# Patient Record
Sex: FEMALE | Race: WHITE | NOT HISPANIC OR LATINO | Employment: UNEMPLOYED | URBAN - NONMETROPOLITAN AREA
[De-identification: names, ages, dates, MRNs, and addresses within clinical notes are randomized per-mention and may not be internally consistent; named-entity substitution may affect disease eponyms.]

---

## 2024-06-27 ENCOUNTER — HOSPITAL ENCOUNTER (EMERGENCY)
Facility: HOSPITAL | Age: 44
Discharge: HOME | End: 2024-06-27
Attending: EMERGENCY MEDICINE
Payer: MEDICAID

## 2024-06-27 VITALS
OXYGEN SATURATION: 98 % | RESPIRATION RATE: 16 BRPM | HEART RATE: 111 BPM | WEIGHT: 264 LBS | HEIGHT: 66 IN | TEMPERATURE: 97.3 F | BODY MASS INDEX: 42.43 KG/M2 | SYSTOLIC BLOOD PRESSURE: 134 MMHG | DIASTOLIC BLOOD PRESSURE: 78 MMHG

## 2024-06-27 DIAGNOSIS — A60.04 HERPES SIMPLEX VULVOVAGINITIS: Primary | ICD-10-CM

## 2024-06-27 LAB
QT INTERVAL: 356 MS
QTC INTERVAL: 545 MS

## 2024-06-27 PROCEDURE — 93010 ELECTROCARDIOGRAM REPORT: CPT | Performed by: INTERNAL MEDICINE

## 2024-06-27 PROCEDURE — 99283 EMERGENCY DEPT VISIT LOW MDM: CPT

## 2024-06-27 PROCEDURE — 25010000002 KETOROLAC TROMETHAMINE PER 15 MG: Performed by: EMERGENCY MEDICINE

## 2024-06-27 PROCEDURE — 96374 THER/PROPH/DIAG INJ IV PUSH: CPT

## 2024-06-27 PROCEDURE — 25810000003 SODIUM CHLORIDE 0.9 % SOLUTION: Performed by: EMERGENCY MEDICINE

## 2024-06-27 PROCEDURE — 93005 ELECTROCARDIOGRAM TRACING: CPT | Performed by: EMERGENCY MEDICINE

## 2024-06-27 RX ORDER — ACYCLOVIR 200 MG/1
400 CAPSULE ORAL ONCE
Status: COMPLETED | OUTPATIENT
Start: 2024-06-27 | End: 2024-06-27

## 2024-06-27 RX ORDER — HYDROCODONE BITARTRATE AND ACETAMINOPHEN 5; 325 MG/1; MG/1
1 TABLET ORAL ONCE
Status: COMPLETED | OUTPATIENT
Start: 2024-06-27 | End: 2024-06-27

## 2024-06-27 RX ORDER — KETOROLAC TROMETHAMINE 30 MG/ML
30 INJECTION, SOLUTION INTRAMUSCULAR; INTRAVENOUS ONCE
Status: COMPLETED | OUTPATIENT
Start: 2024-06-27 | End: 2024-06-27

## 2024-06-27 RX ADMIN — ACYCLOVIR 400 MG: 200 CAPSULE ORAL at 17:21

## 2024-06-27 RX ADMIN — KETOROLAC TROMETHAMINE 30 MG: 30 INJECTION, SOLUTION INTRAMUSCULAR; INTRAVENOUS at 16:25

## 2024-06-27 RX ADMIN — SODIUM CHLORIDE 1000 ML: 9 INJECTION, SOLUTION INTRAVENOUS at 16:25

## 2024-06-27 RX ADMIN — HYDROCODONE BITARTRATE AND ACETAMINOPHEN 1 TABLET: 5; 325 TABLET ORAL at 16:25

## 2024-06-27 NOTE — ED NOTES
Moises: [phone]    Spoke with patient's sister Moises who states the patient lied to get discharged home after her stroke saying there is someone in the home to help her, but apparently there is no one.

## 2024-06-27 NOTE — ED PROVIDER NOTES
Subjective   History of Present Illness  Lety is a 43-year-old female who presents to the ED for vaginal pruritus and pain.  She states she has genital herpes and has had an outbreak for the past 2 days, she states that she also recently had a stroke.  She complains of discomfort and states has been laying there with a fan on she has not taken any Aciclovir.  But states she does have prescriptions for acyclovir.        Review of Systems   All other systems reviewed and are negative.      No past medical history on file.    No Known Allergies    No past surgical history on file.    No family history on file.    Social History     Socioeconomic History    Marital status: Single           Objective   Physical Exam  Vitals reviewed.   Constitutional:       Appearance: Normal appearance. She is normal weight.   HENT:      Head: Normocephalic and atraumatic.      Right Ear: External ear normal.      Left Ear: External ear normal.      Nose: Nose normal.      Mouth/Throat:      Mouth: Mucous membranes are moist. Mucous membranes are dry.      Pharynx: Oropharynx is clear.   Eyes:      Extraocular Movements: Extraocular movements intact.      Conjunctiva/sclera: Conjunctivae normal.      Pupils: Pupils are equal, round, and reactive to light.   Cardiovascular:      Rate and Rhythm: Normal rate and regular rhythm.      Pulses: Normal pulses.      Heart sounds: Normal heart sounds.   Pulmonary:      Effort: Pulmonary effort is normal.      Breath sounds: Normal breath sounds.   Abdominal:      General: Bowel sounds are normal.      Palpations: Abdomen is soft.   Genitourinary:     Comments: Lesions consistent with genital herpes on the vagina perineum  Musculoskeletal:         General: Normal range of motion.      Cervical back: Normal range of motion and neck supple. No rigidity.   Skin:     General: Skin is warm and dry.      Capillary Refill: Capillary refill takes less than 2 seconds.   Neurological:      General: No  focal deficit present.      Mental Status: She is alert and oriented to person, place, and time.   Psychiatric:         Mood and Affect: Mood normal.         Procedures           ED Course                                             Medical Decision Making  Lety is a 43-year-old female who presents to the ED for chief complaint of vaginal itchiness.  She states she has a prescription of acyclovir but has not taken it, I will give her the first dose of acyclovir I will give her a liter of fluid due to some tachycardia which is likely stress-induced and I will give her Toradol for pain.    Answered all questions provided reassurance  Patient was discharged she was offered prescription IV acyclovir she said no as she knows what her prescription at home is.  I encouraged her to fill it out.    Problems Addressed:  Herpes simplex vulvovaginitis: complicated acute illness or injury    Amount and/or Complexity of Data Reviewed  ECG/medicine tests: ordered.    Risk  Prescription drug management.        Final diagnoses:   Herpes simplex vulvovaginitis       ED Disposition  ED Disposition       ED Disposition   Discharge    Condition   Stable    Comment   --               Provider, No Known  [facility]  [facility] [id number] [phone]               Medication List      No changes were made to your prescriptions during this visit.            Ramon Cruz MD  06/27/24 0105

## 2024-06-27 NOTE — CASE MANAGEMENT/SOCIAL WORK
Case Management/Social Work    Patient Name:  Lety Johnson  YOB: 1980  MRN: [id number]  Admit Date:  6/27/2024      Spoke with patient at bedside. Patient is A&Ox 4. She stated she lives at home with her boyfriend  Octavio Mccauley [phone] or [phone] and son Arnoldo Dior [phone]. Patient stated she does not want to stay here she wants to go back home. Patient gave me her Aunt Carol phone number and she confirmed that patients adult son lives at home with her also but is OOT with her this weekend. Her Aunt gave me patients mother Trev Johnson [phone] phone number. The mother refuses to call APS out of fear of her grandson. Explained to patients mother that we are unable to hold patient against her will if she wants to go home. Mother verbalized understanding and requested number to APS that she would make call to them tomorrow and have them check on patient. Patient stated she was going home we couldn't make her stay that she could get in the house and she wanted to go home. [facility] has accepted patient.       Electronically signed by:  Fidelia Magdaleno  06/27/24 19:35 EDT

## 2024-09-26 ENCOUNTER — APPOINTMENT (OUTPATIENT)
Dept: CT IMAGING | Facility: HOSPITAL | Age: 44
End: 2024-09-26
Payer: MEDICAID

## 2024-09-26 ENCOUNTER — APPOINTMENT (OUTPATIENT)
Dept: GENERAL RADIOLOGY | Facility: HOSPITAL | Age: 44
End: 2024-09-26
Payer: MEDICAID

## 2024-09-26 ENCOUNTER — HOSPITAL ENCOUNTER (INPATIENT)
Facility: HOSPITAL | Age: 44
Discharge: HOME | End: 2024-09-26
Attending: STUDENT IN AN ORGANIZED HEALTH CARE EDUCATION/TRAINING PROGRAM | Admitting: INTERNAL MEDICINE
Payer: MEDICAID

## 2024-09-26 DIAGNOSIS — G62.9 PERIPHERAL POLYNEUROPATHY: ICD-10-CM

## 2024-09-26 DIAGNOSIS — L89.629 PRESSURE INJURY OF SKIN OF LEFT HEEL, UNSPECIFIED INJURY STAGE: ICD-10-CM

## 2024-09-26 DIAGNOSIS — E87.6 HYPOKALEMIA: Primary | ICD-10-CM

## 2024-09-26 DIAGNOSIS — L89.159 PRESSURE INJURY OF SKIN OF SACRAL REGION, UNSPECIFIED INJURY STAGE: ICD-10-CM

## 2024-09-26 DIAGNOSIS — N30.00 ACUTE CYSTITIS WITHOUT HEMATURIA: ICD-10-CM

## 2024-09-26 PROBLEM — N39.0 UTI (URINARY TRACT INFECTION): Status: ACTIVE | Noted: 2024-09-26

## 2024-09-26 LAB
ALBUMIN SERPL-MCNC: 3.5 G/DL (ref 3.5–5.2)
ALBUMIN/GLOB SERPL: 1 G/DL
ALP SERPL-CCNC: 119 U/L (ref 39–117)
ALT SERPL W P-5'-P-CCNC: 8 U/L (ref 1–33)
ANION GAP SERPL CALCULATED.3IONS-SCNC: 12.1 MMOL/L (ref 5–15)
AST SERPL-CCNC: 21 U/L (ref 1–32)
BACTERIA UR QL AUTO: ABNORMAL /HPF
BASOPHILS # BLD AUTO: 0.02 10*3/MM3 (ref 0–0.2)
BASOPHILS NFR BLD AUTO: 0.2 % (ref 0–1.5)
BILIRUB SERPL-MCNC: 0.6 MG/DL (ref 0–1.2)
BILIRUB UR QL STRIP: ABNORMAL
BUN SERPL-MCNC: 10 MG/DL (ref 6–20)
BUN/CREAT SERPL: 19.6 (ref 7–25)
C TRACH RRNA CVX QL NAA+PROBE: NOT DETECTED
CALCIUM SPEC-SCNC: 9.4 MG/DL (ref 8.6–10.5)
CHLORIDE SERPL-SCNC: 99 MMOL/L (ref 98–107)
CLARITY UR: ABNORMAL
CO2 SERPL-SCNC: 28.9 MMOL/L (ref 22–29)
COLOR UR: ABNORMAL
CREAT SERPL-MCNC: 0.51 MG/DL (ref 0.57–1)
CRP SERPL-MCNC: 3.8 MG/DL (ref 0–0.5)
D-LACTATE SERPL-SCNC: 1.1 MMOL/L (ref 0.5–2)
DEPRECATED RDW RBC AUTO: 46.9 FL (ref 37–54)
EGFRCR SERPLBLD CKD-EPI 2021: 119.7 ML/MIN/1.73
EOSINOPHIL # BLD AUTO: 0.05 10*3/MM3 (ref 0–0.4)
EOSINOPHIL NFR BLD AUTO: 0.5 % (ref 0.3–6.2)
ERYTHROCYTE [DISTWIDTH] IN BLOOD BY AUTOMATED COUNT: 13.6 % (ref 12.3–15.4)
FLUAV RNA RESP QL NAA+PROBE: NOT DETECTED
FLUBV RNA ISLT QL NAA+PROBE: NOT DETECTED
GLOBULIN UR ELPH-MCNC: 3.6 GM/DL
GLUCOSE SERPL-MCNC: 220 MG/DL (ref 65–99)
GLUCOSE UR STRIP-MCNC: NEGATIVE MG/DL
HAV IGM SERPL QL IA: NORMAL
HBV CORE IGM SERPL QL IA: NORMAL
HBV SURFACE AG SERPL QL IA: NORMAL
HCG SERPL QL: NEGATIVE
HCT VFR BLD AUTO: 37.4 % (ref 34–46.6)
HCV AB SER QL: NORMAL
HGB BLD-MCNC: 12.5 G/DL (ref 12–15.9)
HGB UR QL STRIP.AUTO: ABNORMAL
HIV 1+2 AB+HIV1 P24 AG SERPL QL IA: NORMAL
HYALINE CASTS UR QL AUTO: ABNORMAL /LPF
IMM GRANULOCYTES # BLD AUTO: 0.07 10*3/MM3 (ref 0–0.05)
IMM GRANULOCYTES NFR BLD AUTO: 0.7 % (ref 0–0.5)
KETONES UR QL STRIP: ABNORMAL
LEUKOCYTE ESTERASE UR QL STRIP.AUTO: ABNORMAL
LYMPHOCYTES # BLD AUTO: 3 10*3/MM3 (ref 0.7–3.1)
LYMPHOCYTES NFR BLD AUTO: 28.8 % (ref 19.6–45.3)
MAGNESIUM SERPL-MCNC: 1.5 MG/DL (ref 1.6–2.6)
MCH RBC QN AUTO: 31.4 PG (ref 26.6–33)
MCHC RBC AUTO-ENTMCNC: 33.4 G/DL (ref 31.5–35.7)
MCV RBC AUTO: 94 FL (ref 79–97)
MONOCYTES # BLD AUTO: 0.39 10*3/MM3 (ref 0.1–0.9)
MONOCYTES NFR BLD AUTO: 3.7 % (ref 5–12)
N GONORRHOEA RRNA SPEC QL NAA+PROBE: NOT DETECTED
NEUTROPHILS NFR BLD AUTO: 6.89 10*3/MM3 (ref 1.7–7)
NEUTROPHILS NFR BLD AUTO: 66.1 % (ref 42.7–76)
NITRITE UR QL STRIP: POSITIVE
NRBC BLD AUTO-RTO: 0 /100 WBC (ref 0–0.2)
PH UR STRIP.AUTO: 6 [PH] (ref 5–8)
PLATELET # BLD AUTO: 402 10*3/MM3 (ref 140–450)
PMV BLD AUTO: 9.2 FL (ref 6–12)
POTASSIUM SERPL-SCNC: 2.7 MMOL/L (ref 3.5–5.2)
PROT SERPL-MCNC: 7.1 G/DL (ref 6–8.5)
PROT UR QL STRIP: ABNORMAL
RBC # BLD AUTO: 3.98 10*6/MM3 (ref 3.77–5.28)
RBC # UR STRIP: ABNORMAL /HPF
REF LAB TEST METHOD: ABNORMAL
SARS-COV-2 RNA RESP QL NAA+PROBE: NOT DETECTED
SODIUM SERPL-SCNC: 140 MMOL/L (ref 136–145)
SP GR UR STRIP: >=1.03 (ref 1–1.03)
SQUAMOUS #/AREA URNS HPF: ABNORMAL /HPF
TRICHOMONAS VAGINALIS PCR: NOT DETECTED
UROBILINOGEN UR QL STRIP: ABNORMAL
WBC # UR STRIP: ABNORMAL /HPF
WBC NRBC COR # BLD AUTO: 10.42 10*3/MM3 (ref 3.4–10.8)

## 2024-09-26 PROCEDURE — 71045 X-RAY EXAM CHEST 1 VIEW: CPT

## 2024-09-26 PROCEDURE — G0432 EIA HIV-1/HIV-2 SCREEN: HCPCS | Performed by: INTERNAL MEDICINE

## 2024-09-26 PROCEDURE — 87661 TRICHOMONAS VAGINALIS AMPLIF: CPT | Performed by: INTERNAL MEDICINE

## 2024-09-26 PROCEDURE — 72131 CT LUMBAR SPINE W/O DYE: CPT

## 2024-09-26 PROCEDURE — 71045 X-RAY EXAM CHEST 1 VIEW: CPT | Performed by: RADIOLOGY

## 2024-09-26 PROCEDURE — 25010000002 POTASSIUM CHLORIDE 10 MEQ/100ML SOLUTION: Performed by: PHYSICIAN ASSISTANT

## 2024-09-26 PROCEDURE — G0378 HOSPITAL OBSERVATION PER HR: HCPCS

## 2024-09-26 PROCEDURE — 73610 X-RAY EXAM OF ANKLE: CPT | Performed by: RADIOLOGY

## 2024-09-26 PROCEDURE — 87491 CHLMYD TRACH DNA AMP PROBE: CPT | Performed by: INTERNAL MEDICINE

## 2024-09-26 PROCEDURE — 87040 BLOOD CULTURE FOR BACTERIA: CPT | Performed by: PHYSICIAN ASSISTANT

## 2024-09-26 PROCEDURE — 83735 ASSAY OF MAGNESIUM: CPT | Performed by: PHYSICIAN ASSISTANT

## 2024-09-26 PROCEDURE — 99223 1ST HOSP IP/OBS HIGH 75: CPT | Performed by: INTERNAL MEDICINE

## 2024-09-26 PROCEDURE — 25810000003 SODIUM CHLORIDE 0.9 % SOLUTION: Performed by: INTERNAL MEDICINE

## 2024-09-26 PROCEDURE — 72131 CT LUMBAR SPINE W/O DYE: CPT | Performed by: RADIOLOGY

## 2024-09-26 PROCEDURE — 72128 CT CHEST SPINE W/O DYE: CPT | Performed by: RADIOLOGY

## 2024-09-26 PROCEDURE — 87086 URINE CULTURE/COLONY COUNT: CPT | Performed by: PHYSICIAN ASSISTANT

## 2024-09-26 PROCEDURE — 99285 EMERGENCY DEPT VISIT HI MDM: CPT

## 2024-09-26 PROCEDURE — 83605 ASSAY OF LACTIC ACID: CPT | Performed by: PHYSICIAN ASSISTANT

## 2024-09-26 PROCEDURE — P9612 CATHETERIZE FOR URINE SPEC: HCPCS

## 2024-09-26 PROCEDURE — 72125 CT NECK SPINE W/O DYE: CPT

## 2024-09-26 PROCEDURE — 73630 X-RAY EXAM OF FOOT: CPT | Performed by: RADIOLOGY

## 2024-09-26 PROCEDURE — 73630 X-RAY EXAM OF FOOT: CPT

## 2024-09-26 PROCEDURE — 25010000002 MORPHINE PER 10 MG: Performed by: HOSPITALIST

## 2024-09-26 PROCEDURE — 70450 CT HEAD/BRAIN W/O DYE: CPT | Performed by: RADIOLOGY

## 2024-09-26 PROCEDURE — 72128 CT CHEST SPINE W/O DYE: CPT

## 2024-09-26 PROCEDURE — 25010000002 ONDANSETRON PER 1 MG: Performed by: PHYSICIAN ASSISTANT

## 2024-09-26 PROCEDURE — 81001 URINALYSIS AUTO W/SCOPE: CPT | Performed by: PHYSICIAN ASSISTANT

## 2024-09-26 PROCEDURE — 93005 ELECTROCARDIOGRAM TRACING: CPT

## 2024-09-26 PROCEDURE — 72125 CT NECK SPINE W/O DYE: CPT | Performed by: RADIOLOGY

## 2024-09-26 PROCEDURE — 85025 COMPLETE CBC W/AUTO DIFF WBC: CPT | Performed by: PHYSICIAN ASSISTANT

## 2024-09-26 PROCEDURE — 80053 COMPREHEN METABOLIC PANEL: CPT | Performed by: PHYSICIAN ASSISTANT

## 2024-09-26 PROCEDURE — 70450 CT HEAD/BRAIN W/O DYE: CPT

## 2024-09-26 PROCEDURE — 73610 X-RAY EXAM OF ANKLE: CPT

## 2024-09-26 PROCEDURE — 25010000002 PROCHLORPERAZINE 10 MG/2ML SOLUTION

## 2024-09-26 PROCEDURE — 25010000002 CEFTRIAXONE PER 250 MG: Performed by: PHYSICIAN ASSISTANT

## 2024-09-26 PROCEDURE — 93010 ELECTROCARDIOGRAM REPORT: CPT | Performed by: INTERNAL MEDICINE

## 2024-09-26 PROCEDURE — 84703 CHORIONIC GONADOTROPIN ASSAY: CPT | Performed by: PHYSICIAN ASSISTANT

## 2024-09-26 PROCEDURE — 87591 N.GONORRHOEAE DNA AMP PROB: CPT | Performed by: INTERNAL MEDICINE

## 2024-09-26 PROCEDURE — 87186 SC STD MICRODIL/AGAR DIL: CPT | Performed by: PHYSICIAN ASSISTANT

## 2024-09-26 PROCEDURE — 25010000002 HEPARIN (PORCINE) PER 1000 UNITS: Performed by: INTERNAL MEDICINE

## 2024-09-26 PROCEDURE — 87636 SARSCOV2 & INF A&B AMP PRB: CPT | Performed by: PHYSICIAN ASSISTANT

## 2024-09-26 PROCEDURE — 25010000002 MORPHINE PER 10 MG: Performed by: STUDENT IN AN ORGANIZED HEALTH CARE EDUCATION/TRAINING PROGRAM

## 2024-09-26 PROCEDURE — 25810000003 SODIUM CHLORIDE 0.9 % SOLUTION: Performed by: PHYSICIAN ASSISTANT

## 2024-09-26 PROCEDURE — 87077 CULTURE AEROBIC IDENTIFY: CPT | Performed by: PHYSICIAN ASSISTANT

## 2024-09-26 PROCEDURE — 80074 ACUTE HEPATITIS PANEL: CPT | Performed by: INTERNAL MEDICINE

## 2024-09-26 PROCEDURE — 36415 COLL VENOUS BLD VENIPUNCTURE: CPT

## 2024-09-26 PROCEDURE — 86140 C-REACTIVE PROTEIN: CPT | Performed by: PHYSICIAN ASSISTANT

## 2024-09-26 RX ORDER — BISACODYL 5 MG/1
5 TABLET, DELAYED RELEASE ORAL DAILY PRN
Status: DISPENSED | OUTPATIENT
Start: 2024-09-26

## 2024-09-26 RX ORDER — ATORVASTATIN CALCIUM 40 MG/1
80 TABLET, FILM COATED ORAL DAILY
Status: DISPENSED | OUTPATIENT
Start: 2024-09-26

## 2024-09-26 RX ORDER — CYCLOBENZAPRINE HCL 10 MG
10 TABLET ORAL 3 TIMES DAILY PRN
Status: DISPENSED | OUTPATIENT
Start: 2024-09-26

## 2024-09-26 RX ORDER — POTASSIUM CHLORIDE 7.45 MG/ML
10 INJECTION INTRAVENOUS ONCE
Status: COMPLETED | OUTPATIENT
Start: 2024-09-26 | End: 2024-09-26

## 2024-09-26 RX ORDER — ASPIRIN 81 MG/1
81 TABLET, CHEWABLE ORAL DAILY
Status: DISPENSED | OUTPATIENT
Start: 2024-09-26

## 2024-09-26 RX ORDER — POTASSIUM CHLORIDE 7.45 MG/ML
10 INJECTION INTRAVENOUS ONCE
Status: COMPLETED | OUTPATIENT
Start: 2024-09-26 | End: 2024-09-27

## 2024-09-26 RX ORDER — PANTOPRAZOLE SODIUM 40 MG/1
40 TABLET, DELAYED RELEASE ORAL DAILY
Status: ON HOLD | COMMUNITY

## 2024-09-26 RX ORDER — SODIUM CHLORIDE 9 MG/ML
75 INJECTION, SOLUTION INTRAVENOUS CONTINUOUS
Status: DISCONTINUED | OUTPATIENT
Start: 2024-09-26 | End: 2024-10-05

## 2024-09-26 RX ORDER — PANTOPRAZOLE SODIUM 40 MG/1
40 TABLET, DELAYED RELEASE ORAL DAILY
Status: DISPENSED | OUTPATIENT
Start: 2024-09-26

## 2024-09-26 RX ORDER — MORPHINE SULFATE 2 MG/ML
2 INJECTION, SOLUTION INTRAMUSCULAR; INTRAVENOUS ONCE
Status: COMPLETED | OUTPATIENT
Start: 2024-09-26 | End: 2024-09-26

## 2024-09-26 RX ORDER — SODIUM CHLORIDE 0.9 % (FLUSH) 0.9 %
10 SYRINGE (ML) INJECTION AS NEEDED
Status: ACTIVE | OUTPATIENT
Start: 2024-09-26

## 2024-09-26 RX ORDER — CYCLOBENZAPRINE HCL 10 MG
10 TABLET ORAL 3 TIMES DAILY PRN
Status: ON HOLD | COMMUNITY

## 2024-09-26 RX ORDER — LISINOPRIL 10 MG/1
20 TABLET ORAL DAILY
Status: DISPENSED | OUTPATIENT
Start: 2024-09-26

## 2024-09-26 RX ORDER — DULOXETIN HYDROCHLORIDE 60 MG/1
60 CAPSULE, DELAYED RELEASE ORAL DAILY
Status: ON HOLD | COMMUNITY

## 2024-09-26 RX ORDER — DULOXETIN HYDROCHLORIDE 60 MG/1
60 CAPSULE, DELAYED RELEASE ORAL DAILY
Status: DISPENSED | OUTPATIENT
Start: 2024-09-26

## 2024-09-26 RX ORDER — POTASSIUM CHLORIDE 1500 MG/1
40 TABLET, EXTENDED RELEASE ORAL ONCE
Status: COMPLETED | OUTPATIENT
Start: 2024-09-26 | End: 2024-09-26

## 2024-09-26 RX ORDER — LISINOPRIL 20 MG/1
20 TABLET ORAL DAILY
Status: ON HOLD | COMMUNITY

## 2024-09-26 RX ORDER — PROCHLORPERAZINE EDISYLATE 5 MG/ML
2.5 INJECTION INTRAMUSCULAR; INTRAVENOUS EVERY 6 HOURS PRN
Status: DISPENSED | OUTPATIENT
Start: 2024-09-26

## 2024-09-26 RX ORDER — SODIUM CHLORIDE 9 MG/ML
40 INJECTION, SOLUTION INTRAVENOUS AS NEEDED
Status: ACTIVE | OUTPATIENT
Start: 2024-09-26

## 2024-09-26 RX ORDER — SODIUM CHLORIDE 0.9 % (FLUSH) 0.9 %
10 SYRINGE (ML) INJECTION EVERY 12 HOURS SCHEDULED
Status: ACTIVE | OUTPATIENT
Start: 2024-09-26

## 2024-09-26 RX ORDER — POLYETHYLENE GLYCOL 3350 17 G/17G
17 POWDER, FOR SOLUTION ORAL DAILY PRN
Status: DISPENSED | OUTPATIENT
Start: 2024-09-26

## 2024-09-26 RX ORDER — ONDANSETRON 2 MG/ML
4 INJECTION INTRAMUSCULAR; INTRAVENOUS ONCE
Status: COMPLETED | OUTPATIENT
Start: 2024-09-26 | End: 2024-09-26

## 2024-09-26 RX ORDER — BISACODYL 10 MG
10 SUPPOSITORY, RECTAL RECTAL DAILY PRN
Status: ACTIVE | OUTPATIENT
Start: 2024-09-26

## 2024-09-26 RX ORDER — HEPARIN SODIUM 5000 [USP'U]/ML
5000 INJECTION, SOLUTION INTRAVENOUS; SUBCUTANEOUS EVERY 8 HOURS SCHEDULED
Status: DISPENSED | OUTPATIENT
Start: 2024-09-26

## 2024-09-26 RX ORDER — ASPIRIN 81 MG/1
81 TABLET, CHEWABLE ORAL DAILY
Status: ON HOLD | COMMUNITY

## 2024-09-26 RX ORDER — ATORVASTATIN CALCIUM 80 MG/1
80 TABLET, FILM COATED ORAL DAILY
Status: ON HOLD | COMMUNITY

## 2024-09-26 RX ORDER — AMOXICILLIN 250 MG
2 CAPSULE ORAL 2 TIMES DAILY PRN
Status: DISPENSED | OUTPATIENT
Start: 2024-09-26

## 2024-09-26 RX ADMIN — ASPIRIN 81 MG: 81 TABLET, CHEWABLE ORAL at 18:35

## 2024-09-26 RX ADMIN — ONDANSETRON 4 MG: 2 INJECTION INTRAMUSCULAR; INTRAVENOUS at 14:10

## 2024-09-26 RX ADMIN — POTASSIUM CHLORIDE 10 MEQ: 7.46 INJECTION, SOLUTION INTRAVENOUS at 19:50

## 2024-09-26 RX ADMIN — MORPHINE SULFATE 2 MG: 2 INJECTION, SOLUTION INTRAMUSCULAR; INTRAVENOUS at 20:24

## 2024-09-26 RX ADMIN — PANTOPRAZOLE SODIUM 40 MG: 40 TABLET, DELAYED RELEASE ORAL at 18:35

## 2024-09-26 RX ADMIN — MORPHINE SULFATE 4 MG: 4 INJECTION, SOLUTION INTRAMUSCULAR; INTRAVENOUS at 14:10

## 2024-09-26 RX ADMIN — POTASSIUM CHLORIDE 10 MEQ: 7.46 INJECTION, SOLUTION INTRAVENOUS at 16:18

## 2024-09-26 RX ADMIN — POTASSIUM CHLORIDE 10 MEQ: 7.46 INJECTION, SOLUTION INTRAVENOUS at 20:25

## 2024-09-26 RX ADMIN — LISINOPRIL 20 MG: 10 TABLET ORAL at 18:35

## 2024-09-26 RX ADMIN — NICOTINE 1 PATCH: 7 PATCH, EXTENDED RELEASE TRANSDERMAL at 18:34

## 2024-09-26 RX ADMIN — POTASSIUM CHLORIDE 40 MEQ: 1500 TABLET, EXTENDED RELEASE ORAL at 16:18

## 2024-09-26 RX ADMIN — DULOXETINE HYDROCHLORIDE 60 MG: 60 CAPSULE, DELAYED RELEASE ORAL at 18:35

## 2024-09-26 RX ADMIN — ATORVASTATIN CALCIUM 80 MG: 40 TABLET, FILM COATED ORAL at 18:34

## 2024-09-26 RX ADMIN — CYCLOBENZAPRINE 10 MG: 10 TABLET, FILM COATED ORAL at 19:50

## 2024-09-26 RX ADMIN — SODIUM CHLORIDE 75 ML/HR: 9 INJECTION, SOLUTION INTRAVENOUS at 18:35

## 2024-09-26 RX ADMIN — POTASSIUM CHLORIDE 10 MEQ: 7.46 INJECTION, SOLUTION INTRAVENOUS at 18:35

## 2024-09-26 RX ADMIN — SODIUM CHLORIDE 1000 ML: 9 INJECTION, SOLUTION INTRAVENOUS at 15:27

## 2024-09-26 RX ADMIN — POTASSIUM CHLORIDE 10 MEQ: 7.46 INJECTION, SOLUTION INTRAVENOUS at 21:59

## 2024-09-26 RX ADMIN — CEFTRIAXONE 1000 MG: 1 INJECTION, POWDER, FOR SOLUTION INTRAMUSCULAR; INTRAVENOUS at 15:28

## 2024-09-26 RX ADMIN — HEPARIN SODIUM 5000 UNITS: 5000 INJECTION INTRAVENOUS; SUBCUTANEOUS at 21:13

## 2024-09-26 RX ADMIN — POTASSIUM CHLORIDE 10 MEQ: 7.46 INJECTION, SOLUTION INTRAVENOUS at 23:31

## 2024-09-26 RX ADMIN — PROCHLORPERAZINE EDISYLATE 2.5 MG: 5 INJECTION INTRAMUSCULAR; INTRAVENOUS at 23:32

## 2024-09-26 NOTE — H&P
[facility] HOSPITALIST HISTORY AND PHYSICAL    Patient Identification:  Name:  Lety Johnson  Age:  43 y.o.  Sex:  female  :  1980  MRN:  [id number]   Admit Date: 2024   Visit Number:  [id number]  Room number:  404/04  Primary Care Physician:  Provider, No Known     Subjective     Chief complaint:    Chief Complaint   Patient presents with    Fall     History of presenting illness:   43F Morbid Obesity by BMI PMH History Genital Herpes, Cerebrovascular Accident 2024 complicated by L sided paralysis, presented to [facility] emergency room  after sliding into the floor off her chair due to weakness.  Upon arrival patient afebrile, heart rate 109, respiratory rate 18, blood pressure 146/101, satting 98% on room air. Labs showed WBC count 10K, lactate 1.1, CRP 3.8, potassium 2.7, magnesium 1.5, HCG negative, blood cultures pending. CXR no acute cardiopulmonary processes.  Xray ankle/foot without fracture or dislocation.  Emergency room provider gave antibiotics, pain medications, zofran, potassium replacement.  Hospital Medicine consulted for admission. Patient seen and examined in the emergency room, notes fevers chills at home a few days ago, recently has been on antibiotics for lower extremity cellulitis, has had some dysuria and suprapubic pain, denies current sexual activity, reports her fiance was taken to intermediate about a week ago and has been her primary caretaker, has had difficulty at home since prior stroke and caretakers not consistent, last 24hrs didn't have anyone to help her, sister endorses recent confusion/hallucinations beyond baseline, seeing dead family members, coinciding with onset of dysuria.  ---------------------------------------------------------------------------------------------------------------------   Review of Systems   Constitutional:  Positive for chills and fever.   HENT: Negative.     Eyes: Negative.    Respiratory:  Positive for shortness  of breath.    Cardiovascular:  Positive for leg swelling. Negative for chest pain.   Gastrointestinal: Negative.    Endocrine: Negative.    Genitourinary:  Positive for dysuria.   Musculoskeletal: Negative.    Skin:  Positive for rash.   Allergic/Immunologic: Negative.    Neurological:  Positive for weakness.   Hematological: Negative.    Psychiatric/Behavioral:  Positive for hallucinations.      ---------------------------------------------------------------------------------------------------------------------   Past Medical History:   Diagnosis Date    Stroke      No past surgical history on file.  No family history on file.  Social History     Socioeconomic History    Marital status: Single     ---------------------------------------------------------------------------------------------------------------------   Allergies:  Patient has no known allergies.  ---------------------------------------------------------------------------------------------------------------------   Medications below are reported home medications pulling from within the system; at this time, these medications have not been reconciled unless otherwise specified and are in the verification process for further verifcation as current home medications.    Prior to Admission Medications       Prescriptions Last Dose Informant Patient Reported? Taking?    aspirin 81 MG chewable tablet Unknown  Yes No    Chew 1 tablet Daily.    atorvastatin (LIPITOR) 80 MG tablet Unknown  Yes No    Take 1 tablet by mouth Daily.    cyclobenzaprine (FLEXERIL) 10 MG tablet Unknown  Yes No    Take 1 tablet by mouth 3 (Three) Times a Day As Needed for Muscle Spasms.    DULoxetine (CYMBALTA) 60 MG capsule Unknown  Yes No    Take 1 capsule by mouth Daily.    lisinopril (PRINIVIL,ZESTRIL) 20 MG tablet Unknown  Yes No    Take 1 tablet by mouth Daily.    metFORMIN (GLUCOPHAGE) 1000 MG tablet Unknown  Yes No    Take 1 tablet by mouth 2 (Two) Times a Day With Meals.     pantoprazole (PROTONIX) 40 MG EC tablet Unknown  Yes No    Take 1 tablet by mouth Daily.          Objective     Vital Signs:  Temp:  [98.2 °F (36.8 °C)] 98.2 °F (36.8 °C)  Heart Rate:  [] 118  Resp:  [18] 18  BP: (135-157)/() 140/79    Mean Arterial Pressure (Non-Invasive) for the past 24 hrs (Last 3 readings):   Noninvasive MAP (mmHg)   09/26/24 1645 90   09/26/24 1630 100   09/26/24 1615 103     SpO2:  [91 %-100 %] 94 %  on   ;   Device (Oxygen Therapy): room air  Body mass index is 43.26 kg/m².    Wt Readings from Last 3 Encounters:   09/26/24 120 kg (264 lb)   06/27/24 120 kg (264 lb)      ---------------------------------------------------------------------------------------------------------------------   Physical Exam:  Constitutional:  Well-developed and well-nourished. Older than stated age. No acute distress.      HENT:  Head:  Normocephalic and atraumatic.  Mouth:  Moist mucous membranes.    Eyes:  Conjunctivae and EOM are normal. No scleral icterus.    Neck:  Neck supple.  No JVD present.    Cardiovascular:  Normal rate, regular rhythm and normal heart sounds with no murmur.  Pulmonary/Chest:  No respiratory distress, no wheezes, no crackles, with normal breath sounds and good air movement.  Abdominal:  Soft. No distension and no tenderness.   Musculoskeletal:  No tenderness, and no deformity.  No red or swollen joints anywhere.    Neurological:  Alert and oriented to person, place, and time.  No cranial nerve deficit. Chronic L sided paralysis.    Skin:  Skin is warm and dry. No pallor. Significant intertriginous erythema under panus, consistent with yeast infection.   Peripheral vascular:  No clubbing, no cyanosis, trace edema.  Psychiatric: Appropriate mood and affect  Edited by: Ramon Marc MD at 9/26/2024 1701  ---------------------------------------------------------------------------------------------------------------------  EKG:  N/A    Telemetry:  normal sinus rhythm, no  "significant ST changes    I have personally looked at telemetry.    Last echocardiogram:  Ordered and pending   --------------------------------------------------------------------------------------------------------------------  Labs:  Results from last 7 days   Lab Units 09/26/24  1504 09/26/24  1350   LACTATE mmol/L  --  1.1   CRP mg/dL 3.80*  --    WBC 10*3/mm3  --  10.42   HEMOGLOBIN g/dL  --  12.5   HEMATOCRIT %  --  37.4   MCV fL  --  94.0   MCHC g/dL  --  33.4   PLATELETS 10*3/mm3  --  402         Results from last 7 days   Lab Units 09/26/24  1504   SODIUM mmol/L 140   POTASSIUM mmol/L 2.7*   MAGNESIUM mg/dL 1.5*   CHLORIDE mmol/L 99   CO2 mmol/L 28.9   BUN mg/dL 10   CREATININE mg/dL 0.51*   CALCIUM mg/dL 9.4   GLUCOSE mg/dL 220*   ALBUMIN g/dL 3.5   BILIRUBIN mg/dL 0.6   ALK PHOS U/L 119*   AST (SGOT) U/L 21   ALT (SGPT) U/L 8   Estimated Creatinine Clearance: 188.1 mL/min (A) (by C-G formula based on SCr of 0.51 mg/dL (L)).  No results found for: \"AMMONIA\"          No results found for: \"HGBA1C\", \"POCGLU\"  No results found for: \"TSH\", \"FREET4\"  No results found for: \"PREGTESTUR\", \"PREGSERUM\", \"HCG\", \"HCGQUANT\"  Pain Management Panel           No data to display              Brief Urine Lab Results  (Last result in the past 365 days)        Color   Clarity   Blood   Leuk Est   Nitrite   Protein   CREAT   Urine HCG        09/26/24 1419 Dark Yellow   Turbid   Trace   Moderate (2+)   Positive   30 mg/dL (1+)                 No results found for: \"BLOODCX\"  No results found for: \"URINECX\"  No results found for: \"WOUNDCX\"  No results found for: \"STOOLCX\"    I have personally looked at the labs and they are summarized above.  ----------------------------------------------------------------------------------------------------------------------  Detailed radiology reports for the last 24 hours:    Imaging Results (Last 24 Hours)       Procedure Component Value Units Date/Time    XR Ankle 3+ View Left [[id number]] " Collected: 09/26/24 1537     Updated: 09/26/24 1540    Narrative:      EXAM:    XR Left Ankle Complete, 3 or More Views     EXAM DATE:    9/26/2024 3:17 PM     CLINICAL HISTORY:    left ankle injury     TECHNIQUE:    Frontal, lateral and oblique views of the left ankle.     COMPARISON:    No relevant prior studies available.     FINDINGS:    Bones/joints:  See below.      Soft tissues:  Soft tissue swelling, but no acute fracture or  dislocation.       Impression:        Soft tissue swelling, but no acute fracture or dislocation.        This report was finalized on 9/26/2024 3:38 PM by Dr. Moses Otto MD.       XR Foot 3+ View Left [[id number]] Collected: 09/26/24 1536     Updated: 09/26/24 1539    Narrative:      EXAM:    XR Left Foot Complete, 3 or More Views     EXAM DATE:    9/26/2024 3:16 PM     CLINICAL HISTORY:    left foot wound     TECHNIQUE:    Frontal, lateral and oblique views of the left foot.     COMPARISON:    No relevant prior studies available.     FINDINGS:    Bones/joints:  Unremarkable.  No acute fracture.  No dislocation.    Soft tissues:  Unremarkable.  No radiopaque foreign body.       Impression:        No acute findings in the left foot.        This report was finalized on 9/26/2024 3:37 PM by Dr. Moses Otto MD.       XR Chest 1 View [[id number]] Collected: 09/26/24 1406     Updated: 09/26/24 1408    Narrative:      XR CHEST 1 VW-     CLINICAL INDICATION: weakness        COMPARISON: None immediately available      TECHNIQUE: Single frontal view of the chest.     FINDINGS:     LUNGS: Lungs are adequately aerated.      HEART AND MEDIASTINUM: Heart and mediastinal contours are unremarkable        SKELETON: Bony and soft tissue structures are unremarkable.             Impression:      No radiographic evidence of acute cardiac or pulmonary disease.           This report was finalized on 9/26/2024 2:06 PM by Dr. Moses Otto MD.       CT Cervical Spine Without Contrast [[id number]] Collected:  09/26/24 1317     Updated: 09/26/24 1319    Narrative:      CT CERVICAL SPINE WO CONTRAST-     CLINICAL INDICATION: neck pain        COMPARISON: None available     TECHNIQUE: Axial images of the cervical spine were acquired with out any  intravenous contrast. Reformatted images were then created in the  sagittal and coronal planes.     DOSE:      Radiation dose reduction techniques were utilized per ALARA protocol.  Automated exposure control was initiated through either or [employer] or  [employer] software packages by  protocol.           FINDINGS:   The provided study demonstrates preservation of the vertebral body  heights in the sagittally reconstructed images.     There is no prevertebral soft tissue swelling.     Alignment is near anatomic.     The disc space heights are uniform.     I see no acute cervical spine fracture.       Impression:         1. No acute bony abnormality.     2. Other incidental findings as above     This report was finalized on 9/26/2024 1:17 PM by Dr. Moses Otto MD.       CT Lumbar Spine Without Contrast [[id number]] Collected: 09/26/24 1317     Updated: 09/26/24 1319    Narrative:      EXAM:    CT Lumbar Spine Without Intravenous Contrast     EXAM DATE:    9/26/2024 12:59 PM     CLINICAL HISTORY:    back pain     TECHNIQUE:    Axial computed tomography images of the lumbar spine without  intravenous contrast.  Sagittal and coronal reformatted images were  created and reviewed.  This CT exam was performed using one or more of  the following dose reduction techniques:  automated exposure control,  adjustment of the mA and/or kV according to patient size, and/or use of  iterative reconstruction technique.     COMPARISON:    No relevant prior studies available.     FINDINGS:    VERTEBRAE:  Unremarkable.  No acute fracture.    DISCS/SPINAL CANAL/NEURAL FORAMINA:  No acute findings.  No spinal  canal stenosis.    SOFT TISSUES:  Unremarkable.       Impression:        No acute  findings in the lumbar spine.        This report was finalized on 9/26/2024 1:17 PM by Dr. Moses Otto MD.       CT Thoracic Spine Without Contrast [[id number]] Collected: 09/26/24 1316     Updated: 09/26/24 1319    Narrative:      EXAM:    CT Thoracic Spine Without Intravenous Contrast     EXAM DATE:    9/26/2024 12:58 PM     CLINICAL HISTORY:    back pain     TECHNIQUE:    Axial computed tomography images of the thoracic spine without  intravenous contrast.  Sagittal and coronal reformatted images were  created and reviewed.  This CT exam was performed using one or more of  the following dose reduction techniques:  automated exposure control,  adjustment of the mA and/or kV according to patient size, and/or use of  iterative reconstruction technique.     COMPARISON:    No relevant prior studies available.     FINDINGS:    VERTEBRAE:  Unremarkable.  No acute fracture.    DISCS/SPINAL CANAL/NEURAL FORAMINA:  No acute findings.  No spinal  canal stenosis.    SOFT TISSUES:  Unremarkable.       Impression:        No acute findings in the thoracic spine.        This report was finalized on 9/26/2024 1:17 PM by Dr. Moses Otto MD.       CT Head Without Contrast [[id number]] Collected: 09/26/24 1259     Updated: 09/26/24 1302    Narrative:      CT HEAD WO CONTRAST-     CLINICAL INDICATION: weakness        COMPARISON: None available     TECHNIQUE: Axial images of the brain were obtained with out intravenous  contrast.  Reformatted images were created in the sagittal and coronal  planes.     DOSE:     Radiation dose reduction techniques were utilized per ALARA protocol.  Automated exposure control was initiated through either or [employer] or  [employer] software packages by  protocol.        FINDINGS:    BRAIN: Probable subacute ischemia right middle cerebral artery  territory    VENTRICLES:  Unremarkable.  No ventriculomegaly.       BONES/JOINTS:  Unremarkable.  No acute fracture.       SOFT TISSUES:   Unremarkable.       SINUSES:  no air fluid levels       MASTOID AIR CELLS:  Unremarkable as visualized.  No mastoid effusion.          Impression:         1. Probable remote right middle cerebral artery infarction  2. No acute parenchymal mass, hemorrhage, or midline shift     This report was finalized on 9/26/2024 1:00 PM by Dr. Moses Otto MD.             I have personally looked at the radiology images and read the final radiology report.    Assessment & Plan    43F Morbid Obesity by BMI PMH History Genital Herpes, Cerebrovascular Accident 5/2024 complicated by L sided paralysis, presented to [facility] emergency room 9/26 after sliding into the floor off her chair due to weakness.     #Acute Metabolic Encephalopathy due to Acute Urinary Tract Infection in setting of probable neurogenic bladder  - Continue Ceftriaxone, follow up cultures, rule out STI    #Electrolyte Abnormalities  - Acute Mild Hypokalemia - Replacing, on protocol  - Acute Mild Hypomagnesemia - Replacing, on protocol    #History Cerebrovascular Accident complicated by L sided paralysis, unclear etiology  - Review home medications and resume as indicated, Supportive Care     #Debility  - Consult PT/OT, Social Work if placement needed     #History Genital Herpes  - Supportive Care, follow up medication reconciliation for any suppressive medications, check HIV & acute hepatitis panel     #Morbid Obesity by BMI  - Body mass index is 43.26 kg/m².; complicates all aspects of care    F: Oral  E: Monitor & Replace as needed   N: Regular Diet  PPx: SQH  Code Status (Patient has no pulse and is not breathing): CPR  Medical Interventions (Patient has pulse or is breathing): Full Support     Dispo: Pending workup and clinical improvement    *This patient is considered high risk secondary to Urinary Tract Infection, electrolyte abnormalities, chronic L sided paralysis from prior Cerebrovascular Accident.      Edited by: Ramon Marc MD at  9/26/2024 1701    Ramon Marc MD  [facility]  09/26/24  17:01 EDT

## 2024-09-26 NOTE — LETTER
[facility] [employer]  [address] [place] [address]  [phone]  [phone]        October 8, 2024      Patient: Lety Johnson  YOB: 1980  Date of Visit: 9/26/2024      Return call to Monica at [phone]        KAZ Lacy

## 2024-09-26 NOTE — ED PROVIDER NOTES
Subjective   History of Present Illness  This is a 43 year old female patient who presents to the ER with chief complaint of back pain. 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 The patient had been living with her fiance who is in [facility] now. Her sister has been trying to help care for her. Today, she was in the floor and unable to get up so they called EMS for lift assist. Patient's sister and patient want the patient to go into a rehab facility for strengthening. She also complains of low back pain.       Review of Systems   Constitutional: Negative.  Negative for fever.   HENT: Negative.     Respiratory: Negative.     Cardiovascular: Negative.  Negative for chest pain.   Gastrointestinal:  Negative for abdominal distention, abdominal pain, anal bleeding, blood in stool, constipation, diarrhea, nausea, rectal pain and vomiting.   Endocrine: Negative.    Genitourinary: Negative.  Negative for dysuria.   Musculoskeletal:  Positive for back pain. Negative for arthralgias, gait problem, joint swelling, myalgias, neck pain and neck stiffness.   Skin: Negative.    Neurological: Negative.    Psychiatric/Behavioral: Negative.     All other systems reviewed and are negative.      Past Medical History:   Diagnosis Date    Stroke        No Known Allergies    No past surgical history on file.    No family history on file.    Social History     Socioeconomic History    Marital status: Single           Objective   Physical Exam  Vitals and nursing note reviewed.   Constitutional:       General: She is not in acute distress.     Appearance: She is well-developed. She is not diaphoretic.   HENT:      Head: Normocephalic and atraumatic.      Right Ear: External ear normal.      Left Ear: External ear normal.      Nose: Nose normal.   Eyes:      Conjunctiva/sclera: Conjunctivae normal.      Pupils: Pupils are equal, round, and reactive to light.   Neck:      Vascular: No JVD.      Trachea:  No tracheal deviation.   Cardiovascular:      Rate and Rhythm: Normal rate and regular rhythm.      Heart sounds: Normal heart sounds. No murmur heard.  Pulmonary:      Effort: Pulmonary effort is normal. No respiratory distress.      Breath sounds: Normal breath sounds. No wheezing.   Abdominal:      General: Bowel sounds are normal.      Palpations: Abdomen is soft.      Tenderness: There is no abdominal tenderness.   Musculoskeletal:         General: No deformity. Normal range of motion.      Cervical back: Normal range of motion and neck supple.   Skin:     General: Skin is warm and dry.      Coloration: Skin is not pale.      Findings: No erythema or rash.      Comments: Multiple sacral decubitus ulcers   Neurological:      Mental Status: She is alert and oriented to person, place, and time.      Cranial Nerves: No cranial nerve deficit.   Psychiatric:         Behavior: Behavior normal.         Thought Content: Thought content normal.         Procedures       Results for orders placed or performed during the hospital encounter of 09/26/24   COVID-19 and FLU A/B PCR, 1 HR TAT - Swab, Nasopharynx    Specimen: Nasopharynx; Swab   Result Value Ref Range    COVID19 Not Detected Not Detected - Ref. Range    Influenza A PCR Not Detected Not Detected    Influenza B PCR Not Detected Not Detected   Comprehensive Metabolic Panel    Specimen: Arm, Right; Blood   Result Value Ref Range    Glucose 220 (H) 65 - 99 mg/dL    BUN 10 6 - 20 mg/dL    Creatinine 0.51 (L) 0.57 - 1.00 mg/dL    Sodium 140 136 - 145 mmol/L    Potassium 2.7 (L) 3.5 - 5.2 mmol/L    Chloride 99 98 - 107 mmol/L    CO2 28.9 22.0 - 29.0 mmol/L    Calcium 9.4 8.6 - 10.5 mg/dL    Total Protein 7.1 6.0 - 8.5 g/dL    Albumin 3.5 3.5 - 5.2 g/dL    ALT (SGPT) 8 1 - 33 U/L    AST (SGOT) 21 1 - 32 U/L    Alkaline Phosphatase 119 (H) 39 - 117 U/L    Total Bilirubin 0.6 0.0 - 1.2 mg/dL    Globulin 3.6 gm/dL    A/G Ratio 1.0 g/dL    BUN/Creatinine Ratio 19.6 7.0 - 25.0     Anion Gap 12.1 5.0 - 15.0 mmol/L    eGFR 119.7 >60.0 mL/min/1.73   Urinalysis With Microscopic If Indicated (No Culture) - Urine, Clean Catch    Specimen: Urine, Clean Catch   Result Value Ref Range    Color, UA Dark Yellow (A) Yellow, Straw    Appearance, UA Turbid (A) Clear    pH, UA 6.0 5.0 - 8.0    Specific Gravity, UA >=1.030 1.005 - 1.030    Glucose, UA Negative Negative    Ketones, UA 15 mg/dL (1+) (A) Negative    Bilirubin, UA Small (1+) (A) Negative    Blood, UA Trace (A) Negative    Protein, UA 30 mg/dL (1+) (A) Negative    Leuk Esterase, UA Moderate (2+) (A) Negative    Nitrite, UA Positive (A) Negative    Urobilinogen, UA 2.0 E.U./dL (A) 0.2 - 1.0 E.U./dL   hCG, Serum, Qualitative    Specimen: Arm, Right; Blood   Result Value Ref Range    HCG Qualitative Negative Negative   C-reactive Protein    Specimen: Arm, Right; Blood   Result Value Ref Range    C-Reactive Protein 3.80 (H) 0.00 - 0.50 mg/dL   CBC Auto Differential    Specimen: Arm, Right; Blood   Result Value Ref Range    WBC 10.42 3.40 - 10.80 10*3/mm3    RBC 3.98 3.77 - 5.28 10*6/mm3    Hemoglobin 12.5 12.0 - 15.9 g/dL    Hematocrit 37.4 34.0 - 46.6 %    MCV 94.0 79.0 - 97.0 fL    MCH 31.4 26.6 - 33.0 pg    MCHC 33.4 31.5 - 35.7 g/dL    RDW 13.6 12.3 - 15.4 %    RDW-SD 46.9 37.0 - 54.0 fl    MPV 9.2 6.0 - 12.0 fL    Platelets 402 140 - 450 10*3/mm3    Neutrophil % 66.1 42.7 - 76.0 %    Lymphocyte % 28.8 19.6 - 45.3 %    Monocyte % 3.7 (L) 5.0 - 12.0 %    Eosinophil % 0.5 0.3 - 6.2 %    Basophil % 0.2 0.0 - 1.5 %    Immature Grans % 0.7 (H) 0.0 - 0.5 %    Neutrophils, Absolute 6.89 1.70 - 7.00 10*3/mm3    Lymphocytes, Absolute 3.00 0.70 - 3.10 10*3/mm3    Monocytes, Absolute 0.39 0.10 - 0.90 10*3/mm3    Eosinophils, Absolute 0.05 0.00 - 0.40 10*3/mm3    Basophils, Absolute 0.02 0.00 - 0.20 10*3/mm3    Immature Grans, Absolute 0.07 (H) 0.00 - 0.05 10*3/mm3    nRBC 0.0 0.0 - 0.2 /100 WBC   Lactic Acid, Plasma    Specimen: Arm, Right; Blood    Result Value Ref Range    Lactate 1.1 0.5 - 2.0 mmol/L   Urinalysis, Microscopic Only - Urine, Clean Catch    Specimen: Urine, Clean Catch   Result Value Ref Range    RBC, UA 0-2 None Seen, 0-2 /HPF    WBC, UA 3-5 (A) None Seen, 0-2 /HPF    Bacteria, UA 4+ (A) None Seen /HPF    Squamous Epithelial Cells, UA None Seen None Seen, 0-2 /HPF    Hyaline Casts, UA None Seen None Seen /LPF    Methodology Manual Light Microscopy    Magnesium    Specimen: Arm, Right; Blood   Result Value Ref Range    Magnesium 1.5 (L) 1.6 - 2.6 mg/dL          ED Course  ED Course as of 09/26/24 1624   Thu Sep 26, 2024   1311 CT Head Without Contrast  IMPRESSION:     1. Probable remote right middle cerebral artery infarction  2. No acute parenchymal mass, hemorrhage, or midline shift     This report was finalized on 9/26/2024 1:00 PM by Dr. Moses Otto MD   [MM]   1329 CT Cervical Spine Without Contrast  IMPRESSION:     1. No acute bony abnormality.     2. Other incidental findings as above     This report was finalized on 9/26/2024 1:17 PM by Dr. Moses Otto MD   [MM]   1329 CT Thoracic Spine Without Contrast  IMPRESSION:    No acute findings in the thoracic spine.        This report was finalized on 9/26/2024 1:17 PM by Dr. Moses Otto MD   [MM]   1330 CT Lumbar Spine Without Contrast  IMPRESSION:    No acute findings in the lumbar spine.        This report was finalized on 9/26/2024 1:17 PM by Dr. Moses Otto MD   [MM]   1409 XR Chest 1 View  IMPRESSION:  No radiographic evidence of acute cardiac or pulmonary disease.           This report was finalized on 9/26/2024 2:06 PM by Dr. Moses Otto MD   [MM]   1544 XR Foot 3+ View Left  IMPRESSION:    No acute findings in the left foot.        This report was finalized on 9/26/2024 3:37 PM by Dr. Moses Otto MD   [MM]   1544 XR Ankle 3+ View Left  IMPRESSION:    Soft tissue swelling, but no acute fracture or dislocation.        This report was finalized on 9/26/2024 3:38 PM by   Moses Otto MD   [MM]   [id number] Spoke with Dr. Marc who has accepted her in admission.  [MM]      ED Course User Index  [MM] Alayna Enrique PA                                             Medical Decision Making    This is a 43 year old female patient who presents to the ER with chief complaint of back pain. 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 The patient had been living with her fiance who is in long-term now. Her sister has been trying to help care for her. Today, she was in the floor and unable to get up so they called EMS for lift assist. Patient's sister and patient want the patient to go into a rehab facility for strengthening. She also complains of low back pain.       Problems Addressed:  Acute cystitis without hematuria: complicated acute illness or injury  Hypokalemia: complicated acute illness or injury  Pressure injury of skin of left heel, unspecified injury stage: complicated acute illness or injury  Pressure injury of skin of sacral region, unspecified injury stage: complicated acute illness or injury    Amount and/or Complexity of Data Reviewed  Labs: ordered. Decision-making details documented in ED Course.  Radiology: ordered. Decision-making details documented in ED Course.    Risk  Prescription drug management.        Final diagnoses:   Hypokalemia   Pressure injury of skin of sacral region, unspecified injury stage   Pressure injury of skin of left heel, unspecified injury stage   Acute cystitis without hematuria       ED Disposition  ED Disposition       ED Disposition   Decision to Admit    Condition   --    Comment   --               No follow-up provider specified.       Medication List      No changes were made to your prescriptions during this visit.            Alayna Enrique PA  09/26/24 3724

## 2024-09-26 NOTE — LETTER
[facility] [employer] [facility]  [address] [place] [address]  [phone]  [phone]        September 30, 2024      Patient: Lety Johnson  YOB: 1980  Date of Visit: 9/26/2024      Return call to Monica at [phone].          KAZ Lacy

## 2024-09-26 NOTE — LETTER
[facility]  [address] [place] [address]  [phone]  [phone]        October 1, 2024      Patient: Lety Johnson  YOB: 1980  Date of Visit: 9/26/2024      Return call to Monica at [phone]        KAZ Lacy

## 2024-09-26 NOTE — ED NOTES
Patient assisted with pilliow positioning for comfort, pillow placed under R hip and R knee at this time, per patient request.

## 2024-09-26 NOTE — LETTER
[facility]  [address] [place] [address]  [phone]  [phone]        October 23, 2024      Patient: Lety Johnson  YOB: 1980  Date of Visit: 9/26/2024    To whom it may concern:     The above patient has been hospitalized at the above facility since 9/26/2024. She has a history of CVA

## 2024-09-26 NOTE — ED NOTES
PT has wound on the back of the L ankle, Cleaned with surgical scrub, applied non-stick dressing and gauze roll secured with tape. Pt tolerated well.

## 2024-09-26 NOTE — ED NOTES
Patient arrived to ED via ems. Pt reports that she can not move her L side due to Previous stroke, upon assessment patient was covered with dirt and debris. Pt had feces on her buttocks and down her legs. Pt was cleaned and assisted with rolling over to examine her skin on her backside, Patient has several wounds on her backside. Pt says that she has been living alone every since her fiance went to long-term. Pt states that her sister stayed with her last night and her family was unaware that she was living by herself until last night.

## 2024-09-27 ENCOUNTER — APPOINTMENT (OUTPATIENT)
Dept: CARDIOLOGY | Facility: HOSPITAL | Age: 44
End: 2024-09-27
Payer: MEDICAID

## 2024-09-27 LAB
ALBUMIN SERPL-MCNC: 2.5 G/DL (ref 3.5–5.2)
ALBUMIN/GLOB SERPL: 0.9 G/DL
ALP SERPL-CCNC: 96 U/L (ref 39–117)
ALT SERPL W P-5'-P-CCNC: 7 U/L (ref 1–33)
ANION GAP SERPL CALCULATED.3IONS-SCNC: 10.5 MMOL/L (ref 5–15)
AST SERPL-CCNC: 17 U/L (ref 1–32)
BH CV ECHO MEAS - ACS: 2.2 CM
BH CV ECHO MEAS - AO MAX PG: 10.9 MMHG
BH CV ECHO MEAS - AO MEAN PG: 5 MMHG
BH CV ECHO MEAS - AO V2 MAX: 165 CM/SEC
BH CV ECHO MEAS - AO V2 VTI: 29.6 CM
BH CV ECHO MEAS - AVA(I,D): 1.95 CM2
BH CV ECHO MEAS - EDV(CUBED): 64 ML
BH CV ECHO MEAS - EDV(MOD-SP2): 78.1 ML
BH CV ECHO MEAS - EDV(MOD-SP4): 83.2 ML
BH CV ECHO MEAS - EF(MOD-BP): 62.5 %
BH CV ECHO MEAS - EF(MOD-SP2): 61.3 %
BH CV ECHO MEAS - EF(MOD-SP4): 62.1 %
BH CV ECHO MEAS - ESV(CUBED): 22 ML
BH CV ECHO MEAS - ESV(MOD-SP2): 30.2 ML
BH CV ECHO MEAS - ESV(MOD-SP4): 31.5 ML
BH CV ECHO MEAS - FS: 30 %
BH CV ECHO MEAS - IVS/LVPW: 1 CM
BH CV ECHO MEAS - IVSD: 1 CM
BH CV ECHO MEAS - LA DIMENSION: 2.9 CM
BH CV ECHO MEAS - LAT PEAK E' VEL: 18.8 CM/SEC
BH CV ECHO MEAS - LV DIASTOLIC VOL/BSA (35-75): 38.3 CM2
BH CV ECHO MEAS - LV MASS(C)D: 127.1 GRAMS
BH CV ECHO MEAS - LV MAX PG: 7.8 MMHG
BH CV ECHO MEAS - LV MEAN PG: 3 MMHG
BH CV ECHO MEAS - LV SYSTOLIC VOL/BSA (12-30): 14.5 CM2
BH CV ECHO MEAS - LV V1 MAX: 140 CM/SEC
BH CV ECHO MEAS - LV V1 VTI: 22.7 CM
BH CV ECHO MEAS - LVIDD: 4 CM
BH CV ECHO MEAS - LVIDS: 2.8 CM
BH CV ECHO MEAS - LVOT AREA: 2.5 CM2
BH CV ECHO MEAS - LVOT DIAM: 1.8 CM
BH CV ECHO MEAS - LVPWD: 1 CM
BH CV ECHO MEAS - MED PEAK E' VEL: 16.3 CM/SEC
BH CV ECHO MEAS - MV A MAX VEL: 114 CM/SEC
BH CV ECHO MEAS - MV DEC SLOPE: 544 CM/SEC2
BH CV ECHO MEAS - MV DEC TIME: 0.21 SEC
BH CV ECHO MEAS - MV E MAX VEL: 112 CM/SEC
BH CV ECHO MEAS - MV E/A: 0.98
BH CV ECHO MEAS - SV(LVOT): 57.8 ML
BH CV ECHO MEAS - SV(MOD-SP2): 47.9 ML
BH CV ECHO MEAS - SV(MOD-SP4): 51.7 ML
BH CV ECHO MEAS - SVI(LVOT): 26.6 ML/M2
BH CV ECHO MEAS - SVI(MOD-SP2): 22.1 ML/M2
BH CV ECHO MEAS - SVI(MOD-SP4): 23.8 ML/M2
BH CV ECHO MEAS - TAPSE (>1.6): 3.5 CM
BH CV ECHO MEASUREMENTS AVERAGE E/E' RATIO: 6.38
BILIRUB SERPL-MCNC: 0.3 MG/DL (ref 0–1.2)
BUN SERPL-MCNC: 7 MG/DL (ref 6–20)
BUN/CREAT SERPL: 19.4 (ref 7–25)
CALCIUM SPEC-SCNC: 7.9 MG/DL (ref 8.6–10.5)
CHLORIDE SERPL-SCNC: 105 MMOL/L (ref 98–107)
CO2 SERPL-SCNC: 21.5 MMOL/L (ref 22–29)
CREAT SERPL-MCNC: 0.36 MG/DL (ref 0.57–1)
DEPRECATED RDW RBC AUTO: 52.1 FL (ref 37–54)
EGFRCR SERPLBLD CKD-EPI 2021: 130.2 ML/MIN/1.73
ERYTHROCYTE [DISTWIDTH] IN BLOOD BY AUTOMATED COUNT: 14 % (ref 12.3–15.4)
GLOBULIN UR ELPH-MCNC: 2.8 GM/DL
GLUCOSE SERPL-MCNC: 234 MG/DL (ref 65–99)
HCT VFR BLD AUTO: 34.8 % (ref 34–46.6)
HGB BLD-MCNC: 10.9 G/DL (ref 12–15.9)
MCH RBC QN AUTO: 32.1 PG (ref 26.6–33)
MCHC RBC AUTO-ENTMCNC: 31.3 G/DL (ref 31.5–35.7)
MCV RBC AUTO: 102.4 FL (ref 79–97)
PLATELET # BLD AUTO: 292 10*3/MM3 (ref 140–450)
PMV BLD AUTO: 9.8 FL (ref 6–12)
POTASSIUM SERPL-SCNC: 3.5 MMOL/L (ref 3.5–5.2)
POTASSIUM SERPL-SCNC: 4 MMOL/L (ref 3.5–5.2)
PROT SERPL-MCNC: 5.3 G/DL (ref 6–8.5)
QT INTERVAL: 326 MS
QTC INTERVAL: 458 MS
RBC # BLD AUTO: 3.4 10*6/MM3 (ref 3.77–5.28)
SODIUM SERPL-SCNC: 137 MMOL/L (ref 136–145)
WBC NRBC COR # BLD AUTO: 9.67 10*3/MM3 (ref 3.4–10.8)

## 2024-09-27 PROCEDURE — 84132 ASSAY OF SERUM POTASSIUM: CPT | Performed by: INTERNAL MEDICINE

## 2024-09-27 PROCEDURE — 80053 COMPREHEN METABOLIC PANEL: CPT | Performed by: INTERNAL MEDICINE

## 2024-09-27 PROCEDURE — G0378 HOSPITAL OBSERVATION PER HR: HCPCS

## 2024-09-27 PROCEDURE — 25010000002 HEPARIN (PORCINE) PER 1000 UNITS: Performed by: INTERNAL MEDICINE

## 2024-09-27 PROCEDURE — 25810000003 SODIUM CHLORIDE 0.9 % SOLUTION: Performed by: INTERNAL MEDICINE

## 2024-09-27 PROCEDURE — 99232 SBSQ HOSP IP/OBS MODERATE 35: CPT | Performed by: INTERNAL MEDICINE

## 2024-09-27 PROCEDURE — 93306 TTE W/DOPPLER COMPLETE: CPT | Performed by: SPECIALIST

## 2024-09-27 PROCEDURE — 93306 TTE W/DOPPLER COMPLETE: CPT

## 2024-09-27 PROCEDURE — 25010000002 KETOROLAC TROMETHAMINE PER 15 MG

## 2024-09-27 PROCEDURE — 85027 COMPLETE CBC AUTOMATED: CPT | Performed by: INTERNAL MEDICINE

## 2024-09-27 PROCEDURE — 25010000002 SULFUR HEXAFLUORIDE MICROSPH 60.7-25 MG RECONSTITUTED SUSPENSION: Performed by: INTERNAL MEDICINE

## 2024-09-27 PROCEDURE — 25010000002 CEFTRIAXONE PER 250 MG: Performed by: INTERNAL MEDICINE

## 2024-09-27 RX ORDER — HYDROXYZINE HYDROCHLORIDE 25 MG/1
25 TABLET, FILM COATED ORAL ONCE
Status: COMPLETED | OUTPATIENT
Start: 2024-09-27 | End: 2024-09-27

## 2024-09-27 RX ORDER — ACETAMINOPHEN 325 MG/1
650 TABLET ORAL EVERY 6 HOURS PRN
Status: DISPENSED | OUTPATIENT
Start: 2024-09-27

## 2024-09-27 RX ORDER — ACYCLOVIR 400 MG/1
400 TABLET ORAL NIGHTLY
Status: ON HOLD | COMMUNITY
End: 2024-10-03

## 2024-09-27 RX ORDER — KETOROLAC TROMETHAMINE 30 MG/ML
15 INJECTION, SOLUTION INTRAMUSCULAR; INTRAVENOUS ONCE AS NEEDED
Status: COMPLETED | OUTPATIENT
Start: 2024-09-27 | End: 2024-09-27

## 2024-09-27 RX ORDER — ACYCLOVIR 200 MG/1
400 CAPSULE ORAL NIGHTLY
Status: DISPENSED | OUTPATIENT
Start: 2024-09-27 | End: 2024-11-25

## 2024-09-27 RX ORDER — POTASSIUM CHLORIDE 1500 MG/1
40 TABLET, EXTENDED RELEASE ORAL EVERY 4 HOURS
Status: COMPLETED | OUTPATIENT
Start: 2024-09-27 | End: 2024-09-27

## 2024-09-27 RX ADMIN — HEPARIN SODIUM 5000 UNITS: 5000 INJECTION INTRAVENOUS; SUBCUTANEOUS at 06:26

## 2024-09-27 RX ADMIN — POTASSIUM CHLORIDE 40 MEQ: 1500 TABLET, EXTENDED RELEASE ORAL at 13:33

## 2024-09-27 RX ADMIN — KETOROLAC TROMETHAMINE 15 MG: 30 INJECTION, SOLUTION INTRAMUSCULAR; INTRAVENOUS at 21:34

## 2024-09-27 RX ADMIN — Medication 10 ML: at 21:33

## 2024-09-27 RX ADMIN — ATORVASTATIN CALCIUM 80 MG: 40 TABLET, FILM COATED ORAL at 08:53

## 2024-09-27 RX ADMIN — SULFUR HEXAFLUORIDE 3 ML: KIT at 15:26

## 2024-09-27 RX ADMIN — Medication 10 ML: at 08:54

## 2024-09-27 RX ADMIN — CYCLOBENZAPRINE 10 MG: 10 TABLET, FILM COATED ORAL at 04:15

## 2024-09-27 RX ADMIN — NICOTINE 1 PATCH: 7 PATCH, EXTENDED RELEASE TRANSDERMAL at 08:54

## 2024-09-27 RX ADMIN — POTASSIUM CHLORIDE 40 MEQ: 1500 TABLET, EXTENDED RELEASE ORAL at 17:23

## 2024-09-27 RX ADMIN — HEPARIN SODIUM 5000 UNITS: 5000 INJECTION INTRAVENOUS; SUBCUTANEOUS at 21:34

## 2024-09-27 RX ADMIN — SODIUM CHLORIDE 75 ML/HR: 9 INJECTION, SOLUTION INTRAVENOUS at 08:52

## 2024-09-27 RX ADMIN — SODIUM CHLORIDE 2000 MG: 9 INJECTION, SOLUTION INTRAVENOUS at 02:54

## 2024-09-27 RX ADMIN — ASPIRIN 81 MG: 81 TABLET, CHEWABLE ORAL at 08:54

## 2024-09-27 RX ADMIN — HYDROXYZINE HYDROCHLORIDE 25 MG: 25 TABLET ORAL at 21:34

## 2024-09-27 RX ADMIN — DICLOFENAC SODIUM 4 G: 10 GEL TOPICAL at 03:13

## 2024-09-27 RX ADMIN — ACETAMINOPHEN 650 MG: 325 TABLET ORAL at 02:55

## 2024-09-27 RX ADMIN — DULOXETINE HYDROCHLORIDE 60 MG: 60 CAPSULE, DELAYED RELEASE ORAL at 08:54

## 2024-09-27 RX ADMIN — HEPARIN SODIUM 5000 UNITS: 5000 INJECTION INTRAVENOUS; SUBCUTANEOUS at 13:32

## 2024-09-27 RX ADMIN — CYCLOBENZAPRINE 10 MG: 10 TABLET, FILM COATED ORAL at 21:34

## 2024-09-27 RX ADMIN — ACETAMINOPHEN 650 MG: 325 TABLET ORAL at 13:33

## 2024-09-27 RX ADMIN — LISINOPRIL 20 MG: 10 TABLET ORAL at 08:53

## 2024-09-27 RX ADMIN — PANTOPRAZOLE SODIUM 40 MG: 40 TABLET, DELAYED RELEASE ORAL at 08:53

## 2024-09-27 RX ADMIN — ACYCLOVIR 400 MG: 200 CAPSULE ORAL at 21:34

## 2024-09-27 RX ADMIN — SODIUM CHLORIDE 75 ML/HR: 9 INJECTION, SOLUTION INTRAVENOUS at 21:33

## 2024-09-27 NOTE — SIGNIFICANT NOTE
09/27/24 1456   OTHER   Discipline physical therapist   Rehab Time/Intention   Session Not Performed patient unavailable for evaluation  (Pt. unavailable for evaluation x2 attempts.)

## 2024-09-27 NOTE — CASE MANAGEMENT/SOCIAL WORK
Discharge Planning Assessment  [facility]     Patient Name: Lety Johnson  MRN: [id number]  Today's Date: 9/27/2024    Admit Date: 9/26/2024    Plan: SS received consult per Physician for discharge planning.  SS spoke with pt at bedside.  Pt resides at home alone at [address].  Pt had CVA May of 2024 and has been unable to ambulate since.  Pt was unable to receive rehab after CVA due to lack of insurance.  Pt was residing with sigificant other Octavio( who was pt's caregiver) until approx two weeks ago when significant other was incarcerated.  Pt does not have monthly income.  Pt has filed for Disability and has a hearing scheduled on Oct 31st.  Pt will be unable to be placed in a nursing home for rehab due to lack of monthly income.  Pt's PCP is Dr. Kern at [facility] in [place].  Pt utilizes [facility] in [place].  Pt's sister Moises Narvaez is POA and paperwork has been provided to [facility].  [facility] and family and pt is unable to notify SS of DME provider.  APS  Kinsey Dial at [facility] on this date. APS has accepted an outside report due to pt's self neglect.  Pt is agreeable for rehab if possible.  Pt agreeable for SS to discuss with sister/POA Moises Narvaez at [phone].  SS spoke with pt's sister/POA who stated there is no family who can care for pt at home at this time.  Pt and family are agreeable to rehab.  SS discussed rehab options with POA and discussed that nursing home placement is not an option without a monthly income. Pt's sister/POA stated understanding.  SS will follow up am Monday.   Discharge Needs Assessment       Row Name 09/27/24 1746       Living Environment    People in Home alone    Current Living Arrangements home    Primary Care Provided by self    Provides Primary Care For no one, unable/limited ability to care for self       Transition Planning    Patient/Family Anticipates Transition to inpatient  rehabilitation facility    Patient/Family Anticipated Services at Transition        Discharge Needs Assessment    Equipment Currently Used at Home hospital bed;wheelchair;lift device    Concerns to be Addressed discharge planning                   Discharge Plan       Row Name 09/27/24 2242       Plan    Plan SS received consult per Physician for discharge planning.  SS spoke with pt at bedside.  Pt resides at home alone at [address].  Pt had CVA May of 2024 and has been unable to ambulate since.  Pt was unable to receive rehab after CVA due to lack of insurance.  Pt was residing with sigificant other Octavio( who was pt's caregiver) until approx two weeks ago when significant other was incarcerated.  Pt does not have monthly income.  Pt has filed for Disability and has a hearing scheduled on Oct 31st.  Pt will be unable to be placed in a nursing home for rehab due to lack of monthly income.  Pt's PCP is Dr. Kern at [facility] in [place].  Pt utilizes [facility] in [place].  Pt's sister Moises Narvaez is POA and paperwork has been provided to [facility].  Pt has andreia lift and hospital bed and family and pt is unable to notify SS of DME provider.  APS  [facility]sey Dial at [facility] on this date. APS has accepted an outside report due to pt's self neglect.  Pt is agreeable for rehab if possible.  Pt agreeable for SS to discuss with sister/POA Moises Nravaez at [phone].  SS spoke with pt's sister/POA who stated there is no family who can care for pt at home at this time.  Pt and family are agreeable to rehab.  SS discussed rehab options with POA and discussed that nursing home placement is not an option without a monthly income. Pt's sister/POA stated understanding.  SS will follow up am Monday.                  Continued Care and Services - Admitted Since 9/26/2024    No active coordination exists for this encounter.       Expected Discharge Date and  Time       Expected Discharge Date Expected Discharge Time    Sep 27, 2024            Demographic Summary       Row Name 09/27/24 1887       General Information    Admission Type observation    Arrived From home    Referral Source emergency department    Reason for Consult discharge planning    Preferred Language English                 Monica Ayala, SHELBYW     99

## 2024-09-27 NOTE — DISCHARGE PLACEMENT REQUEST
"Lety Johnson (43 y.o. Female)       Date of Birth   1980    Social Security Number       Address   [address] [id number]    Home Phone   [phone]    MRN   [id number]       Taoism   Episcopalian    Marital Status   Single                            Admission Date   9/26/24    Admission Type   Emergency    Admitting Provider   Ramon Marc MD    Attending Provider   Ramon Marc MD    Department, Room/Bed   [address], 3315/1S       Discharge Date       Discharge Disposition       Discharge Destination                                 Attending Provider: Ramon Marc MD    Allergies: No Known Allergies    Isolation: None   Infection: None   Code Status: CPR    Ht: 166.4 cm (65.5\")   Wt: 113 kg (249 lb 9 oz)    Admission Cmt: None   Principal Problem: UTI (urinary tract infection) [N39.0]                   Active Insurance as of 9/26/2024       Primary Coverage       Payor Plan Insurance Group Employer/Plan Group    [place] [id number]       Payor Plan Address Payor Plan Phone Number Payor Plan Fax Number Effective Dates    [employer] [address] [address] [phone]  5/1/2024 - None Entered    [facility] [address]         Subscriber Name Subscriber Birth Date Member ID       LETY JOHNSON 1980 [id number]                     Emergency Contacts        (Rel.) Home Phone Work Phone Mobile Phone    FLAVIA SHEPARD (Significant Other) [phone] -- --              Emergency Contact Information       Name Relation Home Work FLAVIA Jarrell Significant Other [phone]            Insurance Information                  [place] Phone: [phone]    Subscriber: Leyt Johnson Subscriber#: [id number]    Group#: [id number] Precert#: --          Treatment Team         Provider   Role Specialty Contact     Ramon Marc MD      Attending, Consulting Physician Internal Medicine, Internal " Medicine  [phone]       Kiya Medeiros, PCT      Patient Care Technician -- --     Josue Rg, PCT      Patient Care Technician -- --     Robyn Pickett, PCT      Patient Care Technician -- --     Monica Ayala, BSW         [phone]       Samantha Ragland, OT      Occupational Therapist Occupational Therapy --     Paulette Odell RN      Registered Nurse -- --          Problem List             Codes Noted - Resolved       Hospital    * (Principal) UTI (urinary tract infection) ICD-10-CM: N39.0  ICD-9-CM: 599.0 2024 - Present        History & Physical        Ramon Marc MD at 24 1701              [facility] HOSPITALIST HISTORY AND PHYSICAL    Patient Identification:  Name:  Lety Johnson  Age:  43 y.o.  Sex:  female  :  1980  MRN:  [id number]   Admit Date: 2024   Visit Number:  [id number]  Room number:  404/04  Primary Care Physician:  Provider, No Known     Subjective     Chief complaint:    Chief Complaint   Patient presents with    Fall     History of presenting illness:   43F Morbid Obesity by BMI PMH History Genital Herpes, Cerebrovascular Accident 2024 complicated by L sided paralysis, presented to [facility] emergency room  after sliding into the floor off her chair due to weakness.  Upon arrival patient afebrile, heart rate 109, respiratory rate 18, blood pressure 146/101, satting 98% on room air. Labs showed WBC count 10K, lactate 1.1, CRP 3.8, potassium 2.7, magnesium 1.5, HCG negative, blood cultures pending. CXR no acute cardiopulmonary processes.  Xray ankle/foot without fracture or dislocation.  Emergency room provider gave antibiotics, pain medications, zofran, potassium replacement.  Hospital Medicine consulted for admission. Patient seen and examined in the emergency room, notes fevers chills at home a few days ago, recently has been on antibiotics for lower extremity cellulitis, has had some dysuria and  suprapubic pain, denies current sexual activity, reports her fiance was taken to nursing home about a week ago and has been her primary caretaker, has had difficulty at home since prior stroke and caretakers not consistent, last 24hrs didn't have anyone to help her, sister endorses recent confusion/hallucinations beyond baseline, seeing dead family members, coinciding with onset of dysuria.  ---------------------------------------------------------------------------------------------------------------------   Review of Systems   Constitutional:  Positive for chills and fever.   HENT: Negative.     Eyes: Negative.    Respiratory:  Positive for shortness of breath.    Cardiovascular:  Positive for leg swelling. Negative for chest pain.   Gastrointestinal: Negative.    Endocrine: Negative.    Genitourinary:  Positive for dysuria.   Musculoskeletal: Negative.    Skin:  Positive for rash.   Allergic/Immunologic: Negative.    Neurological:  Positive for weakness.   Hematological: Negative.    Psychiatric/Behavioral:  Positive for hallucinations.      ---------------------------------------------------------------------------------------------------------------------   Past Medical History:   Diagnosis Date    Stroke      No past surgical history on file.  No family history on file.  Social History     Socioeconomic History    Marital status: Single     ---------------------------------------------------------------------------------------------------------------------   Allergies:  Patient has no known allergies.  ---------------------------------------------------------------------------------------------------------------------   Medications below are reported home medications pulling from within the system; at this time, these medications have not been reconciled unless otherwise specified and are in the verification process for further verifcation as current home medications.    Prior to Admission Medications        Prescriptions Last Dose Informant Patient Reported? Taking?    aspirin 81 MG chewable tablet Unknown  Yes No    Chew 1 tablet Daily.    atorvastatin (LIPITOR) 80 MG tablet Unknown  Yes No    Take 1 tablet by mouth Daily.    cyclobenzaprine (FLEXERIL) 10 MG tablet Unknown  Yes No    Take 1 tablet by mouth 3 (Three) Times a Day As Needed for Muscle Spasms.    DULoxetine (CYMBALTA) 60 MG capsule Unknown  Yes No    Take 1 capsule by mouth Daily.    lisinopril (PRINIVIL,ZESTRIL) 20 MG tablet Unknown  Yes No    Take 1 tablet by mouth Daily.    metFORMIN (GLUCOPHAGE) 1000 MG tablet Unknown  Yes No    Take 1 tablet by mouth 2 (Two) Times a Day With Meals.    pantoprazole (PROTONIX) 40 MG EC tablet Unknown  Yes No    Take 1 tablet by mouth Daily.          Objective     Vital Signs:  Temp:  [98.2 °F (36.8 °C)] 98.2 °F (36.8 °C)  Heart Rate:  [] 118  Resp:  [18] 18  BP: (135-157)/() 140/79    Mean Arterial Pressure (Non-Invasive) for the past 24 hrs (Last 3 readings):   Noninvasive MAP (mmHg)   09/26/24 1645 90   09/26/24 1630 100   09/26/24 1615 103     SpO2:  [91 %-100 %] 94 %  on   ;   Device (Oxygen Therapy): room air  Body mass index is 43.26 kg/m².    Wt Readings from Last 3 Encounters:   09/26/24 120 kg (264 lb)   06/27/24 120 kg (264 lb)      ---------------------------------------------------------------------------------------------------------------------   Physical Exam:  Constitutional:  Well-developed and well-nourished. Older than stated age. No acute distress.      HENT:  Head:  Normocephalic and atraumatic.  Mouth:  Moist mucous membranes.    Eyes:  Conjunctivae and EOM are normal. No scleral icterus.    Neck:  Neck supple.  No JVD present.    Cardiovascular:  Normal rate, regular rhythm and normal heart sounds with no murmur.  Pulmonary/Chest:  No respiratory distress, no wheezes, no crackles, with normal breath sounds and good air movement.  Abdominal:  Soft. No distension and no tenderness.  "  Musculoskeletal:  No tenderness, and no deformity.  No red or swollen joints anywhere.    Neurological:  Alert and oriented to person, place, and time.  No cranial nerve deficit. Chronic L sided paralysis.    Skin:  Skin is warm and dry. No pallor. Significant intertriginous erythema under panus, consistent with yeast infection.   Peripheral vascular:  No clubbing, no cyanosis, trace edema.  Psychiatric: Appropriate mood and affect  Edited by: Ramon Marc MD at 9/26/2024 1701  ---------------------------------------------------------------------------------------------------------------------  EKG:  N/A    Telemetry:  normal sinus rhythm, no significant ST changes    I have personally looked at telemetry.    Last echocardiogram:  Ordered and pending   --------------------------------------------------------------------------------------------------------------------  Labs:  Results from last 7 days   Lab Units 09/26/24  1504 09/26/24  1350   LACTATE mmol/L  --  1.1   CRP mg/dL 3.80*  --    WBC 10*3/mm3  --  10.42   HEMOGLOBIN g/dL  --  12.5   HEMATOCRIT %  --  37.4   MCV fL  --  94.0   MCHC g/dL  --  33.4   PLATELETS 10*3/mm3  --  402         Results from last 7 days   Lab Units 09/26/24  1504   SODIUM mmol/L 140   POTASSIUM mmol/L 2.7*   MAGNESIUM mg/dL 1.5*   CHLORIDE mmol/L 99   CO2 mmol/L 28.9   BUN mg/dL 10   CREATININE mg/dL 0.51*   CALCIUM mg/dL 9.4   GLUCOSE mg/dL 220*   ALBUMIN g/dL 3.5   BILIRUBIN mg/dL 0.6   ALK PHOS U/L 119*   AST (SGOT) U/L 21   ALT (SGPT) U/L 8   Estimated Creatinine Clearance: 188.1 mL/min (A) (by C-G formula based on SCr of 0.51 mg/dL (L)).  No results found for: \"AMMONIA\"          No results found for: \"HGBA1C\", \"POCGLU\"  No results found for: \"TSH\", \"FREET4\"  No results found for: \"PREGTESTUR\", \"PREGSERUM\", \"HCG\", \"HCGQUANT\"  Pain Management Panel           No data to display              Brief Urine Lab Results  (Last result in the past 365 days)        Color   Clarity   " "Blood   Leuk Est   Nitrite   Protein   CREAT   Urine HCG        09/26/24 1419 Dark Yellow   Turbid   Trace   Moderate (2+)   Positive   30 mg/dL (1+)                 No results found for: \"BLOODCX\"  No results found for: \"URINECX\"  No results found for: \"WOUNDCX\"  No results found for: \"STOOLCX\"    I have personally looked at the labs and they are summarized above.  ----------------------------------------------------------------------------------------------------------------------  Detailed radiology reports for the last 24 hours:    Imaging Results (Last 24 Hours)       Procedure Component Value Units Date/Time    XR Ankle 3+ View Left [[id number]] Collected: 09/26/24 1537     Updated: 09/26/24 1540    Narrative:      EXAM:    XR Left Ankle Complete, 3 or More Views     EXAM DATE:    9/26/2024 3:17 PM     CLINICAL HISTORY:    left ankle injury     TECHNIQUE:    Frontal, lateral and oblique views of the left ankle.     COMPARISON:    No relevant prior studies available.     FINDINGS:    Bones/joints:  See below.      Soft tissues:  Soft tissue swelling, but no acute fracture or  dislocation.       Impression:        Soft tissue swelling, but no acute fracture or dislocation.        This report was finalized on 9/26/2024 3:38 PM by Dr. Moses Otto MD.       XR Foot 3+ View Left [[id number]] Collected: 09/26/24 1536     Updated: 09/26/24 1539    Narrative:      EXAM:    XR Left Foot Complete, 3 or More Views     EXAM DATE:    9/26/2024 3:16 PM     CLINICAL HISTORY:    left foot wound     TECHNIQUE:    Frontal, lateral and oblique views of the left foot.     COMPARISON:    No relevant prior studies available.     FINDINGS:    Bones/joints:  Unremarkable.  No acute fracture.  No dislocation.    Soft tissues:  Unremarkable.  No radiopaque foreign body.       Impression:        No acute findings in the left foot.        This report was finalized on 9/26/2024 3:37 PM by Dr. Moses Otto MD.       XR Chest 1 View " [[id number]] Collected: 09/26/24 1406     Updated: 09/26/24 1408    Narrative:      XR CHEST 1 VW-     CLINICAL INDICATION: weakness        COMPARISON: None immediately available      TECHNIQUE: Single frontal view of the chest.     FINDINGS:     LUNGS: Lungs are adequately aerated.      HEART AND MEDIASTINUM: Heart and mediastinal contours are unremarkable        SKELETON: Bony and soft tissue structures are unremarkable.             Impression:      No radiographic evidence of acute cardiac or pulmonary disease.           This report was finalized on 9/26/2024 2:06 PM by Dr. Moses Otto MD.       CT Cervical Spine Without Contrast [[id number]] Collected: 09/26/24 1317     Updated: 09/26/24 1319    Narrative:      CT CERVICAL SPINE WO CONTRAST-     CLINICAL INDICATION: neck pain        COMPARISON: None available     TECHNIQUE: Axial images of the cervical spine were acquired with out any  intravenous contrast. Reformatted images were then created in the  sagittal and coronal planes.     DOSE:      Radiation dose reduction techniques were utilized per ALARA protocol.  Automated exposure control was initiated through either or [employer] or  DoseRight software packages by  protocol.           FINDINGS:   The provided study demonstrates preservation of the vertebral body  heights in the sagittally reconstructed images.     There is no prevertebral soft tissue swelling.     Alignment is near anatomic.     The disc space heights are uniform.     I see no acute cervical spine fracture.       Impression:         1. No acute bony abnormality.     2. Other incidental findings as above     This report was finalized on 9/26/2024 1:17 PM by Dr. Moses Otto MD.       CT Lumbar Spine Without Contrast [[id number]] Collected: 09/26/24 1317     Updated: 09/26/24 1319    Narrative:      EXAM:    CT Lumbar Spine Without Intravenous Contrast     EXAM DATE:    9/26/2024 12:59 PM     CLINICAL HISTORY:    back pain      TECHNIQUE:    Axial computed tomography images of the lumbar spine without  intravenous contrast.  Sagittal and coronal reformatted images were  created and reviewed.  This CT exam was performed using one or more of  the following dose reduction techniques:  automated exposure control,  adjustment of the mA and/or kV according to patient size, and/or use of  iterative reconstruction technique.     COMPARISON:    No relevant prior studies available.     FINDINGS:    VERTEBRAE:  Unremarkable.  No acute fracture.    DISCS/SPINAL CANAL/NEURAL FORAMINA:  No acute findings.  No spinal  canal stenosis.    SOFT TISSUES:  Unremarkable.       Impression:        No acute findings in the lumbar spine.        This report was finalized on 9/26/2024 1:17 PM by Dr. Moses Otto MD.       CT Thoracic Spine Without Contrast [[id number]] Collected: 09/26/24 1316     Updated: 09/26/24 1319    Narrative:      EXAM:    CT Thoracic Spine Without Intravenous Contrast     EXAM DATE:    9/26/2024 12:58 PM     CLINICAL HISTORY:    back pain     TECHNIQUE:    Axial computed tomography images of the thoracic spine without  intravenous contrast.  Sagittal and coronal reformatted images were  created and reviewed.  This CT exam was performed using one or more of  the following dose reduction techniques:  automated exposure control,  adjustment of the mA and/or kV according to patient size, and/or use of  iterative reconstruction technique.     COMPARISON:    No relevant prior studies available.     FINDINGS:    VERTEBRAE:  Unremarkable.  No acute fracture.    DISCS/SPINAL CANAL/NEURAL FORAMINA:  No acute findings.  No spinal  canal stenosis.    SOFT TISSUES:  Unremarkable.       Impression:        No acute findings in the thoracic spine.        This report was finalized on 9/26/2024 1:17 PM by Dr. Moses Otto MD.       CT Head Without Contrast [[id number]] Collected: 09/26/24 1259     Updated: 09/26/24 1302    Narrative:      CT HEAD WO  CONTRAST-     CLINICAL INDICATION: weakness        COMPARISON: None available     TECHNIQUE: Axial images of the brain were obtained with out intravenous  contrast.  Reformatted images were created in the sagittal and coronal  planes.     DOSE:     Radiation dose reduction techniques were utilized per ALARA protocol.  Automated exposure control was initiated through either or [employer] or  DoseRight software packages by  protocol.        FINDINGS:    BRAIN: Probable subacute ischemia right middle cerebral artery  territory    VENTRICLES:  Unremarkable.  No ventriculomegaly.       BONES/JOINTS:  Unremarkable.  No acute fracture.       SOFT TISSUES:  Unremarkable.       SINUSES:  no air fluid levels       MASTOID AIR CELLS:  Unremarkable as visualized.  No mastoid effusion.          Impression:         1. Probable remote right middle cerebral artery infarction  2. No acute parenchymal mass, hemorrhage, or midline shift     This report was finalized on 9/26/2024 1:00 PM by Dr. Moses Otto MD.             I have personally looked at the radiology images and read the final radiology report.    Assessment & Plan    43F Morbid Obesity by BMI PMH History Genital Herpes, Cerebrovascular Accident 5/2024 complicated by L sided paralysis, presented to [facility] emergency room 9/26 after sliding into the floor off her chair due to weakness.     #Acute Metabolic Encephalopathy due to Acute Urinary Tract Infection in setting of probable neurogenic bladder  - Continue Ceftriaxone, follow up cultures, rule out STI    #Electrolyte Abnormalities  - Acute Mild Hypokalemia - Replacing, on protocol  - Acute Mild Hypomagnesemia - Replacing, on protocol    #History Cerebrovascular Accident complicated by L sided paralysis, unclear etiology  - Review home medications and resume as indicated, Supportive Care     #Debility  - Consult PT/OT, Social Work if placement needed     #History Genital Herpes  - Supportive  Care, follow up medication reconciliation for any suppressive medications, check HIV & acute hepatitis panel     #Morbid Obesity by BMI  - Body mass index is 43.26 kg/m².; complicates all aspects of care    F: Oral  E: Monitor & Replace as needed   N: Regular Diet  PPx: SQH  Code Status (Patient has no pulse and is not breathing): CPR  Medical Interventions (Patient has pulse or is breathing): Full Support     Dispo: Pending workup and clinical improvement    *This patient is considered high risk secondary to Urinary Tract Infection, electrolyte abnormalities, chronic L sided paralysis from prior Cerebrovascular Accident.      Edited by: Ramon Marc MD at 9/26/2024 1701    Ramon Marc MD  [facility]  09/26/24  17:01 EDT        Electronically signed by Ramon Marc MD at 09/26/24 1703       Lines, Drains & Airways       Active LDAs       Name Placement date Placement time Site Days    Peripheral IV 09/26/24 1355 Anterior;Right;Upper Arm 09/26/24  1355  Arm  1    Peripheral IV 09/26/24 1355 Anterior;Right Forearm 09/26/24  1355  Forearm  1    External Urinary Catheter 09/26/24  1830  --  less than 1                  Current Facility-Administered Medications   Medication Dose Route Frequency Provider Last Rate Last Admin    acetaminophen (TYLENOL) tablet 650 mg  650 mg Oral Q6H PRN Ashleigh Nicholas PA-C   650 mg at 09/27/24 1333    acyclovir (ZOVIRAX) capsule 400 mg  400 mg Oral Nightly Ramon Marc MD        aspirin chewable tablet 81 mg  81 mg Oral Daily Ramon Marc MD   81 mg at 09/27/24 0854    atorvastatin (LIPITOR) tablet 80 mg  80 mg Oral Daily Ramon Marc MD   80 mg at 09/27/24 0853    sennosides-docusate (PERICOLACE) 8.6-50 MG per tablet 2 tablet  2 tablet Oral BID PRN Ramon Marc MD        And    polyethylene glycol (MIRALAX) packet 17 g  17 g Oral Daily PRN Ramon Marc MD        And    bisacodyl (DULCOLAX) EC tablet 5 mg  5 mg Oral Daily PRN Ramon Marc MD         And    bisacodyl (DULCOLAX) suppository 10 mg  10 mg Rectal Daily PRN Ramon Marc MD        Calcium Replacement - Follow Nurse / BPA Driven Protocol   Does not apply PRN Ramon Marc MD        cefTRIAXone (ROCEPHIN) 2,000 mg in sodium chloride 0.9 % 100 mL IVPB-VTB  2,000 mg Intravenous Q24H Ramon Marc  mL/hr at 09/27/24 0254 2,000 mg at 09/27/24 0254    cyclobenzaprine (FLEXERIL) tablet 10 mg  10 mg Oral TID PRN Ramon Marc MD   10 mg at 09/27/24 0415    Diclofenac Sodium (VOLTAREN) 1 % gel 4 g  4 g Topical 4x Daily PRN Ashleigh Nicholas PA-C   4 g at 09/27/24 0313    DULoxetine (CYMBALTA) DR capsule 60 mg  60 mg Oral Daily Ramon Marc MD   60 mg at 09/27/24 0854    heparin (porcine) 5000 UNIT/ML injection 5,000 Units  5,000 Units Subcutaneous Q8H Ramon Marc MD   5,000 Units at 09/27/24 1332    lisinopril (PRINIVIL,ZESTRIL) tablet 20 mg  20 mg Oral Daily Ramon Marc MD   20 mg at 09/27/24 0853    Magnesium Standard Dose Replacement - Follow Nurse / BPA Driven Protocol   Does not apply PRN Ramon Marc MD        [Held by provider] metFORMIN (GLUCOPHAGE) tablet 1,000 mg  1,000 mg Oral BID With Meals Ramon Marc MD        nicotine (NICODERM CQ) 7 MG/24HR patch 1 patch  1 patch Transdermal Q24H Ramon Marc MD   1 patch at 09/27/24 0854    pantoprazole (PROTONIX) EC tablet 40 mg  40 mg Oral Daily Ramon Marc MD   40 mg at 09/27/24 0853    Pharmacy Consult   Does not apply Continuous PRN Ramon Marc MD        Phosphorus Replacement - Follow Nurse / BPA Driven Protocol   Does not apply PRN Ramon Marc MD        Potassium Replacement - Follow Nurse / BPA Driven Protocol   Does not apply PRN Ramon Marc MD        prochlorperazine (COMPAZINE) injection 2.5 mg  2.5 mg Intravenous Q6H PRN Ashleigh Nicholas PA-C   2.5 mg at 09/26/24 2332    sodium chloride 0.9 % flush 10 mL  10 mL Intravenous Q12H Ramon Marc MD   10 mL at 09/27/24 0854    sodium chloride 0.9 % flush 10 mL  10  mL Intravenous PRN Ramon Marc MD        sodium chloride 0.9 % infusion 40 mL  40 mL Intravenous PRN Ramon Marc MD        sodium chloride 0.9 % infusion  75 mL/hr Intravenous Continuous Ramon Marc MD 75 mL/hr at 09/27/24 0852 75 mL/hr at 09/27/24 0852     Lab Results (most recent)       Procedure Component Value Units Date/Time    Blood Culture - Blood, Arm, Left [[id number]]  (Normal) Collected: 09/26/24 1350    Specimen: Blood from Arm, Left Updated: 09/27/24 1400     Blood Culture No growth at 24 hours    Blood Culture - Blood, Arm, Right [[id number]]  (Normal) Collected: 09/26/24 1350    Specimen: Blood from Arm, Right Updated: 09/27/24 1400     Blood Culture No growth at 24 hours    Urine Culture - Urine, Straight Cath [[id number]]  (Abnormal) Collected: 09/26/24 1451    Specimen: Urine from Straight Cath Updated: 09/27/24 1151     Urine Culture >100,000 CFU/mL Gram Negative Bacilli    Narrative:      Colonization of the urinary tract without infection is common. Treatment is discouraged unless the patient is symptomatic, pregnant, or undergoing an invasive urologic procedure.    CBC (No Diff) [[id number]]  (Abnormal) Collected: 09/27/24 0844    Specimen: Blood Updated: 09/27/24 1038     WBC 9.67 10*3/mm3      RBC 3.40 10*6/mm3      Hemoglobin 10.9 g/dL      Hematocrit 34.8 %      .4 fL      MCH 32.1 pg      MCHC 31.3 g/dL      RDW 14.0 %      RDW-SD 52.1 fl      MPV 9.8 fL      Platelets 292 10*3/mm3     Comprehensive Metabolic Panel [[id number]]  (Abnormal) Collected: 09/27/24 0844    Specimen: Blood Updated: 09/27/24 1003     Glucose 234 mg/dL      BUN 7 mg/dL      Creatinine 0.36 mg/dL      Sodium 137 mmol/L      Potassium 3.5 mmol/L      Comment: Slight hemolysis detected by analyzer. Result may be falsely elevated.        Chloride 105 mmol/L      CO2 21.5 mmol/L      Calcium 7.9 mg/dL      Total Protein 5.3 g/dL      Albumin 2.5 g/dL      ALT (SGPT) 7 U/L      AST (SGOT) 17 U/L      Alkaline  Phosphatase 96 U/L      Total Bilirubin 0.3 mg/dL      Globulin 2.8 gm/dL      A/G Ratio 0.9 g/dL      BUN/Creatinine Ratio 19.4     Anion Gap 10.5 mmol/L      eGFR 130.2 mL/min/1.73     Narrative:      GFR Normal >60  Chronic Kidney Disease <60  Kidney Failure <15      Chlamydia trachomatis, Neisseria gonorrhoeae, Trichomonas vaginalis, PCR - Swab, Vagina [[id number]]  (Normal) Collected: 09/26/24 1837    Specimen: Swab from Vagina Updated: 09/26/24 2018     Chlamydia DNA by PCR Not Detected     Neisseria gonorrhoeae by PCR Not Detected     Trichomonas vaginalis PCR Not Detected    HIV-1 & HIV-2 Antibodies [[id number]]  (Normal) Collected: 09/26/24 1350    Specimen: Blood from Arm, Right Updated: 09/26/24 1748    Narrative:      The following orders were created for panel order HIV-1 & HIV-2 Antibodies.  Procedure                               Abnormality         Status                     ---------                               -----------         ------                     HIV-1 / O / 2 Ag / Antibody[[id number]]  Normal              Final result                 Please view results for these tests on the individual orders.    Hepatitis Panel, Acute [[id number]]  (Normal) Collected: 09/26/24 1350    Specimen: Blood from Arm, Right Updated: 09/26/24 1748     Hepatitis B Surface Ag Non-Reactive     Hep A IgM Non-Reactive     Hep B C IgM Non-Reactive     Hepatitis C Ab Non-Reactive    Narrative:      Results may be falsely decreased if patient taking Biotin.     HIV-1 / O / 2 Ag / Antibody [[id number]]  (Normal) Collected: 09/26/24 1350    Specimen: Blood from Arm, Right Updated: 09/26/24 1748     HIV-1/ HIV-2 Non-Reactive     Comment: A non-reactive test result does not preclude the possibility of exposure to HIV or infection with HIV. An antibody response to recent exposure may take several months to reach detectable levels.       Narrative:      The HIV antibody/antigen combo assay is a qualitative assay for HIV that  includes the p24 antigen as well as antibodies to HIV types 1 and 2. This test is intended to be used as a screening assay in the diagnosis of HIV infection in patients over the age of 2.    Magnesium [[id number]]  (Abnormal) Collected: 09/26/24 1504    Specimen: Blood from Arm, Right Updated: 09/26/24 1553     Magnesium 1.5 mg/dL     Comprehensive Metabolic Panel [[id number]]  (Abnormal) Collected: 09/26/24 1504    Specimen: Blood from Arm, Right Updated: 09/26/24 1536     Glucose 220 mg/dL      BUN 10 mg/dL      Creatinine 0.51 mg/dL      Sodium 140 mmol/L      Potassium 2.7 mmol/L      Comment: Slight hemolysis detected by analyzer. Result may be falsely elevated.        Chloride 99 mmol/L      CO2 28.9 mmol/L      Calcium 9.4 mg/dL      Total Protein 7.1 g/dL      Albumin 3.5 g/dL      ALT (SGPT) 8 U/L      AST (SGOT) 21 U/L      Alkaline Phosphatase 119 U/L      Total Bilirubin 0.6 mg/dL      Globulin 3.6 gm/dL      A/G Ratio 1.0 g/dL      BUN/Creatinine Ratio 19.6     Anion Gap 12.1 mmol/L      eGFR 119.7 mL/min/1.73     Narrative:      GFR Normal >60  Chronic Kidney Disease <60  Kidney Failure <15      C-reactive Protein [[id number]]  (Abnormal) Collected: 09/26/24 1504    Specimen: Blood from Arm, Right Updated: 09/26/24 1530     C-Reactive Protein 3.80 mg/dL     Urinalysis, Microscopic Only - Urine, Clean Catch [[id number]]  (Abnormal) Collected: 09/26/24 1419    Specimen: Urine, Clean Catch Updated: 09/26/24 1459     RBC, UA 0-2 /HPF      WBC, UA 3-5 /HPF      Bacteria, UA 4+ /HPF      Squamous Epithelial Cells, UA None Seen /HPF      Hyaline Casts, UA None Seen /LPF      Methodology Manual Light Microscopy    Urinalysis With Microscopic If Indicated (No Culture) - Urine, Clean Catch [[id number]]  (Abnormal) Collected: 09/26/24 1419    Specimen: Urine, Clean Catch Updated: 09/26/24 1440     Color, UA Dark Yellow     Appearance, UA Turbid     pH, UA 6.0     Specific Gravity, UA >=1.030     Glucose, UA Negative      Ketones, UA 15 mg/dL (1+)     Bilirubin, UA Small (1+)     Blood, UA Trace     Protein, UA 30 mg/dL (1+)     Leuk Esterase, UA Moderate (2+)     Nitrite, UA Positive     Urobilinogen, UA 2.0 E.U./dL    hCG, Serum, Qualitative [[id number]]  (Normal) Collected: 09/26/24 1350    Specimen: Blood from Arm, Right Updated: 09/26/24 1425     HCG Qualitative Negative    Lactic Acid, Plasma [[id number]]  (Normal) Collected: 09/26/24 1350    Specimen: Blood from Arm, Right Updated: 09/26/24 1424     Lactate 1.1 mmol/L     CBC & Differential [[id number]]  (Abnormal) Collected: 09/26/24 1350    Specimen: Blood from Arm, Right Updated: 09/26/24 1403    Narrative:      The following orders were created for panel order CBC & Differential.  Procedure                               Abnormality         Status                     ---------                               -----------         ------                     CBC Auto Differential[[id number]]        Abnormal            Final result                 Please view results for these tests on the individual orders.    CBC Auto Differential [[id number]]  (Abnormal) Collected: 09/26/24 1350    Specimen: Blood from Arm, Right Updated: 09/26/24 1403     WBC 10.42 10*3/mm3      RBC 3.98 10*6/mm3      Hemoglobin 12.5 g/dL      Hematocrit 37.4 %      MCV 94.0 fL      MCH 31.4 pg      MCHC 33.4 g/dL      RDW 13.6 %      RDW-SD 46.9 fl      MPV 9.2 fL      Platelets 402 10*3/mm3      Neutrophil % 66.1 %      Lymphocyte % 28.8 %      Monocyte % 3.7 %      Eosinophil % 0.5 %      Basophil % 0.2 %      Immature Grans % 0.7 %      Neutrophils, Absolute 6.89 10*3/mm3      Lymphocytes, Absolute 3.00 10*3/mm3      Monocytes, Absolute 0.39 10*3/mm3      Eosinophils, Absolute 0.05 10*3/mm3      Basophils, Absolute 0.02 10*3/mm3      Immature Grans, Absolute 0.07 10*3/mm3      nRBC 0.0 /100 WBC     COVID-19 and FLU A/B PCR, 1 HR TAT - Swab, Nasopharynx [[id number]]  (Normal) Collected: 09/26/24 1324     Specimen: Swab from Nasopharynx Updated: 09/26/24 1353     COVID19 Not Detected     Influenza A PCR Not Detected     Influenza B PCR Not Detected    Narrative:      Fact sheet for providers: https://www.fda.gov/media/655307/download    Fact sheet for patients: https://www.fda.gov/media/319670/download    Test performed by PCR.          Orders (last 24 hrs)        Start     Ordered    09/28/24 0600  Comprehensive Metabolic Panel  Daily       09/27/24 0738    09/28/24 0600  CBC (No Diff)  Daily       09/27/24 0738    09/27/24 2100  acyclovir (ZOVIRAX) capsule 400 mg  Nightly         09/27/24 0921    09/27/24 1930  Potassium  Timed         09/27/24 1030    09/27/24 1615  Sulfur Hexafluoride Microsph (LUMASON) 60.7-25 MG IV reconstituted suspension reconstituted suspension 3 mL  Once in Imaging         09/27/24 1526    09/27/24 1130  potassium chloride (KLOR-CON M20) CR tablet 40 mEq  Every 4 Hours         09/27/24 1030    09/27/24 0738  CBC (No Diff)  STAT         09/27/24 0738    09/27/24 0738  Comprehensive Metabolic Panel  STAT         09/27/24 0738    09/27/24 0737  Pharmacy Consult  Continuous PRN         09/27/24 0738    09/27/24 0300  cefTRIAXone (ROCEPHIN) 2,000 mg in sodium chloride 0.9 % 100 mL IVPB-VTB  Every 24 Hours         09/26/24 1747    09/27/24 0103  acetaminophen (TYLENOL) tablet 650 mg  Every 6 Hours PRN         09/27/24 0105    09/27/24 0013  Diclofenac Sodium (VOLTAREN) 1 % gel 4 g  4 Times Daily PRN         09/27/24 0013    09/26/24 2200  heparin (porcine) 5000 UNIT/ML injection 5,000 Units  Every 8 Hours Scheduled         09/26/24 1747 09/26/24 2150  ECG 12 Lead QT Measurement  STAT         09/26/24 2149 09/26/24 2149  prochlorperazine (COMPAZINE) injection 2.5 mg  Every 6 Hours PRN         09/26/24 2149 09/26/24 2115  morphine injection 2 mg  Once         09/26/24 2016 09/26/24 2100  sodium chloride 0.9 % flush 10 mL  Every 12 Hours Scheduled         09/26/24 1747    09/26/24 0054   "potassium chloride 10 mEq in 100 mL IVPB  Once        Placed in \"And\" Linked Group    09/26/24 1545    09/26/24 2000  Vital Signs  Every 4 Hours       09/26/24 1747 09/26/24 1946  potassium chloride 10 mEq in 100 mL IVPB  Once        Placed in \"And\" Linked Group    09/26/24 1545    09/26/24 1846  potassium chloride 10 mEq in 100 mL IVPB  Once        Placed in \"And\" Linked Group    09/26/24 1545    09/26/24 1845  sodium chloride 0.9 % infusion  Continuous         09/26/24 1747    09/26/24 1845  aspirin chewable tablet 81 mg  Daily         09/26/24 1747    09/26/24 1845  atorvastatin (LIPITOR) tablet 80 mg  Daily         09/26/24 1747 09/26/24 1845  DULoxetine (CYMBALTA) DR capsule 60 mg  Daily         09/26/24 1747 09/26/24 1845  lisinopril (PRINIVIL,ZESTRIL) tablet 20 mg  Daily         09/26/24 1747 09/26/24 1845  pantoprazole (PROTONIX) EC tablet 40 mg  Daily         09/26/24 1747 09/26/24 1845  [Held by provider]  metFORMIN (GLUCOPHAGE) tablet 1,000 mg  2 Times Daily With Meals        (On hold since yesterday at 1747 until manually unheld; held by Ramon Marc MDHold Reason: Other (Comment Required))    09/26/24 1747 09/26/24 1845  nicotine (NICODERM CQ) 7 MG/24HR patch 1 patch  Every 24 Hours Scheduled         09/26/24 1747 09/26/24 1800  Oral Care  2 Times Daily       09/26/24 1747 09/26/24 1748  Intake & Output  Every Shift       09/26/24 1747 09/26/24 1747  sodium chloride 0.9 % flush 10 mL  As Needed         09/26/24 1747 09/26/24 1747  sodium chloride 0.9 % infusion 40 mL  As Needed         09/26/24 1747 09/26/24 1747  Potassium Replacement - Follow Nurse / BPA Driven Protocol  As Needed         09/26/24 1747    09/26/24 1747  Magnesium Standard Dose Replacement - Follow Nurse / BPA Driven Protocol  As Needed         09/26/24 1747 09/26/24 1747  Phosphorus Replacement - Follow Nurse / BPA Driven Protocol  As Needed         09/26/24 1747    09/26/24 1747  Calcium " "Replacement - Follow Nurse / BPA Driven Protocol  As Needed         09/26/24 1747 09/26/24 1747  sennosides-docusate (PERICOLACE) 8.6-50 MG per tablet 2 tablet  2 Times Daily PRN        Placed in \"And\" Linked Group    09/26/24 1747    09/26/24 1747  polyethylene glycol (MIRALAX) packet 17 g  Daily PRN        Placed in \"And\" Linked Group    09/26/24 1747    09/26/24 1747  bisacodyl (DULCOLAX) EC tablet 5 mg  Daily PRN        Placed in \"And\" Linked Group    09/26/24 1747    09/26/24 1747  bisacodyl (DULCOLAX) suppository 10 mg  Daily PRN        Placed in \"And\" Linked Group    09/26/24 1747    09/26/24 1747  cyclobenzaprine (FLEXERIL) tablet 10 mg  3 Times Daily PRN         09/26/24 1747    09/26/24 1746  potassium chloride 10 mEq in 100 mL IVPB  Once        Placed in \"And\" Linked Group    09/26/24 1545    09/26/24 1646  potassium chloride 10 mEq in 100 mL IVPB  Once        Placed in \"And\" Linked Group    09/26/24 1545    Unscheduled  Straight cath  As Needed       09/26/24 1359    Unscheduled  Potassium  As Needed        Comments: Release/collect/run 4 hours after completion of last potassium dose.     Placed in \"And\" Linked Group    09/26/24 1545    --  aspirin 81 MG chewable tablet  Daily         09/26/24 1641    --  atorvastatin (LIPITOR) 80 MG tablet  Daily         09/26/24 1641    --  cyclobenzaprine (FLEXERIL) 10 MG tablet  3 Times Daily PRN         09/26/24 1641    --  DULoxetine (CYMBALTA) 60 MG capsule  Daily         09/26/24 1641    --  lisinopril (PRINIVIL,ZESTRIL) 20 MG tablet  Daily         09/26/24 1641    --  metFORMIN (GLUCOPHAGE) 1000 MG tablet  2 Times Daily With Meals         09/26/24 1641    --  pantoprazole (PROTONIX) 40 MG EC tablet  Daily         09/26/24 1641    --  acyclovir (ZOVIRAX) 400 MG tablet  Nightly         09/27/24 0920                     Physician Progress Notes (most recent note)        Ramon Marc MD at 09/27/24 1029              [facility] [facility]     Patient Identification:  Name:  Lety Johnson  Age:  43 y.o.  Sex:  female  :  1980  MRN:  [id number]  Visit Number:  [id number]  ROOM: [address] [place] [address]     Primary Care Provider:  Provider, No Known    Length of stay in inpatient status:  0    Subjective     Chief Compliant:    Chief Complaint   Patient presents with    Fall     History of Presenting Illness:    Patient remains ill but stable today, no acute events overnight, no new complaints, denies any fevers or chills, having some chronic L sided spasms, has flexeril as needed ordered, cultures pending, on antibiotics, labs and vitals stable, resumed home Acyclovir, therapy evaluations pending.   Objective     Current Hospital Meds:  acyclovir, 400 mg, Oral, Nightly  aspirin, 81 mg, Oral, Daily  atorvastatin, 80 mg, Oral, Daily  cefTRIAXone, 2,000 mg, Intravenous, Q24H  DULoxetine, 60 mg, Oral, Daily  heparin (porcine), 5,000 Units, Subcutaneous, Q8H  lisinopril, 20 mg, Oral, Daily  [Held by provider] metFORMIN, 1,000 mg, Oral, BID With Meals  nicotine, 1 patch, Transdermal, Q24H  pantoprazole, 40 mg, Oral, Daily  sodium chloride, 10 mL, Intravenous, Q12H      Pharmacy Consult,   sodium chloride, 75 mL/hr, Last Rate: 75 mL/hr (24 0852)      ----------------------------------------------------------------------------------------------------------------------  Vital Signs:  Temp:  [97.3 °F (36.3 °C)-98.4 °F (36.9 °C)] 98.2 °F (36.8 °C)  Heart Rate:  [] 110  Resp:  [18-20] 18  BP: (112-157)/() 112/66  SpO2:  [91 %-100 %] 97 %  on   ;   Device (Oxygen Therapy): room air  Body mass index is 40.9 kg/m².      Intake/Output Summary (Last 24 hours) at 2024 1029  Last data filed at 2024 0874  Gross per 24 hour   Intake 1340 ml   Output 800 ml   Net 540 ml      ----------------------------------------------------------------------------------------------------------------------  Physical exam:  Constitutional:  Well-developed and  "well-nourished. Older than stated age. No acute distress.      HENT:  Head:  Normocephalic and atraumatic.  Mouth:  Moist mucous membranes.    Eyes:  Conjunctivae and EOM are normal. No scleral icterus.    Neck:  Neck supple.  No JVD present.    Cardiovascular:  Normal rate, regular rhythm and normal heart sounds with no murmur.  Pulmonary/Chest:  No respiratory distress, no wheezes, on room air  Abdominal:  Soft. No distension and no tenderness.   Musculoskeletal:  No tenderness, and no deformity.  No red or swollen joints anywhere.    Neurological:  Alert and oriented to person, place, and time.  No cranial nerve deficit. Chronic L sided paralysis.    Skin:  Skin is warm and dry. No pallor. Significant intertriginous erythema under panus, consistent with yeast infection.   Peripheral vascular:  No clubbing, no cyanosis, trace edema.  Psychiatric: Appropriate mood and affect  Edited by: Ramon Marc MD at 9/27/2024 1028  ----------------------------------------------------------------------------------------------------------------------  WBC/HGB/HCT/PLT   10.42/12.5/37.4/402 (09/26 1350)  BUN/CREAT/GLUC/ALT/AST/NORMA/LIP    7/0.36/234/7/17/--/-- (09/27 0844)  LYTES - Na/K/Cl/CO2: 137/3.5/105/21.5* (09/27 0844)        No results found for: \"URINECX\"  No results found for: \"BLOODCX\"    I have personally looked at the labs and they are summarized above.  ----------------------------------------------------------------------------------------------------------------------  Detailed radiology reports for the last 24 hours:  XR Ankle 3+ View Left    Result Date: 9/26/2024    Soft tissue swelling, but no acute fracture or dislocation.   This report was finalized on 9/26/2024 3:38 PM by Dr. Moses Otto MD.      XR Foot 3+ View Left    Result Date: 9/26/2024    No acute findings in the left foot.   This report was finalized on 9/26/2024 3:37 PM by Dr. Moses Otto MD.      XR Chest 1 View    Result Date: 9/26/2024  No " radiographic evidence of acute cardiac or pulmonary disease.    This report was finalized on 9/26/2024 2:06 PM by Dr. Moses Otto MD.      CT Cervical Spine Without Contrast    Result Date: 9/26/2024   1. No acute bony abnormality.  2. Other incidental findings as above  This report was finalized on 9/26/2024 1:17 PM by Dr. Moses Otto MD.      CT Lumbar Spine Without Contrast    Result Date: 9/26/2024    No acute findings in the lumbar spine.   This report was finalized on 9/26/2024 1:17 PM by Dr. Moses Otto MD.      CT Thoracic Spine Without Contrast    Result Date: 9/26/2024    No acute findings in the thoracic spine.   This report was finalized on 9/26/2024 1:17 PM by Dr. Moses Otto MD.      CT Head Without Contrast    Result Date: 9/26/2024   1. Probable remote right middle cerebral artery infarction 2. No acute parenchymal mass, hemorrhage, or midline shift  This report was finalized on 9/26/2024 1:00 PM by Dr. Moses Otto MD.     Assessment & Plan    43F Morbid Obesity by BMI PMH History Genital Herpes, Cerebrovascular Accident 5/2024 complicated by L sided paralysis, presented to [facility] emergency room 9/26 after sliding into the floor off her chair due to weakness.     #Acute Metabolic Encephalopathy due to Acute Urinary Tract Infection in setting of probable neurogenic bladder  - Continue Ceftriaxone, follow up cultures, ruled out STI    #Electrolyte Abnormalities  - Acute Mild Hypokalemia - Replacing, on protocol  - Acute Mild Hypomagnesemia - Replacing, on protocol    #History Cerebrovascular Accident complicated by L sided paralysis, unclear etiology  - Continue home regimen, Supportive Care     #Debility  - Consult PT/OT, Social Work if placement needed     #History Genital Herpes  - Supportive Care, continue home Acyclovir     #Morbid Obesity by BMI  - Body mass index is 43.26 kg/m².; complicates all aspects of care    F: Oral  E: Monitor & Replace as needed   N: Diet:  Regular/House; Fluid Consistency: Thin (IDDSI 0)   PPx: SQH  Code Status (Patient has no pulse and is not breathing): CPR  Medical Interventions (Patient has pulse or is breathing): Full Support     Dispo: Pending workup and clinical improvement    *This patient is considered high risk secondary to Urinary Tract Infection, electrolyte abnormalities, chronic L sided paralysis from prior Cerebrovascular Accident.    Edited by: Ramon Marc MD at 9/27/2024 1029  [facility] Hospitalist        Electronically signed by Ramon Marc MD at 09/27/24 1029       Consult Notes (most recent note)    No notes of this type exist for this encounter.          Physical Therapy Notes (most recent note)        Thierry Saldivar, PT at 09/27/24 1456  Version 1 of 1            09/27/24 1456   OTHER   Discipline physical therapist   Rehab Time/Intention   Session Not Performed patient unavailable for evaluation  (Pt. unavailable for evaluation x2 attempts.)         Electronically signed by Thierry Saldivar PT at 09/27/24 1456          Occupational Therapy Notes (most recent note)        Samantha Ragland OT at 09/27/24 1455             09/27/24 1455   OTHER   Discipline occupational therapist   Rehab Time/Intention   Session Not Performed patient unavailable for evaluation  (attempted multiple times, patient off floor for testing in pm)         Electronically signed by Samantha Ragland OT at 09/27/24 1455       Speech Language Pathology Notes (most recent note)    No notes exist for this encounter.       ADL Documentation (most recent)      Flowsheet Row Most Recent Value   Transferring 4 - completely dependent   Toileting 4 - completely dependent   Bathing 4 - completely dependent   Dressing 4 - completely dependent   Eating 2 - assistive person   Communication 0 - understands/communicates without difficulty   Swallowing 0 - swallows foods/liquids without difficulty   Equipment Currently Used at Home hospital bed, wheelchair, lift  device

## 2024-09-27 NOTE — PROGRESS NOTES
[facility] HOSPITALIST PROGRESS NOTE     Patient Identification:  Name:  Lety Johnson  Age:  43 y.o.  Sex:  female  :  1980  MRN:  [id number]  Visit Number:  [id number]  ROOM: [address] [place] [address]     Primary Care Provider:  Provider, No Known    Length of stay in inpatient status:  0    Subjective     Chief Compliant:    Chief Complaint   Patient presents with    Fall     History of Presenting Illness:    Patient remains ill but stable today, no acute events overnight, no new complaints, denies any fevers or chills, having some chronic L sided spasms, has flexeril as needed ordered, cultures pending, on antibiotics, labs and vitals stable, resumed home Acyclovir, therapy evaluations pending.   Objective     Current Hospital Meds:  acyclovir, 400 mg, Oral, Nightly  aspirin, 81 mg, Oral, Daily  atorvastatin, 80 mg, Oral, Daily  cefTRIAXone, 2,000 mg, Intravenous, Q24H  DULoxetine, 60 mg, Oral, Daily  heparin (porcine), 5,000 Units, Subcutaneous, Q8H  lisinopril, 20 mg, Oral, Daily  [Held by provider] metFORMIN, 1,000 mg, Oral, BID With Meals  nicotine, 1 patch, Transdermal, Q24H  pantoprazole, 40 mg, Oral, Daily  sodium chloride, 10 mL, Intravenous, Q12H      Pharmacy Consult,   sodium chloride, 75 mL/hr, Last Rate: 75 mL/hr (24 0852)      ----------------------------------------------------------------------------------------------------------------------  Vital Signs:  Temp:  [97.3 °F (36.3 °C)-98.4 °F (36.9 °C)] 98.2 °F (36.8 °C)  Heart Rate:  [] 110  Resp:  [18-20] 18  BP: (112-157)/() 112/66  SpO2:  [91 %-100 %] 97 %  on   ;   Device (Oxygen Therapy): room air  Body mass index is 40.9 kg/m².      Intake/Output Summary (Last 24 hours) at 2024 1029  Last data filed at 2024 0837  Gross per 24 hour   Intake 1340 ml   Output 800 ml   Net 540 ml      ----------------------------------------------------------------------------------------------------------------------  Physical  "exam:  Constitutional:  Well-developed and well-nourished. Older than stated age. No acute distress.      HENT:  Head:  Normocephalic and atraumatic.  Mouth:  Moist mucous membranes.    Eyes:  Conjunctivae and EOM are normal. No scleral icterus.    Neck:  Neck supple.  No JVD present.    Cardiovascular:  Normal rate, regular rhythm and normal heart sounds with no murmur.  Pulmonary/Chest:  No respiratory distress, no wheezes, on room air  Abdominal:  Soft. No distension and no tenderness.   Musculoskeletal:  No tenderness, and no deformity.  No red or swollen joints anywhere.    Neurological:  Alert and oriented to person, place, and time.  No cranial nerve deficit. Chronic L sided paralysis.    Skin:  Skin is warm and dry. No pallor. Significant intertriginous erythema under panus, consistent with yeast infection.   Peripheral vascular:  No clubbing, no cyanosis, trace edema.  Psychiatric: Appropriate mood and affect  Edited by: Ramon Marc MD at 9/27/2024 1028  ----------------------------------------------------------------------------------------------------------------------  WBC/HGB/HCT/PLT   10.42/12.5/37.4/402 (09/26 1350)  BUN/CREAT/GLUC/ALT/AST/NORMA/LIP    7/0.36/234/7/17/--/-- (09/27 0844)  LYTES - Na/K/Cl/CO2: 137/3.5/105/21.5* (09/27 0844)        No results found for: \"URINECX\"  No results found for: \"BLOODCX\"    I have personally looked at the labs and they are summarized above.  ----------------------------------------------------------------------------------------------------------------------  Detailed radiology reports for the last 24 hours:  XR Ankle 3+ View Left    Result Date: 9/26/2024    Soft tissue swelling, but no acute fracture or dislocation.   This report was finalized on 9/26/2024 3:38 PM by Dr. Moses Otto MD.      XR Foot 3+ View Left    Result Date: 9/26/2024    No acute findings in the left foot.   This report was finalized on 9/26/2024 3:37 PM by Dr. Moses Otto MD.      XR " Chest 1 View    Result Date: 9/26/2024  No radiographic evidence of acute cardiac or pulmonary disease.    This report was finalized on 9/26/2024 2:06 PM by Dr. Moses Otto MD.      CT Cervical Spine Without Contrast    Result Date: 9/26/2024   1. No acute bony abnormality.  2. Other incidental findings as above  This report was finalized on 9/26/2024 1:17 PM by Dr. Moses Otto MD.      CT Lumbar Spine Without Contrast    Result Date: 9/26/2024    No acute findings in the lumbar spine.   This report was finalized on 9/26/2024 1:17 PM by Dr. Moses Otto MD.      CT Thoracic Spine Without Contrast    Result Date: 9/26/2024    No acute findings in the thoracic spine.   This report was finalized on 9/26/2024 1:17 PM by Dr. Moses Otto MD.      CT Head Without Contrast    Result Date: 9/26/2024   1. Probable remote right middle cerebral artery infarction 2. No acute parenchymal mass, hemorrhage, or midline shift  This report was finalized on 9/26/2024 1:00 PM by Dr. Moses Otto MD.     Assessment & Plan    43F Morbid Obesity by BMI PMH History Genital Herpes, Cerebrovascular Accident 5/2024 complicated by L sided paralysis, presented to [facility] emergency room 9/26 after sliding into the floor off her chair due to weakness.     #Acute Metabolic Encephalopathy due to Acute Urinary Tract Infection in setting of probable neurogenic bladder  - Continue Ceftriaxone, follow up cultures, ruled out STI    #Electrolyte Abnormalities  - Acute Mild Hypokalemia - Replacing, on protocol  - Acute Mild Hypomagnesemia - Replacing, on protocol    #History Cerebrovascular Accident complicated by L sided paralysis, unclear etiology  - Continue home regimen, Supportive Care     #Debility  - Consult PT/OT, Social Work if placement needed     #History Genital Herpes  - Supportive Care, continue home Acyclovir     #Morbid Obesity by BMI  - Body mass index is 43.26 kg/m².; complicates all aspects of care    F: Oral  E:  Monitor & Replace as needed   N: Diet: Regular/House; Fluid Consistency: Thin (IDDSI 0)   PPx: SQH  Code Status (Patient has no pulse and is not breathing): CPR  Medical Interventions (Patient has pulse or is breathing): Full Support     Dispo: Pending workup and clinical improvement    *This patient is considered high risk secondary to Urinary Tract Infection, electrolyte abnormalities, chronic L sided paralysis from prior Cerebrovascular Accident.    Edited by: Ramon Marc MD at 9/27/2024 [address] [place] [address]

## 2024-09-27 NOTE — PLAN OF CARE
Patient resting in the bed throughout the night, many complaints of pain see JOE COATES aware. Will continue to follow plan of care.

## 2024-09-27 NOTE — PLAN OF CARE
Goal Outcome Evaluation:   Pt has been pleasant this shift. Pt on RA with sats maintaining above 90%. No s/s of acute distress noted at this time. Plan of care ongoing.

## 2024-09-27 NOTE — SIGNIFICANT NOTE
09/27/24 1459   OTHER   Discipline occupational therapist   Rehab Time/Intention   Session Not Performed patient unavailable for evaluation  (attempted multiple times, patient off floor for testing in pm)

## 2024-09-28 LAB
ALBUMIN SERPL-MCNC: 2.7 G/DL (ref 3.5–5.2)
ALBUMIN/GLOB SERPL: 0.9 G/DL
ALP SERPL-CCNC: 100 U/L (ref 39–117)
ALT SERPL W P-5'-P-CCNC: 9 U/L (ref 1–33)
ANION GAP SERPL CALCULATED.3IONS-SCNC: 9.7 MMOL/L (ref 5–15)
AST SERPL-CCNC: 15 U/L (ref 1–32)
BACTERIA SPEC AEROBE CULT: ABNORMAL
BILIRUB SERPL-MCNC: 0.3 MG/DL (ref 0–1.2)
BUN SERPL-MCNC: 5 MG/DL (ref 6–20)
BUN/CREAT SERPL: 11.6 (ref 7–25)
CALCIUM SPEC-SCNC: 8.5 MG/DL (ref 8.6–10.5)
CHLORIDE SERPL-SCNC: 110 MMOL/L (ref 98–107)
CO2 SERPL-SCNC: 22.3 MMOL/L (ref 22–29)
CREAT SERPL-MCNC: 0.43 MG/DL (ref 0.57–1)
DEPRECATED RDW RBC AUTO: 51.4 FL (ref 37–54)
EGFRCR SERPLBLD CKD-EPI 2021: 124.7 ML/MIN/1.73
ERYTHROCYTE [DISTWIDTH] IN BLOOD BY AUTOMATED COUNT: 14.2 % (ref 12.3–15.4)
GLOBULIN UR ELPH-MCNC: 3.1 GM/DL
GLUCOSE SERPL-MCNC: 198 MG/DL (ref 65–99)
HCT VFR BLD AUTO: 36.9 % (ref 34–46.6)
HGB BLD-MCNC: 11.8 G/DL (ref 12–15.9)
MCH RBC QN AUTO: 31.6 PG (ref 26.6–33)
MCHC RBC AUTO-ENTMCNC: 32 G/DL (ref 31.5–35.7)
MCV RBC AUTO: 98.9 FL (ref 79–97)
PLATELET # BLD AUTO: 332 10*3/MM3 (ref 140–450)
PMV BLD AUTO: 9.1 FL (ref 6–12)
POTASSIUM SERPL-SCNC: 4.1 MMOL/L (ref 3.5–5.2)
PROT SERPL-MCNC: 5.8 G/DL (ref 6–8.5)
RBC # BLD AUTO: 3.73 10*6/MM3 (ref 3.77–5.28)
SODIUM SERPL-SCNC: 142 MMOL/L (ref 136–145)
WBC NRBC COR # BLD AUTO: 7.38 10*3/MM3 (ref 3.4–10.8)

## 2024-09-28 PROCEDURE — 25810000003 SODIUM CHLORIDE 0.9 % SOLUTION: Performed by: INTERNAL MEDICINE

## 2024-09-28 PROCEDURE — 85027 COMPLETE CBC AUTOMATED: CPT | Performed by: INTERNAL MEDICINE

## 2024-09-28 PROCEDURE — 99254 IP/OBS CNSLTJ NEW/EST MOD 60: CPT | Performed by: NURSE PRACTITIONER

## 2024-09-28 PROCEDURE — 99232 SBSQ HOSP IP/OBS MODERATE 35: CPT | Performed by: INTERNAL MEDICINE

## 2024-09-28 PROCEDURE — 25010000002 CEFTRIAXONE PER 250 MG: Performed by: INTERNAL MEDICINE

## 2024-09-28 PROCEDURE — 25010000002 PROCHLORPERAZINE 10 MG/2ML SOLUTION

## 2024-09-28 PROCEDURE — 25010000002 HEPARIN (PORCINE) PER 1000 UNITS: Performed by: INTERNAL MEDICINE

## 2024-09-28 PROCEDURE — 25010000002 ERTAPENEM PER 500 MG: Performed by: INTERNAL MEDICINE

## 2024-09-28 PROCEDURE — 80053 COMPREHEN METABOLIC PANEL: CPT | Performed by: INTERNAL MEDICINE

## 2024-09-28 RX ADMIN — ACETAMINOPHEN 650 MG: 325 TABLET ORAL at 17:39

## 2024-09-28 RX ADMIN — SODIUM CHLORIDE 2000 MG: 9 INJECTION, SOLUTION INTRAVENOUS at 03:46

## 2024-09-28 RX ADMIN — CYCLOBENZAPRINE 10 MG: 10 TABLET, FILM COATED ORAL at 08:46

## 2024-09-28 RX ADMIN — ACYCLOVIR 400 MG: 200 CAPSULE ORAL at 22:54

## 2024-09-28 RX ADMIN — PROCHLORPERAZINE EDISYLATE 2.5 MG: 5 INJECTION INTRAMUSCULAR; INTRAVENOUS at 17:39

## 2024-09-28 RX ADMIN — LISINOPRIL 20 MG: 10 TABLET ORAL at 08:46

## 2024-09-28 RX ADMIN — CYCLOBENZAPRINE 10 MG: 10 TABLET, FILM COATED ORAL at 17:39

## 2024-09-28 RX ADMIN — ERTAPENEM 1000 MG: 1 INJECTION, POWDER, LYOPHILIZED, FOR SOLUTION INTRAMUSCULAR; INTRAVENOUS at 15:57

## 2024-09-28 RX ADMIN — ASPIRIN 81 MG: 81 TABLET, CHEWABLE ORAL at 08:45

## 2024-09-28 RX ADMIN — Medication 10 ML: at 08:46

## 2024-09-28 RX ADMIN — DULOXETINE HYDROCHLORIDE 60 MG: 60 CAPSULE, DELAYED RELEASE ORAL at 08:45

## 2024-09-28 RX ADMIN — SODIUM CHLORIDE 75 ML/HR: 9 INJECTION, SOLUTION INTRAVENOUS at 11:42

## 2024-09-28 RX ADMIN — ATORVASTATIN CALCIUM 80 MG: 40 TABLET, FILM COATED ORAL at 08:46

## 2024-09-28 RX ADMIN — ACETAMINOPHEN 650 MG: 325 TABLET ORAL at 08:46

## 2024-09-28 RX ADMIN — Medication 10 ML: at 22:39

## 2024-09-28 RX ADMIN — NICOTINE 1 PATCH: 7 PATCH, EXTENDED RELEASE TRANSDERMAL at 08:49

## 2024-09-28 RX ADMIN — PANTOPRAZOLE SODIUM 40 MG: 40 TABLET, DELAYED RELEASE ORAL at 08:46

## 2024-09-28 RX ADMIN — HEPARIN SODIUM 5000 UNITS: 5000 INJECTION INTRAVENOUS; SUBCUTANEOUS at 22:54

## 2024-09-28 NOTE — PLAN OF CARE
Problem: Adult Inpatient Plan of Care  Goal: Plan of Care Review  Outcome: Progressing   Goal Outcome Evaluation:  Plan of Care Reviewed With: patient, durable power of , mother      Patient lying in bed. Family at bedside. IV fluids infusing per order. IV ABX continued. IV patent. Patient resulted positive cultures for ESBL E.Coli this shift. Isolation precautions initiated. IV ABX adjusted this shift. Wound care completed. Plan of care ongoing.

## 2024-09-28 NOTE — PLAN OF CARE
Problem: Adult Inpatient Plan of Care  Goal: Absence of Hospital-Acquired Illness or Injury  Intervention: Prevent Skin Injury  Recent Flowsheet Documentation  Taken 9/27/2024 1957 by Dyan Natarajan, RN  Body Position: supine, legs elevated  Skin Protection:   adhesive use limited   incontinence pads utilized     Problem: Adult Inpatient Plan of Care  Goal: Absence of Hospital-Acquired Illness or Injury  Intervention: Prevent and Manage VTE (Venous Thromboembolism) Risk  Recent Flowsheet Documentation  Taken 9/27/2024 1957 by Dyan Natarajan, RN  Activity Management: activity encouraged  VTE Prevention/Management: (see MAR) other (see comments)   Goal Outcome Evaluation:

## 2024-09-28 NOTE — PROGRESS NOTES
[facility] HOSPITALIST PROGRESS NOTE     Patient Identification:  Name:  Lety Johnson  Age:  43 y.o.  Sex:  female  :  1980  MRN:  [id number]  Visit Number:  [id number]  ROOM: [address] [place] [address]     Primary Care Provider:  Provider, No Known    Length of stay in inpatient status:  0    Subjective     Chief Compliant:    Chief Complaint   Patient presents with    Fall     History of Presenting Illness:    Patient remains ill but stable today, no acute events overnight, denies any fevers or chills, does have new headache today, no neck stiffness or fevers, urine culture with growth and speciation pending, continue Ceftriaxone, electrolytes improved, PT/OT consulted and following.   Objective     Current Hospital Meds:  acyclovir, 400 mg, Oral, Nightly  aspirin, 81 mg, Oral, Daily  atorvastatin, 80 mg, Oral, Daily  cefTRIAXone, 2,000 mg, Intravenous, Q24H  DULoxetine, 60 mg, Oral, Daily  heparin (porcine), 5,000 Units, Subcutaneous, Q8H  lisinopril, 20 mg, Oral, Daily  [Held by provider] metFORMIN, 1,000 mg, Oral, BID With Meals  nicotine, 1 patch, Transdermal, Q24H  pantoprazole, 40 mg, Oral, Daily  sodium chloride, 10 mL, Intravenous, Q12H      Pharmacy Consult,   sodium chloride, 75 mL/hr, Last Rate: 75 mL/hr (24 0846)      ----------------------------------------------------------------------------------------------------------------------  Vital Signs:  Temp:  [97.5 °F (36.4 °C)-98.2 °F (36.8 °C)] 98.2 °F (36.8 °C)  Heart Rate:  [102-112] 102  Resp:  [18-20] 20  BP: (118-150)/(66-86) 141/71  SpO2:  [95 %-98 %] 96 %  on   ;   Device (Oxygen Therapy): room air  Body mass index is 40.79 kg/m².      Intake/Output Summary (Last 24 hours) at 202407  Last data filed at 2024 0309  Gross per 24 hour   Intake 840 ml   Output 1350 ml   Net -510 ml      ----------------------------------------------------------------------------------------------------------------------  Physical  exam:  Constitutional:  Older than stated age. No acute distress. Mild discomfort   HENT:  Head:  Normocephalic and atraumatic.  Mouth:  Moist mucous membranes.    Eyes:  Conjunctivae and EOM are normal. No scleral icterus.    Neck:  Neck supple.  No JVD present.    Cardiovascular:  Normal rate, regular rhythm and normal heart sounds with no murmur.  Pulmonary/Chest:  No respiratory distress, no wheezes, on room air  Abdominal:  Soft. No distension and no tenderness.   Musculoskeletal:  No tenderness, and no deformity.  No red or swollen joints anywhere.    Neurological:  Alert and oriented to person, place, and time.  No cranial nerve deficit. Chronic L sided paralysis.    Skin:  Skin is warm and dry. No pallor. Significant intertriginous erythema under panus, consistent with yeast infection.   Peripheral vascular:  No clubbing, no cyanosis, trace edema.  Psychiatric: Appropriate mood and affect  Edited by: Ramon Marc MD at 9/28/2024 0925  ----------------------------------------------------------------------------------------------------------------------  WBC/HGB/HCT/PLT   7.38/11.8/36.9/332 (09/28 0318)  BUN/CREAT/GLUC/ALT/AST/NORMA/LIP    5/0.43/198/9/15/--/-- (09/28 0318)  LYTES - Na/K/Cl/CO2: 142/4.1/110*/22.3 (09/28 0318)        Urine Culture   Date Value Ref Range Status   09/26/2024 >100,000 CFU/mL Gram Negative Bacilli (A)  Preliminary     Blood Culture   Date Value Ref Range Status   09/26/2024 No growth at 24 hours  Preliminary   09/26/2024 No growth at 24 hours  Preliminary       I have personally looked at the labs and they are summarized above.  ----------------------------------------------------------------------------------------------------------------------  Detailed radiology reports for the last 24 hours:  XR Ankle 3+ View Left    Result Date: 9/26/2024    Soft tissue swelling, but no acute fracture or dislocation.   This report was finalized on 9/26/2024 3:38 PM by Dr. Moses Otto MD.       XR Foot 3+ View Left    Result Date: 9/26/2024    No acute findings in the left foot.   This report was finalized on 9/26/2024 3:37 PM by Dr. Moses Otto MD.      XR Chest 1 View    Result Date: 9/26/2024  No radiographic evidence of acute cardiac or pulmonary disease.    This report was finalized on 9/26/2024 2:06 PM by Dr. Moses Otto MD.      CT Cervical Spine Without Contrast    Result Date: 9/26/2024   1. No acute bony abnormality.  2. Other incidental findings as above  This report was finalized on 9/26/2024 1:17 PM by Dr. Moses Otto MD.      CT Lumbar Spine Without Contrast    Result Date: 9/26/2024    No acute findings in the lumbar spine.   This report was finalized on 9/26/2024 1:17 PM by Dr. Moses Otto MD.      CT Thoracic Spine Without Contrast    Result Date: 9/26/2024    No acute findings in the thoracic spine.   This report was finalized on 9/26/2024 1:17 PM by Dr. Moses Otto MD.      CT Head Without Contrast    Result Date: 9/26/2024   1. Probable remote right middle cerebral artery infarction 2. No acute parenchymal mass, hemorrhage, or midline shift  This report was finalized on 9/26/2024 1:00 PM by Dr. Moses Otto MD.     Assessment & Plan    43F Morbid Obesity by BMI PMH History Genital Herpes, Cerebrovascular Accident 5/2024 complicated by L sided paralysis, presented to [facility] emergency room 9/26 after sliding into the floor off her chair due to weakness.     #Acute Metabolic Encephalopathy due to Acute Urinary Tract Infection in setting of probable neurogenic bladder, ruled out STI  - Continue Ceftriaxone, follow up cultures, so far growing > 100K CFUs GN Bacilli    #Electrolyte Abnormalities  - Acute Mild Hypokalemia - Replaced per protocol - Resolved   - Acute Mild Hypomagnesemia - Replaced per protocol - Resolved     #History Cerebrovascular Accident complicated by L sided paralysis, unclear etiology  - Continue home regimen, Supportive Care      #Debility  - Consult PT/OT, Social Work if placement needed     #History Genital Herpes  - Supportive Care, continue home Acyclovir     #Morbid Obesity by BMI  - Body mass index is 43.26 kg/m².; complicates all aspects of care    F: Oral  E: Monitor & Replace as needed   N: Diet: Regular/House; Fluid Consistency: Thin (IDDSI 0)   PPx: SQH  Code Status (Patient has no pulse and is not breathing): CPR  Medical Interventions (Patient has pulse or is breathing): Full Support     Dispo: Pending workup and clinical improvement, PT/OT consulted and following, likely to need placement, follow up Social Work on Monday     *This patient is considered high risk secondary to Urinary Tract Infection, electrolyte abnormalities, chronic L sided paralysis from prior Cerebrovascular Accident.    Edited by: Ramon Marc MD at 9/28/2024 6329  [facility] Hospitalist

## 2024-09-28 NOTE — CONSULTS
INFECTIOUS DISEASE CONSULTATION REPORT        Patient Identification:  Name:  Lety Johnson  Age:  43 y.o.  Sex:  female  :  1980  MRN:  [id number]   Visit Number:  [id number]  Primary Care Physician:  Provider, No Known        Referring Provider: Dr. Marc    Reason for consult: ESBL UTI       LOS: 0 days        Subjective       Subjective     History of present illness:      Thank you Dr. Marc for allowing us to participate in the care of your patient.  As you well know, Ms. Lety Johnson is a 43 y.o. 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  WBC 7.38.  Chlamydia gonorrhea and trichomonas negative.  CRP 3.80.  Urinalysis positive with culture finalizing is greater than 100,000 colonies of E. coli ESBL.  Blood cultures from 2024 show no growth thus far.  HIV 1 and 2 nonreactive.  Hepatitis panel nonreactive.  COVID-19 and influenza PCR negative.  Lactic acid normal on admission.    Patient resting in bed.  Denies dysuria, urgency, frequency.  Patient reports recurrent yeast infections.  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  Lungs clear to auscultation bilaterally.      Infectious Disease consultation was requested for antimicrobial management.      ---------------------------------------------------------------------------------------------------------------------     Review Of Systems:    Constitutional: no fever, chills and night sweats. No appetite change or unexpected weight change. No fatigue.  Eyes: no eye drainage, itching or redness.  HEENT: no mouth sores, dysphagia or nose bleed.  Respiratory: no for shortness of breath, cough or production of sputum.  Cardiovascular: no chest pain, no palpitations, no orthopnea.  Gastrointestinal: no nausea, vomiting or diarrhea. No  abdominal pain, hematemesis or rectal bleeding.  Genitourinary: no dysuria or polyuria.  Hematologic/lymphatic: no lymph node abnormalities, no easy bruising or easy bleeding.  Musculoskeletal: no muscle or joint pain.  Skin: No rash and no itching.  Neurological: no loss of consciousness, no seizure, no headache.  Behavioral/Psych: no depression or suicidal ideation.  Endocrine: no hot flashes.  Immunologic: negative.    ---------------------------------------------------------------------------------------------------------------------     Past Medical History    Past Medical History:   Diagnosis Date    Stroke        Past Surgical History    History reviewed. No pertinent surgical history.    Family History    History reviewed. No pertinent family history.    Social History    Social History     Tobacco Use    Smoking status: Every Day     Average packs/day: 2.0 packs/day for 26.0 years (52.0 ttl pk-yrs)     Types: Cigarettes     Start date: 1998    Smokeless tobacco: Never   Vaping Use    Vaping status: Never Used   Substance Use Topics    Alcohol use: Not Currently    Drug use: Never       Allergies    Patient has no known allergies.  ---------------------------------------------------------------------------------------------------------------------     Home Medications:    Prior to Admission Medications       Prescriptions Last Dose Informant Patient Reported? Taking?    acyclovir (ZOVIRAX) 400 MG tablet  Pharmacy Yes No    Take 1 tablet by mouth Every Night.    aspirin 81 MG chewable tablet Unknown  Yes No    Chew 1 tablet Daily.    atorvastatin (LIPITOR) 80 MG tablet Unknown  Yes No    Take 1 tablet by mouth Daily.    cyclobenzaprine (FLEXERIL) 10 MG tablet Unknown  Yes No    Take 1 tablet by mouth 3 (Three) Times a Day As Needed for Muscle Spasms.    DULoxetine (CYMBALTA) 60 MG capsule Unknown  Yes No    Take 1 capsule by mouth Daily.    lisinopril (PRINIVIL,ZESTRIL) 20 MG tablet Unknown  Yes No    Take 1  tablet by mouth Daily.    metFORMIN (GLUCOPHAGE) 1000 MG tablet Unknown  Yes No    Take 1 tablet by mouth 2 (Two) Times a Day With Meals.    pantoprazole (PROTONIX) 40 MG EC tablet Unknown  Yes No    Take 1 tablet by mouth Daily.          ---------------------------------------------------------------------------------------------------------------------    Objective       Objective     Hospital Scheduled Meds:  acyclovir, 400 mg, Oral, Nightly  aspirin, 81 mg, Oral, Daily  atorvastatin, 80 mg, Oral, Daily  DULoxetine, 60 mg, Oral, Daily  ertapenem, 1,000 mg, Intravenous, Q24H  heparin (porcine), 5,000 Units, Subcutaneous, Q8H  lisinopril, 20 mg, Oral, Daily  [Held by provider] metFORMIN, 1,000 mg, Oral, BID With Meals  nicotine, 1 patch, Transdermal, Q24H  pantoprazole, 40 mg, Oral, Daily  sodium chloride, 10 mL, Intravenous, Q12H      Pharmacy Consult,   sodium chloride, 75 mL/hr, Last Rate: 75 mL/hr (09/28/24 1315)      ---------------------------------------------------------------------------------------------------------------------   Vital Signs:  Temp:  [97.5 °F (36.4 °C)-98.4 °F (36.9 °C)] 98.4 °F (36.9 °C)  Heart Rate:  [] 58  Resp:  [18-20] 18  BP: (133-172)/(71-86) 172/83  Mean Arterial Pressure (Non-Invasive) for the past 24 hrs (Last 3 readings):   Noninvasive MAP (mmHg)   09/28/24 1100 107   09/28/24 0650 92   09/28/24 0307 103     SpO2 Percentage    09/28/24 0307 09/28/24 0650 09/28/24 1100   SpO2: 98% 96% 97%     SpO2:  [96 %-98 %] 97 %  on   ;   Device (Oxygen Therapy): room air    Body mass index is 40.79 kg/m².  Wt Readings from Last 3 Encounters:   09/28/24 113 kg (248 lb 14.4 oz)   06/27/24 120 kg (264 lb)     ---------------------------------------------------------------------------------------------------------------------     Physical Exam:    Constitutional:  Well-developed and well-nourished.  No respiratory distress.      HENT:  Head: Normocephalic and atraumatic.  Mouth:  Moist  "mucous membranes.    Eyes:  Conjunctivae and EOM are normal.  No scleral icterus.  Neck:  Neck supple.  No JVD present.    Cardiovascular:  Normal rate, regular rhythm and normal heart sounds with no murmur. No edema.  Pulmonary/Chest:  No respiratory distress, no wheezes, no crackles, with normal breath sounds and good air movement.  Abdominal:  Soft.  Bowel sounds are normal.  No distension and no tenderness.   Musculoskeletal: Left sided paralysis secondary to CVA.  Neurological:  Alert and oriented to person, place, and time.  No facial droop.  No slurred speech.   Skin:  Skin is warm and dry.  No rash noted.  No pallor.   Psychiatric:  Normal mood and affect.  Behavior is normal.    ---------------------------------------------------------------------------------------------------------------------              Results from last 7 days   Lab Units 09/28/24 0318 09/27/24  0844 09/26/24  1504 09/26/24  1350   CRP mg/dL  --   --  3.80*  --    LACTATE mmol/L  --   --   --  1.1   WBC 10*3/mm3 7.38 9.67  --  10.42   HEMOGLOBIN g/dL 11.8* 10.9*  --  12.5   HEMATOCRIT % 36.9 34.8  --  37.4   MCV fL 98.9* 102.4*  --  94.0   MCHC g/dL 32.0 31.3*  --  33.4   PLATELETS 10*3/mm3 332 292  --  402     Results from last 7 days   Lab Units 09/28/24 0318 09/27/24  1904 09/27/24  0844 09/26/24  1504   SODIUM mmol/L 142  --  137 140   POTASSIUM mmol/L 4.1 4.0 3.5 2.7*   MAGNESIUM mg/dL  --   --   --  1.5*   CHLORIDE mmol/L 110*  --  105 99   CO2 mmol/L 22.3  --  21.5* 28.9   BUN mg/dL 5*  --  7 10   CREATININE mg/dL 0.43*  --  0.36* 0.51*   CALCIUM mg/dL 8.5*  --  7.9* 9.4   GLUCOSE mg/dL 198*  --  234* 220*   ALBUMIN g/dL 2.7*  --  2.5* 3.5   BILIRUBIN mg/dL 0.3  --  0.3 0.6   ALK PHOS U/L 100  --  96 119*   AST (SGOT) U/L 15  --  17 21   ALT (SGPT) U/L 9  --  7 8   Estimated Creatinine Clearance: 215.5 mL/min (A) (by C-G formula based on SCr of 0.43 mg/dL (L)).  No results found for: \"AMMONIA\"    No results found for: " "\"HGBA1C\", \"POCGLU\"  No results found for: \"HGBA1C\"  No results found for: \"TSH\", \"FREET4\"    Blood Culture   Date Value Ref Range Status   09/26/2024 No growth at 2 days  Preliminary   09/26/2024 No growth at 2 days  Preliminary     Urine Culture   Date Value Ref Range Status   09/26/2024 >100,000 CFU/mL Escherichia coli ESBL (A)  Final     No results found for: \"WOUNDCX\"  No results found for: \"STOOLCX\"  No results found for: \"RESPCX\"  Pain Management Panel           No data to display              I have personally reviewed the above laboratory results.   ---------------------------------------------------------------------------------------------------------------------  Imaging Results (Last 7 Days)       Procedure Component Value Units Date/Time    XR Ankle 3+ View Left [[id number]] Collected: 09/26/24 1537     Updated: 09/26/24 1540    Narrative:      EXAM:    XR Left Ankle Complete, 3 or More Views     EXAM DATE:    9/26/2024 3:17 PM     CLINICAL HISTORY:    left ankle injury     TECHNIQUE:    Frontal, lateral and oblique views of the left ankle.     COMPARISON:    No relevant prior studies available.     FINDINGS:    Bones/joints:  See below.      Soft tissues:  Soft tissue swelling, but no acute fracture or  dislocation.       Impression:        Soft tissue swelling, but no acute fracture or dislocation.        This report was finalized on 9/26/2024 3:38 PM by Dr. Moses Otto MD.       XR Foot 3+ View Left [[id number]] Collected: 09/26/24 1536     Updated: 09/26/24 1539    Narrative:      EXAM:    XR Left Foot Complete, 3 or More Views     EXAM DATE:    9/26/2024 3:16 PM     CLINICAL HISTORY:    left foot wound     TECHNIQUE:    Frontal, lateral and oblique views of the left foot.     COMPARISON:    No relevant prior studies available.     FINDINGS:    Bones/joints:  Unremarkable.  No acute fracture.  No dislocation.    Soft tissues:  Unremarkable.  No radiopaque foreign body.       Impression:        No " acute findings in the left foot.        This report was finalized on 9/26/2024 3:37 PM by Dr. Moses Otto MD.       XR Chest 1 View [[id number]] Collected: 09/26/24 1406     Updated: 09/26/24 1408    Narrative:      XR CHEST 1 VW-     CLINICAL INDICATION: weakness        COMPARISON: None immediately available      TECHNIQUE: Single frontal view of the chest.     FINDINGS:     LUNGS: Lungs are adequately aerated.      HEART AND MEDIASTINUM: Heart and mediastinal contours are unremarkable        SKELETON: Bony and soft tissue structures are unremarkable.             Impression:      No radiographic evidence of acute cardiac or pulmonary disease.           This report was finalized on 9/26/2024 2:06 PM by Dr. Moses Otto MD.       CT Cervical Spine Without Contrast [[id number]] Collected: 09/26/24 1317     Updated: 09/26/24 1319    Narrative:      CT CERVICAL SPINE WO CONTRAST-     CLINICAL INDICATION: neck pain        COMPARISON: None available     TECHNIQUE: Axial images of the cervical spine were acquired with out any  intravenous contrast. Reformatted images were then created in the  sagittal and coronal planes.     DOSE:      Radiation dose reduction techniques were utilized per ALARA protocol.  Automated exposure control was initiated through either or [employer] or  [employer] software packages by  protocol.           FINDINGS:   The provided study demonstrates preservation of the vertebral body  heights in the sagittally reconstructed images.     There is no prevertebral soft tissue swelling.     Alignment is near anatomic.     The disc space heights are uniform.     I see no acute cervical spine fracture.       Impression:         1. No acute bony abnormality.     2. Other incidental findings as above     This report was finalized on 9/26/2024 1:17 PM by Dr. Moses Otto MD.       CT Lumbar Spine Without Contrast [[id number]] Collected: 09/26/24 1317     Updated: 09/26/24 1319    Narrative:       EXAM:    CT Lumbar Spine Without Intravenous Contrast     EXAM DATE:    9/26/2024 12:59 PM     CLINICAL HISTORY:    back pain     TECHNIQUE:    Axial computed tomography images of the lumbar spine without  intravenous contrast.  Sagittal and coronal reformatted images were  created and reviewed.  This CT exam was performed using one or more of  the following dose reduction techniques:  automated exposure control,  adjustment of the mA and/or kV according to patient size, and/or use of  iterative reconstruction technique.     COMPARISON:    No relevant prior studies available.     FINDINGS:    VERTEBRAE:  Unremarkable.  No acute fracture.    DISCS/SPINAL CANAL/NEURAL FORAMINA:  No acute findings.  No spinal  canal stenosis.    SOFT TISSUES:  Unremarkable.       Impression:        No acute findings in the lumbar spine.        This report was finalized on 9/26/2024 1:17 PM by Dr. Moses Otto MD.       CT Thoracic Spine Without Contrast [[id number]] Collected: 09/26/24 1316     Updated: 09/26/24 1319    Narrative:      EXAM:    CT Thoracic Spine Without Intravenous Contrast     EXAM DATE:    9/26/2024 12:58 PM     CLINICAL HISTORY:    back pain     TECHNIQUE:    Axial computed tomography images of the thoracic spine without  intravenous contrast.  Sagittal and coronal reformatted images were  created and reviewed.  This CT exam was performed using one or more of  the following dose reduction techniques:  automated exposure control,  adjustment of the mA and/or kV according to patient size, and/or use of  iterative reconstruction technique.     COMPARISON:    No relevant prior studies available.     FINDINGS:    VERTEBRAE:  Unremarkable.  No acute fracture.    DISCS/SPINAL CANAL/NEURAL FORAMINA:  No acute findings.  No spinal  canal stenosis.    SOFT TISSUES:  Unremarkable.       Impression:        No acute findings in the thoracic spine.        This report was finalized on 9/26/2024 1:17 PM by Dr. Moses Otto MD.        CT Head Without Contrast [[id number]] Collected: 09/26/24 1259     Updated: 09/26/24 1302    Narrative:      CT HEAD WO CONTRAST-     CLINICAL INDICATION: weakness        COMPARISON: None available     TECHNIQUE: Axial images of the brain were obtained with out intravenous  contrast.  Reformatted images were created in the sagittal and coronal  planes.     DOSE:     Radiation dose reduction techniques were utilized per ALARA protocol.  Automated exposure control was initiated through either or [employer] or  [employer] software packages by  protocol.        FINDINGS:    BRAIN: Probable subacute ischemia right middle cerebral artery  territory    VENTRICLES:  Unremarkable.  No ventriculomegaly.       BONES/JOINTS:  Unremarkable.  No acute fracture.       SOFT TISSUES:  Unremarkable.       SINUSES:  no air fluid levels       MASTOID AIR CELLS:  Unremarkable as visualized.  No mastoid effusion.          Impression:         1. Probable remote right middle cerebral artery infarction  2. No acute parenchymal mass, hemorrhage, or midline shift     This report was finalized on 9/26/2024 1:00 PM by Dr. Moses Otto MD.             I have personally reviewed the above radiology results.   ---------------------------------------------------------------------------------------------------------------------      Pertinent Infectious Disease Results          Assessment & Plan      Assessment        ESBL UTI      Plan      Patient presented to [facility] Emergency Department on 9/26/2024 for evaluation after sliding into the floor due to weakness.  WBC 7.38.  Chlamydia gonorrhea and trichomonas negative.  CRP 3.80.  Urinalysis positive with culture finalizing is greater than 100,000 colonies of E. coli ESBL.  Blood cultures from 9/26/2024 show no growth thus far.  HIV 1 and 2 nonreactive.  Hepatitis panel nonreactive.  COVID-19 and influenza PCR negative.  Lactic acid normal on admission.    Patient resting  in bed.  Denies dysuria, urgency, frequency.  Patient reports recurrent yeast infections.  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  Lungs clear to auscultation bilaterally.    Recommend to continue current antibiotic regimen of ertapenem 1 g IV every 24 hours x 7 days for treatment of ESBL UTI.  Okay to continue home suppressive acyclovir for history of genital herpes.  We will continue to follow closely and adjust antibiotic therapy as needed.        ANTIMICROBIAL THERAPY    acyclovir - 200 MG, 400 MG  ertapenem (INVanz) 1000 mg in 100 mL NS (VTB)       Again, thank you Dr. Marc for allowing us to participate in the care of your patient and please feel free to call for any questions you may have.        Code Status:     Code Status and Medical Interventions: CPR (Attempt to Resuscitate); Full Support   Ordered at: 09/26/24 1626     Code Status (Patient has no pulse and is not breathing):    CPR (Attempt to Resuscitate)     Medical Interventions (Patient has pulse or is breathing):    Full Support         YUE Rivera  09/28/24  14:36 EDT

## 2024-09-29 LAB
ALBUMIN SERPL-MCNC: 2.5 G/DL (ref 3.5–5.2)
ALBUMIN/GLOB SERPL: 0.9 G/DL
ALP SERPL-CCNC: 88 U/L (ref 39–117)
ALT SERPL W P-5'-P-CCNC: 8 U/L (ref 1–33)
ANION GAP SERPL CALCULATED.3IONS-SCNC: 10 MMOL/L (ref 5–15)
AST SERPL-CCNC: 15 U/L (ref 1–32)
BILIRUB SERPL-MCNC: 0.2 MG/DL (ref 0–1.2)
BUN SERPL-MCNC: 4 MG/DL (ref 6–20)
BUN/CREAT SERPL: 10 (ref 7–25)
CALCIUM SPEC-SCNC: 8.2 MG/DL (ref 8.6–10.5)
CHLORIDE SERPL-SCNC: 109 MMOL/L (ref 98–107)
CO2 SERPL-SCNC: 21 MMOL/L (ref 22–29)
CREAT SERPL-MCNC: 0.4 MG/DL (ref 0.57–1)
DEPRECATED RDW RBC AUTO: 51.7 FL (ref 37–54)
EGFRCR SERPLBLD CKD-EPI 2021: 126.9 ML/MIN/1.73
ERYTHROCYTE [DISTWIDTH] IN BLOOD BY AUTOMATED COUNT: 14 % (ref 12.3–15.4)
GLOBULIN UR ELPH-MCNC: 2.8 GM/DL
GLUCOSE SERPL-MCNC: 192 MG/DL (ref 65–99)
HCT VFR BLD AUTO: 33.6 % (ref 34–46.6)
HGB BLD-MCNC: 10.7 G/DL (ref 12–15.9)
MCH RBC QN AUTO: 32 PG (ref 26.6–33)
MCHC RBC AUTO-ENTMCNC: 31.8 G/DL (ref 31.5–35.7)
MCV RBC AUTO: 100.6 FL (ref 79–97)
PLATELET # BLD AUTO: 318 10*3/MM3 (ref 140–450)
PMV BLD AUTO: 9.7 FL (ref 6–12)
POTASSIUM SERPL-SCNC: 3.9 MMOL/L (ref 3.5–5.2)
PROT SERPL-MCNC: 5.3 G/DL (ref 6–8.5)
RBC # BLD AUTO: 3.34 10*6/MM3 (ref 3.77–5.28)
SODIUM SERPL-SCNC: 140 MMOL/L (ref 136–145)
WBC NRBC COR # BLD AUTO: 7.24 10*3/MM3 (ref 3.4–10.8)

## 2024-09-29 PROCEDURE — 25810000003 SODIUM CHLORIDE 0.9 % SOLUTION: Performed by: INTERNAL MEDICINE

## 2024-09-29 PROCEDURE — 85027 COMPLETE CBC AUTOMATED: CPT | Performed by: INTERNAL MEDICINE

## 2024-09-29 PROCEDURE — 80053 COMPREHEN METABOLIC PANEL: CPT | Performed by: INTERNAL MEDICINE

## 2024-09-29 PROCEDURE — 25010000002 ERTAPENEM PER 500 MG: Performed by: INTERNAL MEDICINE

## 2024-09-29 PROCEDURE — 99232 SBSQ HOSP IP/OBS MODERATE 35: CPT | Performed by: NURSE PRACTITIONER

## 2024-09-29 PROCEDURE — 99233 SBSQ HOSP IP/OBS HIGH 50: CPT | Performed by: INTERNAL MEDICINE

## 2024-09-29 PROCEDURE — 25010000002 HEPARIN (PORCINE) PER 1000 UNITS: Performed by: INTERNAL MEDICINE

## 2024-09-29 RX ORDER — NYSTATIN 100000 [USP'U]/G
POWDER TOPICAL EVERY 12 HOURS SCHEDULED
Status: DISPENSED | OUTPATIENT
Start: 2024-09-29

## 2024-09-29 RX ORDER — TRAMADOL HYDROCHLORIDE 50 MG/1
50 TABLET ORAL EVERY 8 HOURS PRN
Status: DISPENSED | OUTPATIENT
Start: 2024-09-29 | End: 2024-10-04

## 2024-09-29 RX ADMIN — ATORVASTATIN CALCIUM 80 MG: 40 TABLET, FILM COATED ORAL at 08:06

## 2024-09-29 RX ADMIN — CYCLOBENZAPRINE 10 MG: 10 TABLET, FILM COATED ORAL at 14:21

## 2024-09-29 RX ADMIN — SODIUM CHLORIDE 75 ML/HR: 9 INJECTION, SOLUTION INTRAVENOUS at 20:48

## 2024-09-29 RX ADMIN — PANTOPRAZOLE SODIUM 40 MG: 40 TABLET, DELAYED RELEASE ORAL at 08:06

## 2024-09-29 RX ADMIN — Medication 10 ML: at 22:12

## 2024-09-29 RX ADMIN — LISINOPRIL 20 MG: 10 TABLET ORAL at 08:06

## 2024-09-29 RX ADMIN — Medication 10 ML: at 08:07

## 2024-09-29 RX ADMIN — TRAMADOL HYDROCHLORIDE 50 MG: 50 TABLET ORAL at 11:09

## 2024-09-29 RX ADMIN — NYSTATIN: 100000 POWDER TOPICAL at 20:48

## 2024-09-29 RX ADMIN — NICOTINE 1 PATCH: 7 PATCH, EXTENDED RELEASE TRANSDERMAL at 08:08

## 2024-09-29 RX ADMIN — ACYCLOVIR 400 MG: 200 CAPSULE ORAL at 20:48

## 2024-09-29 RX ADMIN — CYCLOBENZAPRINE 10 MG: 10 TABLET, FILM COATED ORAL at 04:59

## 2024-09-29 RX ADMIN — ERTAPENEM 1000 MG: 1 INJECTION, POWDER, LYOPHILIZED, FOR SOLUTION INTRAMUSCULAR; INTRAVENOUS at 14:25

## 2024-09-29 RX ADMIN — ASPIRIN 81 MG: 81 TABLET, CHEWABLE ORAL at 08:07

## 2024-09-29 RX ADMIN — HEPARIN SODIUM 5000 UNITS: 5000 INJECTION INTRAVENOUS; SUBCUTANEOUS at 05:00

## 2024-09-29 RX ADMIN — TRAMADOL HYDROCHLORIDE 50 MG: 50 TABLET ORAL at 20:48

## 2024-09-29 RX ADMIN — HEPARIN SODIUM 5000 UNITS: 5000 INJECTION INTRAVENOUS; SUBCUTANEOUS at 22:11

## 2024-09-29 RX ADMIN — SODIUM CHLORIDE 75 ML/HR: 9 INJECTION, SOLUTION INTRAVENOUS at 04:53

## 2024-09-29 RX ADMIN — ACETAMINOPHEN 650 MG: 325 TABLET ORAL at 04:59

## 2024-09-29 RX ADMIN — DICLOFENAC SODIUM 4 G: 10 GEL TOPICAL at 11:09

## 2024-09-29 RX ADMIN — NYSTATIN: 100000 POWDER TOPICAL at 11:09

## 2024-09-29 RX ADMIN — DICLOFENAC SODIUM 4 G: 10 GEL TOPICAL at 20:48

## 2024-09-29 RX ADMIN — HEPARIN SODIUM 5000 UNITS: 5000 INJECTION INTRAVENOUS; SUBCUTANEOUS at 14:21

## 2024-09-29 RX ADMIN — DULOXETINE HYDROCHLORIDE 60 MG: 60 CAPSULE, DELAYED RELEASE ORAL at 08:06

## 2024-09-29 NOTE — PLAN OF CARE
Goal Outcome Evaluation:      Patient resting in bed at this time voicing no complaints or concerns. Pt cooperative with care, IV access maintained, IV fluids infusing per MAR, no s/s of acute distress noted at this time. Plan of care ongoing.

## 2024-09-29 NOTE — PLAN OF CARE
Goal Outcome Evaluation:     Patient resting in bed. Wound care performed. Pt complained of pain, PRN medication provided, see MAR. No visible indicators of acute distress noted. Plan of care ongoing.

## 2024-09-29 NOTE — PROGRESS NOTES
[facility] HOSPITALIST PROGRESS NOTE     Patient Identification:  Name:  Lety Johnson  Age:  43 y.o.  Sex:  female  :  1980  MRN:  [id number]  Visit Number:  [id number]  ROOM: [address] [place] [address]     Primary Care Provider:  Provider, No Known    Length of stay in inpatient status:  1    Subjective     Chief Compliant:    Chief Complaint   Patient presents with    Fall     History of Presenting Illness:    Patient remains ill but stable today, no acute events overnight, no new complaints, denies any fevers or chills, culture grew ESBL organism, still having some dysuria, antibiotics changed to Invanz, Infectious Disease consulted and recommendations pending, having trouble keeping IVs in, will order midline. Social Work to follow up on Monday regarding Short Term Rehab. PT/OT consulted and following.   Objective     Current Hospital Meds:  acyclovir, 400 mg, Oral, Nightly  aspirin, 81 mg, Oral, Daily  atorvastatin, 80 mg, Oral, Daily  DULoxetine, 60 mg, Oral, Daily  ertapenem, 1,000 mg, Intravenous, Q24H  heparin (porcine), 5,000 Units, Subcutaneous, Q8H  lisinopril, 20 mg, Oral, Daily  [Held by provider] metFORMIN, 1,000 mg, Oral, BID With Meals  nicotine, 1 patch, Transdermal, Q24H  pantoprazole, 40 mg, Oral, Daily  sodium chloride, 10 mL, Intravenous, Q12H      Pharmacy Consult,   sodium chloride, 75 mL/hr, Last Rate: 75 mL/hr (24 0453)      ----------------------------------------------------------------------------------------------------------------------  Vital Signs:  Temp:  [98 °F (36.7 °C)-98.8 °F (37.1 °C)] 98.4 °F (36.9 °C)  Heart Rate:  [] 109  Resp:  [18-20] 20  BP: (138-172)/(75-97) 157/85  SpO2:  [90 %-98 %] 90 %  on   ;   Device (Oxygen Therapy): room air  Body mass index is 40.12 kg/m².      Intake/Output Summary (Last 24 hours) at 2024 0921  Last data filed at 2024 2200  Gross per 24 hour   Intake 420 ml   Output --   Net 420 ml       ----------------------------------------------------------------------------------------------------------------------  Physical exam:  Constitutional:  Older than stated age. No acute distress. Mild discomfort   HENT:  Head:  Normocephalic and atraumatic.  Mouth:  Moist mucous membranes.    Eyes:  Conjunctivae and EOM are normal. No scleral icterus.    Neck:  Neck supple.  No JVD present.    Cardiovascular:  Normal rate, regular rhythm and normal heart sounds with no murmur.  Pulmonary/Chest:  No respiratory distress, no wheezes, on room air  Abdominal:  Soft. No distension and no tenderness.   Musculoskeletal:  No tenderness, and no deformity.  No red or swollen joints anywhere.    Neurological:  Alert and oriented to person, place, and time.  No cranial nerve deficit. Chronic L sided paralysis.    Skin:  Skin is warm and dry. No pallor. Significant intertriginous erythema under panus, consistent with yeast infection.   Peripheral vascular:  No clubbing, no cyanosis, trace edema.  Psychiatric: Appropriate mood and affect    *Examination stable today 9/29  Edited by: Ramon Marc MD at 9/29/2024 0921  ----------------------------------------------------------------------------------------------------------------------  WBC/HGB/HCT/PLT   7.24/10.7/33.6/318 (09/29 0036)  BUN/CREAT/GLUC/ALT/AST/NORMA/LIP    4/0.40/192/8/15/--/-- (09/29 0036)  LYTES - Na/K/Cl/CO2: 140/3.9/109*/21.0* (09/29 0036)        Urine Culture   Date Value Ref Range Status   09/26/2024 >100,000 CFU/mL Escherichia coli ESBL (A)  Final     Blood Culture   Date Value Ref Range Status   09/26/2024 No growth at 2 days  Preliminary   09/26/2024 No growth at 2 days  Preliminary       I have personally looked at the labs and they are summarized above.  ----------------------------------------------------------------------------------------------------------------------  Detailed radiology reports for the last 24 hours:  No radiology results for the  last day  Assessment & Plan    43F Morbid Obesity by BMI PMH History Genital Herpes, Cerebrovascular Accident 5/2024 complicated by L sided paralysis, presented to [facility] emergency room 9/26 after sliding into the floor off her chair due to weakness.     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   [facility] consulted and recommendations pending, will order midline    #Electrolyte Abnormalities  - Acute Mild Hypokalemia - Replaced per protocol - Resolved   - Acute Mild Hypomagnesemia - Replaced per protocol - Resolved     #History Cerebrovascular Accident complicated by L sided paralysis, unclear etiology  - Continue home regimen, Supportive Care     #Debility  - Consult PT/OT, Social Work if placement needed     #History Genital Herpes  - Supportive Care, continue home Acyclovir     #Morbid Obesity by BMI  - Body mass index is 43.26 kg/m².; complicates all aspects of care    F: Oral  E: Monitor & Replace as needed   N: Diet: Regular/House; Fluid Consistency: Thin (IDDSI 0)   PPx: SQH  Code Status (Patient has no pulse and is not breathing): CPR  Medical Interventions (Patient has pulse or is breathing): Full Support     Dispo: Pending clinical improvement, PT/OT consulted and following, likely to need placement, follow up Social Work on Monday     *This patient is considered high risk secondary to Urinary Tract Infection, electrolyte abnormalities, chronic L sided paralysis from prior Cerebrovascular Accident.    Edited by: Ramon Marc MD at 9/29/2024 0921  [facility] Hospitalist

## 2024-09-29 NOTE — PLAN OF CARE
Goal Outcome Evaluation:    Pt is resting in bed with no s/s of distress noted. VSS. IV access maintained. Wound care completed per standing orders. Will continue with plan of care.

## 2024-09-29 NOTE — NURSING NOTE
Taking over care from FIFI Shahid. Agree with assessment charted. No s/s of acute distress noted at this time.

## 2024-09-29 NOTE — PROGRESS NOTES
PROGRESS NOTE         Patient Identification:  Name:  Lety Johnson  Age:  43 y.o.  Sex:  female  :  1980  MRN:  [id number]  Visit Number:  [id number]  Primary Care Provider:  Provider, No Known         LOS: 1 day       ----------------------------------------------------------------------------------------------------------------------  Subjective       Chief Complaints:    Fall        Interval History:      Patient resting in bed this morning.  No issues or complaints.  Afebrile, denies diarrhea.  Currently on room air with no apparent distress.  WBC normal at 7.24.    Review of Systems:    Constitutional: no fever, chills and night sweats.  Generalized fatigue.  Eyes: no eye drainage, itching or redness.  HEENT: no mouth sores, dysphagia or nose bleed.  Respiratory: no for shortness of breath, cough or production of sputum.  Cardiovascular: no chest pain, no palpitations, no orthopnea.  Gastrointestinal: no nausea, vomiting or diarrhea. No abdominal pain, hematemesis or rectal bleeding.  Genitourinary: no dysuria or polyuria.  Hematologic/lymphatic: no lymph node abnormalities, no easy bruising or easy bleeding.  Musculoskeletal: no muscle or joint pain.  Skin: No rash and no itching.  Neurological: no loss of consciousness, no seizure, no headache.  Behavioral/Psych: no depression or suicidal ideation.  Endocrine: no hot flashes.  Immunologic: negative.    ----------------------------------------------------------------------------------------------------------------------      Objective       Current Hospital Meds:  acyclovir, 400 mg, Oral, Nightly  aspirin, 81 mg, Oral, Daily  atorvastatin, 80 mg, Oral, Daily  DULoxetine, 60 mg, Oral, Daily  ertapenem, 1,000 mg, Intravenous, Q24H  heparin (porcine), 5,000 Units, Subcutaneous, Q8H  lisinopril, 20 mg, Oral, Daily  [Held by provider] metFORMIN, 1,000 mg, Oral, BID With Meals  nicotine, 1 patch, Transdermal, Q24H  nystatin, , Topical,  Q12H  pantoprazole, 40 mg, Oral, Daily  sodium chloride, 10 mL, Intravenous, Q12H      Pharmacy Consult,   sodium chloride, 75 mL/hr, Last Rate: 75 mL/hr (09/29/24 0453)      ----------------------------------------------------------------------------------------------------------------------    Vital Signs:  Temp:  [98 °F (36.7 °C)-98.8 °F (37.1 °C)] 98.3 °F (36.8 °C)  Heart Rate:  [] 102  Resp:  [20] 20  BP: (136-157)/(75-97) 136/77  Mean Arterial Pressure (Non-Invasive) for the past 24 hrs (Last 3 readings):   Noninvasive MAP (mmHg)   09/29/24 1123 108   09/29/24 0700 119   09/29/24 0438 95     SpO2 Percentage    09/29/24 0438 09/29/24 0700 09/29/24 1123   SpO2: 94% 90% 98%     SpO2:  [90 %-98 %] 98 %  on   ;   Device (Oxygen Therapy): room air    Body mass index is 40.12 kg/m².  Wt Readings from Last 3 Encounters:   09/29/24 111 kg (244 lb 12.8 oz)   06/27/24 120 kg (264 lb)        Intake/Output Summary (Last 24 hours) at 9/29/2024 1230  Last data filed at 9/29/2024 0735  Gross per 24 hour   Intake 660 ml   Output --   Net 660 ml     Diet: Regular/House; Fluid Consistency: Thin (IDDSI 0)  ----------------------------------------------------------------------------------------------------------------------      Physical Exam:    Constitutional:  Well-developed and well-nourished.  No respiratory distress.  On room air.     HENT:  Head: Normocephalic and atraumatic.  Mouth:  Moist mucous membranes.    Eyes:  Conjunctivae and EOM are normal.  No scleral icterus.  Neck:  Neck supple.  No JVD present.    Cardiovascular:  Normal rate, regular rhythm and normal heart sounds with no murmur. No edema.  Pulmonary/Chest:  No respiratory distress, no wheezes, no crackles, with normal breath sounds and good air movement.  Abdominal:  Soft.  Bowel sounds are normal.  No distension and no tenderness.   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  Neurological:  Alert and oriented to person, place, and time.   "No facial droop.  No slurred speech.   Skin:  Skin is warm and dry.  No rash noted.  No pallor.   Psychiatric:  Normal mood and affect.  Behavior is normal.        ----------------------------------------------------------------------------------------------------------------------            Results from last 7 days   Lab Units 09/29/24 0036 09/28/24 0318 09/27/24  0844 09/26/24  1504 09/26/24  1350   CRP mg/dL  --   --   --  3.80*  --    LACTATE mmol/L  --   --   --   --  1.1   WBC 10*3/mm3 7.24 7.38 9.67  --  10.42   HEMOGLOBIN g/dL 10.7* 11.8* 10.9*  --  12.5   HEMATOCRIT % 33.6* 36.9 34.8  --  37.4   MCV fL 100.6* 98.9* 102.4*  --  94.0   MCHC g/dL 31.8 32.0 31.3*  --  33.4   PLATELETS 10*3/mm3 318 332 292  --  402     Results from last 7 days   Lab Units 09/29/24 0036 09/28/24 0318 09/27/24  1904 09/27/24  0844 09/26/24  1504   SODIUM mmol/L 140 142  --  137 140   POTASSIUM mmol/L 3.9 4.1 4.0 3.5 2.7*   MAGNESIUM mg/dL  --   --   --   --  1.5*   CHLORIDE mmol/L 109* 110*  --  105 99   CO2 mmol/L 21.0* 22.3  --  21.5* 28.9   BUN mg/dL 4* 5*  --  7 10   CREATININE mg/dL 0.40* 0.43*  --  0.36* 0.51*   CALCIUM mg/dL 8.2* 8.5*  --  7.9* 9.4   GLUCOSE mg/dL 192* 198*  --  234* 220*   ALBUMIN g/dL 2.5* 2.7*  --  2.5* 3.5   BILIRUBIN mg/dL 0.2 0.3  --  0.3 0.6   ALK PHOS U/L 88 100  --  96 119*   AST (SGOT) U/L 15 15  --  17 21   ALT (SGPT) U/L 8 9  --  7 8   Estimated Creatinine Clearance: 229.4 mL/min (A) (by C-G formula based on SCr of 0.4 mg/dL (L)).  No results found for: \"AMMONIA\"    No results found for: \"HGBA1C\", \"POCGLU\"  No results found for: \"HGBA1C\"  No results found for: \"TSH\", \"FREET4\"    Blood Culture   Date Value Ref Range Status   09/26/2024 No growth at 2 days  Preliminary   09/26/2024 No growth at 2 days  Preliminary     Urine Culture   Date Value Ref Range Status   09/26/2024 >100,000 CFU/mL Escherichia coli ESBL (A)  Final     No results found for: \"WOUNDCX\"  No results found for: " "\"STOOLCX\"  No results found for: \"RESPCX\"  Pain Management Panel           No data to display                  ----------------------------------------------------------------------------------------------------------------------  Imaging Results (Last 24 Hours)       ** No results found for the last 24 hours. **            ----------------------------------------------------------------------------------------------------------------------    Pertinent Infectious Disease Results                Assessment/Plan       Assessment       ESBL UTI       Plan      Patient resting in bed this morning.  No issues or complaints.  Afebrile, denies diarrhea.  Currently on room air with no apparent distress.  WBC normal at 7.24.      Recommend to continue current antibiotic regimen of ertapenem 1 g IV every 24 hours x 7 days for treatment of ESBL UTI.  Upon discharge patient can be further de-escalated to Macrobid 100 mg p.o. twice daily to complete antibiotic course. Okay to continue home suppressive acyclovir for history of genital herpes. We will continue to follow closely and adjust antibiotic therapy as needed.     ANTIMICROBIAL THERAPY    acyclovir - 200 MG, 400 MG  ertapenem (INVanz) 1000 mg in 100 mL NS (VTB)     Code Status:   Code Status and Medical Interventions: CPR (Attempt to Resuscitate); Full Support   Ordered at: 09/26/24 2877     Code Status (Patient has no pulse and is not breathing):    CPR (Attempt to Resuscitate)     Medical Interventions (Patient has pulse or is breathing):    Full Support       Cuca Yuen, APRN  09/29/24  12:30 EDT  "

## 2024-09-30 LAB
ALBUMIN SERPL-MCNC: 2.5 G/DL (ref 3.5–5.2)
ALBUMIN/GLOB SERPL: 1 G/DL
ALP SERPL-CCNC: 79 U/L (ref 39–117)
ALT SERPL W P-5'-P-CCNC: 7 U/L (ref 1–33)
ANION GAP SERPL CALCULATED.3IONS-SCNC: 9.2 MMOL/L (ref 5–15)
AST SERPL-CCNC: 11 U/L (ref 1–32)
BILIRUB SERPL-MCNC: 0.2 MG/DL (ref 0–1.2)
BUN SERPL-MCNC: 5 MG/DL (ref 6–20)
BUN/CREAT SERPL: 10.9 (ref 7–25)
CALCIUM SPEC-SCNC: 8 MG/DL (ref 8.6–10.5)
CHLORIDE SERPL-SCNC: 110 MMOL/L (ref 98–107)
CO2 SERPL-SCNC: 22.8 MMOL/L (ref 22–29)
CREAT SERPL-MCNC: 0.46 MG/DL (ref 0.57–1)
DEPRECATED RDW RBC AUTO: 50.3 FL (ref 37–54)
EGFRCR SERPLBLD CKD-EPI 2021: 122.7 ML/MIN/1.73
ERYTHROCYTE [DISTWIDTH] IN BLOOD BY AUTOMATED COUNT: 13.9 % (ref 12.3–15.4)
GLOBULIN UR ELPH-MCNC: 2.6 GM/DL
GLUCOSE SERPL-MCNC: 196 MG/DL (ref 65–99)
HCT VFR BLD AUTO: 32.7 % (ref 34–46.6)
HGB BLD-MCNC: 10.5 G/DL (ref 12–15.9)
MCH RBC QN AUTO: 31.8 PG (ref 26.6–33)
MCHC RBC AUTO-ENTMCNC: 32.1 G/DL (ref 31.5–35.7)
MCV RBC AUTO: 99.1 FL (ref 79–97)
PLATELET # BLD AUTO: 285 10*3/MM3 (ref 140–450)
PMV BLD AUTO: 8.8 FL (ref 6–12)
POTASSIUM SERPL-SCNC: 3.5 MMOL/L (ref 3.5–5.2)
PROT SERPL-MCNC: 5.1 G/DL (ref 6–8.5)
RBC # BLD AUTO: 3.3 10*6/MM3 (ref 3.77–5.28)
SODIUM SERPL-SCNC: 142 MMOL/L (ref 136–145)
WBC NRBC COR # BLD AUTO: 8.49 10*3/MM3 (ref 3.4–10.8)

## 2024-09-30 PROCEDURE — 05HB33Z INSERTION OF INFUSION DEVICE INTO RIGHT BASILIC VEIN, PERCUTANEOUS APPROACH: ICD-10-PCS | Performed by: INTERNAL MEDICINE

## 2024-09-30 PROCEDURE — 25010000002 PROCHLORPERAZINE 10 MG/2ML SOLUTION

## 2024-09-30 PROCEDURE — 25010000002 ERTAPENEM PER 500 MG: Performed by: INTERNAL MEDICINE

## 2024-09-30 PROCEDURE — 97162 PT EVAL MOD COMPLEX 30 MIN: CPT

## 2024-09-30 PROCEDURE — 80053 COMPREHEN METABOLIC PANEL: CPT | Performed by: INTERNAL MEDICINE

## 2024-09-30 PROCEDURE — 25810000003 SODIUM CHLORIDE 0.9 % SOLUTION: Performed by: INTERNAL MEDICINE

## 2024-09-30 PROCEDURE — 99232 SBSQ HOSP IP/OBS MODERATE 35: CPT | Performed by: NURSE PRACTITIONER

## 2024-09-30 PROCEDURE — 99232 SBSQ HOSP IP/OBS MODERATE 35: CPT | Performed by: INTERNAL MEDICINE

## 2024-09-30 PROCEDURE — 92610 EVALUATE SWALLOWING FUNCTION: CPT | Performed by: SPEECH-LANGUAGE PATHOLOGIST

## 2024-09-30 PROCEDURE — C1751 CATH, INF, PER/CENT/MIDLINE: HCPCS

## 2024-09-30 PROCEDURE — 85027 COMPLETE CBC AUTOMATED: CPT | Performed by: INTERNAL MEDICINE

## 2024-09-30 PROCEDURE — 36410 VNPNXR 3YR/> PHY/QHP DX/THER: CPT

## 2024-09-30 PROCEDURE — 25010000002 HEPARIN (PORCINE) PER 1000 UNITS: Performed by: INTERNAL MEDICINE

## 2024-09-30 RX ORDER — SODIUM CHLORIDE 0.9 % (FLUSH) 0.9 %
10 SYRINGE (ML) INJECTION AS NEEDED
Status: ACTIVE | OUTPATIENT
Start: 2024-09-30

## 2024-09-30 RX ORDER — SODIUM CHLORIDE 0.9 % (FLUSH) 0.9 %
10 SYRINGE (ML) INJECTION EVERY 12 HOURS SCHEDULED
Status: ACTIVE | OUTPATIENT
Start: 2024-09-30

## 2024-09-30 RX ORDER — POTASSIUM CHLORIDE 1500 MG/1
40 TABLET, EXTENDED RELEASE ORAL EVERY 4 HOURS
Status: COMPLETED | OUTPATIENT
Start: 2024-09-30 | End: 2024-09-30

## 2024-09-30 RX ORDER — SODIUM CHLORIDE 9 MG/ML
40 INJECTION, SOLUTION INTRAVENOUS AS NEEDED
Status: ACTIVE | OUTPATIENT
Start: 2024-09-30

## 2024-09-30 RX ADMIN — POTASSIUM CHLORIDE 40 MEQ: 1500 TABLET, EXTENDED RELEASE ORAL at 02:43

## 2024-09-30 RX ADMIN — TRAMADOL HYDROCHLORIDE 50 MG: 50 TABLET ORAL at 21:24

## 2024-09-30 RX ADMIN — ACETAMINOPHEN 650 MG: 325 TABLET ORAL at 00:25

## 2024-09-30 RX ADMIN — CYCLOBENZAPRINE 10 MG: 10 TABLET, FILM COATED ORAL at 00:25

## 2024-09-30 RX ADMIN — DULOXETINE HYDROCHLORIDE 60 MG: 60 CAPSULE, DELAYED RELEASE ORAL at 10:00

## 2024-09-30 RX ADMIN — ATORVASTATIN CALCIUM 80 MG: 40 TABLET, FILM COATED ORAL at 10:00

## 2024-09-30 RX ADMIN — ERTAPENEM 1000 MG: 1 INJECTION, POWDER, LYOPHILIZED, FOR SOLUTION INTRAMUSCULAR; INTRAVENOUS at 14:26

## 2024-09-30 RX ADMIN — SODIUM CHLORIDE 75 ML/HR: 9 INJECTION, SOLUTION INTRAVENOUS at 10:22

## 2024-09-30 RX ADMIN — LISINOPRIL 20 MG: 10 TABLET ORAL at 10:00

## 2024-09-30 RX ADMIN — DICLOFENAC SODIUM 4 G: 10 GEL TOPICAL at 10:01

## 2024-09-30 RX ADMIN — ACETAMINOPHEN 650 MG: 325 TABLET ORAL at 10:08

## 2024-09-30 RX ADMIN — Medication 10 ML: at 21:25

## 2024-09-30 RX ADMIN — NYSTATIN: 100000 POWDER TOPICAL at 21:25

## 2024-09-30 RX ADMIN — DICLOFENAC SODIUM 4 G: 10 GEL TOPICAL at 02:44

## 2024-09-30 RX ADMIN — PROCHLORPERAZINE EDISYLATE 2.5 MG: 5 INJECTION INTRAMUSCULAR; INTRAVENOUS at 21:24

## 2024-09-30 RX ADMIN — Medication 10 ML: at 10:01

## 2024-09-30 RX ADMIN — SODIUM CHLORIDE 75 ML/HR: 9 INJECTION, SOLUTION INTRAVENOUS at 23:45

## 2024-09-30 RX ADMIN — Medication 10 ML: at 10:09

## 2024-09-30 RX ADMIN — NYSTATIN: 100000 POWDER TOPICAL at 10:02

## 2024-09-30 RX ADMIN — ACYCLOVIR 400 MG: 200 CAPSULE ORAL at 21:24

## 2024-09-30 RX ADMIN — HEPARIN SODIUM 5000 UNITS: 5000 INJECTION INTRAVENOUS; SUBCUTANEOUS at 21:24

## 2024-09-30 RX ADMIN — PANTOPRAZOLE SODIUM 40 MG: 40 TABLET, DELAYED RELEASE ORAL at 10:00

## 2024-09-30 RX ADMIN — ASPIRIN 81 MG: 81 TABLET, CHEWABLE ORAL at 10:01

## 2024-09-30 RX ADMIN — HEPARIN SODIUM 5000 UNITS: 5000 INJECTION INTRAVENOUS; SUBCUTANEOUS at 05:22

## 2024-09-30 RX ADMIN — MUPIROCIN 1 APPLICATION: 20 OINTMENT TOPICAL at 21:24

## 2024-09-30 RX ADMIN — NICOTINE 1 PATCH: 7 PATCH, EXTENDED RELEASE TRANSDERMAL at 10:02

## 2024-09-30 RX ADMIN — POTASSIUM CHLORIDE 40 MEQ: 1500 TABLET, EXTENDED RELEASE ORAL at 10:00

## 2024-09-30 RX ADMIN — TRAMADOL HYDROCHLORIDE 50 MG: 50 TABLET ORAL at 04:39

## 2024-09-30 RX ADMIN — HEPARIN SODIUM 5000 UNITS: 5000 INJECTION INTRAVENOUS; SUBCUTANEOUS at 13:30

## 2024-09-30 RX ADMIN — TRAMADOL HYDROCHLORIDE 50 MG: 50 TABLET ORAL at 13:30

## 2024-09-30 NOTE — CASE MANAGEMENT/SOCIAL WORK
Discharge Planning Assessment  [facility]     Patient Name: Lety Johnson  MRN: [id number]  Today's Date: 9/30/2024    Admit Date: 9/26/2024    Plan: SS spoke with pt at bedside on this date.  Pt stated that she has participated with therapy on this date and was able to sit on side of the bed.  Pt continues to be agreeable to rehab if possible and requests SS discuss discharge options with POA/Sister Moises.  SS spoke with Moises via telephone.  Moises resides in [place] and requests possible [facility] as nursing home placement will not be an option with no monthly income.  Per Moises if pt can become strong enough to transfer self after rehab and can not be placed in nursing home then pt would return home with sister Moises to her home in [place].  Pt's sister would be agreeable to swing bed referrals if [facility] is not an option for rehab.  SS left message for [facility] to return call to [facility] SS and sent referral at [phone] for review.  SS will follow up in am.       Discharge Plan       Row Name 09/30/24 1653       Plan    Plan SS spoke with pt at bedside on this date.  Pt stated that she has participated with therapy on this date and was able to sit on side of the bed.  Pt continues to be agreeable to rehab if possible and requests SS discuss discharge options with POA/Sister Moises.  SS spoke with Moises via telephone.  Moises resides in [place] and requests possible [facility] as nursing home placement will not be an option with no monthly income.  Per Moises if pt can become strong enough to transfer self after rehab and can not be placed in nursing home then pt would return home with sister Moises to her home in [place].  Pt's sister would be agreeable to swing bed referrals if [facility] is not an option for rehab.  SS left message for [facility] to return call to [facility] SS and sent referral at [phone] for review.  SS will follow up in am.                  Continued Care and Services -  Admitted Since 9/26/2024    No active coordination exists for this encounter.       Expected Discharge Date and Time       Expected Discharge Date Expected Discharge Time    Oct 2, 2024         SHELBY LacyW

## 2024-09-30 NOTE — DISCHARGE PLACEMENT REQUEST
"Lety Johnson (43 y.o. Female)       Date of Birth   1980    Social Security Number       Address   [address] [id number]    Home Phone   [phone]    MRN   [id number]       [place]astaf[place]   [facility], [place]    Marital Status   Single                            Admission Date   9/26/24    Admission Type   Emergency    Admitting Provider   Ramon Marc MD    Attending Provider   Marv Navas DO    Department, Room/Bed   [address], 3315/1S       Discharge Date       Discharge Disposition       Discharge Destination                                 Attending Provider: Marv Navas DO    Allergies: No Known Allergies    Isolation: Contact   Infection: ESBL E coli (09/28/24)   Code Status: CPR    Ht: 166.4 cm (65.5\")   Wt: 110 kg (243 lb 9.6 oz)    Admission Cmt: None   Principal Problem: UTI (urinary tract infection) [N39.0]                   Active Insurance as of 9/26/2024       Primary Coverage       Payor Plan Insurance Group Employer/Plan Group    [place] [id number]       Payor Plan Address Payor Plan Phone Number Payor Plan Fax Number Effective Dates    [employer] [address] [address] [phone]  5/1/2024 - None Entered    [facility] [id number]         Subscriber Name Subscriber Birth Date Member ID       LETY JOHNSON 1980 [id number]                     Emergency Contacts        (Rel.) Home Phone Work Phone Mobile Phone    Moises Narvaez (Legal Guardian) -- -- [phone]              Emergency Contact Information       Name Relation Home Work Mobile    Moises Narvaez Legal Guardian   [phone]          Insurance Information                  [place] Phone: [phone]    Subscriber: Lety Johnson Subscriber#: [id number]    Group#: [id number] Precert#: --          Problem List             Codes Noted - Resolved       Hospital    * (Principal) UTI (urinary tract " infection) ICD-10-CM: N39.0  ICD-9-CM: 599.0 2024 - Present        History & Physical        Ramon Marc MD at 24 1701              [facility]    Patient Identification:  Name:  Lety Johnson  Age:  43 y.o.  Sex:  female  :  1980  MRN:  [id number]   Admit Date: 2024   Visit Number:  [id number]  Room number:  404/04  Primary Care Physician:  Provider, No Known     Subjective     Chief complaint:    Chief Complaint   Patient presents with    Fall     History of presenting illness:   43F Morbid Obesity by BMI PMH History Genital Herpes, Cerebrovascular Accident 2024 complicated by L sided paralysis, presented to [facility] emergency room  after sliding into the floor off her chair due to weakness.  Upon arrival patient afebrile, heart rate 109, respiratory rate 18, blood pressure 146/101, satting 98% on room air. Labs showed WBC count 10K, lactate 1.1, CRP 3.8, potassium 2.7, magnesium 1.5, HCG negative, blood cultures pending. CXR no acute cardiopulmonary processes.  Xray ankle/foot without fracture or dislocation.  Emergency room provider gave antibiotics, pain medications, zofran, potassium replacement.  Hospital Medicine consulted for admission. Patient seen and examined in the emergency room, notes fevers chills at home a few days ago, recently has been on antibiotics for lower extremity cellulitis, has had some dysuria and suprapubic pain, denies current sexual activity, reports her fiance was taken to residential about a week ago and has been her primary caretaker, has had difficulty at home since prior stroke and caretakers not consistent, last 24hrs didn't have anyone to help her, sister endorses recent confusion/hallucinations beyond baseline, seeing dead family members, coinciding with onset of dysuria.  ---------------------------------------------------------------------------------------------------------------------    Review of Systems   Constitutional:  Positive for chills and fever.   HENT: Negative.     Eyes: Negative.    Respiratory:  Positive for shortness of breath.    Cardiovascular:  Positive for leg swelling. Negative for chest pain.   Gastrointestinal: Negative.    Endocrine: Negative.    Genitourinary:  Positive for dysuria.   Musculoskeletal: Negative.    Skin:  Positive for rash.   Allergic/Immunologic: Negative.    Neurological:  Positive for weakness.   Hematological: Negative.    Psychiatric/Behavioral:  Positive for hallucinations.      ---------------------------------------------------------------------------------------------------------------------   Past Medical History:   Diagnosis Date    Stroke      No past surgical history on file.  No family history on file.  Social History     Socioeconomic History    Marital status: Single     ---------------------------------------------------------------------------------------------------------------------   Allergies:  Patient has no known allergies.  ---------------------------------------------------------------------------------------------------------------------   Medications below are reported home medications pulling from within the system; at this time, these medications have not been reconciled unless otherwise specified and are in the verification process for further verifcation as current home medications.    Prior to Admission Medications       Prescriptions Last Dose Informant Patient Reported? Taking?    aspirin 81 MG chewable tablet Unknown  Yes No    Chew 1 tablet Daily.    atorvastatin (LIPITOR) 80 MG tablet Unknown  Yes No    Take 1 tablet by mouth Daily.    cyclobenzaprine (FLEXERIL) 10 MG tablet Unknown  Yes No    Take 1 tablet by mouth 3 (Three) Times a Day As Needed for Muscle Spasms.    DULoxetine (CYMBALTA) 60 MG capsule Unknown  Yes No    Take 1 capsule by mouth Daily.    lisinopril (PRINIVIL,ZESTRIL) 20 MG tablet Unknown  Yes No    Take  1 tablet by mouth Daily.    metFORMIN (GLUCOPHAGE) 1000 MG tablet Unknown  Yes No    Take 1 tablet by mouth 2 (Two) Times a Day With Meals.    pantoprazole (PROTONIX) 40 MG EC tablet Unknown  Yes No    Take 1 tablet by mouth Daily.          Objective     Vital Signs:  Temp:  [98.2 °F (36.8 °C)] 98.2 °F (36.8 °C)  Heart Rate:  [] 118  Resp:  [18] 18  BP: (135-157)/() 140/79    Mean Arterial Pressure (Non-Invasive) for the past 24 hrs (Last 3 readings):   Noninvasive MAP (mmHg)   09/26/24 1645 90   09/26/24 1630 100   09/26/24 1615 103     SpO2:  [91 %-100 %] 94 %  on   ;   Device (Oxygen Therapy): room air  Body mass index is 43.26 kg/m².    Wt Readings from Last 3 Encounters:   09/26/24 120 kg (264 lb)   06/27/24 120 kg (264 lb)      ---------------------------------------------------------------------------------------------------------------------   Physical Exam:  Constitutional:  Well-developed and well-nourished. Older than stated age. No acute distress.      HENT:  Head:  Normocephalic and atraumatic.  Mouth:  Moist mucous membranes.    Eyes:  Conjunctivae and EOM are normal. No scleral icterus.    Neck:  Neck supple.  No JVD present.    Cardiovascular:  Normal rate, regular rhythm and normal heart sounds with no murmur.  Pulmonary/Chest:  No respiratory distress, no wheezes, no crackles, with normal breath sounds and good air movement.  Abdominal:  Soft. No distension and no tenderness.   Musculoskeletal:  No tenderness, and no deformity.  No red or swollen joints anywhere.    Neurological:  Alert and oriented to person, place, and time.  No cranial nerve deficit. Chronic L sided paralysis.    Skin:  Skin is warm and dry. No pallor. Significant intertriginous erythema under panus, consistent with yeast infection.   Peripheral vascular:  No clubbing, no cyanosis, trace edema.  Psychiatric: Appropriate mood and affect  Edited by: Ramon Marc MD at 9/26/2024  "1701  ---------------------------------------------------------------------------------------------------------------------  EKG:  N/A    Telemetry:  normal sinus rhythm, no significant ST changes    I have personally looked at telemetry.    Last echocardiogram:  Ordered and pending   --------------------------------------------------------------------------------------------------------------------  Labs:  Results from last 7 days   Lab Units 09/26/24  1504 09/26/24  1350   LACTATE mmol/L  --  1.1   CRP mg/dL 3.80*  --    WBC 10*3/mm3  --  10.42   HEMOGLOBIN g/dL  --  12.5   HEMATOCRIT %  --  37.4   MCV fL  --  94.0   MCHC g/dL  --  33.4   PLATELETS 10*3/mm3  --  402         Results from last 7 days   Lab Units 09/26/24  1504   SODIUM mmol/L 140   POTASSIUM mmol/L 2.7*   MAGNESIUM mg/dL 1.5*   CHLORIDE mmol/L 99   CO2 mmol/L 28.9   BUN mg/dL 10   CREATININE mg/dL 0.51*   CALCIUM mg/dL 9.4   GLUCOSE mg/dL 220*   ALBUMIN g/dL 3.5   BILIRUBIN mg/dL 0.6   ALK PHOS U/L 119*   AST (SGOT) U/L 21   ALT (SGPT) U/L 8   Estimated Creatinine Clearance: 188.1 mL/min (A) (by C-G formula based on SCr of 0.51 mg/dL (L)).  No results found for: \"AMMONIA\"          No results found for: \"HGBA1C\", \"POCGLU\"  No results found for: \"TSH\", \"FREET4\"  No results found for: \"PREGTESTUR\", \"PREGSERUM\", \"HCG\", \"HCGQUANT\"  Pain Management Panel           No data to display              Brief Urine Lab Results  (Last result in the past 365 days)        Color   Clarity   Blood   Leuk Est   Nitrite   Protein   CREAT   Urine HCG        09/26/24 1419 Dark Yellow   Turbid   Trace   Moderate (2+)   Positive   30 mg/dL (1+)                 No results found for: \"BLOODCX\"  No results found for: \"URINECX\"  No results found for: \"WOUNDCX\"  No results found for: \"STOOLCX\"    I have personally looked at the labs and they are summarized " above.  ----------------------------------------------------------------------------------------------------------------------  Detailed radiology reports for the last 24 hours:    Imaging Results (Last 24 Hours)       Procedure Component Value Units Date/Time    XR Ankle 3+ View Left [[id number]] Collected: 09/26/24 1537     Updated: 09/26/24 1540    Narrative:      EXAM:    XR Left Ankle Complete, 3 or More Views     EXAM DATE:    9/26/2024 3:17 PM     CLINICAL HISTORY:    left ankle injury     TECHNIQUE:    Frontal, lateral and oblique views of the left ankle.     COMPARISON:    No relevant prior studies available.     FINDINGS:    Bones/joints:  See below.      Soft tissues:  Soft tissue swelling, but no acute fracture or  dislocation.       Impression:        Soft tissue swelling, but no acute fracture or dislocation.        This report was finalized on 9/26/2024 3:38 PM by Dr. Moses Otto MD.       XR Foot 3+ View Left [[id number]] Collected: 09/26/24 1536     Updated: 09/26/24 1539    Narrative:      EXAM:    XR Left Foot Complete, 3 or More Views     EXAM DATE:    9/26/2024 3:16 PM     CLINICAL HISTORY:    left foot wound     TECHNIQUE:    Frontal, lateral and oblique views of the left foot.     COMPARISON:    No relevant prior studies available.     FINDINGS:    Bones/joints:  Unremarkable.  No acute fracture.  No dislocation.    Soft tissues:  Unremarkable.  No radiopaque foreign body.       Impression:        No acute findings in the left foot.        This report was finalized on 9/26/2024 3:37 PM by Dr. Moses Otto MD.       XR Chest 1 View [[id number]] Collected: 09/26/24 1406     Updated: 09/26/24 1408    Narrative:      XR CHEST 1 VW-     CLINICAL INDICATION: weakness        COMPARISON: None immediately available      TECHNIQUE: Single frontal view of the chest.     FINDINGS:     LUNGS: Lungs are adequately aerated.      HEART AND MEDIASTINUM: Heart and mediastinal contours are unremarkable         SKELETON: Bony and soft tissue structures are unremarkable.             Impression:      No radiographic evidence of acute cardiac or pulmonary disease.           This report was finalized on 9/26/2024 2:06 PM by Dr. Moses Otto MD.       CT Cervical Spine Without Contrast [[id number]] Collected: 09/26/24 1317     Updated: 09/26/24 1319    Narrative:      CT CERVICAL SPINE WO CONTRAST-     CLINICAL INDICATION: neck pain        COMPARISON: None available     TECHNIQUE: Axial images of the cervical spine were acquired with out any  intravenous contrast. Reformatted images were then created in the  sagittal and coronal planes.     DOSE:      Radiation dose reduction techniques were utilized per ALARA protocol.  Automated exposure control was initiated through either or Agentrun or  Dianxin software packages by  protocol.           FINDINGS:   The provided study demonstrates preservation of the vertebral body  heights in the sagittally reconstructed images.     There is no prevertebral soft tissue swelling.     Alignment is near anatomic.     The disc space heights are uniform.     I see no acute cervical spine fracture.       Impression:         1. No acute bony abnormality.     2. Other incidental findings as above     This report was finalized on 9/26/2024 1:17 PM by Dr. Moses Otto MD.       CT Lumbar Spine Without Contrast [[id number]] Collected: 09/26/24 1317     Updated: 09/26/24 1319    Narrative:      EXAM:    CT Lumbar Spine Without Intravenous Contrast     EXAM DATE:    9/26/2024 12:59 PM     CLINICAL HISTORY:    back pain     TECHNIQUE:    Axial computed tomography images of the lumbar spine without  intravenous contrast.  Sagittal and coronal reformatted images were  created and reviewed.  This CT exam was performed using one or more of  the following dose reduction techniques:  automated exposure control,  adjustment of the mA and/or kV according to patient size, and/or use of  iterative  reconstruction technique.     COMPARISON:    No relevant prior studies available.     FINDINGS:    VERTEBRAE:  Unremarkable.  No acute fracture.    DISCS/SPINAL CANAL/NEURAL FORAMINA:  No acute findings.  No spinal  canal stenosis.    SOFT TISSUES:  Unremarkable.       Impression:        No acute findings in the lumbar spine.        This report was finalized on 9/26/2024 1:17 PM by Dr. Moses Otto MD.       CT Thoracic Spine Without Contrast [[id number]] Collected: 09/26/24 1316     Updated: 09/26/24 1319    Narrative:      EXAM:    CT Thoracic Spine Without Intravenous Contrast     EXAM DATE:    9/26/2024 12:58 PM     CLINICAL HISTORY:    back pain     TECHNIQUE:    Axial computed tomography images of the thoracic spine without  intravenous contrast.  Sagittal and coronal reformatted images were  created and reviewed.  This CT exam was performed using one or more of  the following dose reduction techniques:  automated exposure control,  adjustment of the mA and/or kV according to patient size, and/or use of  iterative reconstruction technique.     COMPARISON:    No relevant prior studies available.     FINDINGS:    VERTEBRAE:  Unremarkable.  No acute fracture.    DISCS/SPINAL CANAL/NEURAL FORAMINA:  No acute findings.  No spinal  canal stenosis.    SOFT TISSUES:  Unremarkable.       Impression:        No acute findings in the thoracic spine.        This report was finalized on 9/26/2024 1:17 PM by Dr. Moses Otto MD.       CT Head Without Contrast [[id number]] Collected: 09/26/24 1259     Updated: 09/26/24 1302    Narrative:      CT HEAD WO CONTRAST-     CLINICAL INDICATION: weakness        COMPARISON: None available     TECHNIQUE: Axial images of the brain were obtained with out intravenous  contrast.  Reformatted images were created in the sagittal and coronal  planes.     DOSE:     Radiation dose reduction techniques were utilized per ALARA protocol.  Automated exposure control was initiated through either or  CareDose or  DoseRight software packages by  protocol.        FINDINGS:    BRAIN: Probable subacute ischemia right middle cerebral artery  territory    VENTRICLES:  Unremarkable.  No ventriculomegaly.       BONES/JOINTS:  Unremarkable.  No acute fracture.       SOFT TISSUES:  Unremarkable.       SINUSES:  no air fluid levels       MASTOID AIR CELLS:  Unremarkable as visualized.  No mastoid effusion.          Impression:         1. Probable remote right middle cerebral artery infarction  2. No acute parenchymal mass, hemorrhage, or midline shift     This report was finalized on 9/26/2024 1:00 PM by Dr. Moses Otto MD.             I have personally looked at the radiology images and read the final radiology report.    Assessment & Plan    43F Morbid Obesity by BMI PMH History Genital Herpes, Cerebrovascular Accident 5/2024 complicated by L sided paralysis, presented to [facility] emergency room 9/26 after sliding into the floor off her chair due to weakness.     #Acute Metabolic Encephalopathy due to Acute Urinary Tract Infection in setting of probable neurogenic bladder  - Continue Ceftriaxone, follow up cultures, rule out STI    #Electrolyte Abnormalities  - Acute Mild Hypokalemia - Replacing, on protocol  - Acute Mild Hypomagnesemia - Replacing, on protocol    #History Cerebrovascular Accident complicated by L sided paralysis, unclear etiology  - Review home medications and resume as indicated, Supportive Care     #Debility  - Consult PT/OT, Social Work if placement needed     #History Genital Herpes  - Supportive Care, follow up medication reconciliation for any suppressive medications, check HIV & acute hepatitis panel     #Morbid Obesity by BMI  - Body mass index is 43.26 kg/m².; complicates all aspects of care    F: Oral  E: Monitor & Replace as needed   N: Regular Diet  PPx: SQH  Code Status (Patient has no pulse and is not breathing): CPR  Medical Interventions (Patient has pulse or  is breathing): Full Support     Dispo: Pending workup and clinical improvement    *This patient is considered high risk secondary to Urinary Tract Infection, electrolyte abnormalities, chronic L sided paralysis from prior Cerebrovascular Accident.      Edited by: Ramon Marc MD at 9/26/2024 1701    Ramon Marc MD  [facility]  09/26/24  17:01 EDT        Electronically signed by Ramon Marc MD at 09/26/24 1703       Vital Signs (last day)       Date/Time Temp Temp src Pulse Resp BP Patient Position SpO2    09/30/24 1500 98.4 (36.9) Oral 101 18 162/92 Lying 97    09/30/24 1100 98.1 (36.7) Oral 102 20 162/89 Lying 97    09/30/24 0700 98.1 (36.7) Oral 105 20 169/87 Lying 95    09/30/24 0452 97.7 (36.5) Oral -- 20 152/87 Lying 92    09/29/24 2024 98 (36.7) Oral 100 20 138/84 Lying 92    09/29/24 1453 98 (36.7) Oral 104 20 140/87 Lying 92    09/29/24 1123 98.3 (36.8) Oral 102 20 136/77 Lying 98    09/29/24 0700 98.4 (36.9) Oral 109 20 157/85 Lying 90    09/29/24 0438 98.2 (36.8) Oral 104 20 138/82 Lying 94    09/29/24 0028 98.8 (37.1) Axillary 78 20 141/75 Lying 96          Lines, Drains & Airways       Active LDAs       Name Placement date Placement time Site Days    Midline Catheter - Single Lumen 09/30/24 Right Basilic 09/30/24  0955  -- less than 1    Peripheral IV 09/29/24 0825 Right Antecubital 09/29/24  0825  Antecubital  1    External Urinary Catheter 09/29/24  1500  --  1                  Current Facility-Administered Medications   Medication Dose Route Frequency Provider Last Rate Last Admin    acetaminophen (TYLENOL) tablet 650 mg  650 mg Oral Q6H PRN Ashleigh Nicholas PA-C   650 mg at 09/30/24 1008    acyclovir (ZOVIRAX) capsule 400 mg  400 mg Oral Nightly Ramon Marc MD   400 mg at 09/29/24 2048    aspirin chewable tablet 81 mg  81 mg Oral Daily Ramon Marc MD   81 mg at 09/30/24 1001    atorvastatin (LIPITOR) tablet 80 mg  80 mg Oral Daily Ramon Marc MD   80 mg at  09/30/24 1000    sennosides-docusate (PERICOLACE) 8.6-50 MG per tablet 2 tablet  2 tablet Oral BID PRN Ramon Marc MD        And    polyethylene glycol (MIRALAX) packet 17 g  17 g Oral Daily PRN Ramon Marc MD        And    bisacodyl (DULCOLAX) EC tablet 5 mg  5 mg Oral Daily PRN Ramon Marc MD        And    bisacodyl (DULCOLAX) suppository 10 mg  10 mg Rectal Daily PRN Ramon Marc MD        Calcium Replacement - Follow Nurse / BPA Driven Protocol   Does not apply PRN Ramon Marc MD        cyclobenzaprine (FLEXERIL) tablet 10 mg  10 mg Oral TID PRN Ramon Marc MD   10 mg at 09/30/24 0025    Diclofenac Sodium (VOLTAREN) 1 % gel 4 g  4 g Topical 4x Daily PRN Ashleigh Nicholas PA-C   4 g at 09/30/24 1001    DULoxetine (CYMBALTA) DR capsule 60 mg  60 mg Oral Daily Ramon Marc MD   60 mg at 09/30/24 1000    ertapenem (INVanz) 1,000 mg in sodium chloride 0.9 % 100 mL IVPB-VTB  1,000 mg Intravenous Q24H Ramon Marc  mL/hr at 09/30/24 1426 1,000 mg at 09/30/24 1426    heparin (porcine) 5000 UNIT/ML injection 5,000 Units  5,000 Units Subcutaneous Q8H Ramon Marc MD   5,000 Units at 09/30/24 1330    lisinopril (PRINIVIL,ZESTRIL) tablet 20 mg  20 mg Oral Daily Ramon Marc MD   20 mg at 09/30/24 1000    Magnesium Standard Dose Replacement - Follow Nurse / BPA Driven Protocol   Does not apply PRN Ramon Marc MD        [Held by provider] metFORMIN (GLUCOPHAGE) tablet 1,000 mg  1,000 mg Oral BID With Meals Ramon Marc MD        mupirocin (BACTROBAN) 2 % nasal ointment 1 Application  1 application  Each Nare BID Marv Navas,         nicotine (NICODERM CQ) 7 MG/24HR patch 1 patch  1 patch Transdermal Q24H Ramon Marc MD   1 patch at 09/30/24 1002    nystatin (MYCOSTATIN) powder   Topical Q12H Ramon Marc MD   Given at 09/30/24 1002    pantoprazole (PROTONIX) EC tablet 40 mg  40 mg Oral Daily Ramon Marc MD   40 mg at 09/30/24 1000    Pharmacy Consult   Does not apply  Continuous PRN Ramon Marc MD        Phosphorus Replacement - Follow Nurse / BPA Driven Protocol   Does not apply PRN Ramon Marc MD        Potassium Replacement - Follow Nurse / BPA Driven Protocol   Does not apply Ramon Benson MD        prochlorperazine (COMPAZINE) injection 2.5 mg  2.5 mg Intravenous Q6H PRN Ashleigh Nicholas PA-C   2.5 mg at 09/28/24 1739    sodium chloride 0.9 % flush 10 mL  10 mL Intravenous Q12H Ramon Marc MD   10 mL at 09/30/24 1001    sodium chloride 0.9 % flush 10 mL  10 mL Intravenous PRN Ramon Marc MD        sodium chloride 0.9 % flush 10 mL  10 mL Intravenous Q12H Marv Navas,    10 mL at 09/30/24 1009    sodium chloride 0.9 % flush 10 mL  10 mL Intravenous PRN Marv Navas,         sodium chloride 0.9 % infusion 40 mL  40 mL Intravenous PRN Ramon Marc MD        sodium chloride 0.9 % infusion 40 mL  40 mL Intravenous PRN Marv Navas,         sodium chloride 0.9 % infusion  75 mL/hr Intravenous Continuous Ramon Marc MD 75 mL/hr at 09/30/24 1022 75 mL/hr at 09/30/24 1022    traMADol (ULTRAM) tablet 50 mg  50 mg Oral Q8H PRN Ramon Marc MD   50 mg at 09/30/24 1330     Lab Results (most recent)       Procedure Component Value Units Date/Time    Blood Culture - Blood, Arm, Right [[id number]]  (Normal) Collected: 09/26/24 1350    Specimen: Blood from Arm, Right Updated: 09/30/24 1400     Blood Culture No growth at 4 days    Blood Culture - Blood, Arm, Left [[id number]]  (Normal) Collected: 09/26/24 1350    Specimen: Blood from Arm, Left Updated: 09/30/24 1400     Blood Culture No growth at 4 days    Comprehensive Metabolic Panel [[id number]]  (Abnormal) Collected: 09/30/24 0117    Specimen: Blood Updated: 09/30/24 0202     Glucose 196 mg/dL      BUN 5 mg/dL      Creatinine 0.46 mg/dL      Sodium 142 mmol/L      Potassium 3.5 mmol/L      Chloride 110 mmol/L      CO2 22.8 mmol/L      Calcium 8.0 mg/dL      Total Protein 5.1  g/dL      Albumin 2.5 g/dL      ALT (SGPT) 7 U/L      AST (SGOT) 11 U/L      Alkaline Phosphatase 79 U/L      Total Bilirubin 0.2 mg/dL      Globulin 2.6 gm/dL      A/G Ratio 1.0 g/dL      BUN/Creatinine Ratio 10.9     Anion Gap 9.2 mmol/L      eGFR 122.7 mL/min/1.73     Narrative:      GFR Normal >60  Chronic Kidney Disease <60  Kidney Failure <15      CBC (No Diff) [[id number]]  (Abnormal) Collected: 09/30/24 0117    Specimen: Blood Updated: 09/30/24 0132     WBC 8.49 10*3/mm3      RBC 3.30 10*6/mm3      Hemoglobin 10.5 g/dL      Hematocrit 32.7 %      MCV 99.1 fL      MCH 31.8 pg      MCHC 32.1 g/dL      RDW 13.9 %      RDW-SD 50.3 fl      MPV 8.8 fL      Platelets 285 10*3/mm3     Comprehensive Metabolic Panel [[id number]]  (Abnormal) Collected: 09/29/24 0036    Specimen: Blood Updated: 09/29/24 0224     Glucose 192 mg/dL      BUN 4 mg/dL      Creatinine 0.40 mg/dL      Sodium 140 mmol/L      Potassium 3.9 mmol/L      Comment: Slight hemolysis detected by analyzer. Result may be falsely elevated.        Chloride 109 mmol/L      CO2 21.0 mmol/L      Calcium 8.2 mg/dL      Total Protein 5.3 g/dL      Albumin 2.5 g/dL      ALT (SGPT) 8 U/L      AST (SGOT) 15 U/L      Alkaline Phosphatase 88 U/L      Total Bilirubin 0.2 mg/dL      Globulin 2.8 gm/dL      A/G Ratio 0.9 g/dL      BUN/Creatinine Ratio 10.0     Anion Gap 10.0 mmol/L      eGFR 126.9 mL/min/1.73     Narrative:      GFR Normal >60  Chronic Kidney Disease <60  Kidney Failure <15      CBC (No Diff) [[id number]]  (Abnormal) Collected: 09/29/24 0036    Specimen: Blood Updated: 09/29/24 0156     WBC 7.24 10*3/mm3      RBC 3.34 10*6/mm3      Hemoglobin 10.7 g/dL      Hematocrit 33.6 %      .6 fL      MCH 32.0 pg      MCHC 31.8 g/dL      RDW 14.0 %      RDW-SD 51.7 fl      MPV 9.7 fL      Platelets 318 10*3/mm3     Urine Culture - Urine, Straight Cath [[id number]]  (Abnormal)  (Susceptibility) Collected: 09/26/24 1451    Specimen: Urine from Straight Cath  Updated: 09/28/24 1408     Urine Culture >100,000 CFU/mL Escherichia coli ESBL    Narrative:      Colonization of the urinary tract without infection is common. Treatment is discouraged unless the patient is symptomatic, pregnant, or undergoing an invasive urologic procedure.  Recent outcomes data supports the use of pip/tazo in the treatment of susceptible ESBL infections for uncomplicated UTI. Consider use of pip/tazo as a carbapenem-sparing regimen in applicable patients.    Susceptibility        Escherichia coli ESBL      LASHAWN      Ertapenem Susceptible      Gentamicin Susceptible      Levofloxacin Intermediate      Meropenem Susceptible      Nitrofurantoin Susceptible      Piperacillin + Tazobactam Susceptible      Trimethoprim + Sulfamethoxazole Resistant                           Potassium [[id number]]  (Normal) Collected: 09/27/24 1904    Specimen: Blood Updated: 09/27/24 1920     Potassium 4.0 mmol/L      Comment: Slight hemolysis detected by analyzer. Result may be falsely elevated.       Chlamydia trachomatis, Neisseria gonorrhoeae, Trichomonas vaginalis, PCR - Swab, Vagina [[id number]]  (Normal) Collected: 09/26/24 1837    Specimen: Swab from Vagina Updated: 09/26/24 2018     Chlamydia DNA by PCR Not Detected     Neisseria gonorrhoeae by PCR Not Detected     Trichomonas vaginalis PCR Not Detected    HIV-1 & HIV-2 Antibodies [[id number]]  (Normal) Collected: 09/26/24 1350    Specimen: Blood from Arm, Right Updated: 09/26/24 1748    Narrative:      The following orders were created for panel order HIV-1 & HIV-2 Antibodies.  Procedure                               Abnormality         Status                     ---------                               -----------         ------                     HIV-1 / O / 2 Ag / Antibody[[id number]]  Normal              Final result                 Please view results for these tests on the individual orders.    Hepatitis Panel, Acute [[id number]]  (Normal) Collected: 09/26/24  1350    Specimen: Blood from Arm, Right Updated: 09/26/24 1748     Hepatitis B Surface Ag Non-Reactive     Hep A IgM Non-Reactive     Hep B C IgM Non-Reactive     Hepatitis C Ab Non-Reactive    Narrative:      Results may be falsely decreased if patient taking Biotin.     HIV-1 / O / 2 Ag / Antibody [[id number]]  (Normal) Collected: 09/26/24 1350    Specimen: Blood from Arm, Right Updated: 09/26/24 1748     HIV-1/ HIV-2 Non-Reactive     Comment: A non-reactive test result does not preclude the possibility of exposure to HIV or infection with HIV. An antibody response to recent exposure may take several months to reach detectable levels.       Narrative:      The HIV antibody/antigen combo assay is a qualitative assay for HIV that includes the p24 antigen as well as antibodies to HIV types 1 and 2. This test is intended to be used as a screening assay in the diagnosis of HIV infection in patients over the age of 2.    Magnesium [[id number]]  (Abnormal) Collected: 09/26/24 1504    Specimen: Blood from Arm, Right Updated: 09/26/24 1553     Magnesium 1.5 mg/dL     C-reactive Protein [[id number]]  (Abnormal) Collected: 09/26/24 1504    Specimen: Blood from Arm, Right Updated: 09/26/24 1530     C-Reactive Protein 3.80 mg/dL     Urinalysis, Microscopic Only - Urine, Clean Catch [[id number]]  (Abnormal) Collected: 09/26/24 1419    Specimen: Urine, Clean Catch Updated: 09/26/24 1459     RBC, UA 0-2 /HPF      WBC, UA 3-5 /HPF      Bacteria, UA 4+ /HPF      Squamous Epithelial Cells, UA None Seen /HPF      Hyaline Casts, UA None Seen /LPF      Methodology Manual Light Microscopy    Urinalysis With Microscopic If Indicated (No Culture) - Urine, Clean Catch [[id number]]  (Abnormal) Collected: 09/26/24 1419    Specimen: Urine, Clean Catch Updated: 09/26/24 1440     Color, UA Dark Yellow     Appearance, UA Turbid     pH, UA 6.0     Specific Gravity, UA >=1.030     Glucose, UA Negative     Ketones, UA 15 mg/dL (1+)     Bilirubin, UA  Small (1+)     Blood, UA Trace     Protein, UA 30 mg/dL (1+)     Leuk Esterase, UA Moderate (2+)     Nitrite, UA Positive     Urobilinogen, UA 2.0 E.U./dL    hCG, Serum, Qualitative [[id number]]  (Normal) Collected: 09/26/24 1350    Specimen: Blood from Arm, Right Updated: 09/26/24 1425     HCG Qualitative Negative    Lactic Acid, Plasma [[id number]]  (Normal) Collected: 09/26/24 1350    Specimen: Blood from Arm, Right Updated: 09/26/24 1424     Lactate 1.1 mmol/L     CBC & Differential [[id number]]  (Abnormal) Collected: 09/26/24 1350    Specimen: Blood from Arm, Right Updated: 09/26/24 1403    Narrative:      The following orders were created for panel order CBC & Differential.  Procedure                               Abnormality         Status                     ---------                               -----------         ------                     CBC Auto Differential[[id number]]        Abnormal            Final result                 Please view results for these tests on the individual orders.    CBC Auto Differential [[id number]]  (Abnormal) Collected: 09/26/24 1350    Specimen: Blood from Arm, Right Updated: 09/26/24 1403     WBC 10.42 10*3/mm3      RBC 3.98 10*6/mm3      Hemoglobin 12.5 g/dL      Hematocrit 37.4 %      MCV 94.0 fL      MCH 31.4 pg      MCHC 33.4 g/dL      RDW 13.6 %      RDW-SD 46.9 fl      MPV 9.2 fL      Platelets 402 10*3/mm3      Neutrophil % 66.1 %      Lymphocyte % 28.8 %      Monocyte % 3.7 %      Eosinophil % 0.5 %      Basophil % 0.2 %      Immature Grans % 0.7 %      Neutrophils, Absolute 6.89 10*3/mm3      Lymphocytes, Absolute 3.00 10*3/mm3      Monocytes, Absolute 0.39 10*3/mm3      Eosinophils, Absolute 0.05 10*3/mm3      Basophils, Absolute 0.02 10*3/mm3      Immature Grans, Absolute 0.07 10*3/mm3      nRBC 0.0 /100 WBC     COVID-19 and FLU A/B PCR, 1 HR TAT - Swab, Nasopharynx [[id number]]  (Normal) Collected: 09/26/24 1326    Specimen: Swab from Nasopharynx Updated: 09/26/24  1353     COVID19 Not Detected     Influenza A PCR Not Detected     Influenza B PCR Not Detected    Narrative:      Fact sheet for providers: https://www.fda.gov/media/872027/download    Fact sheet for patients: https://www.fda.gov/media/528295/download    Test performed by PCR.          Orders (last 24 hrs)        Start     Ordered    09/30/24 1100  sodium chloride 0.9 % flush 10 mL  Every 12 Hours Scheduled         09/30/24 1004    09/30/24 1100  mupirocin (BACTROBAN) 2 % nasal ointment 1 Application  2 Times Daily         09/30/24 1004    09/30/24 1005  Connectors / Hubs Must Be Scrubbed 15 Seconds Using 70% Alcohol Before Access - Allow to Dry Before Accessing Line  Continuous         09/30/24 1004    09/30/24 1005  Change CHG Dressing or Transparent Dressing with CHG Disk, Needleless Connectors and Securement Device Every 7 Days  Weekly        Comments: Per [facility] Policy    09/30/24 1004    09/30/24 1005  Discontinue mupirocin for decolonization after 5 days or sooner if patient no longer has a central venous access device, accessed port, hemodialysis catheter, or midline  Continuous         09/30/24 1004    09/30/24 1004  sodium chloride 0.9 % flush 10 mL  As Needed         09/30/24 1004    09/30/24 1004  sodium chloride 0.9 % infusion 40 mL  As Needed         09/30/24 1004    09/30/24 0300  potassium chloride (KLOR-CON M20) CR tablet 40 mEq  Every 4 Hours         09/30/24 0206    09/30/24 0106  Unstageable Pressure Ulcer (Wet) Care Q12H  Every 12 Hours        Comments: - Gently Cleanse With Normal Saline   - Pack Loosely With Moist to Moist Normal Saline Fluffed Gauze   - Cover With Silicone Border Dressing or Dry Dressing    09/30/24 0105    09/30/24 0105  Follow Pressure Ulcer Prevention Measures Policy  Continuous        Comments: Implement Appropriate Pressure Ulcer Prevention Measures  - Open Order Report to View Full Instructions  Enter Wound LDA & Document Assessment  Add Wound Care Plan  Add Patient  Education Per Policy    09/30/24 0105    09/30/24 0105  Wound Ostomy Eval & Treat  Once         09/30/24 0105    09/30/24 0104  Follow Pressure Ulcer Prevention Measures Policy  Continuous        Comments: Implement Appropriate Pressure Ulcer Prevention Measures  - Open Order Report to View Full Instructions  Enter Wound LDA & Document Assessment  Add Wound Care Plan  Add Patient Education Per Policy    09/30/24 0105    09/30/24 0104  Turn Patient  Now Then Every 2 Hours         09/30/24 0105    09/30/24 0104  Head of Bed 30 Degrees or Less (Unless Contraindicated)  Until Discontinued         09/30/24 0105    09/30/24 0104  Elevate Heels Off of Bed  Until Discontinued         09/30/24 0105    09/30/24 0104  Use Seat Cushion When Up In Chair  Continuous         09/30/24 0105 09/30/24 0104  Silicone Border Dressing to Bony Prominences  Every Shift       09/30/24 0105    09/30/24 0104  Do NOT Rub or Massage Any Bony Prominence  Continuous         09/30/24 0105    09/29/24 1100  nystatin (MYCOSTATIN) powder  Every 12 Hours Scheduled         09/29/24 0948    09/29/24 0930  traMADol (ULTRAM) tablet 50 mg  Every 8 Hours PRN         09/29/24 0930    09/28/24 1800  Dietary Nutrition Supplements Boost Plus (Ensure Enlive, Ensure Plus)  Daily With Breakfast & Dinner       09/28/24 1209    09/28/24 1500  ertapenem (INVanz) 1,000 mg in sodium chloride 0.9 % 100 mL IVPB-VTB  Every 24 Hours         09/28/24 1427    09/28/24 0600  Comprehensive Metabolic Panel  Daily       09/27/24 0738    09/28/24 0600  CBC (No Diff)  Daily       09/27/24 0738    09/27/24 2100  acyclovir (ZOVIRAX) capsule 400 mg  Nightly         09/27/24 0921    09/27/24 0737  Pharmacy Consult  Continuous PRN         09/27/24 0738    09/27/24 0103  acetaminophen (TYLENOL) tablet 650 mg  Every 6 Hours PRN         09/27/24 0105    09/27/24 0013  Diclofenac Sodium (VOLTAREN) 1 % gel 4 g  4 Times Daily PRN         09/27/24 0013    09/26/24 2200  heparin (porcine)  5000 UNIT/ML injection 5,000 Units  Every 8 Hours Scheduled         09/26/24 1747 09/26/24 2149  prochlorperazine (COMPAZINE) injection 2.5 mg  Every 6 Hours PRN         09/26/24 2149 09/26/24 2100  sodium chloride 0.9 % flush 10 mL  Every 12 Hours Scheduled         09/26/24 1747 09/26/24 2000  Vital Signs  Every 4 Hours       09/26/24 1747 09/26/24 1845  sodium chloride 0.9 % infusion  Continuous         09/26/24 1747 09/26/24 1845  aspirin chewable tablet 81 mg  Daily         09/26/24 1747 09/26/24 1845  atorvastatin (LIPITOR) tablet 80 mg  Daily         09/26/24 1747 09/26/24 1845  DULoxetine (CYMBALTA) DR capsule 60 mg  Daily         09/26/24 1747 09/26/24 1845  lisinopril (PRINIVIL,ZESTRIL) tablet 20 mg  Daily         09/26/24 1747 09/26/24 1845  pantoprazole (PROTONIX) EC tablet 40 mg  Daily         09/26/24 1747 09/26/24 1845  [Held by provider]  metFORMIN (GLUCOPHAGE) tablet 1,000 mg  2 Times Daily With Meals        (On hold since Thu 9/26/2024 at 1747 until manually unheld; held by Ramon Marc MDHold Reason: Other (Comment Required))    09/26/24 1747 09/26/24 1845  nicotine (NICODERM CQ) 7 MG/24HR patch 1 patch  Every 24 Hours Scheduled         09/26/24 1747 09/26/24 1800  Oral Care  2 Times Daily       09/26/24 1747 09/26/24 1748  Intake & Output  Every Shift       09/26/24 1747 09/26/24 1747  sodium chloride 0.9 % flush 10 mL  As Needed         09/26/24 1747 09/26/24 1747  sodium chloride 0.9 % infusion 40 mL  As Needed         09/26/24 1747 09/26/24 1747  Potassium Replacement - Follow Nurse / BPA Driven Protocol  As Needed         09/26/24 1747 09/26/24 1747  Magnesium Standard Dose Replacement - Follow Nurse / BPA Driven Protocol  As Needed         09/26/24 1747 09/26/24 1747  Phosphorus Replacement - Follow Nurse / BPA Driven Protocol  As Needed         09/26/24 1747    09/26/24 1747  Calcium Replacement - Follow Nurse / BPA Driven Protocol  As  "Needed         09/26/24 1747    09/26/24 1747  sennosides-docusate (PERICOLACE) 8.6-50 MG per tablet 2 tablet  2 Times Daily PRN        Placed in \"And\" Linked Group    09/26/24 1747    09/26/24 1747  polyethylene glycol (MIRALAX) packet 17 g  Daily PRN        Placed in \"And\" Linked Group    09/26/24 1747    09/26/24 1747  bisacodyl (DULCOLAX) EC tablet 5 mg  Daily PRN        Placed in \"And\" Linked Group    09/26/24 1747    09/26/24 1747  bisacodyl (DULCOLAX) suppository 10 mg  Daily PRN        Placed in \"And\" Linked Group    09/26/24 1747    09/26/24 1747  cyclobenzaprine (FLEXERIL) tablet 10 mg  3 Times Daily PRN         09/26/24 1747    Unscheduled  Straight cath  As Needed       09/26/24 1359    Unscheduled  Potassium  As Needed        Comments: Release/collect/run 4 hours after completion of last potassium dose.     Placed in \"And\" Linked Group    09/26/24 1545    Unscheduled  Stage II Pressure Ulcer Care PRN  As Needed      Comments: - Gently Cleanse With Normal Saline  - Cover With Silicone Border Dressing (If Indicated)  - For Frequent Incontinence - Apply Skin Protective Barrier Cream Rather Than Silicone Border Dressing    09/29/24 1139    Unscheduled  Wound Care  As Needed       09/30/24 0105    Unscheduled  Unstageable Pressure Ulcer (Wet) Care PRN  As Needed      Comments: - Gently Cleanse With Normal Saline   - Pack Loosely With Moist to Moist Normal Saline Fluffed Gauze   - Cover With Silicone Border Dressing or Dry Dressing    09/30/24 0105    Unscheduled  Change Dressing to IV Site As Needed When Damp, Loose or Soiled  As Needed       09/30/24 1004    Unscheduled  Change Needleless Connectors  As Needed      Comments: Change Needleless Connectors When:  - Administration Set Changed  - Dressing Changed  - Removed For Any Reason  - Residual Blood or Debris Within Connector  - Prior to Drawing Blood Cultures  - Contamination of Connector  - After Administration of Blood or Blood Components    09/30/24 " 1004    --  aspirin 81 MG chewable tablet  Daily         24    --  atorvastatin (LIPITOR) 80 MG tablet  Daily         24    --  cyclobenzaprine (FLEXERIL) 10 MG tablet  3 Times Daily PRN         24    --  DULoxetine (CYMBALTA) 60 MG capsule  Daily         24    --  lisinopril (PRINIVIL,ZESTRIL) 20 MG tablet  Daily         24    --  metFORMIN (GLUCOPHAGE) 1000 MG tablet  2 Times Daily With Meals         24    --  pantoprazole (PROTONIX) 40 MG EC tablet  Daily         24    --  acyclovir (ZOVIRAX) 400 MG tablet  Nightly         24                     Physician Progress Notes (most recent note)        Cuca Yuen APRN at 24 1129                     PROGRESS NOTE         Patient Identification:  Name:  Lety Johnson  Age:  43 y.o.  Sex:  female  :  1980  MRN:  [id number]  Visit Number:  [id number]  Primary Care Provider:  Provider, No Known         LOS: 2 days       ----------------------------------------------------------------------------------------------------------------------  Subjective       Chief Complaints:    Fall        Interval History:      Patient resting in bed this morning.  Family at bedside.  Currently on room air with no apparent distress.  No issues or complaints.  Denies dysuria, urgency frequency.  Lungs clear to auscultation bilaterally.  WBC normal at 8.49.    Review of Systems:    Constitutional: no fever, chills and night sweats.  Generalized fatigue.  Eyes: no eye drainage, itching or redness.  HEENT: no mouth sores, dysphagia or nose bleed.  Respiratory: no for shortness of breath, cough or production of sputum.  Cardiovascular: no chest pain, no palpitations, no orthopnea.  Gastrointestinal: no nausea, vomiting or diarrhea. No abdominal pain, hematemesis or rectal bleeding.  Genitourinary: no dysuria or polyuria.  Hematologic/lymphatic: no lymph node abnormalities, no easy bruising  or easy bleeding.  Musculoskeletal: no muscle or joint pain.  Skin: No rash and no itching.  Neurological: no loss of consciousness, no seizure, no headache.  Behavioral/Psych: no depression or suicidal ideation.  Endocrine: no hot flashes.  Immunologic: negative.    ----------------------------------------------------------------------------------------------------------------------      Objective       [facility] Meds:  acyclovir, 400 mg, Oral, Nightly  aspirin, 81 mg, Oral, Daily  atorvastatin, 80 mg, Oral, Daily  DULoxetine, 60 mg, Oral, Daily  ertapenem, 1,000 mg, Intravenous, Q24H  heparin (porcine), 5,000 Units, Subcutaneous, Q8H  lisinopril, 20 mg, Oral, Daily  [Held by provider] metFORMIN, 1,000 mg, Oral, BID With Meals  mupirocin, 1 application , Each Nare, BID  nicotine, 1 patch, Transdermal, Q24H  nystatin, , Topical, Q12H  pantoprazole, 40 mg, Oral, Daily  sodium chloride, 10 mL, Intravenous, Q12H  sodium chloride, 10 mL, Intravenous, Q12H      Pharmacy Consult,   sodium chloride, 75 mL/hr, Last Rate: 75 mL/hr (09/30/24 1022)      ----------------------------------------------------------------------------------------------------------------------    Vital Signs:  Temp:  [97.7 °F (36.5 °C)-98.1 °F (36.7 °C)] 98.1 °F (36.7 °C)  Heart Rate:  [100-105] 102  Resp:  [20] 20  BP: (138-169)/(84-89) 162/89  Mean Arterial Pressure (Non-Invasive) for the past 24 hrs (Last 3 readings):   Noninvasive MAP (mmHg)   09/30/24 0452 105   09/29/24 2024 102   09/29/24 1453 101     SpO2 Percentage    09/30/24 0452 09/30/24 0700 09/30/24 1100   SpO2: 92% 95% 97%     SpO2:  [92 %-97 %] 97 %  on   ;   Device (Oxygen Therapy): room air    Body mass index is 39.92 kg/m².  Wt Readings from Last 3 Encounters:   09/30/24 110 kg (243 lb 9.6 oz)   06/27/24 120 kg (264 lb)        Intake/Output Summary (Last 24 hours) at 9/30/2024 1129  Last data filed at 9/30/2024 0400  Gross per 24 hour   Intake 480 ml   Output 300 ml   Net  180 ml     Diet: Regular/House; Fluid Consistency: Thin (IDDSI 0)  ----------------------------------------------------------------------------------------------------------------------      Physical Exam:    Constitutional:  Well-developed and well-nourished.  No respiratory distress.  On room air.  Family at bedside.  HENT:  Head: Normocephalic and atraumatic.  Mouth:  Moist mucous membranes.    Eyes:  Conjunctivae and EOM are normal.  No scleral icterus.  Neck:  Neck supple.  No JVD present.    Cardiovascular:  Normal rate, regular rhythm and normal heart sounds with no murmur. No edema.  Pulmonary/Chest:  No respiratory distress, no wheezes, no crackles, with normal breath sounds and good air movement.  Abdominal:  Soft.  Bowel sounds are normal.  No distension and no tenderness.   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  Neurological:  Alert and oriented to person, place, and time.  No facial droop.  No slurred speech.   Skin:  Skin is warm and dry.  No rash noted.  No pallor.   Psychiatric:  Normal mood and affect.  Behavior is normal.        ----------------------------------------------------------------------------------------------------------------------            Results from last 7 days   Lab Units 09/30/24  0117 09/29/24  0036 09/28/24  0318 09/27/24  0844 09/26/24  1504 09/26/24  1350   CRP mg/dL  --   --   --   --  3.80*  --    LACTATE mmol/L  --   --   --   --   --  1.1   WBC 10*3/mm3 8.49 7.24 7.38   < >  --  10.42   HEMOGLOBIN g/dL 10.5* 10.7* 11.8*   < >  --  12.5   HEMATOCRIT % 32.7* 33.6* 36.9   < >  --  37.4   MCV fL 99.1* 100.6* 98.9*   < >  --  94.0   MCHC g/dL 32.1 31.8 32.0   < >  --  33.4   PLATELETS 10*3/mm3 285 318 332   < >  --  402    < > = values in this interval not displayed.     Results from last 7 days   Lab Units 09/30/24  0117 09/29/24  0036 09/28/24  0318 09/27/24  0844 09/26/24  1504   SODIUM mmol/L 142 140 142   < > 140   POTASSIUM mmol/L 3.5 3.9 4.1   < >  "2.7*   MAGNESIUM mg/dL  --   --   --   --  1.5*   CHLORIDE mmol/L 110* 109* 110*   < > 99   CO2 mmol/L 22.8 21.0* 22.3   < > 28.9   BUN mg/dL 5* 4* 5*   < > 10   CREATININE mg/dL 0.46* 0.40* 0.43*   < > 0.51*   CALCIUM mg/dL 8.0* 8.2* 8.5*   < > 9.4   GLUCOSE mg/dL 196* 192* 198*   < > 220*   ALBUMIN g/dL 2.5* 2.5* 2.7*   < > 3.5   BILIRUBIN mg/dL 0.2 0.2 0.3   < > 0.6   ALK PHOS U/L 79 88 100   < > 119*   AST (SGOT) U/L 11 15 15   < > 21   ALT (SGPT) U/L 7 8 9   < > 8    < > = values in this interval not displayed.   Estimated Creatinine Clearance: 198.4 mL/min (A) (by C-G formula based on SCr of 0.46 mg/dL (L)).  No results found for: \"AMMONIA\"    No results found for: \"HGBA1C\", \"POCGLU\"  No results found for: \"HGBA1C\"  No results found for: \"TSH\", \"FREET4\"    Blood Culture   Date Value Ref Range Status   09/26/2024 No growth at 2 days  Preliminary   09/26/2024 No growth at 2 days  Preliminary     Urine Culture   Date Value Ref Range Status   09/26/2024 >100,000 CFU/mL Escherichia coli ESBL (A)  Final     No results found for: \"WOUNDCX\"  No results found for: \"STOOLCX\"  No results found for: \"RESPCX\"  Pain Management Panel           No data to display                  ----------------------------------------------------------------------------------------------------------------------  Imaging Results (Last 24 Hours)       ** No results found for the last 24 hours. **            ----------------------------------------------------------------------------------------------------------------------    Pertinent Infectious Disease Results                Assessment/Plan       Assessment       ESBL UTI       Plan        Patient resting in bed this morning.  Family at bedside.  Currently on room air with no apparent distress.  No issues or complaints.  Denies dysuria, urgency frequency.  Lungs clear to auscultation bilaterally.  WBC normal at 8.49.    Recommend to continue current antibiotic regimen of ertapenem 1 g IV " every 24 hours x 7 days for treatment of ESBL UTI.  Upon discharge patient can be further de-escalated to Macrobid 100 mg p.o. twice daily to complete antibiotic course. Okay to continue home suppressive acyclovir for history of genital herpes. We will continue to follow closely and adjust antibiotic therapy as needed.     ANTIMICROBIAL THERAPY    acyclovir - 200 MG, 400 MG  ertapenem (INVanz) 1000 mg in 100 mL NS (VTB)     Code Status:   Code Status and Medical Interventions: CPR (Attempt to Resuscitate); Full Support   Ordered at: 24 1626     Code Status (Patient has no pulse and is not breathing):    CPR (Attempt to Resuscitate)     Medical Interventions (Patient has pulse or is breathing):    Full Support       YUE Rivera  24  11:29 EDT    Electronically signed by Cuca Yuen APRN at 24 1133          Consult Notes (most recent note)        Cuca Yuen APRN at 24 1436        Consult Orders    1. Inpatient Infectious Diseases Consult [[id number]] ordered by Ramon Marc MD at 24 1427                         INFECTIOUS DISEASE CONSULTATION REPORT        Patient Identification:  Name:  Lety Johnson  Age:  43 y.o.  Sex:  female  :  1980  MRN:  [id number]   Visit Number:  [id number]  Primary Care Physician:  Provider, No Known        Referring Provider: Dr. Marc    Reason for consult: ESBL UTI       LOS: 0 days        Subjective       Subjective     History of present illness:      Thank you Dr. Marc for allowing us to participate in the care of your patient.  As you well know, Ms. Lety Johnson is a 43 y.o. 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  WBC 7.38.  Chlamydia gonorrhea and trichomonas negative.  CRP 3.80.  Urinalysis positive with culture  finalizing is greater than 100,000 colonies of E. coli ESBL.  Blood cultures from 9/26/2024 show no growth thus far.  HIV 1 and 2 nonreactive.  Hepatitis panel nonreactive.  COVID-19 and influenza PCR negative.  Lactic acid normal on admission.    Patient resting in bed.  Denies dysuria, urgency, frequency.  Patient reports recurrent yeast infections.  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  Lungs clear to auscultation bilaterally.      Infectious Disease consultation was requested for antimicrobial management.      ---------------------------------------------------------------------------------------------------------------------     Review Of Systems:    Constitutional: no fever, chills and night sweats. No appetite change or unexpected weight change. No fatigue.  Eyes: no eye drainage, itching or redness.  HEENT: no mouth sores, dysphagia or nose bleed.  Respiratory: no for shortness of breath, cough or production of sputum.  Cardiovascular: no chest pain, no palpitations, no orthopnea.  Gastrointestinal: no nausea, vomiting or diarrhea. No abdominal pain, hematemesis or rectal bleeding.  Genitourinary: no dysuria or polyuria.  Hematologic/lymphatic: no lymph node abnormalities, no easy bruising or easy bleeding.  Musculoskeletal: no muscle or joint pain.  Skin: No rash and no itching.  Neurological: no loss of consciousness, no seizure, no headache.  Behavioral/Psych: no depression or suicidal ideation.  Endocrine: no hot flashes.  Immunologic: negative.    ---------------------------------------------------------------------------------------------------------------------     Past Medical History    Past Medical History:   Diagnosis Date    Stroke        Past Surgical History    History reviewed. No pertinent surgical history.    Family History    History reviewed. No pertinent family history.    Social History    Social History     Tobacco Use    Smoking status: Every Day     Average packs/day: 2.0  packs/day for 26.0 years (52.0 ttl pk-yrs)     Types: Cigarettes     Start date: 1998    Smokeless tobacco: Never   Vaping Use    Vaping status: Never Used   Substance Use Topics    Alcohol use: Not Currently    Drug use: Never       Allergies    Patient has no known allergies.  ---------------------------------------------------------------------------------------------------------------------     Home Medications:    Prior to Admission Medications       Prescriptions Last Dose Informant Patient Reported? Taking?    acyclovir (ZOVIRAX) 400 MG tablet  Pharmacy Yes No    Take 1 tablet by mouth Every Night.    aspirin 81 MG chewable tablet Unknown  Yes No    Chew 1 tablet Daily.    atorvastatin (LIPITOR) 80 MG tablet Unknown  Yes No    Take 1 tablet by mouth Daily.    cyclobenzaprine (FLEXERIL) 10 MG tablet Unknown  Yes No    Take 1 tablet by mouth 3 (Three) Times a Day As Needed for Muscle Spasms.    DULoxetine (CYMBALTA) 60 MG capsule Unknown  Yes No    Take 1 capsule by mouth Daily.    lisinopril (PRINIVIL,ZESTRIL) 20 MG tablet Unknown  Yes No    Take 1 tablet by mouth Daily.    metFORMIN (GLUCOPHAGE) 1000 MG tablet Unknown  Yes No    Take 1 tablet by mouth 2 (Two) Times a Day With Meals.    pantoprazole (PROTONIX) 40 MG EC tablet Unknown  Yes No    Take 1 tablet by mouth Daily.          ---------------------------------------------------------------------------------------------------------------------    Objective       Objective     Hospital Scheduled Meds:  acyclovir, 400 mg, Oral, Nightly  aspirin, 81 mg, Oral, Daily  atorvastatin, 80 mg, Oral, Daily  DULoxetine, 60 mg, Oral, Daily  ertapenem, 1,000 mg, Intravenous, Q24H  heparin (porcine), 5,000 Units, Subcutaneous, Q8H  lisinopril, 20 mg, Oral, Daily  [Held by provider] metFORMIN, 1,000 mg, Oral, BID With Meals  nicotine, 1 patch, Transdermal, Q24H  pantoprazole, 40 mg, Oral, Daily  sodium chloride, 10 mL, Intravenous, Q12H      Pharmacy Consult,   sodium  chloride, 75 mL/hr, Last Rate: 75 mL/hr (09/28/24 1315)      ---------------------------------------------------------------------------------------------------------------------   Vital Signs:  Temp:  [97.5 °F (36.4 °C)-98.4 °F (36.9 °C)] 98.4 °F (36.9 °C)  Heart Rate:  [] 58  Resp:  [18-20] 18  BP: (133-172)/(71-86) 172/83  Mean Arterial Pressure (Non-Invasive) for the past 24 hrs (Last 3 readings):   Noninvasive MAP (mmHg)   09/28/24 1100 107   09/28/24 0650 92   09/28/24 0307 103     SpO2 Percentage    09/28/24 0307 09/28/24 0650 09/28/24 1100   SpO2: 98% 96% 97%     SpO2:  [96 %-98 %] 97 %  on   ;   Device (Oxygen Therapy): room air    Body mass index is 40.79 kg/m².  Wt Readings from Last 3 Encounters:   09/28/24 113 kg (248 lb 14.4 oz)   06/27/24 120 kg (264 lb)     ---------------------------------------------------------------------------------------------------------------------     Physical Exam:    Constitutional:  Well-developed and well-nourished.  No respiratory distress.      HENT:  Head: Normocephalic and atraumatic.  Mouth:  Moist mucous membranes.    Eyes:  Conjunctivae and EOM are normal.  No scleral icterus.  Neck:  Neck supple.  No JVD present.    Cardiovascular:  Normal rate, regular rhythm and normal heart sounds with no murmur. No edema.  Pulmonary/Chest:  No respiratory distress, no wheezes, no crackles, with normal breath sounds and good air movement.  Abdominal:  Soft.  Bowel sounds are normal.  No distension and no tenderness.   Musculoskeletal: Left sided paralysis secondary to CVA.  Neurological:  Alert and oriented to person, place, and time.  No facial droop.  No slurred speech.   Skin:  Skin is warm and dry.  No rash noted.  No pallor.   Psychiatric:  Normal mood and affect.  Behavior is normal.    ---------------------------------------------------------------------------------------------------------------------              Results from last 7 days   Lab Units  "09/28/24 0318 09/27/24  0844 09/26/24  1504 09/26/24  1350   CRP mg/dL  --   --  3.80*  --    LACTATE mmol/L  --   --   --  1.1   WBC 10*3/mm3 7.38 9.67  --  10.42   HEMOGLOBIN g/dL 11.8* 10.9*  --  12.5   HEMATOCRIT % 36.9 34.8  --  37.4   MCV fL 98.9* 102.4*  --  94.0   MCHC g/dL 32.0 31.3*  --  33.4   PLATELETS 10*3/mm3 332 292  --  402     Results from last 7 days   Lab Units 09/28/24 0318 09/27/24  1904 09/27/24  0844 09/26/24  1504   SODIUM mmol/L 142  --  137 140   POTASSIUM mmol/L 4.1 4.0 3.5 2.7*   MAGNESIUM mg/dL  --   --   --  1.5*   CHLORIDE mmol/L 110*  --  105 99   CO2 mmol/L 22.3  --  21.5* 28.9   BUN mg/dL 5*  --  7 10   CREATININE mg/dL 0.43*  --  0.36* 0.51*   CALCIUM mg/dL 8.5*  --  7.9* 9.4   GLUCOSE mg/dL 198*  --  234* 220*   ALBUMIN g/dL 2.7*  --  2.5* 3.5   BILIRUBIN mg/dL 0.3  --  0.3 0.6   ALK PHOS U/L 100  --  96 119*   AST (SGOT) U/L 15  --  17 21   ALT (SGPT) U/L 9  --  7 8   Estimated Creatinine Clearance: 215.5 mL/min (A) (by C-G formula based on SCr of 0.43 mg/dL (L)).  No results found for: \"AMMONIA\"    No results found for: \"HGBA1C\", \"POCGLU\"  No results found for: \"HGBA1C\"  No results found for: \"TSH\", \"FREET4\"    Blood Culture   Date Value Ref Range Status   09/26/2024 No growth at 2 days  Preliminary   09/26/2024 No growth at 2 days  Preliminary     Urine Culture   Date Value Ref Range Status   09/26/2024 >100,000 CFU/mL Escherichia coli ESBL (A)  Final     No results found for: \"WOUNDCX\"  No results found for: \"STOOLCX\"  No results found for: \"RESPCX\"  Pain Management Panel           No data to display              I have personally reviewed the above laboratory results.   ---------------------------------------------------------------------------------------------------------------------  Imaging Results (Last 7 Days)       Procedure Component Value Units Date/Time    XR Ankle 3+ View Left [[id number]] Collected: 09/26/24 1537     Updated: 09/26/24 1540    Narrative:      " EXAM:    XR Left Ankle Complete, 3 or More Views     EXAM DATE:    9/26/2024 3:17 PM     CLINICAL HISTORY:    left ankle injury     TECHNIQUE:    Frontal, lateral and oblique views of the left ankle.     COMPARISON:    No relevant prior studies available.     FINDINGS:    Bones/joints:  See below.      Soft tissues:  Soft tissue swelling, but no acute fracture or  dislocation.       Impression:        Soft tissue swelling, but no acute fracture or dislocation.        This report was finalized on 9/26/2024 3:38 PM by Dr. Moses Otto MD.       XR Foot 3+ View Left [[id number]] Collected: 09/26/24 1536     Updated: 09/26/24 1539    Narrative:      EXAM:    XR Left Foot Complete, 3 or More Views     EXAM DATE:    9/26/2024 3:16 PM     CLINICAL HISTORY:    left foot wound     TECHNIQUE:    Frontal, lateral and oblique views of the left foot.     COMPARISON:    No relevant prior studies available.     FINDINGS:    Bones/joints:  Unremarkable.  No acute fracture.  No dislocation.    Soft tissues:  Unremarkable.  No radiopaque foreign body.       Impression:        No acute findings in the left foot.        This report was finalized on 9/26/2024 3:37 PM by Dr. Moses Otto MD.       XR Chest 1 View [[id number]] Collected: 09/26/24 1406     Updated: 09/26/24 1408    Narrative:      XR CHEST 1 VW-     CLINICAL INDICATION: weakness        COMPARISON: None immediately available      TECHNIQUE: Single frontal view of the chest.     FINDINGS:     LUNGS: Lungs are adequately aerated.      HEART AND MEDIASTINUM: Heart and mediastinal contours are unremarkable        SKELETON: Bony and soft tissue structures are unremarkable.             Impression:      No radiographic evidence of acute cardiac or pulmonary disease.           This report was finalized on 9/26/2024 2:06 PM by Dr. Moses Otto MD.       CT Cervical Spine Without Contrast [[id number]] Collected: 09/26/24 1317     Updated: 09/26/24 1319    Narrative:      CT CERVICAL  SPINE WO CONTRAST-     CLINICAL INDICATION: neck pain        COMPARISON: None available     TECHNIQUE: Axial images of the cervical spine were acquired with out any  intravenous contrast. Reformatted images were then created in the  sagittal and coronal planes.     DOSE:      Radiation dose reduction techniques were utilized per ALARA protocol.  Automated exposure control was initiated through either or [employer] or  [employer] software packages by  protocol.           FINDINGS:   The provided study demonstrates preservation of the vertebral body  heights in the sagittally reconstructed images.     There is no prevertebral soft tissue swelling.     Alignment is near anatomic.     The disc space heights are uniform.     I see no acute cervical spine fracture.       Impression:         1. No acute bony abnormality.     2. Other incidental findings as above     This report was finalized on 9/26/2024 1:17 PM by Dr. Moses Otto MD.       CT Lumbar Spine Without Contrast [[id number]] Collected: 09/26/24 1317     Updated: 09/26/24 1319    Narrative:      EXAM:    CT Lumbar Spine Without Intravenous Contrast     EXAM DATE:    9/26/2024 12:59 PM     CLINICAL HISTORY:    back pain     TECHNIQUE:    Axial computed tomography images of the lumbar spine without  intravenous contrast.  Sagittal and coronal reformatted images were  created and reviewed.  This CT exam was performed using one or more of  the following dose reduction techniques:  automated exposure control,  adjustment of the mA and/or kV according to patient size, and/or use of  iterative reconstruction technique.     COMPARISON:    No relevant prior studies available.     FINDINGS:    VERTEBRAE:  Unremarkable.  No acute fracture.    DISCS/SPINAL CANAL/NEURAL FORAMINA:  No acute findings.  No spinal  canal stenosis.    SOFT TISSUES:  Unremarkable.       Impression:        No acute findings in the lumbar spine.        This report was finalized on 9/26/2024  1:17 PM by Dr. Moses Otto MD.       CT Thoracic Spine Without Contrast [[id number]] Collected: 09/26/24 1316     Updated: 09/26/24 1319    Narrative:      EXAM:    CT Thoracic Spine Without Intravenous Contrast     EXAM DATE:    9/26/2024 12:58 PM     CLINICAL HISTORY:    back pain     TECHNIQUE:    Axial computed tomography images of the thoracic spine without  intravenous contrast.  Sagittal and coronal reformatted images were  created and reviewed.  This CT exam was performed using one or more of  the following dose reduction techniques:  automated exposure control,  adjustment of the mA and/or kV according to patient size, and/or use of  iterative reconstruction technique.     COMPARISON:    No relevant prior studies available.     FINDINGS:    VERTEBRAE:  Unremarkable.  No acute fracture.    DISCS/SPINAL CANAL/NEURAL FORAMINA:  No acute findings.  No spinal  canal stenosis.    SOFT TISSUES:  Unremarkable.       Impression:        No acute findings in the thoracic spine.        This report was finalized on 9/26/2024 1:17 PM by Dr. Moses Otto MD.       CT Head Without Contrast [[id number]] Collected: 09/26/24 1259     Updated: 09/26/24 1302    Narrative:      CT HEAD WO CONTRAST-     CLINICAL INDICATION: weakness        COMPARISON: None available     TECHNIQUE: Axial images of the brain were obtained with out intravenous  contrast.  Reformatted images were created in the sagittal and coronal  planes.     DOSE:     Radiation dose reduction techniques were utilized per ALARA protocol.  Automated exposure control was initiated through either or [employer] computer or  [employer] software packages by  protocol.        FINDINGS:    BRAIN: Probable subacute ischemia right middle cerebral artery  territory    VENTRICLES:  Unremarkable.  No ventriculomegaly.       BONES/JOINTS:  Unremarkable.  No acute fracture.       SOFT TISSUES:  Unremarkable.       SINUSES:  no air fluid levels       MASTOID AIR CELLS:   Unremarkable as visualized.  No mastoid effusion.          Impression:         1. Probable remote right middle cerebral artery infarction  2. No acute parenchymal mass, hemorrhage, or midline shift     This report was finalized on 9/26/2024 1:00 PM by Dr. Moses Otto MD.             I have personally reviewed the above radiology results.   ---------------------------------------------------------------------------------------------------------------------      Pertinent Infectious Disease Results          Assessment & Plan      Assessment        ESBL UTI      Plan      Patient presented to [facility] Emergency Department on 9/26/2024 for evaluation after sliding into the floor due to weakness.  WBC 7.38.  Chlamydia gonorrhea and trichomonas negative.  CRP 3.80.  Urinalysis positive with culture finalizing is greater than 100,000 colonies of E. coli ESBL.  Blood cultures from 9/26/2024 show no growth thus far.  HIV 1 and 2 nonreactive.  Hepatitis panel nonreactive.  COVID-19 and influenza PCR negative.  Lactic acid normal on admission.    Patient resting in bed.  Denies dysuria, urgency, frequency.  Patient reports recurrent yeast infections.  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  Lungs clear to auscultation bilaterally.    Recommend to continue current antibiotic regimen of ertapenem 1 g IV every 24 hours x 7 days for treatment of ESBL UTI.  Okay to continue home suppressive acyclovir for history of genital herpes.  We will continue to follow closely and adjust antibiotic therapy as needed.        ANTIMICROBIAL THERAPY    acyclovir - 200 MG, 400 MG  ertapenem (INVanz) 1000 mg in 100 mL NS (VTB)       Again, thank you Dr. Marc for allowing us to participate in the care of your patient and please feel free to call for any questions you may have.        Code Status:     Code Status and Medical Interventions: CPR (Attempt to Resuscitate); Full Support   Ordered at: 09/26/24 3797     Code Status  (Patient has no pulse and is not breathing):    CPR (Attempt to Resuscitate)     Medical Interventions (Patient has pulse or is breathing):    Full Support         YUE Rivera  24  14:36 EDT    Electronically signed by Cuca Yuen APRN at 24 1532          Physical Therapy Notes (most recent note)        Мария Joya, PT at 24 1614  Version 1 of 1         Acute Care - Physical Therapy Initial Evaluation   [facility]aki     Patient Name: Lety Johnson  : 1980  MRN: [id number]  Today's Date: 2024   Onset of Illness/Injury or Date of Surgery: 24  Visit Dx:     ICD-10-CM ICD-9-CM   1. Hypokalemia  E87.6 276.8   2. Pressure injury of skin of sacral region, unspecified injury stage  L89.159 707.03     707.20   3. Pressure injury of skin of left heel, unspecified injury stage  L89.629 707.07     707.20   4. Acute cystitis without hematuria  N30.00 595.0     Patient Active Problem List   Diagnosis    UTI (urinary tract infection)     Past Medical History:   Diagnosis Date    Stroke      History reviewed. No pertinent surgical history.  PT Assessment (Last 12 Hours)       PT Evaluation and Treatment       Row Name 24 3965          Physical Therapy Time and Intention    Subjective Information complains of;weakness  -AG     Document Type evaluation  -AG     Mode of Treatment physical therapy  -AG     Patient Effort good  -AG     Symptoms Noted During/After Treatment none  -AG       Row Name 24 1018          General Information    Patient Profile Reviewed yes  -AG     Onset of Illness/Injury or Date of Surgery 24  -AG     Referring Physician Dr. Marc  -AG     Patient Observations agree to therapy;cooperative;alert  -AG     Patient/Family/Caregiver Comments/Observations pt. supine in bed; L UE hypertonicity noted; L LE is flexed, heel is bandaged; pt. states she has a pressure sore on heel.  -AG     Prior Level of Function --  pt. bedbound at home; boyfriend  provides assistance with bed mobility  -AG     Equipment Currently Used at Home wheelchair;lift device;hospital bed  -AG     Pertinent History of Current Functional Problem pt. admitted with UTI  -AG     Existing Precautions/Restrictions fall  -AG     Risks Reviewed patient:;dizziness;LOB  -AG     Benefits Reviewed patient:;increase knowledge;improve function;increase independence;increase strength  -AG     Barriers to Rehab previous functional deficit  -AG       Row Name 09/30/24 1557          Previous Level of Function/Home Environm    BADLs, Premorbid Functional Level partial assistance  -AG     Bed Mobility, Premorbid Functional Level partial assistance  -AG     Previous Level of Function, Premorbid pt nonambulatory since May (CVA)  -AG       Row Name 09/30/24 1150          Living Environment    Current Living Arrangements home  -AG     People in Home alone  -AG     Primary Care Provided by spouse/significant other  pt. reports her significant other is currently in intermediate and therefore will not be available to assist her at d/c.  -       Row Name 09/30/24 4704          Home Use of Assistive/Adaptive Equipment    Equipment Currently Used at Home hospital bed;lift device;wheelchair  -AG       Row Name 09/30/24 9237          Pain    Additional Documentation Pain Scale: FACES Pre/Post-Treatment (Group)  -       Row Name 09/30/24 5924          Pain Scale: FACES Pre/Post-Treatment    Pain: FACES Scale, Pretreatment 0-->no hurt  -AG     Posttreatment Pain Rating 0-->no hurt  -AG       [facility] Name 09/30/24 6326          Cognition    Affect/Mental Status (Cognition) [facility]     Personal Safety Interventions fall prevention program maintained;gait belt;nonskid shoes/slippers when out of bed;supervised activity  -       Row Name 09/30/24 3051          Range of Motion (ROM)    Range of Motion --  L LE minimal AROM d/t hypertonicity; L UE no functional AROM; minimal L UE PROM d/t tone, pain; R U/LE AROM [facility]  Name 09/30/24 1559          Strength (Manual Muscle Testing)    Strength (Manual Muscle Testing) --  R LE 4-/5  -AG       Row Name 09/30/24 1559          Sensory    Hearing Status L  -AG       Row Name 09/30/24 1559          Vision Assessment/Intervention    Visual Impairment/Limitations L  -AG       Row Name 09/30/24 1559          Bed Mobility    Bed Mobility rolling left;rolling right;scooting/bridging;supine-sit;sit-supine  -AG     Rolling Left Davison (Bed Mobility) verbal cues;nonverbal cues (demo/gesture);maximum assist (25% patient effort)  -AG     Rolling Right Davison (Bed Mobility) verbal cues;nonverbal cues (demo/gesture);maximum assist (25% patient effort)  -AG     Scooting/Bridging Davison (Bed Mobility) verbal cues;nonverbal cues (demo/gesture);maximum assist (25% patient effort)  -AG     Supine-Sit Davison (Bed Mobility) verbal cues;nonverbal cues (demo/gesture);maximum assist (25% patient effort)  -AG     Bed Mobility, Safety Issues decreased use of arms for pushing/pulling;decreased use of legs for bridging/pushing  -AG       Row Name 09/30/24 1556          Transfers    Comment, (Transfers) unable to safetly assess  -AG       Row Name 09/30/24 9761          Gait/Stairs (Locomotion)    Patient was able to Ambulate no, other medical factors prevent ambulation  -     Reason Patient was unable to Ambulate Non-Ambulatory at Baseline  -AG       Row Name 09/30/24 1558          Safety Issues, Functional Mobility    Impairments Affecting Function (Mobility) motor control;pain;muscle tone abnormal;range of motion (ROM);strength  -AG       Row Name 09/30/24 3329          Balance    Balance Assessment sitting static balance;sitting dynamic balance;sit to stand dynamic balance;standing static balance;standing dynamic balance  -     Static Sitting Balance independent  -AG     Position, Sitting Balance unsupported;sitting edge of bed  -[facility] Name             Wound 09/26/24 9857  gluteal    Wound - Properties Group Placement Date: 09/26/24  -KJ Placement Time: 1806  -KJ Location: gluteal  -KJ    Retired Wound - Properties Group Placement Date: 09/26/24  -KJ Placement Time: 1806  -KJ Location: gluteal  -KJ    Retired Wound - Properties Group Date first assessed: 09/26/24  -KJ Time first assessed: 1806  -KJ Location: gluteal  -KJ      Row Name             Wound 09/26/24 1807 Left gluteal    Wound - Properties Group Placement Date: 09/26/24  -KJ Placement Time: 1807  -KJ Side: Left  -KJ Location: gluteal  -KJ    Retired Wound - Properties Group Placement Date: 09/26/24  -KJ Placement Time: 1807  -KJ Side: Left  -KJ Location: gluteal  -KJ    Retired Wound - Properties Group Date first assessed: 09/26/24  -KJ Time first assessed: 1807  -KJ Side: Left  -KJ Location: gluteal  -KJ      Row Name             Wound 09/26/24 1809 gluteal    Wound - Properties Group Placement Date: 09/26/24  -KJ Placement Time: 1809  -KJ Location: gluteal  -KJ    Retired Wound - Properties Group Placement Date: 09/26/24  -KJ Placement Time: 1809  -KJ Location: gluteal  -KJ    Retired Wound - Properties Group Date first assessed: 09/26/24  -KJ Time first assessed: 1809  -KJ Location: gluteal  -KJ      Row Name             Wound 09/26/24 1809 Left posterior ankle    Wound - Properties Group Placement Date: 09/26/24  -KJ Placement Time: 1809  -KJ Side: Left  -KJ Orientation: posterior  -KJ Location: ankle  -KJ    Retired Wound - Properties Group Placement Date: 09/26/24  -KJ Placement Time: 1809  -KJ Side: Left  -KJ Orientation: posterior  -KJ Location: ankle  -KJ    Retired Wound - Properties Group Date first assessed: 09/26/24  -KJ Time first assessed: 1809  -KJ Side: Left  -KJ Location: ankle  -KJ      Row Name             Wound 09/29/24 0952 Bilateral anterior thigh MASD (Moisture associated skin damage)    Wound - Properties Group Placement Date: 09/29/24  -LB Placement Time: 0952  -LB Present on Original Admission: Y   "-LB Side: Bilateral  -LB Orientation: anterior  -LB Location: thigh  -LB Primary Wound Type: MASD  -LB    Retired Wound - Properties Group Placement Date: 09/29/24  -LB Placement Time: 0952 -LB Present on Original Admission: Y  -LB Side: Bilateral  -LB Orientation: anterior  -LB Location: thigh  -LB Primary Wound Type: MASD  -LB    Retired Wound - Properties Group Date first assessed: 09/29/24  -LB Time first assessed: 0952 -LB Present on Original Admission: Y  -LB Side: Bilateral  -LB Location: thigh  -LB Primary Wound Type: MASD  -LB      Row Name 09/30/24 1559          Coping    Observed Emotional State calm;cooperative  -AG     Verbalized Emotional State acceptance  -AG     Trust Relationship/Rapport care explained;choices provided;thoughts/feelings acknowledged  -AG     Family/Support Persons significant other  -AG     Involvement in Care not present at bedside  -AG     Family/Support System Care support provided;self-care encouraged  -AG       Row Name 09/30/24 1553          Plan of Care Review    Plan of Care Reviewed With patient  -AG     Outcome Evaluation PT evaluation complete.  Pt. 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  Pt. requires max A for bed mobility including supine>sit; able to remain sitting unsupported for prolonged time.  PT will continue to follow and focus on improving bed mobility skills; transfer training if applicable.  -AG       Row Name 09/30/24 1559          Positioning and Restraints    Pre-Treatment Position in bed  -AG     Post Treatment Position bed  -AG     In Bed sitting EOB;call light within reach;encouraged to call for assist;notified nsg  -       Row Name 09/30/24 155          Therapy Assessment/Plan (PT)    Patient/Family Therapy Goals Statement (PT) pt. states she plans to d/c to a \"rehab unit\" until she can go home alone or until significan other is released from retirement  -AG     Functional Level at Time of Evaluation (PT) max A  -AG     PT Diagnosis (PT) impaired " functional mobility  -AG     Rehab Potential (PT) fair, will monitor progress closely  -AG     Criteria for Skilled Interventions Met (PT) yes;meets criteria  -AG     Therapy Frequency (PT) 2 times/wk  1-5 times/ week per priority  -AG     Predicted Duration of Therapy Intervention (PT) LOS  -AG     Problem List (PT) problems related to;balance;mobility;hemiparesis/hemiplegia;coordination;strength;range of motion (ROM);muscle tone;motor control  -AG     Activity Limitations Related to Problem List (PT) unable to ambulate safely;unable to transfer safely;BADLs not performed adequately or safely  -AG       Row Name 09/30/24 2166          Therapy Plan Review/Discharge Plan (PT)    Therapy Plan Review (PT) evaluation/treatment results reviewed;care plan/treatment goals reviewed;risks/benefits reviewed;current/potential barriers reviewed;participants voiced agreement with care plan;participants included;patient  -AG       Row Name 09/30/24 8821          Physical Therapy Goals    Bed Mobility Goal Selection (PT) bed mobility, PT goal 1  -AG     Transfer Goal Selection (PT) transfer, PT goal 1  -AG       Row Name 09/30/24 9469          Bed Mobility Goal 1 (PT)    Activity/Assistive Device (Bed Mobility Goal 1, PT) rolling to left;rolling to right;scooting;sit to supine;supine to sit  -AG     Deep Water Level/Cues Needed (Bed Mobility Goal 1, PT) moderate assist (50-74% patient effort)  -AG     Time Frame (Bed Mobility Goal 1, PT) by discharge  -AG       Row Name 09/30/24 3968          Transfer Goal 1 (PT)    Activity/Assistive Device (Transfer Goal 1, PT) sit-to-stand/stand-to-sit;bed-to-chair/chair-to-bed;toilet  -AG     Deep Water Level/Cues Needed (Transfer Goal 1, PT) moderate assist (50-74% patient effort)  -AG     Time Frame (Transfer Goal 1, PT) by discharge  -AG               User Key  (r) = Recorded By, (t) = Taken By, (c) = Cosigned By      Initials Name Provider Type    Мария Russo, PT Physical  Therapist    Nory Kenean, RN Registered Nurse    Zehra Rivera, RN Registered Nurse                    Physical Therapy Education       Title: PT OT SLP Therapies (Done)       Topic: Physical Therapy (Done)       Point: Mobility training (Done)       Learning Progress Summary             Patient Acceptance, E,D, VU,NR by  at 9/30/2024 1559                         Point: Home exercise program (Done)       Learning Progress Summary             Patient Acceptance, E,D, VU,NR by  at 9/30/2024 1559                         Point: Body mechanics (Done)       Learning Progress Summary             Patient Acceptance, E,D, VU,NR by  at 9/30/2024 1559                         Point: Precautions (Done)       Learning Progress Summary             Patient Acceptance, E,D, VU,NR by  at 9/30/2024 1559                                         User Key       Initials Effective Dates Name Provider Type Discipline     06/16/21 -  Мария Joya, PT Physical Therapist PT                  PT Recommendation and Plan  Anticipated Discharge Disposition (PT): skilled nursing facility  Planned Therapy Interventions (PT): balance training, bed mobility training, gait training, home exercise program, transfer training, strengthening, ROM (range of motion), patient/family education, neuromuscular re-education  Therapy Frequency (PT): 2 times/wk (1-5 times/ week per priority)  Plan of Care Reviewed With: patient  Outcome Evaluation: PT evaluation complete.  Pt. 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  Pt. requires max A for bed mobility including supine>sit; able to remain sitting unsupported for prolonged time.  PT will continue to follow and focus on improving bed mobility skills; transfer training if applicable.       Time Calculation:    PT Charges       Row Name 09/30/24 1558             Time Calculation    PT Received On 09/30/24  -      PT Goal Re-Cert Due Date 10/14/24  -                User Key  (r) = Recorded By,  "(t) = Taken By, (c) = Cosigned By      Initials Name Provider Type     Мария Joya, PT Physical Therapist                  Therapy Charges for Today       Code Description Service Date Service Provider Modifiers Qty    66660569052 HC PT EVAL MOD COMPLEXITY 4 2024 Мария Joya, PT GP 1            PT G-Codes  AM-PAC 6 Clicks Score (PT): 6    Мария Joya PT  2024      Electronically signed by Мария Joya, PT at 24 1615          Occupational Therapy Notes (most recent note)        Samantha Ragland OT at 24 1455             24 1455   OTHER   Discipline occupational therapist   Rehab Time/Intention   Session Not Performed patient unavailable for evaluation  (attempted multiple times, patient off floor for testing in pm)         Electronically signed by Samantha Ragland OT at 24 1455          Speech Language Pathology Notes (most recent note)        Hafsa Melendez MA,CCC-SLP at 24 1101          Acute Care - Speech Language Pathology   Swallow Initial Evaluation  [facility]  Clinical Dysphagia Assessment     Patient Name: Lety Johnson  : 1980  MRN: [id number]  Today's Date: 2024             Admit Date: 2024    Visit Dx:     ICD-10-CM ICD-9-CM   1. Hypokalemia  E87.6 276.8   2. Pressure injury of skin of sacral region, unspecified injury stage  L89.159 707.03     707.20   3. Pressure injury of skin of left heel, unspecified injury stage  L89.629 707.07     707.20   4. Acute cystitis without hematuria  N30.00 595.0     Patient Active Problem List   Diagnosis    UTI (urinary tract infection)     Past Medical History:   Diagnosis Date    Stroke      History reviewed. No pertinent surgical history.    Lety Johnson  was seen at bedside this AM on 3S Rm. 3315-1s to assess safety/efficacy of swallowing fnx, determine safest/least restrictive diet tolerance.      Per report: pt \"is a 43 year old female patient who presents to the ER with chief complaint of " "back pain. 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 The patient had been living with her fiance who is in snf now. Her sister has been trying to help care for her. Today, she was in the floor and unable to get up so they called EMS for lift assist. Patient's sister and patient want the patient to go into a rehab facility for strengthening. She also complains of low back pain.\"    Social History     Socioeconomic History    Marital status: Single   Tobacco Use    Smoking status: Every Day     Average packs/day: 2.0 packs/day for 26.0 years (52.0 ttl pk-yrs)     Types: Cigarettes     Start date: 1998    Smokeless tobacco: Never   Vaping Use    Vaping status: Never Used   Substance and Sexual Activity    Alcohol use: Not Currently    Drug use: Never    Sexual activity: Not Currently     Partners: Male     Comment:  in alf        Imaging:  No recent chest imaging    Labs:  Sodium  142  09/30 0117  Potassium  3.5  09/30 0117  Chloride  110  09/30 0117  CO2  22.8  09/30 0117  BUN  5  09/30 0117  Creatinine  0.46  09/30 0117  Glucose  196  09/30 0117  Hemoglobin  10.5  09/30 0117  Hematocrit  32.7  09/30 0117  WBC  8.49  09/30 0117  Platelets  285  09/30 0117  Diet Orders (active) (From admission, onward)       Start     Ordered    09/28/24 1800  Dietary Nutrition Supplements Boost Plus (Ensure Enlive, Ensure Plus)  Daily With Breakfast & Dinner       09/28/24 1209    09/26/24 1748  Diet: Regular/House; Fluid Consistency: Thin (IDDSI 0)  Diet Effective Now         09/26/24 1747                  Pt is observed on RA.     Patient was positioned upright and centered in bed to accept multiple po presentations of ice chips, solid cracker, puree, and thin liquids via spoon, cup, and straw. Patient was able to self provide po trials.     Facial/oral structures were slightly asymmetrical upon observation. Pt with residual L side weakness from previous CVA. Lingual protrusion " revealed no deviation. Oral mucosa were moist, pink, and clean. Secretions were clear, thin, and well controlled. OROM/ENDER was wfl to imitate oral postures. Gag is not assessed. Volitional cough was intact w/ adequate  intensity, clear in quality, non-productive. Voice was adequate in intensity, clear in quality w/ intelligible speech.    Upon po presentations, adequate bolus anticipation and acceptance w/ good labial seal for bolus clearance via spoon bowl, cup rim stability and suction via straw. Bolus formation, manipulation and control were wfl w/ rotary mastication pattern. A-p transit was timely w/o significant oral residue appreciated. No overt s/s aspiration before the swallow.      Pharyngeal swallow was timely w/ adequate hyolaryngeal elevation per palpation. No overt s/s aspiration evidenced across this evaluation. No silent aspiration suspected. Patient denied odynophagia.    Pt reports difficulty swallowing large pills only.     Impression: Patient presented w/ wfl oropharyngeal swallow w/o s/s aspiration. No s/s indicative of silent aspiration. No odynophagia reported.      SLP Recommendation and Plan   1. Regular consistencies, thin liquids.    2. Medications whole in puree/thins. Crush PRN.  3. Upright and centered for all po intake.  4. MO precautions.  5. Oral care protocol.    No further formal SLP f/u warranted/recommended at this time.    D/w patient results and recommendations w/ verbal agreement.    D/w RN results and recommendations w/ verbal agreement.    Thank you for allowing me to participate in the care of your patient-  Hafsa Melendez M.A., CCC-SLP     EDUCATION  The patient has been educated in the following areas:   Oral Care/Hydration.      Time Calculation:     Therapy Charges for Today       Code Description Service Date Service Provider Modifiers Qty    45290200410  ST EVAL ORAL PHARYNG SWALLOW 4 9/30/2024 Hafsa Melendez MA,CCC-SLP GN 1          Hafsa Melendez  MA,CCC-SLP  9/30/2024    Electronically signed by Hafsa Melendez MA,CCC-SLP at 09/30/24 1107       ADL Documentation (most recent)      Flowsheet Row Most Recent Value   Transferring 4 - completely dependent   Toileting 4 - completely dependent   Bathing 4 - completely dependent   Dressing 4 - completely dependent   Eating 2 - assistive person   Communication 0 - understands/communicates without difficulty   Swallowing 0 - swallows foods/liquids without difficulty   Equipment Currently Used at Home hospital bed, lift device, wheelchair

## 2024-09-30 NOTE — PROGRESS NOTES
[facility] HOSPITALIST PROGRESS NOTE     Patient Identification:  Name:  Lety Johnson  Age:  43 y.o.  Sex:  female  :  1980  MRN:  [id number]  Visit Number:  [id number]  Primary Care Provider:  Provider, No Known    Length of stay:  2    Chief complaint: Left lower extremity pain    Subjective:    Patient seen and examined at bedside with no nursing staff present.  Patient states her left lower extremity is painful and has intermittent cramping.  She also reports left-sided shoulder and neck pain.  Patient did appear tearful when describing the symptoms stating that she wished to have stronger pain medication but was specific and saying she did not want to use hydrocodone or oxycodone.  Otherwise, patient denies any other symptoms at this time.  Patient is concerned about returning home as her boyfriend used to take care of her and is now no longer able to as he is incarcerated.  ----------------------------------------------------------------------------------------------------------------------  Current Hospital Meds:  acyclovir, 400 mg, Oral, Nightly  aspirin, 81 mg, Oral, Daily  atorvastatin, 80 mg, Oral, Daily  DULoxetine, 60 mg, Oral, Daily  ertapenem, 1,000 mg, Intravenous, Q24H  heparin (porcine), 5,000 Units, Subcutaneous, Q8H  lisinopril, 20 mg, Oral, Daily  [Held by provider] metFORMIN, 1,000 mg, Oral, BID With Meals  mupirocin, 1 application , Each Nare, BID  nicotine, 1 patch, Transdermal, Q24H  nystatin, , Topical, Q12H  pantoprazole, 40 mg, Oral, Daily  sodium chloride, 10 mL, Intravenous, Q12H  sodium chloride, 10 mL, Intravenous, Q12H      Pharmacy Consult,   sodium chloride, 75 mL/hr, Last Rate: 75 mL/hr (24 1022)      ----------------------------------------------------------------------------------------------------------------------  Vital Signs:  Temp:  [97.7 °F (36.5 °C)-98.4 °F (36.9 °C)] 98.4 °F (36.9 °C)  Heart Rate:  [100-105] 101  Resp:  [18-20] 18  BP:  (138-169)/(84-92) 162/92      09/28/24  0500 09/29/24  0526 09/30/24  0500   Weight: 113 kg (248 lb 14.4 oz) 111 kg (244 lb 12.8 oz) 110 kg (243 lb 9.6 oz)     Body mass index is 39.92 kg/m².    Intake/Output Summary (Last 24 hours) at 9/30/2024 1832  Last data filed at 9/30/2024 1300  Gross per 24 hour   Intake 840 ml   Output 1900 ml   Net -1060 ml     Diet: Regular/House; Fluid Consistency: Thin (IDDSI 0)  ----------------------------------------------------------------------------------------------------------------------  Physical exam:  Constitutional: Chronically ill-appearing  female in mild distress.     HENT:  Head:  Normocephalic and atraumatic.  Mouth:  Moist mucous membranes.    Eyes:  Conjunctivae and EOM are normal.  Pupils are equal, round, and reactive to light.  No scleral icterus.    Neck:  Neck supple. No thyromegaly.  No JVD present.    Cardiovascular:  Regular rate and rhythm with no murmurs, rubs, clicks or gallops appreciated.  Pulmonary/Chest:  Clear to auscultation bilaterally with no crackles, wheezes or rhonchi appreciated.  Abdominal:  Soft. Nontender. Nondistended  Bowel sounds are normal in all four quadrants. No organomegally appreciated.   Musculoskeletal:  No edema, no tenderness, and no deformity.  No red or swollen joints anywhere.    Neurological:  Alert, Cranial nerves II-XII intact with no focal deficits.  No facial droop.  No slurred speech.   Skin:  Warm and dry to palpation with no rashes or lesions appreciated.  Peripheral vascular:  2+ radial and pedal pulses in bilateral upper and lower extremities.  Psychiatric:  Alert and oriented x3, demonstrates appropriate judgment and insight.  -------------------------------------------------------------------------  ----------------------------------------------------------------------------------------------------------------------      Results from last 7 days   Lab Units 09/30/24  0117 09/29/24  0036 09/28/24  0318  "09/27/24  0844 09/26/24  1504 09/26/24  1350   CRP mg/dL  --   --   --   --  3.80*  --    LACTATE mmol/L  --   --   --   --   --  1.1   WBC 10*3/mm3 8.49 7.24 7.38   < >  --  10.42   HEMOGLOBIN g/dL 10.5* 10.7* 11.8*   < >  --  12.5   HEMATOCRIT % 32.7* 33.6* 36.9   < >  --  37.4   MCV fL 99.1* 100.6* 98.9*   < >  --  94.0   MCHC g/dL 32.1 31.8 32.0   < >  --  33.4   PLATELETS 10*3/mm3 285 318 332   < >  --  402    < > = values in this interval not displayed.         Results from last 7 days   Lab Units 09/30/24  0117 09/29/24  0036 09/28/24  0318 09/27/24  0844 09/26/24  1504   SODIUM mmol/L 142 140 142   < > 140   POTASSIUM mmol/L 3.5 3.9 4.1   < > 2.7*   MAGNESIUM mg/dL  --   --   --   --  1.5*   CHLORIDE mmol/L 110* 109* 110*   < > 99   CO2 mmol/L 22.8 21.0* 22.3   < > 28.9   BUN mg/dL 5* 4* 5*   < > 10   CREATININE mg/dL 0.46* 0.40* 0.43*   < > 0.51*   CALCIUM mg/dL 8.0* 8.2* 8.5*   < > 9.4   GLUCOSE mg/dL 196* 192* 198*   < > 220*   ALBUMIN g/dL 2.5* 2.5* 2.7*   < > 3.5   BILIRUBIN mg/dL 0.2 0.2 0.3   < > 0.6   ALK PHOS U/L 79 88 100   < > 119*   AST (SGOT) U/L 11 15 15   < > 21   ALT (SGPT) U/L 7 8 9   < > 8    < > = values in this interval not displayed.   Estimated Creatinine Clearance: 198.4 mL/min (A) (by C-G formula based on SCr of 0.46 mg/dL (L)).    No results found for: \"AMMONIA\"      Blood Culture   Date Value Ref Range Status   09/26/2024 No growth at 4 days  Preliminary   09/26/2024 No growth at 4 days  Preliminary     Urine Culture   Date Value Ref Range Status   09/26/2024 >100,000 CFU/mL Escherichia coli ESBL (A)  Final     No results found for: \"WOUNDCX\"  No results found for: \"STOOLCX\"    I have personally looked at the labs and they are summarized above.  ----------------------------------------------------------------------------------------------------------------------  Imaging Results (Last 24 Hours)       ** No results found for the last 24 hours. **      "     ----------------------------------------------------------------------------------------------------------------------  Assessment and Plan:    ESBL acute cystitis -will continue Invanz 1 g IV every 24 hours while hospitalized and will de-escalate antibiotic therapy to yes Macrobid 100 mg p.o. twice daily to complete a full 7-day course of antibiotic therapy per infectious disease recommendations.    2.  Acute metabolic encephalopathy -resolved, initially secondary to acute UTI    3.  History of CVA -patient with left-sided hemiparesis secondary to history of CVA    4.  Debility -continue PT/OT, unfortunately patient does not have a monthly M, and currently unable to find any rehab facility that will accept the patient.    5.  History of genital herpes -continue acyclovir    6.  Morbid obesity -complicates all aspects of care    Disposition likely discharge in the next 24 to 48 hours    Marv Navas,    09/30/24   18:32 EDT

## 2024-09-30 NOTE — CONSULTS
"Assessment:  Diagnosis: UTI    No Known Allergies    Order Date/Time: 9/29/2024 0951  Indications: difficult access; iv abx  LABS:  No results found for: \"INR\", \"PROTIME\"  No results found for: \"PTT\"  Lab Results   Component Value Date    WBC 8.49 09/30/2024    HGB 10.5 (L) 09/30/2024    HCT 32.7 (L) 09/30/2024    MCV 99.1 (H) 09/30/2024     09/30/2024     Lab Results   Component Value Date    BUN 5 (L) 09/30/2024     Lab Results   Component Value Date    CREATININE 0.46 (L) 09/30/2024     No results found for: \"EGFRIFNONA\", \"EGFRIFAFRI\"  Labs Reviewed: all labs reviewed    Contraindications for PICC/Midline:  No sticks/BPs to Left arm    No other contraindications noted    Recommendations:  PowerGlide Pro Midline Catheter; 18 g; 10 cm  Upper Right Basilic    Procedure Time Out:  Time out Time: 0945  Correct Patient Identity: Yes  Correct Surgical Side and Site Are Marked: Yes  Agreement on Procedure to be done: Yes  Antibiotic Given: N/A  RN: DIGNA Patton RN    Introcan Safety IV Catheter placed with ultrasound guidance and verified by blood return. Minimal blood loss noted. Catheter flushes easily. Blood return noted. Patient nurse, Randi CALIX, made aware that catheter is in upper R arm and ready for use.      Radha Patton RN    "

## 2024-09-30 NOTE — THERAPY EVALUATION
"Acute Care - Speech Language Pathology   Swallow Initial Evaluation [facility]  Clinical Dysphagia Assessment     Patient Name: Lety Johnson  : 1980  MRN: [id number]  Today's Date: 2024             Admit Date: 2024    Visit Dx:     ICD-10-CM ICD-9-CM   1. Hypokalemia  E87.6 276.8   2. Pressure injury of skin of sacral region, unspecified injury stage  L89.159 707.03     707.20   3. Pressure injury of skin of left heel, unspecified injury stage  L89.629 707.07     707.20   4. Acute cystitis without hematuria  N30.00 595.0     Patient Active Problem List   Diagnosis    UTI (urinary tract infection)     Past Medical History:   Diagnosis Date    Stroke      History reviewed. No pertinent surgical history.    Lety Johnson  was seen at bedside this AM on 3S Rm. [id number] to assess safety/efficacy of swallowing fnx, determine safest/least restrictive diet tolerance.      Per report: pt \"is a 43 year old female patient who presents to the ER with chief complaint of back pain. 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 The patient had been living with her fiance who is in detention now. Her sister has been trying to help care for her. Today, she was in the floor and unable to get up so they called EMS for lift assist. Patient's sister and patient want the patient to go into a rehab facility for strengthening. She also complains of low back pain.\"    Social History     Socioeconomic History    Marital status: Single   Tobacco Use    Smoking status: Every Day     Average packs/day: 2.0 packs/day for 26.0 years (52.0 ttl pk-yrs)     Types: Cigarettes     Start date:     Smokeless tobacco: Never   Vaping Use    Vaping status: Never Used   Substance and Sexual Activity    Alcohol use: Not Currently    Drug use: Never    Sexual activity: Not Currently     Partners: Male     Comment:  in custodial        Imaging:  No recent chest imaging    Labs:  Sodium  142   " 0117  Potassium  3.5  09/30 0117  Chloride  110  09/30 0117  CO2  22.8  09/30 0117  BUN  5  09/30 0117  Creatinine  0.46  09/30 0117  Glucose  196  09/30 0117  Hemoglobin  10.5  09/30 0117  Hematocrit  32.7  09/30 0117  WBC  8.49  09/30 0117  Platelets  285  09/30 0117  Diet Orders (active) (From admission, onward)       Start     Ordered    09/28/24 1800  Dietary Nutrition Supplements Boost Plus (Ensure Enlive, Ensure Plus)  Daily With Breakfast & Dinner       09/28/24 1209    09/26/24 1748  Diet: Regular/House; Fluid Consistency: Thin (IDDSI 0)  Diet Effective Now         09/26/24 1747                  Pt is observed on RA.     Patient was positioned upright and centered in bed to accept multiple po presentations of ice chips, solid cracker, puree, and thin liquids via spoon, cup, and straw. Patient was able to self provide po trials.     Facial/oral structures were slightly asymmetrical upon observation. Pt with residual L side weakness from previous CVA. Lingual protrusion revealed no deviation. Oral mucosa were moist, pink, and clean. Secretions were clear, thin, and well controlled. OROM/ENDER was wfl to imitate oral postures. Gag is not assessed. Volitional cough was intact w/ adequate  intensity, clear in quality, non-productive. Voice was adequate in intensity, clear in quality w/ intelligible speech.    Upon po presentations, adequate bolus anticipation and acceptance w/ good labial seal for bolus clearance via spoon bowl, cup rim stability and suction via straw. Bolus formation, manipulation and control were wfl w/ rotary mastication pattern. A-p transit was timely w/o significant oral residue appreciated. No overt s/s aspiration before the swallow.      Pharyngeal swallow was timely w/ adequate hyolaryngeal elevation per palpation. No overt s/s aspiration evidenced across this evaluation. No silent aspiration suspected. Patient denied odynophagia.    Pt reports difficulty swallowing large pills only.      Impression: Patient presented w/ wfl oropharyngeal swallow w/o s/s aspiration. No s/s indicative of silent aspiration. No odynophagia reported.      SLP Recommendation and Plan   1. Regular consistencies, thin liquids.    2. Medications whole in puree/thins. Crush PRN.  3. Upright and centered for all po intake.  4. MO precautions.  5. Oral care protocol.    No further formal SLP f/u warranted/recommended at this time.    D/w patient results and recommendations w/ verbal agreement.    D/w RN results and recommendations w/ verbal agreement.    Thank you for allowing me to participate in the care of your patient-  Hafsa Melendze M.A., CCC-SLP     EDUCATION  The patient has been educated in the following areas:   Oral Care/Hydration.      Time Calculation:     Therapy Charges for Today       Code Description Service Date Service Provider Modifiers Qty    [id number]  ST EVAL ORAL PHARYNG SWALLOW 4 9/30/2024 Hafsa Melendez MA,CCC-SLP GN 1          Hafsa Melendez MA,CCC-SLP  9/30/2024

## 2024-09-30 NOTE — PROGRESS NOTES
PROGRESS NOTE         Patient Identification:  Name:  Lety Johnson  Age:  43 y.o.  Sex:  female  :  1980  MRN:  [id number]  Visit Number:  [id number]  Primary Care Provider:  Provider, No Known         LOS: 2 days       ----------------------------------------------------------------------------------------------------------------------  Subjective       Chief Complaints:    Fall        Interval History:      Patient resting in bed this morning.  Family at bedside.  Currently on room air with no apparent distress.  No issues or complaints.  Denies dysuria, urgency frequency.  Lungs clear to auscultation bilaterally.  WBC normal at 8.49.    Review of Systems:    Constitutional: no fever, chills and night sweats.  Generalized fatigue.  Eyes: no eye drainage, itching or redness.  HEENT: no mouth sores, dysphagia or nose bleed.  Respiratory: no for shortness of breath, cough or production of sputum.  Cardiovascular: no chest pain, no palpitations, no orthopnea.  Gastrointestinal: no nausea, vomiting or diarrhea. No abdominal pain, hematemesis or rectal bleeding.  Genitourinary: no dysuria or polyuria.  Hematologic/lymphatic: no lymph node abnormalities, no easy bruising or easy bleeding.  Musculoskeletal: no muscle or joint pain.  Skin: No rash and no itching.  Neurological: no loss of consciousness, no seizure, no headache.  Behavioral/Psych: no depression or suicidal ideation.  Endocrine: no hot flashes.  Immunologic: negative.    ----------------------------------------------------------------------------------------------------------------------      Objective       Current Hospital Meds:  acyclovir, 400 mg, Oral, Nightly  aspirin, 81 mg, Oral, Daily  atorvastatin, 80 mg, Oral, Daily  DULoxetine, 60 mg, Oral, Daily  ertapenem, 1,000 mg, Intravenous, Q24H  heparin (porcine), 5,000 Units, Subcutaneous, Q8H  lisinopril, 20 mg, Oral, Daily  [Held by provider] metFORMIN, 1,000 mg, Oral, BID With  Meals  mupirocin, 1 application , Each Nare, BID  nicotine, 1 patch, Transdermal, Q24H  nystatin, , Topical, Q12H  pantoprazole, 40 mg, Oral, Daily  sodium chloride, 10 mL, Intravenous, Q12H  sodium chloride, 10 mL, Intravenous, Q12H      Pharmacy Consult,   sodium chloride, 75 mL/hr, Last Rate: 75 mL/hr (09/30/24 1022)      ----------------------------------------------------------------------------------------------------------------------    Vital Signs:  Temp:  [97.7 °F (36.5 °C)-98.1 °F (36.7 °C)] 98.1 °F (36.7 °C)  Heart Rate:  [100-105] 102  Resp:  [20] 20  BP: (138-169)/(84-89) 162/89  Mean Arterial Pressure (Non-Invasive) for the past 24 hrs (Last 3 readings):   Noninvasive MAP (mmHg)   09/30/24 0452 105   09/29/24 2024 102   09/29/24 1453 101     SpO2 Percentage    09/30/24 0452 09/30/24 0700 09/30/24 1100   SpO2: 92% 95% 97%     SpO2:  [92 %-97 %] 97 %  on   ;   Device (Oxygen Therapy): room air    Body mass index is 39.92 kg/m².  Wt Readings from Last 3 Encounters:   09/30/24 110 kg (243 lb 9.6 oz)   06/27/24 120 kg (264 lb)        Intake/Output Summary (Last 24 hours) at 9/30/2024 1129  Last data filed at 9/30/2024 0400  Gross per 24 hour   Intake 480 ml   Output 300 ml   Net 180 ml     Diet: Regular/House; Fluid Consistency: Thin (IDDSI 0)  ----------------------------------------------------------------------------------------------------------------------      Physical Exam:    Constitutional:  Well-developed and well-nourished.  No respiratory distress.  On room air.  Family at bedside.  HENT:  Head: Normocephalic and atraumatic.  Mouth:  Moist mucous membranes.    Eyes:  Conjunctivae and EOM are normal.  No scleral icterus.  Neck:  Neck supple.  No JVD present.    Cardiovascular:  Normal rate, regular rhythm and normal heart sounds with no murmur. No edema.  Pulmonary/Chest:  No respiratory distress, no wheezes, no crackles, with normal breath sounds and good air movement.  Abdominal:  Soft.  Bowel  "sounds are normal.  No distension and no tenderness.   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  Neurological:  Alert and oriented to person, place, and time.  No facial droop.  No slurred speech.   Skin:  Skin is warm and dry.  No rash noted.  No pallor.   Psychiatric:  Normal mood and affect.  Behavior is normal.        ----------------------------------------------------------------------------------------------------------------------            Results from last 7 days   Lab Units 09/30/24 0117 09/29/24 0036 09/28/24 0318 09/27/24  0844 09/26/24  1504 09/26/24  1350   CRP mg/dL  --   --   --   --  3.80*  --    LACTATE mmol/L  --   --   --   --   --  1.1   WBC 10*3/mm3 8.49 7.24 7.38   < >  --  10.42   HEMOGLOBIN g/dL 10.5* 10.7* 11.8*   < >  --  12.5   HEMATOCRIT % 32.7* 33.6* 36.9   < >  --  37.4   MCV fL 99.1* 100.6* 98.9*   < >  --  94.0   MCHC g/dL 32.1 31.8 32.0   < >  --  33.4   PLATELETS 10*3/mm3 285 318 332   < >  --  402    < > = values in this interval not displayed.     Results from last 7 days   Lab Units 09/30/24 0117 09/29/24 0036 09/28/24 0318 09/27/24  0844 09/26/24  1504   SODIUM mmol/L 142 140 142   < > 140   POTASSIUM mmol/L 3.5 3.9 4.1   < > 2.7*   MAGNESIUM mg/dL  --   --   --   --  1.5*   CHLORIDE mmol/L 110* 109* 110*   < > 99   CO2 mmol/L 22.8 21.0* 22.3   < > 28.9   BUN mg/dL 5* 4* 5*   < > 10   CREATININE mg/dL 0.46* 0.40* 0.43*   < > 0.51*   CALCIUM mg/dL 8.0* 8.2* 8.5*   < > 9.4   GLUCOSE mg/dL 196* 192* 198*   < > 220*   ALBUMIN g/dL 2.5* 2.5* 2.7*   < > 3.5   BILIRUBIN mg/dL 0.2 0.2 0.3   < > 0.6   ALK PHOS U/L 79 88 100   < > 119*   AST (SGOT) U/L 11 15 15   < > 21   ALT (SGPT) U/L 7 8 9   < > 8    < > = values in this interval not displayed.   Estimated Creatinine Clearance: 198.4 mL/min (A) (by C-G formula based on SCr of 0.46 mg/dL (L)).  No results found for: \"AMMONIA\"    No results found for: \"HGBA1C\", \"POCGLU\"  No results found for: \"HGBA1C\"  No results " "found for: \"TSH\", \"FREET4\"    Blood Culture   Date Value Ref Range Status   09/26/2024 No growth at 2 days  Preliminary   09/26/2024 No growth at 2 days  Preliminary     Urine Culture   Date Value Ref Range Status   09/26/2024 >100,000 CFU/mL Escherichia coli ESBL (A)  Final     No results found for: \"WOUNDCX\"  No results found for: \"STOOLCX\"  No results found for: \"RESPCX\"  Pain Management Panel           No data to display                  ----------------------------------------------------------------------------------------------------------------------  Imaging Results (Last 24 Hours)       ** No results found for the last 24 hours. **            ----------------------------------------------------------------------------------------------------------------------    Pertinent Infectious Disease Results                Assessment/Plan       Assessment       ESBL UTI       Plan        Patient resting in bed this morning.  Family at bedside.  Currently on room air with no apparent distress.  No issues or complaints.  Denies dysuria, urgency frequency.  Lungs clear to auscultation bilaterally.  WBC normal at 8.49.    Recommend to continue current antibiotic regimen of ertapenem 1 g IV every 24 hours x 7 days for treatment of ESBL UTI.  Upon discharge patient can be further de-escalated to Macrobid 100 mg p.o. twice daily to complete antibiotic course. Okay to continue home suppressive acyclovir for history of genital herpes. We will continue to follow closely and adjust antibiotic therapy as needed.     ANTIMICROBIAL THERAPY    acyclovir - 200 MG, 400 MG  ertapenem (INVanz) 1000 mg in 100 mL NS (VTB)     Code Status:   Code Status and Medical Interventions: CPR (Attempt to Resuscitate); Full Support   Ordered at: 09/26/24 1626     Code Status (Patient has no pulse and is not breathing):    CPR (Attempt to Resuscitate)     Medical Interventions (Patient has pulse or is breathing):    Full Support       Cuca" YUE Yuen  09/30/24  11:29 EDT

## 2024-09-30 NOTE — PLAN OF CARE
Goal Outcome Evaluation:  Pt resting in bed with no s/s of distress noted. VSS. IV access maintained. Pt frequently seeks staff. Call light and belongings in reach. Plan of care ongoing.

## 2024-09-30 NOTE — THERAPY EVALUATION
Acute Care - Physical Therapy Initial Evaluation   [facility]     Patient Name: Lety Johnson  : 1980  MRN: [id number]  Today's Date: 2024   Onset of Illness/Injury or Date of Surgery: 24  Visit Dx:     ICD-10-CM ICD-9-CM   1. Hypokalemia  E87.6 276.8   2. Pressure injury of skin of sacral region, unspecified injury stage  L89.159 707.03     707.20   3. Pressure injury of skin of left heel, unspecified injury stage  L89.629 707.07     707.20   4. Acute cystitis without hematuria  N30.00 595.0     Patient Active Problem List   Diagnosis    UTI (urinary tract infection)     Past Medical History:   Diagnosis Date    Stroke      History reviewed. No pertinent surgical history.  PT Assessment (Last 12 Hours)       PT Evaluation and Treatment       Row Name 24 1559          Physical Therapy Time and Intention    Subjective Information complains of;weakness  -AG     Document Type evaluation  -AG     Mode of Treatment physical therapy  -AG     Patient Effort good  -AG     Symptoms Noted During/After Treatment none  -AG       Row Name 24 1559          General Information    Patient Profile Reviewed yes  -AG     Onset of Illness/Injury or Date of Surgery 24  -AG     Referring Physician Dr. Marc  -     Patient Observations agree to therapy;cooperative;alert  -AG     Patient/Family/Caregiver Comments/Observations pt. supine in bed; L UE hypertonicity noted; L LE is flexed, heel is bandaged; pt. states she has a pressure sore on heel.  -AG     Prior Level of Function --  pt. bedbound at home; boyfriend provides assistance with bed mobility  -AG     Equipment Currently Used at Home wheelchair;lift device;hospital bed  -AG     Pertinent History of Current Functional Problem pt. admitted with UTI  -AG     Existing Precautions/Restrictions fall  -AG     Risks Reviewed patient:;dizziness;LOB  -AG     Benefits Reviewed patient:;increase knowledge;improve function;increase independence;increase  strength  -     Barriers to Rehab previous functional deficit  -[facility] Name 09/30/24 1555          Previous Level of Function/Home Environm    BADLs, Premorbid Functional Level partial assistance  -     Bed Mobility, Premorbid Functional Level partial assistance  -AG     Previous Level of Function, Premorbid pt nonambulatory since May (CVA)  -[facility] Name 09/30/24 5000          Living Environment    Current Living Arrangements home  -     People in Home alone  -     Primary Care Provided by spouse/significant other  pt. reports her significant other is currently in FDC and therefore will not be available to assist her at d/c.  -[facility] Name 09/30/24 3719          Home Use of Assistive/Adaptive Equipment    Equipment Currently Used at Home hospital bed;lift device;wheelchair  -[facility] Name 09/30/24 8558          Pain    Additional Documentation Pain Scale: FACES Pre/Post-Treatment (Group)  -AG       Row Name 09/30/24 8186          Pain Scale: FACES Pre/Post-Treatment    Pain: FACES Scale, Pretreatment 0-->no hurt  -     Posttreatment Pain Rating 0-->no hurt  -AG       Row Name 09/30/24 5409          Cognition    Affect/Mental Status (Cognition) [facility]     Personal Safety Interventions fall prevention program maintained;gait belt;nonskid shoes/slippers when out of bed;supervised activity  -[facility] Name 09/30/24 5536          Range of Motion (ROM)    Range of Motion --  L LE minimal AROM d/t hypertonicity; L UE no functional AROM; minimal L UE PROM d/t tone, pain; R U/LE AROM [facility] Name 09/30/24 6997          Strength (Manual Muscle Testing)    Strength (Manual Muscle Testing) --  R LE 4-/5  -AG       Row Name 09/30/24 5274          Sensory    Hearing Status WFL  -AG       Row Name 09/30/24 155          Vision Assessment/Intervention    Visual Impairment/Limitations [facility] Name 09/30/24 1358          Bed Mobility    Bed Mobility rolling left;rolling  right;scooting/bridging;supine-sit;sit-supine  -AG     Rolling Left [place] (Bed Mobility) verbal cues;nonverbal cues (demo/gesture);maximum assist (25% patient effort)  -AG     Rolling Right [place] (Bed Mobility) verbal cues;nonverbal cues (demo/gesture);maximum assist (25% patient effort)  -AG     Scooting/Bridging [place] (Bed Mobility) verbal cues;nonverbal cues (demo/gesture);maximum assist (25% patient effort)  -AG     Supine-Sit [place] (Bed Mobility) verbal cues;nonverbal cues (demo/gesture);maximum assist (25% patient effort)  -AG     Bed Mobility, Safety Issues decreased use of arms for pushing/pulling;decreased use of legs for bridging/pushing  -AG       [facility] Name 09/30/24 1559          Transfers    Comment, (Transfers) unable to safetly assess  -[facility] Name 09/30/24 1559          Gait/Stairs (Locomotion)    Patient was able to Ambulate no, other medical factors prevent ambulation  -AG     Reason Patient was unable to Ambulate Non-Ambulatory at Baseline  -AG       Row Name 09/30/24 1559          Safety Issues, Functional Mobility    Impairments Affecting Function (Mobility) motor control;pain;muscle tone abnormal;range of motion (ROM);strength  -AG       Row Name 09/30/24 0899          Balance    Balance Assessment sitting static balance;sitting dynamic balance;sit to stand dynamic balance;standing static balance;standing dynamic balance  -AG     Static Sitting Balance independent  -AG     Position, Sitting Balance unsupported;sitting edge of bed  -       Row Name             Wound 09/26/24 1806 gluteal    Wound - Properties Group Placement Date: 09/26/24 -KJ Placement Time: 1806 -KJ Location: gluteal  -KJ    Retired Wound - Properties Group Placement Date: 09/26/24 -KJ Placement Time: 1806 -KJ Location: gluteal  -KJ    Retired Wound - Properties Group Date first assessed: 09/26/24 -KJ Time first assessed: 1806 -KJ Location: gluteal  -KJ      Row Name             Wound  09/26/24 1807 Left gluteal    Wound - Properties Group Placement Date: 09/26/24  -KJ Placement Time: 1807  -KJ Side: Left  -KJ Location: gluteal  -KJ    Retired Wound - Properties Group Placement Date: 09/26/24  -KJ Placement Time: 1807  -KJ Side: Left  -KJ Location: gluteal  -KJ    Retired Wound - Properties Group Date first assessed: 09/26/24  -KJ Time first assessed: 1807  -KJ Side: Left  -KJ Location: gluteal  -KJ      Row Name             Wound 09/26/24 1809 gluteal    Wound - Properties Group Placement Date: 09/26/24  -KJ Placement Time: 1809  -KJ Location: gluteal  -KJ    Retired Wound - Properties Group Placement Date: 09/26/24  -KJ Placement Time: 1809  -KJ Location: gluteal  -KJ    Retired Wound - Properties Group Date first assessed: 09/26/24  -KJ Time first assessed: 1809  -KJ Location: gluteal  -KJ      Row Name             Wound 09/26/24 1809 Left posterior ankle    Wound - Properties Group Placement Date: 09/26/24  -KJ Placement Time: 1809  -KJ Side: Left  -KJ Orientation: posterior  -KJ Location: ankle  -KJ    Retired Wound - Properties Group Placement Date: 09/26/24  -KJ Placement Time: 1809  -KJ Side: Left  -KJ Orientation: posterior  -KJ Location: ankle  -KJ    Retired Wound - Properties Group Date first assessed: 09/26/24  -KJ Time first assessed: 1809  -KJ Side: Left  -KJ Location: ankle  -KJ      Row Name             Wound 09/29/24 0952 Bilateral anterior thigh MASD (Moisture associated skin damage)    Wound - Properties Group Placement Date: 09/29/24  -LB Placement Time: 0952 -LB Present on Original Admission: Y  -LB Side: Bilateral  -LB Orientation: anterior  -LB Location: thigh  -LB Primary Wound Type: MASD  -LB    Retired Wound - Properties Group Placement Date: 09/29/24  -LB Placement Time: 0952 -LB Present on Original Admission: Y  -LB Side: Bilateral  -LB Orientation: anterior  -LB Location: thigh  -LB Primary Wound Type: MASD  -LB    Retired Wound - Properties Group Date first  "assessed: 09/29/24  -LB Time first assessed: 0952 -LB Present on Original Admission: Y  -LB Side: Bilateral  -LB Location: thigh  -LB Primary Wound Type: MASD  -LB      Row Name 09/30/24 1559          Coping    Observed Emotional State calm;cooperative  -AG     Verbalized Emotional State acceptance  -AG     Trust Relationship/Rapport care explained;choices provided;thoughts/feelings acknowledged  -AG     Family/Support Persons significant other  -AG     Involvement in Care not present at bedside  -AG     Family/Support System Care support provided;self-care encouraged  -AG       Row Name 09/30/24 1550          Plan of Care Review    Plan of Care Reviewed With patient  -AG     Outcome Evaluation PT evaluation complete.  Pt. 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  Pt. requires max A for bed mobility including supine>sit; able to remain sitting unsupported for prolonged time.  PT will continue to follow and focus on improving bed mobility skills; transfer training if applicable.  -AG       Row Name 09/30/24 1555          Positioning and Restraints    Pre-Treatment Position in bed  -AG     Post Treatment Position bed  -AG     In Bed sitting EOB;call light within reach;encouraged to call for assist;notified nsg  -AG       Row Name 09/30/24 1555          Therapy Assessment/Plan (PT)    Patient/Family Therapy Goals Statement (PT) pt. states she plans to d/c to a \"rehab unit\" until she can go home alone or until significan other is released from halfway  -AG     Functional Level at Time of Evaluation (PT) max A  -AG     PT Diagnosis (PT) impaired functional mobility  -AG     Rehab Potential (PT) fair, will monitor progress closely  -AG     Criteria for Skilled Interventions Met (PT) yes;meets criteria  -AG     Therapy Frequency (PT) 2 times/wk  1-5 times/ week per priority  -AG     Predicted Duration of Therapy Intervention (PT) LOS  -AG     Problem List (PT) problems related " to;balance;mobility;hemiparesis/hemiplegia;coordination;strength;range of motion (ROM);muscle tone;motor control  -AG     Activity Limitations Related to Problem List (PT) unable to ambulate safely;unable to transfer safely;BADLs not performed adequately or safely  -AG       Row Name 09/30/24 1559          Therapy Plan Review/Discharge Plan (PT)    Therapy Plan Review (PT) evaluation/treatment results reviewed;care plan/treatment goals reviewed;risks/benefits reviewed;current/potential barriers reviewed;participants voiced agreement with care plan;participants included;patient  -AG       Row Name 09/30/24 1559          Physical Therapy Goals    Bed Mobility Goal Selection (PT) bed mobility, PT goal 1  -AG     Transfer Goal Selection (PT) transfer, PT goal 1  -AG       Row Name 09/30/24 1559          Bed Mobility Goal 1 (PT)    Activity/Assistive Device (Bed Mobility Goal 1, PT) rolling to left;rolling to right;scooting;sit to supine;supine to sit  -AG     [place] Level/Cues Needed (Bed Mobility Goal 1, PT) moderate assist (50-74% patient effort)  -AG     Time Frame (Bed Mobility Goal 1, PT) by discharge  -AG       Row Name 09/30/24 1559          Transfer Goal 1 (PT)    Activity/Assistive Device (Transfer Goal 1, PT) sit-to-stand/stand-to-sit;bed-to-chair/chair-to-bed;toilet  -AG     [place] Level/Cues Needed (Transfer Goal 1, PT) moderate assist (50-74% patient effort)  -AG     Time Frame (Transfer Goal 1, PT) by discharge  -AG               User Key  (r) = Recorded By, (t) = Taken By, (c) = Cosigned By      Initials Name Provider Type    AG Мария Joya, PT Physical Therapist    Nory Keenan, RN Registered Nurse    Zehra Rivera, RN Registered Nurse                    Physical Therapy Education       Title: PT OT SLP Therapies (Done)       Topic: Physical Therapy (Done)       Point: Mobility training (Done)       Learning Progress Summary             Patient Acceptance, E,D, VU,NR by  at  9/30/2024 1559                         Point: Home exercise program (Done)       Learning Progress Summary             Patient Acceptance, E,D, VU,NR by  at 9/30/2024 1559                         Point: Body mechanics (Done)       Learning Progress Summary             Patient Acceptance, E,D, VU,NR by  at 9/30/2024 1559                         Point: Precautions (Done)       Learning Progress Summary             Patient Acceptance, E,D, VU,NR by  at 9/30/2024 1559                                         User Key       Initials Effective Dates Name Provider Type Discipline     06/16/21 -  Мария Joya, PT Physical Therapist PT                  PT Recommendation and Plan  Anticipated Discharge Disposition (PT): skilled nursing facility  Planned Therapy Interventions (PT): balance training, bed mobility training, gait training, home exercise program, transfer training, strengthening, ROM (range of motion), patient/family education, neuromuscular re-education  Therapy Frequency (PT): 2 times/wk (1-5 times/ week per priority)  Plan of Care Reviewed With: patient  Outcome Evaluation: PT evaluation complete.  Pt. 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  Pt. requires max A for bed mobility including supine>sit; able to remain sitting unsupported for prolonged time.  PT will continue to follow and focus on improving bed mobility skills; transfer training if applicable.       Time Calculation:    PT Charges       Row Name 09/30/24 1558             Time Calculation    PT Received On 09/30/24  -      PT Goal Re-Cert Due Date 10/14/24  -                User Key  (r) = Recorded By, (t) = Taken By, (c) = Cosigned By      Initials Name Provider Type     Мария Joya, PT Physical Therapist                  Therapy Charges for Today       Code Description Service Date Service Provider Modifiers Qty    81646319118 HC PT EVAL MOD COMPLEXITY 4 9/30/2024 Мария Joya, PT GP 1            PT G-Codes  AM-PAC 6 Clicks  Score (PT): 6    Мария Joya, PT  9/30/2024

## 2024-09-30 NOTE — PLAN OF CARE
Goal Outcome Evaluation:      Patient is resting in bed, call light in reach. Patient shows no s/s of distress at this time. Patient remains on room air saturations remaining above 90%. Fluids infusing per MAR. Patients mother is at bedside, updated on plan of care.

## 2024-10-01 LAB
ANION GAP SERPL CALCULATED.3IONS-SCNC: 8.2 MMOL/L (ref 5–15)
BACTERIA SPEC AEROBE CULT: NORMAL
BACTERIA SPEC AEROBE CULT: NORMAL
BUN SERPL-MCNC: 4 MG/DL (ref 6–20)
BUN/CREAT SERPL: 11.1 (ref 7–25)
CALCIUM SPEC-SCNC: 8.3 MG/DL (ref 8.6–10.5)
CHLORIDE SERPL-SCNC: 110 MMOL/L (ref 98–107)
CO2 SERPL-SCNC: 22.8 MMOL/L (ref 22–29)
CREAT SERPL-MCNC: 0.36 MG/DL (ref 0.57–1)
DEPRECATED RDW RBC AUTO: 50.6 FL (ref 37–54)
EGFRCR SERPLBLD CKD-EPI 2021: 130.2 ML/MIN/1.73
ERYTHROCYTE [DISTWIDTH] IN BLOOD BY AUTOMATED COUNT: 14.2 % (ref 12.3–15.4)
GLUCOSE SERPL-MCNC: 158 MG/DL (ref 65–99)
HCT VFR BLD AUTO: 33.9 % (ref 34–46.6)
HGB BLD-MCNC: 10.9 G/DL (ref 12–15.9)
MCH RBC QN AUTO: 31.7 PG (ref 26.6–33)
MCHC RBC AUTO-ENTMCNC: 32.2 G/DL (ref 31.5–35.7)
MCV RBC AUTO: 98.5 FL (ref 79–97)
PLATELET # BLD AUTO: 353 10*3/MM3 (ref 140–450)
PMV BLD AUTO: 9.1 FL (ref 6–12)
POTASSIUM SERPL-SCNC: 3.8 MMOL/L (ref 3.5–5.2)
RBC # BLD AUTO: 3.44 10*6/MM3 (ref 3.77–5.28)
SODIUM SERPL-SCNC: 141 MMOL/L (ref 136–145)
WBC NRBC COR # BLD AUTO: 7.71 10*3/MM3 (ref 3.4–10.8)

## 2024-10-01 PROCEDURE — 25810000003 SODIUM CHLORIDE 0.9 % SOLUTION: Performed by: INTERNAL MEDICINE

## 2024-10-01 PROCEDURE — 99232 SBSQ HOSP IP/OBS MODERATE 35: CPT | Performed by: NURSE PRACTITIONER

## 2024-10-01 PROCEDURE — 97167 OT EVAL HIGH COMPLEX 60 MIN: CPT

## 2024-10-01 PROCEDURE — 25010000002 HEPARIN (PORCINE) PER 1000 UNITS: Performed by: INTERNAL MEDICINE

## 2024-10-01 PROCEDURE — 97110 THERAPEUTIC EXERCISES: CPT

## 2024-10-01 PROCEDURE — 25010000002 ERTAPENEM PER 500 MG: Performed by: INTERNAL MEDICINE

## 2024-10-01 PROCEDURE — 80048 BASIC METABOLIC PNL TOTAL CA: CPT | Performed by: INTERNAL MEDICINE

## 2024-10-01 PROCEDURE — 99232 SBSQ HOSP IP/OBS MODERATE 35: CPT | Performed by: INTERNAL MEDICINE

## 2024-10-01 PROCEDURE — 85027 COMPLETE CBC AUTOMATED: CPT | Performed by: INTERNAL MEDICINE

## 2024-10-01 PROCEDURE — 97530 THERAPEUTIC ACTIVITIES: CPT

## 2024-10-01 RX ORDER — LOPERAMIDE HYDROCHLORIDE 2 MG/1
2 CAPSULE ORAL 4 TIMES DAILY PRN
Status: DISPENSED | OUTPATIENT
Start: 2024-10-01

## 2024-10-01 RX ORDER — LOPERAMIDE HYDROCHLORIDE 2 MG/1
2 CAPSULE ORAL ONCE
Status: COMPLETED | OUTPATIENT
Start: 2024-10-01 | End: 2024-10-01

## 2024-10-01 RX ADMIN — NICOTINE 1 PATCH: 7 PATCH, EXTENDED RELEASE TRANSDERMAL at 08:39

## 2024-10-01 RX ADMIN — NYSTATIN: 100000 POWDER TOPICAL at 08:38

## 2024-10-01 RX ADMIN — DICLOFENAC SODIUM 4 G: 10 GEL TOPICAL at 12:13

## 2024-10-01 RX ADMIN — CYCLOBENZAPRINE 10 MG: 10 TABLET, FILM COATED ORAL at 12:13

## 2024-10-01 RX ADMIN — HEPARIN SODIUM 5000 UNITS: 5000 INJECTION INTRAVENOUS; SUBCUTANEOUS at 12:13

## 2024-10-01 RX ADMIN — MUPIROCIN 1 APPLICATION: 20 OINTMENT TOPICAL at 08:39

## 2024-10-01 RX ADMIN — PANTOPRAZOLE SODIUM 40 MG: 40 TABLET, DELAYED RELEASE ORAL at 08:38

## 2024-10-01 RX ADMIN — LISINOPRIL 20 MG: 10 TABLET ORAL at 08:38

## 2024-10-01 RX ADMIN — ASPIRIN 81 MG: 81 TABLET, CHEWABLE ORAL at 08:38

## 2024-10-01 RX ADMIN — Medication 10 ML: at 21:58

## 2024-10-01 RX ADMIN — TRAMADOL HYDROCHLORIDE 50 MG: 50 TABLET ORAL at 15:24

## 2024-10-01 RX ADMIN — Medication 10 ML: at 08:38

## 2024-10-01 RX ADMIN — NYSTATIN: 100000 POWDER TOPICAL at 21:58

## 2024-10-01 RX ADMIN — ERTAPENEM 1000 MG: 1 INJECTION, POWDER, LYOPHILIZED, FOR SOLUTION INTRAMUSCULAR; INTRAVENOUS at 14:08

## 2024-10-01 RX ADMIN — ACETAMINOPHEN 650 MG: 325 TABLET ORAL at 19:26

## 2024-10-01 RX ADMIN — DICLOFENAC SODIUM 4 G: 10 GEL TOPICAL at 06:20

## 2024-10-01 RX ADMIN — HEPARIN SODIUM 5000 UNITS: 5000 INJECTION INTRAVENOUS; SUBCUTANEOUS at 05:02

## 2024-10-01 RX ADMIN — TRAMADOL HYDROCHLORIDE 50 MG: 50 TABLET ORAL at 06:18

## 2024-10-01 RX ADMIN — HEPARIN SODIUM 5000 UNITS: 5000 INJECTION INTRAVENOUS; SUBCUTANEOUS at 21:57

## 2024-10-01 RX ADMIN — SODIUM CHLORIDE 75 ML/HR: 9 INJECTION, SOLUTION INTRAVENOUS at 14:06

## 2024-10-01 RX ADMIN — ACYCLOVIR 400 MG: 200 CAPSULE ORAL at 21:57

## 2024-10-01 RX ADMIN — CYCLOBENZAPRINE 10 MG: 10 TABLET, FILM COATED ORAL at 19:26

## 2024-10-01 RX ADMIN — DULOXETINE HYDROCHLORIDE 60 MG: 60 CAPSULE, DELAYED RELEASE ORAL at 08:38

## 2024-10-01 RX ADMIN — MUPIROCIN 1 APPLICATION: 20 OINTMENT TOPICAL at 21:58

## 2024-10-01 RX ADMIN — LOPERAMIDE HYDROCHLORIDE 2 MG: 2 CAPSULE ORAL at 06:18

## 2024-10-01 RX ADMIN — LOPERAMIDE HYDROCHLORIDE 2 MG: 2 CAPSULE ORAL at 12:13

## 2024-10-01 RX ADMIN — ATORVASTATIN CALCIUM 80 MG: 40 TABLET, FILM COATED ORAL at 08:38

## 2024-10-01 NOTE — PLAN OF CARE
Goal Outcome Evaluation:      Patient currently resting in bed. A&Ox4. VSS. With NS infusing at 75mL/hr. Oxygen saturation maintained above 90% on room air.  No visible s/s of acute distress noted at this time. No requests or complaints at this time. Plan of care ongoing.

## 2024-10-01 NOTE — CASE MANAGEMENT/SOCIAL WORK
Discharge Planning Assessment  [facility]     Patient Name: Lety Johnson  MRN: [id number]  Today's Date: 10/1/2024    Admit Date: 9/26/2024    Plan: SS received call from [employer] via [employer] at [phone] denying pt admit. SS discussed with Lead  and Physician. [facility] consult placed.  [facility] to review pt referral.  SS spoke with Jahaira at [facility] office who stated that pt's disability hearing can not be moved up at this point.  SS will follow and assist with discharge plans.     Discharge Plan       Row Name 10/01/24 1138       Plan    Plan SS received call from [employer] via [employer] at [phone] denying pt admit. SS discussed with Lead  and Physician. [facility] consult placed.  [facility] to review pt referral.  SS spoke with Jahaira at [facility] office who stated that pt's disability hearing can not be moved up at this point.  SS will follow and assist with discharge plans.                  Continued Care and Services - Admitted Since 9/26/2024    No active coordination exists for this encounter.       Expected Discharge Date and Time       Expected Discharge Date Expected Discharge Time    Oct 2, 2024             AKZ Lacy

## 2024-10-01 NOTE — PLAN OF CARE
Goal Outcome Evaluation: Patient has been fairly pleasant. Compliant with all medications however she did refuse to have her wound care done this shift. She remains on RA, saturations maintaining well above 90%. Fluids and IV Abx given as ordered. Ext Cath remains in place, good UO noted. She has had multiple complaints during shift, PRN medications given as ordered. She is currently resting in bed. Will continue with plan of care.

## 2024-10-01 NOTE — PROGRESS NOTES
[facility] HOSPITALIST PROGRESS NOTE     Patient Identification:  Name:  Lety Johnson  Age:  43 y.o.  Sex:  female  :  1980  MRN:  [id number]  Visit Number:  [id number]  Primary Care Provider:  Provider, No Known    Length of stay:  3    Chief complaint: None    Subjective:    Patient seen and examined at bedside with no nursing staff present.  Patient was asleep when entered the room but easily awakened.  Patient did quickly fall back asleep.  She currently reports no new symptoms overnight.  Did have discussion with case management regarding deplorable living conditions when patient was found at home.  She reports her boyfriend had been taking care of her but was recently incarcerated.  She had been attempting to take care of herself at home but was found covered in feces and urine when discovered by family members.  Unfortunately, patient has no income and has currently been denied at multiple skilled nursing facilities as well as long-term acute care facilities.  Safe discharge disposition has yet to be determined.  ----------------------------------------------------------------------------------------------------------------------  Current Hospital Meds:  acyclovir, 400 mg, Oral, Nightly  aspirin, 81 mg, Oral, Daily  atorvastatin, 80 mg, Oral, Daily  DULoxetine, 60 mg, Oral, Daily  ertapenem, 1,000 mg, Intravenous, Q24H  heparin (porcine), 5,000 Units, Subcutaneous, Q8H  lisinopril, 20 mg, Oral, Daily  [Held by provider] metFORMIN, 1,000 mg, Oral, BID With Meals  mupirocin, 1 application , Each Nare, BID  nicotine, 1 patch, Transdermal, Q24H  nystatin, , Topical, Q12H  pantoprazole, 40 mg, Oral, Daily  sodium chloride, 10 mL, Intravenous, Q12H  sodium chloride, 10 mL, Intravenous, Q12H      Pharmacy Consult,   sodium chloride, 75 mL/hr, Last Rate: 75 mL/hr (10/01/24  1406)      ----------------------------------------------------------------------------------------------------------------------  Vital Signs:  Temp:  [97.6 °F (36.4 °C)-98.8 °F (37.1 °C)] 97.8 °F (36.6 °C)  Heart Rate:  [] 109  Resp:  [18-20] 20  BP: (147-179)/() 173/97      09/29/24  0526 09/30/24  0500 10/01/24  0530   Weight: 111 kg (244 lb 12.8 oz) 110 kg (243 lb 9.6 oz) 107 kg (235 lb 11.2 oz)     Body mass index is 38.63 kg/m².    Intake/Output Summary (Last 24 hours) at 10/1/2024 1531  Last data filed at 10/1/2024 1400  Gross per 24 hour   Intake 360 ml   Output 1750 ml   Net -1390 ml     Diet: Regular/House; Fluid Consistency: Thin (IDDSI 0)  ----------------------------------------------------------------------------------------------------------------------  Physical exam:  Constitutional: Chronically ill-appearing  female in no apparent distress.     HENT:  Head:  Normocephalic and atraumatic.  Mouth:  Moist mucous membranes.    Eyes:  Conjunctivae and EOM are normal.  Pupils are equal, round, and reactive to light.  No scleral icterus.    Neck:  Neck supple. No thyromegaly.  No JVD present.    Cardiovascular:  Regular rate and rhythm with no murmurs, rubs, clicks or gallops appreciated.  Pulmonary/Chest:  Clear to auscultation bilaterally with no crackles, wheezes or rhonchi appreciated.  Abdominal:  Soft. Nontender. Nondistended  Bowel sounds are normal in all four quadrants. No organomegally appreciated.   Musculoskeletal:  No edema, no tenderness, and no deformity.  No red or swollen joints anywhere.    Neurological:  Alert, Cranial nerves II-XII intact with no focal deficits.  No facial droop.  No slurred speech.   Skin:  Warm and dry to palpation with no rashes or lesions appreciated.  Peripheral vascular:  2+ radial and pedal pulses in bilateral upper and lower extremities.  Psychiatric:  Alert and oriented x3, demonstrates appropriate judgment and  "insight.  -------------------------------------------------------------------------  ----------------------------------------------------------------------------------------------------------------------      Results from last 7 days   Lab Units 10/01/24  0117 09/30/24  0117 09/29/24  0036 09/27/24  0844 09/26/24  1504 09/26/24  1350   CRP mg/dL  --   --   --   --  3.80*  --    LACTATE mmol/L  --   --   --   --   --  1.1   WBC 10*3/mm3 7.71 8.49 7.24   < >  --  10.42   HEMOGLOBIN g/dL 10.9* 10.5* 10.7*   < >  --  12.5   HEMATOCRIT % 33.9* 32.7* 33.6*   < >  --  37.4   MCV fL 98.5* 99.1* 100.6*   < >  --  94.0   MCHC g/dL 32.2 32.1 31.8   < >  --  33.4   PLATELETS 10*3/mm3 353 285 318   < >  --  402    < > = values in this interval not displayed.         Results from last 7 days   Lab Units 10/01/24  0117 09/30/24  0117 09/29/24  0036 09/28/24  0318 09/27/24  0844 09/26/24  1504   SODIUM mmol/L 141 142 140 142   < > 140   POTASSIUM mmol/L 3.8 3.5 3.9 4.1   < > 2.7*   MAGNESIUM mg/dL  --   --   --   --   --  1.5*   CHLORIDE mmol/L 110* 110* 109* 110*   < > 99   CO2 mmol/L 22.8 22.8 21.0* 22.3   < > 28.9   BUN mg/dL 4* 5* 4* 5*   < > 10   CREATININE mg/dL 0.36* 0.46* 0.40* 0.43*   < > 0.51*   CALCIUM mg/dL 8.3* 8.0* 8.2* 8.5*   < > 9.4   GLUCOSE mg/dL 158* 196* 192* 198*   < > 220*   ALBUMIN g/dL  --  2.5* 2.5* 2.7*   < > 3.5   BILIRUBIN mg/dL  --  0.2 0.2 0.3   < > 0.6   ALK PHOS U/L  --  79 88 100   < > 119*   AST (SGOT) U/L  --  11 15 15   < > 21   ALT (SGPT) U/L  --  7 8 9   < > 8    < > = values in this interval not displayed.   Estimated Creatinine Clearance: 249.7 mL/min (A) (by C-G formula based on SCr of 0.36 mg/dL (L)).    No results found for: \"AMMONIA\"      Blood Culture   Date Value Ref Range Status   09/26/2024 No growth at 4 days  Preliminary   09/26/2024 No growth at 4 days  Preliminary     Urine Culture   Date Value Ref Range Status   09/26/2024 >100,000 CFU/mL Escherichia coli ESBL (A)  Final     No " "results found for: \"WOUNDCX\"  No results found for: \"STOOLCX\"    I have personally looked at the labs and they are summarized above.  ----------------------------------------------------------------------------------------------------------------------  Imaging Results (Last 24 Hours)       ** No results found for the last 24 hours. **          ----------------------------------------------------------------------------------------------------------------------  Assessment and Plan:    ESBL acute cystitis -will continue Invanz 1 g IV every 24 hours while hospitalized and will de-escalate antibiotic therapy to Macrobid 100 mg p.o. twice daily upon discharge to complete a full 7-day course of antibiotic therapy per infectious disease recommendations.    2.  Acute metabolic encephalopathy -resolved, initially secondary to acute UTI    3.  History of CVA -patient with left-sided hemiparesis secondary to history of CVA    4.  Debility -continue PT/OT, unfortunately patient does not have a monthly income, and currently unable to find any rehab facility that will accept the patient.    5.  History of genital herpes -continue acyclovir    6.  Morbid obesity -complicates all aspects of care    Disposition currently pending safe discharge disposition    Marv Navas DO   10/01/24   15:31 EDT     "

## 2024-10-01 NOTE — THERAPY TREATMENT NOTE
Acute Care - Physical Therapy Treatment Note   [facility]     Patient Name: Lety Johnson  : 1980  MRN: [id number]  Today's Date: 10/1/2024   Onset of Illness/Injury or Date of Surgery: 24  Visit Dx:     ICD-10-CM ICD-9-CM   1. Hypokalemia  E87.6 276.8   2. Pressure injury of skin of sacral region, unspecified injury stage  L89.159 707.03     707.20   3. Pressure injury of skin of left heel, unspecified injury stage  L89.629 707.07     707.20   4. Acute cystitis without hematuria  N30.00 595.0     Patient Active Problem List   Diagnosis    UTI (urinary tract infection)     Past Medical History:   Diagnosis Date    Stroke      History reviewed. No pertinent surgical history.  PT Assessment (Last 12 Hours)       PT Evaluation and Treatment       Row Name 10/01/24 1100          Physical Therapy Time and Intention    Document Type therapy note (daily note)  -     Mode of Treatment physical therapy  -     Patient Effort adequate  -     Symptoms Noted During/After Treatment increased pain;other (see comments)  Pt reports pain in left UE and LE.  Frequently is tearful.  RN aware.  -       Row Name 10/01/24 1100          General Information    Patient Profile Reviewed yes  -     Patient Observations alert;cooperative;agree to therapy  -     Existing Precautions/Restrictions fall  -     Risks Reviewed patient:;increased discomfort;other (comment)  Pt educated on importance of passive movement to left side even though it is painful due to tone and parathesia.  -     Barriers to Rehab previous functional deficit  -       Row Name 10/01/24 1100          Pain Scale: FACES Pre/Post-Treatment    Pain: FACES Scale, Pretreatment 4-->hurts little more  -     Posttreatment Pain Rating 6-->hurts even more  -     Pain Location - Side/Orientation Left  -KP     Pre/Posttreatment Pain Comment Left extremities, shoulder, hand, elbow, knee  -       Row Name 10/01/24 1100          Cognition     Affect/Mental Status (Cognition) sad/depressed affect;other (see comments)  Pt frequently cried throughout session.  RN was aware of how tearful she was.  -     Orientation Status (Cognition) oriented x 3  -KP     Follows Commands (Cognition) follows one-step commands;physical/tactile prompts required;repetition of directions required;verbal cues/prompting required  -     Personal Safety Interventions fall prevention program maintained;muscle strengthening facilitated;nonskid shoes/slippers when out of bed;supervised activity  -       Row Name 10/01/24 1100          Range of Motion Comprehensive    Comment, General Range of Motion Pt unable to perform AROM to left LE or UE.  She reports pain with PROM to extremities and decreased ROM due to pain and tone.  WFL on right side  -       Row Name 10/01/24 1100          Strength Comprehensive (MMT)    Comment, General Manual Muscle Testing (MMT) Assessment No mm acitvation on left extremities.  Right LE 3/5  -       Row Name 10/01/24 1100          Sensory Assessment (Somatosensory)    Sensory Assessment (Somatosensory) other (see comments)  hypersensitive with parathesias on left extremities.  -       Row Name 10/01/24 1100          Bed Mobility    Rolling Left [place] (Bed Mobility) maximum assist (25% patient effort);1 person assist  -KP     Rolling Right [place] (Bed Mobility) maximum assist (25% patient effort);1 person assist  -KP     Scooting/Bridging [place] (Bed Mobility) maximum assist (25% patient effort);2 person assist  -KP     Supine-Sit [place] (Bed Mobility) maximum assist (25% patient effort);1 person assist  -KP     Sit-Supine [place] (Bed Mobility) maximum assist (25% patient effort);1 person assist  -KP     Bed Mobility, Safety Issues decreased use of arms for pushing/pulling;decreased use of legs for bridging/pushing;impaired trunk control for bed mobility  -     Assistive Device (Bed Mobility) bed rails;draw  sheet;head of bed elevated  -       Row Name 10/01/24 1100          Transfers    Transfers sit-stand transfer;stand-sit transfer  -       Row Name 10/01/24 1100          Sit-Stand Transfer    Sit-Stand [place] (Transfers) dependent (less than 25% patient effort);2 person assist;other (see comments)  Unable to clear bottom from bed  -     Comment, (Sit-Stand Transfer) Attempted sit to stand transfer and it was dependentx2 with inability to lift bottom from the bed  -[facility] Name 10/01/24 1100          Gait/Stairs (Locomotion)    Patient was able to Ambulate no, other medical factors prevent ambulation  -     Reason Patient was unable to Ambulate Non-Ambulatory at Baseline  -       Row Name 10/01/24 1100          Balance    Static Sitting Balance standby assist  -     Dynamic Sitting Balance standby assist;contact guard;1-person assist  -     Position, Sitting Balance sitting edge of bed  -[facility] Name 10/01/24 1100          Motor Skills    Motor Skills muscle tone  -     Muscle Tone left;upper extremity(s);lower extremity(s);clonus;spasticity  -     Therapeutic Exercise hip;knee;ankle  -[facility] Name 10/01/24 1100          Hip (Therapeutic Exercise)    Hip (Therapeutic Exercise) PROM (passive range of motion);strengthening exercise  -     Hip Strengthening (Therapeutic Exercise) right;flexion;marching while seated;sitting;10 repetitions;3 sets  -[facility] Name 10/01/24 1100          Knee (Therapeutic Exercise)    Knee (Therapeutic Exercise) strengthening exercise  -     Knee Strengthening (Therapeutic Exercise) right;LAQ (long arc quad);sitting;10 repetitions;3 sets  -[facility] Name 10/01/24 1100          Ankle (Therapeutic Exercise)    Ankle (Therapeutic Exercise) strengthening exercise;PROM (passive range of motion)  -     Ankle PROM (Therapeutic Exercise) left;dorsiflexion;plantarflexion;supine;10 repetitions  -     Ankle Strengthening (Therapeutic Exercise)  right;dorsiflexion;plantarflexion;sitting;10 repetitions;3 sets  -KP       Row Name             Wound 09/26/24 1806 gluteal    Wound - Properties Group Placement Date: 09/26/24  -KJ Placement Time: 1806  -KJ Location: gluteal  -KJ    Retired Wound - Properties Group Placement Date: 09/26/24  -KJ Placement Time: 1806  -KJ Location: gluteal  -KJ    Retired Wound - Properties Group Date first assessed: 09/26/24  -KJ Time first assessed: 1806  -KJ Location: gluteal  -KJ      Row Name             Wound 09/26/24 1807 Left gluteal    Wound - Properties Group Placement Date: 09/26/24  -KJ Placement Time: 1807  -KJ Side: Left  -KJ Location: gluteal  -KJ    Retired Wound - Properties Group Placement Date: 09/26/24  -KJ Placement Time: 1807  -KJ Side: Left  -KJ Location: gluteal  -KJ    Retired Wound - Properties Group Date first assessed: 09/26/24  -KJ Time first assessed: 1807  -KJ Side: Left  -KJ Location: gluteal  -KJ      Row Name             Wound 09/26/24 1809 gluteal    Wound - Properties Group Placement Date: 09/26/24  -KJ Placement Time: 1809  -KJ Location: gluteal  -KJ    Retired Wound - Properties Group Placement Date: 09/26/24  -KJ Placement Time: 1809  -KJ Location: gluteal  -KJ    Retired Wound - Properties Group Date first assessed: 09/26/24  -KJ Time first assessed: 1809  -KJ Location: gluteal  -KJ      Row Name             Wound 09/26/24 1809 Left posterior ankle    Wound - Properties Group Placement Date: 09/26/24  -KJ Placement Time: 1809  -KJ Side: Left  -KJ Orientation: posterior  -KJ Location: ankle  -KJ    Retired Wound - Properties Group Placement Date: 09/26/24  -KJ Placement Time: 1809  -KJ Side: Left  -KJ Orientation: posterior  -KJ Location: ankle  -KJ    Retired Wound - Properties Group Date first assessed: 09/26/24  -KJ Time first assessed: 1809  -KJ Side: Left  -KJ Location: ankle  -KJ      Row Name             Wound 09/29/24 0952 Bilateral anterior thigh MASD (Moisture associated skin damage)     Wound - Properties Group Placement Date: 09/29/24  -LB Placement Time: 0952 -LB Present on Original Admission: Y  -LB Side: Bilateral  -LB Orientation: anterior  -LB Location: thigh  -LB Primary Wound Type: MASD  -LB    Retired Wound - Properties Group Placement Date: 09/29/24  -LB Placement Time: 0952 -LB Present on Original Admission: Y  -LB Side: Bilateral  -LB Orientation: anterior  -LB Location: thigh  -LB Primary Wound Type: MASD  -LB    Retired Wound - Properties Group Date first assessed: 09/29/24  -LB Time first assessed: 0952 -LB Present on Original Admission: Y  -LB Side: Bilateral  -LB Location: thigh  -LB Primary Wound Type: MASD  -LB      Row Name 10/01/24 1100          Plan of Care Review    Plan of Care Reviewed With patient  -     Outcome Evaluation PT treatment completed.  Patient was very emotional throughout therapy treatment but is eager to participate.  She has tone and no muscle activation on left extremities. She was educated on importance of passive movement to left extremities.  She performed exercises, sitting balance and edurance and attempted transfer with inability to complete dependentx2.  Continue PT POC.  -KP       Row Name 10/01/24 1100          Positioning and Restraints    Pre-Treatment Position in bed  -KP     Post Treatment Position bed  -KP     In Bed notified nsg;fowlers;call light within reach;encouraged to call for assist;exit alarm on;side rails up x3;heels elevated  wedge under right side.  Lines in tact and needs in reach.  -KP               User Key  (r) = Recorded By, (t) = Taken By, (c) = Cosigned By      Initials Name Provider Type    Eileen Arellano, GENARO Physical Therapist    Nory Keenan, RN Registered Nurse    Zehra Rivera, RN Registered Nurse                    Physical Therapy Education       Title: PT OT SLP Therapies (In Progress)       Topic: Physical Therapy (In Progress)       Point: Mobility training (In Progress)       Learning Progress  Summary             Patient Acceptance, E, NR by RD at 10/1/2024 0856    Acceptance, E,D, VU,NR by  at 9/30/2024 1559                         Point: Home exercise program (In Progress)       Learning Progress Summary             Patient Acceptance, E, NR by RD at 10/1/2024 0856    Acceptance, E,D, VU,NR by AG at 9/30/2024 1559                         Point: Body mechanics (In Progress)       Learning Progress Summary             Patient Acceptance, E, NR by RD at 10/1/2024 0856    Acceptance, E,D, VU,NR by AG at 9/30/2024 1559                         Point: Precautions (In Progress)       Learning Progress Summary             Patient Acceptance, E, NR by RD at 10/1/2024 0856    Acceptance, E,D, VU,NR by  at 9/30/2024 1559                                         User Key       Initials Effective Dates Name Provider Type Discipline     06/16/21 -  Мария Joya, PT Physical Therapist PT     06/16/21 -  Laisha Verdugo, RN Registered Nurse Nurse                  PT Recommendation and Plan     Plan of Care Reviewed With: patient  Outcome Evaluation: PT treatment completed.  Patient was very emotional throughout therapy treatment but is eager to participate.  She has tone and no muscle activation on left extremities. She was educated on importance of passive movement to left extremities.  She performed exercises, sitting balance and edurance and attempted transfer with inability to complete dependentx2.  Continue PT POC.       Time Calculation:    PT Charges       Row Name 10/01/24 1157             Time Calculation    PT Received On 10/01/24  -KP      PT Goal Re-Cert Due Date 10/14/24  -KP         Timed Charges    10295 - PT Therapeutic Exercise Minutes 15  -KP      62752 - PT Therapeutic Activity Minutes 25  -KP         Total Minutes    Timed Charges Total Minutes 40  -KP       Total Minutes 40  -KP                User Key  (r) = Recorded By, (t) = Taken By, (c) = Cosigned By      Initials Name Provider Type      Eileen Marc, PT Physical Therapist                  Therapy Charges for Today       Code Description Service Date Service Provider Modifiers Qty    37541415913 HC PT THER PROC EA 15 MIN 10/1/2024 Eileen Marc, PT GP 1    53477096875 HC PT THERAPEUTIC ACT EA 15 MIN 10/1/2024 Eileen Marc, PT GP 2            PT G-Codes  AM-PAC 6 Clicks Score (PT): 6    Eileen Marc, PT  10/1/2024

## 2024-10-01 NOTE — DISCHARGE PLACEMENT REQUEST
"Lety Johnson (43 y.o. Female)       Date of Birth   1980    Social Security Number       Address   [address] [id number]    Home Phone   [phone]    MRN   [id number]       [employer]   [facility]    Marital Status   Single                            Admission Date   9/26/24    Admission Type   Emergency    Admitting Provider   Ramon Marc MD    Attending Provider   Marv Navas DO    Department, Room/Bed   [address], 3315/1S       Discharge Date       Discharge Disposition       Discharge Destination                                 Attending Provider: Marv Navas DO    Allergies: No Known Allergies    Isolation: Contact   Infection: ESBL E coli (09/28/24)   Code Status: CPR    Ht: 166.4 cm (65.5\")   Wt: 107 kg (235 lb 11.2 oz)    Admission Cmt: None   Principal Problem: UTI (urinary tract infection) [N39.0]                   Active Insurance as of 9/26/2024       Primary Coverage       Payor Plan Insurance Group Employer/Plan Group    [place] [id number]       Payor Plan Address Payor Plan Phone Number Payor Plan Fax Number Effective Dates    [employer] [address] [address] [phone]  5/1/2024 - None Entered    Formerly [facility] [id number]         Subscriber Name Subscriber Birth Date Member ID       LETY JOHNSON 1980 [id number]                     Emergency Contacts        (Rel.) Home Phone Work Phone Mobile Phone    Moises Narvaez (Legal Guardian) -- -- [phone]              Emergency Contact Information       Name Relation Home Work Mobile    Moises Narvaez Legal Guardian   [phone]          Insurance Information                  [place] Phone: [phone]    Subscriber: Lety Johnson Subscriber#: [id number]    Group#: [id number] Precert#: --          Problem List             Codes Noted - Resolved       Hospital    * (Principal) UTI (urinary tract " infection) ICD-10-CM: N39.0  ICD-9-CM: 599.0 2024 - Present        History & Physical        Ramon Marc MD at 24 1701              [facility] HOSPITALIST HISTORY AND PHYSICAL    Patient Identification:  Name:  Lety Johnson  Age:  43 y.o.  Sex:  female  :  1980  MRN:  [id number]   Admit Date: 2024   Visit Number:  [id number]  Room number:  404/04  Primary Care Physician:  Provider, No Known     Subjective     Chief complaint:    Chief Complaint   Patient presents with    Fall     History of presenting illness:   43F Morbid Obesity by BMI PMH History Genital Herpes, Cerebrovascular Accident 2024 complicated by L sided paralysis, presented to [facility] emergency room  after sliding into the floor off her chair due to weakness.  Upon arrival patient afebrile, heart rate 109, respiratory rate 18, blood pressure 146/101, satting 98% on room air. Labs showed WBC count 10K, lactate 1.1, CRP 3.8, potassium 2.7, magnesium 1.5, HCG negative, blood cultures pending. CXR no acute cardiopulmonary processes.  Xray ankle/foot without fracture or dislocation.  Emergency room provider gave antibiotics, pain medications, zofran, potassium replacement.  Hospital Medicine consulted for admission. Patient seen and examined in the emergency room, notes fevers chills at home a few days ago, recently has been on antibiotics for lower extremity cellulitis, has had some dysuria and suprapubic pain, denies current sexual activity, reports her fiance was taken to [facility] about a week ago and has been her primary caretaker, has had difficulty at home since prior stroke and caretakers not consistent, last 24hrs didn't have anyone to help her, sister endorses recent confusion/hallucinations beyond baseline, seeing dead family members, coinciding with onset of dysuria.  ---------------------------------------------------------------------------------------------------------------------    Review of Systems   Constitutional:  Positive for chills and fever.   HENT: Negative.     Eyes: Negative.    Respiratory:  Positive for shortness of breath.    Cardiovascular:  Positive for leg swelling. Negative for chest pain.   Gastrointestinal: Negative.    Endocrine: Negative.    Genitourinary:  Positive for dysuria.   Musculoskeletal: Negative.    Skin:  Positive for rash.   Allergic/Immunologic: Negative.    Neurological:  Positive for weakness.   Hematological: Negative.    Psychiatric/Behavioral:  Positive for hallucinations.      ---------------------------------------------------------------------------------------------------------------------   Past Medical History:   Diagnosis Date    Stroke      No past surgical history on file.  No family history on file.  Social History     Socioeconomic History    Marital status: Single     ---------------------------------------------------------------------------------------------------------------------   Allergies:  Patient has no known allergies.  ---------------------------------------------------------------------------------------------------------------------   Medications below are reported home medications pulling from within the system; at this time, these medications have not been reconciled unless otherwise specified and are in the verification process for further verifcation as current home medications.    Prior to Admission Medications       Prescriptions Last Dose Informant Patient Reported? Taking?    aspirin 81 MG chewable tablet Unknown  Yes No    Chew 1 tablet Daily.    atorvastatin (LIPITOR) 80 MG tablet Unknown  Yes No    Take 1 tablet by mouth Daily.    cyclobenzaprine (FLEXERIL) 10 MG tablet Unknown  Yes No    Take 1 tablet by mouth 3 (Three) Times a Day As Needed for Muscle Spasms.    DULoxetine (CYMBALTA) 60 MG capsule Unknown  Yes No    Take 1 capsule by mouth Daily.    lisinopril (PRINIVIL,ZESTRIL) 20 MG tablet Unknown  Yes No    Take  1 tablet by mouth Daily.    metFORMIN (GLUCOPHAGE) 1000 MG tablet Unknown  Yes No    Take 1 tablet by mouth 2 (Two) Times a Day With Meals.    pantoprazole (PROTONIX) 40 MG EC tablet Unknown  Yes No    Take 1 tablet by mouth Daily.          Objective     Vital Signs:  Temp:  [98.2 °F (36.8 °C)] 98.2 °F (36.8 °C)  Heart Rate:  [] 118  Resp:  [18] 18  BP: (135-157)/() 140/79    Mean Arterial Pressure (Non-Invasive) for the past 24 hrs (Last 3 readings):   Noninvasive MAP (mmHg)   09/26/24 1645 90   09/26/24 1630 100   09/26/24 1615 103     SpO2:  [91 %-100 %] 94 %  on   ;   Device (Oxygen Therapy): room air  Body mass index is 43.26 kg/m².    Wt Readings from Last 3 Encounters:   09/26/24 120 kg (264 lb)   06/27/24 120 kg (264 lb)      ---------------------------------------------------------------------------------------------------------------------   Physical Exam:  Constitutional:  Well-developed and well-nourished. Older than stated age. No acute distress.      HENT:  Head:  Normocephalic and atraumatic.  Mouth:  Moist mucous membranes.    Eyes:  Conjunctivae and EOM are normal. No scleral icterus.    Neck:  Neck supple.  No JVD present.    Cardiovascular:  Normal rate, regular rhythm and normal heart sounds with no murmur.  Pulmonary/Chest:  No respiratory distress, no wheezes, no crackles, with normal breath sounds and good air movement.  Abdominal:  Soft. No distension and no tenderness.   Musculoskeletal:  No tenderness, and no deformity.  No red or swollen joints anywhere.    Neurological:  Alert and oriented to person, place, and time.  No cranial nerve deficit. Chronic L sided paralysis.    Skin:  Skin is warm and dry. No pallor. Significant intertriginous erythema under panus, consistent with yeast infection.   Peripheral vascular:  No clubbing, no cyanosis, trace edema.  Psychiatric: Appropriate mood and affect  Edited by: Ramon Marc MD at 9/26/2024  "1701  ---------------------------------------------------------------------------------------------------------------------  EKG:  N/A    Telemetry:  normal sinus rhythm, no significant ST changes    I have personally looked at telemetry.    Last echocardiogram:  Ordered and pending   --------------------------------------------------------------------------------------------------------------------  Labs:  Results from last 7 days   Lab Units 09/26/24  1504 09/26/24  1350   LACTATE mmol/L  --  1.1   CRP mg/dL 3.80*  --    WBC 10*3/mm3  --  10.42   HEMOGLOBIN g/dL  --  12.5   HEMATOCRIT %  --  37.4   MCV fL  --  94.0   MCHC g/dL  --  33.4   PLATELETS 10*3/mm3  --  402         Results from last 7 days   Lab Units 09/26/24  1504   SODIUM mmol/L 140   POTASSIUM mmol/L 2.7*   MAGNESIUM mg/dL 1.5*   CHLORIDE mmol/L 99   CO2 mmol/L 28.9   BUN mg/dL 10   CREATININE mg/dL 0.51*   CALCIUM mg/dL 9.4   GLUCOSE mg/dL 220*   ALBUMIN g/dL 3.5   BILIRUBIN mg/dL 0.6   ALK PHOS U/L 119*   AST (SGOT) U/L 21   ALT (SGPT) U/L 8   Estimated Creatinine Clearance: 188.1 mL/min (A) (by C-G formula based on SCr of 0.51 mg/dL (L)).  No results found for: \"AMMONIA\"          No results found for: \"HGBA1C\", \"POCGLU\"  No results found for: \"TSH\", \"FREET4\"  No results found for: \"PREGTESTUR\", \"PREGSERUM\", \"HCG\", \"HCGQUANT\"  Pain Management Panel           No data to display              Brief Urine Lab Results  (Last result in the past 365 days)        Color   Clarity   Blood   Leuk Est   Nitrite   Protein   CREAT   Urine HCG        09/26/24 1419 Dark Yellow   Turbid   Trace   Moderate (2+)   Positive   30 mg/dL (1+)                 No results found for: \"BLOODCX\"  No results found for: \"URINECX\"  No results found for: \"WOUNDCX\"  No results found for: \"STOOLCX\"    I have personally looked at the labs and they are summarized " above.  ----------------------------------------------------------------------------------------------------------------------  Detailed radiology reports for the last 24 hours:    Imaging Results (Last 24 Hours)       Procedure Component Value Units Date/Time    XR Ankle 3+ View Left [[id number]] Collected: 09/26/24 1537     Updated: 09/26/24 1540    Narrative:      EXAM:    XR Left Ankle Complete, 3 or More Views     EXAM DATE:    9/26/2024 3:17 PM     CLINICAL HISTORY:    left ankle injury     TECHNIQUE:    Frontal, lateral and oblique views of the left ankle.     COMPARISON:    No relevant prior studies available.     FINDINGS:    Bones/joints:  See below.      Soft tissues:  Soft tissue swelling, but no acute fracture or  dislocation.       Impression:        Soft tissue swelling, but no acute fracture or dislocation.        This report was finalized on 9/26/2024 3:38 PM by Dr. Moses Otto MD.       XR Foot 3+ View Left [[id number]] Collected: 09/26/24 1536     Updated: 09/26/24 1539    Narrative:      EXAM:    XR Left Foot Complete, 3 or More Views     EXAM DATE:    9/26/2024 3:16 PM     CLINICAL HISTORY:    left foot wound     TECHNIQUE:    Frontal, lateral and oblique views of the left foot.     COMPARISON:    No relevant prior studies available.     FINDINGS:    Bones/joints:  Unremarkable.  No acute fracture.  No dislocation.    Soft tissues:  Unremarkable.  No radiopaque foreign body.       Impression:        No acute findings in the left foot.        This report was finalized on 9/26/2024 3:37 PM by Dr. Moess Otto MD.       XR Chest 1 View [[id number]] Collected: 09/26/24 1406     Updated: 09/26/24 1408    Narrative:      XR CHEST 1 VW-     CLINICAL INDICATION: weakness        COMPARISON: None immediately available      TECHNIQUE: Single frontal view of the chest.     FINDINGS:     LUNGS: Lungs are adequately aerated.      HEART AND MEDIASTINUM: Heart and mediastinal contours are unremarkable         SKELETON: Bony and soft tissue structures are unremarkable.             Impression:      No radiographic evidence of acute cardiac or pulmonary disease.           This report was finalized on 9/26/2024 2:06 PM by Dr. Moses Otto MD.       CT Cervical Spine Without Contrast [[id number]] Collected: 09/26/24 1317     Updated: 09/26/24 1319    Narrative:      CT CERVICAL SPINE WO CONTRAST-     CLINICAL INDICATION: neck pain        COMPARISON: None available     TECHNIQUE: Axial images of the cervical spine were acquired with out any  intravenous contrast. Reformatted images were then created in the  sagittal and coronal planes.     DOSE:      Radiation dose reduction techniques were utilized per ALARA protocol.  Automated exposure control was initiated through either or [employer] or  [employer] software packages by  protocol.           FINDINGS:   The provided study demonstrates preservation of the vertebral body  heights in the sagittally reconstructed images.     There is no prevertebral soft tissue swelling.     Alignment is near anatomic.     The disc space heights are uniform.     I see no acute cervical spine fracture.       Impression:         1. No acute bony abnormality.     2. Other incidental findings as above     This report was finalized on 9/26/2024 1:17 PM by Dr. Moses Otto MD.       CT Lumbar Spine Without Contrast [[id number]] Collected: 09/26/24 1317     Updated: 09/26/24 1319    Narrative:      EXAM:    CT Lumbar Spine Without Intravenous Contrast     EXAM DATE:    9/26/2024 12:59 PM     CLINICAL HISTORY:    back pain     TECHNIQUE:    Axial computed tomography images of the lumbar spine without  intravenous contrast.  Sagittal and coronal reformatted images were  created and reviewed.  This CT exam was performed using one or more of  the following dose reduction techniques:  automated exposure control,  adjustment of the mA and/or kV according to patient size, and/or use of  iterative  reconstruction technique.     COMPARISON:    No relevant prior studies available.     FINDINGS:    VERTEBRAE:  Unremarkable.  No acute fracture.    DISCS/SPINAL CANAL/NEURAL FORAMINA:  No acute findings.  No spinal  canal stenosis.    SOFT TISSUES:  Unremarkable.       Impression:        No acute findings in the lumbar spine.        This report was finalized on 9/26/2024 1:17 PM by Dr. Moses Otto MD.       CT Thoracic Spine Without Contrast [[id number]] Collected: 09/26/24 1316     Updated: 09/26/24 1319    Narrative:      EXAM:    CT Thoracic Spine Without Intravenous Contrast     EXAM DATE:    9/26/2024 12:58 PM     CLINICAL HISTORY:    back pain     TECHNIQUE:    Axial computed tomography images of the thoracic spine without  intravenous contrast.  Sagittal and coronal reformatted images were  created and reviewed.  This CT exam was performed using one or more of  the following dose reduction techniques:  automated exposure control,  adjustment of the mA and/or kV according to patient size, and/or use of  iterative reconstruction technique.     COMPARISON:    No relevant prior studies available.     FINDINGS:    VERTEBRAE:  Unremarkable.  No acute fracture.    DISCS/SPINAL CANAL/NEURAL FORAMINA:  No acute findings.  No spinal  canal stenosis.    SOFT TISSUES:  Unremarkable.       Impression:        No acute findings in the thoracic spine.        This report was finalized on 9/26/2024 1:17 PM by Dr. Moses Otto MD.       CT Head Without Contrast [[id number]] Collected: 09/26/24 1259     Updated: 09/26/24 1302    Narrative:      CT HEAD WO CONTRAST-     CLINICAL INDICATION: weakness        COMPARISON: None available     TECHNIQUE: Axial images of the brain were obtained with out intravenous  contrast.  Reformatted images were created in the sagittal and coronal  planes.     DOSE:     Radiation dose reduction techniques were utilized per ALARA protocol.  Automated exposure control was initiated through either or  [employer] or  [employer] software packages by  protocol.        FINDINGS:    BRAIN: Probable subacute ischemia right middle cerebral artery  territory    VENTRICLES:  Unremarkable.  No ventriculomegaly.       BONES/JOINTS:  Unremarkable.  No acute fracture.       SOFT TISSUES:  Unremarkable.       SINUSES:  no air fluid levels       MASTOID AIR CELLS:  Unremarkable as visualized.  No mastoid effusion.          Impression:         1. Probable remote right middle cerebral artery infarction  2. No acute parenchymal mass, hemorrhage, or midline shift     This report was finalized on 9/26/2024 1:00 PM by Dr. Moses Otto MD.             I have personally looked at the radiology images and read the final radiology report.    Assessment & Plan    43F Morbid Obesity by BMI PMH History Genital Herpes, Cerebrovascular Accident 5/2024 complicated by L sided paralysis, presented to [facility] emergency room 9/26 after sliding into the floor off her chair due to weakness.     #Acute Metabolic Encephalopathy due to Acute Urinary Tract Infection in setting of probable neurogenic bladder  - Continue Ceftriaxone, follow up cultures, rule out STI    #Electrolyte Abnormalities  - Acute Mild Hypokalemia - Replacing, on protocol  - Acute Mild Hypomagnesemia - Replacing, on protocol    #History Cerebrovascular Accident complicated by L sided paralysis, unclear etiology  - Review home medications and resume as indicated, Supportive Care     #Debility  - Consult PT/OT, Social Work if placement needed     #History Genital Herpes  - Supportive Care, follow up medication reconciliation for any suppressive medications, check HIV & acute hepatitis panel     #Morbid Obesity by BMI  - Body mass index is 43.26 kg/m².; complicates all aspects of care    F: Oral  E: Monitor & Replace as needed   N: Regular Diet  PPx: SQH  Code Status (Patient has no pulse and is not breathing): CPR  Medical Interventions (Patient has pulse or  is breathing): Full Support     Dispo: Pending workup and clinical improvement    *This patient is considered high risk secondary to Urinary Tract Infection, electrolyte abnormalities, chronic L sided paralysis from prior Cerebrovascular Accident.      Edited by: Ramon Marc MD at 9/26/2024 1701    Ramon Marc MD  [facility]  09/26/24  17:01 EDT        Electronically signed by Ramon Marc MD at 09/26/24 1703       Lines, Drains & Airways       Active LDAs       Name Placement date Placement time Site Days    Midline Catheter - Single Lumen 09/30/24 Right Basilic 09/30/24  0955  -- 1    External Urinary Catheter 09/29/24  1500  --  1                  Current Facility-Administered Medications   Medication Dose Route Frequency Provider Last Rate Last Admin    acetaminophen (TYLENOL) tablet 650 mg  650 mg Oral Q6H PRN Ashleigh Nicholas PA-C   650 mg at 09/30/24 1008    acyclovir (ZOVIRAX) capsule 400 mg  400 mg Oral Nightly Ramon Marc MD   400 mg at 09/30/24 2124    aspirin chewable tablet 81 mg  81 mg Oral Daily Ramon Marc MD   81 mg at 10/01/24 0838    atorvastatin (LIPITOR) tablet 80 mg  80 mg Oral Daily Ramon Marc MD   80 mg at 10/01/24 0838    sennosides-docusate (PERICOLACE) 8.6-50 MG per tablet 2 tablet  2 tablet Oral BID PRN Ramon Marc MD        And    polyethylene glycol (MIRALAX) packet 17 g  17 g Oral Daily PRN Ramon Marc MD        And    bisacodyl (DULCOLAX) EC tablet 5 mg  5 mg Oral Daily PRN Ramon Marc MD        And    bisacodyl (DULCOLAX) suppository 10 mg  10 mg Rectal Daily PRN Ramon Marc MD        Calcium Replacement - Follow Nurse / BPA Driven Protocol   Does not apply PRN Ramon Marc MD        cyclobenzaprine (FLEXERIL) tablet 10 mg  10 mg Oral TID PRN Ramon Marc MD   10 mg at 10/01/24 1213    Diclofenac Sodium (VOLTAREN) 1 % gel 4 g  4 g Topical 4x Daily PRN Ashleigh Nicholas PA-C   4 g at 10/01/24 1213    DULoxetine (CYMBALTA)   capsule 60 mg  60 mg Oral Daily Ramon Marc MD   60 mg at 10/01/24 0838    ertapenem (INVanz) 1,000 mg in sodium chloride 0.9 % 100 mL IVPB-VTB  1,000 mg Intravenous Q24H Ramon Marc  mL/hr at 10/01/24 1408 1,000 mg at 10/01/24 1408    heparin (porcine) 5000 UNIT/ML injection 5,000 Units  5,000 Units Subcutaneous Q8H Ramon Marc MD   5,000 Units at 10/01/24 1213    lisinopril (PRINIVIL,ZESTRIL) tablet 20 mg  20 mg Oral Daily Ramon Marc MD   20 mg at 10/01/24 0838    loperamide (IMODIUM) capsule 2 mg  2 mg Oral 4x Daily PRN Marv Navas DO   2 mg at 10/01/24 1213    Magnesium Standard Dose Replacement - Follow Nurse / BPA Driven Protocol   Does not apply PRN Ramon Marc MD        [Held by provider] metFORMIN (GLUCOPHAGE) tablet 1,000 mg  1,000 mg Oral BID With Meals Ramon Marc MD        mupirocin (BACTROBAN) 2 % nasal ointment 1 Application  1 application  Each Nare BID Marv Navas DO   1 Application at 10/01/24 0839    nicotine (NICODERM CQ) 7 MG/24HR patch 1 patch  1 patch Transdermal Q24H Ramon Marc MD   1 patch at 10/01/24 0839    nystatin (MYCOSTATIN) powder   Topical Q12H Ramon Marc MD   Given at 10/01/24 0838    pantoprazole (PROTONIX) EC tablet 40 mg  40 mg Oral Daily Ramon Marc MD   40 mg at 10/01/24 0838    Pharmacy Consult   Does not apply Continuous PRN Ramon Marc MD        Phosphorus Replacement - Follow Nurse / BPA Driven Protocol   Does not apply PRN Ramon Marc MD        Potassium Replacement - Follow Nurse / BPA Driven Protocol   Does not apply PRN Ramon Marc MD        prochlorperazine (COMPAZINE) injection 2.5 mg  2.5 mg Intravenous Q6H PRN Ashleigh Nicholas PA-C   2.5 mg at 09/30/24 2124    sodium chloride 0.9 % flush 10 mL  10 mL Intravenous Q12H Ramon Marc MD   10 mL at 10/01/24 0838    sodium chloride 0.9 % flush 10 mL  10 mL Intravenous PRN Ramon Marc MD        sodium chloride 0.9 % flush 10 mL  10 mL Intravenous  Q12H Marv Navas DO   10 mL at 10/01/24 0838    sodium chloride 0.9 % flush 10 mL  10 mL Intravenous PRN Marv Navas DO        sodium chloride 0.9 % infusion 40 mL  40 mL Intravenous PRN Ramon Marc MD        sodium chloride 0.9 % infusion 40 mL  40 mL Intravenous PRN Marv Navas DO        sodium chloride 0.9 % infusion  75 mL/hr Intravenous Continuous Ramon Marc MD 75 mL/hr at 10/01/24 1406 75 mL/hr at 10/01/24 1406    traMADol (ULTRAM) tablet 50 mg  50 mg Oral Q8H PRN Ramon Marc MD   50 mg at 10/01/24 0618     Lab Results (most recent)       Procedure Component Value Units Date/Time    Blood Culture - Blood, Arm, Left [[id number]]  (Normal) Collected: 09/26/24 1350    Specimen: Blood from Arm, Left Updated: 10/01/24 1400     Blood Culture No growth at 5 days    Blood Culture - Blood, Arm, Right [[id number]]  (Normal) Collected: 09/26/24 1350    Specimen: Blood from Arm, Right Updated: 10/01/24 1400     Blood Culture No growth at 5 days    Basic Metabolic Panel [[id number]]  (Abnormal) Collected: 10/01/24 0117    Specimen: Blood Updated: 10/01/24 0215     Glucose 158 mg/dL      BUN 4 mg/dL      Creatinine 0.36 mg/dL      Sodium 141 mmol/L      Potassium 3.8 mmol/L      Chloride 110 mmol/L      CO2 22.8 mmol/L      Calcium 8.3 mg/dL      BUN/Creatinine Ratio 11.1     Anion Gap 8.2 mmol/L      eGFR 130.2 mL/min/1.73     Narrative:      GFR Normal >60  Chronic Kidney Disease <60  Kidney Failure <15      CBC (No Diff) [[id number]]  (Abnormal) Collected: 10/01/24 0117    Specimen: Blood Updated: 10/01/24 0157     WBC 7.71 10*3/mm3      RBC 3.44 10*6/mm3      Hemoglobin 10.9 g/dL      Hematocrit 33.9 %      MCV 98.5 fL      MCH 31.7 pg      MCHC 32.2 g/dL      RDW 14.2 %      RDW-SD 50.6 fl      MPV 9.1 fL      Platelets 353 10*3/mm3     Comprehensive Metabolic Panel [[id number]]  (Abnormal) Collected: 09/30/24 0117    Specimen: Blood Updated: 09/30/24 0202     Glucose 196  mg/dL      BUN 5 mg/dL      Creatinine 0.46 mg/dL      Sodium 142 mmol/L      Potassium 3.5 mmol/L      Chloride 110 mmol/L      CO2 22.8 mmol/L      Calcium 8.0 mg/dL      Total Protein 5.1 g/dL      Albumin 2.5 g/dL      ALT (SGPT) 7 U/L      AST (SGOT) 11 U/L      Alkaline Phosphatase 79 U/L      Total Bilirubin 0.2 mg/dL      Globulin 2.6 gm/dL      A/G Ratio 1.0 g/dL      BUN/Creatinine Ratio 10.9     Anion Gap 9.2 mmol/L      eGFR 122.7 mL/min/1.73     Narrative:      GFR Normal >60  Chronic Kidney Disease <60  Kidney Failure <15      CBC (No Diff) [[id number]]  (Abnormal) Collected: 09/30/24 0117    Specimen: Blood Updated: 09/30/24 0132     WBC 8.49 10*3/mm3      RBC 3.30 10*6/mm3      Hemoglobin 10.5 g/dL      Hematocrit 32.7 %      MCV 99.1 fL      MCH 31.8 pg      MCHC 32.1 g/dL      RDW 13.9 %      RDW-SD 50.3 fl      MPV 8.8 fL      Platelets 285 10*3/mm3     Comprehensive Metabolic Panel [[id number]]  (Abnormal) Collected: 09/29/24 0036    Specimen: Blood Updated: 09/29/24 0224     Glucose 192 mg/dL      BUN 4 mg/dL      Creatinine 0.40 mg/dL      Sodium 140 mmol/L      Potassium 3.9 mmol/L      Comment: Slight hemolysis detected by analyzer. Result may be falsely elevated.        Chloride 109 mmol/L      CO2 21.0 mmol/L      Calcium 8.2 mg/dL      Total Protein 5.3 g/dL      Albumin 2.5 g/dL      ALT (SGPT) 8 U/L      AST (SGOT) 15 U/L      Alkaline Phosphatase 88 U/L      Total Bilirubin 0.2 mg/dL      Globulin 2.8 gm/dL      A/G Ratio 0.9 g/dL      BUN/Creatinine Ratio 10.0     Anion Gap 10.0 mmol/L      eGFR 126.9 mL/min/1.73     Narrative:      GFR Normal >60  Chronic Kidney Disease <60  Kidney Failure <15      Urine Culture - Urine, Straight Cath [[id number]]  (Abnormal)  (Susceptibility) Collected: 09/26/24 1451    Specimen: Urine from Straight Cath Updated: 09/28/24 1408     Urine Culture >100,000 CFU/mL Escherichia coli ESBL    Narrative:      Colonization of the urinary tract without infection  is common. Treatment is discouraged unless the patient is symptomatic, pregnant, or undergoing an invasive urologic procedure.  Recent outcomes data supports the use of pip/tazo in the treatment of susceptible ESBL infections for uncomplicated UTI. Consider use of pip/tazo as a carbapenem-sparing regimen in applicable patients.    Susceptibility        Escherichia coli ESBL      LASHAWN      Ertapenem Susceptible      Gentamicin Susceptible      Levofloxacin Intermediate      Meropenem Susceptible      Nitrofurantoin Susceptible      Piperacillin + Tazobactam Susceptible      Trimethoprim + Sulfamethoxazole Resistant                           Potassium [[id number]]  (Normal) Collected: 09/27/24 1904    Specimen: Blood Updated: 09/27/24 1920     Potassium 4.0 mmol/L      Comment: Slight hemolysis detected by analyzer. Result may be falsely elevated.       Chlamydia trachomatis, Neisseria gonorrhoeae, Trichomonas vaginalis, PCR - Swab, Vagina [[id number]]  (Normal) Collected: 09/26/24 1837    Specimen: Swab from Vagina Updated: 09/26/24 2018     Chlamydia DNA by PCR Not Detected     Neisseria gonorrhoeae by PCR Not Detected     Trichomonas vaginalis PCR Not Detected    HIV-1 & HIV-2 Antibodies [[id number]]  (Normal) Collected: 09/26/24 1350    Specimen: Blood from Arm, Right Updated: 09/26/24 1748    Narrative:      The following orders were created for panel order HIV-1 & HIV-2 Antibodies.  Procedure                               Abnormality         Status                     ---------                               -----------         ------                     HIV-1 / O / 2 Ag / Antibody[[id number]]  Normal              Final result                 Please view results for these tests on the individual orders.    Hepatitis Panel, Acute [[id number]]  (Normal) Collected: 09/26/24 1350    Specimen: Blood from Arm, Right Updated: 09/26/24 1748     Hepatitis B Surface Ag Non-Reactive     Hep A IgM Non-Reactive     Hep B C IgM  Non-Reactive     Hepatitis C Ab Non-Reactive    Narrative:      Results may be falsely decreased if patient taking Biotin.     HIV-1 / O / 2 Ag / Antibody [[id number]]  (Normal) Collected: 09/26/24 1350    Specimen: Blood from Arm, Right Updated: 09/26/24 1748     HIV-1/ HIV-2 Non-Reactive     Comment: A non-reactive test result does not preclude the possibility of exposure to HIV or infection with HIV. An antibody response to recent exposure may take several months to reach detectable levels.       Narrative:      The HIV antibody/antigen combo assay is a qualitative assay for HIV that includes the p24 antigen as well as antibodies to HIV types 1 and 2. This test is intended to be used as a screening assay in the diagnosis of HIV infection in patients over the age of 2.    Magnesium [[id number]]  (Abnormal) Collected: 09/26/24 1504    Specimen: Blood from Arm, Right Updated: 09/26/24 1553     Magnesium 1.5 mg/dL     C-reactive Protein [[id number]]  (Abnormal) Collected: 09/26/24 1504    Specimen: Blood from Arm, Right Updated: 09/26/24 1530     C-Reactive Protein 3.80 mg/dL     Urinalysis, Microscopic Only - Urine, Clean Catch [[id number]]  (Abnormal) Collected: 09/26/24 1419    Specimen: Urine, Clean Catch Updated: 09/26/24 1459     RBC, UA 0-2 /HPF      WBC, UA 3-5 /HPF      Bacteria, UA 4+ /HPF      Squamous Epithelial Cells, UA None Seen /HPF      Hyaline Casts, UA None Seen /LPF      Methodology Manual Light Microscopy    Urinalysis With Microscopic If Indicated (No Culture) - Urine, Clean Catch [[id number]]  (Abnormal) Collected: 09/26/24 1419    Specimen: Urine, Clean Catch Updated: 09/26/24 1440     Color, UA Dark Yellow     Appearance, UA Turbid     pH, UA 6.0     Specific Gravity, UA >=1.030     Glucose, UA Negative     Ketones, UA 15 mg/dL (1+)     Bilirubin, UA Small (1+)     Blood, UA Trace     Protein, UA 30 mg/dL (1+)     Leuk Esterase, UA Moderate (2+)     Nitrite, UA Positive     Urobilinogen, UA 2.0  E.U./dL    hCG, Serum, Qualitative [[id number]]  (Normal) Collected: 09/26/24 1350    Specimen: Blood from Arm, Right Updated: 09/26/24 1425     HCG Qualitative Negative    Lactic Acid, Plasma [[id number]]  (Normal) Collected: 09/26/24 1350    Specimen: Blood from Arm, Right Updated: 09/26/24 1424     Lactate 1.1 mmol/L     CBC & Differential [[id number]]  (Abnormal) Collected: 09/26/24 1350    Specimen: Blood from Arm, Right Updated: 09/26/24 1403    Narrative:      The following orders were created for panel order CBC & Differential.  Procedure                               Abnormality         Status                     ---------                               -----------         ------                     CBC Auto Differential[[id number]]        Abnormal            Final result                 Please view results for these tests on the individual orders.    CBC Auto Differential [[id number]]  (Abnormal) Collected: 09/26/24 1350    Specimen: Blood from Arm, Right Updated: 09/26/24 1403     WBC 10.42 10*3/mm3      RBC 3.98 10*6/mm3      Hemoglobin 12.5 g/dL      Hematocrit 37.4 %      MCV 94.0 fL      MCH 31.4 pg      MCHC 33.4 g/dL      RDW 13.6 %      RDW-SD 46.9 fl      MPV 9.2 fL      Platelets 402 10*3/mm3      Neutrophil % 66.1 %      Lymphocyte % 28.8 %      Monocyte % 3.7 %      Eosinophil % 0.5 %      Basophil % 0.2 %      Immature Grans % 0.7 %      Neutrophils, Absolute 6.89 10*3/mm3      Lymphocytes, Absolute 3.00 10*3/mm3      Monocytes, Absolute 0.39 10*3/mm3      Eosinophils, Absolute 0.05 10*3/mm3      Basophils, Absolute 0.02 10*3/mm3      Immature Grans, Absolute 0.07 10*3/mm3      nRBC 0.0 /100 WBC     COVID-19 and FLU A/B PCR, 1 HR TAT - Swab, Nasopharynx [[id number]]  (Normal) Collected: 09/26/24 1326    Specimen: Swab from Nasopharynx Updated: 09/26/24 1353     COVID19 Not Detected     Influenza A PCR Not Detected     Influenza B PCR Not Detected    Narrative:      Fact sheet for providers:  https://www.fda.gov/media/114692/download    Fact sheet for patients: https://www.fda.gov/media/266695/download    Test performed by PCR.          Orders (last 24 hrs)        Start     Ordered    10/01/24 1123  [facility]patient [facility] Referral Consult  Once        Provider:  (Not yet assigned)    10/01/24 1122    10/01/24 0924  loperamide (IMODIUM) capsule 2 mg  4 Times Daily PRN         10/01/24 0924    10/01/24 0700  loperamide (IMODIUM) capsule 2 mg  Once         10/01/24 0614    10/01/24 0600  CBC (No Diff)  Morning Draw         09/30/24 1839    10/01/24 0600  Basic Metabolic Panel  Morning Draw         09/30/24 1839    09/30/24 1100  sodium chloride 0.9 % flush 10 mL  Every 12 Hours Scheduled         09/30/24 1004    09/30/24 1100  mupirocin (BACTROBAN) 2 % nasal ointment 1 Application  2 Times Daily         09/30/24 1004    09/30/24 1004  sodium chloride 0.9 % flush 10 mL  As Needed         09/30/24 1004    09/30/24 1004  sodium chloride 0.9 % infusion 40 mL  As Needed         09/30/24 1004    09/30/24 0104  Silicone Border Dressing to Bony Prominences  Every Shift       09/30/24 0105    09/29/24 1100  nystatin (MYCOSTATIN) powder  Every 12 Hours Scheduled         09/29/24 0948    09/29/24 0930  traMADol (ULTRAM) tablet 50 mg  Every 8 Hours PRN         09/29/24 0930    09/28/24 1800  Dietary Nutrition Supplements Boost Plus (Ensure Enlive, Ensure Plus)  Daily With Breakfast & Dinner       09/28/24 1209    09/28/24 1500  ertapenem (INVanz) 1,000 mg in sodium chloride 0.9 % 100 mL IVPB-VTB  Every 24 Hours         09/28/24 1427    09/27/24 2100  acyclovir (ZOVIRAX) capsule 400 mg  Nightly         09/27/24 0921    09/27/24 0737  Pharmacy Consult  Continuous PRN         09/27/24 0738    09/27/24 0103  acetaminophen (TYLENOL) tablet 650 mg  Every 6 Hours PRN         09/27/24 0105    09/27/24 0013  Diclofenac Sodium (VOLTAREN) 1 % gel 4 g  4 Times Daily PRN         09/27/24 0013    09/26/24 2200  heparin  (porcine) 5000 UNIT/ML injection 5,000 Units  Every 8 Hours Scheduled         09/26/24 1747 09/26/24 2149  prochlorperazine (COMPAZINE) injection 2.5 mg  Every 6 Hours PRN         09/26/24 2149 09/26/24 2100  sodium chloride 0.9 % flush 10 mL  Every 12 Hours Scheduled         09/26/24 1747 09/26/24 2000  Vital Signs  Every 4 Hours       09/26/24 1747 09/26/24 1845  sodium chloride 0.9 % infusion  Continuous         09/26/24 1747 09/26/24 1845  aspirin chewable tablet 81 mg  Daily         09/26/24 1747 09/26/24 1845  atorvastatin (LIPITOR) tablet 80 mg  Daily         09/26/24 1747 09/26/24 1845  DULoxetine (CYMBALTA) DR capsule 60 mg  Daily         09/26/24 1747 09/26/24 1845  lisinopril (PRINIVIL,ZESTRIL) tablet 20 mg  Daily         09/26/24 1747 09/26/24 1845  pantoprazole (PROTONIX) EC tablet 40 mg  Daily         09/26/24 1747 09/26/24 1845  [Held by provider]  metFORMIN (GLUCOPHAGE) tablet 1,000 mg  2 Times Daily With Meals        (On hold since Thu 9/26/2024 at 1747 until manually unheld; held by Ramon Marc MDHold Reason: Other (Comment Required))    09/26/24 1747 09/26/24 1845  nicotine (NICODERM CQ) 7 MG/24HR patch 1 patch  Every 24 Hours Scheduled         09/26/24 1747 09/26/24 1800  Oral Care  2 Times Daily       09/26/24 1747 09/26/24 1748  Intake & Output  Every Shift       09/26/24 1747 09/26/24 1747  sodium chloride 0.9 % flush 10 mL  As Needed         09/26/24 1747 09/26/24 1747  sodium chloride 0.9 % infusion 40 mL  As Needed         09/26/24 1747 09/26/24 1747  Potassium Replacement - Follow Nurse / BPA Driven Protocol  As Needed         09/26/24 1747 09/26/24 1747  Magnesium Standard Dose Replacement - Follow Nurse / BPA Driven Protocol  As Needed         09/26/24 1747 09/26/24 1747  Phosphorus Replacement - Follow Nurse / BPA Driven Protocol  As Needed         09/26/24 1747 09/26/24 1747  Calcium Replacement - Follow Nurse / BPA Driven  "Protocol  As Needed         09/26/24 1747 09/26/24 1747  sennosides-docusate (PERICOLACE) 8.6-50 MG per tablet 2 tablet  2 Times Daily PRN        Placed in \"And\" Linked Group    09/26/24 1747    09/26/24 1747  polyethylene glycol (MIRALAX) packet 17 g  Daily PRN        Placed in \"And\" Linked Group    09/26/24 1747    09/26/24 1747  bisacodyl (DULCOLAX) EC tablet 5 mg  Daily PRN        Placed in \"And\" Linked Group    09/26/24 1747    09/26/24 1747  bisacodyl (DULCOLAX) suppository 10 mg  Daily PRN        Placed in \"And\" Linked Group    09/26/24 1747 09/26/24 1747  cyclobenzaprine (FLEXERIL) tablet 10 mg  3 Times Daily PRN         09/26/24 1747    Unscheduled  Straight cath  As Needed       09/26/24 1359    Unscheduled  Potassium  As Needed        Comments: Release/collect/run 4 hours after completion of last potassium dose.     Placed in \"And\" Linked Group    09/26/24 1545    Unscheduled  Stage II Pressure Ulcer Care PRN  As Needed      Comments: - Gently Cleanse With Normal Saline  - Cover With Silicone Border Dressing (If Indicated)  - For Frequent Incontinence - Apply Skin Protective Barrier Cream Rather Than Silicone Border Dressing    09/29/24 1139    Unscheduled  Wound Care  As Needed       09/30/24 0105    Unscheduled  Unstageable Pressure Ulcer (Wet) Care PRN  As Needed      Comments: - Gently Cleanse With Normal Saline   - Pack Loosely With Moist to Moist Normal Saline Fluffed Gauze   - Cover With Silicone Border Dressing or Dry Dressing    09/30/24 0105    Unscheduled  Change Dressing to IV Site As Needed When Damp, Loose or Soiled  As Needed       09/30/24 1004    Unscheduled  Change Needleless Connectors  As Needed      Comments: Change Needleless Connectors When:  - Administration Set Changed  - Dressing Changed  - Removed For Any Reason  - Residual Blood or Debris Within Connector  - Prior to Drawing Blood Cultures  - Contamination of Connector  - After Administration of Blood or Blood Components "    24 1004    --  aspirin 81 MG chewable tablet  Daily         24    --  atorvastatin (LIPITOR) 80 MG tablet  Daily         24    --  cyclobenzaprine (FLEXERIL) 10 MG tablet  3 Times Daily PRN         24    --  DULoxetine (CYMBALTA) 60 MG capsule  Daily         24    --  lisinopril (PRINIVIL,ZESTRIL) 20 MG tablet  Daily         24    --  metFORMIN (GLUCOPHAGE) 1000 MG tablet  2 Times Daily With Meals         24    --  pantoprazole (PROTONIX) 40 MG EC tablet  Daily         24    --  acyclovir (ZOVIRAX) 400 MG tablet  Nightly         24 0920                     Physician Progress Notes (most recent note)        Cuca Yuen APRN at 10/01/24 1001                     PROGRESS NOTE         Patient Identification:  Name:  Lety Johnson  Age:  43 y.o.  Sex:  female  :  1980  MRN:  [id number]  Visit Number:  [id number]  Primary Care Provider:  Provider, No Known         LOS: 3 days       ----------------------------------------------------------------------------------------------------------------------  Subjective       Chief Complaints:    Fall        Interval History:      Patient resting in bed this morning.  Primary RN at bedside.  Primary RN reports patient having 2 liquid bowel movements.  Afebrile.  Lungs clear to auscultation bilaterally.  WBC normal at 7.71.    Review of Systems:    Constitutional: no fever, chills and night sweats.  Generalized fatigue.  Eyes: no eye drainage, itching or redness.  HEENT: no mouth sores, dysphagia or nose bleed.  Respiratory: no for shortness of breath, cough or production of sputum.  Cardiovascular: no chest pain, no palpitations, no orthopnea.  Gastrointestinal: no nausea, vomiting or diarrhea. No abdominal pain, hematemesis or rectal bleeding.  Genitourinary: no dysuria or polyuria.  Hematologic/lymphatic: no lymph node abnormalities, no easy bruising or easy  bleeding.  Musculoskeletal: no muscle or joint pain.  Skin: No rash and no itching.  Neurological: no loss of consciousness, no seizure, no headache.  Behavioral/Psych: no depression or suicidal ideation.  Endocrine: no hot flashes.  Immunologic: negative.    ----------------------------------------------------------------------------------------------------------------------      Objective       Current [facility] Meds:  acyclovir, 400 mg, Oral, Nightly  aspirin, 81 mg, Oral, Daily  atorvastatin, 80 mg, Oral, Daily  DULoxetine, 60 mg, Oral, Daily  ertapenem, 1,000 mg, Intravenous, Q24H  heparin (porcine), 5,000 Units, Subcutaneous, Q8H  lisinopril, 20 mg, Oral, Daily  [Held by provider] metFORMIN, 1,000 mg, Oral, BID With Meals  mupirocin, 1 application , Each Nare, BID  nicotine, 1 patch, Transdermal, Q24H  nystatin, , Topical, Q12H  pantoprazole, 40 mg, Oral, Daily  sodium chloride, 10 mL, Intravenous, Q12H  sodium chloride, 10 mL, Intravenous, Q12H      Pharmacy Consult,   sodium chloride, 75 mL/hr, Last Rate: 75 mL/hr (09/30/24 0925)      ----------------------------------------------------------------------------------------------------------------------    Vital Signs:  Temp:  [98.1 °F (36.7 °C)-98.8 °F (37.1 °C)] 98.4 °F (36.9 °C)  Heart Rate:  [101-107] 103  Resp:  [18-20] 18  BP: (147-179)/(87-99) 160/94  Mean Arterial Pressure (Non-Invasive) for the past 24 hrs (Last 3 readings):   Noninvasive MAP (mmHg)   10/01/24 0730 111   10/01/24 0327 110   10/01/24 0013 105     SpO2 Percentage    10/01/24 0013 10/01/24 0327 10/01/24 0730   SpO2: 95% 95% 94%     SpO2:  [94 %-97 %] 94 %  on   ;   Device (Oxygen Therapy): room air    Body mass index is 38.63 kg/m².  Wt Readings from Last 3 Encounters:   10/01/24 107 kg (235 lb 11.2 oz)   06/27/24 120 kg (264 lb)        Intake/Output Summary (Last 24 hours) at 10/1/2024 1001  Last data filed at 10/1/2024 0530  Gross per 24 hour   Intake 360 ml   Output 2550 ml   Net -2190  ml     Diet: Regular/House; Fluid Consistency: Thin (IDDSI 0)  ----------------------------------------------------------------------------------------------------------------------      Physical Exam:    Constitutional:  Well-developed and well-nourished.  No respiratory distress.  On room air.  Primary RN at bedside.  HENT:  Head: Normocephalic and atraumatic.  Mouth:  Moist mucous membranes.    Eyes:  Conjunctivae and EOM are normal.  No scleral icterus.  Neck:  Neck supple.  No JVD present.    Cardiovascular:  Normal rate, regular rhythm and normal heart sounds with no murmur. No edema.  Pulmonary/Chest:  No respiratory distress, no wheezes, no crackles, with normal breath sounds and good air movement.  Abdominal:  Soft.  Bowel sounds are normal.  No distension and no tenderness.   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  Neurological:  Alert and oriented to person, place, and time.  No facial droop.  No slurred speech.   Skin:  Skin is warm and dry.  No rash noted.  No pallor.   Psychiatric:  Normal mood and affect.  Behavior is normal.        ----------------------------------------------------------------------------------------------------------------------            Results from last 7 days   Lab Units 10/01/24  0117 09/30/24  0117 09/29/24  0036 09/27/24  0844 09/26/24  1504 09/26/24  1350   CRP mg/dL  --   --   --   --  3.80*  --    LACTATE mmol/L  --   --   --   --   --  1.1   WBC 10*3/mm3 7.71 8.49 7.24   < >  --  10.42   HEMOGLOBIN g/dL 10.9* 10.5* 10.7*   < >  --  12.5   HEMATOCRIT % 33.9* 32.7* 33.6*   < >  --  37.4   MCV fL 98.5* 99.1* 100.6*   < >  --  94.0   MCHC g/dL 32.2 32.1 31.8   < >  --  33.4   PLATELETS 10*3/mm3 353 285 318   < >  --  402    < > = values in this interval not displayed.     Results from last 7 days   Lab Units 10/01/24  0117 09/30/24  0117 09/29/24  0036 09/28/24  0318 09/27/24  0844 09/26/24  1504   SODIUM mmol/L 141 142 140 142   < > 140   POTASSIUM mmol/L  "3.8 3.5 3.9 4.1   < > 2.7*   MAGNESIUM mg/dL  --   --   --   --   --  1.5*   CHLORIDE mmol/L 110* 110* 109* 110*   < > 99   CO2 mmol/L 22.8 22.8 21.0* 22.3   < > 28.9   BUN mg/dL 4* 5* 4* 5*   < > 10   CREATININE mg/dL 0.36* 0.46* 0.40* 0.43*   < > 0.51*   CALCIUM mg/dL 8.3* 8.0* 8.2* 8.5*   < > 9.4   GLUCOSE mg/dL 158* 196* 192* 198*   < > 220*   ALBUMIN g/dL  --  2.5* 2.5* 2.7*   < > 3.5   BILIRUBIN mg/dL  --  0.2 0.2 0.3   < > 0.6   ALK PHOS U/L  --  79 88 100   < > 119*   AST (SGOT) U/L  --  11 15 15   < > 21   ALT (SGPT) U/L  --  7 8 9   < > 8    < > = values in this interval not displayed.   Estimated Creatinine Clearance: 249.7 mL/min (A) (by C-G formula based on SCr of 0.36 mg/dL (L)).  No results found for: \"AMMONIA\"    No results found for: \"HGBA1C\", \"POCGLU\"  No results found for: \"HGBA1C\"  No results found for: \"TSH\", \"FREET4\"    Blood Culture   Date Value Ref Range Status   09/26/2024 No growth at 2 days  Preliminary   09/26/2024 No growth at 2 days  Preliminary     Urine Culture   Date Value Ref Range Status   09/26/2024 >100,000 CFU/mL Escherichia coli ESBL (A)  Final     No results found for: \"WOUNDCX\"  No results found for: \"STOOLCX\"  No results found for: \"RESPCX\"  Pain Management Panel           No data to display                  ----------------------------------------------------------------------------------------------------------------------  Imaging Results (Last 24 Hours)       ** No results found for the last 24 hours. **            ----------------------------------------------------------------------------------------------------------------------    Pertinent Infectious Disease Results                Assessment/Plan       Assessment       ESBL UTI       Plan          Patient resting in bed this morning.  Primary RN at bedside.  Primary RN reports patient having 2 liquid bowel movements.  Afebrile.  Lungs clear to auscultation bilaterally.  WBC normal at 7.71.    Recommend to continue " current antibiotic regimen of ertapenem 1 g IV every 24 hours x 7 days for treatment of ESBL UTI.  Upon discharge patient can be further de-escalated to Macrobid 100 mg p.o. twice daily to complete antibiotic course. Okay to continue home suppressive acyclovir for history of genital herpes.  Will continue to monitor closely patient's diarrhea.  We will continue to follow closely and adjust antibiotic therapy as needed.     ANTIMICROBIAL THERAPY    acyclovir - 200 MG, 400 MG  ertapenem (INVanz) 1000 mg in 100 mL NS (VTB)     Code Status:   Code Status and Medical Interventions: CPR (Attempt to Resuscitate); Full Support   Ordered at: 24 1626     Code Status (Patient has no pulse and is not breathing):    CPR (Attempt to Resuscitate)     Medical Interventions (Patient has pulse or is breathing):    Full Support       YUE Rivera  10/01/24  10:01 EDT    Electronically signed by Cuca Yuen APRN at 10/01/24 1006          Consult Notes (most recent note)        Cuca Yuen APRN at 24 1436        Consult Orders    1. Inpatient Infectious Diseases Consult [[id number]] ordered by Ramon Marc MD at 24 1427                         INFECTIOUS DISEASE CONSULTATION REPORT        Patient Identification:  Name:  Lety Johnson  Age:  43 y.o.  Sex:  female  :  1980  MRN:  [id number]   Visit Number:  [id number]  Primary Care Physician:  Provider, No Known        Referring Provider: Dr. Marc    Reason for consult: ESBL UTI       LOS: 0 days        Subjective       Subjective     History of present illness:      Thank you Dr. Marc for allowing us to participate in the care of your patient.  As you well know, Ms. Lety Johnson is a 43 y.o. 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  WBC  7.38.  Chlamydia gonorrhea and trichomonas negative.  CRP 3.80.  Urinalysis positive with culture finalizing is greater than 100,000 colonies of E. coli ESBL.  Blood cultures from 9/26/2024 show no growth thus far.  HIV 1 and 2 nonreactive.  Hepatitis panel nonreactive.  COVID-19 and influenza PCR negative.  Lactic acid normal on admission.    Patient resting in bed.  Denies dysuria, urgency, frequency.  Patient reports recurrent yeast infections.  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  Lungs clear to auscultation bilaterally.      Infectious Disease consultation was requested for antimicrobial management.      ---------------------------------------------------------------------------------------------------------------------     Review Of Systems:    Constitutional: no fever, chills and night sweats. No appetite change or unexpected weight change. No fatigue.  Eyes: no eye drainage, itching or redness.  HEENT: no mouth sores, dysphagia or nose bleed.  Respiratory: no for shortness of breath, cough or production of sputum.  Cardiovascular: no chest pain, no palpitations, no orthopnea.  Gastrointestinal: no nausea, vomiting or diarrhea. No abdominal pain, hematemesis or rectal bleeding.  Genitourinary: no dysuria or polyuria.  Hematologic/lymphatic: no lymph node abnormalities, no easy bruising or easy bleeding.  Musculoskeletal: no muscle or joint pain.  Skin: No rash and no itching.  Neurological: no loss of consciousness, no seizure, no headache.  Behavioral/Psych: no depression or suicidal ideation.  Endocrine: no hot flashes.  Immunologic: negative.    ---------------------------------------------------------------------------------------------------------------------     Past Medical History    Past Medical History:   Diagnosis Date    Stroke        Past Surgical History    History reviewed. No pertinent surgical history.    Family History    History reviewed. No pertinent family history.    Social  History    Social History     Tobacco Use    Smoking status: Every Day     Average packs/day: 2.0 packs/day for 26.0 years (52.0 ttl pk-yrs)     Types: Cigarettes     Start date: 1998    Smokeless tobacco: Never   Vaping Use    Vaping status: Never Used   Substance Use Topics    Alcohol use: Not Currently    Drug use: Never       Allergies    Patient has no known allergies.  ---------------------------------------------------------------------------------------------------------------------     Home Medications:    Prior to Admission Medications       Prescriptions Last Dose Informant Patient Reported? Taking?    acyclovir (ZOVIRAX) 400 MG tablet  Pharmacy Yes No    Take 1 tablet by mouth Every Night.    aspirin 81 MG chewable tablet Unknown  Yes No    Chew 1 tablet Daily.    atorvastatin (LIPITOR) 80 MG tablet Unknown  Yes No    Take 1 tablet by mouth Daily.    cyclobenzaprine (FLEXERIL) 10 MG tablet Unknown  Yes No    Take 1 tablet by mouth 3 (Three) Times a Day As Needed for Muscle Spasms.    DULoxetine (CYMBALTA) 60 MG capsule Unknown  Yes No    Take 1 capsule by mouth Daily.    lisinopril (PRINIVIL,ZESTRIL) 20 MG tablet Unknown  Yes No    Take 1 tablet by mouth Daily.    metFORMIN (GLUCOPHAGE) 1000 MG tablet Unknown  Yes No    Take 1 tablet by mouth 2 (Two) Times a Day With Meals.    pantoprazole (PROTONIX) 40 MG EC tablet Unknown  Yes No    Take 1 tablet by mouth Daily.          ---------------------------------------------------------------------------------------------------------------------    Objective       Objective     Hospital Scheduled Meds:  acyclovir, 400 mg, Oral, Nightly  aspirin, 81 mg, Oral, Daily  atorvastatin, 80 mg, Oral, Daily  DULoxetine, 60 mg, Oral, Daily  ertapenem, 1,000 mg, Intravenous, Q24H  heparin (porcine), 5,000 Units, Subcutaneous, Q8H  lisinopril, 20 mg, Oral, Daily  [Held by provider] metFORMIN, 1,000 mg, Oral, BID With Meals  nicotine, 1 patch, Transdermal,  Q24H  pantoprazole, 40 mg, Oral, Daily  sodium chloride, 10 mL, Intravenous, Q12H      Pharmacy Consult,   sodium chloride, 75 mL/hr, Last Rate: 75 mL/hr (09/28/24 1315)      ---------------------------------------------------------------------------------------------------------------------   Vital Signs:  Temp:  [97.5 °F (36.4 °C)-98.4 °F (36.9 °C)] 98.4 °F (36.9 °C)  Heart Rate:  [] 58  Resp:  [18-20] 18  BP: (133-172)/(71-86) 172/83  Mean Arterial Pressure (Non-Invasive) for the past 24 hrs (Last 3 readings):   Noninvasive MAP (mmHg)   09/28/24 1100 107   09/28/24 0650 92   09/28/24 0307 103     SpO2 Percentage    09/28/24 0307 09/28/24 0650 09/28/24 1100   SpO2: 98% 96% 97%     SpO2:  [96 %-98 %] 97 %  on   ;   Device (Oxygen Therapy): room air    Body mass index is 40.79 kg/m².  Wt Readings from Last 3 Encounters:   09/28/24 113 kg (248 lb 14.4 oz)   06/27/24 120 kg (264 lb)     ---------------------------------------------------------------------------------------------------------------------     Physical Exam:    Constitutional:  Well-developed and well-nourished.  No respiratory distress.      HENT:  Head: Normocephalic and atraumatic.  Mouth:  Moist mucous membranes.    Eyes:  Conjunctivae and EOM are normal.  No scleral icterus.  Neck:  Neck supple.  No JVD present.    Cardiovascular:  Normal rate, regular rhythm and normal heart sounds with no murmur. No edema.  Pulmonary/Chest:  No respiratory distress, no wheezes, no crackles, with normal breath sounds and good air movement.  Abdominal:  Soft.  Bowel sounds are normal.  No distension and no tenderness.   Musculoskeletal: Left sided paralysis secondary to CVA.  Neurological:  Alert and oriented to person, place, and time.  No facial droop.  No slurred speech.   Skin:  Skin is warm and dry.  No rash noted.  No pallor.   Psychiatric:  Normal mood and affect.  Behavior is  "normal.    ---------------------------------------------------------------------------------------------------------------------              Results from last 7 days   Lab Units 09/28/24 0318 09/27/24  0844 09/26/24  1504 09/26/24  1350   CRP mg/dL  --   --  3.80*  --    LACTATE mmol/L  --   --   --  1.1   WBC 10*3/mm3 7.38 9.67  --  10.42   HEMOGLOBIN g/dL 11.8* 10.9*  --  12.5   HEMATOCRIT % 36.9 34.8  --  37.4   MCV fL 98.9* 102.4*  --  94.0   MCHC g/dL 32.0 31.3*  --  33.4   PLATELETS 10*3/mm3 332 292  --  402     Results from last 7 days   Lab Units 09/28/24 0318 09/27/24  1904 09/27/24  0844 09/26/24  1504   SODIUM mmol/L 142  --  137 140   POTASSIUM mmol/L 4.1 4.0 3.5 2.7*   MAGNESIUM mg/dL  --   --   --  1.5*   CHLORIDE mmol/L 110*  --  105 99   CO2 mmol/L 22.3  --  21.5* 28.9   BUN mg/dL 5*  --  7 10   CREATININE mg/dL 0.43*  --  0.36* 0.51*   CALCIUM mg/dL 8.5*  --  7.9* 9.4   GLUCOSE mg/dL 198*  --  234* 220*   ALBUMIN g/dL 2.7*  --  2.5* 3.5   BILIRUBIN mg/dL 0.3  --  0.3 0.6   ALK PHOS U/L 100  --  96 119*   AST (SGOT) U/L 15  --  17 21   ALT (SGPT) U/L 9  --  7 8   Estimated Creatinine Clearance: 215.5 mL/min (A) (by C-G formula based on SCr of 0.43 mg/dL (L)).  No results found for: \"AMMONIA\"    No results found for: \"HGBA1C\", \"POCGLU\"  No results found for: \"HGBA1C\"  No results found for: \"TSH\", \"FREET4\"    Blood Culture   Date Value Ref Range Status   09/26/2024 No growth at 2 days  Preliminary   09/26/2024 No growth at 2 days  Preliminary     Urine Culture   Date Value Ref Range Status   09/26/2024 >100,000 CFU/mL Escherichia coli ESBL (A)  Final     No results found for: \"WOUNDCX\"  No results found for: \"STOOLCX\"  No results found for: \"RESPCX\"  Pain Management Panel           No data to display              I have personally reviewed the above laboratory results.   ---------------------------------------------------------------------------------------------------------------------  Imaging " Results (Last 7 Days)       Procedure Component Value Units Date/Time    XR Ankle 3+ View Left [[id number]] Collected: 09/26/24 1537     Updated: 09/26/24 1540    Narrative:      EXAM:    XR Left Ankle Complete, 3 or More Views     EXAM DATE:    9/26/2024 3:17 PM     CLINICAL HISTORY:    left ankle injury     TECHNIQUE:    Frontal, lateral and oblique views of the left ankle.     COMPARISON:    No relevant prior studies available.     FINDINGS:    Bones/joints:  See below.      Soft tissues:  Soft tissue swelling, but no acute fracture or  dislocation.       Impression:        Soft tissue swelling, but no acute fracture or dislocation.        This report was finalized on 9/26/2024 3:38 PM by Dr. Msoes Otto MD.       XR Foot 3+ View Left [[id number]] Collected: 09/26/24 1536     Updated: 09/26/24 1539    Narrative:      EXAM:    XR Left Foot Complete, 3 or More Views     EXAM DATE:    9/26/2024 3:16 PM     CLINICAL HISTORY:    left foot wound     TECHNIQUE:    Frontal, lateral and oblique views of the left foot.     COMPARISON:    No relevant prior studies available.     FINDINGS:    Bones/joints:  Unremarkable.  No acute fracture.  No dislocation.    Soft tissues:  Unremarkable.  No radiopaque foreign body.       Impression:        No acute findings in the left foot.        This report was finalized on 9/26/2024 3:37 PM by Dr. Moses Otto MD.       XR Chest 1 View [[id number]] Collected: 09/26/24 1406     Updated: 09/26/24 1408    Narrative:      XR CHEST 1 VW-     CLINICAL INDICATION: weakness        COMPARISON: None immediately available      TECHNIQUE: Single frontal view of the chest.     FINDINGS:     LUNGS: Lungs are adequately aerated.      HEART AND MEDIASTINUM: Heart and mediastinal contours are unremarkable        SKELETON: Bony and soft tissue structures are unremarkable.             Impression:      No radiographic evidence of acute cardiac or pulmonary disease.           This report was finalized on  9/26/2024 2:06 PM by Dr. Moses Otto MD.       CT Cervical Spine Without Contrast [[id number]] Collected: 09/26/24 1317     Updated: 09/26/24 1319    Narrative:      CT CERVICAL SPINE WO CONTRAST-     CLINICAL INDICATION: neck pain        COMPARISON: None available     TECHNIQUE: Axial images of the cervical spine were acquired with out any  intravenous contrast. Reformatted images were then created in the  sagittal and coronal planes.     DOSE:      Radiation dose reduction techniques were utilized per ALARA protocol.  Automated exposure control was initiated through either or [employer] or  [employer] software packages by  protocol.           FINDINGS:   The provided study demonstrates preservation of the vertebral body  heights in the sagittally reconstructed images.     There is no prevertebral soft tissue swelling.     Alignment is near anatomic.     The disc space heights are uniform.     I see no acute cervical spine fracture.       Impression:         1. No acute bony abnormality.     2. Other incidental findings as above     This report was finalized on 9/26/2024 1:17 PM by Dr. Moses Otto MD.       CT Lumbar Spine Without Contrast [[id number]] Collected: 09/26/24 1317     Updated: 09/26/24 1319    Narrative:      EXAM:    CT Lumbar Spine Without Intravenous Contrast     EXAM DATE:    9/26/2024 12:59 PM     CLINICAL HISTORY:    back pain     TECHNIQUE:    Axial computed tomography images of the lumbar spine without  intravenous contrast.  Sagittal and coronal reformatted images were  created and reviewed.  This CT exam was performed using one or more of  the following dose reduction techniques:  automated exposure control,  adjustment of the mA and/or kV according to patient size, and/or use of  iterative reconstruction technique.     COMPARISON:    No relevant prior studies available.     FINDINGS:    VERTEBRAE:  Unremarkable.  No acute fracture.    DISCS/SPINAL CANAL/NEURAL FORAMINA:  No acute  findings.  No spinal  canal stenosis.    SOFT TISSUES:  Unremarkable.       Impression:        No acute findings in the lumbar spine.        This report was finalized on 9/26/2024 1:17 PM by Dr. Moses Otto MD.       CT Thoracic Spine Without Contrast [[id number]] Collected: 09/26/24 1316     Updated: 09/26/24 1319    Narrative:      EXAM:    CT Thoracic Spine Without Intravenous Contrast     EXAM DATE:    9/26/2024 12:58 PM     CLINICAL HISTORY:    back pain     TECHNIQUE:    Axial computed tomography images of the thoracic spine without  intravenous contrast.  Sagittal and coronal reformatted images were  created and reviewed.  This CT exam was performed using one or more of  the following dose reduction techniques:  automated exposure control,  adjustment of the mA and/or kV according to patient size, and/or use of  iterative reconstruction technique.     COMPARISON:    No relevant prior studies available.     FINDINGS:    VERTEBRAE:  Unremarkable.  No acute fracture.    DISCS/SPINAL CANAL/NEURAL FORAMINA:  No acute findings.  No spinal  canal stenosis.    SOFT TISSUES:  Unremarkable.       Impression:        No acute findings in the thoracic spine.        This report was finalized on 9/26/2024 1:17 PM by Dr. Moses Otto MD.       CT Head Without Contrast [[id number]] Collected: 09/26/24 1259     Updated: 09/26/24 1302    Narrative:      CT HEAD WO CONTRAST-     CLINICAL INDICATION: weakness        COMPARISON: None available     TECHNIQUE: Axial images of the brain were obtained with out intravenous  contrast.  Reformatted images were created in the sagittal and coronal  planes.     DOSE:     Radiation dose reduction techniques were utilized per ALARA protocol.  Automated exposure control was initiated through either or [employer] or  [employer] software packages by  protocol.        FINDINGS:    BRAIN: Probable subacute ischemia right middle cerebral artery  territory    VENTRICLES:  Unremarkable.  No  ventriculomegaly.       BONES/JOINTS:  Unremarkable.  No acute fracture.       SOFT TISSUES:  Unremarkable.       SINUSES:  no air fluid levels       MASTOID AIR CELLS:  Unremarkable as visualized.  No mastoid effusion.          Impression:         1. Probable remote right middle cerebral artery infarction  2. No acute parenchymal mass, hemorrhage, or midline shift     This report was finalized on 9/26/2024 1:00 PM by Dr. Moses Otto MD.             I have personally reviewed the above radiology results.   ---------------------------------------------------------------------------------------------------------------------      Pertinent Infectious Disease Results          Assessment & Plan      Assessment        ESBL UTI      Plan      Patient presented to [facility] Emergency Department on 9/26/2024 for evaluation after sliding into the floor due to weakness.  WBC 7.38.  Chlamydia gonorrhea and trichomonas negative.  CRP 3.80.  Urinalysis positive with culture finalizing is greater than 100,000 colonies of E. coli ESBL.  Blood cultures from 9/26/2024 show no growth thus far.  HIV 1 and 2 nonreactive.  Hepatitis panel nonreactive.  COVID-19 and influenza PCR negative.  Lactic acid normal on admission.    Patient resting in bed.  Denies dysuria, urgency, frequency.  Patient reports recurrent yeast infections.  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  Lungs clear to auscultation bilaterally.    Recommend to continue current antibiotic regimen of ertapenem 1 g IV every 24 hours x 7 days for treatment of ESBL UTI.  Okay to continue home suppressive acyclovir for history of genital herpes.  We will continue to follow closely and adjust antibiotic therapy as needed.        ANTIMICROBIAL THERAPY    acyclovir - 200 MG, 400 MG  ertapenem (INVanz) 1000 mg in 100 mL NS (VTB)       Again, thank you Dr. Marc for allowing us to participate in the care of your patient and please feel free to call for any  questions you may have.        Code Status:     Code Status and Medical Interventions: CPR (Attempt to Resuscitate); Full Support   Ordered at: 24 1626     Code Status (Patient has no pulse and is not breathing):    CPR (Attempt to Resuscitate)     Medical Interventions (Patient has pulse or is breathing):    Full Support         YUE Rivera  24  14:36 EDT    Electronically signed by Cuca Yuen APRN at 24 1532          Physical Therapy Notes (all)        Thierry Saldivar, PT at 24 1456  Version 1 of 1            24 1456   OTHER   Discipline physical therapist   Rehab Time/Intention   Session Not Performed patient unavailable for evaluation  (Pt. unavailable for evaluation x2 attempts.)         Electronically signed by Thierry Saldivar, PT at 24 1456       Мария Joya, PT at 24 1614  Version 1 of 1         Acute Care - Physical Therapy Initial Evaluation   [facility]     Patient Name: Lety Johnson  : 1980  MRN: [id number]  Today's Date: 2024   Onset of Illness/Injury or Date of Surgery: 24  Visit Dx:     ICD-10-CM ICD-9-CM   1. Hypokalemia  E87.6 276.8   2. Pressure injury of skin of sacral region, unspecified injury stage  L89.159 707.03     707.20   3. Pressure injury of skin of left heel, unspecified injury stage  L89.629 707.07     707.20   4. Acute cystitis without hematuria  N30.00 595.0     Patient Active Problem List   Diagnosis    UTI (urinary tract infection)     Past Medical History:   Diagnosis Date    Stroke      History reviewed. No pertinent surgical history.  PT Assessment (Last 12 Hours)       PT Evaluation and Treatment       Row Name 24 9852          Physical Therapy Time and Intention    Subjective Information complains of;weakness  -AG     Document Type evaluation  -AG     Mode of Treatment physical therapy  -AG     Patient Effort good  -AG     Symptoms Noted During/After Treatment none  -AG       Row Name 24 3152           General Information    Patient Profile Reviewed yes  -AG     Onset of Illness/Injury or Date of Surgery 09/26/24  -AG     Referring Physician Dr. Marc  -     Patient Observations agree to therapy;cooperative;alert  -AG     Patient/Family/Caregiver Comments/Observations pt. supine in bed; L UE hypertonicity noted; L LE is flexed, heel is bandaged; pt. states she has a pressure sore on heel.  -AG     Prior Level of Function --  pt. bedbound at home; boyfriend provides assistance with bed mobility  -AG     Equipment Currently Used at Home wheelchair;lift device;hospital bed  -AG     Pertinent History of Current Functional Problem pt. admitted with UTI  -AG     Existing Precautions/Restrictions fall  -AG     Risks Reviewed patient:;dizziness;LOB  -AG     Benefits Reviewed patient:;increase knowledge;improve function;increase independence;increase strength  -AG     Barriers to Rehab previous functional deficit  -AG       Row Name 09/30/24 9362          Previous Level of Function/Home Environm    BADLs, Premorbid Functional Level partial assistance  -AG     Bed Mobility, Premorbid Functional Level partial assistance  -AG     Previous Level of Function, Premorbid pt nonambulatory since May (CVA)  -AG       Row Name 09/30/24 9116          Living Environment    Current Living Arrangements home  -AG     People in Home alone  -AG     Primary Care Provided by spouse/significant other  pt. reports her significant other is currently in penitentiary and therefore will not be available to assist her at d/c.  -AG       Row Name 09/30/24 8547          Home Use of Assistive/Adaptive Equipment    Equipment Currently Used at Home hospital bed;lift device;wheelchair  -AG       Row Name 09/30/24 3477          Pain    Additional Documentation Pain Scale: FACES Pre/Post-Treatment (Group)  -AG       Row Name 09/30/24 9238          Pain Scale: FACES Pre/Post-Treatment    Pain: FACES Scale, Pretreatment 0-->no hurt  -AG     Posttreatment  Pain Rating 0-->no hurt  -AG       Row Name 09/30/24 1559          Cognition    Affect/Mental Status (Cognition) [facility]     Personal Safety Interventions fall prevention program maintained;gait belt;nonskid shoes/slippers when out of bed;supervised activity  -AG       Row Name 09/30/24 1559          Range of Motion (ROM)    Range of Motion --  L LE minimal AROM d/t hypertonicity; L UE no functional AROM; minimal L UE PROM d/t tone, pain; R U/LE AROM [facility] 09/30/24 1559          Strength (Manual Muscle Testing)    Strength (Manual Muscle Testing) --  R LE 4-/5  -AG       Row Name 09/30/24 1559          Sensory    Hearing Status WFL  -AG       Row Name 09/30/24 1559          Vision Assessment/Intervention    Visual Impairment/Limitations WFL  -AG       Row Name 09/30/24 1559          Bed Mobility    Bed Mobility rolling left;rolling right;scooting/bridging;supine-sit;sit-supine  -     Rolling Left Catron (Bed Mobility) verbal cues;nonverbal cues (demo/gesture);maximum assist (25% patient effort)  -     Rolling Right Catron (Bed Mobility) verbal cues;nonverbal cues (demo/gesture);maximum assist (25% patient effort)  -     Scooting/Bridging Catron (Bed Mobility) verbal cues;nonverbal cues (demo/gesture);maximum assist (25% patient effort)  -     Supine-Sit Catron (Bed Mobility) verbal cues;nonverbal cues (demo/gesture);maximum assist (25% patient effort)  -     Bed Mobility, Safety Issues decreased use of arms for pushing/pulling;decreased use of legs for bridging/pushing  -AG       Row Name 09/30/24 1559          Transfers    Comment, (Transfers) unable to safetly assess  -AG       Row Name 09/30/24 1559          Gait/Stairs (Locomotion)    Patient was able to Ambulate no, other medical factors prevent ambulation  -     Reason Patient was unable to Ambulate Non-Ambulatory at Baseline  -AG       Row Name 09/30/24 1559          Safety Issues, Functional Mobility     Impairments Affecting Function (Mobility) motor control;pain;muscle tone abnormal;range of motion (ROM);strength  -AG       Row Name 09/30/24 1559          Balance    Balance Assessment sitting static balance;sitting dynamic balance;sit to stand dynamic balance;standing static balance;standing dynamic balance  -AG     Static Sitting Balance independent  -AG     Position, Sitting Balance unsupported;sitting edge of bed  -AG       Row Name             Wound 09/26/24 1806 gluteal    Wound - Properties Group Placement Date: 09/26/24  -KJ Placement Time: 1806  -KJ Location: gluteal  -KJ    Retired Wound - Properties Group Placement Date: 09/26/24  -KJ Placement Time: 1806  -KJ Location: gluteal  -KJ    Retired Wound - Properties Group Date first assessed: 09/26/24  -KJ Time first assessed: 1806  -KJ Location: gluteal  -KJ      Row Name             Wound 09/26/24 1807 Left gluteal    Wound - Properties Group Placement Date: 09/26/24  -KJ Placement Time: 1807  -KJ Side: Left  -KJ Location: gluteal  -KJ    Retired Wound - Properties Group Placement Date: 09/26/24  -KJ Placement Time: 1807  -KJ Side: Left  -KJ Location: gluteal  -KJ    Retired Wound - Properties Group Date first assessed: 09/26/24  -KJ Time first assessed: 1807  -KJ Side: Left  -KJ Location: gluteal  -KJ      Row Name             Wound 09/26/24 1809 gluteal    Wound - Properties Group Placement Date: 09/26/24  -KJ Placement Time: 1809  -KJ Location: gluteal  -KJ    Retired Wound - Properties Group Placement Date: 09/26/24  -KJ Placement Time: 1809  -KJ Location: gluteal  -KJ    Retired Wound - Properties Group Date first assessed: 09/26/24  -KJ Time first assessed: 1809  -KJ Location: gluteal  -KJ      Row Name             Wound 09/26/24 1809 Left posterior ankle    Wound - Properties Group Placement Date: 09/26/24  -KJ Placement Time: 1809  -KJ Side: Left  -KJ Orientation: posterior  -KJ Location: ankle  -KJ    Retired Wound - Properties Group Placement  Date: 09/26/24  -KJ Placement Time: 1809  -KJ Side: Left  -KJ Orientation: posterior  -KJ Location: ankle  -KJ    Retired Wound - Properties Group Date first assessed: 09/26/24  -KJ Time first assessed: 1809  -KJ Side: Left  -KJ Location: ankle  -KJ      Row Name             Wound 09/29/24 0952 Bilateral anterior thigh MASD (Moisture associated skin damage)    Wound - Properties Group Placement Date: 09/29/24  -LB Placement Time: 0952 -LB Present on Original Admission: Y  -LB Side: Bilateral  -LB Orientation: anterior  -LB Location: thigh  -LB Primary Wound Type: MASD  -LB    Retired Wound - Properties Group Placement Date: 09/29/24  -LB Placement Time: 0952  -LB Present on Original Admission: Y  -LB Side: Bilateral  -LB Orientation: anterior  -LB Location: thigh  -LB Primary Wound Type: MASD  -LB    Retired Wound - Properties Group Date first assessed: 09/29/24  -LB Time first assessed: 0952  -LB Present on Original Admission: Y  -LB Side: Bilateral  -LB Location: thigh  -LB Primary Wound Type: MASD  -LB      Row Name 09/30/24 1559          Coping    Observed Emotional State calm;cooperative  -AG     Verbalized Emotional State acceptance  -AG     Trust Relationship/Rapport care explained;choices provided;thoughts/feelings acknowledged  -AG     Family/Support Persons significant other  -AG     Involvement in Care not present at bedside  -AG     Family/Support System Care support provided;self-care encouraged  -AG       Row Name 09/30/24 1556          Plan of Care Review    Plan of Care Reviewed With patient  -AG     Outcome Evaluation PT evaluation complete.  Pt. 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  Pt. requires max A for bed mobility including supine>sit; able to remain sitting unsupported for prolonged time.  PT will continue to follow and focus on improving bed mobility skills; transfer training if applicable.  -AG       Row Name 09/30/24 4509          Positioning and Restraints    Pre-Treatment Position in  "bed  -AG     Post Treatment Position bed  -AG     In Bed sitting EOB;call light within reach;encouraged to call for assist;notified nsg  -AG       Row Name 09/30/24 1553          Therapy Assessment/Plan (PT)    Patient/Family Therapy Goals Statement (PT) pt. states she plans to d/c to a \"rehab unit\" until she can go home alone or until significan other is released from half-way  -AG     Functional Level at Time of Evaluation (PT) max A  -AG     PT Diagnosis (PT) impaired functional mobility  -AG     Rehab Potential (PT) fair, will monitor progress closely  -AG     Criteria for Skilled Interventions Met (PT) yes;meets criteria  -AG     Therapy Frequency (PT) 2 times/wk  1-5 times/ week per priority  -AG     Predicted Duration of Therapy Intervention (PT) LOS  -AG     Problem List (PT) problems related to;balance;mobility;hemiparesis/hemiplegia;coordination;strength;range of motion (ROM);muscle tone;motor control  -AG     Activity Limitations Related to Problem List (PT) unable to ambulate safely;unable to transfer safely;BADLs not performed adequately or safely  -AG       Row Name 09/30/24 1555          Therapy Plan Review/Discharge Plan (PT)    Therapy Plan Review (PT) evaluation/treatment results reviewed;care plan/treatment goals reviewed;risks/benefits reviewed;current/potential barriers reviewed;participants voiced agreement with care plan;participants included;patient  -AG       Row Name 09/30/24 1552          Physical Therapy Goals    Bed Mobility Goal Selection (PT) bed mobility, PT goal 1  -AG     Transfer Goal Selection (PT) transfer, PT goal 1  -AG       Row Name 09/30/24 1551          Bed Mobility Goal 1 (PT)    Activity/Assistive Device (Bed Mobility Goal 1, PT) rolling to left;rolling to right;scooting;sit to supine;supine to sit  -AG     [place] Level/Cues Needed (Bed Mobility Goal 1, PT) moderate assist (50-74% patient effort)  -AG     Time Frame (Bed Mobility Goal 1, PT) by discharge  -AG       Row " Name 09/30/24 1559          Transfer Goal 1 (PT)    Activity/Assistive Device (Transfer Goal 1, PT) sit-to-stand/stand-to-sit;bed-to-chair/chair-to-bed;toilet  -AG     [place] Level/Cues Needed (Transfer Goal 1, PT) moderate assist (50-74% patient effort)  -AG     Time Frame (Transfer Goal 1, PT) by discharge  -               User Key  (r) = Recorded By, (t) = Taken By, (c) = Cosigned By      Initials Name Provider Type    AG Мария Joya, PT Physical Therapist    Nory Keenan, RN Registered Nurse    Zehra Rivera, RN Registered Nurse                    Physical Therapy Education       Title: PT OT SLP Therapies (Done)       Topic: Physical Therapy (Done)       Point: Mobility training (Done)       Learning Progress Summary             Patient Acceptance, E,D, VU,NR by  at 9/30/2024 1559                         Point: Home exercise program (Done)       Learning Progress Summary             Patient Acceptance, E,D, VU,NR by  at 9/30/2024 1559                         Point: Body mechanics (Done)       Learning Progress Summary             Patient Acceptance, E,D, VU,NR by  at 9/30/2024 1559                         Point: Precautions (Done)       Learning Progress Summary             Patient Acceptance, E,D, VU,NR by  at 9/30/2024 1559                                         User Key       Initials Effective Dates Name Provider Type Discipline     06/16/21 -  Мария Joya, PT Physical Therapist PT                  PT Recommendation and Plan  Anticipated Discharge Disposition (PT): skilled nursing facility  Planned Therapy Interventions (PT): balance training, bed mobility training, gait training, home exercise program, transfer training, strengthening, ROM (range of motion), patient/family education, neuromuscular re-education  Therapy Frequency (PT): 2 times/wk (1-5 times/ week per priority)  Plan of Care Reviewed With: patient  Outcome Evaluation: PT evaluation complete.  Pt. 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  Pt. requires max A for bed mobility including supine>sit; able to remain sitting unsupported for prolonged time.  PT will continue to follow and focus on improving bed mobility skills; transfer training if applicable.       Time Calculation:    PT Charges       Row Name 24 1558             Time Calculation    PT Received On 24  -      PT Goal Re-Cert Due Date 10/14/24  -                User Key  (r) = Recorded By, (t) = Taken By, (c) = Cosigned By      Initials Name Provider Type     Мария Joya, PT Physical Therapist                  Therapy Charges for Today       Code Description Service Date Service Provider Modifiers Qty    89276692445 HC PT EVAL MOD COMPLEXITY 4 2024 Мария Joya, PT GP 1            PT G-Codes  AM-PAC 6 Clicks Score (PT): 6    Мария Joya PT  2024      Electronically signed by Мария Joya, PT at 24 1615       Eileen Marc, PT at 10/01/24 1158  Version 1 of 1         Acute Care - Physical Therapy Treatment Note   [facility]     Patient Name: Lety Johnson  : 1980  MRN: [id number]  Today's Date: 10/1/2024   Onset of Illness/Injury or Date of Surgery: 24  Visit Dx:     ICD-10-CM ICD-9-CM   1. Hypokalemia  E87.6 276.8   2. Pressure injury of skin of sacral region, unspecified injury stage  L89.159 707.03     707.20   3. Pressure injury of skin of left heel, unspecified injury stage  L89.629 707.07     707.20   4. Acute cystitis without hematuria  N30.00 595.0     Patient Active Problem List   Diagnosis    UTI (urinary tract infection)     Past Medical History:   Diagnosis Date    Stroke      History reviewed. No pertinent surgical history.  PT Assessment (Last 12 Hours)       PT Evaluation and Treatment       Row Name 10/01/24 1100          Physical Therapy Time and Intention    Document Type therapy note (daily note)  -KP     Mode of Treatment physical therapy  -KP     Patient Effort adequate  -KP      Symptoms Noted During/After Treatment increased pain;other (see comments)  Pt reports pain in left UE and LE.  Frequently is tearful.  RN aware.  -       Row Name 10/01/24 1100          General Information    Patient Profile Reviewed yes  -     Patient Observations alert;cooperative;agree to therapy  -     Existing Precautions/Restrictions fall  -     Risks Reviewed patient:;increased discomfort;other (comment)  Pt educated on importance of passive movement to left side even though it is painful due to tone and parathesia.  -     Barriers to Rehab previous functional deficit  -       Row Name 10/01/24 1100          Pain Scale: FACES Pre/Post-Treatment    Pain: FACES Scale, Pretreatment 4-->hurts little more  -     Posttreatment Pain Rating 6-->hurts even more  -     Pain Location - Side/Orientation Left  -     Pre/Posttreatment Pain Comment Left extremities, shoulder, hand, elbow, knee  -       Row Name 10/01/24 1100          Cognition    Affect/Mental Status (Cognition) sad/depressed affect;other (see comments)  Pt frequently cried throughout session.  RN was aware of how tearful she was.  -     Orientation Status (Cognition) oriented x 3  -     Follows Commands (Cognition) follows one-step commands;physical/tactile prompts required;repetition of directions required;verbal cues/prompting required  -     Personal Safety Interventions fall prevention program maintained;muscle strengthening facilitated;nonskid shoes/slippers when out of bed;supervised activity  -       Row Name 10/01/24 1100          Range of Motion Comprehensive    Comment, General Range of Motion Pt unable to perform AROM to left LE or UE.  She reports pain with PROM to extremities and decreased ROM due to pain and tone.  WFL on right side  -[facility] Name 10/01/24 1100          Strength Comprehensive (MMT)    Comment, General Manual Muscle Testing (MMT) Assessment No mm acitvation on left extremities.  Right LE 3/5   -       Row Name 10/01/24 1100          Sensory Assessment (Somatosensory)    Sensory Assessment (Somatosensory) other (see comments)  hypersensitive with parathesias on left extremities.  -       Row Name 10/01/24 1100          Bed Mobility    Rolling Left [place] (Bed Mobility) maximum assist (25% patient effort);1 person assist  -KP     Rolling Right [place] (Bed Mobility) maximum assist (25% patient effort);1 person assist  -KP     Scooting/Bridging [place] (Bed Mobility) maximum assist (25% patient effort);2 person assist  -KP     Supine-Sit [place] (Bed Mobility) maximum assist (25% patient effort);1 person assist  -KP     Sit-Supine [place] (Bed Mobility) maximum assist (25% patient effort);1 person assist  -KP     Bed Mobility, Safety Issues decreased use of arms for pushing/pulling;decreased use of legs for bridging/pushing;impaired trunk control for bed mobility  -     Assistive Device (Bed Mobility) bed rails;draw sheet;head of bed elevated  -       Row Name 10/01/24 1100          Transfers    Transfers sit-stand transfer;stand-sit transfer  -       Row Name 10/01/24 1100          Sit-Stand Transfer    Sit-Stand [place] (Transfers) dependent (less than 25% patient effort);2 person assist;other (see comments)  Unable to clear bottom from bed  -     Comment, (Sit-Stand Transfer) Attempted sit to stand transfer and it was dependentx2 with inability to lift bottom from the bed  -       Row Name 10/01/24 1100          Gait/Stairs (Locomotion)    Patient was able to Ambulate no, other medical factors prevent ambulation  -     Reason Patient was unable to Ambulate Non-Ambulatory at Baseline  -       Row Name 10/01/24 1100          Balance    Static Sitting Balance standby assist  -     Dynamic Sitting Balance standby assist;contact guard;1-person assist  -     Position, Sitting Balance sitting edge of bed  -       Row Name 10/01/24 1100          Motor Skills     Motor Skills muscle tone  -     Muscle Tone left;upper extremity(s);lower extremity(s);clonus;spasticity  -     Therapeutic Exercise hip;knee;ankle  -       Row Name 10/01/24 1100          Hip (Therapeutic Exercise)    Hip (Therapeutic Exercise) PROM (passive range of motion);strengthening exercise  -     Hip Strengthening (Therapeutic Exercise) right;flexion;marching while seated;sitting;10 repetitions;3 sets  -       Row Name 10/01/24 1100          Knee (Therapeutic Exercise)    Knee (Therapeutic Exercise) strengthening exercise  -     Knee Strengthening (Therapeutic Exercise) right;LAQ (long arc quad);sitting;10 repetitions;3 sets  -       Row Name 10/01/24 1100          Ankle (Therapeutic Exercise)    Ankle (Therapeutic Exercise) strengthening exercise;PROM (passive range of motion)  -     Ankle PROM (Therapeutic Exercise) left;dorsiflexion;plantarflexion;supine;10 repetitions  -     Ankle Strengthening (Therapeutic Exercise) right;dorsiflexion;plantarflexion;sitting;10 repetitions;3 sets  -       Row Name             Wound 09/26/24 1806 gluteal    Wound - Properties Group Placement Date: 09/26/24  -KJ Placement Time: 1806  -KJ Location: gluteal  -KJ    Retired Wound - Properties Group Placement Date: 09/26/24  -KJ Placement Time: 1806  -KJ Location: gluteal  -KJ    Retired Wound - Properties Group Date first assessed: 09/26/24  -KJ Time first assessed: 1806  -KJ Location: gluteal  -KJ      Row Name             Wound 09/26/24 1807 Left gluteal    Wound - Properties Group Placement Date: 09/26/24  -KJ Placement Time: 1807  -KJ Side: Left  -KJ Location: gluteal  -KJ    Retired Wound - Properties Group Placement Date: 09/26/24  -KJ Placement Time: 1807  -KJ Side: Left  -KJ Location: gluteal  -KJ    Retired Wound - Properties Group Date first assessed: 09/26/24  -KJ Time first assessed: 1807  -KJ Side: Left  -KJ Location: gluteal  -KJ      Row Name             Wound 09/26/24 1809 gluteal     Wound - Properties Group Placement Date: 09/26/24  -KJ Placement Time: 1809  -KJ Location: gluteal  -KJ    Retired Wound - Properties Group Placement Date: 09/26/24  -KJ Placement Time: 1809  -KJ Location: gluteal  -KJ    Retired Wound - Properties Group Date first assessed: 09/26/24  -KJ Time first assessed: 1809  -KJ Location: gluteal  -KJ      Row Name             Wound 09/26/24 1809 Left posterior ankle    Wound - Properties Group Placement Date: 09/26/24  -KJ Placement Time: 1809  -KJ Side: Left  -KJ Orientation: posterior  -KJ Location: ankle  -KJ    Retired Wound - Properties Group Placement Date: 09/26/24  -KJ Placement Time: 1809  -KJ Side: Left  -KJ Orientation: posterior  -KJ Location: ankle  -KJ    Retired Wound - Properties Group Date first assessed: 09/26/24  -KJ Time first assessed: 1809  -KJ Side: Left  -KJ Location: ankle  -KJ      Row Name             Wound 09/29/24 0952 Bilateral anterior thigh MASD (Moisture associated skin damage)    Wound - Properties Group Placement Date: 09/29/24  -LB Placement Time: 0952  -LB Present on Original Admission: Y  -LB Side: Bilateral  -LB Orientation: anterior  -LB Location: thigh  -LB Primary Wound Type: MASD  -LB    Retired Wound - Properties Group Placement Date: 09/29/24  -LB Placement Time: 0952  -LB Present on Original Admission: Y  -LB Side: Bilateral  -LB Orientation: anterior  -LB Location: thigh  -LB Primary Wound Type: MASD  -LB    Retired Wound - Properties Group Date first assessed: 09/29/24  -LB Time first assessed: 0952  -LB Present on Original Admission: Y  -LB Side: Bilateral  -LB Location: thigh  -LB Primary Wound Type: MASD  -LB      Row Name 10/01/24 1100          Plan of Care Review    Plan of Care Reviewed With patient  -KP     Outcome Evaluation PT treatment completed.  Patient was very emotional throughout therapy treatment but is eager to participate.  She has tone and no muscle activation on left extremities. She was educated on  importance of passive movement to left extremities.  She performed exercises, sitting balance and edurance and attempted transfer with inability to complete dependentx2.  Continue PT POC.  -       Row Name 10/01/24 1100          Positioning and Restraints    Pre-Treatment Position in bed  -KP     Post Treatment Position bed  -KP     In Bed notified nsg;fowlers;call light within reach;encouraged to call for assist;exit alarm on;side rails up x3;heels elevated  wedge under right side.  Lines in tact and needs in reach.  -               User Key  (r) = Recorded By, (t) = Taken By, (c) = Cosigned By      Initials Name Provider Type    Eileen Arellano, PT Physical Therapist    Nory Keenan, RN Registered Nurse    Zehra Rivera, RN Registered Nurse                    Physical Therapy Education       Title: PT OT SLP Therapies (In Progress)       Topic: Physical Therapy (In Progress)       Point: Mobility training (In Progress)       Learning Progress Summary             Patient Acceptance, E, NR by RD at 10/1/2024 0856    Acceptance, E,D, VU,NR by  at 9/30/2024 1559                         Point: Home exercise program (In Progress)       Learning Progress Summary             Patient Acceptance, E, NR by RD at 10/1/2024 0856    Acceptance, E,D, VU,NR by  at 9/30/2024 1559                         Point: Body mechanics (In Progress)       Learning Progress Summary             Patient Acceptance, E, NR by RD at 10/1/2024 0856    Acceptance, E,D, VU,NR by AG at 9/30/2024 1559                         Point: Precautions (In Progress)       Learning Progress Summary             Patient Acceptance, E, NR by RD at 10/1/2024 0856    Acceptance, E,D, VU,NR by  at 9/30/2024 1559                                         User Key       Initials Effective Dates Name Provider Type Discipline     06/16/21 -  Мария Joya, PT Physical Therapist PT    RD 06/16/21 -  Laisha Verdugo, RN Registered Nurse Nurse                   PT Recommendation and Plan     Plan of Care Reviewed With: patient  Outcome Evaluation: PT treatment completed.  Patient was very emotional throughout therapy treatment but is eager to participate.  She has tone and no muscle activation on left extremities. She was educated on importance of passive movement to left extremities.  She performed exercises, sitting balance and edurance and attempted transfer with inability to complete dependentx2.  Continue PT POC.       Time Calculation:    PT Charges       Row Name 10/01/24 1157             Time Calculation    PT Received On 10/01/24  -KP      PT Goal Re-Cert Due Date 10/14/24  -KP         Timed Charges    59159 - PT Therapeutic Exercise Minutes 15  -KP      19258 - PT Therapeutic Activity Minutes 25  -KP         Total Minutes    Timed Charges Total Minutes 40  -KP       Total Minutes 40  -KP                User Key  (r) = Recorded By, (t) = Taken By, (c) = Cosigned By      Initials Name Provider Type    Eileen Arellano, PT Physical Therapist                  Therapy Charges for Today       Code Description Service Date Service Provider Modifiers Qty    39377286493 HC PT THER PROC EA 15 MIN 10/1/2024 Eileen Marc, PT GP 1    06043918262 HC PT THERAPEUTIC ACT EA 15 MIN 10/1/2024 Eileen Marc, PT GP 2            PT G-Codes  AM-PAC 6 Clicks Score (PT): 6    Eileen Marc PT  10/1/2024      Electronically signed by Eileen Marc PT at 10/01/24 1158          Occupational Therapy Notes (all)        Neil Graf, OT at 10/01/24 1055          Acute Care - Occupational Therapy Initial Evaluation   [facility]     Patient Name: Lety Johnson  : 1980  MRN: [id number]  Today's Date: 10/1/2024  Onset of Illness/Injury or Date of Surgery: 24     Referring Physician: Dr. Marc    Admit Date: 2024       ICD-10-CM ICD-9-CM   1. Hypokalemia  E87.6 276.8   2. Pressure injury of skin of sacral region, unspecified injury stage  L89.159 707.03     707.20    3. Pressure injury of skin of left heel, unspecified injury stage  L89.629 707.07     707.20   4. Acute cystitis without hematuria  N30.00 595.0     Patient Active Problem List   Diagnosis    UTI (urinary tract infection)     Past Medical History:   Diagnosis Date    Stroke      History reviewed. No pertinent surgical history.      OT ASSESSMENT FLOWSHEET (Last 12 Hours)       OT Evaluation and Treatment       Row Name 10/01/24 1043                   OT Time and Intention    Document Type evaluation  -KR        Mode of Treatment occupational therapy  -KR        Patient Effort adequate  -KR           General Information    General Observations of Patient alert/cooperative  -KR        Prior Level of Function max assist:;dependent:  -KR           Living Environment    Current Living Arrangements home  -KR        People in Home significant other  -KR        Primary Care Provided by spouse/significant other  -KR           Cognition    Affect/Mental Status (Cognition) WFL  pt became emotional when describing disability/illness  -KR        Orientation Status (Cognition) oriented x 3  -KR        Follows Commands (Cognition) WFL  -KR           Range of Motion Comprehensive    Comment, General Range of Motion No fxl ROM LUE due to previous CVA. Increased pain and tone prevent PROM throughout LUE. Pt reports no splinting or positioning devices have been applied/issued at this time. RUE WFL to assist with self care activity  -KR           Strength Comprehensive (MMT)    Comment, General Manual Muscle Testing (MMT) Assessment RUE WFL to assist with self care tasks, 0/5 LUE  -KR           Activities of Daily Living    BADL Assessment/Intervention bathing;upper body dressing;lower body dressing;grooming;feeding;toileting  -KR           Bathing Assessment/Intervention    [place] Level (Bathing) bathing skills;maximum assist (25% patient effort)  -KR           Upper Body Dressing Assessment/Training    [place] Level  (Upper Body Dressing) upper body dressing skills;maximum assist (25% patient effort)  -KR           Lower Body Dressing Assessment/Training    [place] Level (Lower Body Dressing) lower body dressing skills;dependent (less than 25% patient effort)  -KR           Grooming Assessment/Training    [place] Level (Grooming) grooming skills;moderate assist (50% patient effort)  -KR           Self-Feeding Assessment/Training    [place] Level (Feeding) feeding skills;minimum assist (75% patient effort)  -KR           Toileting Assessment/Training    [place] Level (Toileting) toileting skills;dependent (less than 25% patient effort)  -KR           Motor Skills    Motor Skills muscle tone;neuro-muscular function  -KR        Muscle Tone left;upper extremity(s);moderate impairment;clonus;spasticity  -KR           Wound 09/26/24 1806 gluteal    Wound - Properties Group Placement Date: 09/26/24  -KJ Placement Time: 1806  -KJ Location: gluteal  -KJ    Retired Wound - Properties Group Placement Date: 09/26/24  -KJ Placement Time: 1806  -KJ Location: gluteal  -KJ    Retired Wound - Properties Group Date first assessed: 09/26/24  -KJ Time first assessed: 1806  -KJ Location: gluteal  -KJ       Wound 09/26/24 1807 Left gluteal    Wound - Properties Group Placement Date: 09/26/24  -KJ Placement Time: 1807  -KJ Side: Left  -KJ Location: gluteal  -KJ    Retired Wound - Properties Group Placement Date: 09/26/24  -KJ Placement Time: 1807  -KJ Side: Left  -KJ Location: gluteal  -KJ    Retired Wound - Properties Group Date first assessed: 09/26/24  -KJ Time first assessed: 1807  -KJ Side: Left  -KJ Location: gluteal  -KJ       Wound 09/26/24 1809 gluteal    Wound - Properties Group Placement Date: 09/26/24  -KJ Placement Time: 1809  -KJ Location: gluteal  -KJ    Retired Wound - Properties Group Placement Date: 09/26/24  -KJ Placement Time: 1809  -KJ Location: gluteal  -KJ    Retired Wound - Properties Group Date first  assessed: 09/26/24  -KJ Time first assessed: 1809  -KJ Location: gluteal  -KJ       Wound 09/26/24 1809 Left posterior ankle    Wound - Properties Group Placement Date: 09/26/24  -KJ Placement Time: 1809  -KJ Side: Left  -KJ Orientation: posterior  -KJ Location: ankle  -KJ    Retired Wound - Properties Group Placement Date: 09/26/24  -KJ Placement Time: 1809  -KJ Side: Left  -KJ Orientation: posterior  -KJ Location: ankle  -KJ    Retired Wound - Properties Group Date first assessed: 09/26/24  -KJ Time first assessed: 1809  -KJ Side: Left  -KJ Location: ankle  -KJ       Wound 09/29/24 0952 Bilateral anterior thigh MASD (Moisture associated skin damage)    Wound - Properties Group Placement Date: 09/29/24  -LB Placement Time: 0952 -LB Present on Original Admission: Y  -LB Side: Bilateral  -LB Orientation: anterior  -LB Location: thigh  -LB Primary Wound Type: MASD  -LB    Retired Wound - Properties Group Placement Date: 09/29/24  -LB Placement Time: 0952 -LB Present on Original Admission: Y  -LB Side: Bilateral  -LB Orientation: anterior  -LB Location: thigh  -LB Primary Wound Type: MASD  -LB    Retired Wound - Properties Group Date first assessed: 09/29/24  -LB Time first assessed: 0952  -LB Present on Original Admission: Y  -LB Side: Bilateral  -LB Location: thigh  -LB Primary Wound Type: MASD  -LB       Plan of Care Review    Plan of Care Reviewed With patient  -KR           Therapy Assessment/Plan (OT)    Planned Therapy Interventions (OT) activity tolerance training;neuromuscular control/coordination retraining;ROM/therapeutic exercise  -KR           Therapy Plan Review/Discharge Plan (OT)    Anticipated Discharge Disposition (OT) inpatient rehabilitation facility;extended care facility  -KR           OT Goals    ROM Goal Selection (OT) ROM, OT goal 1  -KR        Activity Tolerance Goal Selection (OT) activity tolerance, OT goal 1  -KR           ROM Goal 1 (OT)    ROM Goal 1 (OT) LUE PROM increase x 1 to  enhance fxl positioning/ability to assist with self care tasks  -KR        Time Frame (ROM Goal 1, OT) by discharge  -KR            Activity Tolerance Goal 1 (OT)    Activity Tolerance Goal 1 (OT) Increase to enhance ADL performance  -KR        Activity Level (Endurance Goal 1, OT) 15 min activity  -KR        Time Frame (Activity Tolerance Goal 1, OT) by discharge  -KR           Patient Education Goal (OT)    Activity (Patient Education Goal, OT) AE/DME training as needed to enhance fxl positioning/safety/independence  -KR        [place]/Cues/Accuracy (Memory Goal 2, OT) verbalizes understanding  -KR        Time Frame (Patient Education Goal, OT) by discharge  -KR                  User Key  (r) = Recorded By, (t) = Taken By, (c) = Cosigned By      Initials Name Effective Dates    Neil Menezes OT 06/16/21 -     Nory Keenan RN 06/08/23 -     Zehra Rivera RN 10/20/21 -                            OT Recommendation and Plan  Planned Therapy Interventions (OT): activity tolerance training, neuromuscular control/coordination retraining, ROM/therapeutic exercise  Plan of Care Review  Plan of Care Reviewed With: patient  Plan of Care Reviewed With: patient        Time Calculation:     Therapy Charges for Today       Code Description Service Date Service Provider Modifiers Qty    91004530499  OT EVAL HIGH COMPLEXITY 4 10/1/2024 Neil Graf OT GO 1                 Neil Graf OT  10/1/2024    Electronically signed by Neil Graf OT at 10/01/24 1055       Samantha Ragland OT at 09/27/24 1455             09/27/24 1455   OTHER   Discipline occupational therapist   Rehab Time/Intention   Session Not Performed patient unavailable for evaluation  (attempted multiple times, patient off floor for testing in pm)         Electronically signed by Samantha Ragland OT at 09/27/24 1455          Speech Language Pathology Notes (all)        Hafsa Melendez MA,CCC-SLP at 09/30/24 1101          [facility] -  "Speech Language Pathology   Swallow Initial Evaluation [facility]  Clinical Dysphagia Assessment     Patient Name: Lety Johnson  : 1980  MRN: [id number]  Today's Date: 2024             Admit Date: 2024    Visit Dx:     ICD-10-CM ICD-9-CM   1. Hypokalemia  E87.6 276.8   2. Pressure injury of skin of sacral region, unspecified injury stage  L89.159 707.03     707.20   3. Pressure injury of skin of left heel, unspecified injury stage  L89.629 707.07     707.20   4. Acute cystitis without hematuria  N30.00 595.0     Patient Active Problem List   Diagnosis    UTI (urinary tract infection)     Past Medical History:   Diagnosis Date    Stroke      History reviewed. No pertinent surgical history.    Lety Johnson  was seen at bedside this AM on 3S Rm. [id number] to assess safety/efficacy of swallowing fnx, determine safest/least restrictive diet tolerance.      Per report: pt \"is a 43 year old female patient who presents to the ER with chief complaint of back pain. 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 The patient had been living with her fiance who is in skilled nursing now. Her sister has been trying to help care for her. Today, she was in the floor and unable to get up so they called EMS for lift assist. Patient's sister and patient want the patient to go into a rehab facility for strengthening. She also complains of low back pain.\"    Social History     Socioeconomic History    Marital status: Single   Tobacco Use    Smoking status: Every Day     Average packs/day: 2.0 packs/day for 26.0 years (52.0 ttl pk-yrs)     Types: Cigarettes     Start date:     Smokeless tobacco: Never   Vaping Use    Vaping status: Never Used   Substance and Sexual Activity    Alcohol use: Not Currently    Drug use: Never    Sexual activity: Not Currently     Partners: Male     Comment:  in correction        Imaging:  No recent chest imaging    Labs:  Sodium  142   " 0117  Potassium  3.5  09/30 0117  Chloride  110  09/30 0117  CO2  22.8  09/30 0117  BUN  5  09/30 0117  Creatinine  0.46  09/30 0117  Glucose  196  09/30 0117  Hemoglobin  10.5  09/30 0117  Hematocrit  32.7  09/30 0117  WBC  8.49  09/30 0117  Platelets  285  09/30 0117  Diet Orders (active) (From admission, onward)       Start     Ordered    09/28/24 1800  Dietary Nutrition Supplements Boost Plus (Ensure Enlive, Ensure Plus)  Daily With Breakfast & Dinner       09/28/24 1209    09/26/24 1748  Diet: Regular/House; Fluid Consistency: Thin (IDDSI 0)  Diet Effective Now         09/26/24 1747                  Pt is observed on RA.     Patient was positioned upright and centered in bed to accept multiple po presentations of ice chips, solid cracker, puree, and thin liquids via spoon, cup, and straw. Patient was able to self provide po trials.     Facial/oral structures were slightly asymmetrical upon observation. Pt with residual L side weakness from previous CVA. Lingual protrusion revealed no deviation. Oral mucosa were moist, pink, and clean. Secretions were clear, thin, and well controlled. OROM/ENDER was wfl to imitate oral postures. Gag is not assessed. Volitional cough was intact w/ adequate  intensity, clear in quality, non-productive. Voice was adequate in intensity, clear in quality w/ intelligible speech.    Upon po presentations, adequate bolus anticipation and acceptance w/ good labial seal for bolus clearance via spoon bowl, cup rim stability and suction via straw. Bolus formation, manipulation and control were wfl w/ rotary mastication pattern. A-p transit was timely w/o significant oral residue appreciated. No overt s/s aspiration before the swallow.      Pharyngeal swallow was timely w/ adequate hyolaryngeal elevation per palpation. No overt s/s aspiration evidenced across this evaluation. No silent aspiration suspected. Patient denied odynophagia.    Pt reports difficulty swallowing large pills only.      Impression: Patient presented w/ wfl oropharyngeal swallow w/o s/s aspiration. No s/s indicative of silent aspiration. No odynophagia reported.      SLP Recommendation and Plan   1. Regular consistencies, thin liquids.    2. Medications whole in puree/thins. Crush PRN.  3. Upright and centered for all po intake.  4. MO precautions.  5. Oral care protocol.    No further formal SLP f/u warranted/recommended at this time.    D/w patient results and recommendations w/ verbal agreement.    D/w RN results and recommendations w/ verbal agreement.    Thank you for allowing me to participate in the care of your patient-  Hafsa Melendez M.A., CCC-SLP     EDUCATION  The patient has been educated in the following areas:   Oral Care/Hydration.      Time Calculation:     Therapy Charges for Today       Code Description Service Date Service Provider Modifiers Qty    [id number]  ST EVAL ORAL PHARYNG SWALLOW 4 9/30/2024 Hafsa Melendez MA,CCC-SLP GN 1          Hafsa Melendez MA,CCC-SLP  9/30/2024    Electronically signed by Hafsa Melendez MA,CCC-SLP at 09/30/24 1107       ADL Documentation (most recent)      Flowsheet Row Most Recent Value   Transferring 4 - completely dependent   Toileting 3 - assistive equipment and person   Bathing 2 - assistive person   Dressing 2 - assistive person   Eating 2 - assistive person   Communication 0 - understands/communicates without difficulty   Swallowing 0 - swallows foods/liquids without difficulty   Equipment Currently Used at Home hospital bed, lift device, wheelchair

## 2024-10-01 NOTE — THERAPY EVALUATION
Acute Care - Occupational Therapy Initial Evaluation   [facility]     Patient Name: Lety Johnson  : 1980  MRN: [id number]  Today's Date: 10/1/2024  Onset of Illness/Injury or Date of Surgery: 24     Referring Physician: Dr. Marc    Admit Date: 2024       ICD-10-CM ICD-9-CM   1. Hypokalemia  E87.6 276.8   2. Pressure injury of skin of sacral region, unspecified injury stage  L89.159 707.03     707.20   3. Pressure injury of skin of left heel, unspecified injury stage  L89.629 707.07     707.20   4. Acute cystitis without hematuria  N30.00 595.0     Patient Active Problem List   Diagnosis    UTI (urinary tract infection)     Past Medical History:   Diagnosis Date    Stroke      History reviewed. No pertinent surgical history.      OT ASSESSMENT FLOWSHEET (Last 12 Hours)       OT Evaluation and Treatment       Row Name 10/01/24 1043                   OT Time and Intention    Document Type evaluation  -KR        Mode of Treatment occupational therapy  -KR        Patient Effort adequate  -KR           General Information    General Observations of Patient alert/cooperative  -KR        Prior Level of Function max assist:;dependent:  -KR           Living Environment    Current Living Arrangements home  -KR        People in Home significant other  -KR        Primary Care Provided by spouse/significant other  -KR           Cognition    Affect/Mental Status (Cognition) WFL  pt became emotional when describing disability/illness  -KR        Orientation Status (Cognition) oriented x 3  -KR        Follows Commands (Cognition) WFL  -KR           Range of Motion Comprehensive    Comment, General Range of Motion No fxl ROM LUE due to previous CVA. Increased pain and tone prevent PROM throughout LUE. Pt reports no splinting or positioning devices have been applied/issued at this time. RUE WFL to assist with self care activity  -KR           Strength Comprehensive (MMT)    Comment, General Manual Muscle Testing  (MMT) Assessment RUE WFL to assist with self care tasks, 0/5 LUE  -KR           Activities of Daily Living    BADL Assessment/Intervention bathing;upper body dressing;lower body dressing;grooming;feeding;toileting  -KR           Bathing Assessment/Intervention    [place] Level (Bathing) bathing skills;maximum assist (25% patient effort)  -KR           Upper Body Dressing Assessment/Training    [place] Level (Upper Body Dressing) upper body dressing skills;maximum assist (25% patient effort)  -KR           Lower Body Dressing Assessment/Training    [place] Level (Lower Body Dressing) lower body dressing skills;dependent (less than 25% patient effort)  -KR           Grooming Assessment/Training    [place] Level (Grooming) grooming skills;moderate assist (50% patient effort)  -KR           Self-Feeding Assessment/Training    [place] Level (Feeding) feeding skills;minimum assist (75% patient effort)  -KR           Toileting Assessment/Training    [place] Level (Toileting) toileting skills;dependent (less than 25% patient effort)  -KR           Motor Skills    Motor Skills muscle tone;neuro-muscular function  -KR        Muscle Tone left;upper extremity(s);moderate impairment;clonus;spasticity  -KR           Wound 09/26/24 1806 gluteal    Wound - Properties Group Placement Date: 09/26/24  -KJ Placement Time: 1806  -KJ Location: gluteal  -KJ    Retired Wound - Properties Group Placement Date: 09/26/24  -KJ Placement Time: 1806  -KJ Location: gluteal  -KJ    Retired Wound - Properties Group Date first assessed: 09/26/24  -KJ Time first assessed: 1806  -KJ Location: gluteal  -KJ       Wound 09/26/24 1807 Left gluteal    Wound - Properties Group Placement Date: 09/26/24  -KJ Placement Time: 1807  -KJ Side: Left  -KJ Location: gluteal  -KJ    Retired Wound - Properties Group Placement Date: 09/26/24  -KJ Placement Time: 1807  -KJ Side: Left  -KJ Location: gluteal  -KJ    Retired Wound - Properties  Group Date first assessed: 09/26/24  -KJ Time first assessed: 1807  -KJ Side: Left  -KJ Location: gluteal  -KJ       Wound 09/26/24 1809 gluteal    Wound - Properties Group Placement Date: 09/26/24  -KJ Placement Time: 1809  -KJ Location: gluteal  -KJ    Retired Wound - Properties Group Placement Date: 09/26/24  -KJ Placement Time: 1809  -KJ Location: gluteal  -KJ    Retired Wound - Properties Group Date first assessed: 09/26/24  -KJ Time first assessed: 1809  -KJ Location: gluteal  -KJ       Wound 09/26/24 1809 Left posterior ankle    Wound - Properties Group Placement Date: 09/26/24  -KJ Placement Time: 1809  -KJ Side: Left  -KJ Orientation: posterior  -KJ Location: ankle  -KJ    Retired Wound - Properties Group Placement Date: 09/26/24  -KJ Placement Time: 1809  -KJ Side: Left  -KJ Orientation: posterior  -KJ Location: ankle  -KJ    Retired Wound - Properties Group Date first assessed: 09/26/24  -KJ Time first assessed: 1809  -KJ Side: Left  -KJ Location: ankle  -KJ       Wound 09/29/24 0952 Bilateral anterior thigh MASD (Moisture associated skin damage)    Wound - Properties Group Placement Date: 09/29/24  -LB Placement Time: 0952 -LB Present on Original Admission: Y  -LB Side: Bilateral  -LB Orientation: anterior  -LB Location: thigh  -LB Primary Wound Type: MASD  -LB    Retired Wound - Properties Group Placement Date: 09/29/24  -LB Placement Time: 0952  -LB Present on Original Admission: Y  -LB Side: Bilateral  -LB Orientation: anterior  -LB Location: thigh  -LB Primary Wound Type: MASD  -LB    Retired Wound - Properties Group Date first assessed: 09/29/24  -LB Time first assessed: 0952  -LB Present on Original Admission: Y  -LB Side: Bilateral  -LB Location: thigh  -LB Primary Wound Type: MASD  -LB       Plan of Care Review    Plan of Care Reviewed With patient  -KR           Therapy Assessment/Plan (OT)    Planned Therapy Interventions (OT) activity tolerance training;neuromuscular control/coordination  retraining;ROM/therapeutic exercise  -KR           Therapy Plan Review/Discharge Plan (OT)    Anticipated Discharge Disposition (OT) inpatient rehabilitation facility;extended care facility  -KR           OT Goals    ROM Goal Selection (OT) ROM, OT goal 1  -KR        Activity Tolerance Goal Selection (OT) activity tolerance, OT goal 1  -KR           ROM Goal 1 (OT)    ROM Goal 1 (OT) LUE PROM increase x 1 to enhance fxl positioning/ability to assist with self care tasks  -KR        Time Frame (ROM Goal 1, OT) by discharge  -KR            Activity Tolerance Goal 1 (OT)    Activity Tolerance Goal 1 (OT) Increase to enhance ADL performance  -KR        Activity Level (Endurance Goal 1, OT) 15 min activity  -KR        Time Frame (Activity Tolerance Goal 1, OT) by discharge  -KR           Patient Education Goal (OT)    Activity (Patient Education Goal, OT) AE/DME training as needed to enhance fxl positioning/safety/independence  -KR        [place]/Cues/Accuracy (Memory Goal 2, OT) verbalizes understanding  -KR        Time Frame (Patient Education Goal, OT) by discharge  -KR                  User Key  (r) = Recorded By, (t) = Taken By, (c) = Cosigned By      Initials Name Effective Dates    Neil Menezes OT 06/16/21 -     Nory Keenan RN 06/08/23 -     Zehra Rivera RN 10/20/21 -                            OT Recommendation and Plan  Planned Therapy Interventions (OT): activity tolerance training, neuromuscular control/coordination retraining, ROM/therapeutic exercise  Plan of Care Review  Plan of Care Reviewed With: patient  Plan of Care Reviewed With: patient        Time Calculation:     Therapy Charges for Today       Code Description Service Date Service Provider Modifiers Qty    [id number]  OT EVAL HIGH COMPLEXITY 4 10/1/2024 Neil Graf OT GO 1                 Neil Graf OT  10/1/2024

## 2024-10-01 NOTE — PROGRESS NOTES
PROGRESS NOTE         Patient Identification:  Name:  Lety Johnson  Age:  43 y.o.  Sex:  female  :  1980  MRN:  [id number]  Visit Number:  [id number]  Primary Care Provider:  Provider, No Known         LOS: 3 days       ----------------------------------------------------------------------------------------------------------------------  Subjective       Chief Complaints:    Fall        Interval History:      Patient resting in bed this morning.  Primary RN at bedside.  Primary RN reports patient having 2 liquid bowel movements.  Afebrile.  Lungs clear to auscultation bilaterally.  WBC normal at 7.71.    Review of Systems:    Constitutional: no fever, chills and night sweats.  Generalized fatigue.  Eyes: no eye drainage, itching or redness.  HEENT: no mouth sores, dysphagia or nose bleed.  Respiratory: no for shortness of breath, cough or production of sputum.  Cardiovascular: no chest pain, no palpitations, no orthopnea.  Gastrointestinal: no nausea, vomiting or diarrhea. No abdominal pain, hematemesis or rectal bleeding.  Genitourinary: no dysuria or polyuria.  Hematologic/lymphatic: no lymph node abnormalities, no easy bruising or easy bleeding.  Musculoskeletal: no muscle or joint pain.  Skin: No rash and no itching.  Neurological: no loss of consciousness, no seizure, no headache.  Behavioral/Psych: no depression or suicidal ideation.  Endocrine: no hot flashes.  Immunologic: negative.    ----------------------------------------------------------------------------------------------------------------------      Objective       Current Hospital Meds:  acyclovir, 400 mg, Oral, Nightly  aspirin, 81 mg, Oral, Daily  atorvastatin, 80 mg, Oral, Daily  DULoxetine, 60 mg, Oral, Daily  ertapenem, 1,000 mg, Intravenous, Q24H  heparin (porcine), 5,000 Units, Subcutaneous, Q8H  lisinopril, 20 mg, Oral, Daily  [Held by provider] metFORMIN, 1,000 mg, Oral, BID With Meals  mupirocin, 1 application , Each  Nare, BID  nicotine, 1 patch, Transdermal, Q24H  nystatin, , Topical, Q12H  pantoprazole, 40 mg, Oral, Daily  sodium chloride, 10 mL, Intravenous, Q12H  sodium chloride, 10 mL, Intravenous, Q12H      Pharmacy Consult,   sodium chloride, 75 mL/hr, Last Rate: 75 mL/hr (09/30/24 2345)      ----------------------------------------------------------------------------------------------------------------------    Vital Signs:  Temp:  [98.1 °F (36.7 °C)-98.8 °F (37.1 °C)] 98.4 °F (36.9 °C)  Heart Rate:  [101-107] 103  Resp:  [18-20] 18  BP: (147-179)/(87-99) 160/94  Mean Arterial Pressure (Non-Invasive) for the past 24 hrs (Last 3 readings):   Noninvasive MAP (mmHg)   10/01/24 0730 111   10/01/24 0327 110   10/01/24 0013 105     SpO2 Percentage    10/01/24 0013 10/01/24 0327 10/01/24 0730   SpO2: 95% 95% 94%     SpO2:  [94 %-97 %] 94 %  on   ;   Device (Oxygen Therapy): room air    Body mass index is 38.63 kg/m².  Wt Readings from Last 3 Encounters:   10/01/24 107 kg (235 lb 11.2 oz)   06/27/24 120 kg (264 lb)        Intake/Output Summary (Last 24 hours) at 10/1/2024 1001  Last data filed at 10/1/2024 0530  Gross per 24 hour   Intake 360 ml   Output 2550 ml   Net -2190 ml     Diet: Regular/House; Fluid Consistency: Thin (IDDSI 0)  ----------------------------------------------------------------------------------------------------------------------      Physical Exam:    Constitutional:  Well-developed and well-nourished.  No respiratory distress.  On room air.  Primary RN at bedside.  HENT:  Head: Normocephalic and atraumatic.  Mouth:  Moist mucous membranes.    Eyes:  Conjunctivae and EOM are normal.  No scleral icterus.  Neck:  Neck supple.  No JVD present.    Cardiovascular:  Normal rate, regular rhythm and normal heart sounds with no murmur. No edema.  Pulmonary/Chest:  No respiratory distress, no wheezes, no crackles, with normal breath sounds and good air movement.  Abdominal:  Soft.  Bowel sounds are normal.  No  "distension and no tenderness.   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  Neurological:  Alert and oriented to person, place, and time.  No facial droop.  No slurred speech.   Skin:  Skin is warm and dry.  No rash noted.  No pallor.   Psychiatric:  Normal mood and affect.  Behavior is normal.        ----------------------------------------------------------------------------------------------------------------------            Results from last 7 days   Lab Units 10/01/24  0117 09/30/24  0117 09/29/24  0036 09/27/24  0844 09/26/24  1504 09/26/24  1350   CRP mg/dL  --   --   --   --  3.80*  --    LACTATE mmol/L  --   --   --   --   --  1.1   WBC 10*3/mm3 7.71 8.49 7.24   < >  --  10.42   HEMOGLOBIN g/dL 10.9* 10.5* 10.7*   < >  --  12.5   HEMATOCRIT % 33.9* 32.7* 33.6*   < >  --  37.4   MCV fL 98.5* 99.1* 100.6*   < >  --  94.0   MCHC g/dL 32.2 32.1 31.8   < >  --  33.4   PLATELETS 10*3/mm3 353 285 318   < >  --  402    < > = values in this interval not displayed.     Results from last 7 days   Lab Units 10/01/24  0117 09/30/24  0117 09/29/24  0036 09/28/24  0318 09/27/24  0844 09/26/24  1504   SODIUM mmol/L 141 142 140 142   < > 140   POTASSIUM mmol/L 3.8 3.5 3.9 4.1   < > 2.7*   MAGNESIUM mg/dL  --   --   --   --   --  1.5*   CHLORIDE mmol/L 110* 110* 109* 110*   < > 99   CO2 mmol/L 22.8 22.8 21.0* 22.3   < > 28.9   BUN mg/dL 4* 5* 4* 5*   < > 10   CREATININE mg/dL 0.36* 0.46* 0.40* 0.43*   < > 0.51*   CALCIUM mg/dL 8.3* 8.0* 8.2* 8.5*   < > 9.4   GLUCOSE mg/dL 158* 196* 192* 198*   < > 220*   ALBUMIN g/dL  --  2.5* 2.5* 2.7*   < > 3.5   BILIRUBIN mg/dL  --  0.2 0.2 0.3   < > 0.6   ALK PHOS U/L  --  79 88 100   < > 119*   AST (SGOT) U/L  --  11 15 15   < > 21   ALT (SGPT) U/L  --  7 8 9   < > 8    < > = values in this interval not displayed.   Estimated Creatinine Clearance: 249.7 mL/min (A) (by C-G formula based on SCr of 0.36 mg/dL (L)).  No results found for: \"AMMONIA\"    No results found for: " "\"HGBA1C\", \"POCGLU\"  No results found for: \"HGBA1C\"  No results found for: \"TSH\", \"FREET4\"    Blood Culture   Date Value Ref Range Status   09/26/2024 No growth at 2 days  Preliminary   09/26/2024 No growth at 2 days  Preliminary     Urine Culture   Date Value Ref Range Status   09/26/2024 >100,000 CFU/mL Escherichia coli ESBL (A)  Final     No results found for: \"WOUNDCX\"  No results found for: \"STOOLCX\"  No results found for: \"RESPCX\"  Pain Management Panel           No data to display                  ----------------------------------------------------------------------------------------------------------------------  Imaging Results (Last 24 Hours)       ** No results found for the last 24 hours. **            ----------------------------------------------------------------------------------------------------------------------    Pertinent Infectious Disease Results                Assessment/Plan       Assessment       ESBL UTI       Plan          Patient resting in bed this morning.  Primary RN at bedside.  Primary RN reports patient having 2 liquid bowel movements.  Afebrile.  Lungs clear to auscultation bilaterally.  WBC normal at 7.71.    Recommend to continue current antibiotic regimen of ertapenem 1 g IV every 24 hours x 7 days for treatment of ESBL UTI.  Upon discharge patient can be further de-escalated to Macrobid 100 mg p.o. twice daily to complete antibiotic course. Okay to continue home suppressive acyclovir for history of genital herpes.  Will continue to monitor closely patient's diarrhea.  We will continue to follow closely and adjust antibiotic therapy as needed.     ANTIMICROBIAL THERAPY    acyclovir - 200 MG, 400 MG  ertapenem (INVanz) 1000 mg in 100 mL NS (VTB)     Code Status:   Code Status and Medical Interventions: CPR (Attempt to Resuscitate); Full Support   Ordered at: 09/26/24 0163     Code Status (Patient has no pulse and is not breathing):    CPR (Attempt to Resuscitate)     Medical " Interventions (Patient has pulse or is breathing):    Full Support       YUE Rivera  10/01/24  10:01 EDT

## 2024-10-01 NOTE — CASE MANAGEMENT/SOCIAL WORK
Discharge Planning Assessment  [facility]     Patient Name: Lety Johnson  MRN: [id number]  Today's Date: 10/1/2024    Admit Date: 9/26/2024    Plan: formerly [facility] has denied pt admit due to not meeting LTACH criteria.  SS spoke with [facility] per Roseline at [phone] who stated facility accepts outside referrals and some Medicaid plans.  SS sent referral to [facility]) [facility] at [phone] for review.  SS requested inpt rehab consult at [facility] via Secure Chat.  SS will follow.     Discharge Plan       Row Name 10/01/24 1509       Plan    Plan formerly [facility] has denied pt admit due to not meeting LTACH criteria.  SS spoke with [facility]) [facility] per Roseline at [phone] who stated facility accepts outside referrals and some Medicaid plans.  SS sent referral to [facility]) [facility] at [phone] for review.  SS requested inpt rehab consult at [facility] via Secure Chat.  SS will follow.                  Continued Care and Services - Admitted Since 9/26/2024       Destination       Service Provider Request Status Selected Services Address Phone Fax Patient Preferred    [facility] Pending - Request Sent N/A [address] [facility] [address] [phone] --    [facility] Declined  Cannot meet patient's needs N/A [address] [facility] [id number] [phone] --    [facility] Acute Care Declined  Not eligible N/A [address] [address] [phone] --                  Expected Discharge Date and Time       Expected Discharge Date Expected Discharge Time    Oct 2, 2024           KAZ Lacy

## 2024-10-02 LAB — GLUCOSE BLDC GLUCOMTR-MCNC: 216 MG/DL (ref 70–130)

## 2024-10-02 PROCEDURE — 97110 THERAPEUTIC EXERCISES: CPT

## 2024-10-02 PROCEDURE — 97530 THERAPEUTIC ACTIVITIES: CPT

## 2024-10-02 PROCEDURE — 99232 SBSQ HOSP IP/OBS MODERATE 35: CPT | Performed by: NURSE PRACTITIONER

## 2024-10-02 PROCEDURE — 99231 SBSQ HOSP IP/OBS SF/LOW 25: CPT | Performed by: INTERNAL MEDICINE

## 2024-10-02 PROCEDURE — 25010000002 HEPARIN (PORCINE) PER 1000 UNITS: Performed by: INTERNAL MEDICINE

## 2024-10-02 PROCEDURE — 25810000003 SODIUM CHLORIDE 0.9 % SOLUTION: Performed by: INTERNAL MEDICINE

## 2024-10-02 PROCEDURE — 97112 NEUROMUSCULAR REEDUCATION: CPT

## 2024-10-02 PROCEDURE — 82948 REAGENT STRIP/BLOOD GLUCOSE: CPT

## 2024-10-02 PROCEDURE — 25010000002 ERTAPENEM PER 500 MG: Performed by: INTERNAL MEDICINE

## 2024-10-02 RX ADMIN — HEPARIN SODIUM 5000 UNITS: 5000 INJECTION INTRAVENOUS; SUBCUTANEOUS at 16:11

## 2024-10-02 RX ADMIN — PANTOPRAZOLE SODIUM 40 MG: 40 TABLET, DELAYED RELEASE ORAL at 08:47

## 2024-10-02 RX ADMIN — SODIUM CHLORIDE 75 ML/HR: 9 INJECTION, SOLUTION INTRAVENOUS at 16:11

## 2024-10-02 RX ADMIN — TRAMADOL HYDROCHLORIDE 50 MG: 50 TABLET ORAL at 18:04

## 2024-10-02 RX ADMIN — NICOTINE 1 PATCH: 7 PATCH, EXTENDED RELEASE TRANSDERMAL at 08:48

## 2024-10-02 RX ADMIN — DICLOFENAC SODIUM 4 G: 10 GEL TOPICAL at 22:22

## 2024-10-02 RX ADMIN — Medication 10 ML: at 22:23

## 2024-10-02 RX ADMIN — ASPIRIN 81 MG: 81 TABLET, CHEWABLE ORAL at 08:47

## 2024-10-02 RX ADMIN — MUPIROCIN 1 APPLICATION: 20 OINTMENT TOPICAL at 22:22

## 2024-10-02 RX ADMIN — HEPARIN SODIUM 5000 UNITS: 5000 INJECTION INTRAVENOUS; SUBCUTANEOUS at 22:23

## 2024-10-02 RX ADMIN — DICLOFENAC SODIUM 4 G: 10 GEL TOPICAL at 08:46

## 2024-10-02 RX ADMIN — ACYCLOVIR 400 MG: 200 CAPSULE ORAL at 22:23

## 2024-10-02 RX ADMIN — ERTAPENEM 1000 MG: 1 INJECTION, POWDER, LYOPHILIZED, FOR SOLUTION INTRAMUSCULAR; INTRAVENOUS at 16:10

## 2024-10-02 RX ADMIN — NYSTATIN: 100000 POWDER TOPICAL at 08:47

## 2024-10-02 RX ADMIN — Medication 10 ML: at 08:47

## 2024-10-02 RX ADMIN — ATORVASTATIN CALCIUM 80 MG: 40 TABLET, FILM COATED ORAL at 08:47

## 2024-10-02 RX ADMIN — TRAMADOL HYDROCHLORIDE 50 MG: 50 TABLET ORAL at 00:30

## 2024-10-02 RX ADMIN — NYSTATIN: 100000 POWDER TOPICAL at 22:21

## 2024-10-02 RX ADMIN — SODIUM CHLORIDE 75 ML/HR: 9 INJECTION, SOLUTION INTRAVENOUS at 03:37

## 2024-10-02 RX ADMIN — HEPARIN SODIUM 5000 UNITS: 5000 INJECTION INTRAVENOUS; SUBCUTANEOUS at 06:12

## 2024-10-02 RX ADMIN — CYCLOBENZAPRINE 10 MG: 10 TABLET, FILM COATED ORAL at 18:04

## 2024-10-02 RX ADMIN — LISINOPRIL 20 MG: 10 TABLET ORAL at 08:47

## 2024-10-02 RX ADMIN — MUPIROCIN 1 APPLICATION: 20 OINTMENT TOPICAL at 08:46

## 2024-10-02 RX ADMIN — TRAMADOL HYDROCHLORIDE 50 MG: 50 TABLET ORAL at 08:47

## 2024-10-02 RX ADMIN — LOPERAMIDE HYDROCHLORIDE 2 MG: 2 CAPSULE ORAL at 08:47

## 2024-10-02 RX ADMIN — DULOXETINE HYDROCHLORIDE 60 MG: 60 CAPSULE, DELAYED RELEASE ORAL at 08:47

## 2024-10-02 RX ADMIN — CYCLOBENZAPRINE 10 MG: 10 TABLET, FILM COATED ORAL at 08:47

## 2024-10-02 NOTE — THERAPY TREATMENT NOTE
Acute Care - Occupational Therapy Treatment Note   Zaki     Patient Name: Lety Johnson  : 1980  MRN: [id number]  Today's Date: 10/2/2024  Onset of Illness/Injury or Date of Surgery: 24     Referring Physician: Dr. Marc    Admit Date: 2024       ICD-10-CM ICD-9-CM   1. Hypokalemia  E87.6 276.8   2. Pressure injury of skin of sacral region, unspecified injury stage  L89.159 707.03     707.20   3. Pressure injury of skin of left heel, unspecified injury stage  L89.629 707.07     707.20   4. Acute cystitis without hematuria  N30.00 595.0     Patient Active Problem List   Diagnosis    UTI (urinary tract infection)     Past Medical History:   Diagnosis Date    Stroke      History reviewed. No pertinent surgical history.      OT ASSESSMENT FLOWSHEET (Last 12 Hours)       OT Evaluation and Treatment       Row Name 10/02/24 1008                   OT Time and Intention    Subjective Information complains of;pain;dizziness  -LA        Document Type therapy note (daily note)  -LA        Mode of Treatment occupational therapy  -LA        Patient Effort good  -LA        Symptoms Noted During/After Treatment none  -LA           General Information    Patient Profile Reviewed yes  -LA        General Observations of Patient Patient agreeable to therapy this date and patient requested to sit EOB this date. OT attempted PROM of LUE however patient unable to tolerate. Patient with good fx use of right UE. Patient tolerated EOB sitting with good balance and control. Fx task completed from EOB. Patient requested to stay at EOB upon OT departure. Patient left with bedside table and notebook/paper with call light in reach. Bed alarm set.  -LA        Existing Precautions/Restrictions fall  -LA           Cognition    Affect/Mental Status (Cognition) emotionally labile  -LA        Orientation Status (Cognition) oriented x 3  -LA        Follows Commands (Cognition) repetition of directions required;increased  processing time needed  -LA           Bed Mobility    Supine-Sit [place] (Bed Mobility) maximum assist (25% patient effort);1 person assist  -LA           Motor Skills    Motor Skills motor control/coordination interventions  -LA        Muscle Tone left;upper extremity(s);severe impairment;spasticity  -LA        Motor Control/Coordination Interventions occupation/activity based treatment  -LA           Balance    Static Sitting Balance independent  -LA        Dynamic Sitting Balance contact guard  -LA        Balance Interventions sitting;minimal challenge;dynamic  -LA           Wound 09/26/24 1806 gluteal    Wound - Properties Group Placement Date: 09/26/24  -KJ Placement Time: 1806  -KJ Location: gluteal  -KJ    Retired Wound - Properties Group Placement Date: 09/26/24  -KJ Placement Time: 1806  -KJ Location: gluteal  -KJ    Retired Wound - Properties Group Date first assessed: 09/26/24  -KJ Time first assessed: 1806  -KJ Location: gluteal  -KJ       Wound 09/26/24 1807 Left gluteal    Wound - Properties Group Placement Date: 09/26/24  -KJ Placement Time: 1807  -KJ Side: Left  -KJ Location: gluteal  -KJ    Retired Wound - Properties Group Placement Date: 09/26/24  -KJ Placement Time: 1807  -KJ Side: Left  -KJ Location: gluteal  -KJ    Retired Wound - Properties Group Date first assessed: 09/26/24  -KJ Time first assessed: 1807  -KJ Side: Left  -KJ Location: gluteal  -KJ       Wound 09/26/24 1809 gluteal    Wound - Properties Group Placement Date: 09/26/24  -KJ Placement Time: 1809  -KJ Location: gluteal  -KJ    Retired Wound - Properties Group Placement Date: 09/26/24  -KJ Placement Time: 1809  -KJ Location: gluteal  -KJ    Retired Wound - Properties Group Date first assessed: 09/26/24  -KJ Time first assessed: 1809  -KJ Location: gluteal  -KJ       Wound 09/26/24 1809 Left posterior ankle    Wound - Properties Group Placement Date: 09/26/24  -KJ Placement Time: 1809  -KJ Side: Left  -KJ Orientation: posterior   -KJ Location: ankle  -KJ    Retired Wound - Properties Group Placement Date: 09/26/24  -KJ Placement Time: 1809  -KJ Side: Left  -KJ Orientation: posterior  -KJ Location: ankle  -KJ    Retired Wound - Properties Group Date first assessed: 09/26/24  -KJ Time first assessed: 1809  -KJ Side: Left  -KJ Location: ankle  -KJ       Wound 09/29/24 0952 Bilateral anterior thigh MASD (Moisture associated skin damage)    Wound - Properties Group Placement Date: 09/29/24  -LB Placement Time: 0952 -LB Present on Original Admission: Y  -LB Side: Bilateral  -LB Orientation: anterior  -LB Location: thigh  -LB Primary Wound Type: MASD  -LB    Retired Wound - Properties Group Placement Date: 09/29/24  -LB Placement Time: 0952 -LB Present on Original Admission: Y  -LB Side: Bilateral  -LB Orientation: anterior  -LB Location: thigh  -LB Primary Wound Type: MASD  -LB    Retired Wound - Properties Group Date first assessed: 09/29/24  -LB Time first assessed: 0952 -LB Present on Original Admission: Y  -LB Side: Bilateral  -LB Location: thigh  -LB Primary Wound Type: MASD  -LB       Plan of Care Review    Plan of Care Reviewed With patient  -LA           Positioning and Restraints    Pre-Treatment Position in bed  -LA        Post Treatment Position bed  -LA        In Bed sitting EOB;call light within reach;encouraged to call for assist;exit alarm on  -LA                  User Key  (r) = Recorded By, (t) = Taken By, (c) = Cosigned By      Initials Name Effective Dates    Nory Keenan RN 06/08/23 -     Zehra Rivera RN 10/20/21 -     Loida Richard OT 02/14/22 -                            OT Recommendation and Plan     Plan of Care Review  Plan of Care Reviewed With: patient  Plan of Care Reviewed With: patient        Time Calculation:     Therapy Charges for Today       Code Description Service Date Service Provider Modifiers Qty    31550433794 HC OT THER PROC EA 15 MIN 10/2/2024 Loida Flaherty OT GO 1    81780446415   OT THERAPEUTIC ACT EA 15 MIN 10/2/2024 Loida Flaherty OT GO 1                 Loida Flaherty OT  10/2/2024

## 2024-10-02 NOTE — PLAN OF CARE
Goal Outcome Evaluation:      Patient currently resting in bed. A&Ox4. VSS. Oxygen saturation maintained above 90% on RA.  No visible s/s of acute distress noted at this time. No requests or complaints at this time. Plan of care ongoing.

## 2024-10-02 NOTE — PROGRESS NOTES
PROGRESS NOTE         Patient Identification:  Name:  Lety Johnson  Age:  43 y.o.  Sex:  female  :  1980  MRN:  [id number]  Visit Number:  [id number]  Primary Care Provider:  Provider, No Known         LOS: 4 days       ----------------------------------------------------------------------------------------------------------------------  Subjective       Chief Complaints:    Fall        Interval History:      Patient sitting up on side of bed working with physical therapy this morning.  Continues on room air without apparent distress.  Afebrile.  No issues or complaints.  Denies dysuria, urgency, frequency.    Review of Systems:    Constitutional: no fever, chills and night sweats.  Generalized fatigue.  Eyes: no eye drainage, itching or redness.  HEENT: no mouth sores, dysphagia or nose bleed.  Respiratory: no for shortness of breath, cough or production of sputum.  Cardiovascular: no chest pain, no palpitations, no orthopnea.  Gastrointestinal: no nausea, vomiting or diarrhea. No abdominal pain, hematemesis or rectal bleeding.  Genitourinary: no dysuria or polyuria.  Hematologic/lymphatic: no lymph node abnormalities, no easy bruising or easy bleeding.  Musculoskeletal: no muscle or joint pain.  Skin: No rash and no itching.  Neurological: no loss of consciousness, no seizure, no headache.  Behavioral/Psych: no depression or suicidal ideation.  Endocrine: no hot flashes.  Immunologic: negative.    ----------------------------------------------------------------------------------------------------------------------      Objective       Current Hospital Meds:  acyclovir, 400 mg, Oral, Nightly  aspirin, 81 mg, Oral, Daily  atorvastatin, 80 mg, Oral, Daily  DULoxetine, 60 mg, Oral, Daily  ertapenem, 1,000 mg, Intravenous, Q24H  heparin (porcine), 5,000 Units, Subcutaneous, Q8H  lisinopril, 20 mg, Oral, Daily  [Held by provider] metFORMIN, 1,000 mg, Oral, BID With Meals  mupirocin, 1 application ,  Each Nare, BID  nicotine, 1 patch, Transdermal, Q24H  nystatin, , Topical, Q12H  pantoprazole, 40 mg, Oral, Daily  sodium chloride, 10 mL, Intravenous, Q12H  sodium chloride, 10 mL, Intravenous, Q12H      Pharmacy Consult,   sodium chloride, 75 mL/hr, Last Rate: 75 mL/hr (10/02/24 0337)      ----------------------------------------------------------------------------------------------------------------------    Vital Signs:  Temp:  [97.5 °F (36.4 °C)-98.8 °F (37.1 °C)] 97.9 °F (36.6 °C)  Heart Rate:  [104-119] 119  Resp:  [20] 20  BP: (130-173)/(78-97) 130/78  Mean Arterial Pressure (Non-Invasive) for the past 24 hrs (Last 3 readings):   Noninvasive MAP (mmHg)   10/02/24 1118 91   10/02/24 0715 110   10/02/24 0510 105     SpO2 Percentage    10/02/24 0510 10/02/24 0715 10/02/24 1118   SpO2: 96% 93% 95%     SpO2:  [92 %-96 %] 95 %  on   ;   Device (Oxygen Therapy): room air    Body mass index is 38.09 kg/m².  Wt Readings from Last 3 Encounters:   10/02/24 105 kg (232 lb 6.4 oz)   06/27/24 120 kg (264 lb)        Intake/Output Summary (Last 24 hours) at 10/2/2024 1139  Last data filed at 10/2/2024 0800  Gross per 24 hour   Intake 480 ml   Output 1000 ml   Net -520 ml     Diet: Regular/House; Fluid Consistency: Thin (IDDSI 0)  ----------------------------------------------------------------------------------------------------------------------      Physical Exam:    Constitutional:  Well-developed and well-nourished.  No respiratory distress.  On room air.  Physical therapy at bedside.  HENT:  Head: Normocephalic and atraumatic.  Mouth:  Moist mucous membranes.    Eyes:  Conjunctivae and EOM are normal.  No scleral icterus.  Neck:  Neck supple.  No JVD present.    Cardiovascular:  Normal rate, regular rhythm and normal heart sounds with no murmur. No edema.  Pulmonary/Chest:  No respiratory distress, no wheezes, no crackles, with normal breath sounds and good air movement.  Abdominal:  Soft.  Bowel sounds are normal.  No  "distension and no tenderness.   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  Neurological:  Alert and oriented to person, place, and time.  No facial droop.  No slurred speech.   Skin:  Skin is warm and dry.  No rash noted.  No pallor.   Psychiatric:  Normal mood and affect.  Behavior is normal.        ----------------------------------------------------------------------------------------------------------------------            Results from last 7 days   Lab Units 10/01/24  0117 09/30/24  0117 09/29/24  0036 09/27/24  0844 09/26/24  1504 09/26/24  1350   CRP mg/dL  --   --   --   --  3.80*  --    LACTATE mmol/L  --   --   --   --   --  1.1   WBC 10*3/mm3 7.71 8.49 7.24   < >  --  10.42   HEMOGLOBIN g/dL 10.9* 10.5* 10.7*   < >  --  12.5   HEMATOCRIT % 33.9* 32.7* 33.6*   < >  --  37.4   MCV fL 98.5* 99.1* 100.6*   < >  --  94.0   MCHC g/dL 32.2 32.1 31.8   < >  --  33.4   PLATELETS 10*3/mm3 353 285 318   < >  --  402    < > = values in this interval not displayed.     Results from last 7 days   Lab Units 10/01/24  0117 09/30/24  0117 09/29/24  0036 09/28/24  0318 09/27/24  0844 09/26/24  1504   SODIUM mmol/L 141 142 140 142   < > 140   POTASSIUM mmol/L 3.8 3.5 3.9 4.1   < > 2.7*   MAGNESIUM mg/dL  --   --   --   --   --  1.5*   CHLORIDE mmol/L 110* 110* 109* 110*   < > 99   CO2 mmol/L 22.8 22.8 21.0* 22.3   < > 28.9   BUN mg/dL 4* 5* 4* 5*   < > 10   CREATININE mg/dL 0.36* 0.46* 0.40* 0.43*   < > 0.51*   CALCIUM mg/dL 8.3* 8.0* 8.2* 8.5*   < > 9.4   GLUCOSE mg/dL 158* 196* 192* 198*   < > 220*   ALBUMIN g/dL  --  2.5* 2.5* 2.7*   < > 3.5   BILIRUBIN mg/dL  --  0.2 0.2 0.3   < > 0.6   ALK PHOS U/L  --  79 88 100   < > 119*   AST (SGOT) U/L  --  11 15 15   < > 21   ALT (SGPT) U/L  --  7 8 9   < > 8    < > = values in this interval not displayed.   Estimated Creatinine Clearance: 247.1 mL/min (A) (by C-G formula based on SCr of 0.36 mg/dL (L)).  No results found for: \"AMMONIA\"    No results found for: " "\"HGBA1C\", \"POCGLU\"  No results found for: \"HGBA1C\"  No results found for: \"TSH\", \"FREET4\"    Blood Culture   Date Value Ref Range Status   09/26/2024 No growth at 2 days  Preliminary   09/26/2024 No growth at 2 days  Preliminary     Urine Culture   Date Value Ref Range Status   09/26/2024 >100,000 CFU/mL Escherichia coli ESBL (A)  Final     No results found for: \"WOUNDCX\"  No results found for: \"STOOLCX\"  No results found for: \"RESPCX\"  Pain Management Panel           No data to display                  ----------------------------------------------------------------------------------------------------------------------  Imaging Results (Last 24 Hours)       ** No results found for the last 24 hours. **            ----------------------------------------------------------------------------------------------------------------------    Pertinent Infectious Disease Results                Assessment/Plan       Assessment       ESBL UTI       Plan        Patient sitting up on side of bed working with physical therapy this morning.  Continues on room air without apparent distress.  Afebrile.  No issues or complaints.  Denies dysuria, urgency, frequency.    Recommend to continue current antibiotic regimen of ertapenem 1 g IV every 24 hours x 7 days for treatment of ESBL UTI.  Upon discharge patient can be further de-escalated to Macrobid 100 mg p.o. twice daily to complete antibiotic course. Okay to continue home suppressive acyclovir for history of genital herpes.  Will continue to monitor closely patient's diarrhea.  We will continue to follow closely and adjust antibiotic therapy as needed.     ANTIMICROBIAL THERAPY    acyclovir - 200 MG, 400 MG  ertapenem (INVanz) 1000 mg in 100 mL NS (VTB)     Code Status:   Code Status and Medical Interventions: CPR (Attempt to Resuscitate); Full Support   Ordered at: 09/26/24 3140     Code Status (Patient has no pulse and is not breathing):    CPR (Attempt to Resuscitate)     " Medical Interventions (Patient has pulse or is breathing):    Full Support       YUE Rivera  10/02/24  11:39 EDT

## 2024-10-02 NOTE — CASE MANAGEMENT/SOCIAL WORK
Discharge Planning Assessment   Sherrill     Patient Name: Lety Johnson  MRN: [id number]  Today's Date: 10/2/2024    Admit Date: 9/26/2024       Discharge Plan       Row Name 10/02/24 1359       Plan    Plan SS contacted [facility] [phone] per Roesline who states referral was received. Roseline voices plans to further discuss referral with provider and she will contact SS back. SS to follow.                 Continued Care and Services - Admitted Since 9/26/2024       Destination       Service Provider Request Status Selected Services Address Phone Fax Patient Preferred    [facility] - [facility] [address] [facility] [place] [address] [phone] --    [facility] Declined  Cannot meet patient's needs N/A [address] [facility] [id number] [phone] --    [facility] Care Declined  Not eligible [facility]/A [address] [facility] [place] [address] [phone] --                  Expected Discharge Date and Time       Expected Discharge Date Expected Discharge Time    Oct 5, 2024      HELEN Butterfield

## 2024-10-02 NOTE — PLAN OF CARE
Goal Outcome Evaluation: Patient has been fairly pleasant today. Compliant with most medications and care as ordered. She remains on RA, saturations maintaining well above 90%. Fluids remain infusing as ordered. Spoke to family via phone, updated on plan of care. She has complain of pain and spasms, PRN medications given. She is currently resting in bed. Will continue with plan of care.

## 2024-10-02 NOTE — THERAPY TREATMENT NOTE
Acute Care - Physical Therapy Treatment Note   Arlee     Patient Name: Lety Johnson  : 1980  MRN: [id number]  Today's Date: 10/2/2024   Onset of Illness/Injury or Date of Surgery: 24  Visit Dx:     ICD-10-CM ICD-9-CM   1. Hypokalemia  E87.6 276.8   2. Pressure injury of skin of sacral region, unspecified injury stage  L89.159 707.03     707.20   3. Pressure injury of skin of left heel, unspecified injury stage  L89.629 707.07     707.20   4. Acute cystitis without hematuria  N30.00 595.0     Patient Active Problem List   Diagnosis    UTI (urinary tract infection)     Past Medical History:   Diagnosis Date    Stroke      History reviewed. No pertinent surgical history.  PT Assessment (Last 12 Hours)       PT Evaluation and Treatment       Row Name 10/02/24 1009          Physical Therapy Time and Intention    Document Type therapy note (daily note)  -     Mode of Treatment physical therapy  -     Patient Effort good  -     Comment Pt seen for therapy this AM, pleasant and cooperative with therapy. Pt participated in LE TE/neuromuscular re-ed and education on CVA. Some bed mobility performed this shift.  -       Row Name 10/02/24 1009          Bed Mobility    Bed Mobility scooting/bridging;sit-supine  -     Scooting/Bridging [place] (Bed Mobility) maximum assist (25% patient effort);dependent (less than 25% patient effort)  Pt did make effort however grossly max/depA  -     Sit-Supine [place] (Bed Mobility) dependent (less than 25% patient effort);maximum assist (25% patient effort)  -       Row Name 10/02/24 1009          Motor Skills    Therapeutic Exercise hip;knee  hip flexion, hip add, knee ext; passive ROM with L LE encouraging visual input and mirroring with R LE  -       Row Name             Wound 24 gluteal    Wound - Properties Group Placement Date: 24  -KJ Placement Time: 1806 Location: gluteal  -KJ    Retired Wound - Properties Group  Placement Date: 09/26/24  -KJ Placement Time: 1806  -KJ Location: gluteal  -KJ    Retired Wound - Properties Group Date first assessed: 09/26/24  -KJ Time first assessed: 1806  -KJ Location: gluteal  -KJ      Row Name             Wound 09/26/24 1807 Left gluteal    Wound - Properties Group Placement Date: 09/26/24  -KJ Placement Time: 1807  -KJ Side: Left  -KJ Location: gluteal  -KJ    Retired Wound - Properties Group Placement Date: 09/26/24  -KJ Placement Time: 1807  -KJ Side: Left  -KJ Location: gluteal  -KJ    Retired Wound - Properties Group Date first assessed: 09/26/24  -KJ Time first assessed: 1807  -KJ Side: Left  -KJ Location: gluteal  -KJ      Row Name             Wound 09/26/24 1809 gluteal    Wound - Properties Group Placement Date: 09/26/24  -KJ Placement Time: 1809  -KJ Location: gluteal  -KJ    Retired Wound - Properties Group Placement Date: 09/26/24  -KJ Placement Time: 1809  -KJ Location: gluteal  -KJ    Retired Wound - Properties Group Date first assessed: 09/26/24  -KJ Time first assessed: 1809  -KJ Location: gluteal  -KJ      Row Name             Wound 09/26/24 1809 Left posterior ankle    Wound - Properties Group Placement Date: 09/26/24  -KJ Placement Time: 1809  -KJ Side: Left  -KJ Orientation: posterior  -KJ Location: ankle  -KJ    Retired Wound - Properties Group Placement Date: 09/26/24  -KJ Placement Time: 1809  -KJ Side: Left  -KJ Orientation: posterior  -KJ Location: ankle  -KJ    Retired Wound - Properties Group Date first assessed: 09/26/24  -KJ Time first assessed: 1809  -KJ Side: Left  -KJ Location: ankle  -KJ      Row Name             Wound 09/29/24 0952 Bilateral anterior thigh MASD (Moisture associated skin damage)    Wound - Properties Group Placement Date: 09/29/24  -LB Placement Time: 0952  -LB Present on Original Admission: Y  -LB Side: Bilateral  -LB Orientation: anterior  -LB Location: thigh  -LB Primary Wound Type: MASD  -LB    Retired Wound - Properties Group Placement  Date: 09/29/24  -LB Placement Time: 0952 -LB Present on Original Admission: Y  -LB Side: Bilateral  -LB Orientation: anterior  -LB Location: thigh  -LB Primary Wound Type: MASD  -LB    Retired Wound - Properties Group Date first assessed: 09/29/24  -LB Time first assessed: 0952 -LB Present on Original Admission: Y  -LB Side: Bilateral  -LB Location: thigh  -LB Primary Wound Type: MASD  -LB      Row Name 10/02/24 1009          Positioning and Restraints    Pre-Treatment Position in bed  -     Post Treatment Position bed  -KH     In Bed supine;call light within reach;exit alarm on;encouraged to call for assist  -       Row Name 10/02/24 1009          Progress Summary (PT)    Daily Progress Summary (PT) Pt tolerated therapy fair to good with pain, some spasms, and emotional outbreak a few times during session. Pt may benefit from frequent therapeutic intervention, no change in POC.  -               User Key  (r) = Recorded By, (t) = Taken By, (c) = Cosigned By      Initials Name Provider Type    Janessa Hassan, GENARO Physical Therapist    Nory Keenan, RN Registered Nurse    Zehra Rivera RN Registered Nurse                    Physical Therapy Education       Title: PT OT SLP Therapies (In Progress)       Topic: Physical Therapy (In Progress)       Point: Mobility training (In Progress)       Learning Progress Summary             Patient Acceptance, E, NR by RD at 10/2/2024 1101    Acceptance, E, NR by RD at 10/1/2024 0856    Acceptance, E,D, VU,NR by AG at 9/30/2024 1559                         Point: Home exercise program (In Progress)       Learning Progress Summary             Patient Acceptance, E, NR by RD at 10/2/2024 1101    Acceptance, E, NR by RD at 10/1/2024 0856    Acceptance, E,D, VU,NR by AG at 9/30/2024 1559                         Point: Body mechanics (In Progress)       Learning Progress Summary             Patient Acceptance, E, NR by RD at 10/2/2024 1101    Acceptance, E, NR by  MITCHEL at 10/1/2024 0856    Acceptance, E,D, VU,NR by  at 9/30/2024 1559                         Point: Precautions (In Progress)       Learning Progress Summary             Patient Acceptance, E, NR by RD at 10/2/2024 1101    Acceptance, E, NR by RD at 10/1/2024 0856    Acceptance, E,D, VU,NR by  at 9/30/2024 1559                                         User Key       Initials Effective Dates Name Provider Type Discipline     06/16/21 -  Мария Joya, PT Physical Therapist PT    RD 06/16/21 -  Laisha Verdugo, RN Registered Nurse Nurse                  PT Recommendation and Plan     Progress Summary (PT)  Daily Progress Summary (PT): Pt tolerated therapy fair to good with pain, some spasms, and emotional outbreak a few times during session. Pt may benefit from frequent therapeutic intervention, no change in POC.       Time Calculation:    PT Charges       Row Name 10/02/24 1123             Time Calculation    Start Time 1008  -KH      PT Received On 10/02/24  -         Time Calculation- PT    Total Timed Code Minutes- PT 23 minute(s)  -                User Key  (r) = Recorded By, (t) = Taken By, (c) = Cosigned By      Initials Name Provider Type     Janessa Almeida, PT Physical Therapist                  Therapy Charges for Today       Code Description Service Date Service Provider Modifiers Qty    86926358755 HC PT THER PROC EA 15 MIN 10/2/2024 Janessa Almeida, PT GP 1    58159737584 HC PT NEUROMUSC RE EDUCATION EA 15 MIN 10/2/2024 Janessa Almeida, PT GP 1            PT G-Codes  AM-PAC 6 Clicks Score (PT): 6    Janessa Almeida PT  10/2/2024

## 2024-10-02 NOTE — PAYOR COMM NOTE
"CONTACT:  RONAL RACHEL RN  UTILIZATION MANAGEMENT DEPT.   [facility]   [address] [facility], [address]   PHONE:  [phone]   FAX: [phone]         ADDITIONAL DAYS REQUEST-CLINICAL UPDATES    AUTH # [id number]          Lety Johnson (43 y.o. Female)       Date of Birth   1980    Social Security Number       Address   [address] [address]    Home Phone   [phone]    MRN   [id number]       [employer]   [facility]    Marital Status   Single                            Admission Date   9/26/24    Admission Type   Emergency    Admitting Provider   Ramon Marc MD    Attending Provider   Marv Navas DO    Department, Room/Bed   [address], 3315/1S       Discharge Date       Discharge Disposition       Discharge Destination                                 Attending Provider: Marv Navas DO    Allergies: No Known Allergies    Isolation: Contact   Infection: ESBL E coli (09/28/24)   Code Status: CPR    Ht: 166.4 cm (65.5\")   Wt: 105 kg (232 lb 6.4 oz)    Admission Cmt: None   Principal Problem: UTI (urinary tract infection) [N39.0]                   Active Insurance as of 9/26/2024       Primary Coverage       Payor Plan Insurance Group Employer/Plan Group    [place] [id number]       Payor Plan Address Payor Plan Phone Number Payor Plan Fax Number Effective Dates    [employer] [address] [address] [phone]  5/1/2024 - None Entered    [facility] [id number]         Subscriber Name Subscriber Birth Date Member ID       LETY JOHNSON 1980 [id number]                     Emergency Contacts        (Rel.) Home Phone Work Phone Mobile Phone    Moises Rachel (Legal Guardian) -- -- [phone]              Orders (last 72 hrs)        Start     Ordered    10/01/24 1123  Inpatient Continue Care Hospital Referral Consult  Once        Provider:  (Not yet assigned)    10/01/24 1122    " 10/01/24 0924  loperamide (IMODIUM) capsule 2 mg  4 Times Daily PRN         10/01/24 0924    10/01/24 0700  loperamide (IMODIUM) capsule 2 mg  Once         10/01/24 0614    10/01/24 0600  CBC (No Diff)  Morning Draw         09/30/24 1839    10/01/24 0600  Basic Metabolic Panel  Morning Draw         09/30/24 1839    09/30/24 1100  sodium chloride 0.9 % flush 10 mL  Every 12 Hours Scheduled         09/30/24 1004    09/30/24 1100  mupirocin (BACTROBAN) 2 % nasal ointment 1 Application  2 Times Daily         09/30/24 1004    09/30/24 1005  Connectors / Hubs Must Be Scrubbed 15 Seconds Using 70% Alcohol Before Access - Allow to Dry Before Accessing Line  Continuous         09/30/24 1004    09/30/24 1005  Change CHG Dressing or Transparent Dressing with CHG Disk, Needleless Connectors and Securement Device Every 7 Days  Weekly        Comments: Per [facility] Policy    09/30/24 1004    09/30/24 1005  Discontinue mupirocin for decolonization after 5 days or sooner if patient no longer has a central venous access device, accessed port, hemodialysis catheter, or midline  Continuous         09/30/24 1004    09/30/24 1004  sodium chloride 0.9 % flush 10 mL  As Needed         09/30/24 1004    09/30/24 1004  sodium chloride 0.9 % infusion 40 mL  As Needed         09/30/24 1004    09/30/24 0300  potassium chloride (KLOR-CON M20) CR tablet 40 mEq  Every 4 Hours         09/30/24 0206    09/30/24 0106  Unstageable Pressure Ulcer (Wet) Care Q12H  Every 12 Hours        Comments: - Gently Cleanse With Normal Saline   - Pack Loosely With Moist to Moist Normal Saline Fluffed Gauze   - Cover With Silicone Border Dressing or Dry Dressing    09/30/24 0105    09/30/24 0105  Follow Pressure Ulcer Prevention Measures Policy  Continuous        Comments: Implement Appropriate Pressure Ulcer Prevention Measures  - Open Order Report to View Full Instructions  Enter Wound LDA & Document Assessment  Add Wound Care Plan  Add Patient Education Per Policy     09/30/24 0105    09/30/24 0105  Wound Ostomy Eval & Treat  Once         09/30/24 0105    09/30/24 0104  Follow Pressure Ulcer Prevention Measures Policy  Continuous        Comments: Implement Appropriate Pressure Ulcer Prevention Measures  - Open Order Report to View Full Instructions  Enter Wound LDA & Document Assessment  Add Wound Care Plan  Add Patient Education Per Policy    09/30/24 0105    09/30/24 0104  Turn Patient  Now Then Every 2 Hours         09/30/24 0105    09/30/24 0104  Head of Bed 30 Degrees or Less (Unless Contraindicated)  Until Discontinued         09/30/24 0105    09/30/24 0104  Elevate Heels Off of Bed  Until Discontinued         09/30/24 0105    09/30/24 0104  Use Seat Cushion When Up In Chair  Continuous         09/30/24 0105 09/30/24 0104  Silicone Border Dressing to Bony Prominences  Every Shift       09/30/24 0105    09/30/24 0104  Do NOT Rub or Massage Any Bony Prominence  Continuous         09/30/24 0105    09/29/24 1100  nystatin (MYCOSTATIN) powder  Every 12 Hours Scheduled         09/29/24 0948    09/29/24 0930  traMADol (ULTRAM) tablet 50 mg  Every 8 Hours PRN         09/29/24 0930    09/28/24 1800  Dietary Nutrition Supplements Boost Plus (Ensure Enlive, Ensure Plus)  Daily With Breakfast & Dinner       09/28/24 1209    09/28/24 1500  ertapenem (INVanz) 1,000 mg in sodium chloride 0.9 % 100 mL IVPB-VTB  Every 24 Hours         09/28/24 1427    09/28/24 0600  Comprehensive Metabolic Panel  Daily       09/27/24 0738    09/28/24 0600  CBC (No Diff)  Daily       09/27/24 0738    09/27/24 2100  acyclovir (ZOVIRAX) capsule 400 mg  Nightly         09/27/24 0921    09/27/24 0737  Pharmacy Consult  Continuous PRN         09/27/24 0738    09/27/24 0103  acetaminophen (TYLENOL) tablet 650 mg  Every 6 Hours PRN         09/27/24 0105    09/27/24 0013  Diclofenac Sodium (VOLTAREN) 1 % gel 4 g  4 Times Daily PRN         09/27/24 0013    09/26/24 2200  heparin (porcine) 5000 UNIT/ML injection  5,000 Units  Every 8 Hours Scheduled         09/26/24 1747 09/26/24 2149  prochlorperazine (COMPAZINE) injection 2.5 mg  Every 6 Hours PRN         09/26/24 2149 09/26/24 2100  sodium chloride 0.9 % flush 10 mL  Every 12 Hours Scheduled         09/26/24 1747 09/26/24 2000  Vital Signs  Every 4 Hours       09/26/24 1747 09/26/24 1845  sodium chloride 0.9 % infusion  Continuous         09/26/24 1747 09/26/24 1845  aspirin chewable tablet 81 mg  Daily         09/26/24 1747 09/26/24 1845  atorvastatin (LIPITOR) tablet 80 mg  Daily         09/26/24 1747 09/26/24 1845  DULoxetine (CYMBALTA) DR capsule 60 mg  Daily         09/26/24 1747 09/26/24 1845  lisinopril (PRINIVIL,ZESTRIL) tablet 20 mg  Daily         09/26/24 1747 09/26/24 1845  pantoprazole (PROTONIX) EC tablet 40 mg  Daily         09/26/24 1747 09/26/24 1845  [Held by provider]  metFORMIN (GLUCOPHAGE) tablet 1,000 mg  2 Times Daily With Meals        (On hold since Thu 9/26/2024 at 1747 until manually unheld; held by Ramon Marc MDHold Reason: Other (Comment Required))    09/26/24 1747 09/26/24 1845  nicotine (NICODERM CQ) 7 MG/24HR patch 1 patch  Every 24 Hours Scheduled         09/26/24 1747 09/26/24 1800  Oral Care  2 Times Daily       09/26/24 1747 09/26/24 1748  Intake & Output  Every Shift       09/26/24 1747 09/26/24 1747  sodium chloride 0.9 % flush 10 mL  As Needed         09/26/24 1747 09/26/24 1747  sodium chloride 0.9 % infusion 40 mL  As Needed         09/26/24 1747 09/26/24 1747  Potassium Replacement - Follow Nurse / BPA Driven Protocol  As Needed         09/26/24 1747    09/26/24 1747  Magnesium Standard Dose Replacement - Follow Nurse / BPA Driven Protocol  As Needed         09/26/24 1747 09/26/24 1747  Phosphorus Replacement - Follow Nurse / BPA Driven Protocol  As Needed         09/26/24 1747    09/26/24 1747  Calcium Replacement - Follow Nurse / BPA Driven Protocol  As Needed         09/26/24  "1747 09/26/24 1747  sennosides-docusate (PERICOLACE) 8.6-50 MG per tablet 2 tablet  2 Times Daily PRN        Placed in \"And\" Linked Group    09/26/24 1747 09/26/24 1747  polyethylene glycol (MIRALAX) packet 17 g  Daily PRN        Placed in \"And\" Linked Group    09/26/24 1747    09/26/24 1747  bisacodyl (DULCOLAX) EC tablet 5 mg  Daily PRN        Placed in \"And\" Linked Group    09/26/24 1747    09/26/24 1747  bisacodyl (DULCOLAX) suppository 10 mg  Daily PRN        Placed in \"And\" Linked Group    09/26/24 1747 09/26/24 1747  cyclobenzaprine (FLEXERIL) tablet 10 mg  3 Times Daily PRN         09/26/24 1747    Unscheduled  Straight cath  As Needed       09/26/24 1359    Unscheduled  Potassium  As Needed        Comments: Release/collect/run 4 hours after completion of last potassium dose.     Placed in \"And\" Linked Group    09/26/24 1545    Unscheduled  Stage II Pressure Ulcer Care PRN  As Needed      Comments: - Gently Cleanse With Normal Saline  - Cover With Silicone Border Dressing (If Indicated)  - For Frequent Incontinence - Apply Skin Protective Barrier Cream Rather Than Silicone Border Dressing    09/29/24 1139    Unscheduled  Wound Care  As Needed       09/30/24 0105    Unscheduled  Unstageable Pressure Ulcer (Wet) Care PRN  As Needed      Comments: - Gently Cleanse With Normal Saline   - Pack Loosely With Moist to Moist Normal Saline Fluffed Gauze   - Cover With Silicone Border Dressing or Dry Dressing    09/30/24 0105    Unscheduled  Change Dressing to IV Site As Needed When Damp, Loose or Soiled  As Needed       09/30/24 1004    Unscheduled  Change Needleless Connectors  As Needed      Comments: Change Needleless Connectors When:  - Administration Set Changed  - Dressing Changed  - Removed For Any Reason  - Residual Blood or Debris Within Connector  - Prior to Drawing Blood Cultures  - Contamination of Connector  - After Administration of Blood or Blood Components    09/30/24 1004    --  aspirin 81 MG " chewable tablet  Daily         24    --  atorvastatin (LIPITOR) 80 MG tablet  Daily         24    --  cyclobenzaprine (FLEXERIL) 10 MG tablet  3 Times Daily PRN         24    --  DULoxetine (CYMBALTA) 60 MG capsule  Daily         24    --  lisinopril (PRINIVIL,ZESTRIL) 20 MG tablet  Daily         24    --  metFORMIN (GLUCOPHAGE) 1000 MG tablet  2 Times Daily With Meals         24    --  pantoprazole (PROTONIX) 40 MG EC tablet  Daily         24    --  acyclovir (ZOVIRAX) 400 MG tablet  Nightly         24 09                     Physician Progress Notes (last 72 hours)        Cuca Yuen APRN at 10/02/24 1138                     PROGRESS NOTE         Patient Identification:  Name:  Lety Johnson  Age:  43 y.o.  Sex:  female  :  1980  MRN:  [id number]  Visit Number:  [id number]  Primary Care Provider:  Provider, No Known         LOS: 4 days       ----------------------------------------------------------------------------------------------------------------------  Subjective       Chief Complaints:    Fall        Interval History:      Patient sitting up on side of bed working with physical therapy this morning.  Continues on room air without apparent distress.  Afebrile.  No issues or complaints.  Denies dysuria, urgency, frequency.    Review of Systems:    Constitutional: no fever, chills and night sweats.  Generalized fatigue.  Eyes: no eye drainage, itching or redness.  HEENT: no mouth sores, dysphagia or nose bleed.  Respiratory: no for shortness of breath, cough or production of sputum.  Cardiovascular: no chest pain, no palpitations, no orthopnea.  Gastrointestinal: no nausea, vomiting or diarrhea. No abdominal pain, hematemesis or rectal bleeding.  Genitourinary: no dysuria or polyuria.  Hematologic/lymphatic: no lymph node abnormalities, no easy bruising or easy bleeding.  Musculoskeletal: no muscle or joint  pain.  Skin: No rash and no itching.  Neurological: no loss of consciousness, no seizure, no headache.  Behavioral/Psych: no depression or suicidal ideation.  Endocrine: no hot flashes.  Immunologic: negative.    ----------------------------------------------------------------------------------------------------------------------      Objective       Current Hospital Meds:  acyclovir, 400 mg, Oral, Nightly  aspirin, 81 mg, Oral, Daily  atorvastatin, 80 mg, Oral, Daily  DULoxetine, 60 mg, Oral, Daily  ertapenem, 1,000 mg, Intravenous, Q24H  heparin (porcine), 5,000 Units, Subcutaneous, Q8H  lisinopril, 20 mg, Oral, Daily  [Held by provider] metFORMIN, 1,000 mg, Oral, BID With Meals  mupirocin, 1 application , Each Nare, BID  nicotine, 1 patch, Transdermal, Q24H  nystatin, , Topical, Q12H  pantoprazole, 40 mg, Oral, Daily  sodium chloride, 10 mL, Intravenous, Q12H  sodium chloride, 10 mL, Intravenous, Q12H      Pharmacy Consult,   sodium chloride, 75 mL/hr, Last Rate: 75 mL/hr (10/02/24 0337)      ----------------------------------------------------------------------------------------------------------------------    Vital Signs:  Temp:  [97.5 °F (36.4 °C)-98.8 °F (37.1 °C)] 97.9 °F (36.6 °C)  Heart Rate:  [104-119] 119  Resp:  [20] 20  BP: (130-173)/(78-97) 130/78  Mean Arterial Pressure (Non-Invasive) for the past 24 hrs (Last 3 readings):   Noninvasive MAP (mmHg)   10/02/24 1118 91   10/02/24 0715 110   10/02/24 0510 105     SpO2 Percentage    10/02/24 0510 10/02/24 0715 10/02/24 1118   SpO2: 96% 93% 95%     SpO2:  [92 %-96 %] 95 %  on   ;   Device (Oxygen Therapy): room air    Body mass index is 38.09 kg/m².  Wt Readings from Last 3 Encounters:   10/02/24 105 kg (232 lb 6.4 oz)   06/27/24 120 kg (264 lb)        Intake/Output Summary (Last 24 hours) at 10/2/2024 1139  Last data filed at 10/2/2024 0800  Gross per 24 hour   Intake 480 ml   Output 1000 ml   Net -520 ml     Diet: Regular/House; Fluid Consistency: Thin  (IDDSI 0)  ----------------------------------------------------------------------------------------------------------------------      Physical Exam:    Constitutional:  Well-developed and well-nourished.  No respiratory distress.  On room air.  Physical therapy at bedside.  HENT:  Head: Normocephalic and atraumatic.  Mouth:  Moist mucous membranes.    Eyes:  Conjunctivae and EOM are normal.  No scleral icterus.  Neck:  Neck supple.  No JVD present.    Cardiovascular:  Normal rate, regular rhythm and normal heart sounds with no murmur. No edema.  Pulmonary/Chest:  No respiratory distress, no wheezes, no crackles, with normal breath sounds and good air movement.  Abdominal:  Soft.  Bowel sounds are normal.  No distension and no tenderness.   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  Neurological:  Alert and oriented to person, place, and time.  No facial droop.  No slurred speech.   Skin:  Skin is warm and dry.  No rash noted.  No pallor.   Psychiatric:  Normal mood and affect.  Behavior is normal.        ----------------------------------------------------------------------------------------------------------------------            Results from last 7 days   Lab Units 10/01/24  0117 09/30/24  0117 09/29/24  0036 09/27/24  0844 09/26/24  1504 09/26/24  1350   CRP mg/dL  --   --   --   --  3.80*  --    LACTATE mmol/L  --   --   --   --   --  1.1   WBC 10*3/mm3 7.71 8.49 7.24   < >  --  10.42   HEMOGLOBIN g/dL 10.9* 10.5* 10.7*   < >  --  12.5   HEMATOCRIT % 33.9* 32.7* 33.6*   < >  --  37.4   MCV fL 98.5* 99.1* 100.6*   < >  --  94.0   MCHC g/dL 32.2 32.1 31.8   < >  --  33.4   PLATELETS 10*3/mm3 353 285 318   < >  --  402    < > = values in this interval not displayed.     Results from last 7 days   Lab Units 10/01/24  0117 09/30/24  0117 09/29/24  0036 09/28/24  0318 09/27/24  0844 09/26/24  1504   SODIUM mmol/L 141 142 140 142   < > 140   POTASSIUM mmol/L 3.8 3.5 3.9 4.1   < > 2.7*   MAGNESIUM mg/dL   "--   --   --   --   --  1.5*   CHLORIDE mmol/L 110* 110* 109* 110*   < > 99   CO2 mmol/L 22.8 22.8 21.0* 22.3   < > 28.9   BUN mg/dL 4* 5* 4* 5*   < > 10   CREATININE mg/dL 0.36* 0.46* 0.40* 0.43*   < > 0.51*   CALCIUM mg/dL 8.3* 8.0* 8.2* 8.5*   < > 9.4   GLUCOSE mg/dL 158* 196* 192* 198*   < > 220*   ALBUMIN g/dL  --  2.5* 2.5* 2.7*   < > 3.5   BILIRUBIN mg/dL  --  0.2 0.2 0.3   < > 0.6   ALK PHOS U/L  --  79 88 100   < > 119*   AST (SGOT) U/L  --  11 15 15   < > 21   ALT (SGPT) U/L  --  7 8 9   < > 8    < > = values in this interval not displayed.   Estimated Creatinine Clearance: 247.1 mL/min (A) (by C-G formula based on SCr of 0.36 mg/dL (L)).  No results found for: \"AMMONIA\"    No results found for: \"HGBA1C\", \"POCGLU\"  No results found for: \"HGBA1C\"  No results found for: \"TSH\", \"FREET4\"    Blood Culture   Date Value Ref Range Status   09/26/2024 No growth at 2 days  Preliminary   09/26/2024 No growth at 2 days  Preliminary     Urine Culture   Date Value Ref Range Status   09/26/2024 >100,000 CFU/mL Escherichia coli ESBL (A)  Final     No results found for: \"WOUNDCX\"  No results found for: \"STOOLCX\"  No results found for: \"RESPCX\"  Pain Management Panel           No data to display                  ----------------------------------------------------------------------------------------------------------------------  Imaging Results (Last 24 Hours)       ** No results found for the last 24 hours. **            ----------------------------------------------------------------------------------------------------------------------    Pertinent Infectious Disease Results                Assessment/Plan       Assessment       ESBL UTI       Plan        Patient sitting up on side of bed working with physical therapy this morning.  Continues on room air without apparent distress.  Afebrile.  No issues or complaints.  Denies dysuria, urgency, frequency.    Recommend to continue current antibiotic regimen of ertapenem 1 g " IV every 24 hours x 7 days for treatment of ESBL UTI.  Upon discharge patient can be further de-escalated to Macrobid 100 mg p.o. twice daily to complete antibiotic course. Okay to continue home suppressive acyclovir for history of genital herpes.  Will continue to monitor closely patient's diarrhea.  We will continue to follow closely and adjust antibiotic therapy as needed.     ANTIMICROBIAL THERAPY    acyclovir - 200 MG, 400 MG  ertapenem (INVanz) 1000 mg in 100 mL NS (VTB)     Code Status:   Code Status and Medical Interventions: CPR (Attempt to Resuscitate); Full Support   Ordered at: 24 1626     Code Status (Patient has no pulse and is not breathing):    CPR (Attempt to Resuscitate)     Medical Interventions (Patient has pulse or is breathing):    Full Support       YUE Rivera  10/02/24  11:39 EDT    Electronically signed by Cuca Yuen APRN at 10/02/24 1142       Marv Navas DO at 10/01/24 1531              [facility] HOSPITALIST PROGRESS NOTE     Patient Identification:  Name:  Lety Johnson  Age:  43 y.o.  Sex:  female  :  1980  MRN:  [id number]  Visit Number:  [id number]  Primary Care Provider:  Provider, No Known    Length of stay:  3    Chief complaint: None    Subjective:    Patient seen and examined at bedside with no nursing staff present.  Patient was asleep when entered the room but easily awakened.  Patient did quickly fall back asleep.  She currently reports no new symptoms overnight.  Did have discussion with case management regarding deplorable living conditions when patient was found at home.  She reports her boyfriend had been taking care of her but was recently incarcerated.  She had been attempting to take care of herself at home but was found covered in feces and urine when discovered by family members.  Unfortunately, patient has no income and has currently been denied at multiple skilled nursing facilities as well as long-term acute  care facilities.  Safe discharge disposition has yet to be determined.  ----------------------------------------------------------------------------------------------------------------------  Current Hospital Meds:  acyclovir, 400 mg, Oral, Nightly  aspirin, 81 mg, Oral, Daily  atorvastatin, 80 mg, Oral, Daily  DULoxetine, 60 mg, Oral, Daily  ertapenem, 1,000 mg, Intravenous, Q24H  heparin (porcine), 5,000 Units, Subcutaneous, Q8H  lisinopril, 20 mg, Oral, Daily  [Held by provider] metFORMIN, 1,000 mg, Oral, BID With Meals  mupirocin, 1 application , Each Nare, BID  nicotine, 1 patch, Transdermal, Q24H  nystatin, , Topical, Q12H  pantoprazole, 40 mg, Oral, Daily  sodium chloride, 10 mL, Intravenous, Q12H  sodium chloride, 10 mL, Intravenous, Q12H      Pharmacy Consult,   sodium chloride, 75 mL/hr, Last Rate: 75 mL/hr (10/01/24 1406)      ----------------------------------------------------------------------------------------------------------------------  Vital Signs:  Temp:  [97.6 °F (36.4 °C)-98.8 °F (37.1 °C)] 97.8 °F (36.6 °C)  Heart Rate:  [] 109  Resp:  [18-20] 20  BP: (147-179)/() 173/97      09/29/24  0526 09/30/24  0500 10/01/24  0530   Weight: 111 kg (244 lb 12.8 oz) 110 kg (243 lb 9.6 oz) 107 kg (235 lb 11.2 oz)     Body mass index is 38.63 kg/m².    Intake/Output Summary (Last 24 hours) at 10/1/2024 1531  Last data filed at 10/1/2024 1400  Gross per 24 hour   Intake 360 ml   Output 1750 ml   Net -1390 ml     Diet: Regular/House; Fluid Consistency: Thin (IDDSI 0)  ----------------------------------------------------------------------------------------------------------------------  Physical exam:  Constitutional: Chronically ill-appearing  female in no apparent distress.     HENT:  Head:  Normocephalic and atraumatic.  Mouth:  Moist mucous membranes.    Eyes:  Conjunctivae and EOM are normal.  Pupils are equal, round, and reactive to light.  No scleral icterus.    Neck:  Neck supple.  No thyromegaly.  No JVD present.    Cardiovascular:  Regular rate and rhythm with no murmurs, rubs, clicks or gallops appreciated.  Pulmonary/Chest:  Clear to auscultation bilaterally with no crackles, wheezes or rhonchi appreciated.  Abdominal:  Soft. Nontender. Nondistended  Bowel sounds are normal in all four quadrants. No organomegally appreciated.   Musculoskeletal:  No edema, no tenderness, and no deformity.  No red or swollen joints anywhere.    Neurological:  Alert, Cranial nerves II-XII intact with no focal deficits.  No facial droop.  No slurred speech.   Skin:  Warm and dry to palpation with no rashes or lesions appreciated.  Peripheral vascular:  2+ radial and pedal pulses in bilateral upper and lower extremities.  Psychiatric:  Alert and oriented x3, demonstrates appropriate judgment and insight.  -------------------------------------------------------------------------  ----------------------------------------------------------------------------------------------------------------------      Results from last 7 days   Lab Units 10/01/24  0117 09/30/24  0117 09/29/24  0036 09/27/24  0844 09/26/24  1504 09/26/24  1350   CRP mg/dL  --   --   --   --  3.80*  --    LACTATE mmol/L  --   --   --   --   --  1.1   WBC 10*3/mm3 7.71 8.49 7.24   < >  --  10.42   HEMOGLOBIN g/dL 10.9* 10.5* 10.7*   < >  --  12.5   HEMATOCRIT % 33.9* 32.7* 33.6*   < >  --  37.4   MCV fL 98.5* 99.1* 100.6*   < >  --  94.0   MCHC g/dL 32.2 32.1 31.8   < >  --  33.4   PLATELETS 10*3/mm3 353 285 318   < >  --  402    < > = values in this interval not displayed.         Results from last 7 days   Lab Units 10/01/24  0117 09/30/24  0117 09/29/24  0036 09/28/24  0318 09/27/24  0844 09/26/24  1504   SODIUM mmol/L 141 142 140 142   < > 140   POTASSIUM mmol/L 3.8 3.5 3.9 4.1   < > 2.7*   MAGNESIUM mg/dL  --   --   --   --   --  1.5*   CHLORIDE mmol/L 110* 110* 109* 110*   < > 99   CO2 mmol/L 22.8 22.8 21.0* 22.3   < > 28.9   BUN mg/dL 4* 5*  "4* 5*   < > 10   CREATININE mg/dL 0.36* 0.46* 0.40* 0.43*   < > 0.51*   CALCIUM mg/dL 8.3* 8.0* 8.2* 8.5*   < > 9.4   GLUCOSE mg/dL 158* 196* 192* 198*   < > 220*   ALBUMIN g/dL  --  2.5* 2.5* 2.7*   < > 3.5   BILIRUBIN mg/dL  --  0.2 0.2 0.3   < > 0.6   ALK PHOS U/L  --  79 88 100   < > 119*   AST (SGOT) U/L  --  11 15 15   < > 21   ALT (SGPT) U/L  --  7 8 9   < > 8    < > = values in this interval not displayed.   Estimated Creatinine Clearance: 249.7 mL/min (A) (by C-G formula based on SCr of 0.36 mg/dL (L)).    No results found for: \"AMMONIA\"      Blood Culture   Date Value Ref Range Status   09/26/2024 No growth at 4 days  Preliminary   09/26/2024 No growth at 4 days  Preliminary     Urine Culture   Date Value Ref Range Status   09/26/2024 >100,000 CFU/mL Escherichia coli ESBL (A)  Final     No results found for: \"WOUNDCX\"  No results found for: \"STOOLCX\"    I have personally looked at the labs and they are summarized above.  ----------------------------------------------------------------------------------------------------------------------  Imaging Results (Last 24 Hours)       ** No results found for the last 24 hours. **          ----------------------------------------------------------------------------------------------------------------------  Assessment and Plan:    ESBL acute cystitis -will continue Invanz 1 g IV every 24 hours while hospitalized and will de-escalate antibiotic therapy to Macrobid 100 mg p.o. twice daily upon discharge to complete a full 7-day course of antibiotic therapy per infectious disease recommendations.    2.  Acute metabolic encephalopathy -resolved, initially secondary to acute UTI    3.  History of CVA -patient with left-sided hemiparesis secondary to history of CVA    4.  Debility -continue PT/OT, unfortunately patient does not have a monthly income, and currently unable to find any rehab facility that will accept the patient.    5.  History of genital herpes -continue " acyclovir    6.  Morbid obesity -complicates all aspects of care    Disposition currently pending safe discharge disposition    Marv Navas DO   10/01/24   15:31 EDT       Electronically signed by Marv Navas DO at 10/01/24 7044       [email or web address] mccain at 10/01/24 1001                     PROGRESS NOTE         Patient Identification:  Name:  Lety Johnson  Age:  43 y.o.  Sex:  female  :  1980  MRN:  [id number]  Visit Number:  [id number]  Primary Care Provider:  Provider, No Known         LOS: 3 days       ----------------------------------------------------------------------------------------------------------------------  Subjective       Chief Complaints:    Fall        Interval History:      Patient resting in bed this morning.  Primary RN at bedside.  Primary RN reports patient having 2 liquid bowel movements.  Afebrile.  Lungs clear to auscultation bilaterally.  WBC normal at 7.71.    Review of Systems:    Constitutional: no fever, chills and night sweats.  Generalized fatigue.  Eyes: no eye drainage, itching or redness.  HEENT: no mouth sores, dysphagia or nose bleed.  Respiratory: no for shortness of breath, cough or production of sputum.  Cardiovascular: no chest pain, no palpitations, no orthopnea.  Gastrointestinal: no nausea, vomiting or diarrhea. No abdominal pain, hematemesis or rectal bleeding.  Genitourinary: no dysuria or polyuria.  Hematologic/lymphatic: no lymph node abnormalities, no easy bruising or easy bleeding.  Musculoskeletal: no muscle or joint pain.  Skin: No rash and no itching.  Neurological: no loss of consciousness, no seizure, no headache.  Behavioral/Psych: no depression or suicidal ideation.  Endocrine: no hot flashes.  Immunologic: negative.    ----------------------------------------------------------------------------------------------------------------------      Objective       Current Hospital Meds:  acyclovir, 400 mg, Oral,  Nightly  aspirin, 81 mg, Oral, Daily  atorvastatin, 80 mg, Oral, Daily  DULoxetine, 60 mg, Oral, Daily  ertapenem, 1,000 mg, Intravenous, Q24H  heparin (porcine), 5,000 Units, Subcutaneous, Q8H  lisinopril, 20 mg, Oral, Daily  [Held by provider] metFORMIN, 1,000 mg, Oral, BID With Meals  mupirocin, 1 application , Each Nare, BID  nicotine, 1 patch, Transdermal, Q24H  nystatin, , Topical, Q12H  pantoprazole, 40 mg, Oral, Daily  sodium chloride, 10 mL, Intravenous, Q12H  sodium chloride, 10 mL, Intravenous, Q12H      Pharmacy Consult,   sodium chloride, 75 mL/hr, Last Rate: 75 mL/hr (09/30/24 0255)      ----------------------------------------------------------------------------------------------------------------------    Vital Signs:  Temp:  [98.1 °F (36.7 °C)-98.8 °F (37.1 °C)] 98.4 °F (36.9 °C)  Heart Rate:  [101-107] 103  Resp:  [18-20] 18  BP: (147-179)/(87-99) 160/94  Mean Arterial Pressure (Non-Invasive) for the past 24 hrs (Last 3 readings):   Noninvasive MAP (mmHg)   10/01/24 0730 111   10/01/24 0327 110   10/01/24 0013 105     SpO2 Percentage    10/01/24 0013 10/01/24 0327 10/01/24 0730   SpO2: 95% 95% 94%     SpO2:  [94 %-97 %] 94 %  on   ;   Device (Oxygen Therapy): room air    Body mass index is 38.63 kg/m².  Wt Readings from Last 3 Encounters:   10/01/24 107 kg (235 lb 11.2 oz)   06/27/24 120 kg (264 lb)        Intake/Output Summary (Last 24 hours) at 10/1/2024 1001  Last data filed at 10/1/2024 0530  Gross per 24 hour   Intake 360 ml   Output 2550 ml   Net -2190 ml     Diet: Regular/House; Fluid Consistency: Thin (IDDSI 0)  ----------------------------------------------------------------------------------------------------------------------      Physical Exam:    Constitutional:  Well-developed and well-nourished.  No respiratory distress.  On room air.  Primary RN at bedside.  HENT:  Head: Normocephalic and atraumatic.  Mouth:  Moist mucous membranes.    Eyes:  Conjunctivae and EOM are normal.  No  scleral icterus.  Neck:  Neck supple.  No JVD present.    Cardiovascular:  Normal rate, regular rhythm and normal heart sounds with no murmur. No edema.  Pulmonary/Chest:  No respiratory distress, no wheezes, no crackles, with normal breath sounds and good air movement.  Abdominal:  Soft.  Bowel sounds are normal.  No distension and no tenderness.   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  Neurological:  Alert and oriented to person, place, and time.  No facial droop.  No slurred speech.   Skin:  Skin is warm and dry.  No rash noted.  No pallor.   Psychiatric:  Normal mood and affect.  Behavior is normal.        ----------------------------------------------------------------------------------------------------------------------            Results from last 7 days   Lab Units 10/01/24  0117 09/30/24  0117 09/29/24  0036 09/27/24  0844 09/26/24  1504 09/26/24  1350   CRP mg/dL  --   --   --   --  3.80*  --    LACTATE mmol/L  --   --   --   --   --  1.1   WBC 10*3/mm3 7.71 8.49 7.24   < >  --  10.42   HEMOGLOBIN g/dL 10.9* 10.5* 10.7*   < >  --  12.5   HEMATOCRIT % 33.9* 32.7* 33.6*   < >  --  37.4   MCV fL 98.5* 99.1* 100.6*   < >  --  94.0   MCHC g/dL 32.2 32.1 31.8   < >  --  33.4   PLATELETS 10*3/mm3 353 285 318   < >  --  402    < > = values in this interval not displayed.     Results from last 7 days   Lab Units 10/01/24  0117 09/30/24  0117 09/29/24  0036 09/28/24  0318 09/27/24  0844 09/26/24  1504   SODIUM mmol/L 141 142 140 142   < > 140   POTASSIUM mmol/L 3.8 3.5 3.9 4.1   < > 2.7*   MAGNESIUM mg/dL  --   --   --   --   --  1.5*   CHLORIDE mmol/L 110* 110* 109* 110*   < > 99   CO2 mmol/L 22.8 22.8 21.0* 22.3   < > 28.9   BUN mg/dL 4* 5* 4* 5*   < > 10   CREATININE mg/dL 0.36* 0.46* 0.40* 0.43*   < > 0.51*   CALCIUM mg/dL 8.3* 8.0* 8.2* 8.5*   < > 9.4   GLUCOSE mg/dL 158* 196* 192* 198*   < > 220*   ALBUMIN g/dL  --  2.5* 2.5* 2.7*   < > 3.5   BILIRUBIN mg/dL  --  0.2 0.2 0.3   < > 0.6   ALK  "PHOS U/L  --  79 88 100   < > 119*   AST (SGOT) U/L  --  11 15 15   < > 21   ALT (SGPT) U/L  --  7 8 9   < > 8    < > = values in this interval not displayed.   Estimated Creatinine Clearance: 249.7 mL/min (A) (by C-G formula based on SCr of 0.36 mg/dL (L)).  No results found for: \"AMMONIA\"    No results found for: \"HGBA1C\", \"POCGLU\"  No results found for: \"HGBA1C\"  No results found for: \"TSH\", \"FREET4\"    Blood Culture   Date Value Ref Range Status   09/26/2024 No growth at 2 days  Preliminary   09/26/2024 No growth at 2 days  Preliminary     Urine Culture   Date Value Ref Range Status   09/26/2024 >100,000 CFU/mL Escherichia coli ESBL (A)  Final     No results found for: \"WOUNDCX\"  No results found for: \"STOOLCX\"  No results found for: \"RESPCX\"  Pain Management Panel           No data to display                  ----------------------------------------------------------------------------------------------------------------------  Imaging Results (Last 24 Hours)       ** No results found for the last 24 hours. **            ----------------------------------------------------------------------------------------------------------------------    Pertinent Infectious Disease Results                Assessment/Plan       Assessment       ESBL UTI       Plan          Patient resting in bed this morning.  Primary RN at bedside.  Primary RN reports patient having 2 liquid bowel movements.  Afebrile.  Lungs clear to auscultation bilaterally.  WBC normal at 7.71.    Recommend to continue current antibiotic regimen of ertapenem 1 g IV every 24 hours x 7 days for treatment of ESBL UTI.  Upon discharge patient can be further de-escalated to Macrobid 100 mg p.o. twice daily to complete antibiotic course. Okay to continue home suppressive acyclovir for history of genital herpes.  Will continue to monitor closely patient's diarrhea.  We will continue to follow closely and adjust antibiotic therapy as needed.     ANTIMICROBIAL " THERAPY    acyclovir - 200 MG, 400 MG  ertapenem (INVanz) 1000 mg in 100 mL NS (VTB)     Code Status:   Code Status and Medical Interventions: CPR (Attempt to Resuscitate); Full Support   Ordered at: 24 1620     Code Status (Patient has no pulse and is not breathing):    CPR (Attempt to Resuscitate)     Medical Interventions (Patient has pulse or is breathing):    Full Support       YUE Rivera  10/01/24  10:01 EDT    Electronically signed by Cuca Yuen APRN at 10/01/24 1006       Marv Navas DO at 24 4437              [facility]     Patient Identification:  Name:  Lety Johnson  Age:  43 y.o.  Sex:  female  :  1980  MRN:  [id number]  Visit Number:  [id number]  Primary Care Provider:  Provider, No Known    Length of stay:  2    Chief complaint: Left lower extremity pain    Subjective:    Patient seen and examined at bedside with no nursing staff present.  Patient states her left lower extremity is painful and has intermittent cramping.  She also reports left-sided shoulder and neck pain.  Patient did appear tearful when describing the symptoms stating that she wished to have stronger pain medication but was specific and saying she did not want to use hydrocodone or oxycodone.  Otherwise, patient denies any other symptoms at this time.  Patient is concerned about returning home as her boyfriend used to take care of her and is now no longer able to as he is incarcerated.  ----------------------------------------------------------------------------------------------------------------------  Current [facility] Meds:  acyclovir, 400 mg, Oral, Nightly  aspirin, 81 mg, Oral, Daily  atorvastatin, 80 mg, Oral, Daily  DULoxetine, 60 mg, Oral, Daily  ertapenem, 1,000 mg, Intravenous, Q24H  heparin (porcine), 5,000 Units, Subcutaneous, Q8H  lisinopril, 20 mg, Oral, Daily  [Held by provider] metFORMIN, 1,000 mg, Oral, BID With Meals  mupirocin, 1  application , Each Nare, BID  nicotine, 1 patch, Transdermal, Q24H  nystatin, , Topical, Q12H  pantoprazole, 40 mg, Oral, Daily  sodium chloride, 10 mL, Intravenous, Q12H  sodium chloride, 10 mL, Intravenous, Q12H      Pharmacy Consult,   sodium chloride, 75 mL/hr, Last Rate: 75 mL/hr (09/30/24 1022)      ----------------------------------------------------------------------------------------------------------------------  Vital Signs:  Temp:  [97.7 °F (36.5 °C)-98.4 °F (36.9 °C)] 98.4 °F (36.9 °C)  Heart Rate:  [100-105] 101  Resp:  [18-20] 18  BP: (138-169)/(84-92) 162/92      09/28/24  0500 09/29/24  0526 09/30/24  0500   Weight: 113 kg (248 lb 14.4 oz) 111 kg (244 lb 12.8 oz) 110 kg (243 lb 9.6 oz)     Body mass index is 39.92 kg/m².    Intake/Output Summary (Last 24 hours) at 9/30/2024 1832  Last data filed at 9/30/2024 1300  Gross per 24 hour   Intake 840 ml   Output 1900 ml   Net -1060 ml     Diet: Regular/House; Fluid Consistency: Thin (IDDSI 0)  ----------------------------------------------------------------------------------------------------------------------  Physical exam:  Constitutional: Chronically ill-appearing  female in mild distress.     HENT:  Head:  Normocephalic and atraumatic.  Mouth:  Moist mucous membranes.    Eyes:  Conjunctivae and EOM are normal.  Pupils are equal, round, and reactive to light.  No scleral icterus.    Neck:  Neck supple. No thyromegaly.  No JVD present.    Cardiovascular:  Regular rate and rhythm with no murmurs, rubs, clicks or gallops appreciated.  Pulmonary/Chest:  Clear to auscultation bilaterally with no crackles, wheezes or rhonchi appreciated.  Abdominal:  Soft. Nontender. Nondistended  Bowel sounds are normal in all four quadrants. No organomegally appreciated.   Musculoskeletal:  No edema, no tenderness, and no deformity.  No red or swollen joints anywhere.    Neurological:  Alert, Cranial nerves II-XII intact with no focal deficits.  No facial droop.   "No slurred speech.   Skin:  Warm and dry to palpation with no rashes or lesions appreciated.  Peripheral vascular:  2+ radial and pedal pulses in bilateral upper and lower extremities.  Psychiatric:  Alert and oriented x3, demonstrates appropriate judgment and insight.  -------------------------------------------------------------------------  ----------------------------------------------------------------------------------------------------------------------      Results from last 7 days   Lab Units 09/30/24 0117 09/29/24 0036 09/28/24 0318 09/27/24  0844 09/26/24  1504 09/26/24  1350   CRP mg/dL  --   --   --   --  3.80*  --    LACTATE mmol/L  --   --   --   --   --  1.1   WBC 10*3/mm3 8.49 7.24 7.38   < >  --  10.42   HEMOGLOBIN g/dL 10.5* 10.7* 11.8*   < >  --  12.5   HEMATOCRIT % 32.7* 33.6* 36.9   < >  --  37.4   MCV fL 99.1* 100.6* 98.9*   < >  --  94.0   MCHC g/dL 32.1 31.8 32.0   < >  --  33.4   PLATELETS 10*3/mm3 285 318 332   < >  --  402    < > = values in this interval not displayed.         Results from last 7 days   Lab Units 09/30/24 0117 09/29/24 0036 09/28/24 0318 09/27/24  0844 09/26/24  1504   SODIUM mmol/L 142 140 142   < > 140   POTASSIUM mmol/L 3.5 3.9 4.1   < > 2.7*   MAGNESIUM mg/dL  --   --   --   --  1.5*   CHLORIDE mmol/L 110* 109* 110*   < > 99   CO2 mmol/L 22.8 21.0* 22.3   < > 28.9   BUN mg/dL 5* 4* 5*   < > 10   CREATININE mg/dL 0.46* 0.40* 0.43*   < > 0.51*   CALCIUM mg/dL 8.0* 8.2* 8.5*   < > 9.4   GLUCOSE mg/dL 196* 192* 198*   < > 220*   ALBUMIN g/dL 2.5* 2.5* 2.7*   < > 3.5   BILIRUBIN mg/dL 0.2 0.2 0.3   < > 0.6   ALK PHOS U/L 79 88 100   < > 119*   AST (SGOT) U/L 11 15 15   < > 21   ALT (SGPT) U/L 7 8 9   < > 8    < > = values in this interval not displayed.   Estimated Creatinine Clearance: 198.4 mL/min (A) (by C-G formula based on SCr of 0.46 mg/dL (L)).    No results found for: \"AMMONIA\"      Blood Culture   Date Value Ref Range Status   09/26/2024 No growth at 4 days " " Preliminary   2024 No growth at 4 days  Preliminary     Urine Culture   Date Value Ref Range Status   2024 >100,000 CFU/mL Escherichia coli ESBL (A)  Final     No results found for: \"WOUNDCX\"  No results found for: \"STOOLCX\"    I have personally looked at the labs and they are summarized above.  ----------------------------------------------------------------------------------------------------------------------  Imaging Results (Last 24 Hours)       ** No results found for the last 24 hours. **          ----------------------------------------------------------------------------------------------------------------------  Assessment and Plan:    ESBL acute cystitis -will continue Invanz 1 g IV every 24 hours while hospitalized and will de-escalate antibiotic therapy to yes Macrobid 100 mg p.o. twice daily to complete a full 7-day course of antibiotic therapy per infectious disease recommendations.    2.  Acute metabolic encephalopathy -resolved, initially secondary to acute UTI    3.  History of CVA -patient with left-sided hemiparesis secondary to history of CVA    4.  Debility -continue PT/OT, unfortunately patient does not have a monthly M, and currently unable to find any rehab facility that will accept the patient.    5.  History of genital herpes -continue acyclovir    6.  Morbid obesity -complicates all aspects of care    Disposition likely discharge in the next 24 to 48 hours    Marv Navas,    24   18:32 EDT       Electronically signed by Marv Navas DO at 24 1838       Cuca Yuen APRN at 24 1129                     PROGRESS NOTE         Patient Identification:  Name:  Lety Johnson  Age:  43 y.o.  Sex:  female  :  1980  MRN:  [id number]  Visit Number:  [id number]  Primary Care Provider:  Provider, No Known         LOS: 2 days "       ----------------------------------------------------------------------------------------------------------------------  Subjective       Chief Complaints:    Fall        Interval History:      Patient resting in bed this morning.  Family at bedside.  Currently on room air with no apparent distress.  No issues or complaints.  Denies dysuria, urgency frequency.  Lungs clear to auscultation bilaterally.  WBC normal at 8.49.    Review of Systems:    Constitutional: no fever, chills and night sweats.  Generalized fatigue.  Eyes: no eye drainage, itching or redness.  HEENT: no mouth sores, dysphagia or nose bleed.  Respiratory: no for shortness of breath, cough or production of sputum.  Cardiovascular: no chest pain, no palpitations, no orthopnea.  Gastrointestinal: no nausea, vomiting or diarrhea. No abdominal pain, hematemesis or rectal bleeding.  Genitourinary: no dysuria or polyuria.  Hematologic/lymphatic: no lymph node abnormalities, no easy bruising or easy bleeding.  Musculoskeletal: no muscle or joint pain.  Skin: No rash and no itching.  Neurological: no loss of consciousness, no seizure, no headache.  Behavioral/Psych: no depression or suicidal ideation.  Endocrine: no hot flashes.  Immunologic: negative.    ----------------------------------------------------------------------------------------------------------------------      Objective       Current Hospital Meds:  acyclovir, 400 mg, Oral, Nightly  aspirin, 81 mg, Oral, Daily  atorvastatin, 80 mg, Oral, Daily  DULoxetine, 60 mg, Oral, Daily  ertapenem, 1,000 mg, Intravenous, Q24H  heparin (porcine), 5,000 Units, Subcutaneous, Q8H  lisinopril, 20 mg, Oral, Daily  [Held by provider] metFORMIN, 1,000 mg, Oral, BID With Meals  mupirocin, 1 application , Each Nare, BID  nicotine, 1 patch, Transdermal, Q24H  nystatin, , Topical, Q12H  pantoprazole, 40 mg, Oral, Daily  sodium chloride, 10 mL, Intravenous, Q12H  sodium chloride, 10 mL, Intravenous,  Q12H      Pharmacy Consult,   sodium chloride, 75 mL/hr, Last Rate: 75 mL/hr (09/30/24 1022)      ----------------------------------------------------------------------------------------------------------------------    Vital Signs:  Temp:  [97.7 °F (36.5 °C)-98.1 °F (36.7 °C)] 98.1 °F (36.7 °C)  Heart Rate:  [100-105] 102  Resp:  [20] 20  BP: (138-169)/(84-89) 162/89  Mean Arterial Pressure (Non-Invasive) for the past 24 hrs (Last 3 readings):   Noninvasive MAP (mmHg)   09/30/24 0452 105   09/29/24 2024 102   09/29/24 1453 101     SpO2 Percentage    09/30/24 0452 09/30/24 0700 09/30/24 1100   SpO2: 92% 95% 97%     SpO2:  [92 %-97 %] 97 %  on   ;   Device (Oxygen Therapy): room air    Body mass index is 39.92 kg/m².  Wt Readings from Last 3 Encounters:   09/30/24 110 kg (243 lb 9.6 oz)   06/27/24 120 kg (264 lb)        Intake/Output Summary (Last 24 hours) at 9/30/2024 1129  Last data filed at 9/30/2024 0400  Gross per 24 hour   Intake 480 ml   Output 300 ml   Net 180 ml     Diet: Regular/House; Fluid Consistency: Thin (IDDSI 0)  ----------------------------------------------------------------------------------------------------------------------      Physical Exam:    Constitutional:  Well-developed and well-nourished.  No respiratory distress.  On room air.  Family at bedside.  HENT:  Head: Normocephalic and atraumatic.  Mouth:  Moist mucous membranes.    Eyes:  Conjunctivae and EOM are normal.  No scleral icterus.  Neck:  Neck supple.  No JVD present.    Cardiovascular:  Normal rate, regular rhythm and normal heart sounds with no murmur. No edema.  Pulmonary/Chest:  No respiratory distress, no wheezes, no crackles, with normal breath sounds and good air movement.  Abdominal:  Soft.  Bowel sounds are normal.  No distension and no tenderness.   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  Neurological:  Alert and oriented to person, place, and time.  No facial droop.  No slurred speech.   Skin:  Skin is  "warm and dry.  No rash noted.  No pallor.   Psychiatric:  Normal mood and affect.  Behavior is normal.        ----------------------------------------------------------------------------------------------------------------------            Results from last 7 days   Lab Units 09/30/24 0117 09/29/24 0036 09/28/24 0318 09/27/24  0844 09/26/24  1504 09/26/24  1350   CRP mg/dL  --   --   --   --  3.80*  --    LACTATE mmol/L  --   --   --   --   --  1.1   WBC 10*3/mm3 8.49 7.24 7.38   < >  --  10.42   HEMOGLOBIN g/dL 10.5* 10.7* 11.8*   < >  --  12.5   HEMATOCRIT % 32.7* 33.6* 36.9   < >  --  37.4   MCV fL 99.1* 100.6* 98.9*   < >  --  94.0   MCHC g/dL 32.1 31.8 32.0   < >  --  33.4   PLATELETS 10*3/mm3 285 318 332   < >  --  402    < > = values in this interval not displayed.     Results from last 7 days   Lab Units 09/30/24 0117 09/29/24 0036 09/28/24 0318 09/27/24  0844 09/26/24  1504   SODIUM mmol/L 142 140 142   < > 140   POTASSIUM mmol/L 3.5 3.9 4.1   < > 2.7*   MAGNESIUM mg/dL  --   --   --   --  1.5*   CHLORIDE mmol/L 110* 109* 110*   < > 99   CO2 mmol/L 22.8 21.0* 22.3   < > 28.9   BUN mg/dL 5* 4* 5*   < > 10   CREATININE mg/dL 0.46* 0.40* 0.43*   < > 0.51*   CALCIUM mg/dL 8.0* 8.2* 8.5*   < > 9.4   GLUCOSE mg/dL 196* 192* 198*   < > 220*   ALBUMIN g/dL 2.5* 2.5* 2.7*   < > 3.5   BILIRUBIN mg/dL 0.2 0.2 0.3   < > 0.6   ALK PHOS U/L 79 88 100   < > 119*   AST (SGOT) U/L 11 15 15   < > 21   ALT (SGPT) U/L 7 8 9   < > 8    < > = values in this interval not displayed.   Estimated Creatinine Clearance: 198.4 mL/min (A) (by C-G formula based on SCr of 0.46 mg/dL (L)).  No results found for: \"AMMONIA\"    No results found for: \"HGBA1C\", \"POCGLU\"  No results found for: \"HGBA1C\"  No results found for: \"TSH\", \"FREET4\"    Blood Culture   Date Value Ref Range Status   09/26/2024 No growth at 2 days  Preliminary   09/26/2024 No growth at 2 days  Preliminary     Urine Culture   Date Value Ref Range Status   09/26/2024 " ">100,000 CFU/mL Escherichia coli ESBL (A)  Final     No results found for: \"WOUNDCX\"  No results found for: \"STOOLCX\"  No results found for: \"RESPCX\"  Pain Management Panel           No data to display                  ----------------------------------------------------------------------------------------------------------------------  Imaging Results (Last 24 Hours)       ** No results found for the last 24 hours. **            ----------------------------------------------------------------------------------------------------------------------    Pertinent Infectious Disease Results                Assessment/Plan       Assessment       ESBL UTI       Plan        Patient resting in bed this morning.  Family at bedside.  Currently on room air with no apparent distress.  No issues or complaints.  Denies dysuria, urgency frequency.  Lungs clear to auscultation bilaterally.  WBC normal at 8.49.    Recommend to continue current antibiotic regimen of ertapenem 1 g IV every 24 hours x 7 days for treatment of ESBL UTI.  Upon discharge patient can be further de-escalated to Macrobid 100 mg p.o. twice daily to complete antibiotic course. Okay to continue home suppressive acyclovir for history of genital herpes. We will continue to follow closely and adjust antibiotic therapy as needed.     ANTIMICROBIAL THERAPY    acyclovir - 200 MG, 400 MG  ertapenem (INVanz) 1000 mg in 100 mL NS (VTB)     Code Status:   Code Status and Medical Interventions: CPR (Attempt to Resuscitate); Full Support   Ordered at: 09/26/24 1626     Code Status (Patient has no pulse and is not breathing):    CPR (Attempt to Resuscitate)     Medical Interventions (Patient has pulse or is breathing):    Full Support       YUE Rivera  09/30/24  11:29 EDT    Electronically signed by Cuac Yuen APRN at 09/30/24 1133       Consult Notes (last 72 hours)  Notes from 09/29/24 1244 through 10/02/24 1244   No notes of this type exist for this " encounter.

## 2024-10-02 NOTE — PROGRESS NOTES
[facility]     Patient Identification:  Name:  Lety Johnson  Age:  43 y.o.  Sex:  female  :  1980  MRN:  [id number]  Visit Number:  [id number]  Primary Care Provider:  Provider, No Known    Length of stay:  4    Chief complaint: None    Subjective:    Patient doing well today with no complaints.  No new events overnight.  Currently awaiting to see if patient can be placed in swing bed facility.  ----------------------------------------------------------------------------------------------------------------------  Current Hospital Meds:  acyclovir, 400 mg, Oral, Nightly  aspirin, 81 mg, Oral, Daily  atorvastatin, 80 mg, Oral, Daily  DULoxetine, 60 mg, Oral, Daily  ertapenem, 1,000 mg, Intravenous, Q24H  heparin (porcine), 5,000 Units, Subcutaneous, Q8H  lisinopril, 20 mg, Oral, Daily  [Held by provider] metFORMIN, 1,000 mg, Oral, BID With Meals  mupirocin, 1 application , Each Nare, BID  nicotine, 1 patch, Transdermal, Q24H  nystatin, , Topical, Q12H  pantoprazole, 40 mg, Oral, Daily  sodium chloride, 10 mL, Intravenous, Q12H  sodium chloride, 10 mL, Intravenous, Q12H      Pharmacy Consult,   sodium chloride, 75 mL/hr, Last Rate: 75 mL/hr (10/02/24 1611)      ----------------------------------------------------------------------------------------------------------------------  Vital Signs:  Temp:  [97.5 °F (36.4 °C)-98.8 °F (37.1 °C)] 98.2 °F (36.8 °C)  Heart Rate:  [104-119] 109  Resp:  [18-20] 18  BP: (130-157)/(77-95) 134/77      24  0500 10/01/24  0530 10/02/24  0510   Weight: 110 kg (243 lb 9.6 oz) 107 kg (235 lb 11.2 oz) 105 kg (232 lb 6.4 oz)     Body mass index is 38.09 kg/m².    Intake/Output Summary (Last 24 hours) at 10/2/2024 1730  Last data filed at 10/2/2024 0800  Gross per 24 hour   Intake 360 ml   Output 200 ml   Net 160 ml     Diet: Regular/House; Fluid Consistency: Thin (IDDSI  0)  ----------------------------------------------------------------------------------------------------------------------  Physical exam:  Constitutional: Chronically ill-appearing  female in no apparent distress.     HENT:  Head:  Normocephalic and atraumatic.  Mouth:  Moist mucous membranes.    Eyes:  Conjunctivae and EOM are normal.  Pupils are equal, round, and reactive to light.  No scleral icterus.    Neck:  Neck supple. No thyromegaly.  No JVD present.    Cardiovascular:  Regular rate and rhythm with no murmurs, rubs, clicks or gallops appreciated.  Pulmonary/Chest:  Clear to auscultation bilaterally with no crackles, wheezes or rhonchi appreciated.  Abdominal:  Soft. Nontender. Nondistended  Bowel sounds are normal in all four quadrants. No organomegally appreciated.   Musculoskeletal:  No edema, no tenderness, and no deformity.  No red or swollen joints anywhere.    Neurological:  Alert, Cranial nerves II-XII intact with no focal deficits.  No facial droop.  No slurred speech.   Skin:  Warm and dry to palpation with no rashes or lesions appreciated.  Peripheral vascular:  2+ radial and pedal pulses in bilateral upper and lower extremities.  Psychiatric:  Alert and oriented x3, demonstrates appropriate judgment and insight.    No significant change in physical exam in comparison to 10/1/2024  -------------------------------------------------------------------------  ----------------------------------------------------------------------------------------------------------------------      Results from last 7 days   Lab Units 10/01/24  0117 09/30/24  0117 09/29/24  0036 09/27/24  0844 09/26/24  1504 09/26/24  1350   CRP mg/dL  --   --   --   --  3.80*  --    LACTATE mmol/L  --   --   --   --   --  1.1   WBC 10*3/mm3 7.71 8.49 7.24   < >  --  10.42   HEMOGLOBIN g/dL 10.9* 10.5* 10.7*   < >  --  12.5   HEMATOCRIT % 33.9* 32.7* 33.6*   < >  --  37.4   MCV fL 98.5* 99.1* 100.6*   < >  --  94.0   MCHC  "g/dL 32.2 32.1 31.8   < >  --  33.4   PLATELETS 10*3/mm3 353 285 318   < >  --  402    < > = values in this interval not displayed.         Results from last 7 days   Lab Units 10/01/24  0117 09/30/24  0117 09/29/24  0036 09/28/24  0318 09/27/24  0844 09/26/24  1504   SODIUM mmol/L 141 142 140 142   < > 140   POTASSIUM mmol/L 3.8 3.5 3.9 4.1   < > 2.7*   MAGNESIUM mg/dL  --   --   --   --   --  1.5*   CHLORIDE mmol/L 110* 110* 109* 110*   < > 99   CO2 mmol/L 22.8 22.8 21.0* 22.3   < > 28.9   BUN mg/dL 4* 5* 4* 5*   < > 10   CREATININE mg/dL 0.36* 0.46* 0.40* 0.43*   < > 0.51*   CALCIUM mg/dL 8.3* 8.0* 8.2* 8.5*   < > 9.4   GLUCOSE mg/dL 158* 196* 192* 198*   < > 220*   ALBUMIN g/dL  --  2.5* 2.5* 2.7*   < > 3.5   BILIRUBIN mg/dL  --  0.2 0.2 0.3   < > 0.6   ALK PHOS U/L  --  79 88 100   < > 119*   AST (SGOT) U/L  --  11 15 15   < > 21   ALT (SGPT) U/L  --  7 8 9   < > 8    < > = values in this interval not displayed.   Estimated Creatinine Clearance: 247.1 mL/min (A) (by C-G formula based on SCr of 0.36 mg/dL (L)).    No results found for: \"AMMONIA\"      Blood Culture   Date Value Ref Range Status   09/26/2024 No growth at 4 days  Preliminary   09/26/2024 No growth at 4 days  Preliminary     Urine Culture   Date Value Ref Range Status   09/26/2024 >100,000 CFU/mL Escherichia coli ESBL (A)  Final     No results found for: \"WOUNDCX\"  No results found for: \"STOOLCX\"    I have personally looked at the labs and they are summarized above.  ----------------------------------------------------------------------------------------------------------------------  Imaging Results (Last 24 Hours)       ** No results found for the last 24 hours. **          ----------------------------------------------------------------------------------------------------------------------  Assessment and Plan:    ESBL acute cystitis -will continue Invanz 1 g IV every 24 hours while hospitalized and will de-escalate antibiotic therapy to Macrobid " 100 mg p.o. twice daily upon discharge to complete a full 7-day course of antibiotic therapy per infectious disease recommendations.    2.  Acute metabolic encephalopathy -resolved, initially secondary to acute UTI    3.  History of CVA -patient with left-sided hemiparesis secondary to history of CVA    4.  Debility -continue PT/OT, unfortunately patient does not have a monthly income, and currently unable to find any rehab facility that will accept the patient.    5.  History of genital herpes -continue acyclovir    6.  Morbid obesity -complicates all aspects of care    Disposition awaiting final disposition for possible swing bed admission    Marv Navas DO   10/02/24   17:30 EDT

## 2024-10-03 PROBLEM — N39.0 UTI (URINARY TRACT INFECTION): Status: RESOLVED | Noted: 2024-09-26 | Resolved: 2024-10-03

## 2024-10-03 PROCEDURE — 97530 THERAPEUTIC ACTIVITIES: CPT

## 2024-10-03 PROCEDURE — 99233 SBSQ HOSP IP/OBS HIGH 50: CPT | Performed by: NURSE PRACTITIONER

## 2024-10-03 PROCEDURE — 97110 THERAPEUTIC EXERCISES: CPT

## 2024-10-03 PROCEDURE — 25810000003 SODIUM CHLORIDE 0.9 % SOLUTION: Performed by: INTERNAL MEDICINE

## 2024-10-03 PROCEDURE — 25010000002 HEPARIN (PORCINE) PER 1000 UNITS: Performed by: INTERNAL MEDICINE

## 2024-10-03 PROCEDURE — 25010000002 KETOROLAC TROMETHAMINE PER 15 MG: Performed by: INTERNAL MEDICINE

## 2024-10-03 PROCEDURE — 99232 SBSQ HOSP IP/OBS MODERATE 35: CPT | Performed by: INTERNAL MEDICINE

## 2024-10-03 RX ORDER — NITROFURANTOIN 25; 75 MG/1; MG/1
100 CAPSULE ORAL 2 TIMES DAILY
Qty: 4 CAPSULE | Refills: 0 | Status: SHIPPED | OUTPATIENT
Start: 2024-10-03 | End: 2024-10-05

## 2024-10-03 RX ORDER — KETOROLAC TROMETHAMINE 30 MG/ML
15 INJECTION, SOLUTION INTRAMUSCULAR; INTRAVENOUS ONCE
Status: COMPLETED | OUTPATIENT
Start: 2024-10-03 | End: 2024-10-03

## 2024-10-03 RX ORDER — ACYCLOVIR 400 MG/1
400 TABLET ORAL NIGHTLY
Qty: 30 TABLET | Refills: 0 | Status: SHIPPED | OUTPATIENT
Start: 2024-10-03

## 2024-10-03 RX ADMIN — DULOXETINE HYDROCHLORIDE 60 MG: 60 CAPSULE, DELAYED RELEASE ORAL at 08:18

## 2024-10-03 RX ADMIN — MUPIROCIN 1 APPLICATION: 20 OINTMENT TOPICAL at 08:20

## 2024-10-03 RX ADMIN — ASPIRIN 81 MG: 81 TABLET, CHEWABLE ORAL at 08:18

## 2024-10-03 RX ADMIN — NYSTATIN: 100000 POWDER TOPICAL at 21:12

## 2024-10-03 RX ADMIN — HEPARIN SODIUM 5000 UNITS: 5000 INJECTION INTRAVENOUS; SUBCUTANEOUS at 06:24

## 2024-10-03 RX ADMIN — HEPARIN SODIUM 5000 UNITS: 5000 INJECTION INTRAVENOUS; SUBCUTANEOUS at 21:08

## 2024-10-03 RX ADMIN — ATORVASTATIN CALCIUM 80 MG: 40 TABLET, FILM COATED ORAL at 08:18

## 2024-10-03 RX ADMIN — SODIUM CHLORIDE 75 ML/HR: 9 INJECTION, SOLUTION INTRAVENOUS at 21:07

## 2024-10-03 RX ADMIN — KETOROLAC TROMETHAMINE 15 MG: 30 INJECTION, SOLUTION INTRAMUSCULAR; INTRAVENOUS at 11:13

## 2024-10-03 RX ADMIN — Medication 10 ML: at 21:11

## 2024-10-03 RX ADMIN — Medication 10 ML: at 08:19

## 2024-10-03 RX ADMIN — LISINOPRIL 20 MG: 10 TABLET ORAL at 08:18

## 2024-10-03 RX ADMIN — PANTOPRAZOLE SODIUM 40 MG: 40 TABLET, DELAYED RELEASE ORAL at 08:18

## 2024-10-03 RX ADMIN — ACETAMINOPHEN 650 MG: 325 TABLET ORAL at 21:08

## 2024-10-03 RX ADMIN — CYCLOBENZAPRINE 10 MG: 10 TABLET, FILM COATED ORAL at 02:19

## 2024-10-03 RX ADMIN — TRAMADOL HYDROCHLORIDE 50 MG: 50 TABLET ORAL at 02:19

## 2024-10-03 RX ADMIN — NICOTINE 1 PATCH: 7 PATCH, EXTENDED RELEASE TRANSDERMAL at 08:19

## 2024-10-03 RX ADMIN — ACYCLOVIR 400 MG: 200 CAPSULE ORAL at 21:08

## 2024-10-03 RX ADMIN — Medication 10 ML: at 21:09

## 2024-10-03 RX ADMIN — TRAMADOL HYDROCHLORIDE 50 MG: 50 TABLET ORAL at 21:09

## 2024-10-03 RX ADMIN — MUPIROCIN 1 APPLICATION: 20 OINTMENT TOPICAL at 21:12

## 2024-10-03 RX ADMIN — CYCLOBENZAPRINE 10 MG: 10 TABLET, FILM COATED ORAL at 21:09

## 2024-10-03 RX ADMIN — SODIUM CHLORIDE 75 ML/HR: 9 INJECTION, SOLUTION INTRAVENOUS at 04:06

## 2024-10-03 NOTE — PROGRESS NOTES
[facility]     Patient Identification:  Name:  Lety Johnson  Age:  43 y.o.  Sex:  female  :  1980  MRN:  [id number]  Visit Number:  [id number]  Primary Care Provider:  Provider, No Known    Length of stay:  5    Chief complaint: None    Subjective:    Patient seen and examined at bedside with no nursing staff present.  Patient had just completed physical therapy.  Patient was sitting in bed watching TV.  Patient continues to report left-sided whole body pain although patient did appear comfortable when I entered the room.  No new events overnight, currently awaiting swing bed placement.  ----------------------------------------------------------------------------------------------------------------------  Current Hospital Meds:  acyclovir, 400 mg, Oral, Nightly  aspirin, 81 mg, Oral, Daily  atorvastatin, 80 mg, Oral, Daily  DULoxetine, 60 mg, Oral, Daily  ertapenem, 1,000 mg, Intravenous, Q24H  heparin (porcine), 5,000 Units, Subcutaneous, Q8H  lisinopril, 20 mg, Oral, Daily  [Held by provider] metFORMIN, 1,000 mg, Oral, BID With Meals  mupirocin, 1 application , Each Nare, BID  nicotine, 1 patch, Transdermal, Q24H  nystatin, , Topical, Q12H  pantoprazole, 40 mg, Oral, Daily  sodium chloride, 10 mL, Intravenous, Q12H  sodium chloride, 10 mL, Intravenous, Q12H      Pharmacy Consult,   sodium chloride, 75 mL/hr, Last Rate: 75 mL/hr (10/03/24 0406)      ----------------------------------------------------------------------------------------------------------------------  Vital Signs:  Temp:  [98 °F (36.7 °C)-99.3 °F (37.4 °C)] 98 °F (36.7 °C)  Heart Rate:  [] 102  Resp:  [18-20] 20  BP: (134-152)/(77-95) 152/95      10/01/24  0530 10/02/24  0510 10/03/24  0500   Weight: 107 kg (235 lb 11.2 oz) 105 kg (232 lb 6.4 oz) 107 kg (235 lb 12.8 oz)     Body mass index is 38.64 kg/m².    Intake/Output Summary (Last 24 hours) at 10/3/2024 1329  Last data filed at 10/3/2024  0836  Gross per 24 hour   Intake 480 ml   Output 1300 ml   Net -820 ml     Diet: Regular/House; Fluid Consistency: Thin (IDDSI 0)  ----------------------------------------------------------------------------------------------------------------------  Physical exam:  Constitutional: Chronically ill-appearing  female in no apparent distress.     HENT:  Head:  Normocephalic and atraumatic.  Mouth:  Moist mucous membranes.    Eyes:  Conjunctivae and EOM are normal.  Pupils are equal, round, and reactive to light.  No scleral icterus.    Neck:  Neck supple. No thyromegaly.  No JVD present.    Cardiovascular:  Regular rate and rhythm with no murmurs, rubs, clicks or gallops appreciated.  Pulmonary/Chest:  Clear to auscultation bilaterally with no crackles, wheezes or rhonchi appreciated.  Abdominal:  Soft. Nontender. Nondistended  Bowel sounds are normal in all four quadrants. No organomegally appreciated.   Musculoskeletal:  No edema, no tenderness, and no deformity.  No red or swollen joints anywhere.    Neurological:  Alert, Cranial nerves II-XII intact with no focal deficits.  No facial droop.  No slurred speech.   Skin:  Warm and dry to palpation with no rashes or lesions appreciated.  Peripheral vascular:  2+ radial and pedal pulses in bilateral upper and lower extremities.  Psychiatric:  Alert and oriented x3, demonstrates appropriate judgment and insight.    No significant change in physical exam in comparison to 10/2/2024  -------------------------------------------------------------------------  ----------------------------------------------------------------------------------------------------------------------      Results from last 7 days   Lab Units 10/01/24  0117 09/30/24  0117 09/29/24  0036 09/27/24  0844 09/26/24  1504 09/26/24  1350   CRP mg/dL  --   --   --   --  3.80*  --    LACTATE mmol/L  --   --   --   --   --  1.1   WBC 10*3/mm3 7.71 8.49 7.24   < >  --  10.42   HEMOGLOBIN g/dL 10.9*  "10.5* 10.7*   < >  --  12.5   HEMATOCRIT % 33.9* 32.7* 33.6*   < >  --  37.4   MCV fL 98.5* 99.1* 100.6*   < >  --  94.0   MCHC g/dL 32.2 32.1 31.8   < >  --  33.4   PLATELETS 10*3/mm3 353 285 318   < >  --  402    < > = values in this interval not displayed.         Results from last 7 days   Lab Units 10/01/24  0117 09/30/24  0117 09/29/24  0036 09/28/24  0318 09/27/24  0844 09/26/24  1504   SODIUM mmol/L 141 142 140 142   < > 140   POTASSIUM mmol/L 3.8 3.5 3.9 4.1   < > 2.7*   MAGNESIUM mg/dL  --   --   --   --   --  1.5*   CHLORIDE mmol/L 110* 110* 109* 110*   < > 99   CO2 mmol/L 22.8 22.8 21.0* 22.3   < > 28.9   BUN mg/dL 4* 5* 4* 5*   < > 10   CREATININE mg/dL 0.36* 0.46* 0.40* 0.43*   < > 0.51*   CALCIUM mg/dL 8.3* 8.0* 8.2* 8.5*   < > 9.4   GLUCOSE mg/dL 158* 196* 192* 198*   < > 220*   ALBUMIN g/dL  --  2.5* 2.5* 2.7*   < > 3.5   BILIRUBIN mg/dL  --  0.2 0.2 0.3   < > 0.6   ALK PHOS U/L  --  79 88 100   < > 119*   AST (SGOT) U/L  --  11 15 15   < > 21   ALT (SGPT) U/L  --  7 8 9   < > 8    < > = values in this interval not displayed.   Estimated Creatinine Clearance: 249.7 mL/min (A) (by C-G formula based on SCr of 0.36 mg/dL (L)).    No results found for: \"AMMONIA\"      Blood Culture   Date Value Ref Range Status   09/26/2024 No growth at 4 days  Preliminary   09/26/2024 No growth at 4 days  Preliminary     Urine Culture   Date Value Ref Range Status   09/26/2024 >100,000 CFU/mL Escherichia coli ESBL (A)  Final     No results found for: \"WOUNDCX\"  No results found for: \"STOOLCX\"    I have personally looked at the labs and they are summarized above.  ----------------------------------------------------------------------------------------------------------------------  Imaging Results (Last 24 Hours)       ** No results found for the last 24 hours. **          ----------------------------------------------------------------------------------------------------------------------  Assessment and Plan:    ESBL " acute cystitis -will continue Invanz 1 g IV every 24 hours while hospitalized and will de-escalate antibiotic therapy to Macrobid 100 mg p.o. twice daily upon discharge to complete a full 7-day course of antibiotic therapy per infectious disease recommendations.  Currently on day #6 of 7 of antibiotic therapy    2.  Acute metabolic encephalopathy -resolved, initially secondary to acute UTI    3.  History of CVA -patient with left-sided hemiparesis secondary to history of CVA    4.  Debility -continue PT/OT, unfortunately patient does not have a monthly income, and currently unable to find any rehab facility that will accept the patient.    5.  History of genital herpes -continue acyclovir    6.  Morbid obesity -complicates all aspects of care    Disposition awaiting final disposition for possible swing bed admission    Marv Navas DO   10/03/24   13:29 EDT

## 2024-10-03 NOTE — NURSING NOTE
After speaking with Dr. Navas tried to contact patients sister and legal guardian, no answer at this time

## 2024-10-03 NOTE — CASE MANAGEMENT/SOCIAL WORK
Discharge Planning Assessment   [place]     Patient Name: Lety Johnson  MRN: [id number]  Today's Date: 10/3/2024    Admit Date: 9/26/2024       Discharge Plan       Row Name 10/03/24 1217       Plan    Plan SS contacted [facility] [phone] and left a message for Roseline. SS to follow.    Addendum @ 15:53: SS discussed pt with PT. Pt is not willing to attempt transfers with therapy. Pt informed therapy that she is bed bound at baseline and does not want to attempt transfer using slide board in the future. Pt was discussed in [facility] today and provider was notified of above information from therapy. Provider ordered discharge. Pt voiced agreement for SS to speak with sister, Moises Narvaez. SS contacted sister regarding discharge. Sister is not willing to come and pick pt up. SS contacted [facility] SW, Kinsey Dial. [facility]PS BOBY is willing for pt to be discharged as long as a caregiver is available.     Manager contacted [facility] senior [facility] [place] and [facility] [place] regarding pt's significant other. [facility] [place] [facility] Lesly [facility]aley.  had significant other call SS. Significant other voices being pt's caregiver prior to being arrested and voices being willing to resume his caregiver role after he is released. SS spoke to pt at bedside. Pt is agreeable to return home with significant other after he is released from [facility]. Manager is awaiting for  to call back regarding significant other. Manager to notify provider that discharge will need canceled. SS to follow.                   Continued Care and Services - Admitted Since 9/26/2024       Destination       Service Provider Request Status Selected Services Address Phone Fax Patient Preferred    [facility] Pending - Request Sent N/A [address] [place] [address] [phone] --    [facility] Declined  Cannot meet patient's needs N/A [address],  [facility] [address] [phone] --    [facility] Declined  Not eligible N/A [address] [place] [id number] [phone] --                  Expected Discharge Date and Time       Expected Discharge Date Expected Discharge Time    Oct 5, 2024         HELEN Butterfield

## 2024-10-03 NOTE — DISCHARGE SUMMARY
[facility] HOSPITALIST MEDICINE DISCHARGE SUMMARY    Patient Identification:  Name:  Lety Johnson  Age:  43 y.o.  Sex:  female  :  1980  MRN:  [id number]  Visit Number:  [id number]    Date of Admission: 2024  Date of Discharge: 10/3/2024    PCP: Provider, No Known    DISCHARGE DIAGNOSIS   ESBL acute cystitis  Acute metabolic encephalopathy  History of CVA  General Debility  History of genital herpes  Morbid obesity      CONSULTS  YUE Rivera, ID      PROCEDURES PERFORMED   None      HOSPITAL COURSE  Ms. Johnson is a 43 y.o. female who presented to [facility] ED on 2024 with a chief complaint of controlled fall at home.  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  Patient reported being at home in her usual state of health when she had worsening weakness and slid out of her chair into the floor.  Patient called EMS who then brought her to the emergency department for further treatment and evaluation.  Upon further questioning, patient's family members did state the patient appeared to have worsening encephalopathy/confusion surrounding the time of her worsening weakness.  Initial evaluation in the emergency department did consist of basic laboratory work as well as physical exam and vital signs.  Initial vital signs found patient's blood pressure 146/101, respiratory rate 18, heart rate 109, oxygen saturation 98% on room air.  Initial lab work did include CBC and CMP.  Please see initial H&P for specific details.  Urine analysis did appear concerning for acute cystitis and as such patient was admitted for further treatment and evaluation.    In regards to acute urinary tract infection, patient was started on empiric antibiotic therapy with IV Rocephin.  Urine cultures eventually demonstrated ESBL E. coli and as such antibiotic therapy was transitioned to  Invanz.  Patient was continued on Invanz throughout the remainder of her hospitalization.  Infectious disease was consulted who thoroughly evaluated the patient.  After thorough evaluation, recommendation was made to complete a full 7-day course of antibiotic therapy for ESBL acute cystitis.  Patient has 2 additional days of antibiotic therapy to complete her 7-day course.  As such, patient will be transition to Macrobid 100 mg p.o. twice daily as recommended by infectious disease.    Patient does have history of CVA complicated by left-sided paralysis.  With this in mind, physical therapy was consulted to evaluate patient.  On date of discharge, patient was able to perform bed mobility with maximum assistance and was able to sit on the edge of the bed for a short period of time.  However, patient was poorly motivated to work with physical therapy and refused to work with transferring from the bed to a chair or wheelchair with a slide board.  It is well-known patient has difficulty at home and had previously relied on a significant other to help take care of her daily needs.  Unfortunately, 2 weeks prior to admission, the significant other was arrested and placed in intermediate.  Patient was encouraged to work with physical therapy so she may be approved for skilled nursing facility or inpatient rehab placement.  However, secondary to patient's poor motivation to work with physical therapy, it is felt patient is not an appropriate candidate for inpatient rehab or SNF. As such, patient will be discharged from the hospital in stable condition today.    VITAL SIGNS:      10/01/24  0530 10/02/24  0510 10/03/24  0500   Weight: 107 kg (235 lb 11.2 oz) 105 kg (232 lb 6.4 oz) 107 kg (235 lb 12.8 oz)     Body mass index is 38.64 kg/m².    PHYSICAL EXAM:  Constitutional: Chronically ill-appearing  female in no apparent distress.     HENT:  Head:  Normocephalic and atraumatic.  Mouth:  Moist mucous membranes.    Eyes:   Conjunctivae and EOM are normal.  Pupils are equal, round, and reactive to light.  No scleral icterus.    Neck:  Neck supple. No thyromegaly.  No JVD present.    Cardiovascular:  Regular rate and rhythm with no murmurs, rubs, clicks or gallops appreciated.  Pulmonary/Chest:  Clear to auscultation bilaterally with no crackles, wheezes or rhonchi appreciated.  Abdominal:  Soft. Nontender. Nondistended  Bowel sounds are normal in all four quadrants. No organomegally appreciated.   Musculoskeletal:  No edema, no tenderness, and no deformity.  No red or swollen joints anywhere.    Neurological:  Alert, Cranial nerves II-XII intact with no focal deficits.  No facial droop.  No slurred speech.   Skin:  Warm and dry to palpation with no rashes or lesions appreciated.  Peripheral vascular:  2+ radial and pedal pulses in bilateral upper and lower extremities.  Psychiatric:  Alert and oriented x3, demonstrates appropriate judgment and insight.    DISCHARGE DISPOSITION   Stable    DISCHARGE MEDICATIONS:     Discharge Medications        New Medications        Instructions Start Date   nitrofurantoin (macrocrystal-monohydrate) 100 MG capsule  Commonly known as: Macrobid   100 mg, Oral, 2 Times Daily             Continue These Medications        Instructions Start Date   acyclovir 400 MG tablet  Commonly known as: ZOVIRAX   400 mg, Oral, Nightly      aspirin 81 MG chewable tablet   81 mg, Oral, Daily      atorvastatin 80 MG tablet  Commonly known as: LIPITOR   80 mg, Oral, Daily      cyclobenzaprine 10 MG tablet  Commonly known as: FLEXERIL   10 mg, Oral, 3 Times Daily PRN      DULoxetine 60 MG capsule  Commonly known as: CYMBALTA   60 mg, Oral, Daily      lisinopril 20 MG tablet  Commonly known as: PRINIVIL,ZESTRIL   20 mg, Oral, Daily      metFORMIN 1000 MG tablet  Commonly known as: GLUCOPHAGE   1,000 mg, Oral, 2 Times Daily With Meals      pantoprazole 40 MG EC tablet  Commonly known as: PROTONIX   40 mg, Oral, Daily                      Additional Instructions for the Follow-ups that You Need to Schedule       Discharge Follow-up with PCP   As directed       Currently Documented PCP:    Provider, No Known    PCP Phone Number:    [phone]     Follow Up Details: please follow up with pcp in 1 week               Follow-up Information       Provider, No Known .    Why: please follow up with pcp in 1 week  Contact information:  [facility]  [place] [address]  [phone]                             TEST  RESULTS PENDING AT DISCHARGE       Marv Navas DO  10/03/24  14:36 EDT    Please note that this discharge summary required more than 30 minutes to complete.    Please send a copy of this dictation to the following providers:  Provider, No Known

## 2024-10-03 NOTE — THERAPY TREATMENT NOTE
Acute Care - Occupational Therapy Treatment Note   [place]     Patient Name: Lety Johnson  : 1980  MRN: [id number]  Today's Date: 10/3/2024  Onset of Illness/Injury or Date of Surgery: 24     Referring Physician: Dr. Marc    Admit Date: 2024       ICD-10-CM ICD-9-CM   1. Hypokalemia  E87.6 276.8   2. Pressure injury of skin of sacral region, unspecified injury stage  L89.159 707.03     707.20   3. Pressure injury of skin of left heel, unspecified injury stage  L89.629 707.07     707.20   4. Acute cystitis without hematuria  N30.00 595.0     Patient Active Problem List   Diagnosis    UTI (urinary tract infection)     Past Medical History:   Diagnosis Date    Stroke      History reviewed. No pertinent surgical history.      OT ASSESSMENT FLOWSHEET (Last 12 Hours)       OT Evaluation and Treatment       Row Name 10/03/24 1110                   OT Time and Intention    Subjective Information complains of;pain;weakness  -LA        Document Type therapy note (daily note)  -LA        Mode of Treatment occupational therapy  -LA        Patient Effort good  -LA           General Information    Patient Profile Reviewed yes  -LA        General Observations of Patient Patient agreeable to therapy this date. Patient agreeable to sitting EOB for exercises this date. Patient with maxA supine to sit and sit to supine. Patient engaged in right UE AROM exercises from EOB this date. Patient continues to c/o severe pain in left UE and refused to allow PROM. Discussed possiblity of slide board transfer however patient refused stating that she staying in bed prior to hospialization and didn't want to try.  -LA        Existing Precautions/Restrictions fall  -LA           Cognition    Affect/Mental Status (Cognition) emotionally labile  -LA        Orientation Status (Cognition) oriented x 3  -LA        Follows Commands (Cognition) WFL  -LA           Bed Mobility    Rolling Left [place] (Bed Mobility) maximum  assist (25% patient effort)  -LA        Rolling Right [place] (Bed Mobility) maximum assist (25% patient effort)  -LA        Supine-Sit [place] (Bed Mobility) maximum assist (25% patient effort)  -LA        Sit-Supine [place] (Bed Mobility) maximum assist (25% patient effort);dependent (less than 25% patient effort)  -LA           Motor Skills    Therapeutic Exercise shoulder;elbow/forearm;wrist;hand  RUE only; patient refused PROM of LUE due to c/o pain  -LA           Wound 09/26/24 1806 gluteal    Wound - Properties Group Placement Date: 09/26/24  -KJ Placement Time: 1806 -KJ Location: gluteal  -KJ    Retired Wound - Properties Group Placement Date: 09/26/24  -KJ Placement Time: 1806  -KJ Location: gluteal  -KJ    Retired Wound - Properties Group Date first assessed: 09/26/24  -KJ Time first assessed: 1806  -KJ Location: gluteal  -KJ       Wound 09/26/24 1807 Left gluteal    Wound - Properties Group Placement Date: 09/26/24  -KJ Placement Time: 1807  -KJ Side: Left  -KJ Location: gluteal  -KJ    Retired Wound - Properties Group Placement Date: 09/26/24  -KJ Placement Time: 1807  -KJ Side: Left  -KJ Location: gluteal  -KJ    Retired Wound - Properties Group Date first assessed: 09/26/24  -KJ Time first assessed: 1807  -KJ Side: Left  -KJ Location: gluteal  -KJ       Wound 09/26/24 1809 gluteal    Wound - Properties Group Placement Date: 09/26/24  -KJ Placement Time: 1809  -KJ Location: gluteal  -KJ    Retired Wound - Properties Group Placement Date: 09/26/24  -KJ Placement Time: 1809  -KJ Location: gluteal  -KJ    Retired Wound - Properties Group Date first assessed: 09/26/24  -KJ Time first assessed: 1809  -KJ Location: gluteal  -KJ       Wound 09/26/24 1809 Left posterior ankle    Wound - Properties Group Placement Date: 09/26/24  -KJ Placement Time: 1809  -KJ Side: Left  -KJ Orientation: posterior  -KJ Location: ankle  -KJ    Retired Wound - Properties Group Placement Date: 09/26/24  -KJ  Placement Time: 1809  -KJ Side: Left  -KJ Orientation: posterior  -KJ Location: ankle  -KJ    Retired Wound - Properties Group Date first assessed: 09/26/24  -KJ Time first assessed: 1809  -KJ Side: Left  -KJ Location: ankle  -KJ       Wound 09/29/24 0952 Bilateral anterior thigh MASD (Moisture associated skin damage)    Wound - Properties Group Placement Date: 09/29/24  -LB Placement Time: 0952 -LB Present on Original Admission: Y  -LB Side: Bilateral  -LB Orientation: anterior  -LB Location: thigh  -LB Primary Wound Type: MASD  -LB    Retired Wound - Properties Group Placement Date: 09/29/24  -LB Placement Time: 0952  -LB Present on Original Admission: Y  -LB Side: Bilateral  -LB Orientation: anterior  -LB Location: thigh  -LB Primary Wound Type: MASD  -LB    Retired Wound - Properties Group Date first assessed: 09/29/24  -LB Time first assessed: 0952  -LB Present on Original Admission: Y  -LB Side: Bilateral  -LB Location: thigh  -LB Primary Wound Type: MASD  -LB       Plan of Care Review    Plan of Care Reviewed With patient  -LA           Positioning and Restraints    Pre-Treatment Position in bed  -LA        Post Treatment Position bed  -LA        In Bed supine;call light within reach;encouraged to call for assist;exit alarm on  -LA                  User Key  (r) = Recorded By, (t) = Taken By, (c) = Cosigned By      Initials Name Effective Dates    Nory Keenan, FIFI 06/08/23 -     Zehra Rivera RN 10/20/21 -     Loida Richard OT 02/14/22 -                            OT Recommendation and Plan     Plan of Care Review  Plan of Care Reviewed With: patient  Plan of Care Reviewed With: patient        Time Calculation:     Therapy Charges for Today       Code Description Service Date Service Provider Modifiers Qty    16518073879 HC OT THER PROC EA 15 MIN 10/2/2024 Loida Flaherty OT GO 1    52884954045 HC OT THERAPEUTIC ACT EA 15 MIN 10/2/2024 Loida Flaherty OT GO 1    93930123547 HC OT THER PROC EA  15 MIN 10/3/2024 Loida Flaherty, OT GO 1    68543328245  OT THERAPEUTIC ACT EA 15 MIN 10/3/2024 Loida Flaherty OT GO 1                 Loida Flaherty OT  10/3/2024

## 2024-10-03 NOTE — THERAPY TREATMENT NOTE
Acute Care - Physical Therapy Treatment Note   [place]     Patient Name: Lety Johnson  : 1980  MRN: [id number]  Today's Date: 10/3/2024   Onset of Illness/Injury or Date of Surgery: 24  Visit Dx:     ICD-10-CM ICD-9-CM   1. Hypokalemia  E87.6 276.8   2. Pressure injury of skin of sacral region, unspecified injury stage  L89.159 707.03     707.20   3. Pressure injury of skin of left heel, unspecified injury stage  L89.629 707.07     707.20   4. Acute cystitis without hematuria  N30.00 595.0     Patient Active Problem List   Diagnosis    UTI (urinary tract infection)     Past Medical History:   Diagnosis Date    Stroke      History reviewed. No pertinent surgical history.  PT Assessment (Last 12 Hours)       PT Evaluation and Treatment       Row Name 10/03/24 1243          Physical Therapy Time and Intention    Subjective Information complains of;weakness;pain  -KM     Document Type therapy note (daily note)  -KM     Mode of Treatment physical therapy  -KM     Patient Effort good  -KM     Symptoms Noted During/After Treatment fatigue  -KM       Row Name 10/03/24 1243          General Information    Patient Profile Reviewed yes  -KM     Patient Observations alert;cooperative;agree to therapy  -KM     Existing Precautions/Restrictions fall  -KM       Row Name 10/03/24 1243          Cognition    Affect/Mental Status (Cognition) emotionally labile  -KM     Orientation Status (Cognition) oriented x 3  -KM     Follows Commands (Cognition) WFL  -KM       Row Name 10/03/24 1243          Bed Mobility    Bed Mobility supine-sit;sit-supine  -KM     Supine-Sit [place] (Bed Mobility) maximum assist (25% patient effort)  -KM     Sit-Supine [place] (Bed Mobility) maximum assist (25% patient effort)  -KM     Bed Mobility, Safety Issues decreased use of arms for pushing/pulling;decreased use of legs for bridging/pushing;impaired trunk control for bed mobility  -KM     Assistive Device (Bed Mobility) bed  rails;draw sheet;head of bed elevated  -KM       Row Name 10/03/24 1243          Transfers    Comment, (Transfers) pt. deferred  -KM       Row Name 10/03/24 1243          Gait/Stairs (Locomotion)    Patient was able to Ambulate no, other medical factors prevent ambulation  -KM     Reason Patient was unable to Ambulate Excessive Weakness  -KM       Row Name 10/03/24 1243          Safety Issues, Functional Mobility    Impairments Affecting Function (Mobility) balance;endurance/activity tolerance;pain;range of motion (ROM);strength  -KM       Row Name 10/03/24 1243          Balance    Balance Assessment sitting static balance  -KM     Static Sitting Balance supervision  -KM       Row Name 10/03/24 1243          Motor Skills    Comments, Therapeutic Exercise EOB ther-ex  -KM     Additional Documentation Comments, Therapeutic Exercise (Row)  -KM       Row Name             Wound 09/26/24 1806 gluteal    Wound - Properties Group Placement Date: 09/26/24  -KJ Placement Time: 1806  -KJ Location: gluteal  -KJ    Retired Wound - Properties Group Placement Date: 09/26/24  -KJ Placement Time: 1806  -KJ Location: gluteal  -KJ    Retired Wound - Properties Group Date first assessed: 09/26/24  -KJ Time first assessed: 1806  -KJ Location: gluteal  -KJ      Row Name             Wound 09/26/24 1807 Left gluteal    Wound - Properties Group Placement Date: 09/26/24  -KJ Placement Time: 1807  -KJ Side: Left  -KJ Location: gluteal  -KJ    Retired Wound - Properties Group Placement Date: 09/26/24  -KJ Placement Time: 1807  -KJ Side: Left  -KJ Location: gluteal  -KJ    Retired Wound - Properties Group Date first assessed: 09/26/24  -KJ Time first assessed: 1807  -KJ Side: Left  -KJ Location: gluteal  -KJ      Row Name             Wound 09/26/24 1809 gluteal    Wound - Properties Group Placement Date: 09/26/24  -KJ Placement Time: 1809  -KJ Location: gluteal  -KJ    Retired Wound - Properties Group Placement Date: 09/26/24  -KJ Placement  Time: 1809 -KJ Location: gluteal  -KJ    Retired Wound - Properties Group Date first assessed: 09/26/24  -KJ Time first assessed: 1809 -KJ Location: gluteal  -KJ      Row Name             Wound 09/26/24 1809 Left posterior ankle    Wound - Properties Group Placement Date: 09/26/24  -KJ Placement Time: 1809 -KJ Side: Left  -KJ Orientation: posterior  -KJ Location: ankle  -KJ    Retired Wound - Properties Group Placement Date: 09/26/24  -KJ Placement Time: 1809 -KJ Side: Left  -KJ Orientation: posterior  -KJ Location: ankle  -KJ    Retired Wound - Properties Group Date first assessed: 09/26/24  -KJ Time first assessed: 1809 -KJ Side: Left  -KJ Location: ankle  -KJ      Row Name             Wound 09/29/24 0952 Bilateral anterior thigh MASD (Moisture associated skin damage)    Wound - Properties Group Placement Date: 09/29/24  -LB Placement Time: 0952  -LB Present on Original Admission: Y  -LB Side: Bilateral  -LB Orientation: anterior  -LB Location: thigh  -LB Primary Wound Type: MASD  -LB    Retired Wound - Properties Group Placement Date: 09/29/24  -LB Placement Time: 0952  -LB Present on Original Admission: Y  -LB Side: Bilateral  -LB Orientation: anterior  -LB Location: thigh  -LB Primary Wound Type: MASD  -LB    Retired Wound - Properties Group Date first assessed: 09/29/24  -LB Time first assessed: 0952  -LB Present on Original Admission: Y  -LB Side: Bilateral  -LB Location: thigh  -LB Primary Wound Type: MASD  -LB      Row Name 10/03/24 1243          Progress Summary (PT)    Daily Progress Summary (PT) Pt. was able to perform bed mobility w/ maxA. She was able to sit EOB for short period ot time. She was able to perform EOB ther-ex. Pt. would continue to benefit from skilled PT services.  -KM               User Key  (r) = Recorded By, (t) = Taken By, (c) = Cosigned By      Initials Name Provider Type    Thierry Carter, PT Physical Therapist    Nory Keenan, RN Registered Nurse    Zehra Rivera  RN Registered Nurse                    Physical Therapy Education       Title: PT OT SLP Therapies (In Progress)       Topic: Physical Therapy (In Progress)       Point: Mobility training (In Progress)       Learning Progress Summary             Patient Acceptance, E, NR by RD at 10/2/2024 1101    Acceptance, E, NR by RD at 10/1/2024 0856    Acceptance, E,D, VU,NR by AG at 9/30/2024 1559                         Point: Home exercise program (In Progress)       Learning Progress Summary             Patient Acceptance, E, NR by RD at 10/2/2024 1101    Acceptance, E, NR by RD at 10/1/2024 0856    Acceptance, E,D, VU,NR by AG at 9/30/2024 1559                         Point: Body mechanics (In Progress)       Learning Progress Summary             Patient Acceptance, E, NR by RD at 10/2/2024 1101    Acceptance, E, NR by RD at 10/1/2024 0856    Acceptance, E,D, VU,NR by AG at 9/30/2024 1559                         Point: Precautions (In Progress)       Learning Progress Summary             Patient Acceptance, E, NR by RD at 10/2/2024 1101    Acceptance, E, NR by RD at 10/1/2024 0856    Acceptance, E,D, VU,NR by AG at 9/30/2024 1559                                         User Key       Initials Effective Dates Name Provider Type Discipline     06/16/21 -  Мария Joya, PT Physical Therapist PT     06/16/21 -  Laisha Verdugo, FIFI Registered Nurse Nurse                  PT Recommendation and Plan     Progress Summary (PT)  Daily Progress Summary (PT): Pt. was able to perform bed mobility w/ maxA. She was able to sit EOB for short period ot time. She was able to perform EOB ther-ex. Pt. would continue to benefit from skilled PT services.       Time Calculation:    PT Charges       Row Name 10/03/24 1243             Time Calculation    PT Received On 10/03/24  -KM         Time Calculation- PT    Total Timed Code Minutes- PT 23 minute(s)  -KM                User Key  (r) = Recorded By, (t) = Taken By, (c) = Cosigned By       Initials Name Provider Type     Thierry Saldivar, PT Physical Therapist                  Therapy Charges for Today       Code Description Service Date Service Provider Modifiers Qty    29916809226 HC PT THERAPEUTIC ACT EA 15 MIN 10/3/2024 Thierry Saldivar, PT GP 1    48242212939 HC PT THER PROC EA 15 MIN 10/3/2024 Thierry Saldivar, PT GP 1            PT G-Codes  AM-PAC 6 Clicks Score (PT): 6    Thierry Saldivar PT  10/3/2024

## 2024-10-03 NOTE — PLAN OF CARE
Goal Outcome Evaluation: Patient has been noncompliant. Patient refused several doses of medication today because she was upset due to potential discharge. Patient has made several complaints. MD has been made aware. Patient resting in bed. Call light in reach.    I spoke with patients sister Gretel, patient gave verbal permission to speak with her. Up dated on plan

## 2024-10-03 NOTE — PROGRESS NOTES
PROGRESS NOTE         Patient Identification:  Name:  Lety Johnson  Age:  43 y.o.  Sex:  female  :  1980  MRN:  [id number]  Visit Number:  [id number]  Primary Care Provider:  Provider, No Known         LOS: 5 days       ----------------------------------------------------------------------------------------------------------------------  Subjective       Chief Complaints:    Fall        Interval History:      Patient resting in bed.  Complains of left lower extremity pain this morning.  Nurse at bedside.  Lungs clear to auscultation bilaterally.  Abdomen soft, nontender.  Afebrile, no reported diarrhea.  Currently on room air with no apparent distress.    Review of Systems:    Constitutional: no fever, chills and night sweats.  Generalized fatigue.  Eyes: no eye drainage, itching or redness.  HEENT: no mouth sores, dysphagia or nose bleed.  Respiratory: no for shortness of breath, cough or production of sputum.  Cardiovascular: no chest pain, no palpitations, no orthopnea.  Gastrointestinal: no nausea, vomiting or diarrhea. No abdominal pain, hematemesis or rectal bleeding.  Genitourinary: no dysuria or polyuria.  Hematologic/lymphatic: no lymph node abnormalities, no easy bruising or easy bleeding.  Musculoskeletal: no muscle or joint pain.  Skin: No rash and no itching.  Neurological: no loss of consciousness, no seizure, no headache.  Behavioral/Psych: no depression or suicidal ideation.  Endocrine: no hot flashes.  Immunologic: negative.    ----------------------------------------------------------------------------------------------------------------------      Objective       Current [facility] Meds:  acyclovir, 400 mg, Oral, Nightly  aspirin, 81 mg, Oral, Daily  atorvastatin, 80 mg, Oral, Daily  DULoxetine, 60 mg, Oral, Daily  ertapenem, 1,000 mg, Intravenous, Q24H  heparin (porcine), 5,000 Units, Subcutaneous, Q8H  ketorolac, 15 mg, Intravenous, Once  lisinopril, 20 mg, Oral, Daily  [Held  by provider] metFORMIN, 1,000 mg, Oral, BID With Meals  mupirocin, 1 application , Each Nare, BID  nicotine, 1 patch, Transdermal, Q24H  nystatin, , Topical, Q12H  pantoprazole, 40 mg, Oral, Daily  sodium chloride, 10 mL, Intravenous, Q12H  sodium chloride, 10 mL, Intravenous, Q12H      Pharmacy Consult,   sodium chloride, 75 mL/hr, Last Rate: 75 mL/hr (10/03/24 0406)      ----------------------------------------------------------------------------------------------------------------------    Vital Signs:  Temp:  [97.9 °F (36.6 °C)-99.3 °F (37.4 °C)] 98.8 °F (37.1 °C)  Heart Rate:  [] 103  Resp:  [18-20] 18  BP: (130-152)/(77-88) 152/85  Mean Arterial Pressure (Non-Invasive) for the past 24 hrs (Last 3 readings):   Noninvasive MAP (mmHg)   10/03/24 0634 102   10/02/24 2302 103   10/02/24 2006 104     SpO2 Percentage    10/02/24 2302 10/03/24 0219 10/03/24 0634   SpO2: 95% 96% 94%     SpO2:  [92 %-96 %] 94 %  on   ;   Device (Oxygen Therapy): room air    Body mass index is 38.64 kg/m².  Wt Readings from Last 3 Encounters:   10/03/24 107 kg (235 lb 12.8 oz)   06/27/24 120 kg (264 lb)        Intake/Output Summary (Last 24 hours) at 10/3/2024 1021  Last data filed at 10/3/2024 0836  Gross per 24 hour   Intake 480 ml   Output 1300 ml   Net -820 ml     Diet: Regular/House; Fluid Consistency: Thin (IDDSI 0)  ----------------------------------------------------------------------------------------------------------------------      Physical Exam:    Constitutional:  Well-developed and well-nourished.  No respiratory distress.  On room air.  Physical therapy at bedside.  HENT:  Head: Normocephalic and atraumatic.  Mouth:  Moist mucous membranes.    Eyes:  Conjunctivae and EOM are normal.  No scleral icterus.  Neck:  Neck supple.  No JVD present.    Cardiovascular:  Normal rate, regular rhythm and normal heart sounds with no murmur. No edema.  Pulmonary/Chest:  No respiratory distress, no wheezes, no crackles, with  normal breath sounds and good air movement.  Abdominal:  Soft.  Bowel sounds are normal.  No distension and no tenderness.   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  Neurological:  Alert and oriented to person, place, and time.  No facial droop.  No slurred speech.   Skin:  Skin is warm and dry.  No rash noted.  No pallor.   Psychiatric:  Normal mood and affect.  Behavior is normal.        ----------------------------------------------------------------------------------------------------------------------            Results from last 7 days   Lab Units 10/01/24  0117 09/30/24  0117 09/29/24  0036 09/27/24  0844 09/26/24  1504 09/26/24  1350   CRP mg/dL  --   --   --   --  3.80*  --    LACTATE mmol/L  --   --   --   --   --  1.1   WBC 10*3/mm3 7.71 8.49 7.24   < >  --  10.42   HEMOGLOBIN g/dL 10.9* 10.5* 10.7*   < >  --  12.5   HEMATOCRIT % 33.9* 32.7* 33.6*   < >  --  37.4   MCV fL 98.5* 99.1* 100.6*   < >  --  94.0   MCHC g/dL 32.2 32.1 31.8   < >  --  33.4   PLATELETS 10*3/mm3 353 285 318   < >  --  402    < > = values in this interval not displayed.     Results from last 7 days   Lab Units 10/01/24  0117 09/30/24  0117 09/29/24  0036 09/28/24  0318 09/27/24  0844 09/26/24  1504   SODIUM mmol/L 141 142 140 142   < > 140   POTASSIUM mmol/L 3.8 3.5 3.9 4.1   < > 2.7*   MAGNESIUM mg/dL  --   --   --   --   --  1.5*   CHLORIDE mmol/L 110* 110* 109* 110*   < > 99   CO2 mmol/L 22.8 22.8 21.0* 22.3   < > 28.9   BUN mg/dL 4* 5* 4* 5*   < > 10   CREATININE mg/dL 0.36* 0.46* 0.40* 0.43*   < > 0.51*   CALCIUM mg/dL 8.3* 8.0* 8.2* 8.5*   < > 9.4   GLUCOSE mg/dL 158* 196* 192* 198*   < > 220*   ALBUMIN g/dL  --  2.5* 2.5* 2.7*   < > 3.5   BILIRUBIN mg/dL  --  0.2 0.2 0.3   < > 0.6   ALK PHOS U/L  --  79 88 100   < > 119*   AST (SGOT) U/L  --  11 15 15   < > 21   ALT (SGPT) U/L  --  7 8 9   < > 8    < > = values in this interval not displayed.   Estimated Creatinine Clearance: 249.7 mL/min (A) (by C-G formula  "based on SCr of 0.36 mg/dL (L)).  No results found for: \"AMMONIA\"    Glucose   Date/Time Value Ref Range Status   10/02/2024 2010 216 (H) 70 - 130 mg/dL Final     No results found for: \"HGBA1C\"  No results found for: \"TSH\", \"FREET4\"    Blood Culture   Date Value Ref Range Status   09/26/2024 No growth at 2 days  Preliminary   09/26/2024 No growth at 2 days  Preliminary     Urine Culture   Date Value Ref Range Status   09/26/2024 >100,000 CFU/mL Escherichia coli ESBL (A)  Final     No results found for: \"WOUNDCX\"  No results found for: \"STOOLCX\"  No results found for: \"RESPCX\"  Pain Management Panel           No data to display                  ----------------------------------------------------------------------------------------------------------------------  Imaging Results (Last 24 Hours)       ** No results found for the last 24 hours. **            ----------------------------------------------------------------------------------------------------------------------    Pertinent Infectious Disease Results                Assessment/Plan       Assessment       ESBL UTI       Plan          Patient resting in bed.  Complains of left lower extremity pain this morning.  Nurse at bedside.  Lungs clear to auscultation bilaterally.  Abdomen soft, nontender.  Afebrile, no reported diarrhea.  Currently on room air with no apparent distress.    Recommend to continue current antibiotic regimen of ertapenem 1 g IV every 24 hours x 7 days for treatment of ESBL UTI.  Upon discharge patient can be further de-escalated to Macrobid 100 mg p.o. twice daily to complete antibiotic course. Okay to continue home suppressive acyclovir for history of genital herpes.  Will continue to monitor closely patient's diarrhea.  We will continue to follow closely and adjust antibiotic therapy as needed.     ANTIMICROBIAL THERAPY    acyclovir - 200 MG, 400 MG  ertapenem (INVanz) 1000 mg in 100 mL NS (VTB)     Code Status:   Code Status and " Medical Interventions: CPR (Attempt to Resuscitate); Full Support   Ordered at: 09/26/24 1626     Code Status (Patient has no pulse and is not breathing):    CPR (Attempt to Resuscitate)     Medical Interventions (Patient has pulse or is breathing):    Full Support       YUE Rivera  10/03/24  10:21 EDT

## 2024-10-04 PROCEDURE — 25010000002 HEPARIN (PORCINE) PER 1000 UNITS: Performed by: INTERNAL MEDICINE

## 2024-10-04 PROCEDURE — 25010000002 ERTAPENEM PER 500 MG: Performed by: INTERNAL MEDICINE

## 2024-10-04 PROCEDURE — 97110 THERAPEUTIC EXERCISES: CPT

## 2024-10-04 PROCEDURE — 25810000003 SODIUM CHLORIDE 0.9 % SOLUTION: Performed by: INTERNAL MEDICINE

## 2024-10-04 PROCEDURE — 97530 THERAPEUTIC ACTIVITIES: CPT

## 2024-10-04 PROCEDURE — 25010000002 PROCHLORPERAZINE 10 MG/2ML SOLUTION

## 2024-10-04 PROCEDURE — 99232 SBSQ HOSP IP/OBS MODERATE 35: CPT | Performed by: NURSE PRACTITIONER

## 2024-10-04 RX ADMIN — ERTAPENEM 1000 MG: 1 INJECTION, POWDER, LYOPHILIZED, FOR SOLUTION INTRAMUSCULAR; INTRAVENOUS at 14:28

## 2024-10-04 RX ADMIN — ACETAMINOPHEN 650 MG: 325 TABLET ORAL at 14:29

## 2024-10-04 RX ADMIN — MUPIROCIN 1 APPLICATION: 20 OINTMENT TOPICAL at 21:53

## 2024-10-04 RX ADMIN — PROCHLORPERAZINE EDISYLATE 2.5 MG: 5 INJECTION INTRAMUSCULAR; INTRAVENOUS at 14:28

## 2024-10-04 RX ADMIN — PANTOPRAZOLE SODIUM 40 MG: 40 TABLET, DELAYED RELEASE ORAL at 08:28

## 2024-10-04 RX ADMIN — ACETAMINOPHEN 650 MG: 325 TABLET ORAL at 21:53

## 2024-10-04 RX ADMIN — NICOTINE 1 PATCH: 7 PATCH, EXTENDED RELEASE TRANSDERMAL at 08:26

## 2024-10-04 RX ADMIN — DULOXETINE HYDROCHLORIDE 60 MG: 60 CAPSULE, DELAYED RELEASE ORAL at 08:28

## 2024-10-04 RX ADMIN — NYSTATIN: 100000 POWDER TOPICAL at 21:53

## 2024-10-04 RX ADMIN — HEPARIN SODIUM 5000 UNITS: 5000 INJECTION INTRAVENOUS; SUBCUTANEOUS at 14:28

## 2024-10-04 RX ADMIN — CYCLOBENZAPRINE 10 MG: 10 TABLET, FILM COATED ORAL at 21:53

## 2024-10-04 RX ADMIN — Medication 10 ML: at 08:29

## 2024-10-04 RX ADMIN — SODIUM CHLORIDE 75 ML/HR: 9 INJECTION, SOLUTION INTRAVENOUS at 06:40

## 2024-10-04 RX ADMIN — ACETAMINOPHEN 650 MG: 325 TABLET ORAL at 08:28

## 2024-10-04 RX ADMIN — Medication 10 ML: at 08:28

## 2024-10-04 RX ADMIN — Medication 10 ML: at 21:52

## 2024-10-04 RX ADMIN — NYSTATIN: 100000 POWDER TOPICAL at 08:29

## 2024-10-04 RX ADMIN — ASPIRIN 81 MG: 81 TABLET, CHEWABLE ORAL at 08:28

## 2024-10-04 RX ADMIN — LISINOPRIL 20 MG: 10 TABLET ORAL at 08:28

## 2024-10-04 RX ADMIN — ACYCLOVIR 400 MG: 200 CAPSULE ORAL at 21:53

## 2024-10-04 RX ADMIN — HEPARIN SODIUM 5000 UNITS: 5000 INJECTION INTRAVENOUS; SUBCUTANEOUS at 21:52

## 2024-10-04 RX ADMIN — HEPARIN SODIUM 5000 UNITS: 5000 INJECTION INTRAVENOUS; SUBCUTANEOUS at 06:33

## 2024-10-04 RX ADMIN — MUPIROCIN 1 APPLICATION: 20 OINTMENT TOPICAL at 08:29

## 2024-10-04 RX ADMIN — ATORVASTATIN CALCIUM 80 MG: 40 TABLET, FILM COATED ORAL at 08:28

## 2024-10-04 NOTE — CASE MANAGEMENT/SOCIAL WORK
Discharge Planning Assessment   Zaki     Patient Name: Lety Johnson  MRN: [id number]  Today's Date: 10/4/2024    Admit Date: 9/26/2024    Plan: SS received denial for [facility] per Tali for lack of [place] criteria.  SS spoke with pt at bedside and provided pt with update regarding discharge planning.  Pt stated understanding and gave SS permission to speak with sister Moises.  SS will follow.     Discharge Plan       Row Name 10/04/24 1634       Plan    Plan SS received denial for [facility] of [facility] per Tali for lack of [place] criteria.  SS spoke with pt at bedside and provided pt with update regarding discharge planning.  Pt stated understanding and gave SS permission to speak with sister Moises.  SS will follow.      Row Name 10/04/24 2767       Plan    Plan SS sent pt's referral for [facility] to [facility] for review.  SS will follow.      Row Name 10/04/24 4510       Plan    Plan SS spoke with [facility] per Liasha who stated pt was denied admit to their facility.  SS spoke with Supervisor who stated that  would not allow pt's significant other early release from alf to care for pt.  SS sent pt's referral to [facility] for review and notified Lesly Childs.  SS sent pt's referral to [facility] in [place] for review and spoke with Tali.  SS requested inpt rehab consult for review and spoke with ext [phone].  SS left message for admissions at [facility] to return call for referral.  SS spoke with Physician.  SS will follow.                  Continued Care and Services - Admitted Since 9/26/2024       Destination       Service Provider Request Status Selected Services Address Phone Fax Patient Preferred    [facility] Pending - No Request Sent N/A [address] [facility] [address] [phone] --    [facility] Pending - No Request Sent N/A [address]  [place] [id number] [phone] --    [facility] Pending - No Request Sent N/A [address] [address] [phone] --    [facility] Declined  Cannot meet patient's needs N/A [address] [facility] [id number] [phone] --    [facility] Acute Care Declined  Not eligible N/A [address] [place] [id number] [phone] --    [facility] Declined  Not eligible N/A [address] [address] [phone] --    [facility] Declined  Clinical Denial N/A [address], [facility] [address] [phone] --                  Expected Discharge Date and Time       Expected Discharge Date Expected Discharge Time    Oct 7, 2024                   KAZ Lacy

## 2024-10-04 NOTE — DISCHARGE PLACEMENT REQUEST
"Lety Johnson (43 y.o. Female)       Date of Birth   1980    Social Security Number       Address   [address] [id number]    Home Phone   [phone]    MRN   [id number]       Cheondoism   Islam    Marital Status   Single                            Admission Date   9/26/24    Admission Type   Emergency    Admitting Provider   Ramon Marc MD    Attending Provider   Marv Navas DO    Department, Room/Bed   [address], 3315/1S       Discharge Date       Discharge Disposition       Discharge Destination                                 Attending Provider: Marv Navas DO    Allergies: No Known Allergies    Isolation: Contact   Infection: ESBL E coli (09/28/24)   Code Status: CPR    Ht: 166.4 cm (65.5\")   Wt: 110 kg (241 lb 6.5 oz)    Admission Cmt: None   Principal Problem: UTI (urinary tract infection) [N39.0]                   Active Insurance as of 9/26/2024       Primary Coverage       Payor Plan Insurance Group Employer/Plan Group    [place] [id number]       Payor Plan Address Payor Plan Phone Number Payor Plan Fax Number Effective Dates    [employer] [address] [address] [phone]  5/1/2024 - None Entered    Formerly [facility] [id number]         Subscriber Name Subscriber Birth Date Member ID       LETY JOHNSON 1980 [id number]                     Emergency Contacts        (Rel.) Home Phone Work Phone Mobile Phone    BriceMoises (Legal Guardian) -- -- [phone]              Emergency Contact Information       Name Relation Home Work Mobile    Moises Narvaez Legal Guardian   [phone]          Insurance Information                  [place] Phone: [phone]    Subscriber: Lety Johnson Subscriber#: [id number]    Group#: [id number] Precert#: --               History & Physical        Ramon Marc MD at 09/26/24 [address] [place] [address] " HOSPITALIST HISTORY AND PHYSICAL    Patient Identification:  Name:  Lety Johnson  Age:  43 y.o.  Sex:  female  :  1980  MRN:  [id number]   Admit Date: 2024   Visit Number:  [id number]  Room number:  404/04  Primary Care Physician:  Provider, No Known     Subjective     Chief complaint:    Chief Complaint   Patient presents with    Fall     History of presenting illness:   43F Morbid Obesity by BMI PMH History Genital Herpes, Cerebrovascular Accident 2024 complicated by L sided paralysis, presented to [facility] emergency room  after sliding into the floor off her chair due to weakness.  Upon arrival patient afebrile, heart rate 109, respiratory rate 18, blood pressure 146/101, satting 98% on room air. Labs showed WBC count 10K, lactate 1.1, CRP 3.8, potassium 2.7, magnesium 1.5, HCG negative, blood cultures pending. CXR no acute cardiopulmonary processes.  Xray ankle/foot without fracture or dislocation.  Emergency room provider gave antibiotics, pain medications, zofran, potassium replacement.  Hospital Medicine consulted for admission. Patient seen and examined in the emergency room, notes fevers chills at home a few days ago, recently has been on antibiotics for lower extremity cellulitis, has had some dysuria and suprapubic pain, denies current sexual activity, reports her fiance was taken to senior care about a week ago and has been her primary caretaker, has had difficulty at home since prior stroke and caretakers not consistent, last 24hrs didn't have anyone to help her, sister endorses recent confusion/hallucinations beyond baseline, seeing dead family members, coinciding with onset of dysuria.  ---------------------------------------------------------------------------------------------------------------------   Review of Systems   Constitutional:  Positive for chills and fever.   HENT: Negative.     Eyes: Negative.    Respiratory:  Positive for shortness of breath.     Cardiovascular:  Positive for leg swelling. Negative for chest pain.   Gastrointestinal: Negative.    Endocrine: Negative.    Genitourinary:  Positive for dysuria.   Musculoskeletal: Negative.    Skin:  Positive for rash.   Allergic/Immunologic: Negative.    Neurological:  Positive for weakness.   Hematological: Negative.    Psychiatric/Behavioral:  Positive for hallucinations.      ---------------------------------------------------------------------------------------------------------------------   Past Medical History:   Diagnosis Date    Stroke      No past surgical history on file.  No family history on file.  Social History     Socioeconomic History    Marital status: Single     ---------------------------------------------------------------------------------------------------------------------   Allergies:  Patient has no known allergies.  ---------------------------------------------------------------------------------------------------------------------   Medications below are reported home medications pulling from within the system; at this time, these medications have not been reconciled unless otherwise specified and are in the verification process for further verifcation as current home medications.    Prior to Admission Medications       Prescriptions Last Dose Informant Patient Reported? Taking?    aspirin 81 MG chewable tablet Unknown  Yes No    Chew 1 tablet Daily.    atorvastatin (LIPITOR) 80 MG tablet Unknown  Yes No    Take 1 tablet by mouth Daily.    cyclobenzaprine (FLEXERIL) 10 MG tablet Unknown  Yes No    Take 1 tablet by mouth 3 (Three) Times a Day As Needed for Muscle Spasms.    DULoxetine (CYMBALTA) 60 MG capsule Unknown  Yes No    Take 1 capsule by mouth Daily.    lisinopril (PRINIVIL,ZESTRIL) 20 MG tablet Unknown  Yes No    Take 1 tablet by mouth Daily.    metFORMIN (GLUCOPHAGE) 1000 MG tablet Unknown  Yes No    Take 1 tablet by mouth 2 (Two) Times a Day With Meals.    pantoprazole  (PROTONIX) 40 MG EC tablet Unknown  Yes No    Take 1 tablet by mouth Daily.          Objective     Vital Signs:  Temp:  [98.2 °F (36.8 °C)] 98.2 °F (36.8 °C)  Heart Rate:  [] 118  Resp:  [18] 18  BP: (135-157)/() 140/79    Mean Arterial Pressure (Non-Invasive) for the past 24 hrs (Last 3 readings):   Noninvasive MAP (mmHg)   09/26/24 1645 90   09/26/24 1630 100   09/26/24 1615 103     SpO2:  [91 %-100 %] 94 %  on   ;   Device (Oxygen Therapy): room air  Body mass index is 43.26 kg/m².    Wt Readings from Last 3 Encounters:   09/26/24 120 kg (264 lb)   06/27/24 120 kg (264 lb)      ---------------------------------------------------------------------------------------------------------------------   Physical Exam:  Constitutional:  Well-developed and well-nourished. Older than stated age. No acute distress.      HENT:  Head:  Normocephalic and atraumatic.  Mouth:  Moist mucous membranes.    Eyes:  Conjunctivae and EOM are normal. No scleral icterus.    Neck:  Neck supple.  No JVD present.    Cardiovascular:  Normal rate, regular rhythm and normal heart sounds with no murmur.  Pulmonary/Chest:  No respiratory distress, no wheezes, no crackles, with normal breath sounds and good air movement.  Abdominal:  Soft. No distension and no tenderness.   Musculoskeletal:  No tenderness, and no deformity.  No red or swollen joints anywhere.    Neurological:  Alert and oriented to person, place, and time.  No cranial nerve deficit. Chronic L sided paralysis.    Skin:  Skin is warm and dry. No pallor. Significant intertriginous erythema under panus, consistent with yeast infection.   Peripheral vascular:  No clubbing, no cyanosis, trace edema.  Psychiatric: Appropriate mood and affect  Edited by: Ramon Marc MD at 9/26/2024 1701  ---------------------------------------------------------------------------------------------------------------------  EKG:  N/A    Telemetry:  normal sinus rhythm, no significant ST  "changes    I have personally looked at telemetry.    Last echocardiogram:  Ordered and pending   --------------------------------------------------------------------------------------------------------------------  Labs:  Results from last 7 days   Lab Units 09/26/24  1504 09/26/24  1350   LACTATE mmol/L  --  1.1   CRP mg/dL 3.80*  --    WBC 10*3/mm3  --  10.42   HEMOGLOBIN g/dL  --  12.5   HEMATOCRIT %  --  37.4   MCV fL  --  94.0   MCHC g/dL  --  33.4   PLATELETS 10*3/mm3  --  402         Results from last 7 days   Lab Units 09/26/24  1504   SODIUM mmol/L 140   POTASSIUM mmol/L 2.7*   MAGNESIUM mg/dL 1.5*   CHLORIDE mmol/L 99   CO2 mmol/L 28.9   BUN mg/dL 10   CREATININE mg/dL 0.51*   CALCIUM mg/dL 9.4   GLUCOSE mg/dL 220*   ALBUMIN g/dL 3.5   BILIRUBIN mg/dL 0.6   ALK PHOS U/L 119*   AST (SGOT) U/L 21   ALT (SGPT) U/L 8   Estimated Creatinine Clearance: 188.1 mL/min (A) (by C-G formula based on SCr of 0.51 mg/dL (L)).  No results found for: \"AMMONIA\"          No results found for: \"HGBA1C\", \"POCGLU\"  No results found for: \"TSH\", \"FREET4\"  No results found for: \"PREGTESTUR\", \"PREGSERUM\", \"HCG\", \"HCGQUANT\"  Pain Management Panel           No data to display              Brief Urine Lab Results  (Last result in the past 365 days)        Color   Clarity   Blood   Leuk Est   Nitrite   Protein   CREAT   Urine HCG        09/26/24 1419 Dark Yellow   Turbid   Trace   Moderate (2+)   Positive   30 mg/dL (1+)                 No results found for: \"BLOODCX\"  No results found for: \"URINECX\"  No results found for: \"WOUNDCX\"  No results found for: \"STOOLCX\"    I have personally looked at the labs and they are summarized above.  ----------------------------------------------------------------------------------------------------------------------  Detailed radiology reports for the last 24 hours:    Imaging Results (Last 24 Hours)       Procedure Component Value Units Date/Time    XR Ankle 3+ View Left [[id number]] Collected: " 09/26/24 1537     Updated: 09/26/24 1540    Narrative:      EXAM:    XR Left Ankle Complete, 3 or More Views     EXAM DATE:    9/26/2024 3:17 PM     CLINICAL HISTORY:    left ankle injury     TECHNIQUE:    Frontal, lateral and oblique views of the left ankle.     COMPARISON:    No relevant prior studies available.     FINDINGS:    Bones/joints:  See below.      Soft tissues:  Soft tissue swelling, but no acute fracture or  dislocation.       Impression:        Soft tissue swelling, but no acute fracture or dislocation.        This report was finalized on 9/26/2024 3:38 PM by Dr. Moses Otto MD.       XR Foot 3+ View Left [[id number]] Collected: 09/26/24 1536     Updated: 09/26/24 1539    Narrative:      EXAM:    XR Left Foot Complete, 3 or More Views     EXAM DATE:    9/26/2024 3:16 PM     CLINICAL HISTORY:    left foot wound     TECHNIQUE:    Frontal, lateral and oblique views of the left foot.     COMPARISON:    No relevant prior studies available.     FINDINGS:    Bones/joints:  Unremarkable.  No acute fracture.  No dislocation.    Soft tissues:  Unremarkable.  No radiopaque foreign body.       Impression:        No acute findings in the left foot.        This report was finalized on 9/26/2024 3:37 PM by Dr. Moses Otto MD.       XR Chest 1 View [[id number]] Collected: 09/26/24 1406     Updated: 09/26/24 1408    Narrative:      XR CHEST 1 VW-     CLINICAL INDICATION: weakness        COMPARISON: None immediately available      TECHNIQUE: Single frontal view of the chest.     FINDINGS:     LUNGS: Lungs are adequately aerated.      HEART AND MEDIASTINUM: Heart and mediastinal contours are unremarkable        SKELETON: Bony and soft tissue structures are unremarkable.             Impression:      No radiographic evidence of acute cardiac or pulmonary disease.           This report was finalized on 9/26/2024 2:06 PM by Dr. Moses Otto MD.       CT Cervical Spine Without Contrast [[id number]] Collected: 09/26/24  1317     Updated: 09/26/24 1319    Narrative:      CT CERVICAL SPINE WO CONTRAST-     CLINICAL INDICATION: neck pain        COMPARISON: None available     TECHNIQUE: Axial images of the cervical spine were acquired with out any  intravenous contrast. Reformatted images were then created in the  sagittal and coronal planes.     DOSE:      Radiation dose reduction techniques were utilized per ALARA protocol.  Automated exposure control was initiated through either or [employer] or  [employer] software packages by  protocol.           FINDINGS:   The provided study demonstrates preservation of the vertebral body  heights in the sagittally reconstructed images.     There is no prevertebral soft tissue swelling.     Alignment is near anatomic.     The disc space heights are uniform.     I see no acute cervical spine fracture.       Impression:         1. No acute bony abnormality.     2. Other incidental findings as above     This report was finalized on 9/26/2024 1:17 PM by Dr. Moses Otto MD.       CT Lumbar Spine Without Contrast [[id number]] Collected: 09/26/24 1317     Updated: 09/26/24 1319    Narrative:      EXAM:    CT Lumbar Spine Without Intravenous Contrast     EXAM DATE:    9/26/2024 12:59 PM     CLINICAL HISTORY:    back pain     TECHNIQUE:    Axial computed tomography images of the lumbar spine without  intravenous contrast.  Sagittal and coronal reformatted images were  created and reviewed.  This CT exam was performed using one or more of  the following dose reduction techniques:  automated exposure control,  adjustment of the mA and/or kV according to patient size, and/or use of  iterative reconstruction technique.     COMPARISON:    No relevant prior studies available.     FINDINGS:    VERTEBRAE:  Unremarkable.  No acute fracture.    DISCS/SPINAL CANAL/NEURAL FORAMINA:  No acute findings.  No spinal  canal stenosis.    SOFT TISSUES:  Unremarkable.       Impression:        No acute findings in  the lumbar spine.        This report was finalized on 9/26/2024 1:17 PM by Dr. Moses Otto MD.       CT Thoracic Spine Without Contrast [[id number]] Collected: 09/26/24 1316     Updated: 09/26/24 1319    Narrative:      EXAM:    CT Thoracic Spine Without Intravenous Contrast     EXAM DATE:    9/26/2024 12:58 PM     CLINICAL HISTORY:    back pain     TECHNIQUE:    Axial computed tomography images of the thoracic spine without  intravenous contrast.  Sagittal and coronal reformatted images were  created and reviewed.  This CT exam was performed using one or more of  the following dose reduction techniques:  automated exposure control,  adjustment of the mA and/or kV according to patient size, and/or use of  iterative reconstruction technique.     COMPARISON:    No relevant prior studies available.     FINDINGS:    VERTEBRAE:  Unremarkable.  No acute fracture.    DISCS/SPINAL CANAL/NEURAL FORAMINA:  No acute findings.  No spinal  canal stenosis.    SOFT TISSUES:  Unremarkable.       Impression:        No acute findings in the thoracic spine.        This report was finalized on 9/26/2024 1:17 PM by Dr. Moses Otto MD.       CT Head Without Contrast [[id number]] Collected: 09/26/24 1259     Updated: 09/26/24 1302    Narrative:      CT HEAD WO CONTRAST-     CLINICAL INDICATION: weakness        COMPARISON: None available     TECHNIQUE: Axial images of the brain were obtained with out intravenous  contrast.  Reformatted images were created in the sagittal and coronal  planes.     DOSE:     Radiation dose reduction techniques were utilized per ALARA protocol.  Automated exposure control was initiated through either or [employer] or  [employer] software packages by  protocol.        FINDINGS:    BRAIN: Probable subacute ischemia right middle cerebral artery  territory    VENTRICLES:  Unremarkable.  No ventriculomegaly.       BONES/JOINTS:  Unremarkable.  No acute fracture.       SOFT TISSUES:  Unremarkable.        SINUSES:  no air fluid levels       MASTOID AIR CELLS:  Unremarkable as visualized.  No mastoid effusion.          Impression:         1. Probable remote right middle cerebral artery infarction  2. No acute parenchymal mass, hemorrhage, or midline shift     This report was finalized on 9/26/2024 1:00 PM by Dr. Moses Otto MD.             I have personally looked at the radiology images and read the final radiology report.    Assessment & Plan    43F Morbid Obesity by BMI PMH History Genital Herpes, Cerebrovascular Accident 5/2024 complicated by L sided paralysis, presented to [facility] emergency room 9/26 after sliding into the floor off her chair due to weakness.     #Acute Metabolic Encephalopathy due to Acute Urinary Tract Infection in setting of probable neurogenic bladder  - Continue Ceftriaxone, follow up cultures, rule out STI    #Electrolyte Abnormalities  - Acute Mild Hypokalemia - Replacing, on protocol  - Acute Mild Hypomagnesemia - Replacing, on protocol    #History Cerebrovascular Accident complicated by L sided paralysis, unclear etiology  - Review home medications and resume as indicated, Supportive Care     #Debility  - Consult PT/OT, Social Work if placement needed     #History Genital Herpes  - Supportive Care, follow up medication reconciliation for any suppressive medications, check HIV & acute hepatitis panel     #Morbid Obesity by BMI  - Body mass index is 43.26 kg/m².; complicates all aspects of care    F: Oral  E: Monitor & Replace as needed   N: Regular Diet  PPx: SQH  Code Status (Patient has no pulse and is not breathing): CPR  Medical Interventions (Patient has pulse or is breathing): Full Support     Dispo: Pending workup and clinical improvement    *This patient is considered high risk secondary to Urinary Tract Infection, electrolyte abnormalities, chronic L sided paralysis from prior Cerebrovascular Accident.      Edited by: Ramon Marc MD at 9/26/2024 9089    Ramon  MD [facility]  [facility]  09/26/24  17:01 EDT        Electronically signed by Ramon Marc MD at 09/26/24 1703       Vital Signs (last day)       Date/Time Temp Temp src Pulse Resp BP Patient Position SpO2    10/04/24 1039 98.4 (36.9) Oral 111 20 154/99 Lying 96    10/04/24 0700 98.3 (36.8) Oral 100 20 160/98 Lying 96    10/04/24 0437 98.4 (36.9) Oral 97 18 157/90 Lying 94    10/03/24 2331 98.5 (36.9) Oral 112 18 124/98 Lying 98    10/03/24 2110 -- -- 99 20 140/92 -- 97    10/03/24 2017 98.4 (36.9) Oral 103 20 158/85 Lying 95    10/03/24 1530 98.4 (36.9) Oral 105 18 154/98 Lying 96    10/03/24 1042 98 (36.7) Oral 102 20 152/95 Lying 91    10/03/24 0634 98.8 (37.1) Oral 103 18 152/85 Lying 94    10/03/24 0219 -- -- 100 18 140/80 -- 96          Lines, Drains & Airways       Active LDAs       Name Placement date Placement time Site Days    Midline Catheter - Single Lumen 09/30/24 Right Basilic 09/30/24  0955  -- 4    External Urinary Catheter 09/29/24  1500  --  4                  Current Facility-Administered Medications   Medication Dose Route Frequency Provider Last Rate Last Admin    acetaminophen (TYLENOL) tablet 650 mg  650 mg Oral Q6H PRN Ashleigh Nicholas PA-C   650 mg at 10/04/24 1429    acyclovir (ZOVIRAX) capsule 400 mg  400 mg Oral Nightly Ramon Marc MD   400 mg at 10/03/24 2108    aspirin chewable tablet 81 mg  81 mg Oral Daily Ramon Marc MD   81 mg at 10/04/24 0828    atorvastatin (LIPITOR) tablet 80 mg  80 mg Oral Daily Ramon Marc MD   80 mg at 10/04/24 0828    sennosides-docusate (PERICOLACE) 8.6-50 MG per tablet 2 tablet  2 tablet Oral BID PRN Ramon Marc MD        And    polyethylene glycol (MIRALAX) packet 17 g  17 g Oral Daily PRN Ramon Marc MD        And    bisacodyl (DULCOLAX) EC tablet 5 mg  5 mg Oral Daily PRN Ramon Marc MD        And    bisacodyl (DULCOLAX) suppository 10 mg  10 mg Rectal Daily PRN Ramon Marc MD        Calcium Replacement - Follow  Nurse / BPA Driven Protocol   Does not apply PRN Ramon Marc MD        cyclobenzaprine (FLEXERIL) tablet 10 mg  10 mg Oral TID PRN Ramon Marc MD   10 mg at 10/03/24 2109    Diclofenac Sodium (VOLTAREN) 1 % gel 4 g  4 g Topical 4x Daily PRN Ashleigh Nicholas PA-C   4 g at 10/02/24 2222    DULoxetine (CYMBALTA) DR capsule 60 mg  60 mg Oral Daily Ramon Marc MD   60 mg at 10/04/24 0828    ertapenem (INVanz) 1,000 mg in sodium chloride 0.9 % 100 mL IVPB-VTB  1,000 mg Intravenous Q24H Ramon Marc  mL/hr at 10/04/24 1428 1,000 mg at 10/04/24 1428    heparin (porcine) 5000 UNIT/ML injection 5,000 Units  5,000 Units Subcutaneous Q8H Ramon Marc MD   5,000 Units at 10/04/24 1428    lisinopril (PRINIVIL,ZESTRIL) tablet 20 mg  20 mg Oral Daily Ramon Marc MD   20 mg at 10/04/24 0828    loperamide (IMODIUM) capsule 2 mg  2 mg Oral 4x Daily PRN Marv Navas DO   2 mg at 10/02/24 0847    Magnesium Standard Dose Replacement - Follow Nurse / BPA Driven Protocol   Does not apply PRN Ramon Marc MD        [Held by provider] metFORMIN (GLUCOPHAGE) tablet 1,000 mg  1,000 mg Oral BID With Meals Ramon Marc MD        mupirocin (BACTROBAN) 2 % nasal ointment 1 Application  1 application  Each Nare BID Marv Navas DO   1 Application at 10/04/24 0829    nicotine (NICODERM CQ) 7 MG/24HR patch 1 patch  1 patch Transdermal Q24H Ramon Marc MD   1 patch at 10/04/24 0826    nystatin (MYCOSTATIN) powder   Topical Q12H Ramon Marc MD   Given at 10/04/24 0829    pantoprazole (PROTONIX) EC tablet 40 mg  40 mg Oral Daily Ramon Marc MD   40 mg at 10/04/24 0828    Pharmacy Consult   Does not apply Continuous PRN Ramon Marc MD        Phosphorus Replacement - Follow Nurse / BPA Driven Protocol   Does not apply PRN Ramon Marc MD        Potassium Replacement - Follow Nurse / BPA Driven Protocol   Does not apply PRN Ramon Marc MD        prochlorperazine (COMPAZINE) injection 2.5 mg   2.5 mg Intravenous Q6H PRN Ashleigh Nicholas PA-C   2.5 mg at 10/04/24 1428    sodium chloride 0.9 % flush 10 mL  10 mL Intravenous Q12H Ramon Marc MD   10 mL at 10/04/24 0828    sodium chloride 0.9 % flush 10 mL  10 mL Intravenous PRN Ramon Marc MD        sodium chloride 0.9 % flush 10 mL  10 mL Intravenous Q12H Marv Navas,    10 mL at 10/04/24 0829    sodium chloride 0.9 % flush 10 mL  10 mL Intravenous PRN Marv Navas,         sodium chloride 0.9 % infusion 40 mL  40 mL Intravenous PRN Ramon Marc MD        sodium chloride 0.9 % infusion 40 mL  40 mL Intravenous PRN Marv Navas DO        sodium chloride 0.9 % infusion  75 mL/hr Intravenous Continuous Ramon Marc MD 75 mL/hr at 10/04/24 0640 75 mL/hr at 10/04/24 0640     Lab Results (most recent)       Procedure Component Value Units Date/Time    POC Glucose Once [[id number]]  (Abnormal) Collected: 10/02/24 2010    Specimen: Blood Updated: 10/02/24 2016     Glucose 216 mg/dL     Blood Culture - Blood, Arm, Left [[id number]]  (Normal) Collected: 09/26/24 1350    Specimen: Blood from Arm, Left Updated: 10/01/24 1400     Blood Culture No growth at 5 days    Blood Culture - Blood, Arm, Right [[id number]]  (Normal) Collected: 09/26/24 1350    Specimen: Blood from Arm, Right Updated: 10/01/24 1400     Blood Culture No growth at 5 days    Basic Metabolic Panel [[id number]]  (Abnormal) Collected: 10/01/24 0117    Specimen: Blood Updated: 10/01/24 0215     Glucose 158 mg/dL      BUN 4 mg/dL      Creatinine 0.36 mg/dL      Sodium 141 mmol/L      Potassium 3.8 mmol/L      Chloride 110 mmol/L      CO2 22.8 mmol/L      Calcium 8.3 mg/dL      BUN/Creatinine Ratio 11.1     Anion Gap 8.2 mmol/L      eGFR 130.2 mL/min/1.73     Narrative:      GFR Normal >60  Chronic Kidney Disease <60  Kidney Failure <15      CBC (No Diff) [[id number]]  (Abnormal) Collected: 10/01/24 0117    Specimen: Blood Updated: 10/01/24 0157     WBC 7.71  10*3/mm3      RBC 3.44 10*6/mm3      Hemoglobin 10.9 g/dL      Hematocrit 33.9 %      MCV 98.5 fL      MCH 31.7 pg      MCHC 32.2 g/dL      RDW 14.2 %      RDW-SD 50.6 fl      MPV 9.1 fL      Platelets 353 10*3/mm3     Comprehensive Metabolic Panel [[id number]]  (Abnormal) Collected: 09/30/24 0117    Specimen: Blood Updated: 09/30/24 0202     Glucose 196 mg/dL      BUN 5 mg/dL      Creatinine 0.46 mg/dL      Sodium 142 mmol/L      Potassium 3.5 mmol/L      Chloride 110 mmol/L      CO2 22.8 mmol/L      Calcium 8.0 mg/dL      Total Protein 5.1 g/dL      Albumin 2.5 g/dL      ALT (SGPT) 7 U/L      AST (SGOT) 11 U/L      Alkaline Phosphatase 79 U/L      Total Bilirubin 0.2 mg/dL      Globulin 2.6 gm/dL      A/G Ratio 1.0 g/dL      BUN/Creatinine Ratio 10.9     Anion Gap 9.2 mmol/L      eGFR 122.7 mL/min/1.73     Narrative:      GFR Normal >60  Chronic Kidney Disease <60  Kidney Failure <15      CBC (No Diff) [[id number]]  (Abnormal) Collected: 09/30/24 0117    Specimen: Blood Updated: 09/30/24 0132     WBC 8.49 10*3/mm3      RBC 3.30 10*6/mm3      Hemoglobin 10.5 g/dL      Hematocrit 32.7 %      MCV 99.1 fL      MCH 31.8 pg      MCHC 32.1 g/dL      RDW 13.9 %      RDW-SD 50.3 fl      MPV 8.8 fL      Platelets 285 10*3/mm3     Comprehensive Metabolic Panel [[id number]]  (Abnormal) Collected: 09/29/24 0036    Specimen: Blood Updated: 09/29/24 0224     Glucose 192 mg/dL      BUN 4 mg/dL      Creatinine 0.40 mg/dL      Sodium 140 mmol/L      Potassium 3.9 mmol/L      Comment: Slight hemolysis detected by analyzer. Result may be falsely elevated.        Chloride 109 mmol/L      CO2 21.0 mmol/L      Calcium 8.2 mg/dL      Total Protein 5.3 g/dL      Albumin 2.5 g/dL      ALT (SGPT) 8 U/L      AST (SGOT) 15 U/L      Alkaline Phosphatase 88 U/L      Total Bilirubin 0.2 mg/dL      Globulin 2.8 gm/dL      A/G Ratio 0.9 g/dL      BUN/Creatinine Ratio 10.0     Anion Gap 10.0 mmol/L      eGFR 126.9 mL/min/1.73     Narrative:      GFR  Normal >60  Chronic Kidney Disease <60  Kidney Failure <15      Urine Culture - Urine, Straight Cath [[id number]]  (Abnormal)  (Susceptibility) Collected: 09/26/24 1451    Specimen: Urine from Straight Cath Updated: 09/28/24 1408     Urine Culture >100,000 CFU/mL Escherichia coli ESBL    Narrative:      Colonization of the urinary tract without infection is common. Treatment is discouraged unless the patient is symptomatic, pregnant, or undergoing an invasive urologic procedure.  Recent outcomes data supports the use of pip/tazo in the treatment of susceptible ESBL infections for uncomplicated UTI. Consider use of pip/tazo as a carbapenem-sparing regimen in applicable patients.    Susceptibility        Escherichia coli ESBL      LASHAWN      Ertapenem Susceptible      Gentamicin Susceptible      Levofloxacin Intermediate      Meropenem Susceptible      Nitrofurantoin Susceptible      Piperacillin + Tazobactam Susceptible      Trimethoprim + Sulfamethoxazole Resistant                           Potassium [[id number]]  (Normal) Collected: 09/27/24 1904    Specimen: Blood Updated: 09/27/24 1920     Potassium 4.0 mmol/L      Comment: Slight hemolysis detected by analyzer. Result may be falsely elevated.       Chlamydia trachomatis, Neisseria gonorrhoeae, Trichomonas vaginalis, PCR - Swab, Vagina [[id number]]  (Normal) Collected: 09/26/24 1837    Specimen: Swab from Vagina Updated: 09/26/24 2018     Chlamydia DNA by PCR Not Detected     Neisseria gonorrhoeae by PCR Not Detected     Trichomonas vaginalis PCR Not Detected    HIV-1 & HIV-2 Antibodies [[id number]]  (Normal) Collected: 09/26/24 1350    Specimen: Blood from Arm, Right Updated: 09/26/24 1748    Narrative:      The following orders were created for panel order HIV-1 & HIV-2 Antibodies.  Procedure                               Abnormality         Status                     ---------                               -----------         ------                     HIV-1 / O / 2  Ag / Antibody[[id number]]  Normal              Final result                 Please view results for these tests on the individual orders.    Hepatitis Panel, Acute [[id number]]  (Normal) Collected: 09/26/24 1350    Specimen: Blood from Arm, Right Updated: 09/26/24 1748     Hepatitis B Surface Ag Non-Reactive     Hep A IgM Non-Reactive     Hep B C IgM Non-Reactive     Hepatitis C Ab Non-Reactive    Narrative:      Results may be falsely decreased if patient taking Biotin.     HIV-1 / O / 2 Ag / Antibody [[id number]]  (Normal) Collected: 09/26/24 1350    Specimen: Blood from Arm, Right Updated: 09/26/24 1748     HIV-1/ HIV-2 Non-Reactive     Comment: A non-reactive test result does not preclude the possibility of exposure to HIV or infection with HIV. An antibody response to recent exposure may take several months to reach detectable levels.       Narrative:      The HIV antibody/antigen combo assay is a qualitative assay for HIV that includes the p24 antigen as well as antibodies to HIV types 1 and 2. This test is intended to be used as a screening assay in the diagnosis of HIV infection in patients over the age of 2.    Magnesium [[id number]]  (Abnormal) Collected: 09/26/24 1504    Specimen: Blood from Arm, Right Updated: 09/26/24 1553     Magnesium 1.5 mg/dL     C-reactive Protein [[id number]]  (Abnormal) Collected: 09/26/24 1504    Specimen: Blood from Arm, Right Updated: 09/26/24 1530     C-Reactive Protein 3.80 mg/dL     Urinalysis, Microscopic Only - Urine, Clean Catch [[id number]]  (Abnormal) Collected: 09/26/24 1419    Specimen: Urine, Clean Catch Updated: 09/26/24 1459     RBC, UA 0-2 /HPF      WBC, UA 3-5 /HPF      Bacteria, UA 4+ /HPF      Squamous Epithelial Cells, UA None Seen /HPF      Hyaline Casts, UA None Seen /LPF      Methodology Manual Light Microscopy    Urinalysis With Microscopic If Indicated (No Culture) - Urine, Clean Catch [[id number]]  (Abnormal) Collected: 09/26/24 1419    Specimen: Urine, Clean  Catch Updated: 09/26/24 1440     Color, UA Dark Yellow     Appearance, UA Turbid     pH, UA 6.0     Specific Gravity, UA >=1.030     Glucose, UA Negative     Ketones, UA 15 mg/dL (1+)     Bilirubin, UA Small (1+)     Blood, UA Trace     Protein, UA 30 mg/dL (1+)     Leuk Esterase, UA Moderate (2+)     Nitrite, UA Positive     Urobilinogen, UA 2.0 E.U./dL    hCG, Serum, Qualitative [[id number]]  (Normal) Collected: 09/26/24 1350    Specimen: Blood from Arm, Right Updated: 09/26/24 1425     HCG Qualitative Negative    Lactic Acid, Plasma [[id number]]  (Normal) Collected: 09/26/24 1350    Specimen: Blood from Arm, Right Updated: 09/26/24 1424     Lactate 1.1 mmol/L     CBC & Differential [[id number]]  (Abnormal) Collected: 09/26/24 1350    Specimen: Blood from Arm, Right Updated: 09/26/24 1403    Narrative:      The following orders were created for panel order CBC & Differential.  Procedure                               Abnormality         Status                     ---------                               -----------         ------                     CBC Auto Differential[[id number]]        Abnormal            Final result                 Please view results for these tests on the individual orders.    CBC Auto Differential [[id number]]  (Abnormal) Collected: 09/26/24 1350    Specimen: Blood from Arm, Right Updated: 09/26/24 1403     WBC 10.42 10*3/mm3      RBC 3.98 10*6/mm3      Hemoglobin 12.5 g/dL      Hematocrit 37.4 %      MCV 94.0 fL      MCH 31.4 pg      MCHC 33.4 g/dL      RDW 13.6 %      RDW-SD 46.9 fl      MPV 9.2 fL      Platelets 402 10*3/mm3      Neutrophil % 66.1 %      Lymphocyte % 28.8 %      Monocyte % 3.7 %      Eosinophil % 0.5 %      Basophil % 0.2 %      Immature Grans % 0.7 %      Neutrophils, Absolute 6.89 10*3/mm3      Lymphocytes, Absolute 3.00 10*3/mm3      Monocytes, Absolute 0.39 10*3/mm3      Eosinophils, Absolute 0.05 10*3/mm3      Basophils, Absolute 0.02 10*3/mm3      Immature Grans,  Absolute 0.07 10*3/mm3      nRBC 0.0 /100 WBC     COVID-19 and FLU A/B PCR, 1 HR TAT - Swab, Nasopharynx [[id number]]  (Normal) Collected: 09/26/24 1326    Specimen: Swab from Nasopharynx Updated: 09/26/24 1353     COVID19 Not Detected     Influenza A PCR Not Detected     Influenza B PCR Not Detected    Narrative:      Fact sheet for providers: https://www.fda.gov/media/803241/download    Fact sheet for patients: https://www.fda.gov/media/342915/download    Test performed by PCR.          Orders (last 24 hrs)        Start     Ordered    10/03/24 0000  nitrofurantoin, macrocrystal-monohydrate, (Macrobid) 100 MG capsule  2 Times Daily         10/03/24 1402    10/03/24 0000  Discharge Follow-up with PCP         10/03/24 1402    10/03/24 0000  acyclovir (ZOVIRAX) 400 MG tablet  Nightly         10/03/24 1509    10/02/24 1800  Dietary Nutrition Supplements Boost Plus (Ensure Enlive, Ensure Plus)  Daily With Breakfast, Lunch & Dinner       10/02/24 1611    10/01/24 0924  loperamide (IMODIUM) capsule 2 mg  4 Times Daily PRN         10/01/24 0924    09/30/24 1100  sodium chloride 0.9 % flush 10 mL  Every 12 Hours Scheduled         09/30/24 1004    09/30/24 1100  mupirocin (BACTROBAN) 2 % nasal ointment 1 Application  2 Times Daily         09/30/24 1004    09/30/24 1004  sodium chloride 0.9 % flush 10 mL  As Needed         09/30/24 1004    09/30/24 1004  sodium chloride 0.9 % infusion 40 mL  As Needed         09/30/24 1004    09/30/24 0104  Silicone Border Dressing to Bony Prominences  Every Shift       09/30/24 0105    09/29/24 1100  nystatin (MYCOSTATIN) powder  Every 12 Hours Scheduled         09/29/24 0948    09/29/24 0930  traMADol (ULTRAM) tablet 50 mg  Every 8 Hours PRN         09/29/24 0930    09/28/24 1500  ertapenem (INVanz) 1,000 mg in sodium chloride 0.9 % 100 mL IVPB-VTB  Every 24 Hours         09/28/24 1427    09/27/24 2100  acyclovir (ZOVIRAX) capsule 400 mg  Nightly         09/27/24 0921    09/27/24 0776   Pharmacy Consult  Continuous PRN         09/27/24 0738    09/27/24 0103  acetaminophen (TYLENOL) tablet 650 mg  Every 6 Hours PRN         09/27/24 0105    09/27/24 0013  Diclofenac Sodium (VOLTAREN) 1 % gel 4 g  4 Times Daily PRN         09/27/24 0013    09/26/24 2200  heparin (porcine) 5000 UNIT/ML injection 5,000 Units  Every 8 Hours Scheduled         09/26/24 1747 09/26/24 2149  prochlorperazine (COMPAZINE) injection 2.5 mg  Every 6 Hours PRN         09/26/24 2149    09/26/24 2100  sodium chloride 0.9 % flush 10 mL  Every 12 Hours Scheduled         09/26/24 1747 09/26/24 2000  Vital Signs  Every 4 Hours       09/26/24 1747    09/26/24 1845  sodium chloride 0.9 % infusion  Continuous         09/26/24 1747 09/26/24 1845  aspirin chewable tablet 81 mg  Daily         09/26/24 1747 09/26/24 1845  atorvastatin (LIPITOR) tablet 80 mg  Daily         09/26/24 1747    09/26/24 1845  DULoxetine (CYMBALTA) DR capsule 60 mg  Daily         09/26/24 1747 09/26/24 1845  lisinopril (PRINIVIL,ZESTRIL) tablet 20 mg  Daily         09/26/24 1747 09/26/24 1845  pantoprazole (PROTONIX) EC tablet 40 mg  Daily         09/26/24 1747 09/26/24 1845  [Held by provider]  metFORMIN (GLUCOPHAGE) tablet 1,000 mg  2 Times Daily With Meals        (On hold since Thu 9/26/2024 at 1747 until manually unheld; held by Ramon Marc MDHold Reason: Other (Comment Required))    09/26/24 1747 09/26/24 1845  nicotine (NICODERM CQ) 7 MG/24HR patch 1 patch  Every 24 Hours Scheduled         09/26/24 1747 09/26/24 1800  Oral Care  2 Times Daily       09/26/24 1747 09/26/24 1748  Intake & Output  Every Shift       09/26/24 1747 09/26/24 1747  sodium chloride 0.9 % flush 10 mL  As Needed         09/26/24 1747 09/26/24 1747  sodium chloride 0.9 % infusion 40 mL  As Needed         09/26/24 1747    09/26/24 1747  Potassium Replacement - Follow Nurse / BPA Driven Protocol  As Needed         09/26/24 1747    09/26/24 1747   "Magnesium Standard Dose Replacement - Follow Nurse / BPA Driven Protocol  As Needed         09/26/24 1747    09/26/24 1747  Phosphorus Replacement - Follow Nurse / BPA Driven Protocol  As Needed         09/26/24 1747    09/26/24 1747  Calcium Replacement - Follow Nurse / BPA Driven Protocol  As Needed         09/26/24 1747 09/26/24 1747  sennosides-docusate (PERICOLACE) 8.6-50 MG per tablet 2 tablet  2 Times Daily PRN        Placed in \"And\" Linked Group    09/26/24 1747    09/26/24 1747  polyethylene glycol (MIRALAX) packet 17 g  Daily PRN        Placed in \"And\" Linked Group    09/26/24 1747    09/26/24 1747  bisacodyl (DULCOLAX) EC tablet 5 mg  Daily PRN        Placed in \"And\" Linked Group    09/26/24 1747    09/26/24 1747  bisacodyl (DULCOLAX) suppository 10 mg  Daily PRN        Placed in \"And\" Linked Group    09/26/24 1747    09/26/24 1747  cyclobenzaprine (FLEXERIL) tablet 10 mg  3 Times Daily PRN         09/26/24 1747    Unscheduled  Straight cath  As Needed       09/26/24 1359    Unscheduled  Potassium  As Needed        Comments: Release/collect/run 4 hours after completion of last potassium dose.     Placed in \"And\" Linked Group    09/26/24 1545    Unscheduled  Stage II Pressure Ulcer Care PRN  As Needed      Comments: - Gently Cleanse With Normal Saline  - Cover With Silicone Border Dressing (If Indicated)  - For Frequent Incontinence - Apply Skin Protective Barrier Cream Rather Than Silicone Border Dressing    09/29/24 1139    Unscheduled  Wound Care  As Needed       09/30/24 0105    Unscheduled  Unstageable Pressure Ulcer (Wet) Care PRN  As Needed      Comments: - Gently Cleanse With Normal Saline   - Pack Loosely With Moist to Moist Normal Saline Fluffed Gauze   - Cover With Silicone Border Dressing or Dry Dressing    09/30/24 0105    Unscheduled  Change Dressing to IV Site As Needed When Damp, Loose or Soiled  As Needed       09/30/24 1004    Unscheduled  Change Needleless Connectors  As Needed    "   Comments: Change Needleless Connectors When:  - Administration Set Changed  - Dressing Changed  - Removed For Any Reason  - Residual Blood or Debris Within Connector  - Prior to Drawing Blood Cultures  - Contamination of Connector  - After Administration of Blood or Blood Components    24 1004    --  aspirin 81 MG chewable tablet  Daily         24    --  atorvastatin (LIPITOR) 80 MG tablet  Daily         24    --  cyclobenzaprine (FLEXERIL) 10 MG tablet  3 Times Daily PRN         24    --  DULoxetine (CYMBALTA) 60 MG capsule  Daily         24    --  lisinopril (PRINIVIL,ZESTRIL) 20 MG tablet  Daily         24    --  metFORMIN (GLUCOPHAGE) 1000 MG tablet  2 Times Daily With Meals         24    --  pantoprazole (PROTONIX) 40 MG EC tablet  Daily         24    --  acyclovir (ZOVIRAX) 400 MG tablet  Nightly,   Status:  Discontinued         24 0920                     Physician Progress Notes (most recent note)        Shanthi Dick, APRN at 10/04/24 1213                     PROGRESS NOTE         Patient Identification:  Name:  Lety Johnson  Age:  43 y.o.  Sex:  female  :  1980  MRN:  [id number]  Visit Number:  [id number]  Primary Care Provider:  Provider, No Known         LOS: 6 days       ----------------------------------------------------------------------------------------------------------------------  Subjective       Chief Complaints:    Fall        Interval History:      The patient is resting comfortably in bed, on room air with no apparent distress.  Afebrile.  Denies diarrhea.  The patient does complain of left hip pain, she reports a history of sciatica.  Lung exam is clear with diminished bases bilaterally to auscultation.  Abdomen soft and nontender with normoactive bowel sounds.      Review of Systems:    Constitutional: no fever, chills and night sweats.  Generalized fatigue.  Eyes: no eye drainage,  itching or redness.  HEENT: no mouth sores, dysphagia or nose bleed.  Respiratory: no for shortness of breath, cough or production of sputum.  Cardiovascular: no chest pain, no palpitations, no orthopnea.  Gastrointestinal: no nausea, vomiting or diarrhea. No abdominal pain, hematemesis or rectal bleeding.  Genitourinary: no dysuria or polyuria.  Hematologic/lymphatic: no lymph node abnormalities, no easy bruising or easy bleeding.  Musculoskeletal: no muscle or joint pain.  Left hip pain  Skin: No rash and no itching.  Neurological: no loss of consciousness, no seizure, no headache.  Behavioral/Psych: no depression or suicidal ideation.  Endocrine: no hot flashes.  Immunologic: negative.    ----------------------------------------------------------------------------------------------------------------------      Objective       Current Hospital Meds:  acyclovir, 400 mg, Oral, Nightly  aspirin, 81 mg, Oral, Daily  atorvastatin, 80 mg, Oral, Daily  DULoxetine, 60 mg, Oral, Daily  ertapenem, 1,000 mg, Intravenous, Q24H  heparin (porcine), 5,000 Units, Subcutaneous, Q8H  lisinopril, 20 mg, Oral, Daily  [Held by provider] metFORMIN, 1,000 mg, Oral, BID With Meals  mupirocin, 1 application , Each Nare, BID  nicotine, 1 patch, Transdermal, Q24H  nystatin, , Topical, Q12H  pantoprazole, 40 mg, Oral, Daily  sodium chloride, 10 mL, Intravenous, Q12H  sodium chloride, 10 mL, Intravenous, Q12H      Pharmacy Consult,   sodium chloride, 75 mL/hr, Last Rate: 75 mL/hr (10/04/24 7677)      ----------------------------------------------------------------------------------------------------------------------    Vital Signs:  Temp:  [98.3 °F (36.8 °C)-98.5 °F (36.9 °C)] 98.4 °F (36.9 °C)  Heart Rate:  [] 111  Resp:  [18-20] 20  BP: (124-160)/(85-99) 154/99  Mean Arterial Pressure (Non-Invasive) for the past 24 hrs (Last 3 readings):   Noninvasive MAP (mmHg)   10/04/24 1039 115   10/04/24 0700 120   10/04/24 0437 110     SpO2  Percentage    10/04/24 0437 10/04/24 0700 10/04/24 1039   SpO2: 94% 96% 96%     SpO2:  [94 %-98 %] 96 %  on   ;   Device (Oxygen Therapy): room air    Body mass index is 39.56 kg/m².  Wt Readings from Last 3 Encounters:   10/04/24 110 kg (241 lb 6.5 oz)   06/27/24 120 kg (264 lb)        Intake/Output Summary (Last 24 hours) at 10/4/2024 1213  Last data filed at 10/4/2024 0800  Gross per 24 hour   Intake 360 ml   Output 1300 ml   Net -940 ml     Diet: Regular/House; Fluid Consistency: Thin (IDDSI 0)  ----------------------------------------------------------------------------------------------------------------------      Physical Exam:    Constitutional:  Well-developed and well-nourished.  Resting comfortably in bed, on room air with no apparent distress.    HENT:  Head: Normocephalic and atraumatic.  Mouth:  Moist mucous membranes.    Eyes:  Conjunctivae and EOM are normal.  No scleral icterus.  Neck:  Neck supple.  No JVD present.    Cardiovascular:  Normal rate, regular rhythm and normal heart sounds with no murmur. No edema.  Pulmonary/Chest: Clear with bases diminished no respiratory distress, no wheezes, no crackles, with normal breath sounds and good air movement.  Abdominal:  Soft.  Bowel sounds are normal.  No distension and no tenderness.   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  Left hip pain, sciatica.  Neurological:  Alert and oriented to person, place, and time.  No facial droop.  No slurred speech.   Skin:  Skin is warm and dry.  No rash noted.  No pallor.   Psychiatric:  Normal mood and affect.  Behavior is normal.        ----------------------------------------------------------------------------------------------------------------------            Results from last 7 days   Lab Units 10/01/24  0117 09/30/24  0117 09/29/24  0036   WBC 10*3/mm3 7.71 8.49 7.24   HEMOGLOBIN g/dL 10.9* 10.5* 10.7*   HEMATOCRIT % 33.9* 32.7* 33.6*   MCV fL 98.5* 99.1* 100.6*   MCHC g/dL 32.2 32.1 31.8  "  PLATELETS 10*3/mm3 353 285 318     Results from last 7 days   Lab Units 10/01/24  0117 09/30/24  0117 09/29/24  0036 09/28/24  0318   SODIUM mmol/L 141 142 140 142   POTASSIUM mmol/L 3.8 3.5 3.9 4.1   CHLORIDE mmol/L 110* 110* 109* 110*   CO2 mmol/L 22.8 22.8 21.0* 22.3   BUN mg/dL 4* 5* 4* 5*   CREATININE mg/dL 0.36* 0.46* 0.40* 0.43*   CALCIUM mg/dL 8.3* 8.0* 8.2* 8.5*   GLUCOSE mg/dL 158* 196* 192* 198*   ALBUMIN g/dL  --  2.5* 2.5* 2.7*   BILIRUBIN mg/dL  --  0.2 0.2 0.3   ALK PHOS U/L  --  79 88 100   AST (SGOT) U/L  --  11 15 15   ALT (SGPT) U/L  --  7 8 9   Estimated Creatinine Clearance: 253.6 mL/min (A) (by C-G formula based on SCr of 0.36 mg/dL (L)).  No results found for: \"AMMONIA\"    Glucose   Date/Time Value Ref Range Status   10/02/2024 2010 216 (H) 70 - 130 mg/dL Final     No results found for: \"HGBA1C\"  No results found for: \"TSH\", \"FREET4\"    Blood Culture   Date Value Ref Range Status   09/26/2024 No growth at 2 days  Preliminary   09/26/2024 No growth at 2 days  Preliminary     Urine Culture   Date Value Ref Range Status   09/26/2024 >100,000 CFU/mL Escherichia coli ESBL (A)  Final     No results found for: \"WOUNDCX\"  No results found for: \"STOOLCX\"  No results found for: \"RESPCX\"  Pain Management Panel           No data to display                  ----------------------------------------------------------------------------------------------------------------------  Imaging Results (Last 24 Hours)       ** No results found for the last 24 hours. **            ----------------------------------------------------------------------------------------------------------------------    Pertinent Infectious Disease Results                Assessment/Plan       Assessment       ESBL UTI       Plan      The patient is resting comfortably in bed, on room air with no apparent distress.  Afebrile.  Denies diarrhea.  The patient does complain of left hip pain, she reports a history of sciatica.  Lung exam is " clear with diminished bases bilaterally to auscultation.  Abdomen soft and nontender with normoactive bowel sounds.    Recommend to continue with current treatment of ertapenem 1 g IV every 24 hours for total of 7 days for the treatment of ESBL UTI.  On discharge the patient may be further de-escalated to Macrobid 100 mg p.o. twice daily to complete antibiotic course.  Okay to continue home suppressive acyclovir for history of genital herpes.  Continue to monitor closely.      ANTIMICROBIAL THERAPY    acyclovir - 200 MG, 400 MG  ertapenem (INVanz) 1000 mg in 100 mL NS (VTB)  nitrofurantoin (macrocrystal-monohydrate) - 100 MG     Code Status:   Code Status and Medical Interventions: CPR (Attempt to Resuscitate); Full Support   Ordered at: 24 1626     Code Status (Patient has no pulse and is not breathing):    CPR (Attempt to Resuscitate)     Medical Interventions (Patient has pulse or is breathing):    Full Support       YUE Matthews  10/04/24  12:13 EDT    Electronically signed by Shanthi Dick APRN at 10/04/24 1215          Consult Notes (most recent note)        Cuca Yuen APRN at 24 1436        Consult Orders    1. Inpatient Infectious Diseases Consult [[id number]] ordered by Ramon Marc MD at 24 1427                         INFECTIOUS DISEASE CONSULTATION REPORT        Patient Identification:  Name:  Lety Johnson  Age:  43 y.o.  Sex:  female  :  1980  MRN:  [id number]   Visit Number:  [id number]  Primary Care Physician:  Provider, No Known        Referring Provider: Dr. Marc    Reason for consult: ESBL UTI       LOS: 0 days        Subjective       Subjective     History of present illness:      Thank you Dr. Marc for allowing us to participate in the care of your patient.  As you well know, Ms. Lety Johnson is a 43 y.o. 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  WBC 7.38.  Chlamydia gonorrhea and trichomonas negative.  CRP 3.80.  Urinalysis positive with culture finalizing is greater than 100,000 colonies of E. coli ESBL.  Blood cultures from 9/26/2024 show no growth thus far.  HIV 1 and 2 nonreactive.  Hepatitis panel nonreactive.  COVID-19 and influenza PCR negative.  Lactic acid normal on admission.    Patient resting in bed.  Denies dysuria, urgency, frequency.  Patient reports recurrent yeast infections.  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  Lungs clear to auscultation bilaterally.      Infectious Disease consultation was requested for antimicrobial management.      ---------------------------------------------------------------------------------------------------------------------     Review Of Systems:    Constitutional: no fever, chills and night sweats. No appetite change or unexpected weight change. No fatigue.  Eyes: no eye drainage, itching or redness.  HEENT: no mouth sores, dysphagia or nose bleed.  Respiratory: no for shortness of breath, cough or production of sputum.  Cardiovascular: no chest pain, no palpitations, no orthopnea.  Gastrointestinal: no nausea, vomiting or diarrhea. No abdominal pain, hematemesis or rectal bleeding.  Genitourinary: no dysuria or polyuria.  Hematologic/lymphatic: no lymph node abnormalities, no easy bruising or easy bleeding.  Musculoskeletal: no muscle or joint pain.  Skin: No rash and no itching.  Neurological: no loss of consciousness, no seizure, no headache.  Behavioral/Psych: no depression or suicidal ideation.  Endocrine: no hot flashes.  Immunologic: negative.    ---------------------------------------------------------------------------------------------------------------------     Past Medical History    Past Medical History:   Diagnosis Date    Stroke        Past Surgical History    History  reviewed. No pertinent surgical history.    Family History    History reviewed. No pertinent family history.    Social History    Social History     Tobacco Use    Smoking status: Every Day     Average packs/day: 2.0 packs/day for 26.0 years (52.0 ttl pk-yrs)     Types: Cigarettes     Start date: 1998    Smokeless tobacco: Never   Vaping Use    Vaping status: Never Used   Substance Use Topics    Alcohol use: Not Currently    Drug use: Never       Allergies    Patient has no known allergies.  ---------------------------------------------------------------------------------------------------------------------     Home Medications:    Prior to Admission Medications       Prescriptions Last Dose Informant Patient Reported? Taking?    acyclovir (ZOVIRAX) 400 MG tablet  Pharmacy Yes No    Take 1 tablet by mouth Every Night.    aspirin 81 MG chewable tablet Unknown  Yes No    Chew 1 tablet Daily.    atorvastatin (LIPITOR) 80 MG tablet Unknown  Yes No    Take 1 tablet by mouth Daily.    cyclobenzaprine (FLEXERIL) 10 MG tablet Unknown  Yes No    Take 1 tablet by mouth 3 (Three) Times a Day As Needed for Muscle Spasms.    DULoxetine (CYMBALTA) 60 MG capsule Unknown  Yes No    Take 1 capsule by mouth Daily.    lisinopril (PRINIVIL,ZESTRIL) 20 MG tablet Unknown  Yes No    Take 1 tablet by mouth Daily.    metFORMIN (GLUCOPHAGE) 1000 MG tablet Unknown  Yes No    Take 1 tablet by mouth 2 (Two) Times a Day With Meals.    pantoprazole (PROTONIX) 40 MG EC tablet Unknown  Yes No    Take 1 tablet by mouth Daily.          ---------------------------------------------------------------------------------------------------------------------    Objective       Objective     Hospital Scheduled Meds:  acyclovir, 400 mg, Oral, Nightly  aspirin, 81 mg, Oral, Daily  atorvastatin, 80 mg, Oral, Daily  DULoxetine, 60 mg, Oral, Daily  ertapenem, 1,000 mg, Intravenous, Q24H  heparin (porcine), 5,000 Units, Subcutaneous, Q8H  lisinopril, 20 mg, Oral,  Daily  [Held by provider] metFORMIN, 1,000 mg, Oral, BID With Meals  nicotine, 1 patch, Transdermal, Q24H  pantoprazole, 40 mg, Oral, Daily  sodium chloride, 10 mL, Intravenous, Q12H      Pharmacy Consult,   sodium chloride, 75 mL/hr, Last Rate: 75 mL/hr (09/28/24 1315)      ---------------------------------------------------------------------------------------------------------------------   Vital Signs:  Temp:  [97.5 °F (36.4 °C)-98.4 °F (36.9 °C)] 98.4 °F (36.9 °C)  Heart Rate:  [] 58  Resp:  [18-20] 18  BP: (133-172)/(71-86) 172/83  Mean Arterial Pressure (Non-Invasive) for the past 24 hrs (Last 3 readings):   Noninvasive MAP (mmHg)   09/28/24 1100 107   09/28/24 0650 92   09/28/24 0307 103     SpO2 Percentage    09/28/24 0307 09/28/24 0650 09/28/24 1100   SpO2: 98% 96% 97%     SpO2:  [96 %-98 %] 97 %  on   ;   Device (Oxygen Therapy): room air    Body mass index is 40.79 kg/m².  Wt Readings from Last 3 Encounters:   09/28/24 113 kg (248 lb 14.4 oz)   06/27/24 120 kg (264 lb)     ---------------------------------------------------------------------------------------------------------------------     Physical Exam:    Constitutional:  Well-developed and well-nourished.  No respiratory distress.      HENT:  Head: Normocephalic and atraumatic.  Mouth:  Moist mucous membranes.    Eyes:  Conjunctivae and EOM are normal.  No scleral icterus.  Neck:  Neck supple.  No JVD present.    Cardiovascular:  Normal rate, regular rhythm and normal heart sounds with no murmur. No edema.  Pulmonary/Chest:  No respiratory distress, no wheezes, no crackles, with normal breath sounds and good air movement.  Abdominal:  Soft.  Bowel sounds are normal.  No distension and no tenderness.   Musculoskeletal: Left sided paralysis secondary to CVA.  Neurological:  Alert and oriented to person, place, and time.  No facial droop.  No slurred speech.   Skin:  Skin is warm and dry.  No rash noted.  No pallor.   Psychiatric:  Normal mood  "and affect.  Behavior is normal.    ---------------------------------------------------------------------------------------------------------------------              Results from last 7 days   Lab Units 09/28/24 0318 09/27/24  0844 09/26/24  1504 09/26/24  1350   CRP mg/dL  --   --  3.80*  --    LACTATE mmol/L  --   --   --  1.1   WBC 10*3/mm3 7.38 9.67  --  10.42   HEMOGLOBIN g/dL 11.8* 10.9*  --  12.5   HEMATOCRIT % 36.9 34.8  --  37.4   MCV fL 98.9* 102.4*  --  94.0   MCHC g/dL 32.0 31.3*  --  33.4   PLATELETS 10*3/mm3 332 292  --  402     Results from last 7 days   Lab Units 09/28/24 0318 09/27/24  1904 09/27/24  0844 09/26/24  1504   SODIUM mmol/L 142  --  137 140   POTASSIUM mmol/L 4.1 4.0 3.5 2.7*   MAGNESIUM mg/dL  --   --   --  1.5*   CHLORIDE mmol/L 110*  --  105 99   CO2 mmol/L 22.3  --  21.5* 28.9   BUN mg/dL 5*  --  7 10   CREATININE mg/dL 0.43*  --  0.36* 0.51*   CALCIUM mg/dL 8.5*  --  7.9* 9.4   GLUCOSE mg/dL 198*  --  234* 220*   ALBUMIN g/dL 2.7*  --  2.5* 3.5   BILIRUBIN mg/dL 0.3  --  0.3 0.6   ALK PHOS U/L 100  --  96 119*   AST (SGOT) U/L 15  --  17 21   ALT (SGPT) U/L 9  --  7 8   Estimated Creatinine Clearance: 215.5 mL/min (A) (by C-G formula based on SCr of 0.43 mg/dL (L)).  No results found for: \"AMMONIA\"    No results found for: \"HGBA1C\", \"POCGLU\"  No results found for: \"HGBA1C\"  No results found for: \"TSH\", \"FREET4\"    Blood Culture   Date Value Ref Range Status   09/26/2024 No growth at 2 days  Preliminary   09/26/2024 No growth at 2 days  Preliminary     Urine Culture   Date Value Ref Range Status   09/26/2024 >100,000 CFU/mL Escherichia coli ESBL (A)  Final     No results found for: \"WOUNDCX\"  No results found for: \"STOOLCX\"  No results found for: \"RESPCX\"  Pain Management Panel           No data to display              I have personally reviewed the above laboratory results. "   ---------------------------------------------------------------------------------------------------------------------  Imaging Results (Last 7 Days)       Procedure Component Value Units Date/Time    XR Ankle 3+ View Left [[id number]] Collected: 09/26/24 1537     Updated: 09/26/24 1540    Narrative:      EXAM:    XR Left Ankle Complete, 3 or More Views     EXAM DATE:    9/26/2024 3:17 PM     CLINICAL HISTORY:    left ankle injury     TECHNIQUE:    Frontal, lateral and oblique views of the left ankle.     COMPARISON:    No relevant prior studies available.     FINDINGS:    Bones/joints:  See below.      Soft tissues:  Soft tissue swelling, but no acute fracture or  dislocation.       Impression:        Soft tissue swelling, but no acute fracture or dislocation.        This report was finalized on 9/26/2024 3:38 PM by Dr. Moses Otto MD.       XR Foot 3+ View Left [[id number]] Collected: 09/26/24 1536     Updated: 09/26/24 1539    Narrative:      EXAM:    XR Left Foot Complete, 3 or More Views     EXAM DATE:    9/26/2024 3:16 PM     CLINICAL HISTORY:    left foot wound     TECHNIQUE:    Frontal, lateral and oblique views of the left foot.     COMPARISON:    No relevant prior studies available.     FINDINGS:    Bones/joints:  Unremarkable.  No acute fracture.  No dislocation.    Soft tissues:  Unremarkable.  No radiopaque foreign body.       Impression:        No acute findings in the left foot.        This report was finalized on 9/26/2024 3:37 PM by Dr. Moses Otto MD.       XR Chest 1 View [[id number]] Collected: 09/26/24 1406     Updated: 09/26/24 1408    Narrative:      XR CHEST 1 VW-     CLINICAL INDICATION: weakness        COMPARISON: None immediately available      TECHNIQUE: Single frontal view of the chest.     FINDINGS:     LUNGS: Lungs are adequately aerated.      HEART AND MEDIASTINUM: Heart and mediastinal contours are unremarkable        SKELETON: Bony and soft tissue structures are unremarkable.              Impression:      No radiographic evidence of acute cardiac or pulmonary disease.           This report was finalized on 9/26/2024 2:06 PM by Dr. Moses Otto MD.       CT Cervical Spine Without Contrast [[id number]] Collected: 09/26/24 1317     Updated: 09/26/24 1319    Narrative:      CT CERVICAL SPINE WO CONTRAST-     CLINICAL INDICATION: neck pain        COMPARISON: None available     TECHNIQUE: Axial images of the cervical spine were acquired with out any  intravenous contrast. Reformatted images were then created in the  sagittal and coronal planes.     DOSE:      Radiation dose reduction techniques were utilized per ALARA protocol.  Automated exposure control was initiated through either or [employer] or  [employer] software packages by  protocol.           FINDINGS:   The provided study demonstrates preservation of the vertebral body  heights in the sagittally reconstructed images.     There is no prevertebral soft tissue swelling.     Alignment is near anatomic.     The disc space heights are uniform.     I see no acute cervical spine fracture.       Impression:         1. No acute bony abnormality.     2. Other incidental findings as above     This report was finalized on 9/26/2024 1:17 PM by Dr. Moses Otto MD.       CT Lumbar Spine Without Contrast [[id number]] Collected: 09/26/24 1317     Updated: 09/26/24 1319    Narrative:      EXAM:    CT Lumbar Spine Without Intravenous Contrast     EXAM DATE:    9/26/2024 12:59 PM     CLINICAL HISTORY:    back pain     TECHNIQUE:    Axial computed tomography images of the lumbar spine without  intravenous contrast.  Sagittal and coronal reformatted images were  created and reviewed.  This CT exam was performed using one or more of  the following dose reduction techniques:  automated exposure control,  adjustment of the mA and/or kV according to patient size, and/or use of  iterative reconstruction technique.     COMPARISON:    No relevant prior  studies available.     FINDINGS:    VERTEBRAE:  Unremarkable.  No acute fracture.    DISCS/SPINAL CANAL/NEURAL FORAMINA:  No acute findings.  No spinal  canal stenosis.    SOFT TISSUES:  Unremarkable.       Impression:        No acute findings in the lumbar spine.        This report was finalized on 9/26/2024 1:17 PM by Dr. Moses Otto MD.       CT Thoracic Spine Without Contrast [[id number]] Collected: 09/26/24 1316     Updated: 09/26/24 1319    Narrative:      EXAM:    CT Thoracic Spine Without Intravenous Contrast     EXAM DATE:    9/26/2024 12:58 PM     CLINICAL HISTORY:    back pain     TECHNIQUE:    Axial computed tomography images of the thoracic spine without  intravenous contrast.  Sagittal and coronal reformatted images were  created and reviewed.  This CT exam was performed using one or more of  the following dose reduction techniques:  automated exposure control,  adjustment of the mA and/or kV according to patient size, and/or use of  iterative reconstruction technique.     COMPARISON:    No relevant prior studies available.     FINDINGS:    VERTEBRAE:  Unremarkable.  No acute fracture.    DISCS/SPINAL CANAL/NEURAL FORAMINA:  No acute findings.  No spinal  canal stenosis.    SOFT TISSUES:  Unremarkable.       Impression:        No acute findings in the thoracic spine.        This report was finalized on 9/26/2024 1:17 PM by Dr. Moses Otto MD.       CT Head Without Contrast [[id number]] Collected: 09/26/24 1259     Updated: 09/26/24 1302    Narrative:      CT HEAD WO CONTRAST-     CLINICAL INDICATION: weakness        COMPARISON: None available     TECHNIQUE: Axial images of the brain were obtained with out intravenous  contrast.  Reformatted images were created in the sagittal and coronal  planes.     DOSE:     Radiation dose reduction techniques were utilized per ALARA protocol.  Automated exposure control was initiated through either or [employer] or  [employer] software packages by   protocol.        FINDINGS:    BRAIN: Probable subacute ischemia right middle cerebral artery  territory    VENTRICLES:  Unremarkable.  No ventriculomegaly.       BONES/JOINTS:  Unremarkable.  No acute fracture.       SOFT TISSUES:  Unremarkable.       SINUSES:  no air fluid levels       MASTOID AIR CELLS:  Unremarkable as visualized.  No mastoid effusion.          Impression:         1. Probable remote right middle cerebral artery infarction  2. No acute parenchymal mass, hemorrhage, or midline shift     This report was finalized on 9/26/2024 1:00 PM by Dr. Moses Otto MD.             I have personally reviewed the above radiology results.   ---------------------------------------------------------------------------------------------------------------------      Pertinent Infectious Disease Results          Assessment & Plan      Assessment        ESBL UTI      Plan      Patient presented to [facility] Emergency Department on 9/26/2024 for evaluation after sliding into the floor due to weakness.  WBC 7.38.  Chlamydia gonorrhea and trichomonas negative.  CRP 3.80.  Urinalysis positive with culture finalizing is greater than 100,000 colonies of E. coli ESBL.  Blood cultures from 9/26/2024 show no growth thus far.  HIV 1 and 2 nonreactive.  Hepatitis panel nonreactive.  COVID-19 and influenza PCR negative.  Lactic acid normal on admission.    Patient resting in bed.  Denies dysuria, urgency, frequency.  Patient reports recurrent yeast infections.  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  Lungs clear to auscultation bilaterally.    Recommend to continue current antibiotic regimen of ertapenem 1 g IV every 24 hours x 7 days for treatment of ESBL UTI.  Okay to continue home suppressive acyclovir for history of genital herpes.  We will continue to follow closely and adjust antibiotic therapy as needed.        ANTIMICROBIAL THERAPY    acyclovir - 200 MG, 400 MG  ertapenem (INVanz) 1000 mg in 100 mL NS  (VTB)       Again, thank you Dr. Marc for allowing us to participate in the care of your patient and please feel free to call for any questions you may have.        Code Status:     Code Status and Medical Interventions: CPR (Attempt to Resuscitate); Full Support   Ordered at: 24 1626     Code Status (Patient has no pulse and is not breathing):    CPR (Attempt to Resuscitate)     Medical Interventions (Patient has pulse or is breathing):    Full Support         YUE Rivera  24  14:36 EDT    Electronically signed by Cuca Yuen APRN at 24 1532          Physical Therapy Notes (most recent note)        Thierry Saldivar, PT at 10/04/24 1134  Version 1 of 1         Acute Care - Physical Therapy Treatment Note   [facility]     Patient Name: Lety Johnson  : 1980  MRN: [id number]  Today's Date: 10/4/2024   Onset of Illness/Injury or Date of Surgery: 24  Visit Dx:     ICD-10-CM ICD-9-CM   1. Hypokalemia  E87.6 276.8   2. Pressure injury of skin of sacral region, unspecified injury stage  L89.159 707.03     707.20   3. Pressure injury of skin of left heel, unspecified injury stage  L89.629 707.07     707.20   4. Acute cystitis without hematuria  N30.00 595.0     Patient Active Problem List   Diagnosis   (none) - all problems resolved or deleted     Past Medical History:   Diagnosis Date    Stroke      History reviewed. No pertinent surgical history.  PT Assessment (Last 12 Hours)       PT Evaluation and Treatment       Row Name 10/04/24 1128          Physical Therapy Time and Intention    Subjective Information complains of;weakness;pain  -KM     Document Type therapy note (daily note)  -KM     Mode of Treatment physical therapy  -KM     Patient Effort good  -KM     Symptoms Noted During/After Treatment fatigue;increased pain  -KM       Row Name 10/04/24 1128          General Information    Patient Profile Reviewed yes  -KM     Patient Observations alert;cooperative;agree to therapy   -KM     Existing Precautions/Restrictions fall  -KM       Row Name 10/04/24 1128          Cognition    Affect/Mental Status (Cognition) emotionally labile  -KM     Orientation Status (Cognition) oriented x 3  -KM     Follows Commands (Cognition) WFL  -KM       Row Name 10/04/24 1128          Bed Mobility    Bed Mobility supine-sit;sit-supine  -KM     Supine-Sit Whitman (Bed Mobility) maximum assist (25% patient effort)  -KM     Sit-Supine Whitman (Bed Mobility) maximum assist (25% patient effort)  -KM     Bed Mobility, Safety Issues decreased use of arms for pushing/pulling;decreased use of legs for bridging/pushing;impaired trunk control for bed mobility  -KM     Assistive Device (Bed Mobility) bed rails;draw sheet;head of bed elevated  -       Row Name 10/04/24 1128          Transfers    Transfers sit-stand transfer;stand-sit transfer  -       Row Name 10/04/24 1128          Sit-Stand Transfer    Sit-Stand Whitman (Transfers) maximum assist (25% patient effort);2 person assist  -KM     Comment, (Sit-Stand Transfer) Pt. unable to complete full stand, but able to lift bottom off of bed w/ bilateral knee blocks; x2 attempts  -       Row Name 10/04/24 1128          Stand-Sit Transfer    Stand-Sit Whitman (Transfers) maximum assist (25% patient effort);2 person assist  -       Row Name 10/04/24 1128          Gait/Stairs (Locomotion)    Patient was able to Ambulate no, other medical factors prevent ambulation  -KM     Reason Patient was unable to Ambulate Excessive Weakness;Non-Ambulatory at Baseline  -       Row Name 10/04/24 1128          Safety Issues, Functional Mobility    Impairments Affecting Function (Mobility) balance;endurance/activity tolerance;pain;range of motion (ROM);strength  -       Row Name 10/04/24 1128          Balance    Balance Interventions sitting;dynamic;dynamic reaching;UE activity with balance activity  -KM     Comment, Balance pt. able to scoot self towards HOB  w/ Neelima and extra time  -KM       Row Name 10/04/24 1128          Motor Skills    Comments, Therapeutic Exercise EOB ther-ex  -KM       Row Name             Wound 09/26/24 1806 gluteal    Wound - Properties Group Placement Date: 09/26/24  -KJ Placement Time: 1806  -KJ Location: gluteal  -KJ    Retired Wound - Properties Group Placement Date: 09/26/24  -KJ Placement Time: 1806  -KJ Location: gluteal  -KJ    Retired Wound - Properties Group Date first assessed: 09/26/24  -KJ Time first assessed: 1806  -KJ Location: gluteal  -KJ      Row Name             Wound 09/26/24 1807 Left gluteal    Wound - Properties Group Placement Date: 09/26/24  -KJ Placement Time: 1807  -KJ Side: Left  -KJ Location: gluteal  -KJ    Retired Wound - Properties Group Placement Date: 09/26/24  -KJ Placement Time: 1807  -KJ Side: Left  -KJ Location: gluteal  -KJ    Retired Wound - Properties Group Date first assessed: 09/26/24  -KJ Time first assessed: 1807  -KJ Side: Left  -KJ Location: gluteal  -KJ      Row Name             Wound 09/26/24 1809 gluteal    Wound - Properties Group Placement Date: 09/26/24  -KJ Placement Time: 1809  -KJ Location: gluteal  -KJ    Retired Wound - Properties Group Placement Date: 09/26/24  -KJ Placement Time: 1809  -KJ Location: gluteal  -KJ    Retired Wound - Properties Group Date first assessed: 09/26/24  -KJ Time first assessed: 1809  -KJ Location: gluteal  -KJ      Row Name             Wound 09/26/24 1809 Left posterior ankle    Wound - Properties Group Placement Date: 09/26/24  -KJ Placement Time: 1809  -KJ Side: Left  -KJ Orientation: posterior  -KJ Location: ankle  -KJ    Retired Wound - Properties Group Placement Date: 09/26/24  -KJ Placement Time: 1809  -KJ Side: Left  -KJ Orientation: posterior  -KJ Location: ankle  -KJ    Retired Wound - Properties Group Date first assessed: 09/26/24  -KJ Time first assessed: 1809  -KJ Side: Left  -KJ Location: ankle  -KJ      Row Name             Wound 09/29/24 0952  Bilateral anterior thigh MASD (Moisture associated skin damage)    Wound - Properties Group Placement Date: 09/29/24  -LB Placement Time: 0952 -LB Present on Original Admission: Y  -LB Side: Bilateral  -LB Orientation: anterior  -LB Location: thigh  -LB Primary Wound Type: MASD  -LB    Retired Wound - Properties Group Placement Date: 09/29/24  -LB Placement Time: 0952 -LB Present on Original Admission: Y  -LB Side: Bilateral  -LB Orientation: anterior  -LB Location: thigh  -LB Primary Wound Type: MASD  -LB    Retired Wound - Properties Group Date first assessed: 09/29/24  -LB Time first assessed: 0952 -LB Present on Original Admission: Y  -LB Side: Bilateral  -LB Location: thigh  -LB Primary Wound Type: MASD  -LB      Row Name 10/04/24 1128          Progress Summary (PT)    Daily Progress Summary (PT) Pt. was able to demonstrate improved activity tolerance compared to prior session. She was able to perform bed mobility w/ maxA x2. She was able to tolerate sitting EOB for increased time. Pt. effort was much improved compared to prior session. Pt. would continue to benefit from skilled PT services as she is able to give consistent effort.  -ANKUR               User Key  (r) = Recorded By, (t) = Taken By, (c) = Cosigned By      Initials Name Provider Type    Thierry Carter, PT Physical Therapist    Nory Keenan, RN Registered Nurse    Zehra Rivera, RN Registered Nurse                    Physical Therapy Education       Title: PT OT SLP Therapies (In Progress)       Topic: Physical Therapy (In Progress)       Point: Mobility training (In Progress)       Learning Progress Summary             Patient Acceptance, E, NR by RD at 10/2/2024 1101    Acceptance, E, NR by RD at 10/1/2024 0856    Acceptance, E,D, VU,NR by AG at 9/30/2024 1559                         Point: Home exercise program (In Progress)       Learning Progress Summary             Patient Acceptance, E, NR by RD at 10/2/2024 1101    Acceptance,  E, NR by RD at 10/1/2024 0856    Acceptance, E,D, VU,NR by AG at 9/30/2024 1559                         Point: Body mechanics (In Progress)       Learning Progress Summary             Patient Acceptance, E, NR by RD at 10/2/2024 1101    Acceptance, E, NR by RD at 10/1/2024 0856    Acceptance, E,D, VU,NR by AG at 9/30/2024 1559                         Point: Precautions (In Progress)       Learning Progress Summary             Patient Acceptance, E, NR by RD at 10/2/2024 1101    Acceptance, E, NR by RD at 10/1/2024 0856    Acceptance, E,D, VU,NR by AG at 9/30/2024 1559                                         User Key       Initials Effective Dates Name Provider Type Discipline     06/16/21 -  Мария Joya, PT Physical Therapist PT     06/16/21 -  Laisha Verdugo, RN Registered Nurse Nurse                  PT Recommendation and Plan     Progress Summary (PT)  Daily Progress Summary (PT): Pt. was able to demonstrate improved activity tolerance compared to prior session. She was able to perform bed mobility w/ maxA x2. She was able to tolerate sitting EOB for increased time. Pt. effort was much improved compared to prior session. Pt. would continue to benefit from skilled PT services as she is able to give consistent effort.       Time Calculation:    PT Charges       Row Name 10/04/24 1128             Time Calculation    PT Received On 10/04/24  -KM         Time Calculation- PT    Total Timed Code Minutes- PT 25 minute(s)  -KM                User Key  (r) = Recorded By, (t) = Taken By, (c) = Cosigned By      Initials Name Provider Type    Thierry Carter, PT Physical Therapist                  Therapy Charges for Today       Code Description Service Date Service Provider Modifiers Qty    18756290797 HC PT THERAPEUTIC ACT EA 15 MIN 10/3/2024 Thierry Saldivar, PT GP 1    36259706328 HC PT THER PROC EA 15 MIN 10/3/2024 Thierry Saldivar, PT GP 1    57047465351 HC PT THER PROC EA 15 MIN 10/4/2024 Thierry Saldivar, PT GP 1     [id number]  PT THERAPEUTIC ACT EA 15 MIN 10/4/2024 Thierry Saldivar, PT GP 1            PT G-Codes  AM-PAC 6 Clicks Score (PT): 6    Thierry Saldivar, PT  10/4/2024      Electronically signed by Thierry Saldivar, PT at 10/04/24 1134          Occupational Therapy Notes (most recent note)        Loida Flaherty, OT at 10/04/24 1222          Acute Care - Occupational Therapy Treatment Note  LILLIANA [facility]     Patient Name: Lety Johnson  : 1980  MRN: [id number]  Today's Date: 10/4/2024  Onset of Illness/Injury or Date of Surgery: 24     Referring Physician: Dr. Marc    Admit Date: 2024       ICD-10-CM ICD-9-CM   1. Hypokalemia  E87.6 276.8   2. Pressure injury of skin of sacral region, unspecified injury stage  L89.159 707.03     707.20   3. Pressure injury of skin of left heel, unspecified injury stage  L89.629 707.07     707.20   4. Acute cystitis without hematuria  N30.00 595.0     Patient Active Problem List   Diagnosis   (none) - all problems resolved or deleted     Past Medical History:   Diagnosis Date    Stroke      History reviewed. No pertinent surgical history.      OT ASSESSMENT FLOWSHEET (Last 12 Hours)       OT Evaluation and Treatment       Row Name 10/04/24 1215                   OT Time and Intention    Subjective Information complains of;pain  -LA        Document Type therapy note (daily note)  -LA        Mode of Treatment occupational therapy  -LA        Patient Effort good  -LA           General Information    Patient Profile Reviewed yes  -LA        General Observations of Patient Patient agreeable to therapy this date. Patient continues to be very fearful of movement/transfers. Discussed different options this date with patient. Patient engaged in bed mobility, sliding/scooting up side of bed and sit to stand. Patient continues to complain of significant pain in left UE even when arm is supported throughout movement.  -LA        Existing Precautions/Restrictions fall  -LA            Cognition    Affect/Mental Status (Cognition) emotionally labile  -LA        Orientation Status (Cognition) oriented x 4  -LA        Follows Commands (Cognition) WFL  -LA           Bed Mobility    Rolling Left [place] (Bed Mobility) maximum assist (25% patient effort)  -LA        Rolling Right [place] (Bed Mobility) moderate assist (50% patient effort)  -LA        Supine-Sit [place] (Bed Mobility) maximum assist (25% patient effort)  -LA        Sit-Supine [place] (Bed Mobility) maximum assist (25% patient effort);2 person assist  -LA           Sit-Stand Transfer    Sit-Stand [place] (Transfers) maximum assist (25% patient effort);2 person assist  -LA           Motor Skills    Motor Skills coordination;functional endurance  -LA        Neuromuscular Function WFL  -LA        Motor Control/Coordination Interventions occupation/activity based treatment  -LA           Balance    Static Sitting Balance supervision  -LA        Dynamic Sitting Balance minimal assist  -LA           Wound 09/26/24 1806 gluteal    Wound - Properties Group Placement Date: 09/26/24  -KJ Placement Time: 1806  -KJ Location: gluteal  -KJ    Retired Wound - Properties Group Placement Date: 09/26/24  -KJ Placement Time: 1806  -KJ Location: gluteal  -KJ    Retired Wound - Properties Group Date first assessed: 09/26/24  -KJ Time first assessed: 1806  -KJ Location: gluteal  -KJ       Wound 09/26/24 1807 Left gluteal    Wound - Properties Group Placement Date: 09/26/24  -KJ Placement Time: 1807  -KJ Side: Left  -KJ Location: gluteal  -KJ    Retired Wound - Properties Group Placement Date: 09/26/24  -KJ Placement Time: 1807  -KJ Side: Left  -KJ Location: gluteal  -KJ    Retired Wound - Properties Group Date first assessed: 09/26/24  -KJ Time first assessed: 1807  -KJ Side: Left  -KJ Location: gluteal  -KJ       Wound 09/26/24 1809 gluteal    Wound - Properties Group Placement Date: 09/26/24  -KJ Placement Time: 1809  -KJ Location:  gluteal  -KJ    Retired Wound - Properties Group Placement Date: 09/26/24  -KJ Placement Time: 1809 -KJ Location: gluteal  -KJ    Retired Wound - Properties Group Date first assessed: 09/26/24  -KJ Time first assessed: 1809 -KJ Location: gluteal  -KJ       Wound 09/26/24 1809 Left posterior ankle    Wound - Properties Group Placement Date: 09/26/24  -KJ Placement Time: 1809 -KJ Side: Left  -KJ Orientation: posterior  -KJ Location: ankle  -KJ    Retired Wound - Properties Group Placement Date: 09/26/24  -KJ Placement Time: 1809  -KJ Side: Left  -KJ Orientation: posterior  -KJ Location: ankle  -KJ    Retired Wound - Properties Group Date first assessed: 09/26/24  -KJ Time first assessed: 1809 -KJ Side: Left  -KJ Location: ankle  -KJ       Wound 09/29/24 0952 Bilateral anterior thigh MASD (Moisture associated skin damage)    Wound - Properties Group Placement Date: 09/29/24  -LB Placement Time: 0952 -LB Present on Original Admission: Y  -LB Side: Bilateral  -LB Orientation: anterior  -LB Location: thigh  -LB Primary Wound Type: MASD  -LB    Retired Wound - Properties Group Placement Date: 09/29/24  -LB Placement Time: 0952  -LB Present on Original Admission: Y  -LB Side: Bilateral  -LB Orientation: anterior  -LB Location: thigh  -LB Primary Wound Type: MASD  -LB    Retired Wound - Properties Group Date first assessed: 09/29/24  -LB Time first assessed: 0952  -LB Present on Original Admission: Y  -LB Side: Bilateral  -LB Location: thigh  -LB Primary Wound Type: MASD  -LB       Plan of Care Review    Plan of Care Reviewed With patient  -LA        Progress improving  -LA           Positioning and Restraints    Pre-Treatment Position in bed  -LA        Post Treatment Position bed  -LA        In Bed sitting EOB;call light within reach;encouraged to call for assist;with family/caregiver  -LA                  User Key  (r) = Recorded By, (t) = Taken By, (c) = Cosigned By      Initials Name Effective Dates    LAURA Ledezma  "FIFI Cueto 23 -     Zehra Rivera RN 10/20/21 -     Loida Richard OT 22 -                            OT Recommendation and Plan     Plan of Care Review  Plan of Care Reviewed With: patient  Progress: improving  Plan of Care Reviewed With: patient        Time Calculation:     Therapy Charges for Today       Code Description Service Date Service Provider Modifiers Qty    75697692991 HC OT THER PROC EA 15 MIN 10/3/2024 Loida Flaherty OT GO 1    46159430027 HC OT THERAPEUTIC ACT EA 15 MIN 10/3/2024 Loida Flaherty OT GO 1    17203982543 HC OT THER PROC EA 15 MIN 10/4/2024 Loida Flaherty OT GO 1                 Loida Flaherty OT  10/4/2024    Electronically signed by Loida Flaherty OT at 10/04/24 1222          Speech Language Pathology Notes (most recent note)        Hafsa Melendez MA,CCC-SLP at 24 1101          Acute Care - Speech Language Pathology   Swallow Initial Evaluation  [place]  Clinical Dysphagia Assessment     Patient Name: Lety Johnson  : 1980  MRN: [id number]  Today's Date: 2024             Admit Date: 2024    Visit Dx:     ICD-10-CM ICD-9-CM   1. Hypokalemia  E87.6 276.8   2. Pressure injury of skin of sacral region, unspecified injury stage  L89.159 707.03     707.20   3. Pressure injury of skin of left heel, unspecified injury stage  L89.629 707.07     707.20   4. Acute cystitis without hematuria  N30.00 595.0     Patient Active Problem List   Diagnosis    UTI (urinary tract infection)     Past Medical History:   Diagnosis Date    Stroke      History reviewed. No pertinent surgical history.    Lety Johnson  was seen at bedside this AM on 3S Rm. 3315-1s to assess safety/efficacy of swallowing fnx, determine safest/least restrictive diet tolerance.      Per report: pt \"is a 43 year old female patient who presents to the ER with chief complaint of back pain. 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 The " "patient had been living with her fiance who is in jail now. Her sister has been trying to help care for her. Today, she was in the floor and unable to get up so they called EMS for lift assist. Patient's sister and patient want the patient to go into a rehab facility for strengthening. She also complains of low back pain.\"    Social History     Socioeconomic History    Marital status: Single   Tobacco Use    Smoking status: Every Day     Average packs/day: 2.0 packs/day for 26.0 years (52.0 ttl pk-yrs)     Types: Cigarettes     Start date: 1998    Smokeless tobacco: Never   Vaping Use    Vaping status: Never Used   Substance and Sexual Activity    Alcohol use: Not Currently    Drug use: Never    Sexual activity: Not Currently     Partners: Male     Comment:  in [facility]        Imaging:  No recent chest imaging    Labs:  Sodium  142  09/30 0117  Potassium  3.5  09/30 0117  Chloride  110  09/30 0117  CO2  22.8  09/30 0117  BUN  5  09/30 0117  Creatinine  0.46  09/30 0117  Glucose  196  09/30 0117  Hemoglobin  10.5  09/30 0117  Hematocrit  32.7  09/30 0117  WBC  8.49  09/30 0117  Platelets  285  09/30 0117  Diet Orders (active) (From admission, onward)       Start     Ordered    09/28/24 1800  Dietary Nutrition Supplements Boost Plus (Ensure Enlive, Ensure Plus)  Daily With Breakfast & Dinner       09/28/24 1209    09/26/24 1748  Diet: Regular/House; Fluid Consistency: Thin (IDDSI 0)  Diet Effective Now         09/26/24 1747                  Pt is observed on RA.     Patient was positioned upright and centered in bed to accept multiple po presentations of ice chips, solid cracker, puree, and thin liquids via spoon, cup, and straw. Patient was able to self provide po trials.     Facial/oral structures were slightly asymmetrical upon observation. Pt with residual L side weakness from previous CVA. Lingual protrusion revealed no deviation. Oral mucosa were moist, pink, and clean. Secretions were clear, thin, and well " controlled. OROM/ENDER was wfl to imitate oral postures. Gag is not assessed. Volitional cough was intact w/ adequate  intensity, clear in quality, non-productive. Voice was adequate in intensity, clear in quality w/ intelligible speech.    Upon po presentations, adequate bolus anticipation and acceptance w/ good labial seal for bolus clearance via spoon bowl, cup rim stability and suction via straw. Bolus formation, manipulation and control were wfl w/ rotary mastication pattern. A-p transit was timely w/o significant oral residue appreciated. No overt s/s aspiration before the swallow.      Pharyngeal swallow was timely w/ adequate hyolaryngeal elevation per palpation. No overt s/s aspiration evidenced across this evaluation. No silent aspiration suspected. Patient denied odynophagia.    Pt reports difficulty swallowing large pills only.     Impression: Patient presented w/ wfl oropharyngeal swallow w/o s/s aspiration. No s/s indicative of silent aspiration. No odynophagia reported.      SLP Recommendation and Plan   1. Regular consistencies, thin liquids.    2. Medications whole in puree/thins. Crush PRN.  3. Upright and centered for all po intake.  4. MO precautions.  5. Oral care protocol.    No further formal SLP f/u warranted/recommended at this time.    D/w patient results and recommendations w/ verbal agreement.    D/w RN results and recommendations w/ verbal agreement.    Thank you for allowing me to participate in the care of your patient-  Hafsa Melendez M.A., CCC-SLP     EDUCATION  The patient has been educated in the following areas:   Oral Care/Hydration.      Time Calculation:     Therapy Charges for Today       Code Description Service Date Service Provider Modifiers Qty    24384840396  ST EVAL ORAL PHARYNG SWALLOW 4 9/30/2024 Hafsa Melendez MA,CCC-SLP GN 1          Hafsa Melendez MA,CCC-SLP  9/30/2024    Electronically signed by Hafsa Melendez MA,CCC-SLP at 09/30/24 8343        ADL Documentation (most recent)      Flowsheet Row Most Recent Value   Transferring 4 - completely dependent   Toileting 3 - assistive equipment and person   Bathing 2 - assistive person   Dressing 2 - assistive person   Eating 2 - assistive person   Communication 0 - understands/communicates without difficulty   Swallowing 0 - swallows foods/liquids without difficulty   Equipment Currently Used at Home hospital bed, lift device, wheelchair

## 2024-10-04 NOTE — THERAPY TREATMENT NOTE
Acute Care - Physical Therapy Treatment Note   [place]     Patient Name: Lety Johnson  : 1980  MRN: [id number]  Today's Date: 10/4/2024   Onset of Illness/Injury or Date of Surgery: 24  Visit Dx:     ICD-10-CM ICD-9-CM   1. Hypokalemia  E87.6 276.8   2. Pressure injury of skin of sacral region, unspecified injury stage  L89.159 707.03     707.20   3. Pressure injury of skin of left heel, unspecified injury stage  L89.629 707.07     707.20   4. Acute cystitis without hematuria  N30.00 595.0     Patient Active Problem List   Diagnosis   (none) - all problems resolved or deleted     Past Medical History:   Diagnosis Date    Stroke      History reviewed. No pertinent surgical history.  PT Assessment (Last 12 Hours)       PT Evaluation and Treatment       Row Name 10/04/24 1128          Physical Therapy Time and Intention    Subjective Information complains of;weakness;pain  -KM     Document Type therapy note (daily note)  -KM     Mode of Treatment physical therapy  -KM     Patient Effort good  -KM     Symptoms Noted During/After Treatment fatigue;increased pain  -KM       Row Name 10/04/24 1128          General Information    Patient Profile Reviewed yes  -KM     Patient Observations alert;cooperative;agree to therapy  -KM     Existing Precautions/Restrictions fall  -KM       Row Name 10/04/24 1128          Cognition    Affect/Mental Status (Cognition) emotionally labile  -KM     Orientation Status (Cognition) oriented x 3  -KM     Follows Commands (Cognition) WFL  -KM       Row Name 10/04/24 1128          Bed Mobility    Bed Mobility supine-sit;sit-supine  -KM     Supine-Sit [place] (Bed Mobility) maximum assist (25% patient effort)  -KM     Sit-Supine [place] (Bed Mobility) maximum assist (25% patient effort)  -KM     Bed Mobility, Safety Issues decreased use of arms for pushing/pulling;decreased use of legs for bridging/pushing;impaired trunk control for bed mobility  -KM     Assistive  Device (Bed Mobility) bed rails;draw sheet;head of bed elevated  -       Row Name 10/04/24 1128          Transfers    Transfers sit-stand transfer;stand-sit transfer  -       Row Name 10/04/24 1128          Sit-Stand Transfer    Sit-Stand [place] (Transfers) maximum assist (25% patient effort);2 person assist  -KM     Comment, (Sit-Stand Transfer) Pt. unable to complete full stand, but able to lift bottom off of bed w/ bilateral knee blocks; x2 attempts  -KM       Row Name 10/04/24 1128          Stand-Sit Transfer    Stand-Sit [place] (Transfers) maximum assist (25% patient effort);2 person assist  -KM       Row Name 10/04/24 1128          Gait/Stairs (Locomotion)    Patient was able to Ambulate no, other medical factors prevent ambulation  -     Reason Patient was unable to Ambulate Excessive Weakness;Non-Ambulatory at Baseline  -       Row Name 10/04/24 1128          Safety Issues, Functional Mobility    Impairments Affecting Function (Mobility) balance;endurance/activity tolerance;pain;range of motion (ROM);strength  -       Row Name 10/04/24 1128          Balance    Balance Interventions sitting;dynamic;dynamic reaching;UE activity with balance activity  -     Comment, Balance pt. able to scoot self towards HOB w/ [place] and extra time  -       Row Name 10/04/24 1128          Motor Skills    Comments, Therapeutic Exercise EOB ther-ex  -       Row Name             Wound 09/26/24 1806 gluteal    Wound - Properties Group Placement Date: 09/26/24  -KJ Placement Time: 1806  -KJ Location: gluteal  -KJ    Retired Wound - Properties Group Placement Date: 09/26/24  -KJ Placement Time: 1806  -KJ Location: gluteal  -KJ    Retired Wound - Properties Group Date first assessed: 09/26/24  -KJ Time first assessed: 1806  -KJ Location: gluteal  -KJ      Row Name             Wound 09/26/24 1807 Left gluteal    Wound - Properties Group Placement Date: 09/26/24  -KJ Placement Time: 1807  -KJ Side: Left  -KJ  Location: gluteal  -KJ    Retired Wound - Properties Group Placement Date: 09/26/24  -KJ Placement Time: 1807  -KJ Side: Left  -KJ Location: gluteal  -KJ    Retired Wound - Properties Group Date first assessed: 09/26/24  -KJ Time first assessed: 1807  -KJ Side: Left  -KJ Location: gluteal  -KJ      Row Name             Wound 09/26/24 1809 gluteal    Wound - Properties Group Placement Date: 09/26/24  -KJ Placement Time: 1809 -KJ Location: gluteal  -KJ    Retired Wound - Properties Group Placement Date: 09/26/24  -KJ Placement Time: 1809  -KJ Location: gluteal  -KJ    Retired Wound - Properties Group Date first assessed: 09/26/24  -KJ Time first assessed: 1809 -KJ Location: gluteal  -KJ      Row Name             Wound 09/26/24 1809 Left posterior ankle    Wound - Properties Group Placement Date: 09/26/24  -KJ Placement Time: 1809  -KJ Side: Left  -KJ Orientation: posterior  -KJ Location: ankle  -KJ    Retired Wound - Properties Group Placement Date: 09/26/24  -KJ Placement Time: 1809  -KJ Side: Left  -KJ Orientation: posterior  -KJ Location: ankle  -KJ    Retired Wound - Properties Group Date first assessed: 09/26/24  -KJ Time first assessed: 1809  -KJ Side: Left  -KJ Location: ankle  -KJ      Row Name             Wound 09/29/24 0952 Bilateral anterior thigh MASD (Moisture associated skin damage)    Wound - Properties Group Placement Date: 09/29/24  -LB Placement Time: 0952 -LB Present on Original Admission: Y  -LB Side: Bilateral  -LB Orientation: anterior  -LB Location: thigh  -LB Primary Wound Type: MASD  -LB    Retired Wound - Properties Group Placement Date: 09/29/24  -LB Placement Time: 0952  -LB Present on Original Admission: Y  -LB Side: Bilateral  -LB Orientation: anterior  -LB Location: thigh  -LB Primary Wound Type: MASD  -LB    Retired Wound - Properties Group Date first assessed: 09/29/24  -LB Time first assessed: 0952  -LB Present on Original Admission: Y  -LB Side: Bilateral  -LB Location: thigh  -LB  Primary Wound Type: MASD  -LB      Row Name 10/04/24 1128          Progress Summary (PT)    Daily Progress Summary (PT) Pt. was able to demonstrate improved activity tolerance compared to prior session. She was able to perform bed mobility w/ maxA x2. She was able to tolerate sitting EOB for increased time. Pt. effort was much improved compared to prior session. Pt. would continue to benefit from skilled PT services as she is able to give consistent effort.  -KM               User Key  (r) = Recorded By, (t) = Taken By, (c) = Cosigned By      Initials Name Provider Type    Thierry Carter, PT Physical Therapist    Nory Keenan, RN Registered Nurse    Zehra Rivera, RN Registered Nurse                    Physical Therapy Education       Title: PT OT SLP Therapies (In Progress)       Topic: Physical Therapy (In Progress)       Point: Mobility training (In Progress)       Learning Progress Summary             Patient Acceptance, E, NR by RD at 10/2/2024 1101    Acceptance, E, NR by RD at 10/1/2024 0856    Acceptance, E,D, VU,NR by AG at 9/30/2024 1559                         Point: Home exercise program (In Progress)       Learning Progress Summary             Patient Acceptance, E, NR by RD at 10/2/2024 1101    Acceptance, E, NR by RD at 10/1/2024 0856    Acceptance, E,D, VU,NR by AG at 9/30/2024 1559                         Point: Body mechanics (In Progress)       Learning Progress Summary             Patient Acceptance, E, NR by RD at 10/2/2024 1101    Acceptance, E, NR by RD at 10/1/2024 0856    Acceptance, E,D, VU,NR by AG at 9/30/2024 1559                         Point: Precautions (In Progress)       Learning Progress Summary             Patient Acceptance, E, NR by RD at 10/2/2024 1101    Acceptance, E, NR by RD at 10/1/2024 0856    Acceptance, E,D, VU,NR by AG at 9/30/2024 1559                                         User Key       Initials Effective Dates Name Provider Type [facility]    AG  06/16/21 -  Мария Joya, PT Physical Therapist PT    RD 06/16/21 -  Laisha Verdugo, RN Registered Nurse Nurse                  PT Recommendation and Plan     Progress Summary (PT)  Daily Progress Summary (PT): Pt. was able to demonstrate improved activity tolerance compared to prior session. She was able to perform bed mobility w/ maxA x2. She was able to tolerate sitting EOB for increased time. Pt. effort was much improved compared to prior session. Pt. would continue to benefit from skilled PT services as she is able to give consistent effort.       Time Calculation:    PT Charges       Row Name 10/04/24 1128             Time Calculation    PT Received On 10/04/24  -KM         Time Calculation- PT    Total Timed Code Minutes- PT 25 minute(s)  -KM                User Key  (r) = Recorded By, (t) = Taken By, (c) = Cosigned By      Initials Name Provider Type    Thierry Carter, PT Physical Therapist                  Therapy Charges for Today       Code Description Service Date Service Provider Modifiers Qty    33180821599 HC PT THERAPEUTIC ACT EA 15 MIN 10/3/2024 Thierry Saldivar, PT GP 1    68630265679 HC PT THER PROC EA 15 MIN 10/3/2024 Thierry Saldivar, PT GP 1    46678803851 HC PT THER PROC EA 15 MIN 10/4/2024 Thierry Saldivar, PT GP 1    76293827859 HC PT THERAPEUTIC ACT EA 15 MIN 10/4/2024 Thierry Saldivar, PT GP 1            PT G-Codes  AM-PAC 6 Clicks Score (PT): 6    Thierry Saldivar PT  10/4/2024

## 2024-10-04 NOTE — PROGRESS NOTES
PROGRESS NOTE         Patient Identification:  Name:  Lety Johnson  Age:  43 y.o.  Sex:  female  :  1980  MRN:  [id number]  Visit Number:  [id number]  Primary Care Provider:  Provider, No Known         LOS: 6 days       ----------------------------------------------------------------------------------------------------------------------  Subjective       Chief Complaints:    Fall        Interval History:      The patient is resting comfortably in bed, on room air with no apparent distress.  Afebrile.  Denies diarrhea.  The patient does complain of left hip pain, she reports a history of sciatica.  Lung exam is clear with diminished bases bilaterally to auscultation.  Abdomen soft and nontender with normoactive bowel sounds.      Review of Systems:    Constitutional: no fever, chills and night sweats.  Generalized fatigue.  Eyes: no eye drainage, itching or redness.  HEENT: no mouth sores, dysphagia or nose bleed.  Respiratory: no for shortness of breath, cough or production of sputum.  Cardiovascular: no chest pain, no palpitations, no orthopnea.  Gastrointestinal: no nausea, vomiting or diarrhea. No abdominal pain, hematemesis or rectal bleeding.  Genitourinary: no dysuria or polyuria.  Hematologic/lymphatic: no lymph node abnormalities, no easy bruising or easy bleeding.  Musculoskeletal: no muscle or joint pain.  Left hip pain  Skin: No rash and no itching.  Neurological: no loss of consciousness, no seizure, no headache.  Behavioral/Psych: no depression or suicidal ideation.  Endocrine: no hot flashes.  Immunologic: negative.    ----------------------------------------------------------------------------------------------------------------------      Objective       hospitals Meds:  acyclovir, 400 mg, Oral, Nightly  aspirin, 81 mg, Oral, Daily  atorvastatin, 80 mg, Oral, Daily  DULoxetine, 60 mg, Oral, Daily  ertapenem, 1,000 mg, Intravenous, Q24H  heparin (porcine), 5,000 Units,  Subcutaneous, Q8H  lisinopril, 20 mg, Oral, Daily  [Held by provider] metFORMIN, 1,000 mg, Oral, BID With Meals  mupirocin, 1 application , Each Nare, BID  nicotine, 1 patch, Transdermal, Q24H  nystatin, , Topical, Q12H  pantoprazole, 40 mg, Oral, Daily  sodium chloride, 10 mL, Intravenous, Q12H  sodium chloride, 10 mL, Intravenous, Q12H      Pharmacy Consult,   sodium chloride, 75 mL/hr, Last Rate: 75 mL/hr (10/04/24 0640)      ----------------------------------------------------------------------------------------------------------------------    Vital Signs:  Temp:  [98.3 °F (36.8 °C)-98.5 °F (36.9 °C)] 98.4 °F (36.9 °C)  Heart Rate:  [] 111  Resp:  [18-20] 20  BP: (124-160)/(85-99) 154/99  Mean Arterial Pressure (Non-Invasive) for the past 24 hrs (Last 3 readings):   Noninvasive MAP (mmHg)   10/04/24 1039 115   10/04/24 0700 120   10/04/24 0437 110     SpO2 Percentage    10/04/24 0437 10/04/24 0700 10/04/24 1039   SpO2: 94% 96% 96%     SpO2:  [94 %-98 %] 96 %  on   ;   Device (Oxygen Therapy): room air    Body mass index is 39.56 kg/m².  Wt Readings from Last 3 Encounters:   10/04/24 110 kg (241 lb 6.5 oz)   06/27/24 120 kg (264 lb)        Intake/Output Summary (Last 24 hours) at 10/4/2024 1213  Last data filed at 10/4/2024 0800  Gross per 24 hour   Intake 360 ml   Output 1300 ml   Net -940 ml     Diet: Regular/House; Fluid Consistency: Thin (IDDSI 0)  ----------------------------------------------------------------------------------------------------------------------      Physical Exam:    Constitutional:  Well-developed and well-nourished.  Resting comfortably in bed, on room air with no apparent distress.    HENT:  Head: Normocephalic and atraumatic.  Mouth:  Moist mucous membranes.    Eyes:  Conjunctivae and EOM are normal.  No scleral icterus.  Neck:  Neck supple.  No JVD present.    Cardiovascular:  Normal rate, regular rhythm and normal heart sounds with no murmur. No edema.  Pulmonary/Chest: Clear  "with bases diminished no respiratory distress, no wheezes, no crackles, with normal breath sounds and good air movement.  Abdominal:  Soft.  Bowel sounds are normal.  No distension and no tenderness.   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  Left hip pain, sciatica.  Neurological:  Alert and oriented to person, place, and time.  No facial droop.  No slurred speech.   Skin:  Skin is warm and dry.  No rash noted.  No pallor.   Psychiatric:  Normal mood and affect.  Behavior is normal.        ----------------------------------------------------------------------------------------------------------------------            Results from last 7 days   Lab Units 10/01/24  0117 09/30/24  0117 09/29/24  0036   WBC 10*3/mm3 7.71 8.49 7.24   HEMOGLOBIN g/dL 10.9* 10.5* 10.7*   HEMATOCRIT % 33.9* 32.7* 33.6*   MCV fL 98.5* 99.1* 100.6*   MCHC g/dL 32.2 32.1 31.8   PLATELETS 10*3/mm3 353 285 318     Results from last 7 days   Lab Units 10/01/24  0117 09/30/24  0117 09/29/24  0036 09/28/24  0318   SODIUM mmol/L 141 142 140 142   POTASSIUM mmol/L 3.8 3.5 3.9 4.1   CHLORIDE mmol/L 110* 110* 109* 110*   CO2 mmol/L 22.8 22.8 21.0* 22.3   BUN mg/dL 4* 5* 4* 5*   CREATININE mg/dL 0.36* 0.46* 0.40* 0.43*   CALCIUM mg/dL 8.3* 8.0* 8.2* 8.5*   GLUCOSE mg/dL 158* 196* 192* 198*   ALBUMIN g/dL  --  2.5* 2.5* 2.7*   BILIRUBIN mg/dL  --  0.2 0.2 0.3   ALK PHOS U/L  --  79 88 100   AST (SGOT) U/L  --  11 15 15   ALT (SGPT) U/L  --  7 8 9   Estimated Creatinine Clearance: 253.6 mL/min (A) (by C-G formula based on SCr of 0.36 mg/dL (L)).  No results found for: \"AMMONIA\"    Glucose   Date/Time Value Ref Range Status   10/02/2024 2010 216 (H) 70 - 130 mg/dL Final     No results found for: \"HGBA1C\"  No results found for: \"TSH\", \"FREET4\"    Blood Culture   Date Value Ref Range Status   09/26/2024 No growth at 2 days  Preliminary   09/26/2024 No growth at 2 days  Preliminary     Urine Culture   Date Value Ref Range Status   09/26/2024 " ">100,000 CFU/mL Escherichia coli ESBL (A)  Final     No results found for: \"WOUNDCX\"  No results found for: \"STOOLCX\"  No results found for: \"RESPCX\"  Pain Management Panel           No data to display                  ----------------------------------------------------------------------------------------------------------------------  Imaging Results (Last 24 Hours)       ** No results found for the last 24 hours. **            ----------------------------------------------------------------------------------------------------------------------    Pertinent Infectious Disease Results                Assessment/Plan       Assessment       ESBL UTI       Plan      The patient is resting comfortably in bed, on room air with no apparent distress.  Afebrile.  Denies diarrhea.  The patient does complain of left hip pain, she reports a history of sciatica.  Lung exam is clear with diminished bases bilaterally to auscultation.  Abdomen soft and nontender with normoactive bowel sounds.    Recommend to continue with current treatment of ertapenem 1 g IV every 24 hours for total of 7 days for the treatment of ESBL UTI.  On discharge the patient may be further de-escalated to Macrobid 100 mg p.o. twice daily to complete antibiotic course.  Okay to continue home suppressive acyclovir for history of genital herpes.  Continue to monitor closely.      ANTIMICROBIAL THERAPY    acyclovir - 200 MG, 400 MG  ertapenem (INVanz) 1000 mg in 100 mL NS (VTB)  nitrofurantoin (macrocrystal-monohydrate) - 100 MG     Code Status:   Code Status and Medical Interventions: CPR (Attempt to Resuscitate); Full Support   Ordered at: 09/26/24 1626     Code Status (Patient has no pulse and is not breathing):    CPR (Attempt to Resuscitate)     Medical Interventions (Patient has pulse or is breathing):    Full Support       Shanthi Dick, YUE  10/04/24  12:13 EDT  "

## 2024-10-04 NOTE — CASE MANAGEMENT/SOCIAL WORK
Discharge Planning Assessment   [facility]     Patient Name: Lety Johnson  MRN: [id number]  Today's Date: 10/4/2024    Admit Date: 9/26/2024    Plan: SS updated pt's sister Moises regarding discharge disposition attempts.  SS will follow.       Discharge Plan       Row Name 10/04/24 1653       Plan    Plan SS updated pt's sister Moises regarding discharge disposition attempts.  SS will follow.      Row Name 10/04/24 1634       Plan    Plan SS received denial for [facility] per [place] for lack of [place] criteria.  SS spoke with pt at bedside and provided pt with update regarding discharge planning.  Pt stated understanding and gave SS permission to speak with sister Moises.  SS will follow.      Row Name 10/04/24 9082       Plan    Plan SS sent pt's referral for review to [facility] for review.  SS will follow.      Row Name 10/04/24 2978       Plan    Plan SS spoke with [facility] Bed per [place] who stated pt was denied admit to their facility.  SS spoke with Supervisor who stated that  would not allow pt's significant other early release from senior living to care for pt.  SS sent pt's referral to [facility] for review and notified Lesly Childs.  SS sent pt's referral to [facility] in [place] for review and spoke with Tali.  SS requested inpt rehab consult for review and spoke with ext [phone].  SS left message for admissions at [facility] to return call for referral.  SS spoke with Physician.  SS will follow.                  Continued Care and Services - Admitted Since 9/26/2024       Destination       Service Provider Request Status Selected Services Address Phone Fax Patient Preferred    [facility] Pending - No Request Sent N/A [address] [place] [id number] [phone] --    [facility] Pending - No Request Sent N/A [address] [place] [address] [phone]  [phone] --    [facility] Pending - No Request Sent N/A [address] [place] [address] [phone] --    [facility] Declined  Cannot meet patient's needs N/A [address], [facility] [id number] [phone] --    [facility] Acute Care Declined  Not eligible N/A [address] [facility] [address] [phone] --    [facility] Declined  Not eligible N/A [address] [facility] [address] [phone] --    [facility] Declined  Clinical Denial N/A [address], 4TH FLOOR, [facility] [address] [phone] --                  Expected Discharge Date and Time       Expected Discharge Date Expected Discharge Time    Oct 7, 2024           SHELBY LacyW

## 2024-10-04 NOTE — CASE MANAGEMENT/SOCIAL WORK
Discharge Planning Assessment  [facility]     Patient Name: Lety Johnson  MRN: [id number]  Today's Date: 10/4/2024    Admit Date: 9/26/2024    Plan: SS spoke with [facility] per [place] who stated pt was denied admit to their facility.  SS spoke with Supervisor who stated that  would not allow pt's significant other early release from senior living to care for pt.  SS sent pt's referral to [facility] for review and notified Lesly Childs.  SS sent pt's referral to [facility] for review and spoke with Tali.  SS requested inpt rehab consult for review and spoke with ext [phone].  SS left message for admissions at [facility] to return call for referral.  SS spoke with Physician.  SS will follow.     Discharge Plan       Row Name 10/04/24 1540       Plan    Plan SS spoke with [facility] per [place] who stated pt was denied admit to their facility.  SS spoke with Supervisor who stated that  would not allow pt's significant other early release from senior living to care for pt.  SS sent pt's referral to [facility] for review and notified Lesly Childs.  SS sent pt's referral to [facility] for review and spoke with Tali.  SS requested inpt rehab consult for review and spoke with ext [phone].  SS left message for admissions at [facility] to return call for referral.  SS spoke with Physician.  SS will follow.      Row Name 10/04/24 1303                  Continued Care and Services - Admitted Since 9/26/2024       Destination       Service Provider Request Status Selected Services Address Phone Fax Patient Preferred    [facility] Pending - No Request Sent N/A [address] [facility] [facility], 4TH FLOOR, [facility] [address] [phone] --    [facility] SWING BED UNIT Pending - No Request Sent N/A [address] [facility] [address]  [phone] --    [facility] Declined  Cannot meet patient's needs N/A [address] [facility] [address] [phone] --    [facility] Acute Care Declined  Not eligible N/A [address] [place] [address] [phone] --    [facility] Declined  Not eligible N/A [address] [facility] [address] [phone] --                  Expected Discharge Date and Time       Expected Discharge Date Expected Discharge Time    Oct 7, 2024             SHELBY LacyW

## 2024-10-04 NOTE — DISCHARGE PLACEMENT REQUEST
"Lety Johnson (43 y.o. Female)       Date of Birth   1980    Social Security Number       Address   [address] [id number]    Home Phone   [phone]    MRN   [id number]       Yazidi   Caodaism    Marital Status   Single                            Admission Date   9/26/24    Admission Type   Emergency    Admitting Provider   Ramon Marc MD    Attending Provider   Marv Navas DO    Department, Room/Bed   [address], 3315/1S       Discharge Date       Discharge Disposition       Discharge Destination                                 Attending Provider: Marv Navas DO    Allergies: No Known Allergies    Isolation: Contact   Infection: ESBL E coli (09/28/24)   Code Status: CPR    Ht: 166.4 cm (65.5\")   Wt: 110 kg (241 lb 6.5 oz)    Admission Cmt: None   Principal Problem: UTI (urinary tract infection) [N39.0]                   Active Insurance as of 9/26/2024       Primary Coverage       Payor Plan Insurance Group Employer/Plan Group    [place] [id number]       Payor Plan Address Payor Plan Phone Number Payor Plan Fax Number Effective Dates    HU[employer] [address] [address] [phone]  5/1/2024 - None Entered    [facility] [id number]         Subscriber Name Subscriber Birth Date Member ID       LETY JOHNSON 1980 [id number]                     Emergency Contacts        (Rel.) Home Phone Work Phone Mobile Phone    BriceMoises (Legal Guardian) -- -- [phone]              Emergency Contact Information       Name Relation Home Work Mobile    Moises Narvaez Legal Guardian   [phone]          Insurance Information                  [place] Phone: [phone]    Subscriber: Lety Johnson Subscriber#: [id number]    Group#: [id number] Precert#: --               History & Physical        Ramon Marc MD at 09/26/24 [address] [place] [address] " HOSPITALIST HISTORY AND PHYSICAL    Patient Identification:  Name:  Lety Johnson  Age:  43 y.o.  Sex:  female  :  1980  MRN:  [id number]   Admit Date: 2024   Visit Number:  [id number]  Room number:  404/04  Primary Care Physician:  Provider, No Known     Subjective     Chief complaint:    Chief Complaint   Patient presents with    Fall     History of presenting illness:   43F Morbid Obesity by BMI PMH History Genital Herpes, Cerebrovascular Accident 2024 complicated by L sided paralysis, presented to [facility] emergency room  after sliding into the floor off her chair due to weakness.  Upon arrival patient afebrile, heart rate 109, respiratory rate 18, blood pressure 146/101, satting 98% on room air. Labs showed WBC count 10K, lactate 1.1, CRP 3.8, potassium 2.7, magnesium 1.5, HCG negative, blood cultures pending. CXR no acute cardiopulmonary processes.  Xray ankle/foot without fracture or dislocation.  Emergency room provider gave antibiotics, pain medications, zofran, potassium replacement.  Hospital Medicine consulted for admission. Patient seen and examined in the emergency room, notes fevers chills at home a few days ago, recently has been on antibiotics for lower extremity cellulitis, has had some dysuria and suprapubic pain, denies current sexual activity, reports her fiance was taken to [facility] about a week ago and has been her primary caretaker, has had difficulty at home since prior stroke and caretakers not consistent, last 24hrs didn't have anyone to help her, sister endorses recent confusion/hallucinations beyond baseline, seeing dead family members, coinciding with onset of dysuria.  ---------------------------------------------------------------------------------------------------------------------   Review of Systems   Constitutional:  Positive for chills and fever.   HENT: Negative.     Eyes: Negative.    Respiratory:  Positive for shortness of breath.     Cardiovascular:  Positive for leg swelling. Negative for chest pain.   Gastrointestinal: Negative.    Endocrine: Negative.    Genitourinary:  Positive for dysuria.   Musculoskeletal: Negative.    Skin:  Positive for rash.   Allergic/Immunologic: Negative.    Neurological:  Positive for weakness.   Hematological: Negative.    Psychiatric/Behavioral:  Positive for hallucinations.      ---------------------------------------------------------------------------------------------------------------------   Past Medical History:   Diagnosis Date    Stroke      No past surgical history on file.  No family history on file.  Social History     Socioeconomic History    Marital status: Single     ---------------------------------------------------------------------------------------------------------------------   Allergies:  Patient has no known allergies.  ---------------------------------------------------------------------------------------------------------------------   Medications below are reported home medications pulling from within the system; at this time, these medications have not been reconciled unless otherwise specified and are in the verification process for further verifcation as current home medications.    Prior to Admission Medications       Prescriptions Last Dose Informant Patient Reported? Taking?    aspirin 81 MG chewable tablet Unknown  Yes No    Chew 1 tablet Daily.    atorvastatin (LIPITOR) 80 MG tablet Unknown  Yes No    Take 1 tablet by mouth Daily.    cyclobenzaprine (FLEXERIL) 10 MG tablet Unknown  Yes No    Take 1 tablet by mouth 3 (Three) Times a Day As Needed for Muscle Spasms.    DULoxetine (CYMBALTA) 60 MG capsule Unknown  Yes No    Take 1 capsule by mouth Daily.    lisinopril (PRINIVIL,ZESTRIL) 20 MG tablet Unknown  Yes No    Take 1 tablet by mouth Daily.    metFORMIN (GLUCOPHAGE) 1000 MG tablet Unknown  Yes No    Take 1 tablet by mouth 2 (Two) Times a Day With Meals.    pantoprazole  (PROTONIX) 40 MG EC tablet Unknown  Yes No    Take 1 tablet by mouth Daily.          Objective     Vital Signs:  Temp:  [98.2 °F (36.8 °C)] 98.2 °F (36.8 °C)  Heart Rate:  [] 118  Resp:  [18] 18  BP: (135-157)/() 140/79    Mean Arterial Pressure (Non-Invasive) for the past 24 hrs (Last 3 readings):   Noninvasive MAP (mmHg)   09/26/24 1645 90   09/26/24 1630 100   09/26/24 1615 103     SpO2:  [91 %-100 %] 94 %  on   ;   Device (Oxygen Therapy): room air  Body mass index is 43.26 kg/m².    Wt Readings from Last 3 Encounters:   09/26/24 120 kg (264 lb)   06/27/24 120 kg (264 lb)      ---------------------------------------------------------------------------------------------------------------------   Physical Exam:  Constitutional:  Well-developed and well-nourished. Older than stated age. No acute distress.      HENT:  Head:  Normocephalic and atraumatic.  Mouth:  Moist mucous membranes.    Eyes:  Conjunctivae and EOM are normal. No scleral icterus.    Neck:  Neck supple.  No JVD present.    Cardiovascular:  Normal rate, regular rhythm and normal heart sounds with no murmur.  Pulmonary/Chest:  No respiratory distress, no wheezes, no crackles, with normal breath sounds and good air movement.  Abdominal:  Soft. No distension and no tenderness.   Musculoskeletal:  No tenderness, and no deformity.  No red or swollen joints anywhere.    Neurological:  Alert and oriented to person, place, and time.  No cranial nerve deficit. Chronic L sided paralysis.    Skin:  Skin is warm and dry. No pallor. Significant intertriginous erythema under panus, consistent with yeast infection.   Peripheral vascular:  No clubbing, no cyanosis, trace edema.  Psychiatric: Appropriate mood and affect  Edited by: Ramon Marc MD at 9/26/2024 1701  ---------------------------------------------------------------------------------------------------------------------  EKG:  N/A    Telemetry:  normal sinus rhythm, no significant ST  "changes    I have personally looked at telemetry.    Last echocardiogram:  Ordered and pending   --------------------------------------------------------------------------------------------------------------------  Labs:  Results from last 7 days   Lab Units 09/26/24  1504 09/26/24  1350   LACTATE mmol/L  --  1.1   CRP mg/dL 3.80*  --    WBC 10*3/mm3  --  10.42   HEMOGLOBIN g/dL  --  12.5   HEMATOCRIT %  --  37.4   MCV fL  --  94.0   MCHC g/dL  --  33.4   PLATELETS 10*3/mm3  --  402         Results from last 7 days   Lab Units 09/26/24  1504   SODIUM mmol/L 140   POTASSIUM mmol/L 2.7*   MAGNESIUM mg/dL 1.5*   CHLORIDE mmol/L 99   CO2 mmol/L 28.9   BUN mg/dL 10   CREATININE mg/dL 0.51*   CALCIUM mg/dL 9.4   GLUCOSE mg/dL 220*   ALBUMIN g/dL 3.5   BILIRUBIN mg/dL 0.6   ALK PHOS U/L 119*   AST (SGOT) U/L 21   ALT (SGPT) U/L 8   Estimated Creatinine Clearance: 188.1 mL/min (A) (by C-G formula based on SCr of 0.51 mg/dL (L)).  No results found for: \"AMMONIA\"          No results found for: \"HGBA1C\", \"POCGLU\"  No results found for: \"TSH\", \"FREET4\"  No results found for: \"PREGTESTUR\", \"PREGSERUM\", \"HCG\", \"HCGQUANT\"  Pain Management Panel           No data to display              Brief Urine Lab Results  (Last result in the past 365 days)        Color   Clarity   Blood   Leuk Est   Nitrite   Protein   CREAT   Urine HCG        09/26/24 1419 Dark Yellow   Turbid   Trace   Moderate (2+)   Positive   30 mg/dL (1+)                 No results found for: \"BLOODCX\"  No results found for: \"URINECX\"  No results found for: \"WOUNDCX\"  No results found for: \"STOOLCX\"    I have personally looked at the labs and they are summarized above.  ----------------------------------------------------------------------------------------------------------------------  Detailed radiology reports for the last 24 hours:    Imaging Results (Last 24 Hours)       Procedure Component Value Units Date/Time    XR Ankle 3+ View Left [[id number]] Collected: " 09/26/24 1537     Updated: 09/26/24 1540    Narrative:      EXAM:    XR Left Ankle Complete, 3 or More Views     EXAM DATE:    9/26/2024 3:17 PM     CLINICAL HISTORY:    left ankle injury     TECHNIQUE:    Frontal, lateral and oblique views of the left ankle.     COMPARISON:    No relevant prior studies available.     FINDINGS:    Bones/joints:  See below.      Soft tissues:  Soft tissue swelling, but no acute fracture or  dislocation.       Impression:        Soft tissue swelling, but no acute fracture or dislocation.        This report was finalized on 9/26/2024 3:38 PM by Dr. Moses Otto MD.       XR Foot 3+ View Left [[id number]] Collected: 09/26/24 1536     Updated: 09/26/24 1539    Narrative:      EXAM:    XR Left Foot Complete, 3 or More Views     EXAM DATE:    9/26/2024 3:16 PM     CLINICAL HISTORY:    left foot wound     TECHNIQUE:    Frontal, lateral and oblique views of the left foot.     COMPARISON:    No relevant prior studies available.     FINDINGS:    Bones/joints:  Unremarkable.  No acute fracture.  No dislocation.    Soft tissues:  Unremarkable.  No radiopaque foreign body.       Impression:        No acute findings in the left foot.        This report was finalized on 9/26/2024 3:37 PM by Dr. Moses Otto MD.       XR Chest 1 View [[id number]] Collected: 09/26/24 1406     Updated: 09/26/24 1408    Narrative:      XR CHEST 1 VW-     CLINICAL INDICATION: weakness        COMPARISON: None immediately available      TECHNIQUE: Single frontal view of the chest.     FINDINGS:     LUNGS: Lungs are adequately aerated.      HEART AND MEDIASTINUM: Heart and mediastinal contours are unremarkable        SKELETON: Bony and soft tissue structures are unremarkable.             Impression:      No radiographic evidence of acute cardiac or pulmonary disease.           This report was finalized on 9/26/2024 2:06 PM by Dr. Moses Otto MD.       CT Cervical Spine Without Contrast [[id number]] Collected: 09/26/24  1317     Updated: 09/26/24 1319    Narrative:      CT CERVICAL SPINE WO CONTRAST-     CLINICAL INDICATION: neck pain        COMPARISON: None available     TECHNIQUE: Axial images of the cervical spine were acquired with out any  intravenous contrast. Reformatted images were then created in the  sagittal and coronal planes.     DOSE:      Radiation dose reduction techniques were utilized per ALARA protocol.  Automated exposure control was initiated through either or [employer] or  [employer] software packages by  protocol.           FINDINGS:   The provided study demonstrates preservation of the vertebral body  heights in the sagittally reconstructed images.     There is no prevertebral soft tissue swelling.     Alignment is near anatomic.     The disc space heights are uniform.     I see no acute cervical spine fracture.       Impression:         1. No acute bony abnormality.     2. Other incidental findings as above     This report was finalized on 9/26/2024 1:17 PM by Dr. Moses Otto MD.       CT Lumbar Spine Without Contrast [[id number]] Collected: 09/26/24 1317     Updated: 09/26/24 1319    Narrative:      EXAM:    CT Lumbar Spine Without Intravenous Contrast     EXAM DATE:    9/26/2024 12:59 PM     CLINICAL HISTORY:    back pain     TECHNIQUE:    Axial computed tomography images of the lumbar spine without  intravenous contrast.  Sagittal and coronal reformatted images were  created and reviewed.  This CT exam was performed using one or more of  the following dose reduction techniques:  automated exposure control,  adjustment of the mA and/or kV according to patient size, and/or use of  iterative reconstruction technique.     COMPARISON:    No relevant prior studies available.     FINDINGS:    VERTEBRAE:  Unremarkable.  No acute fracture.    DISCS/SPINAL CANAL/NEURAL FORAMINA:  No acute findings.  No spinal  canal stenosis.    SOFT TISSUES:  Unremarkable.       Impression:        No acute findings in  the lumbar spine.        This report was finalized on 9/26/2024 1:17 PM by Dr. Moses Otto MD.       CT Thoracic Spine Without Contrast [[id number]] Collected: 09/26/24 1316     Updated: 09/26/24 1319    Narrative:      EXAM:    CT Thoracic Spine Without Intravenous Contrast     EXAM DATE:    9/26/2024 12:58 PM     CLINICAL HISTORY:    back pain     TECHNIQUE:    Axial computed tomography images of the thoracic spine without  intravenous contrast.  Sagittal and coronal reformatted images were  created and reviewed.  This CT exam was performed using one or more of  the following dose reduction techniques:  automated exposure control,  adjustment of the mA and/or kV according to patient size, and/or use of  iterative reconstruction technique.     COMPARISON:    No relevant prior studies available.     FINDINGS:    VERTEBRAE:  Unremarkable.  No acute fracture.    DISCS/SPINAL CANAL/NEURAL FORAMINA:  No acute findings.  No spinal  canal stenosis.    SOFT TISSUES:  Unremarkable.       Impression:        No acute findings in the thoracic spine.        This report was finalized on 9/26/2024 1:17 PM by Dr. Moses Otto MD.       CT Head Without Contrast [[id number]] Collected: 09/26/24 1259     Updated: 09/26/24 1302    Narrative:      CT HEAD WO CONTRAST-     CLINICAL INDICATION: weakness        COMPARISON: None available     TECHNIQUE: Axial images of the brain were obtained with out intravenous  contrast.  Reformatted images were created in the sagittal and coronal  planes.     DOSE:     Radiation dose reduction techniques were utilized per ALARA protocol.  Automated exposure control was initiated through either or [employer] or  [employer] software packages by  protocol.        FINDINGS:    BRAIN: Probable subacute ischemia right middle cerebral artery  territory    VENTRICLES:  Unremarkable.  No ventriculomegaly.       BONES/JOINTS:  Unremarkable.  No acute fracture.       SOFT TISSUES:  Unremarkable.        SINUSES:  no air fluid levels       MASTOID AIR CELLS:  Unremarkable as visualized.  No mastoid effusion.          Impression:         1. Probable remote right middle cerebral artery infarction  2. No acute parenchymal mass, hemorrhage, or midline shift     This report was finalized on 9/26/2024 1:00 PM by Dr. Moses Otto MD.             I have personally looked at the radiology images and read the final radiology report.    Assessment & Plan    43F Morbid Obesity by BMI PMH History Genital Herpes, Cerebrovascular Accident 5/2024 complicated by L sided paralysis, presented to [facility] emergency room 9/26 after sliding into the floor off her chair due to weakness.     #Acute Metabolic Encephalopathy due to Acute Urinary Tract Infection in setting of probable neurogenic bladder  - Continue Ceftriaxone, follow up cultures, rule out STI    #Electrolyte Abnormalities  - Acute Mild Hypokalemia - Replacing, on protocol  - Acute Mild Hypomagnesemia - Replacing, on protocol    #History Cerebrovascular Accident complicated by L sided paralysis, unclear etiology  - Review home medications and resume as indicated, Supportive Care     #Debility  - Consult PT/OT, Social Work if placement needed     #History Genital Herpes  - Supportive Care, follow up medication reconciliation for any suppressive medications, check HIV & acute hepatitis panel     #Morbid Obesity by BMI  - Body mass index is 43.26 kg/m².; complicates all aspects of care    F: Oral  E: Monitor & Replace as needed   N: Regular Diet  PPx: SQH  Code Status (Patient has no pulse and is not breathing): CPR  Medical Interventions (Patient has pulse or is breathing): Full Support     Dispo: Pending workup and clinical improvement    *This patient is considered high risk secondary to Urinary Tract Infection, electrolyte abnormalities, chronic L sided paralysis from prior Cerebrovascular Accident.      Edited by: Ramon Marc MD at 9/26/2024 8471    Ramon  MD Monico  [facility]  09/26/24  17:01 EDT        Electronically signed by Ramon Marc MD at 09/26/24 1703       Vital Signs (last day)       Date/Time Temp Temp src Pulse Resp BP Patient Position SpO2    10/04/24 1039 98.4 (36.9) Oral 111 20 154/99 Lying 96    10/04/24 0700 98.3 (36.8) Oral 100 20 160/98 Lying 96    10/04/24 0437 98.4 (36.9) Oral 97 18 157/90 Lying 94    10/03/24 2331 98.5 (36.9) Oral 112 18 124/98 Lying 98    10/03/24 2110 -- -- 99 20 140/92 -- 97    10/03/24 2017 98.4 (36.9) Oral 103 20 158/85 Lying 95    10/03/24 1530 98.4 (36.9) Oral 105 18 154/98 Lying 96    10/03/24 1042 98 (36.7) Oral 102 20 152/95 Lying 91    10/03/24 0634 98.8 (37.1) Oral 103 18 152/85 Lying 94    10/03/24 0219 -- -- 100 18 140/80 -- 96          Lines, Drains & Airways       Active LDAs       Name Placement date Placement time Site Days    Midline Catheter - Single Lumen 09/30/24 Right Basilic 09/30/24  0955  -- 4    External Urinary Catheter 09/29/24  1500  --  4                  Current Facility-Administered Medications   Medication Dose Route Frequency Provider Last Rate Last Admin    acetaminophen (TYLENOL) tablet 650 mg  650 mg Oral Q6H PRN Ashleigh Nicholas PA-C   650 mg at 10/04/24 0828    acyclovir (ZOVIRAX) capsule 400 mg  400 mg Oral Nightly Ramon Marc MD   400 mg at 10/03/24 2108    aspirin chewable tablet 81 mg  81 mg Oral Daily Ramon Marc MD   81 mg at 10/04/24 0828    atorvastatin (LIPITOR) tablet 80 mg  80 mg Oral Daily Ramon Marc MD   80 mg at 10/04/24 0828    sennosides-docusate (PERICOLACE) 8.6-50 MG per tablet 2 tablet  2 tablet Oral BID PRN Ramon Marc MD        And    polyethylene glycol (MIRALAX) packet 17 g  17 g Oral Daily PRN Ramon Marc MD        And    bisacodyl (DULCOLAX) EC tablet 5 mg  5 mg Oral Daily PRN Ramon Marc MD        And    bisacodyl (DULCOLAX) suppository 10 mg  10 mg Rectal Daily PRN Ramon Marc MD        Calcium Replacement - Follow  Nurse / BPA Driven Protocol   Does not apply PRN Ramon Marc MD        cyclobenzaprine (FLEXERIL) tablet 10 mg  10 mg Oral TID PRN Ramon Marc MD   10 mg at 10/03/24 2109    Diclofenac Sodium (VOLTAREN) 1 % gel 4 g  4 g Topical 4x Daily PRN Ashleigh Nicholas PA-C   4 g at 10/02/24 2222    DULoxetine (CYMBALTA) DR capsule 60 mg  60 mg Oral Daily Ramon Marc MD   60 mg at 10/04/24 0828    ertapenem (INVanz) 1,000 mg in sodium chloride 0.9 % 100 mL IVPB-VTB  1,000 mg Intravenous Q24H Ramon Marc  mL/hr at 10/02/24 1610 1,000 mg at 10/02/24 1610    heparin (porcine) 5000 UNIT/ML injection 5,000 Units  5,000 Units Subcutaneous Q8H Ramon Marc MD   5,000 Units at 10/04/24 0633    lisinopril (PRINIVIL,ZESTRIL) tablet 20 mg  20 mg Oral Daily Ramon Marc MD   20 mg at 10/04/24 0828    loperamide (IMODIUM) capsule 2 mg  2 mg Oral 4x Daily PRN Marv Navas DO   2 mg at 10/02/24 0847    Magnesium Standard Dose Replacement - Follow Nurse / BPA Driven Protocol   Does not apply PRN Ramon Marc MD        [Held by provider] metFORMIN (GLUCOPHAGE) tablet 1,000 mg  1,000 mg Oral BID With Meals Ramon Marc MD        mupirocin (BACTROBAN) 2 % nasal ointment 1 Application  1 application  Each Nare BID Marv Navas DO   1 Application at 10/04/24 0829    nicotine (NICODERM CQ) 7 MG/24HR patch 1 patch  1 patch Transdermal Q24H Ramon Marc MD   1 patch at 10/04/24 0826    nystatin (MYCOSTATIN) powder   Topical Q12H Ramon Marc MD   Given at 10/04/24 0829    pantoprazole (PROTONIX) EC tablet 40 mg  40 mg Oral Daily Ramon Marc MD   40 mg at 10/04/24 0828    Pharmacy Consult   Does not apply Continuous PRN Ramon Marc MD        Phosphorus Replacement - Follow Nurse / BPA Driven Protocol   Does not apply PRN Ramon Marc MD        Potassium Replacement - Follow Nurse / BPA Driven Protocol   Does not apply PRN Ramon Marc MD        prochlorperazine (COMPAZINE) injection 2.5 mg   2.5 mg Intravenous Q6H PRN Ashleigh Nicholas PA-C   2.5 mg at 09/30/24 2124    sodium chloride 0.9 % flush 10 mL  10 mL Intravenous Q12H Ramon Marc MD   10 mL at 10/04/24 0828    sodium chloride 0.9 % flush 10 mL  10 mL Intravenous PRN Ramon Marc MD        sodium chloride 0.9 % flush 10 mL  10 mL Intravenous Q12H Marv Navas,    10 mL at 10/04/24 0829    sodium chloride 0.9 % flush 10 mL  10 mL Intravenous PRN Marv Navas,         sodium chloride 0.9 % infusion 40 mL  40 mL Intravenous PRN Ramon Marc MD        sodium chloride 0.9 % infusion 40 mL  40 mL Intravenous PRN Marv Navas DO        sodium chloride 0.9 % infusion  75 mL/hr Intravenous Continuous Ramon Marc MD 75 mL/hr at 10/04/24 0640 75 mL/hr at 10/04/24 0640     Lab Results (most recent)       Procedure Component Value Units Date/Time    POC Glucose Once [[id number]]  (Abnormal) Collected: 10/02/24 2010    Specimen: Blood Updated: 10/02/24 2016     Glucose 216 mg/dL     Blood Culture - Blood, Arm, Left [[id number]]  (Normal) Collected: 09/26/24 1350    Specimen: Blood from Arm, Left Updated: 10/01/24 1400     Blood Culture No growth at 5 days    Blood Culture - Blood, Arm, Right [[id number]]  (Normal) Collected: 09/26/24 1350    Specimen: Blood from Arm, Right Updated: 10/01/24 1400     Blood Culture No growth at 5 days    Basic Metabolic Panel [[id number]]  (Abnormal) Collected: 10/01/24 0117    Specimen: Blood Updated: 10/01/24 0215     Glucose 158 mg/dL      BUN 4 mg/dL      Creatinine 0.36 mg/dL      Sodium 141 mmol/L      Potassium 3.8 mmol/L      Chloride 110 mmol/L      CO2 22.8 mmol/L      Calcium 8.3 mg/dL      BUN/Creatinine Ratio 11.1     Anion Gap 8.2 mmol/L      eGFR 130.2 mL/min/1.73     Narrative:      GFR Normal >60  Chronic Kidney Disease <60  Kidney Failure <15      CBC (No Diff) [[id number]]  (Abnormal) Collected: 10/01/24 0117    Specimen: Blood Updated: 10/01/24 0157     WBC 7.71  10*3/mm3      RBC 3.44 10*6/mm3      Hemoglobin 10.9 g/dL      Hematocrit 33.9 %      MCV 98.5 fL      MCH 31.7 pg      MCHC 32.2 g/dL      RDW 14.2 %      RDW-SD 50.6 fl      MPV 9.1 fL      Platelets 353 10*3/mm3     Comprehensive Metabolic Panel [[id number]]  (Abnormal) Collected: 09/30/24 0117    Specimen: Blood Updated: 09/30/24 0202     Glucose 196 mg/dL      BUN 5 mg/dL      Creatinine 0.46 mg/dL      Sodium 142 mmol/L      Potassium 3.5 mmol/L      Chloride 110 mmol/L      CO2 22.8 mmol/L      Calcium 8.0 mg/dL      Total Protein 5.1 g/dL      Albumin 2.5 g/dL      ALT (SGPT) 7 U/L      AST (SGOT) 11 U/L      Alkaline Phosphatase 79 U/L      Total Bilirubin 0.2 mg/dL      Globulin 2.6 gm/dL      A/G Ratio 1.0 g/dL      BUN/Creatinine Ratio 10.9     Anion Gap 9.2 mmol/L      eGFR 122.7 mL/min/1.73     Narrative:      GFR Normal >60  Chronic Kidney Disease <60  Kidney Failure <15      CBC (No Diff) [[id number]]  (Abnormal) Collected: 09/30/24 0117    Specimen: Blood Updated: 09/30/24 0132     WBC 8.49 10*3/mm3      RBC 3.30 10*6/mm3      Hemoglobin 10.5 g/dL      Hematocrit 32.7 %      MCV 99.1 fL      MCH 31.8 pg      MCHC 32.1 g/dL      RDW 13.9 %      RDW-SD 50.3 fl      MPV 8.8 fL      Platelets 285 10*3/mm3     Comprehensive Metabolic Panel [[id number]]  (Abnormal) Collected: 09/29/24 0036    Specimen: Blood Updated: 09/29/24 0224     Glucose 192 mg/dL      BUN 4 mg/dL      Creatinine 0.40 mg/dL      Sodium 140 mmol/L      Potassium 3.9 mmol/L      Comment: Slight hemolysis detected by analyzer. Result may be falsely elevated.        Chloride 109 mmol/L      CO2 21.0 mmol/L      Calcium 8.2 mg/dL      Total Protein 5.3 g/dL      Albumin 2.5 g/dL      ALT (SGPT) 8 U/L      AST (SGOT) 15 U/L      Alkaline Phosphatase 88 U/L      Total Bilirubin 0.2 mg/dL      Globulin 2.8 gm/dL      A/G Ratio 0.9 g/dL      BUN/Creatinine Ratio 10.0     Anion Gap 10.0 mmol/L      eGFR 126.9 mL/min/1.73     Narrative:      GFR  Normal >60  Chronic Kidney Disease <60  Kidney Failure <15      Urine Culture - Urine, Straight Cath [[id number]]  (Abnormal)  (Susceptibility) Collected: 09/26/24 1451    Specimen: Urine from Straight Cath Updated: 09/28/24 1408     Urine Culture >100,000 CFU/mL Escherichia coli ESBL    Narrative:      Colonization of the urinary tract without infection is common. Treatment is discouraged unless the patient is symptomatic, pregnant, or undergoing an invasive urologic procedure.  Recent outcomes data supports the use of pip/tazo in the treatment of susceptible ESBL infections for uncomplicated UTI. Consider use of pip/tazo as a carbapenem-sparing regimen in applicable patients.    Susceptibility        Escherichia coli ESBL      LASHAWN      Ertapenem Susceptible      Gentamicin Susceptible      Levofloxacin Intermediate      Meropenem Susceptible      Nitrofurantoin Susceptible      Piperacillin + Tazobactam Susceptible      Trimethoprim + Sulfamethoxazole Resistant                           Potassium [[id number]]  (Normal) Collected: 09/27/24 1904    Specimen: Blood Updated: 09/27/24 1920     Potassium 4.0 mmol/L      Comment: Slight hemolysis detected by analyzer. Result may be falsely elevated.       Chlamydia trachomatis, Neisseria gonorrhoeae, Trichomonas vaginalis, PCR - Swab, Vagina [[id number]]  (Normal) Collected: 09/26/24 1837    Specimen: Swab from Vagina Updated: 09/26/24 2018     Chlamydia DNA by PCR Not Detected     Neisseria gonorrhoeae by PCR Not Detected     Trichomonas vaginalis PCR Not Detected    HIV-1 & HIV-2 Antibodies [[id number]]  (Normal) Collected: 09/26/24 1350    Specimen: Blood from Arm, Right Updated: 09/26/24 1748    Narrative:      The following orders were created for panel order HIV-1 & HIV-2 Antibodies.  Procedure                               Abnormality         Status                     ---------                               -----------         ------                     HIV-1 / O / 2  Ag / Antibody[[id number]]  Normal              Final result                 Please view results for these tests on the individual orders.    Hepatitis Panel, Acute [[id number]]  (Normal) Collected: 09/26/24 1350    Specimen: Blood from Arm, Right Updated: 09/26/24 1748     Hepatitis B Surface Ag Non-Reactive     Hep A IgM Non-Reactive     Hep B C IgM Non-Reactive     Hepatitis C Ab Non-Reactive    Narrative:      Results may be falsely decreased if patient taking Biotin.     HIV-1 / O / 2 Ag / Antibody [[id number]]  (Normal) Collected: 09/26/24 1350    Specimen: Blood from Arm, Right Updated: 09/26/24 1748     HIV-1/ HIV-2 Non-Reactive     Comment: A non-reactive test result does not preclude the possibility of exposure to HIV or infection with HIV. An antibody response to recent exposure may take several months to reach detectable levels.       Narrative:      The HIV antibody/antigen combo assay is a qualitative assay for HIV that includes the p24 antigen as well as antibodies to HIV types 1 and 2. This test is intended to be used as a screening assay in the diagnosis of HIV infection in patients over the age of 2.    Magnesium [[id number]]  (Abnormal) Collected: 09/26/24 1504    Specimen: Blood from Arm, Right Updated: 09/26/24 1553     Magnesium 1.5 mg/dL     C-reactive Protein [[id number]]  (Abnormal) Collected: 09/26/24 1504    Specimen: Blood from Arm, Right Updated: 09/26/24 1530     C-Reactive Protein 3.80 mg/dL     Urinalysis, Microscopic Only - Urine, Clean Catch [[id number]]  (Abnormal) Collected: 09/26/24 1419    Specimen: Urine, Clean Catch Updated: 09/26/24 1459     RBC, UA 0-2 /HPF      WBC, UA 3-5 /HPF      Bacteria, UA 4+ /HPF      Squamous Epithelial Cells, UA None Seen /HPF      Hyaline Casts, UA None Seen /LPF      Methodology Manual Light Microscopy    Urinalysis With Microscopic If Indicated (No Culture) - Urine, Clean Catch [[id number]]  (Abnormal) Collected: 09/26/24 1419    Specimen: Urine, Clean  Catch Updated: 09/26/24 1440     Color, UA Dark Yellow     Appearance, UA Turbid     pH, UA 6.0     Specific Gravity, UA >=1.030     Glucose, UA Negative     Ketones, UA 15 mg/dL (1+)     Bilirubin, UA Small (1+)     Blood, UA Trace     Protein, UA 30 mg/dL (1+)     Leuk Esterase, UA Moderate (2+)     Nitrite, UA Positive     Urobilinogen, UA 2.0 E.U./dL    hCG, Serum, Qualitative [[id number]]  (Normal) Collected: 09/26/24 1350    Specimen: Blood from Arm, Right Updated: 09/26/24 1425     HCG Qualitative Negative    Lactic Acid, Plasma [[id number]]  (Normal) Collected: 09/26/24 1350    Specimen: Blood from Arm, Right Updated: 09/26/24 1424     Lactate 1.1 mmol/L     CBC & Differential [[id number]]  (Abnormal) Collected: 09/26/24 1350    Specimen: Blood from Arm, Right Updated: 09/26/24 1403    Narrative:      The following orders were created for panel order CBC & Differential.  Procedure                               Abnormality         Status                     ---------                               -----------         ------                     CBC Auto Differential[[id number]]        Abnormal            Final result                 Please view results for these tests on the individual orders.    CBC Auto Differential [[id number]]  (Abnormal) Collected: 09/26/24 1350    Specimen: Blood from Arm, Right Updated: 09/26/24 1403     WBC 10.42 10*3/mm3      RBC 3.98 10*6/mm3      Hemoglobin 12.5 g/dL      Hematocrit 37.4 %      MCV 94.0 fL      MCH 31.4 pg      MCHC 33.4 g/dL      RDW 13.6 %      RDW-SD 46.9 fl      MPV 9.2 fL      Platelets 402 10*3/mm3      Neutrophil % 66.1 %      Lymphocyte % 28.8 %      Monocyte % 3.7 %      Eosinophil % 0.5 %      Basophil % 0.2 %      Immature Grans % 0.7 %      Neutrophils, Absolute 6.89 10*3/mm3      Lymphocytes, Absolute 3.00 10*3/mm3      Monocytes, Absolute 0.39 10*3/mm3      Eosinophils, Absolute 0.05 10*3/mm3      Basophils, Absolute 0.02 10*3/mm3      Immature Grans,  Absolute 0.07 10*3/mm3      nRBC 0.0 /100 WBC     COVID-19 and FLU A/B PCR, 1 HR TAT - Swab, Nasopharynx [[id number]]  (Normal) Collected: 09/26/24 1326    Specimen: Swab from Nasopharynx Updated: 09/26/24 1353     COVID19 Not Detected     Influenza A PCR Not Detected     Influenza B PCR Not Detected    Narrative:      Fact sheet for providers: https://www.fda.gov/media/455568/download    Fact sheet for patients: https://www.fda.gov/media/393787/download    Test performed by PCR.          Orders (last 24 hrs)        Start     Ordered    10/03/24 1354  Discontinue IV  Once         10/03/24 1402    10/03/24 0000  nitrofurantoin, macrocrystal-monohydrate, (Macrobid) 100 MG capsule  2 Times Daily         10/03/24 1402    10/03/24 0000  Discharge Follow-up with PCP         10/03/24 1402    10/03/24 0000  acyclovir (ZOVIRAX) 400 MG tablet  Nightly         10/03/24 1509    10/02/24 1800  Dietary Nutrition Supplements Boost Plus (Ensure Enlive, Ensure Plus)  Daily With Breakfast, Lunch & Dinner       10/02/24 1611    10/01/24 0924  loperamide (IMODIUM) capsule 2 mg  4 Times Daily PRN         10/01/24 0924    09/30/24 1100  sodium chloride 0.9 % flush 10 mL  Every 12 Hours Scheduled         09/30/24 1004    09/30/24 1100  mupirocin (BACTROBAN) 2 % nasal ointment 1 Application  2 Times Daily         09/30/24 1004    09/30/24 1004  sodium chloride 0.9 % flush 10 mL  As Needed         09/30/24 1004    09/30/24 1004  sodium chloride 0.9 % infusion 40 mL  As Needed         09/30/24 1004    09/30/24 0104  Silicone Border Dressing to Bony Prominences  Every Shift       09/30/24 0105    09/29/24 1100  nystatin (MYCOSTATIN) powder  Every 12 Hours Scheduled         09/29/24 0948    09/29/24 0930  traMADol (ULTRAM) tablet 50 mg  Every 8 Hours PRN         09/29/24 0930    09/28/24 1500  ertapenem (INVanz) 1,000 mg in sodium chloride 0.9 % 100 mL IVPB-VTB  Every 24 Hours         09/28/24 1427    09/27/24 2100  acyclovir (ZOVIRAX) capsule  400 mg  Nightly         09/27/24 0921    09/27/24 0737  Pharmacy Consult  Continuous PRN         09/27/24 0738    09/27/24 0103  acetaminophen (TYLENOL) tablet 650 mg  Every 6 Hours PRN         09/27/24 0105    09/27/24 0013  Diclofenac Sodium (VOLTAREN) 1 % gel 4 g  4 Times Daily PRN         09/27/24 0013    09/26/24 2200  heparin (porcine) 5000 UNIT/ML injection 5,000 Units  Every 8 Hours Scheduled         09/26/24 1747 09/26/24 2149  prochlorperazine (COMPAZINE) injection 2.5 mg  Every 6 Hours PRN         09/26/24 2149 09/26/24 2100  sodium chloride 0.9 % flush 10 mL  Every 12 Hours Scheduled         09/26/24 1747 09/26/24 2000  Vital Signs  Every 4 Hours       09/26/24 1747 09/26/24 1845  sodium chloride 0.9 % infusion  Continuous         09/26/24 1747 09/26/24 1845  aspirin chewable tablet 81 mg  Daily         09/26/24 1747 09/26/24 1845  atorvastatin (LIPITOR) tablet 80 mg  Daily         09/26/24 1747    09/26/24 1845  DULoxetine (CYMBALTA) DR capsule 60 mg  Daily         09/26/24 1747 09/26/24 1845  lisinopril (PRINIVIL,ZESTRIL) tablet 20 mg  Daily         09/26/24 1747 09/26/24 1845  pantoprazole (PROTONIX) EC tablet 40 mg  Daily         09/26/24 1747 09/26/24 1845  [Held by provider]  metFORMIN (GLUCOPHAGE) tablet 1,000 mg  2 Times Daily With Meals        (On hold since Thu 9/26/2024 at 1747 until manually unheld; held by Ramon Marc MDHold Reason: Other (Comment Required))    09/26/24 1747 09/26/24 1845  nicotine (NICODERM CQ) 7 MG/24HR patch 1 patch  Every 24 Hours Scheduled         09/26/24 1747 09/26/24 1800  Oral Care  2 Times Daily       09/26/24 1747 09/26/24 1748  Intake & Output  Every Shift       09/26/24 1747 09/26/24 1747  sodium chloride 0.9 % flush 10 mL  As Needed         09/26/24 1747    09/26/24 1747  sodium chloride 0.9 % infusion 40 mL  As Needed         09/26/24 1747 09/26/24 1747  Potassium Replacement - Follow Nurse / BPA Driven Protocol  As  "Needed         09/26/24 1747    09/26/24 1747  Magnesium Standard Dose Replacement - Follow Nurse / BPA Driven Protocol  As Needed         09/26/24 1747    09/26/24 1747  Phosphorus Replacement - Follow Nurse / BPA Driven Protocol  As Needed         09/26/24 1747    09/26/24 1747  Calcium Replacement - Follow Nurse / BPA Driven Protocol  As Needed         09/26/24 1747    09/26/24 1747  sennosides-docusate (PERICOLACE) 8.6-50 MG per tablet 2 tablet  2 Times Daily PRN        Placed in \"And\" Linked Group    09/26/24 1747    09/26/24 1747  polyethylene glycol (MIRALAX) packet 17 g  Daily PRN        Placed in \"And\" Linked Group    09/26/24 1747    09/26/24 1747  bisacodyl (DULCOLAX) EC tablet 5 mg  Daily PRN        Placed in \"And\" Linked Group    09/26/24 1747    09/26/24 1747  bisacodyl (DULCOLAX) suppository 10 mg  Daily PRN        Placed in \"And\" Linked Group    09/26/24 1747    09/26/24 1747  cyclobenzaprine (FLEXERIL) tablet 10 mg  3 Times Daily PRN         09/26/24 1747    Unscheduled  Straight cath  As Needed       09/26/24 1359    Unscheduled  Potassium  As Needed        Comments: Release/collect/run 4 hours after completion of last potassium dose.     Placed in \"And\" Linked Group    09/26/24 1545    Unscheduled  Stage II Pressure Ulcer Care PRN  As Needed      Comments: - Gently Cleanse With Normal Saline  - Cover With Silicone Border Dressing (If Indicated)  - For Frequent Incontinence - Apply Skin Protective Barrier Cream Rather Than Silicone Border Dressing    09/29/24 1139    Unscheduled  Wound Care  As Needed       09/30/24 0105    Unscheduled  Unstageable Pressure Ulcer (Wet) Care PRN  As Needed      Comments: - Gently Cleanse With Normal Saline   - Pack Loosely With Moist to Moist Normal Saline Fluffed Gauze   - Cover With Silicone Border Dressing or Dry Dressing    09/30/24 0105    Unscheduled  Change Dressing to IV Site As Needed When Damp, Loose or Soiled  As Needed       09/30/24 1004    Unscheduled  " Change Needleless Connectors  As Needed      Comments: Change Needleless Connectors When:  - Administration Set Changed  - Dressing Changed  - Removed For Any Reason  - Residual Blood or Debris Within Connector  - Prior to Drawing Blood Cultures  - Contamination of Connector  - After Administration of Blood or Blood Components    24 1004    --  aspirin 81 MG chewable tablet  Daily         24    --  atorvastatin (LIPITOR) 80 MG tablet  Daily         24    --  cyclobenzaprine (FLEXERIL) 10 MG tablet  3 Times Daily PRN         24    --  DULoxetine (CYMBALTA) 60 MG capsule  Daily         24    --  lisinopril (PRINIVIL,ZESTRIL) 20 MG tablet  Daily         24    --  metFORMIN (GLUCOPHAGE) 1000 MG tablet  2 Times Daily With Meals         24    --  pantoprazole (PROTONIX) 40 MG EC tablet  Daily         24    --  acyclovir (ZOVIRAX) 400 MG tablet  Nightly,   Status:  Discontinued         24 0920                     Physician Progress Notes (most recent note)        Shanthi Dick, APRN at 10/04/24 1213                     PROGRESS NOTE         Patient Identification:  Name:  Lety Johnson  Age:  43 y.o.  Sex:  female  :  1980  MRN:  [id number]  Visit Number:  [id number]  Primary Care Provider:  Provider, No Known         LOS: 6 days       ----------------------------------------------------------------------------------------------------------------------  Subjective       Chief Complaints:    Fall        Interval History:      The patient is resting comfortably in bed, on room air with no apparent distress.  Afebrile.  Denies diarrhea.  The patient does complain of left hip pain, she reports a history of sciatica.  Lung exam is clear with diminished bases bilaterally to auscultation.  Abdomen soft and nontender with normoactive bowel sounds.      Review of Systems:    Constitutional: no fever, chills and night sweats.   Generalized fatigue.  Eyes: no eye drainage, itching or redness.  HEENT: no mouth sores, dysphagia or nose bleed.  Respiratory: no for shortness of breath, cough or production of sputum.  Cardiovascular: no chest pain, no palpitations, no orthopnea.  Gastrointestinal: no nausea, vomiting or diarrhea. No abdominal pain, hematemesis or rectal bleeding.  Genitourinary: no dysuria or polyuria.  Hematologic/lymphatic: no lymph node abnormalities, no easy bruising or easy bleeding.  Musculoskeletal: no muscle or joint pain.  Left hip pain  Skin: No rash and no itching.  Neurological: no loss of consciousness, no seizure, no headache.  Behavioral/Psych: no depression or suicidal ideation.  Endocrine: no hot flashes.  Immunologic: negative.    ----------------------------------------------------------------------------------------------------------------------      Objective       Current Hospital Meds:  acyclovir, 400 mg, Oral, Nightly  aspirin, 81 mg, Oral, Daily  atorvastatin, 80 mg, Oral, Daily  DULoxetine, 60 mg, Oral, Daily  ertapenem, 1,000 mg, Intravenous, Q24H  heparin (porcine), 5,000 Units, Subcutaneous, Q8H  lisinopril, 20 mg, Oral, Daily  [Held by provider] metFORMIN, 1,000 mg, Oral, BID With Meals  mupirocin, 1 application , Each Nare, BID  nicotine, 1 patch, Transdermal, Q24H  nystatin, , Topical, Q12H  pantoprazole, 40 mg, Oral, Daily  sodium chloride, 10 mL, Intravenous, Q12H  sodium chloride, 10 mL, Intravenous, Q12H      Pharmacy Consult,   sodium chloride, 75 mL/hr, Last Rate: 75 mL/hr (10/04/24 0640)      ----------------------------------------------------------------------------------------------------------------------    Vital Signs:  Temp:  [98.3 °F (36.8 °C)-98.5 °F (36.9 °C)] 98.4 °F (36.9 °C)  Heart Rate:  [] 111  Resp:  [18-20] 20  BP: (124-160)/(85-99) 154/99  Mean Arterial Pressure (Non-Invasive) for the past 24 hrs (Last 3 readings):   Noninvasive MAP (mmHg)   10/04/24 1039 115    10/04/24 0700 120   10/04/24 0437 110     SpO2 Percentage    10/04/24 0437 10/04/24 0700 10/04/24 1039   SpO2: 94% 96% 96%     SpO2:  [94 %-98 %] 96 %  on   ;   Device (Oxygen Therapy): room air    Body mass index is 39.56 kg/m².  Wt Readings from Last 3 Encounters:   10/04/24 110 kg (241 lb 6.5 oz)   06/27/24 120 kg (264 lb)        Intake/Output Summary (Last 24 hours) at 10/4/2024 1213  Last data filed at 10/4/2024 0800  Gross per 24 hour   Intake 360 ml   Output 1300 ml   Net -940 ml     Diet: Regular/House; Fluid Consistency: Thin (IDDSI 0)  ----------------------------------------------------------------------------------------------------------------------      Physical Exam:    Constitutional:  Well-developed and well-nourished.  Resting comfortably in bed, on room air with no apparent distress.    HENT:  Head: Normocephalic and atraumatic.  Mouth:  Moist mucous membranes.    Eyes:  Conjunctivae and EOM are normal.  No scleral icterus.  Neck:  Neck supple.  No JVD present.    Cardiovascular:  Normal rate, regular rhythm and normal heart sounds with no murmur. No edema.  Pulmonary/Chest: Clear with bases diminished no respiratory distress, no wheezes, no crackles, with normal breath sounds and good air movement.  Abdominal:  Soft.  Bowel sounds are normal.  No distension and no tenderness.   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  Left hip pain, sciatica.  Neurological:  Alert and oriented to person, place, and time.  No facial droop.  No slurred speech.   Skin:  Skin is warm and dry.  No rash noted.  No pallor.   Psychiatric:  Normal mood and affect.  Behavior is normal.        ----------------------------------------------------------------------------------------------------------------------            Results from last 7 days   Lab Units 10/01/24  0117 09/30/24 0117 09/29/24  0036   WBC 10*3/mm3 7.71 8.49 7.24   HEMOGLOBIN g/dL 10.9* 10.5* 10.7*   HEMATOCRIT % 33.9* 32.7* 33.6*   MCV  "fL 98.5* 99.1* 100.6*   MCHC g/dL 32.2 32.1 31.8   PLATELETS 10*3/mm3 353 285 318     Results from last 7 days   Lab Units 10/01/24  0117 09/30/24  0117 09/29/24  0036 09/28/24  0318   SODIUM mmol/L 141 142 140 142   POTASSIUM mmol/L 3.8 3.5 3.9 4.1   CHLORIDE mmol/L 110* 110* 109* 110*   CO2 mmol/L 22.8 22.8 21.0* 22.3   BUN mg/dL 4* 5* 4* 5*   CREATININE mg/dL 0.36* 0.46* 0.40* 0.43*   CALCIUM mg/dL 8.3* 8.0* 8.2* 8.5*   GLUCOSE mg/dL 158* 196* 192* 198*   ALBUMIN g/dL  --  2.5* 2.5* 2.7*   BILIRUBIN mg/dL  --  0.2 0.2 0.3   ALK PHOS U/L  --  79 88 100   AST (SGOT) U/L  --  11 15 15   ALT (SGPT) U/L  --  7 8 9   Estimated Creatinine Clearance: 253.6 mL/min (A) (by C-G formula based on SCr of 0.36 mg/dL (L)).  No results found for: \"AMMONIA\"    Glucose   Date/Time Value Ref Range Status   10/02/2024 2010 216 (H) 70 - 130 mg/dL Final     No results found for: \"HGBA1C\"  No results found for: \"TSH\", \"FREET4\"    Blood Culture   Date Value Ref Range Status   09/26/2024 No growth at 2 days  Preliminary   09/26/2024 No growth at 2 days  Preliminary     Urine Culture   Date Value Ref Range Status   09/26/2024 >100,000 CFU/mL Escherichia coli ESBL (A)  Final     No results found for: \"WOUNDCX\"  No results found for: \"STOOLCX\"  No results found for: \"RESPCX\"  Pain Management Panel           No data to display                  ----------------------------------------------------------------------------------------------------------------------  Imaging Results (Last 24 Hours)       ** No results found for the last 24 hours. **            ----------------------------------------------------------------------------------------------------------------------    Pertinent Infectious Disease Results                Assessment/Plan       Assessment       ESBL UTI       Plan      The patient is resting comfortably in bed, on room air with no apparent distress.  Afebrile.  Denies diarrhea.  The patient does complain of left hip pain, " she reports a history of sciatica.  Lung exam is clear with diminished bases bilaterally to auscultation.  Abdomen soft and nontender with normoactive bowel sounds.    Recommend to continue with current treatment of ertapenem 1 g IV every 24 hours for total of 7 days for the treatment of ESBL UTI.  On discharge the patient may be further de-escalated to Macrobid 100 mg p.o. twice daily to complete antibiotic course.  Okay to continue home suppressive acyclovir for history of genital herpes.  Continue to monitor closely.      ANTIMICROBIAL THERAPY    acyclovir - 200 MG, 400 MG  ertapenem (INVanz) 1000 mg in 100 mL NS (VTB)  nitrofurantoin (macrocrystal-monohydrate) - 100 MG     Code Status:   Code Status and Medical Interventions: CPR (Attempt to Resuscitate); Full Support   Ordered at: 24 1626     Code Status (Patient has no pulse and is not breathing):    CPR (Attempt to Resuscitate)     Medical Interventions (Patient has pulse or is breathing):    Full Support       YUE Matthews  10/04/24  12:13 EDT    Electronically signed by Shanthi Dick APRN at 10/04/24 1215          Consult Notes (most recent note)        Cuca Yuen APRN at 24 1436        Consult Orders    1. Inpatient Infectious Diseases Consult [[id number]] ordered by Ramon Marc MD at 24 1427                         INFECTIOUS DISEASE CONSULTATION REPORT        Patient Identification:  Name:  Lety Johnson  Age:  43 y.o.  Sex:  female  :  1980  MRN:  [id number]   Visit Number:  [id number]  Primary Care Physician:  Provider, No Known        Referring Provider: Dr. Marc    Reason for consult: ESBL UTI       LOS: 0 days        Subjective       Subjective     History of present illness:      Thank you Dr. Marc for allowing us to participate in the care of your patient.  As you well know, Ms. Lety Johnson is a 43 y.o. 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  WBC 7.38.  Chlamydia gonorrhea and trichomonas negative.  CRP 3.80.  Urinalysis positive with culture finalizing is greater than 100,000 colonies of E. coli ESBL.  Blood cultures from 9/26/2024 show no growth thus far.  HIV 1 and 2 nonreactive.  Hepatitis panel nonreactive.  COVID-19 and influenza PCR negative.  Lactic acid normal on admission.    Patient resting in bed.  Denies dysuria, urgency, frequency.  Patient reports recurrent yeast infections.  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  Lungs clear to auscultation bilaterally.      Infectious Disease consultation was requested for antimicrobial management.      ---------------------------------------------------------------------------------------------------------------------     Review Of Systems:    Constitutional: no fever, chills and night sweats. No appetite change or unexpected weight change. No fatigue.  Eyes: no eye drainage, itching or redness.  HEENT: no mouth sores, dysphagia or nose bleed.  Respiratory: no for shortness of breath, cough or production of sputum.  Cardiovascular: no chest pain, no palpitations, no orthopnea.  Gastrointestinal: no nausea, vomiting or diarrhea. No abdominal pain, hematemesis or rectal bleeding.  Genitourinary: no dysuria or polyuria.  Hematologic/lymphatic: no lymph node abnormalities, no easy bruising or easy bleeding.  Musculoskeletal: no muscle or joint pain.  Skin: No rash and no itching.  Neurological: no loss of consciousness, no seizure, no headache.  Behavioral/Psych: no depression or suicidal ideation.  Endocrine: no hot flashes.  Immunologic: negative.    ---------------------------------------------------------------------------------------------------------------------     Past Medical History    Past Medical History:   Diagnosis Date    Stroke         Past Surgical History    History reviewed. No pertinent surgical history.    Family History    History reviewed. No pertinent family history.    Social History    Social History     Tobacco Use    Smoking status: Every Day     Average packs/day: 2.0 packs/day for 26.0 years (52.0 ttl pk-yrs)     Types: Cigarettes     Start date: 1998    Smokeless tobacco: Never   Vaping Use    Vaping status: Never Used   Substance Use Topics    Alcohol use: Not Currently    Drug use: Never       Allergies    Patient has no known allergies.  ---------------------------------------------------------------------------------------------------------------------     Home Medications:    Prior to Admission Medications       Prescriptions Last Dose Informant Patient Reported? Taking?    acyclovir (ZOVIRAX) 400 MG tablet  Pharmacy Yes No    Take 1 tablet by mouth Every Night.    aspirin 81 MG chewable tablet Unknown  Yes No    Chew 1 tablet Daily.    atorvastatin (LIPITOR) 80 MG tablet Unknown  Yes No    Take 1 tablet by mouth Daily.    cyclobenzaprine (FLEXERIL) 10 MG tablet Unknown  Yes No    Take 1 tablet by mouth 3 (Three) Times a Day As Needed for Muscle Spasms.    DULoxetine (CYMBALTA) 60 MG capsule Unknown  Yes No    Take 1 capsule by mouth Daily.    lisinopril (PRINIVIL,ZESTRIL) 20 MG tablet Unknown  Yes No    Take 1 tablet by mouth Daily.    metFORMIN (GLUCOPHAGE) 1000 MG tablet Unknown  Yes No    Take 1 tablet by mouth 2 (Two) Times a Day With Meals.    pantoprazole (PROTONIX) 40 MG EC tablet Unknown  Yes No    Take 1 tablet by mouth Daily.          ---------------------------------------------------------------------------------------------------------------------    Objective       Objective     Hospital Scheduled Meds:  acyclovir, 400 mg, Oral, Nightly  aspirin, 81 mg, Oral, Daily  atorvastatin, 80 mg, Oral, Daily  DULoxetine, 60 mg, Oral, Daily  ertapenem, 1,000 mg, Intravenous, Q24H  heparin (porcine), 5,000 Units,  Subcutaneous, Q8H  lisinopril, 20 mg, Oral, Daily  [Held by provider] metFORMIN, 1,000 mg, Oral, BID With Meals  nicotine, 1 patch, Transdermal, Q24H  pantoprazole, 40 mg, Oral, Daily  sodium chloride, 10 mL, Intravenous, Q12H      Pharmacy Consult,   sodium chloride, 75 mL/hr, Last Rate: 75 mL/hr (09/28/24 1315)      ---------------------------------------------------------------------------------------------------------------------   Vital Signs:  Temp:  [97.5 °F (36.4 °C)-98.4 °F (36.9 °C)] 98.4 °F (36.9 °C)  Heart Rate:  [] 58  Resp:  [18-20] 18  BP: (133-172)/(71-86) 172/83  Mean Arterial Pressure (Non-Invasive) for the past 24 hrs (Last 3 readings):   Noninvasive MAP (mmHg)   09/28/24 1100 107   09/28/24 0650 92   09/28/24 0307 103     SpO2 Percentage    09/28/24 0307 09/28/24 0650 09/28/24 1100   SpO2: 98% 96% 97%     SpO2:  [96 %-98 %] 97 %  on   ;   Device (Oxygen Therapy): room air    Body mass index is 40.79 kg/m².  Wt Readings from Last 3 Encounters:   09/28/24 113 kg (248 lb 14.4 oz)   06/27/24 120 kg (264 lb)     ---------------------------------------------------------------------------------------------------------------------     Physical Exam:    Constitutional:  Well-developed and well-nourished.  No respiratory distress.      HENT:  Head: Normocephalic and atraumatic.  Mouth:  Moist mucous membranes.    Eyes:  Conjunctivae and EOM are normal.  No scleral icterus.  Neck:  Neck supple.  No JVD present.    Cardiovascular:  Normal rate, regular rhythm and normal heart sounds with no murmur. No edema.  Pulmonary/Chest:  No respiratory distress, no wheezes, no crackles, with normal breath sounds and good air movement.  Abdominal:  Soft.  Bowel sounds are normal.  No distension and no tenderness.   Musculoskeletal: Left sided paralysis secondary to CVA.  Neurological:  Alert and oriented to person, place, and time.  No facial droop.  No slurred speech.   Skin:  Skin is warm and dry.  No rash  "noted.  No pallor.   Psychiatric:  Normal mood and affect.  Behavior is normal.    ---------------------------------------------------------------------------------------------------------------------              Results from last 7 days   Lab Units 09/28/24 0318 09/27/24  0844 09/26/24  1504 09/26/24  1350   CRP mg/dL  --   --  3.80*  --    LACTATE mmol/L  --   --   --  1.1   WBC 10*3/mm3 7.38 9.67  --  10.42   HEMOGLOBIN g/dL 11.8* 10.9*  --  12.5   HEMATOCRIT % 36.9 34.8  --  37.4   MCV fL 98.9* 102.4*  --  94.0   MCHC g/dL 32.0 31.3*  --  33.4   PLATELETS 10*3/mm3 332 292  --  402     Results from last 7 days   Lab Units 09/28/24 0318 09/27/24  1904 09/27/24  0844 09/26/24  1504   SODIUM mmol/L 142  --  137 140   POTASSIUM mmol/L 4.1 4.0 3.5 2.7*   MAGNESIUM mg/dL  --   --   --  1.5*   CHLORIDE mmol/L 110*  --  105 99   CO2 mmol/L 22.3  --  21.5* 28.9   BUN mg/dL 5*  --  7 10   CREATININE mg/dL 0.43*  --  0.36* 0.51*   CALCIUM mg/dL 8.5*  --  7.9* 9.4   GLUCOSE mg/dL 198*  --  234* 220*   ALBUMIN g/dL 2.7*  --  2.5* 3.5   BILIRUBIN mg/dL 0.3  --  0.3 0.6   ALK PHOS U/L 100  --  96 119*   AST (SGOT) U/L 15  --  17 21   ALT (SGPT) U/L 9  --  7 8   Estimated Creatinine Clearance: 215.5 mL/min (A) (by C-G formula based on SCr of 0.43 mg/dL (L)).  No results found for: \"AMMONIA\"    No results found for: \"HGBA1C\", \"POCGLU\"  No results found for: \"HGBA1C\"  No results found for: \"TSH\", \"FREET4\"    Blood Culture   Date Value Ref Range Status   09/26/2024 No growth at 2 days  Preliminary   09/26/2024 No growth at 2 days  Preliminary     Urine Culture   Date Value Ref Range Status   09/26/2024 >100,000 CFU/mL Escherichia coli ESBL (A)  Final     No results found for: \"WOUNDCX\"  No results found for: \"STOOLCX\"  No results found for: \"RESPCX\"  Pain Management Panel           No data to display              I have personally reviewed the above laboratory results. "   ---------------------------------------------------------------------------------------------------------------------  Imaging Results (Last 7 Days)       Procedure Component Value Units Date/Time    XR Ankle 3+ View Left [[id number]] Collected: 09/26/24 1537     Updated: 09/26/24 1540    Narrative:      EXAM:    XR Left Ankle Complete, 3 or More Views     EXAM DATE:    9/26/2024 3:17 PM     CLINICAL HISTORY:    left ankle injury     TECHNIQUE:    Frontal, lateral and oblique views of the left ankle.     COMPARISON:    No relevant prior studies available.     FINDINGS:    Bones/joints:  See below.      Soft tissues:  Soft tissue swelling, but no acute fracture or  dislocation.       Impression:        Soft tissue swelling, but no acute fracture or dislocation.        This report was finalized on 9/26/2024 3:38 PM by Dr. Moses Otto MD.       XR Foot 3+ View Left [[id number]] Collected: 09/26/24 1536     Updated: 09/26/24 1539    Narrative:      EXAM:    XR Left Foot Complete, 3 or More Views     EXAM DATE:    9/26/2024 3:16 PM     CLINICAL HISTORY:    left foot wound     TECHNIQUE:    Frontal, lateral and oblique views of the left foot.     COMPARISON:    No relevant prior studies available.     FINDINGS:    Bones/joints:  Unremarkable.  No acute fracture.  No dislocation.    Soft tissues:  Unremarkable.  No radiopaque foreign body.       Impression:        No acute findings in the left foot.        This report was finalized on 9/26/2024 3:37 PM by Dr. Moses Otto MD.       XR Chest 1 View [[id number]] Collected: 09/26/24 1406     Updated: 09/26/24 1408    Narrative:      XR CHEST 1 VW-     CLINICAL INDICATION: weakness        COMPARISON: None immediately available      TECHNIQUE: Single frontal view of the chest.     FINDINGS:     LUNGS: Lungs are adequately aerated.      HEART AND MEDIASTINUM: Heart and mediastinal contours are unremarkable        SKELETON: Bony and soft tissue structures are unremarkable.              Impression:      No radiographic evidence of acute cardiac or pulmonary disease.           This report was finalized on 9/26/2024 2:06 PM by Dr. Moses Otto MD.       CT Cervical Spine Without Contrast [[id number]] Collected: 09/26/24 1317     Updated: 09/26/24 1319    Narrative:      CT CERVICAL SPINE WO CONTRAST-     CLINICAL INDICATION: neck pain        COMPARISON: None available     TECHNIQUE: Axial images of the cervical spine were acquired with out any  intravenous contrast. Reformatted images were then created in the  sagittal and coronal planes.     DOSE:      Radiation dose reduction techniques were utilized per ALARA protocol.  Automated exposure control was initiated through either or [employer] or  [employer] software packages by  protocol.           FINDINGS:   The provided study demonstrates preservation of the vertebral body  heights in the sagittally reconstructed images.     There is no prevertebral soft tissue swelling.     Alignment is near anatomic.     The disc space heights are uniform.     I see no acute cervical spine fracture.       Impression:         1. No acute bony abnormality.     2. Other incidental findings as above     This report was finalized on 9/26/2024 1:17 PM by Dr. Moses Otto MD.       CT Lumbar Spine Without Contrast [[id number]] Collected: 09/26/24 1317     Updated: 09/26/24 1319    Narrative:      EXAM:    CT Lumbar Spine Without Intravenous Contrast     EXAM DATE:    9/26/2024 12:59 PM     CLINICAL HISTORY:    back pain     TECHNIQUE:    Axial computed tomography images of the lumbar spine without  intravenous contrast.  Sagittal and coronal reformatted images were  created and reviewed.  This CT exam was performed using one or more of  the following dose reduction techniques:  automated exposure control,  adjustment of the mA and/or kV according to patient size, and/or use of  iterative reconstruction technique.     COMPARISON:    No relevant prior  studies available.     FINDINGS:    VERTEBRAE:  Unremarkable.  No acute fracture.    DISCS/SPINAL CANAL/NEURAL FORAMINA:  No acute findings.  No spinal  canal stenosis.    SOFT TISSUES:  Unremarkable.       Impression:        No acute findings in the lumbar spine.        This report was finalized on 9/26/2024 1:17 PM by Dr. Moses Otto MD.       CT Thoracic Spine Without Contrast [[id number]] Collected: 09/26/24 1316     Updated: 09/26/24 1319    Narrative:      EXAM:    CT Thoracic Spine Without Intravenous Contrast     EXAM DATE:    9/26/2024 12:58 PM     CLINICAL HISTORY:    back pain     TECHNIQUE:    Axial computed tomography images of the thoracic spine without  intravenous contrast.  Sagittal and coronal reformatted images were  created and reviewed.  This CT exam was performed using one or more of  the following dose reduction techniques:  automated exposure control,  adjustment of the mA and/or kV according to patient size, and/or use of  iterative reconstruction technique.     COMPARISON:    No relevant prior studies available.     FINDINGS:    VERTEBRAE:  Unremarkable.  No acute fracture.    DISCS/SPINAL CANAL/NEURAL FORAMINA:  No acute findings.  No spinal  canal stenosis.    SOFT TISSUES:  Unremarkable.       Impression:        No acute findings in the thoracic spine.        This report was finalized on 9/26/2024 1:17 PM by Dr. Moses Otto MD.       CT Head Without Contrast [[id number]] Collected: 09/26/24 1259     Updated: 09/26/24 1302    Narrative:      CT HEAD WO CONTRAST-     CLINICAL INDICATION: weakness        COMPARISON: None available     TECHNIQUE: Axial images of the brain were obtained with out intravenous  contrast.  Reformatted images were created in the sagittal and coronal  planes.     DOSE:     Radiation dose reduction techniques were utilized per ALARA protocol.  Automated exposure control was initiated through either or [employer] or  [employer] software packages by   protocol.        FINDINGS:    BRAIN: Probable subacute ischemia right middle cerebral artery  territory    VENTRICLES:  Unremarkable.  No ventriculomegaly.       BONES/JOINTS:  Unremarkable.  No acute fracture.       SOFT TISSUES:  Unremarkable.       SINUSES:  no air fluid levels       MASTOID AIR CELLS:  Unremarkable as visualized.  No mastoid effusion.          Impression:         1. Probable remote right middle cerebral artery infarction  2. No acute parenchymal mass, hemorrhage, or midline shift     This report was finalized on 9/26/2024 1:00 PM by Dr. Moses Otto MD.             I have personally reviewed the above radiology results.   ---------------------------------------------------------------------------------------------------------------------      Pertinent Infectious Disease Results          Assessment & Plan      Assessment        ESBL UTI      Plan      Patient presented to [facility] Emergency Department on 9/26/2024 for evaluation after sliding into the floor due to weakness.  WBC 7.38.  Chlamydia gonorrhea and trichomonas negative.  CRP 3.80.  Urinalysis positive with culture finalizing is greater than 100,000 colonies of E. coli ESBL.  Blood cultures from 9/26/2024 show no growth thus far.  HIV 1 and 2 nonreactive.  Hepatitis panel nonreactive.  COVID-19 and influenza PCR negative.  Lactic acid normal on admission.    Patient resting in bed.  Denies dysuria, urgency, frequency.  Patient reports recurrent yeast infections.  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  Lungs clear to auscultation bilaterally.    Recommend to continue current antibiotic regimen of ertapenem 1 g IV every 24 hours x 7 days for treatment of ESBL UTI.  Okay to continue home suppressive acyclovir for history of genital herpes.  We will continue to follow closely and adjust antibiotic therapy as needed.        ANTIMICROBIAL THERAPY    acyclovir - 200 MG, 400 MG  ertapenem (INVanz) 1000 mg in 100 mL NS  (VTB)       Again, thank you Dr. Marc for allowing us to participate in the care of your patient and please feel free to call for any questions you may have.        Code Status:     Code Status and Medical Interventions: CPR (Attempt to Resuscitate); Full Support   Ordered at: 24 1626     Code Status (Patient has no pulse and is not breathing):    CPR (Attempt to Resuscitate)     Medical Interventions (Patient has pulse or is breathing):    Full Support         YUE Rivera  24  14:36 EDT    Electronically signed by Cuca Yuen APRN at 24 1532          Physical Therapy Notes (most recent note)        Thierry Saldivar, PT at 10/04/24 1134  Version 1 of 1         Acute Care - Physical Therapy Treatment Note   [facility]     Patient Name: Lety Johnson  : 1980  MRN: [id number]  Today's Date: 10/4/2024   Onset of Illness/Injury or Date of Surgery: 24  Visit Dx:     ICD-10-CM ICD-9-CM   1. Hypokalemia  E87.6 276.8   2. Pressure injury of skin of sacral region, unspecified injury stage  L89.159 707.03     707.20   3. Pressure injury of skin of left heel, unspecified injury stage  L89.629 707.07     707.20   4. Acute cystitis without hematuria  N30.00 595.0     Patient Active Problem List   Diagnosis   (none) - all problems resolved or deleted     Past Medical History:   Diagnosis Date    Stroke      History reviewed. No pertinent surgical history.  PT Assessment (Last 12 Hours)       PT Evaluation and Treatment       Row Name 10/04/24 1128          Physical Therapy Time and Intention    Subjective Information complains of;weakness;pain  -KM     Document Type therapy note (daily note)  -KM     Mode of Treatment physical therapy  -KM     Patient Effort good  -KM     Symptoms Noted During/After Treatment fatigue;increased pain  -KM       Row Name 10/04/24 1128          General Information    Patient Profile Reviewed yes  -KM     Patient Observations alert;cooperative;agree to therapy   -KM     Existing Precautions/Restrictions fall  -KM       Row Name 10/04/24 1128          Cognition    Affect/Mental Status (Cognition) emotionally labile  -KM     Orientation Status (Cognition) oriented x 3  -KM     Follows Commands (Cognition) WFL  -KM       Row Name 10/04/24 1128          Bed Mobility    Bed Mobility supine-sit;sit-supine  -KM     Supine-Sit [place] (Bed Mobility) maximum assist (25% patient effort)  -KM     Sit-Supine [place] (Bed Mobility) maximum assist (25% patient effort)  -KM     Bed Mobility, Safety Issues decreased use of arms for pushing/pulling;decreased use of legs for bridging/pushing;impaired trunk control for bed mobility  -KM     Assistive Device (Bed Mobility) bed rails;draw sheet;head of bed elevated  -       Row Name 10/04/24 1128          Transfers    Transfers sit-stand transfer;stand-sit transfer  -       Row Name 10/04/24 1128          Sit-Stand Transfer    Sit-Stand [place] (Transfers) maximum assist (25% patient effort);2 person assist  -KM     Comment, (Sit-Stand Transfer) Pt. unable to complete full stand, but able to lift bottom off of bed w/ bilateral knee blocks; x2 attempts  -       Row Name 10/04/24 1128          Stand-Sit Transfer    Stand-Sit [place] (Transfers) maximum assist (25% patient effort);2 person assist  -       Row Name 10/04/24 1128          Gait/Stairs (Locomotion)    Patient was able to Ambulate no, other medical factors prevent ambulation  -KM     Reason Patient was unable to Ambulate Excessive Weakness;Non-Ambulatory at Baseline  -       Row Name 10/04/24 1128          Safety Issues, Functional Mobility    Impairments Affecting Function (Mobility) balance;endurance/activity tolerance;pain;range of motion (ROM);strength  -       Row Name 10/04/24 1128          Balance    Balance Interventions sitting;dynamic;dynamic reaching;UE activity with balance activity  -KM     Comment, Balance pt. able to scoot self towards HOB  w/ Neelima and extra time  -KM       Row Name 10/04/24 1128          Motor Skills    Comments, Therapeutic Exercise EOB ther-ex  -KM       Row Name             Wound 09/26/24 1806 gluteal    Wound - Properties Group Placement Date: 09/26/24  -KJ Placement Time: 1806  -KJ Location: gluteal  -KJ    Retired Wound - Properties Group Placement Date: 09/26/24  -KJ Placement Time: 1806  -KJ Location: gluteal  -KJ    Retired Wound - Properties Group Date first assessed: 09/26/24  -KJ Time first assessed: 1806  -KJ Location: gluteal  -KJ      Row Name             Wound 09/26/24 1807 Left gluteal    Wound - Properties Group Placement Date: 09/26/24  -KJ Placement Time: 1807  -KJ Side: Left  -KJ Location: gluteal  -KJ    Retired Wound - Properties Group Placement Date: 09/26/24  -KJ Placement Time: 1807  -KJ Side: Left  -KJ Location: gluteal  -KJ    Retired Wound - Properties Group Date first assessed: 09/26/24  -KJ Time first assessed: 1807  -KJ Side: Left  -KJ Location: gluteal  -KJ      Row Name             Wound 09/26/24 1809 gluteal    Wound - Properties Group Placement Date: 09/26/24  -KJ Placement Time: 1809  -KJ Location: gluteal  -KJ    Retired Wound - Properties Group Placement Date: 09/26/24  -KJ Placement Time: 1809  -KJ Location: gluteal  -KJ    Retired Wound - Properties Group Date first assessed: 09/26/24  -KJ Time first assessed: 1809  -KJ Location: gluteal  -KJ      Row Name             Wound 09/26/24 1809 Left posterior ankle    Wound - Properties Group Placement Date: 09/26/24  -KJ Placement Time: 1809  -KJ Side: Left  -KJ Orientation: posterior  -KJ Location: ankle  -KJ    Retired Wound - Properties Group Placement Date: 09/26/24  -KJ Placement Time: 1809  -KJ Side: Left  -KJ Orientation: posterior  -KJ Location: ankle  -KJ    Retired Wound - Properties Group Date first assessed: 09/26/24  -KJ Time first assessed: 1809  -KJ Side: Left  -KJ Location: ankle  -KJ      Row Name             Wound 09/29/24 0952  Bilateral anterior thigh MASD (Moisture associated skin damage)    Wound - Properties Group Placement Date: 09/29/24  -LB Placement Time: 0952 -LB Present on Original Admission: Y  -LB Side: Bilateral  -LB Orientation: anterior  -LB Location: thigh  -LB Primary Wound Type: MASD  -LB    Retired Wound - Properties Group Placement Date: 09/29/24  -LB Placement Time: 0952 -LB Present on Original Admission: Y  -LB Side: Bilateral  -LB Orientation: anterior  -LB Location: thigh  -LB Primary Wound Type: MASD  -LB    Retired Wound - Properties Group Date first assessed: 09/29/24  -LB Time first assessed: 0952 -LB Present on Original Admission: Y  -LB Side: Bilateral  -LB Location: thigh  -LB Primary Wound Type: MASD  -LB      Row Name 10/04/24 1128          Progress Summary (PT)    Daily Progress Summary (PT) Pt. was able to demonstrate improved activity tolerance compared to prior session. She was able to perform bed mobility w/ maxA x2. She was able to tolerate sitting EOB for increased time. Pt. effort was much improved compared to prior session. Pt. would continue to benefit from skilled PT services as she is able to give consistent effort.  -ANKUR               User Key  (r) = Recorded By, (t) = Taken By, (c) = Cosigned By      Initials Name Provider Type    Thierry Carter, PT Physical Therapist    Nory Keenan, RN Registered Nurse    Zehra Rivera, RN Registered Nurse                    Physical Therapy Education       Title: PT OT SLP Therapies (In Progress)       Topic: Physical Therapy (In Progress)       Point: Mobility training (In Progress)       Learning Progress Summary             Patient Acceptance, E, NR by RD at 10/2/2024 1101    Acceptance, E, NR by RD at 10/1/2024 0856    Acceptance, E,D, VU,NR by AG at 9/30/2024 1559                         Point: Home exercise program (In Progress)       Learning Progress Summary             Patient Acceptance, E, NR by RD at 10/2/2024 1101    Acceptance,  E, NR by RD at 10/1/2024 0856    Acceptance, E,D, VU,NR by AG at 9/30/2024 1559                         Point: Body mechanics (In Progress)       Learning Progress Summary             Patient Acceptance, E, NR by RD at 10/2/2024 1101    Acceptance, E, NR by RD at 10/1/2024 0856    Acceptance, E,D, VU,NR by AG at 9/30/2024 1559                         Point: Precautions (In Progress)       Learning Progress Summary             Patient Acceptance, E, NR by RD at 10/2/2024 1101    Acceptance, E, NR by RD at 10/1/2024 0856    Acceptance, E,D, VU,NR by AG at 9/30/2024 1559                                         User Key       Initials Effective Dates Name Provider Type Discipline     06/16/21 -  Мария Joya, PT Physical Therapist PT     06/16/21 -  Laisha Verdugo, RN Registered Nurse Nurse                  PT Recommendation and Plan     Progress Summary (PT)  Daily Progress Summary (PT): Pt. was able to demonstrate improved activity tolerance compared to prior session. She was able to perform bed mobility w/ maxA x2. She was able to tolerate sitting EOB for increased time. Pt. effort was much improved compared to prior session. Pt. would continue to benefit from skilled PT services as she is able to give consistent effort.       Time Calculation:    PT Charges       Row Name 10/04/24 1128             Time Calculation    PT Received On 10/04/24  -KM         Time Calculation- PT    Total Timed Code Minutes- PT 25 minute(s)  -KM                User Key  (r) = Recorded By, (t) = Taken By, (c) = Cosigned By      Initials Name Provider Type    Thierry Carter, PT Physical Therapist                  Therapy Charges for Today       Code Description Service Date Service Provider Modifiers Qty    25345757054 HC PT THERAPEUTIC ACT EA 15 MIN 10/3/2024 Thierry Saldivar, PT GP 1    66120112560 HC PT THER PROC EA 15 MIN 10/3/2024 Thierry Saldivar, PT GP 1    27232435852 HC PT THER PROC EA 15 MIN 10/4/2024 Thierry Saldivar, PT GP 1     [id number]  PT THERAPEUTIC ACT EA 15 MIN 10/4/2024 Thierry Saldivar, PT GP 1            PT G-Codes  AM-PAC 6 Clicks Score (PT): 6    Thierry Saldivar, PT  10/4/2024      Electronically signed by Thierry Saldivar, PT at 10/04/24 1134          Occupational Therapy Notes (most recent note)        Loida Flaherty, OT at 10/04/24 1222          Acute Care - Occupational Therapy Treatment Note  LILLIANA [facility]     Patient Name: Lety Johnson  : 1980  MRN: [id number]  Today's Date: 10/4/2024  Onset of Illness/Injury or Date of Surgery: 24     Referring Physician: Dr. Marc    Admit Date: 2024       ICD-10-CM ICD-9-CM   1. Hypokalemia  E87.6 276.8   2. Pressure injury of skin of sacral region, unspecified injury stage  L89.159 707.03     707.20   3. Pressure injury of skin of left heel, unspecified injury stage  L89.629 707.07     707.20   4. Acute cystitis without hematuria  N30.00 595.0     Patient Active Problem List   Diagnosis   (none) - all problems resolved or deleted     Past Medical History:   Diagnosis Date    Stroke      History reviewed. No pertinent surgical history.      OT ASSESSMENT FLOWSHEET (Last 12 Hours)       OT Evaluation and Treatment       Row Name 10/04/24 1215                   OT Time and Intention    Subjective Information complains of;pain  -LA        Document Type therapy note (daily note)  -LA        Mode of Treatment occupational therapy  -LA        Patient Effort good  -LA           General Information    Patient Profile Reviewed yes  -LA        General Observations of Patient Patient agreeable to therapy this date. Patient continues to be very fearful of movement/transfers. Discussed different options this date with patient. Patient engaged in bed mobility, sliding/scooting up side of bed and sit to stand. Patient continues to complain of significant pain in left UE even when arm is supported throughout movement.  -LA        Existing Precautions/Restrictions fall  -LA            Cognition    Affect/Mental Status (Cognition) emotionally labile  -LA        Orientation Status (Cognition) oriented x 4  -LA        Follows Commands (Cognition) WFL  -LA           Bed Mobility    Rolling Left [place] (Bed Mobility) maximum assist (25% patient effort)  -LA        Rolling Right [place] (Bed Mobility) moderate assist (50% patient effort)  -LA        Supine-Sit [place] (Bed Mobility) maximum assist (25% patient effort)  -LA        Sit-Supine [place] (Bed Mobility) maximum assist (25% patient effort);2 person assist  -LA           Sit-Stand Transfer    Sit-Stand [place] (Transfers) maximum assist (25% patient effort);2 person assist  -LA           Motor Skills    Motor Skills coordination;functional endurance  -LA        Neuromuscular Function WFL  -LA        Motor Control/Coordination Interventions occupation/activity based treatment  -LA           Balance    Static Sitting Balance supervision  -LA        Dynamic Sitting Balance minimal assist  -LA           Wound 09/26/24 1806 gluteal    Wound - Properties Group Placement Date: 09/26/24  -KJ Placement Time: 1806  -KJ Location: gluteal  -KJ    Retired Wound - Properties Group Placement Date: 09/26/24  -KJ Placement Time: 1806  -KJ Location: gluteal  -KJ    Retired Wound - Properties Group Date first assessed: 09/26/24  -KJ Time first assessed: 1806  -KJ Location: gluteal  -KJ       Wound 09/26/24 1807 Left gluteal    Wound - Properties Group Placement Date: 09/26/24  -KJ Placement Time: 1807  -KJ Side: Left  -KJ Location: gluteal  -KJ    Retired Wound - Properties Group Placement Date: 09/26/24  -KJ Placement Time: 1807  -KJ Side: Left  -KJ Location: gluteal  -KJ    Retired Wound - Properties Group Date first assessed: 09/26/24  -KJ Time first assessed: 1807  -KJ Side: Left  -KJ Location: gluteal  -KJ       Wound 09/26/24 1809 gluteal    Wound - Properties Group Placement Date: 09/26/24  -KJ Placement Time: 1809  -KJ Location:  gluteal  -KJ    Retired Wound - Properties Group Placement Date: 09/26/24  -KJ Placement Time: 1809 -KJ Location: gluteal  -KJ    Retired Wound - Properties Group Date first assessed: 09/26/24  -KJ Time first assessed: 1809 -KJ Location: gluteal  -KJ       Wound 09/26/24 1809 Left posterior ankle    Wound - Properties Group Placement Date: 09/26/24  -KJ Placement Time: 1809 -KJ Side: Left  -KJ Orientation: posterior  -KJ Location: ankle  -KJ    Retired Wound - Properties Group Placement Date: 09/26/24  -KJ Placement Time: 1809  -KJ Side: Left  -KJ Orientation: posterior  -KJ Location: ankle  -KJ    Retired Wound - Properties Group Date first assessed: 09/26/24  -KJ Time first assessed: 1809 -KJ Side: Left  -KJ Location: ankle  -KJ       Wound 09/29/24 0952 Bilateral anterior thigh MASD (Moisture associated skin damage)    Wound - Properties Group Placement Date: 09/29/24  -LB Placement Time: 0952 -LB Present on Original Admission: Y  -LB Side: Bilateral  -LB Orientation: anterior  -LB Location: thigh  -LB Primary Wound Type: MASD  -LB    Retired Wound - Properties Group Placement Date: 09/29/24  -LB Placement Time: 0952  -LB Present on Original Admission: Y  -LB Side: Bilateral  -LB Orientation: anterior  -LB Location: thigh  -LB Primary Wound Type: MASD  -LB    Retired Wound - Properties Group Date first assessed: 09/29/24  -LB Time first assessed: 0952  -LB Present on Original Admission: Y  -LB Side: Bilateral  -LB Location: thigh  -LB Primary Wound Type: MASD  -LB       Plan of Care Review    Plan of Care Reviewed With patient  -LA        Progress improving  -LA           Positioning and Restraints    Pre-Treatment Position in bed  -LA        Post Treatment Position bed  -LA        In Bed sitting EOB;call light within reach;encouraged to call for assist;with family/caregiver  -LA                  User Key  (r) = Recorded By, (t) = Taken By, (c) = Cosigned By      Initials Name Effective Dates    LAURA Ledezma  "FIFI Cueto 23 -     Zehra Rivera RN 10/20/21 -     Loida Richard OT 22 -                            OT Recommendation and Plan     Plan of Care Review  Plan of Care Reviewed With: patient  Progress: improving  Plan of Care Reviewed With: patient        Time Calculation:     Therapy Charges for Today       Code Description Service Date Service Provider Modifiers Qty    13564328250 HC OT THER PROC EA 15 MIN 10/3/2024 Loida Flaherty OT GO 1    31215543824 HC OT THERAPEUTIC ACT EA 15 MIN 10/3/2024 Loida Flaherty OT GO 1    68421598212 HC OT THER PROC EA 15 MIN 10/4/2024 Loida Flaherty OT GO 1                 Loida Flahetry OT  10/4/2024    Electronically signed by Loida Flaherty OT at 10/04/24 1222          Speech Language Pathology Notes (most recent note)        Hafsa Melendez MA,CCC-SLP at 24 1101          Acute Care - Speech Language Pathology   Swallow Initial Evaluation  [place]  Clinical Dysphagia Assessment     Patient Name: Lety Johnson  : 1980  MRN: [id number]  Today's Date: 2024             Admit Date: 2024    Visit Dx:     ICD-10-CM ICD-9-CM   1. Hypokalemia  E87.6 276.8   2. Pressure injury of skin of sacral region, unspecified injury stage  L89.159 707.03     707.20   3. Pressure injury of skin of left heel, unspecified injury stage  L89.629 707.07     707.20   4. Acute cystitis without hematuria  N30.00 595.0     Patient Active Problem List   Diagnosis    UTI (urinary tract infection)     Past Medical History:   Diagnosis Date    Stroke      History reviewed. No pertinent surgical history.    Lety Johnson  was seen at bedside this AM on 3S Rm. 3315-1s to assess safety/efficacy of swallowing fnx, determine safest/least restrictive diet tolerance.      Per report: pt \"is a 43 year old female patient who presents to the ER with chief complaint of back pain. 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 The " "patient had been living with her fiance who is in senior living now. Her sister has been trying to help care for her. Today, she was in the floor and unable to get up so they called EMS for lift assist. Patient's sister and patient want the patient to go into a rehab facility for strengthening. She also complains of low back pain.\"    Social History     Socioeconomic History    Marital status: Single   Tobacco Use    Smoking status: Every Day     Average packs/day: 2.0 packs/day for 26.0 years (52.0 ttl pk-yrs)     Types: Cigarettes     Start date: 1998    Smokeless tobacco: Never   Vaping Use    Vaping status: Never Used   Substance and Sexual Activity    Alcohol use: Not Currently    Drug use: Never    Sexual activity: Not Currently     Partners: Male     Comment:  in penitentiary        Imaging:  No recent chest imaging    Labs:  Sodium  142  09/30 0117  Potassium  3.5  09/30 0117  Chloride  110  09/30 0117  CO2  22.8  09/30 0117  BUN  5  09/30 0117  Creatinine  0.46  09/30 0117  Glucose  196  09/30 0117  Hemoglobin  10.5  09/30 0117  Hematocrit  32.7  09/30 0117  WBC  8.49  09/30 0117  Platelets  285  09/30 0117  Diet Orders (active) (From admission, onward)       Start     Ordered    09/28/24 1800  Dietary Nutrition Supplements Boost Plus (Ensure Enlive, Ensure Plus)  Daily With Breakfast & Dinner       09/28/24 1209    09/26/24 1748  Diet: Regular/House; Fluid Consistency: Thin (IDDSI 0)  Diet Effective Now         09/26/24 1747                  Pt is observed on RA.     Patient was positioned upright and centered in bed to accept multiple po presentations of ice chips, solid cracker, puree, and thin liquids via spoon, cup, and straw. Patient was able to self provide po trials.     Facial/oral structures were slightly asymmetrical upon observation. Pt with residual L side weakness from previous CVA. Lingual protrusion revealed no deviation. Oral mucosa were moist, pink, and clean. Secretions were clear, thin, and well " controlled. OROM/ENDER was wfl to imitate oral postures. Gag is not assessed. Volitional cough was intact w/ adequate  intensity, clear in quality, non-productive. Voice was adequate in intensity, clear in quality w/ intelligible speech.    Upon po presentations, adequate bolus anticipation and acceptance w/ good labial seal for bolus clearance via spoon bowl, cup rim stability and suction via straw. Bolus formation, manipulation and control were wfl w/ rotary mastication pattern. A-p transit was timely w/o significant oral residue appreciated. No overt s/s aspiration before the swallow.      Pharyngeal swallow was timely w/ adequate hyolaryngeal elevation per palpation. No overt s/s aspiration evidenced across this evaluation. No silent aspiration suspected. Patient denied odynophagia.    Pt reports difficulty swallowing large pills only.     Impression: Patient presented w/ wfl oropharyngeal swallow w/o s/s aspiration. No s/s indicative of silent aspiration. No odynophagia reported.      SLP Recommendation and Plan   1. Regular consistencies, thin liquids.    2. Medications whole in puree/thins. Crush PRN.  3. Upright and centered for all po intake.  4. MO precautions.  5. Oral care protocol.    No further formal SLP f/u warranted/recommended at this time.    D/w patient results and recommendations w/ verbal agreement.    D/w RN results and recommendations w/ verbal agreement.    Thank you for allowing me to participate in the care of your patient-  Hafsa Melendez M.A., CCC-SLP     EDUCATION  The patient has been educated in the following areas:   Oral Care/Hydration.      Time Calculation:     Therapy Charges for Today       Code Description Service Date Service Provider Modifiers Qty    25699122120  ST EVAL ORAL PHARYNG SWALLOW 4 9/30/2024 Hafsa Melendez MA,CCC-SLP GN 1          Hafsa Melendez MA,CCC-SLP  9/30/2024    Electronically signed by Hafsa Melendez MA,CCC-SLP at 09/30/24 3629        ADL Documentation (most recent)      Flowsheet Row Most Recent Value   Transferring 4 - completely dependent   Toileting 3 - assistive equipment and person   Bathing 2 - assistive person   Dressing 2 - assistive person   Eating 2 - assistive person   Communication 0 - understands/communicates without difficulty   Swallowing 0 - swallows foods/liquids without difficulty   Equipment Currently Used at Home hospital bed, lift device, wheelchair

## 2024-10-04 NOTE — THERAPY TREATMENT NOTE
Acute Care - Occupational Therapy Treatment Note   [facility]     Patient Name: Lety Johnson  : 1980  MRN: [id number]  Today's Date: 10/4/2024  Onset of Illness/Injury or Date of Surgery: 24     Referring Physician: Dr. Marc    Admit Date: 2024       ICD-10-CM ICD-9-CM   1. Hypokalemia  E87.6 276.8   2. Pressure injury of skin of sacral region, unspecified injury stage  L89.159 707.03     707.20   3. Pressure injury of skin of left heel, unspecified injury stage  L89.629 707.07     707.20   4. Acute cystitis without hematuria  N30.00 595.0     Patient Active Problem List   Diagnosis   (none) - all problems resolved or deleted     Past Medical History:   Diagnosis Date    Stroke      History reviewed. No pertinent surgical history.      OT ASSESSMENT FLOWSHEET (Last 12 Hours)       OT Evaluation and Treatment       Row Name 10/04/24 1215                   OT Time and Intention    Subjective Information complains of;pain  -LA        Document Type therapy note (daily note)  -LA        Mode of Treatment occupational therapy  -LA        Patient Effort good  -LA           General Information    Patient Profile Reviewed yes  -LA        General Observations of Patient Patient agreeable to therapy this date. Patient continues to be very fearful of movement/transfers. Discussed different options this date with patient. Patient engaged in bed mobility, sliding/scooting up side of bed and sit to stand. Patient continues to complain of significant pain in left UE even when arm is supported throughout movement.  -LA        Existing Precautions/Restrictions fall  -LA           Cognition    Affect/Mental Status (Cognition) emotionally labile  -LA        Orientation Status (Cognition) oriented x 4  -LA        Follows Commands (Cognition) WFL  -LA           Bed Mobility    Rolling Left [place] (Bed Mobility) maximum assist (25% patient effort)  -LA        Rolling Right [place] (Bed Mobility) moderate  assist (50% patient effort)  -LA        Supine-Sit [place] (Bed Mobility) maximum assist (25% patient effort)  -LA        Sit-Supine [place] (Bed Mobility) maximum assist (25% patient effort);2 person assist  -LA           Sit-Stand Transfer    Sit-Stand [place] (Transfers) maximum assist (25% patient effort);2 person assist  -LA           Motor Skills    Motor Skills coordination;functional endurance  -LA        Neuromuscular Function WFL  -LA        Motor Control/Coordination Interventions occupation/activity based treatment  -LA           Balance    Static Sitting Balance supervision  -LA        Dynamic Sitting Balance minimal assist  -LA           Wound 09/26/24 1806 gluteal    Wound - Properties Group Placement Date: 09/26/24  -KJ Placement Time: 1806  -KJ Location: gluteal  -KJ    Retired Wound - Properties Group Placement Date: 09/26/24  -KJ Placement Time: 1806  -KJ Location: gluteal  -KJ    Retired Wound - Properties Group Date first assessed: 09/26/24  -KJ Time first assessed: 1806  -KJ Location: gluteal  -KJ       Wound 09/26/24 1807 Left gluteal    Wound - Properties Group Placement Date: 09/26/24  -KJ Placement Time: 1807  -KJ Side: Left  -KJ Location: gluteal  -KJ    Retired Wound - Properties Group Placement Date: 09/26/24  -KJ Placement Time: 1807  -KJ Side: Left  -KJ Location: gluteal  -KJ    Retired Wound - Properties Group Date first assessed: 09/26/24  -KJ Time first assessed: 1807  -KJ Side: Left  -KJ Location: gluteal  -KJ       Wound 09/26/24 1809 gluteal    Wound - Properties Group Placement Date: 09/26/24  -KJ Placement Time: 1809  -KJ Location: gluteal  -KJ    Retired Wound - Properties Group Placement Date: 09/26/24  -KJ Placement Time: 1809  -KJ Location: gluteal  -KJ    Retired Wound - Properties Group Date first assessed: 09/26/24  -KJ Time first assessed: 1809  -KJ Location: gluteal  -KJ       Wound 09/26/24 1809 Left posterior ankle    Wound - Properties Group Placement  Date: 09/26/24  -KJ Placement Time: 1809 -KJ Side: Left  -KJ Orientation: posterior  -KJ Location: ankle  -KJ    Retired Wound - Properties Group Placement Date: 09/26/24  -KJ Placement Time: 1809 -KJ Side: Left  -KJ Orientation: posterior  -KJ Location: ankle  -KJ    Retired Wound - Properties Group Date first assessed: 09/26/24  -KJ Time first assessed: 1809  -KJ Side: Left  -KJ Location: ankle  -KJ       Wound 09/29/24 0952 Bilateral anterior thigh MASD (Moisture associated skin damage)    Wound - Properties Group Placement Date: 09/29/24  -LB Placement Time: 0952  -LB Present on Original Admission: Y  -LB Side: Bilateral  -LB Orientation: anterior  -LB Location: thigh  -LB Primary Wound Type: MASD  -LB    Retired Wound - Properties Group Placement Date: 09/29/24  -LB Placement Time: 0952 -LB Present on Original Admission: Y  -LB Side: Bilateral  -LB Orientation: anterior  -LB Location: thigh  -LB Primary Wound Type: MASD  -LB    Retired Wound - Properties Group Date first assessed: 09/29/24  -LB Time first assessed: 0952 -LB Present on Original Admission: Y  -LB Side: Bilateral  -LB Location: thigh  -LB Primary Wound Type: MASD  -LB       Plan of Care Review    Plan of Care Reviewed With patient  -LA        Progress improving  -LA           Positioning and Restraints    Pre-Treatment Position in bed  -LA        Post Treatment Position bed  -LA        In Bed sitting EOB;call light within reach;encouraged to call for assist;with family/caregiver  -LA                  User Key  (r) = Recorded By, (t) = Taken By, (c) = Cosigned By      Initials Name Effective Dates    Nory Keenan RN 06/08/23 -     Zehra Rivera RN 10/20/21 -     Loida Richard OT 02/14/22 -                            OT Recommendation and Plan     Plan of Care Review  Plan of Care Reviewed With: patient  Progress: improving  Plan of Care Reviewed With: patient        Time Calculation:     Therapy Charges for Today       Code  Description Service Date Service Provider Modifiers Qty    29191008606 HC OT THER PROC EA 15 MIN 10/3/2024 Loida Flaherty OT GO 1    84196182509 HC OT THERAPEUTIC ACT EA 15 MIN 10/3/2024 Loida Flaherty OT GO 1    53120277088 HC OT THER PROC EA 15 MIN 10/4/2024 Loida Flaherty OT GO 1                 Loida Flaherty OT  10/4/2024

## 2024-10-04 NOTE — PROGRESS NOTES
Adult Nutrition  Assessment/PES    Patient Name:  Lety Johnson  YOB: 1980  MRN: [id number]  Admit Date:  9/26/2024    Assessment Date:  10/4/2024    Comments:  follow-up    Po intake 50% ave of meals.   Pt reports drinking most of boost plus provided with meals.    Pt should be able to meet needs with po and ONS.    Will cont to follow and monitor.      Reason for Assessment       Row Name 10/04/24 1622          Reason for Assessment    Reason For Assessment follow-up protocol     Diagnosis infection/sepsis;neurologic conditions  cystitis, met enceph, debility hx CVA                    Nutrition/Diet History       Row Name 10/04/24 1622          Nutrition/Diet History    Typical Intake (Food/Fluid/EN/PN) pt reports eating pretty well and drinking boost. declines any needs via phone call                    Labs/Tests/Procedures/Meds       Row Name 10/04/24 1623          Labs/Procedures/Meds    Lab Results Reviewed reviewed     Lab Results Comments glu 158-216        Diagnostic Tests/Procedures    Diagnostic Test/Procedure Reviewed reviewed        Medications    Pertinent Medications Reviewed reviewed     Pertinent Medications Comments glucophage                    Physical Findings       Row Name 10/04/24 1624          Physical Findings    Overall Physical Appearance multiple                      Nutrition Prescription Ordered       Row Name 10/04/24 1626          Nutrition Prescription PO    Current PO Diet Regular     Supplement Boost Plus (Ensure Enlive, Ensure Plus)     Supplement Frequency 3 times a day                    Evaluation of Received Nutrient/Fluid Intake       Row Name 10/04/24 1626          Fluid Intake Evaluation    Oral Fluid (mL) 360  17%        PO Evaluation    Number of Meals 7     % PO Intake 50                       Problem/Interventions:   Problem 1       Row Name 10/04/24 1627          Nutrition Diagnoses Problem 1    Problem 1 Other (comment)  Inadequate energy intake  related to cystitis, met eceph as evidenced po intake, ONS, skin                          Intervention Goal       Row Name 10/04/24 1627          Intervention Goal    General Meet nutritional needs for age/condition     PO Increase intake;PO intake (%)     PO Intake % 75 %                    Nutrition Intervention       Row Name 10/04/24 1627          Nutrition Intervention    RD/Tech Action Encourage intake;Follow Tx progress;Supplement provided                      Education/Evaluation       Row Name 10/04/24 1628          Education    Education No discharge needs identified at this time        Monitor/Evaluation    Monitor Per protocol;I&O;PO intake;Supplement intake;Pertinent labs;Weight;Skin status                     Electronically signed by:  Vee Van RD  10/04/24 16:28 EDT

## 2024-10-04 NOTE — DISCHARGE PLACEMENT REQUEST
"Lety Johnson (43 y.o. Female)       Date of Birth   1980    Social Security Number       Address   [address] [id number]    Home Phone   [phone]    MRN   [id number]       Hindu   Restorationist    Marital Status   Single                            Admission Date   9/26/24    Admission Type   Emergency    Admitting Provider   Ramon Marc MD    Attending Provider   Marv Navas DO    Department, Room/Bed   [address], 3315/1S       Discharge Date       Discharge Disposition       Discharge Destination                                 Attending Provider: Marv Navas DO    Allergies: No Known Allergies    Isolation: Contact   Infection: ESBL E coli (09/28/24)   Code Status: CPR    Ht: 166.4 cm (65.5\")   Wt: 110 kg (241 lb 6.5 oz)    Admission Cmt: None   Principal Problem: UTI (urinary tract infection) [N39.0]                   Active Insurance as of 9/26/2024       Primary Coverage       Payor Plan Insurance Group Employer/Plan Group    [place] [id number]       Payor Plan Address Payor Plan Phone Number Payor Plan Fax Number Effective Dates    [employer] [address] [address] [phone]  5/1/2024 - None Entered    [facility] [id number]         Subscriber Name Subscriber Birth Date Member ID       LETY JOHNSON 1980 [id number]                     Emergency Contacts        (Rel.) Home Phone Work Phone Mobile Phone    BriceMoises (Legal Guardian) -- -- [phone]              Emergency Contact Information       Name Relation Home Work Mobile    Moises Narvaez Legal Guardian   [phone]          Insurance Information                  [place] Phone: [phone]    Subscriber: Lety Johnson Subscriber#: [id number]    Group#: [id number] Precert#: --               History & Physical        Ramon Marc MD at 09/26/24 [address] [place] [address] " HOSPITALIST HISTORY AND PHYSICAL    Patient Identification:  Name:  Lety Johnson  Age:  43 y.o.  Sex:  female  :  1980  MRN:  [id number]   Admit Date: 2024   Visit Number:  [id number]  Room number:  404/04  Primary Care Physician:  Provider, No Known     Subjective     Chief complaint:    Chief Complaint   Patient presents with    Fall     History of presenting illness:   43F Morbid Obesity by BMI PMH History Genital Herpes, Cerebrovascular Accident 2024 complicated by L sided paralysis, presented to [facility] emergency room  after sliding into the floor off her chair due to weakness.  Upon arrival patient afebrile, heart rate 109, respiratory rate 18, blood pressure 146/101, satting 98% on room air. Labs showed WBC count 10K, lactate 1.1, CRP 3.8, potassium 2.7, magnesium 1.5, HCG negative, blood cultures pending. CXR no acute cardiopulmonary processes.  Xray ankle/foot without fracture or dislocation.  Emergency room provider gave antibiotics, pain medications, zofran, potassium replacement.  Hospital Medicine consulted for admission. Patient seen and examined in the emergency room, notes fevers chills at home a few days ago, recently has been on antibiotics for lower extremity cellulitis, has had some dysuria and suprapubic pain, denies current sexual activity, reports her fiance was taken to prison about a week ago and has been her primary caretaker, has had difficulty at home since prior stroke and caretakers not consistent, last 24hrs didn't have anyone to help her, sister endorses recent confusion/hallucinations beyond baseline, seeing dead family members, coinciding with onset of dysuria.  ---------------------------------------------------------------------------------------------------------------------   Review of Systems   Constitutional:  Positive for chills and fever.   HENT: Negative.     Eyes: Negative.    Respiratory:  Positive for shortness of breath.     Cardiovascular:  Positive for leg swelling. Negative for chest pain.   Gastrointestinal: Negative.    Endocrine: Negative.    Genitourinary:  Positive for dysuria.   Musculoskeletal: Negative.    Skin:  Positive for rash.   Allergic/Immunologic: Negative.    Neurological:  Positive for weakness.   Hematological: Negative.    Psychiatric/Behavioral:  Positive for hallucinations.      ---------------------------------------------------------------------------------------------------------------------   Past Medical History:   Diagnosis Date    Stroke      No past surgical history on file.  No family history on file.  Social History     Socioeconomic History    Marital status: Single     ---------------------------------------------------------------------------------------------------------------------   Allergies:  Patient has no known allergies.  ---------------------------------------------------------------------------------------------------------------------   Medications below are reported home medications pulling from within the system; at this time, these medications have not been reconciled unless otherwise specified and are in the verification process for further verifcation as current home medications.    Prior to Admission Medications       Prescriptions Last Dose Informant Patient Reported? Taking?    aspirin 81 MG chewable tablet Unknown  Yes No    Chew 1 tablet Daily.    atorvastatin (LIPITOR) 80 MG tablet Unknown  Yes No    Take 1 tablet by mouth Daily.    cyclobenzaprine (FLEXERIL) 10 MG tablet Unknown  Yes No    Take 1 tablet by mouth 3 (Three) Times a Day As Needed for Muscle Spasms.    DULoxetine (CYMBALTA) 60 MG capsule Unknown  Yes No    Take 1 capsule by mouth Daily.    lisinopril (PRINIVIL,ZESTRIL) 20 MG tablet Unknown  Yes No    Take 1 tablet by mouth Daily.    metFORMIN (GLUCOPHAGE) 1000 MG tablet Unknown  Yes No    Take 1 tablet by mouth 2 (Two) Times a Day With Meals.    pantoprazole  (PROTONIX) 40 MG EC tablet Unknown  Yes No    Take 1 tablet by mouth Daily.          Objective     Vital Signs:  Temp:  [98.2 °F (36.8 °C)] 98.2 °F (36.8 °C)  Heart Rate:  [] 118  Resp:  [18] 18  BP: (135-157)/() 140/79    Mean Arterial Pressure (Non-Invasive) for the past 24 hrs (Last 3 readings):   Noninvasive MAP (mmHg)   09/26/24 1645 90   09/26/24 1630 100   09/26/24 1615 103     SpO2:  [91 %-100 %] 94 %  on   ;   Device (Oxygen Therapy): room air  Body mass index is 43.26 kg/m².    Wt Readings from Last 3 Encounters:   09/26/24 120 kg (264 lb)   06/27/24 120 kg (264 lb)      ---------------------------------------------------------------------------------------------------------------------   Physical Exam:  Constitutional:  Well-developed and well-nourished. Older than stated age. No acute distress.      HENT:  Head:  Normocephalic and atraumatic.  Mouth:  Moist mucous membranes.    Eyes:  Conjunctivae and EOM are normal. No scleral icterus.    Neck:  Neck supple.  No JVD present.    Cardiovascular:  Normal rate, regular rhythm and normal heart sounds with no murmur.  Pulmonary/Chest:  No respiratory distress, no wheezes, no crackles, with normal breath sounds and good air movement.  Abdominal:  Soft. No distension and no tenderness.   Musculoskeletal:  No tenderness, and no deformity.  No red or swollen joints anywhere.    Neurological:  Alert and oriented to person, place, and time.  No cranial nerve deficit. Chronic L sided paralysis.    Skin:  Skin is warm and dry. No pallor. Significant intertriginous erythema under panus, consistent with yeast infection.   Peripheral vascular:  No clubbing, no cyanosis, trace edema.  Psychiatric: Appropriate mood and affect  Edited by: Ramon Marc MD at 9/26/2024 1701  ---------------------------------------------------------------------------------------------------------------------  EKG:  N/A    Telemetry:  normal sinus rhythm, no significant ST  "changes    I have personally looked at telemetry.    Last echocardiogram:  Ordered and pending   --------------------------------------------------------------------------------------------------------------------  Labs:  Results from last 7 days   Lab Units 09/26/24  1504 09/26/24  1350   LACTATE mmol/L  --  1.1   CRP mg/dL 3.80*  --    WBC 10*3/mm3  --  10.42   HEMOGLOBIN g/dL  --  12.5   HEMATOCRIT %  --  37.4   MCV fL  --  94.0   MCHC g/dL  --  33.4   PLATELETS 10*3/mm3  --  402         Results from last 7 days   Lab Units 09/26/24  1504   SODIUM mmol/L 140   POTASSIUM mmol/L 2.7*   MAGNESIUM mg/dL 1.5*   CHLORIDE mmol/L 99   CO2 mmol/L 28.9   BUN mg/dL 10   CREATININE mg/dL 0.51*   CALCIUM mg/dL 9.4   GLUCOSE mg/dL 220*   ALBUMIN g/dL 3.5   BILIRUBIN mg/dL 0.6   ALK PHOS U/L 119*   AST (SGOT) U/L 21   ALT (SGPT) U/L 8   Estimated Creatinine Clearance: 188.1 mL/min (A) (by C-G formula based on SCr of 0.51 mg/dL (L)).  No results found for: \"AMMONIA\"          No results found for: \"HGBA1C\", \"POCGLU\"  No results found for: \"TSH\", \"FREET4\"  No results found for: \"PREGTESTUR\", \"PREGSERUM\", \"HCG\", \"HCGQUANT\"  Pain Management Panel           No data to display              Brief Urine Lab Results  (Last result in the past 365 days)        Color   Clarity   Blood   Leuk Est   Nitrite   Protein   CREAT   Urine HCG        09/26/24 1419 Dark Yellow   Turbid   Trace   Moderate (2+)   Positive   30 mg/dL (1+)                 No results found for: \"BLOODCX\"  No results found for: \"URINECX\"  No results found for: \"WOUNDCX\"  No results found for: \"STOOLCX\"    I have personally looked at the labs and they are summarized above.  ----------------------------------------------------------------------------------------------------------------------  Detailed radiology reports for the last 24 hours:    Imaging Results (Last 24 Hours)       Procedure Component Value Units Date/Time    XR Ankle 3+ View Left [[id number]] Collected: " 09/26/24 1537     Updated: 09/26/24 1540    Narrative:      EXAM:    XR Left Ankle Complete, 3 or More Views     EXAM DATE:    9/26/2024 3:17 PM     CLINICAL HISTORY:    left ankle injury     TECHNIQUE:    Frontal, lateral and oblique views of the left ankle.     COMPARISON:    No relevant prior studies available.     FINDINGS:    Bones/joints:  See below.      Soft tissues:  Soft tissue swelling, but no acute fracture or  dislocation.       Impression:        Soft tissue swelling, but no acute fracture or dislocation.        This report was finalized on 9/26/2024 3:38 PM by Dr. Moses Otto MD.       XR Foot 3+ View Left [[id number]] Collected: 09/26/24 1536     Updated: 09/26/24 1539    Narrative:      EXAM:    XR Left Foot Complete, 3 or More Views     EXAM DATE:    9/26/2024 3:16 PM     CLINICAL HISTORY:    left foot wound     TECHNIQUE:    Frontal, lateral and oblique views of the left foot.     COMPARISON:    No relevant prior studies available.     FINDINGS:    Bones/joints:  Unremarkable.  No acute fracture.  No dislocation.    Soft tissues:  Unremarkable.  No radiopaque foreign body.       Impression:        No acute findings in the left foot.        This report was finalized on 9/26/2024 3:37 PM by Dr. Moses Otto MD.       XR Chest 1 View [[id number]] Collected: 09/26/24 1406     Updated: 09/26/24 1408    Narrative:      XR CHEST 1 VW-     CLINICAL INDICATION: weakness        COMPARISON: None immediately available      TECHNIQUE: Single frontal view of the chest.     FINDINGS:     LUNGS: Lungs are adequately aerated.      HEART AND MEDIASTINUM: Heart and mediastinal contours are unremarkable        SKELETON: Bony and soft tissue structures are unremarkable.             Impression:      No radiographic evidence of acute cardiac or pulmonary disease.           This report was finalized on 9/26/2024 2:06 PM by Dr. Moses Otto MD.       CT Cervical Spine Without Contrast [[id number]] Collected: 09/26/24  1317     Updated: 09/26/24 1319    Narrative:      CT CERVICAL SPINE WO CONTRAST-     CLINICAL INDICATION: neck pain        COMPARISON: None available     TECHNIQUE: Axial images of the cervical spine were acquired with out any  intravenous contrast. Reformatted images were then created in the  sagittal and coronal planes.     DOSE:      Radiation dose reduction techniques were utilized per ALARA protocol.  Automated exposure control was initiated through either or [employer] or  [employer] software packages by  protocol.           FINDINGS:   The provided study demonstrates preservation of the vertebral body  heights in the sagittally reconstructed images.     There is no prevertebral soft tissue swelling.     Alignment is near anatomic.     The disc space heights are uniform.     I see no acute cervical spine fracture.       Impression:         1. No acute bony abnormality.     2. Other incidental findings as above     This report was finalized on 9/26/2024 1:17 PM by Dr. Moses Otto MD.       CT Lumbar Spine Without Contrast [[id number]] Collected: 09/26/24 1317     Updated: 09/26/24 1319    Narrative:      EXAM:    CT Lumbar Spine Without Intravenous Contrast     EXAM DATE:    9/26/2024 12:59 PM     CLINICAL HISTORY:    back pain     TECHNIQUE:    Axial computed tomography images of the lumbar spine without  intravenous contrast.  Sagittal and coronal reformatted images were  created and reviewed.  This CT exam was performed using one or more of  the following dose reduction techniques:  automated exposure control,  adjustment of the mA and/or kV according to patient size, and/or use of  iterative reconstruction technique.     COMPARISON:    No relevant prior studies available.     FINDINGS:    VERTEBRAE:  Unremarkable.  No acute fracture.    DISCS/SPINAL CANAL/NEURAL FORAMINA:  No acute findings.  No spinal  canal stenosis.    SOFT TISSUES:  Unremarkable.       Impression:        No acute findings in  the lumbar spine.        This report was finalized on 9/26/2024 1:17 PM by Dr. Moses Otto MD.       CT Thoracic Spine Without Contrast [[id number]] Collected: 09/26/24 1316     Updated: 09/26/24 1319    Narrative:      EXAM:    CT Thoracic Spine Without Intravenous Contrast     EXAM DATE:    9/26/2024 12:58 PM     CLINICAL HISTORY:    back pain     TECHNIQUE:    Axial computed tomography images of the thoracic spine without  intravenous contrast.  Sagittal and coronal reformatted images were  created and reviewed.  This CT exam was performed using one or more of  the following dose reduction techniques:  automated exposure control,  adjustment of the mA and/or kV according to patient size, and/or use of  iterative reconstruction technique.     COMPARISON:    No relevant prior studies available.     FINDINGS:    VERTEBRAE:  Unremarkable.  No acute fracture.    DISCS/SPINAL CANAL/NEURAL FORAMINA:  No acute findings.  No spinal  canal stenosis.    SOFT TISSUES:  Unremarkable.       Impression:        No acute findings in the thoracic spine.        This report was finalized on 9/26/2024 1:17 PM by Dr. Moses Otto MD.       CT Head Without Contrast [[id number]] Collected: 09/26/24 1259     Updated: 09/26/24 1302    Narrative:      CT HEAD WO CONTRAST-     CLINICAL INDICATION: weakness        COMPARISON: None available     TECHNIQUE: Axial images of the brain were obtained with out intravenous  contrast.  Reformatted images were created in the sagittal and coronal  planes.     DOSE:     Radiation dose reduction techniques were utilized per ALARA protocol.  Automated exposure control was initiated through either or [employer] or  [employer] software packages by  protocol.        FINDINGS:    BRAIN: Probable subacute ischemia right middle cerebral artery  territory    VENTRICLES:  Unremarkable.  No ventriculomegaly.       BONES/JOINTS:  Unremarkable.  No acute fracture.       SOFT TISSUES:  Unremarkable.        SINUSES:  no air fluid levels       MASTOID AIR CELLS:  Unremarkable as visualized.  No mastoid effusion.          Impression:         1. Probable remote right middle cerebral artery infarction  2. No acute parenchymal mass, hemorrhage, or midline shift     This report was finalized on 9/26/2024 1:00 PM by Dr. Moses Otto MD.             I have personally looked at the radiology images and read the final radiology report.    Assessment & Plan    43F Morbid Obesity by BMI PMH History Genital Herpes, Cerebrovascular Accident 5/2024 complicated by L sided paralysis, presented to [facility] emergency room 9/26 after sliding into the floor off her chair due to weakness.     #Acute Metabolic Encephalopathy due to Acute Urinary Tract Infection in setting of probable neurogenic bladder  - Continue Ceftriaxone, follow up cultures, rule out STI    #Electrolyte Abnormalities  - Acute Mild Hypokalemia - Replacing, on protocol  - Acute Mild Hypomagnesemia - Replacing, on protocol    #History Cerebrovascular Accident complicated by L sided paralysis, unclear etiology  - Review home medications and resume as indicated, Supportive Care     #Debility  - Consult PT/OT, Social Work if placement needed     #History Genital Herpes  - Supportive Care, follow up medication reconciliation for any suppressive medications, check HIV & acute hepatitis panel     #Morbid Obesity by BMI  - Body mass index is 43.26 kg/m².; complicates all aspects of care    F: Oral  E: Monitor & Replace as needed   N: Regular Diet  PPx: SQH  Code Status (Patient has no pulse and is not breathing): CPR  Medical Interventions (Patient has pulse or is breathing): Full Support     Dispo: Pending workup and clinical improvement    *This patient is considered high risk secondary to Urinary Tract Infection, electrolyte abnormalities, chronic L sided paralysis from prior Cerebrovascular Accident.      Edited by: Ramon Marc MD at 9/26/2024 [id number]    Ramon  MD Monico  [facility]  09/26/24  17:01 EDT        Electronically signed by Ramon Marc MD at 09/26/24 1703       Lines, Drains & Airways       Active LDAs       Name Placement date Placement time Site Days    Midline Catheter - Single Lumen 09/30/24 Right Basilic 09/30/24  0955  -- 4    External Urinary Catheter 09/29/24  1500  --  5                  Current Facility-Administered Medications   Medication Dose Route Frequency Provider Last Rate Last Admin    acetaminophen (TYLENOL) tablet 650 mg  650 mg Oral Q6H PRN Ashleigh Nicholas PA-C   650 mg at 10/04/24 1429    acyclovir (ZOVIRAX) capsule 400 mg  400 mg Oral Nightly Ramon Marc MD   400 mg at 10/03/24 2108    aspirin chewable tablet 81 mg  81 mg Oral Daily Ramon Marc MD   81 mg at 10/04/24 0828    atorvastatin (LIPITOR) tablet 80 mg  80 mg Oral Daily Ramon Marc MD   80 mg at 10/04/24 0828    sennosides-docusate (PERICOLACE) 8.6-50 MG per tablet 2 tablet  2 tablet Oral BID PRN Ramon Marc MD        And    polyethylene glycol (MIRALAX) packet 17 g  17 g Oral Daily PRN Ramon Marc MD        And    bisacodyl (DULCOLAX) EC tablet 5 mg  5 mg Oral Daily PRN Ramon Marc MD        And    bisacodyl (DULCOLAX) suppository 10 mg  10 mg Rectal Daily PRN Ramon Marc MD        Calcium Replacement - Follow Nurse / BPA Driven Protocol   Does not apply PRN Ramon Marc MD        cyclobenzaprine (FLEXERIL) tablet 10 mg  10 mg Oral TID PRN Ramon Marc MD   10 mg at 10/03/24 2109    Diclofenac Sodium (VOLTAREN) 1 % gel 4 g  4 g Topical 4x Daily PRN Ashleigh Nicholas PA-C   4 g at 10/02/24 2222    DULoxetine (CYMBALTA) DR capsule 60 mg  60 mg Oral Daily Ramon Marc MD   60 mg at 10/04/24 0828    ertapenem (INVanz) 1,000 mg in sodium chloride 0.9 % 100 mL IVPB-VTB  1,000 mg Intravenous Q24H Ramon Marc  mL/hr at 10/04/24 1428 1,000 mg at 10/04/24 1428    heparin (porcine) 5000 UNIT/ML injection 5,000 Units  5,000 Units  Subcutaneous Q8H Ramon Marc MD   5,000 Units at 10/04/24 1428    lisinopril (PRINIVIL,ZESTRIL) tablet 20 mg  20 mg Oral Daily Ramon Marc MD   20 mg at 10/04/24 0828    loperamide (IMODIUM) capsule 2 mg  2 mg Oral 4x Daily PRN Marv Navas DO   2 mg at 10/02/24 0847    Magnesium Standard Dose Replacement - Follow Nurse / BPA Driven Protocol   Does not apply PRN Ramon Marc MD        [Held by provider] metFORMIN (GLUCOPHAGE) tablet 1,000 mg  1,000 mg Oral BID With Meals Ramon Marc MD        mupirocin (BACTROBAN) 2 % nasal ointment 1 Application  1 application  Each Nare BID Marv Navas DO   1 Application at 10/04/24 0829    nicotine (NICODERM CQ) 7 MG/24HR patch 1 patch  1 patch Transdermal Q24H Ramon Marc MD   1 patch at 10/04/24 0826    nystatin (MYCOSTATIN) powder   Topical Q12H Ramon Marc MD   Given at 10/04/24 0829    pantoprazole (PROTONIX) EC tablet 40 mg  40 mg Oral Daily Ramon Marc MD   40 mg at 10/04/24 0828    Pharmacy Consult   Does not apply Continuous PRN Ramon Marc MD        Phosphorus Replacement - Follow Nurse / BPA Driven Protocol   Does not apply PRN Ramon Marc MD        Potassium Replacement - Follow Nurse / BPA Driven Protocol   Does not apply PRN Ramon Marc MD        prochlorperazine (COMPAZINE) injection 2.5 mg  2.5 mg Intravenous Q6H PRN Ashleigh Nicholas PA-C   2.5 mg at 10/04/24 1428    sodium chloride 0.9 % flush 10 mL  10 mL Intravenous Q12H Ramon Marc MD   10 mL at 10/04/24 0828    sodium chloride 0.9 % flush 10 mL  10 mL Intravenous PRN Ramon Marc MD        sodium chloride 0.9 % flush 10 mL  10 mL Intravenous Q12H Marv Navas DO   10 mL at 10/04/24 0829    sodium chloride 0.9 % flush 10 mL  10 mL Intravenous PRN Marv Navas DO        sodium chloride 0.9 % infusion 40 mL  40 mL Intravenous PRN Ramon Marc MD        sodium chloride 0.9 % infusion 40 mL  40 mL Intravenous PRN Marv Navas  DO Sacha        sodium chloride 0.9 % infusion  75 mL/hr Intravenous Continuous Ramon Marc MD 75 mL/hr at 10/04/24 0640 75 mL/hr at 10/04/24 0640     Lab Results (most recent)       Procedure Component Value Units Date/Time    POC Glucose Once [[id number]]  (Abnormal) Collected: 10/02/24 2010    Specimen: Blood Updated: 10/02/24 2016     Glucose 216 mg/dL     Blood Culture - Blood, Arm, Left [[id number]]  (Normal) Collected: 09/26/24 1350    Specimen: Blood from Arm, Left Updated: 10/01/24 1400     Blood Culture No growth at 5 days    Blood Culture - Blood, Arm, Right [[id number]]  (Normal) Collected: 09/26/24 1350    Specimen: Blood from Arm, Right Updated: 10/01/24 1400     Blood Culture No growth at 5 days    Basic Metabolic Panel [[id number]]  (Abnormal) Collected: 10/01/24 0117    Specimen: Blood Updated: 10/01/24 0215     Glucose 158 mg/dL      BUN 4 mg/dL      Creatinine 0.36 mg/dL      Sodium 141 mmol/L      Potassium 3.8 mmol/L      Chloride 110 mmol/L      CO2 22.8 mmol/L      Calcium 8.3 mg/dL      BUN/Creatinine Ratio 11.1     Anion Gap 8.2 mmol/L      eGFR 130.2 mL/min/1.73     Narrative:      GFR Normal >60  Chronic Kidney Disease <60  Kidney Failure <15      CBC (No Diff) [[id number]]  (Abnormal) Collected: 10/01/24 0117    Specimen: Blood Updated: 10/01/24 0157     WBC 7.71 10*3/mm3      RBC 3.44 10*6/mm3      Hemoglobin 10.9 g/dL      Hematocrit 33.9 %      MCV 98.5 fL      MCH 31.7 pg      MCHC 32.2 g/dL      RDW 14.2 %      RDW-SD 50.6 fl      MPV 9.1 fL      Platelets 353 10*3/mm3     Comprehensive Metabolic Panel [[id number]]  (Abnormal) Collected: 09/30/24 0117    Specimen: Blood Updated: 09/30/24 0202     Glucose 196 mg/dL      BUN 5 mg/dL      Creatinine 0.46 mg/dL      Sodium 142 mmol/L      Potassium 3.5 mmol/L      Chloride 110 mmol/L      CO2 22.8 mmol/L      Calcium 8.0 mg/dL      Total Protein 5.1 g/dL      Albumin 2.5 g/dL      ALT (SGPT) 7 U/L      AST (SGOT) 11 U/L      Alkaline  Phosphatase 79 U/L      Total Bilirubin 0.2 mg/dL      Globulin 2.6 gm/dL      A/G Ratio 1.0 g/dL      BUN/Creatinine Ratio 10.9     Anion Gap 9.2 mmol/L      eGFR 122.7 mL/min/1.73     Narrative:      GFR Normal >60  Chronic Kidney Disease <60  Kidney Failure <15      CBC (No Diff) [[id number]]  (Abnormal) Collected: 09/30/24 0117    Specimen: Blood Updated: 09/30/24 0132     WBC 8.49 10*3/mm3      RBC 3.30 10*6/mm3      Hemoglobin 10.5 g/dL      Hematocrit 32.7 %      MCV 99.1 fL      MCH 31.8 pg      MCHC 32.1 g/dL      RDW 13.9 %      RDW-SD 50.3 fl      MPV 8.8 fL      Platelets 285 10*3/mm3     Comprehensive Metabolic Panel [[id number]]  (Abnormal) Collected: 09/29/24 0036    Specimen: Blood Updated: 09/29/24 0224     Glucose 192 mg/dL      BUN 4 mg/dL      Creatinine 0.40 mg/dL      Sodium 140 mmol/L      Potassium 3.9 mmol/L      Comment: Slight hemolysis detected by analyzer. Result may be falsely elevated.        Chloride 109 mmol/L      CO2 21.0 mmol/L      Calcium 8.2 mg/dL      Total Protein 5.3 g/dL      Albumin 2.5 g/dL      ALT (SGPT) 8 U/L      AST (SGOT) 15 U/L      Alkaline Phosphatase 88 U/L      Total Bilirubin 0.2 mg/dL      Globulin 2.8 gm/dL      A/G Ratio 0.9 g/dL      BUN/Creatinine Ratio 10.0     Anion Gap 10.0 mmol/L      eGFR 126.9 mL/min/1.73     Narrative:      GFR Normal >60  Chronic Kidney Disease <60  Kidney Failure <15      Urine Culture - Urine, Straight Cath [[id number]]  (Abnormal)  (Susceptibility) Collected: 09/26/24 1451    Specimen: Urine from Straight Cath Updated: 09/28/24 1408     Urine Culture >100,000 CFU/mL Escherichia coli ESBL    Narrative:      Colonization of the urinary tract without infection is common. Treatment is discouraged unless the patient is symptomatic, pregnant, or undergoing an invasive urologic procedure.  Recent outcomes data supports the use of pip/tazo in the treatment of susceptible ESBL infections for uncomplicated UTI. Consider use of pip/tazo as a  carbapenem-sparing regimen in applicable patients.    Susceptibility        Escherichia coli ESBL      LASHAWN      Ertapenem Susceptible      Gentamicin Susceptible      Levofloxacin Intermediate      Meropenem Susceptible      Nitrofurantoin Susceptible      Piperacillin + Tazobactam Susceptible      Trimethoprim + Sulfamethoxazole Resistant                           Potassium [[id number]]  (Normal) Collected: 09/27/24 1904    Specimen: Blood Updated: 09/27/24 1920     Potassium 4.0 mmol/L      Comment: Slight hemolysis detected by analyzer. Result may be falsely elevated.       Chlamydia trachomatis, Neisseria gonorrhoeae, Trichomonas vaginalis, PCR - Swab, Vagina [[id number]]  (Normal) Collected: 09/26/24 1837    Specimen: Swab from Vagina Updated: 09/26/24 2018     Chlamydia DNA by PCR Not Detected     Neisseria gonorrhoeae by PCR Not Detected     Trichomonas vaginalis PCR Not Detected    HIV-1 & HIV-2 Antibodies [[id number]]  (Normal) Collected: 09/26/24 1350    Specimen: Blood from Arm, Right Updated: 09/26/24 1748    Narrative:      The following orders were created for panel order HIV-1 & HIV-2 Antibodies.  Procedure                               Abnormality         Status                     ---------                               -----------         ------                     HIV-1 / O / 2 Ag / Antibody[[id number]]  Normal              Final result                 Please view results for these tests on the individual orders.    Hepatitis Panel, Acute [[id number]]  (Normal) Collected: 09/26/24 1350    Specimen: Blood from Arm, Right Updated: 09/26/24 1748     Hepatitis B Surface Ag Non-Reactive     Hep A IgM Non-Reactive     Hep B C IgM Non-Reactive     Hepatitis C Ab Non-Reactive    Narrative:      Results may be falsely decreased if patient taking Biotin.     HIV-1 / O / 2 Ag / Antibody [[id number]]  (Normal) Collected: 09/26/24 1350    Specimen: Blood from Arm, Right Updated: 09/26/24 1748     HIV-1/ HIV-2  Non-Reactive     Comment: A non-reactive test result does not preclude the possibility of exposure to HIV or infection with HIV. An antibody response to recent exposure may take several months to reach detectable levels.       Narrative:      The HIV antibody/antigen combo assay is a qualitative assay for HIV that includes the p24 antigen as well as antibodies to HIV types 1 and 2. This test is intended to be used as a screening assay in the diagnosis of HIV infection in patients over the age of 2.    Magnesium [[id number]]  (Abnormal) Collected: 09/26/24 1504    Specimen: Blood from Arm, Right Updated: 09/26/24 1553     Magnesium 1.5 mg/dL     C-reactive Protein [[id number]]  (Abnormal) Collected: 09/26/24 1504    Specimen: Blood from Arm, Right Updated: 09/26/24 1530     C-Reactive Protein 3.80 mg/dL     Urinalysis, Microscopic Only - Urine, Clean Catch [[id number]]  (Abnormal) Collected: 09/26/24 1419    Specimen: Urine, Clean Catch Updated: 09/26/24 1459     RBC, UA 0-2 /HPF      WBC, UA 3-5 /HPF      Bacteria, UA 4+ /HPF      Squamous Epithelial Cells, UA None Seen /HPF      Hyaline Casts, UA None Seen /LPF      Methodology Manual Light Microscopy    Urinalysis With Microscopic If Indicated (No Culture) - Urine, Clean Catch [[id number]]  (Abnormal) Collected: 09/26/24 1419    Specimen: Urine, Clean Catch Updated: 09/26/24 1440     Color, UA Dark Yellow     Appearance, UA Turbid     pH, UA 6.0     Specific Gravity, UA >=1.030     Glucose, UA Negative     Ketones, UA 15 mg/dL (1+)     Bilirubin, UA Small (1+)     Blood, UA Trace     Protein, UA 30 mg/dL (1+)     Leuk Esterase, UA Moderate (2+)     Nitrite, UA Positive     Urobilinogen, UA 2.0 E.U./dL    hCG, Serum, Qualitative [[id number]]  (Normal) Collected: 09/26/24 1350    Specimen: Blood from Arm, Right Updated: 09/26/24 1425     HCG Qualitative Negative    Lactic Acid, Plasma [[id number]]  (Normal) Collected: 09/26/24 1350    Specimen: Blood from Arm, Right  Updated: 09/26/24 1424     Lactate 1.1 mmol/L     CBC & Differential [[id number]]  (Abnormal) Collected: 09/26/24 1350    Specimen: Blood from Arm, Right Updated: 09/26/24 1403    Narrative:      The following orders were created for panel order CBC & Differential.  Procedure                               Abnormality         Status                     ---------                               -----------         ------                     CBC Auto Differential[[id number]]        Abnormal            Final result                 Please view results for these tests on the individual orders.    CBC Auto Differential [[id number]]  (Abnormal) Collected: 09/26/24 1350    Specimen: Blood from Arm, Right Updated: 09/26/24 1403     WBC 10.42 10*3/mm3      RBC 3.98 10*6/mm3      Hemoglobin 12.5 g/dL      Hematocrit 37.4 %      MCV 94.0 fL      MCH 31.4 pg      MCHC 33.4 g/dL      RDW 13.6 %      RDW-SD 46.9 fl      MPV 9.2 fL      Platelets 402 10*3/mm3      Neutrophil % 66.1 %      Lymphocyte % 28.8 %      Monocyte % 3.7 %      Eosinophil % 0.5 %      Basophil % 0.2 %      Immature Grans % 0.7 %      Neutrophils, Absolute 6.89 10*3/mm3      Lymphocytes, Absolute 3.00 10*3/mm3      Monocytes, Absolute 0.39 10*3/mm3      Eosinophils, Absolute 0.05 10*3/mm3      Basophils, Absolute 0.02 10*3/mm3      Immature Grans, Absolute 0.07 10*3/mm3      nRBC 0.0 /100 WBC     COVID-19 and FLU A/B PCR, 1 HR TAT - Swab, Nasopharynx [[id number]]  (Normal) Collected: 09/26/24 1326    Specimen: Swab from Nasopharynx Updated: 09/26/24 1353     COVID19 Not Detected     Influenza A PCR Not Detected     Influenza B PCR Not Detected    Narrative:      Fact sheet for providers: https://www.fda.gov/media/623968/download    Fact sheet for patients: https://www.fda.gov/media/360108/download    Test performed by PCR.          Orders (last 24 hrs)        Start     Ordered    10/04/24 1516  Inpatient Rehab Admission Consult  Once        Provider:  (Not yet  assigned)    10/04/24 1516    10/03/24 0000  nitrofurantoin, macrocrystal-monohydrate, (Macrobid) 100 MG capsule  2 Times Daily         10/03/24 1402    10/03/24 0000  Discharge Follow-up with PCP         10/03/24 1402    10/03/24 0000  acyclovir (ZOVIRAX) 400 MG tablet  Nightly         10/03/24 1509    10/02/24 1800  Dietary Nutrition Supplements Boost Plus (Ensure Enlive, Ensure Plus)  Daily With Breakfast, Lunch & Dinner       10/02/24 1611    10/01/24 0924  loperamide (IMODIUM) capsule 2 mg  4 Times Daily PRN         10/01/24 0924    09/30/24 1100  sodium chloride 0.9 % flush 10 mL  Every 12 Hours Scheduled         09/30/24 1004    09/30/24 1100  mupirocin (BACTROBAN) 2 % nasal ointment 1 Application  2 Times Daily         09/30/24 1004    09/30/24 1004  sodium chloride 0.9 % flush 10 mL  As Needed         09/30/24 1004    09/30/24 1004  sodium chloride 0.9 % infusion 40 mL  As Needed         09/30/24 1004    09/30/24 0104  Silicone Border Dressing to Bony Prominences  Every Shift       09/30/24 0105    09/29/24 1100  nystatin (MYCOSTATIN) powder  Every 12 Hours Scheduled         09/29/24 0948    09/29/24 0930  traMADol (ULTRAM) tablet 50 mg  Every 8 Hours PRN         09/29/24 0930    09/28/24 1500  ertapenem (INVanz) 1,000 mg in sodium chloride 0.9 % 100 mL IVPB-VTB  Every 24 Hours         09/28/24 1427    09/27/24 2100  acyclovir (ZOVIRAX) capsule 400 mg  Nightly         09/27/24 0921    09/27/24 0737  Pharmacy Consult  Continuous PRN         09/27/24 0738    09/27/24 0103  acetaminophen (TYLENOL) tablet 650 mg  Every 6 Hours PRN         09/27/24 0105    09/27/24 0013  Diclofenac Sodium (VOLTAREN) 1 % gel 4 g  4 Times Daily PRN         09/27/24 0013    09/26/24 2200  heparin (porcine) 5000 UNIT/ML injection 5,000 Units  Every 8 Hours Scheduled         09/26/24 1747    09/26/24 2149  prochlorperazine (COMPAZINE) injection 2.5 mg  Every 6 Hours PRN         09/26/24 2149 09/26/24 2100  sodium chloride 0.9 %  "flush 10 mL  Every 12 Hours Scheduled         09/26/24 1747    09/26/24 2000  Vital Signs  Every 4 Hours       09/26/24 1747 09/26/24 1845  sodium chloride 0.9 % infusion  Continuous         09/26/24 1747 09/26/24 1845  aspirin chewable tablet 81 mg  Daily         09/26/24 1747 09/26/24 1845  atorvastatin (LIPITOR) tablet 80 mg  Daily         09/26/24 1747 09/26/24 1845  DULoxetine (CYMBALTA) DR capsule 60 mg  Daily         09/26/24 1747 09/26/24 1845  lisinopril (PRINIVIL,ZESTRIL) tablet 20 mg  Daily         09/26/24 1747 09/26/24 1845  pantoprazole (PROTONIX) EC tablet 40 mg  Daily         09/26/24 1747 09/26/24 1845  [Held by provider]  metFORMIN (GLUCOPHAGE) tablet 1,000 mg  2 Times Daily With Meals        (On hold since Thu 9/26/2024 at 1747 until manually unheld; held by Ramon Marc MDHold Reason: Other (Comment Required))    09/26/24 1747 09/26/24 1845  nicotine (NICODERM CQ) 7 MG/24HR patch 1 patch  Every 24 Hours Scheduled         09/26/24 1747 09/26/24 1800  Oral Care  2 Times Daily       09/26/24 1747 09/26/24 1748  Intake & Output  Every Shift       09/26/24 1747 09/26/24 1747  sodium chloride 0.9 % flush 10 mL  As Needed         09/26/24 1747 09/26/24 1747  sodium chloride 0.9 % infusion 40 mL  As Needed         09/26/24 1747 09/26/24 1747  Potassium Replacement - Follow Nurse / BPA Driven Protocol  As Needed         09/26/24 1747 09/26/24 1747  Magnesium Standard Dose Replacement - Follow Nurse / BPA Driven Protocol  As Needed         09/26/24 1747 09/26/24 1747  Phosphorus Replacement - Follow Nurse / BPA Driven Protocol  As Needed         09/26/24 1747 09/26/24 1747  Calcium Replacement - Follow Nurse / BPA Driven Protocol  As Needed         09/26/24 1747 09/26/24 1747  sennosides-docusate (PERICOLACE) 8.6-50 MG per tablet 2 tablet  2 Times Daily PRN        Placed in \"And\" Linked Group    09/26/24 1747    09/26/24 1747  polyethylene glycol " "(MIRALAX) packet 17 g  Daily PRN        Placed in \"And\" Linked Group    09/26/24 1747    09/26/24 1747  bisacodyl (DULCOLAX) EC tablet 5 mg  Daily PRN        Placed in \"And\" Linked Group    09/26/24 1747    09/26/24 1747  bisacodyl (DULCOLAX) suppository 10 mg  Daily PRN        Placed in \"And\" Linked Group    09/26/24 1747    09/26/24 1747  cyclobenzaprine (FLEXERIL) tablet 10 mg  3 Times Daily PRN         09/26/24 1747    Unscheduled  Straight cath  As Needed       09/26/24 1359    Unscheduled  Potassium  As Needed        Comments: Release/collect/run 4 hours after completion of last potassium dose.     Placed in \"And\" Linked Group    09/26/24 1545    Unscheduled  Stage II Pressure Ulcer Care PRN  As Needed      Comments: - Gently Cleanse With Normal Saline  - Cover With Silicone Border Dressing (If Indicated)  - For Frequent Incontinence - Apply Skin Protective Barrier Cream Rather Than Silicone Border Dressing    09/29/24 1139    Unscheduled  Wound Care  As Needed       09/30/24 0105    Unscheduled  Unstageable Pressure Ulcer (Wet) Care PRN  As Needed      Comments: - Gently Cleanse With Normal Saline   - Pack Loosely With Moist to Moist Normal Saline Fluffed Gauze   - Cover With Silicone Border Dressing or Dry Dressing    09/30/24 0105    Unscheduled  Change Dressing to IV Site As Needed When Damp, Loose or Soiled  As Needed       09/30/24 1004    Unscheduled  Change Needleless Connectors  As Needed      Comments: Change Needleless Connectors When:  - Administration Set Changed  - Dressing Changed  - Removed For Any Reason  - Residual Blood or Debris Within Connector  - Prior to Drawing Blood Cultures  - Contamination of Connector  - After Administration of Blood or Blood Components    09/30/24 1004    --  aspirin 81 MG chewable tablet  Daily         09/26/24 1641    --  atorvastatin (LIPITOR) 80 MG tablet  Daily         09/26/24 1641    --  cyclobenzaprine (FLEXERIL) 10 MG tablet  3 Times Daily PRN         " 24    --  DULoxetine (CYMBALTA) 60 MG capsule  Daily         24    --  lisinopril (PRINIVIL,ZESTRIL) 20 MG tablet  Daily         24    --  metFORMIN (GLUCOPHAGE) 1000 MG tablet  2 Times Daily With Meals         24    --  pantoprazole (PROTONIX) 40 MG EC tablet  Daily         24                     Physician Progress Notes (most recent note)        Shanthi Dick, APRN at 10/04/24 1213                     PROGRESS NOTE         Patient Identification:  Name:  Lety Johnson  Age:  43 y.o.  Sex:  female  :  1980  MRN:  [id number]  Visit Number:  [id number]  Primary Care Provider:  Provider, No Known         LOS: 6 days       ----------------------------------------------------------------------------------------------------------------------  Subjective       Chief Complaints:    Fall        Interval History:      The patient is resting comfortably in bed, on room air with no apparent distress.  Afebrile.  Denies diarrhea.  The patient does complain of left hip pain, she reports a history of sciatica.  Lung exam is clear with diminished bases bilaterally to auscultation.  Abdomen soft and nontender with normoactive bowel sounds.      Review of Systems:    Constitutional: no fever, chills and night sweats.  Generalized fatigue.  Eyes: no eye drainage, itching or redness.  HEENT: no mouth sores, dysphagia or nose bleed.  Respiratory: no for shortness of breath, cough or production of sputum.  Cardiovascular: no chest pain, no palpitations, no orthopnea.  Gastrointestinal: no nausea, vomiting or diarrhea. No abdominal pain, hematemesis or rectal bleeding.  Genitourinary: no dysuria or polyuria.  Hematologic/lymphatic: no lymph node abnormalities, no easy bruising or easy bleeding.  Musculoskeletal: no muscle or joint pain.  Left hip pain  Skin: No rash and no itching.  Neurological: no loss of consciousness, no seizure, no headache.  Behavioral/Psych: no  depression or suicidal ideation.  Endocrine: no hot flashes.  Immunologic: negative.    ----------------------------------------------------------------------------------------------------------------------      Objective       [facility] [place] Meds:  acyclovir, 400 mg, Oral, Nightly  aspirin, 81 mg, Oral, Daily  atorvastatin, 80 mg, Oral, Daily  DULoxetine, 60 mg, Oral, Daily  ertapenem, 1,000 mg, Intravenous, Q24H  heparin (porcine), 5,000 Units, Subcutaneous, Q8H  lisinopril, 20 mg, Oral, Daily  [Held by provider] metFORMIN, 1,000 mg, Oral, BID With Meals  mupirocin, 1 application , Each Nare, BID  nicotine, 1 patch, Transdermal, Q24H  nystatin, , Topical, Q12H  pantoprazole, 40 mg, Oral, Daily  sodium chloride, 10 mL, Intravenous, Q12H  sodium chloride, 10 mL, Intravenous, Q12H      Pharmacy Consult,   sodium chloride, 75 mL/hr, Last Rate: 75 mL/hr (10/04/24 0640)      ----------------------------------------------------------------------------------------------------------------------    Vital Signs:  Temp:  [98.3 °F (36.8 °C)-98.5 °F (36.9 °C)] 98.4 °F (36.9 °C)  Heart Rate:  [] 111  Resp:  [18-20] 20  BP: (124-160)/(85-99) 154/99  Mean Arterial Pressure (Non-Invasive) for the past 24 hrs (Last 3 readings):   Noninvasive MAP (mmHg)   10/04/24 1039 115   10/04/24 0700 120   10/04/24 0437 110     SpO2 Percentage    10/04/24 0437 10/04/24 0700 10/04/24 1039   SpO2: 94% 96% 96%     SpO2:  [94 %-98 %] 96 %  on   ;   Device (Oxygen Therapy): room air    Body mass index is 39.56 kg/m².  Wt Readings from Last 3 Encounters:   10/04/24 110 kg (241 lb 6.5 oz)   06/27/24 120 kg (264 lb)        Intake/Output Summary (Last 24 hours) at 10/4/2024 1213  Last data filed at 10/4/2024 0800  Gross per 24 hour   Intake 360 ml   Output 1300 ml   Net -940 ml     Diet: Regular/House; Fluid Consistency: Thin (IDDSI  0)  ----------------------------------------------------------------------------------------------------------------------      Physical Exam:    Constitutional:  Well-developed and well-nourished.  Resting comfortably in bed, on room air with no apparent distress.    HENT:  Head: Normocephalic and atraumatic.  Mouth:  Moist mucous membranes.    Eyes:  Conjunctivae and EOM are normal.  No scleral icterus.  Neck:  Neck supple.  No JVD present.    Cardiovascular:  Normal rate, regular rhythm and normal heart sounds with no murmur. No edema.  Pulmonary/Chest: Clear with bases diminished no respiratory distress, no wheezes, no crackles, with normal breath sounds and good air movement.  Abdominal:  Soft.  Bowel sounds are normal.  No distension and no tenderness.   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  Left hip pain, sciatica.  Neurological:  Alert and oriented to person, place, and time.  No facial droop.  No slurred speech.   Skin:  Skin is warm and dry.  No rash noted.  No pallor.   Psychiatric:  Normal mood and affect.  Behavior is normal.        ----------------------------------------------------------------------------------------------------------------------            Results from last 7 days   Lab Units 10/01/24  0117 09/30/24  0117 09/29/24  0036   WBC 10*3/mm3 7.71 8.49 7.24   HEMOGLOBIN g/dL 10.9* 10.5* 10.7*   HEMATOCRIT % 33.9* 32.7* 33.6*   MCV fL 98.5* 99.1* 100.6*   MCHC g/dL 32.2 32.1 31.8   PLATELETS 10*3/mm3 353 285 318     Results from last 7 days   Lab Units 10/01/24  0117 09/30/24  0117 09/29/24  0036 09/28/24  0318   SODIUM mmol/L 141 142 140 142   POTASSIUM mmol/L 3.8 3.5 3.9 4.1   CHLORIDE mmol/L 110* 110* 109* 110*   CO2 mmol/L 22.8 22.8 21.0* 22.3   BUN mg/dL 4* 5* 4* 5*   CREATININE mg/dL 0.36* 0.46* 0.40* 0.43*   CALCIUM mg/dL 8.3* 8.0* 8.2* 8.5*   GLUCOSE mg/dL 158* 196* 192* 198*   ALBUMIN g/dL  --  2.5* 2.5* 2.7*   BILIRUBIN mg/dL  --  0.2 0.2 0.3   ALK PHOS U/L  --   "79 88 100   AST (SGOT) U/L  --  11 15 15   ALT (SGPT) U/L  --  7 8 9   Estimated Creatinine Clearance: 253.6 mL/min (A) (by C-G formula based on SCr of 0.36 mg/dL (L)).  No results found for: \"AMMONIA\"    Glucose   Date/Time Value Ref Range Status   10/02/2024 2010 216 (H) 70 - 130 mg/dL Final     No results found for: \"HGBA1C\"  No results found for: \"TSH\", \"FREET4\"    Blood Culture   Date Value Ref Range Status   09/26/2024 No growth at 2 days  Preliminary   09/26/2024 No growth at 2 days  Preliminary     Urine Culture   Date Value Ref Range Status   09/26/2024 >100,000 CFU/mL Escherichia coli ESBL (A)  Final     No results found for: \"WOUNDCX\"  No results found for: \"STOOLCX\"  No results found for: \"RESPCX\"  Pain Management Panel           No data to display                  ----------------------------------------------------------------------------------------------------------------------  Imaging Results (Last 24 Hours)       ** No results found for the last 24 hours. **            ----------------------------------------------------------------------------------------------------------------------    Pertinent Infectious Disease Results                Assessment/Plan       Assessment       ESBL UTI       Plan      The patient is resting comfortably in bed, on room air with no apparent distress.  Afebrile.  Denies diarrhea.  The patient does complain of left hip pain, she reports a history of sciatica.  Lung exam is clear with diminished bases bilaterally to auscultation.  Abdomen soft and nontender with normoactive bowel sounds.    Recommend to continue with current treatment of ertapenem 1 g IV every 24 hours for total of 7 days for the treatment of ESBL UTI.  On discharge the patient may be further de-escalated to Macrobid 100 mg p.o. twice daily to complete antibiotic course.  Okay to continue home suppressive acyclovir for history of genital herpes.  Continue to monitor closely.      ANTIMICROBIAL " THERAPY    acyclovir - 200 MG, 400 MG  ertapenem (INVanz) 1000 mg in 100 mL NS (VTB)  nitrofurantoin (macrocrystal-monohydrate) - 100 MG     Code Status:   Code Status and Medical Interventions: CPR (Attempt to Resuscitate); Full Support   Ordered at: 24 1626     Code Status (Patient has no pulse and is not breathing):    CPR (Attempt to Resuscitate)     Medical Interventions (Patient has pulse or is breathing):    Full Support       YUE Matthews  10/04/24  12:13 EDT    Electronically signed by Shanthi Dick APRN at 10/04/24 1215          Consult Notes (most recent note)        Cuca Yuen APRN at 24 1436        Consult Orders    1. Inpatient Infectious Diseases Consult [[id number]] ordered by Ramon Marc MD at 24 1427                         INFECTIOUS DISEASE CONSULTATION REPORT        Patient Identification:  Name:  Lety Johnson  Age:  43 y.o.  Sex:  female  :  1980  MRN:  [id number]   Visit Number:  [id number]  Primary Care Physician:  Provider, No Known        Referring Provider: Dr. Marc    Reason for consult: ESBL UTI       LOS: 0 days        Subjective       Subjective     History of present illness:      Thank you Dr. Marc for allowing us to participate in the care of your patient.  As you well know, Ms. Lety Johnson is a 43 y.o. 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  WBC 7.38.  Chlamydia gonorrhea and trichomonas negative.  CRP 3.80.  Urinalysis positive with culture finalizing is greater than 100,000 colonies of E. coli ESBL.  Blood cultures from 2024 show no growth thus far.  HIV 1 and 2 nonreactive.  Hepatitis panel nonreactive.  COVID-19 and influenza PCR negative.  Lactic acid normal on admission.    Patient resting in bed.  Denies  dysuria, urgency, frequency.  Patient reports recurrent yeast infections.  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  Lungs clear to auscultation bilaterally.      Infectious Disease consultation was requested for antimicrobial management.      ---------------------------------------------------------------------------------------------------------------------     Review Of Systems:    Constitutional: no fever, chills and night sweats. No appetite change or unexpected weight change. No fatigue.  Eyes: no eye drainage, itching or redness.  HEENT: no mouth sores, dysphagia or nose bleed.  Respiratory: no for shortness of breath, cough or production of sputum.  Cardiovascular: no chest pain, no palpitations, no orthopnea.  Gastrointestinal: no nausea, vomiting or diarrhea. No abdominal pain, hematemesis or rectal bleeding.  Genitourinary: no dysuria or polyuria.  Hematologic/lymphatic: no lymph node abnormalities, no easy bruising or easy bleeding.  Musculoskeletal: no muscle or joint pain.  Skin: No rash and no itching.  Neurological: no loss of consciousness, no seizure, no headache.  Behavioral/Psych: no depression or suicidal ideation.  Endocrine: no hot flashes.  Immunologic: negative.    ---------------------------------------------------------------------------------------------------------------------     Past Medical History    Past Medical History:   Diagnosis Date    Stroke        Past Surgical History    History reviewed. No pertinent surgical history.    Family History    History reviewed. No pertinent family history.    Social History    Social History     Tobacco Use    Smoking status: Every Day     Average packs/day: 2.0 packs/day for 26.0 years (52.0 ttl pk-yrs)     Types: Cigarettes     Start date: 1998    Smokeless tobacco: Never   Vaping Use    Vaping status: Never Used   Substance Use Topics    Alcohol use: Not Currently    Drug use: Never       Allergies    Patient has no known  allergies.  ---------------------------------------------------------------------------------------------------------------------     Home Medications:    Prior to Admission Medications       Prescriptions Last Dose Informant Patient Reported? Taking?    acyclovir (ZOVIRAX) 400 MG tablet  Pharmacy Yes No    Take 1 tablet by mouth Every Night.    aspirin 81 MG chewable tablet Unknown  Yes No    Chew 1 tablet Daily.    atorvastatin (LIPITOR) 80 MG tablet Unknown  Yes No    Take 1 tablet by mouth Daily.    cyclobenzaprine (FLEXERIL) 10 MG tablet Unknown  Yes No    Take 1 tablet by mouth 3 (Three) Times a Day As Needed for Muscle Spasms.    DULoxetine (CYMBALTA) 60 MG capsule Unknown  Yes No    Take 1 capsule by mouth Daily.    lisinopril (PRINIVIL,ZESTRIL) 20 MG tablet Unknown  Yes No    Take 1 tablet by mouth Daily.    metFORMIN (GLUCOPHAGE) 1000 MG tablet Unknown  Yes No    Take 1 tablet by mouth 2 (Two) Times a Day With Meals.    pantoprazole (PROTONIX) 40 MG EC tablet Unknown  Yes No    Take 1 tablet by mouth Daily.          ---------------------------------------------------------------------------------------------------------------------    Objective       Objective     Hospital Scheduled Meds:  acyclovir, 400 mg, Oral, Nightly  aspirin, 81 mg, Oral, Daily  atorvastatin, 80 mg, Oral, Daily  DULoxetine, 60 mg, Oral, Daily  ertapenem, 1,000 mg, Intravenous, Q24H  heparin (porcine), 5,000 Units, Subcutaneous, Q8H  lisinopril, 20 mg, Oral, Daily  [Held by provider] metFORMIN, 1,000 mg, Oral, BID With Meals  nicotine, 1 patch, Transdermal, Q24H  pantoprazole, 40 mg, Oral, Daily  sodium chloride, 10 mL, Intravenous, Q12H      Pharmacy Consult,   sodium chloride, 75 mL/hr, Last Rate: 75 mL/hr (09/28/24 1315)      ---------------------------------------------------------------------------------------------------------------------   Vital Signs:  Temp:  [97.5 °F (36.4 °C)-98.4 °F (36.9 °C)] 98.4 °F (36.9 °C)  Heart Rate:   [] 58  Resp:  [18-20] 18  BP: (133-172)/(71-86) 172/83  Mean Arterial Pressure (Non-Invasive) for the past 24 hrs (Last 3 readings):   Noninvasive MAP (mmHg)   09/28/24 1100 107   09/28/24 0650 92   09/28/24 0307 103     SpO2 Percentage    09/28/24 0307 09/28/24 0650 09/28/24 1100   SpO2: 98% 96% 97%     SpO2:  [96 %-98 %] 97 %  on   ;   Device (Oxygen Therapy): room air    Body mass index is 40.79 kg/m².  Wt Readings from Last 3 Encounters:   09/28/24 113 kg (248 lb 14.4 oz)   06/27/24 120 kg (264 lb)     ---------------------------------------------------------------------------------------------------------------------     Physical Exam:    Constitutional:  Well-developed and well-nourished.  No respiratory distress.      HENT:  Head: Normocephalic and atraumatic.  Mouth:  Moist mucous membranes.    Eyes:  Conjunctivae and EOM are normal.  No scleral icterus.  Neck:  Neck supple.  No JVD present.    Cardiovascular:  Normal rate, regular rhythm and normal heart sounds with no murmur. No edema.  Pulmonary/Chest:  No respiratory distress, no wheezes, no crackles, with normal breath sounds and good air movement.  Abdominal:  Soft.  Bowel sounds are normal.  No distension and no tenderness.   Musculoskeletal: Left sided paralysis secondary to CVA.  Neurological:  Alert and oriented to person, place, and time.  No facial droop.  No slurred speech.   Skin:  Skin is warm and dry.  No rash noted.  No pallor.   Psychiatric:  Normal mood and affect.  Behavior is normal.    ---------------------------------------------------------------------------------------------------------------------              Results from last 7 days   Lab Units 09/28/24  0318 09/27/24  0844 09/26/24  1504 09/26/24  1350   CRP mg/dL  --   --  3.80*  --    LACTATE mmol/L  --   --   --  1.1   WBC 10*3/mm3 7.38 9.67  --  10.42   HEMOGLOBIN g/dL 11.8* 10.9*  --  12.5   HEMATOCRIT % 36.9 34.8  --  37.4   MCV fL 98.9* 102.4*  --  94.0   MCHC g/dL  "32.0 31.3*  --  33.4   PLATELETS 10*3/mm3 332 292  --  402     Results from last 7 days   Lab Units 09/28/24  0318 09/27/24  1904 09/27/24  0844 09/26/24  1504   SODIUM mmol/L 142  --  137 140   POTASSIUM mmol/L 4.1 4.0 3.5 2.7*   MAGNESIUM mg/dL  --   --   --  1.5*   CHLORIDE mmol/L 110*  --  105 99   CO2 mmol/L 22.3  --  21.5* 28.9   BUN mg/dL 5*  --  7 10   CREATININE mg/dL 0.43*  --  0.36* 0.51*   CALCIUM mg/dL 8.5*  --  7.9* 9.4   GLUCOSE mg/dL 198*  --  234* 220*   ALBUMIN g/dL 2.7*  --  2.5* 3.5   BILIRUBIN mg/dL 0.3  --  0.3 0.6   ALK PHOS U/L 100  --  96 119*   AST (SGOT) U/L 15  --  17 21   ALT (SGPT) U/L 9  --  7 8   Estimated Creatinine Clearance: 215.5 mL/min (A) (by C-G formula based on SCr of 0.43 mg/dL (L)).  No results found for: \"AMMONIA\"    No results found for: \"HGBA1C\", \"POCGLU\"  No results found for: \"HGBA1C\"  No results found for: \"TSH\", \"FREET4\"    Blood Culture   Date Value Ref Range Status   09/26/2024 No growth at 2 days  Preliminary   09/26/2024 No growth at 2 days  Preliminary     Urine Culture   Date Value Ref Range Status   09/26/2024 >100,000 CFU/mL Escherichia coli ESBL (A)  Final     No results found for: \"WOUNDCX\"  No results found for: \"STOOLCX\"  No results found for: \"RESPCX\"  Pain Management Panel           No data to display              I have personally reviewed the above laboratory results.   ---------------------------------------------------------------------------------------------------------------------  Imaging Results (Last 7 Days)       Procedure Component Value Units Date/Time    XR Ankle 3+ View Left [[id number]] Collected: 09/26/24 1537     Updated: 09/26/24 1540    Narrative:      EXAM:    XR Left Ankle Complete, 3 or More Views     EXAM DATE:    9/26/2024 3:17 PM     CLINICAL HISTORY:    left ankle injury     TECHNIQUE:    Frontal, lateral and oblique views of the left ankle.     COMPARISON:    No relevant prior studies available.     FINDINGS:    Bones/joints:  " See below.      Soft tissues:  Soft tissue swelling, but no acute fracture or  dislocation.       Impression:        Soft tissue swelling, but no acute fracture or dislocation.        This report was finalized on 9/26/2024 3:38 PM by Dr. Moses Otto MD.       XR Foot 3+ View Left [[id number]] Collected: 09/26/24 1536     Updated: 09/26/24 1539    Narrative:      EXAM:    XR Left Foot Complete, 3 or More Views     EXAM DATE:    9/26/2024 3:16 PM     CLINICAL HISTORY:    left foot wound     TECHNIQUE:    Frontal, lateral and oblique views of the left foot.     COMPARISON:    No relevant prior studies available.     FINDINGS:    Bones/joints:  Unremarkable.  No acute fracture.  No dislocation.    Soft tissues:  Unremarkable.  No radiopaque foreign body.       Impression:        No acute findings in the left foot.        This report was finalized on 9/26/2024 3:37 PM by Dr. Moses Otto MD.       XR Chest 1 View [[id number]] Collected: 09/26/24 1406     Updated: 09/26/24 1408    Narrative:      XR CHEST 1 VW-     CLINICAL INDICATION: weakness        COMPARISON: None immediately available      TECHNIQUE: Single frontal view of the chest.     FINDINGS:     LUNGS: Lungs are adequately aerated.      HEART AND MEDIASTINUM: Heart and mediastinal contours are unremarkable        SKELETON: Bony and soft tissue structures are unremarkable.             Impression:      No radiographic evidence of acute cardiac or pulmonary disease.           This report was finalized on 9/26/2024 2:06 PM by Dr. Moses Otto MD.       CT Cervical Spine Without Contrast [[id number]] Collected: 09/26/24 1317     Updated: 09/26/24 1319    Narrative:      CT CERVICAL SPINE WO CONTRAST-     CLINICAL INDICATION: neck pain        COMPARISON: None available     TECHNIQUE: Axial images of the cervical spine were acquired with out any  intravenous contrast. Reformatted images were then created in the  sagittal and coronal planes.     DOSE:      Radiation  dose reduction techniques were utilized per ALARA protocol.  Automated exposure control was initiated through either or [employer] or  [employer] software packages by  protocol.           FINDINGS:   The provided study demonstrates preservation of the vertebral body  heights in the sagittally reconstructed images.     There is no prevertebral soft tissue swelling.     Alignment is near anatomic.     The disc space heights are uniform.     I see no acute cervical spine fracture.       Impression:         1. No acute bony abnormality.     2. Other incidental findings as above     This report was finalized on 9/26/2024 1:17 PM by Dr. Moses Otto MD.       CT Lumbar Spine Without Contrast [[id number]] Collected: 09/26/24 1317     Updated: 09/26/24 1319    Narrative:      EXAM:    CT Lumbar Spine Without Intravenous Contrast     EXAM DATE:    9/26/2024 12:59 PM     CLINICAL HISTORY:    back pain     TECHNIQUE:    Axial computed tomography images of the lumbar spine without  intravenous contrast.  Sagittal and coronal reformatted images were  created and reviewed.  This CT exam was performed using one or more of  the following dose reduction techniques:  automated exposure control,  adjustment of the mA and/or kV according to patient size, and/or use of  iterative reconstruction technique.     COMPARISON:    No relevant prior studies available.     FINDINGS:    VERTEBRAE:  Unremarkable.  No acute fracture.    DISCS/SPINAL CANAL/NEURAL FORAMINA:  No acute findings.  No spinal  canal stenosis.    SOFT TISSUES:  Unremarkable.       Impression:        No acute findings in the lumbar spine.        This report was finalized on 9/26/2024 1:17 PM by Dr. Moses Otto MD.       CT Thoracic Spine Without Contrast [[id number]] Collected: 09/26/24 1316     Updated: 09/26/24 1319    Narrative:      EXAM:    CT Thoracic Spine Without Intravenous Contrast     EXAM DATE:    9/26/2024 12:58 PM     CLINICAL HISTORY:    back pain      TECHNIQUE:    Axial computed tomography images of the thoracic spine without  intravenous contrast.  Sagittal and coronal reformatted images were  created and reviewed.  This CT exam was performed using one or more of  the following dose reduction techniques:  automated exposure control,  adjustment of the mA and/or kV according to patient size, and/or use of  iterative reconstruction technique.     COMPARISON:    No relevant prior studies available.     FINDINGS:    VERTEBRAE:  Unremarkable.  No acute fracture.    DISCS/SPINAL CANAL/NEURAL FORAMINA:  No acute findings.  No spinal  canal stenosis.    SOFT TISSUES:  Unremarkable.       Impression:        No acute findings in the thoracic spine.        This report was finalized on 9/26/2024 1:17 PM by Dr. Moses Otto MD.       CT Head Without Contrast [[id number]] Collected: 09/26/24 1259     Updated: 09/26/24 1302    Narrative:      CT HEAD WO CONTRAST-     CLINICAL INDICATION: weakness        COMPARISON: None available     TECHNIQUE: Axial images of the brain were obtained with out intravenous  contrast.  Reformatted images were created in the sagittal and coronal  planes.     DOSE:     Radiation dose reduction techniques were utilized per ALARA protocol.  Automated exposure control was initiated through either or [employer] or  [employer] software packages by  protocol.        FINDINGS:    BRAIN: Probable subacute ischemia right middle cerebral artery  territory    VENTRICLES:  Unremarkable.  No ventriculomegaly.       BONES/JOINTS:  Unremarkable.  No acute fracture.       SOFT TISSUES:  Unremarkable.       SINUSES:  no air fluid levels       MASTOID AIR CELLS:  Unremarkable as visualized.  No mastoid effusion.          Impression:         1. Probable remote right middle cerebral artery infarction  2. No acute parenchymal mass, hemorrhage, or midline shift     This report was finalized on 9/26/2024 1:00 PM by Dr. Moses Otto MD.             I have  personally reviewed the above radiology results.   ---------------------------------------------------------------------------------------------------------------------      Pertinent Infectious Disease Results          Assessment & Plan      Assessment        ESBL UTI      Plan      Patient presented to [facility] on 9/26/2024 for evaluation after sliding into the floor due to weakness.  WBC 7.38.  Chlamydia gonorrhea and trichomonas negative.  CRP 3.80.  Urinalysis positive with culture finalizing is greater than 100,000 colonies of E. coli ESBL.  Blood cultures from 9/26/2024 show no growth thus far.  HIV 1 and 2 nonreactive.  Hepatitis panel nonreactive.  COVID-19 and influenza PCR negative.  Lactic acid normal on admission.    Patient resting in bed.  Denies dysuria, urgency, frequency.  Patient reports recurrent yeast infections.  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  Lungs clear to auscultation bilaterally.    Recommend to continue current antibiotic regimen of ertapenem 1 g IV every 24 hours x 7 days for treatment of ESBL UTI.  Okay to continue home suppressive acyclovir for history of genital herpes.  We will continue to follow closely and adjust antibiotic therapy as needed.        ANTIMICROBIAL THERAPY    acyclovir - 200 MG, 400 MG  ertapenem (INVanz) 1000 mg in 100 mL NS (VTB)       Again, thank you Dr. Marc for allowing us to participate in the care of your patient and please feel free to call for any questions you may have.        Code Status:     Code Status and Medical Interventions: CPR (Attempt to Resuscitate); Full Support   Ordered at: 09/26/24 2005     Code Status (Patient has no pulse and is not breathing):    CPR (Attempt to Resuscitate)     Medical Interventions (Patient has pulse or is breathing):    Full Support         YUE Rivera  09/28/24  14:36 EDT    Electronically signed by Cuca Yuen APRN at 09/28/24 4721          Physical  Therapy Notes (most recent note)        Thierry Saldivar, PT at 10/04/24 1134  Version 1 of 1         Acute Care - Physical Therapy Treatment Note  [facility]ILLIANA Zaki     Patient Name: Lety Johnson  : 1980  MRN: [id number]  Today's Date: 10/4/2024   Onset of Illness/Injury or Date of Surgery: 24  Visit Dx:     ICD-10-CM ICD-9-CM   1. Hypokalemia  E87.6 276.8   2. Pressure injury of skin of sacral region, unspecified injury stage  L89.159 707.03     707.20   3. Pressure injury of skin of left heel, unspecified injury stage  L89.629 707.07     707.20   4. Acute cystitis without hematuria  N30.00 595.0     Patient Active Problem List   Diagnosis   (none) - all problems resolved or deleted     Past Medical History:   Diagnosis Date    Stroke      History reviewed. No pertinent surgical history.  PT Assessment (Last 12 Hours)       PT Evaluation and Treatment       Row Name 10/04/24 1128          Physical Therapy Time and Intention    Subjective Information complains of;weakness;pain  -KM     Document Type therapy note (daily note)  -KM     Mode of Treatment physical therapy  -KM     Patient Effort good  -KM     Symptoms Noted During/After Treatment fatigue;increased pain  -KM       Row Name 10/04/24 1128          General Information    Patient Profile Reviewed yes  -KM     Patient Observations alert;cooperative;agree to therapy  -KM     Existing Precautions/Restrictions fall  -KM       Row Name 10/04/24 1128          Cognition    Affect/Mental Status (Cognition) emotionally labile  -KM     Orientation Status (Cognition) oriented x 3  -KM     Follows Commands (Cognition) WFL  -KM       Row Name 10/04/24 1128          Bed Mobility    Bed Mobility supine-sit;sit-supine  -KM     Supine-Sit [place] (Bed Mobility) maximum assist (25% patient effort)  -KM     Sit-Supine [place] (Bed Mobility) maximum assist (25% patient effort)  -KM     Bed Mobility, Safety Issues decreased use of arms for  pushing/pulling;decreased use of legs for bridging/pushing;impaired trunk control for bed mobility  -KM     Assistive Device (Bed Mobility) bed rails;draw sheet;head of bed elevated  -       Row Name 10/04/24 1128          Transfers    Transfers sit-stand transfer;stand-sit transfer  -       Row Name 10/04/24 1128          Sit-Stand Transfer    Sit-Stand [place] (Transfers) maximum assist (25% patient effort);2 person assist  -     Comment, (Sit-Stand Transfer) Pt. unable to complete full stand, but able to lift bottom off of bed w/ bilateral knee blocks; x2 attempts  -       Row Name 10/04/24 1128          Stand-Sit Transfer    Stand-Sit [place] (Transfers) maximum assist (25% patient effort);2 person assist  -       Row Name 10/04/24 1128          Gait/Stairs (Locomotion)    Patient was able to Ambulate no, other medical factors prevent ambulation  -     Reason Patient was unable to Ambulate Excessive Weakness;Non-Ambulatory at Baseline  -       Row Name 10/04/24 1128          Safety Issues, Functional Mobility    Impairments Affecting Function (Mobility) balance;endurance/activity tolerance;pain;range of motion (ROM);strength  -       Row Name 10/04/24 1128          Balance    Balance Interventions sitting;dynamic;dynamic reaching;UE activity with balance activity  -     Comment, Balance pt. able to scoot self towards HOB w/ Neelima and extra time  -       Row Name 10/04/24 1128          Motor Skills    Comments, Therapeutic Exercise EOB ther-ex  -       Row Name             Wound 09/26/24 1806 gluteal    Wound - Properties Group Placement Date: 09/26/24  -KJ Placement Time: 1806 -KJ Location: gluteal  -KJ    Retired Wound - Properties Group Placement Date: 09/26/24  -KJ Placement Time: 1806  -KJ Location: gluteal  -KJ    Retired Wound - Properties Group Date first assessed: 09/26/24  -KJ Time first assessed: 1806  -KJ Location: gluteal  -KJ      Row Name             Wound 09/26/24  1807 Left gluteal    Wound - Properties Group Placement Date: 09/26/24  -KJ Placement Time: 1807  -KJ Side: Left  -KJ Location: gluteal  -KJ    Retired Wound - Properties Group Placement Date: 09/26/24  -KJ Placement Time: 1807  -KJ Side: Left  -KJ Location: gluteal  -KJ    Retired Wound - Properties Group Date first assessed: 09/26/24  -KJ Time first assessed: 1807  -KJ Side: Left  -KJ Location: gluteal  -KJ      Row Name             Wound 09/26/24 1809 gluteal    Wound - Properties Group Placement Date: 09/26/24  -KJ Placement Time: 1809  -KJ Location: gluteal  -KJ    Retired Wound - Properties Group Placement Date: 09/26/24  -KJ Placement Time: 1809  -KJ Location: gluteal  -KJ    Retired Wound - Properties Group Date first assessed: 09/26/24  -KJ Time first assessed: 1809  -KJ Location: gluteal  -KJ      Row Name             Wound 09/26/24 1809 Left posterior ankle    Wound - Properties Group Placement Date: 09/26/24  -KJ Placement Time: 1809  -KJ Side: Left  -KJ Orientation: posterior  -KJ Location: ankle  -KJ    Retired Wound - Properties Group Placement Date: 09/26/24  -KJ Placement Time: 1809  -KJ Side: Left  -KJ Orientation: posterior  -KJ Location: ankle  -KJ    Retired Wound - Properties Group Date first assessed: 09/26/24  -KJ Time first assessed: 1809  -KJ Side: Left  -KJ Location: ankle  -KJ      Row Name             Wound 09/29/24 0952 Bilateral anterior thigh MASD (Moisture associated skin damage)    Wound - Properties Group Placement Date: 09/29/24  -LB Placement Time: 0952 -LB Present on Original Admission: Y  -LB Side: Bilateral  -LB Orientation: anterior  -LB Location: thigh  -LB Primary Wound Type: MASD  -LB    Retired Wound - Properties Group Placement Date: 09/29/24  -LB Placement Time: 0952 -LB Present on Original Admission: Y  -LB Side: Bilateral  -LB Orientation: anterior  -LB Location: thigh  -LB Primary Wound Type: MASD  -LB    Retired Wound - Properties Group Date first assessed:  09/29/24  -LB Time first assessed: 0952  -LB Present on Original Admission: Y  -LB Side: Bilateral  -LB Location: thigh  -LB Primary Wound Type: MASD  -LB      Row Name 10/04/24 1128          Progress Summary (PT)    Daily Progress Summary (PT) Pt. was able to demonstrate improved activity tolerance compared to prior session. She was able to perform bed mobility w/ maxA x2. She was able to tolerate sitting EOB for increased time. Pt. effort was much improved compared to prior session. Pt. would continue to benefit from skilled PT services as she is able to give consistent effort.  -KM               User Key  (r) = Recorded By, (t) = Taken By, (c) = Cosigned By      Initials Name Provider Type    Thierry Carter, PT Physical Therapist    Nory Keenan, RN Registered Nurse    Zehra Rivera, RN Registered Nurse                    Physical Therapy Education       Title: PT OT SLP Therapies (In Progress)       Topic: Physical Therapy (In Progress)       Point: Mobility training (In Progress)       Learning Progress Summary             Patient Acceptance, E, NR by RD at 10/2/2024 1101    Acceptance, E, NR by RD at 10/1/2024 0856    Acceptance, E,D, VU,NR by AG at 9/30/2024 1559                         Point: Home exercise program (In Progress)       Learning Progress Summary             Patient Acceptance, E, NR by RD at 10/2/2024 1101    Acceptance, E, NR by RD at 10/1/2024 0856    Acceptance, E,D, VU,NR by AG at 9/30/2024 1559                         Point: Body mechanics (In Progress)       Learning Progress Summary             Patient Acceptance, E, NR by RD at 10/2/2024 1101    Acceptance, E, NR by RD at 10/1/2024 0856    Acceptance, E,D, VU,NR by AG at 9/30/2024 1559                         Point: Precautions (In Progress)       Learning Progress Summary             Patient Acceptance, E, NR by RD at 10/2/2024 1101    Acceptance, E, NR by RD at 10/1/2024 0856    Acceptance, E,D, VU,NR by AG at 9/30/2024  1559                                         User Key       Initials Effective Dates Name Provider Type Discipline    AG 21 -  Мария Joya, PT Physical Therapist PT    RD 21 -  Laisha Verdugo, RN Registered Nurse Nurse                  PT Recommendation and Plan     Progress Summary (PT)  Daily Progress Summary (PT): Pt. was able to demonstrate improved activity tolerance compared to prior session. She was able to perform bed mobility w/ maxA x2. She was able to tolerate sitting EOB for increased time. Pt. effort was much improved compared to prior session. Pt. would continue to benefit from skilled PT services as she is able to give consistent effort.       Time Calculation:    PT Charges       Row Name 10/04/24 1128             Time Calculation    PT Received On 10/04/24  -KM         Time Calculation- PT    Total Timed Code Minutes- PT 25 minute(s)  -KM                User Key  (r) = Recorded By, (t) = Taken By, (c) = Cosigned By      Initials Name Provider Type    KM Thierry Saldivar, PT Physical Therapist                  Therapy Charges for Today       Code Description Service Date Service Provider Modifiers Qty    62547533440 HC PT THERAPEUTIC ACT EA 15 MIN 10/3/2024 Thierry Saldivar, PT GP 1    43321189244 HC PT THER PROC EA 15 MIN 10/3/2024 Thierry Saldivar, PT GP 1    51415029535 HC PT THER PROC EA 15 MIN 10/4/2024 Thierry Saldivar, PT GP 1    77504118238 HC PT THERAPEUTIC ACT EA 15 MIN 10/4/2024 Thierry Saldivar, PT GP 1            PT G-Codes  AM-PAC 6 Clicks Score (PT): 6    Thierry Saldivar PT  10/4/2024      Electronically signed by Thierry Saldivar, PT at 10/04/24 1134          Occupational Therapy Notes (most recent note)        Loida Flaherty, OT at 10/04/24 1222          Acute Care - Occupational Therapy Treatment Note  [facility]IANA Haines     Patient Name: Lety Johnson  : 1980  MRN: [id number]  Today's Date: 10/4/2024  Onset of Illness/Injury or Date of Surgery: 24     Referring Physician:   Marc    Admit Date: 9/26/2024       ICD-10-CM ICD-9-CM   1. Hypokalemia  E87.6 276.8   2. Pressure injury of skin of sacral region, unspecified injury stage  L89.159 707.03     707.20   3. Pressure injury of skin of left heel, unspecified injury stage  L89.629 707.07     707.20   4. Acute cystitis without hematuria  N30.00 595.0     Patient Active Problem List   Diagnosis   (none) - all problems resolved or deleted     Past Medical History:   Diagnosis Date    Stroke      History reviewed. No pertinent surgical history.      OT ASSESSMENT FLOWSHEET (Last 12 Hours)       OT Evaluation and Treatment       Row Name 10/04/24 1215                   OT Time and Intention    Subjective Information complains of;pain  -LA        Document Type therapy note (daily note)  -LA        Mode of Treatment occupational therapy  -LA        Patient Effort good  -LA           General Information    Patient Profile Reviewed yes  -LA        General Observations of Patient Patient agreeable to therapy this date. Patient continues to be very fearful of movement/transfers. Discussed different options this date with patient. Patient engaged in bed mobility, sliding/scooting up side of bed and sit to stand. Patient continues to complain of significant pain in left UE even when arm is supported throughout movement.  -LA        Existing Precautions/Restrictions fall  -LA           Cognition    Affect/Mental Status (Cognition) emotionally labile  -LA        Orientation Status (Cognition) oriented x 4  -LA        Follows Commands (Cognition) WFL  -LA           Bed Mobility    Rolling Left [place] (Bed Mobility) maximum assist (25% patient effort)  -LA        Rolling Right [place] (Bed Mobility) moderate assist (50% patient effort)  -LA        Supine-Sit [place] (Bed Mobility) maximum assist (25% patient effort)  -LA        Sit-Supine [place] (Bed Mobility) maximum assist (25% patient effort);2 person assist  -LA            Sit-Stand Transfer    Sit-Stand [place] (Transfers) maximum assist (25% patient effort);2 person assist  -LA           Motor Skills    Motor Skills coordination;functional endurance  -LA        Neuromuscular Function WFL  -LA        Motor Control/Coordination Interventions occupation/activity based treatment  -LA           Balance    Static Sitting Balance supervision  -LA        Dynamic Sitting Balance minimal assist  -LA           Wound 09/26/24 1806 gluteal    Wound - Properties Group Placement Date: 09/26/24  -KJ Placement Time: 1806  -KJ Location: gluteal  -KJ    Retired Wound - Properties Group Placement Date: 09/26/24  -KJ Placement Time: 1806  -KJ Location: gluteal  -KJ    Retired Wound - Properties Group Date first assessed: 09/26/24  -KJ Time first assessed: 1806  -KJ Location: gluteal  -KJ       Wound 09/26/24 1807 Left gluteal    Wound - Properties Group Placement Date: 09/26/24  -KJ Placement Time: 1807  -KJ Side: Left  -KJ Location: gluteal  -KJ    Retired Wound - Properties Group Placement Date: 09/26/24  -KJ Placement Time: 1807  -KJ Side: Left  -KJ Location: gluteal  -KJ    Retired Wound - Properties Group Date first assessed: 09/26/24  -KJ Time first assessed: 1807  -KJ Side: Left  -KJ Location: gluteal  -KJ       Wound 09/26/24 1809 gluteal    Wound - Properties Group Placement Date: 09/26/24  -KJ Placement Time: 1809  -KJ Location: gluteal  -KJ    Retired Wound - Properties Group Placement Date: 09/26/24  -KJ Placement Time: 1809  -KJ Location: gluteal  -KJ    Retired Wound - Properties Group Date first assessed: 09/26/24  -KJ Time first assessed: 1809  -KJ Location: gluteal  -KJ       Wound 09/26/24 1809 Left posterior ankle    Wound - Properties Group Placement Date: 09/26/24  -KJ Placement Time: 1809  -KJ Side: Left  -KJ Orientation: posterior  -KJ Location: ankle  -KJ    Retired Wound - Properties Group Placement Date: 09/26/24  -KJ Placement Time: 1809  -KJ Side: Left  -KJ Orientation:  posterior  -KJ Location: ankle  -KJ    Retired Wound - Properties Group Date first assessed: 09/26/24  -KJ Time first assessed: 1809  -KJ Side: Left  -KJ Location: ankle  -KJ       Wound 09/29/24 0952 Bilateral anterior thigh MASD (Moisture associated skin damage)    Wound - Properties Group Placement Date: 09/29/24  -LB Placement Time: 0952 -LB Present on Original Admission: Y  -LB Side: Bilateral  -LB Orientation: anterior  -LB Location: thigh  -LB Primary Wound Type: MASD  -LB    Retired Wound - Properties Group Placement Date: 09/29/24  -LB Placement Time: 0952  -LB Present on Original Admission: Y  -LB Side: Bilateral  -LB Orientation: anterior  -LB Location: thigh  -LB Primary Wound Type: MASD  -LB    Retired Wound - Properties Group Date first assessed: 09/29/24  -LB Time first assessed: 0952  -LB Present on Original Admission: Y  -LB Side: Bilateral  -LB Location: thigh  -LB Primary Wound Type: MASD  -LB       Plan of Care Review    Plan of Care Reviewed With patient  -LA        Progress improving  -LA           Positioning and Restraints    Pre-Treatment Position in bed  -LA        Post Treatment Position bed  -LA        In Bed sitting EOB;call light within reach;encouraged to call for assist;with family/caregiver  -LA                  User Key  (r) = Recorded By, (t) = Taken By, (c) = Cosigned By      Initials Name Effective Dates    Nory Keenan, FIFI 06/08/23 -     Zehra Rivera RN 10/20/21 -     Loida Richard OT 02/14/22 -                            OT Recommendation and Plan     Plan of Care Review  Plan of Care Reviewed With: patient  Progress: improving  Plan of Care Reviewed With: patient        Time Calculation:     Therapy Charges for Today       Code Description Service Date Service Provider Modifiers Qty    17914503589  OT THER PROC EA 15 MIN 10/3/2024 Loida Flaherty OT GO 1    21578378512 HC OT THERAPEUTIC ACT EA 15 MIN 10/3/2024 Loida Flaherty OT GO 1    [id number]  OT  "THER PROC EA 15 MIN 10/4/2024 Loida Flaherty OT GO 1                 Loida Flaherty OT  10/4/2024    Electronically signed by Loida Flaherty OT at 10/04/24 1222          Speech Language Pathology Notes (most recent note)        Hafsa Melendez MA,CCC-SLP at 24 1101          Acute Care - Speech Language Pathology   Swallow Initial Evaluation  Zaki  Clinical Dysphagia Assessment     Patient Name: Lety Johnson  : 1980  MRN: [id number]  Today's Date: 2024             Admit Date: 2024    Visit Dx:     ICD-10-CM ICD-9-CM   1. Hypokalemia  E87.6 276.8   2. Pressure injury of skin of sacral region, unspecified injury stage  L89.159 707.03     707.20   3. Pressure injury of skin of left heel, unspecified injury stage  L89.629 707.07     707.20   4. Acute cystitis without hematuria  N30.00 595.0     Patient Active Problem List   Diagnosis    UTI (urinary tract infection)     Past Medical History:   Diagnosis Date    Stroke      History reviewed. No pertinent surgical history.    Lety Johnson  was seen at bedside this AM on 3S Rm. 3315-1s to assess safety/efficacy of swallowing fnx, determine safest/least restrictive diet tolerance.      Per report: pt \"is a 43 year old female patient who presents to the ER with chief complaint of back pain. 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 The patient had been living with her fiance who is in [facility] now. Her sister has been trying to help care for her. Today, she was in the floor and unable to get up so they called EMS for lift assist. Patient's sister and patient want the patient to go into a rehab facility for strengthening. She also complains of low back pain.\"    Social History     Socioeconomic History    Marital status: Single   Tobacco Use    Smoking status: Every Day     Average packs/day: 2.0 packs/day for 26.0 years (52.0 ttl pk-yrs)     Types: Cigarettes     Start date:     Smokeless " tobacco: Never   Vaping Use    Vaping status: Never Used   Substance and Sexual Activity    Alcohol use: Not Currently    Drug use: Never    Sexual activity: Not Currently     Partners: Male     Comment:  in senior living        Imaging:  No recent chest imaging    Labs:  Sodium  142  09/30 0117  Potassium  3.5  09/30 0117  Chloride  110  09/30 0117  CO2  22.8  09/30 0117  BUN  5  09/30 0117  Creatinine  0.46  09/30 0117  Glucose  196  09/30 0117  Hemoglobin  10.5  09/30 0117  Hematocrit  32.7  09/30 0117  WBC  8.49  09/30 0117  Platelets  285  09/30 0117  Diet Orders (active) (From admission, onward)       Start     Ordered    09/28/24 1800  Dietary Nutrition Supplements Boost Plus (Ensure Enlive, Ensure Plus)  Daily With Breakfast & Dinner       09/28/24 1209    09/26/24 1748  Diet: Regular/House; Fluid Consistency: Thin (IDDSI 0)  Diet Effective Now         09/26/24 1747                  Pt is observed on RA.     Patient was positioned upright and centered in bed to accept multiple po presentations of ice chips, solid cracker, puree, and thin liquids via spoon, cup, and straw. Patient was able to self provide po trials.     Facial/oral structures were slightly asymmetrical upon observation. Pt with residual L side weakness from previous CVA. Lingual protrusion revealed no deviation. Oral mucosa were moist, pink, and clean. Secretions were clear, thin, and well controlled. OROM/ENDER was wfl to imitate oral postures. Gag is not assessed. Volitional cough was intact w/ adequate  intensity, clear in quality, non-productive. Voice was adequate in intensity, clear in quality w/ intelligible speech.    Upon po presentations, adequate bolus anticipation and acceptance w/ good labial seal for bolus clearance via spoon bowl, cup rim stability and suction via straw. Bolus formation, manipulation and control were wfl w/ rotary mastication pattern. A-p transit was timely w/o significant oral residue appreciated. No overt s/s  aspiration before the swallow.      Pharyngeal swallow was timely w/ adequate hyolaryngeal elevation per palpation. No overt s/s aspiration evidenced across this evaluation. No silent aspiration suspected. Patient denied odynophagia.    Pt reports difficulty swallowing large pills only.     Impression: Patient presented w/ wfl oropharyngeal swallow w/o s/s aspiration. No s/s indicative of silent aspiration. No odynophagia reported.      SLP Recommendation and Plan   1. Regular consistencies, thin liquids.    2. Medications whole in puree/thins. Crush PRN.  3. Upright and centered for all po intake.  4. MO precautions.  5. Oral care protocol.    No further formal SLP f/u warranted/recommended at this time.    D/w patient results and recommendations w/ verbal agreement.    D/w RN results and recommendations w/ verbal agreement.    Thank you for allowing me to participate in the care of your patient-  Hafsa Melendez M.A., CCC-SLP     EDUCATION  The patient has been educated in the following areas:   Oral Care/Hydration.      Time Calculation:     Therapy Charges for Today       Code Description Service Date Service Provider Modifiers Qty    67633758966 HC ST EVAL ORAL PHARYNG SWALLOW 4 9/30/2024 Hafsa Melendez MA,CCC-SLP GN 1          Hafsa Melendez MA,CCC-SLP  9/30/2024    Electronically signed by Hafsa Melendez MA,CCC-SLP at 09/30/24 1107       ADL Documentation (most recent)      Flowsheet Row Most Recent Value   Transferring 4 - completely dependent   Toileting 3 - assistive equipment and person   Bathing 2 - assistive person   Dressing 2 - assistive person   Eating 2 - assistive person   Communication 0 - understands/communicates without difficulty   Swallowing 0 - swallows foods/liquids without difficulty   Equipment Currently Used at Home hospital bed, lift device, wheelchair

## 2024-10-04 NOTE — CASE MANAGEMENT/SOCIAL WORK
Discharge Planning Assessment   [facility]     Patient Name: Lety Johnson  MRN: [id number]  Today's Date: 10/4/2024    Admit Date: 9/26/2024    Plan: SS sent pt's referral for review to [facility] for review.  SS will follow.    KAZ Lacy

## 2024-10-04 NOTE — PROGRESS NOTES
Hospitalist note     Patient was to be discharged yesterday on Macrobid to complete 7-day course of antibiotic therapy for ESBL E. coli acute cystitis.  Unfortunately, patient has no family members willing to take care of her at home and she has left-sided hemiplegia and unable to care for herself independently.  As such, there is no safe discharge disposition at this time.  Case management is attempting to speak with the [facility]  in hopes of having him speak to the  to have patient's boyfriend released from [facility] so he can come home to take care of her.  Once a safe discharge disposition is created, patient is stable for discharge.  Will not formally evaluate/examine patient during the remainder of her hospital stay unless concerns are brought to providers from patient's nursing staff.

## 2024-10-04 NOTE — CASE MANAGEMENT/SOCIAL WORK
Discharge Planning Assessment  [facility]     Patient Name: Lety Johnson  MRN: [id number]  Today's Date: 10/4/2024    Admit Date: 9/26/2024    Plan: SS spoke with [facility] per [facility] who will return call to [facility] SS regarding referral.  SS spoke with pt and pt's Mother at bedside.  Pt participated with therapy on this date with SS present.  Pt educated on reasons that SS has been unsuccessful with therapy options and stated understanding.  Manager continues efforts with .  SS discussed with Physician.  SS will follow.     Discharge Plan       Row Name 10/04/24 1205       Plan    Plan SS spoke with [facility] per Morris Chapel who will return call to [facility] SS regarding referral.  SS spoke with pt and pt's Mother at bedside.  Pt participated with therapy on this date with SS present.  Pt educated on reasons that SS has been unsuccessful with therapy options and stated understanding.  Manager continues efforts with .  SS discussed with Physician.  SS will follow.                  Continued Care and Services - Admitted Since 9/26/2024       Destination       Service Provider Request Status Selected Services Address Phone Fax Patient Preferred    [facility] Pending - Request Sent N/A [address] [address] [phone] --    [facility] Declined  Cannot meet patient's needs N/A [address] [facility] [id number] [phone] --    [facility] Acute Care Declined  [facility] [address] [facility] [id number] [phone] --                  Expected Discharge Date and Time       Expected Discharge Date Expected Discharge Time    Oct 3, 2024             KAZ Lacy

## 2024-10-04 NOTE — PLAN OF CARE
Goal Outcome Evaluation:   Pt is currently resting in bed. VSS with no complaints or S/S of distress. Will continue to follow plan of care.

## 2024-10-05 LAB — HBA1C MFR BLD: 9.4 % (ref 4.8–5.6)

## 2024-10-05 PROCEDURE — 99232 SBSQ HOSP IP/OBS MODERATE 35: CPT | Performed by: NURSE PRACTITIONER

## 2024-10-05 PROCEDURE — 25010000002 HEPARIN (PORCINE) PER 1000 UNITS: Performed by: INTERNAL MEDICINE

## 2024-10-05 PROCEDURE — 25810000003 SODIUM CHLORIDE 0.9 % SOLUTION: Performed by: INTERNAL MEDICINE

## 2024-10-05 PROCEDURE — 25010000002 KETOROLAC TROMETHAMINE PER 15 MG: Performed by: HOSPITALIST

## 2024-10-05 PROCEDURE — 99231 SBSQ HOSP IP/OBS SF/LOW 25: CPT | Performed by: HOSPITALIST

## 2024-10-05 PROCEDURE — 83036 HEMOGLOBIN GLYCOSYLATED A1C: CPT | Performed by: HOSPITALIST

## 2024-10-05 RX ORDER — METOPROLOL TARTRATE 25 MG/1
25 TABLET, FILM COATED ORAL EVERY 12 HOURS SCHEDULED
Status: DISPENSED | OUTPATIENT
Start: 2024-10-05

## 2024-10-05 RX ORDER — HYDROXYZINE HYDROCHLORIDE 25 MG/1
25 TABLET, FILM COATED ORAL ONCE
Status: COMPLETED | OUTPATIENT
Start: 2024-10-06 | End: 2024-10-05

## 2024-10-05 RX ORDER — KETOROLAC TROMETHAMINE 30 MG/ML
15 INJECTION, SOLUTION INTRAMUSCULAR; INTRAVENOUS EVERY 6 HOURS PRN
Status: DISPENSED | OUTPATIENT
Start: 2024-10-05 | End: 2024-10-07

## 2024-10-05 RX ADMIN — Medication 10 ML: at 09:02

## 2024-10-05 RX ADMIN — HEPARIN SODIUM 5000 UNITS: 5000 INJECTION INTRAVENOUS; SUBCUTANEOUS at 13:34

## 2024-10-05 RX ADMIN — ASPIRIN 81 MG: 81 TABLET, CHEWABLE ORAL at 09:01

## 2024-10-05 RX ADMIN — ACETAMINOPHEN 650 MG: 325 TABLET ORAL at 04:16

## 2024-10-05 RX ADMIN — PANTOPRAZOLE SODIUM 40 MG: 40 TABLET, DELAYED RELEASE ORAL at 09:01

## 2024-10-05 RX ADMIN — SODIUM CHLORIDE 75 ML/HR: 9 INJECTION, SOLUTION INTRAVENOUS at 09:13

## 2024-10-05 RX ADMIN — CYCLOBENZAPRINE 10 MG: 10 TABLET, FILM COATED ORAL at 20:45

## 2024-10-05 RX ADMIN — Medication 10 ML: at 20:45

## 2024-10-05 RX ADMIN — KETOROLAC TROMETHAMINE 15 MG: 30 INJECTION, SOLUTION INTRAMUSCULAR; INTRAVENOUS at 21:28

## 2024-10-05 RX ADMIN — METOPROLOL TARTRATE 25 MG: 25 TABLET, FILM COATED ORAL at 13:34

## 2024-10-05 RX ADMIN — HEPARIN SODIUM 5000 UNITS: 5000 INJECTION INTRAVENOUS; SUBCUTANEOUS at 20:45

## 2024-10-05 RX ADMIN — ACETAMINOPHEN 650 MG: 325 TABLET ORAL at 13:39

## 2024-10-05 RX ADMIN — KETOROLAC TROMETHAMINE 15 MG: 30 INJECTION, SOLUTION INTRAMUSCULAR; INTRAVENOUS at 15:24

## 2024-10-05 RX ADMIN — ATORVASTATIN CALCIUM 80 MG: 40 TABLET, FILM COATED ORAL at 09:01

## 2024-10-05 RX ADMIN — DICLOFENAC SODIUM 4 G: 10 GEL TOPICAL at 20:45

## 2024-10-05 RX ADMIN — HYDROXYZINE HYDROCHLORIDE 25 MG: 25 TABLET ORAL at 23:48

## 2024-10-05 RX ADMIN — DULOXETINE HYDROCHLORIDE 60 MG: 60 CAPSULE, DELAYED RELEASE ORAL at 09:01

## 2024-10-05 RX ADMIN — LISINOPRIL 20 MG: 10 TABLET ORAL at 09:01

## 2024-10-05 RX ADMIN — NICOTINE 1 PATCH: 7 PATCH, EXTENDED RELEASE TRANSDERMAL at 09:02

## 2024-10-05 RX ADMIN — ACETAMINOPHEN 650 MG: 325 TABLET ORAL at 20:45

## 2024-10-05 RX ADMIN — METOPROLOL TARTRATE 25 MG: 25 TABLET, FILM COATED ORAL at 21:28

## 2024-10-05 RX ADMIN — NYSTATIN: 100000 POWDER TOPICAL at 09:01

## 2024-10-05 RX ADMIN — ACYCLOVIR 400 MG: 200 CAPSULE ORAL at 20:45

## 2024-10-05 NOTE — PLAN OF CARE
Goal Outcome Evaluation:   Patient resting well during shift. She has complained of pain on her left side throughout shift. Dr. Navas notified see orders. No distress noted plan of care reviewed with the patient no questions at this time.

## 2024-10-05 NOTE — PROGRESS NOTES
PROGRESS NOTE         Patient Identification:  Name:  Lety Johnson  Age:  43 y.o.  Sex:  female  :  1980  MRN:  [id number]  Visit Number:  [id number]  Primary Care Provider:  Provider, No Known         LOS: 7 days       ----------------------------------------------------------------------------------------------------------------------  Subjective       Chief Complaints:    Fall        Interval History:      The patient is resting comfortably in bed, pleasant and conversive.  On room air with no apparent distress.  Afebrile.  Denies diarrhea.  The patient denies any complaints.  Denies dysuria.  Lung exam is diminished to auscultation bilaterally.  Abdomen soft and nontender with normoactive bowel sounds.      Review of Systems:    Constitutional: no fever, chills and night sweats.  Generalized fatigue.  Eyes: no eye drainage, itching or redness.  HEENT: no mouth sores, dysphagia or nose bleed.  Respiratory: no for shortness of breath, cough or production of sputum.  Cardiovascular: no chest pain, no palpitations, no orthopnea.  Gastrointestinal: no nausea, vomiting or diarrhea. No abdominal pain, hematemesis or rectal bleeding.  Genitourinary: no dysuria or polyuria.  Hematologic/lymphatic: no lymph node abnormalities, no easy bruising or easy bleeding.  Musculoskeletal: no muscle or joint pain.  Left hip pain  Skin: No rash and no itching.  Neurological: no loss of consciousness, no seizure, no headache.  Behavioral/Psych: no depression or suicidal ideation.  Endocrine: no hot flashes.  Immunologic: negative.    ----------------------------------------------------------------------------------------------------------------------      Objective       [facility] Meds:  acyclovir, 400 mg, Oral, Nightly  aspirin, 81 mg, Oral, Daily  atorvastatin, 80 mg, Oral, Daily  DULoxetine, 60 mg, Oral, Daily  ertapenem, 1,000 mg, Intravenous, Q24H  heparin (porcine), 5,000 Units, Subcutaneous,  Q8H  lisinopril, 20 mg, Oral, Daily  [Held by provider] metFORMIN, 1,000 mg, Oral, BID With Meals  nicotine, 1 patch, Transdermal, Q24H  nystatin, , Topical, Q12H  pantoprazole, 40 mg, Oral, Daily  sodium chloride, 10 mL, Intravenous, Q12H  sodium chloride, 10 mL, Intravenous, Q12H      Pharmacy Consult,   sodium chloride, 75 mL/hr, Last Rate: 75 mL/hr (10/05/24 0913)      ----------------------------------------------------------------------------------------------------------------------    Vital Signs:  Temp:  [98.2 °F (36.8 °C)-98.8 °F (37.1 °C)] 98.6 °F (37 °C)  Heart Rate:  [100-115] 106  Resp:  [20] 20  BP: (140-164)/(80-93) 152/93  Mean Arterial Pressure (Non-Invasive) for the past 24 hrs (Last 3 readings):   Noninvasive MAP (mmHg)   10/05/24 1044 109   10/05/24 0655 109   10/04/24 1500 97     SpO2 Percentage    10/04/24 2255 10/05/24 0655 10/05/24 1044   SpO2: 95% 95% 95%     SpO2:  [95 %-96 %] 95 %  on   ;   Device (Oxygen Therapy): room air    Body mass index is 39.48 kg/m².  Wt Readings from Last 3 Encounters:   10/05/24 109 kg (240 lb 14.4 oz)   06/27/24 120 kg (264 lb)        Intake/Output Summary (Last 24 hours) at 10/5/2024 1215  Last data filed at 10/5/2024 0900  Gross per 24 hour   Intake 160 ml   Output 3600 ml   Net -3440 ml     Diet: Regular/House; Fluid Consistency: Thin (IDDSI 0)  ----------------------------------------------------------------------------------------------------------------------      Physical Exam:    Constitutional:  Well-developed and well-nourished.  Pleasant and conversive.  Resting comfortably in bed on room air with no apparent distress.  Denies complaints.  HENT:  Head: Normocephalic and atraumatic.  Mouth:  Moist mucous membranes.    Eyes:  Conjunctivae and EOM are normal.  No scleral icterus.  Neck:  Neck supple.  No JVD present.    Cardiovascular:  Normal rate, regular rhythm and normal heart sounds with no murmur. No edema.  Pulmonary/Chest: Diminished bilaterally  "no respiratory distress, no wheezes, no crackles, with normal breath sounds and good air movement.  Abdominal:  Soft.  Bowel sounds are normal.  No distension and no tenderness.   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  Left hip pain, sciatica.  Neurological:  Alert and oriented to person, place, and time.  No facial droop.  No slurred speech.   Skin:  Skin is warm and dry.  No rash noted.  No pallor.   Psychiatric:  Normal mood and affect.  Behavior is normal.        ----------------------------------------------------------------------------------------------------------------------            Results from last 7 days   Lab Units 10/01/24  0117 09/30/24  0117 09/29/24  0036   WBC 10*3/mm3 7.71 8.49 7.24   HEMOGLOBIN g/dL 10.9* 10.5* 10.7*   HEMATOCRIT % 33.9* 32.7* 33.6*   MCV fL 98.5* 99.1* 100.6*   MCHC g/dL 32.2 32.1 31.8   PLATELETS 10*3/mm3 353 285 318     Results from last 7 days   Lab Units 10/01/24  0117 09/30/24  0117 09/29/24  0036   SODIUM mmol/L 141 142 140   POTASSIUM mmol/L 3.8 3.5 3.9   CHLORIDE mmol/L 110* 110* 109*   CO2 mmol/L 22.8 22.8 21.0*   BUN mg/dL 4* 5* 4*   CREATININE mg/dL 0.36* 0.46* 0.40*   CALCIUM mg/dL 8.3* 8.0* 8.2*   GLUCOSE mg/dL 158* 196* 192*   ALBUMIN g/dL  --  2.5* 2.5*   BILIRUBIN mg/dL  --  0.2 0.2   ALK PHOS U/L  --  79 88   AST (SGOT) U/L  --  11 15   ALT (SGPT) U/L  --  7 8   Estimated Creatinine Clearance: 252.3 mL/min (A) (by C-G formula based on SCr of 0.36 mg/dL (L)).  No results found for: \"AMMONIA\"    Glucose   Date/Time Value Ref Range Status   10/02/2024 2010 216 (H) 70 - 130 mg/dL Final     No results found for: \"HGBA1C\"  No results found for: \"TSH\", \"FREET4\"    Blood Culture   Date Value Ref Range Status   09/26/2024 No growth at 2 days  Preliminary   09/26/2024 No growth at 2 days  Preliminary     Urine Culture   Date Value Ref Range Status   09/26/2024 >100,000 CFU/mL Escherichia coli ESBL (A)  Final     No results found for: \"WOUNDCX\"  No " "results found for: \"STOOLCX\"  No results found for: \"RESPCX\"  Pain Management Panel           No data to display                  ----------------------------------------------------------------------------------------------------------------------  Imaging Results (Last 24 Hours)       ** No results found for the last 24 hours. **            ----------------------------------------------------------------------------------------------------------------------    Pertinent Infectious Disease Results                Assessment/Plan       Assessment       ESBL UTI       Plan      The patient is resting comfortably in bed, pleasant and conversive.  On room air with no apparent distress.  Afebrile.  Denies diarrhea.  The patient denies any complaints.  Denies dysuria.  Lung exam is diminished to auscultation bilaterally.  Abdomen soft and nontender with normoactive bowel sounds.    The patient has completed a 7-day course of ertapenem for the treatment of ESBL UTI.  She denies any dysuria at this time.  Stable off antibiotic coverage.  We will continue to monitor closely for now.  Continue home suppressive acyclovir for history of genital herpes.    ANTIMICROBIAL THERAPY    acyclovir - 200 MG, 400 MG  ertapenem (INVanz) 1000 mg in 100 mL NS (VTB)  nitrofurantoin (macrocrystal-monohydrate) - 100 MG     Code Status:   Code Status and Medical Interventions: CPR (Attempt to Resuscitate); Full Support   Ordered at: 09/26/24 4153     Code Status (Patient has no pulse and is not breathing):    CPR (Attempt to Resuscitate)     Medical Interventions (Patient has pulse or is breathing):    Full Support       YUE Matthews  10/05/24  12:15 EDT  "

## 2024-10-05 NOTE — PLAN OF CARE
Problem: Adult Inpatient Plan of Care  Goal: Absence of Hospital-Acquired Illness or Injury  Intervention: Prevent Skin Injury  Recent Flowsheet Documentation  Taken 10/4/2024 1931 by Dyan Natarajan, RN  Body Position: supine, legs elevated  Skin Protection:   adhesive use limited   incontinence pads utilized     Problem: Adult Inpatient Plan of Care  Goal: Absence of Hospital-Acquired Illness or Injury  Intervention: Prevent and Manage VTE (Venous Thromboembolism) Risk  Recent Flowsheet Documentation  Taken 10/4/2024 1931 by Dyan Natarajan, RN  Activity Management: activity encouraged  VTE Prevention/Management: (See MAR) other (see comments)   Goal Outcome Evaluation:

## 2024-10-05 NOTE — PROGRESS NOTES
[facility]     Patient Identification:  Name:  Lety Johnson  Age:  43 y.o.  Sex:  female  :  1980  MRN:  [id number]  Visit Number:  [id number]  Primary Care Provider:  Provider, No Known    Length of stay:  7    Subjective: Events noted patient was supposed to be discharged on October 3 but was held, until safe discharge disposition is available.  Blood pressure noted slightly elevated today, will continue to monitor.  Patient is eating well.  No issues reported by staff, patient has no complaints.  Review of chart patient is persistently hyperglycemic, will check for A1c    Chief Complaint: Altered mental status  ----------------------------------------------------------------------------------------------------------------------  Current [facility] Meds:  acyclovir, 400 mg, Oral, Nightly  aspirin, 81 mg, Oral, Daily  atorvastatin, 80 mg, Oral, Daily  DULoxetine, 60 mg, Oral, Daily  ertapenem, 1,000 mg, Intravenous, Q24H  heparin (porcine), 5,000 Units, Subcutaneous, Q8H  lisinopril, 20 mg, Oral, Daily  [Held by provider] metFORMIN, 1,000 mg, Oral, BID With Meals  nicotine, 1 patch, Transdermal, Q24H  nystatin, , Topical, Q12H  pantoprazole, 40 mg, Oral, Daily  sodium chloride, 10 mL, Intravenous, Q12H  sodium chloride, 10 mL, Intravenous, Q12H      Pharmacy Consult,   sodium chloride, 75 mL/hr, Last Rate: 75 mL/hr (10/05/24 0913)      ----------------------------------------------------------------------------------------------------------------------  Vital Signs:  Temp:  [98.2 °F (36.8 °C)-98.8 °F (37.1 °C)] 98.6 °F (37 °C)  Heart Rate:  [100-115] 106  Resp:  [20] 20  BP: (140-164)/(80-93) 152/93       Tele: Sinus tachycardia rate of 104 bpm      10/03/24  0500 10/04/24  0500 10/05/24  0500   Weight: 107 kg (235 lb 12.8 oz) 110 kg (241 lb 6.5 oz) 109 kg (240 lb 14.4 oz)     Body mass index is 39.48 kg/m².    Intake/Output Summary (Last 24 hours) at 10/5/2024  1232  Last data filed at 10/5/2024 0900  Gross per 24 hour   Intake 160 ml   Output 3600 ml   Net -3440 ml     Diet: Regular/House; Fluid Consistency: Thin (IDDSI 0)  ----------------------------------------------------------------------------------------------------------------------  Physical exam:  General: Comfortable,awake, alert, oriented to self, place, and time, able to answer question, slightly slurred, No respiratory distress.    Skin:  Skin is warm and dry. No rash noted. No pallor.    HENT:  Head:  Normocephalic and atraumatic.  Mouth:  Moist mucous membranes.    Eyes:  Conjunctivae and EOM are normal.  Pupils are equal, round, and reactive to light.  No scleral icterus.    Neck:  Neck supple.  No JVD present.  No bruit  Pulmonary/Chest:  No respiratory distress, no wheezes, no crackles, with normal breath sounds and good air movement.  Cardiovascular:  Normal rate, regular rhythm and normal heart sounds with no murmur.  Abdominal: Obese soft.  Bowel sounds are normal.  No distension and no tenderness.   Extremities:  No edema, no tenderness, and no deformity.  No red or swollen joints anywhere.  Strong pulses in all 4 extremities with no clubbing, no cyanosis, no edema.  Neurological: Right upper eyelid drooping, tongue is midline, slight slurring of speech, left-sided weakness/stiffness   Genitourinary: External urinary catheter  Back:  ----------------------------------------------------------------------------------------------------------------------  ----------------------------------------------------------------------------------------------------------------------      Results from last 7 days   Lab Units 10/01/24  0117 09/30/24  0117 09/29/24  0036   WBC 10*3/mm3 7.71 8.49 7.24   HEMOGLOBIN g/dL 10.9* 10.5* 10.7*   HEMATOCRIT % 33.9* 32.7* 33.6*   MCV fL 98.5* 99.1* 100.6*   MCHC g/dL 32.2 32.1 31.8   PLATELETS 10*3/mm3 353 480 270         Results from last 7 days   Lab Units 10/01/24  0117  "09/30/24  0117 09/29/24  0036   SODIUM mmol/L 141 142 140   POTASSIUM mmol/L 3.8 3.5 3.9   CHLORIDE mmol/L 110* 110* 109*   CO2 mmol/L 22.8 22.8 21.0*   BUN mg/dL 4* 5* 4*   CREATININE mg/dL 0.36* 0.46* 0.40*   CALCIUM mg/dL 8.3* 8.0* 8.2*   GLUCOSE mg/dL 158* 196* 192*   ALBUMIN g/dL  --  2.5* 2.5*   BILIRUBIN mg/dL  --  0.2 0.2   ALK PHOS U/L  --  79 88   AST (SGOT) U/L  --  11 15   ALT (SGPT) U/L  --  7 8   Estimated Creatinine Clearance: 252.3 mL/min (A) (by C-G formula based on SCr of 0.36 mg/dL (L)).    No results found for: \"AMMONIA\"      No results found for: \"BLOODCX\"  No results found for: \"URINECX\"  No results found for: \"WOUNDCX\"  No results found for: \"STOOLCX\"    I have personally looked at the labs and they are summarized above.  ----------------------------------------------------------------------------------------------------------------------  Imaging Results (Last 24 Hours)       ** No results found for the last 24 hours. **          ----------------------------------------------------------------------------------------------------------------------  Assessment and Plan:  -Acute cystitis without hematuria ESBL E. coli positive completed treatment  -Acute metabolic encephalopathy  -Hyperglycemia, check A1c  -History of CVA with left-sided plegic  -Debility chronic  -History of genital herpes  -Morbid obesity with a BMI of 39.5 complicating all aspects of care  -History of essential hypertension, monitor and adjust regimen as needed    Patient has completed treatment for ESBL E. coli cystitis, currently on nitrofurantoin, no issues reported by staff patient has no complaints, will DC IV fluids, PT OT, awaiting for discharge/placement.  Case management and  help appreciated.    Disposition pending      Mara Navas MD  10/05/24  12:32 EDT  "

## 2024-10-06 PROCEDURE — 25010000002 HEPARIN (PORCINE) PER 1000 UNITS: Performed by: INTERNAL MEDICINE

## 2024-10-06 PROCEDURE — 99231 SBSQ HOSP IP/OBS SF/LOW 25: CPT | Performed by: HOSPITALIST

## 2024-10-06 PROCEDURE — 99232 SBSQ HOSP IP/OBS MODERATE 35: CPT | Performed by: NURSE PRACTITIONER

## 2024-10-06 PROCEDURE — 25010000002 KETOROLAC TROMETHAMINE PER 15 MG: Performed by: HOSPITALIST

## 2024-10-06 RX ADMIN — NYSTATIN: 100000 POWDER TOPICAL at 22:00

## 2024-10-06 RX ADMIN — HEPARIN SODIUM 5000 UNITS: 5000 INJECTION INTRAVENOUS; SUBCUTANEOUS at 10:02

## 2024-10-06 RX ADMIN — Medication 10 ML: at 22:00

## 2024-10-06 RX ADMIN — HEPARIN SODIUM 5000 UNITS: 5000 INJECTION INTRAVENOUS; SUBCUTANEOUS at 21:58

## 2024-10-06 RX ADMIN — NICOTINE 1 PATCH: 7 PATCH, EXTENDED RELEASE TRANSDERMAL at 09:53

## 2024-10-06 RX ADMIN — ACYCLOVIR 400 MG: 200 CAPSULE ORAL at 21:59

## 2024-10-06 RX ADMIN — METOPROLOL TARTRATE 25 MG: 25 TABLET, FILM COATED ORAL at 22:00

## 2024-10-06 RX ADMIN — Medication 10 ML: at 09:52

## 2024-10-06 RX ADMIN — CYCLOBENZAPRINE 10 MG: 10 TABLET, FILM COATED ORAL at 21:59

## 2024-10-06 RX ADMIN — Medication 10 ML: at 21:59

## 2024-10-06 RX ADMIN — PANTOPRAZOLE SODIUM 40 MG: 40 TABLET, DELAYED RELEASE ORAL at 09:51

## 2024-10-06 RX ADMIN — KETOROLAC TROMETHAMINE 15 MG: 30 INJECTION, SOLUTION INTRAMUSCULAR; INTRAVENOUS at 11:53

## 2024-10-06 RX ADMIN — METOPROLOL TARTRATE 25 MG: 25 TABLET, FILM COATED ORAL at 09:52

## 2024-10-06 RX ADMIN — DULOXETINE HYDROCHLORIDE 60 MG: 60 CAPSULE, DELAYED RELEASE ORAL at 09:51

## 2024-10-06 RX ADMIN — KETOROLAC TROMETHAMINE 15 MG: 30 INJECTION, SOLUTION INTRAMUSCULAR; INTRAVENOUS at 21:59

## 2024-10-06 RX ADMIN — NYSTATIN: 100000 POWDER TOPICAL at 09:52

## 2024-10-06 RX ADMIN — LISINOPRIL 20 MG: 10 TABLET ORAL at 09:51

## 2024-10-06 RX ADMIN — ACETAMINOPHEN 650 MG: 325 TABLET ORAL at 21:59

## 2024-10-06 RX ADMIN — ATORVASTATIN CALCIUM 80 MG: 40 TABLET, FILM COATED ORAL at 09:51

## 2024-10-06 RX ADMIN — ASPIRIN 81 MG: 81 TABLET, CHEWABLE ORAL at 09:51

## 2024-10-06 NOTE — PROGRESS NOTES
[facility]     Patient Identification:  Name:  Lety Johnson  Age:  43 y.o.  Sex:  female  :  1980  MRN:  [id number]  Visit Number:  [id number]  Primary Care Provider:  Provider, No Known    Length of stay:  8    Subjective: Patient seen and examined, patient is sitting at the edge of the bed, no complaints at this time, no issues reported by staff.  Tolerated Lopressor last night blood pressure still elevated.    Chief Complaint: UTI  ----------------------------------------------------------------------------------------------------------------------  Current Hospital Meds:  acyclovir, 400 mg, Oral, Nightly  aspirin, 81 mg, Oral, Daily  atorvastatin, 80 mg, Oral, Daily  DULoxetine, 60 mg, Oral, Daily  heparin (porcine), 5,000 Units, Subcutaneous, Q8H  lisinopril, 20 mg, Oral, Daily  [Held by provider] metFORMIN, 1,000 mg, Oral, BID With Meals  metoprolol tartrate, 25 mg, Oral, Q12H  nicotine, 1 patch, Transdermal, Q24H  nystatin, , Topical, Q12H  pantoprazole, 40 mg, Oral, Daily  sodium chloride, 10 mL, Intravenous, Q12H  sodium chloride, 10 mL, Intravenous, Q12H      Pharmacy Consult,       ----------------------------------------------------------------------------------------------------------------------  Vital Signs:  Temp:  [97.9 °F (36.6 °C)-98.6 °F (37 °C)] 97.9 °F (36.6 °C)  Heart Rate:  [] 91  Resp:  [20-22] 22  BP: (142-157)/(81-97) 145/90       Tele: Sinus rhythm 91 bpm      10/04/24  0500 10/05/24  0500 10/06/24  0500   Weight: 110 kg (241 lb 6.5 oz) 109 kg (240 lb 14.4 oz) 109 kg (241 lb 1.6 oz)     Body mass index is 39.51 kg/m².    Intake/Output Summary (Last 24 hours) at 10/6/2024 0848  Last data filed at 10/6/2024 0431  Gross per 24 hour   Intake 400 ml   Output 1150 ml   Net -750 ml     Diet: Regular/House; Fluid Consistency: Thin (IDDSI  0)  ----------------------------------------------------------------------------------------------------------------------  Physical exam:  General: Sitting at the edge of the bed, awake and alert conversant.    No respiratory distress.    Skin:  Skin is warm and dry. No rash noted. No pallor.    HENT:  Head:  Normocephalic and atraumatic.  Mouth:  Moist mucous membranes.    Eyes:  Conjunctivae and EOM are normal.  Pupils are equal, round, and reactive to light.  No scleral icterus.    Neck:  Neck supple.  No JVD present.    Pulmonary/Chest:  No respiratory distress, no wheezes, no crackles, with normal breath sounds and good air movement.  Cardiovascular:  Normal rate, regular rhythm and normal heart sounds with no murmur.  Abdominal: Obese soft.  Bowel sounds are normal.  No distension and no tenderness.   Extremities:  No edema, no tenderness, and no deformity.  No red or swollen joints anywhere.  Strong pulses in all 4 extremities with no clubbing, no cyanosis, no edema.  Neurological: Left-sided hemiplegia.  Mild slurred speech   genitourinary: No Florentino catheter  Back:  ----------------------------------------------------------------------------------------------------------------------  ----------------------------------------------------------------------------------------------------------------------      Results from last 7 days   Lab Units 10/01/24  0117 09/30/24  0117   WBC 10*3/mm3 7.71 8.49   HEMOGLOBIN g/dL 10.9* 10.5*   HEMATOCRIT % 33.9* 32.7*   MCV fL 98.5* 99.1*   MCHC g/dL 32.2 32.1   PLATELETS 10*3/mm3 353 285         Results from last 7 days   Lab Units 10/01/24  0117 09/30/24  0117   SODIUM mmol/L 141 142   POTASSIUM mmol/L 3.8 3.5   CHLORIDE mmol/L 110* 110*   CO2 mmol/L 22.8 22.8   BUN mg/dL 4* 5*   CREATININE mg/dL 0.36* 0.46*   CALCIUM mg/dL 8.3* 8.0*   GLUCOSE mg/dL 158* 196*   ALBUMIN g/dL  --  2.5*   BILIRUBIN mg/dL  --  0.2   ALK PHOS U/L  --  79   AST (SGOT) U/L  --  11   ALT (SGPT)  "U/L  --  7   Estimated Creatinine Clearance: 252.3 mL/min (A) (by C-G formula based on SCr of 0.36 mg/dL (L)).    No results found for: \"AMMONIA\"      No results found for: \"BLOODCX\"  No results found for: \"URINECX\"  No results found for: \"WOUNDCX\"  No results found for: \"STOOLCX\"    I have personally looked at the labs and they are summarized above.  ----------------------------------------------------------------------------------------------------------------------  Imaging Results (Last 24 Hours)       ** No results found for the last 24 hours. **          ----------------------------------------------------------------------------------------------------------------------  Assessment and Plan:  -Acute cystitis without hematuria ESBL positive E. coli completed treatment  -Chronic debility  -History of CVA with left-sided hemiplegia  -Diabetes type 2 on Glucophage Accu-Chek so far has been at goal  -History of genital herpes  -Morbid obesity with a BMI of 39  -History of essential hypertension, added Lopressor yesterday continue to monitor and increase tomorrow if BP still persistently elevated.  -Acute metabolic encephalopathy secondary to UTI now resolved    Continue supportive care, aspiration precaution, awaiting for discharge planning with assistance from  and case management.      Disposition pending      Mara Navas MD  10/06/24  08:48 EDT  "

## 2024-10-06 NOTE — PLAN OF CARE
Goal Outcome Evaluation:   Patient resting well during shift. Patient has complained of left leg and hip pain. PRN medications given as ordered. No distress noted. Plan of care reviewed with the patient.

## 2024-10-06 NOTE — PLAN OF CARE
Goal Outcome Evaluation:   VSS on RA, SR on the monitor. No indicators of acute distress noted. Wound care completed.

## 2024-10-06 NOTE — PROGRESS NOTES
PROGRESS NOTE         Patient Identification:  Name:  Lety Johnson  Age:  43 y.o.  Sex:  female  :  1980  MRN:  [id number]  Visit Number:  [id number]  Primary Care Provider:  Provider, No Known         LOS: 8 days       ----------------------------------------------------------------------------------------------------------------------  Subjective       Chief Complaints:    Fall        Interval History:      The patient is resting comfortably in bed, on room air with no apparent distress.  Afebrile.  Denies diarrhea.  The patient denies any complaints including dysuria.  She does complain of hip pain related to sciatica.  Lung exam is clear with bases diminished to auscultation bilaterally.  Abdomen soft and nontender with normoactive bowel sounds.      Review of Systems:    Constitutional: no fever, chills and night sweats.  Generalized fatigue.  Eyes: no eye drainage, itching or redness.  HEENT: no mouth sores, dysphagia or nose bleed.  Respiratory: no for shortness of breath, cough or production of sputum.  Cardiovascular: no chest pain, no palpitations, no orthopnea.  Gastrointestinal: no nausea, vomiting or diarrhea. No abdominal pain, hematemesis or rectal bleeding.  Genitourinary: no dysuria or polyuria.  Hematologic/lymphatic: no lymph node abnormalities, no easy bruising or easy bleeding.  Musculoskeletal: no muscle or joint pain.  Left hip pain  Skin: No rash and no itching.  Neurological: no loss of consciousness, no seizure, no headache.  Behavioral/Psych: no depression or suicidal ideation.  Endocrine: no hot flashes.  Immunologic: negative.    ----------------------------------------------------------------------------------------------------------------------      Objective       [facility] Meds:  acyclovir, 400 mg, Oral, Nightly  aspirin, 81 mg, Oral, Daily  atorvastatin, 80 mg, Oral, Daily  DULoxetine, 60 mg, Oral, Daily  heparin (porcine), 5,000 Units, Subcutaneous,  Q8H  lisinopril, 20 mg, Oral, Daily  [Held by provider] metFORMIN, 1,000 mg, Oral, BID With Meals  metoprolol tartrate, 25 mg, Oral, Q12H  nicotine, 1 patch, Transdermal, Q24H  nystatin, , Topical, Q12H  pantoprazole, 40 mg, Oral, Daily  sodium chloride, 10 mL, Intravenous, Q12H  sodium chloride, 10 mL, Intravenous, Q12H      Pharmacy Consult,       ----------------------------------------------------------------------------------------------------------------------    Vital Signs:  Temp:  [97.3 °F (36.3 °C)-98.2 °F (36.8 °C)] 97.3 °F (36.3 °C)  Heart Rate:  [] 96  Resp:  [20-22] 20  BP: (142-157)/(81-97) 147/89  Mean Arterial Pressure (Non-Invasive) for the past 24 hrs (Last 3 readings):   Noninvasive MAP (mmHg)   10/06/24 1100 112   10/06/24 0842 108   10/06/24 0431 119     SpO2 Percentage    10/06/24 0431 10/06/24 0842 10/06/24 1100   SpO2: 95% 96% 95%     SpO2:  [95 %-96 %] 95 %  on   ;   Device (Oxygen Therapy): room air    Body mass index is 39.51 kg/m².  Wt Readings from Last 3 Encounters:   10/06/24 109 kg (241 lb 1.6 oz)   06/27/24 120 kg (264 lb)        Intake/Output Summary (Last 24 hours) at 10/6/2024 1200  Last data filed at 10/6/2024 0431  Gross per 24 hour   Intake 240 ml   Output 1150 ml   Net -910 ml     Diet: Regular/House; Fluid Consistency: Thin (IDDSI 0)  ----------------------------------------------------------------------------------------------------------------------      Physical Exam:    Constitutional:  Well-developed and well-nourished.  Resting comfortably in bed on room air with no apparent distress.  Denies any complaints.    HENT:  Head: Normocephalic and atraumatic.  Mouth:  Moist mucous membranes.    Eyes:  Conjunctivae and EOM are normal.  No scleral icterus.  Neck:  Neck supple.  No JVD present.    Cardiovascular:  Normal rate, regular rhythm and normal heart sounds with no murmur. No edema.  Pulmonary/Chest: Diminished bilaterally no respiratory distress, no wheezes, no  "crackles, with normal breath sounds and good air movement.  Abdominal:  Soft.  Bowel sounds are normal.  No distension and no tenderness.   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  Left hip pain, sciatica.  Neurological:  Alert and oriented to person, place, and time.  No facial droop.  No slurred speech.   Skin:  Skin is warm and dry.  No rash noted.  No pallor.   Psychiatric:  Normal mood and affect.  Behavior is normal.        ----------------------------------------------------------------------------------------------------------------------            Results from last 7 days   Lab Units 10/01/24  0117 09/30/24  0117   WBC 10*3/mm3 7.71 8.49   HEMOGLOBIN g/dL 10.9* 10.5*   HEMATOCRIT % 33.9* 32.7*   MCV fL 98.5* 99.1*   MCHC g/dL 32.2 32.1   PLATELETS 10*3/mm3 353 285     Results from last 7 days   Lab Units 10/01/24  0117 09/30/24  0117   SODIUM mmol/L 141 142   POTASSIUM mmol/L 3.8 3.5   CHLORIDE mmol/L 110* 110*   CO2 mmol/L 22.8 22.8   BUN mg/dL 4* 5*   CREATININE mg/dL 0.36* 0.46*   CALCIUM mg/dL 8.3* 8.0*   GLUCOSE mg/dL 158* 196*   ALBUMIN g/dL  --  2.5*   BILIRUBIN mg/dL  --  0.2   ALK PHOS U/L  --  79   AST (SGOT) U/L  --  11   ALT (SGPT) U/L  --  7   Estimated Creatinine Clearance: 252.3 mL/min (A) (by C-G formula based on SCr of 0.36 mg/dL (L)).  No results found for: \"AMMONIA\"    Hemoglobin A1C   Date/Time Value Ref Range Status   10/05/2024 1209 9.40 (H) 4.80 - 5.60 % Final     Lab Results   Component Value Date    HGBA1C 9.40 (H) 10/05/2024     No results found for: \"TSH\", \"FREET4\"    Blood Culture   Date Value Ref Range Status   09/26/2024 No growth at 2 days  Preliminary   09/26/2024 No growth at 2 days  Preliminary     Urine Culture   Date Value Ref Range Status   09/26/2024 >100,000 CFU/mL Escherichia coli ESBL (A)  Final     No results found for: \"WOUNDCX\"  No results found for: \"STOOLCX\"  No results found for: \"RESPCX\"  Pain Management Panel           No data to display    "               ----------------------------------------------------------------------------------------------------------------------  Imaging Results (Last 24 Hours)       ** No results found for the last 24 hours. **            ----------------------------------------------------------------------------------------------------------------------    Pertinent Infectious Disease Results                Assessment/Plan       Assessment       ESBL UTI       Plan      The patient is resting comfortably in bed, on room air with no apparent distress.  Afebrile.  Denies diarrhea.  The patient denies any complaints including dysuria.  She does complain of hip pain related to sciatica.  Lung exam is clear with bases diminished to auscultation bilaterally.  Abdomen soft and nontender with normoactive bowel sounds.    The patient completed a 7-day course of ertapenem in the setting of ESBL UTI.  She denies any complaints or dysuria at all.  Remained stable off of antibiotic coverage.  Continues suppressive acyclovir for history of genital herpes.  At this time the infectious disease team will sign off the case, please give us a call back with any questions, concerns, or changes with the patient's status.    ANTIMICROBIAL THERAPY    acyclovir - 200 MG, 400 MG     Code Status:   Code Status and Medical Interventions: CPR (Attempt to Resuscitate); Full Support   Ordered at: 09/26/24 7996     Code Status (Patient has no pulse and is not breathing):    CPR (Attempt to Resuscitate)     Medical Interventions (Patient has pulse or is breathing):    Full Support       Shanthi Dick, YUE  10/06/24  12:00 EDT

## 2024-10-07 PROCEDURE — 25010000002 HEPARIN (PORCINE) PER 1000 UNITS: Performed by: INTERNAL MEDICINE

## 2024-10-07 PROCEDURE — 97110 THERAPEUTIC EXERCISES: CPT

## 2024-10-07 PROCEDURE — 25010000002 KETOROLAC TROMETHAMINE PER 15 MG: Performed by: HOSPITALIST

## 2024-10-07 PROCEDURE — 97535 SELF CARE MNGMENT TRAINING: CPT

## 2024-10-07 PROCEDURE — 97530 THERAPEUTIC ACTIVITIES: CPT

## 2024-10-07 PROCEDURE — 99231 SBSQ HOSP IP/OBS SF/LOW 25: CPT | Performed by: INTERNAL MEDICINE

## 2024-10-07 RX ADMIN — NYSTATIN: 100000 POWDER TOPICAL at 21:54

## 2024-10-07 RX ADMIN — ATORVASTATIN CALCIUM 80 MG: 40 TABLET, FILM COATED ORAL at 08:59

## 2024-10-07 RX ADMIN — METOPROLOL TARTRATE 25 MG: 25 TABLET, FILM COATED ORAL at 09:00

## 2024-10-07 RX ADMIN — HEPARIN SODIUM 5000 UNITS: 5000 INJECTION INTRAVENOUS; SUBCUTANEOUS at 21:51

## 2024-10-07 RX ADMIN — ASPIRIN 81 MG: 81 TABLET, CHEWABLE ORAL at 09:00

## 2024-10-07 RX ADMIN — KETOROLAC TROMETHAMINE 15 MG: 30 INJECTION, SOLUTION INTRAMUSCULAR; INTRAVENOUS at 09:04

## 2024-10-07 RX ADMIN — PANTOPRAZOLE SODIUM 40 MG: 40 TABLET, DELAYED RELEASE ORAL at 09:00

## 2024-10-07 RX ADMIN — ACYCLOVIR 400 MG: 200 CAPSULE ORAL at 21:50

## 2024-10-07 RX ADMIN — Medication 10 ML: at 09:01

## 2024-10-07 RX ADMIN — METOPROLOL TARTRATE 25 MG: 25 TABLET, FILM COATED ORAL at 21:50

## 2024-10-07 RX ADMIN — NICOTINE 1 PATCH: 7 PATCH, EXTENDED RELEASE TRANSDERMAL at 09:00

## 2024-10-07 RX ADMIN — DULOXETINE HYDROCHLORIDE 60 MG: 60 CAPSULE, DELAYED RELEASE ORAL at 08:59

## 2024-10-07 RX ADMIN — ACETAMINOPHEN 650 MG: 325 TABLET ORAL at 18:17

## 2024-10-07 RX ADMIN — HEPARIN SODIUM 5000 UNITS: 5000 INJECTION INTRAVENOUS; SUBCUTANEOUS at 14:27

## 2024-10-07 RX ADMIN — LISINOPRIL 20 MG: 10 TABLET ORAL at 09:00

## 2024-10-07 RX ADMIN — HEPARIN SODIUM 5000 UNITS: 5000 INJECTION INTRAVENOUS; SUBCUTANEOUS at 05:40

## 2024-10-07 RX ADMIN — Medication 10 ML: at 21:58

## 2024-10-07 RX ADMIN — CYCLOBENZAPRINE 10 MG: 10 TABLET, FILM COATED ORAL at 16:02

## 2024-10-07 RX ADMIN — NYSTATIN: 100000 POWDER TOPICAL at 09:01

## 2024-10-07 RX ADMIN — Medication 5 MG: at 02:28

## 2024-10-07 NOTE — CASE MANAGEMENT/SOCIAL WORK
Discharge Planning Assessment   [place]     Patient Name: Lety Johnson  MRN: [id number]  Today's Date: 10/7/2024    Admit Date: 9/26/2024    Plan: Pt being reviewed by inpt rehab at [facility].  [facility] per Lesly Childs has no available beds at this time. SS will follow.       Discharge Plan       Row Name 10/07/24 1407       Plan    Plan Pt being reviewed by inpt rehab at [facility].  [facility] per Lesly Childs has no available beds at this time. SS will follow.      Row Name 10/07/24 1212                  Continued Care and Services - Admitted Since 9/26/2024       Destination       Service Provider Request Status Selected Services Address Phone Fax Patient Preferred    [facility] SWING BED UNIT Pending - No Request Sent N/A [address] [facility] [id number] [phone] --    [facility] Pending - No Request Sent N/A [address] [facility] [address] [phone] --    [facility] Pending - No Request Sent N/A [address] [facility] [address] [phone] --    [facility] Declined  Cannot meet patient's needs N/A [address] [facility] [id number] [phone] --    [facility] Acute Care Declined  Not eligible N/A [address] [facility] [address] [facility] [id number] [phone] --    [facility] - SWING Declined  Not eligible N/A [address] [address] [facility] [address] [phone] --    [facility] - [facility] Declined  Clinical Denial N/A [address] FLO[address], [facility] [address] [phone] --                  Expected Discharge Date and Time       Expected Discharge Date Expected Discharge Time    Oct 11, 2024         KAZ Lacy

## 2024-10-07 NOTE — CASE MANAGEMENT/SOCIAL WORK
Discharge Planning Assessment  [facility]     Patient Name: Lety Johnson  MRN: [id number]  Today's Date: 10/7/2024    Admit Date: 9/26/2024    Plan: [facility] inpt rehab has denied pt due to low functioning.  SS left message for [facility] to return call.  SS will follow.     Discharge Plan       Row Name 10/07/24 1551       Plan    Plan [facility] inpt rehab has denied pt due to low functioning.  SS left message for [facility] to return call.  SS will follow.      Row Name 10/07/24 1408       Plan    Plan Pt being reviewed by inpt rehab at [facility].  [facility] Swing Bed per Lesly Childs has no available beds at this time. SS will follow.                  Continued Care and Services - Admitted Since 9/26/2024       Destination       Service Provider Request Status Selected Services Address Phone Fax Patient Preferred    [facility] SWING BED UNIT Pending - No Request Sent N/A [address] [facility] [address] [phone] --    [facility] Pending - No Request Sent N/A [address] [facility] [address] [phone] --    [facility] Declined  Cannot meet patient's needs N/A [address] [facility] [address] [phone] --    [facility] Acute Care Declined  Not eligible N/A [address] [facility] [address] [phone] --    [facility] - SWING Declined  Not eligible N/A [address] [facility] [address] [phone] --    [facility] - [facility] Declined  Clinical Denial N/A [address] FLOOR, [facility] [address] [phone] --    [facility] Declined  Not eligible N/A [address] [facility] [address] [phone] --                  Expected Discharge Date and Time       Expected Discharge Date Expected Discharge Time    Oct 11, 2024             Monica Ayala,  BSW

## 2024-10-07 NOTE — THERAPY TREATMENT NOTE
Acute Care - Occupational Therapy Treatment Note   Zaki     Patient Name: Lety Johnson  : 1980  MRN: [id number]  Today's Date: 10/7/2024  Onset of Illness/Injury or Date of Surgery: 24     Referring Physician: Dr. Marc    Admit Date: 2024       ICD-10-CM ICD-9-CM   1. Hypokalemia  E87.6 276.8   2. Pressure injury of skin of sacral region, unspecified injury stage  L89.159 707.03     707.20   3. Pressure injury of skin of left heel, unspecified injury stage  L89.629 707.07     707.20   4. Acute cystitis without hematuria  N30.00 595.0     Patient Active Problem List   Diagnosis   (none) - all problems resolved or deleted     Past Medical History:   Diagnosis Date    Stroke      History reviewed. No pertinent surgical history.      OT ASSESSMENT FLOWSHEET (Last 12 Hours)       OT Evaluation and Treatment       Row Name 10/07/24 1022                   OT Time and Intention    Document Type therapy note (daily note)  -KR        Mode of Treatment occupational therapy  -KR        Patient Effort adequate  -KR        Symptoms Noted During/After Treatment fatigue  -KR        Comment Pt presented EOB for therapy session. PT also present for session, in addressing mobility/balance for fxl activity performance.  -KR           General Information    General Observations of Patient alert/cooperative  -KR        Prior Level of Function max assist:;dependent:  -KR           Living Environment    Current Living Arrangements home  -KR        People in Home friend(s)  -KR        Primary Care Provided by other (see comments)  family/friends as available  -KR           Cognition    Affect/Mental Status (Cognition) emotionally labile  -KR        Follows Commands (Cognition) WFL  -KR           Range of Motion Comprehensive    Comment, General Range of Motion No fxl ROM LUE/LLE  -KR           Bed Mobility    Scooting/Bridging [place] (Bed Mobility) dependent (less than 25% patient effort)  -KR         Sit-Supine [place] (Bed Mobility) maximum assist (25% patient effort)  -KR           Sit-Stand Transfer    Sit-Stand [place] (Transfers) dependent (less than 25% patient effort);2 person assist  -KR           Motor Skills    Muscle Tone left;upper extremity(s);spasticity  -KR        Therapeutic Exercise shoulder;elbow/forearm;hand  -KR           Shoulder (Therapeutic Exercise)    Shoulder (Therapeutic Exercise) PROM (passive range of motion)  -KR        Shoulder PROM (Therapeutic Exercise) sitting  -KR           Elbow/Forearm (Therapeutic Exercise)    Elbow/Forearm (Therapeutic Exercise) PROM (passive range of motion)  -KR        Elbow/Forearm PROM (Therapeutic Exercise) sitting  -KR           Wrist (Therapeutic Exercise)    Wrist (Therapeutic Exercise) PROM (passive range of motion)  -KR           Hand (Therapeutic Exercise)    Hand (Therapeutic Exercise) PROM (passive range of motion)  -KR           Balance    Static Sitting Balance independent  -KR        Position, Sitting Balance unsupported;sitting edge of bed  -KR           Orthotics & Prosthetics Management    Additional Documentation --  LUE sling issued. Pt tolerated fitting/placement appropriately. Training began for purpose of sling/ proper donning. OT to continue training as needed.  -KR           Wound 09/26/24 1806 gluteal    Wound - Properties Group Placement Date: 09/26/24  -KJ Placement Time: 1806  -KJ Location: gluteal  -KJ    Retired Wound - Properties Group Placement Date: 09/26/24  -KJ Placement Time: 1806  -KJ Location: gluteal  -KJ    Retired Wound - Properties Group Date first assessed: 09/26/24  -KJ Time first assessed: 1806  -KJ Location: gluteal  -KJ       Wound 09/26/24 1807 Left gluteal    Wound - Properties Group Placement Date: 09/26/24  -KJ Placement Time: 1807  -KJ Side: Left  -KJ Location: gluteal  -KJ    Retired Wound - Properties Group Placement Date: 09/26/24  -KJ Placement Time: 1807  -KJ Side: Left  -KJ Location:  gluteal  -KJ    Retired Wound - Properties Group Date first assessed: 09/26/24  -KJ Time first assessed: 1807  -KJ Side: Left  -KJ Location: gluteal  -KJ       Wound 09/26/24 1809 gluteal    Wound - Properties Group Placement Date: 09/26/24  -KJ Placement Time: 1809  -KJ Location: gluteal  -KJ    Retired Wound - Properties Group Placement Date: 09/26/24  -KJ Placement Time: 1809  -KJ Location: gluteal  -KJ    Retired Wound - Properties Group Date first assessed: 09/26/24  -KJ Time first assessed: 1809  -KJ Location: gluteal  -KJ       Wound 09/26/24 1809 Left posterior ankle    Wound - Properties Group Placement Date: 09/26/24  -KJ Placement Time: 1809  -KJ Side: Left  -KJ Orientation: posterior  -KJ Location: ankle  -KJ    Retired Wound - Properties Group Placement Date: 09/26/24  -KJ Placement Time: 1809  -KJ Side: Left  -KJ Orientation: posterior  -KJ Location: ankle  -KJ    Retired Wound - Properties Group Date first assessed: 09/26/24  -KJ Time first assessed: 1809  -KJ Side: Left  -KJ Location: ankle  -KJ       Wound 09/29/24 0952 Bilateral anterior thigh MASD (Moisture associated skin damage)    Wound - Properties Group Placement Date: 09/29/24  -LB Placement Time: 0952 -LB Present on Original Admission: Y  -LB Side: Bilateral  -LB Orientation: anterior  -LB Location: thigh  -LB Primary Wound Type: MASD  -LB    Retired Wound - Properties Group Placement Date: 09/29/24  -LB Placement Time: 0952 -LB Present on Original Admission: Y  -LB Side: Bilateral  -LB Orientation: anterior  -LB Location: thigh  -LB Primary Wound Type: MASD  -LB    Retired Wound - Properties Group Date first assessed: 09/29/24  -LB Time first assessed: 0952  -LB Present on Original Admission: Y  -LB Side: Bilateral  -LB Location: thigh  -LB Primary Wound Type: MASD  -LB       Plan of Care Review    Plan of Care Reviewed With patient  -KR           ROM Goal 1 (OT)    Progress/Outcome (ROM Goal 1, OT) continuing progress toward goal  -KR             Activity Tolerance Goal 1 (OT)    Progress/Outcome (Activity Tolerance Goal 1, OT) continuing progress toward goal  -KR           Patient Education Goal (OT)    Progress/Outcome (Patient Education Goal, OT) continuing progress toward goal  -KR                  User Key  (r) = Recorded By, (t) = Taken By, (c) = Cosigned By      Initials Name Effective Dates    Neil Menezes OT 06/16/21 -     Nory Keenan RN 06/08/23 -     Zehra Rivera RN 10/20/21 -                            OT Recommendation and Plan  Planned Therapy Interventions (OT): activity tolerance training, neuromuscular control/coordination retraining, ROM/therapeutic exercise  Plan of Care Review  Plan of Care Reviewed With: patient  Plan of Care Reviewed With: patient        Time Calculation:     Therapy Charges for Today       Code Description Service Date Service Provider Modifiers Qty    59250465813  OT THERAPEUTIC ACT EA 15 MIN 10/7/2024 Neil Graf OT GO 2    [id number]  OT SELF CARE/MGMT/TRAIN EA 15 MIN 10/7/2024 Neil Graf OT GO 1                 Neil Graf OT  10/7/2024

## 2024-10-07 NOTE — PLAN OF CARE
Goal Outcome Evaluation:  Patient is currently resting in bed. No S&S of distress noted at this time. Bed alarm on, call light within reach, bed in lowest position. Plan of care ongoing.

## 2024-10-07 NOTE — PROGRESS NOTES
[facility] HOSPITALIST PROGRESS NOTE     Patient Identification:  Name:  Lety Johnson  Age:  43 y.o.  Sex:  female  :  1980  MRN:  [id number]  Visit Number:  [id number]  ROOM: [address] [place] [address]     Primary Care Provider:  Provider, No Known    Length of stay in inpatient status:  9    Subjective     Chief Compliant:    Chief Complaint   Patient presents with    Fall     History of Presenting Illness:    Patient remains ill but stable today, no acute events overnight, no new complaints, denies any fevers or chills, denies fevers or dysuria, PT/OT consulted and following, pending placement. Social Work assisting.   Objective     Current Hospital Meds:  acyclovir, 400 mg, Oral, Nightly  aspirin, 81 mg, Oral, Daily  atorvastatin, 80 mg, Oral, Daily  DULoxetine, 60 mg, Oral, Daily  heparin (porcine), 5,000 Units, Subcutaneous, Q8H  lisinopril, 20 mg, Oral, Daily  [Held by provider] metFORMIN, 1,000 mg, Oral, BID With Meals  metoprolol tartrate, 25 mg, Oral, Q12H  nicotine, 1 patch, Transdermal, Q24H  nystatin, , Topical, Q12H  pantoprazole, 40 mg, Oral, Daily  sodium chloride, 10 mL, Intravenous, Q12H  sodium chloride, 10 mL, Intravenous, Q12H      Pharmacy Consult,       ----------------------------------------------------------------------------------------------------------------------  Vital Signs:  Temp:  [97.3 °F (36.3 °C)-98.8 °F (37.1 °C)] 97.5 °F (36.4 °C)  Heart Rate:  [] 84  Resp:  [20-22] 22  BP: (144-154)/(79-97) 144/97  SpO2:  [94 %-96 %] 96 %  on   ;   Device (Oxygen Therapy): room air  Body mass index is 37.02 kg/m².      Intake/Output Summary (Last 24 hours) at 10/7/2024 0929  Last data filed at 10/7/2024 033  Gross per 24 hour   Intake 500 ml   Output 1200 ml   Net -700 ml      ----------------------------------------------------------------------------------------------------------------------  Physical exam:  Constitutional:  Older than stated age. No acute distress. No  "discomfort   HENT:  Head:  Normocephalic and atraumatic.  Mouth:  Moist mucous membranes.    Eyes:  Conjunctivae and EOM are normal. No scleral icterus.    Neck:  Neck supple.  No JVD present.    Cardiovascular:  Normal rate, regular rhythm and normal heart sounds with no murmur.  Pulmonary/Chest:  No respiratory distress, no wheezes, on room air  Abdominal:  Soft. No distension and no tenderness.   Musculoskeletal:  No tenderness, and no deformity.  No red or swollen joints anywhere.    Neurological:  Alert and oriented to person, place, and time.  No cranial nerve deficit. Chronic L sided paralysis.    Skin:  Skin is warm and dry. No pallor. Significant intertriginous erythema under panus, consistent with yeast infection.   Peripheral vascular:  No clubbing, no cyanosis, trace edema.  Psychiatric: Appropriate mood and affect    *Examination stable today 10/7  Edited by: Ramon Marc MD at 10/7/2024 0929  ----------------------------------------------------------------------------------------------------------------------                 No results found for: \"URINECX\"  No results found for: \"BLOODCX\"    I have personally looked at the labs and they are summarized above.  ----------------------------------------------------------------------------------------------------------------------  Detailed radiology reports for the last 24 hours:  No radiology results for the last day  Assessment & Plan    43F Morbid Obesity by BMI PMH History Genital Herpes, Cerebrovascular Accident 5/2024 complicated by L sided paralysis, presented to [facility] emergency room 9/26 after sliding into the floor off her chair due to weakness.     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     #Electrolyte Abnormalities  - Acute Mild Hypokalemia - Replaced per protocol - Resolved   - Acute Mild Hypomagnesemia - Replaced per protocol - Resolved     #History Cerebrovascular Accident complicated by L sided paralysis, unclear etiology  - Continue home regimen, Supportive Care     #Debility  - Consult PT/OT, Social Work if placement needed     #History Genital Herpes  - Supportive Care, continue home Acyclovir     #Morbid Obesity by BMI  - Body mass index is 43.26 kg/m².; complicates all aspects of care    F: Oral  E: Monitor & Replace as needed   N: Diet: Regular/House; Fluid Consistency: Thin (IDDSI 0)   PPx: [facility]  Code Status (Patient has no pulse and is not breathing): CPR  Medical Interventions (Patient has pulse or is breathing): Full Support     Dispo: Pending placement, PT/OT consulted and following, Social Work consulted and following.     *This patient is considered high risk secondary to Urinary Tract Infection, electrolyte abnormalities, chronic L sided paralysis from prior Cerebrovascular Accident.    Edited by: Ramon Marc MD at 10/7/2024 3581  [facility]

## 2024-10-07 NOTE — THERAPY TREATMENT NOTE
Acute Care - Physical Therapy Treatment Note   Dike     Patient Name: Lety Johnson  : 1980  MRN: [id number]  Today's Date: 10/7/2024   Onset of Illness/Injury or Date of Surgery: 24  Visit Dx:     ICD-10-CM ICD-9-CM   1. Hypokalemia  E87.6 276.8   2. Pressure injury of skin of sacral region, unspecified injury stage  L89.159 707.03     707.20   3. Pressure injury of skin of left heel, unspecified injury stage  L89.629 707.07     707.20   4. Acute cystitis without hematuria  N30.00 595.0     Patient Active Problem List   Diagnosis   (none) - all problems resolved or deleted     Past Medical History:   Diagnosis Date    Stroke      History reviewed. No pertinent surgical history.  PT Assessment (Last 12 Hours)       PT Evaluation and Treatment       Row Name 10/07/24 1049          Physical Therapy Time and Intention    Document Type therapy note (daily note)  -     Mode of Treatment physical therapy  -     Patient Effort good  -     Symptoms Noted During/After Treatment fatigue  -       Row Name 10/07/24 1049          General Information    Patient Observations alert;cooperative;agree to therapy  -     Patient/Family/Caregiver Comments/Observations Pt was sitting edge of bed upon therapists arrival, pt reports being assisted by nurses to sit up.  -     Existing Precautions/Restrictions fall  -       Row Name 10/07/24 1049          Pain Scale: FACES Pre/Post-Treatment    Pain: FACES Scale, Pretreatment 2-->hurts little bit  -     Posttreatment Pain Rating 4-->hurts little more  -     Pain Location - Side/Orientation Left  -     Pre/Posttreatment Pain Comment Pt reports pain various locations including left extremities and right knee and hip.  -       Row Name 10/07/24 1049          Cognition    Affect/Mental Status (Cognition) emotionally labile  -     Orientation Status (Cognition) oriented x 4  -KP     Follows Commands (Cognition) WFL  -     Personal Safety Interventions  fall prevention program maintained;muscle strengthening facilitated;gait belt;nonskid shoes/slippers when out of bed;supervised activity  -       Row Name 10/07/24 1049          Range of Motion Comprehensive    Comment, General Range of Motion No AROM to left LE.  Decreased ROM to left knee extension while sitting edge of bed.  Tone and tight hamstrings.  -       Row Name 10/07/24 1049          Strength Comprehensive (MMT)    Comment, General Manual Muscle Testing (MMT) Assessment no active movement on left extremities  -       Row Name 10/07/24 1049          Bed Mobility    Rolling Right [place] (Bed Mobility) maximum assist (25% patient effort);1 person assist  -     Scooting/Bridging [place] (Bed Mobility) dependent (less than 25% patient effort);2 person assist  -     Sit-Supine [place] (Bed Mobility) maximum assist (25% patient effort);1 person assist;verbal cues  -     Bed Mobility, Safety Issues decreased use of arms for pushing/pulling;decreased use of legs for bridging/pushing  [facility]     Assistive Device (Bed Mobility) bed rails;draw sheet;head of bed elevated  -[facility] Name 10/07/24 1049          Sit-Stand Transfer    Sit-Stand [place] (Transfers) dependent (less than 25% patient effort);2 person assist  -     Comment, (Sit-Stand Transfer) Unable to come into standing position with dependent assistx2.  -[facility] Name 10/07/24 1049          Gait/Stairs (Locomotion)    Patient was able to Ambulate no, other medical factors prevent ambulation  -     Reason Patient was unable to Ambulate Non-Ambulatory at Baseline;Excessive Weakness;Other (Comment)  non-ambulatory since stroke in May  -[facility] Name 10/07/24 1049          Balance    Balance Interventions sitting;dynamic;moderate challenge;dynamic reaching;weight shifting activity  -     Comment, Balance Anterior weight shifting with scooting towards head of bed while sitting edge of bed.  Pt required maxAx2 with  cues for weight shifting  -       Row Name 10/07/24 1049          Hip (Therapeutic Exercise)    Hip PROM (Therapeutic Exercise) left;flexion;sitting;10 repetitions  -     Hip Strengthening (Therapeutic Exercise) right;flexion;marching while seated;aDduction;sitting;10 repetitions;3 sets  -       Row Name 10/07/24 1049          Knee (Therapeutic Exercise)    Knee (Therapeutic Exercise) PROM (passive range of motion)  -     Knee PROM (Therapeutic Exercise) left;flexion;extension;sitting;10 repetitions  -     Knee Strengthening (Therapeutic Exercise) right;LAQ (long arc quad);sitting;10 repetitions;3 sets  -       Row Name 10/07/24 1049          Ankle (Therapeutic Exercise)    Ankle PROM (Therapeutic Exercise) left;dorsiflexion;plantarflexion;sitting;10 repetitions  -     Ankle Strengthening (Therapeutic Exercise) right;dorsiflexion;plantarflexion;sitting;10 repetitions;3 sets  -       Row Name             Wound 09/26/24 1806 gluteal    Wound - Properties Group Placement Date: 09/26/24  -KJ Placement Time: 1806  -KJ Location: gluteal  -KJ    Retired Wound - Properties Group Placement Date: 09/26/24  -KJ Placement Time: 1806  -KJ Location: gluteal  -KJ    Retired Wound - Properties Group Date first assessed: 09/26/24  -KJ Time first assessed: 1806  -KJ Location: gluteal  -KJ      Row Name             Wound 09/26/24 1807 Left gluteal    Wound - Properties Group Placement Date: 09/26/24  -KJ Placement Time: 1807  -KJ Side: Left  -KJ Location: gluteal  -KJ    Retired Wound - Properties Group Placement Date: 09/26/24  -KJ Placement Time: 1807  -KJ Side: Left  -KJ Location: gluteal  -KJ    Retired Wound - Properties Group Date first assessed: 09/26/24  -KJ Time first assessed: 1807  -KJ Side: Left  -KJ Location: gluteal  -KJ      Row Name             Wound 09/26/24 1809 gluteal    Wound - Properties Group Placement Date: 09/26/24  -KJ Placement Time: 1809  -KJ Location: gluteal  -KJ    Retired Wound -  Properties Group Placement Date: 09/26/24  -KJ Placement Time: 1809 -KJ Location: gluteal  -KJ    Retired Wound - Properties Group Date first assessed: 09/26/24  -KJ Time first assessed: 1809  -KJ Location: gluteal  -KJ      Row Name             Wound 09/26/24 1809 Left posterior ankle    Wound - Properties Group Placement Date: 09/26/24  -KJ Placement Time: 1809  -KJ Side: Left  -KJ Orientation: posterior  -KJ Location: ankle  -KJ    Retired Wound - Properties Group Placement Date: 09/26/24  -KJ Placement Time: 1809  -KJ Side: Left  -KJ Orientation: posterior  -KJ Location: ankle  -KJ    Retired Wound - Properties Group Date first assessed: 09/26/24  -KJ Time first assessed: 1809  -KJ Side: Left  -KJ Location: ankle  -KJ      Row Name             Wound 09/29/24 0952 Bilateral anterior thigh MASD (Moisture associated skin damage)    Wound - Properties Group Placement Date: 09/29/24  -LB Placement Time: 0952  -LB Present on Original Admission: Y  -LB Side: Bilateral  -LB Orientation: anterior  -LB Location: thigh  -LB Primary Wound Type: MASD  -LB    Retired Wound - Properties Group Placement Date: 09/29/24  -LB Placement Time: 0952  -LB Present on Original Admission: Y  -LB Side: Bilateral  -LB Orientation: anterior  -LB Location: thigh  -LB Primary Wound Type: MASD  -LB    Retired Wound - Properties Group Date first assessed: 09/29/24  -LB Time first assessed: 0952  -LB Present on Original Admission: Y  -LB Side: Bilateral  -LB Location: thigh  -LB Primary Wound Type: MASD  -LB      Row Name 10/07/24 1049          Plan of Care Review    Plan of Care Reviewed With patient  -     Outcome Evaluation PT treatment completed.  Patient performed attempted transfers and scooting with dependentx2 and inability to stand up.  LE strengthening exercises and PROM/stretching while seated edge of bed.  Continue PT POC.  -       Row Name 10/07/24 1049          Positioning and Restraints    Pre-Treatment Position in bed  -      Post Treatment Position bed  -KP     In Bed fowlers;call light within reach;encouraged to call for assist;exit alarm on;side rails up x3;heels elevated  wedge under right side.  Lines in tact and needs in reach  -KP               User Key  (r) = Recorded By, (t) = Taken By, (c) = Cosigned By      Initials Name Provider Type    Eileen Arellano, PT Physical Therapist    Nory Keenan, RN Registered Nurse    Zehra Rivera, RN Registered Nurse                    Physical Therapy Education       Title: PT OT SLP Therapies (In Progress)       Topic: Physical Therapy (In Progress)       Point: Mobility training (In Progress)       Learning Progress Summary             Patient Acceptance, E, NR by RD at 10/2/2024 1101    Acceptance, E, NR by RD at 10/1/2024 0856    Acceptance, E,D, VU,NR by AG at 9/30/2024 1559                         Point: Home exercise program (In Progress)       Learning Progress Summary             Patient Acceptance, E, NR by RD at 10/2/2024 1101    Acceptance, E, NR by RD at 10/1/2024 0856    Acceptance, E,D, VU,NR by AG at 9/30/2024 1559                         Point: Body mechanics (In Progress)       Learning Progress Summary             Patient Acceptance, E, NR by RD at 10/2/2024 1101    Acceptance, E, NR by RD at 10/1/2024 0856    Acceptance, E,D, VU,NR by AG at 9/30/2024 1559                         Point: Precautions (In Progress)       Learning Progress Summary             Patient Acceptance, E, NR by RD at 10/2/2024 1101    Acceptance, E, NR by RD at 10/1/2024 0856    Acceptance, E,D, VU,NR by AG at 9/30/2024 1559                                         User Key       Initials Effective Dates Name Provider Type Discipline     06/16/21 -  Мария Joya, PT Physical Therapist PT    RD 06/16/21 -  Laisha Verdugo, RN Registered Nurse Nurse                  PT Recommendation and Plan     Plan of Care Reviewed With: patient  Outcome Evaluation: PT treatment completed.  Patient  performed attempted transfers and scooting with dependentx2 and inability to stand up.  LE strengthening exercises and PROM/stretching while seated edge of bed.  Continue PT POC.       Time Calculation:    PT Charges       Row Name 10/07/24 1057             Time Calculation    PT Received On 10/07/24  -      PT Goal Re-Cert Due Date 10/14/24  -KP         Timed Charges    67321 - PT Therapeutic Exercise Minutes 15  -KP      92811 - PT Therapeutic Activity Minutes 25  -KP         Total Minutes    Timed Charges Total Minutes 40  -KP       Total Minutes 40  -KP                User Key  (r) = Recorded By, (t) = Taken By, (c) = Cosigned By      Initials Name Provider Type    KP Eileen Marc, PT Physical Therapist                  Therapy Charges for Today       Code Description Service Date Service Provider Modifiers Qty    65992377560 HC PT THERAPEUTIC ACT EA 15 MIN 10/7/2024 Eileen Marc, PT GP 2    36627910362 HC PT THER PROC EA 15 MIN 10/7/2024 Eileen Marc, PT GP 1            PT G-Codes  AM-PAC 6 Clicks Score (PT): 9    Eileen Marc PT  10/7/2024

## 2024-10-08 PROCEDURE — 97530 THERAPEUTIC ACTIVITIES: CPT

## 2024-10-08 PROCEDURE — 25010000002 PROCHLORPERAZINE 10 MG/2ML SOLUTION

## 2024-10-08 PROCEDURE — 97110 THERAPEUTIC EXERCISES: CPT

## 2024-10-08 PROCEDURE — 99231 SBSQ HOSP IP/OBS SF/LOW 25: CPT | Performed by: INTERNAL MEDICINE

## 2024-10-08 PROCEDURE — 25010000002 HEPARIN (PORCINE) PER 1000 UNITS: Performed by: INTERNAL MEDICINE

## 2024-10-08 RX ORDER — HYDROCODONE BITARTRATE AND ACETAMINOPHEN 5; 325 MG/1; MG/1
1 TABLET ORAL ONCE
Status: COMPLETED | OUTPATIENT
Start: 2024-10-08 | End: 2024-10-08

## 2024-10-08 RX ADMIN — ACETAMINOPHEN 650 MG: 325 TABLET ORAL at 05:11

## 2024-10-08 RX ADMIN — Medication 10 ML: at 09:24

## 2024-10-08 RX ADMIN — LOPERAMIDE HYDROCHLORIDE 2 MG: 2 CAPSULE ORAL at 05:20

## 2024-10-08 RX ADMIN — METOPROLOL TARTRATE 25 MG: 25 TABLET, FILM COATED ORAL at 09:22

## 2024-10-08 RX ADMIN — ACYCLOVIR 400 MG: 200 CAPSULE ORAL at 20:17

## 2024-10-08 RX ADMIN — HEPARIN SODIUM 5000 UNITS: 5000 INJECTION INTRAVENOUS; SUBCUTANEOUS at 21:06

## 2024-10-08 RX ADMIN — CYCLOBENZAPRINE 10 MG: 10 TABLET, FILM COATED ORAL at 23:38

## 2024-10-08 RX ADMIN — NYSTATIN: 100000 POWDER TOPICAL at 10:40

## 2024-10-08 RX ADMIN — LISINOPRIL 20 MG: 10 TABLET ORAL at 09:22

## 2024-10-08 RX ADMIN — METOPROLOL TARTRATE 25 MG: 25 TABLET, FILM COATED ORAL at 20:18

## 2024-10-08 RX ADMIN — Medication 10 ML: at 20:17

## 2024-10-08 RX ADMIN — NICOTINE 1 PATCH: 7 PATCH, EXTENDED RELEASE TRANSDERMAL at 09:24

## 2024-10-08 RX ADMIN — ATORVASTATIN CALCIUM 80 MG: 40 TABLET, FILM COATED ORAL at 09:22

## 2024-10-08 RX ADMIN — ASPIRIN 81 MG: 81 TABLET, CHEWABLE ORAL at 09:22

## 2024-10-08 RX ADMIN — PANTOPRAZOLE SODIUM 40 MG: 40 TABLET, DELAYED RELEASE ORAL at 09:22

## 2024-10-08 RX ADMIN — DULOXETINE HYDROCHLORIDE 60 MG: 60 CAPSULE, DELAYED RELEASE ORAL at 09:22

## 2024-10-08 RX ADMIN — HYDROCODONE BITARTRATE AND ACETAMINOPHEN 1 TABLET: 5; 325 TABLET ORAL at 10:40

## 2024-10-08 RX ADMIN — HEPARIN SODIUM 5000 UNITS: 5000 INJECTION INTRAVENOUS; SUBCUTANEOUS at 05:11

## 2024-10-08 RX ADMIN — PROCHLORPERAZINE EDISYLATE 2.5 MG: 5 INJECTION INTRAMUSCULAR; INTRAVENOUS at 05:20

## 2024-10-08 RX ADMIN — HEPARIN SODIUM 5000 UNITS: 5000 INJECTION INTRAVENOUS; SUBCUTANEOUS at 15:12

## 2024-10-08 RX ADMIN — NYSTATIN: 100000 POWDER TOPICAL at 20:18

## 2024-10-08 RX ADMIN — ACETAMINOPHEN 650 MG: 325 TABLET ORAL at 20:17

## 2024-10-08 NOTE — CASE MANAGEMENT/SOCIAL WORK
Discharge Planning Assessment  [facility]     Patient Name: Lety Johnson  MRN: [id number]  Today's Date: 10/8/2024    Admit Date: 9/26/2024    Plan: [facility] has no available beds per Lesly Amadeo.  SS left message for Windy at [facility] to return call.  SS will follow.     Discharge Plan       Row Name 10/08/24 1441       Plan    Plan [facility] has no available beds per Lesly Amadeo.  SS left message for Windy at [facility] to return call.  SS will follow.                  Continued Care and Services - Admitted Since 9/26/2024       Destination       Service Provider Request Status Selected Services Address Phone Fax Patient Preferred    [facility] Pending - No Request Sent N/A [address] [facility] [id number] [phone] --    [facility] Pending - No Request Sent N/A [address] [facility] [address] [phone] --    [facility] Declined  Cannot meet patient's needs N/A [address] [facility] [address] [phone] --     [facility] Declined  Not eligible N/A [address] [facility] [address] [phone] --    [facility] Declined  Not eligible N/A [address] [facility] [address] [phone] --    [facility] Declined  Clinical Denial N/A [address] [facility], 4TH FLOOR, [facility] [address] [phone] --     [facility] Declined  Not eligible N/A [address] [facility] [address] [phone] --                  Expected Discharge Date and Time       Expected Discharge Date Expected Discharge Time    Oct 11, 2024             KAZ Lacy

## 2024-10-08 NOTE — THERAPY TREATMENT NOTE
Acute Care - Physical Therapy Treatment Note   [place]     Patient Name: Lety Johnson  : 1980  MRN: [id number]  Today's Date: 10/8/2024   Onset of Illness/Injury or Date of Surgery: 24  Visit Dx:     ICD-10-CM ICD-9-CM   1. Hypokalemia  E87.6 276.8   2. Pressure injury of skin of sacral region, unspecified injury stage  L89.159 707.03     707.20   3. Pressure injury of skin of left heel, unspecified injury stage  L89.629 707.07     707.20   4. Acute cystitis without hematuria  N30.00 595.0     Patient Active Problem List   Diagnosis   (none) - all problems resolved or deleted     Past Medical History:   Diagnosis Date    Stroke      History reviewed. No pertinent surgical history.  PT Assessment (Last 12 Hours)       PT Evaluation and Treatment       Row Name 10/08/24 1038          Physical Therapy Time and Intention    Document Type therapy note (daily note)  -     Mode of Treatment physical therapy  -     Patient Effort adequate  -     Comment Pt called family member (?) during session, unprompted and without saying/notifying...handed phone to PT to tell phone call recipient what she was doing with therapy...  -       Row Name 10/08/24 1038          Bed Mobility    Bed Mobility supine-sit  -     Supine-Sit [place] (Bed Mobility) moderate assist (50% patient effort);maximum assist (25% patient effort);1 person assist  -       Row Name 10/08/24 1038          Transfers    Transfers sit-stand transfer;stand-sit transfer  -       Row Name 10/08/24 1038          Sit-Stand Transfer    Sit-Stand [place] (Transfers) maximum assist (25% patient effort);dependent (less than 25% patient effort);1 person assist  -       Row Name 10/08/24 1038          Stand-Sit Transfer    Stand-Sit [place] (Transfers) other (see comments)  one time grossly CGA, grossly may be mod/maxA at other times  -       Row Name 10/08/24 1038          Motor Skills    Therapeutic Exercise hip;ankle  R LE  hip flex grossly 2-3x10; L LE hip flex encouraged with PROM and encouragement for pt to concentrate on assisting in movement and encouraged to visualize movement. encouraged ankle pumps on L LE with potential trace DF  -KH       Row Name             Wound 09/26/24 1806 gluteal    Wound - Properties Group Placement Date: 09/26/24  -KJ Placement Time: 1806  -KJ Location: gluteal  -KJ    Retired Wound - Properties Group Placement Date: 09/26/24  -KJ Placement Time: 1806  -KJ Location: gluteal  -KJ    Retired Wound - Properties Group Date first assessed: 09/26/24  -KJ Time first assessed: 1806  -KJ Location: gluteal  -KJ      Row Name             Wound 09/26/24 1807 Left gluteal    Wound - Properties Group Placement Date: 09/26/24  -KJ Placement Time: 1807  -KJ Side: Left  -KJ Location: gluteal  -KJ    Retired Wound - Properties Group Placement Date: 09/26/24  -KJ Placement Time: 1807  -KJ Side: Left  -KJ Location: gluteal  -KJ    Retired Wound - Properties Group Date first assessed: 09/26/24  -KJ Time first assessed: 1807  -KJ Side: Left  -KJ Location: gluteal  -KJ      Row Name             Wound 09/26/24 1809 gluteal    Wound - Properties Group Placement Date: 09/26/24  -KJ Placement Time: 1809  -KJ Location: gluteal  -KJ    Retired Wound - Properties Group Placement Date: 09/26/24  -KJ Placement Time: 1809  -KJ Location: gluteal  -KJ    Retired Wound - Properties Group Date first assessed: 09/26/24  -KJ Time first assessed: 1809  -KJ Location: gluteal  -KJ      Row Name             Wound 09/26/24 1809 Left posterior ankle    Wound - Properties Group Placement Date: 09/26/24  -KJ Placement Time: 1809  -KJ Side: Left  -KJ Orientation: posterior  -KJ Location: ankle  -KJ    Retired Wound - Properties Group Placement Date: 09/26/24  -KJ Placement Time: 1809  -KJ Side: Left  -KJ Orientation: posterior  -KJ Location: ankle  -KJ    Retired Wound - Properties Group Date first assessed: 09/26/24  -KJ Time first assessed: 1809   -KJ Side: Left  -KJ Location: ankle  -KJ      Row Name             Wound 09/29/24 0952 Bilateral anterior thigh MASD (Moisture associated skin damage)    Wound - Properties Group Placement Date: 09/29/24  -LB Placement Time: 0952 -LB Present on Original Admission: Y  -LB Side: Bilateral  -LB Orientation: anterior  -LB Location: thigh  -LB Primary Wound Type: MASD  -LB    Retired Wound - Properties Group Placement Date: 09/29/24  -LB Placement Time: 0952 -LB Present on Original Admission: Y  -LB Side: Bilateral  -LB Orientation: anterior  -LB Location: thigh  -LB Primary Wound Type: MASD  -LB    Retired Wound - Properties Group Date first assessed: 09/29/24  -LB Time first assessed: 0952 -LB Present on Original Admission: Y  -LB Side: Bilateral  -LB Location: thigh  -LB Primary Wound Type: MASD  -LB      Row Name 10/08/24 1038          Coping    Observed Emotional State other (see comments)  intermittently emotionally liable wanted to get better, unrealistic in expectations. angry with herself/body  -KH       Row Name 10/08/24 1038          Positioning and Restraints    Pre-Treatment Position in bed  -KH     Post Treatment Position bed  -KH     In Bed supine;exit alarm on;call light within reach;encouraged to call for assist;side rails up x3  -KH               User Key  (r) = Recorded By, (t) = Taken By, (c) = Cosigned By      Initials Name Provider Type    Janessa Hassan, GENARO Physical Therapist    Nory Keenan, RN Registered Nurse    Zehra Rivera, RN Registered Nurse                    Physical Therapy Education       Title: PT OT SLP Therapies (Done)       Topic: Physical Therapy (Done)       Point: Mobility training (Done)       Learning Progress Summary             Patient Acceptance, E,TB, VU by CB at 10/8/2024 0448    Acceptance, E, NR by RD at 10/2/2024 1101    Acceptance, E, NR by RD at 10/1/2024 0856    Acceptance, E,D, VU,NR by MARI at 9/30/2024 1559                         Point: Home  exercise program (Done)       Learning Progress Summary             Patient Acceptance, E,TB, VU by CB at 10/8/2024 0448    Acceptance, E, NR by RD at 10/2/2024 1101    Acceptance, E, NR by RD at 10/1/2024 0856    Acceptance, E,D, VU,NR by AG at 9/30/2024 1559                         Point: Body mechanics (Done)       Learning Progress Summary             Patient Acceptance, E,TB, VU by CB at 10/8/2024 0448    Acceptance, E, NR by RD at 10/2/2024 1101    Acceptance, E, NR by RD at 10/1/2024 0856    Acceptance, E,D, VU,NR by AG at 9/30/2024 1559                         Point: Precautions (Done)       Learning Progress Summary             Patient Acceptance, E,TB, VU by CB at 10/8/2024 0448    Acceptance, E, NR by RD at 10/2/2024 1101    Acceptance, E, NR by RD at 10/1/2024 0856    Acceptance, E,D, VU,NR by AG at 9/30/2024 1559                                         User Key       Initials Effective Dates Name Provider Type Discipline     06/16/21 -  Мария Joya, PT Physical Therapist PT     06/16/21 -  Laisha Verdugo, RN Registered Nurse Nurse     06/17/24 -  Fidelia Lombardo, RN Registered Nurse Nurse                  PT Recommendation and Plan     Progress Summary (PT)  Daily Progress Summary (PT): Pt tolerated therapy fair to good with pain, some spasms, and emotional outbreak a few times during session. Pt may benefit from frequent therapeutic intervention, no change in POC.       Time Calculation:    PT Charges       Row Name 10/08/24 1125             Time Calculation    Start Time 1038  -      PT Received On 10/08/24  -         Time Calculation- PT    Total Timed Code Minutes- PT 23 minute(s)  -                User Key  (r) = Recorded By, (t) = Taken By, (c) = Cosigned By      Initials Name Provider Type    Janessa Hassan, PT Physical Therapist                  Therapy Charges for Today       Code Description Service Date Service Provider Modifiers Qty    02829236716 HC PT THER PROC EA 15 MIN  10/8/2024 Janessa Almeida, PT GP 1    78126016971 HC PT THERAPEUTIC ACT EA 15 MIN 10/8/2024 Janessa Almeida, PT GP 1            PT G-Codes  AM-PAC 6 Clicks Score (PT): 8    Janessa Almeida, PT  10/8/2024

## 2024-10-08 NOTE — THERAPY TREATMENT NOTE
Acute Care - Occupational Therapy Treatment Note   [facility]     Patient Name: Lety Johnson  : 1980  MRN: [id number]  Today's Date: 10/8/2024  Onset of Illness/Injury or Date of Surgery: 24     Referring Physician: Dr. Marc    Admit Date: 2024       ICD-10-CM ICD-9-CM   1. Hypokalemia  E87.6 276.8   2. Pressure injury of skin of sacral region, unspecified injury stage  L89.159 707.03     707.20   3. Pressure injury of skin of left heel, unspecified injury stage  L89.629 707.07     707.20   4. Acute cystitis without hematuria  N30.00 595.0     Patient Active Problem List   Diagnosis   (none) - all problems resolved or deleted     Past Medical History:   Diagnosis Date    Stroke      History reviewed. No pertinent surgical history.      OT ASSESSMENT FLOWSHEET (Last 12 Hours)       OT Evaluation and Treatment       Row Name 10/08/24 1112                   OT Time and Intention    Document Type therapy note (daily note)  -KR        Mode of Treatment occupational therapy  -KR        Patient Effort adequate  -KR        Comment Pt seen today for continued monitor/assess LUE sling for positioning/support. Proper donning performed/explained to increase fxl use of device. Pt acknowledged purpose and reported comprehension.  -KR           General Information    Prior Level of Function max assist:;dependent:  -KR           Bathing Assessment/Intervention    [place] Level (Bathing) maximum assist (25% patient effort)  -KR           Upper Body Dressing Assessment/Training    [place] Level (Upper Body Dressing) upper body dressing skills;maximum assist (25% patient effort)  -KR           Lower Body Dressing Assessment/Training    [place] Level (Lower Body Dressing) lower body dressing skills;dependent (less than 25% patient effort)  -KR           Grooming Assessment/Training    [place] Level (Grooming) grooming skills;minimum assist (75% patient effort)  -KR           Self-Feeding  Assessment/Training    [place] Level (Feeding) feeding skills;minimum assist (75% patient effort)  -KR           Toileting Assessment/Training    [place] Level (Toileting) toileting skills;dependent (less than 25% patient effort)  -KR           Wound 09/26/24 1806 gluteal    Wound - Properties Group Placement Date: 09/26/24  -KJ Placement Time: 1806  -KJ Location: gluteal  -KJ    Retired Wound - Properties Group Placement Date: 09/26/24  -KJ Placement Time: 1806  -KJ Location: gluteal  -KJ    Retired Wound - Properties Group Date first assessed: 09/26/24  -KJ Time first assessed: 1806  -KJ Location: gluteal  -KJ       Wound 09/26/24 1807 Left gluteal    Wound - Properties Group Placement Date: 09/26/24  -KJ Placement Time: 1807  -KJ Side: Left  -KJ Location: gluteal  -KJ    Retired Wound - Properties Group Placement Date: 09/26/24  -KJ Placement Time: 1807  -KJ Side: Left  -KJ Location: gluteal  -KJ    Retired Wound - Properties Group Date first assessed: 09/26/24  -KJ Time first assessed: 1807  -KJ Side: Left  -KJ Location: gluteal  -KJ       Wound 09/26/24 1809 gluteal    Wound - Properties Group Placement Date: 09/26/24  -KJ Placement Time: 1809  -KJ Location: gluteal  -KJ    Retired Wound - Properties Group Placement Date: 09/26/24  -KJ Placement Time: 1809  -KJ Location: gluteal  -KJ    Retired Wound - Properties Group Date first assessed: 09/26/24  -KJ Time first assessed: 1809  -KJ Location: gluteal  -KJ       Wound 09/26/24 1809 Left posterior ankle    Wound - Properties Group Placement Date: 09/26/24  -KJ Placement Time: 1809  -KJ Side: Left  -KJ Orientation: posterior  -KJ Location: ankle  -KJ    Retired Wound - Properties Group Placement Date: 09/26/24  -KJ Placement Time: 1809  -KJ Side: Left  -KJ Orientation: posterior  -KJ Location: ankle  -KJ    Retired Wound - Properties Group Date first assessed: 09/26/24  -KJ Time first assessed: 1809  -KJ Side: Left  -KJ Location: ankle  -KJ       Wound  09/29/24 0952 Bilateral anterior thigh MASD (Moisture associated skin damage)    Wound - Properties Group Placement Date: 09/29/24  -LB Placement Time: 0952 -LB Present on Original Admission: Y  -LB Side: Bilateral  -LB Orientation: anterior  -LB Location: thigh  -LB Primary Wound Type: MASD  -LB    Retired Wound - Properties Group Placement Date: 09/29/24  -LB Placement Time: 0952 -LB Present on Original Admission: Y  -LB Side: Bilateral  -LB Orientation: anterior  -LB Location: thigh  -LB Primary Wound Type: MASD  -LB    Retired Wound - Properties Group Date first assessed: 09/29/24  -LB Time first assessed: 0952  -LB Present on Original Admission: Y  -LB Side: Bilateral  -LB Location: thigh  -LB Primary Wound Type: MASD  -LB       Plan of Care Review    Plan of Care Reviewed With patient  -KR           Therapy Assessment/Plan (OT)    Comment, Therapy Assessment/Plan (OT) Pt presented EOB on this date. PT present for training session/donning of sling LUE.  -KR           ROM Goal 1 (OT)    Progress/Outcome (ROM Goal 1, OT) continuing progress toward goal  -KR            Activity Tolerance Goal 1 (OT)    Progress/Outcome (Activity Tolerance Goal 1, OT) continuing progress toward goal  -KR           Patient Education Goal (OT)    Progress/Outcome (Patient Education Goal, OT) continuing progress toward goal  -KR                  User Key  (r) = Recorded By, (t) = Taken By, (c) = Cosigned By      Initials Name Effective Dates    Neil Menezes OT 06/16/21 -     Noyr Keenan RN 06/08/23 -     Zehra Rivera RN 10/20/21 -                            OT Recommendation and Plan  Planned Therapy Interventions (OT): activity tolerance training, neuromuscular control/coordination retraining, ROM/therapeutic exercise  Plan of Care Review  Plan of Care Reviewed With: patient  Plan of Care Reviewed With: patient        Time Calculation:     Therapy Charges for Today       Code Description Service Date Service  Provider Modifiers Qty    67832374593 HC OT THERAPEUTIC ACT EA 15 MIN 10/7/2024 Neil Graf OT GO 2    72020731931 HC OT SELF CARE/MGMT/TRAIN EA 15 MIN 10/7/2024 Neil Graf OT GO 1    51063869760 HC OT THERAPEUTIC ACT EA 15 MIN 10/8/2024 Neil Graf OT GO 1                 Neil Graf OT  10/8/2024

## 2024-10-08 NOTE — PLAN OF CARE
Goal Outcome Evaluation:  Patient is currently resting in bed. No S&S of distress noted at this time. No complaints at this time. Bed alarm on, call light within reach, bed in lowest position. Plan of care ongoing.

## 2024-10-08 NOTE — PLAN OF CARE
Goal Outcome Evaluation:      Patient is resting in bed, call light in reach. Patient complained of pain, readjusted patient in bed. Patient remains on room air. Patient shows no s/s of distress at this time.

## 2024-10-08 NOTE — DISCHARGE PLACEMENT REQUEST
"Lety Johnson (43 y.o. Female)       Date of Birth   1980    Social Security Number       Address   [address] [id number]    Home Phone   [phone]    MRN   [id number]       Islam   Mu-ism    Marital Status   Single                            Admission Date   9/26/24    Admission Type   Emergency    Admitting Provider   Ramon Marc MD    Attending Provider   Ramon Marc MD    Department, Room/Bed   [address], 3315/1S       Discharge Date       Discharge Disposition       Discharge Destination                                 Attending Provider: Ramon Marc MD    Allergies: No Known Allergies    Isolation: Contact   Infection: ESBL E coli (09/28/24)   Code Status: CPR    Ht: 166.4 cm (65.5\")   Wt: 102 kg (225 lb 15.5 oz)    Admission Cmt: None   Principal Problem: UTI (urinary tract infection) [N39.0]                   Active Insurance as of 9/26/2024       Primary Coverage       Payor Plan Insurance Group Employer/Plan Group    [place] [id number]       Payor Plan Address Payor Plan Phone Number Payor Plan Fax Number Effective Dates    [employer] [address] [address] [phone]  5/1/2024 - None Entered    [place] [address]         Subscriber Name Subscriber Birth Date Member ID       LETY JOHNSON 1980 [id number]                     Emergency Contacts        (Rel.) Home Phone Work Phone Mobile Phone    Moises Narvaez (Legal Guardian) -- -- [phone]              Emergency Contact Information       Name Relation Home Work Mobile    Moises Narvaez Legal Guardian   [phone]          Insurance Information                  [place] Phone: [phone]    Subscriber: Lety Johnson Subscriber#: [id number]    Group#: [id number] Precert#: --             History & Physical        Ramon Marc MD at 09/26/24 [facility]              [facility] HOSPITALIST HISTORY AND " PHYSICAL    Patient Identification:  Name:  Lety Johnson  Age:  43 y.o.  Sex:  female  :  1980  MRN:  [id number]   Admit Date: 2024   Visit Number:  [id number]  Room number:  404/04  Primary Care Physician:  Provider, No Known     Subjective     Chief complaint:    Chief Complaint   Patient presents with    Fall     History of presenting illness:   43F Morbid Obesity by BMI PMH History Genital Herpes, Cerebrovascular Accident 2024 complicated by L sided paralysis, presented to [facility] emergency room  after sliding into the floor off her chair due to weakness.  Upon arrival patient afebrile, heart rate 109, respiratory rate 18, blood pressure 146/101, satting 98% on room air. Labs showed WBC count 10K, lactate 1.1, CRP 3.8, potassium 2.7, magnesium 1.5, HCG negative, blood cultures pending. CXR no acute cardiopulmonary processes.  Xray ankle/foot without fracture or dislocation.  Emergency room provider gave antibiotics, pain medications, zofran, potassium replacement.  Hospital Medicine consulted for admission. Patient seen and examined in the emergency room, notes fevers chills at home a few days ago, recently has been on antibiotics for lower extremity cellulitis, has had some dysuria and suprapubic pain, denies current sexual activity, reports her fiance was taken to longterm about a week ago and has been her primary caretaker, has had difficulty at home since prior stroke and caretakers not consistent, last 24hrs didn't have anyone to help her, sister endorses recent confusion/hallucinations beyond baseline, seeing dead family members, coinciding with onset of dysuria.  ---------------------------------------------------------------------------------------------------------------------   Review of Systems   Constitutional:  Positive for chills and fever.   HENT: Negative.     Eyes: Negative.    Respiratory:  Positive for shortness of breath.    Cardiovascular:  Positive for leg  swelling. Negative for chest pain.   Gastrointestinal: Negative.    Endocrine: Negative.    Genitourinary:  Positive for dysuria.   Musculoskeletal: Negative.    Skin:  Positive for rash.   Allergic/Immunologic: Negative.    Neurological:  Positive for weakness.   Hematological: Negative.    Psychiatric/Behavioral:  Positive for hallucinations.      ---------------------------------------------------------------------------------------------------------------------   Past Medical History:   Diagnosis Date    Stroke      No past surgical history on file.  No family history on file.  Social History     Socioeconomic History    Marital status: Single     ---------------------------------------------------------------------------------------------------------------------   Allergies:  Patient has no known allergies.  ---------------------------------------------------------------------------------------------------------------------   Medications below are reported home medications pulling from within the system; at this time, these medications have not been reconciled unless otherwise specified and are in the verification process for further verifcation as current home medications.    Prior to Admission Medications       Prescriptions Last Dose Informant Patient Reported? Taking?    aspirin 81 MG chewable tablet Unknown  Yes No    Chew 1 tablet Daily.    atorvastatin (LIPITOR) 80 MG tablet Unknown  Yes No    Take 1 tablet by mouth Daily.    cyclobenzaprine (FLEXERIL) 10 MG tablet Unknown  Yes No    Take 1 tablet by mouth 3 (Three) Times a Day As Needed for Muscle Spasms.    DULoxetine (CYMBALTA) 60 MG capsule Unknown  Yes No    Take 1 capsule by mouth Daily.    lisinopril (PRINIVIL,ZESTRIL) 20 MG tablet Unknown  Yes No    Take 1 tablet by mouth Daily.    metFORMIN (GLUCOPHAGE) 1000 MG tablet Unknown  Yes No    Take 1 tablet by mouth 2 (Two) Times a Day With Meals.    pantoprazole (PROTONIX) 40 MG EC tablet Unknown  Yes  No    Take 1 tablet by mouth Daily.          Objective     Vital Signs:  Temp:  [98.2 °F (36.8 °C)] 98.2 °F (36.8 °C)  Heart Rate:  [] 118  Resp:  [18] 18  BP: (135-157)/() 140/79    Mean Arterial Pressure (Non-Invasive) for the past 24 hrs (Last 3 readings):   Noninvasive MAP (mmHg)   09/26/24 1645 90   09/26/24 1630 100   09/26/24 1615 103     SpO2:  [91 %-100 %] 94 %  on   ;   Device (Oxygen Therapy): room air  Body mass index is 43.26 kg/m².    Wt Readings from Last 3 Encounters:   09/26/24 120 kg (264 lb)   06/27/24 120 kg (264 lb)      ---------------------------------------------------------------------------------------------------------------------   Physical Exam:  Constitutional:  Well-developed and well-nourished. Older than stated age. No acute distress.      HENT:  Head:  Normocephalic and atraumatic.  Mouth:  Moist mucous membranes.    Eyes:  Conjunctivae and EOM are normal. No scleral icterus.    Neck:  Neck supple.  No JVD present.    Cardiovascular:  Normal rate, regular rhythm and normal heart sounds with no murmur.  Pulmonary/Chest:  No respiratory distress, no wheezes, no crackles, with normal breath sounds and good air movement.  Abdominal:  Soft. No distension and no tenderness.   Musculoskeletal:  No tenderness, and no deformity.  No red or swollen joints anywhere.    Neurological:  Alert and oriented to person, place, and time.  No cranial nerve deficit. Chronic L sided paralysis.    Skin:  Skin is warm and dry. No pallor. Significant intertriginous erythema under panus, consistent with yeast infection.   Peripheral vascular:  No clubbing, no cyanosis, trace edema.  Psychiatric: Appropriate mood and affect  Edited by: Ramon Marc MD at 9/26/2024 1701  ---------------------------------------------------------------------------------------------------------------------  EKG:  N/A    Telemetry:  normal sinus rhythm, no significant ST changes    I have personally looked at  "telemetry.    Last echocardiogram:  Ordered and pending   --------------------------------------------------------------------------------------------------------------------  Labs:  Results from last 7 days   Lab Units 09/26/24  1504 09/26/24  1350   LACTATE mmol/L  --  1.1   CRP mg/dL 3.80*  --    WBC 10*3/mm3  --  10.42   HEMOGLOBIN g/dL  --  12.5   HEMATOCRIT %  --  37.4   MCV fL  --  94.0   MCHC g/dL  --  33.4   PLATELETS 10*3/mm3  --  402         Results from last 7 days   Lab Units 09/26/24  1504   SODIUM mmol/L 140   POTASSIUM mmol/L 2.7*   MAGNESIUM mg/dL 1.5*   CHLORIDE mmol/L 99   CO2 mmol/L 28.9   BUN mg/dL 10   CREATININE mg/dL 0.51*   CALCIUM mg/dL 9.4   GLUCOSE mg/dL 220*   ALBUMIN g/dL 3.5   BILIRUBIN mg/dL 0.6   ALK PHOS U/L 119*   AST (SGOT) U/L 21   ALT (SGPT) U/L 8   Estimated Creatinine Clearance: 188.1 mL/min (A) (by C-G formula based on SCr of 0.51 mg/dL (L)).  No results found for: \"AMMONIA\"          No results found for: \"HGBA1C\", \"POCGLU\"  No results found for: \"TSH\", \"FREET4\"  No results found for: \"PREGTESTUR\", \"PREGSERUM\", \"HCG\", \"HCGQUANT\"  Pain Management Panel           No data to display              Brief Urine Lab Results  (Last result in the past 365 days)        Color   Clarity   Blood   Leuk Est   Nitrite   Protein   CREAT   Urine HCG        09/26/24 1419 Dark Yellow   Turbid   Trace   Moderate (2+)   Positive   30 mg/dL (1+)                 No results found for: \"BLOODCX\"  No results found for: \"URINECX\"  No results found for: \"WOUNDCX\"  No results found for: \"STOOLCX\"    I have personally looked at the labs and they are summarized above.  ----------------------------------------------------------------------------------------------------------------------  Detailed radiology reports for the last 24 hours:    Imaging Results (Last 24 Hours)       Procedure Component Value Units Date/Time    XR Ankle 3+ View Left [[id number]] Collected: 09/26/24 1537     Updated: 09/26/24 1540    " Narrative:      EXAM:    XR Left Ankle Complete, 3 or More Views     EXAM DATE:    9/26/2024 3:17 PM     CLINICAL HISTORY:    left ankle injury     TECHNIQUE:    Frontal, lateral and oblique views of the left ankle.     COMPARISON:    No relevant prior studies available.     FINDINGS:    Bones/joints:  See below.      Soft tissues:  Soft tissue swelling, but no acute fracture or  dislocation.       Impression:        Soft tissue swelling, but no acute fracture or dislocation.        This report was finalized on 9/26/2024 3:38 PM by Dr. Moses Otto MD.       XR Foot 3+ View Left [[id number]] Collected: 09/26/24 1536     Updated: 09/26/24 1539    Narrative:      EXAM:    XR Left Foot Complete, 3 or More Views     EXAM DATE:    9/26/2024 3:16 PM     CLINICAL HISTORY:    left foot wound     TECHNIQUE:    Frontal, lateral and oblique views of the left foot.     COMPARISON:    No relevant prior studies available.     FINDINGS:    Bones/joints:  Unremarkable.  No acute fracture.  No dislocation.    Soft tissues:  Unremarkable.  No radiopaque foreign body.       Impression:        No acute findings in the left foot.        This report was finalized on 9/26/2024 3:37 PM by Dr. Moses Otto MD.       XR Chest 1 View [[id number]] Collected: 09/26/24 1406     Updated: 09/26/24 1408    Narrative:      XR CHEST 1 VW-     CLINICAL INDICATION: weakness        COMPARISON: None immediately available      TECHNIQUE: Single frontal view of the chest.     FINDINGS:     LUNGS: Lungs are adequately aerated.      HEART AND MEDIASTINUM: Heart and mediastinal contours are unremarkable        SKELETON: Bony and soft tissue structures are unremarkable.             Impression:      No radiographic evidence of acute cardiac or pulmonary disease.           This report was finalized on 9/26/2024 2:06 PM by Dr. Moses Otto MD.       CT Cervical Spine Without Contrast [[id number]] Collected: 09/26/24 1317     Updated: 09/26/24 1319    Narrative:       CT CERVICAL SPINE WO CONTRAST-     CLINICAL INDICATION: neck pain        COMPARISON: None available     TECHNIQUE: Axial images of the cervical spine were acquired with out any  intravenous contrast. Reformatted images were then created in the  sagittal and coronal planes.     DOSE:      Radiation dose reduction techniques were utilized per ALARA protocol.  Automated exposure control was initiated through either or [employer] or  [employer] software packages by  protocol.           FINDINGS:   The provided study demonstrates preservation of the vertebral body  heights in the sagittally reconstructed images.     There is no prevertebral soft tissue swelling.     Alignment is near anatomic.     The disc space heights are uniform.     I see no acute cervical spine fracture.       Impression:         1. No acute bony abnormality.     2. Other incidental findings as above     This report was finalized on 9/26/2024 1:17 PM by Dr. Moses Otto MD.       CT Lumbar Spine Without Contrast [[id number]] Collected: 09/26/24 1317     Updated: 09/26/24 1319    Narrative:      EXAM:    CT Lumbar Spine Without Intravenous Contrast     EXAM DATE:    9/26/2024 12:59 PM     CLINICAL HISTORY:    back pain     TECHNIQUE:    Axial computed tomography images of the lumbar spine without  intravenous contrast.  Sagittal and coronal reformatted images were  created and reviewed.  This CT exam was performed using one or more of  the following dose reduction techniques:  automated exposure control,  adjustment of the mA and/or kV according to patient size, and/or use of  iterative reconstruction technique.     COMPARISON:    No relevant prior studies available.     FINDINGS:    VERTEBRAE:  Unremarkable.  No acute fracture.    DISCS/SPINAL CANAL/NEURAL FORAMINA:  No acute findings.  No spinal  canal stenosis.    SOFT TISSUES:  Unremarkable.       Impression:        No acute findings in the lumbar spine.        This report was  finalized on 9/26/2024 1:17 PM by Dr. Moses Otto MD.       CT Thoracic Spine Without Contrast [[id number]] Collected: 09/26/24 1316     Updated: 09/26/24 1319    Narrative:      EXAM:    CT Thoracic Spine Without Intravenous Contrast     EXAM DATE:    9/26/2024 12:58 PM     CLINICAL HISTORY:    back pain     TECHNIQUE:    Axial computed tomography images of the thoracic spine without  intravenous contrast.  Sagittal and coronal reformatted images were  created and reviewed.  This CT exam was performed using one or more of  the following dose reduction techniques:  automated exposure control,  adjustment of the mA and/or kV according to patient size, and/or use of  iterative reconstruction technique.     COMPARISON:    No relevant prior studies available.     FINDINGS:    VERTEBRAE:  Unremarkable.  No acute fracture.    DISCS/SPINAL CANAL/NEURAL FORAMINA:  No acute findings.  No spinal  canal stenosis.    SOFT TISSUES:  Unremarkable.       Impression:        No acute findings in the thoracic spine.        This report was finalized on 9/26/2024 1:17 PM by Dr. Moses Otto MD.       CT Head Without Contrast [[id number]] Collected: 09/26/24 1259     Updated: 09/26/24 1302    Narrative:      CT HEAD WO CONTRAST-     CLINICAL INDICATION: weakness        COMPARISON: None available     TECHNIQUE: Axial images of the brain were obtained with out intravenous  contrast.  Reformatted images were created in the sagittal and coronal  planes.     DOSE:     Radiation dose reduction techniques were utilized per ALARA protocol.  Automated exposure control was initiated through either or [employer] or  [employer] software packages by  protocol.        FINDINGS:    BRAIN: Probable subacute ischemia right middle cerebral artery  territory    VENTRICLES:  Unremarkable.  No ventriculomegaly.       BONES/JOINTS:  Unremarkable.  No acute fracture.       SOFT TISSUES:  Unremarkable.       SINUSES:  no air fluid levels        MASTOID AIR CELLS:  Unremarkable as visualized.  No mastoid effusion.          Impression:         1. Probable remote right middle cerebral artery infarction  2. No acute parenchymal mass, hemorrhage, or midline shift     This report was finalized on 9/26/2024 1:00 PM by Dr. Moses Otto MD.             I have personally looked at the radiology images and read the final radiology report.    Assessment & Plan    43F Morbid Obesity by BMI PMH History Genital Herpes, Cerebrovascular Accident 5/2024 complicated by L sided paralysis, presented to [facility] emergency room 9/26 after sliding into the floor off her chair due to weakness.     #Acute Metabolic Encephalopathy due to Acute Urinary Tract Infection in setting of probable neurogenic bladder  - Continue Ceftriaxone, follow up cultures, rule out STI    #Electrolyte Abnormalities  - Acute Mild Hypokalemia - Replacing, on protocol  - Acute Mild Hypomagnesemia - Replacing, on protocol    #History Cerebrovascular Accident complicated by L sided paralysis, unclear etiology  - Review home medications and resume as indicated, Supportive Care     #Debility  - Consult PT/OT, Social Work if placement needed     #History Genital Herpes  - Supportive Care, follow up medication reconciliation for any suppressive medications, check HIV & acute hepatitis panel     #Morbid Obesity by BMI  - Body mass index is 43.26 kg/m².; complicates all aspects of care    F: Oral  E: Monitor & Replace as needed   N: Regular Diet  PPx: SQH  Code Status (Patient has no pulse and is not breathing): CPR  Medical Interventions (Patient has pulse or is breathing): Full Support     Dispo: Pending workup and clinical improvement    *This patient is considered high risk secondary to Urinary Tract Infection, electrolyte abnormalities, chronic L sided paralysis from prior Cerebrovascular Accident.      Edited by: Ramon Marc MD at 9/26/2024 4768    Ramon Marc MD  [facility]  Hospitalist  09/26/24  17:01 EDT        Electronically signed by Ramon Marc MD at 09/26/24 1703       Lines, Drains & Airways       Active LDAs       Name Placement date Placement time Site Days    Midline Catheter - Single Lumen 09/30/24 Right Basilic 09/30/24  0955  -- 8    External Urinary Catheter 09/29/24  1500  --  8                  Current Facility-Administered Medications   Medication Dose Route Frequency Provider Last Rate Last Admin    acetaminophen (TYLENOL) tablet 650 mg  650 mg Oral Q6H PRN Ashleigh Nicholas PA-C   650 mg at 10/08/24 0511    acyclovir (ZOVIRAX) capsule 400 mg  400 mg Oral Nightly Ramon Marc MD   400 mg at 10/07/24 2150    aspirin chewable tablet 81 mg  81 mg Oral Daily Ramon Marc MD   81 mg at 10/08/24 0922    atorvastatin (LIPITOR) tablet 80 mg  80 mg Oral Daily Ramon Marc MD   80 mg at 10/08/24 0922    sennosides-docusate (PERICOLACE) 8.6-50 MG per tablet 2 tablet  2 tablet Oral BID PRN Ramon Marc MD        And    polyethylene glycol (MIRALAX) packet 17 g  17 g Oral Daily PRN Ramon Marc MD        And    bisacodyl (DULCOLAX) EC tablet 5 mg  5 mg Oral Daily PRN Ramon Marc MD        And    bisacodyl (DULCOLAX) suppository 10 mg  10 mg Rectal Daily PRN Ramon Marc MD        Calcium Replacement - Follow Nurse / BPA Driven Protocol   Does not apply PRN Ramon Marc MD        cyclobenzaprine (FLEXERIL) tablet 10 mg  10 mg Oral TID PRN Ramon Marc MD   10 mg at 10/07/24 1602    Diclofenac Sodium (VOLTAREN) 1 % gel 4 g  4 g Topical 4x Daily PRN Ashleigh Nicholas PA-C   4 g at 10/05/24 2045    DULoxetine (CYMBALTA) DR capsule 60 mg  60 mg Oral Daily Ramon Marc MD   60 mg at 10/08/24 0922    heparin (porcine) 5000 UNIT/ML injection 5,000 Units  5,000 Units Subcutaneous Q8H Ramon Marc MD   5,000 Units at 10/08/24 0511    lisinopril (PRINIVIL,ZESTRIL) tablet 20 mg  20 mg Oral Daily Ramon Marc MD   20 mg at 10/08/24 0922    loperamide (IMODIUM) capsule  2 mg  2 mg Oral 4x Daily PRN Marv Navas DO   2 mg at 10/08/24 0520    Magnesium Standard Dose Replacement - Follow Nurse / BPA Driven Protocol   Does not apply PRN Ramon Marc MD        melatonin tablet 5 mg  5 mg Oral Nightly PRN Wesley Matthews APRN   5 mg at 10/07/24 0228    [Held by provider] metFORMIN (GLUCOPHAGE) tablet 1,000 mg  1,000 mg Oral BID With Meals Ramon Marc MD        metoprolol tartrate (LOPRESSOR) tablet 25 mg  25 mg Oral Q12H Mara Navas MD   25 mg at 10/08/24 0922    nicotine (NICODERM CQ) 7 MG/24HR patch 1 patch  1 patch Transdermal Q24H Ramon Marc MD   1 patch at 10/08/24 0924    nystatin (MYCOSTATIN) powder   Topical Q12H Ramon Marc MD   Given at 10/08/24 1040    pantoprazole (PROTONIX) EC tablet 40 mg  40 mg Oral Daily Ramon Marc MD   40 mg at 10/08/24 0922    Pharmacy Consult   Does not apply Continuous PRN Ramon Marc MD        Phosphorus Replacement - Follow Nurse / BPA Driven Protocol   Does not apply PRN Ramon Marc MD        Potassium Replacement - Follow Nurse / BPA Driven Protocol   Does not apply PRN Ramon Marc MD        prochlorperazine (COMPAZINE) injection 2.5 mg  2.5 mg Intravenous Q6H PRN Ashleigh Nicholas PA-C   2.5 mg at 10/08/24 0520    sodium chloride 0.9 % flush 10 mL  10 mL Intravenous Q12H Ramon Marc MD   10 mL at 10/08/24 0924    sodium chloride 0.9 % flush 10 mL  10 mL Intravenous PRN Ramon Marc MD        sodium chloride 0.9 % flush 10 mL  10 mL Intravenous Q12H Marv Navas DO   10 mL at 10/08/24 0924    sodium chloride 0.9 % flush 10 mL  10 mL Intravenous PRN Marv Navas DO        sodium chloride 0.9 % infusion 40 mL  40 mL Intravenous PRN Ramon Marc MD        sodium chloride 0.9 % infusion 40 mL  40 mL Intravenous PRN Marv Navas DO         Lab Results (most recent)       Procedure Component Value Units Date/Time    Hemoglobin A1c [[id number]]  (Abnormal) Collected:  10/05/24 1209    Specimen: Blood Updated: 10/05/24 1241     Hemoglobin A1C 9.40 %     Narrative:      Hemoglobin A1C Ranges:    Increased Risk for Diabetes  5.7% to 6.4%  Diabetes                     >= 6.5%  Diabetic Goal                < 7.0%    POC Glucose Once [[id number]]  (Abnormal) Collected: 10/02/24 2010    Specimen: Blood Updated: 10/02/24 2016     Glucose 216 mg/dL     Blood Culture - Blood, Arm, Left [[id number]]  (Normal) Collected: 09/26/24 1350    Specimen: Blood from Arm, Left Updated: 10/01/24 1400     Blood Culture No growth at 5 days    Blood Culture - Blood, Arm, Right [[id number]]  (Normal) Collected: 09/26/24 1350    Specimen: Blood from Arm, Right Updated: 10/01/24 1400     Blood Culture No growth at 5 days    Basic Metabolic Panel [[id number]]  (Abnormal) Collected: 10/01/24 0117    Specimen: Blood Updated: 10/01/24 0215     Glucose 158 mg/dL      BUN 4 mg/dL      Creatinine 0.36 mg/dL      Sodium 141 mmol/L      Potassium 3.8 mmol/L      Chloride 110 mmol/L      CO2 22.8 mmol/L      Calcium 8.3 mg/dL      BUN/Creatinine Ratio 11.1     Anion Gap 8.2 mmol/L      eGFR 130.2 mL/min/1.73     Narrative:      GFR Normal >60  Chronic Kidney Disease <60  Kidney Failure <15      CBC (No Diff) [[id number]]  (Abnormal) Collected: 10/01/24 0117    Specimen: Blood Updated: 10/01/24 0157     WBC 7.71 10*3/mm3      RBC 3.44 10*6/mm3      Hemoglobin 10.9 g/dL      Hematocrit 33.9 %      MCV 98.5 fL      MCH 31.7 pg      MCHC 32.2 g/dL      RDW 14.2 %      RDW-SD 50.6 fl      MPV 9.1 fL      Platelets 353 10*3/mm3     Comprehensive Metabolic Panel [[id number]]  (Abnormal) Collected: 09/30/24 0117    Specimen: Blood Updated: 09/30/24 0202     Glucose 196 mg/dL      BUN 5 mg/dL      Creatinine 0.46 mg/dL      Sodium 142 mmol/L      Potassium 3.5 mmol/L      Chloride 110 mmol/L      CO2 22.8 mmol/L      Calcium 8.0 mg/dL      Total Protein 5.1 g/dL      Albumin 2.5 g/dL      ALT (SGPT) 7 U/L      AST (SGOT) 11 U/L       Alkaline Phosphatase 79 U/L      Total Bilirubin 0.2 mg/dL      Globulin 2.6 gm/dL      A/G Ratio 1.0 g/dL      BUN/Creatinine Ratio 10.9     Anion Gap 9.2 mmol/L      eGFR 122.7 mL/min/1.73     Narrative:      GFR Normal >60  Chronic Kidney Disease <60  Kidney Failure <15      CBC (No Diff) [[id number]]  (Abnormal) Collected: 09/30/24 0117    Specimen: Blood Updated: 09/30/24 0132     WBC 8.49 10*3/mm3      RBC 3.30 10*6/mm3      Hemoglobin 10.5 g/dL      Hematocrit 32.7 %      MCV 99.1 fL      MCH 31.8 pg      MCHC 32.1 g/dL      RDW 13.9 %      RDW-SD 50.3 fl      MPV 8.8 fL      Platelets 285 10*3/mm3     Comprehensive Metabolic Panel [[id number]]  (Abnormal) Collected: 09/29/24 0036    Specimen: Blood Updated: 09/29/24 0224     Glucose 192 mg/dL      BUN 4 mg/dL      Creatinine 0.40 mg/dL      Sodium 140 mmol/L      Potassium 3.9 mmol/L      Comment: Slight hemolysis detected by analyzer. Result may be falsely elevated.        Chloride 109 mmol/L      CO2 21.0 mmol/L      Calcium 8.2 mg/dL      Total Protein 5.3 g/dL      Albumin 2.5 g/dL      ALT (SGPT) 8 U/L      AST (SGOT) 15 U/L      Alkaline Phosphatase 88 U/L      Total Bilirubin 0.2 mg/dL      Globulin 2.8 gm/dL      A/G Ratio 0.9 g/dL      BUN/Creatinine Ratio 10.0     Anion Gap 10.0 mmol/L      eGFR 126.9 mL/min/1.73     Narrative:      GFR Normal >60  Chronic Kidney Disease <60  Kidney Failure <15      Urine Culture - Urine, Straight Cath [[id number]]  (Abnormal)  (Susceptibility) Collected: 09/26/24 1451    Specimen: Urine from Straight Cath Updated: 09/28/24 1408     Urine Culture >100,000 CFU/mL Escherichia coli ESBL    Narrative:      Colonization of the urinary tract without infection is common. Treatment is discouraged unless the patient is symptomatic, pregnant, or undergoing an invasive urologic procedure.  Recent outcomes data supports the use of pip/tazo in the treatment of susceptible ESBL infections for uncomplicated UTI. Consider use of  pip/tazo as a carbapenem-sparing regimen in applicable patients.    Susceptibility        Escherichia coli ESBL      LASHAWN      Ertapenem Susceptible      Gentamicin Susceptible      Levofloxacin Intermediate      Meropenem Susceptible      Nitrofurantoin Susceptible      Piperacillin + Tazobactam Susceptible      Trimethoprim + Sulfamethoxazole Resistant                           Potassium [[id number]]  (Normal) Collected: 09/27/24 1904    Specimen: Blood Updated: 09/27/24 1920     Potassium 4.0 mmol/L      Comment: Slight hemolysis detected by analyzer. Result may be falsely elevated.       Chlamydia trachomatis, Neisseria gonorrhoeae, Trichomonas vaginalis, PCR - Swab, Vagina [[id number]]  (Normal) Collected: 09/26/24 1837    Specimen: Swab from Vagina Updated: 09/26/24 2018     Chlamydia DNA by PCR Not Detected     Neisseria gonorrhoeae by PCR Not Detected     Trichomonas vaginalis PCR Not Detected    HIV-1 & HIV-2 Antibodies [[id number]]  (Normal) Collected: 09/26/24 1350    Specimen: Blood from Arm, Right Updated: 09/26/24 1748    Narrative:      The following orders were created for panel order HIV-1 & HIV-2 Antibodies.  Procedure                               Abnormality         Status                     ---------                               -----------         ------                     HIV-1 / O / 2 Ag / Antibody[[id number]]  Normal              Final result                 Please view results for these tests on the individual orders.    Hepatitis Panel, Acute [[id number]]  (Normal) Collected: 09/26/24 1350    Specimen: Blood from Arm, Right Updated: 09/26/24 1748     Hepatitis B Surface Ag Non-Reactive     Hep A IgM Non-Reactive     Hep B C IgM Non-Reactive     Hepatitis C Ab Non-Reactive    Narrative:      Results may be falsely decreased if patient taking Biotin.     HIV-1 / O / 2 Ag / Antibody [[id number]]  (Normal) Collected: 09/26/24 1350    Specimen: Blood from Arm, Right Updated: 09/26/24 1748      HIV-1/ HIV-2 Non-Reactive     Comment: A non-reactive test result does not preclude the possibility of exposure to HIV or infection with HIV. An antibody response to recent exposure may take several months to reach detectable levels.       Narrative:      The HIV antibody/antigen combo assay is a qualitative assay for HIV that includes the p24 antigen as well as antibodies to HIV types 1 and 2. This test is intended to be used as a screening assay in the diagnosis of HIV infection in patients over the age of 2.    Magnesium [[id number]]  (Abnormal) Collected: 09/26/24 1504    Specimen: Blood from Arm, Right Updated: 09/26/24 1553     Magnesium 1.5 mg/dL     C-reactive Protein [[id number]]  (Abnormal) Collected: 09/26/24 1504    Specimen: Blood from Arm, Right Updated: 09/26/24 1530     C-Reactive Protein 3.80 mg/dL     Urinalysis, Microscopic Only - Urine, Clean Catch [[id number]]  (Abnormal) Collected: 09/26/24 1419    Specimen: Urine, Clean Catch Updated: 09/26/24 1459     RBC, UA 0-2 /HPF      WBC, UA 3-5 /HPF      Bacteria, UA 4+ /HPF      Squamous Epithelial Cells, UA None Seen /HPF      Hyaline Casts, UA None Seen /LPF      Methodology Manual Light Microscopy    Urinalysis With Microscopic If Indicated (No Culture) - Urine, Clean Catch [[id number]]  (Abnormal) Collected: 09/26/24 1419    Specimen: Urine, Clean Catch Updated: 09/26/24 1440     Color, UA Dark Yellow     Appearance, UA Turbid     pH, UA 6.0     Specific Gravity, UA >=1.030     Glucose, UA Negative     Ketones, UA 15 mg/dL (1+)     Bilirubin, UA Small (1+)     Blood, UA Trace     Protein, UA 30 mg/dL (1+)     Leuk Esterase, UA Moderate (2+)     Nitrite, UA Positive     Urobilinogen, UA 2.0 E.U./dL    hCG, Serum, Qualitative [[id number]]  (Normal) Collected: 09/26/24 1350    Specimen: Blood from Arm, Right Updated: 09/26/24 1425     HCG Qualitative Negative    Lactic Acid, Plasma [[id number]]  (Normal) Collected: 09/26/24 1350    Specimen: Blood from  Arm, Right Updated: 09/26/24 1424     Lactate 1.1 mmol/L     CBC & Differential [[id number]]  (Abnormal) Collected: 09/26/24 1350    Specimen: Blood from Arm, Right Updated: 09/26/24 1403    Narrative:      The following orders were created for panel order CBC & Differential.  Procedure                               Abnormality         Status                     ---------                               -----------         ------                     CBC Auto Differential[[id number]]        Abnormal            Final result                 Please view results for these tests on the individual orders.    CBC Auto Differential [[id number]]  (Abnormal) Collected: 09/26/24 1350    Specimen: Blood from Arm, Right Updated: 09/26/24 1403     WBC 10.42 10*3/mm3      RBC 3.98 10*6/mm3      Hemoglobin 12.5 g/dL      Hematocrit 37.4 %      MCV 94.0 fL      MCH 31.4 pg      MCHC 33.4 g/dL      RDW 13.6 %      RDW-SD 46.9 fl      MPV 9.2 fL      Platelets 402 10*3/mm3      Neutrophil % 66.1 %      Lymphocyte % 28.8 %      Monocyte % 3.7 %      Eosinophil % 0.5 %      Basophil % 0.2 %      Immature Grans % 0.7 %      Neutrophils, Absolute 6.89 10*3/mm3      Lymphocytes, Absolute 3.00 10*3/mm3      Monocytes, Absolute 0.39 10*3/mm3      Eosinophils, Absolute 0.05 10*3/mm3      Basophils, Absolute 0.02 10*3/mm3      Immature Grans, Absolute 0.07 10*3/mm3      nRBC 0.0 /100 WBC     COVID-19 and FLU A/B PCR, 1 HR TAT - Swab, Nasopharynx [[id number]]  (Normal) Collected: 09/26/24 1326    Specimen: Swab from Nasopharynx Updated: 09/26/24 1353     COVID19 Not Detected     Influenza A PCR Not Detected     Influenza B PCR Not Detected    Narrative:      Fact sheet for providers: https://www.fda.gov/media/601040/download    Fact sheet for patients: https://www.fda.gov/media/086878/download    Test performed by PCR.          Orders (active)        Start     Ordered    10/07/24 0225  melatonin tablet 5 mg  Nightly PRN         10/07/24 0225     10/05/24 1501  ketorolac (TORADOL) injection 15 mg  Every 6 Hours PRN         10/05/24 1501    10/05/24 1330  metoprolol tartrate (LOPRESSOR) tablet 25 mg  Every 12 Hours Scheduled         10/05/24 1239    10/03/24 1354  Discontinue IV  Once         10/03/24 1402    10/03/24 0000  nitrofurantoin, macrocrystal-monohydrate, (Macrobid) 100 MG capsule  2 Times Daily         10/03/24 1402    10/03/24 0000  Discharge Follow-up with PCP         10/03/24 1402    10/03/24 0000  acyclovir (ZOVIRAX) 400 MG tablet  Nightly         10/03/24 1509    10/02/24 1800  Dietary Nutrition Supplements Boost Plus (Ensure Enlive, Ensure Plus)  Daily With Breakfast, Lunch & Dinner       10/02/24 1611    10/01/24 0924  loperamide (IMODIUM) capsule 2 mg  4 Times Daily PRN         10/01/24 0924    09/30/24 1100  sodium chloride 0.9 % flush 10 mL  Every 12 Hours Scheduled         09/30/24 1004    09/30/24 1100  mupirocin (BACTROBAN) 2 % nasal ointment 1 Application  2 Times Daily         09/30/24 1004    09/30/24 1005  Connectors / Hubs Must Be Scrubbed 15 Seconds Using 70% Alcohol Before Access - Allow to Dry Before Accessing Line  Continuous         09/30/24 1004    09/30/24 1005  Change CHG Dressing or Transparent Dressing with CHG Disk, Needleless Connectors and Securement Device Every 7 Days  Weekly        Comments: Per [facility] Policy    09/30/24 1004    09/30/24 1005  Discontinue mupirocin for decolonization after 5 days or sooner if patient no longer has a central venous access device, accessed port, hemodialysis catheter, or midline  Continuous         09/30/24 1004    09/30/24 1004  sodium chloride 0.9 % flush 10 mL  As Needed         09/30/24 1004    09/30/24 1004  sodium chloride 0.9 % infusion 40 mL  As Needed         09/30/24 1004    09/30/24 0106  Unstageable Pressure Ulcer (Wet) Care Q12H  Every 12 Hours        Comments: - Gently Cleanse With Normal Saline   - Pack Loosely With Moist to Moist Normal Saline Fluffed Gauze   - Cover  With Silicone Border Dressing or Dry Dressing    09/30/24 0105    09/30/24 0105  Follow Pressure Ulcer Prevention Measures Policy  Continuous        Comments: Implement Appropriate Pressure Ulcer Prevention Measures  - Open Order Report to View Full Instructions  Enter Wound LDA & Document Assessment  Add Wound Care Plan  Add Patient Education Per Policy    09/30/24 0105    09/30/24 0105  Wound Ostomy Eval & Treat  Once         09/30/24 0105    09/30/24 0104  Follow Pressure Ulcer Prevention Measures Policy  Continuous        Comments: Implement Appropriate Pressure Ulcer Prevention Measures  - Open Order Report to View Full Instructions  Enter Wound LDA & Document Assessment  Add Wound Care Plan  Add Patient Education Per Policy    09/30/24 0105    09/30/24 0104  Turn Patient  Now Then Every 2 Hours         09/30/24 0105    09/30/24 0104  Head of Bed 30 Degrees or Less (Unless Contraindicated)  Until Discontinued         09/30/24 0105    09/30/24 0104  Elevate Heels Off of Bed  Until Discontinued         09/30/24 0105    09/30/24 0104  Use Seat Cushion When Up In Chair  Continuous         09/30/24 0105    09/30/24 0104  Silicone Border Dressing to Bony Prominences  Every Shift       09/30/24 0105    09/30/24 0104  Do NOT Rub or Massage Any Bony Prominence  Continuous         09/30/24 0105    09/29/24 1139  Follow Pressure Ulcer Prevention Measures Policy  Continuous        Comments: Implement Appropriate Pressure Ulcer Prevention Measures  - Open Order Report to View Full Instructions  Enter Wound LDA & Document Assessment  Add Wound Care Plan  Add Patient Education Per Policy    09/29/24 1139    09/29/24 1139  Wound Ostomy Eval & Treat  Once         09/29/24 1139    09/29/24 1138  Stage II Pressure Ulcer Care Every Other Day  Every Other Day        Comments: - Gently Cleanse With Normal Saline  - Cover With Silicone Border Dressing (If Indicated)  - For Frequent Incontinence - Apply Skin Protective Barrier Cream  Rather Than Silicone Border Dressing    09/29/24 1139    09/29/24 1138  Follow Pressure Ulcer Prevention Measures Policy  Continuous        Comments: Implement Appropriate Pressure Ulcer Prevention Measures  - Open Order Report to View Full Instructions  Enter Wound LDA & Document Assessment  Add Wound Care Plan  Add Patient Education Per Policy    09/29/24 1139    09/29/24 1100  nystatin (MYCOSTATIN) powder  Every 12 Hours Scheduled         09/29/24 0948    09/29/24 0930  traMADol (ULTRAM) tablet 50 mg  Every 8 Hours PRN         09/29/24 0930    09/28/24 1500  ertapenem (INVanz) 1,000 mg in sodium chloride 0.9 % 100 mL IVPB-VTB  Every 24 Hours         09/28/24 1427    09/28/24 1223  Cardiac Monitoring  Continuous        Comments: Follow Standing Orders As Outlined in Process Instructions (Open Order Report to View Full Instructions)    09/28/24 1222    09/27/24 2100  acyclovir (ZOVIRAX) capsule 400 mg  Nightly         09/27/24 0921    09/27/24 0737  Pharmacy Consult  Continuous PRN         09/27/24 0738    09/27/24 0103  acetaminophen (TYLENOL) tablet 650 mg  Every 6 Hours PRN         09/27/24 0105    09/27/24 0013  Diclofenac Sodium (VOLTAREN) 1 % gel 4 g  4 Times Daily PRN         09/27/24 0013    09/26/24 2200  heparin (porcine) 5000 UNIT/ML injection 5,000 Units  Every 8 Hours Scheduled         09/26/24 1747    09/26/24 2149  prochlorperazine (COMPAZINE) injection 2.5 mg  Every 6 Hours PRN         09/26/24 2149    09/26/24 2100  sodium chloride 0.9 % flush 10 mL  Every 12 Hours Scheduled         09/26/24 1747    09/26/24 2000  Vital Signs  Every 4 Hours       09/26/24 1747    09/26/24 1845  aspirin chewable tablet 81 mg  Daily         09/26/24 1747 09/26/24 1845  atorvastatin (LIPITOR) tablet 80 mg  Daily         09/26/24 1747 09/26/24 1845  DULoxetine (CYMBALTA) DR capsule 60 mg  Daily         09/26/24 1747 09/26/24 1845  lisinopril (PRINIVIL,ZESTRIL) tablet 20 mg  Daily         09/26/24 1747     09/26/24 1845  pantoprazole (PROTONIX) EC tablet 40 mg  Daily         09/26/24 1747 09/26/24 1845  [Held by provider]  metFORMIN (GLUCOPHAGE) tablet 1,000 mg  2 Times Daily With Meals        (On hold since Thu 9/26/2024 at 1747 until manually unheld; held by Ramon Marc MDHold Reason: Other (Comment Required))    09/26/24 1747 09/26/24 1845  nicotine (NICODERM CQ) 7 MG/24HR patch 1 patch  Every 24 Hours Scheduled         09/26/24 1747    09/26/24 1800  Oral Care  2 Times Daily       09/26/24 1747 09/26/24 1748  Intake & Output  Every Shift       09/26/24 1747 09/26/24 1748  Weigh Patient  Once         09/26/24 1747    09/26/24 1748  Insert Peripheral IV  Once         09/26/24 1747    09/26/24 1748  Saline Lock & Maintain IV Access  Continuous         09/26/24 1747 09/26/24 1748  Activity - Ad Matilde  Until Discontinued         09/26/24 1747 09/26/24 1748  Diet: Regular/House; Fluid Consistency: Thin (IDDSI 0)  Diet Effective Now         09/26/24 1747    09/26/24 1748  PT Consult: Eval & Treat Discharge Placement Assessment, Functional Mobility Below Baseline  Once         09/26/24 1747    09/26/24 1748  OT Consult: Eval & Treat ADL Performance Below Baseline, Discharge Placement Assessment  Once         09/26/24 1747    09/26/24 1747  sodium chloride 0.9 % flush 10 mL  As Needed         09/26/24 1747 09/26/24 1747  sodium chloride 0.9 % infusion 40 mL  As Needed         09/26/24 1747 09/26/24 1747  Potassium Replacement - Follow Nurse / BPA Driven Protocol  As Needed         09/26/24 1747 09/26/24 1747  Magnesium Standard Dose Replacement - Follow Nurse / BPA Driven Protocol  As Needed         09/26/24 1747 09/26/24 1747  Phosphorus Replacement - Follow Nurse / BPA Driven Protocol  As Needed         09/26/24 1747 09/26/24 1747  Calcium Replacement - Follow Nurse / BPA Driven Protocol  As Needed         09/26/24 1747 09/26/24 1747  sennosides-docusate (PERICOLACE) 8.6-50 MG per  "tablet 2 tablet  2 Times Daily PRN        Placed in \"And\" Linked Group    09/26/24 1747    09/26/24 1747  polyethylene glycol (MIRALAX) packet 17 g  Daily PRN        Placed in \"And\" Linked Group    09/26/24 1747    09/26/24 1747  bisacodyl (DULCOLAX) EC tablet 5 mg  Daily PRN        Placed in \"And\" Linked Group    09/26/24 1747    09/26/24 1747  bisacodyl (DULCOLAX) suppository 10 mg  Daily PRN        Placed in \"And\" Linked Group    09/26/24 1747    09/26/24 1747  cyclobenzaprine (FLEXERIL) tablet 10 mg  3 Times Daily PRN         09/26/24 1747    09/26/24 1626  Code Status and Medical Interventions: CPR (Attempt to Resuscitate); Full Support  Continuous         09/26/24 1626    09/26/24 1624  Hospitalist (on-call MD unless specified)  Once        Specialty:  Hospitalist  Provider:  Ramon Marc MD    09/26/24 1624    Unscheduled  Straight cath  As Needed       09/26/24 1359    Unscheduled  Potassium  As Needed        Comments: Release/collect/run 4 hours after completion of last potassium dose.     Placed in \"And\" Linked Group    09/26/24 1545    Unscheduled  Stage II Pressure Ulcer Care PRN  As Needed      Comments: - Gently Cleanse With Normal Saline  - Cover With Silicone Border Dressing (If Indicated)  - For Frequent Incontinence - Apply Skin Protective Barrier Cream Rather Than Silicone Border Dressing    09/29/24 1139    Unscheduled  Wound Care  As Needed       09/30/24 0105    Unscheduled  Unstageable Pressure Ulcer (Wet) Care PRN  As Needed      Comments: - Gently Cleanse With Normal Saline   - Pack Loosely With Moist to Moist Normal Saline Fluffed Gauze   - Cover With Silicone Border Dressing or Dry Dressing    09/30/24 0105    Unscheduled  Change Dressing to IV Site As Needed When Damp, Loose or Soiled  As Needed       09/30/24 1004    Unscheduled  Change Needleless Connectors  As Needed      Comments: Change Needleless Connectors When:  - Administration Set Changed  - Dressing Changed  - Removed For Any " Reason  - Residual Blood or Debris Within Connector  - Prior to Drawing Blood Cultures  - Contamination of Connector  - After Administration of Blood or Blood Components    24 1004                     Physician Progress Notes (most recent note)        Ramon Marc MD at 10/08/24 0986              [facility] HOSPITALIST PROGRESS NOTE     Patient Identification:  Name:  Lety Johnson  Age:  43 y.o.  Sex:  female  :  1980  MRN:  [id number]  Visit Number:  [id number]  ROOM: [address] [place] [address]     Primary Care Provider:  Provider, No Known    Length of stay in inpatient status:  10    Subjective     Chief Compliant:    Chief Complaint   Patient presents with    Fall     History of Presenting Illness:    Patient remains ill but stable today, no acute events overnight, no new complaints, denies any fevers or chills, pending placement, will discuss at multidisciplinary rounds later today.   Objective     Current Hospital Meds:  acyclovir, 400 mg, Oral, Nightly  aspirin, 81 mg, Oral, Daily  atorvastatin, 80 mg, Oral, Daily  DULoxetine, 60 mg, Oral, Daily  heparin (porcine), 5,000 Units, Subcutaneous, Q8H  HYDROcodone-acetaminophen, 1 tablet, Oral, Once  lisinopril, 20 mg, Oral, Daily  [Held by provider] metFORMIN, 1,000 mg, Oral, BID With Meals  metoprolol tartrate, 25 mg, Oral, Q12H  nicotine, 1 patch, Transdermal, Q24H  nystatin, , Topical, Q12H  pantoprazole, 40 mg, Oral, Daily  sodium chloride, 10 mL, Intravenous, Q12H  sodium chloride, 10 mL, Intravenous, Q12H      Pharmacy Consult,       ----------------------------------------------------------------------------------------------------------------------  Vital Signs:  Temp:  [97.8 °F (36.6 °C)-98.3 °F (36.8 °C)] 98.2 °F (36.8 °C)  Heart Rate:  [69-92] 92  Resp:  [20-22] 20  BP: (148-155)/(86-98) 152/98  SpO2:  [91 %-98 %] 91 %  on   ;   Device (Oxygen Therapy): room air  Body mass index is 37.03 kg/m².      Intake/Output Summary (Last 24 hours) at  "10/8/2024 0954  Last data filed at 10/8/2024 0450  Gross per 24 hour   Intake 240 ml   Output 1425 ml   Net -1185 ml      ----------------------------------------------------------------------------------------------------------------------  Physical exam:  Constitutional:  Older than stated age. No acute distress. No discomfort   HENT:  Head:  Normocephalic and atraumatic.  Mouth:  Moist mucous membranes.    Eyes:  Conjunctivae and EOM are normal. No scleral icterus.    Neck:  Neck supple.  No JVD present.    Cardiovascular:  Normal rate, regular rhythm and normal heart sounds with no murmur.  Pulmonary/Chest:  No respiratory distress, no wheezes, on room air  Abdominal:  Soft. No distension and no tenderness.   Musculoskeletal:  No tenderness, and no deformity.  No red or swollen joints anywhere.    Neurological:  Alert and oriented to person, place, and time.  No cranial nerve deficit. Chronic L sided paralysis.    Skin:  Skin is warm and dry. No pallor. Significant intertriginous erythema under panus, consistent with yeast infection.   Peripheral vascular:  No clubbing, no cyanosis, trace edema.  Psychiatric: Appropriate mood and affect    *Examination stable today 10/8  Edited by: Ramon Marc MD at 10/8/2024 0954  ----------------------------------------------------------------------------------------------------------------------                 No results found for: \"URINECX\"  No results found for: \"BLOODCX\"    I have personally looked at the labs and they are summarized above.  ----------------------------------------------------------------------------------------------------------------------  Detailed radiology reports for the last 24 hours:  No radiology results for the last day  Assessment & Plan    43F Morbid Obesity by BMI [facility] History Genital Herpes, Cerebrovascular Accident 5/2024 complicated by L sided paralysis, presented to [facility] emergency room 9/26 after sliding into the floor " off her chair due to weakness.     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     #Electrolyte Abnormalities  - Acute Mild Hypokalemia - Replaced per protocol - Resolved   - Acute Mild Hypomagnesemia - Replaced per protocol - Resolved     #History Cerebrovascular Accident complicated by L sided paralysis, unclear etiology  - Continue home regimen, Supportive Care     #Debility  - Consult PT/OT, Social Work if placement needed     #History Genital Herpes  - Supportive Care, continue home Acyclovir     #Morbid Obesity by BMI  - Body mass index is 43.26 kg/m².; complicates all aspects of care    F: Oral  E: Monitor & Replace as needed   N: Diet: Regular/House; Fluid Consistency: Thin (IDDSI 0)   PPx: SQH  Code Status (Patient has no pulse and is not breathing): CPR  Medical Interventions (Patient has pulse or is breathing): Full Support     Dispo: Pending placement, PT/OT consulted and following, Social Work consulted and following.     *This patient is considered high risk secondary to Urinary Tract Infection, electrolyte abnormalities, chronic L sided paralysis from prior Cerebrovascular Accident.      *Plan unchanged today 10/8  Edited by: Ramon Marc MD at 10/8/2024 0954  [facility]        Electronically signed by Ramon Marc MD at 10/08/24 0954          Consult Notes (most recent note)        Cuca Yuen APRN at 24 1436        Consult Orders    1. Inpatient Infectious Diseases Consult [[id number]] ordered by Ramon Marc MD at 24 1427                         INFECTIOUS DISEASE CONSULTATION REPORT        Patient Identification:  Name:  Lety Johnson  Age:  43 y.o.  Sex:  female  :   1980  MRN:  [id number]   Visit Number:  [id number]  Primary Care Physician:  Provider, No Known        Referring Provider: Dr. Marc    Reason for consult: ESBL UTI       LOS: 0 days        Subjective       Subjective     History of present illness:      Thank you Dr. Marc for allowing us to participate in the care of your patient.  As you well know, Ms. Lety Johnson is a 43 y.o. female with past medical history significant for ailin herpes, CVA in May 2024 with residual left-sided paralysis, who presented to [facility] Emergency Department on 9/26/2024 for evaluation after sliding into the floor due to weakness.  WBC 7.38.  Chlamydia gonorrhea and trichomonas negative.  CRP 3.80.  Urinalysis positive with culture finalizing is greater than 100,000 colonies of E. coli ESBL.  Blood cultures from 9/26/2024 show no growth thus far.  HIV 1 and 2 nonreactive.  Hepatitis panel nonreactive.  COVID-19 and influenza PCR negative.  Lactic acid normal on admission.    Patient resting in bed.  Denies dysuria, urgency, frequency.  Patient reports recurrent yeast infections.  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  Lungs clear to auscultation bilaterally.      Infectious Disease consultation was requested for antimicrobial management.      ---------------------------------------------------------------------------------------------------------------------     Review Of Systems:    Constitutional: no fever, chills and night sweats. No appetite change or unexpected weight change. No fatigue.  Eyes: no eye drainage, itching or redness.  HEENT: no mouth sores, dysphagia or nose bleed.  Respiratory: no for shortness of breath, cough or production of sputum.  Cardiovascular: no chest pain, no palpitations, no orthopnea.  Gastrointestinal: no nausea, vomiting or diarrhea. No abdominal pain, hematemesis or rectal bleeding.  Genitourinary: no dysuria or polyuria.  Hematologic/lymphatic: no lymph node  abnormalities, no easy bruising or easy bleeding.  Musculoskeletal: no muscle or joint pain.  Skin: No rash and no itching.  Neurological: no loss of consciousness, no seizure, no headache.  Behavioral/Psych: no depression or suicidal ideation.  Endocrine: no hot flashes.  Immunologic: negative.    ---------------------------------------------------------------------------------------------------------------------     Past Medical History    Past Medical History:   Diagnosis Date    Stroke        Past Surgical History    History reviewed. No pertinent surgical history.    Family History    History reviewed. No pertinent family history.    Social History    Social History     Tobacco Use    Smoking status: Every Day     Average packs/day: 2.0 packs/day for 26.0 years (52.0 ttl pk-yrs)     Types: Cigarettes     Start date: 1998    Smokeless tobacco: Never   Vaping Use    Vaping status: Never Used   Substance Use Topics    Alcohol use: Not Currently    Drug use: Never       Allergies    Patient has no known allergies.  ---------------------------------------------------------------------------------------------------------------------     Home Medications:    Prior to Admission Medications       Prescriptions Last Dose Informant Patient Reported? Taking?    acyclovir (ZOVIRAX) 400 MG tablet  Pharmacy Yes No    Take 1 tablet by mouth Every Night.    aspirin 81 MG chewable tablet Unknown  Yes No    Chew 1 tablet Daily.    atorvastatin (LIPITOR) 80 MG tablet Unknown  Yes No    Take 1 tablet by mouth Daily.    cyclobenzaprine (FLEXERIL) 10 MG tablet Unknown  Yes No    Take 1 tablet by mouth 3 (Three) Times a Day As Needed for Muscle Spasms.    DULoxetine (CYMBALTA) 60 MG capsule Unknown  Yes No    Take 1 capsule by mouth Daily.    lisinopril (PRINIVIL,ZESTRIL) 20 MG tablet Unknown  Yes No    Take 1 tablet by mouth Daily.    metFORMIN (GLUCOPHAGE) 1000 MG tablet Unknown  Yes No    Take 1 tablet by mouth 2 (Two) Times a Day  With Meals.    pantoprazole (PROTONIX) 40 MG EC tablet Unknown  Yes No    Take 1 tablet by mouth Daily.          ---------------------------------------------------------------------------------------------------------------------    Objective       Objective     Hospital Scheduled Meds:  acyclovir, 400 mg, Oral, Nightly  aspirin, 81 mg, Oral, Daily  atorvastatin, 80 mg, Oral, Daily  DULoxetine, 60 mg, Oral, Daily  ertapenem, 1,000 mg, Intravenous, Q24H  heparin (porcine), 5,000 Units, Subcutaneous, Q8H  lisinopril, 20 mg, Oral, Daily  [Held by provider] metFORMIN, 1,000 mg, Oral, BID With Meals  nicotine, 1 patch, Transdermal, Q24H  pantoprazole, 40 mg, Oral, Daily  sodium chloride, 10 mL, Intravenous, Q12H      Pharmacy Consult,   sodium chloride, 75 mL/hr, Last Rate: 75 mL/hr (09/28/24 1315)      ---------------------------------------------------------------------------------------------------------------------   Vital Signs:  Temp:  [97.5 °F (36.4 °C)-98.4 °F (36.9 °C)] 98.4 °F (36.9 °C)  Heart Rate:  [] 58  Resp:  [18-20] 18  BP: (133-172)/(71-86) 172/83  Mean Arterial Pressure (Non-Invasive) for the past 24 hrs (Last 3 readings):   Noninvasive MAP (mmHg)   09/28/24 1100 107   09/28/24 0650 92   09/28/24 0307 103     SpO2 Percentage    09/28/24 0307 09/28/24 0650 09/28/24 1100   SpO2: 98% 96% 97%     SpO2:  [96 %-98 %] 97 %  on   ;   Device (Oxygen Therapy): room air    Body mass index is 40.79 kg/m².  Wt Readings from Last 3 Encounters:   09/28/24 113 kg (248 lb 14.4 oz)   06/27/24 120 kg (264 lb)     ---------------------------------------------------------------------------------------------------------------------     Physical Exam:    Constitutional:  Well-developed and well-nourished.  No respiratory distress.      HENT:  Head: Normocephalic and atraumatic.  Mouth:  Moist mucous membranes.    Eyes:  Conjunctivae and EOM are normal.  No scleral icterus.  Neck:  Neck supple.  No JVD present.   "  Cardiovascular:  Normal rate, regular rhythm and normal heart sounds with no murmur. No edema.  Pulmonary/Chest:  No respiratory distress, no wheezes, no crackles, with normal breath sounds and good air movement.  Abdominal:  Soft.  Bowel sounds are normal.  No distension and no tenderness.   Musculoskeletal: Left sided paralysis secondary to CVA.  Neurological:  Alert and oriented to person, place, and time.  No facial droop.  No slurred speech.   Skin:  Skin is warm and dry.  No rash noted.  No pallor.   Psychiatric:  Normal mood and affect.  Behavior is normal.    ---------------------------------------------------------------------------------------------------------------------              Results from last 7 days   Lab Units 09/28/24 0318 09/27/24  0844 09/26/24  1504 09/26/24  1350   CRP mg/dL  --   --  3.80*  --    LACTATE mmol/L  --   --   --  1.1   WBC 10*3/mm3 7.38 9.67  --  10.42   HEMOGLOBIN g/dL 11.8* 10.9*  --  12.5   HEMATOCRIT % 36.9 34.8  --  37.4   MCV fL 98.9* 102.4*  --  94.0   MCHC g/dL 32.0 31.3*  --  33.4   PLATELETS 10*3/mm3 332 292  --  402     Results from last 7 days   Lab Units 09/28/24 0318 09/27/24  1904 09/27/24  0844 09/26/24  1504   SODIUM mmol/L 142  --  137 140   POTASSIUM mmol/L 4.1 4.0 3.5 2.7*   MAGNESIUM mg/dL  --   --   --  1.5*   CHLORIDE mmol/L 110*  --  105 99   CO2 mmol/L 22.3  --  21.5* 28.9   BUN mg/dL 5*  --  7 10   CREATININE mg/dL 0.43*  --  0.36* 0.51*   CALCIUM mg/dL 8.5*  --  7.9* 9.4   GLUCOSE mg/dL 198*  --  234* 220*   ALBUMIN g/dL 2.7*  --  2.5* 3.5   BILIRUBIN mg/dL 0.3  --  0.3 0.6   ALK PHOS U/L 100  --  96 119*   AST (SGOT) U/L 15  --  17 21   ALT (SGPT) U/L 9  --  7 8   Estimated Creatinine Clearance: 215.5 mL/min (A) (by C-G formula based on SCr of 0.43 mg/dL (L)).  No results found for: \"AMMONIA\"    No results found for: \"HGBA1C\", \"POCGLU\"  No results found for: \"HGBA1C\"  No results found for: \"TSH\", \"FREET4\"    Blood Culture   Date Value Ref Range " "Status   09/26/2024 No growth at 2 days  Preliminary   09/26/2024 No growth at 2 days  Preliminary     Urine Culture   Date Value Ref Range Status   09/26/2024 >100,000 CFU/mL Escherichia coli ESBL (A)  Final     No results found for: \"WOUNDCX\"  No results found for: \"STOOLCX\"  No results found for: \"RESPCX\"  Pain Management Panel           No data to display              I have personally reviewed the above laboratory results.   ---------------------------------------------------------------------------------------------------------------------  Imaging Results (Last 7 Days)       Procedure Component Value Units Date/Time    XR Ankle 3+ View Left [[id number]] Collected: 09/26/24 1537     Updated: 09/26/24 1540    Narrative:      EXAM:    XR Left Ankle Complete, 3 or More Views     EXAM DATE:    9/26/2024 3:17 PM     CLINICAL HISTORY:    left ankle injury     TECHNIQUE:    Frontal, lateral and oblique views of the left ankle.     COMPARISON:    No relevant prior studies available.     FINDINGS:    Bones/joints:  See below.      Soft tissues:  Soft tissue swelling, but no acute fracture or  dislocation.       Impression:        Soft tissue swelling, but no acute fracture or dislocation.        This report was finalized on 9/26/2024 3:38 PM by Dr. Moses Otto MD.       XR Foot 3+ View Left [[id number]] Collected: 09/26/24 1536     Updated: 09/26/24 1539    Narrative:      EXAM:    XR Left Foot Complete, 3 or More Views     EXAM DATE:    9/26/2024 3:16 PM     CLINICAL HISTORY:    left foot wound     TECHNIQUE:    Frontal, lateral and oblique views of the left foot.     COMPARISON:    No relevant prior studies available.     FINDINGS:    Bones/joints:  Unremarkable.  No acute fracture.  No dislocation.    Soft tissues:  Unremarkable.  No radiopaque foreign body.       Impression:        No acute findings in the left foot.        This report was finalized on 9/26/2024 3:37 PM by Dr. Moses Otto MD.       XR Chest 1 " View [[id number]] Collected: 09/26/24 1406     Updated: 09/26/24 1408    Narrative:      XR CHEST 1 VW-     CLINICAL INDICATION: weakness        COMPARISON: None immediately available      TECHNIQUE: Single frontal view of the chest.     FINDINGS:     LUNGS: Lungs are adequately aerated.      HEART AND MEDIASTINUM: Heart and mediastinal contours are unremarkable        SKELETON: Bony and soft tissue structures are unremarkable.             Impression:      No radiographic evidence of acute cardiac or pulmonary disease.           This report was finalized on 9/26/2024 2:06 PM by Dr. Moses Otto MD.       CT Cervical Spine Without Contrast [[id number]] Collected: 09/26/24 1317     Updated: 09/26/24 1319    Narrative:      CT CERVICAL SPINE WO CONTRAST-     CLINICAL INDICATION: neck pain        COMPARISON: None available     TECHNIQUE: Axial images of the cervical spine were acquired with out any  intravenous contrast. Reformatted images were then created in the  sagittal and coronal planes.     DOSE:      Radiation dose reduction techniques were utilized per ALARA protocol.  Automated exposure control was initiated through either or [employer] or  D[employer]Right software packages by  protocol.           FINDINGS:   The provided study demonstrates preservation of the vertebral body  heights in the sagittally reconstructed images.     There is no prevertebral soft tissue swelling.     Alignment is near anatomic.     The disc space heights are uniform.     I see no acute cervical spine fracture.       Impression:         1. No acute bony abnormality.     2. Other incidental findings as above     This report was finalized on 9/26/2024 1:17 PM by Dr. Moses Otto MD.       CT Lumbar Spine Without Contrast [[id number]] Collected: 09/26/24 1317     Updated: 09/26/24 1319    Narrative:      EXAM:    CT Lumbar Spine Without Intravenous Contrast     EXAM DATE:    9/26/2024 12:59 PM     CLINICAL HISTORY:    back pain      TECHNIQUE:    Axial computed tomography images of the lumbar spine without  intravenous contrast.  Sagittal and coronal reformatted images were  created and reviewed.  This CT exam was performed using one or more of  the following dose reduction techniques:  automated exposure control,  adjustment of the mA and/or kV according to patient size, and/or use of  iterative reconstruction technique.     COMPARISON:    No relevant prior studies available.     FINDINGS:    VERTEBRAE:  Unremarkable.  No acute fracture.    DISCS/SPINAL CANAL/NEURAL FORAMINA:  No acute findings.  No spinal  canal stenosis.    SOFT TISSUES:  Unremarkable.       Impression:        No acute findings in the lumbar spine.        This report was finalized on 9/26/2024 1:17 PM by Dr. Moses Otto MD.       CT Thoracic Spine Without Contrast [[id number]] Collected: 09/26/24 1316     Updated: 09/26/24 1319    Narrative:      EXAM:    CT Thoracic Spine Without Intravenous Contrast     EXAM DATE:    9/26/2024 12:58 PM     CLINICAL HISTORY:    back pain     TECHNIQUE:    Axial computed tomography images of the thoracic spine without  intravenous contrast.  Sagittal and coronal reformatted images were  created and reviewed.  This CT exam was performed using one or more of  the following dose reduction techniques:  automated exposure control,  adjustment of the mA and/or kV according to patient size, and/or use of  iterative reconstruction technique.     COMPARISON:    No relevant prior studies available.     FINDINGS:    VERTEBRAE:  Unremarkable.  No acute fracture.    DISCS/SPINAL CANAL/NEURAL FORAMINA:  No acute findings.  No spinal  canal stenosis.    SOFT TISSUES:  Unremarkable.       Impression:        No acute findings in the thoracic spine.        This report was finalized on 9/26/2024 1:17 PM by Dr. Moses Otto MD.       CT Head Without Contrast [[id number]] Collected: 09/26/24 1259     Updated: 09/26/24 1302    Narrative:      CT HEAD WO  CONTRAST-     CLINICAL INDICATION: weakness        COMPARISON: None available     TECHNIQUE: Axial images of the brain were obtained with out intravenous  contrast.  Reformatted images were created in the sagittal and coronal  planes.     DOSE:     Radiation dose reduction techniques were utilized per ALARA protocol.  Automated exposure control was initiated through either or [employer] or  DoseRight software packages by  protocol.        FINDINGS:    BRAIN: Probable subacute ischemia right middle cerebral artery  territory    VENTRICLES:  Unremarkable.  No ventriculomegaly.       BONES/JOINTS:  Unremarkable.  No acute fracture.       SOFT TISSUES:  Unremarkable.       SINUSES:  no air fluid levels       MASTOID AIR CELLS:  Unremarkable as visualized.  No mastoid effusion.          Impression:         1. Probable remote right middle cerebral artery infarction  2. No acute parenchymal mass, hemorrhage, or midline shift     This report was finalized on 9/26/2024 1:00 PM by Dr. Moses Otto MD.             I have personally reviewed the above radiology results.   ---------------------------------------------------------------------------------------------------------------------      Pertinent Infectious Disease Results          Assessment & Plan      Assessment        ESBL UTI      Plan      Patient presented to [facility] Emergency Department on 9/26/2024 for evaluation after sliding into the floor due to weakness.  WBC 7.38.  Chlamydia gonorrhea and trichomonas negative.  CRP 3.80.  Urinalysis positive with culture finalizing is greater than 100,000 colonies of E. coli ESBL.  Blood cultures from 9/26/2024 show no growth thus far.  HIV 1 and 2 nonreactive.  Hepatitis panel nonreactive.  COVID-19 and influenza PCR negative.  Lactic acid normal on admission.    Patient resting in bed.  Denies dysuria, urgency, frequency.  Patient reports recurrent yeast infections.  Left-sided paralysis secondary  to recent CVA.  Lungs clear to auscultation bilaterally.    Recommend to continue current antibiotic regimen of ertapenem 1 g IV every 24 hours x 7 days for treatment of ESBL UTI.  Okay to continue home suppressive acyclovir for history of genital herpes.  We will continue to follow closely and adjust antibiotic therapy as needed.        ANTIMICROBIAL THERAPY    acyclovir - 200 MG, 400 MG  ertapenem (INVanz) 1000 mg in 100 mL NS (VTB)       Again, thank you Dr. Marc for allowing us to participate in the care of your patient and please feel free to call for any questions you may have.        Code Status:     Code Status and Medical Interventions: CPR (Attempt to Resuscitate); Full Support   Ordered at: 24 1626     Code Status (Patient has no pulse and is not breathing):    CPR (Attempt to Resuscitate)     Medical Interventions (Patient has pulse or is breathing):    Full Support         YUE Rivera  24  14:36 EDT    Electronically signed by Cuca Yuen APRN at 24 1532          Physical Therapy Notes (most recent note)        Janessa Almeida, PT at 10/08/24 1126  Version 1 of 1         Acute Care - Physical Therapy Treatment Note  LILLIANA Haines     Patient Name: Lety Johnson  : 1980  MRN: [id number]  Today's Date: 10/8/2024   Onset of Illness/Injury or Date of Surgery: 24  Visit Dx:     ICD-10-CM ICD-9-CM   1. Hypokalemia  E87.6 276.8   2. Pressure injury of skin of sacral region, unspecified injury stage  L89.159 707.03     707.20   3. Pressure injury of skin of left heel, unspecified injury stage  L89.629 707.07     707.20   4. Acute cystitis without hematuria  N30.00 595.0     Patient Active Problem List   Diagnosis   (none) - all problems resolved or deleted     Past Medical History:   Diagnosis Date    Stroke      History reviewed. No pertinent surgical history.  PT Assessment (Last 12 Hours)       PT Evaluation and Treatment       Row Name 10/08/24 1038           Physical Therapy Time and Intention    Document Type therapy note (daily note)  -     Mode of Treatment physical therapy  -     Patient Effort adequate  -     Comment Pt called family member (?) during session, unprompted and without saying/notifying...handed phone to PT to tell phone call recipient what she was doing with therapy...  -       Row Name 10/08/24 1038          Bed Mobility    Bed Mobility supine-sit  -     Supine-Sit [place] (Bed Mobility) moderate assist (50% patient effort);maximum assist (25% patient effort);1 person assist  -       Row Name 10/08/24 1038          Transfers    Transfers sit-stand transfer;stand-sit transfer  -       Row Name 10/08/24 1038          Sit-Stand Transfer    Sit-Stand [place] (Transfers) maximum assist (25% patient effort);dependent (less than 25% patient effort);1 person assist  -[facility] Name 10/08/24 1038          Stand-Sit Transfer    Stand-Sit [place] (Transfers) other (see comments)  one time grossly CGA, grossly may be mod/maxA at other times  -       Row Name 10/08/24 1038          Motor Skills    Therapeutic Exercise hip;ankle  R LE hip flex grossly 2-3x10; L LE hip flex encouraged with PROM and encouragement for pt to concentrate on assisting in movement and encouraged to visualize movement. encouraged ankle pumps on L LE with potential trace DF  -       Row Name             Wound 09/26/24 1806 gluteal    Wound - Properties Group Placement Date: 09/26/24  -KJ Placement Time: 1806  -KJ Location: gluteal  -KJ    Retired Wound - Properties Group Placement Date: 09/26/24  -KJ Placement Time: 1806  -KJ Location: gluteal  -KJ    Retired Wound - Properties Group Date first assessed: 09/26/24  -KJ Time first assessed: 1806  -KJ Location: gluteal  -KJ      Row Name             Wound 09/26/24 1807 Left gluteal    Wound - Properties Group Placement Date: 09/26/24  -KJ Placement Time: 1807  -KJ Side: Left  -KJ Location: gluteal  -KJ     Retired Wound - Properties Group Placement Date: 09/26/24  -KJ Placement Time: 1807  -KJ Side: Left  -KJ Location: gluteal  -KJ    Retired Wound - Properties Group Date first assessed: 09/26/24  -KJ Time first assessed: 1807  -KJ Side: Left  -KJ Location: gluteal  -KJ      Row Name             Wound 09/26/24 1809 gluteal    Wound - Properties Group Placement Date: 09/26/24  -KJ Placement Time: 1809  -KJ Location: gluteal  -KJ    Retired Wound - Properties Group Placement Date: 09/26/24  -KJ Placement Time: 1809  -KJ Location: gluteal  -KJ    Retired Wound - Properties Group Date first assessed: 09/26/24  -KJ Time first assessed: 1809  -KJ Location: gluteal  -KJ      Row Name             Wound 09/26/24 1809 Left posterior ankle    Wound - Properties Group Placement Date: 09/26/24  -KJ Placement Time: 1809  -KJ Side: Left  -KJ Orientation: posterior  -KJ Location: ankle  -KJ    Retired Wound - Properties Group Placement Date: 09/26/24  -KJ Placement Time: 1809  -KJ Side: Left  -KJ Orientation: posterior  -KJ Location: ankle  -KJ    Retired Wound - Properties Group Date first assessed: 09/26/24  -KJ Time first assessed: 1809  -KJ Side: Left  -KJ Location: ankle  -KJ      Row Name             Wound 09/29/24 0952 Bilateral anterior thigh MASD (Moisture associated skin damage)    Wound - Properties Group Placement Date: 09/29/24  -LB Placement Time: 0952 -LB Present on Original Admission: Y  -LB Side: Bilateral  -LB Orientation: anterior  -LB Location: thigh  -LB Primary Wound Type: MASD  -LB    Retired Wound - Properties Group Placement Date: 09/29/24  -LB Placement Time: 0952 -LB Present on Original Admission: Y  -LB Side: Bilateral  -LB Orientation: anterior  -LB Location: thigh  -LB Primary Wound Type: MASD  -LB    Retired Wound - Properties Group Date first assessed: 09/29/24  -LB Time first assessed: 0952  -LB Present on Original Admission: Y  -LB Side: Bilateral  -LB Location: thigh  -LB Primary Wound Type: MASD   -LB      Row Name 10/08/24 1038          Coping    Observed Emotional State other (see comments)  intermittently emotionally liable wanted to get better, unrealistic in expectations. angry with herself/body  -GWNY       Row Name 10/08/24 1038          Positioning and Restraints    Pre-Treatment Position in bed  -KH     Post Treatment Position bed  -KH     In Bed supine;exit alarm on;call light within reach;encouraged to call for assist;side rails up x3  -KH               User Key  (r) = Recorded By, (t) = Taken By, (c) = Cosigned By      Initials Name Provider Type    Janessa Hassan, PT Physical Therapist    Nory Keenan, RN Registered Nurse    Zehra Rivera, RN Registered Nurse                    Physical Therapy Education       Title: PT OT SLP Therapies (Done)       Topic: Physical Therapy (Done)       Point: Mobility training (Done)       Learning Progress Summary             Patient Acceptance, E,TB, VU by CB at 10/8/2024 0448    Acceptance, E, NR by RD at 10/2/2024 1101    Acceptance, E, NR by RD at 10/1/2024 0856    Acceptance, E,D, VU,NR by AG at 9/30/2024 1559                         Point: Home exercise program (Done)       Learning Progress Summary             Patient Acceptance, E,TB, VU by CB at 10/8/2024 0448    Acceptance, E, NR by RD at 10/2/2024 1101    Acceptance, E, NR by RD at 10/1/2024 0856    Acceptance, E,D, VU,NR by AG at 9/30/2024 1559                         Point: Body mechanics (Done)       Learning Progress Summary             Patient Acceptance, E,TB, VU by CB at 10/8/2024 0448    Acceptance, E, NR by RD at 10/2/2024 1101    Acceptance, E, NR by RD at 10/1/2024 0856    Acceptance, E,D, VU,NR by AG at 9/30/2024 1559                         Point: Precautions (Done)       Learning Progress Summary             Patient Acceptance, E,TB, VU by CB at 10/8/2024 0448    Acceptance, E, NR by RD at 10/2/2024 1101    Acceptance, E, NR by RD at 10/1/2024 0856    Acceptance, E,D, VU,NR  by  at 2024 1559                                         User Key       Initials Effective Dates Name Provider Type Discipline    AG 21 -  Мария Joya, PT Physical Therapist PT    RD 21 -  Laisha Verdugo, RN Registered Nurse Nurse    CB 24 -  Fidelia Lombardo, RN Registered Nurse Nurse                  PT Recommendation and Plan     Progress Summary (PT)  Daily Progress Summary (PT): Pt tolerated therapy fair to good with pain, some spasms, and emotional outbreak a few times during session. Pt may benefit from frequent therapeutic intervention, no change in POC.       Time Calculation:    PT Charges       Row Name 10/08/24 1125             Time Calculation    Start Time 1038  -      PT Received On 10/08/24  -         Time Calculation- PT    Total Timed Code Minutes- PT 23 minute(s)  -                User Key  (r) = Recorded By, (t) = Taken By, (c) = Cosigned By      Initials Name Provider Type     Janessa Almeida, PT Physical Therapist                  Therapy Charges for Today       Code Description Service Date Service Provider Modifiers Qty    94346722605 HC PT THER PROC EA 15 MIN 10/8/2024 Janessa Almeida, PT GP 1    20638947895 HC PT THERAPEUTIC ACT EA 15 MIN 10/8/2024 Janessa Almeida, PT GP 1            PT G-Codes  AM-PAC 6 Clicks Score (PT): 8    Janessa Almeida PT  10/8/2024      Electronically signed by Janessa Almeiad PT at 10/08/24 1126          Occupational Therapy Notes (most recent note)        Neil Graf, OT at 10/08/24 1116          Acute Care - Occupational Therapy Treatment Note   [facility]     Patient Name: Lety Johnson  : 1980  MRN: [id number]  Today's Date: 10/8/2024  Onset of Illness/Injury or Date of Surgery: 24     Referring Physician: Dr. Marc    Admit Date: 2024       ICD-10-CM ICD-9-CM   1. Hypokalemia  E87.6 276.8   2. Pressure injury of skin of sacral region, unspecified injury stage  L89.159 707.03     707.20   3. Pressure  injury of skin of left heel, unspecified injury stage  L89.629 707.07     707.20   4. Acute cystitis without hematuria  N30.00 595.0     Patient Active Problem List   Diagnosis   (none) - all problems resolved or deleted     Past Medical History:   Diagnosis Date    Stroke      History reviewed. No pertinent surgical history.      OT ASSESSMENT FLOWSHEET (Last 12 Hours)       OT Evaluation and Treatment       Row Name 10/08/24 1112                   OT Time and Intention    Document Type therapy note (daily note)  -KR        Mode of Treatment occupational therapy  -KR        Patient Effort adequate  -KR        Comment Pt seen today for continued monitor/assess LUE sling for positioning/support. Proper donning performed/explained to increase fxl use of device. Pt acknowledged purpose and reported comprehension.  -KR           General Information    Prior Level of Function max assist:;dependent:  -KR           Bathing Assessment/Intervention    Simpson Level (Bathing) maximum assist (25% patient effort)  -KR           Upper Body Dressing Assessment/Training    Simpson Level (Upper Body Dressing) upper body dressing skills;maximum assist (25% patient effort)  -KR           Lower Body Dressing Assessment/Training    Simpson Level (Lower Body Dressing) lower body dressing skills;dependent (less than 25% patient effort)  -KR           Grooming Assessment/Training    Simpson Level (Grooming) grooming skills;minimum assist (75% patient effort)  -KR           Self-Feeding Assessment/Training    Simpson Level (Feeding) feeding skills;minimum assist (75% patient effort)  -KR           Toileting Assessment/Training    Simpson Level (Toileting) toileting skills;dependent (less than 25% patient effort)  -KR           Wound 09/26/24 1806 gluteal    Wound - Properties Group Placement Date: 09/26/24  -KJ Placement Time: 1806 -KJ Location: gluteal  -KJ    Retired Wound - Properties Group Placement Date:  09/26/24  -KJ Placement Time: 1806  -KJ Location: gluteal  -KJ    Retired Wound - Properties Group Date first assessed: 09/26/24  -KJ Time first assessed: 1806  -KJ Location: gluteal  -KJ       Wound 09/26/24 1807 Left gluteal    Wound - Properties Group Placement Date: 09/26/24  -KJ Placement Time: 1807  -KJ Side: Left  -KJ Location: gluteal  -KJ    Retired Wound - Properties Group Placement Date: 09/26/24  -KJ Placement Time: 1807  -KJ Side: Left  -KJ Location: gluteal  -KJ    Retired Wound - Properties Group Date first assessed: 09/26/24  -KJ Time first assessed: 1807  -KJ Side: Left  -KJ Location: gluteal  -KJ       Wound 09/26/24 1809 gluteal    Wound - Properties Group Placement Date: 09/26/24  -KJ Placement Time: 1809  -KJ Location: gluteal  -KJ    Retired Wound - Properties Group Placement Date: 09/26/24  -KJ Placement Time: 1809  -KJ Location: gluteal  -KJ    Retired Wound - Properties Group Date first assessed: 09/26/24  -KJ Time first assessed: 1809  -KJ Location: gluteal  -KJ       Wound 09/26/24 1809 Left posterior ankle    Wound - Properties Group Placement Date: 09/26/24  -KJ Placement Time: 1809  -KJ Side: Left  -KJ Orientation: posterior  -KJ Location: ankle  -KJ    Retired Wound - Properties Group Placement Date: 09/26/24  -KJ Placement Time: 1809  -KJ Side: Left  -KJ Orientation: posterior  -KJ Location: ankle  -KJ    Retired Wound - Properties Group Date first assessed: 09/26/24  -KJ Time first assessed: 1809  -KJ Side: Left  -KJ Location: ankle  -KJ       Wound 09/29/24 0952 Bilateral anterior thigh MASD (Moisture associated skin damage)    Wound - Properties Group Placement Date: 09/29/24  -LB Placement Time: 0952  -LB Present on Original Admission: Y  -LB Side: Bilateral  -LB Orientation: anterior  -LB Location: thigh  -LB Primary Wound Type: MASD  -LB    Retired Wound - Properties Group Placement Date: 09/29/24  -LB Placement Time: 0952  -LB Present on Original Admission: Y  -LB Side:  Bilateral  -LB Orientation: anterior  -LB Location: thigh  -LB Primary Wound Type: MASD  -LB    Retired Wound - Properties Group Date first assessed: 09/29/24  -LB Time first assessed: 0952 -LB Present on Original Admission: Y  -LB Side: Bilateral  -LB Location: thigh  -LB Primary Wound Type: MASD  -LB       Plan of Care Review    Plan of Care Reviewed With patient  -KR           Therapy Assessment/Plan (OT)    Comment, Therapy Assessment/Plan (OT) Pt presented EOB on this date. PT present for training session/donning of sling LUE.  -KR           ROM Goal 1 (OT)    Progress/Outcome (ROM Goal 1, OT) continuing progress toward goal  -KR            Activity Tolerance Goal 1 (OT)    Progress/Outcome (Activity Tolerance Goal 1, OT) continuing progress toward goal  -KR           Patient Education Goal (OT)    Progress/Outcome (Patient Education Goal, OT) continuing progress toward goal  -KR                  User Key  (r) = Recorded By, (t) = Taken By, (c) = Cosigned By      Initials Name Effective Dates    KR Neil Graf OT 06/16/21 -     KJ Nory Ledezma RN 06/08/23 -     Zehra Rivera RN 10/20/21 -                            OT Recommendation and Plan  Planned Therapy Interventions (OT): activity tolerance training, neuromuscular control/coordination retraining, ROM/therapeutic exercise  Plan of Care Review  Plan of Care Reviewed With: patient  Plan of Care Reviewed With: patient        Time Calculation:     Therapy Charges for Today       Code Description Service Date Service Provider Modifiers Qty    98498377903  OT THERAPEUTIC ACT EA 15 MIN 10/7/2024 Neil Graf OT GO 2    99500179120  OT SELF CARE/MGMT/TRAIN EA 15 MIN 10/7/2024 Neil Graf OT GO 1    59384068336 HC OT THERAPEUTIC ACT EA 15 MIN 10/8/2024 Neil Graf OT GO 1                 Neil Graf OT  10/8/2024    Electronically signed by Neil Graf OT at 10/08/24 1116          Speech Language Pathology Notes (most recent note)     "Hafsa Melendez MA,CCC-SLP at 24 1101          Acute Care - Speech Language Pathology   Swallow Initial Evaluation  [place]  Clinical Dysphagia Assessment     Patient Name: Lety Johnson  : 1980  MRN: [id number]  Today's Date: 2024             Admit Date: 2024    Visit Dx:     ICD-10-CM ICD-9-CM   1. Hypokalemia  E87.6 276.8   2. Pressure injury of skin of sacral region, unspecified injury stage  L89.159 707.03     707.20   3. Pressure injury of skin of left heel, unspecified injury stage  L89.629 707.07     707.20   4. Acute cystitis without hematuria  N30.00 595.0     Patient Active Problem List   Diagnosis    UTI (urinary tract infection)     Past Medical History:   Diagnosis Date    Stroke      History reviewed. No pertinent surgical history.    Lety Johnson  was seen at bedside this AM on 3S Rm. 3315-1s to assess safety/efficacy of swallowing fnx, determine safest/least restrictive diet tolerance.      Per report: pt \"is a 43 year old female patient who presents to the ER with chief complaint of back pain. 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 The patient had been living with her fiance who is in senior living now. Her sister has been trying to help care for her. Today, she was in the floor and unable to get up so they called EMS for lift assist. Patient's sister and patient want the patient to go into a rehab facility for strengthening. She also complains of low back pain.\"    Social History     Socioeconomic History    Marital status: Single   Tobacco Use    Smoking status: Every Day     Average packs/day: 2.0 packs/day for 26.0 years (52.0 ttl pk-yrs)     Types: Cigarettes     Start date:     Smokeless tobacco: Never   Vaping Use    Vaping status: Never Used   Substance and Sexual Activity    Alcohol use: Not Currently    Drug use: Never    Sexual activity: Not Currently     Partners: Male     Comment:  in longterm    "     Imaging:  No recent chest imaging    Labs:  Sodium  142  09/30 0117  Potassium  3.5  09/30 0117  Chloride  110  09/30 0117  CO2  22.8  09/30 0117  BUN  5  09/30 0117  Creatinine  0.46  09/30 0117  Glucose  196  09/30 0117  Hemoglobin  10.5  09/30 0117  Hematocrit  32.7  09/30 0117  WBC  8.49  09/30 0117  Platelets  285  09/30 0117  Diet Orders (active) (From admission, onward)       Start     Ordered    09/28/24 1800  Dietary Nutrition Supplements Boost Plus (Ensure Enlive, Ensure Plus)  Daily With Breakfast & Dinner       09/28/24 1209    09/26/24 1748  Diet: Regular/House; Fluid Consistency: Thin (IDDSI 0)  Diet Effective Now         09/26/24 1747                  Pt is observed on RA.     Patient was positioned upright and centered in bed to accept multiple po presentations of ice chips, solid cracker, puree, and thin liquids via spoon, cup, and straw. Patient was able to self provide po trials.     Facial/oral structures were slightly asymmetrical upon observation. Pt with residual L side weakness from previous CVA. Lingual protrusion revealed no deviation. Oral mucosa were moist, pink, and clean. Secretions were clear, thin, and well controlled. OROM/ENDER was wfl to imitate oral postures. Gag is not assessed. Volitional cough was intact w/ adequate  intensity, clear in quality, non-productive. Voice was adequate in intensity, clear in quality w/ intelligible speech.    Upon po presentations, adequate bolus anticipation and acceptance w/ good labial seal for bolus clearance via spoon bowl, cup rim stability and suction via straw. Bolus formation, manipulation and control were wfl w/ rotary mastication pattern. A-p transit was timely w/o significant oral residue appreciated. No overt s/s aspiration before the swallow.      Pharyngeal swallow was timely w/ adequate hyolaryngeal elevation per palpation. No overt s/s aspiration evidenced across this evaluation. No silent aspiration suspected. Patient denied  odynophagia.    Pt reports difficulty swallowing large pills only.     Impression: Patient presented w/ wfl oropharyngeal swallow w/o s/s aspiration. No s/s indicative of silent aspiration. No odynophagia reported.      SLP Recommendation and Plan   1. Regular consistencies, thin liquids.    2. Medications whole in puree/thins. Crush PRN.  3. Upright and centered for all po intake.  4. MO precautions.  5. Oral care protocol.    No further formal SLP f/u warranted/recommended at this time.    D/w patient results and recommendations w/ verbal agreement.    D/w RN results and recommendations w/ verbal agreement.    Thank you for allowing me to participate in the care of your patient-  Hafsa Melendez M.A., CCC-SLP     EDUCATION  The patient has been educated in the following areas:   Oral Care/Hydration.      Time Calculation:     Therapy Charges for Today       Code Description Service Date Service Provider Modifiers Qty    85567355806 HC ST EVAL ORAL PHARYNG SWALLOW 4 9/30/2024 Hafsa Melendez MA,CCC-SLP GN 1          Hafsa Melendez MA,CCC-SLP  9/30/2024    Electronically signed by Hafsa Melendez MA,CCC-SLP at 09/30/24 1107       ADL Documentation (most recent)      Flowsheet Row Most Recent Value   Transferring 4 - completely dependent   Toileting 3 - assistive equipment and person   Bathing 2 - assistive person   Dressing 2 - assistive person   Eating 2 - assistive person   Communication 0 - understands/communicates without difficulty   Swallowing 0 - swallows foods/liquids without difficulty   Equipment Currently Used at Home hospital bed, lift device, wheelchair

## 2024-10-08 NOTE — PROGRESS NOTES
[facility] HOSPITALIST PROGRESS NOTE     Patient Identification:  Name:  Lety Johnson  Age:  43 y.o.  Sex:  female  :  1980  MRN:  [id number]  Visit Number:  [id number]  ROOM: [address] [place] [address]     Primary Care Provider:  Provider, No Known    Length of stay in inpatient status:  10    Subjective     Chief Compliant:    Chief Complaint   Patient presents with    Fall     History of Presenting Illness:    Patient remains ill but stable today, no acute events overnight, no new complaints, denies any fevers or chills, pending placement, will discuss at multidisciplinary rounds later today.   Objective     Current Hospital Meds:  acyclovir, 400 mg, Oral, Nightly  aspirin, 81 mg, Oral, Daily  atorvastatin, 80 mg, Oral, Daily  DULoxetine, 60 mg, Oral, Daily  heparin (porcine), 5,000 Units, Subcutaneous, Q8H  HYDROcodone-acetaminophen, 1 tablet, Oral, Once  lisinopril, 20 mg, Oral, Daily  [Held by provider] metFORMIN, 1,000 mg, Oral, BID With Meals  metoprolol tartrate, 25 mg, Oral, Q12H  nicotine, 1 patch, Transdermal, Q24H  nystatin, , Topical, Q12H  pantoprazole, 40 mg, Oral, Daily  sodium chloride, 10 mL, Intravenous, Q12H  sodium chloride, 10 mL, Intravenous, Q12H      Pharmacy Consult,       ----------------------------------------------------------------------------------------------------------------------  Vital Signs:  Temp:  [97.8 °F (36.6 °C)-98.3 °F (36.8 °C)] 98.2 °F (36.8 °C)  Heart Rate:  [69-92] 92  Resp:  [20-22] 20  BP: (148-155)/(86-98) 152/98  SpO2:  [91 %-98 %] 91 %  on   ;   Device (Oxygen Therapy): room air  Body mass index is 37.03 kg/m².      Intake/Output Summary (Last 24 hours) at 10/8/2024 9757  Last data filed at 10/8/2024 4867  Gross per 24 hour   Intake 240 ml   Output 1425 ml   Net -1185 ml      ----------------------------------------------------------------------------------------------------------------------  Physical exam:  Constitutional:  Older than stated age. No  "acute distress. No discomfort   HENT:  Head:  Normocephalic and atraumatic.  Mouth:  Moist mucous membranes.    Eyes:  Conjunctivae and EOM are normal. No scleral icterus.    Neck:  Neck supple.  No JVD present.    Cardiovascular:  Normal rate, regular rhythm and normal heart sounds with no murmur.  Pulmonary/Chest:  No respiratory distress, no wheezes, on room air  Abdominal:  Soft. No distension and no tenderness.   Musculoskeletal:  No tenderness, and no deformity.  No red or swollen joints anywhere.    Neurological:  Alert and oriented to person, place, and time.  No cranial nerve deficit. Chronic L sided paralysis.    Skin:  Skin is warm and dry. No pallor. Significant intertriginous erythema under panus, consistent with yeast infection.   Peripheral vascular:  No clubbing, no cyanosis, trace edema.  Psychiatric: Appropriate mood and affect    *Examination stable today 10/8  Edited by: Ramon Marc MD at 10/8/2024 0954  ----------------------------------------------------------------------------------------------------------------------                 No results found for: \"URINECX\"  No results found for: \"BLOODCX\"    I have personally looked at the labs and they are summarized above.  ----------------------------------------------------------------------------------------------------------------------  Detailed radiology reports for the last 24 hours:  No radiology results for the last day  Assessment & Plan    43F Morbid Obesity by BMI PMH History Genital Herpes, Cerebrovascular Accident 5/2024 complicated by L sided paralysis, presented to [facility] emergency room 9/26 after sliding into the floor off her chair due to weakness.     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     #Electrolyte Abnormalities  - Acute Mild Hypokalemia - Replaced per protocol - Resolved   - Acute Mild Hypomagnesemia - Replaced per protocol - Resolved     #History Cerebrovascular Accident complicated by L sided paralysis, unclear etiology  - Continue home regimen, Supportive Care     #Debility  - Consult PT/OT, Social Work if placement needed     #History Genital Herpes  - Supportive Care, continue home Acyclovir     #Morbid Obesity by BMI  - Body mass index is 43.26 kg/m².; complicates all aspects of care    F: Oral  E: Monitor & Replace as needed   N: Diet: Regular/House; Fluid Consistency: Thin (IDDSI 0)   PPx: SQH  Code Status (Patient has no pulse and is not breathing): CPR  Medical Interventions (Patient has pulse or is breathing): Full Support     Dispo: Pending placement, PT/OT consulted and following, Social Work consulted and following.     *This patient is considered high risk secondary to Urinary Tract Infection, electrolyte abnormalities, chronic L sided paralysis from prior Cerebrovascular Accident.      *Plan unchanged today 10/8  Edited by: Ramon Marc MD at 10/8/2024 6054  [facility]

## 2024-10-08 NOTE — CASE MANAGEMENT/SOCIAL WORK
Discharge Planning Assessment   [place]     Patient Name: Lety Johnson  MRN: [id number]  Today's Date: 10/8/2024    Admit Date: 9/26/2024    Plan: SS spoke with [facility] per Clare at [phone] who stated that facility accepts outside referrals but on [employer] Medicaid plan.  SS spoke with Mary at [facility] at [phone] who accepts outside referrals and Medicaid.  SS sent referral to [facility] at [phone].  SS will follow.       Discharge Plan       Row Name 10/08/24 0406       Plan    Plan SS spoke with [facility] per Clare at [phone] who stated that facility accepts outside referrals but on [employer] Medicaid plan.  SS spoke with Mary at [facility] at [phone] who accepts outside referrals and Medicaid.  SS sent referral to [facility] at [phone].  SS will follow.      Row Name 10/08/24 1446       Plan    Plan [facility] has no available beds per Lesly Childs.  SS left message for Windy at [facility] to return call.  SS will follow.                  Continued Care and Services - Admitted Since 9/26/2024       Destination       Service Provider Request Status Selected Services Address Phone Fax Patient Preferred    [facility] SWING BED UNIT Pending - No Request Sent N/A [address] [facility] [place] [address] [phone] --    [facility] Pending - No Request Sent N/A [address] [facility] [address] [phone] --    [facility] SKILLED CARE Pending - No Request Sent N/A [address] [facility] [id number] [phone] --    [facility] Declined  Cannot meet patient's needs N/A [address] [facility] [id number] [phone] --    [facility] Acute Care Declined  Not eligible N/A [address], [place] [phone] --    [facility] - SWING Declined  Not eligible N/A [address] [facility] [address] [phone] --    [facility] - [facility] Declined  Clinical Denial N/A [address] [facility], 4TH FLOOR, [facility] [address] [phone] --    [facility] Declined  Not eligible N/A [address] [place] [address] [phone] --                  Expected Discharge Date and Time       Expected Discharge Date Expected Discharge Time    Oct 11, 2024             SHELBY LacyW

## 2024-10-09 PROCEDURE — 97110 THERAPEUTIC EXERCISES: CPT

## 2024-10-09 PROCEDURE — 99231 SBSQ HOSP IP/OBS SF/LOW 25: CPT | Performed by: INTERNAL MEDICINE

## 2024-10-09 PROCEDURE — 25010000002 PROCHLORPERAZINE 10 MG/2ML SOLUTION

## 2024-10-09 PROCEDURE — 97530 THERAPEUTIC ACTIVITIES: CPT

## 2024-10-09 PROCEDURE — 25010000002 HEPARIN (PORCINE) PER 1000 UNITS: Performed by: INTERNAL MEDICINE

## 2024-10-09 RX ORDER — HYDROXYZINE HYDROCHLORIDE 25 MG/1
25 TABLET, FILM COATED ORAL ONCE
Status: COMPLETED | OUTPATIENT
Start: 2024-10-09 | End: 2024-10-09

## 2024-10-09 RX ADMIN — PROCHLORPERAZINE EDISYLATE 2.5 MG: 5 INJECTION INTRAMUSCULAR; INTRAVENOUS at 23:57

## 2024-10-09 RX ADMIN — LISINOPRIL 20 MG: 10 TABLET ORAL at 09:15

## 2024-10-09 RX ADMIN — NICOTINE 1 PATCH: 7 PATCH, EXTENDED RELEASE TRANSDERMAL at 09:16

## 2024-10-09 RX ADMIN — ASPIRIN 81 MG: 81 TABLET, CHEWABLE ORAL at 09:15

## 2024-10-09 RX ADMIN — NYSTATIN: 100000 POWDER TOPICAL at 09:16

## 2024-10-09 RX ADMIN — Medication 5 MG: at 21:25

## 2024-10-09 RX ADMIN — DULOXETINE HYDROCHLORIDE 60 MG: 60 CAPSULE, DELAYED RELEASE ORAL at 09:15

## 2024-10-09 RX ADMIN — PANTOPRAZOLE SODIUM 40 MG: 40 TABLET, DELAYED RELEASE ORAL at 09:14

## 2024-10-09 RX ADMIN — ATORVASTATIN CALCIUM 80 MG: 40 TABLET, FILM COATED ORAL at 09:15

## 2024-10-09 RX ADMIN — Medication 10 ML: at 21:25

## 2024-10-09 RX ADMIN — ACETAMINOPHEN 650 MG: 325 TABLET ORAL at 13:42

## 2024-10-09 RX ADMIN — LOPERAMIDE HYDROCHLORIDE 2 MG: 2 CAPSULE ORAL at 16:27

## 2024-10-09 RX ADMIN — METOPROLOL TARTRATE 25 MG: 25 TABLET, FILM COATED ORAL at 21:25

## 2024-10-09 RX ADMIN — HEPARIN SODIUM 5000 UNITS: 5000 INJECTION INTRAVENOUS; SUBCUTANEOUS at 05:26

## 2024-10-09 RX ADMIN — Medication 10 ML: at 09:16

## 2024-10-09 RX ADMIN — HEPARIN SODIUM 5000 UNITS: 5000 INJECTION INTRAVENOUS; SUBCUTANEOUS at 21:25

## 2024-10-09 RX ADMIN — ACYCLOVIR 400 MG: 200 CAPSULE ORAL at 21:25

## 2024-10-09 RX ADMIN — HYDROXYZINE 25 MG: 25 TABLET, FILM COATED ORAL at 00:42

## 2024-10-09 RX ADMIN — NYSTATIN: 100000 POWDER TOPICAL at 21:26

## 2024-10-09 RX ADMIN — METOPROLOL TARTRATE 25 MG: 25 TABLET, FILM COATED ORAL at 09:15

## 2024-10-09 RX ADMIN — HYDROXYZINE HYDROCHLORIDE 25 MG: 25 TABLET ORAL at 21:25

## 2024-10-09 RX ADMIN — ACETAMINOPHEN 650 MG: 325 TABLET ORAL at 21:25

## 2024-10-09 RX ADMIN — HEPARIN SODIUM 5000 UNITS: 5000 INJECTION INTRAVENOUS; SUBCUTANEOUS at 13:42

## 2024-10-09 RX ADMIN — CYCLOBENZAPRINE 10 MG: 10 TABLET, FILM COATED ORAL at 21:25

## 2024-10-09 NOTE — CASE MANAGEMENT/SOCIAL WORK
Discharge Planning Assessment  [facility]     Patient Name: Lety Johnson  MRN: [id number]  Today's Date: 10/9/2024    Admit Date: 9/26/2024    Plan: SS followed up with [facility] who stated referral has been received and is in process of being reviewed.  SS left message for [facility] to return call. [facility] continues to have no bed availability.  SS will follow.       Discharge Plan       Row Name 10/09/24 1316       Plan    Plan SS followed up with [facility] who stated referral has been received and is in process of being reviewed.  SS left message for [facility] to return call. [facility] continues to have no bed availability.  SS will follow.      Row Name 10/09/24 1106                  Continued Care and Services - Admitted Since 9/26/2024       Destination       Service Provider Request Status Selected Services Address Phone Fax Patient Preferred    [facility] SWING BED UNIT Pending - No Request Sent N/A [address] [facility] [address] [phone] --    [facility] Pending - No Request Sent N/A [address] [facility] [address] [phone] --    [facility] SKILLED CARE Pending - No Request Sent N/A [address] [facility] [id number] [phone] --    [facility] Declined  Cannot meet patient's needs N/A [address] [facility] [id number] [phone] --    [facility] Acute Care Declined  Not eligible N/A [address] [facility] [place] [address] [phone] --    [facility] - SWING Declined  Not eligible N/A [address] [facility] [address] [phone] --    [facility] - [facility] Declined  Clinical Denial N/A [address] [facility], 4TH FLOOR, [facility] [address] [phone] --      [facility] Declined  Not eligible N/A [address] [place] [address] [phone] --    [facility] Declined  Out of network N/A [address] [place] [address] [phone] --                  Expected Discharge Date and Time       Expected Discharge Date Expected Discharge Time    Oct 11, 2024           Monica Ayala, SHELBYW

## 2024-10-09 NOTE — PROGRESS NOTES
[facility] HOSPITALIST PROGRESS NOTE     Patient Identification:  Name:  Lety Johnson  Age:  43 y.o.  Sex:  female  :  1980  MRN:  [id number]  Visit Number:  [id number]  ROOM: [address] [place] [address]     Primary Care Provider:  Provider, No Known    Length of stay in inpatient status:  11    Subjective     Chief Compliant:    Chief Complaint   Patient presents with    Fall     History of Presenting Illness:    Patient remains ill but stable today, no acute events overnight, no new complaints, denies any fevers or chills, not sure she wants to go to [facility] if that is where a bed is available, asked if she could bring her emotional support dog to which will defer to Social Work. Medically stable.   Objective     Current Hospital Meds:  acyclovir, 400 mg, Oral, Nightly  aspirin, 81 mg, Oral, Daily  atorvastatin, 80 mg, Oral, Daily  DULoxetine, 60 mg, Oral, Daily  heparin (porcine), 5,000 Units, Subcutaneous, Q8H  lisinopril, 20 mg, Oral, Daily  [Held by provider] metFORMIN, 1,000 mg, Oral, BID With Meals  metoprolol tartrate, 25 mg, Oral, Q12H  nicotine, 1 patch, Transdermal, Q24H  nystatin, , Topical, Q12H  pantoprazole, 40 mg, Oral, Daily  sodium chloride, 10 mL, Intravenous, Q12H  sodium chloride, 10 mL, Intravenous, Q12H      Pharmacy Consult,       ----------------------------------------------------------------------------------------------------------------------  Vital Signs:  Temp:  [97.4 °F (36.3 °C)-99.1 °F (37.3 °C)] 98.6 °F (37 °C)  Heart Rate:  [] 114  Resp:  [20-22] 20  BP: (112-147)/(64-83) 132/64  SpO2:  [93 %-97 %] 93 %  on   ;   Device (Oxygen Therapy): room air  Body mass index is 36.73 kg/m².      Intake/Output Summary (Last 24 hours) at 10/9/2024 0849  Last data filed at 10/9/2024 0834  Gross per 24 hour   Intake 1440 ml   Output 2200 ml   Net -760 ml     "  ----------------------------------------------------------------------------------------------------------------------  Physical exam:  Constitutional:  Older than stated age. No acute distress. No discomfort   HENT:  Head:  Normocephalic and atraumatic.  Mouth:  Moist mucous membranes.    Eyes:  Conjunctivae and EOM are normal. No scleral icterus.    Neck:  Neck supple.  No JVD present.    Cardiovascular:  Normal rate, regular rhythm and normal heart sounds with no murmur.  Pulmonary/Chest:  No respiratory distress, no wheezes, on room air  Abdominal:  Soft. No distension and no tenderness.   Musculoskeletal:  No tenderness, and no deformity.  No red or swollen joints anywhere.    Neurological:  Alert and oriented to person, place, and time.  No cranial nerve deficit. Chronic L sided paralysis.    Skin:  Skin is warm and dry. No pallor. Significant intertriginous erythema under panus, consistent with yeast infection.   Peripheral vascular:  No clubbing, no cyanosis, trace edema.  Psychiatric: Appropriate mood and affect    *Examination stable today 10/9  Edited by: Ramon Marc MD at 10/9/2024 0849  ----------------------------------------------------------------------------------------------------------------------                 No results found for: \"URINECX\"  No results found for: \"BLOODCX\"    I have personally looked at the labs and they are summarized above.  ----------------------------------------------------------------------------------------------------------------------  Detailed radiology reports for the last 24 hours:  No radiology results for the last day  Assessment & Plan    43F Morbid Obesity by BMI PMH History Genital Herpes, Cerebrovascular Accident 5/2024 complicated by L sided paralysis, presented to [facility] emergency room 9/26 after sliding into the floor off her chair due to weakness.     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     #Electrolyte Abnormalities  - Acute Mild Hypokalemia - Replaced per protocol - Resolved   - Acute Mild Hypomagnesemia - Replaced per protocol - Resolved     #History Cerebrovascular Accident complicated by L sided paralysis, unclear etiology  - Continue home regimen, Supportive Care     #Debility  - Consult PT/OT, Social Work if placement needed     #History Genital Herpes  - Supportive Care, continue home Acyclovir     #Morbid Obesity by BMI  - Body mass index is 43.26 kg/m².; complicates all aspects of care    F: Oral  E: Monitor & Replace as needed   N: Diet: Regular/House; Fluid Consistency: Thin (IDDSI 0)   PPx: SQH  Code Status (Patient has no pulse and is not breathing): CPR  Medical Interventions (Patient has pulse or is breathing): Full Support     Dispo: Pending placement, PT/OT consulted and following, Social Work consulted and following.     *This patient is considered high risk secondary to Urinary Tract Infection, electrolyte abnormalities, chronic L sided paralysis from prior Cerebrovascular Accident.      *Plan unchanged today 10/9  Edited by: Ramon Marc MD at 10/9/2024 3253  [facility]

## 2024-10-09 NOTE — DISCHARGE PLACEMENT REQUEST
"Lety Johnson (43 y.o. Female)       Date of Birth   1980    Social Security Number       Address   [address] [id number]    Home Phone   [phone]    MRN   [id number]       Latter-day   Shinto    Marital Status   Single                            Admission Date   9/26/24    Admission Type   Emergency    Admitting Provider   Ramon Marc MD    Attending Provider   Ramon Marc MD    Department, Room/Bed   [address], 3315/1S       Discharge Date       Discharge Disposition       Discharge Destination                                 Attending Provider: Ramon Marc MD    Allergies: No Known Allergies    Isolation: Contact   Infection: ESBL E coli (09/28/24)   Code Status: CPR    Ht: 166.4 cm (65.5\")   Wt: 102 kg (224 lb 1.6 oz)    Admission Cmt: None   Principal Problem: UTI (urinary tract infection) [N39.0]                   Active Insurance as of 9/26/2024       Primary Coverage       Payor Plan Insurance Group Employer/Plan Group    [place] [id number]       Payor Plan Address Payor Plan Phone Number Payor Plan Fax Number Effective Dates    [employer] [address] [address] [phone]  5/1/2024 - None Entered    [place] [address]         Subscriber Name Subscriber Birth Date Member ID       LETY JOHNSON 1980 [id number]                     Emergency Contacts        (Rel.) Home Phone Work Phone Mobile Phone    BriceMoises (Legal Guardian) -- -- [phone]              Emergency Contact Information       Name Relation Home Work Mobile    Moises Narvaez Legal Guardian   [phone]          Insurance Information                  [place] Phone: [phone]    Subscriber: Lety Johnson Subscriber#: [id number]    Group#: [id number] Precert#: --             History & Physical        Ramon Marc MD at 09/26/24 [facility]              [facility] HOSPITALIST HISTORY AND " PHYSICAL    Patient Identification:  Name:  Lety Johnson  Age:  43 y.o.  Sex:  female  :  1980  MRN:  [id number]   Admit Date: 2024   Visit Number:  [id number]  Room number:  404/04  Primary Care Physician:  Provider, No Known     Subjective     Chief complaint:    Chief Complaint   Patient presents with    Fall     History of presenting illness:   43F Morbid Obesity by BMI PMH History Genital Herpes, Cerebrovascular Accident 2024 complicated by L sided paralysis, presented to [facility] emergency room  after sliding into the floor off her chair due to weakness.  Upon arrival patient afebrile, heart rate 109, respiratory rate 18, blood pressure 146/101, satting 98% on room air. Labs showed WBC count 10K, lactate 1.1, CRP 3.8, potassium 2.7, magnesium 1.5, HCG negative, blood cultures pending. CXR no acute cardiopulmonary processes.  Xray ankle/foot without fracture or dislocation.  Emergency room provider gave antibiotics, pain medications, zofran, potassium replacement.  Hospital Medicine consulted for admission. Patient seen and examined in the emergency room, notes fevers chills at home a few days ago, recently has been on antibiotics for lower extremity cellulitis, has had some dysuria and suprapubic pain, denies current sexual activity, reports her fiance was taken to snf about a week ago and has been her primary caretaker, has had difficulty at home since prior stroke and caretakers not consistent, last 24hrs didn't have anyone to help her, sister endorses recent confusion/hallucinations beyond baseline, seeing dead family members, coinciding with onset of dysuria.  ---------------------------------------------------------------------------------------------------------------------   Review of Systems   Constitutional:  Positive for chills and fever.   HENT: Negative.     Eyes: Negative.    Respiratory:  Positive for shortness of breath.    Cardiovascular:  Positive for leg  swelling. Negative for chest pain.   Gastrointestinal: Negative.    Endocrine: Negative.    Genitourinary:  Positive for dysuria.   Musculoskeletal: Negative.    Skin:  Positive for rash.   Allergic/Immunologic: Negative.    Neurological:  Positive for weakness.   Hematological: Negative.    Psychiatric/Behavioral:  Positive for hallucinations.      ---------------------------------------------------------------------------------------------------------------------   Past Medical History:   Diagnosis Date    Stroke      No past surgical history on file.  No family history on file.  Social History     Socioeconomic History    Marital status: Single     ---------------------------------------------------------------------------------------------------------------------   Allergies:  Patient has no known allergies.  ---------------------------------------------------------------------------------------------------------------------   Medications below are reported home medications pulling from within the system; at this time, these medications have not been reconciled unless otherwise specified and are in the verification process for further verifcation as current home medications.    Prior to Admission Medications       Prescriptions Last Dose Informant Patient Reported? Taking?    aspirin 81 MG chewable tablet Unknown  Yes No    Chew 1 tablet Daily.    atorvastatin (LIPITOR) 80 MG tablet Unknown  Yes No    Take 1 tablet by mouth Daily.    cyclobenzaprine (FLEXERIL) 10 MG tablet Unknown  Yes No    Take 1 tablet by mouth 3 (Three) Times a Day As Needed for Muscle Spasms.    DULoxetine (CYMBALTA) 60 MG capsule Unknown  Yes No    Take 1 capsule by mouth Daily.    lisinopril (PRINIVIL,ZESTRIL) 20 MG tablet Unknown  Yes No    Take 1 tablet by mouth Daily.    metFORMIN (GLUCOPHAGE) 1000 MG tablet Unknown  Yes No    Take 1 tablet by mouth 2 (Two) Times a Day With Meals.    pantoprazole (PROTONIX) 40 MG EC tablet Unknown  Yes  No    Take 1 tablet by mouth Daily.          Objective     Vital Signs:  Temp:  [98.2 °F (36.8 °C)] 98.2 °F (36.8 °C)  Heart Rate:  [] 118  Resp:  [18] 18  BP: (135-157)/() 140/79    Mean Arterial Pressure (Non-Invasive) for the past 24 hrs (Last 3 readings):   Noninvasive MAP (mmHg)   09/26/24 1645 90   09/26/24 1630 100   09/26/24 1615 103     SpO2:  [91 %-100 %] 94 %  on   ;   Device (Oxygen Therapy): room air  Body mass index is 43.26 kg/m².    Wt Readings from Last 3 Encounters:   09/26/24 120 kg (264 lb)   06/27/24 120 kg (264 lb)      ---------------------------------------------------------------------------------------------------------------------   Physical Exam:  Constitutional:  Well-developed and well-nourished. Older than stated age. No acute distress.      HENT:  Head:  Normocephalic and atraumatic.  Mouth:  Moist mucous membranes.    Eyes:  Conjunctivae and EOM are normal. No scleral icterus.    Neck:  Neck supple.  No JVD present.    Cardiovascular:  Normal rate, regular rhythm and normal heart sounds with no murmur.  Pulmonary/Chest:  No respiratory distress, no wheezes, no crackles, with normal breath sounds and good air movement.  Abdominal:  Soft. No distension and no tenderness.   Musculoskeletal:  No tenderness, and no deformity.  No red or swollen joints anywhere.    Neurological:  Alert and oriented to person, place, and time.  No cranial nerve deficit. Chronic L sided paralysis.    Skin:  Skin is warm and dry. No pallor. Significant intertriginous erythema under panus, consistent with yeast infection.   Peripheral vascular:  No clubbing, no cyanosis, trace edema.  Psychiatric: Appropriate mood and affect  Edited by: Ramon Marc MD at 9/26/2024 1701  ---------------------------------------------------------------------------------------------------------------------  EKG:  N/A    Telemetry:  normal sinus rhythm, no significant ST changes    I have personally looked at  "telemetry.    Last echocardiogram:  Ordered and pending   --------------------------------------------------------------------------------------------------------------------  Labs:  Results from last 7 days   Lab Units 09/26/24  1504 09/26/24  1350   LACTATE mmol/L  --  1.1   CRP mg/dL 3.80*  --    WBC 10*3/mm3  --  10.42   HEMOGLOBIN g/dL  --  12.5   HEMATOCRIT %  --  37.4   MCV fL  --  94.0   MCHC g/dL  --  33.4   PLATELETS 10*3/mm3  --  402         Results from last 7 days   Lab Units 09/26/24  1504   SODIUM mmol/L 140   POTASSIUM mmol/L 2.7*   MAGNESIUM mg/dL 1.5*   CHLORIDE mmol/L 99   CO2 mmol/L 28.9   BUN mg/dL 10   CREATININE mg/dL 0.51*   CALCIUM mg/dL 9.4   GLUCOSE mg/dL 220*   ALBUMIN g/dL 3.5   BILIRUBIN mg/dL 0.6   ALK PHOS U/L 119*   AST (SGOT) U/L 21   ALT (SGPT) U/L 8   Estimated Creatinine Clearance: 188.1 mL/min (A) (by C-G formula based on SCr of 0.51 mg/dL (L)).  No results found for: \"AMMONIA\"          No results found for: \"HGBA1C\", \"POCGLU\"  No results found for: \"TSH\", \"FREET4\"  No results found for: \"PREGTESTUR\", \"PREGSERUM\", \"HCG\", \"HCGQUANT\"  Pain Management Panel           No data to display              Brief Urine Lab Results  (Last result in the past 365 days)        Color   Clarity   Blood   Leuk Est   Nitrite   Protein   CREAT   Urine HCG        09/26/24 1419 Dark Yellow   Turbid   Trace   Moderate (2+)   Positive   30 mg/dL (1+)                 No results found for: \"BLOODCX\"  No results found for: \"URINECX\"  No results found for: \"WOUNDCX\"  No results found for: \"STOOLCX\"    I have personally looked at the labs and they are summarized above.  ----------------------------------------------------------------------------------------------------------------------  Detailed radiology reports for the last 24 hours:    Imaging Results (Last 24 Hours)       Procedure Component Value Units Date/Time    XR Ankle 3+ View Left [[id number]] Collected: 09/26/24 1537     Updated: 09/26/24 1540    " Narrative:      EXAM:    XR Left Ankle Complete, 3 or More Views     EXAM DATE:    9/26/2024 3:17 PM     CLINICAL HISTORY:    left ankle injury     TECHNIQUE:    Frontal, lateral and oblique views of the left ankle.     COMPARISON:    No relevant prior studies available.     FINDINGS:    Bones/joints:  See below.      Soft tissues:  Soft tissue swelling, but no acute fracture or  dislocation.       Impression:        Soft tissue swelling, but no acute fracture or dislocation.        This report was finalized on 9/26/2024 3:38 PM by Dr. Moses Otto MD.       XR Foot 3+ View Left [[id number]] Collected: 09/26/24 1536     Updated: 09/26/24 1539    Narrative:      EXAM:    XR Left Foot Complete, 3 or More Views     EXAM DATE:    9/26/2024 3:16 PM     CLINICAL HISTORY:    left foot wound     TECHNIQUE:    Frontal, lateral and oblique views of the left foot.     COMPARISON:    No relevant prior studies available.     FINDINGS:    Bones/joints:  Unremarkable.  No acute fracture.  No dislocation.    Soft tissues:  Unremarkable.  No radiopaque foreign body.       Impression:        No acute findings in the left foot.        This report was finalized on 9/26/2024 3:37 PM by Dr. Moses Otto MD.       XR Chest 1 View [[id number]] Collected: 09/26/24 1406     Updated: 09/26/24 1408    Narrative:      XR CHEST 1 VW-     CLINICAL INDICATION: weakness        COMPARISON: None immediately available      TECHNIQUE: Single frontal view of the chest.     FINDINGS:     LUNGS: Lungs are adequately aerated.      HEART AND MEDIASTINUM: Heart and mediastinal contours are unremarkable        SKELETON: Bony and soft tissue structures are unremarkable.             Impression:      No radiographic evidence of acute cardiac or pulmonary disease.           This report was finalized on 9/26/2024 2:06 PM by Dr. Moses Otto MD.       CT Cervical Spine Without Contrast [[id number]] Collected: 09/26/24 1317     Updated: 09/26/24 1319    Narrative:       CT CERVICAL SPINE WO CONTRAST-     CLINICAL INDICATION: neck pain        COMPARISON: None available     TECHNIQUE: Axial images of the cervical spine were acquired with out any  intravenous contrast. Reformatted images were then created in the  sagittal and coronal planes.     DOSE:      Radiation dose reduction techniques were utilized per ALARA protocol.  Automated exposure control was initiated through either or [employer] or  [employer] software packages by  protocol.           FINDINGS:   The provided study demonstrates preservation of the vertebral body  heights in the sagittally reconstructed images.     There is no prevertebral soft tissue swelling.     Alignment is near anatomic.     The disc space heights are uniform.     I see no acute cervical spine fracture.       Impression:         1. No acute bony abnormality.     2. Other incidental findings as above     This report was finalized on 9/26/2024 1:17 PM by Dr. Moses Otto MD.       CT Lumbar Spine Without Contrast [[id number]] Collected: 09/26/24 1317     Updated: 09/26/24 1319    Narrative:      EXAM:    CT Lumbar Spine Without Intravenous Contrast     EXAM DATE:    9/26/2024 12:59 PM     CLINICAL HISTORY:    back pain     TECHNIQUE:    Axial computed tomography images of the lumbar spine without  intravenous contrast.  Sagittal and coronal reformatted images were  created and reviewed.  This CT exam was performed using one or more of  the following dose reduction techniques:  automated exposure control,  adjustment of the mA and/or kV according to patient size, and/or use of  iterative reconstruction technique.     COMPARISON:    No relevant prior studies available.     FINDINGS:    VERTEBRAE:  Unremarkable.  No acute fracture.    DISCS/SPINAL CANAL/NEURAL FORAMINA:  No acute findings.  No spinal  canal stenosis.    SOFT TISSUES:  Unremarkable.       Impression:        No acute findings in the lumbar spine.        This report was  finalized on 9/26/2024 1:17 PM by Dr. Moses Otto MD.       CT Thoracic Spine Without Contrast [[id number]] Collected: 09/26/24 1316     Updated: 09/26/24 1319    Narrative:      EXAM:    CT Thoracic Spine Without Intravenous Contrast     EXAM DATE:    9/26/2024 12:58 PM     CLINICAL HISTORY:    back pain     TECHNIQUE:    Axial computed tomography images of the thoracic spine without  intravenous contrast.  Sagittal and coronal reformatted images were  created and reviewed.  This CT exam was performed using one or more of  the following dose reduction techniques:  automated exposure control,  adjustment of the mA and/or kV according to patient size, and/or use of  iterative reconstruction technique.     COMPARISON:    No relevant prior studies available.     FINDINGS:    VERTEBRAE:  Unremarkable.  No acute fracture.    DISCS/SPINAL CANAL/NEURAL FORAMINA:  No acute findings.  No spinal  canal stenosis.    SOFT TISSUES:  Unremarkable.       Impression:        No acute findings in the thoracic spine.        This report was finalized on 9/26/2024 1:17 PM by Dr. Moses Otto MD.       CT Head Without Contrast [[id number]] Collected: 09/26/24 1259     Updated: 09/26/24 1302    Narrative:      CT HEAD WO CONTRAST-     CLINICAL INDICATION: weakness        COMPARISON: None available     TECHNIQUE: Axial images of the brain were obtained with out intravenous  contrast.  Reformatted images were created in the sagittal and coronal  planes.     DOSE:     Radiation dose reduction techniques were utilized per ALARA protocol.  Automated exposure control was initiated through either or [employer] or  [employer] software packages by  protocol.        FINDINGS:    BRAIN: Probable subacute ischemia right middle cerebral artery  territory    VENTRICLES:  Unremarkable.  No ventriculomegaly.       BONES/JOINTS:  Unremarkable.  No acute fracture.       SOFT TISSUES:  Unremarkable.       SINUSES:  no air fluid levels        MASTOID AIR CELLS:  Unremarkable as visualized.  No mastoid effusion.          Impression:         1. Probable remote right middle cerebral artery infarction  2. No acute parenchymal mass, hemorrhage, or midline shift     This report was finalized on 9/26/2024 1:00 PM by Dr. Moses Otto MD.             I have personally looked at the radiology images and read the final radiology report.    Assessment & Plan    43F Morbid Obesity by BMI PMH History Genital Herpes, Cerebrovascular Accident 5/2024 complicated by L sided paralysis, presented to [facility] emergency room 9/26 after sliding into the floor off her chair due to weakness.     #Acute Metabolic Encephalopathy due to Acute Urinary Tract Infection in setting of probable neurogenic bladder  - Continue Ceftriaxone, follow up cultures, rule out STI    #Electrolyte Abnormalities  - Acute Mild Hypokalemia - Replacing, on protocol  - Acute Mild Hypomagnesemia - Replacing, on protocol    #History Cerebrovascular Accident complicated by L sided paralysis, unclear etiology  - Review home medications and resume as indicated, Supportive Care     #Debility  - Consult PT/OT, Social Work if placement needed     #History Genital Herpes  - Supportive Care, follow up medication reconciliation for any suppressive medications, check HIV & acute hepatitis panel     #Morbid Obesity by BMI  - Body mass index is 43.26 kg/m².; complicates all aspects of care    F: Oral  E: Monitor & Replace as needed   N: Regular Diet  PPx: SQH  Code Status (Patient has no pulse and is not breathing): CPR  Medical Interventions (Patient has pulse or is breathing): Full Support     Dispo: Pending workup and clinical improvement    *This patient is considered high risk secondary to Urinary Tract Infection, electrolyte abnormalities, chronic L sided paralysis from prior Cerebrovascular Accident.      Edited by: Ramon Marc MD at 9/26/2024 2204    Ramon Marc MD  [facility]  Hospitalist  09/26/24  17:01 EDT        Electronically signed by Ramon Marc MD at 09/26/24 1703       Vital Signs (last day)       Date/Time Temp Temp src Pulse Resp BP Patient Position SpO2    10/09/24 1130 98.4 (36.9) Oral 83 20 138/84 Lying 96    10/09/24 0720 98.6 (37) Oral 114 20 132/64 Lying 93    10/09/24 0445 98.1 (36.7) Oral 97 22 137/83 Lying 93    10/08/24 2304 97.6 (36.4) Oral 93 22 126/70 Lying 97    10/08/24 1927 99.1 (37.3) Oral 108 22 130/82 Lying 97    10/08/24 1610 97.4 (36.3) Oral 101 22 112/64 Lying 94    10/08/24 1205 97.5 (36.4) Oral 92 22 147/83 Lying 94    10/08/24 0730 97.7 (36.5) Oral 87 20 144/85 Lying 93    10/08/24 0450 98.2 (36.8) Oral 92 20 152/98 Lying 91          Lines, Drains & Airways       Active LDAs       Name Placement date Placement time Site Days    Midline Catheter - Single Lumen 09/30/24 Right Basilic 09/30/24  0955  -- 9    External Urinary Catheter 09/29/24  1500  --  10                  Current Facility-Administered Medications   Medication Dose Route Frequency Provider Last Rate Last Admin    acetaminophen (TYLENOL) tablet 650 mg  650 mg Oral Q6H PRN Ashleigh Nicholas PA-C   650 mg at 10/09/24 1342    acyclovir (ZOVIRAX) capsule 400 mg  400 mg Oral Nightly Ramon Marc MD   400 mg at 10/08/24 2017    aspirin chewable tablet 81 mg  81 mg Oral Daily Ramon Marc MD   81 mg at 10/09/24 0915    atorvastatin (LIPITOR) tablet 80 mg  80 mg Oral Daily Ramon Marc MD   80 mg at 10/09/24 0915    sennosides-docusate (PERICOLACE) 8.6-50 MG per tablet 2 tablet  2 tablet Oral BID PRN Ramon Marc MD        And    polyethylene glycol (MIRALAX) packet 17 g  17 g Oral Daily PRN Ramon Marc MD        And    bisacodyl (DULCOLAX) EC tablet 5 mg  5 mg Oral Daily PRN Ramon Macr MD        And    bisacodyl (DULCOLAX) suppository 10 mg  10 mg Rectal Daily PRN Ramon Marc MD        Calcium Replacement - Follow Nurse / BPA Driven Protocol   Does not apply PRN Ramon Marc,  MD        cyclobenzaprine (FLEXERIL) tablet 10 mg  10 mg Oral TID PRN Ramon Marc MD   10 mg at 10/08/24 2338    Diclofenac Sodium (VOLTAREN) 1 % gel 4 g  4 g Topical 4x Daily PRN Ashleigh Nicholas PA-C   4 g at 10/05/24 2045    DULoxetine (CYMBALTA) DR capsule 60 mg  60 mg Oral Daily Ramon Marc MD   60 mg at 10/09/24 0915    heparin (porcine) 5000 UNIT/ML injection 5,000 Units  5,000 Units Subcutaneous Q8H Ramon Marc MD   5,000 Units at 10/09/24 1342    lisinopril (PRINIVIL,ZESTRIL) tablet 20 mg  20 mg Oral Daily Ramon Marc MD   20 mg at 10/09/24 0915    loperamide (IMODIUM) capsule 2 mg  2 mg Oral 4x Daily PRN Marv Navas DO   2 mg at 10/08/24 0520    Magnesium Standard Dose Replacement - Follow Nurse / BPA Driven Protocol   Does not apply PRN Ramon Marc MD        melatonin tablet 5 mg  5 mg Oral Nightly PRN Wesley Matthews APRN   5 mg at 10/07/24 0228    [Held by provider] metFORMIN (GLUCOPHAGE) tablet 1,000 mg  1,000 mg Oral BID With Meals Ramon Marc MD        metoprolol tartrate (LOPRESSOR) tablet 25 mg  25 mg Oral Q12H OculMara everett MD   25 mg at 10/09/24 0915    nicotine (NICODERM CQ) 7 MG/24HR patch 1 patch  1 patch Transdermal Q24H Ramon Marc MD   1 patch at 10/09/24 0916    nystatin (MYCOSTATIN) powder   Topical Q12H aRmon Marc MD   Given at 10/09/24 0916    pantoprazole (PROTONIX) EC tablet 40 mg  40 mg Oral Daily Ramon Marc MD   40 mg at 10/09/24 0914    Pharmacy Consult   Does not apply Continuous PRN Ramon Marc MD        Phosphorus Replacement - Follow Nurse / BPA Driven Protocol   Does not apply PRN Ramon Marc MD        Potassium Replacement - Follow Nurse / BPA Driven Protocol   Does not apply PRN Ramon Marc MD        prochlorperazine (COMPAZINE) injection 2.5 mg  2.5 mg Intravenous Q6H PRN Ashleigh Nicholas PA-C   2.5 mg at 10/08/24 0520    sodium chloride 0.9 % flush 10 mL  10 mL Intravenous Q12H Ramon Marc MD   10 mL at 10/09/24 0916     sodium chloride 0.9 % flush 10 mL  10 mL Intravenous PRN Ramon Marc MD        sodium chloride 0.9 % flush 10 mL  10 mL Intravenous Q12H Marv Navas DO   10 mL at 10/09/24 0916    sodium chloride 0.9 % flush 10 mL  10 mL Intravenous PRN Marv Navas DO        sodium chloride 0.9 % infusion 40 mL  40 mL Intravenous PRN Ramon Marc MD        sodium chloride 0.9 % infusion 40 mL  40 mL Intravenous PRN Marv Navas DO         Lab Results (most recent)       Procedure Component Value Units Date/Time    Hemoglobin A1c [[id number]]  (Abnormal) Collected: 10/05/24 1209    Specimen: Blood Updated: 10/05/24 1241     Hemoglobin A1C 9.40 %     Narrative:      Hemoglobin A1C Ranges:    Increased Risk for Diabetes  5.7% to 6.4%  Diabetes                     >= 6.5%  Diabetic Goal                < 7.0%    POC Glucose Once [[id number]]  (Abnormal) Collected: 10/02/24 2010    Specimen: Blood Updated: 10/02/24 2016     Glucose 216 mg/dL     Blood Culture - Blood, Arm, Left [[id number]]  (Normal) Collected: 09/26/24 1350    Specimen: Blood from Arm, Left Updated: 10/01/24 1400     Blood Culture No growth at 5 days    Blood Culture - Blood, Arm, Right [[id number]]  (Normal) Collected: 09/26/24 1350    Specimen: Blood from Arm, Right Updated: 10/01/24 1400     Blood Culture No growth at 5 days    Basic Metabolic Panel [[id number]]  (Abnormal) Collected: 10/01/24 0117    Specimen: Blood Updated: 10/01/24 0215     Glucose 158 mg/dL      BUN 4 mg/dL      Creatinine 0.36 mg/dL      Sodium 141 mmol/L      Potassium 3.8 mmol/L      Chloride 110 mmol/L      CO2 22.8 mmol/L      Calcium 8.3 mg/dL      BUN/Creatinine Ratio 11.1     Anion Gap 8.2 mmol/L      eGFR 130.2 mL/min/1.73     Narrative:      GFR Normal >60  Chronic Kidney Disease <60  Kidney Failure <15      CBC (No Diff) [[id number]]  (Abnormal) Collected: 10/01/24 0117    Specimen: Blood Updated: 10/01/24 0157     WBC 7.71 10*3/mm3      RBC 3.44  10*6/mm3      Hemoglobin 10.9 g/dL      Hematocrit 33.9 %      MCV 98.5 fL      MCH 31.7 pg      MCHC 32.2 g/dL      RDW 14.2 %      RDW-SD 50.6 fl      MPV 9.1 fL      Platelets 353 10*3/mm3     Comprehensive Metabolic Panel [[id number]]  (Abnormal) Collected: 09/30/24 0117    Specimen: Blood Updated: 09/30/24 0202     Glucose 196 mg/dL      BUN 5 mg/dL      Creatinine 0.46 mg/dL      Sodium 142 mmol/L      Potassium 3.5 mmol/L      Chloride 110 mmol/L      CO2 22.8 mmol/L      Calcium 8.0 mg/dL      Total Protein 5.1 g/dL      Albumin 2.5 g/dL      ALT (SGPT) 7 U/L      AST (SGOT) 11 U/L      Alkaline Phosphatase 79 U/L      Total Bilirubin 0.2 mg/dL      Globulin 2.6 gm/dL      A/G Ratio 1.0 g/dL      BUN/Creatinine Ratio 10.9     Anion Gap 9.2 mmol/L      eGFR 122.7 mL/min/1.73     Narrative:      GFR Normal >60  Chronic Kidney Disease <60  Kidney Failure <15      CBC (No Diff) [[id number]]  (Abnormal) Collected: 09/30/24 0117    Specimen: Blood Updated: 09/30/24 0132     WBC 8.49 10*3/mm3      RBC 3.30 10*6/mm3      Hemoglobin 10.5 g/dL      Hematocrit 32.7 %      MCV 99.1 fL      MCH 31.8 pg      MCHC 32.1 g/dL      RDW 13.9 %      RDW-SD 50.3 fl      MPV 8.8 fL      Platelets 285 10*3/mm3     Comprehensive Metabolic Panel [[id number]]  (Abnormal) Collected: 09/29/24 0036    Specimen: Blood Updated: 09/29/24 0224     Glucose 192 mg/dL      BUN 4 mg/dL      Creatinine 0.40 mg/dL      Sodium 140 mmol/L      Potassium 3.9 mmol/L      Comment: Slight hemolysis detected by analyzer. Result may be falsely elevated.        Chloride 109 mmol/L      CO2 21.0 mmol/L      Calcium 8.2 mg/dL      Total Protein 5.3 g/dL      Albumin 2.5 g/dL      ALT (SGPT) 8 U/L      AST (SGOT) 15 U/L      Alkaline Phosphatase 88 U/L      Total Bilirubin 0.2 mg/dL      Globulin 2.8 gm/dL      A/G Ratio 0.9 g/dL      BUN/Creatinine Ratio 10.0     Anion Gap 10.0 mmol/L      eGFR 126.9 mL/min/1.73     Narrative:      GFR Normal >60  Chronic  Kidney Disease <60  Kidney Failure <15      Urine Culture - Urine, Straight Cath [[id number]]  (Abnormal)  (Susceptibility) Collected: 09/26/24 1451    Specimen: Urine from Straight Cath Updated: 09/28/24 1408     Urine Culture >100,000 CFU/mL Escherichia coli ESBL    Narrative:      Colonization of the urinary tract without infection is common. Treatment is discouraged unless the patient is symptomatic, pregnant, or undergoing an invasive urologic procedure.  Recent outcomes data supports the use of pip/tazo in the treatment of susceptible ESBL infections for uncomplicated UTI. Consider use of pip/tazo as a carbapenem-sparing regimen in applicable patients.    Susceptibility        Escherichia coli ESBL      LASHAWN      Ertapenem Susceptible      Gentamicin Susceptible      Levofloxacin Intermediate      Meropenem Susceptible      Nitrofurantoin Susceptible      Piperacillin + Tazobactam Susceptible      Trimethoprim + Sulfamethoxazole Resistant                           Potassium [[id number]]  (Normal) Collected: 09/27/24 1904    Specimen: Blood Updated: 09/27/24 1920     Potassium 4.0 mmol/L      Comment: Slight hemolysis detected by analyzer. Result may be falsely elevated.       Chlamydia trachomatis, Neisseria gonorrhoeae, Trichomonas vaginalis, PCR - Swab, Vagina [[id number]]  (Normal) Collected: 09/26/24 1837    Specimen: Swab from Vagina Updated: 09/26/24 2018     Chlamydia DNA by PCR Not Detected     Neisseria gonorrhoeae by PCR Not Detected     Trichomonas vaginalis PCR Not Detected    HIV-1 & HIV-2 Antibodies [[id number]]  (Normal) Collected: 09/26/24 1350    Specimen: Blood from Arm, Right Updated: 09/26/24 1748    Narrative:      The following orders were created for panel order HIV-1 & HIV-2 Antibodies.  Procedure                               Abnormality         Status                     ---------                               -----------         ------                     HIV-1 / O / 2 Ag /  Antibody[[id number]]  Normal              Final result                 Please view results for these tests on the individual orders.    Hepatitis Panel, Acute [[id number]]  (Normal) Collected: 09/26/24 1350    Specimen: Blood from Arm, Right Updated: 09/26/24 1748     Hepatitis B Surface Ag Non-Reactive     Hep A IgM Non-Reactive     Hep B C IgM Non-Reactive     Hepatitis C Ab Non-Reactive    Narrative:      Results may be falsely decreased if patient taking Biotin.     HIV-1 / O / 2 Ag / Antibody [[id number]]  (Normal) Collected: 09/26/24 1350    Specimen: Blood from Arm, Right Updated: 09/26/24 1748     HIV-1/ HIV-2 Non-Reactive     Comment: A non-reactive test result does not preclude the possibility of exposure to HIV or infection with HIV. An antibody response to recent exposure may take several months to reach detectable levels.       Narrative:      The HIV antibody/antigen combo assay is a qualitative assay for HIV that includes the p24 antigen as well as antibodies to HIV types 1 and 2. This test is intended to be used as a screening assay in the diagnosis of HIV infection in patients over the age of 2.    Magnesium [[id number]]  (Abnormal) Collected: 09/26/24 1504    Specimen: Blood from Arm, Right Updated: 09/26/24 1553     Magnesium 1.5 mg/dL     C-reactive Protein [[id number]]  (Abnormal) Collected: 09/26/24 1504    Specimen: Blood from Arm, Right Updated: 09/26/24 1530     C-Reactive Protein 3.80 mg/dL     Urinalysis, Microscopic Only - Urine, Clean Catch [[id number]]  (Abnormal) Collected: 09/26/24 1419    Specimen: Urine, Clean Catch Updated: 09/26/24 1459     RBC, UA 0-2 /HPF      WBC, UA 3-5 /HPF      Bacteria, UA 4+ /HPF      Squamous Epithelial Cells, UA None Seen /HPF      Hyaline Casts, UA None Seen /LPF      Methodology Manual Light Microscopy    Urinalysis With Microscopic If Indicated (No Culture) - Urine, Clean Catch [[id number]]  (Abnormal) Collected: 09/26/24 1419    Specimen: Urine, Clean Catch  Updated: 09/26/24 1440     Color, UA Dark Yellow     Appearance, UA Turbid     pH, UA 6.0     Specific Gravity, UA >=1.030     Glucose, UA Negative     Ketones, UA 15 mg/dL (1+)     Bilirubin, UA Small (1+)     Blood, UA Trace     Protein, UA 30 mg/dL (1+)     Leuk Esterase, UA Moderate (2+)     Nitrite, UA Positive     Urobilinogen, UA 2.0 E.U./dL    hCG, Serum, Qualitative [[id number]]  (Normal) Collected: 09/26/24 1350    Specimen: Blood from Arm, Right Updated: 09/26/24 1425     HCG Qualitative Negative    Lactic Acid, Plasma [[id number]]  (Normal) Collected: 09/26/24 1350    Specimen: Blood from Arm, Right Updated: 09/26/24 1424     Lactate 1.1 mmol/L     CBC & Differential [[id number]]  (Abnormal) Collected: 09/26/24 1350    Specimen: Blood from Arm, Right Updated: 09/26/24 1403    Narrative:      The following orders were created for panel order CBC & Differential.  Procedure                               Abnormality         Status                     ---------                               -----------         ------                     CBC Auto Differential[[id number]]        Abnormal            Final result                 Please view results for these tests on the individual orders.    CBC Auto Differential [[id number]]  (Abnormal) Collected: 09/26/24 1350    Specimen: Blood from Arm, Right Updated: 09/26/24 1403     WBC 10.42 10*3/mm3      RBC 3.98 10*6/mm3      Hemoglobin 12.5 g/dL      Hematocrit 37.4 %      MCV 94.0 fL      MCH 31.4 pg      MCHC 33.4 g/dL      RDW 13.6 %      RDW-SD 46.9 fl      MPV 9.2 fL      Platelets 402 10*3/mm3      Neutrophil % 66.1 %      Lymphocyte % 28.8 %      Monocyte % 3.7 %      Eosinophil % 0.5 %      Basophil % 0.2 %      Immature Grans % 0.7 %      Neutrophils, Absolute 6.89 10*3/mm3      Lymphocytes, Absolute 3.00 10*3/mm3      Monocytes, Absolute 0.39 10*3/mm3      Eosinophils, Absolute 0.05 10*3/mm3      Basophils, Absolute 0.02 10*3/mm3      Immature Grans, Absolute  0.07 10*3/mm3      nRBC 0.0 /100 WBC     COVID-19 and FLU A/B PCR, 1 HR TAT - Swab, Nasopharynx [[id number]]  (Normal) Collected: 09/26/24 1326    Specimen: Swab from Nasopharynx Updated: 09/26/24 1353     COVID19 Not Detected     Influenza A PCR Not Detected     Influenza B PCR Not Detected    Narrative:      Fact sheet for providers: https://www.fda.gov/media/858296/download    Fact sheet for patients: https://www.fda.gov/media/070296/download    Test performed by PCR.          Orders (last 24 hrs)        Start     Ordered    10/09/24 0115  hydrOXYzine (ATARAX) tablet 25 mg  Once         10/09/24 0021    10/07/24 0225  melatonin tablet 5 mg  Nightly PRN         10/07/24 0225    10/05/24 1330  metoprolol tartrate (LOPRESSOR) tablet 25 mg  Every 12 Hours Scheduled         10/05/24 1239    10/03/24 0000  Discharge Follow-up with PCP         10/03/24 1402    10/03/24 0000  acyclovir (ZOVIRAX) 400 MG tablet  Nightly         10/03/24 1509    10/02/24 1800  Dietary Nutrition Supplements Boost Plus (Ensure Enlive, Ensure Plus)  Daily With Breakfast, Lunch & Dinner       10/02/24 1611    10/01/24 0924  loperamide (IMODIUM) capsule 2 mg  4 Times Daily PRN         10/01/24 0924    09/30/24 1100  sodium chloride 0.9 % flush 10 mL  Every 12 Hours Scheduled         09/30/24 1004    09/30/24 1004  sodium chloride 0.9 % flush 10 mL  As Needed         09/30/24 1004    09/30/24 1004  sodium chloride 0.9 % infusion 40 mL  As Needed         09/30/24 1004    09/30/24 0104  Silicone Border Dressing to Bony Prominences  Every Shift       09/30/24 0105    09/29/24 1100  nystatin (MYCOSTATIN) powder  Every 12 Hours Scheduled         09/29/24 0948    09/27/24 2100  acyclovir (ZOVIRAX) capsule 400 mg  Nightly         09/27/24 0921    09/27/24 0737  Pharmacy Consult  Continuous PRN         09/27/24 0738    09/27/24 0103  acetaminophen (TYLENOL) tablet 650 mg  Every 6 Hours PRN         09/27/24 0105    09/27/24 0013  Diclofenac Sodium  (VOLTAREN) 1 % gel 4 g  4 Times Daily PRN         09/27/24 0013    09/26/24 2200  heparin (porcine) 5000 UNIT/ML injection 5,000 Units  Every 8 Hours Scheduled         09/26/24 1747 09/26/24 2149  prochlorperazine (COMPAZINE) injection 2.5 mg  Every 6 Hours PRN         09/26/24 2149 09/26/24 2100  sodium chloride 0.9 % flush 10 mL  Every 12 Hours Scheduled         09/26/24 1747 09/26/24 2000  Vital Signs  Every 4 Hours       09/26/24 1747 09/26/24 1845  aspirin chewable tablet 81 mg  Daily         09/26/24 1747 09/26/24 1845  atorvastatin (LIPITOR) tablet 80 mg  Daily         09/26/24 1747 09/26/24 1845  DULoxetine (CYMBALTA) DR capsule 60 mg  Daily         09/26/24 1747 09/26/24 1845  lisinopril (PRINIVIL,ZESTRIL) tablet 20 mg  Daily         09/26/24 1747 09/26/24 1845  pantoprazole (PROTONIX) EC tablet 40 mg  Daily         09/26/24 1747 09/26/24 1845  [Held by provider]  metFORMIN (GLUCOPHAGE) tablet 1,000 mg  2 Times Daily With Meals        (On hold since Thu 9/26/2024 at 1747 until manually unheld; held by Ramon Marc MDHold Reason: Other (Comment Required))    09/26/24 1747 09/26/24 1845  nicotine (NICODERM CQ) 7 MG/24HR patch 1 patch  Every 24 Hours Scheduled         09/26/24 1747 09/26/24 1800  Oral Care  2 Times Daily       09/26/24 1747 09/26/24 1748  Intake & Output  Every Shift       09/26/24 1747 09/26/24 1747  sodium chloride 0.9 % flush 10 mL  As Needed         09/26/24 1747 09/26/24 1747  sodium chloride 0.9 % infusion 40 mL  As Needed         09/26/24 1747 09/26/24 1747  Potassium Replacement - Follow Nurse / BPA Driven Protocol  As Needed         09/26/24 1747 09/26/24 1747  Magnesium Standard Dose Replacement - Follow Nurse / BPA Driven Protocol  As Needed         09/26/24 1747 09/26/24 1747  Phosphorus Replacement - Follow Nurse / BPA Driven Protocol  As Needed         09/26/24 1747 09/26/24 1747  Calcium Replacement - Follow Nurse / BPA  "Driven Protocol  As Needed         09/26/24 1747 09/26/24 1747  sennosides-docusate (PERICOLACE) 8.6-50 MG per tablet 2 tablet  2 Times Daily PRN        Placed in \"And\" Linked Group    09/26/24 1747    09/26/24 1747  polyethylene glycol (MIRALAX) packet 17 g  Daily PRN        Placed in \"And\" Linked Group    09/26/24 1747    09/26/24 1747  bisacodyl (DULCOLAX) EC tablet 5 mg  Daily PRN        Placed in \"And\" Linked Group    09/26/24 1747    09/26/24 1747  bisacodyl (DULCOLAX) suppository 10 mg  Daily PRN        Placed in \"And\" Linked Group    09/26/24 1747 09/26/24 1747  cyclobenzaprine (FLEXERIL) tablet 10 mg  3 Times Daily PRN         09/26/24 1747    Unscheduled  Straight cath  As Needed       09/26/24 1359    Unscheduled  Potassium  As Needed        Comments: Release/collect/run 4 hours after completion of last potassium dose.     Placed in \"And\" Linked Group    09/26/24 1545    Unscheduled  Stage II Pressure Ulcer Care PRN  As Needed      Comments: - Gently Cleanse With Normal Saline  - Cover With Silicone Border Dressing (If Indicated)  - For Frequent Incontinence - Apply Skin Protective Barrier Cream Rather Than Silicone Border Dressing    09/29/24 1139    Unscheduled  Wound Care  As Needed       09/30/24 0105    Unscheduled  Unstageable Pressure Ulcer (Wet) Care PRN  As Needed      Comments: - Gently Cleanse With Normal Saline   - Pack Loosely With Moist to Moist Normal Saline Fluffed Gauze   - Cover With Silicone Border Dressing or Dry Dressing    09/30/24 0105    Unscheduled  Change Dressing to IV Site As Needed When Damp, Loose or Soiled  As Needed       09/30/24 1004    Unscheduled  Change Needleless Connectors  As Needed      Comments: Change Needleless Connectors When:  - Administration Set Changed  - Dressing Changed  - Removed For Any Reason  - Residual Blood or Debris Within Connector  - Prior to Drawing Blood Cultures  - Contamination of Connector  - After Administration of Blood or Blood " Components    24 1004    --  aspirin 81 MG chewable tablet  Daily         24    --  atorvastatin (LIPITOR) 80 MG tablet  Daily         24    --  cyclobenzaprine (FLEXERIL) 10 MG tablet  3 Times Daily PRN         24    --  DULoxetine (CYMBALTA) 60 MG capsule  Daily         24    --  lisinopril (PRINIVIL,ZESTRIL) 20 MG tablet  Daily         24    --  metFORMIN (GLUCOPHAGE) 1000 MG tablet  2 Times Daily With Meals         24    --  pantoprazole (PROTONIX) 40 MG EC tablet  Daily         24                     Physician Progress Notes (most recent note)        Ramon Marc MD at 10/09/24 0849              [facility] HOSPITALIST PROGRESS NOTE     Patient Identification:  Name:  Lety Johnson  Age:  43 y.o.  Sex:  female  :  1980  MRN:  [id number]  Visit Number:  [id number]  ROOM: [address] [place] [address]     Primary Care Provider:  Provider, No Known    Length of stay in inpatient status:  11    Subjective     Chief Compliant:    Chief Complaint   Patient presents with    Fall     History of Presenting Illness:    Patient remains ill but stable today, no acute events overnight, no new complaints, denies any fevers or chills, not sure she wants to go to [facility] if that is where a bed is available, asked if she could bring her emotional support dog to which will defer to Social Work. Medically stable.   Objective     Current Hospital Meds:  acyclovir, 400 mg, Oral, Nightly  aspirin, 81 mg, Oral, Daily  atorvastatin, 80 mg, Oral, Daily  DULoxetine, 60 mg, Oral, Daily  heparin (porcine), 5,000 Units, Subcutaneous, Q8H  lisinopril, 20 mg, Oral, Daily  [Held by provider] metFORMIN, 1,000 mg, Oral, BID With Meals  metoprolol tartrate, 25 mg, Oral, Q12H  nicotine, 1 patch, Transdermal, Q24H  nystatin, , Topical, Q12H  pantoprazole, 40 mg, Oral, Daily  sodium chloride, 10 mL, Intravenous, Q12H  sodium chloride, 10 mL, Intravenous,  "Q12H      Pharmacy Consult,       ----------------------------------------------------------------------------------------------------------------------  Vital Signs:  Temp:  [97.4 °F (36.3 °C)-99.1 °F (37.3 °C)] 98.6 °F (37 °C)  Heart Rate:  [] 114  Resp:  [20-22] 20  BP: (112-147)/(64-83) 132/64  SpO2:  [93 %-97 %] 93 %  on   ;   Device (Oxygen Therapy): room air  Body mass index is 36.73 kg/m².      Intake/Output Summary (Last 24 hours) at 10/9/2024 0825  Last data filed at 10/9/2024 0834  Gross per 24 hour   Intake 1440 ml   Output 2200 ml   Net -760 ml      ----------------------------------------------------------------------------------------------------------------------  Physical exam:  Constitutional:  Older than stated age. No acute distress. No discomfort   HENT:  Head:  Normocephalic and atraumatic.  Mouth:  Moist mucous membranes.    Eyes:  Conjunctivae and EOM are normal. No scleral icterus.    Neck:  Neck supple.  No JVD present.    Cardiovascular:  Normal rate, regular rhythm and normal heart sounds with no murmur.  Pulmonary/Chest:  No respiratory distress, no wheezes, on room air  Abdominal:  Soft. No distension and no tenderness.   Musculoskeletal:  No tenderness, and no deformity.  No red or swollen joints anywhere.    Neurological:  Alert and oriented to person, place, and time.  No cranial nerve deficit. Chronic L sided paralysis.    Skin:  Skin is warm and dry. No pallor. Significant intertriginous erythema under panus, consistent with yeast infection.   Peripheral vascular:  No clubbing, no cyanosis, trace edema.  Psychiatric: Appropriate mood and affect    *Examination stable today 10/9  Edited by: Ramon Marc MD at 10/9/2024 0849  ----------------------------------------------------------------------------------------------------------------------                 No results found for: \"URINECX\"  No results found for: \"BLOODCX\"    I have personally looked at the labs and they are " summarized above.  ----------------------------------------------------------------------------------------------------------------------  Detailed radiology reports for the last 24 hours:  No radiology results for the last day  Assessment & Plan    43F Morbid Obesity by BMI PMH History Genital Herpes, Cerebrovascular Accident 5/2024 complicated by L sided paralysis, presented to [facility] emergency room 9/26 after sliding into the floor off her chair due to weakness.     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     #Electrolyte Abnormalities  - Acute Mild Hypokalemia - Replaced per protocol - Resolved   - Acute Mild Hypomagnesemia - Replaced per protocol - Resolved     #History Cerebrovascular Accident complicated by L sided paralysis, unclear etiology  - Continue home regimen, Supportive Care     #Debility  - Consult PT/OT, Social Work if placement needed     #History Genital Herpes  - Supportive Care, continue home Acyclovir     #Morbid Obesity by BMI  - Body mass index is 43.26 kg/m².; complicates all aspects of care    F: Oral  E: Monitor & Replace as needed   N: Diet: Regular/House; Fluid Consistency: Thin (IDDSI 0)   PPx: SQH  Code Status (Patient has no pulse and is not breathing): CPR  Medical Interventions (Patient has pulse or is breathing): Full Support     Dispo: Pending placement, PT/OT consulted and following, Social Work consulted and following.     *This patient is considered high risk secondary to Urinary Tract Infection, electrolyte abnormalities, chronic L sided paralysis from prior Cerebrovascular Accident.      *Plan unchanged today 10/9  Edited by: Ramon Marc MD at 10/9/2024 0849  [facility]  [place] Hospitalist        Electronically signed by Ramon Marc MD at 10/09/24 0850          Consult Notes (most recent note)        Cuca Yuen APRN at 24 1436        Consult Orders    1. Inpatient Infectious Diseases Consult [[id number]] ordered by Ramon Marc MD at 24 1427                         INFECTIOUS DISEASE CONSULTATION REPORT        Patient Identification:  Name:  Lety Johnson  Age:  43 y.o.  Sex:  female  :  1980  MRN:  [id number]   Visit Number:  [id number]  Primary Care Physician:  Provider, No Known        Referring Provider: Dr. Marc    Reason for consult: ESBL UTI       LOS: 0 days        Subjective       Subjective     History of present illness:      Thank you Dr. Marc for allowing us to participate in the care of your patient.  As you well know, Ms. Lety Johnson is a 43 y.o. female with past medical history significant for ailin herpes, CVA in May 2024 with residual left-sided paralysis, who presented to [facility] Emergency Department on 2024 for evaluation after sliding into the floor due to weakness.  WBC 7.38.  Chlamydia gonorrhea and trichomonas negative.  CRP 3.80.  Urinalysis positive with culture finalizing is greater than 100,000 colonies of E. coli ESBL.  Blood cultures from 2024 show no growth thus far.  HIV 1 and 2 nonreactive.  Hepatitis panel nonreactive.  COVID-19 and influenza PCR negative.  Lactic acid normal on admission.    Patient resting in bed.  Denies dysuria, urgency, frequency.  Patient reports recurrent yeast infections.  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  Lungs clear to auscultation bilaterally.      Infectious Disease consultation was requested for antimicrobial management.      ---------------------------------------------------------------------------------------------------------------------     Review Of Systems:    Constitutional: no fever, chills and night sweats. No appetite change or  unexpected weight change. No fatigue.  Eyes: no eye drainage, itching or redness.  HEENT: no mouth sores, dysphagia or nose bleed.  Respiratory: no for shortness of breath, cough or production of sputum.  Cardiovascular: no chest pain, no palpitations, no orthopnea.  Gastrointestinal: no nausea, vomiting or diarrhea. No abdominal pain, hematemesis or rectal bleeding.  Genitourinary: no dysuria or polyuria.  Hematologic/lymphatic: no lymph node abnormalities, no easy bruising or easy bleeding.  Musculoskeletal: no muscle or joint pain.  Skin: No rash and no itching.  Neurological: no loss of consciousness, no seizure, no headache.  Behavioral/Psych: no depression or suicidal ideation.  Endocrine: no hot flashes.  Immunologic: negative.    ---------------------------------------------------------------------------------------------------------------------     Past Medical History    Past Medical History:   Diagnosis Date    Stroke        Past Surgical History    History reviewed. No pertinent surgical history.    Family History    History reviewed. No pertinent family history.    Social History    Social History     Tobacco Use    Smoking status: Every Day     Average packs/day: 2.0 packs/day for 26.0 years (52.0 ttl pk-yrs)     Types: Cigarettes     Start date: 1998    Smokeless tobacco: Never   Vaping Use    Vaping status: Never Used   Substance Use Topics    Alcohol use: Not Currently    Drug use: Never       Allergies    Patient has no known allergies.  ---------------------------------------------------------------------------------------------------------------------     Home Medications:    Prior to Admission Medications       Prescriptions Last Dose Informant Patient Reported? Taking?    acyclovir (ZOVIRAX) 400 MG tablet  Pharmacy Yes No    Take 1 tablet by mouth Every Night.    aspirin 81 MG chewable tablet Unknown  Yes No    Chew 1 tablet Daily.    atorvastatin (LIPITOR) 80 MG tablet Unknown  Yes No    Take  1 tablet by mouth Daily.    cyclobenzaprine (FLEXERIL) 10 MG tablet Unknown  Yes No    Take 1 tablet by mouth 3 (Three) Times a Day As Needed for Muscle Spasms.    DULoxetine (CYMBALTA) 60 MG capsule Unknown  Yes No    Take 1 capsule by mouth Daily.    lisinopril (PRINIVIL,ZESTRIL) 20 MG tablet Unknown  Yes No    Take 1 tablet by mouth Daily.    metFORMIN (GLUCOPHAGE) 1000 MG tablet Unknown  Yes No    Take 1 tablet by mouth 2 (Two) Times a Day With Meals.    pantoprazole (PROTONIX) 40 MG EC tablet Unknown  Yes No    Take 1 tablet by mouth Daily.          ---------------------------------------------------------------------------------------------------------------------    Objective       Objective     Hospital Scheduled Meds:  acyclovir, 400 mg, Oral, Nightly  aspirin, 81 mg, Oral, Daily  atorvastatin, 80 mg, Oral, Daily  DULoxetine, 60 mg, Oral, Daily  ertapenem, 1,000 mg, Intravenous, Q24H  heparin (porcine), 5,000 Units, Subcutaneous, Q8H  lisinopril, 20 mg, Oral, Daily  [Held by provider] metFORMIN, 1,000 mg, Oral, BID With Meals  nicotine, 1 patch, Transdermal, Q24H  pantoprazole, 40 mg, Oral, Daily  sodium chloride, 10 mL, Intravenous, Q12H      Pharmacy Consult,   sodium chloride, 75 mL/hr, Last Rate: 75 mL/hr (09/28/24 5635)      ---------------------------------------------------------------------------------------------------------------------   Vital Signs:  Temp:  [97.5 °F (36.4 °C)-98.4 °F (36.9 °C)] 98.4 °F (36.9 °C)  Heart Rate:  [] 58  Resp:  [18-20] 18  BP: (133-172)/(71-86) 172/83  Mean Arterial Pressure (Non-Invasive) for the past 24 hrs (Last 3 readings):   Noninvasive MAP (mmHg)   09/28/24 1100 107   09/28/24 0650 92   09/28/24 0307 103     SpO2 Percentage    09/28/24 0307 09/28/24 0650 09/28/24 1100   SpO2: 98% 96% 97%     SpO2:  [96 %-98 %] 97 %  on   ;   Device (Oxygen Therapy): room air    Body mass index is 40.79 kg/m².  Wt Readings from Last 3 Encounters:   09/28/24 113 kg (248 lb  14.4 oz)   06/27/24 120 kg (264 lb)     ---------------------------------------------------------------------------------------------------------------------     Physical Exam:    Constitutional:  Well-developed and well-nourished.  No respiratory distress.      HENT:  Head: Normocephalic and atraumatic.  Mouth:  Moist mucous membranes.    Eyes:  Conjunctivae and EOM are normal.  No scleral icterus.  Neck:  Neck supple.  No JVD present.    Cardiovascular:  Normal rate, regular rhythm and normal heart sounds with no murmur. No edema.  Pulmonary/Chest:  No respiratory distress, no wheezes, no crackles, with normal breath sounds and good air movement.  Abdominal:  Soft.  Bowel sounds are normal.  No distension and no tenderness.   Musculoskeletal: Left sided paralysis secondary to CVA.  Neurological:  Alert and oriented to person, place, and time.  No facial droop.  No slurred speech.   Skin:  Skin is warm and dry.  No rash noted.  No pallor.   Psychiatric:  Normal mood and affect.  Behavior is normal.    ---------------------------------------------------------------------------------------------------------------------              Results from last 7 days   Lab Units 09/28/24  0318 09/27/24  0844 09/26/24  1504 09/26/24  1350   CRP mg/dL  --   --  3.80*  --    LACTATE mmol/L  --   --   --  1.1   WBC 10*3/mm3 7.38 9.67  --  10.42   HEMOGLOBIN g/dL 11.8* 10.9*  --  12.5   HEMATOCRIT % 36.9 34.8  --  37.4   MCV fL 98.9* 102.4*  --  94.0   MCHC g/dL 32.0 31.3*  --  33.4   PLATELETS 10*3/mm3 332 292  --  402     Results from last 7 days   Lab Units 09/28/24  0318 09/27/24  1904 09/27/24  0844 09/26/24  1504   SODIUM mmol/L 142  --  137 140   POTASSIUM mmol/L 4.1 4.0 3.5 2.7*   MAGNESIUM mg/dL  --   --   --  1.5*   CHLORIDE mmol/L 110*  --  105 99   CO2 mmol/L 22.3  --  21.5* 28.9   BUN mg/dL 5*  --  7 10   CREATININE mg/dL 0.43*  --  0.36* 0.51*   CALCIUM mg/dL 8.5*  --  7.9* 9.4   GLUCOSE mg/dL 198*  --  234* 220*  "  ALBUMIN g/dL 2.7*  --  2.5* 3.5   BILIRUBIN mg/dL 0.3  --  0.3 0.6   ALK PHOS U/L 100  --  96 119*   AST (SGOT) U/L 15  --  17 21   ALT (SGPT) U/L 9  --  7 8   Estimated Creatinine Clearance: 215.5 mL/min (A) (by C-G formula based on SCr of 0.43 mg/dL (L)).  No results found for: \"AMMONIA\"    No results found for: \"HGBA1C\", \"POCGLU\"  No results found for: \"HGBA1C\"  No results found for: \"TSH\", \"FREET4\"    Blood Culture   Date Value Ref Range Status   09/26/2024 No growth at 2 days  Preliminary   09/26/2024 No growth at 2 days  Preliminary     Urine Culture   Date Value Ref Range Status   09/26/2024 >100,000 CFU/mL Escherichia coli ESBL (A)  Final     No results found for: \"WOUNDCX\"  No results found for: \"STOOLCX\"  No results found for: \"RESPCX\"  Pain Management Panel           No data to display              I have personally reviewed the above laboratory results.   ---------------------------------------------------------------------------------------------------------------------  Imaging Results (Last 7 Days)       Procedure Component Value Units Date/Time    XR Ankle 3+ View Left [[id number]] Collected: 09/26/24 1537     Updated: 09/26/24 1540    Narrative:      EXAM:    XR Left Ankle Complete, 3 or More Views     EXAM DATE:    9/26/2024 3:17 PM     CLINICAL HISTORY:    left ankle injury     TECHNIQUE:    Frontal, lateral and oblique views of the left ankle.     COMPARISON:    No relevant prior studies available.     FINDINGS:    Bones/joints:  See below.      Soft tissues:  Soft tissue swelling, but no acute fracture or  dislocation.       Impression:        Soft tissue swelling, but no acute fracture or dislocation.        This report was finalized on 9/26/2024 3:38 PM by Dr. Moses Otto MD.       XR Foot 3+ View Left [[id number]] Collected: 09/26/24 1536     Updated: 09/26/24 1539    Narrative:      EXAM:    XR Left Foot Complete, 3 or More Views     EXAM DATE:    9/26/2024 3:16 PM     CLINICAL HISTORY:    " left foot wound     TECHNIQUE:    Frontal, lateral and oblique views of the left foot.     COMPARISON:    No relevant prior studies available.     FINDINGS:    Bones/joints:  Unremarkable.  No acute fracture.  No dislocation.    Soft tissues:  Unremarkable.  No radiopaque foreign body.       Impression:        No acute findings in the left foot.        This report was finalized on 9/26/2024 3:37 PM by Dr. Moses Otto MD.       XR Chest 1 View [[id number]] Collected: 09/26/24 1406     Updated: 09/26/24 1408    Narrative:      XR CHEST 1 VW-     CLINICAL INDICATION: weakness        COMPARISON: None immediately available      TECHNIQUE: Single frontal view of the chest.     FINDINGS:     LUNGS: Lungs are adequately aerated.      HEART AND MEDIASTINUM: Heart and mediastinal contours are unremarkable        SKELETON: Bony and soft tissue structures are unremarkable.             Impression:      No radiographic evidence of acute cardiac or pulmonary disease.           This report was finalized on 9/26/2024 2:06 PM by Dr. Moses Otto MD.       CT Cervical Spine Without Contrast [[id number]] Collected: 09/26/24 1317     Updated: 09/26/24 1319    Narrative:      CT CERVICAL SPINE WO CONTRAST-     CLINICAL INDICATION: neck pain        COMPARISON: None available     TECHNIQUE: Axial images of the cervical spine were acquired with out any  intravenous contrast. Reformatted images were then created in the  sagittal and coronal planes.     DOSE:      Radiation dose reduction techniques were utilized per ALARA protocol.  Automated exposure control was initiated through either or [employer] or  Dose[employer]igJam[employer] software packages by  protocol.           FINDINGS:   The provided study demonstrates preservation of the vertebral body  heights in the sagittally reconstructed images.     There is no prevertebral soft tissue swelling.     Alignment is near anatomic.     The disc space heights are uniform.     I see no acute cervical  spine fracture.       Impression:         1. No acute bony abnormality.     2. Other incidental findings as above     This report was finalized on 9/26/2024 1:17 PM by Dr. Moses Otto MD.       CT Lumbar Spine Without Contrast [[id number]] Collected: 09/26/24 1317     Updated: 09/26/24 1319    Narrative:      EXAM:    CT Lumbar Spine Without Intravenous Contrast     EXAM DATE:    9/26/2024 12:59 PM     CLINICAL HISTORY:    back pain     TECHNIQUE:    Axial computed tomography images of the lumbar spine without  intravenous contrast.  Sagittal and coronal reformatted images were  created and reviewed.  This CT exam was performed using one or more of  the following dose reduction techniques:  automated exposure control,  adjustment of the mA and/or kV according to patient size, and/or use of  iterative reconstruction technique.     COMPARISON:    No relevant prior studies available.     FINDINGS:    VERTEBRAE:  Unremarkable.  No acute fracture.    DISCS/SPINAL CANAL/NEURAL FORAMINA:  No acute findings.  No spinal  canal stenosis.    SOFT TISSUES:  Unremarkable.       Impression:        No acute findings in the lumbar spine.        This report was finalized on 9/26/2024 1:17 PM by Dr. Moses Otto MD.       CT Thoracic Spine Without Contrast [[id number]] Collected: 09/26/24 1316     Updated: 09/26/24 1319    Narrative:      EXAM:    CT Thoracic Spine Without Intravenous Contrast     EXAM DATE:    9/26/2024 12:58 PM     CLINICAL HISTORY:    back pain     TECHNIQUE:    Axial computed tomography images of the thoracic spine without  intravenous contrast.  Sagittal and coronal reformatted images were  created and reviewed.  This CT exam was performed using one or more of  the following dose reduction techniques:  automated exposure control,  adjustment of the mA and/or kV according to patient size, and/or use of  iterative reconstruction technique.     COMPARISON:    No relevant prior studies available.     FINDINGS:     VERTEBRAE:  Unremarkable.  No acute fracture.    DISCS/SPINAL CANAL/NEURAL FORAMINA:  No acute findings.  No spinal  canal stenosis.    SOFT TISSUES:  Unremarkable.       Impression:        No acute findings in the thoracic spine.        This report was finalized on 9/26/2024 1:17 PM by Dr. Moses Otto MD.       CT Head Without Contrast [[id number]] Collected: 09/26/24 1259     Updated: 09/26/24 1302    Narrative:      CT HEAD WO CONTRAST-     CLINICAL INDICATION: weakness        COMPARISON: None available     TECHNIQUE: Axial images of the brain were obtained with out intravenous  contrast.  Reformatted images were created in the sagittal and coronal  planes.     DOSE:     Radiation dose reduction techniques were utilized per ALARA protocol.  Automated exposure control was initiated through either or [employer] or  [employer] software packages by  protocol.        FINDINGS:    BRAIN: Probable subacute ischemia right middle cerebral artery  territory    VENTRICLES:  Unremarkable.  No ventriculomegaly.       BONES/JOINTS:  Unremarkable.  No acute fracture.       SOFT TISSUES:  Unremarkable.       SINUSES:  no air fluid levels       MASTOID AIR CELLS:  Unremarkable as visualized.  No mastoid effusion.          Impression:         1. Probable remote right middle cerebral artery infarction  2. No acute parenchymal mass, hemorrhage, or midline shift     This report was finalized on 9/26/2024 1:00 PM by Dr. Moses Otto MD.             I have personally reviewed the above radiology results.   ---------------------------------------------------------------------------------------------------------------------      Pertinent Infectious Disease Results          Assessment & Plan      Assessment        ESBL UTI      Plan      Patient presented to [facility] Emergency Department on 9/26/2024 for evaluation after sliding into the floor due to weakness.  WBC 7.38.  Chlamydia gonorrhea and trichomonas  negative.  CRP 3.80.  Urinalysis positive with culture finalizing is greater than 100,000 colonies of E. coli ESBL.  Blood cultures from 2024 show no growth thus far.  HIV 1 and 2 nonreactive.  Hepatitis panel nonreactive.  COVID-19 and influenza PCR negative.  Lactic acid normal on admission.    Patient resting in bed.  Denies dysuria, urgency, frequency.  Patient reports recurrent yeast infections.  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  Lungs clear to auscultation bilaterally.    Recommend to continue current antibiotic regimen of ertapenem 1 g IV every 24 hours x 7 days for treatment of ESBL UTI.  Okay to continue home suppressive acyclovir for history of genital herpes.  We will continue to follow closely and adjust antibiotic therapy as needed.        ANTIMICROBIAL THERAPY    acyclovir - 200 MG, 400 MG  ertapenem (INVanz) 1000 mg in 100 mL NS (VTB)       Again, thank you Dr. Marc for allowing us to participate in the care of your patient and please feel free to call for any questions you may have.        Code Status:     Code Status and Medical Interventions: CPR (Attempt to Resuscitate); Full Support   Ordered at: 24 1626     Code Status (Patient has no pulse and is not breathing):    CPR (Attempt to Resuscitate)     Medical Interventions (Patient has pulse or is breathing):    Full Support         YUE Rivera  24  14:36 EDT    Electronically signed by Cuca Yuen APRN at 24 1532          Physical Therapy Notes (most recent note)        Janessa Almeida, PT at 10/09/24 1008  Version 1 of 1         Acute Care - Physical Therapy Treatment Note  LILLIANA Haines     Patient Name: Lety Johnson  : 1980  MRN: [id number]  Today's Date: 10/9/2024   Onset of Illness/Injury or Date of Surgery: 24  Visit Dx:     ICD-10-CM ICD-9-CM   1. Hypokalemia  E87.6 276.8   2. Pressure injury of skin of sacral region, unspecified injury stage  L89.159 707.03     707.20   3.  Pressure injury of skin of left heel, unspecified injury stage  L89.629 707.07     707.20   4. Acute cystitis without hematuria  N30.00 595.0     Patient Active Problem List   Diagnosis   (none) - all problems resolved or deleted     Past Medical History:   Diagnosis Date    Stroke      History reviewed. No pertinent surgical history.  PT Assessment (Last 12 Hours)       PT Evaluation and Treatment       Row Name 10/09/24 0915          Physical Therapy Time and Intention    Document Type therapy note (daily note)  -     Mode of Treatment physical therapy  -     Patient Effort adequate  -     Comment Pt participated in therapy intervention with some education, HEP administration of knee flex/ext and hip abd/add x5 q hour on L LE. Pt participated in LE TE of glute sets, LAQ, hip flexion with L LE hip flex neuro focus. Pt less emotionally liable today, cooperative. Attempted stand x3 without success. Time spent allowing pt to rest between sets, encouragement provided to pt. 15 minutes face to face with pt.  -       Row Name 10/09/24 0915          Bed Mobility    Bed Mobility supine-sit;sit-supine  -     Supine-Sit [place] (Bed Mobility) maximum assist (25% patient effort)  -     Sit-Supine [place] (Bed Mobility) maximum assist (25% patient effort);other (see comments)  encouraged to use R LE to assist L LE into bed, however pt states she couldn't or it hadn't worked before, encouraged to try again, grossly max/depA  -[facility] Name 10/09/24 0915          Transfers    Comment, (Transfers) unable to perform STSx3 with 2 prson assist  -       Row Name 10/09/24 0915          Motor Skills    Therapeutic Exercise hip;knee  glute sets, LAQ, hip flexion R LE grossly 3x10, L LE hip flexion x10 with encouragement to visualize and attempt any trace movement.  -       Row Name             Wound 09/26/24 1806 gluteal    Wound - Properties Group Placement Date: 09/26/24  -KJ Placement Time: 1806 -KJ  Location: gluteal  -KJ    Retired Wound - Properties Group Placement Date: 09/26/24  -KJ Placement Time: 1806  -KJ Location: gluteal  -KJ    Retired Wound - Properties Group Date first assessed: 09/26/24  -KJ Time first assessed: 1806  -KJ Location: gluteal  -KJ      Row Name             Wound 09/26/24 1807 Left gluteal    Wound - Properties Group Placement Date: 09/26/24  -KJ Placement Time: 1807  -KJ Side: Left  -KJ Location: gluteal  -KJ    Retired Wound - Properties Group Placement Date: 09/26/24  -KJ Placement Time: 1807  -KJ Side: Left  -KJ Location: gluteal  -KJ    Retired Wound - Properties Group Date first assessed: 09/26/24  -KJ Time first assessed: 1807  -KJ Side: Left  -KJ Location: gluteal  -KJ      Row Name             Wound 09/26/24 1809 gluteal    Wound - Properties Group Placement Date: 09/26/24  -KJ Placement Time: 1809  -KJ Location: gluteal  -KJ    Retired Wound - Properties Group Placement Date: 09/26/24  -KJ Placement Time: 1809  -KJ Location: gluteal  -KJ    Retired Wound - Properties Group Date first assessed: 09/26/24  -KJ Time first assessed: 1809  -KJ Location: gluteal  -KJ      Row Name             Wound 09/26/24 1809 Left posterior ankle    Wound - Properties Group Placement Date: 09/26/24  -KJ Placement Time: 1809  -KJ Side: Left  -KJ Orientation: posterior  -KJ Location: ankle  -KJ    Retired Wound - Properties Group Placement Date: 09/26/24  -KJ Placement Time: 1809  -KJ Side: Left  -KJ Orientation: posterior  -KJ Location: ankle  -KJ    Retired Wound - Properties Group Date first assessed: 09/26/24  -KJ Time first assessed: 1809  -KJ Side: Left  -KJ Location: ankle  -KJ      Row Name             Wound 09/29/24 0952 Bilateral anterior thigh MASD (Moisture associated skin damage)    Wound - Properties Group Placement Date: 09/29/24  -LB Placement Time: 0952  -LB Present on Original Admission: Y  -LB Side: Bilateral  -LB Orientation: anterior  -LB Location: thigh  -LB Primary Wound  Type: MASD  -LB    Retired Wound - Properties Group Placement Date: 09/29/24  -LB Placement Time: 0952 -LB Present on Original Admission: Y  -LB Side: Bilateral  -LB Orientation: anterior  -LB Location: thigh  -LB Primary Wound Type: MASD  -LB    Retired Wound - Properties Group Date first assessed: 09/29/24  -LB Time first assessed: 0952 -LB Present on Original Admission: Y  -LB Side: Bilateral  -LB Location: thigh  -LB Primary Wound Type: MASD  -LB      Row Name 10/09/24 0915          Positioning and Restraints    Pre-Treatment Position in bed  -KH     Post Treatment Position bed  -KH     In Bed supine;exit alarm on  -KH       Row Name 10/09/24 0915          Progress Summary (PT)    Daily Progress Summary (PT) Pt less emotionally liable today, effort fair, unsure if pt understood task at times, easily distracted/discusses other topics during treatment. Prognosis guarded.  -               User Key  (r) = Recorded By, (t) = Taken By, (c) = Cosigned By      Initials Name Provider Type    Janessa Hassan, GENARO Physical Therapist    Nory Keenan, RN Registered Nurse    Zehra Rivera, RN Registered Nurse                    Physical Therapy Education       Title: PT OT SLP Therapies (Done)       Topic: Physical Therapy (Done)       Point: Mobility training (Done)       Learning Progress Summary             Patient Acceptance, E,TB, VU by CB at 10/8/2024 0448    Acceptance, E, NR by RD at 10/2/2024 1101    Acceptance, E, NR by RD at 10/1/2024 0856    Acceptance, E,D, VU,NR by  at 9/30/2024 1559                         Point: Home exercise program (Done)       Learning Progress Summary             Patient Acceptance, E,TB, VU by CB at 10/8/2024 0448    Acceptance, E, NR by RD at 10/2/2024 1101    Acceptance, E, NR by RD at 10/1/2024 0856    Acceptance, E,D, VU,NR by  at 9/30/2024 1559                         Point: Body mechanics (Done)       Learning Progress Summary             Patient Acceptance,  E,TB, VU by CB at 10/8/2024 0448    Acceptance, E, NR by RD at 10/2/2024 1101    Acceptance, E, NR by RD at 10/1/2024 0856    Acceptance, E,D, VU,NR by AG at 9/30/2024 1559                         Point: Precautions (Done)       Learning Progress Summary             Patient Acceptance, E,TB, VU by CB at 10/8/2024 0448    Acceptance, E, NR by RD at 10/2/2024 1101    Acceptance, E, NR by RD at 10/1/2024 0856    Acceptance, E,D, VU,NR by AG at 9/30/2024 1559                                         User Key       Initials Effective Dates Name Provider Type Discipline     06/16/21 -  Мария Joya, PT Physical Therapist PT    RD 06/16/21 -  Laisha Verdugo, RN Registered Nurse Nurse    CB 06/17/24 -  Fidelia Lombardo, RN Registered Nurse Nurse                  PT Recommendation and Plan     Progress Summary (PT)  Daily Progress Summary (PT): Pt less emotionally liable today, effort fair, unsure if pt understood task at times, easily distracted/discusses other topics during treatment. Prognosis guarded.       Time Calculation:    PT Charges       Row Name 10/09/24 1005             Time Calculation    Start Time 0915  -      PT Received On 10/09/24  -         Time Calculation- PT    Total Timed Code Minutes- PT 15 minute(s)  -                User Key  (r) = Recorded By, (t) = Taken By, (c) = Cosigned By      Initials Name Provider Type     Janessa Almeida, PT Physical Therapist                  Therapy Charges for Today       Code Description Service Date Service Provider Modifiers Qty    88897791823 HC PT THER PROC EA 15 MIN 10/8/2024 Janessa Almeida, PT GP 1    55092238605 HC PT THERAPEUTIC ACT EA 15 MIN 10/8/2024 Janessa Almeida, PT GP 1    84320980779 HC PT THER PROC EA 15 MIN 10/9/2024 Janessa Almeida, PT GP 1            PT G-Codes  AM-PAC 6 Clicks Score (PT): 8    Janessa Almeida PT  10/9/2024      Electronically signed by Janessa Almeida PT at 10/09/24 1008          Occupational Therapy Notes (most  recent note)        Loida Flaherty, OT at 10/09/24 0956          Acute Care - Occupational Therapy Treatment Note   [facility]     Patient Name: Lety Johnson  : 1980  MRN: [id number]  Today's Date: 10/9/2024  Onset of Illness/Injury or Date of Surgery: 24     Referring Physician: Dr. Marc    Admit Date: 2024       ICD-10-CM ICD-9-CM   1. Hypokalemia  E87.6 276.8   2. Pressure injury of skin of sacral region, unspecified injury stage  L89.159 707.03     707.20   3. Pressure injury of skin of left heel, unspecified injury stage  L89.629 707.07     707.20   4. Acute cystitis without hematuria  N30.00 595.0     Patient Active Problem List   Diagnosis   (none) - all problems resolved or deleted     Past Medical History:   Diagnosis Date    Stroke      History reviewed. No pertinent surgical history.      OT ASSESSMENT FLOWSHEET (Last 12 Hours)       OT Evaluation and Treatment       Row Name 10/09/24 0950                   OT Time and Intention    Subjective Information complains of;weakness;pain  -LA        Document Type therapy note (daily note)  -LA        Mode of Treatment occupational therapy  -LA        Patient Effort adequate  -LA        Symptoms Noted During/After Treatment increased pain;fatigue  -LA           General Information    Patient Profile Reviewed yes  -LA        General Observations of Patient Patient agreeable to therapy this date. Patient agreeable to EOB exercises and attempts at both sit to stands and scooting in the bed. Patient continues to c/o intense pain with movement of left UE therefore RUE exercises only completed. Patient unable to perform sit to stand with all attempts this date. Scooting performed minimally this date to right side. Patient continues to be maxA x2 person for EOB to supine.  -LA        Existing Precautions/Restrictions fall;left;shoulder  -LA           Cognition    Affect/Mental Status (Cognition) emotionally labile  -LA        Orientation Status  (Cognition) oriented x 4  -LA        Follows Commands (Cognition) WFL  -LA           Lower Body Dressing Assessment/Training    [place] Level (Lower Body Dressing) dependent (less than 25% patient effort)  -LA           Toileting Assessment/Training    [place] Level (Toileting) dependent (less than 25% patient effort)  -LA           Bed Mobility    Rolling Left [place] (Bed Mobility) maximum assist (25% patient effort)  -LA        Rolling Right [place] (Bed Mobility) maximum assist (25% patient effort)  -LA        Scooting/Bridging [place] (Bed Mobility) dependent (less than 25% patient effort)  -LA        Supine-Sit [place] (Bed Mobility) moderate assist (50% patient effort);maximum assist (25% patient effort)  -LA        Sit-Supine [place] (Bed Mobility) dependent (less than 25% patient effort)  -LA           Sit-Stand Transfer    Sit-Stand [place] (Transfers) dependent (less than 25% patient effort);2 person assist  -LA        Comment, (Sit-Stand Transfer) patient able to clear bed this date  -LA           Motor Skills    Motor Skills motor control/coordination interventions  -LA        Functional Endurance fair  -LA        Therapeutic Exercise shoulder;elbow/forearm;wrist;hand  RUE only  -LA        Comments, Therapeutic Exercise EOB ther-ex  -LA           Balance    Static Sitting Balance supervision  -LA        Dynamic Sitting Balance contact guard;minimal assist  poor awareness resulting in increased assist  -LA           Wound 09/26/24 1806 gluteal    Wound - Properties Group Placement Date: 09/26/24  -KJ Placement Time: 1806 -KJ Location: gluteal  -KJ    Retired Wound - Properties Group Placement Date: 09/26/24  -KJ Placement Time: 1806  -KJ Location: gluteal  -KJ    Retired Wound - Properties Group Date first assessed: 09/26/24  -KJ Time first assessed: 1806  -KJ Location: gluteal  -KJ       Wound 09/26/24 1807 Left gluteal    Wound - Properties Group Placement Date:  09/26/24  -KJ Placement Time: 1807  -KJ Side: Left  -KJ Location: gluteal  -KJ    Retired Wound - Properties Group Placement Date: 09/26/24  -KJ Placement Time: 1807  -KJ Side: Left  -KJ Location: gluteal  -KJ    Retired Wound - Properties Group Date first assessed: 09/26/24  -KJ Time first assessed: 1807  -KJ Side: Left  -KJ Location: gluteal  -KJ       Wound 09/26/24 1809 gluteal    Wound - Properties Group Placement Date: 09/26/24  -KJ Placement Time: 1809  -KJ Location: gluteal  -KJ    Retired Wound - Properties Group Placement Date: 09/26/24  -KJ Placement Time: 1809  -KJ Location: gluteal  -KJ    Retired Wound - Properties Group Date first assessed: 09/26/24  -KJ Time first assessed: 1809  -KJ Location: gluteal  -KJ       Wound 09/26/24 1809 Left posterior ankle    Wound - Properties Group Placement Date: 09/26/24  -KJ Placement Time: 1809  -KJ Side: Left  -KJ Orientation: posterior  -KJ Location: ankle  -KJ    Retired Wound - Properties Group Placement Date: 09/26/24  -KJ Placement Time: 1809  -KJ Side: Left  -KJ Orientation: posterior  -KJ Location: ankle  -KJ    Retired Wound - Properties Group Date first assessed: 09/26/24  -KJ Time first assessed: 1809  -KJ Side: Left  -KJ Location: ankle  -KJ       Wound 09/29/24 0952 Bilateral anterior thigh MASD (Moisture associated skin damage)    Wound - Properties Group Placement Date: 09/29/24  -LB Placement Time: 0952 -LB Present on Original Admission: Y  -LB Side: Bilateral  -LB Orientation: anterior  -LB Location: thigh  -LB Primary Wound Type: MASD  -LB    Retired Wound - Properties Group Placement Date: 09/29/24  -LB Placement Time: 0952  -LB Present on Original Admission: Y  -LB Side: Bilateral  -LB Orientation: anterior  -LB Location: thigh  -LB Primary Wound Type: MASD  -LB    Retired Wound - Properties Group Date first assessed: 09/29/24  -LB Time first assessed: 0952  -LB Present on Original Admission: Y  -LB Side: Bilateral  -LB Location: thigh  -LB  Primary Wound Type: MASD  -LB       Plan of Care Review    Plan of Care Reviewed With patient  -LA           Positioning and Restraints    Pre-Treatment Position in bed  -LA        Post Treatment Position bed  -LA        In Bed supine;call light within reach;encouraged to call for assist;exit alarm on  -LA                  User Key  (r) = Recorded By, (t) = Taken By, (c) = Cosigned By      Initials Name Effective Dates    Nory Keenan RN 23 -     Zehra Rivera RN 10/20/21 -     Loida Richard OT 22 -                            OT Recommendation and Plan     Plan of Care Review  Plan of Care Reviewed With: patient  Progress: improving  Plan of Care Reviewed With: patient        Time Calculation:     Therapy Charges for Today       Code Description Service Date Service Provider Modifiers Qty    68442322525  OT THER PROC EA 15 MIN 10/9/2024 Loida Flaherty OT GO 1    [id number]  OT THERAPEUTIC ACT EA 15 MIN 10/9/2024 Loida Flaherty OT GO 1                 Loida Flaherty OT  10/9/2024    Electronically signed by Loida Flaherty OT at 10/09/24 0957          Speech Language Pathology Notes (most recent note)        Hafsa Melendez MA,CCC-SLP at 24 1101          Acute Care - Speech Language Pathology   Swallow Initial Evaluation  [facility]  Clinical Dysphagia Assessment     Patient Name: Lety Johnson  : 1980  MRN: [id number]  Today's Date: 2024             Admit Date: 2024    Visit Dx:     ICD-10-CM ICD-9-CM   1. Hypokalemia  E87.6 276.8   2. Pressure injury of skin of sacral region, unspecified injury stage  L89.159 707.03     707.20   3. Pressure injury of skin of left heel, unspecified injury stage  L89.629 707.07     707.20   4. Acute cystitis without hematuria  N30.00 595.0     Patient Active Problem List   Diagnosis    UTI (urinary tract infection)     Past Medical History:   Diagnosis Date    Stroke      History reviewed. No pertinent surgical  "history.    Lety Johnson  was seen at bedside this AM on 3S Rm. 3315-1s to assess safety/efficacy of swallowing fnx, determine safest/least restrictive diet tolerance.      Per report: pt \"is a 43 year old female patient who presents to the ER with chief complaint of back pain. 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 The patient had been living with her fiance who is in group home now. Her sister has been trying to help care for her. Today, she was in the floor and unable to get up so they called EMS for lift assist. Patient's sister and patient want the patient to go into a rehab facility for strengthening. She also complains of low back pain.\"    Social History     Socioeconomic History    Marital status: Single   Tobacco Use    Smoking status: Every Day     Average packs/day: 2.0 packs/day for 26.0 years (52.0 ttl pk-yrs)     Types: Cigarettes     Start date: 1998    Smokeless tobacco: Never   Vaping Use    Vaping status: Never Used   Substance and Sexual Activity    Alcohol use: Not Currently    Drug use: Never    Sexual activity: Not Currently     Partners: Male     Comment:  in [facility]        Imaging:  No recent chest imaging    Labs:  Sodium  142  09/30 0117  Potassium  3.5  09/30 0117  Chloride  110  09/30 0117  CO2  22.8  09/30 0117  BUN  5  09/30 0117  Creatinine  0.46  09/30 0117  Glucose  196  09/30 0117  Hemoglobin  10.5  09/30 0117  Hematocrit  32.7  09/30 0117  WBC  8.49  09/30 0117  Platelets  285  09/30 0117  Diet Orders (active) (From admission, onward)       Start     Ordered    09/28/24 1800  Dietary Nutrition Supplements Boost Plus (Ensure Enlive, Ensure Plus)  Daily With Breakfast & Dinner       09/28/24 1209    09/26/24 1748  Diet: Regular/House; Fluid Consistency: Thin (IDDSI 0)  Diet Effective Now         09/26/24 1747                  Pt is observed on RA.     Patient was positioned upright and centered in bed to accept multiple po presentations " of ice chips, solid cracker, puree, and thin liquids via spoon, cup, and straw. Patient was able to self provide po trials.     Facial/oral structures were slightly asymmetrical upon observation. Pt with residual L side weakness from previous CVA. Lingual protrusion revealed no deviation. Oral mucosa were moist, pink, and clean. Secretions were clear, thin, and well controlled. OROM/ENDER was wfl to imitate oral postures. Gag is not assessed. Volitional cough was intact w/ adequate  intensity, clear in quality, non-productive. Voice was adequate in intensity, clear in quality w/ intelligible speech.    Upon po presentations, adequate bolus anticipation and acceptance w/ good labial seal for bolus clearance via spoon bowl, cup rim stability and suction via straw. Bolus formation, manipulation and control were wfl w/ rotary mastication pattern. A-p transit was timely w/o significant oral residue appreciated. No overt s/s aspiration before the swallow.      Pharyngeal swallow was timely w/ adequate hyolaryngeal elevation per palpation. No overt s/s aspiration evidenced across this evaluation. No silent aspiration suspected. Patient denied odynophagia.    Pt reports difficulty swallowing large pills only.     Impression: Patient presented w/ wfl oropharyngeal swallow w/o s/s aspiration. No s/s indicative of silent aspiration. No odynophagia reported.      SLP Recommendation and Plan   1. Regular consistencies, thin liquids.    2. Medications whole in puree/thins. Crush PRN.  3. Upright and centered for all po intake.  4. MO precautions.  5. Oral care protocol.    No further formal SLP f/u warranted/recommended at this time.    D/w patient results and recommendations w/ verbal agreement.    D/w RN results and recommendations w/ verbal agreement.    Thank you for allowing me to participate in the care of your patient-  Hafsa Melendez M.A., CCC-SLP     EDUCATION  The patient has been educated in the following areas:    Oral Care/Hydration.      Time Calculation:     Therapy Charges for Today       Code Description Service Date Service Provider Modifiers Qty    [id number]  ST EVAL ORAL PHARYNG SWALLOW 4 9/30/2024 Hafsa Melendez MA,CCC-SLP GN 1          Hafsa Melendez MA,CCC-SLP  9/30/2024    Electronically signed by Hafsa Melendez MA,CCC-SLP at 09/30/24 1107       ADL Documentation (most recent)      Flowsheet Row Most Recent Value   Transferring 4 - completely dependent   Toileting 3 - assistive equipment and person   Bathing 2 - assistive person   Dressing 2 - assistive person   Eating 2 - assistive person   Communication 0 - understands/communicates without difficulty   Swallowing 0 - swallows foods/liquids without difficulty   Equipment Currently Used at Home hospital bed, lift device, wheelchair

## 2024-10-09 NOTE — PLAN OF CARE
Goal Outcome Evaluation:   Patient currently resting in bed. A&Ox 4. VSS. No visible s/s of acute distress noted at this time. Patient frequently seeks out staff. Call light within reach. Plan of care ongoing.

## 2024-10-09 NOTE — THERAPY TREATMENT NOTE
Acute Care - Occupational Therapy Treatment Note   [facility]     Patient Name: Lety Johnson  : 1980  MRN: [id number]  Today's Date: 10/9/2024  Onset of Illness/Injury or Date of Surgery: 24     Referring Physician: Dr. Marc    Admit Date: 2024       ICD-10-CM ICD-9-CM   1. Hypokalemia  E87.6 276.8   2. Pressure injury of skin of sacral region, unspecified injury stage  L89.159 707.03     707.20   3. Pressure injury of skin of left heel, unspecified injury stage  L89.629 707.07     707.20   4. Acute cystitis without hematuria  N30.00 595.0     Patient Active Problem List   Diagnosis   (none) - all problems resolved or deleted     Past Medical History:   Diagnosis Date    Stroke      History reviewed. No pertinent surgical history.      OT ASSESSMENT FLOWSHEET (Last 12 Hours)       OT Evaluation and Treatment       Row Name 10/09/24 0950                   OT Time and Intention    Subjective Information complains of;weakness;pain  -LA        Document Type therapy note (daily note)  -LA        Mode of Treatment occupational therapy  -LA        Patient Effort adequate  -LA        Symptoms Noted During/After Treatment increased pain;fatigue  -LA           General Information    Patient Profile Reviewed yes  -LA        General Observations of Patient Patient agreeable to therapy this date. Patient agreeable to EOB exercises and attempts at both sit to stands and scooting in the bed. Patient continues to c/o intense pain with movement of left UE therefore RUE exercises only completed. Patient unable to perform sit to stand with all attempts this date. Scooting performed minimally this date to right side. Patient continues to be maxA x2 person for EOB to supine.  -LA        Existing Precautions/Restrictions fall;left;shoulder  -LA           Cognition    Affect/Mental Status (Cognition) emotionally labile  -LA        Orientation Status (Cognition) oriented x 4  -LA        Follows Commands (Cognition) WFL   -LA           Lower Body Dressing Assessment/Training    Douglas Level (Lower Body Dressing) dependent (less than 25% patient effort)  -LA           Toileting Assessment/Training    Douglas Level (Toileting) dependent (less than 25% patient effort)  -LA           Bed Mobility    Rolling Left Douglas (Bed Mobility) maximum assist (25% patient effort)  -LA        Rolling Right [place] (Bed Mobility) maximum assist (25% patient effort)  -LA        Scooting/Bridging Douglas (Bed Mobility) dependent (less than 25% patient effort)  -LA        Supine-Sit [place] (Bed Mobility) moderate assist (50% patient effort);maximum assist (25% patient effort)  -LA        Sit-Supine [place] (Bed Mobility) dependent (less than 25% patient effort)  -LA           Sit-Stand Transfer    Sit-Stand Douglas (Transfers) dependent (less than 25% patient effort);2 person assist  -LA        Comment, (Sit-Stand Transfer) patient able to clear bed this date  -LA           Motor Skills    Motor Skills motor control/coordination interventions  -LA        Functional Endurance fair  -LA        Therapeutic Exercise shoulder;elbow/forearm;wrist;hand  RUE only  -LA        Comments, Therapeutic Exercise EOB ther-ex  -LA           Balance    Static Sitting Balance supervision  -LA        Dynamic Sitting Balance contact guard;minimal assist  poor awareness resulting in increased assist  -LA           Wound 09/26/24 1806 gluteal    Wound - Properties Group Placement Date: 09/26/24  -KJ Placement Time: 1806 -KJ Location: gluteal  -KJ    Retired Wound - Properties Group Placement Date: 09/26/24  -KJ Placement Time: 1806  -KJ Location: gluteal  -KJ    Retired Wound - Properties Group Date first assessed: 09/26/24  -KJ Time first assessed: 1806  -KJ Location: gluteal  -KJ       Wound 09/26/24 1807 Left gluteal    Wound - Properties Group Placement Date: 09/26/24  -KJ Placement Time: 1807  -KJ Side: Left  -KJ Location:  gluteal  -KJ    Retired Wound - Properties Group Placement Date: 09/26/24  -KJ Placement Time: 1807  -KJ Side: Left  -KJ Location: gluteal  -KJ    Retired Wound - Properties Group Date first assessed: 09/26/24  -KJ Time first assessed: 1807  -KJ Side: Left  -KJ Location: gluteal  -KJ       Wound 09/26/24 1809 gluteal    Wound - Properties Group Placement Date: 09/26/24  -KJ Placement Time: 1809  -KJ Location: gluteal  -KJ    Retired Wound - Properties Group Placement Date: 09/26/24  -KJ Placement Time: 1809  -KJ Location: gluteal  -KJ    Retired Wound - Properties Group Date first assessed: 09/26/24  -KJ Time first assessed: 1809  -KJ Location: gluteal  -KJ       Wound 09/26/24 1809 Left posterior ankle    Wound - Properties Group Placement Date: 09/26/24  -KJ Placement Time: 1809  -KJ Side: Left  -KJ Orientation: posterior  -KJ Location: ankle  -KJ    Retired Wound - Properties Group Placement Date: 09/26/24  -KJ Placement Time: 1809  -KJ Side: Left  -KJ Orientation: posterior  -KJ Location: ankle  -KJ    Retired Wound - Properties Group Date first assessed: 09/26/24  -KJ Time first assessed: 1809  -KJ Side: Left  -KJ Location: ankle  -KJ       Wound 09/29/24 0952 Bilateral anterior thigh MASD (Moisture associated skin damage)    Wound - Properties Group Placement Date: 09/29/24  -LB Placement Time: 0952 -LB Present on Original Admission: Y  -LB Side: Bilateral  -LB Orientation: anterior  -LB Location: thigh  -LB Primary Wound Type: MASD  -LB    Retired Wound - Properties Group Placement Date: 09/29/24  -LB Placement Time: 0952  -LB Present on Original Admission: Y  -LB Side: Bilateral  -LB Orientation: anterior  -LB Location: thigh  -LB Primary Wound Type: MASD  -LB    Retired Wound - Properties Group Date first assessed: 09/29/24  -LB Time first assessed: 0952  -LB Present on Original Admission: Y  -LB Side: Bilateral  -LB Location: thigh  -LB Primary Wound Type: MASD  -LB       Plan of Care Review    Plan of  Care Reviewed With patient  -LA           Positioning and Restraints    Pre-Treatment Position in bed  -LA        Post Treatment Position bed  -LA        In Bed supine;call light within reach;encouraged to call for assist;exit alarm on  -LA                  User Key  (r) = Recorded By, (t) = Taken By, (c) = Cosigned By      Initials Name Effective Dates    Nory Keenan RN 06/08/23 -     Zehra Rivera RN 10/20/21 -     Loida Richard OT 02/14/22 -                            OT Recommendation and Plan     Plan of Care Review  Plan of Care Reviewed With: patient  Progress: improving  Plan of Care Reviewed With: patient        Time Calculation:     Therapy Charges for Today       Code Description Service Date Service Provider Modifiers Qty    01990737018  OT THER PROC EA 15 MIN 10/9/2024 Loida Flaherty OT GO 1    [id number]  OT THERAPEUTIC ACT EA 15 MIN 10/9/2024 Loida Flaherty OT GO 1                 Loida Flaherty OT  10/9/2024

## 2024-10-09 NOTE — CASE MANAGEMENT/SOCIAL WORK
Discharge Planning Assessment  [facility]     Patient Name: Lety Johnson  MRN: [id number]  Today's Date: 10/9/2024    Admit Date: 9/26/2024    Plan: SS spoke with Shyann at [facility] who stated no available female beds at this time.  SS spoke with [facility] per Lesly Amadeo who stated possibility of bed availability.  SS faxed pt referral to [facility] for review.  SS received call from [facility] on this date requesting updated information.  [facility] stated no resources for assistance.  SS will follow.     Discharge Plan       Row Name 10/09/24 1524       Plan    Plan SS spoke with Shyann at [facility] who stated no available female beds at this time.  SS spoke with [facility] per Lesly Childs who stated possibility of bed availability.  SS faxed pt referral to [facility] for review.  SS received call from [facility] on this date requesting updated information.  [facility] stated no resources for assistance.  SS will follow.      Row Name 10/09/24 1316       Plan    Plan SS followed up with [facility] who stated referral has been received and is in process of being reviewed.  SS left message for [facility] to return call. [facility] continues to have no bed availability.  SS will follow.                  KAZ Lacy

## 2024-10-09 NOTE — THERAPY TREATMENT NOTE
Acute Care - Physical Therapy Treatment Note   [facility]     Patient Name: Lety Johnson  : 1980  MRN: [id number]  Today's Date: 10/9/2024   Onset of Illness/Injury or Date of Surgery: 24  Visit Dx:     ICD-10-CM ICD-9-CM   1. Hypokalemia  E87.6 276.8   2. Pressure injury of skin of sacral region, unspecified injury stage  L89.159 707.03     707.20   3. Pressure injury of skin of left heel, unspecified injury stage  L89.629 707.07     707.20   4. Acute cystitis without hematuria  N30.00 595.0     Patient Active Problem List   Diagnosis   (none) - all problems resolved or deleted     Past Medical History:   Diagnosis Date    Stroke      History reviewed. No pertinent surgical history.  PT Assessment (Last 12 Hours)       PT Evaluation and Treatment       Row Name 10/09/24 0915          Physical Therapy Time and Intention    Document Type therapy note (daily note)  -     Mode of Treatment physical therapy  -     Patient Effort adequate  -     Comment Pt participated in therapy intervention with some education, HEP administration of knee flex/ext and hip abd/add x5 q hour on L LE. Pt participated in LE TE of glute sets, LAQ, hip flexion with L LE hip flex neuro focus. Pt less emotionally liable today, cooperative. Attempted stand x3 without success. Time spent allowing pt to rest between sets, encouragement provided to pt. 15 minutes face to face with pt.  -       Row Name 10/09/24 0915          Bed Mobility    Bed Mobility supine-sit;sit-supine  -     Supine-Sit Wise (Bed Mobility) maximum assist (25% patient effort)  -     Sit-Supine Wise (Bed Mobility) maximum assist (25% patient effort);other (see comments)  encouraged to use R LE to assist L LE into bed, however pt states she couldn't or it hadn't worked before, encouraged to try again, grossly max/depA  -       Row Name 10/09/24 0915          Transfers    Comment, (Transfers) unable to perform STSx3 with 2 prson assist   -GWYN       Row Name 10/09/24 0915          Motor Skills    Therapeutic Exercise hip;knee  glute sets, LAQ, hip flexion R LE grossly 3x10, L LE hip flexion x10 with encouragement to visualize and attempt any trace movement.  -GWYN       Row Name             Wound 09/26/24 1806 gluteal    Wound - Properties Group Placement Date: 09/26/24  -KJ Placement Time: 1806  -KJ Location: gluteal  -KJ    Retired Wound - Properties Group Placement Date: 09/26/24  -KJ Placement Time: 1806  -KJ Location: gluteal  -KJ    Retired Wound - Properties Group Date first assessed: 09/26/24  -KJ Time first assessed: 1806  -KJ Location: gluteal  -KJ      Row Name             Wound 09/26/24 1807 Left gluteal    Wound - Properties Group Placement Date: 09/26/24  -KJ Placement Time: 1807  -KJ Side: Left  -KJ Location: gluteal  -KJ    Retired Wound - Properties Group Placement Date: 09/26/24  -KJ Placement Time: 1807  -KJ Side: Left  -KJ Location: gluteal  -KJ    Retired Wound - Properties Group Date first assessed: 09/26/24  -KJ Time first assessed: 1807  -KJ Side: Left  -KJ Location: gluteal  -KJ      Row Name             Wound 09/26/24 1809 gluteal    Wound - Properties Group Placement Date: 09/26/24  -KJ Placement Time: 1809  -KJ Location: gluteal  -KJ    Retired Wound - Properties Group Placement Date: 09/26/24  -KJ Placement Time: 1809  -KJ Location: gluteal  -KJ    Retired Wound - Properties Group Date first assessed: 09/26/24  -KJ Time first assessed: 1809  -KJ Location: gluteal  -KJ      Row Name             Wound 09/26/24 1809 Left posterior ankle    Wound - Properties Group Placement Date: 09/26/24  -KJ Placement Time: 1809  -KJ Side: Left  -KJ Orientation: posterior  -KJ Location: ankle  -KJ    Retired Wound - Properties Group Placement Date: 09/26/24  -KJ Placement Time: 1809  -KJ Side: Left  -KJ Orientation: posterior  -KJ Location: ankle  -KJ    Retired Wound - Properties Group Date first assessed: 09/26/24  -KJ Time first assessed:  1809  -KJ Side: Left  -KJ Location: ankle  -KJ      Row Name             Wound 09/29/24 0952 Bilateral anterior thigh MASD (Moisture associated skin damage)    Wound - Properties Group Placement Date: 09/29/24  -LB Placement Time: 0952 -LB Present on Original Admission: Y  -LB Side: Bilateral  -LB Orientation: anterior  -LB Location: thigh  -LB Primary Wound Type: MASD  -LB    Retired Wound - Properties Group Placement Date: 09/29/24  -LB Placement Time: 0952 -LB Present on Original Admission: Y  -LB Side: Bilateral  -LB Orientation: anterior  -LB Location: thigh  -LB Primary Wound Type: MASD  -LB    Retired Wound - Properties Group Date first assessed: 09/29/24  -LB Time first assessed: 0952 -LB Present on Original Admission: Y  -LB Side: Bilateral  -LB Location: thigh  -LB Primary Wound Type: MASD  -LB      Row Name 10/09/24 0915          Positioning and Restraints    Pre-Treatment Position in bed  -KH     Post Treatment Position bed  -KH     In Bed supine;exit alarm on  -KH       Row Name 10/09/24 0915          Progress Summary (PT)    Daily Progress Summary (PT) Pt less emotionally liable today, effort fair, unsure if pt understood task at times, easily distracted/discusses other topics during treatment. Prognosis guarded.  -               User Key  (r) = Recorded By, (t) = Taken By, (c) = Cosigned By      Initials Name Provider Type    Janessa Hassan, GENARO Physical Therapist    Nory Keenan, RN Registered Nurse    Zehra Rivera, RN Registered Nurse                    Physical Therapy Education       Title: PT OT SLP Therapies (Done)       Topic: Physical Therapy (Done)       Point: Mobility training (Done)       Learning Progress Summary             Patient Acceptance, E,TB, VU by CB at 10/8/2024 0448    Acceptance, E, NR by RD at 10/2/2024 1101    Acceptance, E, NR by RD at 10/1/2024 0856    Acceptance, E,D, VU,NR by AG at 9/30/2024 1559                         Point: Home exercise program  (Done)       Learning Progress Summary             Patient Acceptance, E,TB, VU by CB at 10/8/2024 0448    Acceptance, E, NR by RD at 10/2/2024 1101    Acceptance, E, NR by RD at 10/1/2024 0856    Acceptance, E,D, VU,NR by AG at 9/30/2024 1559                         Point: Body mechanics (Done)       Learning Progress Summary             Patient Acceptance, E,TB, VU by CB at 10/8/2024 0448    Acceptance, E, NR by RD at 10/2/2024 1101    Acceptance, E, NR by RD at 10/1/2024 0856    Acceptance, E,D, VU,NR by AG at 9/30/2024 1559                         Point: Precautions (Done)       Learning Progress Summary             Patient Acceptance, E,TB, VU by CB at 10/8/2024 0448    Acceptance, E, NR by RD at 10/2/2024 1101    Acceptance, E, NR by RD at 10/1/2024 0856    Acceptance, E,D, VU,NR by AG at 9/30/2024 1559                                         User Key       Initials Effective Dates Name Provider Type Discipline     06/16/21 -  Мария Joya, PT Physical Therapist PT    RD 06/16/21 -  Laisha Verdugo, RN Registered Nurse Nurse     06/17/24 -  Fidelia Lombardo, RN Registered Nurse Nurse                  PT Recommendation and Plan     Progress Summary (PT)  Daily Progress Summary (PT): Pt less emotionally liable today, effort fair, unsure if pt understood task at times, easily distracted/discusses other topics during treatment. Prognosis guarded.       Time Calculation:    PT Charges       Row Name 10/09/24 1005             Time Calculation    Start Time 0915  -      PT Received On 10/09/24  -         Time Calculation- PT    Total Timed Code Minutes- PT 15 minute(s)  -                User Key  (r) = Recorded By, (t) = Taken By, (c) = Cosigned By      Initials Name Provider Type     Janessa Almeida, PT Physical Therapist                  Therapy Charges for Today       Code Description Service Date Service Provider Modifiers Qty    74916208012 HC PT THER PROC EA 15 MIN 10/8/2024 Janessa Almeida, PT GP  1    58821809086 HC PT THERAPEUTIC ACT EA 15 MIN 10/8/2024 Janessa Almeida, PT GP 1    21453286055 HC PT THER PROC EA 15 MIN 10/9/2024 Janessa Almeida, PT GP 1            PT G-Codes  AM-PAC 6 Clicks Score (PT): 8    Janessa Almeida, PT  10/9/2024

## 2024-10-09 NOTE — PROGRESS NOTES
Adult Nutrition  Assessment/PES    Patient Name:  Lety Johnson  YOB: 1980  MRN: [id number]  Admit Date:  9/26/2024    Assessment Date:  10/9/2024    Comments:  follow-up    Po 77% ave of meals.  Boost Plus in place w meals.    Will cont to follow and monitor.      Reason for Assessment       Row Name 10/09/24 1350          Reason for Assessment    Reason For Assessment follow-up protocol     Diagnosis infection/sepsis;neurologic conditions  cystitis, met enceph, debility hx CVA                    Nutrition/Diet History       Row Name 10/09/24 1350          Nutrition/Diet History    Typical Intake (Food/Fluid/EN/PN) called to room, no answer                    Labs/Tests/Procedures/Meds       Row Name 10/09/24 1350          Labs/Procedures/Meds    Lab Results Reviewed reviewed     Lab Results Comments HgA1c 9.4 H, glu 216        Diagnostic Tests/Procedures    Diagnostic Test/Procedure Reviewed reviewed        Medications    Pertinent Medications Reviewed reviewed     Pertinent Medications Comments glucophage                        Nutrition Prescription Ordered       Row Name 10/09/24 1350          Nutrition Prescription PO    Current PO Diet Regular     Supplement Boost Plus (Ensure Enlive, Ensure Plus)     Supplement Frequency 3 times a day                    Evaluation of Received Nutrient/Fluid Intake       Row Name 10/09/24 1351          Fluid Intake Evaluation    Oral Fluid (mL) 708  ave x 5, 33%        PO Evaluation    Number of Meals 11     % PO Intake 77                       Problem/Interventions:   Problem 1       Row Name 10/09/24 1351          Nutrition Diagnoses Problem 1    Problem 1 Other (comment)  Inadequate energy intake related to cystitis, met eceph as evidenced po intake, ONS, skin                          Intervention Goal       Row Name 10/09/24 1352          Intervention Goal    General Meet nutritional needs for age/condition     PO Maintain intake;PO intake (%)     PO  Intake % 75 %  or greater                    Nutrition Intervention       Row Name 10/09/24 1352          Nutrition Intervention    RD/Tech Action Follow Tx progress;Supplement provided                      Education/Evaluation       Row Name 10/09/24 1352          Education    Education No discharge needs identified at this time        Monitor/Evaluation    Monitor Per protocol;I&O;PO intake;Supplement intake;Pertinent labs;Weight;Skin status                     Electronically signed by:  Vee Van RD  10/09/24 13:52 EDT

## 2024-10-10 PROCEDURE — 97530 THERAPEUTIC ACTIVITIES: CPT

## 2024-10-10 PROCEDURE — 99231 SBSQ HOSP IP/OBS SF/LOW 25: CPT | Performed by: INTERNAL MEDICINE

## 2024-10-10 PROCEDURE — 25010000002 HEPARIN (PORCINE) PER 1000 UNITS: Performed by: INTERNAL MEDICINE

## 2024-10-10 PROCEDURE — 97110 THERAPEUTIC EXERCISES: CPT

## 2024-10-10 RX ORDER — HYDROCODONE BITARTRATE AND ACETAMINOPHEN 5; 325 MG/1; MG/1
1 TABLET ORAL ONCE
Status: COMPLETED | OUTPATIENT
Start: 2024-10-10 | End: 2024-10-10

## 2024-10-10 RX ADMIN — DICLOFENAC SODIUM 4 G: 10 GEL TOPICAL at 08:25

## 2024-10-10 RX ADMIN — Medication 10 ML: at 08:25

## 2024-10-10 RX ADMIN — HEPARIN SODIUM 5000 UNITS: 5000 INJECTION INTRAVENOUS; SUBCUTANEOUS at 14:48

## 2024-10-10 RX ADMIN — ACYCLOVIR 400 MG: 200 CAPSULE ORAL at 21:08

## 2024-10-10 RX ADMIN — ASPIRIN 81 MG: 81 TABLET, CHEWABLE ORAL at 08:23

## 2024-10-10 RX ADMIN — DICLOFENAC SODIUM 4 G: 10 GEL TOPICAL at 15:45

## 2024-10-10 RX ADMIN — Medication 10 ML: at 21:07

## 2024-10-10 RX ADMIN — HYDROCODONE BITARTRATE AND ACETAMINOPHEN 1 TABLET: 5; 325 TABLET ORAL at 17:17

## 2024-10-10 RX ADMIN — Medication 10 ML: at 21:08

## 2024-10-10 RX ADMIN — CYCLOBENZAPRINE 10 MG: 10 TABLET, FILM COATED ORAL at 07:24

## 2024-10-10 RX ADMIN — LISINOPRIL 20 MG: 10 TABLET ORAL at 08:22

## 2024-10-10 RX ADMIN — PANTOPRAZOLE SODIUM 40 MG: 40 TABLET, DELAYED RELEASE ORAL at 08:22

## 2024-10-10 RX ADMIN — METOPROLOL TARTRATE 25 MG: 25 TABLET, FILM COATED ORAL at 21:08

## 2024-10-10 RX ADMIN — ACETAMINOPHEN 650 MG: 325 TABLET ORAL at 11:03

## 2024-10-10 RX ADMIN — CYCLOBENZAPRINE 10 MG: 10 TABLET, FILM COATED ORAL at 15:45

## 2024-10-10 RX ADMIN — ATORVASTATIN CALCIUM 80 MG: 40 TABLET, FILM COATED ORAL at 08:22

## 2024-10-10 RX ADMIN — METOPROLOL TARTRATE 25 MG: 25 TABLET, FILM COATED ORAL at 08:22

## 2024-10-10 RX ADMIN — NICOTINE 1 PATCH: 7 PATCH, EXTENDED RELEASE TRANSDERMAL at 08:23

## 2024-10-10 RX ADMIN — DULOXETINE HYDROCHLORIDE 60 MG: 60 CAPSULE, DELAYED RELEASE ORAL at 08:23

## 2024-10-10 RX ADMIN — HEPARIN SODIUM 5000 UNITS: 5000 INJECTION INTRAVENOUS; SUBCUTANEOUS at 05:46

## 2024-10-10 RX ADMIN — HEPARIN SODIUM 5000 UNITS: 5000 INJECTION INTRAVENOUS; SUBCUTANEOUS at 21:08

## 2024-10-10 NOTE — DISCHARGE PLACEMENT REQUEST
"Lety Johnson (43 y.o. Female)       Date of Birth   1980    Social Security Number       Address   [address] [facility] [address]    Home Phone   [phone]    MRN   [id number]       Gnosticism   Mormonism    Marital Status   Single                            Admission Date   24    Admission Type   Emergency    Admitting Provider   Ramon Marc MD    Attending Provider   Ramon Marc MD    Department, Room/Bed   [address], [address]32/       Discharge Date       Discharge Disposition       Discharge Destination                                 Attending Provider: Ramon Marc MD    Allergies: No Known Allergies    Isolation: Contact   Infection: ESBL E coli (24)   Code Status: CPR    Ht: 166.4 cm (65.5\")   Wt: 102 kg (224 lb 1.6 oz)    Admission Cmt: None   Principal Problem: UTI (urinary tract infection) [N39.0]                   Active Insurance as of 2024       Primary Coverage       Payor Plan Insurance Group Employer/Plan Group    [place] [id number]       Payor Plan Address Payor Plan Phone Number Payor Plan Fax Number Effective Dates    [employer] [address] [address] [phone]  2024 - None Entered    [place] [address]         Subscriber Name Subscriber Birth Date Member ID       LETY JOHNSON 1980 [id number]                     Emergency Contacts        (Rel.) Home Phone Work Phone Mobile Phone    BriceHenrya (Legal Guardian) -- -- [phone]                 History & Physical        Ramon Marc MD at [address] [place] [address] HOSPITALIST HISTORY AND PHYSICAL    Patient Identification:  Name:  Lety Johnson  Age:  43 y.o.  Sex:  female  :  1980  MRN:  [id number]   Admit Date: 2024   Visit Number:  [id number]  Room number:  404/04  Primary Care Physician:  Provider, No Known     Subjective     Chief complaint:    Chief Complaint   Patient presents " with    Fall     History of presenting illness:   43F Morbid Obesity by BMI PMH History Genital Herpes, Cerebrovascular Accident 5/2024 complicated by L sided paralysis, presented to [facility] emergency room 9/26 after sliding into the floor off her chair due to weakness.  Upon arrival patient afebrile, heart rate 109, respiratory rate 18, blood pressure 146/101, satting 98% on room air. Labs showed WBC count 10K, lactate 1.1, CRP 3.8, potassium 2.7, magnesium 1.5, HCG negative, blood cultures pending. CXR no acute cardiopulmonary processes.  Xray ankle/foot without fracture or dislocation.  Emergency room provider gave antibiotics, pain medications, zofran, potassium replacement.  Hospital Medicine consulted for admission. Patient seen and examined in the emergency room, notes fevers chills at home a few days ago, recently has been on antibiotics for lower extremity cellulitis, has had some dysuria and suprapubic pain, denies current sexual activity, reports her fiance was taken to custodial about a week ago and has been her primary caretaker, has had difficulty at home since prior stroke and caretakers not consistent, last 24hrs didn't have anyone to help her, sister endorses recent confusion/hallucinations beyond baseline, seeing dead family members, coinciding with onset of dysuria.  ---------------------------------------------------------------------------------------------------------------------   Review of Systems   Constitutional:  Positive for chills and fever.   HENT: Negative.     Eyes: Negative.    Respiratory:  Positive for shortness of breath.    Cardiovascular:  Positive for leg swelling. Negative for chest pain.   Gastrointestinal: Negative.    Endocrine: Negative.    Genitourinary:  Positive for dysuria.   Musculoskeletal: Negative.    Skin:  Positive for rash.   Allergic/Immunologic: Negative.    Neurological:  Positive for weakness.   Hematological: Negative.    Psychiatric/Behavioral:   Positive for hallucinations.      ---------------------------------------------------------------------------------------------------------------------   Past Medical History:   Diagnosis Date    Stroke      No past surgical history on file.  No family history on file.  Social History     Socioeconomic History    Marital status: Single     ---------------------------------------------------------------------------------------------------------------------   Allergies:  Patient has no known allergies.  ---------------------------------------------------------------------------------------------------------------------   Medications below are reported home medications pulling from within the system; at this time, these medications have not been reconciled unless otherwise specified and are in the verification process for further verifcation as current home medications.    Prior to Admission Medications       Prescriptions Last Dose Informant Patient Reported? Taking?    aspirin 81 MG chewable tablet Unknown  Yes No    Chew 1 tablet Daily.    atorvastatin (LIPITOR) 80 MG tablet Unknown  Yes No    Take 1 tablet by mouth Daily.    cyclobenzaprine (FLEXERIL) 10 MG tablet Unknown  Yes No    Take 1 tablet by mouth 3 (Three) Times a Day As Needed for Muscle Spasms.    DULoxetine (CYMBALTA) 60 MG capsule Unknown  Yes No    Take 1 capsule by mouth Daily.    lisinopril (PRINIVIL,ZESTRIL) 20 MG tablet Unknown  Yes No    Take 1 tablet by mouth Daily.    metFORMIN (GLUCOPHAGE) 1000 MG tablet Unknown  Yes No    Take 1 tablet by mouth 2 (Two) Times a Day With Meals.    pantoprazole (PROTONIX) 40 MG EC tablet Unknown  Yes No    Take 1 tablet by mouth Daily.          Objective     Vital Signs:  Temp:  [98.2 °F (36.8 °C)] 98.2 °F (36.8 °C)  Heart Rate:  [] 118  Resp:  [18] 18  BP: (135-157)/() 140/79    Mean Arterial Pressure (Non-Invasive) for the past 24 hrs (Last 3 readings):   Noninvasive MAP (mmHg)   09/26/24 1645 90    09/26/24 1630 100   09/26/24 1615 103     SpO2:  [91 %-100 %] 94 %  on   ;   Device (Oxygen Therapy): room air  Body mass index is 43.26 kg/m².    Wt Readings from Last 3 Encounters:   09/26/24 120 kg (264 lb)   06/27/24 120 kg (264 lb)      ---------------------------------------------------------------------------------------------------------------------   Physical Exam:  Constitutional:  Well-developed and well-nourished. Older than stated age. No acute distress.      HENT:  Head:  Normocephalic and atraumatic.  Mouth:  Moist mucous membranes.    Eyes:  Conjunctivae and EOM are normal. No scleral icterus.    Neck:  Neck supple.  No JVD present.    Cardiovascular:  Normal rate, regular rhythm and normal heart sounds with no murmur.  Pulmonary/Chest:  No respiratory distress, no wheezes, no crackles, with normal breath sounds and good air movement.  Abdominal:  Soft. No distension and no tenderness.   Musculoskeletal:  No tenderness, and no deformity.  No red or swollen joints anywhere.    Neurological:  Alert and oriented to person, place, and time.  No cranial nerve deficit. Chronic L sided paralysis.    Skin:  Skin is warm and dry. No pallor. Significant intertriginous erythema under panus, consistent with yeast infection.   Peripheral vascular:  No clubbing, no cyanosis, trace edema.  Psychiatric: Appropriate mood and affect  Edited by: Ramon Marc MD at 9/26/2024 1701  ---------------------------------------------------------------------------------------------------------------------  EKG:  N/A    Telemetry:  normal sinus rhythm, no significant ST changes    I have personally looked at telemetry.    Last echocardiogram:  Ordered and pending   --------------------------------------------------------------------------------------------------------------------  Labs:  Results from last 7 days   Lab Units 09/26/24  1504 09/26/24  1350   LACTATE mmol/L  --  1.1   CRP mg/dL 3.80*  --    WBC 10*3/mm3  --   "10.42   HEMOGLOBIN g/dL  --  12.5   HEMATOCRIT %  --  37.4   MCV fL  --  94.0   MCHC g/dL  --  33.4   PLATELETS 10*3/mm3  --  402         Results from last 7 days   Lab Units 09/26/24  1504   SODIUM mmol/L 140   POTASSIUM mmol/L 2.7*   MAGNESIUM mg/dL 1.5*   CHLORIDE mmol/L 99   CO2 mmol/L 28.9   BUN mg/dL 10   CREATININE mg/dL 0.51*   CALCIUM mg/dL 9.4   GLUCOSE mg/dL 220*   ALBUMIN g/dL 3.5   BILIRUBIN mg/dL 0.6   ALK PHOS U/L 119*   AST (SGOT) U/L 21   ALT (SGPT) U/L 8   Estimated Creatinine Clearance: 188.1 mL/min (A) (by C-G formula based on SCr of 0.51 mg/dL (L)).  No results found for: \"AMMONIA\"          No results found for: \"HGBA1C\", \"POCGLU\"  No results found for: \"TSH\", \"FREET4\"  No results found for: \"PREGTESTUR\", \"PREGSERUM\", \"HCG\", \"HCGQUANT\"  Pain Management Panel           No data to display              Brief Urine Lab Results  (Last result in the past 365 days)        Color   Clarity   Blood   Leuk Est   Nitrite   Protein   CREAT   Urine HCG        09/26/24 1419 Dark Yellow   Turbid   Trace   Moderate (2+)   Positive   30 mg/dL (1+)                 No results found for: \"BLOODCX\"  No results found for: \"URINECX\"  No results found for: \"WOUNDCX\"  No results found for: \"STOOLCX\"    I have personally looked at the labs and they are summarized above.  ----------------------------------------------------------------------------------------------------------------------  Detailed radiology reports for the last 24 hours:    Imaging Results (Last 24 Hours)       Procedure Component Value Units Date/Time    XR Ankle 3+ View Left [[id number]] Collected: 09/26/24 1537     Updated: 09/26/24 1540    Narrative:      EXAM:    XR Left Ankle Complete, 3 or More Views     EXAM DATE:    9/26/2024 3:17 PM     CLINICAL HISTORY:    left ankle injury     TECHNIQUE:    Frontal, lateral and oblique views of the left ankle.     COMPARISON:    No relevant prior studies available.     FINDINGS:    Bones/joints:  See below.     "  Soft tissues:  Soft tissue swelling, but no acute fracture or  dislocation.       Impression:        Soft tissue swelling, but no acute fracture or dislocation.        This report was finalized on 9/26/2024 3:38 PM by Dr. Moses Otto MD.       XR Foot 3+ View Left [[id number]] Collected: 09/26/24 1536     Updated: 09/26/24 1539    Narrative:      EXAM:    XR Left Foot Complete, 3 or More Views     EXAM DATE:    9/26/2024 3:16 PM     CLINICAL HISTORY:    left foot wound     TECHNIQUE:    Frontal, lateral and oblique views of the left foot.     COMPARISON:    No relevant prior studies available.     FINDINGS:    Bones/joints:  Unremarkable.  No acute fracture.  No dislocation.    Soft tissues:  Unremarkable.  No radiopaque foreign body.       Impression:        No acute findings in the left foot.        This report was finalized on 9/26/2024 3:37 PM by Dr. Moses Otto MD.       XR Chest 1 View [[id number]] Collected: 09/26/24 1406     Updated: 09/26/24 1408    Narrative:      XR CHEST 1 VW-     CLINICAL INDICATION: weakness        COMPARISON: None immediately available      TECHNIQUE: Single frontal view of the chest.     FINDINGS:     LUNGS: Lungs are adequately aerated.      HEART AND MEDIASTINUM: Heart and mediastinal contours are unremarkable        SKELETON: Bony and soft tissue structures are unremarkable.             Impression:      No radiographic evidence of acute cardiac or pulmonary disease.           This report was finalized on 9/26/2024 2:06 PM by Dr. Moses Otto MD.       CT Cervical Spine Without Contrast [[id number]] Collected: 09/26/24 1317     Updated: 09/26/24 1319    Narrative:      CT CERVICAL SPINE WO CONTRAST-     CLINICAL INDICATION: neck pain        COMPARISON: None available     TECHNIQUE: Axial images of the cervical spine were acquired with out any  intravenous contrast. Reformatted images were then created in the  sagittal and coronal planes.     DOSE:      Radiation dose reduction  techniques were utilized per ALARA protocol.  Automated exposure control was initiated through either or Magicblox or  DoseRight software packages by  protocol.           FINDINGS:   The provided study demonstrates preservation of the vertebral body  heights in the sagittally reconstructed images.     There is no prevertebral soft tissue swelling.     Alignment is near anatomic.     The disc space heights are uniform.     I see no acute cervical spine fracture.       Impression:         1. No acute bony abnormality.     2. Other incidental findings as above     This report was finalized on 9/26/2024 1:17 PM by Dr. Moses Otto MD.       CT Lumbar Spine Without Contrast [[id number]] Collected: 09/26/24 1317     Updated: 09/26/24 1319    Narrative:      EXAM:    CT Lumbar Spine Without Intravenous Contrast     EXAM DATE:    9/26/2024 12:59 PM     CLINICAL HISTORY:    back pain     TECHNIQUE:    Axial computed tomography images of the lumbar spine without  intravenous contrast.  Sagittal and coronal reformatted images were  created and reviewed.  This CT exam was performed using one or more of  the following dose reduction techniques:  automated exposure control,  adjustment of the mA and/or kV according to patient size, and/or use of  iterative reconstruction technique.     COMPARISON:    No relevant prior studies available.     FINDINGS:    VERTEBRAE:  Unremarkable.  No acute fracture.    DISCS/SPINAL CANAL/NEURAL FORAMINA:  No acute findings.  No spinal  canal stenosis.    SOFT TISSUES:  Unremarkable.       Impression:        No acute findings in the lumbar spine.        This report was finalized on 9/26/2024 1:17 PM by Dr. Moses Otto MD.       CT Thoracic Spine Without Contrast [[id number]] Collected: 09/26/24 1316     Updated: 09/26/24 1319    Narrative:      EXAM:    CT Thoracic Spine Without Intravenous Contrast     EXAM DATE:    9/26/2024 12:58 PM     CLINICAL HISTORY:    back pain     TECHNIQUE:     Axial computed tomography images of the thoracic spine without  intravenous contrast.  Sagittal and coronal reformatted images were  created and reviewed.  This CT exam was performed using one or more of  the following dose reduction techniques:  automated exposure control,  adjustment of the mA and/or kV according to patient size, and/or use of  iterative reconstruction technique.     COMPARISON:    No relevant prior studies available.     FINDINGS:    VERTEBRAE:  Unremarkable.  No acute fracture.    DISCS/SPINAL CANAL/NEURAL FORAMINA:  No acute findings.  No spinal  canal stenosis.    SOFT TISSUES:  Unremarkable.       Impression:        No acute findings in the thoracic spine.        This report was finalized on 9/26/2024 1:17 PM by Dr. Moses Otto MD.       CT Head Without Contrast [[id number]] Collected: 09/26/24 1259     Updated: 09/26/24 1302    Narrative:      CT HEAD WO CONTRAST-     CLINICAL INDICATION: weakness        COMPARISON: None available     TECHNIQUE: Axial images of the brain were obtained with out intravenous  contrast.  Reformatted images were created in the sagittal and coronal  planes.     DOSE:     Radiation dose reduction techniques were utilized per ALARA protocol.  Automated exposure control was initiated through either or Schrodinger or  DoseRight software packages by  protocol.        FINDINGS:    BRAIN: Probable subacute ischemia right middle cerebral artery  territory    VENTRICLES:  Unremarkable.  No ventriculomegaly.       BONES/JOINTS:  Unremarkable.  No acute fracture.       SOFT TISSUES:  Unremarkable.       SINUSES:  no air fluid levels       MASTOID AIR CELLS:  Unremarkable as visualized.  No mastoid effusion.          Impression:         1. Probable remote right middle cerebral artery infarction  2. No acute parenchymal mass, hemorrhage, or midline shift     This report was finalized on 9/26/2024 1:00 PM by Dr. Moses Otto MD.             I have personally looked  at the radiology images and read the final radiology report.    Assessment & Plan    43F Morbid Obesity by BMI PMH History Genital Herpes, Cerebrovascular Accident 5/2024 complicated by L sided paralysis, presented to [facility] emergency room 9/26 after sliding into the floor off her chair due to weakness.     #Acute Metabolic Encephalopathy due to Acute Urinary Tract Infection in setting of probable neurogenic bladder  - Continue Ceftriaxone, follow up cultures, rule out STI    #Electrolyte Abnormalities  - Acute Mild Hypokalemia - Replacing, on protocol  - Acute Mild Hypomagnesemia - Replacing, on protocol    #History Cerebrovascular Accident complicated by L sided paralysis, unclear etiology  - Review home medications and resume as indicated, Supportive Care     #Debility  - Consult PT/OT, Social Work if placement needed     #History Genital Herpes  - Supportive Care, follow up medication reconciliation for any suppressive medications, check HIV & acute hepatitis panel     #Morbid Obesity by BMI  - Body mass index is 43.26 kg/m².; complicates all aspects of care    F: Oral  E: Monitor & Replace as needed   N: Regular Diet  PPx: SQH  Code Status (Patient has no pulse and is not breathing): CPR  Medical Interventions (Patient has pulse or is breathing): Full Support     Dispo: Pending workup and clinical improvement    *This patient is considered high risk secondary to Urinary Tract Infection, electrolyte abnormalities, chronic L sided paralysis from prior Cerebrovascular Accident.      Edited by: Ramon Marc MD at 9/26/2024 1701    Ramon Marc MD  [facility] Hospitalist  09/26/24  17:01 EDT        Electronically signed by Ramon Marc MD at 09/26/24 1703       Vital Signs (last day)       Date/Time Temp Temp src Pulse Resp BP Patient Position SpO2    10/10/24 0700 97.6 (36.4) Oral 99 18 154/93 Lying 98    10/10/24 0048 98.6 (37) Oral 85 18 96/71 Lying 97    10/09/24 2344 98.1 (36.7)  Oral 92 20 123/71 Lying 96    10/09/24 2025 98.4 (36.9) Oral 114 20 143/97 Lying 96    10/09/24 1500 98.6 (37) Oral 99 20 132/84 Lying 95    10/09/24 1130 98.4 (36.9) Oral 83 20 138/84 Lying 96    10/09/24 0720 98.6 (37) Oral 114 20 132/64 Lying 93    10/09/24 0445 98.1 (36.7) Oral 97 22 137/83 Lying 93          Lines, Drains & Airways       Active LDAs       Name Placement date Placement time Site Days    Midline Catheter - Single Lumen 09/30/24 Right Basilic 09/30/24  0955  -- 10    External Urinary Catheter 09/29/24  1500  --  10                  Current Facility-Administered Medications   Medication Dose Route Frequency Provider Last Rate Last Admin    acetaminophen (TYLENOL) tablet 650 mg  650 mg Oral Q6H PRN Ashleigh Nicholas PA-C   650 mg at 10/10/24 1103    acyclovir (ZOVIRAX) capsule 400 mg  400 mg Oral Nightly Ramon Marc MD   400 mg at 10/09/24 2125    aspirin chewable tablet 81 mg  81 mg Oral Daily Ramon Marc MD   81 mg at 10/10/24 0823    atorvastatin (LIPITOR) tablet 80 mg  80 mg Oral Daily Ramon Marc MD   80 mg at 10/10/24 0822    sennosides-docusate (PERICOLACE) 8.6-50 MG per tablet 2 tablet  2 tablet Oral BID PRN Ramon Marc MD        And    polyethylene glycol (MIRALAX) packet 17 g  17 g Oral Daily PRN Ramon Marc MD        And    bisacodyl (DULCOLAX) EC tablet 5 mg  5 mg Oral Daily PRN Ramon Marc MD        And    bisacodyl (DULCOLAX) suppository 10 mg  10 mg Rectal Daily PRN Ramon Marc MD        Calcium Replacement - Follow Nurse / BPA Driven Protocol   Does not apply PRN Ramon Marc MD        cyclobenzaprine (FLEXERIL) tablet 10 mg  10 mg Oral TID PRN Ramon Marc MD   10 mg at 10/10/24 0724    Diclofenac Sodium (VOLTAREN) 1 % gel 4 g  4 g Topical 4x Daily PRN Ashleigh Nicholas PA-C   4 g at 10/10/24 0825    DULoxetine (CYMBALTA) DR capsule 60 mg  60 mg Oral Daily Ramon Marc MD   60 mg at 10/10/24 0823    heparin (porcine) 5000 UNIT/ML injection 5,000 Units  5,000  Units Subcutaneous Q8H Ramon Marc MD   5,000 Units at 10/10/24 0546    lisinopril (PRINIVIL,ZESTRIL) tablet 20 mg  20 mg Oral Daily Ramon Marc MD   20 mg at 10/10/24 0822    loperamide (IMODIUM) capsule 2 mg  2 mg Oral 4x Daily PRN Marv Navas,    2 mg at 10/09/24 1627    Magnesium Standard Dose Replacement - Follow Nurse / BPA Driven Protocol   Does not apply PRN Ramon Marc MD        melatonin tablet 5 mg  5 mg Oral Nightly PRN Wesley Matthews APRN   5 mg at 10/09/24 2125    [Held by provider] metFORMIN (GLUCOPHAGE) tablet 1,000 mg  1,000 mg Oral BID With Meals Ramon Marc MD        metoprolol tartrate (LOPRESSOR) tablet 25 mg  25 mg Oral Q12H Mara Navas MD   25 mg at 10/10/24 0822    nicotine (NICODERM CQ) 7 MG/24HR patch 1 patch  1 patch Transdermal Q24H Ramon Marc MD   1 patch at 10/10/24 0823    nystatin (MYCOSTATIN) powder   Topical Q12H Ramon Marc MD   Given at 10/09/24 2126    pantoprazole (PROTONIX) EC tablet 40 mg  40 mg Oral Daily Ramon Marc MD   40 mg at 10/10/24 0822    Pharmacy Consult   Does not apply Continuous PRN Ramon Marc MD        Phosphorus Replacement - Follow Nurse / BPA Driven Protocol   Does not apply PRN Ramon Marc MD        Potassium Replacement - Follow Nurse / BPA Driven Protocol   Does not apply PRN Ramon Marc MD        prochlorperazine (COMPAZINE) injection 2.5 mg  2.5 mg Intravenous Q6H PRN Ashleigh Nicholas PA-C   2.5 mg at 10/09/24 2357    sodium chloride 0.9 % flush 10 mL  10 mL Intravenous Q12H Ramon Marc MD   10 mL at 10/10/24 0825    sodium chloride 0.9 % flush 10 mL  10 mL Intravenous PRN Ramon Marc MD        sodium chloride 0.9 % flush 10 mL  10 mL Intravenous Q12H Marv Navas,    10 mL at 10/10/24 0825    sodium chloride 0.9 % flush 10 mL  10 mL Intravenous PRN Marv Navas DO        sodium chloride 0.9 % infusion 40 mL  40 mL Intravenous PRN Ramon Marc MD        sodium chloride  0.9 % infusion 40 mL  40 mL Intravenous PRN Marv Navas DO         Operative/Procedure Notes (most recent note)    No notes of this type exist for this encounter.          Physician Progress Notes (most recent note)        Ramon Marc MD at 10/10/24 0931              [facility]     Patient Identification:  Name:  Lety Johnson  Age:  43 y.o.  Sex:  female  :  1980  MRN:  [id number]  Visit Number:  [id number]  ROOM: [address] [place] [address]     Primary Care Provider:  Provider, No Known    Length of stay in inpatient status:  12    Subjective     Chief Compliant:    Chief Complaint   Patient presents with    Fall     History of Presenting Illness:    Patient remains ill but stable today, no acute events overnight, no new complaints, denies any fevers or chills, medically stable, not undergoing any active treatments, pending placement for rehab, reports her boyfriend gets out of [facility] next week, notes she doesn't believe he can care for her at home alone though if not placed by next week would be open to see if Home Health or home helpers could assist him if she's unable to be placed.   Objective     Current Hospital Meds:  acyclovir, 400 mg, Oral, Nightly  aspirin, 81 mg, Oral, Daily  atorvastatin, 80 mg, Oral, Daily  DULoxetine, 60 mg, Oral, Daily  heparin (porcine), 5,000 Units, Subcutaneous, Q8H  lisinopril, 20 mg, Oral, Daily  [Held by provider] metFORMIN, 1,000 mg, Oral, BID With Meals  metoprolol tartrate, 25 mg, Oral, Q12H  nicotine, 1 patch, Transdermal, Q24H  nystatin, , Topical, Q12H  pantoprazole, 40 mg, Oral, Daily  sodium chloride, 10 mL, Intravenous, Q12H  sodium chloride, 10 mL, Intravenous, Q12H      Pharmacy Consult,       ----------------------------------------------------------------------------------------------------------------------  Vital Signs:  Temp:  [97.6 °F (36.4 °C)-98.6 °F (37 °C)] 97.6 °F (36.4 °C)  Heart Rate:  [] 99  Resp:   "[18-20] 18  BP: ()/(71-97) 154/93  SpO2:  [95 %-98 %] 98 %  on   ;   Device (Oxygen Therapy): room air  Body mass index is 36.73 kg/m².      Intake/Output Summary (Last 24 hours) at 10/10/2024 0931  Last data filed at 10/10/2024 0300  Gross per 24 hour   Intake 780 ml   Output 2550 ml   Net -1770 ml      ----------------------------------------------------------------------------------------------------------------------  Physical exam:  Constitutional:  Older than stated age. No acute distress. No discomfort   HENT:  Head:  Normocephalic and atraumatic.  Mouth:  Moist mucous membranes.    Eyes:  Conjunctivae and EOM are normal. No scleral icterus.    Neck:  Neck supple.  No JVD present.    Cardiovascular:  Normal rate, regular rhythm and normal heart sounds with no murmur.  Pulmonary/Chest:  No respiratory distress, no wheezes, on room air  Abdominal:  Soft. No distension and no tenderness.   Musculoskeletal:  No tenderness, and no deformity.  No red or swollen joints anywhere.    Neurological:  Alert and oriented to person, place, and time.  No cranial nerve deficit. Chronic L sided paralysis.    Skin:  Skin is warm and dry. No pallor. Significant intertriginous erythema under panus, consistent with yeast infection.   Peripheral vascular:  No clubbing, no cyanosis, trace edema.  Psychiatric: Appropriate mood and affect    *Examination stable today 10/10  Edited by: Ramon Marc MD at 10/10/2024 0931  ----------------------------------------------------------------------------------------------------------------------                 No results found for: \"URINECX\"  No results found for: \"BLOODCX\"    I have personally looked at the labs and they are summarized above.  ----------------------------------------------------------------------------------------------------------------------  Detailed radiology reports for the last 24 hours:  No radiology results for the last day  Assessment & Plan    43F Morbid " Obesity by BMI PMH History Genital Herpes, Cerebrovascular Accident 5/2024 complicated by L sided paralysis, presented to [facility] emergency room 9/26 after sliding into the floor off her chair due to weakness.     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     #Electrolyte Abnormalities  - Acute Mild Hypokalemia - Replaced per protocol - Resolved   - Acute Mild Hypomagnesemia - Replaced per protocol - Resolved     #History Cerebrovascular Accident complicated by L sided paralysis, unclear etiology  - Continue home regimen, Supportive Care     #Debility  - Consult PT/OT, Social Work if placement needed     #History Genital Herpes  - Supportive Care, continue home Acyclovir     #Morbid Obesity by BMI  - Body mass index is 43.26 kg/m².; complicates all aspects of care    F: Oral  E: Monitor & Replace as needed   N: Diet: Regular/House; Fluid Consistency: Thin (IDDSI 0)   PPx: SQH  Code Status (Patient has no pulse and is not breathing): CPR  Medical Interventions (Patient has pulse or is breathing): Full Support     Dispo: Pending placement, PT/OT consulted and following, Social Work consulted and following.     *This patient is considered high risk secondary to Urinary Tract Infection, electrolyte abnormalities, chronic L sided paralysis from prior Cerebrovascular Accident.      *Plan unchanged today 10/10  Edited by: Ramon Marc MD at 10/10/2024 0931  [facility] Hospitalist        Electronically signed by Ramon Marc MD at 10/10/24 0932          Consult Notes (most recent note)        Cuca Yuen APRN at 09/28/24 1436        Consult Orders    1. Inpatient Infectious Diseases Consult [[id number]] ordered  by Ramon Marc MD at 24 1427                         INFECTIOUS DISEASE CONSULTATION REPORT        Patient Identification:  Name:  Lety Johnson  Age:  43 y.o.  Sex:  female  :  1980  MRN:  [id number]   Visit Number:  [id number]  Primary Care Physician:  Provider, No Known        Referring Provider: Dr. Marc    Reason for consult: ESBL UTI       LOS: 0 days        Subjective       Subjective     History of present illness:      Thank you Dr. Marc for allowing us to participate in the care of your patient.  As you well know, Ms. Lety Johnson is a 43 y.o. female with past medical history significant for ailin herpes, CVA in May 2024 with residual left-sided paralysis, who presented to [facility] Emergency Department on 2024 for evaluation after sliding into the floor due to weakness.  WBC 7.38.  Chlamydia gonorrhea and trichomonas negative.  CRP 3.80.  Urinalysis positive with culture finalizing is greater than 100,000 colonies of E. coli ESBL.  Blood cultures from 2024 show no growth thus far.  HIV 1 and 2 nonreactive.  Hepatitis panel nonreactive.  COVID-19 and influenza PCR negative.  Lactic acid normal on admission.    Patient resting in bed.  Denies dysuria, urgency, frequency.  Patient reports recurrent yeast infections.  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  Lungs clear to auscultation bilaterally.      Infectious Disease consultation was requested for antimicrobial management.      ---------------------------------------------------------------------------------------------------------------------     Review Of Systems:    Constitutional: no fever, chills and night sweats. No appetite change or unexpected weight change. No fatigue.  Eyes: no eye drainage, itching or redness.  HEENT: no mouth sores, dysphagia or nose bleed.  Respiratory: no for shortness of breath, cough or production of sputum.  Cardiovascular: no chest pain, no palpitations, no  orthopnea.  Gastrointestinal: no nausea, vomiting or diarrhea. No abdominal pain, hematemesis or rectal bleeding.  Genitourinary: no dysuria or polyuria.  Hematologic/lymphatic: no lymph node abnormalities, no easy bruising or easy bleeding.  Musculoskeletal: no muscle or joint pain.  Skin: No rash and no itching.  Neurological: no loss of consciousness, no seizure, no headache.  Behavioral/Psych: no depression or suicidal ideation.  Endocrine: no hot flashes.  Immunologic: negative.    ---------------------------------------------------------------------------------------------------------------------     Past Medical History    Past Medical History:   Diagnosis Date    Stroke        Past Surgical History    History reviewed. No pertinent surgical history.    Family History    History reviewed. No pertinent family history.    Social History    Social History     Tobacco Use    Smoking status: Every Day     Average packs/day: 2.0 packs/day for 26.0 years (52.0 ttl pk-yrs)     Types: Cigarettes     Start date: 1998    Smokeless tobacco: Never   Vaping Use    Vaping status: Never Used   Substance Use Topics    Alcohol use: Not Currently    Drug use: Never       Allergies    Patient has no known allergies.  ---------------------------------------------------------------------------------------------------------------------     Home Medications:    Prior to Admission Medications       Prescriptions Last Dose Informant Patient Reported? Taking?    acyclovir (ZOVIRAX) 400 MG tablet  Pharmacy Yes No    Take 1 tablet by mouth Every Night.    aspirin 81 MG chewable tablet Unknown  Yes No    Chew 1 tablet Daily.    atorvastatin (LIPITOR) 80 MG tablet Unknown  Yes No    Take 1 tablet by mouth Daily.    cyclobenzaprine (FLEXERIL) 10 MG tablet Unknown  Yes No    Take 1 tablet by mouth 3 (Three) Times a Day As Needed for Muscle Spasms.    DULoxetine (CYMBALTA) 60 MG capsule Unknown  Yes No    Take 1 capsule by mouth Daily.     lisinopril (PRINIVIL,ZESTRIL) 20 MG tablet Unknown  Yes No    Take 1 tablet by mouth Daily.    metFORMIN (GLUCOPHAGE) 1000 MG tablet Unknown  Yes No    Take 1 tablet by mouth 2 (Two) Times a Day With Meals.    pantoprazole (PROTONIX) 40 MG EC tablet Unknown  Yes No    Take 1 tablet by mouth Daily.          ---------------------------------------------------------------------------------------------------------------------    Objective       Objective     Hospital Scheduled Meds:  acyclovir, 400 mg, Oral, Nightly  aspirin, 81 mg, Oral, Daily  atorvastatin, 80 mg, Oral, Daily  DULoxetine, 60 mg, Oral, Daily  ertapenem, 1,000 mg, Intravenous, Q24H  heparin (porcine), 5,000 Units, Subcutaneous, Q8H  lisinopril, 20 mg, Oral, Daily  [Held by provider] metFORMIN, 1,000 mg, Oral, BID With Meals  nicotine, 1 patch, Transdermal, Q24H  pantoprazole, 40 mg, Oral, Daily  sodium chloride, 10 mL, Intravenous, Q12H      Pharmacy Consult,   sodium chloride, 75 mL/hr, Last Rate: 75 mL/hr (09/28/24 1315)      ---------------------------------------------------------------------------------------------------------------------   Vital Signs:  Temp:  [97.5 °F (36.4 °C)-98.4 °F (36.9 °C)] 98.4 °F (36.9 °C)  Heart Rate:  [] 58  Resp:  [18-20] 18  BP: (133-172)/(71-86) 172/83  Mean Arterial Pressure (Non-Invasive) for the past 24 hrs (Last 3 readings):   Noninvasive MAP (mmHg)   09/28/24 1100 107   09/28/24 0650 92   09/28/24 0307 103     SpO2 Percentage    09/28/24 0307 09/28/24 0650 09/28/24 1100   SpO2: 98% 96% 97%     SpO2:  [96 %-98 %] 97 %  on   ;   Device (Oxygen Therapy): room air    Body mass index is 40.79 kg/m².  Wt Readings from Last 3 Encounters:   09/28/24 113 kg (248 lb 14.4 oz)   06/27/24 120 kg (264 lb)     ---------------------------------------------------------------------------------------------------------------------     Physical Exam:    Constitutional:  Well-developed and well-nourished.  No respiratory  distress.      HENT:  Head: Normocephalic and atraumatic.  Mouth:  Moist mucous membranes.    Eyes:  Conjunctivae and EOM are normal.  No scleral icterus.  Neck:  Neck supple.  No JVD present.    Cardiovascular:  Normal rate, regular rhythm and normal heart sounds with no murmur. No edema.  Pulmonary/Chest:  No respiratory distress, no wheezes, no crackles, with normal breath sounds and good air movement.  Abdominal:  Soft.  Bowel sounds are normal.  No distension and no tenderness.   Musculoskeletal: Left sided paralysis secondary to CVA.  Neurological:  Alert and oriented to person, place, and time.  No facial droop.  No slurred speech.   Skin:  Skin is warm and dry.  No rash noted.  No pallor.   Psychiatric:  Normal mood and affect.  Behavior is normal.    ---------------------------------------------------------------------------------------------------------------------              Results from last 7 days   Lab Units 09/28/24 0318 09/27/24  0844 09/26/24  1504 09/26/24  1350   CRP mg/dL  --   --  3.80*  --    LACTATE mmol/L  --   --   --  1.1   WBC 10*3/mm3 7.38 9.67  --  10.42   HEMOGLOBIN g/dL 11.8* 10.9*  --  12.5   HEMATOCRIT % 36.9 34.8  --  37.4   MCV fL 98.9* 102.4*  --  94.0   MCHC g/dL 32.0 31.3*  --  33.4   PLATELETS 10*3/mm3 332 292  --  402     Results from last 7 days   Lab Units 09/28/24 0318 09/27/24  1904 09/27/24  0844 09/26/24  1504   SODIUM mmol/L 142  --  137 140   POTASSIUM mmol/L 4.1 4.0 3.5 2.7*   MAGNESIUM mg/dL  --   --   --  1.5*   CHLORIDE mmol/L 110*  --  105 99   CO2 mmol/L 22.3  --  21.5* 28.9   BUN mg/dL 5*  --  7 10   CREATININE mg/dL 0.43*  --  0.36* 0.51*   CALCIUM mg/dL 8.5*  --  7.9* 9.4   GLUCOSE mg/dL 198*  --  234* 220*   ALBUMIN g/dL 2.7*  --  2.5* 3.5   BILIRUBIN mg/dL 0.3  --  0.3 0.6   ALK PHOS U/L 100  --  96 119*   AST (SGOT) U/L 15  --  17 21   ALT (SGPT) U/L 9  --  7 8   Estimated Creatinine Clearance: 215.5 mL/min (A) (by C-G formula based on SCr of 0.43 mg/dL  "(L)).  No results found for: \"AMMONIA\"    No results found for: \"HGBA1C\", \"POCGLU\"  No results found for: \"HGBA1C\"  No results found for: \"TSH\", \"FREET4\"    Blood Culture   Date Value Ref Range Status   09/26/2024 No growth at 2 days  Preliminary   09/26/2024 No growth at 2 days  Preliminary     Urine Culture   Date Value Ref Range Status   09/26/2024 >100,000 CFU/mL Escherichia coli ESBL (A)  Final     No results found for: \"WOUNDCX\"  No results found for: \"STOOLCX\"  No results found for: \"RESPCX\"  Pain Management Panel           No data to display              I have personally reviewed the above laboratory results.   ---------------------------------------------------------------------------------------------------------------------  Imaging Results (Last 7 Days)       Procedure Component Value Units Date/Time    XR Ankle 3+ View Left [[id number]] Collected: 09/26/24 1537     Updated: 09/26/24 1540    Narrative:      EXAM:    XR Left Ankle Complete, 3 or More Views     EXAM DATE:    9/26/2024 3:17 PM     CLINICAL HISTORY:    left ankle injury     TECHNIQUE:    Frontal, lateral and oblique views of the left ankle.     COMPARISON:    No relevant prior studies available.     FINDINGS:    Bones/joints:  See below.      Soft tissues:  Soft tissue swelling, but no acute fracture or  dislocation.       Impression:        Soft tissue swelling, but no acute fracture or dislocation.        This report was finalized on 9/26/2024 3:38 PM by Dr. Moses Otto MD.       XR Foot 3+ View Left [[id number]] Collected: 09/26/24 1536     Updated: 09/26/24 1539    Narrative:      EXAM:    XR Left Foot Complete, 3 or More Views     EXAM DATE:    9/26/2024 3:16 PM     CLINICAL HISTORY:    left foot wound     TECHNIQUE:    Frontal, lateral and oblique views of the left foot.     COMPARISON:    No relevant prior studies available.     FINDINGS:    Bones/joints:  Unremarkable.  No acute fracture.  No dislocation.    Soft tissues:  " Unremarkable.  No radiopaque foreign body.       Impression:        No acute findings in the left foot.        This report was finalized on 9/26/2024 3:37 PM by Dr. Moses Otto MD.       XR Chest 1 View [[id number]] Collected: 09/26/24 1406     Updated: 09/26/24 1408    Narrative:      XR CHEST 1 VW-     CLINICAL INDICATION: weakness        COMPARISON: None immediately available      TECHNIQUE: Single frontal view of the chest.     FINDINGS:     LUNGS: Lungs are adequately aerated.      HEART AND MEDIASTINUM: Heart and mediastinal contours are unremarkable        SKELETON: Bony and soft tissue structures are unremarkable.             Impression:      No radiographic evidence of acute cardiac or pulmonary disease.           This report was finalized on 9/26/2024 2:06 PM by Dr. Moses Otto MD.       CT Cervical Spine Without Contrast [[id number]] Collected: 09/26/24 1317     Updated: 09/26/24 1319    Narrative:      CT CERVICAL SPINE WO CONTRAST-     CLINICAL INDICATION: neck pain        COMPARISON: None available     TECHNIQUE: Axial images of the cervical spine were acquired with out any  intravenous contrast. Reformatted images were then created in the  sagittal and coronal planes.     DOSE:      Radiation dose reduction techniques were utilized per ALARA protocol.  Automated exposure control was initiated through either or CareDose or  DoseRigWedge Buster software packages by  protocol.           FINDINGS:   The provided study demonstrates preservation of the vertebral body  heights in the sagittally reconstructed images.     There is no prevertebral soft tissue swelling.     Alignment is near anatomic.     The disc space heights are uniform.     I see no acute cervical spine fracture.       Impression:         1. No acute bony abnormality.     2. Other incidental findings as above     This report was finalized on 9/26/2024 1:17 PM by Dr. Moses Otto MD.       CT Lumbar Spine Without Contrast [[id number]]  Collected: 09/26/24 1317     Updated: 09/26/24 1319    Narrative:      EXAM:    CT Lumbar Spine Without Intravenous Contrast     EXAM DATE:    9/26/2024 12:59 PM     CLINICAL HISTORY:    back pain     TECHNIQUE:    Axial computed tomography images of the lumbar spine without  intravenous contrast.  Sagittal and coronal reformatted images were  created and reviewed.  This CT exam was performed using one or more of  the following dose reduction techniques:  automated exposure control,  adjustment of the mA and/or kV according to patient size, and/or use of  iterative reconstruction technique.     COMPARISON:    No relevant prior studies available.     FINDINGS:    VERTEBRAE:  Unremarkable.  No acute fracture.    DISCS/SPINAL CANAL/NEURAL FORAMINA:  No acute findings.  No spinal  canal stenosis.    SOFT TISSUES:  Unremarkable.       Impression:        No acute findings in the lumbar spine.        This report was finalized on 9/26/2024 1:17 PM by Dr. Moses Otto MD.       CT Thoracic Spine Without Contrast [[id number]] Collected: 09/26/24 1316     Updated: 09/26/24 1319    Narrative:      EXAM:    CT Thoracic Spine Without Intravenous Contrast     EXAM DATE:    9/26/2024 12:58 PM     CLINICAL HISTORY:    back pain     TECHNIQUE:    Axial computed tomography images of the thoracic spine without  intravenous contrast.  Sagittal and coronal reformatted images were  created and reviewed.  This CT exam was performed using one or more of  the following dose reduction techniques:  automated exposure control,  adjustment of the mA and/or kV according to patient size, and/or use of  iterative reconstruction technique.     COMPARISON:    No relevant prior studies available.     FINDINGS:    VERTEBRAE:  Unremarkable.  No acute fracture.    DISCS/SPINAL CANAL/NEURAL FORAMINA:  No acute findings.  No spinal  canal stenosis.    SOFT TISSUES:  Unremarkable.       Impression:        No acute findings in the thoracic spine.        This  report was finalized on 9/26/2024 1:17 PM by Dr. Moses Otto MD.       CT Head Without Contrast [[id number]] Collected: 09/26/24 1259     Updated: 09/26/24 1302    Narrative:      CT HEAD WO CONTRAST-     CLINICAL INDICATION: weakness        COMPARISON: None available     TECHNIQUE: Axial images of the brain were obtained with out intravenous  contrast.  Reformatted images were created in the sagittal and coronal  planes.     DOSE:     Radiation dose reduction techniques were utilized per ALARA protocol.  Automated exposure control was initiated through either or [employer] or  [employer] software packages by  protocol.        FINDINGS:    BRAIN: Probable subacute ischemia right middle cerebral artery  territory    VENTRICLES:  Unremarkable.  No ventriculomegaly.       BONES/JOINTS:  Unremarkable.  No acute fracture.       SOFT TISSUES:  Unremarkable.       SINUSES:  no air fluid levels       MASTOID AIR CELLS:  Unremarkable as visualized.  No mastoid effusion.          Impression:         1. Probable remote right middle cerebral artery infarction  2. No acute parenchymal mass, hemorrhage, or midline shift     This report was finalized on 9/26/2024 1:00 PM by Dr. Moses Otto MD.             I have personally reviewed the above radiology results.   ---------------------------------------------------------------------------------------------------------------------      Pertinent Infectious Disease Results          Assessment & Plan      Assessment        ESBL UTI      Plan      Patient presented to [facility] Emergency Department on 9/26/2024 for evaluation after sliding into the floor due to weakness.  WBC 7.38.  Chlamydia gonorrhea and trichomonas negative.  CRP 3.80.  Urinalysis positive with culture finalizing is greater than 100,000 colonies of E. coli ESBL.  Blood cultures from 9/26/2024 show no growth thus far.  HIV 1 and 2 nonreactive.  Hepatitis panel nonreactive.  COVID-19 and  influenza PCR negative.  Lactic acid normal on admission.    Patient resting in bed.  Denies dysuria, urgency, frequency.  Patient reports recurrent yeast infections.  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  Lungs clear to auscultation bilaterally.    Recommend to continue current antibiotic regimen of ertapenem 1 g IV every 24 hours x 7 days for treatment of ESBL UTI.  Okay to continue home suppressive acyclovir for history of genital herpes.  We will continue to follow closely and adjust antibiotic therapy as needed.        ANTIMICROBIAL THERAPY    acyclovir - 200 MG, 400 MG  ertapenem (INVanz) 1000 mg in 100 mL NS (VTB)       Again, thank you Dr. Marc for allowing us to participate in the care of your patient and please feel free to call for any questions you may have.        Code Status:     Code Status and Medical Interventions: CPR (Attempt to Resuscitate); Full Support   Ordered at: 24 1626     Code Status (Patient has no pulse and is not breathing):    CPR (Attempt to Resuscitate)     Medical Interventions (Patient has pulse or is breathing):    Full Support         YUE Rivera  24  14:36 EDT    Electronically signed by Cuca Yuen APRN at 24 1532          Physical Therapy Notes (most recent note)        Janessa Almeida, PT at 10/09/24 1008  Version 1 of 1         Acute Care - Physical Therapy Treatment Note   [facility]     Patient Name: Lety Johnson  : 1980  MRN: [id number]  Today's Date: 10/9/2024   Onset of Illness/Injury or Date of Surgery: 24  Visit Dx:     ICD-10-CM ICD-9-CM   1. Hypokalemia  E87.6 276.8   2. Pressure injury of skin of sacral region, unspecified injury stage  L89.159 707.03     707.20   3. Pressure injury of skin of left heel, unspecified injury stage  L89.629 707.07     707.20   4. Acute cystitis without hematuria  N30.00 595.0     Patient Active Problem List   Diagnosis   (none) - all problems resolved or deleted     Past Medical  History:   Diagnosis Date    Stroke      History reviewed. No pertinent surgical history.  PT Assessment (Last 12 Hours)       PT Evaluation and Treatment       Row Name 10/09/24 0915          Physical Therapy Time and Intention    Document Type therapy note (daily note)  -     Mode of Treatment physical therapy  -     Patient Effort adequate  -     Comment Pt participated in therapy intervention with some education, HEP administration of knee flex/ext and hip abd/add x5 q hour on L LE. Pt participated in LE TE of glute sets, LAQ, hip flexion with L LE hip flex neuro focus. Pt less emotionally liable today, cooperative. Attempted stand x3 without success. Time spent allowing pt to rest between sets, encouragement provided to pt. 15 minutes face to face with pt.  -       Row Name 10/09/24 0915          Bed Mobility    Bed Mobility supine-sit;sit-supine  -     Supine-Sit Ewing (Bed Mobility) maximum assist (25% patient effort)  -     Sit-Supine Ewing (Bed Mobility) maximum assist (25% patient effort);other (see comments)  encouraged to use R LE to assist L LE into bed, however pt states she couldn't or it hadn't worked before, encouraged to try again, grossly max/depA  -       Row Name 10/09/24 0915          Transfers    Comment, (Transfers) unable to perform STSx3 with 2 prson assist  -       Row Name 10/09/24 0915          Motor Skills    Therapeutic Exercise hip;knee  glute sets, LAQ, hip flexion R LE grossly 3x10, L LE hip flexion x10 with encouragement to visualize and attempt any trace movement.  -       Row Name             Wound 09/26/24 1806 gluteal    Wound - Properties Group Placement Date: 09/26/24 -KJ Placement Time: 1806 -KJ Location: gluteal  -KJ    Retired Wound - Properties Group Placement Date: 09/26/24 -KJ Placement Time: 1806 -KJ Location: gluteal  -KJ    Retired Wound - Properties Group Date first assessed: 09/26/24 -KJ Time first assessed: 1806 -KJ Location:  gluteal  -KJ      Row Name             Wound 09/26/24 1807 Left gluteal    Wound - Properties Group Placement Date: 09/26/24  -KJ Placement Time: 1807  -KJ Side: Left  -KJ Location: gluteal  -KJ    Retired Wound - Properties Group Placement Date: 09/26/24  -KJ Placement Time: 1807  -KJ Side: Left  -KJ Location: gluteal  -KJ    Retired Wound - Properties Group Date first assessed: 09/26/24  -KJ Time first assessed: 1807  -KJ Side: Left  -KJ Location: gluteal  -KJ      Row Name             Wound 09/26/24 1809 gluteal    Wound - Properties Group Placement Date: 09/26/24  -KJ Placement Time: 1809  -KJ Location: gluteal  -KJ    Retired Wound - Properties Group Placement Date: 09/26/24  -KJ Placement Time: 1809  -KJ Location: gluteal  -KJ    Retired Wound - Properties Group Date first assessed: 09/26/24  -KJ Time first assessed: 1809  -KJ Location: gluteal  -KJ      Row Name             Wound 09/26/24 1809 Left posterior ankle    Wound - Properties Group Placement Date: 09/26/24  -KJ Placement Time: 1809  -KJ Side: Left  -KJ Orientation: posterior  -KJ Location: ankle  -KJ    Retired Wound - Properties Group Placement Date: 09/26/24  -KJ Placement Time: 1809  -KJ Side: Left  -KJ Orientation: posterior  -KJ Location: ankle  -KJ    Retired Wound - Properties Group Date first assessed: 09/26/24  -KJ Time first assessed: 1809  -KJ Side: Left  -KJ Location: ankle  -KJ      Row Name             Wound 09/29/24 0952 Bilateral anterior thigh MASD (Moisture associated skin damage)    Wound - Properties Group Placement Date: 09/29/24  -LB Placement Time: 0952 -LB Present on Original Admission: Y  -LB Side: Bilateral  -LB Orientation: anterior  -LB Location: thigh  -LB Primary Wound Type: MASD  -LB    Retired Wound - Properties Group Placement Date: 09/29/24  -LB Placement Time: 0952 -LB Present on Original Admission: Y  -LB Side: Bilateral  -LB Orientation: anterior  -LB Location: thigh  -LB Primary Wound Type: MASD  -LB     Retired Wound - Properties Group Date first assessed: 09/29/24  -LB Time first assessed: 0952 -LB Present on Original Admission: Y  -LB Side: Bilateral  -LB Location: thigh  -LB Primary Wound Type: MASD  -LB      Row Name 10/09/24 0915          Positioning and Restraints    Pre-Treatment Position in bed  -KH     Post Treatment Position bed  -KH     In Bed supine;exit alarm on  -KH       Row Name 10/09/24 0915          Progress Summary (PT)    Daily Progress Summary (PT) Pt less emotionally liable today, effort fair, unsure if pt understood task at times, easily distracted/discusses other topics during treatment. Prognosis guarded.  -KH               User Key  (r) = Recorded By, (t) = Taken By, (c) = Cosigned By      Initials Name Provider Type    Janessa Hassan, PT Physical Therapist    Nory Keenan, RN Registered Nurse    Zehra Rivera, RN Registered Nurse                    Physical Therapy Education       Title: PT OT SLP Therapies (Done)       Topic: Physical Therapy (Done)       Point: Mobility training (Done)       Learning Progress Summary             Patient Acceptance, E,TB, VU by CB at 10/8/2024 0448    Acceptance, E, NR by RD at 10/2/2024 1101    Acceptance, E, NR by RD at 10/1/2024 0856    Acceptance, E,D, VU,NR by AG at 9/30/2024 1559                         Point: Home exercise program (Done)       Learning Progress Summary             Patient Acceptance, E,TB, VU by CB at 10/8/2024 0448    Acceptance, E, NR by RD at 10/2/2024 1101    Acceptance, E, NR by RD at 10/1/2024 0856    Acceptance, E,D, VU,NR by AG at 9/30/2024 1559                         Point: Body mechanics (Done)       Learning Progress Summary             Patient Acceptance, E,TB, VU by CB at 10/8/2024 0448    Acceptance, E, NR by RD at 10/2/2024 1101    Acceptance, E, NR by RD at 10/1/2024 0856    Acceptance, E,D, VU,NR by AG at 9/30/2024 1559                         Point: Precautions (Done)       Learning Progress  Summary             Patient Acceptance, E,TB, VU by CB at 10/8/2024 0448    Acceptance, E, NR by RD at 10/2/2024 1101    Acceptance, E, NR by RD at 10/1/2024 0856    Acceptance, E,D, VU,NR by  at 2024 1559                                         User Key       Initials Effective Dates Name Provider Type Discipline     21 -  Мария Joya, PT Physical Therapist PT    RD 21 -  Laisha Verdugo, RN Registered Nurse Nurse     24 -  Fidelia Lombardo, RN Registered Nurse Nurse                  PT Recommendation and Plan     Progress Summary (PT)  Daily Progress Summary (PT): Pt less emotionally liable today, effort fair, unsure if pt understood task at times, easily distracted/discusses other topics during treatment. Prognosis guarded.       Time Calculation:    PT Charges       Row Name 10/09/24 1005             Time Calculation    Start Time 915  -      PT Received On 10/09/24  -         Time Calculation- PT    Total Timed Code Minutes- PT 15 minute(s)  -                User Key  (r) = Recorded By, (t) = Taken By, (c) = Cosigned By      Initials Name Provider Type     Janessa Almeida, PT Physical Therapist                  Therapy Charges for Today       Code Description Service Date Service Provider Modifiers Qty    81950051636 HC PT THER PROC EA 15 MIN 10/8/2024 Janessa Almeida, PT GP 1    51542101893 HC PT THERAPEUTIC ACT EA 15 MIN 10/8/2024 Janessa Almeida, PT GP 1    95970202204 HC PT THER PROC EA 15 MIN 10/9/2024 Janessa Almeida, PT GP 1            PT G-Codes  AM-PAC 6 Clicks Score (PT): 8    Janessa Almeida PT  10/9/2024      Electronically signed by Janessa Almeida PT at 10/09/24 1008          Occupational Therapy Notes (most recent note)        Loida Flaherty, OT at 10/10/24 1127          Acute Care - Occupational Therapy Treatment Note  [facility]     Patient Name: Lety Johnson  : 1980  MRN: [id number]  Today's Date: 10/10/2024  Onset of Illness/Injury or Date  of Surgery: 09/26/24     Referring Physician: Dr. Marc    Admit Date: 9/26/2024       ICD-10-CM ICD-9-CM   1. Hypokalemia  E87.6 276.8   2. Pressure injury of skin of sacral region, unspecified injury stage  L89.159 707.03     707.20   3. Pressure injury of skin of left heel, unspecified injury stage  L89.629 707.07     707.20   4. Acute cystitis without hematuria  N30.00 595.0     Patient Active Problem List   Diagnosis   (none) - all problems resolved or deleted     Past Medical History:   Diagnosis Date    Stroke      History reviewed. No pertinent surgical history.      OT ASSESSMENT FLOWSHEET (Last 12 Hours)       OT Evaluation and Treatment       Row Name 10/10/24 1122                   OT Time and Intention    Subjective Information weakness;complains of;pain  -LA        Document Type therapy note (daily note)  -LA        Mode of Treatment occupational therapy  -LA        Patient Effort adequate  -LA        Symptoms Noted During/After Treatment fatigue  -LA           General Information    Patient Profile Reviewed yes  -LA        General Observations of Patient Patient agreeable to therapy this date. Patient was able to perform sit to stand with maxAx2 person x2 attempts. Patient continues to require maxA x2 from EOB to supine.  -LA        Existing Precautions/Restrictions fall;left;shoulder  -LA           Cognition    Affect/Mental Status (Cognition) WFL  patient did not cry this date  -LA        Orientation Status (Cognition) oriented x 4  -LA        Follows Commands (Cognition) WFL  -LA           Bed Mobility    Rolling Left [place] (Bed Mobility) maximum assist (25% patient effort)  -LA        Rolling Right [place] (Bed Mobility) maximum assist (25% patient effort)  -LA        Supine-Sit [place] (Bed Mobility) moderate assist (50% patient effort);maximum assist (25% patient effort)  -LA        Sit-Supine [place] (Bed Mobility) dependent (less than 25% patient effort);2 person assist   -LA           Sit-Stand Transfer    Sit-Stand [place] (Transfers) maximum assist (25% patient effort);2 person assist  x2 (attempted x4 this date)  -LA           Motor Skills    Motor Control/Coordination Interventions therapeutic exercise/ROM  -LA        Therapeutic Exercise shoulder;elbow/forearm;wrist;hand  Right UE only  -LA           Balance    Static Sitting Balance supervision  -LA        Dynamic Sitting Balance minimal assist  -LA           Wound 09/26/24 1806 gluteal    Wound - Properties Group Placement Date: 09/26/24  -KJ Placement Time: 1806  -KJ Location: gluteal  -KJ    Retired Wound - Properties Group Placement Date: 09/26/24  -KJ Placement Time: 1806  -KJ Location: gluteal  -KJ    Retired Wound - Properties Group Date first assessed: 09/26/24  -KJ Time first assessed: 1806  -KJ Location: gluteal  -KJ       Wound 09/26/24 1807 Left gluteal    Wound - Properties Group Placement Date: 09/26/24  -KJ Placement Time: 1807  -KJ Side: Left  -KJ Location: gluteal  -KJ    Retired Wound - Properties Group Placement Date: 09/26/24  -KJ Placement Time: 1807  -KJ Side: Left  -KJ Location: gluteal  -KJ    Retired Wound - Properties Group Date first assessed: 09/26/24  -KJ Time first assessed: 1807  -KJ Side: Left  -KJ Location: gluteal  -KJ       Wound 09/26/24 1809 gluteal    Wound - Properties Group Placement Date: 09/26/24  -KJ Placement Time: 1809  -KJ Location: gluteal  -KJ    Retired Wound - Properties Group Placement Date: 09/26/24  -KJ Placement Time: 1809  -KJ Location: gluteal  -KJ    Retired Wound - Properties Group Date first assessed: 09/26/24  -KJ Time first assessed: 1809  -KJ Location: gluteal  -KJ       Wound 09/26/24 1809 Left posterior ankle    Wound - Properties Group Placement Date: 09/26/24  -KJ Placement Time: 1809  -KJ Side: Left  -KJ Orientation: posterior  -KJ Location: ankle  -KJ    Retired Wound - Properties Group Placement Date: 09/26/24  -KJ Placement Time: 1809  -KJ Side: Left   -KJ Orientation: posterior  -KJ Location: ankle  -KJ    Retired Wound - Properties Group Date first assessed: 09/26/24  -KJ Time first assessed: 1809  -KJ Side: Left  -KJ Location: ankle  -KJ       Wound 09/29/24 0952 Bilateral anterior thigh MASD (Moisture associated skin damage)    Wound - Properties Group Placement Date: 09/29/24  -LB Placement Time: 0952 -LB Present on Original Admission: Y  -LB Side: Bilateral  -LB Orientation: anterior  -LB Location: thigh  -LB Primary Wound Type: MASD  -LB    Retired Wound - Properties Group Placement Date: 09/29/24  -LB Placement Time: 0952  -LB Present on Original Admission: Y  -LB Side: Bilateral  -LB Orientation: anterior  -LB Location: thigh  -LB Primary Wound Type: MASD  -LB    Retired Wound - Properties Group Date first assessed: 09/29/24  -LB Time first assessed: 0952  -LB Present on Original Admission: Y  -LB Side: Bilateral  -LB Location: thigh  -LB Primary Wound Type: MASD  -LB       Plan of Care Review    Plan of Care Reviewed With patient  -LA           Positioning and Restraints    Pre-Treatment Position in bed  -LA        Post Treatment Position bed  -LA        In Bed supine;call light within reach;encouraged to call for assist;exit alarm on  -LA                  User Key  (r) = Recorded By, (t) = Taken By, (c) = Cosigned By      Initials Name Effective Dates    Nory Keenan, FIFI 06/08/23 -     Zehra Rivera RN 10/20/21 -     Loida Richard OT 02/14/22 -                            OT Recommendation and Plan     Plan of Care Review  Plan of Care Reviewed With: patient  Progress: improving  Plan of Care Reviewed With: patient        Time Calculation:     Therapy Charges for Today       Code Description Service Date Service Provider Modifiers Qty    66235763161 HC OT THER PROC EA 15 MIN 10/9/2024 Loida Flaherty OT GO 1    55978316604 HC OT THERAPEUTIC ACT EA 15 MIN 10/9/2024 Loida Flaherty OT GO 1    62073636832 HC OT THERAPEUTIC ACT EA 15 MIN  10/10/2024 Loida Flaherty, OT GO 1    [id number]  OT THER PROC EA 15 MIN 10/10/2024 Loida Flaherty OT GO 1                 Loida Flaherty OT  10/10/2024    Electronically signed by Loida Flaherty OT at 10/10/24 1127

## 2024-10-10 NOTE — CASE MANAGEMENT/SOCIAL WORK
Discharge Planning Assessment   [facility]     Patient Name: Lety Johnson  MRN: [id number]  Today's Date: 10/10/2024    Admit Date: 9/26/2024            Discharge Plan       Row Name 10/10/24 1220       Plan    Plan SS spoke with [facility] swing bed per Lesly Guerra who states Pt's referral continues to be reviewed. SS contacted Lesly Paige per Windy Seymour who states Pt has been denied, no available beds. SS contacted [facility] Swing bed [phone] per Silvana there are no available swing beds at this time. SS contacted [facility] [phone] per Mariana who states she is willing to review Pt's referrral. SS faxed referral to fax [phone]. SS to follow.                   SHELBY ReynoldsW

## 2024-10-10 NOTE — THERAPY EVALUATION
Acute Care - Physical Therapy Treatment Note   [place]     Patient Name: Lety Johnson  : 1980  MRN: [id number]  Today's Date: 10/10/2024   Onset of Illness/Injury or Date of Surgery: 24  Visit Dx:     ICD-10-CM ICD-9-CM   1. Hypokalemia  E87.6 276.8   2. Pressure injury of skin of sacral region, unspecified injury stage  L89.159 707.03     707.20   3. Pressure injury of skin of left heel, unspecified injury stage  L89.629 707.07     707.20   4. Acute cystitis without hematuria  N30.00 595.0     Patient Active Problem List   Diagnosis   (none) - all problems resolved or deleted     Past Medical History:   Diagnosis Date    Stroke      History reviewed. No pertinent surgical history.  PT Assessment (Last 12 Hours)       PT Evaluation and Treatment       Row Name 10/10/24 1311          Physical Therapy Time and Intention    Subjective Information complains of;weakness  -AG     Document Type therapy note (daily note)  -AG     Mode of Treatment physical therapy  -AG     Symptoms Noted During/After Treatment fatigue  -AG       Row Name 10/10/24 1311          General Information    Patient Profile Reviewed yes  -AG     Patient Observations agree to therapy;cooperative;alert  -AG     Patient/Family/Caregiver Comments/Observations pt. supine in bed on room air; L UE in hemisling.  Pt. is agreeable to participation.  -AG     Existing Precautions/Restrictions fall  -AG     Risks Reviewed patient:;LOB;increased discomfort  -AG     Benefits Reviewed patient:;improve function;increase independence;increase strength;increase balance;increase knowledge;decrease risk of DVT  -AG     Barriers to Rehab previous functional deficit  -AG       Row Name 10/10/24 1311          Cognition    Affect/Mental Status (Cognition) WFL  -AG     Orientation Status (Cognition) oriented x 3  -AG     Follows Commands (Cognition) WFL  -AG     Personal Safety Interventions fall prevention program maintained;gait belt;nonskid  shoes/slippers when out of bed;supervised activity  -AG       Row Name 10/10/24 1311          Bed Mobility    Supine-Sit [place] (Bed Mobility) verbal cues;nonverbal cues (demo/gesture);moderate assist (50% patient effort);maximum assist (25% patient effort)  -AG     Sit-Supine [place] (Bed Mobility) dependent (less than 25% patient effort)  -       Row Name 10/10/24 1311          Sit-Stand Transfer    Sit-Stand [place] (Transfers) verbal cues;nonverbal cues (demo/gesture);maximum assist (25% patient effort);2 person assist  -AG     Assistive Device (Sit-Stand Transfers) --  no assistive device  -AG       Row Name 10/10/24 1311          Stand-Sit Transfer    Stand-Sit [place] (Transfers) maximum assist (25% patient effort);2 person assist  -AG     Assistive Device (Stand-Sit Transfers) --  no assistive device  -AG       Row Name 10/10/24 1311          Gait/Stairs (Locomotion)    Patient was able to Ambulate no, other medical factors prevent ambulation  -AG     Reason Patient was unable to Ambulate Excessive Weakness  -AG       Row Name 10/10/24 1311          Safety Issues, Functional Mobility    Impairments Affecting Function (Mobility) balance;endurance/activity tolerance;strength  -AG       Row Name 10/10/24 1311          Balance    Static Sitting Balance standby assist  -AG     Position, Sitting Balance unsupported;sitting edge of bed  -AG     Static Standing Balance verbal cues;non-verbal cues (demo/gesture);maximum assist;2-person assist  -AG       Row Name 10/10/24 1311          Knee (Therapeutic Exercise)    Knee (Therapeutic Exercise) PROM (passive range of motion)  -     Knee PROM (Therapeutic Exercise) left;extension  long sitting/ hamstrings stretch  -       Row Name             Wound 09/26/24 1806 gluteal    Wound - Properties Group Placement Date: 09/26/24  -KJ Placement Time: 1806 -KJ Location: gluteal  -KJ    Retired Wound - Properties Group Placement Date: 09/26/24  -KJ  Placement Time: 1806  -KJ Location: gluteal  -KJ    Retired Wound - Properties Group Date first assessed: 09/26/24  -KJ Time first assessed: 1806  -KJ Location: gluteal  -KJ      Row Name             Wound 09/26/24 1807 Left gluteal    Wound - Properties Group Placement Date: 09/26/24  -KJ Placement Time: 1807  -KJ Side: Left  -KJ Location: gluteal  -KJ    Retired Wound - Properties Group Placement Date: 09/26/24  -KJ Placement Time: 1807  -KJ Side: Left  -KJ Location: gluteal  -KJ    Retired Wound - Properties Group Date first assessed: 09/26/24  -KJ Time first assessed: 1807  -KJ Side: Left  -KJ Location: gluteal  -KJ      Row Name             Wound 09/26/24 1809 gluteal    Wound - Properties Group Placement Date: 09/26/24  -KJ Placement Time: 1809 -KJ Location: gluteal  -KJ    Retired Wound - Properties Group Placement Date: 09/26/24  -KJ Placement Time: 1809  -KJ Location: gluteal  -KJ    Retired Wound - Properties Group Date first assessed: 09/26/24  -KJ Time first assessed: 1809  -KJ Location: gluteal  -KJ      Row Name             Wound 09/26/24 1809 Left posterior ankle    Wound - Properties Group Placement Date: 09/26/24  -KJ Placement Time: 1809  -KJ Side: Left  -KJ Orientation: posterior  -KJ Location: ankle  -KJ    Retired Wound - Properties Group Placement Date: 09/26/24  -KJ Placement Time: 1809  -KJ Side: Left  -KJ Orientation: posterior  -KJ Location: ankle  -KJ    Retired Wound - Properties Group Date first assessed: 09/26/24  -KJ Time first assessed: 1809  -KJ Side: Left  -KJ Location: ankle  -KJ      Row Name             Wound 09/29/24 0952 Bilateral anterior thigh MASD (Moisture associated skin damage)    Wound - Properties Group Placement Date: 09/29/24  -LB Placement Time: 0952  -LB Present on Original Admission: Y  -LB Side: Bilateral  -LB Orientation: anterior  -LB Location: thigh  -LB Primary Wound Type: MASD  -LB    Retired Wound - Properties Group Placement Date: 09/29/24  -LB Placement  Time: 0952 -LB Present on Original Admission: Y  -LB Side: Bilateral  -LB Orientation: anterior  -LB Location: thigh  -LB Primary Wound Type: MASD  -LB    Retired Wound - Properties Group Date first assessed: 09/29/24  -LB Time first assessed: 0952 -LB Present on Original Admission: Y  -LB Side: Bilateral  -LB Location: thigh  -LB Primary Wound Type: MASD  -LB      Row Name 10/10/24 1311          Coping    Observed Emotional State calm;cooperative  -AG     Verbalized Emotional State acceptance  -AG     Trust Relationship/Rapport choices provided;care explained;thoughts/feelings acknowledged  -     Family/Support Persons family  -AG     Involvement in Care not present at bedside  -     Family/Support System Care support provided;self-care encouraged  -AG       Row Name 10/10/24 1311          Plan of Care Review    Plan of Care Reviewed With patient  -AG     Outcome Evaluation PT treatment complete.  Pt. mod/ max A for supine>sit and remained sitting unsupported EOB for prolonged time; performed L LE PROM, knee extension/ hamstrings stretch. Pt. dependent for sit>supine.  PT will continue to follow.  -AG       Row Name 10/10/24 1311          Positioning and Restraints    Pre-Treatment Position in bed  -AG     Post Treatment Position bed  -AG     In Bed fowlers;call light within reach;encouraged to call for assist;exit alarm on;side rails up x3  -AG       Row Name 10/10/24 1311          Therapy Plan Review/Discharge Plan (PT)    Therapy Plan Review (PT) evaluation/treatment results reviewed;care plan/treatment goals reviewed;risks/benefits reviewed;current/potential barriers reviewed;participants voiced agreement with care plan;participants included;patient  -AG               User Key  (r) = Recorded By, (t) = Taken By, (c) = Cosigned By      Initials Name Provider Type    Мария Russo, PT Physical Therapist    Nory Keenan, RN Registered Nurse    LB Zehra Bland, RN Registered Nurse                     Physical Therapy Education       Title: PT OT SLP Therapies (Done)       Topic: Physical Therapy (Done)       Point: Mobility training (Done)       Learning Progress Summary             Patient Acceptance, E,D, VU,NR by AG at 10/10/2024 1310    Acceptance, E,TB, VU by CB at 10/8/2024 0448    Acceptance, E, NR by RD at 10/2/2024 1101    Acceptance, E, NR by RD at 10/1/2024 0856    Acceptance, E,D, VU,NR by AG at 9/30/2024 1559                         Point: Home exercise program (Done)       Learning Progress Summary             Patient Acceptance, E,D, VU,NR by AG at 10/10/2024 1310    Acceptance, E,TB, VU by CB at 10/8/2024 0448    Acceptance, E, NR by RD at 10/2/2024 1101    Acceptance, E, NR by RD at 10/1/2024 0856    Acceptance, E,D, VU,NR by AG at 9/30/2024 1559                         Point: Body mechanics (Done)       Learning Progress Summary             Patient Acceptance, E,D, VU,NR by AG at 10/10/2024 1310    Acceptance, E,TB, VU by CB at 10/8/2024 0448    Acceptance, E, NR by RD at 10/2/2024 1101    Acceptance, E, NR by RD at 10/1/2024 0856    Acceptance, E,D, VU,NR by AG at 9/30/2024 1559                         Point: Precautions (Done)       Learning Progress Summary             Patient Acceptance, E,D, VU,NR by AG at 10/10/2024 1310    Acceptance, E,TB, VU by CB at 10/8/2024 0448    Acceptance, E, NR by RD at 10/2/2024 1101    Acceptance, E, NR by RD at 10/1/2024 0856    Acceptance, E,D, VU,NR by AG at 9/30/2024 1559                                         User Key       Initials Effective Dates Name Provider Type Discipline     06/16/21 -  Мария Joya, PT Physical Therapist PT     06/16/21 -  Laisha Verdugo, RN Registered Nurse Nurse    CB 06/17/24 -  Fidelia Lombardo, RN Registered Nurse Nurse                  PT Recommendation and Plan  Anticipated Discharge Disposition (PT): skilled nursing facility  Planned Therapy Interventions (PT): balance training, bed mobility training, gait  training, home exercise program, transfer training, strengthening, ROM (range of motion), patient/family education, neuromuscular re-education  Therapy Frequency (PT): 2 times/wk (1-5 times/ week per priority)  Plan of Care Reviewed With: patient  Outcome Evaluation: PT treatment complete.  Pt. mod/ max A for supine>sit and remained sitting unsupported EOB for prolonged time; performed L LE PROM, knee extension/ hamstrings stretch. Pt. dependent for sit>supine.  PT will continue to follow.       Time Calculation:    PT Charges       Row Name 10/10/24 1310             Time Calculation    PT Received On 10/10/24  -                User Key  (r) = Recorded By, (t) = Taken By, (c) = Cosigned By      Initials Name Provider Type     Мария Joya, PT Physical Therapist                  Therapy Charges for Today       Code Description Service Date Service Provider Modifiers Qty    27200197048  PT THER PROC EA 15 MIN 10/10/2024 Мария Joya, PT GP 1    17809572868  PT THERAPEUTIC ACT EA 15 MIN 10/10/2024 Мария Joya, PT GP 1            PT G-Codes  AM-PAC 6 Clicks Score (PT): 8    Мария Joya PT  10/10/2024

## 2024-10-10 NOTE — PLAN OF CARE
Problem: Adult Inpatient Plan of Care  Goal: Absence of Hospital-Acquired Illness or Injury  Intervention: Prevent Skin Injury  Recent Flowsheet Documentation  Taken 10/9/2024 1945 by Dyan Natarajan RN  Body Position: supine  Skin Protection:   adhesive use limited   incontinence pads utilized     Problem: Adult Inpatient Plan of Care  Goal: Absence of Hospital-Acquired Illness or Injury  Intervention: Prevent and Manage VTE (Venous Thromboembolism) Risk  Recent Flowsheet Documentation  Taken 10/9/2024 1945 by Dyan Natarajan, RN  Activity Management: activity encouraged  VTE Prevention/Management: (See MAR) other (see comments)   Goal Outcome Evaluation:

## 2024-10-10 NOTE — PLAN OF CARE
Goal Outcome Evaluation:  Plan of Care Reviewed With: patient        Progress: no change  Outcome Evaluation: Pt resting in bed throughout shift. Pt transfered from [facility] this shift. VSS. No acute changes. Will continue plan of care.

## 2024-10-10 NOTE — PLAN OF CARE
Goal Outcome Evaluation:  Plan of Care Reviewed With: patient        Progress: no change  Outcome Evaluation: Patient's VSS. Pt wound care done per orders. Pt c/o of pain during & PRN pain medication given. Pt sat up on the side bed during shift. Pt voiced no further concerns at this time.

## 2024-10-10 NOTE — THERAPY TREATMENT NOTE
Acute Care - Occupational Therapy Treatment Note   [place]     Patient Name: Lety Johnson  : 1980  MRN: [id number]  Today's Date: 10/10/2024  Onset of Illness/Injury or Date of Surgery: 24     Referring Physician: Dr. Marc    Admit Date: 2024       ICD-10-CM ICD-9-CM   1. Hypokalemia  E87.6 276.8   2. Pressure injury of skin of sacral region, unspecified injury stage  L89.159 707.03     707.20   3. Pressure injury of skin of left heel, unspecified injury stage  L89.629 707.07     707.20   4. Acute cystitis without hematuria  N30.00 595.0     Patient Active Problem List   Diagnosis   (none) - all problems resolved or deleted     Past Medical History:   Diagnosis Date    Stroke      History reviewed. No pertinent surgical history.      OT ASSESSMENT FLOWSHEET (Last 12 Hours)       OT Evaluation and Treatment       Row Name 10/10/24 1122                   OT Time and Intention    Subjective Information weakness;complains of;pain  -LA        Document Type therapy note (daily note)  -LA        Mode of Treatment occupational therapy  -LA        Patient Effort adequate  -LA        Symptoms Noted During/After Treatment fatigue  -LA           General Information    Patient Profile Reviewed yes  -LA        General Observations of Patient Patient agreeable to therapy this date. Patient was able to perform sit to stand with maxAx2 person x2 attempts. Patient continues to require maxA x2 from EOB to supine.  -LA        Existing Precautions/Restrictions fall;left;shoulder  -LA           Cognition    Affect/Mental Status (Cognition) WFL  patient did not cry this date  -LA        Orientation Status (Cognition) oriented x 4  -LA        Follows Commands (Cognition) WFL  -LA           Bed Mobility    Rolling Left [place] (Bed Mobility) maximum assist (25% patient effort)  -LA        Rolling Right [place] (Bed Mobility) maximum assist (25% patient effort)  -LA        Supine-Sit [place] (Bed  Mobility) moderate assist (50% patient effort);maximum assist (25% patient effort)  -LA        Sit-Supine [place] (Bed Mobility) dependent (less than 25% patient effort);2 person assist  -LA           Sit-Stand Transfer    Sit-Stand [place] (Transfers) maximum assist (25% patient effort);2 person assist  x2 (attempted x4 this date)  -LA           Motor Skills    Motor Control/Coordination Interventions therapeutic exercise/ROM  -LA        Therapeutic Exercise shoulder;elbow/forearm;wrist;hand  Right UE only  -LA           Balance    Static Sitting Balance supervision  -LA        Dynamic Sitting Balance minimal assist  -LA           Wound 09/26/24 1806 gluteal    Wound - Properties Group Placement Date: 09/26/24  -KJ Placement Time: 1806  -KJ Location: gluteal  -KJ    Retired Wound - Properties Group Placement Date: 09/26/24  -KJ Placement Time: 1806  -KJ Location: gluteal  -KJ    Retired Wound - Properties Group Date first assessed: 09/26/24  -KJ Time first assessed: 1806  -KJ Location: gluteal  -KJ       Wound 09/26/24 1807 Left gluteal    Wound - Properties Group Placement Date: 09/26/24  -KJ Placement Time: 1807  -KJ Side: Left  -KJ Location: gluteal  -KJ    Retired Wound - Properties Group Placement Date: 09/26/24  -KJ Placement Time: 1807  -KJ Side: Left  -KJ Location: gluteal  -KJ    Retired Wound - Properties Group Date first assessed: 09/26/24  -KJ Time first assessed: 1807  -KJ Side: Left  -KJ Location: gluteal  -KJ       Wound 09/26/24 1809 gluteal    Wound - Properties Group Placement Date: 09/26/24  -KJ Placement Time: 1809  -KJ Location: gluteal  -KJ    Retired Wound - Properties Group Placement Date: 09/26/24  -KJ Placement Time: 1809  -KJ Location: gluteal  -KJ    Retired Wound - Properties Group Date first assessed: 09/26/24  -KJ Time first assessed: 1809  -KJ Location: gluteal  -KJ       Wound 09/26/24 1809 Left posterior ankle    Wound - Properties Group Placement Date: 09/26/24  -KJ  Placement Time: 1809 -KJ Side: Left  -KJ Orientation: posterior  -KJ Location: ankle  -KJ    Retired Wound - Properties Group Placement Date: 09/26/24  -KJ Placement Time: 1809 -KJ Side: Left  -KJ Orientation: posterior  -KJ Location: ankle  -KJ    Retired Wound - Properties Group Date first assessed: 09/26/24  -KJ Time first assessed: 1809  -KJ Side: Left  -KJ Location: ankle  -KJ       Wound 09/29/24 0952 Bilateral anterior thigh MASD (Moisture associated skin damage)    Wound - Properties Group Placement Date: 09/29/24  -LB Placement Time: 0952 -LB Present on Original Admission: Y  -LB Side: Bilateral  -LB Orientation: anterior  -LB Location: thigh  -LB Primary Wound Type: MASD  -LB    Retired Wound - Properties Group Placement Date: 09/29/24  -LB Placement Time: 0952 -LB Present on Original Admission: Y  -LB Side: Bilateral  -LB Orientation: anterior  -LB Location: thigh  -LB Primary Wound Type: MASD  -LB    Retired Wound - Properties Group Date first assessed: 09/29/24  -LB Time first assessed: 0952 -LB Present on Original Admission: Y  -LB Side: Bilateral  -LB Location: thigh  -LB Primary Wound Type: MASD  -LB       Plan of Care Review    Plan of Care Reviewed With patient  -LA           Positioning and Restraints    Pre-Treatment Position in bed  -LA        Post Treatment Position bed  -LA        In Bed supine;call light within reach;encouraged to call for assist;exit alarm on  -LA                  User Key  (r) = Recorded By, (t) = Taken By, (c) = Cosigned By      Initials Name Effective Dates    Nory Keenan RN 06/08/23 -     Zehra Rivera RN 10/20/21 -     Loida Richard OT 02/14/22 -                            OT Recommendation and Plan     Plan of Care Review  Plan of Care Reviewed With: patient  Progress: improving  Plan of Care Reviewed With: patient        Time Calculation:     Therapy Charges for Today       Code Description Service Date Service Provider Modifiers Qty     46840781564 HC OT THER PROC EA 15 MIN 10/9/2024 Loida Flaherty OT GO 1    21250412655 HC OT THERAPEUTIC ACT EA 15 MIN 10/9/2024 Loida Flaherty OT GO 1    50663832517 HC OT THERAPEUTIC ACT EA 15 MIN 10/10/2024 Loida Flaherty OT GO 1    30978847781 HC OT THER PROC EA 15 MIN 10/10/2024 Loida Flaherty OT GO 1                 Loida Flaherty OT  10/10/2024

## 2024-10-10 NOTE — PROGRESS NOTES
[facility] HOSPITALIST PROGRESS NOTE     Patient Identification:  Name:  Lety Johnson  Age:  43 y.o.  Sex:  female  :  1980  MRN:  [id number]  Visit Number:  [id number]  ROOM: [address] [place] [address]     Primary Care Provider:  Provider, No Known    Length of stay in inpatient status:  12    Subjective     Chief Compliant:    Chief Complaint   Patient presents with    Fall     History of Presenting Illness:    Patient remains ill but stable today, no acute events overnight, no new complaints, denies any fevers or chills, medically stable, not undergoing any active treatments, pending placement for rehab, reports her boyfriend gets out of penitentiary next week, notes she doesn't believe he can care for her at home alone though if not placed by next week would be open to see if Home Health or home helpers could assist him if she's unable to be placed.   Objective     Current Hospital Meds:  acyclovir, 400 mg, Oral, Nightly  aspirin, 81 mg, Oral, Daily  atorvastatin, 80 mg, Oral, Daily  DULoxetine, 60 mg, Oral, Daily  heparin (porcine), 5,000 Units, Subcutaneous, Q8H  lisinopril, 20 mg, Oral, Daily  [Held by provider] metFORMIN, 1,000 mg, Oral, BID With Meals  metoprolol tartrate, 25 mg, Oral, Q12H  nicotine, 1 patch, Transdermal, Q24H  nystatin, , Topical, Q12H  pantoprazole, 40 mg, Oral, Daily  sodium chloride, 10 mL, Intravenous, Q12H  sodium chloride, 10 mL, Intravenous, Q12H      Pharmacy Consult,       ----------------------------------------------------------------------------------------------------------------------  Vital Signs:  Temp:  [97.6 °F (36.4 °C)-98.6 °F (37 °C)] 97.6 °F (36.4 °C)  Heart Rate:  [] 99  Resp:  [18-20] 18  BP: ()/(71-97) 154/93  SpO2:  [95 %-98 %] 98 %  on   ;   Device (Oxygen Therapy): room air  Body mass index is 36.73 kg/m².      Intake/Output Summary (Last 24 hours) at 10/10/2024 0931  Last data filed at 10/10/2024 0300  Gross per 24 hour   Intake 780 ml   Output  "2550 ml   Net -1770 ml      ----------------------------------------------------------------------------------------------------------------------  Physical exam:  Constitutional:  Older than stated age. No acute distress. No discomfort   HENT:  Head:  Normocephalic and atraumatic.  Mouth:  Moist mucous membranes.    Eyes:  Conjunctivae and EOM are normal. No scleral icterus.    Neck:  Neck supple.  No JVD present.    Cardiovascular:  Normal rate, regular rhythm and normal heart sounds with no murmur.  Pulmonary/Chest:  No respiratory distress, no wheezes, on room air  Abdominal:  Soft. No distension and no tenderness.   Musculoskeletal:  No tenderness, and no deformity.  No red or swollen joints anywhere.    Neurological:  Alert and oriented to person, place, and time.  No cranial nerve deficit. Chronic L sided paralysis.    Skin:  Skin is warm and dry. No pallor. Significant intertriginous erythema under panus, consistent with yeast infection.   Peripheral vascular:  No clubbing, no cyanosis, trace edema.  Psychiatric: Appropriate mood and affect    *Examination stable today 10/10  Edited by: Ramon Marc MD at 10/10/2024 0931  ----------------------------------------------------------------------------------------------------------------------                 No results found for: \"URINECX\"  No results found for: \"BLOODCX\"    I have personally looked at the labs and they are summarized above.  ----------------------------------------------------------------------------------------------------------------------  Detailed radiology reports for the last 24 hours:  No radiology results for the last day  Assessment & Plan    43F Morbid Obesity by BMI PMH History Genital Herpes, Cerebrovascular Accident 5/2024 complicated by L sided paralysis, presented to [facility] emergency room 9/26 after sliding into the floor off her chair due to weakness.     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     #Electrolyte Abnormalities  - Acute Mild Hypokalemia - Replaced per protocol - Resolved   - Acute Mild Hypomagnesemia - Replaced per protocol - Resolved     #History Cerebrovascular Accident complicated by L sided paralysis, unclear etiology  - Continue home regimen, Supportive Care     #Debility  - Consult PT/OT, Social Work if placement needed     #History Genital Herpes  - Supportive Care, continue home Acyclovir     #Morbid Obesity by BMI  - Body mass index is 43.26 kg/m².; complicates all aspects of care    F: Oral  E: Monitor & Replace as needed   N: Diet: Regular/House; Fluid Consistency: Thin (IDDSI 0)   PPx: SQH  Code Status (Patient has no pulse and is not breathing): CPR  Medical Interventions (Patient has pulse or is breathing): Full Support     Dispo: Pending placement, PT/OT consulted and following, Social Work consulted and following.     *This patient is considered high risk secondary to Urinary Tract Infection, electrolyte abnormalities, chronic L sided paralysis from prior Cerebrovascular Accident.      *Plan unchanged today 10/10  Edited by: Ramon Marc MD at 10/10/2024 0931  [facility]

## 2024-10-10 NOTE — THERAPY TREATMENT NOTE
Acute Care - Physical Therapy Treatment Note   [place]     Patient Name: Lety Johnson  : 1980  MRN: [id number]  Today's Date: 10/10/2024   Onset of Illness/Injury or Date of Surgery: 24  Visit Dx:     ICD-10-CM ICD-9-CM   1. Hypokalemia  E87.6 276.8   2. Pressure injury of skin of sacral region, unspecified injury stage  L89.159 707.03     707.20   3. Pressure injury of skin of left heel, unspecified injury stage  L89.629 707.07     707.20   4. Acute cystitis without hematuria  N30.00 595.0     Patient Active Problem List   Diagnosis   (none) - all problems resolved or deleted     Past Medical History:   Diagnosis Date    Stroke      History reviewed. No pertinent surgical history.  PT Assessment (Last 12 Hours)       PT Evaluation and Treatment       Row Name 10/10/24 1311          Physical Therapy Time and Intention    Subjective Information complains of;weakness  -AG     Document Type therapy note (daily note)  -AG     Mode of Treatment physical therapy  -AG     Symptoms Noted During/After Treatment fatigue  -AG       Row Name 10/10/24 1311          General Information    Patient Profile Reviewed yes  -AG     Patient Observations agree to therapy;cooperative;alert  -AG     Patient/Family/Caregiver Comments/Observations pt. supine in bed on room air; L UE in hemis[place].  Pt. is agreeable to participation.  -AG     Existing Precautions/Restrictions fall  -AG     Risks Reviewed patient:;LOB;increased discomfort  -AG     Benefits Reviewed patient:;improve function;increase independence;increase strength;increase balance;increase knowledge;decrease risk of DVT  -AG     Barriers to Rehab previous functional deficit  -AG       Row Name 10/10/24 1311          Cognition    Affect/Mental Status (Cognition) WFL  -AG     Orientation Status (Cognition) oriented x 3  -AG     Follows Commands (Cognition) WFL  -AG     Personal Safety Interventions fall prevention program maintained;gait belt;nonskid  shoes/slippers when out of bed;supervised activity  -AG       Row Name 10/10/24 1311          Bed Mobility    Supine-Sit [place] (Bed Mobility) verbal cues;nonverbal cues (demo/gesture);moderate assist (50% patient effort);maximum assist (25% patient effort)  -AG     Sit-Supine [place] (Bed Mobility) dependent (less than 25% patient effort)  -       Row Name 10/10/24 1311          Sit-Stand Transfer    Sit-Stand [place] (Transfers) verbal cues;nonverbal cues (demo/gesture);maximum assist (25% patient effort);2 person assist  -AG     Assistive Device (Sit-Stand Transfers) --  no assistive device  -AG       Row Name 10/10/24 1311          Stand-Sit Transfer    Stand-Sit [place] (Transfers) maximum assist (25% patient effort);2 person assist  -AG     Assistive Device (Stand-Sit Transfers) --  no assistive device  -AG       Row Name 10/10/24 1311          Gait/Stairs (Locomotion)    Patient was able to Ambulate no, other medical factors prevent ambulation  -AG     Reason Patient was unable to Ambulate Excessive Weakness  -AG       Row Name 10/10/24 1311          Safety Issues, Functional Mobility    Impairments Affecting Function (Mobility) balance;endurance/activity tolerance;strength  -AG       Row Name 10/10/24 1311          Balance    Static Sitting Balance standby assist  -AG     Position, Sitting Balance unsupported;sitting edge of bed  -AG     Static Standing Balance verbal cues;non-verbal cues (demo/gesture);maximum assist;2-person assist  -AG       Row Name 10/10/24 1311          Knee (Therapeutic Exercise)    Knee (Therapeutic Exercise) PROM (passive range of motion)  -     Knee PROM (Therapeutic Exercise) left;extension  long sitting/ hamstrings stretch  -       Row Name             Wound 09/26/24 1806 gluteal    Wound - Properties Group Placement Date: 09/26/24  -KJ Placement Time: 1806 -KJ Location: gluteal  -KJ    Retired Wound - Properties Group Placement Date: 09/26/24  -KJ  Placement Time: 1806  -KJ Location: gluteal  -KJ    Retired Wound - Properties Group Date first assessed: 09/26/24  -KJ Time first assessed: 1806  -KJ Location: gluteal  -KJ      Row Name             Wound 09/26/24 1807 Left gluteal    Wound - Properties Group Placement Date: 09/26/24  -KJ Placement Time: 1807  -KJ Side: Left  -KJ Location: gluteal  -KJ    Retired Wound - Properties Group Placement Date: 09/26/24  -KJ Placement Time: 1807  -KJ Side: Left  -KJ Location: gluteal  -KJ    Retired Wound - Properties Group Date first assessed: 09/26/24  -KJ Time first assessed: 1807  -KJ Side: Left  -KJ Location: gluteal  -KJ      Row Name             Wound 09/26/24 1809 gluteal    Wound - Properties Group Placement Date: 09/26/24  -KJ Placement Time: 1809 -KJ Location: gluteal  -KJ    Retired Wound - Properties Group Placement Date: 09/26/24  -KJ Placement Time: 1809  -KJ Location: gluteal  -KJ    Retired Wound - Properties Group Date first assessed: 09/26/24  -KJ Time first assessed: 1809  -KJ Location: gluteal  -KJ      Row Name             Wound 09/26/24 1809 Left posterior ankle    Wound - Properties Group Placement Date: 09/26/24  -KJ Placement Time: 1809  -KJ Side: Left  -KJ Orientation: posterior  -KJ Location: ankle  -KJ    Retired Wound - Properties Group Placement Date: 09/26/24  -KJ Placement Time: 1809  -KJ Side: Left  -KJ Orientation: posterior  -KJ Location: ankle  -KJ    Retired Wound - Properties Group Date first assessed: 09/26/24  -KJ Time first assessed: 1809  -KJ Side: Left  -KJ Location: ankle  -KJ      Row Name             Wound 09/29/24 0952 Bilateral anterior thigh MASD (Moisture associated skin damage)    Wound - Properties Group Placement Date: 09/29/24  -LB Placement Time: 0952  -LB Present on Original Admission: Y  -LB Side: Bilateral  -LB Orientation: anterior  -LB Location: thigh  -LB Primary Wound Type: MASD  -LB    Retired Wound - Properties Group Placement Date: 09/29/24  -LB Placement  Time: 0952 -LB Present on Original Admission: Y  -LB Side: Bilateral  -LB Orientation: anterior  -LB Location: thigh  -LB Primary Wound Type: MASD  -LB    Retired Wound - Properties Group Date first assessed: 09/29/24  -LB Time first assessed: 0952 -LB Present on Original Admission: Y  -LB Side: Bilateral  -LB Location: thigh  -LB Primary Wound Type: MASD  -LB      Row Name 10/10/24 1311          Coping    Observed Emotional State calm;cooperative  -AG     Verbalized Emotional State acceptance  -AG     Trust Relationship/Rapport choices provided;care explained;thoughts/feelings acknowledged  -     Family/Support Persons family  -AG     Involvement in Care not present at bedside  -     Family/Support System Care support provided;self-care encouraged  -AG       Row Name 10/10/24 1311          Plan of Care Review    Plan of Care Reviewed With patient  -AG     Outcome Evaluation PT treatment complete.  Pt. mod/ max A for supine>sit and remained sitting unsupported EOB for prolonged time; performed L LE PROM, knee extension/ hamstrings stretch. Pt. dependent for sit>supine.  PT will continue to follow.  -AG       Row Name 10/10/24 1311          Positioning and Restraints    Pre-Treatment Position in bed  -AG     Post Treatment Position bed  -AG     In Bed fowlers;call light within reach;encouraged to call for assist;exit alarm on;side rails up x3  -AG       Row Name 10/10/24 1311          Therapy Plan Review/Discharge Plan (PT)    Therapy Plan Review (PT) evaluation/treatment results reviewed;care plan/treatment goals reviewed;risks/benefits reviewed;current/potential barriers reviewed;participants voiced agreement with care plan;participants included;patient  -AG               User Key  (r) = Recorded By, (t) = Taken By, (c) = Cosigned By      Initials Name Provider Type    Мария Russo, PT Physical Therapist    Nory Keenan, RN Registered Nurse    LB Zehra Bland, RN Registered Nurse                     Physical Therapy Education       Title: PT OT SLP Therapies (Done)       Topic: Physical Therapy (Done)       Point: Mobility training (Done)       Learning Progress Summary             Patient Acceptance, E,D, VU,NR by AG at 10/10/2024 1310    Acceptance, E,TB, VU by CB at 10/8/2024 0448    Acceptance, E, NR by RD at 10/2/2024 1101    Acceptance, E, NR by RD at 10/1/2024 0856    Acceptance, E,D, VU,NR by AG at 9/30/2024 1559                         Point: Home exercise program (Done)       Learning Progress Summary             Patient Acceptance, E,D, VU,NR by AG at 10/10/2024 1310    Acceptance, E,TB, VU by CB at 10/8/2024 0448    Acceptance, E, NR by RD at 10/2/2024 1101    Acceptance, E, NR by RD at 10/1/2024 0856    Acceptance, E,D, VU,NR by AG at 9/30/2024 1559                         Point: Body mechanics (Done)       Learning Progress Summary             Patient Acceptance, E,D, VU,NR by AG at 10/10/2024 1310    Acceptance, E,TB, VU by CB at 10/8/2024 0448    Acceptance, E, NR by RD at 10/2/2024 1101    Acceptance, E, NR by RD at 10/1/2024 0856    Acceptance, E,D, VU,NR by AG at 9/30/2024 1559                         Point: Precautions (Done)       Learning Progress Summary             Patient Acceptance, E,D, VU,NR by AG at 10/10/2024 1310    Acceptance, E,TB, VU by CB at 10/8/2024 0448    Acceptance, E, NR by RD at 10/2/2024 1101    Acceptance, E, NR by RD at 10/1/2024 0856    Acceptance, E,D, VU,NR by AG at 9/30/2024 1559                                         User Key       Initials Effective Dates Name Provider Type Discipline     06/16/21 -  Мария Joya, PT Physical Therapist PT     06/16/21 -  Laisha Verdugo, RN Registered Nurse Nurse    CB 06/17/24 -  Fidelia Lombardo, RN Registered Nurse Nurse                  PT Recommendation and Plan  Anticipated Discharge Disposition (PT): skilled nursing facility  Planned Therapy Interventions (PT): balance training, bed mobility training, gait  training, home exercise program, transfer training, strengthening, ROM (range of motion), patient/family education, neuromuscular re-education  Therapy Frequency (PT): 2 times/wk (1-5 times/ week per priority)  Plan of Care Reviewed With: patient  Outcome Evaluation: PT treatment complete.  Pt. mod/ max A for supine>sit and remained sitting unsupported EOB for prolonged time; performed L LE PROM, knee extension/ hamstrings stretch. Pt. dependent for sit>supine.  PT will continue to follow.       Time Calculation:    PT Charges       Row Name 10/10/24 1310             Time Calculation    PT Received On 10/10/24  -                User Key  (r) = Recorded By, (t) = Taken By, (c) = Cosigned By      Initials Name Provider Type     Мария Joya, PT Physical Therapist                  Therapy Charges for Today       Code Description Service Date Service Provider Modifiers Qty    30079544787  PT THER PROC EA 15 MIN 10/10/2024 Мария Joya, PT GP 1    09644287424  PT THERAPEUTIC ACT EA 15 MIN 10/10/2024 Мария Joya, PT GP 1            PT G-Codes  AM-PAC 6 Clicks Score (PT): 8    Мария Joya PT  10/10/2024

## 2024-10-11 LAB
ANION GAP SERPL CALCULATED.3IONS-SCNC: 11.5 MMOL/L (ref 5–15)
BUN SERPL-MCNC: 13 MG/DL (ref 6–20)
BUN/CREAT SERPL: 29.5 (ref 7–25)
CALCIUM SPEC-SCNC: 9.6 MG/DL (ref 8.6–10.5)
CHLORIDE SERPL-SCNC: 99 MMOL/L (ref 98–107)
CO2 SERPL-SCNC: 23.5 MMOL/L (ref 22–29)
CREAT SERPL-MCNC: 0.44 MG/DL (ref 0.57–1)
DEPRECATED RDW RBC AUTO: 51.4 FL (ref 37–54)
EGFRCR SERPLBLD CKD-EPI 2021: 124 ML/MIN/1.73
ERYTHROCYTE [DISTWIDTH] IN BLOOD BY AUTOMATED COUNT: 14.3 % (ref 12.3–15.4)
GLUCOSE BLDC GLUCOMTR-MCNC: 386 MG/DL (ref 70–130)
GLUCOSE BLDC GLUCOMTR-MCNC: 395 MG/DL (ref 70–130)
GLUCOSE BLDC GLUCOMTR-MCNC: 460 MG/DL (ref 70–130)
GLUCOSE BLDC GLUCOMTR-MCNC: 506 MG/DL (ref 70–130)
GLUCOSE SERPL-MCNC: 404 MG/DL (ref 65–99)
HCT VFR BLD AUTO: 41.4 % (ref 34–46.6)
HGB BLD-MCNC: 13.7 G/DL (ref 12–15.9)
MCH RBC QN AUTO: 32.8 PG (ref 26.6–33)
MCHC RBC AUTO-ENTMCNC: 33.1 G/DL (ref 31.5–35.7)
MCV RBC AUTO: 99 FL (ref 79–97)
PLATELET # BLD AUTO: 387 10*3/MM3 (ref 140–450)
PMV BLD AUTO: 10 FL (ref 6–12)
POTASSIUM SERPL-SCNC: 3.6 MMOL/L (ref 3.5–5.2)
POTASSIUM SERPL-SCNC: 4.7 MMOL/L (ref 3.5–5.2)
RBC # BLD AUTO: 4.18 10*6/MM3 (ref 3.77–5.28)
SODIUM SERPL-SCNC: 134 MMOL/L (ref 136–145)
WBC NRBC COR # BLD AUTO: 9.91 10*3/MM3 (ref 3.4–10.8)

## 2024-10-11 PROCEDURE — 25010000002 KETOROLAC TROMETHAMINE PER 15 MG: Performed by: INTERNAL MEDICINE

## 2024-10-11 PROCEDURE — 25010000002 HEPARIN (PORCINE) PER 1000 UNITS: Performed by: INTERNAL MEDICINE

## 2024-10-11 PROCEDURE — 82948 REAGENT STRIP/BLOOD GLUCOSE: CPT

## 2024-10-11 PROCEDURE — 85027 COMPLETE CBC AUTOMATED: CPT | Performed by: INTERNAL MEDICINE

## 2024-10-11 PROCEDURE — 63710000001 INSULIN GLARGINE PER 5 UNITS: Performed by: INTERNAL MEDICINE

## 2024-10-11 PROCEDURE — 63710000001 INSULIN LISPRO (HUMAN) PER 5 UNITS: Performed by: INTERNAL MEDICINE

## 2024-10-11 PROCEDURE — 99231 SBSQ HOSP IP/OBS SF/LOW 25: CPT | Performed by: INTERNAL MEDICINE

## 2024-10-11 PROCEDURE — 63710000001 INSULIN REGULAR HUMAN PER 5 UNITS: Performed by: INTERNAL MEDICINE

## 2024-10-11 PROCEDURE — 84132 ASSAY OF SERUM POTASSIUM: CPT | Performed by: INTERNAL MEDICINE

## 2024-10-11 PROCEDURE — 80048 BASIC METABOLIC PNL TOTAL CA: CPT | Performed by: INTERNAL MEDICINE

## 2024-10-11 PROCEDURE — 97110 THERAPEUTIC EXERCISES: CPT

## 2024-10-11 PROCEDURE — 97530 THERAPEUTIC ACTIVITIES: CPT

## 2024-10-11 RX ORDER — HYDROCODONE BITARTRATE AND ACETAMINOPHEN 5; 325 MG/1; MG/1
1 TABLET ORAL EVERY 12 HOURS PRN
Status: DISCONTINUED | OUTPATIENT
Start: 2024-10-11 | End: 2024-10-11

## 2024-10-11 RX ORDER — NICOTINE POLACRILEX 4 MG
15 LOZENGE BUCCAL
Status: DISCONTINUED | OUTPATIENT
Start: 2024-10-11 | End: 2024-10-17

## 2024-10-11 RX ORDER — KETOROLAC TROMETHAMINE 30 MG/ML
15 INJECTION, SOLUTION INTRAMUSCULAR; INTRAVENOUS 2 TIMES DAILY PRN
Status: DISPENSED | OUTPATIENT
Start: 2024-10-11 | End: 2024-10-14

## 2024-10-11 RX ORDER — INSULIN LISPRO 100 [IU]/ML
2-7 INJECTION, SOLUTION INTRAVENOUS; SUBCUTANEOUS
Status: DISCONTINUED | OUTPATIENT
Start: 2024-10-11 | End: 2024-10-12

## 2024-10-11 RX ORDER — GLUCAGON 1 MG/ML
1 KIT INJECTION
Status: DISCONTINUED | OUTPATIENT
Start: 2024-10-11 | End: 2024-10-17

## 2024-10-11 RX ORDER — DEXTROSE MONOHYDRATE 25 G/50ML
25 INJECTION, SOLUTION INTRAVENOUS
Status: DISCONTINUED | OUTPATIENT
Start: 2024-10-11 | End: 2024-10-17

## 2024-10-11 RX ORDER — KETOROLAC TROMETHAMINE 30 MG/ML
15 INJECTION, SOLUTION INTRAMUSCULAR; INTRAVENOUS 2 TIMES DAILY PRN
Status: DISCONTINUED | OUTPATIENT
Start: 2024-10-11 | End: 2024-10-11

## 2024-10-11 RX ORDER — POTASSIUM CHLORIDE 1500 MG/1
40 TABLET, EXTENDED RELEASE ORAL EVERY 4 HOURS
Status: DISPENSED | OUTPATIENT
Start: 2024-10-11 | End: 2024-10-11

## 2024-10-11 RX ADMIN — INSULIN HUMAN 10 UNITS: 100 INJECTION, SOLUTION PARENTERAL at 13:20

## 2024-10-11 RX ADMIN — DULOXETINE HYDROCHLORIDE 60 MG: 60 CAPSULE, DELAYED RELEASE ORAL at 09:07

## 2024-10-11 RX ADMIN — ASPIRIN 81 MG: 81 TABLET, CHEWABLE ORAL at 09:07

## 2024-10-11 RX ADMIN — ACETAMINOPHEN 650 MG: 325 TABLET ORAL at 09:07

## 2024-10-11 RX ADMIN — LISINOPRIL 20 MG: 10 TABLET ORAL at 09:07

## 2024-10-11 RX ADMIN — ACYCLOVIR 400 MG: 200 CAPSULE ORAL at 21:16

## 2024-10-11 RX ADMIN — HEPARIN SODIUM 5000 UNITS: 5000 INJECTION INTRAVENOUS; SUBCUTANEOUS at 13:21

## 2024-10-11 RX ADMIN — CYCLOBENZAPRINE 10 MG: 10 TABLET, FILM COATED ORAL at 06:00

## 2024-10-11 RX ADMIN — KETOROLAC TROMETHAMINE 15 MG: 30 INJECTION, SOLUTION INTRAMUSCULAR; INTRAVENOUS at 09:05

## 2024-10-11 RX ADMIN — METOPROLOL TARTRATE 25 MG: 25 TABLET, FILM COATED ORAL at 09:07

## 2024-10-11 RX ADMIN — NYSTATIN: 100000 POWDER TOPICAL at 09:07

## 2024-10-11 RX ADMIN — NYSTATIN: 100000 POWDER TOPICAL at 21:29

## 2024-10-11 RX ADMIN — METOPROLOL TARTRATE 25 MG: 25 TABLET, FILM COATED ORAL at 21:16

## 2024-10-11 RX ADMIN — ATORVASTATIN CALCIUM 80 MG: 40 TABLET, FILM COATED ORAL at 09:07

## 2024-10-11 RX ADMIN — INSULIN LISPRO 6 UNITS: 100 INJECTION, SOLUTION INTRAVENOUS; SUBCUTANEOUS at 18:04

## 2024-10-11 RX ADMIN — INSULIN LISPRO 6 UNITS: 100 INJECTION, SOLUTION INTRAVENOUS; SUBCUTANEOUS at 21:17

## 2024-10-11 RX ADMIN — INSULIN LISPRO 7 UNITS: 100 INJECTION, SOLUTION INTRAVENOUS; SUBCUTANEOUS at 13:21

## 2024-10-11 RX ADMIN — HEPARIN SODIUM 5000 UNITS: 5000 INJECTION INTRAVENOUS; SUBCUTANEOUS at 21:29

## 2024-10-11 RX ADMIN — Medication 10 ML: at 21:29

## 2024-10-11 RX ADMIN — POTASSIUM CHLORIDE 40 MEQ: 1500 TABLET, EXTENDED RELEASE ORAL at 10:00

## 2024-10-11 RX ADMIN — KETOROLAC TROMETHAMINE 15 MG: 30 INJECTION, SOLUTION INTRAMUSCULAR; INTRAVENOUS at 21:17

## 2024-10-11 RX ADMIN — NICOTINE 1 PATCH: 7 PATCH, EXTENDED RELEASE TRANSDERMAL at 09:04

## 2024-10-11 RX ADMIN — PANTOPRAZOLE SODIUM 40 MG: 40 TABLET, DELAYED RELEASE ORAL at 09:07

## 2024-10-11 RX ADMIN — Medication 5 MG: at 02:43

## 2024-10-11 RX ADMIN — HEPARIN SODIUM 5000 UNITS: 5000 INJECTION INTRAVENOUS; SUBCUTANEOUS at 05:36

## 2024-10-11 RX ADMIN — Medication 5 MG: at 21:41

## 2024-10-11 RX ADMIN — INSULIN LISPRO 7 UNITS: 100 INJECTION, SOLUTION INTRAVENOUS; SUBCUTANEOUS at 10:00

## 2024-10-11 RX ADMIN — INSULIN GLARGINE 5 UNITS: 100 INJECTION, SOLUTION SUBCUTANEOUS at 21:17

## 2024-10-11 RX ADMIN — ACETAMINOPHEN 650 MG: 325 TABLET ORAL at 02:21

## 2024-10-11 NOTE — NURSING NOTE
Stage I and Stage II PIs documented since admit now resolved.  LDAs removed.    Patient continues with a linear fissure to the medial coccyx.  Order placed to treat with Z-Guard and leave open to air.    Patient with scab covered wound to the left lower leg just above the heel.  Dressing removed.  Order placed to paint with betadine and leave open to air daily.    Damir score 15.  PI prevention orders initiated.       10/11/24 1330   Wound 09/26/24 1809 Left posterior ankle Other (comment)   Placement Date/Time: 09/26/24 1809   Side: Left  Orientation: posterior  Location: ankle  Primary Wound Type: (c) Other (comment)   Dressing Appearance dry;intact   Closure None   Base dry;scab   Periwound intact;dry   Periwound Temperature warm   Periwound Skin Turgor soft   Edges rolled/closed   Drainage Amount none   Dressing Care dressing removed   Wound 10/11/24 1330 medial coccyx Fissure   Placement Date/Time: 10/11/24 1330   Orientation: medial  Location: coccyx  Primary Wound Type: Fissure   Dressing Appearance no drainage   Closure None   Base pink;moist   Periwound intact   Periwound Temperature warm   Periwound Skin Turgor soft   Edges open   Drainage Amount none

## 2024-10-11 NOTE — DISCHARGE PLACEMENT REQUEST
"Lety Johnson (43 y.o. Female)       Date of Birth   1980    Social Security Number       Address   [address] [facility] [address]    Home Phone   [phone]    MRN   [id number]       Orthodox   Orthodoxy    Marital Status   Single                            Admission Date   24    Admission Type   Emergency    Admitting Provider   Ramon Marc MD    Attending Provider   Ramon Marc MD    Department, Room/Bed   [address], [address]32/       Discharge Date       Discharge Disposition       Discharge Destination                                 Attending Provider: Ramon Marc MD    Allergies: No Known Allergies    Isolation: Contact   Infection: ESBL E coli (24)   Code Status: CPR    Ht: 166.4 cm (65.5\")   Wt: 102 kg (224 lb 1.6 oz)    Admission Cmt: None   Principal Problem: UTI (urinary tract infection) [N39.0]                   Active Insurance as of 2024       Primary Coverage       Payor Plan Insurance Group Employer/Plan Group    [place] [id number]       Payor Plan Address Payor Plan Phone Number Payor Plan Fax Number Effective Dates    [employer] [address] [address] [phone]  2024 - None Entered    [place] [address]         Subscriber Name Subscriber Birth Date Member ID       LETY JOHNSON 1980 [id number]                     Emergency Contacts        (Rel.) Home Phone Work Phone Mobile Phone    BriceHenrya (Legal Guardian) -- -- [phone]                 History & Physical        Ramon Marc MD at 24 [address] [place] [address] HOSPITALIST HISTORY AND PHYSICAL    Patient Identification:  Name:  Lety Johnson  Age:  43 y.o.  Sex:  female  :  1980  MRN:  [id number]   Admit Date: 2024   Visit Number:  [id number]  Room number:  404/04  Primary Care Physician:  Provider, No Known     Subjective     Chief complaint:    Chief Complaint   Patient presents " with    Fall     History of presenting illness:   43F Morbid Obesity by BMI PMH History Genital Herpes, Cerebrovascular Accident 5/2024 complicated by L sided paralysis, presented to [facility] emergency room 9/26 after sliding into the floor off her chair due to weakness.  Upon arrival patient afebrile, heart rate 109, respiratory rate 18, blood pressure 146/101, satting 98% on room air. Labs showed WBC count 10K, lactate 1.1, CRP 3.8, potassium 2.7, magnesium 1.5, HCG negative, blood cultures pending. CXR no acute cardiopulmonary processes.  Xray ankle/foot without fracture or dislocation.  Emergency room provider gave antibiotics, pain medications, zofran, potassium replacement.  Hospital Medicine consulted for admission. Patient seen and examined in the emergency room, notes fevers chills at home a few days ago, recently has been on antibiotics for lower extremity cellulitis, has had some dysuria and suprapubic pain, denies current sexual activity, reports her fiance was taken to [facility] about a week ago and has been her primary caretaker, has had difficulty at home since prior stroke and caretakers not consistent, last 24hrs didn't have anyone to help her, sister endorses recent confusion/hallucinations beyond baseline, seeing dead family members, coinciding with onset of dysuria.  ---------------------------------------------------------------------------------------------------------------------   Review of Systems   Constitutional:  Positive for chills and fever.   HENT: Negative.     Eyes: Negative.    Respiratory:  Positive for shortness of breath.    Cardiovascular:  Positive for leg swelling. Negative for chest pain.   Gastrointestinal: Negative.    Endocrine: Negative.    Genitourinary:  Positive for dysuria.   Musculoskeletal: Negative.    Skin:  Positive for rash.   Allergic/Immunologic: Negative.    Neurological:  Positive for weakness.   Hematological: Negative.    Psychiatric/Behavioral:   Positive for hallucinations.      ---------------------------------------------------------------------------------------------------------------------   Past Medical History:   Diagnosis Date    Stroke      No past surgical history on file.  No family history on file.  Social History     Socioeconomic History    Marital status: Single     ---------------------------------------------------------------------------------------------------------------------   Allergies:  Patient has no known allergies.  ---------------------------------------------------------------------------------------------------------------------   Medications below are reported home medications pulling from within the system; at this time, these medications have not been reconciled unless otherwise specified and are in the verification process for further verifcation as current home medications.    Prior to Admission Medications       Prescriptions Last Dose Informant Patient Reported? Taking?    aspirin 81 MG chewable tablet Unknown  Yes No    Chew 1 tablet Daily.    atorvastatin (LIPITOR) 80 MG tablet Unknown  Yes No    Take 1 tablet by mouth Daily.    cyclobenzaprine (FLEXERIL) 10 MG tablet Unknown  Yes No    Take 1 tablet by mouth 3 (Three) Times a Day As Needed for Muscle Spasms.    DULoxetine (CYMBALTA) 60 MG capsule Unknown  Yes No    Take 1 capsule by mouth Daily.    lisinopril (PRINIVIL,ZESTRIL) 20 MG tablet Unknown  Yes No    Take 1 tablet by mouth Daily.    metFORMIN (GLUCOPHAGE) 1000 MG tablet Unknown  Yes No    Take 1 tablet by mouth 2 (Two) Times a Day With Meals.    pantoprazole (PROTONIX) 40 MG EC tablet Unknown  Yes No    Take 1 tablet by mouth Daily.          Objective     Vital Signs:  Temp:  [98.2 °F (36.8 °C)] 98.2 °F (36.8 °C)  Heart Rate:  [] 118  Resp:  [18] 18  BP: (135-157)/() 140/79    Mean Arterial Pressure (Non-Invasive) for the past 24 hrs (Last 3 readings):   Noninvasive MAP (mmHg)   09/26/24 1645 90    09/26/24 1630 100   09/26/24 1615 103     SpO2:  [91 %-100 %] 94 %  on   ;   Device (Oxygen Therapy): room air  Body mass index is 43.26 kg/m².    Wt Readings from Last 3 Encounters:   09/26/24 120 kg (264 lb)   06/27/24 120 kg (264 lb)      ---------------------------------------------------------------------------------------------------------------------   Physical Exam:  Constitutional:  Well-developed and well-nourished. Older than stated age. No acute distress.      HENT:  Head:  Normocephalic and atraumatic.  Mouth:  Moist mucous membranes.    Eyes:  Conjunctivae and EOM are normal. No scleral icterus.    Neck:  Neck supple.  No JVD present.    Cardiovascular:  Normal rate, regular rhythm and normal heart sounds with no murmur.  Pulmonary/Chest:  No respiratory distress, no wheezes, no crackles, with normal breath sounds and good air movement.  Abdominal:  Soft. No distension and no tenderness.   Musculoskeletal:  No tenderness, and no deformity.  No red or swollen joints anywhere.    Neurological:  Alert and oriented to person, place, and time.  No cranial nerve deficit. Chronic L sided paralysis.    Skin:  Skin is warm and dry. No pallor. Significant intertriginous erythema under panus, consistent with yeast infection.   Peripheral vascular:  No clubbing, no cyanosis, trace edema.  Psychiatric: Appropriate mood and affect  Edited by: Ramon Marc MD at 9/26/2024 1701  ---------------------------------------------------------------------------------------------------------------------  EKG:  N/A    Telemetry:  normal sinus rhythm, no significant ST changes    I have personally looked at telemetry.    Last echocardiogram:  Ordered and pending   --------------------------------------------------------------------------------------------------------------------  Labs:  Results from last 7 days   Lab Units 09/26/24  1504 09/26/24  1350   LACTATE mmol/L  --  1.1   CRP mg/dL 3.80*  --    WBC 10*3/mm3  --   "10.42   HEMOGLOBIN g/dL  --  12.5   HEMATOCRIT %  --  37.4   MCV fL  --  94.0   MCHC g/dL  --  33.4   PLATELETS 10*3/mm3  --  402         Results from last 7 days   Lab Units 09/26/24  1504   SODIUM mmol/L 140   POTASSIUM mmol/L 2.7*   MAGNESIUM mg/dL 1.5*   CHLORIDE mmol/L 99   CO2 mmol/L 28.9   BUN mg/dL 10   CREATININE mg/dL 0.51*   CALCIUM mg/dL 9.4   GLUCOSE mg/dL 220*   ALBUMIN g/dL 3.5   BILIRUBIN mg/dL 0.6   ALK PHOS U/L 119*   AST (SGOT) U/L 21   ALT (SGPT) U/L 8   Estimated Creatinine Clearance: 188.1 mL/min (A) (by C-G formula based on SCr of 0.51 mg/dL (L)).  No results found for: \"AMMONIA\"          No results found for: \"HGBA1C\", \"POCGLU\"  No results found for: \"TSH\", \"FREET4\"  No results found for: \"PREGTESTUR\", \"PREGSERUM\", \"HCG\", \"HCGQUANT\"  Pain Management Panel           No data to display              Brief Urine Lab Results  (Last result in the past 365 days)        Color   Clarity   Blood   Leuk Est   Nitrite   Protein   CREAT   Urine HCG        09/26/24 1419 Dark Yellow   Turbid   Trace   Moderate (2+)   Positive   30 mg/dL (1+)                 No results found for: \"BLOODCX\"  No results found for: \"URINECX\"  No results found for: \"WOUNDCX\"  No results found for: \"STOOLCX\"    I have personally looked at the labs and they are summarized above.  ----------------------------------------------------------------------------------------------------------------------  Detailed radiology reports for the last 24 hours:    Imaging Results (Last 24 Hours)       Procedure Component Value Units Date/Time    XR Ankle 3+ View Left [[id number]] Collected: 09/26/24 1537     Updated: 09/26/24 1540    Narrative:      EXAM:    XR Left Ankle Complete, 3 or More Views     EXAM DATE:    9/26/2024 3:17 PM     CLINICAL HISTORY:    left ankle injury     TECHNIQUE:    Frontal, lateral and oblique views of the left ankle.     COMPARISON:    No relevant prior studies available.     FINDINGS:    Bones/joints:  See below.     "  Soft tissues:  Soft tissue swelling, but no acute fracture or  dislocation.       Impression:        Soft tissue swelling, but no acute fracture or dislocation.        This report was finalized on 9/26/2024 3:38 PM by Dr. Moses Otto MD.       XR Foot 3+ View Left [[id number]] Collected: 09/26/24 1536     Updated: 09/26/24 1539    Narrative:      EXAM:    XR Left Foot Complete, 3 or More Views     EXAM DATE:    9/26/2024 3:16 PM     CLINICAL HISTORY:    left foot wound     TECHNIQUE:    Frontal, lateral and oblique views of the left foot.     COMPARISON:    No relevant prior studies available.     FINDINGS:    Bones/joints:  Unremarkable.  No acute fracture.  No dislocation.    Soft tissues:  Unremarkable.  No radiopaque foreign body.       Impression:        No acute findings in the left foot.        This report was finalized on 9/26/2024 3:37 PM by Dr. Moses Otto MD.       XR Chest 1 View [[id number]] Collected: 09/26/24 1406     Updated: 09/26/24 1408    Narrative:      XR CHEST 1 VW-     CLINICAL INDICATION: weakness        COMPARISON: None immediately available      TECHNIQUE: Single frontal view of the chest.     FINDINGS:     LUNGS: Lungs are adequately aerated.      HEART AND MEDIASTINUM: Heart and mediastinal contours are unremarkable        SKELETON: Bony and soft tissue structures are unremarkable.             Impression:      No radiographic evidence of acute cardiac or pulmonary disease.           This report was finalized on 9/26/2024 2:06 PM by Dr. Moses Otto MD.       CT Cervical Spine Without Contrast [[id number]] Collected: 09/26/24 1317     Updated: 09/26/24 1319    Narrative:      CT CERVICAL SPINE WO CONTRAST-     CLINICAL INDICATION: neck pain        COMPARISON: None available     TECHNIQUE: Axial images of the cervical spine were acquired with out any  intravenous contrast. Reformatted images were then created in the  sagittal and coronal planes.     DOSE:      Radiation dose reduction  techniques were utilized per ALARA protocol.  Automated exposure control was initiated through either or Car[employer].mx or  DoseRight software packages by  protocol.           FINDINGS:   The provided study demonstrates preservation of the vertebral body  heights in the sagittally reconstructed images.     There is no prevertebral soft tissue swelling.     Alignment is near anatomic.     The disc space heights are uniform.     I see no acute cervical spine fracture.       Impression:         1. No acute bony abnormality.     2. Other incidental findings as above     This report was finalized on 9/26/2024 1:17 PM by Dr. Moses Otto MD.       CT Lumbar Spine Without Contrast [[id number]] Collected: 09/26/24 1317     Updated: 09/26/24 1319    Narrative:      EXAM:    CT Lumbar Spine Without Intravenous Contrast     EXAM DATE:    9/26/2024 12:59 PM     CLINICAL HISTORY:    back pain     TECHNIQUE:    Axial computed tomography images of the lumbar spine without  intravenous contrast.  Sagittal and coronal reformatted images were  created and reviewed.  This CT exam was performed using one or more of  the following dose reduction techniques:  automated exposure control,  adjustment of the mA and/or kV according to patient size, and/or use of  iterative reconstruction technique.     COMPARISON:    No relevant prior studies available.     FINDINGS:    VERTEBRAE:  Unremarkable.  No acute fracture.    DISCS/SPINAL CANAL/NEURAL FORAMINA:  No acute findings.  No spinal  canal stenosis.    SOFT TISSUES:  Unremarkable.       Impression:        No acute findings in the lumbar spine.        This report was finalized on 9/26/2024 1:17 PM by Dr. Moses Otto MD.       CT Thoracic Spine Without Contrast [[id number]] Collected: 09/26/24 1316     Updated: 09/26/24 1319    Narrative:      EXAM:    CT Thoracic Spine Without Intravenous Contrast     EXAM DATE:    9/26/2024 12:58 PM     CLINICAL HISTORY:    back pain     TECHNIQUE:     Axial computed tomography images of the thoracic spine without  intravenous contrast.  Sagittal and coronal reformatted images were  created and reviewed.  This CT exam was performed using one or more of  the following dose reduction techniques:  automated exposure control,  adjustment of the mA and/or kV according to patient size, and/or use of  iterative reconstruction technique.     COMPARISON:    No relevant prior studies available.     FINDINGS:    VERTEBRAE:  Unremarkable.  No acute fracture.    DISCS/SPINAL CANAL/NEURAL FORAMINA:  No acute findings.  No spinal  canal stenosis.    SOFT TISSUES:  Unremarkable.       Impression:        No acute findings in the thoracic spine.        This report was finalized on 9/26/2024 1:17 PM by Dr. Moses Otto MD.       CT Head Without Contrast [[id number]] Collected: 09/26/24 1259     Updated: 09/26/24 1302    Narrative:      CT HEAD WO CONTRAST-     CLINICAL INDICATION: weakness        COMPARISON: None available     TECHNIQUE: Axial images of the brain were obtained with out intravenous  contrast.  Reformatted images were created in the sagittal and coronal  planes.     DOSE:     Radiation dose reduction techniques were utilized per ALARA protocol.  Automated exposure control was initiated through either or [employer]id or  DoseRight software packages by  protocol.        FINDINGS:    BRAIN: Probable subacute ischemia right middle cerebral artery  territory    VENTRICLES:  Unremarkable.  No ventriculomegaly.       BONES/JOINTS:  Unremarkable.  No acute fracture.       SOFT TISSUES:  Unremarkable.       SINUSES:  no air fluid levels       MASTOID AIR CELLS:  Unremarkable as visualized.  No mastoid effusion.          Impression:         1. Probable remote right middle cerebral artery infarction  2. No acute parenchymal mass, hemorrhage, or midline shift     This report was finalized on 9/26/2024 1:00 PM by Dr. Moses Otto MD.             I have personally looked  at the radiology images and read the final radiology report.    Assessment & Plan    43F Morbid Obesity by BMI PMH History Genital Herpes, Cerebrovascular Accident 5/2024 complicated by L sided paralysis, presented to [facility] emergency room 9/26 after sliding into the floor off her chair due to weakness.     #Acute Metabolic Encephalopathy due to Acute Urinary Tract Infection in setting of probable neurogenic bladder  - Continue Ceftriaxone, follow up cultures, rule out STI    #Electrolyte Abnormalities  - Acute Mild Hypokalemia - Replacing, on protocol  - Acute Mild Hypomagnesemia - Replacing, on protocol    #History Cerebrovascular Accident complicated by L sided paralysis, unclear etiology  - Review home medications and resume as indicated, Supportive Care     #Debility  - Consult PT/OT, Social Work if placement needed     #History Genital Herpes  - Supportive Care, follow up medication reconciliation for any suppressive medications, check HIV & acute hepatitis panel     #Morbid Obesity by BMI  - Body mass index is 43.26 kg/m².; complicates all aspects of care    F: Oral  E: Monitor & Replace as needed   N: Regular Diet  PPx: SQH  Code Status (Patient has no pulse and is not breathing): CPR  Medical Interventions (Patient has pulse or is breathing): Full Support     Dispo: Pending workup and clinical improvement    *This patient is considered high risk secondary to Urinary Tract Infection, electrolyte abnormalities, chronic L sided paralysis from prior Cerebrovascular Accident.      Edited by: Ramon Marc MD at 9/26/2024 1701    Ramon Marc MD  [facility] Hospitalist  09/26/24  17:01 EDT        Electronically signed by Ramon Marc MD at 09/26/24 1703       Vital Signs (last day)       Date/Time Temp Temp src Pulse Resp BP Patient Position SpO2    10/11/24 1416 97.8 (36.6) Oral 92 18 128/80 Lying --    10/11/24 0932 -- -- 110 20 152/88 Lying --    10/11/24 0518 97.9 (36.6) Oral 92  18 129/88 Lying --    10/10/24 2358 97.3 (36.3) Oral 87 18 142/80 Lying --    10/10/24 2019 98.3 (36.8) Oral 105 18 138/81 Lying 93    10/10/24 1716 -- -- 100 -- 134/73 Lying --    10/10/24 1207 -- -- 91 18 134/87 Lying 91    10/10/24 0700 97.6 (36.4) Oral 99 18 154/93 Lying 98    10/10/24 0048 98.6 (37) Oral 85 18 96/71 Lying 97          Lines, Drains & Airways       Active LDAs       Name Placement date Placement time Site Days    Midline Catheter - Single Lumen 09/30/24 Right Basilic 09/30/24  0955  -- 11    External Urinary Catheter 09/29/24  1500  --  12                  Current Facility-Administered Medications   Medication Dose Route Frequency Provider Last Rate Last Admin    acetaminophen (TYLENOL) tablet 650 mg  650 mg Oral Q6H PRN Ashleigh Nicholas PA-C   650 mg at 10/11/24 0907    acyclovir (ZOVIRAX) capsule 400 mg  400 mg Oral Nightly Ramon Marc MD   400 mg at 10/10/24 2108    aspirin chewable tablet 81 mg  81 mg Oral Daily Ramon Marc MD   81 mg at 10/11/24 0907    atorvastatin (LIPITOR) tablet 80 mg  80 mg Oral Daily Ramon Marc MD   80 mg at 10/11/24 0907    sennosides-docusate (PERICOLACE) 8.6-50 MG per tablet 2 tablet  2 tablet Oral BID PRN Ramon Marc MD        And    polyethylene glycol (MIRALAX) packet 17 g  17 g Oral Daily PRN Ramon Marc MD        And    bisacodyl (DULCOLAX) EC tablet 5 mg  5 mg Oral Daily PRN Ramon Marc MD        And    bisacodyl (DULCOLAX) suppository 10 mg  10 mg Rectal Daily PRN Ramon Marc MD        Calcium Replacement - Follow Nurse / BPA Driven Protocol   Does not apply PRN Ramon Marc MD        cyclobenzaprine (FLEXERIL) tablet 10 mg  10 mg Oral TID PRN Ramon Marc MD   10 mg at 10/11/24 0600    dextrose (D50W) (25 g/50 mL) IV injection 25 g  25 g Intravenous Q15 Min PRN Ramon Marc MD        dextrose (GLUTOSE) oral gel 15 g  15 g Oral Q15 Min PRN Ramon Marc MD        Diclofenac Sodium (VOLTAREN) 1 % gel 4 g  4 g Topical 4x Daily PRN  Ashleigh Nicholas PA-C   4 g at 10/10/24 1545    DULoxetine (CYMBALTA) DR capsule 60 mg  60 mg Oral Daily Ramon Marc MD   60 mg at 10/11/24 0907    glucagon HCl (Diagnostic) injection 1 mg  1 mg Intramuscular Q15 Min PRN Ramon Marc MD        heparin (porcine) 5000 UNIT/ML injection 5,000 Units  5,000 Units Subcutaneous Q8H Ramon Marc MD   5,000 Units at 10/11/24 1321    insulin glargine (LANTUS, SEMGLEE) injection 5 Units  5 Units Subcutaneous Nightly Ramon Marc MD        Insulin Lispro (humaLOG) injection 2-7 Units  2-7 Units Subcutaneous 4x Daily PC & at Bedtime Ramon Marc MD   7 Units at 10/11/24 1321    ketorolac (TORADOL) injection 15 mg  15 mg Intravenous BID PRN Ramon Marc MD   15 mg at 10/11/24 0905    lisinopril (PRINIVIL,ZESTRIL) tablet 20 mg  20 mg Oral Daily Ramon Marc MD   20 mg at 10/11/24 0907    loperamide (IMODIUM) capsule 2 mg  2 mg Oral 4x Daily PRN Marv Navas DO   2 mg at 10/09/24 1627    Magnesium Standard Dose Replacement - Follow Nurse / BPA Driven Protocol   Does not apply PRN Ramon Marc MD        melatonin tablet 5 mg  5 mg Oral Nightly PRN Wesley Matthews APRN   5 mg at 10/11/24 0243    [Held by provider] metFORMIN (GLUCOPHAGE) tablet 1,000 mg  1,000 mg Oral BID With Meals Ramon Marc MD        metoprolol tartrate (LOPRESSOR) tablet 25 mg  25 mg Oral Q12H Mara Navas MD   25 mg at 10/11/24 0907    nicotine (NICODERM CQ) 7 MG/24HR patch 1 patch  1 patch Transdermal Q24H Ramon Marc MD   1 patch at 10/11/24 0904    nystatin (MYCOSTATIN) powder   Topical Q12H Ramon Marc MD   Given at 10/11/24 0907    pantoprazole (PROTONIX) EC tablet 40 mg  40 mg Oral Daily Ramon Marc MD   40 mg at 10/11/24 0907    Pharmacy Consult   Does not apply Continuous PRN Ramon Marc MD        Phosphorus Replacement - Follow Nurse / BPA Driven Protocol   Does not apply PRN Ramon Marc MD        potassium chloride (KLOR-CON M20) CR tablet 40 mEq  40  mEq Oral Q4H Ramon Marc MD   40 mEq at 10/11/24 1000    Potassium Replacement - Follow Nurse / BPA Driven Protocol   Does not apply PRN Ramon Marc MD        prochlorperazine (COMPAZINE) injection 2.5 mg  2.5 mg Intravenous Q6H PRN Ashleigh Nicholas PA-C   2.5 mg at 10/09/24 2357    sodium chloride 0.9 % flush 10 mL  10 mL Intravenous Q12H Ramon Marc MD   10 mL at 10/10/24 2108    sodium chloride 0.9 % flush 10 mL  10 mL Intravenous PRN Ramon Marc MD        sodium chloride 0.9 % flush 10 mL  10 mL Intravenous Q12H Marv Navas,    10 mL at 10/10/24 210    sodium chloride 0.9 % flush 10 mL  10 mL Intravenous PRN Marv Navas DO        sodium chloride 0.9 % infusion 40 mL  40 mL Intravenous PRN Ramon Marc MD        sodium chloride 0.9 % infusion 40 mL  40 mL Intravenous PRN Marv Navas DO         Operative/Procedure Notes (most recent note)    No notes of this type exist for this encounter.          Physician Progress Notes (most recent note)        Ramon Marc MD at 10/11/24 1007              [facility]     Patient Identification:  Name:  Lety Johnson  Age:  43 y.o.  Sex:  female  :  1980  MRN:  [id number]  Visit Number:  [id number]  ROOM: [address] [place] [address]     Primary Care Provider:  Provider, No Known    Length of stay in inpatient status:  13    Subjective     Chief Compliant:    Chief Complaint   Patient presents with    Fall     History of Presenting Illness:    Patient remains ill but stable today, no acute events overnight, no new complaints, denies any fevers or chills, A1c >9%, added SSI, glucose 400's, may end up needing to add long acting soon. Pending placement, medically stable otherwise.   Objective     Current Hospital Meds:  acyclovir, 400 mg, Oral, Nightly  aspirin, 81 mg, Oral, Daily  atorvastatin, 80 mg, Oral, Daily  DULoxetine, 60 mg, Oral, Daily  heparin (porcine), 5,000 Units, Subcutaneous,  Q8H  insulin lispro, 2-7 Units, Subcutaneous, 4x Daily PC & at Bedtime  lisinopril, 20 mg, Oral, Daily  [Held by provider] metFORMIN, 1,000 mg, Oral, BID With Meals  metoprolol tartrate, 25 mg, Oral, Q12H  nicotine, 1 patch, Transdermal, Q24H  nystatin, , Topical, Q12H  pantoprazole, 40 mg, Oral, Daily  potassium chloride ER, 40 mEq, Oral, Q4H  sodium chloride, 10 mL, Intravenous, Q12H  sodium chloride, 10 mL, Intravenous, Q12H      Pharmacy Consult,       ----------------------------------------------------------------------------------------------------------------------  Vital Signs:  Temp:  [97.3 °F (36.3 °C)-98.3 °F (36.8 °C)] 97.9 °F (36.6 °C)  Heart Rate:  [] 110  Resp:  [18-20] 20  BP: (129-152)/(73-88) 152/88  SpO2:  [91 %-93 %] 93 %  on   ;   Device (Oxygen Therapy): room air  Body mass index is 36.73 kg/m².      Intake/Output Summary (Last 24 hours) at 10/11/2024 1007  Last data filed at 10/11/2024 0900  Gross per 24 hour   Intake 480 ml   Output 1700 ml   Net -1220 ml      ----------------------------------------------------------------------------------------------------------------------  Physical exam:  Constitutional:  Older than stated age. No acute distress. No discomfort   HENT:  Head:  Normocephalic and atraumatic.  Mouth:  Moist mucous membranes.    Eyes:  Conjunctivae and EOM are normal. No scleral icterus.    Neck:  Neck supple.  No JVD present.    Cardiovascular:  Normal rate, regular rhythm and normal heart sounds with no murmur.  Pulmonary/Chest:  No respiratory distress, no wheezes, on room air  Abdominal:  Soft. No distension and no tenderness.   Musculoskeletal:  No tenderness, and no deformity.  No red or swollen joints anywhere.    Neurological:  Alert and oriented to person, place, and time.  No cranial nerve deficit. Chronic L sided paralysis.    Skin:  Skin is warm and dry. No pallor.  Peripheral vascular:  No clubbing, no cyanosis, trace edema.  Psychiatric: Appropriate mood  "and affect    *Examination stable today 10/11  Edited by: Ramon Marc MD at 10/11/2024 1007  ----------------------------------------------------------------------------------------------------------------------  WBC/HGB/HCT/PLT   9.91/13.7/41.4/387 (10/11 0814)  BUN/CREAT/GLUC/ALT/AST/NORMA/LIP    13/0.44/404/--/--/--/-- (10/11 0814)  LYTES - Na/K/Cl/CO2: 134*/3.6/99/23.5 (10/11 0814)        No results found for: \"URINECX\"  No results found for: \"BLOODCX\"    I have personally looked at the labs and they are summarized above.  ----------------------------------------------------------------------------------------------------------------------  Detailed radiology reports for the last 24 hours:  No radiology results for the last day  Assessment & Plan    43F Morbid Obesity by BMI PMH History Genital Herpes, Cerebrovascular Accident 5/2024 complicated by L sided paralysis, presented to [facility] emergency room 9/26 after sliding into the floor off her chair due to weakness.     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     #Non-Insulin Dependent Diabetes Mellitus Type II, uncontrolled, unknown complications - Likely to be insulin dependent now  - Hgb A1c = 9.40%  - Hold home oral agents, add FSBG and SSI, add long acting as indicated.     #Electrolyte Abnormalities  - Acute Mild Hypokalemia - Replaced per protocol - Resolved   - Acute Mild Hypomagnesemia - Replaced per protocol - Resolved     #History Cerebrovascular Accident complicated by L sided paralysis, unclear etiology  - Continue home regimen, Supportive Care     #Debility  - Consult PT/OT, Social Work if placement needed     #History Genital Herpes  - Supportive " Care, continue home Acyclovir     #Morbid Obesity by BMI  - Body mass index is 43.26 kg/m².; complicates all aspects of care    F: Oral  E: Monitor & Replace as needed   N: Diet: Regular/House; Fluid Consistency: Thin (IDDSI 0)   PPx: SQH  Code Status (Patient has no pulse and is not breathing): CPR  Medical Interventions (Patient has pulse or is breathing): Full Support     Dispo: Pending placement, PT/OT consulted and following, Social Work consulted and following.     *This patient is considered high risk secondary to Urinary Tract Infection, electrolyte abnormalities, chronic L sided paralysis from prior Cerebrovascular Accident.    Edited by: Ramon Marc MD at 10/11/2024 1007  [facility]        Electronically signed by Ramon Marc MD at 10/11/24 1008          Consult Notes (most recent note)        Cuca Yuen APRN at 24 1436        Consult Orders    1. Inpatient Infectious Diseases Consult [[id number]] ordered by Ramon Marc MD at 24 1427                         INFECTIOUS DISEASE CONSULTATION REPORT        Patient Identification:  Name:  Lety Johnson  Age:  43 y.o.  Sex:  female  :  1980  MRN:  [id number]   Visit Number:  [id number]  Primary Care Physician:  Provider, No Known        Referring Provider: Dr. Marc    Reason for consult: ESBL UTI       LOS: 0 days        Subjective       Subjective     History of present illness:      Thank you Dr. Marc for allowing us to participate in the care of your patient.  As you well know, Ms. Lety Johnson is a 43 y.o. female with past medical history significant for ailin herpes, CVA in May 2024 with residual left-sided paralysis, who presented to [facility] Emergency Department on 2024 for evaluation after sliding into the floor due to weakness.  WBC 7.38.  Chlamydia gonorrhea and trichomonas negative.  CRP 3.80.  Urinalysis positive with culture finalizing is greater than 100,000 colonies of  E. coli ESBL.  Blood cultures from 9/26/2024 show no growth thus far.  HIV 1 and 2 nonreactive.  Hepatitis panel nonreactive.  COVID-19 and influenza PCR negative.  Lactic acid normal on admission.    Patient resting in bed.  Denies dysuria, urgency, frequency.  Patient reports recurrent yeast infections.  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  Lungs clear to auscultation bilaterally.      Infectious Disease consultation was requested for antimicrobial management.      ---------------------------------------------------------------------------------------------------------------------     Review Of Systems:    Constitutional: no fever, chills and night sweats. No appetite change or unexpected weight change. No fatigue.  Eyes: no eye drainage, itching or redness.  HEENT: no mouth sores, dysphagia or nose bleed.  Respiratory: no for shortness of breath, cough or production of sputum.  Cardiovascular: no chest pain, no palpitations, no orthopnea.  Gastrointestinal: no nausea, vomiting or diarrhea. No abdominal pain, hematemesis or rectal bleeding.  Genitourinary: no dysuria or polyuria.  Hematologic/lymphatic: no lymph node abnormalities, no easy bruising or easy bleeding.  Musculoskeletal: no muscle or joint pain.  Skin: No rash and no itching.  Neurological: no loss of consciousness, no seizure, no headache.  Behavioral/Psych: no depression or suicidal ideation.  Endocrine: no hot flashes.  Immunologic: negative.    ---------------------------------------------------------------------------------------------------------------------     Past Medical History    Past Medical History:   Diagnosis Date    Stroke        Past Surgical History    History reviewed. No pertinent surgical history.    Family History    History reviewed. No pertinent family history.    Social History    Social History     Tobacco Use    Smoking status: Every Day     Average packs/day: 2.0 packs/day for 26.0 years (52.0 ttl pk-yrs)      Types: Cigarettes     Start date: 1998    Smokeless tobacco: Never   Vaping Use    Vaping status: Never Used   Substance Use Topics    Alcohol use: Not Currently    Drug use: Never       Allergies    Patient has no known allergies.  ---------------------------------------------------------------------------------------------------------------------     Home Medications:    Prior to Admission Medications       Prescriptions Last Dose Informant Patient Reported? Taking?    acyclovir (ZOVIRAX) 400 MG tablet  Pharmacy Yes No    Take 1 tablet by mouth Every Night.    aspirin 81 MG chewable tablet Unknown  Yes No    Chew 1 tablet Daily.    atorvastatin (LIPITOR) 80 MG tablet Unknown  Yes No    Take 1 tablet by mouth Daily.    cyclobenzaprine (FLEXERIL) 10 MG tablet Unknown  Yes No    Take 1 tablet by mouth 3 (Three) Times a Day As Needed for Muscle Spasms.    DULoxetine (CYMBALTA) 60 MG capsule Unknown  Yes No    Take 1 capsule by mouth Daily.    lisinopril (PRINIVIL,ZESTRIL) 20 MG tablet Unknown  Yes No    Take 1 tablet by mouth Daily.    metFORMIN (GLUCOPHAGE) 1000 MG tablet Unknown  Yes No    Take 1 tablet by mouth 2 (Two) Times a Day With Meals.    pantoprazole (PROTONIX) 40 MG EC tablet Unknown  Yes No    Take 1 tablet by mouth Daily.          ---------------------------------------------------------------------------------------------------------------------    Objective       Objective     Hospital Scheduled Meds:  acyclovir, 400 mg, Oral, Nightly  aspirin, 81 mg, Oral, Daily  atorvastatin, 80 mg, Oral, Daily  DULoxetine, 60 mg, Oral, Daily  ertapenem, 1,000 mg, Intravenous, Q24H  heparin (porcine), 5,000 Units, Subcutaneous, Q8H  lisinopril, 20 mg, Oral, Daily  [Held by provider] metFORMIN, 1,000 mg, Oral, BID With Meals  nicotine, 1 patch, Transdermal, Q24H  pantoprazole, 40 mg, Oral, Daily  sodium chloride, 10 mL, Intravenous, Q12H      Pharmacy Consult,   sodium chloride, 75 mL/hr, Last Rate: 75 mL/hr (09/28/24  1315)      ---------------------------------------------------------------------------------------------------------------------   Vital Signs:  Temp:  [97.5 °F (36.4 °C)-98.4 °F (36.9 °C)] 98.4 °F (36.9 °C)  Heart Rate:  [] 58  Resp:  [18-20] 18  BP: (133-172)/(71-86) 172/83  Mean Arterial Pressure (Non-Invasive) for the past 24 hrs (Last 3 readings):   Noninvasive MAP (mmHg)   09/28/24 1100 107   09/28/24 0650 92   09/28/24 0307 103     SpO2 Percentage    09/28/24 0307 09/28/24 0650 09/28/24 1100   SpO2: 98% 96% 97%     SpO2:  [96 %-98 %] 97 %  on   ;   Device (Oxygen Therapy): room air    Body mass index is 40.79 kg/m².  Wt Readings from Last 3 Encounters:   09/28/24 113 kg (248 lb 14.4 oz)   06/27/24 120 kg (264 lb)     ---------------------------------------------------------------------------------------------------------------------     Physical Exam:    Constitutional:  Well-developed and well-nourished.  No respiratory distress.      HENT:  Head: Normocephalic and atraumatic.  Mouth:  Moist mucous membranes.    Eyes:  Conjunctivae and EOM are normal.  No scleral icterus.  Neck:  Neck supple.  No JVD present.    Cardiovascular:  Normal rate, regular rhythm and normal heart sounds with no murmur. No edema.  Pulmonary/Chest:  No respiratory distress, no wheezes, no crackles, with normal breath sounds and good air movement.  Abdominal:  Soft.  Bowel sounds are normal.  No distension and no tenderness.   Musculoskeletal: Left sided paralysis secondary to CVA.  Neurological:  Alert and oriented to person, place, and time.  No facial droop.  No slurred speech.   Skin:  Skin is warm and dry.  No rash noted.  No pallor.   Psychiatric:  Normal mood and affect.  Behavior is normal.    ---------------------------------------------------------------------------------------------------------------------              Results from last 7 days   Lab Units 09/28/24  0318 09/27/24  0844 09/26/24  1504 09/26/24  1350  "  CRP mg/dL  --   --  3.80*  --    LACTATE mmol/L  --   --   --  1.1   WBC 10*3/mm3 7.38 9.67  --  10.42   HEMOGLOBIN g/dL 11.8* 10.9*  --  12.5   HEMATOCRIT % 36.9 34.8  --  37.4   MCV fL 98.9* 102.4*  --  94.0   MCHC g/dL 32.0 31.3*  --  33.4   PLATELETS 10*3/mm3 332 292  --  402     Results from last 7 days   Lab Units 09/28/24  0318 09/27/24  1904 09/27/24  0844 09/26/24  1504   SODIUM mmol/L 142  --  137 140   POTASSIUM mmol/L 4.1 4.0 3.5 2.7*   MAGNESIUM mg/dL  --   --   --  1.5*   CHLORIDE mmol/L 110*  --  105 99   CO2 mmol/L 22.3  --  21.5* 28.9   BUN mg/dL 5*  --  7 10   CREATININE mg/dL 0.43*  --  0.36* 0.51*   CALCIUM mg/dL 8.5*  --  7.9* 9.4   GLUCOSE mg/dL 198*  --  234* 220*   ALBUMIN g/dL 2.7*  --  2.5* 3.5   BILIRUBIN mg/dL 0.3  --  0.3 0.6   ALK PHOS U/L 100  --  96 119*   AST (SGOT) U/L 15  --  17 21   ALT (SGPT) U/L 9  --  7 8   Estimated Creatinine Clearance: 215.5 mL/min (A) (by C-G formula based on SCr of 0.43 mg/dL (L)).  No results found for: \"AMMONIA\"    No results found for: \"HGBA1C\", \"POCGLU\"  No results found for: \"HGBA1C\"  No results found for: \"TSH\", \"FREET4\"    Blood Culture   Date Value Ref Range Status   09/26/2024 No growth at 2 days  Preliminary   09/26/2024 No growth at 2 days  Preliminary     Urine Culture   Date Value Ref Range Status   09/26/2024 >100,000 CFU/mL Escherichia coli ESBL (A)  Final     No results found for: \"WOUNDCX\"  No results found for: \"STOOLCX\"  No results found for: \"RESPCX\"  Pain Management Panel           No data to display              I have personally reviewed the above laboratory results.   ---------------------------------------------------------------------------------------------------------------------  Imaging Results (Last 7 Days)       Procedure Component Value Units Date/Time    XR Ankle 3+ View Left [[id number]] Collected: 09/26/24 1537     Updated: 09/26/24 1540    Narrative:      EXAM:    XR Left Ankle Complete, 3 or More Views     EXAM " DATE:    9/26/2024 3:17 PM     CLINICAL HISTORY:    left ankle injury     TECHNIQUE:    Frontal, lateral and oblique views of the left ankle.     COMPARISON:    No relevant prior studies available.     FINDINGS:    Bones/joints:  See below.      Soft tissues:  Soft tissue swelling, but no acute fracture or  dislocation.       Impression:        Soft tissue swelling, but no acute fracture or dislocation.        This report was finalized on 9/26/2024 3:38 PM by Dr. Moses Otto MD.       XR Foot 3+ View Left [[id number]] Collected: 09/26/24 1536     Updated: 09/26/24 1539    Narrative:      EXAM:    XR Left Foot Complete, 3 or More Views     EXAM DATE:    9/26/2024 3:16 PM     CLINICAL HISTORY:    left foot wound     TECHNIQUE:    Frontal, lateral and oblique views of the left foot.     COMPARISON:    No relevant prior studies available.     FINDINGS:    Bones/joints:  Unremarkable.  No acute fracture.  No dislocation.    Soft tissues:  Unremarkable.  No radiopaque foreign body.       Impression:        No acute findings in the left foot.        This report was finalized on 9/26/2024 3:37 PM by Dr. Moses Otto MD.       XR Chest 1 View [[id number]] Collected: 09/26/24 1406     Updated: 09/26/24 1408    Narrative:      XR CHEST 1 VW-     CLINICAL INDICATION: weakness        COMPARISON: None immediately available      TECHNIQUE: Single frontal view of the chest.     FINDINGS:     LUNGS: Lungs are adequately aerated.      HEART AND MEDIASTINUM: Heart and mediastinal contours are unremarkable        SKELETON: Bony and soft tissue structures are unremarkable.             Impression:      No radiographic evidence of acute cardiac or pulmonary disease.           This report was finalized on 9/26/2024 2:06 PM by Dr. Moses Otto MD.       CT Cervical Spine Without Contrast [[id number]] Collected: 09/26/24 1317     Updated: 09/26/24 1319    Narrative:      CT CERVICAL SPINE WO CONTRAST-     CLINICAL INDICATION: neck pain         COMPARISON: None available     TECHNIQUE: Axial images of the cervical spine were acquired with out any  intravenous contrast. Reformatted images were then created in the  sagittal and coronal planes.     DOSE:      Radiation dose reduction techniques were utilized per ALARA protocol.  Automated exposure control was initiated through either or [employer] or  [employer] software packages by  protocol.           FINDINGS:   The provided study demonstrates preservation of the vertebral body  heights in the sagittally reconstructed images.     There is no prevertebral soft tissue swelling.     Alignment is near anatomic.     The disc space heights are uniform.     I see no acute cervical spine fracture.       Impression:         1. No acute bony abnormality.     2. Other incidental findings as above     This report was finalized on 9/26/2024 1:17 PM by Dr. Moses Otto MD.       CT Lumbar Spine Without Contrast [[id number]] Collected: 09/26/24 1317     Updated: 09/26/24 1319    Narrative:      EXAM:    CT Lumbar Spine Without Intravenous Contrast     EXAM DATE:    9/26/2024 12:59 PM     CLINICAL HISTORY:    back pain     TECHNIQUE:    Axial computed tomography images of the lumbar spine without  intravenous contrast.  Sagittal and coronal reformatted images were  created and reviewed.  This CT exam was performed using one or more of  the following dose reduction techniques:  automated exposure control,  adjustment of the mA and/or kV according to patient size, and/or use of  iterative reconstruction technique.     COMPARISON:    No relevant prior studies available.     FINDINGS:    VERTEBRAE:  Unremarkable.  No acute fracture.    DISCS/SPINAL CANAL/NEURAL FORAMINA:  No acute findings.  No spinal  canal stenosis.    SOFT TISSUES:  Unremarkable.       Impression:        No acute findings in the lumbar spine.        This report was finalized on 9/26/2024 1:17 PM by Dr. Moses Otto MD.       CT Thoracic Spine  Without Contrast [[id number]] Collected: 09/26/24 1316     Updated: 09/26/24 1319    Narrative:      EXAM:    CT Thoracic Spine Without Intravenous Contrast     EXAM DATE:    9/26/2024 12:58 PM     CLINICAL HISTORY:    back pain     TECHNIQUE:    Axial computed tomography images of the thoracic spine without  intravenous contrast.  Sagittal and coronal reformatted images were  created and reviewed.  This CT exam was performed using one or more of  the following dose reduction techniques:  automated exposure control,  adjustment of the mA and/or kV according to patient size, and/or use of  iterative reconstruction technique.     COMPARISON:    No relevant prior studies available.     FINDINGS:    VERTEBRAE:  Unremarkable.  No acute fracture.    DISCS/SPINAL CANAL/NEURAL FORAMINA:  No acute findings.  No spinal  canal stenosis.    SOFT TISSUES:  Unremarkable.       Impression:        No acute findings in the thoracic spine.        This report was finalized on 9/26/2024 1:17 PM by Dr. Moses Otto MD.       CT Head Without Contrast [[id number]] Collected: 09/26/24 1259     Updated: 09/26/24 1302    Narrative:      CT HEAD WO CONTRAST-     CLINICAL INDICATION: weakness        COMPARISON: None available     TECHNIQUE: Axial images of the brain were obtained with out intravenous  contrast.  Reformatted images were created in the sagittal and coronal  planes.     DOSE:     Radiation dose reduction techniques were utilized per ALARA protocol.  Automated exposure control was initiated through either or Department of [employer] and Human Services or  [employer] software packages by  protocol.        FINDINGS:    BRAIN: Probable subacute ischemia right middle cerebral artery  territory    VENTRICLES:  Unremarkable.  No ventriculomegaly.       BONES/JOINTS:  Unremarkable.  No acute fracture.       SOFT TISSUES:  Unremarkable.       SINUSES:  no air fluid levels       MASTOID AIR CELLS:  Unremarkable as visualized.  No mastoid effusion.           Impression:         1. Probable remote right middle cerebral artery infarction  2. No acute parenchymal mass, hemorrhage, or midline shift     This report was finalized on 9/26/2024 1:00 PM by Dr. Moses Otto MD.             I have personally reviewed the above radiology results.   ---------------------------------------------------------------------------------------------------------------------      Pertinent Infectious Disease Results          Assessment & Plan      Assessment        ESBL UTI      Plan      Patient presented to [facility] Emergency Department on 9/26/2024 for evaluation after sliding into the floor due to weakness.  WBC 7.38.  Chlamydia gonorrhea and trichomonas negative.  CRP 3.80.  Urinalysis positive with culture finalizing is greater than 100,000 colonies of E. coli ESBL.  Blood cultures from 9/26/2024 show no growth thus far.  HIV 1 and 2 nonreactive.  Hepatitis panel nonreactive.  COVID-19 and influenza PCR negative.  Lactic acid normal on admission.    Patient resting in bed.  Denies dysuria, urgency, frequency.  Patient reports recurrent yeast infections.  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  Lungs clear to auscultation bilaterally.    Recommend to continue current antibiotic regimen of ertapenem 1 g IV every 24 hours x 7 days for treatment of ESBL UTI.  Okay to continue home suppressive acyclovir for history of genital herpes.  We will continue to follow closely and adjust antibiotic therapy as needed.        ANTIMICROBIAL THERAPY    acyclovir - 200 MG, 400 MG  ertapenem (INVanz) 1000 mg in 100 mL NS (VTB)       Again, thank you Dr. Marc for allowing us to participate in the care of your patient and please feel free to call for any questions you may have.        Code Status:     Code Status and Medical Interventions: CPR (Attempt to Resuscitate); Full Support   Ordered at: 09/26/24 0590     Code Status (Patient has no pulse and is not breathing):    CPR (Attempt  to Resuscitate)     Medical Interventions (Patient has pulse or is breathing):    Full Support         YUE Rivera  24  14:36 EDT    Electronically signed by Cuca Yuen APRN at 24 1532          Physical Therapy Notes (most recent note)        Janessa Almeida, PT at 10/11/24 1113  Version 1 of 1         Acute Care - Physical Therapy Treatment Note   [facility]     Patient Name: Lety Johnson  : 1980  MRN: [id number]  Today's Date: 10/11/2024   Onset of Illness/Injury or Date of Surgery: 24  Visit Dx:     ICD-10-CM ICD-9-CM   1. Hypokalemia  E87.6 276.8   2. Pressure injury of skin of sacral region, unspecified injury stage  L89.159 707.03     707.20   3. Pressure injury of skin of left heel, unspecified injury stage  L89.629 707.07     707.20   4. Acute cystitis without hematuria  N30.00 595.0     Patient Active Problem List   Diagnosis   (none) - all problems resolved or deleted     Past Medical History:   Diagnosis Date    Stroke      History reviewed. No pertinent surgical history.  PT Assessment (Last 12 Hours)       PT Evaluation and Treatment       Row Name 10/11/24 1045          Physical Therapy Time and Intention    Document Type therapy note (daily note)  -     Mode of Treatment physical therapy  -     Patient Effort adequate  -     Comment Pt coperative with LE TE in supine. Reported she sat EOB after breakfast  -       Row Name 10/11/24 1045          Motor Skills    Therapeutic Exercise hip;knee  R hip flex grossly 3x10 (3-4 x10); hip add grossly 3x10, quad sets L LE grossly 3x5 reps  -       Row Name             Wound 24 gluteal    Wound - Properties Group Placement Date: 24  -KJ Placement Time: 1806  -KJ Location: gluteal  -KJ    Retired Wound - Properties Group Placement Date: 24  -KJ Placement Time: 1806  -KJ Location: gluteal  -KJ    Retired Wound - Properties Group Date first assessed: 24  -KJ Time first assessed: 1806  -KJ  Location: gluteal  -KJ      Row Name             Wound 09/26/24 1807 Left gluteal    Wound - Properties Group Placement Date: 09/26/24  -KJ Placement Time: 1807  -KJ Side: Left  -KJ Location: gluteal  -KJ    Retired Wound - Properties Group Placement Date: 09/26/24  -KJ Placement Time: 1807  -KJ Side: Left  -KJ Location: gluteal  -KJ    Retired Wound - Properties Group Date first assessed: 09/26/24  -KJ Time first assessed: 1807  -KJ Side: Left  -KJ Location: gluteal  -KJ      Row Name             Wound 09/26/24 1809 gluteal    Wound - Properties Group Placement Date: 09/26/24  -KJ Placement Time: 1809 -KJ Location: gluteal  -KJ    Retired Wound - Properties Group Placement Date: 09/26/24  -KJ Placement Time: 1809 -KJ Location: gluteal  -KJ    Retired Wound - Properties Group Date first assessed: 09/26/24  -KJ Time first assessed: 1809  -KJ Location: gluteal  -KJ      Row Name             Wound 09/26/24 1809 Left posterior ankle    Wound - Properties Group Placement Date: 09/26/24  -KJ Placement Time: 1809  -KJ Side: Left  -KJ Orientation: posterior  -KJ Location: ankle  -KJ    Retired Wound - Properties Group Placement Date: 09/26/24  -KJ Placement Time: 1809  -KJ Side: Left  -KJ Orientation: posterior  -KJ Location: ankle  -KJ    Retired Wound - Properties Group Date first assessed: 09/26/24  -KJ Time first assessed: 1809  -KJ Side: Left  -KJ Location: ankle  -KJ      Row Name             Wound 09/29/24 0952 Bilateral anterior thigh MASD (Moisture associated skin damage)    Wound - Properties Group Placement Date: 09/29/24  -LB Placement Time: 0952 -LB Present on Original Admission: Y  -LB Side: Bilateral  -LB Orientation: anterior  -LB Location: thigh  -LB Primary Wound Type: MASD  -LB    Retired Wound - Properties Group Placement Date: 09/29/24  -LB Placement Time: 0952 -LB Present on Original Admission: Y  -LB Side: Bilateral  -LB Orientation: anterior  -LB Location: thigh  -LB Primary Wound Type: MASD  -LB     Retired Wound - Properties Group Date first assessed: 09/29/24  -LB Time first assessed: 0952 -LB Present on Original Admission: Y  -LB Side: Bilateral  -LB Location: thigh  -LB Primary Wound Type: MASD  -LB      Row Name 10/11/24 1045          Positioning and Restraints    Pre-Treatment Position in bed  -KH     Post Treatment Position bed  -KH     In Bed supine;call light within reach;exit alarm on  -KH       Row Name 10/11/24 1045          Progress Summary (PT)    Daily Progress Summary (PT) Pt had pain medication leaving her drowsy, pleasant and cooperative with therapy. May benefit from therapy intervention.  -KH               User Key  (r) = Recorded By, (t) = Taken By, (c) = Cosigned By      Initials Name Provider Type    Janessa Hassan, PT Physical Therapist    Nory Keenan, RN Registered Nurse    Zehra Rivera RN Registered Nurse                    Physical Therapy Education       Title: PT OT SLP Therapies (Done)       Topic: Physical Therapy (Done)       Point: Mobility training (Done)       Learning Progress Summary             Patient Acceptance, E,D, VU,NR by AG at 10/10/2024 1310    Acceptance, E,TB, VU by CB at 10/8/2024 0448    Acceptance, E, NR by RD at 10/2/2024 1101    Acceptance, E, NR by RD at 10/1/2024 0856    Acceptance, E,D, VU,NR by AG at 9/30/2024 1559                         Point: Home exercise program (Done)       Learning Progress Summary             Patient Acceptance, E,D, VU,NR by AG at 10/10/2024 1310    Acceptance, E,TB, VU by CB at 10/8/2024 0448    Acceptance, E, NR by RD at 10/2/2024 1101    Acceptance, E, NR by RD at 10/1/2024 0856    Acceptance, E,D, VU,NR by AG at 9/30/2024 1559                         Point: Body mechanics (Done)       Learning Progress Summary             Patient Acceptance, E,D, VU,NR by AG at 10/10/2024 1310    Acceptance, E,TB, VU by CB at 10/8/2024 0448    Acceptance, E, NR by RD at 10/2/2024 1101    Acceptance, E, NR by RD at  10/1/2024 0856    Acceptance, E,D, VU,NR by  at 2024 1559                         Point: Precautions (Done)       Learning Progress Summary             Patient Acceptance, E,D, VU,NR by  at 10/10/2024 1310    Acceptance, E,TB, VU by  at 10/8/2024 0448    Acceptance, E, NR by RD at 10/2/2024 1101    Acceptance, E, NR by RD at 10/1/2024 0856    Acceptance, E,D, VU,NR by  at 2024 1559                                         User Key       Initials Effective Dates Name Provider Type Discipline     21 -  Мария Joya, PT Physical Therapist PT    RD 21 -  Laisha Verdugo, RN Registered Nurse Nurse    CB 24 -  Fidelia Lombardo, RN Registered Nurse Nurse                  PT Recommendation and Plan     Progress Summary (PT)  Daily Progress Summary (PT): Pt had pain medication leaving her drowsy, pleasant and cooperative with therapy. May benefit from therapy intervention.       Time Calculation:    PT Charges       Row Name 10/11/24 1112             Time Calculation    Start Time 1045  -      PT Received On 10/11/24  -         Time Calculation- PT    Total Timed Code Minutes- PT 15 minute(s)  -                User Key  (r) = Recorded By, (t) = Taken By, (c) = Cosigned By      Initials Name Provider Type     Janessa Almeida, PT Physical Therapist                  Therapy Charges for Today       Code Description Service Date Service Provider Modifiers Qty    24722911942 HC PT THER PROC EA 15 MIN 10/11/2024 Janessa Almeida, PT GP 1            PT G-Codes  AM-PAC 6 Clicks Score (PT): 8    Janessa Almeida PT  10/11/2024      Electronically signed by Janessa Almeida, PT at 10/11/24 1114          Occupational Therapy Notes (most recent note)        Neil Graf, OT at 10/11/24 1256          Acute Care - Occupational Therapy Treatment Note  [facility]     Patient Name: Lety Johnson  : 1980  MRN: [id number]  Today's Date: 10/11/2024  Onset of Illness/Injury or Date of Surgery:  09/26/24     Referring Physician: Dr. Marc    Admit Date: 9/26/2024       ICD-10-CM ICD-9-CM   1. Hypokalemia  E87.6 276.8   2. Pressure injury of skin of sacral region, unspecified injury stage  L89.159 707.03     707.20   3. Pressure injury of skin of left heel, unspecified injury stage  L89.629 707.07     707.20   4. Acute cystitis without hematuria  N30.00 595.0     Patient Active Problem List   Diagnosis   (none) - all problems resolved or deleted     Past Medical History:   Diagnosis Date    Stroke      History reviewed. No pertinent surgical history.      OT ASSESSMENT FLOWSHEET (Last 12 Hours)       OT Evaluation and Treatment       Row Name 10/11/24 1244                   OT Time and Intention    Document Type therapy note (daily note)  -KR        Mode of Treatment occupational therapy  -KR        Patient Effort fair  -KR        Symptoms Noted During/After Treatment fatigue;increased pain  -KR           Shoulder (Therapeutic Exercise)    Shoulder PROM (Therapeutic Exercise) left;flexion;extension;supine  -KR           Elbow/Forearm (Therapeutic Exercise)    Elbow/Forearm PROM (Therapeutic Exercise) left;flexion;extension;supine  -KR           Wrist (Therapeutic Exercise)    Wrist PROM (Therapeutic Exercise) left;flexion;extension  -KR           Hand (Therapeutic Exercise)    Hand PROM (Therapeutic Exercise) left;finger flexion;finger extension  -KR           Wound 09/26/24 1806 gluteal    Wound - Properties Group Placement Date: 09/26/24  -KJ Placement Time: 1806  -KJ Location: gluteal  -KJ    Retired Wound - Properties Group Placement Date: 09/26/24  -KJ Placement Time: 1806  -KJ Location: gluteal  -KJ    Retired Wound - Properties Group Date first assessed: 09/26/24  -KJ Time first assessed: 1806  -KJ Location: gluteal  -KJ       Wound 09/26/24 1807 Left gluteal    Wound - Properties Group Placement Date: 09/26/24  -KJ Placement Time: 1807  -KJ Side: Left  -KJ Location: gluteal  -KJ    Retired Wound -  Properties Group Placement Date: 09/26/24  -KJ Placement Time: 1807  -KJ Side: Left  -KJ Location: gluteal  -KJ    Retired Wound - Properties Group Date first assessed: 09/26/24  -KJ Time first assessed: 1807  -KJ Side: Left  -KJ Location: gluteal  -KJ       Wound 09/26/24 1809 gluteal    Wound - Properties Group Placement Date: 09/26/24  -KJ Placement Time: 1809  -KJ Location: gluteal  -KJ    Retired Wound - Properties Group Placement Date: 09/26/24  -KJ Placement Time: 1809  -KJ Location: gluteal  -KJ    Retired Wound - Properties Group Date first assessed: 09/26/24  -KJ Time first assessed: 1809  -KJ Location: gluteal  -KJ       Wound 09/26/24 1809 Left posterior ankle    Wound - Properties Group Placement Date: 09/26/24  -KJ Placement Time: 1809  -KJ Side: Left  -KJ Orientation: posterior  -KJ Location: ankle  -KJ    Retired Wound - Properties Group Placement Date: 09/26/24  -KJ Placement Time: 1809  -KJ Side: Left  -KJ Orientation: posterior  -KJ Location: ankle  -KJ    Retired Wound - Properties Group Date first assessed: 09/26/24  -KJ Time first assessed: 1809  -KJ Side: Left  -KJ Location: ankle  -KJ       Wound 09/29/24 0952 Bilateral anterior thigh MASD (Moisture associated skin damage)    Wound - Properties Group Placement Date: 09/29/24  -LB Placement Time: 0952 -LB Present on Original Admission: Y  -LB Side: Bilateral  -LB Orientation: anterior  -LB Location: thigh  -LB Primary Wound Type: MASD  -LB    Retired Wound - Properties Group Placement Date: 09/29/24  -LB Placement Time: 0952 -LB Present on Original Admission: Y  -LB Side: Bilateral  -LB Orientation: anterior  -LB Location: thigh  -LB Primary Wound Type: MASD  -LB    Retired Wound - Properties Group Date first assessed: 09/29/24  -LB Time first assessed: 0952  -LB Present on Original Admission: Y  -LB Side: Bilateral  -LB Location: thigh  -LB Primary Wound Type: MASD  -LB       Plan of Care Review    Plan of Care Reviewed With patient  -KR            ROM Goal 1 (OT)    Progress/Outcome (ROM Goal 1, OT) continuing progress toward goal  -KR                  User Key  (r) = Recorded By, (t) = Taken By, (c) = Cosigned By      Initials Name Effective Dates    KR Neil Graf OT 06/16/21 -     Nory Keenan RN 06/08/23 -     Zehra Rivera RN 10/20/21 -                            OT Recommendation and Plan  Planned Therapy Interventions (OT): activity tolerance training, neuromuscular control/coordination retraining, ROM/therapeutic exercise  Plan of Care Review  Plan of Care Reviewed With: patient  Plan of Care Reviewed With: patient        Time Calculation:     Therapy Charges for Today       Code Description Service Date Service Provider Modifiers Qty    61387282709 HC OT THERAPEUTIC ACT EA 15 MIN 10/11/2024 Neil Graf OT GO 1                 Neil Graf OT  10/11/2024    Electronically signed by Neil Graf OT at 10/11/24 0788

## 2024-10-11 NOTE — THERAPY TREATMENT NOTE
Acute Care - Physical Therapy Treatment Note   [facility]     Patient Name: Lety Johnson  : 1980  MRN: [id number]  Today's Date: 10/11/2024   Onset of Illness/Injury or Date of Surgery: 24  Visit Dx:     ICD-10-CM ICD-9-CM   1. Hypokalemia  E87.6 276.8   2. Pressure injury of skin of sacral region, unspecified injury stage  L89.159 707.03     707.20   3. Pressure injury of skin of left heel, unspecified injury stage  L89.629 707.07     707.20   4. Acute cystitis without hematuria  N30.00 595.0     Patient Active Problem List   Diagnosis   (none) - all problems resolved or deleted     Past Medical History:   Diagnosis Date    Stroke      History reviewed. No pertinent surgical history.  PT Assessment (Last 12 Hours)       PT Evaluation and Treatment       Row Name 10/11/24 1045          Physical Therapy Time and Intention    Document Type therapy note (daily note)  -     Mode of Treatment physical therapy  -     Patient Effort adequate  -     Comment Pt coperative with LE TE in supine. Reported she sat EOB after breakfast  -       Row Name 10/11/24 1045          Motor Skills    Therapeutic Exercise hip;knee  R hip flex grossly 3x10 (3-4 x10); hip add grossly 3x10, quad sets L LE grossly 3x5 reps  -       Row Name             Wound 24 gluteal    Wound - Properties Group Placement Date: 24  -KJ Placement Time: 1806  -KJ Location: gluteal  -KJ    Retired Wound - Properties Group Placement Date: 24  - Placement Time: 180  -KJ Location: gluteal  -KJ    Retired Wound - Properties Group Date first assessed: 24  -KJ Time first assessed: 1806  -KJ Location: gluteal  -KJ      Row Name             Wound 24 180 Left gluteal    Wound - Properties Group Placement Date: 24  - Placement Time: 180  -KJ Side: Left  -KJ Location: gluteal  -KJ    Retired Wound - Properties Group Placement Date: 24  -KJ Placement Time: 1807  -KJ Side: Left  -KJ Location:  gluteal  -KJ    Retired Wound - Properties Group Date first assessed: 09/26/24  -KJ Time first assessed: 1807  -KJ Side: Left  -KJ Location: gluteal  -KJ      Row Name             Wound 09/26/24 1809 gluteal    Wound - Properties Group Placement Date: 09/26/24  -KJ Placement Time: 1809  -KJ Location: gluteal  -KJ    Retired Wound - Properties Group Placement Date: 09/26/24  -KJ Placement Time: 1809  -KJ Location: gluteal  -KJ    Retired Wound - Properties Group Date first assessed: 09/26/24  -KJ Time first assessed: 1809  -KJ Location: gluteal  -KJ      Row Name             Wound 09/26/24 1809 Left posterior ankle    Wound - Properties Group Placement Date: 09/26/24  -KJ Placement Time: 1809  -KJ Side: Left  -KJ Orientation: posterior  -KJ Location: ankle  -KJ    Retired Wound - Properties Group Placement Date: 09/26/24  -KJ Placement Time: 1809  -KJ Side: Left  -KJ Orientation: posterior  -KJ Location: ankle  -KJ    Retired Wound - Properties Group Date first assessed: 09/26/24  -KJ Time first assessed: 1809  -KJ Side: Left  -KJ Location: ankle  -KJ      Row Name             Wound 09/29/24 0952 Bilateral anterior thigh MASD (Moisture associated skin damage)    Wound - Properties Group Placement Date: 09/29/24  -LB Placement Time: 0952 -LB Present on Original Admission: Y  -LB Side: Bilateral  -LB Orientation: anterior  -LB Location: thigh  -LB Primary Wound Type: MASD  -LB    Retired Wound - Properties Group Placement Date: 09/29/24  -LB Placement Time: 0952 -LB Present on Original Admission: Y  -LB Side: Bilateral  -LB Orientation: anterior  -LB Location: thigh  -LB Primary Wound Type: MASD  -LB    Retired Wound - Properties Group Date first assessed: 09/29/24  -LB Time first assessed: 0952  -LB Present on Original Admission: Y  -LB Side: Bilateral  -LB Location: thigh  -LB Primary Wound Type: MASD  -LB      Row Name 10/11/24 1045          Positioning and Restraints    Pre-Treatment Position in bed  -     Post  Treatment Position bed  -KH     In Bed supine;call light within reach;exit alarm on  -KH       Row Name 10/11/24 1042          Progress Summary (PT)    Daily Progress Summary (PT) Pt had pain medication leaving her drowsy, pleasant and cooperative with therapy. May benefit from therapy intervention.  -KH               User Key  (r) = Recorded By, (t) = Taken By, (c) = Cosigned By      Initials Name Provider Type    Janessa Hassan, PT Physical Therapist    Nory Keenan, RN Registered Nurse    Zehra Rivera RN Registered Nurse                    Physical Therapy Education       Title: PT OT SLP Therapies (Done)       Topic: Physical Therapy (Done)       Point: Mobility training (Done)       Learning Progress Summary             Patient Acceptance, E,D, VU,NR by AG at 10/10/2024 1310    Acceptance, E,TB, VU by CB at 10/8/2024 0448    Acceptance, E, NR by RD at 10/2/2024 1101    Acceptance, E, NR by RD at 10/1/2024 0856    Acceptance, E,D, VU,NR by AG at 9/30/2024 1559                         Point: Home exercise program (Done)       Learning Progress Summary             Patient Acceptance, E,D, VU,NR by AG at 10/10/2024 1310    Acceptance, E,TB, VU by CB at 10/8/2024 0448    Acceptance, E, NR by RD at 10/2/2024 1101    Acceptance, E, NR by RD at 10/1/2024 0856    Acceptance, E,D, VU,NR by AG at 9/30/2024 1559                         Point: Body mechanics (Done)       Learning Progress Summary             Patient Acceptance, E,D, VU,NR by AG at 10/10/2024 1310    Acceptance, E,TB, VU by CB at 10/8/2024 0448    Acceptance, E, NR by RD at 10/2/2024 1101    Acceptance, E, NR by RD at 10/1/2024 0856    Acceptance, E,D, VU,NR by AG at 9/30/2024 1559                         Point: Precautions (Done)       Learning Progress Summary             Patient Acceptance, E,D, VU,NR by AG at 10/10/2024 1310    Acceptance, E,TB, VU by CB at 10/8/2024 0448    Acceptance, E, NR by RD at 10/2/2024 1101    Acceptance, E, NR  by RD at 10/1/2024 0856    Acceptance, E,D, VU,NR by  at 9/30/2024 1559                                         User Key       Initials Effective Dates Name Provider Type Discipline     06/16/21 -  Мария Joya, PT Physical Therapist PT    RD 06/16/21 -  Laisha Verdugo, RN Registered Nurse Nurse    CB 06/17/24 -  Fidelia Lombardo, RN Registered Nurse Nurse                  PT Recommendation and Plan     Progress Summary (PT)  Daily Progress Summary (PT): Pt had pain medication leaving her drowsy, pleasant and cooperative with therapy. May benefit from therapy intervention.       Time Calculation:    PT Charges       Row Name 10/11/24 1112             Time Calculation    Start Time 1045  -KH      PT Received On 10/11/24  -         Time Calculation- PT    Total Timed Code Minutes- PT 15 minute(s)  -                User Key  (r) = Recorded By, (t) = Taken By, (c) = Cosigned By      Initials Name Provider Type     Janessa Almeida, PT Physical Therapist                  Therapy Charges for Today       Code Description Service Date Service Provider Modifiers Qty    58762634174 HC PT THER PROC EA 15 MIN 10/11/2024 Janessa Almeida, PT GP 1            PT G-Codes  AM-PAC 6 Clicks Score (PT): 8    Janessa Almeida, PT  10/11/2024

## 2024-10-11 NOTE — PLAN OF CARE
Goal Outcome Evaluation:  Plan of Care Reviewed With: patient        Progress: no change  Outcome Evaluation: Pt resting in bed throughout shift. VSS on RA. Wound care complete. Pt complained of pain, PRN medication given. PRN melatonin given for sleep. No concerns or requests at this time. Will continue plan of care.

## 2024-10-11 NOTE — DISCHARGE PLACEMENT REQUEST
"Lety Johnson (43 y.o. Female)       Date of Birth   1980    Social Security Number       Address   [address] [facility] [address]    Home Phone   [phone]    MRN   [id number]       Episcopalian   Alevism    Marital Status   Single                            Admission Date   24    Admission Type   Emergency    Admitting Provider   Ramon Marc MD    Attending Provider   Ramon Marc MD    Department, Room/Bed   [address], [address]/       Discharge Date       Discharge Disposition       Discharge Destination                                 Attending Provider: Ramon Marc MD    Allergies: No Known Allergies    Isolation: Contact   Infection: ESBL E coli (24)   Code Status: CPR    Ht: 166.4 cm (65.5\")   Wt: 102 kg (224 lb 1.6 oz)    Admission Cmt: None   Principal Problem: UTI (urinary tract infection) [N39.0]                   Active Insurance as of 2024       Primary Coverage       Payor Plan Insurance Group Employer/Plan Group    [place] [id number]       Payor Plan Address Payor Plan Phone Number Payor Plan Fax Number Effective Dates    [employer] [address] [address] [phone]  2024 - None Entered    [place] [address]         Subscriber Name Subscriber Birth Date Member ID       LETY JOHNSON 1980 [id number]                     Emergency Contacts        (Rel.) Home Phone Work Phone Mobile Phone    BriceHenrya (Legal Guardian) -- -- [phone]                 History & Physical        Ramon Marc MD at 24 [address] [place] [address] HOSPITALIST HISTORY AND PHYSICAL    Patient Identification:  Name:  Lety Johnson  Age:  43 y.o.  Sex:  female  :  1980  MRN:  [id number]   Admit Date: 2024   Visit Number:  [id number]  Room number:  404/04  Primary Care Physician:  Provider, No Known     Subjective     Chief complaint:    Chief Complaint   Patient presents " with    Fall     History of presenting illness:   43F Morbid Obesity by BMI PMH History Genital Herpes, Cerebrovascular Accident 5/2024 complicated by L sided paralysis, presented to [facility] emergency room 9/26 after sliding into the floor off her chair due to weakness.  Upon arrival patient afebrile, heart rate 109, respiratory rate 18, blood pressure 146/101, satting 98% on room air. Labs showed WBC count 10K, lactate 1.1, CRP 3.8, potassium 2.7, magnesium 1.5, HCG negative, blood cultures pending. CXR no acute cardiopulmonary processes.  Xray ankle/foot without fracture or dislocation.  Emergency room provider gave antibiotics, pain medications, zofran, potassium replacement.  Hospital Medicine consulted for admission. Patient seen and examined in the emergency room, notes fevers chills at home a few days ago, recently has been on antibiotics for lower extremity cellulitis, has had some dysuria and suprapubic pain, denies current sexual activity, reports her fiance was taken to [facility] about a week ago and has been her primary caretaker, has had difficulty at home since prior stroke and caretakers not consistent, last 24hrs didn't have anyone to help her, sister endorses recent confusion/hallucinations beyond baseline, seeing dead family members, coinciding with onset of dysuria.  ---------------------------------------------------------------------------------------------------------------------   Review of Systems   Constitutional:  Positive for chills and fever.   HENT: Negative.     Eyes: Negative.    Respiratory:  Positive for shortness of breath.    Cardiovascular:  Positive for leg swelling. Negative for chest pain.   Gastrointestinal: Negative.    Endocrine: Negative.    Genitourinary:  Positive for dysuria.   Musculoskeletal: Negative.    Skin:  Positive for rash.   Allergic/Immunologic: Negative.    Neurological:  Positive for weakness.   Hematological: Negative.    Psychiatric/Behavioral:   Positive for hallucinations.      ---------------------------------------------------------------------------------------------------------------------   Past Medical History:   Diagnosis Date    Stroke      No past surgical history on file.  No family history on file.  Social History     Socioeconomic History    Marital status: Single     ---------------------------------------------------------------------------------------------------------------------   Allergies:  Patient has no known allergies.  ---------------------------------------------------------------------------------------------------------------------   Medications below are reported home medications pulling from within the system; at this time, these medications have not been reconciled unless otherwise specified and are in the verification process for further verifcation as current home medications.    Prior to Admission Medications       Prescriptions Last Dose Informant Patient Reported? Taking?    aspirin 81 MG chewable tablet Unknown  Yes No    Chew 1 tablet Daily.    atorvastatin (LIPITOR) 80 MG tablet Unknown  Yes No    Take 1 tablet by mouth Daily.    cyclobenzaprine (FLEXERIL) 10 MG tablet Unknown  Yes No    Take 1 tablet by mouth 3 (Three) Times a Day As Needed for Muscle Spasms.    DULoxetine (CYMBALTA) 60 MG capsule Unknown  Yes No    Take 1 capsule by mouth Daily.    lisinopril (PRINIVIL,ZESTRIL) 20 MG tablet Unknown  Yes No    Take 1 tablet by mouth Daily.    metFORMIN (GLUCOPHAGE) 1000 MG tablet Unknown  Yes No    Take 1 tablet by mouth 2 (Two) Times a Day With Meals.    pantoprazole (PROTONIX) 40 MG EC tablet Unknown  Yes No    Take 1 tablet by mouth Daily.          Objective     Vital Signs:  Temp:  [98.2 °F (36.8 °C)] 98.2 °F (36.8 °C)  Heart Rate:  [] 118  Resp:  [18] 18  BP: (135-157)/() 140/79    Mean Arterial Pressure (Non-Invasive) for the past 24 hrs (Last 3 readings):   Noninvasive MAP (mmHg)   09/26/24 1645 90    09/26/24 1630 100   09/26/24 1615 103     SpO2:  [91 %-100 %] 94 %  on   ;   Device (Oxygen Therapy): room air  Body mass index is 43.26 kg/m².    Wt Readings from Last 3 Encounters:   09/26/24 120 kg (264 lb)   06/27/24 120 kg (264 lb)      ---------------------------------------------------------------------------------------------------------------------   Physical Exam:  Constitutional:  Well-developed and well-nourished. Older than stated age. No acute distress.      HENT:  Head:  Normocephalic and atraumatic.  Mouth:  Moist mucous membranes.    Eyes:  Conjunctivae and EOM are normal. No scleral icterus.    Neck:  Neck supple.  No JVD present.    Cardiovascular:  Normal rate, regular rhythm and normal heart sounds with no murmur.  Pulmonary/Chest:  No respiratory distress, no wheezes, no crackles, with normal breath sounds and good air movement.  Abdominal:  Soft. No distension and no tenderness.   Musculoskeletal:  No tenderness, and no deformity.  No red or swollen joints anywhere.    Neurological:  Alert and oriented to person, place, and time.  No cranial nerve deficit. Chronic L sided paralysis.    Skin:  Skin is warm and dry. No pallor. Significant intertriginous erythema under panus, consistent with yeast infection.   Peripheral vascular:  No clubbing, no cyanosis, trace edema.  Psychiatric: Appropriate mood and affect  Edited by: Ramon Marc MD at 9/26/2024 1701  ---------------------------------------------------------------------------------------------------------------------  EKG:  N/A    Telemetry:  normal sinus rhythm, no significant ST changes    I have personally looked at telemetry.    Last echocardiogram:  Ordered and pending   --------------------------------------------------------------------------------------------------------------------  Labs:  Results from last 7 days   Lab Units 09/26/24  1504 09/26/24  1350   LACTATE mmol/L  --  1.1   CRP mg/dL 3.80*  --    WBC 10*3/mm3  --   "10.42   HEMOGLOBIN g/dL  --  12.5   HEMATOCRIT %  --  37.4   MCV fL  --  94.0   MCHC g/dL  --  33.4   PLATELETS 10*3/mm3  --  402         Results from last 7 days   Lab Units 09/26/24  1504   SODIUM mmol/L 140   POTASSIUM mmol/L 2.7*   MAGNESIUM mg/dL 1.5*   CHLORIDE mmol/L 99   CO2 mmol/L 28.9   BUN mg/dL 10   CREATININE mg/dL 0.51*   CALCIUM mg/dL 9.4   GLUCOSE mg/dL 220*   ALBUMIN g/dL 3.5   BILIRUBIN mg/dL 0.6   ALK PHOS U/L 119*   AST (SGOT) U/L 21   ALT (SGPT) U/L 8   Estimated Creatinine Clearance: 188.1 mL/min (A) (by C-G formula based on SCr of 0.51 mg/dL (L)).  No results found for: \"AMMONIA\"          No results found for: \"HGBA1C\", \"POCGLU\"  No results found for: \"TSH\", \"FREET4\"  No results found for: \"PREGTESTUR\", \"PREGSERUM\", \"HCG\", \"HCGQUANT\"  Pain Management Panel           No data to display              Brief Urine Lab Results  (Last result in the past 365 days)        Color   Clarity   Blood   Leuk Est   Nitrite   Protein   CREAT   Urine HCG        09/26/24 1419 Dark Yellow   Turbid   Trace   Moderate (2+)   Positive   30 mg/dL (1+)                 No results found for: \"BLOODCX\"  No results found for: \"URINECX\"  No results found for: \"WOUNDCX\"  No results found for: \"STOOLCX\"    I have personally looked at the labs and they are summarized above.  ----------------------------------------------------------------------------------------------------------------------  Detailed radiology reports for the last 24 hours:    Imaging Results (Last 24 Hours)       Procedure Component Value Units Date/Time    XR Ankle 3+ View Left [[id number]] Collected: 09/26/24 1537     Updated: 09/26/24 1540    Narrative:      EXAM:    XR Left Ankle Complete, 3 or More Views     EXAM DATE:    9/26/2024 3:17 PM     CLINICAL HISTORY:    left ankle injury     TECHNIQUE:    Frontal, lateral and oblique views of the left ankle.     COMPARISON:    No relevant prior studies available.     FINDINGS:    Bones/joints:  See below.     "  Soft tissues:  Soft tissue swelling, but no acute fracture or  dislocation.       Impression:        Soft tissue swelling, but no acute fracture or dislocation.        This report was finalized on 9/26/2024 3:38 PM by Dr. Moses Otto MD.       XR Foot 3+ View Left [[id number]] Collected: 09/26/24 1536     Updated: 09/26/24 1539    Narrative:      EXAM:    XR Left Foot Complete, 3 or More Views     EXAM DATE:    9/26/2024 3:16 PM     CLINICAL HISTORY:    left foot wound     TECHNIQUE:    Frontal, lateral and oblique views of the left foot.     COMPARISON:    No relevant prior studies available.     FINDINGS:    Bones/joints:  Unremarkable.  No acute fracture.  No dislocation.    Soft tissues:  Unremarkable.  No radiopaque foreign body.       Impression:        No acute findings in the left foot.        This report was finalized on 9/26/2024 3:37 PM by Dr. Moses Otto MD.       XR Chest 1 View [[id number]] Collected: 09/26/24 1406     Updated: 09/26/24 1408    Narrative:      XR CHEST 1 VW-     CLINICAL INDICATION: weakness        COMPARISON: None immediately available      TECHNIQUE: Single frontal view of the chest.     FINDINGS:     LUNGS: Lungs are adequately aerated.      HEART AND MEDIASTINUM: Heart and mediastinal contours are unremarkable        SKELETON: Bony and soft tissue structures are unremarkable.             Impression:      No radiographic evidence of acute cardiac or pulmonary disease.           This report was finalized on 9/26/2024 2:06 PM by Dr. Moses Otto MD.       CT Cervical Spine Without Contrast [[id number]] Collected: 09/26/24 1317     Updated: 09/26/24 1319    Narrative:      CT CERVICAL SPINE WO CONTRAST-     CLINICAL INDICATION: neck pain        COMPARISON: None available     TECHNIQUE: Axial images of the cervical spine were acquired with out any  intravenous contrast. Reformatted images were then created in the  sagittal and coronal planes.     DOSE:      Radiation dose reduction  techniques were utilized per ALARA protocol.  Automated exposure control was initiated through either or [employer] or  DoseRight software packages by  protocol.           FINDINGS:   The provided study demonstrates preservation of the vertebral body  heights in the sagittally reconstructed images.     There is no prevertebral soft tissue swelling.     Alignment is near anatomic.     The disc space heights are uniform.     I see no acute cervical spine fracture.       Impression:         1. No acute bony abnormality.     2. Other incidental findings as above     This report was finalized on 9/26/2024 1:17 PM by Dr. Moses Otto MD.       CT Lumbar Spine Without Contrast [[id number]] Collected: 09/26/24 1317     Updated: 09/26/24 1319    Narrative:      EXAM:    CT Lumbar Spine Without Intravenous Contrast     EXAM DATE:    9/26/2024 12:59 PM     CLINICAL HISTORY:    back pain     TECHNIQUE:    Axial computed tomography images of the lumbar spine without  intravenous contrast.  Sagittal and coronal reformatted images were  created and reviewed.  This CT exam was performed using one or more of  the following dose reduction techniques:  automated exposure control,  adjustment of the mA and/or kV according to patient size, and/or use of  iterative reconstruction technique.     COMPARISON:    No relevant prior studies available.     FINDINGS:    VERTEBRAE:  Unremarkable.  No acute fracture.    DISCS/SPINAL CANAL/NEURAL FORAMINA:  No acute findings.  No spinal  canal stenosis.    SOFT TISSUES:  Unremarkable.       Impression:        No acute findings in the lumbar spine.        This report was finalized on 9/26/2024 1:17 PM by Dr. Moses Otto MD.       CT Thoracic Spine Without Contrast [[id number]] Collected: 09/26/24 1316     Updated: 09/26/24 1319    Narrative:      EXAM:    CT Thoracic Spine Without Intravenous Contrast     EXAM DATE:    9/26/2024 12:58 PM     CLINICAL HISTORY:    back pain     TECHNIQUE:     Axial computed tomography images of the thoracic spine without  intravenous contrast.  Sagittal and coronal reformatted images were  created and reviewed.  This CT exam was performed using one or more of  the following dose reduction techniques:  automated exposure control,  adjustment of the mA and/or kV according to patient size, and/or use of  iterative reconstruction technique.     COMPARISON:    No relevant prior studies available.     FINDINGS:    VERTEBRAE:  Unremarkable.  No acute fracture.    DISCS/SPINAL CANAL/NEURAL FORAMINA:  No acute findings.  No spinal  canal stenosis.    SOFT TISSUES:  Unremarkable.       Impression:        No acute findings in the thoracic spine.        This report was finalized on 9/26/2024 1:17 PM by Dr. Moses Otto MD.       CT Head Without Contrast [[id number]] Collected: 09/26/24 1259     Updated: 09/26/24 1302    Narrative:      CT HEAD WO CONTRAST-     CLINICAL INDICATION: weakness        COMPARISON: None available     TECHNIQUE: Axial images of the brain were obtained with out intravenous  contrast.  Reformatted images were created in the sagittal and coronal  planes.     DOSE:     Radiation dose reduction techniques were utilized per ALARA protocol.  Automated exposure control was initiated through either or [employer] or  DoseRight software packages by  protocol.        FINDINGS:    BRAIN: Probable subacute ischemia right middle cerebral artery  territory    VENTRICLES:  Unremarkable.  No ventriculomegaly.       BONES/JOINTS:  Unremarkable.  No acute fracture.       SOFT TISSUES:  Unremarkable.       SINUSES:  no air fluid levels       MASTOID AIR CELLS:  Unremarkable as visualized.  No mastoid effusion.          Impression:         1. Probable remote right middle cerebral artery infarction  2. No acute parenchymal mass, hemorrhage, or midline shift     This report was finalized on 9/26/2024 1:00 PM by Dr. Moses Otto MD.             I have personally looked  at the radiology images and read the final radiology report.    Assessment & Plan    43F Morbid Obesity by BMI PMH History Genital Herpes, Cerebrovascular Accident 5/2024 complicated by L sided paralysis, presented to [facility] emergency room 9/26 after sliding into the floor off her chair due to weakness.     #Acute Metabolic Encephalopathy due to Acute Urinary Tract Infection in setting of probable neurogenic bladder  - Continue Ceftriaxone, follow up cultures, rule out STI    #Electrolyte Abnormalities  - Acute Mild Hypokalemia - Replacing, on protocol  - Acute Mild Hypomagnesemia - Replacing, on protocol    #History Cerebrovascular Accident complicated by L sided paralysis, unclear etiology  - Review home medications and resume as indicated, Supportive Care     #Debility  - Consult PT/OT, Social Work if placement needed     #History Genital Herpes  - Supportive Care, follow up medication reconciliation for any suppressive medications, check HIV & acute hepatitis panel     #Morbid Obesity by BMI  - Body mass index is 43.26 kg/m².; complicates all aspects of care    F: Oral  E: Monitor & Replace as needed   N: Regular Diet  PPx: SQH  Code Status (Patient has no pulse and is not breathing): CPR  Medical Interventions (Patient has pulse or is breathing): Full Support     Dispo: Pending workup and clinical improvement    *This patient is considered high risk secondary to Urinary Tract Infection, electrolyte abnormalities, chronic L sided paralysis from prior Cerebrovascular Accident.      Edited by: Ramon Marc MD at 9/26/2024 1701    Ramon Marc MD  [facility] Hospitalist  09/26/24  17:01 EDT        Electronically signed by Ramon Marc MD at 09/26/24 1703       Vital Signs (last day)       Date/Time Temp Temp src Pulse Resp BP Patient Position SpO2    10/11/24 0932 -- -- 110 20 152/88 Lying --    10/11/24 0518 97.9 (36.6) Oral 92 18 129/88 Lying --    10/10/24 2358 97.3 (36.3) Oral 87  18 142/80 Lying --    10/10/24 2019 98.3 (36.8) Oral 105 18 138/81 Lying 93    10/10/24 1716 -- -- 100 -- 134/73 Lying --    10/10/24 1207 -- -- 91 18 134/87 Lying 91    10/10/24 0700 97.6 (36.4) Oral 99 18 154/93 Lying 98    10/10/24 0048 98.6 (37) Oral 85 18 96/71 Lying 97          Intake & Output (last day)         10/10 0701  10/11 0700 10/11 0701  10/12 0700    P.O. 240 240    Total Intake(mL/kg) 240 (2.4) 240 (2.4)    Urine (mL/kg/hr) 1700 (0.7)     Stool 0     Total Output 1700     Net -1460 +240          Urine Unmeasured Occurrence 3 x 1 x    Stool Unmeasured Occurrence 0 x           Current Facility-Administered Medications   Medication Dose Route Frequency Provider Last Rate Last Admin    acetaminophen (TYLENOL) tablet 650 mg  650 mg Oral Q6H PRN Ashleigh Nicholas PA-C   650 mg at 10/11/24 0907    acyclovir (ZOVIRAX) capsule 400 mg  400 mg Oral Nightly Ramon Marc MD   400 mg at 10/10/24 2108    aspirin chewable tablet 81 mg  81 mg Oral Daily Ramon Marc MD   81 mg at 10/11/24 0907    atorvastatin (LIPITOR) tablet 80 mg  80 mg Oral Daily Ramon Marc MD   80 mg at 10/11/24 0907    sennosides-docusate (PERICOLACE) 8.6-50 MG per tablet 2 tablet  2 tablet Oral BID PRN Ramon Marc MD        And    polyethylene glycol (MIRALAX) packet 17 g  17 g Oral Daily PRN Ramon Marc MD        And    bisacodyl (DULCOLAX) EC tablet 5 mg  5 mg Oral Daily PRN Ramon Marc MD        And    bisacodyl (DULCOLAX) suppository 10 mg  10 mg Rectal Daily PRN Ramon Marc MD        Calcium Replacement - Follow Nurse / BPA Driven Protocol   Does not apply PRN Ramon Marc MD        cyclobenzaprine (FLEXERIL) tablet 10 mg  10 mg Oral TID PRN Ramon Marc MD   10 mg at 10/11/24 0600    dextrose (D50W) (25 g/50 mL) IV injection 25 g  25 g Intravenous Q15 Min PRN Ramon Marc MD        dextrose (GLUTOSE) oral gel 15 g  15 g Oral Q15 Min PRN Ramon Marc MD        Diclofenac Sodium (VOLTAREN) 1 % gel 4 g  4 g  Topical 4x Daily PRN Ashleigh Nicholas PA-C   4 g at 10/10/24 1545    DULoxetine (CYMBALTA) DR capsule 60 mg  60 mg Oral Daily Ramon Marc MD   60 mg at 10/11/24 0907    glucagon HCl (Diagnostic) injection 1 mg  1 mg Intramuscular Q15 Min PRN Ramon Marc MD        heparin (porcine) 5000 UNIT/ML injection 5,000 Units  5,000 Units Subcutaneous Q8H Ramon Marc MD   5,000 Units at 10/11/24 0536    Insulin Lispro (humaLOG) injection 2-7 Units  2-7 Units Subcutaneous 4x Daily PC & at Bedtime Ramon Marc MD   7 Units at 10/11/24 1000    ketorolac (TORADOL) injection 15 mg  15 mg Intravenous BID PRN Ramon Marc MD   15 mg at 10/11/24 0905    lisinopril (PRINIVIL,ZESTRIL) tablet 20 mg  20 mg Oral Daily Ramon Marc MD   20 mg at 10/11/24 0907    loperamide (IMODIUM) capsule 2 mg  2 mg Oral 4x Daily PRN Marv Navas DO   2 mg at 10/09/24 1627    Magnesium Standard Dose Replacement - Follow Nurse / BPA Driven Protocol   Does not apply PRN Ramon Marc MD        melatonin tablet 5 mg  5 mg Oral Nightly PRN Wesley Matthews APRN   5 mg at 10/11/24 0243    [Held by provider] metFORMIN (GLUCOPHAGE) tablet 1,000 mg  1,000 mg Oral BID With Meals Ramon Marc MD        metoprolol tartrate (LOPRESSOR) tablet 25 mg  25 mg Oral Q12H OculMara everett MD   25 mg at 10/11/24 0907    nicotine (NICODERM CQ) 7 MG/24HR patch 1 patch  1 patch Transdermal Q24H aRmon Marc MD   1 patch at 10/11/24 0904    nystatin (MYCOSTATIN) powder   Topical Q12H Ramon Marc MD   Given at 10/11/24 0907    pantoprazole (PROTONIX) EC tablet 40 mg  40 mg Oral Daily Ramon Marc MD   40 mg at 10/11/24 0907    Pharmacy Consult   Does not apply Continuous PRN Ramon Marc MD        Phosphorus Replacement - Follow Nurse / BPA Driven Protocol   Does not apply PRN Ramon Marc MD        potassium chloride (KLOR-CON M20) CR tablet 40 mEq  40 mEq Oral Q4H Ramno Marc MD   40 mEq at 10/11/24 1000    Potassium Replacement -  Follow Nurse / BPA Driven Protocol   Does not apply PRN Ramon Marc MD        prochlorperazine (COMPAZINE) injection 2.5 mg  2.5 mg Intravenous Q6H PRN Ashleigh Nicholas PA-C   2.5 mg at 10/09/24 2357    sodium chloride 0.9 % flush 10 mL  10 mL Intravenous Q12H Ramon Marc MD   10 mL at 10/10/24 210    sodium chloride 0.9 % flush 10 mL  10 mL Intravenous PRN Ramon Marc MD        sodium chloride 0.9 % flush 10 mL  10 mL Intravenous Q12H Marv Navas,    10 mL at 10/10/24 2107    sodium chloride 0.9 % flush 10 mL  10 mL Intravenous PRN Marv Navas DO        sodium chloride 0.9 % infusion 40 mL  40 mL Intravenous PRN Ramon Marc MD        sodium chloride 0.9 % infusion 40 mL  40 mL Intravenous PRN Marv Navas DO         Operative/Procedure Notes (most recent note)    No notes of this type exist for this encounter.          Physician Progress Notes (most recent note)        Ramon Marc MD at 10/11/24 [address] [place], [place] [address]IST PROGRESS NOTE     Patient Identification:  Name:  Lety Johnson  Age:  43 y.o.  Sex:  female  :  1980  MRN:  [id number]  Visit Number:  [id number]  ROOM: [address] [place] [address]     Primary Care Provider:  Provider, No Known    Length of stay in inpatient status:  13    Subjective     Chief Compliant:    Chief Complaint   Patient presents with    Fall     History of Presenting Illness:    Patient remains ill but stable today, no acute events overnight, no new complaints, denies any fevers or chills, A1c >9%, added SSI, glucose 400's, may end up needing to add long acting soon. Pending placement, medically stable otherwise.   Objective     Current Hospital Meds:  acyclovir, 400 mg, Oral, Nightly  aspirin, 81 mg, Oral, Daily  atorvastatin, 80 mg, Oral, Daily  DULoxetine, 60 mg, Oral, Daily  heparin (porcine), 5,000 Units, Subcutaneous, Q8H  insulin lispro, 2-7 Units, Subcutaneous, 4x Daily PC & at Bedtime  lisinopril, 20 mg,  Oral, Daily  [Held by provider] metFORMIN, 1,000 mg, Oral, BID With Meals  metoprolol tartrate, 25 mg, Oral, Q12H  nicotine, 1 patch, Transdermal, Q24H  nystatin, , Topical, Q12H  pantoprazole, 40 mg, Oral, Daily  potassium chloride ER, 40 mEq, Oral, Q4H  sodium chloride, 10 mL, Intravenous, Q12H  sodium chloride, 10 mL, Intravenous, Q12H      Pharmacy Consult,       ----------------------------------------------------------------------------------------------------------------------  Vital Signs:  Temp:  [97.3 °F (36.3 °C)-98.3 °F (36.8 °C)] 97.9 °F (36.6 °C)  Heart Rate:  [] 110  Resp:  [18-20] 20  BP: (129-152)/(73-88) 152/88  SpO2:  [91 %-93 %] 93 %  on   ;   Device (Oxygen Therapy): room air  Body mass index is 36.73 kg/m².      Intake/Output Summary (Last 24 hours) at 10/11/2024 1007  Last data filed at 10/11/2024 0900  Gross per 24 hour   Intake 480 ml   Output 1700 ml   Net -1220 ml      ----------------------------------------------------------------------------------------------------------------------  Physical exam:  Constitutional:  Older than stated age. No acute distress. No discomfort   HENT:  Head:  Normocephalic and atraumatic.  Mouth:  Moist mucous membranes.    Eyes:  Conjunctivae and EOM are normal. No scleral icterus.    Neck:  Neck supple.  No JVD present.    Cardiovascular:  Normal rate, regular rhythm and normal heart sounds with no murmur.  Pulmonary/Chest:  No respiratory distress, no wheezes, on room air  Abdominal:  Soft. No distension and no tenderness.   Musculoskeletal:  No tenderness, and no deformity.  No red or swollen joints anywhere.    Neurological:  Alert and oriented to person, place, and time.  No cranial nerve deficit. Chronic L sided paralysis.    Skin:  Skin is warm and dry. No pallor.  Peripheral vascular:  No clubbing, no cyanosis, trace edema.  Psychiatric: Appropriate mood and affect    *Examination stable today 10/11  Edited by: Ramon Marc MD at 10/11/2024  "1007  ----------------------------------------------------------------------------------------------------------------------  WBC/HGB/HCT/PLT   9.91/13.7/41.4/387 (10/11 0814)  BUN/CREAT/GLUC/ALT/AST/NORMA/LIP    13/0.44/404/--/--/--/-- (10/11 0814)  LYTES - Na/K/Cl/CO2: 134*/3.6/99/23.5 (10/11 0814)        No results found for: \"URINECX\"  No results found for: \"BLOODCX\"    I have personally looked at the labs and they are summarized above.  ----------------------------------------------------------------------------------------------------------------------  Detailed radiology reports for the last 24 hours:  No radiology results for the last day  Assessment & Plan    43F Morbid Obesity by BMI PMH History Genital Herpes, Cerebrovascular Accident 5/2024 complicated by L sided paralysis, presented to [facility] emergency room 9/26 after sliding into the floor off her chair due to weakness.     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     #Non-Insulin Dependent Diabetes Mellitus Type II, uncontrolled, unknown complications - Likely to be insulin dependent now  - Hgb A1c = 9.40%  - Hold home oral agents, add FSBG and SSI, add long acting as indicated.     #Electrolyte Abnormalities  - Acute Mild Hypokalemia - Replaced per protocol - Resolved   - Acute Mild Hypomagnesemia - Replaced per protocol - Resolved     #History Cerebrovascular Accident complicated by L sided paralysis, unclear etiology  - Continue home regimen, Supportive Care     #Debility  - Consult PT/OT, Social Work if placement needed     #History Genital Herpes  - Supportive Care, continue home Acyclovir     #Morbid Obesity by BMI  - Body mass index is 43.26 kg/m².; " complicates all aspects of care    F: Oral  E: Monitor & Replace as needed   N: Diet: Regular/House; Fluid Consistency: Thin (IDDSI 0)   PPx: SQH  Code Status (Patient has no pulse and is not breathing): CPR  Medical Interventions (Patient has pulse or is breathing): Full Support     Dispo: Pending placement, PT/OT consulted and following, Social Work consulted and following.     *This patient is considered high risk secondary to Urinary Tract Infection, electrolyte abnormalities, chronic L sided paralysis from prior Cerebrovascular Accident.    Edited by: Ramon Marc MD at 10/11/2024 1007  [facility] Hospitalist        Electronically signed by Ramon Marc MD at 10/11/24 1008          Physical Therapy Notes (last 72 hours)        Janessa Almeida, PT at 10/08/24 1126  Version 1 of 1         Acute Care - Physical Therapy Treatment Note  [facility]     Patient Name: Lety Johnsno  : 1980  MRN: [id number]  Today's Date: 10/8/2024   Onset of Illness/Injury or Date of Surgery: 24  Visit Dx:     ICD-10-CM ICD-9-CM   1. Hypokalemia  E87.6 276.8   2. Pressure injury of skin of sacral region, unspecified injury stage  L89.159 707.03     707.20   3. Pressure injury of skin of left heel, unspecified injury stage  L89.629 707.07     707.20   4. Acute cystitis without hematuria  N30.00 595.0     Patient Active Problem List   Diagnosis   (none) - all problems resolved or deleted     Past Medical History:   Diagnosis Date    Stroke      History reviewed. No pertinent surgical history.  PT Assessment (Last 12 Hours)       PT Evaluation and Treatment       Row Name 10/08/24 1038          Physical Therapy Time and Intention    Document Type therapy note (daily note)  -KH     Mode of Treatment physical therapy  -KH     Patient Effort adequate  -     Comment Pt called family member (?) during session, unprompted and without saying/notifying...handed phone to PT to tell phone call recipient what she was  doing with therapy...  -       Row Name 10/08/24 1038          Bed Mobility    Bed Mobility supine-sit  -     Supine-Sit [place] (Bed Mobility) moderate assist (50% patient effort);maximum assist (25% patient effort);1 person assist  -       Row Name 10/08/24 1038          Transfers    Transfers sit-stand transfer;stand-sit transfer  -       Row Name 10/08/24 1038          Sit-Stand Transfer    Sit-Stand [place] (Transfers) maximum assist (25% patient effort);dependent (less than 25% patient effort);1 person assist  -       Row Name 10/08/24 1038          Stand-Sit Transfer    Stand-Sit [place] (Transfers) other (see comments)  one time grossly CGA, grossly may be mod/maxA at other times  -       Row Name 10/08/24 1038          Motor Skills    Therapeutic Exercise hip;ankle  R LE hip flex grossly 2-3x10; L LE hip flex encouraged with PROM and encouragement for pt to concentrate on assisting in movement and encouraged to visualize movement. encouraged ankle pumps on L LE with potential trace DF  -       Row Name             Wound 09/26/24 1806 gluteal    Wound - Properties Group Placement Date: 09/26/24  -KJ Placement Time: 1806  -KJ Location: gluteal  -KJ    Retired Wound - Properties Group Placement Date: 09/26/24  -KJ Placement Time: 1806  -KJ Location: gluteal  -KJ    Retired Wound - Properties Group Date first assessed: 09/26/24  -KJ Time first assessed: 1806  -KJ Location: gluteal  -KJ      Row Name             Wound 09/26/24 1807 Left gluteal    Wound - Properties Group Placement Date: 09/26/24  -KJ Placement Time: 1807  -KJ Side: Left  -KJ Location: gluteal  -KJ    Retired Wound - Properties Group Placement Date: 09/26/24  -KJ Placement Time: 1807  -KJ Side: Left  -KJ Location: gluteal  -KJ    Retired Wound - Properties Group Date first assessed: 09/26/24  -KJ Time first assessed: 1807  -KJ Side: Left  -KJ Location: gluteal  -KJ      Row Name             Wound 09/26/24 1809  gluteal    Wound - Properties Group Placement Date: 09/26/24  -KJ Placement Time: 1809  -KJ Location: gluteal  -KJ    Retired Wound - Properties Group Placement Date: 09/26/24  -KJ Placement Time: 1809  -KJ Location: gluteal  -KJ    Retired Wound - Properties Group Date first assessed: 09/26/24  -KJ Time first assessed: 1809  -KJ Location: gluteal  -KJ      Row Name             Wound 09/26/24 1809 Left posterior ankle    Wound - Properties Group Placement Date: 09/26/24  -KJ Placement Time: 1809  -KJ Side: Left  -KJ Orientation: posterior  -KJ Location: ankle  -KJ    Retired Wound - Properties Group Placement Date: 09/26/24  -KJ Placement Time: 1809  -KJ Side: Left  -KJ Orientation: posterior  -KJ Location: ankle  -KJ    Retired Wound - Properties Group Date first assessed: 09/26/24  -KJ Time first assessed: 1809  -KJ Side: Left  -KJ Location: ankle  -KJ      Row Name             Wound 09/29/24 0952 Bilateral anterior thigh MASD (Moisture associated skin damage)    Wound - Properties Group Placement Date: 09/29/24  -LB Placement Time: 0952  -LB Present on Original Admission: Y  -LB Side: Bilateral  -LB Orientation: anterior  -LB Location: thigh  -LB Primary Wound Type: MASD  -LB    Retired Wound - Properties Group Placement Date: 09/29/24  -LB Placement Time: 0952  -LB Present on Original Admission: Y  -LB Side: Bilateral  -LB Orientation: anterior  -LB Location: thigh  -LB Primary Wound Type: MASD  -LB    Retired Wound - Properties Group Date first assessed: 09/29/24  -LB Time first assessed: 0952  -LB Present on Original Admission: Y  -LB Side: Bilateral  -LB Location: thigh  -LB Primary Wound Type: MASD  -LB      Row Name 10/08/24 1038          Coping    Observed Emotional State other (see comments)  intermittently emotionally liable wanted to get better, unrealistic in expectations. angry with herself/body  -KH       Row Name 10/08/24 1038          Positioning and Restraints    Pre-Treatment Position in bed  -KH      Post Treatment Position bed  -KH     In Bed supine;exit alarm on;call light within reach;encouraged to call for assist;side rails up x3  -KH               User Key  (r) = Recorded By, (t) = Taken By, (c) = Cosigned By      Initials Name Provider Type     Janessa Almeida, PT Physical Therapist    Nory Keenan RN Registered Nurse    Zehra Rivera, RN Registered Nurse                    Physical Therapy Education       Title: PT OT SLP Therapies (Done)       Topic: Physical Therapy (Done)       Point: Mobility training (Done)       Learning Progress Summary             Patient Acceptance, E,TB, VU by CB at 10/8/2024 0448    Acceptance, E, NR by RD at 10/2/2024 1101    Acceptance, E, NR by RD at 10/1/2024 0856    Acceptance, E,D, VU,NR by AG at 9/30/2024 1559                         Point: Home exercise program (Done)       Learning Progress Summary             Patient Acceptance, E,TB, VU by CB at 10/8/2024 0448    Acceptance, E, NR by RD at 10/2/2024 1101    Acceptance, E, NR by RD at 10/1/2024 0856    Acceptance, E,D, VU,NR by AG at 9/30/2024 1559                         Point: Body mechanics (Done)       Learning Progress Summary             Patient Acceptance, E,TB, VU by CB at 10/8/2024 0448    Acceptance, E, NR by RD at 10/2/2024 1101    Acceptance, E, NR by RD at 10/1/2024 0856    Acceptance, E,D, VU,NR by AG at 9/30/2024 1559                         Point: Precautions (Done)       Learning Progress Summary             Patient Acceptance, E,TB, VU by CB at 10/8/2024 0448    Acceptance, E, NR by RD at 10/2/2024 1101    Acceptance, E, NR by RD at 10/1/2024 0856    Acceptance, E,D, VU,NR by  at 9/30/2024 1559                                         User Key       Initials Effective Dates Name Provider Type Discipline     06/16/21 -  Мария Joya, PT Physical Therapist PT    RD 06/16/21 -  Laisha Verdugo, RN Registered Nurse Nurse    CB 06/17/24 -  Fidelia Lombardo, RN Registered Nurse Nurse                   PT Recommendation and Plan     Progress Summary (PT)  Daily Progress Summary (PT): Pt tolerated therapy fair to good with pain, some spasms, and emotional outbreak a few times during session. Pt may benefit from frequent therapeutic intervention, no change in POC.       Time Calculation:    PT Charges       Row Name 10/08/24 1125             Time Calculation    Start Time 1038  -      PT Received On 10/08/24  -         Time Calculation- PT    Total Timed Code Minutes- PT 23 minute(s)  -                User Key  (r) = Recorded By, (t) = Taken By, (c) = Cosigned By      Initials Name Provider Type    Janessa Hassan, PT Physical Therapist                  Therapy Charges for Today       Code Description Service Date Service Provider Modifiers Qty    60302636293 HC PT THER PROC EA 15 MIN 10/8/2024 Janessa Almeida, PT GP 1    80798066878 HC PT THERAPEUTIC ACT EA 15 MIN 10/8/2024 Janessa Almeida, PT GP 1            PT G-Codes  AM-PAC 6 Clicks Score (PT): 8    Janessa Almeida PT  10/8/2024      Electronically signed by Janessa Almeida, PT at 10/08/24 1126       Janessa Almeida PT at 10/09/24 1008  Version 1 of 1         Acute Care - Physical Therapy Treatment Note  [facility]     Patient Name: Lety Johnson  : 1980  MRN: [id number]  Today's Date: 10/9/2024   Onset of Illness/Injury or Date of Surgery: 24  Visit Dx:     ICD-10-CM ICD-9-CM   1. Hypokalemia  E87.6 276.8   2. Pressure injury of skin of sacral region, unspecified injury stage  L89.159 707.03     707.20   3. Pressure injury of skin of left heel, unspecified injury stage  L89.629 707.07     707.20   4. Acute cystitis without hematuria  N30.00 595.0     Patient Active Problem List   Diagnosis   (none) - all problems resolved or deleted     Past Medical History:   Diagnosis Date    Stroke      History reviewed. No pertinent surgical history.  PT Assessment (Last 12 Hours)       PT Evaluation and Treatment       Row  Name 10/09/24 0915          Physical Therapy Time and Intention    Document Type therapy note (daily note)  -     Mode of Treatment physical therapy  -     Patient Effort adequate  -     Comment Pt participated in therapy intervention with some education, HEP administration of knee flex/ext and hip abd/add x5 q hour on L LE. Pt participated in LE TE of glute sets, LAQ, hip flexion with L LE hip flex neuro focus. Pt less emotionally liable today, cooperative. Attempted stand x3 without success. Time spent allowing pt to rest between sets, encouragement provided to pt. 15 minutes face to face with pt.  -       Row Name 10/09/24 0915          Bed Mobility    Bed Mobility supine-sit;sit-supine  -     Supine-Sit [place] (Bed Mobility) maximum assist (25% patient effort)  -     Sit-Supine [place] (Bed Mobility) maximum assist (25% patient effort);other (see comments)  encouraged to use R LE to assist L LE into bed, however pt states she couldn't or it hadn't worked before, encouraged to try again, grossly max/depA  -       Row Name 10/09/24 0915          Transfers    Comment, (Transfers) unable to perform STSx3 with 2 prson assist  -       Row Name 10/09/24 0915          Motor Skills    Therapeutic Exercise hip;knee  glute sets, LAQ, hip flexion R LE grossly 3x10, L LE hip flexion x10 with encouragement to visualize and attempt any trace movement.  -       Row Name             Wound 09/26/24 1806 gluteal    Wound - Properties Group Placement Date: 09/26/24 -KJ Placement Time: 1806 -KJ Location: gluteal  -KJ    Retired Wound - Properties Group Placement Date: 09/26/24 -KJ Placement Time: 1806 -KJ Location: gluteal  -KJ    Retired Wound - Properties Group Date first assessed: 09/26/24  -KJ Time first assessed: 1806 -KJ Location: gluteal  -KJ      Row Name             Wound 09/26/24 1807 Left gluteal    Wound - Properties Group Placement Date: 09/26/24 -KJ Placement Time: 1807 -KJ Side:  Left  -KJ Location: gluteal  -KJ    Retired Wound - Properties Group Placement Date: 09/26/24  -KJ Placement Time: 1807  -KJ Side: Left  -KJ Location: gluteal  -KJ    Retired Wound - Properties Group Date first assessed: 09/26/24  -KJ Time first assessed: 1807  -KJ Side: Left  -KJ Location: gluteal  -KJ      Row Name             Wound 09/26/24 1809 gluteal    Wound - Properties Group Placement Date: 09/26/24  -KJ Placement Time: 1809  -KJ Location: gluteal  -KJ    Retired Wound - Properties Group Placement Date: 09/26/24  -KJ Placement Time: 1809  -KJ Location: gluteal  -KJ    Retired Wound - Properties Group Date first assessed: 09/26/24  -KJ Time first assessed: 1809 -KJ Location: gluteal  -KJ      Row Name             Wound 09/26/24 1809 Left posterior ankle    Wound - Properties Group Placement Date: 09/26/24  -KJ Placement Time: 1809  -KJ Side: Left  -KJ Orientation: posterior  -KJ Location: ankle  -KJ    Retired Wound - Properties Group Placement Date: 09/26/24  -KJ Placement Time: 1809  -KJ Side: Left  -KJ Orientation: posterior  -KJ Location: ankle  -KJ    Retired Wound - Properties Group Date first assessed: 09/26/24  -KJ Time first assessed: 1809  -KJ Side: Left  -KJ Location: ankle  -KJ      Row Name             Wound 09/29/24 0952 Bilateral anterior thigh MASD (Moisture associated skin damage)    Wound - Properties Group Placement Date: 09/29/24  -LB Placement Time: 0952 -LB Present on Original Admission: Y  -LB Side: Bilateral  -LB Orientation: anterior  -LB Location: thigh  -LB Primary Wound Type: MASD  -LB    Retired Wound - Properties Group Placement Date: 09/29/24  -LB Placement Time: 0952  -LB Present on Original Admission: Y  -LB Side: Bilateral  -LB Orientation: anterior  -LB Location: thigh  -LB Primary Wound Type: MASD  -LB    Retired Wound - Properties Group Date first assessed: 09/29/24  -LB Time first assessed: 0952 -LB Present on Original Admission: Y  -LB Side: Bilateral  -LB Location:  thigh  -LB Primary Wound Type: MASD  -LB      Row Name 10/09/24 0915          Positioning and Restraints    Pre-Treatment Position in bed  -     Post Treatment Position bed  -KH     In Bed supine;exit alarm on  -KH       Row Name 10/09/24 0915          Progress Summary (PT)    Daily Progress Summary (PT) Pt less emotionally liable today, effort fair, unsure if pt understood task at times, easily distracted/discusses other topics during treatment. Prognosis guarded.  -               User Key  (r) = Recorded By, (t) = Taken By, (c) = Cosigned By      Initials Name Provider Type    Janessa Hassan, PT Physical Therapist    Nory Keenan, RN Registered Nurse    Zehra Rivera, RN Registered Nurse                    Physical Therapy Education       Title: PT OT SLP Therapies (Done)       Topic: Physical Therapy (Done)       Point: Mobility training (Done)       Learning Progress Summary             Patient Acceptance, E,TB, VU by CB at 10/8/2024 0448    Acceptance, E, NR by RD at 10/2/2024 1101    Acceptance, E, NR by RD at 10/1/2024 0856    Acceptance, E,D, VU,NR by AG at 9/30/2024 1559                         Point: Home exercise program (Done)       Learning Progress Summary             Patient Acceptance, E,TB, VU by CB at 10/8/2024 0448    Acceptance, E, NR by RD at 10/2/2024 1101    Acceptance, E, NR by RD at 10/1/2024 0856    Acceptance, E,D, VU,NR by AG at 9/30/2024 1559                         Point: Body mechanics (Done)       Learning Progress Summary             Patient Acceptance, E,TB, VU by CB at 10/8/2024 0448    Acceptance, E, NR by RD at 10/2/2024 1101    Acceptance, E, NR by RD at 10/1/2024 0856    Acceptance, E,D, VU,NR by AG at 9/30/2024 1559                         Point: Precautions (Done)       Learning Progress Summary             Patient Acceptance, E,TB, VU by CB at 10/8/2024 0448    Acceptance, E, NR by RD at 10/2/2024 1101    Acceptance, E, NR by RD at 10/1/2024 0856     Acceptance, E,D, VU,NR by  at 2024 1559                                         User Key       Initials Effective Dates Name Provider Type Discipline     21 -  Мария Joya, PT Physical Therapist PT    RD 21 -  Laisha Verdugo, RN Registered Nurse Nurse     24 -  Fidelia Lombardo, RN Registered Nurse Nurse                  PT Recommendation and Plan     Progress Summary (PT)  Daily Progress Summary (PT): Pt less emotionally liable today, effort fair, unsure if pt understood task at times, easily distracted/discusses other topics during treatment. Prognosis guarded.       Time Calculation:    PT Charges       Row Name 10/09/24 1005             Time Calculation    Start Time 915  -      PT Received On 10/09/24  -         Time Calculation- PT    Total Timed Code Minutes- PT 15 minute(s)  -                User Key  (r) = Recorded By, (t) = Taken By, (c) = Cosigned By      Initials Name Provider Type     Janessa Almeida, PT Physical Therapist                  Therapy Charges for Today       Code Description Service Date Service Provider Modifiers Qty    63706458626 HC PT THER PROC EA 15 MIN 10/8/2024 Janessa Almeida, PT GP 1    77496117293 HC PT THERAPEUTIC ACT EA 15 MIN 10/8/2024 Janessa Almeida, PT GP 1    62237202148 HC PT THER PROC EA 15 MIN 10/9/2024 Janessa Almeida, PT GP 1            PT G-Codes  AM-PAC 6 Clicks Score (PT): 8    Janessa Almeida PT  10/9/2024      Electronically signed by Janessa Almeida, PT at 10/09/24 1008       Мария Joya, PT at 10/10/24 1324  Version 1 of 1         Acute Care - Physical Therapy Treatment Note   [place]     Patient Name: Lety Johnson  : 1980  MRN: [id number]  Today's Date: 10/10/2024   Onset of Illness/Injury or Date of Surgery: 24  Visit Dx:     ICD-10-CM ICD-9-CM   1. Hypokalemia  E87.6 276.8   2. Pressure injury of skin of sacral region, unspecified injury stage  L89.159 707.03     707.20   3. Pressure injury of  skin of left heel, unspecified injury stage  L89.629 707.07     707.20   4. Acute cystitis without hematuria  N30.00 595.0     Patient Active Problem List   Diagnosis   (none) - all problems resolved or deleted     Past Medical History:   Diagnosis Date    Stroke      History reviewed. No pertinent surgical history.  PT Assessment (Last 12 Hours)       PT Evaluation and Treatment       Row Name 10/10/24 1311          Physical Therapy Time and Intention    Subjective Information complains of;weakness  -AG     Document Type therapy note (daily note)  -AG     Mode of Treatment physical therapy  -AG     Symptoms Noted During/After Treatment fatigue  -AG       Row Name 10/10/24 1311          General Information    Patient Profile Reviewed yes  -AG     Patient Observations agree to therapy;cooperative;alert  -AG     Patient/Family/Caregiver Comments/Observations pt. supine in bed on room air; L UE in hemisling.  Pt. is agreeable to participation.  -AG     Existing Precautions/Restrictions fall  -AG     Risks Reviewed patient:;LOB;increased discomfort  -AG     Benefits Reviewed patient:;improve function;increase independence;increase strength;increase balance;increase knowledge;decrease risk of DVT  -AG     Barriers to Rehab previous functional deficit  -AG       Row Name 10/10/24 1311          Cognition    Affect/Mental Status (Cognition) WFL  -AG     Orientation Status (Cognition) oriented x 3  -AG     Follows Commands (Cognition) WFL  -AG     Personal Safety Interventions fall prevention program maintained;gait belt;nonskid shoes/slippers when out of bed;supervised activity  -AG       Row Name 10/10/24 1311          Bed Mobility    Supine-Sit [place] (Bed Mobility) verbal cues;nonverbal cues (demo/gesture);moderate assist (50% patient effort);maximum assist (25% patient effort)  -AG     Sit-Supine [place] (Bed Mobility) dependent (less than 25% patient effort)  -AG       Row Name 10/10/24 1311           Sit-Stand Transfer    Sit-Stand [place] (Transfers) verbal cues;nonverbal cues (demo/gesture);maximum assist (25% patient effort);2 person assist  -AG     Assistive Device (Sit-Stand Transfers) --  no assistive device  -AG       Row Name 10/10/24 1311          Stand-Sit Transfer    Stand-Sit [place] (Transfers) maximum assist (25% patient effort);2 person assist  -AG     Assistive Device (Stand-Sit Transfers) --  no assistive device  -AG       Row Name 10/10/24 1311          Gait/Stairs (Locomotion)    Patient was able to Ambulate no, other medical factors prevent ambulation  -AG     Reason Patient was unable to Ambulate Excessive Weakness  -AG       Row Name 10/10/24 1311          Safety Issues, Functional Mobility    Impairments Affecting Function (Mobility) balance;endurance/activity tolerance;strength  -AG       Row Name 10/10/24 1311          Balance    Static Sitting Balance standby assist  -AG     Position, Sitting Balance unsupported;sitting edge of bed  -AG     Static Standing Balance verbal cues;non-verbal cues (demo/gesture);maximum assist;2-person assist  -AG       Row Name 10/10/24 1311          Knee (Therapeutic Exercise)    Knee (Therapeutic Exercise) PROM (passive range of motion)  -AG     Knee PROM (Therapeutic Exercise) left;extension  long sitting/ hamstrings stretch  -AG       Row Name             Wound 09/26/24 1806 gluteal    Wound - Properties Group Placement Date: 09/26/24  -KJ Placement Time: 1806  -KJ Location: gluteal  -KJ    Retired Wound - Properties Group Placement Date: 09/26/24  -KJ Placement Time: 1806  -KJ Location: gluteal  -KJ    Retired Wound - Properties Group Date first assessed: 09/26/24  -KJ Time first assessed: 1806  -KJ Location: gluteal  -KJ      Row Name             Wound 09/26/24 1807 Left gluteal    Wound - Properties Group Placement Date: 09/26/24  -KJ Placement Time: 1807  -KJ Side: Left  -KJ Location: gluteal  -KJ    Retired Wound - Properties Group  Placement Date: 09/26/24  -KJ Placement Time: 1807  -KJ Side: Left  -KJ Location: gluteal  -KJ    Retired Wound - Properties Group Date first assessed: 09/26/24  -KJ Time first assessed: 1807  -KJ Side: Left  -KJ Location: gluteal  -KJ      Row Name             Wound 09/26/24 1809 gluteal    Wound - Properties Group Placement Date: 09/26/24  -KJ Placement Time: 1809  -KJ Location: gluteal  -KJ    Retired Wound - Properties Group Placement Date: 09/26/24  -KJ Placement Time: 1809  -KJ Location: gluteal  -KJ    Retired Wound - Properties Group Date first assessed: 09/26/24  -KJ Time first assessed: 1809  -KJ Location: gluteal  -KJ      Row Name             Wound 09/26/24 1809 Left posterior ankle    Wound - Properties Group Placement Date: 09/26/24  -KJ Placement Time: 1809  -KJ Side: Left  -KJ Orientation: posterior  -KJ Location: ankle  -KJ    Retired Wound - Properties Group Placement Date: 09/26/24  -KJ Placement Time: 1809  -KJ Side: Left  -KJ Orientation: posterior  -KJ Location: ankle  -KJ    Retired Wound - Properties Group Date first assessed: 09/26/24  -KJ Time first assessed: 1809  -KJ Side: Left  -KJ Location: ankle  -KJ      Row Name             Wound 09/29/24 0952 Bilateral anterior thigh MASD (Moisture associated skin damage)    Wound - Properties Group Placement Date: 09/29/24  -LB Placement Time: 0952 -LB Present on Original Admission: Y  -LB Side: Bilateral  -LB Orientation: anterior  -LB Location: thigh  -LB Primary Wound Type: MASD  -LB    Retired Wound - Properties Group Placement Date: 09/29/24  -LB Placement Time: 0952  -LB Present on Original Admission: Y  -LB Side: Bilateral  -LB Orientation: anterior  -LB Location: thigh  -LB Primary Wound Type: MASD  -LB    Retired Wound - Properties Group Date first assessed: 09/29/24  -LB Time first assessed: 0952  -LB Present on Original Admission: Y  -LB Side: Bilateral  -LB Location: thigh  -LB Primary Wound Type: MASD  -LB      Row Name 10/10/24 1311           Coping    Observed Emotional State calm;cooperative  -AG     Verbalized Emotional State acceptance  -AG     Trust Relationship/Rapport choices provided;care explained;thoughts/feelings acknowledged  -     Family/Support Persons family  -AG     Involvement in Care not present at bedside  -     Family/Support System Care support provided;self-care encouraged  -AG       Row Name 10/10/24 1311          Plan of Care Review    Plan of Care Reviewed With patient  -AG     Outcome Evaluation PT treatment complete.  Pt. mod/ max A for supine>sit and remained sitting unsupported EOB for prolonged time; performed L LE PROM, knee extension/ hamstrings stretch. Pt. dependent for sit>supine.  PT will continue to follow.  -AG       Row Name 10/10/24 1311          Positioning and Restraints    Pre-Treatment Position in bed  -AG     Post Treatment Position bed  -AG     In Bed fowlers;call light within reach;encouraged to call for assist;exit alarm on;side rails up x3  -AG       Row Name 10/10/24 1311          Therapy Plan Review/Discharge Plan (PT)    Therapy Plan Review (PT) evaluation/treatment results reviewed;care plan/treatment goals reviewed;risks/benefits reviewed;current/potential barriers reviewed;participants voiced agreement with care plan;participants included;patient  -AG               User Key  (r) = Recorded By, (t) = Taken By, (c) = Cosigned By      Initials Name Provider Type    AG Мария Joya, PT Physical Therapist    Nory Keenan, RN Registered Nurse    Zehra Rivera, RN Registered Nurse                    Physical Therapy Education       Title: PT OT SLP Therapies (Done)       Topic: Physical Therapy (Done)       Point: Mobility training (Done)       Learning Progress Summary             Patient Acceptance, E,D, VU,NR by AG at 10/10/2024 1310    Acceptance, E,TB, VU by EMERSON at 10/8/2024 0448    Acceptance, E, NR by RD at 10/2/2024 1101    Acceptance, E, NR by RD at 10/1/2024 0856     Acceptance, E,D, VU,NR by AG at 9/30/2024 1559                         Point: Home exercise program (Done)       Learning Progress Summary             Patient Acceptance, E,D, VU,NR by AG at 10/10/2024 1310    Acceptance, E,TB, VU by CB at 10/8/2024 0448    Acceptance, E, NR by RD at 10/2/2024 1101    Acceptance, E, NR by RD at 10/1/2024 0856    Acceptance, E,D, VU,NR by AG at 9/30/2024 1559                         Point: Body mechanics (Done)       Learning Progress Summary             Patient Acceptance, E,D, VU,NR by AG at 10/10/2024 1310    Acceptance, E,TB, VU by CB at 10/8/2024 0448    Acceptance, E, NR by RD at 10/2/2024 1101    Acceptance, E, NR by RD at 10/1/2024 0856    Acceptance, E,D, VU,NR by AG at 9/30/2024 1559                         Point: Precautions (Done)       Learning Progress Summary             Patient Acceptance, E,D, VU,NR by AG at 10/10/2024 1310    Acceptance, E,TB, VU by CB at 10/8/2024 0448    Acceptance, E, NR by RD at 10/2/2024 1101    Acceptance, E, NR by RD at 10/1/2024 0856    Acceptance, E,D, VU,NR by AG at 9/30/2024 1559                                         User Key       Initials Effective Dates Name Provider Type Discipline     06/16/21 -  Мария Joya, PT Physical Therapist PT     06/16/21 -  Laisha Verdugo, RN Registered Nurse Nurse     06/17/24 -  Fidelia Lombardo, RN Registered Nurse Nurse                  PT Recommendation and Plan  Anticipated Discharge Disposition (PT): skilled nursing facility  Planned Therapy Interventions (PT): balance training, bed mobility training, gait training, home exercise program, transfer training, strengthening, ROM (range of motion), patient/family education, neuromuscular re-education  Therapy Frequency (PT): 2 times/wk (1-5 times/ week per priority)  Plan of Care Reviewed With: patient  Outcome Evaluation: PT treatment complete.  Pt. mod/ max A for supine>sit and remained sitting unsupported EOB for prolonged time; performed L LE  PROM, knee extension/ hamstrings stretch. Pt. dependent for sit>supine.  PT will continue to follow.       Time Calculation:    PT Charges       Row Name 10/10/24 1310             Time Calculation    PT Received On 10/10/24  -                User Key  (r) = Recorded By, (t) = Taken By, (c) = Cosigned By      Initials Name Provider Type     Мария Joya, PT Physical Therapist                  Therapy Charges for Today       Code Description Service Date Service Provider Modifiers Qty    87447642078  PT THER PROC EA 15 MIN 10/10/2024 Мария Joya, PT GP 1    60610131318 HC PT THERAPEUTIC ACT EA 15 MIN 10/10/2024 Мария Joya, PT GP 1            PT G-Codes  AM-PAC 6 Clicks Score (PT): 8    Мария Joya PT  10/10/2024      Electronically signed by Мария Joya, PT at 10/10/24 1324          Occupational Therapy Notes (most recent note)        Loida Flaherty, OT at 10/10/24 1127          Acute Care - Occupational Therapy Treatment Note   [facility]     Patient Name: Lety Johnson  : 1980  MRN: [id number]  Today's Date: 10/10/2024  Onset of Illness/Injury or Date of Surgery: 24     Referring Physician: Dr. Marc    Admit Date: 2024       ICD-10-CM ICD-9-CM   1. Hypokalemia  E87.6 276.8   2. Pressure injury of skin of sacral region, unspecified injury stage  L89.159 707.03     707.20   3. Pressure injury of skin of left heel, unspecified injury stage  L89.629 707.07     707.20   4. Acute cystitis without hematuria  N30.00 595.0     Patient Active Problem List   Diagnosis   (none) - all problems resolved or deleted     Past Medical History:   Diagnosis Date    Stroke      History reviewed. No pertinent surgical history.      OT ASSESSMENT FLOWSHEET (Last 12 Hours)       OT Evaluation and Treatment       Row Name 10/10/24 1122                   OT Time and Intention    Subjective Information weakness;complains of;pain  -LA        Document Type therapy note (daily note)  -LA        Mode of  Treatment occupational therapy  -LA        Patient Effort adequate  -LA        Symptoms Noted During/After Treatment fatigue  -LA           General Information    Patient Profile Reviewed yes  -LA        General Observations of Patient Patient agreeable to therapy this date. Patient was able to perform sit to stand with maxAx2 person x2 attempts. Patient continues to require maxA x2 from EOB to supine.  -LA        Existing Precautions/Restrictions fall;left;shoulder  -LA           Cognition    Affect/Mental Status (Cognition) WFL  patient did not cry this date  -LA        Orientation Status (Cognition) oriented x 4  -LA        Follows Commands (Cognition) WFL  -LA           Bed Mobility    Rolling Left McLean (Bed Mobility) maximum assist (25% patient effort)  -LA        Rolling Right Mc[place] (Bed Mobility) maximum assist (25% patient effort)  -LA        Supine-Sit McLean (Bed Mobility) moderate assist (50% patient effort);maximum assist (25% patient effort)  -LA        Sit-Supine McLean (Bed Mobility) dependent (less than 25% patient effort);2 person assist  -LA           Sit-Stand Transfer    Sit-Stand McLean (Transfers) maximum assist (25% patient effort);2 person assist  x2 (attempted x4 this date)  -LA           Motor Skills    Motor Control/Coordination Interventions therapeutic exercise/ROM  -LA        Therapeutic Exercise shoulder;elbow/forearm;wrist;hand  Right UE only  -LA           Balance    Static Sitting Balance supervision  -LA        Dynamic Sitting Balance minimal assist  -LA           Wound 09/26/24 1806 gluteal    Wound - Properties Group Placement Date: 09/26/24  -KJ Placement Time: 1806 -KJ Location: gluteal  -KJ    Retired Wound - Properties Group Placement Date: 09/26/24  -KJ Placement Time: 1806 -KJ Location: gluteal  -KJ    Retired Wound - Properties Group Date first assessed: 09/26/24  -KJ Time first assessed: 1806 -KJ Location: gluteal  -KJ       Wound  09/26/24 1807 Left gluteal    Wound - Properties Group Placement Date: 09/26/24  -KJ Placement Time: 1807  -KJ Side: Left  -KJ Location: gluteal  -KJ    Retired Wound - Properties Group Placement Date: 09/26/24  -KJ Placement Time: 1807  -KJ Side: Left  -KJ Location: gluteal  -KJ    Retired Wound - Properties Group Date first assessed: 09/26/24  -KJ Time first assessed: 1807  -KJ Side: Left  -KJ Location: gluteal  -KJ       Wound 09/26/24 1809 gluteal    Wound - Properties Group Placement Date: 09/26/24  -KJ Placement Time: 1809  -KJ Location: gluteal  -KJ    Retired Wound - Properties Group Placement Date: 09/26/24  -KJ Placement Time: 1809  -KJ Location: gluteal  -KJ    Retired Wound - Properties Group Date first assessed: 09/26/24  -KJ Time first assessed: 1809  -KJ Location: gluteal  -KJ       Wound 09/26/24 1809 Left posterior ankle    Wound - Properties Group Placement Date: 09/26/24  -KJ Placement Time: 1809  -KJ Side: Left  -KJ Orientation: posterior  -KJ Location: ankle  -KJ    Retired Wound - Properties Group Placement Date: 09/26/24  -KJ Placement Time: 1809  -KJ Side: Left  -KJ Orientation: posterior  -KJ Location: ankle  -KJ    Retired Wound - Properties Group Date first assessed: 09/26/24  -KJ Time first assessed: 1809  -KJ Side: Left  -KJ Location: ankle  -KJ       Wound 09/29/24 0952 Bilateral anterior thigh MASD (Moisture associated skin damage)    Wound - Properties Group Placement Date: 09/29/24  -LB Placement Time: 0952 -LB Present on Original Admission: Y  -LB Side: Bilateral  -LB Orientation: anterior  -LB Location: thigh  -LB Primary Wound Type: MASD  -LB    Retired Wound - Properties Group Placement Date: 09/29/24  -LB Placement Time: 0952 -LB Present on Original Admission: Y  -LB Side: Bilateral  -LB Orientation: anterior  -LB Location: thigh  -LB Primary Wound Type: MASD  -LB    Retired Wound - Properties Group Date first assessed: 09/29/24  -LB Time first assessed: 0952  -LB Present on  Original Admission: Y  -LB Side: Bilateral  -LB Location: thigh  -LB Primary Wound Type: MASD  -LB       Plan of Care Review    Plan of Care Reviewed With patient  -LA           Positioning and Restraints    Pre-Treatment Position in bed  -LA        Post Treatment Position bed  -LA        In Bed supine;call light within reach;encouraged to call for assist;exit alarm on  -LA                  User Key  (r) = Recorded By, (t) = Taken By, (c) = Cosigned By      Initials Name Effective Dates    Nory Keenan RN 06/08/23 -     Zehra Rivera RN 10/20/21 -     Loida Richard OT 02/14/22 -                            OT Recommendation and Plan     Plan of Care Review  Plan of Care Reviewed With: patient  Progress: improving  Plan of Care Reviewed With: patient        Time Calculation:     Therapy Charges for Today       Code Description Service Date Service Provider Modifiers Qty    74373820367 HC OT THER PROC EA 15 MIN 10/9/2024 Loida Flaherty OT GO 1    35323574630 HC OT THERAPEUTIC ACT EA 15 MIN 10/9/2024 Loida Flaherty OT GO 1    97353896665 HC OT THERAPEUTIC ACT EA 15 MIN 10/10/2024 Loida Flaherty OT GO 1    11300670948 HC OT THER PROC EA 15 MIN 10/10/2024 Loida Flaherty OT GO 1                 Loida Flaherty OT  10/10/2024    Electronically signed by Loida Flaherty OT at 10/10/24 1127

## 2024-10-11 NOTE — PROGRESS NOTES
[facility] HOSPITALIST PROGRESS NOTE     Patient Identification:  Name:  Lety Johnson  Age:  43 y.o.  Sex:  female  :  1980  MRN:  [id number]  Visit Number:  [id number]  ROOM: [address] [place] [address]     Primary Care Provider:  Provider, No Known    Length of stay in inpatient status:  13    Subjective     Chief Compliant:    Chief Complaint   Patient presents with    Fall     History of Presenting Illness:    Patient remains ill but stable today, no acute events overnight, no new complaints, denies any fevers or chills, A1c >9%, added SSI, glucose 400's, may end up needing to add long acting soon. Pending placement, medically stable otherwise.   Objective     Current Hospital Meds:  acyclovir, 400 mg, Oral, Nightly  aspirin, 81 mg, Oral, Daily  atorvastatin, 80 mg, Oral, Daily  DULoxetine, 60 mg, Oral, Daily  heparin (porcine), 5,000 Units, Subcutaneous, Q8H  insulin lispro, 2-7 Units, Subcutaneous, 4x Daily PC & at Bedtime  lisinopril, 20 mg, Oral, Daily  [Held by provider] metFORMIN, 1,000 mg, Oral, BID With Meals  metoprolol tartrate, 25 mg, Oral, Q12H  nicotine, 1 patch, Transdermal, Q24H  nystatin, , Topical, Q12H  pantoprazole, 40 mg, Oral, Daily  potassium chloride ER, 40 mEq, Oral, Q4H  sodium chloride, 10 mL, Intravenous, Q12H  sodium chloride, 10 mL, Intravenous, Q12H      Pharmacy Consult,       ----------------------------------------------------------------------------------------------------------------------  Vital Signs:  Temp:  [97.3 °F (36.3 °C)-98.3 °F (36.8 °C)] 97.9 °F (36.6 °C)  Heart Rate:  [] 110  Resp:  [18-20] 20  BP: (129-152)/(73-88) 152/88  SpO2:  [91 %-93 %] 93 %  on   ;   Device (Oxygen Therapy): room air  Body mass index is 36.73 kg/m².      Intake/Output Summary (Last 24 hours) at 10/11/2024 1007  Last data filed at 10/11/2024 0900  Gross per 24 hour   Intake 480 ml   Output 1700 ml   Net -1220 ml     "  ----------------------------------------------------------------------------------------------------------------------  Physical exam:  Constitutional:  Older than stated age. No acute distress. No discomfort   HENT:  Head:  Normocephalic and atraumatic.  Mouth:  Moist mucous membranes.    Eyes:  Conjunctivae and EOM are normal. No scleral icterus.    Neck:  Neck supple.  No JVD present.    Cardiovascular:  Normal rate, regular rhythm and normal heart sounds with no murmur.  Pulmonary/Chest:  No respiratory distress, no wheezes, on room air  Abdominal:  Soft. No distension and no tenderness.   Musculoskeletal:  No tenderness, and no deformity.  No red or swollen joints anywhere.    Neurological:  Alert and oriented to person, place, and time.  No cranial nerve deficit. Chronic L sided paralysis.    Skin:  Skin is warm and dry. No pallor.  Peripheral vascular:  No clubbing, no cyanosis, trace edema.  Psychiatric: Appropriate mood and affect    *Examination stable today 10/11  Edited by: Ramon Marc MD at 10/11/2024 1007  ----------------------------------------------------------------------------------------------------------------------  WBC/HGB/HCT/PLT   9.91/13.7/41.4/387 (10/11 0814)  BUN/CREAT/GLUC/ALT/AST/NORMA/LIP    13/0.44/404/--/--/--/-- (10/11 0814)  LYTES - Na/K/Cl/CO2: 134*/3.6/99/23.5 (10/11 0814)        No results found for: \"URINECX\"  No results found for: \"BLOODCX\"    I have personally looked at the labs and they are summarized above.  ----------------------------------------------------------------------------------------------------------------------  Detailed radiology reports for the last 24 hours:  No radiology results for the last day  Assessment & Plan    43F Morbid Obesity by BMI PMH History Genital Herpes, Cerebrovascular Accident 5/2024 complicated by L sided paralysis, presented to [facility] emergency room 9/26 after sliding into the floor off her chair due to weakness. 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     #Non-Insulin Dependent Diabetes Mellitus Type II, uncontrolled, unknown complications - Likely to be insulin dependent now  - Hgb A1c = 9.40%  - Hold home oral agents, add FSBG and SSI, add long acting as indicated.     #Electrolyte Abnormalities  - Acute Mild Hypokalemia - Replaced per protocol - Resolved   - Acute Mild Hypomagnesemia - Replaced per protocol - Resolved     #History Cerebrovascular Accident complicated by L sided paralysis, unclear etiology  - Continue home regimen, Supportive Care     #Debility  - Consult PT/OT, Social Work if placement needed     #History Genital Herpes  - Supportive Care, continue home Acyclovir     #Morbid Obesity by BMI  - Body mass index is 43.26 kg/m².; complicates all aspects of care    F: Oral  E: Monitor & Replace as needed   N: Diet: Regular/House; Fluid Consistency: Thin (IDDSI 0)   PPx: SQH  Code Status (Patient has no pulse and is not breathing): CPR  Medical Interventions (Patient has pulse or is breathing): Full Support     Dispo: Pending placement, PT/OT consulted and following, Social Work consulted and following.     *This patient is considered high risk secondary to Urinary Tract Infection, electrolyte abnormalities, chronic L sided paralysis from prior Cerebrovascular Accident.    Edited by: Ramon Marc MD at 10/11/2024 [address] [place] [address]

## 2024-10-11 NOTE — PLAN OF CARE
Goal Outcome Evaluation:  Plan of Care Reviewed With: patient        Progress: no change  Outcome Evaluation: Patient resting in bed at this time. A&O. VSS on RA. SS insulin added this shift for noted hyperglycemia, diet updated to reg, cc, tolerating well. PRN pain meds given for c/o Pain in LLE per orders. Wound care completed per orders. Patient worked with PT/OT this shift. Refused bath this shift, educated on importance of daily bathing for infection prevention, continues to refuse. No other requests or complaints at this time. Will continue with POC.

## 2024-10-11 NOTE — THERAPY TREATMENT NOTE
Acute Care - Occupational Therapy Treatment Note   [place]     Patient Name: Lety Johnson  : 1980  MRN: [id number]  Today's Date: 10/11/2024  Onset of Illness/Injury or Date of Surgery: 24     Referring Physician: Dr. Marc    Admit Date: 2024       ICD-10-CM ICD-9-CM   1. Hypokalemia  E87.6 276.8   2. Pressure injury of skin of sacral region, unspecified injury stage  L89.159 707.03     707.20   3. Pressure injury of skin of left heel, unspecified injury stage  L89.629 707.07     707.20   4. Acute cystitis without hematuria  N30.00 595.0     Patient Active Problem List   Diagnosis   (none) - all problems resolved or deleted     Past Medical History:   Diagnosis Date    Stroke      History reviewed. No pertinent surgical history.      OT ASSESSMENT FLOWSHEET (Last 12 Hours)       OT Evaluation and Treatment       Row Name 10/11/24 1244                   OT Time and Intention    Document Type therapy note (daily note)  -KR        Mode of Treatment occupational therapy  -KR        Patient Effort fair  -KR        Symptoms Noted During/After Treatment fatigue;increased pain  -KR           Shoulder (Therapeutic Exercise)    Shoulder PROM (Therapeutic Exercise) left;flexion;extension;supine  -KR           Elbow/Forearm (Therapeutic Exercise)    Elbow/Forearm PROM (Therapeutic Exercise) left;flexion;extension;supine  -KR           Wrist (Therapeutic Exercise)    Wrist PROM (Therapeutic Exercise) left;flexion;extension  -KR           Hand (Therapeutic Exercise)    Hand PROM (Therapeutic Exercise) left;finger flexion;finger extension  -KR           Wound 24 gluteal    Wound - Properties Group Placement Date: 24  - Placement Time: 180 Location: gluteal  -KJ    Retired Wound - Properties Group Placement Date: 24  -KJ Placement Time: 180  -KJ Location: gluteal  -KJ    Retired Wound - Properties Group Date first assessed: 24  -KJ Time first assessed: 180  - Location:  gluteal  -KJ       Wound 09/26/24 1807 Left gluteal    Wound - Properties Group Placement Date: 09/26/24  -KJ Placement Time: 1807  -KJ Side: Left  -KJ Location: gluteal  -KJ    Retired Wound - Properties Group Placement Date: 09/26/24  -KJ Placement Time: 1807  -KJ Side: Left  -KJ Location: gluteal  -KJ    Retired Wound - Properties Group Date first assessed: 09/26/24  -KJ Time first assessed: 1807  -KJ Side: Left  -KJ Location: gluteal  -KJ       Wound 09/26/24 1809 gluteal    Wound - Properties Group Placement Date: 09/26/24  -KJ Placement Time: 1809  -KJ Location: gluteal  -KJ    Retired Wound - Properties Group Placement Date: 09/26/24  -KJ Placement Time: 1809  -KJ Location: gluteal  -KJ    Retired Wound - Properties Group Date first assessed: 09/26/24  -KJ Time first assessed: 1809  -KJ Location: gluteal  -KJ       Wound 09/26/24 1809 Left posterior ankle    Wound - Properties Group Placement Date: 09/26/24  -KJ Placement Time: 1809  -KJ Side: Left  -KJ Orientation: posterior  -KJ Location: ankle  -KJ    Retired Wound - Properties Group Placement Date: 09/26/24  -KJ Placement Time: 1809  -KJ Side: Left  -KJ Orientation: posterior  -KJ Location: ankle  -KJ    Retired Wound - Properties Group Date first assessed: 09/26/24  -KJ Time first assessed: 1809  -KJ Side: Left  -KJ Location: ankle  -KJ       Wound 09/29/24 0952 Bilateral anterior thigh MASD (Moisture associated skin damage)    Wound - Properties Group Placement Date: 09/29/24  -LB Placement Time: 0952 -LB Present on Original Admission: Y  -LB Side: Bilateral  -LB Orientation: anterior  -LB Location: thigh  -LB Primary Wound Type: MASD  -LB    Retired Wound - Properties Group Placement Date: 09/29/24  -LB Placement Time: 0952 -LB Present on Original Admission: Y  -LB Side: Bilateral  -LB Orientation: anterior  -LB Location: thigh  -LB Primary Wound Type: MASD  -LB    Retired Wound - Properties Group Date first assessed: 09/29/24  -LB Time first  assessed: 0952  - Present on Original Admission: Y  -LB Side: Bilateral  -LB Location: thigh  -LB Primary Wound Type: MASD  -LB       Plan of Care Review    Plan of Care Reviewed With patient  -KR           ROM Goal 1 (OT)    Progress/Outcome (ROM Goal 1, OT) continuing progress toward goal  -KR                  User Key  (r) = Recorded By, (t) = Taken By, (c) = Cosigned By      Initials Name Effective Dates    Neil Menezes OT 06/16/21 -     KJ Nory Ledezma RN 06/08/23 -     Zehra Rivera RN 10/20/21 -                            OT Recommendation and Plan  Planned Therapy Interventions (OT): activity tolerance training, neuromuscular control/coordination retraining, ROM/therapeutic exercise  Plan of Care Review  Plan of Care Reviewed With: patient  Plan of Care Reviewed With: patient        Time Calculation:     Therapy Charges for Today       Code Description Service Date Service Provider Modifiers Qty    [id number]  OT THERAPEUTIC ACT EA 15 MIN 10/11/2024 Neil Graf OT GO 1                 Neil Graf OT  10/11/2024

## 2024-10-11 NOTE — CASE MANAGEMENT/SOCIAL WORK
Discharge Planning Assessment   [place]     Patient Name: Lety Johnson  MRN: [id number]  Today's Date: 10/11/2024    Admit Date: 9/26/2024            Discharge Plan       Row Name 10/11/24 1002       Plan    Plan SS spoke with Pt at bedside on 10/10/24, she requested SS speak with her POA/sister Moises about discharge planning. SS contacted Moises [phone] and notified her that [facility] swing bed per Eleanor has declined Pt. SS also notified POA that Pt has been declined by several other swing bed locations due to no beds being available including, [facility] Swing Bed, [facility]ing [facility], [facility] swing bed. [facility] Swing bed,and [facility] swing bed. Pt's POA has requested SS try swing beds closer to [place]. POA states she and Pt have discussed and she is willing to go to any rehab SS can find. SS contacted [facility] per Tali who states no beds available. SS contacted [facility] swing bed unit per Raysa [phone] and faxed referral to fax [phone] for review. SS to follow.    15:25pm: [facility] swing bed unit per Mari [phone] faxed referral to fax [phone] for review. SS contacted [facility] Swing Bed Unit per Ovidio [phone] and faxed referral to fax [phone] for review. SS to follow.                      KAZ Reynolds

## 2024-10-12 LAB
GLUCOSE BLDC GLUCOMTR-MCNC: 335 MG/DL (ref 70–130)
GLUCOSE BLDC GLUCOMTR-MCNC: 346 MG/DL (ref 70–130)
GLUCOSE BLDC GLUCOMTR-MCNC: 392 MG/DL (ref 70–130)
GLUCOSE BLDC GLUCOMTR-MCNC: 436 MG/DL (ref 70–130)

## 2024-10-12 PROCEDURE — 25010000002 KETOROLAC TROMETHAMINE PER 15 MG: Performed by: INTERNAL MEDICINE

## 2024-10-12 PROCEDURE — 25010000002 HEPARIN (PORCINE) PER 1000 UNITS: Performed by: INTERNAL MEDICINE

## 2024-10-12 PROCEDURE — 99231 SBSQ HOSP IP/OBS SF/LOW 25: CPT | Performed by: INTERNAL MEDICINE

## 2024-10-12 PROCEDURE — 63710000001 INSULIN GLARGINE PER 5 UNITS: Performed by: INTERNAL MEDICINE

## 2024-10-12 PROCEDURE — 25010000002 PROCHLORPERAZINE 10 MG/2ML SOLUTION

## 2024-10-12 PROCEDURE — 82948 REAGENT STRIP/BLOOD GLUCOSE: CPT

## 2024-10-12 PROCEDURE — 63710000001 INSULIN LISPRO (HUMAN) PER 5 UNITS: Performed by: INTERNAL MEDICINE

## 2024-10-12 RX ORDER — INSULIN LISPRO 100 [IU]/ML
2-9 INJECTION, SOLUTION INTRAVENOUS; SUBCUTANEOUS
Status: DISCONTINUED | OUTPATIENT
Start: 2024-10-12 | End: 2024-10-17

## 2024-10-12 RX ADMIN — HEPARIN SODIUM 5000 UNITS: 5000 INJECTION INTRAVENOUS; SUBCUTANEOUS at 13:10

## 2024-10-12 RX ADMIN — Medication 10 ML: at 20:36

## 2024-10-12 RX ADMIN — PANTOPRAZOLE SODIUM 40 MG: 40 TABLET, DELAYED RELEASE ORAL at 09:35

## 2024-10-12 RX ADMIN — METOPROLOL TARTRATE 25 MG: 25 TABLET, FILM COATED ORAL at 20:34

## 2024-10-12 RX ADMIN — Medication 10 ML: at 09:41

## 2024-10-12 RX ADMIN — PROCHLORPERAZINE EDISYLATE 2.5 MG: 5 INJECTION INTRAMUSCULAR; INTRAVENOUS at 20:28

## 2024-10-12 RX ADMIN — Medication 5 MG: at 20:34

## 2024-10-12 RX ADMIN — ACYCLOVIR 400 MG: 200 CAPSULE ORAL at 20:34

## 2024-10-12 RX ADMIN — INSULIN GLARGINE 7 UNITS: 100 INJECTION, SOLUTION SUBCUTANEOUS at 20:28

## 2024-10-12 RX ADMIN — NICOTINE 1 PATCH: 7 PATCH, EXTENDED RELEASE TRANSDERMAL at 09:41

## 2024-10-12 RX ADMIN — LISINOPRIL 20 MG: 10 TABLET ORAL at 09:35

## 2024-10-12 RX ADMIN — ASPIRIN 81 MG: 81 TABLET, CHEWABLE ORAL at 09:35

## 2024-10-12 RX ADMIN — DULOXETINE HYDROCHLORIDE 60 MG: 60 CAPSULE, DELAYED RELEASE ORAL at 09:34

## 2024-10-12 RX ADMIN — INSULIN LISPRO 9 UNITS: 100 INJECTION, SOLUTION INTRAVENOUS; SUBCUTANEOUS at 18:14

## 2024-10-12 RX ADMIN — NYSTATIN: 100000 POWDER TOPICAL at 09:41

## 2024-10-12 RX ADMIN — METOPROLOL TARTRATE 25 MG: 25 TABLET, FILM COATED ORAL at 09:34

## 2024-10-12 RX ADMIN — HEPARIN SODIUM 5000 UNITS: 5000 INJECTION INTRAVENOUS; SUBCUTANEOUS at 20:29

## 2024-10-12 RX ADMIN — Medication 10 ML: at 20:29

## 2024-10-12 RX ADMIN — SENNOSIDES AND DOCUSATE SODIUM 2 TABLET: 50; 8.6 TABLET ORAL at 13:10

## 2024-10-12 RX ADMIN — ACETAMINOPHEN 650 MG: 325 TABLET ORAL at 02:08

## 2024-10-12 RX ADMIN — NYSTATIN: 100000 POWDER TOPICAL at 20:36

## 2024-10-12 RX ADMIN — INSULIN LISPRO 6 UNITS: 100 INJECTION, SOLUTION INTRAVENOUS; SUBCUTANEOUS at 12:19

## 2024-10-12 RX ADMIN — INSULIN LISPRO 5 UNITS: 100 INJECTION, SOLUTION INTRAVENOUS; SUBCUTANEOUS at 09:33

## 2024-10-12 RX ADMIN — ACETAMINOPHEN 650 MG: 325 TABLET ORAL at 23:45

## 2024-10-12 RX ADMIN — KETOROLAC TROMETHAMINE 15 MG: 30 INJECTION, SOLUTION INTRAMUSCULAR; INTRAVENOUS at 20:28

## 2024-10-12 RX ADMIN — HEPARIN SODIUM 5000 UNITS: 5000 INJECTION INTRAVENOUS; SUBCUTANEOUS at 05:11

## 2024-10-12 RX ADMIN — ATORVASTATIN CALCIUM 80 MG: 40 TABLET, FILM COATED ORAL at 09:34

## 2024-10-12 RX ADMIN — CYCLOBENZAPRINE 10 MG: 10 TABLET, FILM COATED ORAL at 23:45

## 2024-10-12 RX ADMIN — INSULIN LISPRO 7 UNITS: 100 INJECTION, SOLUTION INTRAVENOUS; SUBCUTANEOUS at 20:28

## 2024-10-12 NOTE — PLAN OF CARE
Goal Outcome Evaluation:                                            Problem: Adult Inpatient Plan of Care  Goal: Plan of Care Review  Outcome: Resolved for upgrade, new template will be applied  Goal: Patient-Specific Goal (Individualized)  Outcome: Resolved for upgrade, new template will be applied  Goal: Absence of Hospital-Acquired Illness or Injury  Outcome: Resolved for upgrade, new template will be applied  Goal: Optimal Comfort and Wellbeing  Outcome: Resolved for upgrade, new template will be applied  Goal: Readiness for Transition of Care  Outcome: Resolved for upgrade, new template will be applied     Problem: Asthma Comorbidity  Goal: Maintenance of Asthma Control  Outcome: Resolved for upgrade, new template will be applied     Problem: Behavioral Health Comorbidity  Goal: Maintenance of Behavioral Health Symptom Control  Outcome: Resolved for upgrade, new template will be applied     Problem: COPD (Chronic Obstructive Pulmonary Disease) Comorbidity  Goal: Maintenance of COPD Symptom Control  Outcome: Resolved for upgrade, new template will be applied     Problem: Diabetes Comorbidity  Goal: Blood Glucose Level Within Targeted Range  Outcome: Resolved for upgrade, new template will be applied     Problem: Heart Failure Comorbidity  Goal: Maintenance of Heart Failure Symptom Control  Outcome: Resolved for upgrade, new template will be applied     Problem: Hypertension Comorbidity  Goal: Blood Pressure in Desired Range  Outcome: Resolved for upgrade, new template will be applied     Problem: Obstructive Sleep Apnea Risk or Actual Comorbidity Management  Goal: Unobstructed Breathing During Sleep  Outcome: Resolved for upgrade, new template will be applied     Problem: Osteoarthritis Comorbidity  Goal: Maintenance of Osteoarthritis Symptom Control  Outcome: Resolved for upgrade, new template will be applied     Problem: Pain Chronic (Persistent) (Comorbidity Management)  Goal: Acceptable Pain Control and  Functional Ability  Outcome: Resolved for upgrade, new template will be applied     Problem: Seizure Disorder Comorbidity  Goal: Maintenance of Seizure Control  Outcome: Resolved for upgrade, new template will be applied     Problem: Skin Injury Risk Increased  Goal: Skin Health and Integrity  Outcome: Resolved for upgrade, new template will be applied     Problem: Fall Injury Risk  Goal: Absence of Fall and Fall-Related Injury  Outcome: Resolved for upgrade, new template will be applied     Problem: UTI (Urinary Tract Infection)  Goal: Improved Infection Symptoms  Outcome: Resolved for upgrade, new template will be applied

## 2024-10-12 NOTE — PLAN OF CARE
Goal Outcome Evaluation:  Plan of Care Reviewed With: patient        Progress: no change  Outcome Evaluation: Patient resting in bed. VSS on room air. Patient c/o pain this shift, PRN medications given per MAR. Patient voices no concerns at this time, will continue with POC.                             Problem: Adult Inpatient Plan of Care  Goal: Plan of Care Review  10/12/2024 0236 by Chelsie Sawant RN  Outcome: Progressing  Flowsheets (Taken 10/12/2024 0235)  Progress: no change  Outcome Evaluation: Patient resting in bed. VSS on room air. Patient c/o pain this shift, PRN medications given per MAR. Patient voices no concerns at this time, will continue with POC.  Plan of Care Reviewed With: patient  10/12/2024 0235 by Chelsie Sawant RN  Reactivated  Flowsheets (Taken 10/12/2024 0235)  Progress: no change  Outcome Evaluation: Patient resting in bed. VSS on room air. Patient c/o pain this shift, PRN medications given per MAR. Patient voices no concerns at this time, will continue with POC.  Plan of Care Reviewed With: patient  10/11/2024 2200 by Chelsie Sawant RN  Outcome: Resolved for upgrade, new template will be applied  Goal: Patient-Specific Goal (Individualized)  10/12/2024 0236 by Chelsie Sawant RN  Outcome: Progressing  10/12/2024 0235 by Chelsie Sawant RN  Reactivated  10/11/2024 2200 by Chelsie Sawant RN  Outcome: Resolved for upgrade, new template will be applied  Goal: Absence of Hospital-Acquired Illness or Injury  10/12/2024 0236 by Chelsie Sawant RN  Outcome: Progressing  10/12/2024 0235 by Chelsie Sawant RN  Reactivated  10/11/2024 2200 by Chelsie Sawant RN  Outcome: Resolved for upgrade, new template will be applied  Goal: Optimal Comfort and Wellbeing  10/12/2024 0236 by Chelsie Sawant RN  Outcome: Progressing  10/12/2024 0235 by Chelsie Sawant RN  Reactivated  10/11/2024 2200 by Chelsie Sawant RN  Outcome: Resolved for upgrade, new template will be applied  Goal: Readiness  for Transition of Care  10/12/2024 0236 by Chelsie Sawant RN  Outcome: Progressing  10/12/2024 0235 by Chelsie Sawant RN  Reactivated  10/11/2024 2200 by Chelsie Sawant RN  Outcome: Resolved for upgrade, new template will be applied     Problem: Asthma Comorbidity  Goal: Maintenance of Asthma Control  10/12/2024 0236 by Chelsie Sawant RN  Outcome: Progressing  10/12/2024 0235 by Chelsie Sawant RN  Reactivated  10/11/2024 2200 by Chelsie Sawant RN  Outcome: Resolved for upgrade, new template will be applied     Problem: Behavioral Health Comorbidity  Goal: Maintenance of Behavioral Health Symptom Control  10/12/2024 0236 by Chelsie Sawant RN  Outcome: Progressing  10/12/2024 0235 by Chelsie Sawant RN  Reactivated  10/11/2024 2200 by Chelsie Sawant RN  Outcome: Resolved for upgrade, new template will be applied     Problem: COPD (Chronic Obstructive Pulmonary Disease) Comorbidity  Goal: Maintenance of COPD Symptom Control  10/12/2024 0236 by Chelsie Sawant RN  Outcome: Progressing  10/12/2024 0235 by Chelsie Sawant RN  Reactivated  10/11/2024 2200 by Chelsie Sawant RN  Outcome: Resolved for upgrade, new template will be applied     Problem: Diabetes Comorbidity  Goal: Blood Glucose Level Within Targeted Range  10/12/2024 0236 by Chelsie Sawant RN  Outcome: Progressing  10/12/2024 0235 by Chelsie Sawant RN  Reactivated  10/11/2024 2200 by Chelsie Sawant RN  Outcome: Resolved for upgrade, new template will be applied     Problem: Heart Failure Comorbidity  Goal: Maintenance of Heart Failure Symptom Control  10/12/2024 0236 by Chelsie Sawant RN  Outcome: Progressing  10/12/2024 0235 by Chelsie Sawant RN  Reactivated  10/11/2024 2200 by Chelsie Sawant RN  Outcome: Resolved for upgrade, new template will be applied     Problem: Hypertension Comorbidity  Goal: Blood Pressure in Desired Range  10/12/2024 0236 by Chelsie Sawant RN  Outcome: Progressing  10/12/2024 0235 by Chelsie Sawant  RN  Reactivated  10/11/2024 2200 by Chelsie Sawant RN  Outcome: Resolved for upgrade, new template will be applied     Problem: Obstructive Sleep Apnea Risk or Actual Comorbidity Management  Goal: Unobstructed Breathing During Sleep  10/12/2024 0236 by Chelsie Sawant RN  Outcome: Progressing  10/12/2024 0235 by Chelsie Sawant RN  Reactivated  10/11/2024 2200 by Chelsie Sawant RN  Outcome: Resolved for upgrade, new template will be applied     Problem: Osteoarthritis Comorbidity  Goal: Maintenance of Osteoarthritis Symptom Control  10/12/2024 0236 by Chelsie Sawant RN  Outcome: Progressing  10/12/2024 0235 by Chelsie Sawant RN  Reactivated  10/11/2024 2200 by Chelsie Sawant RN  Outcome: Resolved for upgrade, new template will be applied     Problem: Pain Chronic (Persistent) (Comorbidity Management)  Goal: Acceptable Pain Control and Functional Ability  10/12/2024 0236 by Chelsie Sawant RN  Outcome: Progressing  10/12/2024 0235 by Chelsie Sawant RN  Reactivated  10/11/2024 2200 by Chelsie Sawant RN  Outcome: Resolved for upgrade, new template will be applied     Problem: Seizure Disorder Comorbidity  Goal: Maintenance of Seizure Control  10/12/2024 0236 by Chelsie Sawant RN  Outcome: Progressing  10/12/2024 0235 by Chelsie Sawant RN  Reactivated  10/11/2024 2200 by Chelsie Sawant RN  Outcome: Resolved for upgrade, new template will be applied     Problem: Skin Injury Risk Increased  Goal: Skin Health and Integrity  10/12/2024 0236 by Chelsie Sawant RN  Outcome: Progressing  10/12/2024 0235 by Chelsie Sawant RN  Reactivated  10/11/2024 2200 by Chelsie Sawant RN  Outcome: Resolved for upgrade, new template will be applied     Problem: Fall Injury Risk  Goal: Absence of Fall and Fall-Related Injury  10/12/2024 0236 by Chelsie Sawant RN  Outcome: Progressing  10/12/2024 0235 by Chelsie Sawant RN  Reactivated  10/11/2024 2200 by Chelsie Sawant RN  Outcome: Resolved for upgrade, new  template will be applied     Problem: UTI (Urinary Tract Infection)  Goal: Improved Infection Symptoms  10/12/2024 0236 by Chelsie Sawant RN  Outcome: Progressing  10/12/2024 0235 by Chelsie Sawant RN  Reactivated  10/11/2024 2200 by Chelsie Sawant RN  Outcome: Resolved for upgrade, new template will be applied

## 2024-10-12 NOTE — PROGRESS NOTES
[facility] HOSPITALIST PROGRESS NOTE     Patient Identification:  Name:  Lety Johnson  Age:  43 y.o.  Sex:  female  :  1980  MRN:  [id number]  Visit Number:  [id number]  ROOM: [address] [place] [address]     Primary Care Provider:  Provider, No Known    Length of stay in inpatient status:  14    Subjective     Chief Compliant:    Chief Complaint   Patient presents with    Fall     History of Presenting Illness:    Patient remains ill but stable today, no acute events overnight, no new complaints, denies any fevers or chills, glucose elevated yesterday, started on long acting insulin, improved some today but not well controlled, increased long acting dose for tonight, continue SSI, pending placement, follow up with Social Work Monday.   Objective     Current Hospital Meds:  acyclovir, 400 mg, Oral, Nightly  aspirin, 81 mg, Oral, Daily  atorvastatin, 80 mg, Oral, Daily  DULoxetine, 60 mg, Oral, Daily  heparin (porcine), 5,000 Units, Subcutaneous, Q8H  insulin glargine, 7 Units, Subcutaneous, Nightly  insulin lispro, 2-7 Units, Subcutaneous, 4x Daily PC & at Bedtime  lisinopril, 20 mg, Oral, Daily  [Held by provider] metFORMIN, 1,000 mg, Oral, BID With Meals  metoprolol tartrate, 25 mg, Oral, Q12H  nicotine, 1 patch, Transdermal, Q24H  nystatin, , Topical, Q12H  pantoprazole, 40 mg, Oral, Daily  sodium chloride, 10 mL, Intravenous, Q12H  sodium chloride, 10 mL, Intravenous, Q12H      Pharmacy Consult,       ----------------------------------------------------------------------------------------------------------------------  Vital Signs:  Temp:  [97.8 °F (36.6 °C)-98.5 °F (36.9 °C)] 97.8 °F (36.6 °C)  Heart Rate:  [] 106  Resp:  [18] 18  BP: (114-130)/(67-88) 119/72     on   ;   Device (Oxygen Therapy): room air  Body mass index is 36.73 kg/m².      Intake/Output Summary (Last 24 hours) at 10/12/2024 1002  Last data filed at 10/12/2024 0934  Gross per 24 hour   Intake 1180 ml   Output 1100 ml   Net 80 ml     "  ----------------------------------------------------------------------------------------------------------------------  Physical exam:  Constitutional:  Older than stated age. No acute distress. No discomfort   HENT:  Head:  Normocephalic and atraumatic.  Mouth:  Moist mucous membranes.    Eyes:  Conjunctivae and EOM are normal. No scleral icterus.    Neck:  Neck supple.  No JVD present.    Cardiovascular:  Normal rate, regular rhythm and normal heart sounds with no murmur.  Pulmonary/Chest:  No respiratory distress, no wheezes, on room air  Abdominal:  Soft. No distension and no tenderness.   Musculoskeletal:  No tenderness, and no deformity.  No red or swollen joints anywhere.    Neurological:  Alert and oriented to person, place, and time.  No cranial nerve deficit. Chronic L sided paralysis.    Skin:  Skin is warm and dry. No pallor.  Peripheral vascular:  No clubbing, no cyanosis, trace edema.  Psychiatric: Appropriate mood and affect    *Examination stable today 10/12  Edited by: Ramon Marc MD at 10/12/2024 1002  ----------------------------------------------------------------------------------------------------------------------        BARBARA - Na/K/Cl/CO2: --/4.7/--/-- (10/11 1832)        No results found for: \"URINECX\"  No results found for: \"BLOODCX\"    I have personally looked at the labs and they are summarized above.  ----------------------------------------------------------------------------------------------------------------------  Detailed radiology reports for the last 24 hours:  No radiology results for the last day  Assessment & Plan    43F Morbid Obesity by BMI PMH History Genital Herpes, Cerebrovascular Accident 5/2024 complicated by L sided paralysis, [facility] to [facility] room 9/26 after sliding into the floor off her chair due to weakness.     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     #Non-Insulin Dependent Diabetes Mellitus Type II, uncontrolled, unknown complications - Likely to be insulin dependent now  - Hgb A1c = 9.40%  - Hold home oral agents, add FSBG and SSI, added long acting and titrated further for tonight to 7U nightly.     #Electrolyte Abnormalities  - Acute Mild Hypokalemia - Replaced per protocol - Resolved   - Acute Mild Hypomagnesemia - Replaced per protocol - Resolved     #History Cerebrovascular Accident complicated by L sided paralysis, unclear etiology  - Continue home regimen, Supportive Care     #Debility  - Consult PT/OT, Social Work if placement needed     #History Genital Herpes  - Supportive Care, continue home Acyclovir     #Morbid Obesity by BMI  - Body mass index is 43.26 kg/m².; complicates all aspects of care    F: Oral  E: Monitor & Replace as needed   N: Diet: Regular/House; Fluid Consistency: Thin (IDDSI 0)   PPx: SQH  Code Status (Patient has no pulse and is not breathing): CPR  Medical Interventions (Patient has pulse or is breathing): Full Support     Dispo: Pending placement, PT/OT consulted and following, Social Work consulted and following.     *This patient is considered high risk secondary to Urinary Tract Infection, electrolyte abnormalities, chronic L sided paralysis from prior Cerebrovascular Accident.    Edited by: Ramon Marc MD at 10/12/2024 [address] [place] [address]

## 2024-10-12 NOTE — PLAN OF CARE
Goal Outcome Evaluation:  Plan of Care Reviewed With: patient        Progress: no change  Outcome Evaluation: VSS on room air. Pt a&o x4. Pt c/o constipation, PRN medication given per MAR. Wound care done per orders. Will continue POC.

## 2024-10-13 LAB
ANION GAP SERPL CALCULATED.3IONS-SCNC: 14.5 MMOL/L (ref 5–15)
BUN SERPL-MCNC: 30 MG/DL (ref 6–20)
BUN/CREAT SERPL: 53.6 (ref 7–25)
CALCIUM SPEC-SCNC: 9.9 MG/DL (ref 8.6–10.5)
CHLORIDE SERPL-SCNC: 96 MMOL/L (ref 98–107)
CO2 SERPL-SCNC: 21.5 MMOL/L (ref 22–29)
CREAT SERPL-MCNC: 0.56 MG/DL (ref 0.57–1)
DEPRECATED RDW RBC AUTO: 50.9 FL (ref 37–54)
EGFRCR SERPLBLD CKD-EPI 2021: 117 ML/MIN/1.73
ERYTHROCYTE [DISTWIDTH] IN BLOOD BY AUTOMATED COUNT: 14 % (ref 12.3–15.4)
GLUCOSE BLDC GLUCOMTR-MCNC: 352 MG/DL (ref 70–130)
GLUCOSE BLDC GLUCOMTR-MCNC: 468 MG/DL (ref 70–130)
GLUCOSE BLDC GLUCOMTR-MCNC: 509 MG/DL (ref 70–130)
GLUCOSE BLDC GLUCOMTR-MCNC: 519 MG/DL (ref 70–130)
GLUCOSE BLDC GLUCOMTR-MCNC: 543 MG/DL (ref 70–130)
GLUCOSE BLDC GLUCOMTR-MCNC: 594 MG/DL (ref 70–130)
GLUCOSE SERPL-MCNC: 375 MG/DL (ref 65–99)
HCT VFR BLD AUTO: 42 % (ref 34–46.6)
HGB BLD-MCNC: 13.9 G/DL (ref 12–15.9)
MCH RBC QN AUTO: 33.1 PG (ref 26.6–33)
MCHC RBC AUTO-ENTMCNC: 33.1 G/DL (ref 31.5–35.7)
MCV RBC AUTO: 100 FL (ref 79–97)
PLATELET # BLD AUTO: 381 10*3/MM3 (ref 140–450)
PMV BLD AUTO: 9.9 FL (ref 6–12)
POTASSIUM SERPL-SCNC: 4.1 MMOL/L (ref 3.5–5.2)
RBC # BLD AUTO: 4.2 10*6/MM3 (ref 3.77–5.28)
SODIUM SERPL-SCNC: 132 MMOL/L (ref 136–145)
WBC NRBC COR # BLD AUTO: 11.31 10*3/MM3 (ref 3.4–10.8)

## 2024-10-13 PROCEDURE — 63710000001 INSULIN LISPRO (HUMAN) PER 5 UNITS: Performed by: INTERNAL MEDICINE

## 2024-10-13 PROCEDURE — 25010000002 HEPARIN (PORCINE) PER 1000 UNITS: Performed by: INTERNAL MEDICINE

## 2024-10-13 PROCEDURE — 63710000001 INSULIN LISPRO (HUMAN) PER 5 UNITS

## 2024-10-13 PROCEDURE — 80048 BASIC METABOLIC PNL TOTAL CA: CPT | Performed by: INTERNAL MEDICINE

## 2024-10-13 PROCEDURE — 85027 COMPLETE CBC AUTOMATED: CPT | Performed by: INTERNAL MEDICINE

## 2024-10-13 PROCEDURE — 99231 SBSQ HOSP IP/OBS SF/LOW 25: CPT | Performed by: INTERNAL MEDICINE

## 2024-10-13 PROCEDURE — 63710000001 INSULIN GLARGINE PER 5 UNITS: Performed by: INTERNAL MEDICINE

## 2024-10-13 PROCEDURE — 25010000002 KETOROLAC TROMETHAMINE PER 15 MG: Performed by: INTERNAL MEDICINE

## 2024-10-13 PROCEDURE — 82948 REAGENT STRIP/BLOOD GLUCOSE: CPT

## 2024-10-13 RX ORDER — INSULIN LISPRO 100 [IU]/ML
8 INJECTION, SOLUTION INTRAVENOUS; SUBCUTANEOUS ONCE
Status: COMPLETED | OUTPATIENT
Start: 2024-10-13 | End: 2024-10-13

## 2024-10-13 RX ADMIN — HEPARIN SODIUM 5000 UNITS: 5000 INJECTION INTRAVENOUS; SUBCUTANEOUS at 20:41

## 2024-10-13 RX ADMIN — Medication 10 ML: at 08:17

## 2024-10-13 RX ADMIN — Medication 5 MG: at 20:41

## 2024-10-13 RX ADMIN — INSULIN LISPRO 9 UNITS: 100 INJECTION, SOLUTION INTRAVENOUS; SUBCUTANEOUS at 20:41

## 2024-10-13 RX ADMIN — NICOTINE 1 PATCH: 7 PATCH, EXTENDED RELEASE TRANSDERMAL at 08:17

## 2024-10-13 RX ADMIN — INSULIN LISPRO 9 UNITS: 100 INJECTION, SOLUTION INTRAVENOUS; SUBCUTANEOUS at 18:29

## 2024-10-13 RX ADMIN — Medication 10 ML: at 08:16

## 2024-10-13 RX ADMIN — HEPARIN SODIUM 5000 UNITS: 5000 INJECTION INTRAVENOUS; SUBCUTANEOUS at 13:00

## 2024-10-13 RX ADMIN — Medication 10 ML: at 20:45

## 2024-10-13 RX ADMIN — INSULIN LISPRO 9 UNITS: 100 INJECTION, SOLUTION INTRAVENOUS; SUBCUTANEOUS at 12:55

## 2024-10-13 RX ADMIN — HEPARIN SODIUM 5000 UNITS: 5000 INJECTION INTRAVENOUS; SUBCUTANEOUS at 05:24

## 2024-10-13 RX ADMIN — LISINOPRIL 20 MG: 10 TABLET ORAL at 08:10

## 2024-10-13 RX ADMIN — NYSTATIN: 100000 POWDER TOPICAL at 08:16

## 2024-10-13 RX ADMIN — Medication 10 ML: at 20:41

## 2024-10-13 RX ADMIN — ATORVASTATIN CALCIUM 80 MG: 40 TABLET, FILM COATED ORAL at 08:10

## 2024-10-13 RX ADMIN — INSULIN LISPRO 8 UNITS: 100 INJECTION, SOLUTION INTRAVENOUS; SUBCUTANEOUS at 10:14

## 2024-10-13 RX ADMIN — DULOXETINE HYDROCHLORIDE 60 MG: 60 CAPSULE, DELAYED RELEASE ORAL at 08:10

## 2024-10-13 RX ADMIN — KETOROLAC TROMETHAMINE 15 MG: 30 INJECTION, SOLUTION INTRAMUSCULAR; INTRAVENOUS at 20:40

## 2024-10-13 RX ADMIN — METOPROLOL TARTRATE 25 MG: 25 TABLET, FILM COATED ORAL at 20:41

## 2024-10-13 RX ADMIN — ACETAMINOPHEN 650 MG: 325 TABLET ORAL at 12:56

## 2024-10-13 RX ADMIN — ACYCLOVIR 400 MG: 200 CAPSULE ORAL at 20:41

## 2024-10-13 RX ADMIN — PANTOPRAZOLE SODIUM 40 MG: 40 TABLET, DELAYED RELEASE ORAL at 08:10

## 2024-10-13 RX ADMIN — INSULIN LISPRO 8 UNITS: 100 INJECTION, SOLUTION INTRAVENOUS; SUBCUTANEOUS at 22:05

## 2024-10-13 RX ADMIN — METOPROLOL TARTRATE 25 MG: 25 TABLET, FILM COATED ORAL at 08:10

## 2024-10-13 RX ADMIN — DICLOFENAC SODIUM 4 G: 10 GEL TOPICAL at 20:42

## 2024-10-13 RX ADMIN — ASPIRIN 81 MG: 81 TABLET, CHEWABLE ORAL at 08:10

## 2024-10-13 RX ADMIN — NYSTATIN: 100000 POWDER TOPICAL at 20:45

## 2024-10-13 RX ADMIN — INSULIN GLARGINE 12 UNITS: 100 INJECTION, SOLUTION SUBCUTANEOUS at 20:41

## 2024-10-13 NOTE — PROGRESS NOTES
[facility] HOSPITALIST PROGRESS NOTE     Patient Identification:  Name:  Lety Johnson  Age:  43 y.o.  Sex:  female  :  1980  MRN:  [id number]  Visit Number:  [id number]  ROOM: [address] [place] [address]     Primary Care Provider:  Provider, No Known    Length of stay in inpatient status:  15    Subjective     Chief Compliant:    Chief Complaint   Patient presents with    Fall     History of Presenting Illness:    Patient remains ill but stable today, no acute events overnight, no new complaints, denies any fevers or chills, glucose remains elevated, increased SSI scale, increased lantus for tonight to 10U nightly, otherwise pending placement, follow up with Social Work on Monday.   Objective     Current Hospital Meds:  acyclovir, 400 mg, Oral, Nightly  aspirin, 81 mg, Oral, Daily  atorvastatin, 80 mg, Oral, Daily  DULoxetine, 60 mg, Oral, Daily  heparin (porcine), 5,000 Units, Subcutaneous, Q8H  insulin glargine, 10 Units, Subcutaneous, Nightly  insulin lispro, 2-9 Units, Subcutaneous, 4x Daily PC & at Bedtime  lisinopril, 20 mg, Oral, Daily  [Held by provider] metFORMIN, 1,000 mg, Oral, BID With Meals  metoprolol tartrate, 25 mg, Oral, Q12H  nicotine, 1 patch, Transdermal, Q24H  nystatin, , Topical, Q12H  pantoprazole, 40 mg, Oral, Daily  ----------------------------------------------------------------------------------------------------------------------  Vital Signs:  Temp:  [97.7 °F (36.5 °C)-99 °F (37.2 °C)] 99 °F (37.2 °C)  Heart Rate:  [] 110  Resp:  [16-18] 16  BP: (104-132)/(61-89) 106/67     on   ;   Device (Oxygen Therapy): room air  Body mass index is 36.73 kg/m².      Intake/Output Summary (Last 24 hours) at 10/13/2024 0950  Last data filed at 10/13/2024 0600  Gross per 24 hour   Intake 1200 ml   Output 1500 ml   Net -300 ml      ----------------------------------------------------------------------------------------------------------------------  Physical exam:  Constitutional:  Older than  "stated age. No acute distress. No discomfort   HENT:  Head:  Normocephalic and atraumatic.  Mouth:  Moist mucous membranes.    Eyes:  Conjunctivae and EOM are normal. No scleral icterus.    Neck:  Neck supple.  No JVD present.    Cardiovascular:  Normal rate, regular rhythm and normal heart sounds with no murmur.  Pulmonary/Chest:  No respiratory distress, no wheezes, on room air  Abdominal:  Soft. No distension and no tenderness.   Musculoskeletal:  No tenderness, and no deformity.  No red or swollen joints anywhere.    Neurological:  Alert and oriented to person, place, and time.  No cranial nerve deficit. Chronic L sided paralysis.    Skin:  Skin is warm and dry. No pallor.  Peripheral vascular:  No clubbing, no cyanosis, trace edema.  Psychiatric: Appropriate mood and affect    *Examination stable today 10/13  Edited by: Ramon Marc MD at 10/13/2024 0950  ----------------------------------------------------------------------------------------------------------------------  WBC/HGB/HCT/PLT   11.31/13.9/42.0/381 (10/13 0742)  BUN/CREAT/GLUC/ALT/AST/NORMA/LIP    30/0.56/375/--/--/--/-- (10/13 0742)  LYTES - Na/K/Cl/CO2: 132*/4.1/96*/21.5* (10/13 0742)     No results found for: \"URINECX\"  No results found for: \"BLOODCX\"    I have personally looked at the labs and they are summarized above.  ----------------------------------------------------------------------------------------------------------------------  Detailed radiology reports for the last 24 hours:  No radiology results for the last day  Assessment & Plan    43F Morbid Obesity by BMI PMH History Genital Herpes, Cerebrovascular Accident 5/2024 complicated by L sided paralysis, presented to [facility] emergency room 9/26 after sliding into the floor off her chair due to weakness.     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     #Non-Insulin Dependent Diabetes Mellitus Type II, uncontrolled, unknown complications - Likely to be insulin dependent now  - Hgb A1c = 9.40%  - Hold home oral agents, add FSBG and SSI, increased SSI scale, continue long acting and titrated further for tonight to 10U nightly.    #Electrolyte Abnormalities  - Acute Mild Hypokalemia - Replaced per protocol - Resolved   - Acute Mild Hypomagnesemia - Replaced per protocol - Resolved     #History Cerebrovascular Accident complicated by L sided paralysis, unclear etiology  - Continue home regimen, Supportive Care     #Debility  - Consult PT/OT, Social Work if placement needed     #History Genital Herpes  - Supportive Care, continue home Acyclovir     #Morbid Obesity by BMI  - Body mass index is 43.26 kg/m².; complicates all aspects of care    F: Oral  E: Monitor & Replace as needed   N: Diet: Regular/House; Fluid Consistency: Thin (IDDSI 0)   PPx: H  Code Status (Patient has no pulse and is not breathing): CPR  Medical Interventions (Patient has pulse or is breathing): Full Support     Dispo: Pending placement, PT/OT consulted and following, Social Work consulted and following.     *This patient is considered high risk secondary to Urinary Tract Infection, electrolyte abnormalities, chronic L sided paralysis from prior Cerebrovascular Accident.      Edited by: Ramon Marc MD at 10/13/2024 2215  [facility]

## 2024-10-13 NOTE — PLAN OF CARE
Goal Outcome Evaluation:  Plan of Care Reviewed With: patient        Progress: no change  Outcome Evaluation: Pt has rested in bed overnight. VSS. PRN medication adminstered for pain, sleep, nausea, and muscle cramps. Wound care completed per orders. Will continue POC.

## 2024-10-13 NOTE — PLAN OF CARE
Goal Outcome Evaluation:  Plan of Care Reviewed With: patient           Outcome Evaluation: Patient resting in bed at this time. VSS. Pt c/o pain, PRN pain meds given per MAR. Wound care completed per orders. No acute changes or concerns at this time. Will continue with plan of care.

## 2024-10-14 LAB
GLUCOSE BLDC GLUCOMTR-MCNC: 324 MG/DL (ref 70–130)
GLUCOSE BLDC GLUCOMTR-MCNC: 351 MG/DL (ref 70–130)
GLUCOSE BLDC GLUCOMTR-MCNC: 391 MG/DL (ref 70–130)
GLUCOSE BLDC GLUCOMTR-MCNC: 453 MG/DL (ref 70–130)
GLUCOSE BLDC GLUCOMTR-MCNC: 540 MG/DL (ref 70–130)

## 2024-10-14 PROCEDURE — 97164 PT RE-EVAL EST PLAN CARE: CPT

## 2024-10-14 PROCEDURE — 25010000002 HEPARIN (PORCINE) PER 1000 UNITS: Performed by: INTERNAL MEDICINE

## 2024-10-14 PROCEDURE — 97112 NEUROMUSCULAR REEDUCATION: CPT

## 2024-10-14 PROCEDURE — 82948 REAGENT STRIP/BLOOD GLUCOSE: CPT

## 2024-10-14 PROCEDURE — 63710000001 INSULIN LISPRO (HUMAN) PER 5 UNITS: Performed by: INTERNAL MEDICINE

## 2024-10-14 PROCEDURE — 99231 SBSQ HOSP IP/OBS SF/LOW 25: CPT | Performed by: INTERNAL MEDICINE

## 2024-10-14 PROCEDURE — 97535 SELF CARE MNGMENT TRAINING: CPT

## 2024-10-14 PROCEDURE — 97530 THERAPEUTIC ACTIVITIES: CPT

## 2024-10-14 PROCEDURE — 25010000002 PROCHLORPERAZINE 10 MG/2ML SOLUTION

## 2024-10-14 PROCEDURE — 63710000001 INSULIN GLARGINE PER 5 UNITS: Performed by: INTERNAL MEDICINE

## 2024-10-14 PROCEDURE — 63710000001 INSULIN REGULAR HUMAN PER 5 UNITS: Performed by: INTERNAL MEDICINE

## 2024-10-14 RX ORDER — HYDROXYZINE HYDROCHLORIDE 25 MG/1
25 TABLET, FILM COATED ORAL ONCE
Status: DISCONTINUED | OUTPATIENT
Start: 2024-10-14 | End: 2024-10-23

## 2024-10-14 RX ADMIN — INSULIN GLARGINE 30 UNITS: 100 INJECTION, SOLUTION SUBCUTANEOUS at 20:04

## 2024-10-14 RX ADMIN — ASPIRIN 81 MG: 81 TABLET, CHEWABLE ORAL at 09:01

## 2024-10-14 RX ADMIN — PANTOPRAZOLE SODIUM 40 MG: 40 TABLET, DELAYED RELEASE ORAL at 09:01

## 2024-10-14 RX ADMIN — ACYCLOVIR 400 MG: 200 CAPSULE ORAL at 20:04

## 2024-10-14 RX ADMIN — METOPROLOL TARTRATE 25 MG: 25 TABLET, FILM COATED ORAL at 20:04

## 2024-10-14 RX ADMIN — NYSTATIN: 100000 POWDER TOPICAL at 09:10

## 2024-10-14 RX ADMIN — Medication 5 MG: at 20:04

## 2024-10-14 RX ADMIN — PROCHLORPERAZINE EDISYLATE 2.5 MG: 5 INJECTION INTRAMUSCULAR; INTRAVENOUS at 21:54

## 2024-10-14 RX ADMIN — DULOXETINE HYDROCHLORIDE 60 MG: 60 CAPSULE, DELAYED RELEASE ORAL at 09:01

## 2024-10-14 RX ADMIN — HEPARIN SODIUM 5000 UNITS: 5000 INJECTION INTRAVENOUS; SUBCUTANEOUS at 15:20

## 2024-10-14 RX ADMIN — LISINOPRIL 20 MG: 10 TABLET ORAL at 09:01

## 2024-10-14 RX ADMIN — ATORVASTATIN CALCIUM 80 MG: 40 TABLET, FILM COATED ORAL at 09:01

## 2024-10-14 RX ADMIN — ACETAMINOPHEN 650 MG: 325 TABLET ORAL at 15:20

## 2024-10-14 RX ADMIN — INSULIN HUMAN 15 UNITS: 100 INJECTION, SOLUTION PARENTERAL at 18:12

## 2024-10-14 RX ADMIN — INSULIN LISPRO 8 UNITS: 100 INJECTION, SOLUTION INTRAVENOUS; SUBCUTANEOUS at 09:00

## 2024-10-14 RX ADMIN — Medication 10 ML: at 20:06

## 2024-10-14 RX ADMIN — METOPROLOL TARTRATE 25 MG: 25 TABLET, FILM COATED ORAL at 09:01

## 2024-10-14 RX ADMIN — Medication 10 ML: at 10:11

## 2024-10-14 RX ADMIN — INSULIN LISPRO 8 UNITS: 100 INJECTION, SOLUTION INTRAVENOUS; SUBCUTANEOUS at 20:04

## 2024-10-14 RX ADMIN — HEPARIN SODIUM 5000 UNITS: 5000 INJECTION INTRAVENOUS; SUBCUTANEOUS at 21:22

## 2024-10-14 RX ADMIN — INSULIN LISPRO 9 UNITS: 100 INJECTION, SOLUTION INTRAVENOUS; SUBCUTANEOUS at 15:20

## 2024-10-14 RX ADMIN — LOPERAMIDE HYDROCHLORIDE 2 MG: 2 CAPSULE ORAL at 16:21

## 2024-10-14 RX ADMIN — HEPARIN SODIUM 5000 UNITS: 5000 INJECTION INTRAVENOUS; SUBCUTANEOUS at 06:35

## 2024-10-14 RX ADMIN — NYSTATIN: 100000 POWDER TOPICAL at 20:06

## 2024-10-14 RX ADMIN — NICOTINE 1 PATCH: 7 PATCH, EXTENDED RELEASE TRANSDERMAL at 09:01

## 2024-10-14 NOTE — THERAPY TREATMENT NOTE
Acute Care - Occupational Therapy Treatment Note   [facility]     Patient Name: Lety Johnson  : 1980  MRN: [id number]  Today's Date: 10/14/2024  Onset of Illness/Injury or Date of Surgery: 24     Referring Physician: Dr. Marc    Admit Date: 2024       ICD-10-CM ICD-9-CM   1. Hypokalemia  E87.6 276.8   2. Pressure injury of skin of sacral region, unspecified injury stage  L89.159 707.03     707.20   3. Pressure injury of skin of left heel, unspecified injury stage  L89.629 707.07     707.20   4. Acute cystitis without hematuria  N30.00 595.0     Patient Active Problem List   Diagnosis   (none) - all problems resolved or deleted     Past Medical History:   Diagnosis Date    Stroke      History reviewed. No pertinent surgical history.      OT ASSESSMENT FLOWSHEET (Last 12 Hours)       OT Evaluation and Treatment       Row Name 10/14/24 1219                   OT Time and Intention    Document Type therapy note (daily note)  -KR        Mode of Treatment occupational therapy  -KR        Patient Effort adequate  -KR        Symptoms Noted During/After Treatment increased pain  -KR           General Information    General Observations of Patient alert/cooperative  -KR           Cognition    Affect/Mental Status (Cognition) emotionally labile  -KR           Upper Body Dressing Assessment/Training    Egan Level (Upper Body Dressing) maximum assist (25% patient effort)  -KR           Lower Body Dressing Assessment/Training    Egan Level (Lower Body Dressing) dependent (less than 25% patient effort)  -KR           Bed Mobility    Supine-Sit Egan (Bed Mobility) maximum assist (25% patient effort)  -KR           Sit-Stand Transfer    Sit-Stand Egan (Transfers) maximum assist (25% patient effort);2 person assist  -KR           Motor Skills    Motor Skills --  PROM LUE as tolerated, to increase fxl position/mobility. Severe pain noted, 2/5 LUE PROM achieved  -KR           Balance     Static Sitting Balance standby assist  -KR        Dynamic Sitting Balance contact guard  -KR           Wound 09/26/24 1809 Left posterior ankle Other (comment)    Wound - Properties Group Placement Date: 09/26/24  -KJ Placement Time: 1809  -KJ Side: Left  -KJ Orientation: posterior  -KJ Location: ankle  -KJ Primary Wound Type: Other  -TW, unknow etiology     Retired Wound - Properties Group Placement Date: 09/26/24  -KJ Placement Time: 1809  -KJ Side: Left  -KJ Orientation: posterior  -KJ Location: ankle  -KJ Primary Wound Type: Other  -TW, unknow etiology     Retired Wound - Properties Group Placement Date: 09/26/24  -KJ Placement Time: 1809  -KJ Side: Left  -KJ Orientation: posterior  -KJ Location: ankle  -KJ Primary Wound Type: Other  -TW, unknow etiology     Retired Wound - Properties Group Date first assessed: 09/26/24  -KJ Time first assessed: 1809  -KJ Side: Left  -KJ Location: ankle  -KJ Primary Wound Type: Other  -TW, unknow etiology        Wound 10/11/24 1330 medial coccyx Fissure    Wound - Properties Group Placement Date: 10/11/24  -TW Placement Time: 1330 -TW Orientation: medial  -TW Location: coccyx  -TW Primary Wound Type: Fissure  -TW    Retired Wound - Properties Group Placement Date: 10/11/24  -TW Placement Time: 1330 -TW Orientation: medial  -TW Location: coccyx  -TW Primary Wound Type: Fissure  -TW    Retired Wound - Properties Group Placement Date: 10/11/24  -TW Placement Time: 1330  -TW Orientation: medial  -TW Location: coccyx  -TW Primary Wound Type: Fissure  -TW    Retired Wound - Properties Group Date first assessed: 10/11/24  -TW Time first assessed: 1330  -TW Location: coccyx  -TW Primary Wound Type: Fissure  -TW       Plan of Care Review    Plan of Care Reviewed With patient  -KR           Therapy Assessment/Plan (OT)    Therapy Assessment/Plan (OT) Seen today for LUE PROM as tolerated, sling repositioning/training/education continued throughout. Mobility/balance training EOB  -KR            ROM Goal 1 (OT)    Progress/Outcome (ROM Goal 1, OT) continuing progress toward goal  -KR            Activity Tolerance Goal 1 (OT)    Progress/Outcome (Activity Tolerance Goal 1, OT) continuing progress toward goal  -KR                  User Key  (r) = Recorded By, (t) = Taken By, (c) = Cosigned By      Initials Name Effective Dates    Karen Peralta RN 06/16/21 -     Neil Menezes OT 06/16/21 -     Nory Keenan RN 06/08/23 -                      Occupational Therapy Education        No education to display                      OT Recommendation and Plan  Planned Therapy Interventions (OT): activity tolerance training, neuromuscular control/coordination retraining, ROM/therapeutic exercise  Plan of Care Review  Plan of Care Reviewed With: patient  Plan of Care Reviewed With: patient        Time Calculation:     Therapy Charges for Today       Code Description Service Date Service Provider Modifiers Qty    [id number]  OT THERAPEUTIC ACT EA 15 MIN 10/14/2024 Neil Graf OT GO 2    [id number]  OT SELF CARE/MGMT/TRAIN EA 15 MIN 10/14/2024 Neil Graf OT GO 1                 Neil Graf OT  10/14/2024

## 2024-10-14 NOTE — PROGRESS NOTES
[facility]     Patient Identification:  Name:  Lety Johnson  Age:  43 y.o.  Sex:  female  :  1980  MRN:  [id number]  Visit Number:  [id number]  Primary Care Provider:  Provider, No Known    Length of stay:  16    Chief complaint: Pain    Subjective:    Patient continues to report left-sided body pain.  Otherwise, no new events overnight.  Patient is currently pending placement.  ----------------------------------------------------------------------------------------------------------------------  Current Hospital Meds:  acyclovir, 400 mg, Oral, Nightly  aspirin, 81 mg, Oral, Daily  atorvastatin, 80 mg, Oral, Daily  DULoxetine, 60 mg, Oral, Daily  heparin (porcine), 5,000 Units, Subcutaneous, Q8H  hydrOXYzine, 25 mg, Oral, Once  insulin glargine, 30 Units, Subcutaneous, Nightly  insulin lispro, 2-9 Units, Subcutaneous, 4x Daily PC & at Bedtime  lisinopril, 20 mg, Oral, Daily  [Held by provider] metFORMIN, 1,000 mg, Oral, BID With Meals  metoprolol tartrate, 25 mg, Oral, Q12H  nicotine, 1 patch, Transdermal, Q24H  nystatin, , Topical, Q12H  pantoprazole, 40 mg, Oral, Daily  sodium chloride, 10 mL, Intravenous, Q12H  sodium chloride, 10 mL, Intravenous, Q12H      Pharmacy Consult,       ----------------------------------------------------------------------------------------------------------------------  Vital Signs:  Temp:  [98 °F (36.7 °C)-98.6 °F (37 °C)] 98 °F (36.7 °C)  Heart Rate:  [102-127] 104  Resp:  [16-18] 18  BP: ()/(56-73) 110/73      10/07/24  0511 10/08/24  0500 10/09/24  0500   Weight: 102 kg (225 lb 14.4 oz) (FIFI cameron) 102 kg (225 lb 15.5 oz) 102 kg (224 lb 1.6 oz)     Body mass index is 36.73 kg/m².    Intake/Output Summary (Last 24 hours) at 10/14/2024 1851  Last data filed at 10/14/2024 1700  Gross per 24 hour   Intake 1220 ml   Output 1500 ml   Net -280 ml     Diet: Regular/House, Diabetic; Consistent Carbohydrate; Fluid Consistency:  Thin (IDDSI 0)  ----------------------------------------------------------------------------------------------------------------------  Physical exam:  Constitutional: Chronically ill-appearing  female in no apparent distress.     HENT:  Head:  Normocephalic and atraumatic.  Mouth:  Moist mucous membranes.    Eyes:  Conjunctivae and EOM are normal.  Pupils are equal, round, and reactive to light.  No scleral icterus.    Neck:  Neck supple. No thyromegaly.  No JVD present.    Cardiovascular:  Regular rate and rhythm with no murmurs, rubs, clicks or gallops appreciated.  Pulmonary/Chest:  Clear to auscultation bilaterally with no crackles, wheezes or rhonchi appreciated.  Abdominal:  Soft. Nontender. Nondistended  Bowel sounds are normal in all four quadrants. No organomegally appreciated.   Musculoskeletal:  No edema, no tenderness, and no deformity.  No red or swollen joints anywhere.    Neurological:  Alert, Cranial nerves II-XII intact with no focal deficits.  No facial droop.  No slurred speech.   Skin:  Warm and dry to palpation with no rashes or lesions appreciated.  Peripheral vascular:  2+ radial and pedal pulses in bilateral upper and lower extremities.  Psychiatric:  Alert and oriented x3, demonstrates appropriate judgment and insight.  -------------------------------------------------------------------------  ----------------------------------------------------------------------------------------------------------------------      Results from last 7 days   Lab Units 10/13/24  0742 10/11/24  0814   WBC 10*3/mm3 11.31* 9.91   HEMOGLOBIN g/dL 13.9 13.7   HEMATOCRIT % 42.0 41.4   MCV fL 100.0* 99.0*   MCHC g/dL 33.1 33.1   PLATELETS 10*3/mm3 381 387         Results from last 7 days   Lab Units 10/13/24  0742 10/11/24  1832 10/11/24  0814   SODIUM mmol/L 132*  --  134*   POTASSIUM mmol/L 4.1 4.7 3.6   CHLORIDE mmol/L 96*  --  99   CO2 mmol/L 21.5*  --  23.5   BUN mg/dL 30*  --  13   CREATININE mg/dL  "0.56*  --  0.44*   CALCIUM mg/dL 9.9  --  9.6   GLUCOSE mg/dL 375*  --  404*   Estimated Creatinine Clearance: 156.4 mL/min (A) (by C-G formula based on SCr of 0.56 mg/dL (L)).    No results found for: \"AMMONIA\"      No results found for: \"BLOODCX\"  No results found for: \"URINECX\"  No results found for: \"WOUNDCX\"  No results found for: \"STOOLCX\"    I have personally looked at the labs and they are summarized above.  ----------------------------------------------------------------------------------------------------------------------  Imaging Results (Last 24 Hours)       ** No results found for the last 24 hours. **          ----------------------------------------------------------------------------------------------------------------------  Assessment and Plan:    ESBL acute cystitis -patient has completed full course of IV antibiotic therapy with Invanz     2.  Acute metabolic encephalopathy -resolved, initially secondary to acute UTI     3.  History of CVA -patient with left-sided hemiparesis secondary to history of CVA     4.  Debility -continue PT/OT     5.  History of genital herpes -continue acyclovir     6.  Morbid obesity -complicates all aspects of care    Disposition still pending placement    Marv Navas,    10/14/24   18:51 EDT     "

## 2024-10-14 NOTE — THERAPY RE-EVALUATION
Acute Care - Physical Therapy Re-Evaluation   Zaki     Patient Name: Lety Johnson  : 1980  MRN: [id number]  Today's Date: 10/14/2024   Onset of Illness/Injury or Date of Surgery: 24  Visit Dx:     ICD-10-CM ICD-9-CM   1. Hypokalemia  E87.6 276.8   2. Pressure injury of skin of sacral region, unspecified injury stage  L89.159 707.03     707.20   3. Pressure injury of skin of left heel, unspecified injury stage  L89.629 707.07     707.20   4. Acute cystitis without hematuria  N30.00 595.0     Patient Active Problem List   Diagnosis   (none) - all problems resolved or deleted     Past Medical History:   Diagnosis Date    Stroke      History reviewed. No pertinent surgical history.  PT Assessment (Last 12 Hours)       PT Evaluation and Treatment       Row Name 10/14/24 1133          Physical Therapy Time and Intention    Subjective Information complains of;weakness;fatigue;pain  -CT     Document Type re-evaluation  -CT     Mode of Treatment physical therapy  -CT     Patient Effort adequate  -CT     Comment Pt attempted sit to stand this date x 3 attempts  -CT       Row Name 10/14/24 1133          General Information    Patient Profile Reviewed yes  -CT     Risks Reviewed patient:;LOB;nausea/vomiting;dizziness;increased discomfort;change in vital signs;lines disloged;increased drainage  -CT     Benefits Reviewed patient:;improve function;increase independence;increase strength;increase balance;decrease pain;decrease risk of DVT;improve skin integrity;increase knowledge  -CT     Barriers to Rehab previous functional deficit  -CT       Row Name 10/14/24 1133          Pain    Pretreatment Pain Rating 4/10  -CT     Posttreatment Pain Rating 7/10  -CT     Pain Location other (see comments)  L arm, L ankle and R knee  -CT       Row Name 10/14/24 1133          Cognition    Affect/Mental Status (Cognition) emotionally labile  -CT     Orientation Status (Cognition) oriented x 3  -CT     Follows Commands  (Cognition) WFL  -CT       Row Name 10/14/24 1133          Range of Motion Comprehensive    Comment, General Range of Motion LLE no AROM, PROM WFL with pain; RLE grossly WFL  -CT       Row Name 10/14/24 1133          Strength Comprehensive (MMT)    Comment, General Manual Muscle Testing (MMT) Assessment RLE grossly 4-/5  -CT       Row Name 10/14/24 1133          Bed Mobility    Bed Mobility bed mobility (all) activities  -CT     All Activities, [place] (Bed Mobility) moderate assist (50% patient effort);maximum assist (25% patient effort);verbal cues;nonverbal cues (demo/gesture)  -CT     Bed Mobility, Safety Issues decreased use of arms for pushing/pulling;decreased use of legs for bridging/pushing  -CT     Assistive Device (Bed Mobility) bed rails;head of bed elevated  -CT       Row Name 10/14/24 1133          Transfers    Transfers sit-stand transfer;stand-sit transfer  -CT       Row Name 10/14/24 1133          Sit-Stand Transfer    Sit-Stand [place] (Transfers) maximum assist (25% patient effort);2 person assist;verbal cues;nonverbal cues (demo/gesture)  -CT     Assistive Device (Sit-Stand Transfers) --  therapist via pt RUE  -CT       Row Name 10/14/24 1133          Stand-Sit Transfer    Stand-Sit [place] (Transfers) maximum assist (25% patient effort);2 person assist;verbal cues;nonverbal cues (demo/gesture)  -CT     Assistive Device (Stand-Sit Transfers) other (see comments)  therapist via pt RUE  -CT       Row Name 10/14/24 1133          Balance    Static Sitting Balance independent  -CT     Dynamic Sitting Balance standby assist;contact guard  -CT     Position, Sitting Balance sitting edge of bed  -CT     Static Standing Balance maximum assist;2-person assist  <5 seconds  -CT     Balance Interventions sitting;standing  -CT       Row Name 10/14/24 1133          Motor Skills    Therapeutic Exercise --  RLE AROM ther ex in sitting  -CT       Row Name             Wound 09/26/24 1809 Left  posterior ankle Other (comment)    Wound - Properties Group Placement Date: 09/26/24  -KJ Placement Time: 1809  -KJ Side: Left  -KJ Orientation: posterior  -KJ Location: ankle  -KJ Primary Wound Type: Other  -TW, unknow etiology     Retired Wound - Properties Group Placement Date: 09/26/24  -KJ Placement Time: 1809  -KJ Side: Left  -KJ Orientation: posterior  -KJ Location: ankle  -KJ Primary Wound Type: Other  -TW, unknow etiology     Retired Wound - Properties Group Placement Date: 09/26/24  -KJ Placement Time: 1809  -KJ Side: Left  -KJ Orientation: posterior  -KJ Location: ankle  -KJ Primary Wound Type: Other  -TW, unknow etiology     Retired Wound - Properties Group Date first assessed: 09/26/24  -KJ Time first assessed: 1809  -KJ Side: Left  -KJ Location: ankle  -KJ Primary Wound Type: Other  -TW, unknow etiology       Row Name             Wound 10/11/24 1330 medial coccyx Fissure    Wound - Properties Group Placement Date: 10/11/24  -TW Placement Time: 1330 -TW Orientation: medial  -TW Location: coccyx  -TW Primary Wound Type: Fissure  -TW    Retired Wound - Properties Group Placement Date: 10/11/24  -TW Placement Time: 1330  -TW Orientation: medial  -TW Location: coccyx  -TW Primary Wound Type: Fissure  -TW    Retired Wound - Properties Group Placement Date: 10/11/24  -TW Placement Time: 1330  -TW Orientation: medial  -TW Location: coccyx  -TW Primary Wound Type: Fissure  -TW    Retired Wound - Properties Group Date first assessed: 10/11/24  -TW Time first assessed: 1330  -TW Location: coccyx  -TW Primary Wound Type: Fissure  -TW      Row Name 10/14/24 1133          Coping    Observed Emotional State calm;cooperative  -CT     Verbalized Emotional State acceptance  -CT       Row Name 10/14/24 1133          Plan of Care Review    Plan of Care Reviewed With patient  -CT       Row Name 10/14/24 1133          Positioning and Restraints    Pre-Treatment Position in bed  -CT     Post Treatment Position bed  -CT      In Bed supine;call light within reach;encouraged to call for assist;exit alarm on;side rails up x3  -CT       Row Name 10/14/24 1133          Therapy Assessment/Plan (PT)    Patient/Family Therapy Goals Statement (PT) Pt goals continue to be d/c to rehab unit to improve overall mobility  -CT     Functional Level at Time of Evaluation (PT) Max A x 2 sit to stand  -CT     PT Diagnosis (PT) impaired functional mobility  -CT     Rehab Potential (PT) fair  -CT     Criteria for Skilled Interventions Met (PT) yes;meets criteria;skilled treatment is necessary  -CT     Therapy Frequency (PT) 2 times/wk  1-5 times/wk  -CT     Predicted Duration of Therapy Intervention (PT) length of stay  -CT     Problem List (PT) problems related to;balance;mobility;hemiparesis/hemiplegia;coordination;strength;range of motion (ROM);muscle tone;motor control  -CT     Activity Limitations Related to Problem List (PT) unable to ambulate safely;unable to transfer safely;BADLs not performed adequately or safely  -CT       Row Name 10/14/24 1133          Therapy Plan Review/Discharge Plan (PT)    Therapy Plan Review (PT) evaluation/treatment results reviewed;care plan/treatment goals reviewed;risks/benefits reviewed;current/potential barriers reviewed;participants voiced agreement with care plan;participants included;patient  -CT       Row Name 10/14/24 1133          Physical Therapy Goals    Bed Mobility Goal Selection (PT) bed mobility, PT goal 1  -CT     Transfer Goal Selection (PT) transfer, PT goal 1  -CT       Row Name 10/14/24 1133          Bed Mobility Goal 1 (PT)    Activity/Assistive Device (Bed Mobility Goal 1, PT) rolling to left;rolling to right;scooting;sit to supine;supine to sit  -CT     [place] Level/Cues Needed (Bed Mobility Goal 1, PT) minimum assist (75% or more patient effort);moderate assist (50-74% patient effort)  -CT     Time Frame (Bed Mobility Goal 1, PT) by discharge  -CT       Row Name 10/14/24 1138           Transfer Goal 1 (PT)    Activity/Assistive Device (Transfer Goal 1, PT) sit-to-stand/stand-to-sit;bed-to-chair/chair-to-bed;toilet  -CT     [place] Level/Cues Needed (Transfer Goal 1, PT) moderate assist (50-74% patient effort)  -CT     Time Frame (Transfer Goal 1, PT) by discharge  -CT               User Key  (r) = Recorded By, (t) = Taken By, (c) = Cosigned By      Initials Name Provider Type    Karen Peralta, RN Registered Nurse    CT Dilma Ochoa, PT Physical Therapist    Nory Keenan RN Registered Nurse                    Physical Therapy Education       Title: PT OT SLP Therapies (Done)       Topic: Physical Therapy (Done)       Point: Mobility training (Done)       Learning Progress Summary            Patient Acceptance, E,TB, VU by  at 10/14/2024 0801    Acceptance, E,TB, VU by  at 10/13/2024 1045    Acceptance, E,D, VU,NR by AG at 10/10/2024 1310    Acceptance, E,TB, VU by CB at 10/8/2024 0448    Acceptance, E, NR by RD at 10/2/2024 1101    Acceptance, E, NR by RD at 10/1/2024 0856    Acceptance, E,D, VU,NR by AG at 9/30/2024 1559                      Point: Home exercise program (Done)       Learning Progress Summary            Patient Acceptance, E,TB, VU by CF at 10/14/2024 0801    Acceptance, E,TB, VU by  at 10/13/2024 1045    Acceptance, E,D, VU,NR by AG at 10/10/2024 1310    Acceptance, E,TB, VU by CB at 10/8/2024 0448    Acceptance, E, NR by RD at 10/2/2024 1101    Acceptance, E, NR by RD at 10/1/2024 0856    Acceptance, E,D, VU,NR by AG at 9/30/2024 1559                      Point: Body mechanics (Done)       Learning Progress Summary            Patient Acceptance, E,TB, VU by CF at 10/14/2024 0801    Acceptance, E,TB, VU by CF at 10/13/2024 1045    Acceptance, E,D, VU,NR by AG at 10/10/2024 1310    Acceptance, E,TB, VU by CB at 10/8/2024 0448    Acceptance, E, NR by RD at 10/2/2024 1101    Acceptance, E, NR by RD at 10/1/2024 0856    Acceptance, E,D, VU,NR by AG at  9/30/2024 1559                      Point: Precautions (Done)       Learning Progress Summary            Patient Acceptance, E,TB, VU by  at 10/14/2024 0801    Acceptance, E,TB, VU by CF at 10/13/2024 1045    Acceptance, E,D, VU,NR by AG at 10/10/2024 1310    Acceptance, E,TB, VU by CB at 10/8/2024 0448    Acceptance, E, NR by RD at 10/2/2024 1101    Acceptance, E, NR by RD at 10/1/2024 0856    Acceptance, E,D, VU,NR by AG at 9/30/2024 1559                                      User Key       Initials Effective Dates Name Provider Type Discipline     06/16/21 -  Мария Joya, PT Physical Therapist PT    RD 06/16/21 -  Laisha Verdugo, RN Registered Nurse Nurse    CF 06/25/24 -  Cherelle Germain, RN Registered Nurse Nurse     06/17/24 -  Fidelia Lombardo RN Registered Nurse Nurse                  PT Recommendation and Plan  Anticipated Discharge Disposition (PT): skilled nursing facility, inpatient rehabilitation facility  Planned Therapy Interventions (PT): balance training, bed mobility training, gait training, home exercise program, manual therapy techniques, motor coordination training, neuromuscular re-education, patient/family education, postural re-education, strengthening, ROM (range of motion), transfer training, wheelchair management/propulsion training  Therapy Frequency (PT): 2 times/wk (1-5 times/wk)  Plan of Care Reviewed With: patient       Time Calculation:    PT Charges       Row Name 10/14/24 1143             Time Calculation    PT Received On 10/14/24  -CT      PT Goal Re-Cert Due Date 10/28/24  -CT         Time Calculation- PT    Total Timed Code Minutes- PT 42 minute(s)  -CT                User Key  (r) = Recorded By, (t) = Taken By, (c) = Cosigned By      Initials Name Provider Type    CT Dilma Ochoa, GENARO Physical Therapist                  Therapy Charges for Today       Code Description Service Date Service Provider Modifiers Qty    18765886541  PT THERAPEUTIC ACT EA 15 MIN  10/14/2024 Dilma Ochoa, PT GP 2    29188041686 HC PT NEUROMUSC RE EDUCATION EA 15 MIN 10/14/2024 Dilma Ochoa, PT GP 1    40093667041 HC PT RE-EVAL ESTABLISHED PLAN 2 10/14/2024 Dilma Ochoa, PT GP 1            PT G-Codes  AM-PAC 6 Clicks Score (PT): 8    Dilma Ochoa, PT  10/14/2024

## 2024-10-14 NOTE — PLAN OF CARE
Goal Outcome Evaluation:  Plan of Care Reviewed With: patient           Outcome Evaluation: Patient resting in bed at this time. VSS. Pt c/o pain, PRN pain meds given per MAR. Wound care completed per orders. No acute changes or concerns at this this time. Will continue with plan of care.

## 2024-10-14 NOTE — CASE MANAGEMENT/SOCIAL WORK
Discharge Planning Assessment   Zkai     Patient Name: Lety Johnson  MRN: [id number]  Today's Date: 10/14/2024    Admit Date: 9/26/2024       Discharge Plan       Row Name 10/14/24 1539       Plan    Plan SS contacted [facility] swing bed unit per Mari who states Pt has been declined. SS left message at [facility] Bed unit per Janessa [phone] checking on status of referral. SS to follow.                    KAZ Reynolds

## 2024-10-14 NOTE — PLAN OF CARE
Goal Outcome Evaluation:  Plan of Care Reviewed With: patient        Progress: no change  Outcome Evaluation: Patient has been resting in bed throughout the night. PRN medications given, see mar. wound care completed. pt voiced no other complaints or concerns. no acute changes noted at this time. will continue with plan of care.

## 2024-10-15 LAB
ANION GAP SERPL CALCULATED.3IONS-SCNC: 13.1 MMOL/L (ref 5–15)
BUN SERPL-MCNC: 23 MG/DL (ref 6–20)
BUN/CREAT SERPL: 46.9 (ref 7–25)
CALCIUM SPEC-SCNC: 10.1 MG/DL (ref 8.6–10.5)
CHLORIDE SERPL-SCNC: 98 MMOL/L (ref 98–107)
CO2 SERPL-SCNC: 23.9 MMOL/L (ref 22–29)
CREAT SERPL-MCNC: 0.49 MG/DL (ref 0.57–1)
DEPRECATED RDW RBC AUTO: 50.7 FL (ref 37–54)
EGFRCR SERPLBLD CKD-EPI 2021: 120.9 ML/MIN/1.73
ERYTHROCYTE [DISTWIDTH] IN BLOOD BY AUTOMATED COUNT: 13.9 % (ref 12.3–15.4)
GLUCOSE BLDC GLUCOMTR-MCNC: 295 MG/DL (ref 70–130)
GLUCOSE BLDC GLUCOMTR-MCNC: 376 MG/DL (ref 70–130)
GLUCOSE BLDC GLUCOMTR-MCNC: 382 MG/DL (ref 70–130)
GLUCOSE BLDC GLUCOMTR-MCNC: 405 MG/DL (ref 70–130)
GLUCOSE SERPL-MCNC: 324 MG/DL (ref 65–99)
HCT VFR BLD AUTO: 41.8 % (ref 34–46.6)
HGB BLD-MCNC: 13.6 G/DL (ref 12–15.9)
MCH RBC QN AUTO: 32.5 PG (ref 26.6–33)
MCHC RBC AUTO-ENTMCNC: 32.5 G/DL (ref 31.5–35.7)
MCV RBC AUTO: 99.8 FL (ref 79–97)
PLATELET # BLD AUTO: 283 10*3/MM3 (ref 140–450)
PMV BLD AUTO: 10.2 FL (ref 6–12)
POTASSIUM SERPL-SCNC: 4.2 MMOL/L (ref 3.5–5.2)
RBC # BLD AUTO: 4.19 10*6/MM3 (ref 3.77–5.28)
SODIUM SERPL-SCNC: 135 MMOL/L (ref 136–145)
WBC NRBC COR # BLD AUTO: 9.56 10*3/MM3 (ref 3.4–10.8)

## 2024-10-15 PROCEDURE — 97530 THERAPEUTIC ACTIVITIES: CPT

## 2024-10-15 PROCEDURE — 85027 COMPLETE CBC AUTOMATED: CPT | Performed by: INTERNAL MEDICINE

## 2024-10-15 PROCEDURE — 99231 SBSQ HOSP IP/OBS SF/LOW 25: CPT | Performed by: INTERNAL MEDICINE

## 2024-10-15 PROCEDURE — 25010000002 HEPARIN (PORCINE) PER 1000 UNITS: Performed by: INTERNAL MEDICINE

## 2024-10-15 PROCEDURE — 80048 BASIC METABOLIC PNL TOTAL CA: CPT | Performed by: INTERNAL MEDICINE

## 2024-10-15 PROCEDURE — 63710000001 INSULIN GLARGINE PER 5 UNITS: Performed by: INTERNAL MEDICINE

## 2024-10-15 PROCEDURE — 82948 REAGENT STRIP/BLOOD GLUCOSE: CPT

## 2024-10-15 PROCEDURE — 63710000001 INSULIN LISPRO (HUMAN) PER 5 UNITS: Performed by: INTERNAL MEDICINE

## 2024-10-15 RX ADMIN — INSULIN LISPRO 8 UNITS: 100 INJECTION, SOLUTION INTRAVENOUS; SUBCUTANEOUS at 20:21

## 2024-10-15 RX ADMIN — Medication 10 ML: at 10:08

## 2024-10-15 RX ADMIN — HEPARIN SODIUM 5000 UNITS: 5000 INJECTION INTRAVENOUS; SUBCUTANEOUS at 21:02

## 2024-10-15 RX ADMIN — Medication 5 MG: at 20:22

## 2024-10-15 RX ADMIN — ACETAMINOPHEN 650 MG: 325 TABLET ORAL at 21:01

## 2024-10-15 RX ADMIN — Medication 10 ML: at 20:22

## 2024-10-15 RX ADMIN — NICOTINE 1 PATCH: 7 PATCH, EXTENDED RELEASE TRANSDERMAL at 09:40

## 2024-10-15 RX ADMIN — ATORVASTATIN CALCIUM 80 MG: 40 TABLET, FILM COATED ORAL at 09:37

## 2024-10-15 RX ADMIN — METOPROLOL TARTRATE 25 MG: 25 TABLET, FILM COATED ORAL at 09:38

## 2024-10-15 RX ADMIN — HEPARIN SODIUM 5000 UNITS: 5000 INJECTION INTRAVENOUS; SUBCUTANEOUS at 05:29

## 2024-10-15 RX ADMIN — ASPIRIN 81 MG: 81 TABLET, CHEWABLE ORAL at 09:37

## 2024-10-15 RX ADMIN — NYSTATIN: 100000 POWDER TOPICAL at 20:22

## 2024-10-15 RX ADMIN — METOPROLOL TARTRATE 25 MG: 25 TABLET, FILM COATED ORAL at 20:22

## 2024-10-15 RX ADMIN — LOPERAMIDE HYDROCHLORIDE 2 MG: 2 CAPSULE ORAL at 03:14

## 2024-10-15 RX ADMIN — LISINOPRIL 20 MG: 10 TABLET ORAL at 09:37

## 2024-10-15 RX ADMIN — HEPARIN SODIUM 5000 UNITS: 5000 INJECTION INTRAVENOUS; SUBCUTANEOUS at 13:15

## 2024-10-15 RX ADMIN — ACYCLOVIR 400 MG: 200 CAPSULE ORAL at 20:22

## 2024-10-15 RX ADMIN — PANTOPRAZOLE SODIUM 40 MG: 40 TABLET, DELAYED RELEASE ORAL at 09:37

## 2024-10-15 RX ADMIN — DULOXETINE HYDROCHLORIDE 60 MG: 60 CAPSULE, DELAYED RELEASE ORAL at 09:38

## 2024-10-15 RX ADMIN — INSULIN LISPRO 8 UNITS: 100 INJECTION, SOLUTION INTRAVENOUS; SUBCUTANEOUS at 18:13

## 2024-10-15 RX ADMIN — INSULIN LISPRO 9 UNITS: 100 INJECTION, SOLUTION INTRAVENOUS; SUBCUTANEOUS at 13:14

## 2024-10-15 RX ADMIN — ACETAMINOPHEN 650 MG: 325 TABLET ORAL at 13:14

## 2024-10-15 RX ADMIN — INSULIN GLARGINE 30 UNITS: 100 INJECTION, SOLUTION SUBCUTANEOUS at 20:21

## 2024-10-15 RX ADMIN — ACETAMINOPHEN 650 MG: 325 TABLET ORAL at 03:14

## 2024-10-15 RX ADMIN — INSULIN LISPRO 6 UNITS: 100 INJECTION, SOLUTION INTRAVENOUS; SUBCUTANEOUS at 09:37

## 2024-10-15 RX ADMIN — NYSTATIN: 100000 POWDER TOPICAL at 09:38

## 2024-10-15 NOTE — THERAPY TREATMENT NOTE
Acute Care - Occupational Therapy Treatment Note   Zaki     Patient Name: Lety Johnson  : 1980  MRN: [id number]  Today's Date: 10/15/2024  Onset of Illness/Injury or Date of Surgery: 24     Referring Physician: Dr. Marc    Admit Date: 2024       ICD-10-CM ICD-9-CM   1. Hypokalemia  E87.6 276.8   2. Pressure injury of skin of sacral region, unspecified injury stage  L89.159 707.03     707.20   3. Pressure injury of skin of left heel, unspecified injury stage  L89.629 707.07     707.20   4. Acute cystitis without hematuria  N30.00 595.0     Patient Active Problem List   Diagnosis   (none) - all problems resolved or deleted     Past Medical History:   Diagnosis Date    Stroke      History reviewed. No pertinent surgical history.      OT ASSESSMENT FLOWSHEET (Last 12 Hours)       OT Evaluation and Treatment       Row Name 10/15/24 1113                   OT Time and Intention    Document Type therapy note (daily note)  -KR        Mode of Treatment occupational therapy  -KR        Patient Effort adequate  -KR        Comment Continued PROM LUE to promote fxl positioning, decrease deformity. Pt able to tolerate L elbow ext to WFL, slight flexion of shoulder, neutral wrist and hand for fxl positioning at side, while resting. Pt education continued for importance of PROM/positioning to decrease abnormal tone and prevent contractures. Pt reported comprehension. Nursing informed of positioning LUE as tolerated.  -KR           Motor Skills    Neuromuscular Function left;upper extremity;severe impairment  -KR           Shoulder (Therapeutic Exercise)    Shoulder PROM (Therapeutic Exercise) left;flexion  -KR           Elbow/Forearm (Therapeutic Exercise)    Elbow/Forearm PROM (Therapeutic Exercise) left;extension  -KR           Wrist (Therapeutic Exercise)    Wrist PROM (Therapeutic Exercise) left;flexion;extension  -KR           Hand (Therapeutic Exercise)    Hand PROM (Therapeutic Exercise) left;finger  flexion;finger extension  -KR           Wound 09/26/24 1809 Left posterior ankle Other (comment)    Wound - Properties Group Placement Date: 09/26/24  -KJ Placement Time: 1809  -KJ Side: Left  -KJ Orientation: posterior  -KJ Location: ankle  -KJ Primary Wound Type: Other  -TW, unknow etiology     Retired Wound - Properties Group Placement Date: 09/26/24  -KJ Placement Time: 1809  -KJ Side: Left  -KJ Orientation: posterior  -KJ Location: ankle  -KJ Primary Wound Type: Other  -TW, unknow etiology     Retired Wound - Properties Group Placement Date: 09/26/24  -KJ Placement Time: 1809  -KJ Side: Left  -KJ Orientation: posterior  -KJ Location: ankle  -KJ Primary Wound Type: Other  -TW, unknow etiology     Retired Wound - Properties Group Date first assessed: 09/26/24  -KJ Time first assessed: 1809  -KJ Side: Left  -KJ Location: ankle  -KJ Primary Wound Type: Other  -TW, unknow etiology        Wound 10/11/24 1330 medial coccyx Fissure    Wound - Properties Group Placement Date: 10/11/24  -TW Placement Time: 1330 -TW Orientation: medial  -TW Location: coccyx  -TW Primary Wound Type: Fissure  -TW    Retired Wound - Properties Group Placement Date: 10/11/24  -TW Placement Time: 1330 -TW Orientation: medial  -TW Location: coccyx  -TW Primary Wound Type: Fissure  -TW    Retired Wound - Properties Group Placement Date: 10/11/24  -TW Placement Time: 1330  -TW Orientation: medial  -TW Location: coccyx  -TW Primary Wound Type: Fissure  -TW    Retired Wound - Properties Group Date first assessed: 10/11/24  -TW Time first assessed: 1330 -TW Location: coccyx  -TW Primary Wound Type: Fissure  -TW       Plan of Care Review    Plan of Care Reviewed With patient  -KR        Progress improving  -KR           ROM Goal 1 (OT)    Progress/Outcome (ROM Goal 1, OT) continuing progress toward goal  -KR                  User Key  (r) = Recorded By, (t) = Taken By, (c) = Cosigned By      Initials Name Effective Dates    TW BrandanKaren,  RN 06/16/21 -     Neil Menezes OT 06/16/21 -     Nory Keenan RN 06/08/23 -                      Occupational Therapy Education        No education to display                      OT Recommendation and Plan  Planned Therapy Interventions (OT): activity tolerance training, neuromuscular control/coordination retraining, ROM/therapeutic exercise  Plan of Care Review  Plan of Care Reviewed With: patient  Progress: improving  Plan of Care Reviewed With: patient        Time Calculation:     Therapy Charges for Today       Code Description Service Date Service Provider Modifiers Qty    48460866640  OT THERAPEUTIC ACT EA 15 MIN 10/14/2024 Neil Graf OT GO 2    [id number]  OT SELF CARE/MGMT/TRAIN EA 15 MIN 10/14/2024 Neil Graf OT GO 1    [id number]  OT THERAPEUTIC ACT EA 15 MIN 10/15/2024 Neil Graf OT GO 2                 Neil Graf OT  10/15/2024

## 2024-10-15 NOTE — DISCHARGE PLACEMENT REQUEST
"Lety Johnson (43 y.o. Female)       Date of Birth   1980    Social Security Number       Address   [address] [facility] [address]    Home Phone   [phone]    MRN   [id number]       [facility]    Marital Status   Single                            Admission Date   24    Admission Type   Emergency    Admitting Provider   Ramon Marc MD    Attending Provider   Marv Navas DO    Department, Room/Bed   [address], [address]/       Discharge Date       Discharge Disposition       Discharge Destination                                 Attending Provider: Marv Navas DO    Allergies: No Known Allergies    Isolation: None   Infection: None   Code Status: CPR    Ht: 166.4 cm (65.5\")   Wt: 102 kg (224 lb 1.6 oz)    Admission Cmt: None   Principal Problem: UTI (urinary tract infection) [N39.0]                   Active Insurance as of 2024       Primary Coverage       Payor Plan Insurance Group Employer/Plan Group    [place] [id number]       Payor Plan Address Payor Plan Phone Number Payor Plan Fax Number Effective Dates    [employer] [address] [address] [phone]  2024 - None Entered    [facility] [id number]         Subscriber Name Subscriber Birth Date Member ID       LETY JOHNSON 1980 [id number]                     Emergency Contacts        (Rel.) Home Phone Work Phone Mobile Phone    Moises Narvaez (Legal Guardian) -- -- [phone]                 History & Physical        Ramon Marc MD at 24 [address] [place] [address] HOSPITALIST HISTORY AND PHYSICAL    Patient Identification:  Name:  Lety Johnson  Age:  43 y.o.  Sex:  female  :  1980  MRN:  [id number]   Admit Date: 2024   Visit Number:  [id number]  Room number:  404/04  Primary Care Physician:  Provider, No Known     Subjective     Chief complaint:    Chief Complaint   Patient presents with "    Fall     History of presenting illness:   43F Morbid Obesity by BMI PMH History Genital Herpes, Cerebrovascular Accident 5/2024 complicated by L sided paralysis, presented to [facility] emergency room 9/26 after sliding into the floor off her chair due to weakness.  Upon arrival patient afebrile, heart rate 109, respiratory rate 18, blood pressure 146/101, satting 98% on room air. Labs showed WBC count 10K, lactate 1.1, CRP 3.8, potassium 2.7, magnesium 1.5, HCG negative, blood cultures pending. CXR no acute cardiopulmonary processes.  Xray ankle/foot without fracture or dislocation.  Emergency room provider gave antibiotics, pain medications, zofran, potassium replacement.  Hospital Medicine consulted for admission. Patient seen and examined in the emergency room, notes fevers chills at home a few days ago, recently has been on antibiotics for lower extremity cellulitis, has had some dysuria and suprapubic pain, denies current sexual activity, reports her fiance was taken to correction about a week ago and has been her primary caretaker, has had difficulty at home since prior stroke and caretakers not consistent, last 24hrs didn't have anyone to help her, sister endorses recent confusion/hallucinations beyond baseline, seeing dead family members, coinciding with onset of dysuria.  ---------------------------------------------------------------------------------------------------------------------   Review of Systems   Constitutional:  Positive for chills and fever.   HENT: Negative.     Eyes: Negative.    Respiratory:  Positive for shortness of breath.    Cardiovascular:  Positive for leg swelling. Negative for chest pain.   Gastrointestinal: Negative.    Endocrine: Negative.    Genitourinary:  Positive for dysuria.   Musculoskeletal: Negative.    Skin:  Positive for rash.   Allergic/Immunologic: Negative.    Neurological:  Positive for weakness.   Hematological: Negative.    Psychiatric/Behavioral:   Positive for hallucinations.      ---------------------------------------------------------------------------------------------------------------------   Past Medical History:   Diagnosis Date    Stroke      No past surgical history on file.  No family history on file.  Social History     Socioeconomic History    Marital status: Single     ---------------------------------------------------------------------------------------------------------------------   Allergies:  Patient has no known allergies.  ---------------------------------------------------------------------------------------------------------------------   Medications below are reported home medications pulling from within the system; at this time, these medications have not been reconciled unless otherwise specified and are in the verification process for further verifcation as current home medications.    Prior to Admission Medications       Prescriptions Last Dose Informant Patient Reported? Taking?    aspirin 81 MG chewable tablet Unknown  Yes No    Chew 1 tablet Daily.    atorvastatin (LIPITOR) 80 MG tablet Unknown  Yes No    Take 1 tablet by mouth Daily.    cyclobenzaprine (FLEXERIL) 10 MG tablet Unknown  Yes No    Take 1 tablet by mouth 3 (Three) Times a Day As Needed for Muscle Spasms.    DULoxetine (CYMBALTA) 60 MG capsule Unknown  Yes No    Take 1 capsule by mouth Daily.    lisinopril (PRINIVIL,ZESTRIL) 20 MG tablet Unknown  Yes No    Take 1 tablet by mouth Daily.    metFORMIN (GLUCOPHAGE) 1000 MG tablet Unknown  Yes No    Take 1 tablet by mouth 2 (Two) Times a Day With Meals.    pantoprazole (PROTONIX) 40 MG EC tablet Unknown  Yes No    Take 1 tablet by mouth Daily.          Objective     Vital Signs:  Temp:  [98.2 °F (36.8 °C)] 98.2 °F (36.8 °C)  Heart Rate:  [] 118  Resp:  [18] 18  BP: (135-157)/() 140/79    Mean Arterial Pressure (Non-Invasive) for the past 24 hrs (Last 3 readings):   Noninvasive MAP (mmHg)   09/26/24 1645 90    09/26/24 1630 100   09/26/24 1615 103     SpO2:  [91 %-100 %] 94 %  on   ;   Device (Oxygen Therapy): room air  Body mass index is 43.26 kg/m².    Wt Readings from Last 3 Encounters:   09/26/24 120 kg (264 lb)   06/27/24 120 kg (264 lb)      ---------------------------------------------------------------------------------------------------------------------   Physical Exam:  Constitutional:  Well-developed and well-nourished. Older than stated age. No acute distress.      HENT:  Head:  Normocephalic and atraumatic.  Mouth:  Moist mucous membranes.    Eyes:  Conjunctivae and EOM are normal. No scleral icterus.    Neck:  Neck supple.  No JVD present.    Cardiovascular:  Normal rate, regular rhythm and normal heart sounds with no murmur.  Pulmonary/Chest:  No respiratory distress, no wheezes, no crackles, with normal breath sounds and good air movement.  Abdominal:  Soft. No distension and no tenderness.   Musculoskeletal:  No tenderness, and no deformity.  No red or swollen joints anywhere.    Neurological:  Alert and oriented to person, place, and time.  No cranial nerve deficit. Chronic L sided paralysis.    Skin:  Skin is warm and dry. No pallor. Significant intertriginous erythema under panus, consistent with yeast infection.   Peripheral vascular:  No clubbing, no cyanosis, trace edema.  Psychiatric: Appropriate mood and affect  Edited by: Ramon Marc MD at 9/26/2024 1701  ---------------------------------------------------------------------------------------------------------------------  EKG:  N/A    Telemetry:  normal sinus rhythm, no significant ST changes    I have personally looked at telemetry.    Last echocardiogram:  Ordered and pending   --------------------------------------------------------------------------------------------------------------------  Labs:  Results from last 7 days   Lab Units 09/26/24  1504 09/26/24  1350   LACTATE mmol/L  --  1.1   CRP mg/dL 3.80*  --    WBC 10*3/mm3  --   "10.42   HEMOGLOBIN g/dL  --  12.5   HEMATOCRIT %  --  37.4   MCV fL  --  94.0   MCHC g/dL  --  33.4   PLATELETS 10*3/mm3  --  402         Results from last 7 days   Lab Units 09/26/24  1504   SODIUM mmol/L 140   POTASSIUM mmol/L 2.7*   MAGNESIUM mg/dL 1.5*   CHLORIDE mmol/L 99   CO2 mmol/L 28.9   BUN mg/dL 10   CREATININE mg/dL 0.51*   CALCIUM mg/dL 9.4   GLUCOSE mg/dL 220*   ALBUMIN g/dL 3.5   BILIRUBIN mg/dL 0.6   ALK PHOS U/L 119*   AST (SGOT) U/L 21   ALT (SGPT) U/L 8   Estimated Creatinine Clearance: 188.1 mL/min (A) (by C-G formula based on SCr of 0.51 mg/dL (L)).  No results found for: \"AMMONIA\"          No results found for: \"HGBA1C\", \"POCGLU\"  No results found for: \"TSH\", \"FREET4\"  No results found for: \"PREGTESTUR\", \"PREGSERUM\", \"HCG\", \"HCGQUANT\"  Pain Management Panel           No data to display              Brief Urine Lab Results  (Last result in the past 365 days)        Color   Clarity   Blood   Leuk Est   Nitrite   Protein   CREAT   Urine HCG        09/26/24 1419 Dark Yellow   Turbid   Trace   Moderate (2+)   Positive   30 mg/dL (1+)                 No results found for: \"BLOODCX\"  No results found for: \"URINECX\"  No results found for: \"WOUNDCX\"  No results found for: \"STOOLCX\"    I have personally looked at the labs and they are summarized above.  ----------------------------------------------------------------------------------------------------------------------  Detailed radiology reports for the last 24 hours:    Imaging Results (Last 24 Hours)       Procedure Component Value Units Date/Time    XR Ankle 3+ View Left [[id number]] Collected: 09/26/24 1537     Updated: 09/26/24 1540    Narrative:      EXAM:    XR Left Ankle Complete, 3 or More Views     EXAM DATE:    9/26/2024 3:17 PM     CLINICAL HISTORY:    left ankle injury     TECHNIQUE:    Frontal, lateral and oblique views of the left ankle.     COMPARISON:    No relevant prior studies available.     FINDINGS:    Bones/joints:  See below.     "  Soft tissues:  Soft tissue swelling, but no acute fracture or  dislocation.       Impression:        Soft tissue swelling, but no acute fracture or dislocation.        This report was finalized on 9/26/2024 3:38 PM by Dr. Moses Otto MD.       XR Foot 3+ View Left [[id number]] Collected: 09/26/24 1536     Updated: 09/26/24 1539    Narrative:      EXAM:    XR Left Foot Complete, 3 or More Views     EXAM DATE:    9/26/2024 3:16 PM     CLINICAL HISTORY:    left foot wound     TECHNIQUE:    Frontal, lateral and oblique views of the left foot.     COMPARISON:    No relevant prior studies available.     FINDINGS:    Bones/joints:  Unremarkable.  No acute fracture.  No dislocation.    Soft tissues:  Unremarkable.  No radiopaque foreign body.       Impression:        No acute findings in the left foot.        This report was finalized on 9/26/2024 3:37 PM by Dr. Moses Otto MD.       XR Chest 1 View [[id number]] Collected: 09/26/24 1406     Updated: 09/26/24 1408    Narrative:      XR CHEST 1 VW-     CLINICAL INDICATION: weakness        COMPARISON: None immediately available      TECHNIQUE: Single frontal view of the chest.     FINDINGS:     LUNGS: Lungs are adequately aerated.      HEART AND MEDIASTINUM: Heart and mediastinal contours are unremarkable        SKELETON: Bony and soft tissue structures are unremarkable.             Impression:      No radiographic evidence of acute cardiac or pulmonary disease.           This report was finalized on 9/26/2024 2:06 PM by Dr. Moses Otto MD.       CT Cervical Spine Without Contrast [[id number]] Collected: 09/26/24 1317     Updated: 09/26/24 1319    Narrative:      CT CERVICAL SPINE WO CONTRAST-     CLINICAL INDICATION: neck pain        COMPARISON: None available     TECHNIQUE: Axial images of the cervical spine were acquired with out any  intravenous contrast. Reformatted images were then created in the  sagittal and coronal planes.     DOSE:      Radiation dose reduction  techniques were utilized per ALARA protocol.  Automated exposure control was initiated through either or [employer] or  DoseRight software packages by  protocol.           FINDINGS:   The provided study demonstrates preservation of the vertebral body  heights in the sagittally reconstructed images.     There is no prevertebral soft tissue swelling.     Alignment is near anatomic.     The disc space heights are uniform.     I see no acute cervical spine fracture.       Impression:         1. No acute bony abnormality.     2. Other incidental findings as above     This report was finalized on 9/26/2024 1:17 PM by Dr. Moses Otto MD.       CT Lumbar Spine Without Contrast [[id number]] Collected: 09/26/24 1317     Updated: 09/26/24 1319    Narrative:      EXAM:    CT Lumbar Spine Without Intravenous Contrast     EXAM DATE:    9/26/2024 12:59 PM     CLINICAL HISTORY:    back pain     TECHNIQUE:    Axial computed tomography images of the lumbar spine without  intravenous contrast.  Sagittal and coronal reformatted images were  created and reviewed.  This CT exam was performed using one or more of  the following dose reduction techniques:  automated exposure control,  adjustment of the mA and/or kV according to patient size, and/or use of  iterative reconstruction technique.     COMPARISON:    No relevant prior studies available.     FINDINGS:    VERTEBRAE:  Unremarkable.  No acute fracture.    DISCS/SPINAL CANAL/NEURAL FORAMINA:  No acute findings.  No spinal  canal stenosis.    SOFT TISSUES:  Unremarkable.       Impression:        No acute findings in the lumbar spine.        This report was finalized on 9/26/2024 1:17 PM by Dr. Moses Otto MD.       CT Thoracic Spine Without Contrast [[id number]] Collected: 09/26/24 1316     Updated: 09/26/24 1319    Narrative:      EXAM:    CT Thoracic Spine Without Intravenous Contrast     EXAM DATE:    9/26/2024 12:58 PM     CLINICAL HISTORY:    back pain     TECHNIQUE:     Axial computed tomography images of the thoracic spine without  intravenous contrast.  Sagittal and coronal reformatted images were  created and reviewed.  This CT exam was performed using one or more of  the following dose reduction techniques:  automated exposure control,  adjustment of the mA and/or kV according to patient size, and/or use of  iterative reconstruction technique.     COMPARISON:    No relevant prior studies available.     FINDINGS:    VERTEBRAE:  Unremarkable.  No acute fracture.    DISCS/SPINAL CANAL/NEURAL FORAMINA:  No acute findings.  No spinal  canal stenosis.    SOFT TISSUES:  Unremarkable.       Impression:        No acute findings in the thoracic spine.        This report was finalized on 9/26/2024 1:17 PM by Dr. Moses Otto MD.       CT Head Without Contrast [[id number]] Collected: 09/26/24 1259     Updated: 09/26/24 1302    Narrative:      CT HEAD WO CONTRAST-     CLINICAL INDICATION: weakness        COMPARISON: None available     TECHNIQUE: Axial images of the brain were obtained with out intravenous  contrast.  Reformatted images were created in the sagittal and coronal  planes.     DOSE:     Radiation dose reduction techniques were utilized per ALARA protocol.  Automated exposure control was initiated through either or [employer] or  DoseRight software packages by  protocol.        FINDINGS:    BRAIN: Probable subacute ischemia right middle cerebral artery  territory    VENTRICLES:  Unremarkable.  No ventriculomegaly.       BONES/JOINTS:  Unremarkable.  No acute fracture.       SOFT TISSUES:  Unremarkable.       SINUSES:  no air fluid levels       MASTOID AIR CELLS:  Unremarkable as visualized.  No mastoid effusion.          Impression:         1. Probable remote right middle cerebral artery infarction  2. No acute parenchymal mass, hemorrhage, or midline shift     This report was finalized on 9/26/2024 1:00 PM by Dr. Moses Otto MD.             I have personally looked  at the radiology images and read the final radiology report.    Assessment & Plan    43F Morbid Obesity by BMI PMH History Genital Herpes, Cerebrovascular Accident 5/2024 complicated by L sided paralysis, presented to [facility] emergency room 9/26 after sliding into the floor off her chair due to weakness.     #Acute Metabolic Encephalopathy due to Acute Urinary Tract Infection in setting of probable neurogenic bladder  - Continue Ceftriaxone, follow up cultures, rule out STI    #Electrolyte Abnormalities  - Acute Mild Hypokalemia - Replacing, on protocol  - Acute Mild Hypomagnesemia - Replacing, on protocol    #History Cerebrovascular Accident complicated by L sided paralysis, unclear etiology  - Review home medications and resume as indicated, Supportive Care     #Debility  - Consult PT/OT, Social Work if placement needed     #History Genital Herpes  - Supportive Care, follow up medication reconciliation for any suppressive medications, check HIV & acute hepatitis panel     #Morbid Obesity by BMI  - Body mass index is 43.26 kg/m².; complicates all aspects of care    F: Oral  E: Monitor & Replace as needed   N: Regular Diet  PPx: SQH  Code Status (Patient has no pulse and is not breathing): CPR  Medical Interventions (Patient has pulse or is breathing): Full Support     Dispo: Pending workup and clinical improvement    *This patient is considered high risk secondary to Urinary Tract Infection, electrolyte abnormalities, chronic L sided paralysis from prior Cerebrovascular Accident.      Edited by: Ramon Marc MD at 9/26/2024 1701    Ramon Marc MD  [facility] Hospitalist  09/26/24  17:01 EDT        Electronically signed by Ramon Marc MD at 09/26/24 1703       Vital Signs (last day)       Date/Time Temp Temp src Pulse Resp BP Patient Position SpO2    10/15/24 1000 98.6 (37) Oral 105 18 103/63 Lying --    10/15/24 0600 97.7 (36.5) Oral 95 16 120/63 Lying --    10/15/24 0256 97.8 (36.6)  Oral 95 18 102/65 Lying 94    10/14/24 2300 98 (36.7) Oral 94 18 100/57 Lying 97    10/14/24 1900 97.6 (36.4) Oral 104 18 105/64 Lying 97    10/14/24 1400 98 (36.7) Oral 104 18 110/73 Lying --    10/14/24 1000 98.6 (37) Oral 113 16 99/56 Lying --    10/14/24 0600 -- -- 110 16 109/67 Lying --    10/14/24 0300 98.5 (36.9) Oral 104 18 94/62 Lying 95          Intake & Output (last day)         10/14 0701  10/15 0700 10/15 0701  10/16 0700    P.O. 1270 360    Total Intake(mL/kg) 1270 (12.5) 360 (3.5)    Urine (mL/kg/hr) 1700 (0.7)     Stool 0     Total Output 1700     Net -430 +360          Urine Unmeasured Occurrence 4 x     Stool Unmeasured Occurrence 4 x           Lines, Drains & Airways       Active LDAs       Name Placement date Placement time Site Days    Midline Catheter - Single Lumen 09/30/24 Right Basilic 09/30/24  0955  -- 15    External Urinary Catheter 09/29/24  1500  --  15                  Current Facility-Administered Medications   Medication Dose Route Frequency Provider Last Rate Last Admin    acetaminophen (TYLENOL) tablet 650 mg  650 mg Oral Q6H PRN Ashleigh Nicholas PA-C   650 mg at 10/15/24 1314    acyclovir (ZOVIRAX) capsule 400 mg  400 mg Oral Nightly Ramon Marc MD   400 mg at 10/14/24 2004    aspirin chewable tablet 81 mg  81 mg Oral Daily Ramon Marc MD   81 mg at 10/15/24 0937    atorvastatin (LIPITOR) tablet 80 mg  80 mg Oral Daily Ramon Marc MD   80 mg at 10/15/24 0937    sennosides-docusate (PERICOLACE) 8.6-50 MG per tablet 2 tablet  2 tablet Oral BID PRN Ramon Marc MD   2 tablet at 10/12/24 1310    And    polyethylene glycol (MIRALAX) packet 17 g  17 g Oral Daily PRN Ramon Marc MD        And    bisacodyl (DULCOLAX) EC tablet 5 mg  5 mg Oral Daily PRN Ramon Marc MD        And    bisacodyl (DULCOLAX) suppository 10 mg  10 mg Rectal Daily PRN Ramon Marc MD        Calcium Replacement - Follow Nurse / BPA Driven Protocol   Does not apply PRRamon Brown MD         cyclobenzaprine (FLEXERIL) tablet 10 mg  10 mg Oral TID PRN Ramon Marc MD   10 mg at 10/12/24 2345    dextrose (D50W) (25 g/50 mL) IV injection 25 g  25 g Intravenous Q15 Min PRN Ramon Marc MD        dextrose (GLUTOSE) oral gel 15 g  15 g Oral Q15 Min PRN Ramon Marc MD        Diclofenac Sodium (VOLTAREN) 1 % gel 4 g  4 g Topical 4x Daily PRN Ashleigh Nicholas PA-C   4 g at 10/13/24 2042    DULoxetine (CYMBALTA) DR capsule 60 mg  60 mg Oral Daily Ramon Marc MD   60 mg at 10/15/24 0938    glucagon HCl (Diagnostic) injection 1 mg  1 mg Intramuscular Q15 Min PRN Ramon Marc MD        heparin (porcine) 5000 UNIT/ML injection 5,000 Units  5,000 Units Subcutaneous Q8H Ramon Marc MD   5,000 Units at 10/15/24 1315    hydrOXYzine (ATARAX) tablet 25 mg  25 mg Oral Once Ashleigh Nicholas PA-C        insulin glargine (LANTUS, SEMGLEE) injection 30 Units  30 Units Subcutaneous Nightly Marv Navas DO   30 Units at 10/14/24 2004    Insulin Lispro (humaLOG) injection 2-9 Units  2-9 Units Subcutaneous 4x Daily PC & at Bedtime Ramon Marc MD   9 Units at 10/15/24 1314    lisinopril (PRINIVIL,ZESTRIL) tablet 20 mg  20 mg Oral Daily Ramon Marc MD   20 mg at 10/15/24 0937    loperamide (IMODIUM) capsule 2 mg  2 mg Oral 4x Daily PRN Marv Navas DO   2 mg at 10/15/24 0314    Magnesium Standard Dose Replacement - Follow Nurse / BPA Driven Protocol   Does not apply PRN Ramon Marc MD        melatonin tablet 5 mg  5 mg Oral Nightly PRN Wesley Matthews APRN   5 mg at 10/14/24 2004    [Held by provider] metFORMIN (GLUCOPHAGE) tablet 1,000 mg  1,000 mg Oral BID With Meals Ramon Marc MD        metoprolol tartrate (LOPRESSOR) tablet 25 mg  25 mg Oral Q12H Mara Navas MD   25 mg at 10/15/24 0938    nicotine (NICODERM CQ) 7 MG/24HR patch 1 patch  1 patch Transdermal Q24H Ramon Marc MD   1 patch at 10/15/24 0940    nystatin (MYCOSTATIN) powder   Topical Q12H Ramon Marc MD    Given at 10/15/24 0938    pantoprazole (PROTONIX) EC tablet 40 mg  40 mg Oral Daily Ramon Marc MD   40 mg at 10/15/24 0937    Pharmacy Consult   Does not apply Continuous PRN Ramon Marc MD        Phosphorus Replacement - Follow Nurse / BPA Driven Protocol   Does not apply PRN Ramon Marc MD        Potassium Replacement - Follow Nurse / BPA Driven Protocol   Does not apply PRN Ramon Marc MD        prochlorperazine (COMPAZINE) injection 2.5 mg  2.5 mg Intravenous Q6H PRN Ashleigh Nicholas PA-C   2.5 mg at 10/14/24 2154    sodium chloride 0.9 % flush 10 mL  10 mL Intravenous Q12H Ramon Marc MD   10 mL at 10/15/24 1008    sodium chloride 0.9 % flush 10 mL  10 mL Intravenous PRN Ramon Marc MD        sodium chloride 0.9 % flush 10 mL  10 mL Intravenous Q12H Marv Navas DO   10 mL at 10/15/24 1008    sodium chloride 0.9 % flush 10 mL  10 mL Intravenous PRN Marv Navas DO        sodium chloride 0.9 % infusion 40 mL  40 mL Intravenous PRN Ramon Marc MD        sodium chloride 0.9 % infusion 40 mL  40 mL Intravenous PRN Marv Navas DO         Operative/Procedure Notes (most recent note)    No notes of this type exist for this encounter.          Physician Progress Notes (most recent note)        Marv Navas DO at 10/14/24 1851              [facility]     Patient Identification:  Name:  Lety Johnson  Age:  43 y.o.  Sex:  female  :  1980  MRN:  [id number]  Visit Number:  [id number]  Primary Care Provider:  Provider, No Known    Length of stay:  16    Chief complaint: Pain    Subjective:    Patient continues to report left-sided body pain.  Otherwise, no new events overnight.  Patient is currently pending placement.  ----------------------------------------------------------------------------------------------------------------------  Current Hospital Meds:  acyclovir, 400 mg, Oral, Nightly  aspirin, 81  mg, Oral, Daily  atorvastatin, 80 mg, Oral, Daily  DULoxetine, 60 mg, Oral, Daily  heparin (porcine), 5,000 Units, Subcutaneous, Q8H  hydrOXYzine, 25 mg, Oral, Once  insulin glargine, 30 Units, Subcutaneous, Nightly  insulin lispro, 2-9 Units, Subcutaneous, 4x Daily PC & at Bedtime  lisinopril, 20 mg, Oral, Daily  [Held by provider] metFORMIN, 1,000 mg, Oral, BID With Meals  metoprolol tartrate, 25 mg, Oral, Q12H  nicotine, 1 patch, Transdermal, Q24H  nystatin, , Topical, Q12H  pantoprazole, 40 mg, Oral, Daily  sodium chloride, 10 mL, Intravenous, Q12H  sodium chloride, 10 mL, Intravenous, Q12H      Pharmacy Consult,       ----------------------------------------------------------------------------------------------------------------------  Vital Signs:  Temp:  [98 °F (36.7 °C)-98.6 °F (37 °C)] 98 °F (36.7 °C)  Heart Rate:  [102-127] 104  Resp:  [16-18] 18  BP: ()/(56-73) 110/73      10/07/24  0511 10/08/24  0500 10/09/24  0500   Weight: 102 kg (225 lb 14.4 oz) (FIFI cameron) 102 kg (225 lb 15.5 oz) 102 kg (224 lb 1.6 oz)     Body mass index is 36.73 kg/m².    Intake/Output Summary (Last 24 hours) at 10/14/2024 1851  Last data filed at 10/14/2024 1700  Gross per 24 hour   Intake 1220 ml   Output 1500 ml   Net -280 ml     Diet: Regular/House, Diabetic; Consistent Carbohydrate; Fluid Consistency: Thin (IDDSI 0)  ----------------------------------------------------------------------------------------------------------------------  Physical exam:  Constitutional: Chronically ill-appearing  female in no apparent distress.     HENT:  Head:  Normocephalic and atraumatic.  Mouth:  Moist mucous membranes.    Eyes:  Conjunctivae and EOM are normal.  Pupils are equal, round, and reactive to light.  No scleral icterus.    Neck:  Neck supple. No thyromegaly.  No JVD present.    Cardiovascular:  Regular rate and rhythm with no murmurs, rubs, clicks or gallops appreciated.  Pulmonary/Chest:  Clear to auscultation  "bilaterally with no crackles, wheezes or rhonchi appreciated.  Abdominal:  Soft. Nontender. Nondistended  Bowel sounds are normal in all four quadrants. No organomegally appreciated.   Musculoskeletal:  No edema, no tenderness, and no deformity.  No red or swollen joints anywhere.    Neurological:  Alert, Cranial nerves II-XII intact with no focal deficits.  No facial droop.  No slurred speech.   Skin:  Warm and dry to palpation with no rashes or lesions appreciated.  Peripheral vascular:  2+ radial and pedal pulses in bilateral upper and lower extremities.  Psychiatric:  Alert and oriented x3, demonstrates appropriate judgment and insight.  -------------------------------------------------------------------------  ----------------------------------------------------------------------------------------------------------------------      Results from last 7 days   Lab Units 10/13/24  0742 10/11/24  0814   WBC 10*3/mm3 11.31* 9.91   HEMOGLOBIN g/dL 13.9 13.7   HEMATOCRIT % 42.0 41.4   MCV fL 100.0* 99.0*   MCHC g/dL 33.1 33.1   PLATELETS 10*3/mm3 381 387         Results from last 7 days   Lab Units 10/13/24  0742 10/11/24  1832 10/11/24  0814   SODIUM mmol/L 132*  --  134*   POTASSIUM mmol/L 4.1 4.7 3.6   CHLORIDE mmol/L 96*  --  99   CO2 mmol/L 21.5*  --  23.5   BUN mg/dL 30*  --  13   CREATININE mg/dL 0.56*  --  0.44*   CALCIUM mg/dL 9.9  --  9.6   GLUCOSE mg/dL 375*  --  404*   Estimated Creatinine Clearance: 156.4 mL/min (A) (by C-G formula based on SCr of 0.56 mg/dL (L)).    No results found for: \"AMMONIA\"      No results found for: \"BLOODCX\"  No results found for: \"URINECX\"  No results found for: \"WOUNDCX\"  No results found for: \"STOOLCX\"    I have personally looked at the labs and they are summarized above.  ----------------------------------------------------------------------------------------------------------------------  Imaging Results (Last 24 Hours)       ** No results found for the last 24 hours. ** "          ----------------------------------------------------------------------------------------------------------------------  Assessment and Plan:    ESBL acute cystitis -patient has completed full course of IV antibiotic therapy with Invanz     2.  Acute metabolic encephalopathy -resolved, initially secondary to acute UTI     3.  History of CVA -patient with left-sided hemiparesis secondary to history of CVA     4.  Debility -continue PT/OT     5.  History of genital herpes -continue acyclovir     6.  Morbid obesity -complicates all aspects of care    Disposition still pending placement    Marv Navas DO   10/14/24   18:51 EDT       Electronically signed by Marv Navas DO at 10/14/24 1853          Consult Notes (most recent note)        Ccua Yuen APRN at 24 1436        Consult Orders    1. Inpatient Infectious Diseases Consult [[id number]] ordered by Ramon Marc MD at 24 1427                         INFECTIOUS DISEASE CONSULTATION REPORT        Patient Identification:  Name:  Lety Johnson  Age:  43 y.o.  Sex:  female  :  1980  MRN:  [id number]   Visit Number:  [id number]  Primary Care Physician:  Provider, No Known        Referring Provider: Dr. Marc    Reason for consult: ESBL UTI       LOS: 0 days        Subjective       Subjective     History of present illness:      Thank you Dr. Marc for allowing us to participate in the care of your patient.  As you well know, Ms. Lety Johnson is a 43 y.o. female with past medical history significant for ailin herpes, CVA in May 2024 with residual left-sided paralysis, who presented to [facility] Emergency Department on 2024 for evaluation after sliding into the floor due to weakness.  WBC 7.38.  Chlamydia gonorrhea and trichomonas negative.  CRP 3.80.  Urinalysis positive with culture finalizing is greater than 100,000 colonies of E. coli ESBL.  Blood cultures from 2024 show no growth thus  far.  HIV 1 and 2 nonreactive.  Hepatitis panel nonreactive.  COVID-19 and influenza PCR negative.  Lactic acid normal on admission.    Patient resting in bed.  Denies dysuria, urgency, frequency.  Patient reports recurrent yeast infections.  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  Lungs clear to auscultation bilaterally.      Infectious Disease consultation was requested for antimicrobial management.      ---------------------------------------------------------------------------------------------------------------------     Review Of Systems:    Constitutional: no fever, chills and night sweats. No appetite change or unexpected weight change. No fatigue.  Eyes: no eye drainage, itching or redness.  HEENT: no mouth sores, dysphagia or nose bleed.  Respiratory: no for shortness of breath, cough or production of sputum.  Cardiovascular: no chest pain, no palpitations, no orthopnea.  Gastrointestinal: no nausea, vomiting or diarrhea. No abdominal pain, hematemesis or rectal bleeding.  Genitourinary: no dysuria or polyuria.  Hematologic/lymphatic: no lymph node abnormalities, no easy bruising or easy bleeding.  Musculoskeletal: no muscle or joint pain.  Skin: No rash and no itching.  Neurological: no loss of consciousness, no seizure, no headache.  Behavioral/Psych: no depression or suicidal ideation.  Endocrine: no hot flashes.  Immunologic: negative.    ---------------------------------------------------------------------------------------------------------------------     Past Medical History    Past Medical History:   Diagnosis Date    Stroke        Past Surgical History    History reviewed. No pertinent surgical history.    Family History    History reviewed. No pertinent family history.    Social History    Social History     Tobacco Use    Smoking status: Every Day     Average packs/day: 2.0 packs/day for 26.0 years (52.0 ttl pk-yrs)     Types: Cigarettes     Start date: 1998    Smokeless tobacco: Never    Vaping Use    Vaping status: Never Used   Substance Use Topics    Alcohol use: Not Currently    Drug use: Never       Allergies    Patient has no known allergies.  ---------------------------------------------------------------------------------------------------------------------     Home Medications:    Prior to Admission Medications       Prescriptions Last Dose Informant Patient Reported? Taking?    acyclovir (ZOVIRAX) 400 MG tablet  Pharmacy Yes No    Take 1 tablet by mouth Every Night.    aspirin 81 MG chewable tablet Unknown  Yes No    Chew 1 tablet Daily.    atorvastatin (LIPITOR) 80 MG tablet Unknown  Yes No    Take 1 tablet by mouth Daily.    cyclobenzaprine (FLEXERIL) 10 MG tablet Unknown  Yes No    Take 1 tablet by mouth 3 (Three) Times a Day As Needed for Muscle Spasms.    DULoxetine (CYMBALTA) 60 MG capsule Unknown  Yes No    Take 1 capsule by mouth Daily.    lisinopril (PRINIVIL,ZESTRIL) 20 MG tablet Unknown  Yes No    Take 1 tablet by mouth Daily.    metFORMIN (GLUCOPHAGE) 1000 MG tablet Unknown  Yes No    Take 1 tablet by mouth 2 (Two) Times a Day With Meals.    pantoprazole (PROTONIX) 40 MG EC tablet Unknown  Yes No    Take 1 tablet by mouth Daily.          ---------------------------------------------------------------------------------------------------------------------    Objective       Objective     Hospital Scheduled Meds:  acyclovir, 400 mg, Oral, Nightly  aspirin, 81 mg, Oral, Daily  atorvastatin, 80 mg, Oral, Daily  DULoxetine, 60 mg, Oral, Daily  ertapenem, 1,000 mg, Intravenous, Q24H  heparin (porcine), 5,000 Units, Subcutaneous, Q8H  lisinopril, 20 mg, Oral, Daily  [Held by provider] metFORMIN, 1,000 mg, Oral, BID With Meals  nicotine, 1 patch, Transdermal, Q24H  pantoprazole, 40 mg, Oral, Daily  sodium chloride, 10 mL, Intravenous, Q12H      Pharmacy Consult,   sodium chloride, 75 mL/hr, Last Rate: 75 mL/hr (09/28/24  1315)      ---------------------------------------------------------------------------------------------------------------------   Vital Signs:  Temp:  [97.5 °F (36.4 °C)-98.4 °F (36.9 °C)] 98.4 °F (36.9 °C)  Heart Rate:  [] 58  Resp:  [18-20] 18  BP: (133-172)/(71-86) 172/83  Mean Arterial Pressure (Non-Invasive) for the past 24 hrs (Last 3 readings):   Noninvasive MAP (mmHg)   09/28/24 1100 107   09/28/24 0650 92   09/28/24 0307 103     SpO2 Percentage    09/28/24 0307 09/28/24 0650 09/28/24 1100   SpO2: 98% 96% 97%     SpO2:  [96 %-98 %] 97 %  on   ;   Device (Oxygen Therapy): room air    Body mass index is 40.79 kg/m².  Wt Readings from Last 3 Encounters:   09/28/24 113 kg (248 lb 14.4 oz)   06/27/24 120 kg (264 lb)     ---------------------------------------------------------------------------------------------------------------------     Physical Exam:    Constitutional:  Well-developed and well-nourished.  No respiratory distress.      HENT:  Head: Normocephalic and atraumatic.  Mouth:  Moist mucous membranes.    Eyes:  Conjunctivae and EOM are normal.  No scleral icterus.  Neck:  Neck supple.  No JVD present.    Cardiovascular:  Normal rate, regular rhythm and normal heart sounds with no murmur. No edema.  Pulmonary/Chest:  No respiratory distress, no wheezes, no crackles, with normal breath sounds and good air movement.  Abdominal:  Soft.  Bowel sounds are normal.  No distension and no tenderness.   Musculoskeletal: Left sided paralysis secondary to CVA.  Neurological:  Alert and oriented to person, place, and time.  No facial droop.  No slurred speech.   Skin:  Skin is warm and dry.  No rash noted.  No pallor.   Psychiatric:  Normal mood and affect.  Behavior is normal.    ---------------------------------------------------------------------------------------------------------------------              Results from last 7 days   Lab Units 09/28/24  0318 09/27/24  0844 09/26/24  1504 09/26/24  1350  "  CRP mg/dL  --   --  3.80*  --    LACTATE mmol/L  --   --   --  1.1   WBC 10*3/mm3 7.38 9.67  --  10.42   HEMOGLOBIN g/dL 11.8* 10.9*  --  12.5   HEMATOCRIT % 36.9 34.8  --  37.4   MCV fL 98.9* 102.4*  --  94.0   MCHC g/dL 32.0 31.3*  --  33.4   PLATELETS 10*3/mm3 332 292  --  402     Results from last 7 days   Lab Units 09/28/24  0318 09/27/24  1904 09/27/24  0844 09/26/24  1504   SODIUM mmol/L 142  --  137 140   POTASSIUM mmol/L 4.1 4.0 3.5 2.7*   MAGNESIUM mg/dL  --   --   --  1.5*   CHLORIDE mmol/L 110*  --  105 99   CO2 mmol/L 22.3  --  21.5* 28.9   BUN mg/dL 5*  --  7 10   CREATININE mg/dL 0.43*  --  0.36* 0.51*   CALCIUM mg/dL 8.5*  --  7.9* 9.4   GLUCOSE mg/dL 198*  --  234* 220*   ALBUMIN g/dL 2.7*  --  2.5* 3.5   BILIRUBIN mg/dL 0.3  --  0.3 0.6   ALK PHOS U/L 100  --  96 119*   AST (SGOT) U/L 15  --  17 21   ALT (SGPT) U/L 9  --  7 8   Estimated Creatinine Clearance: 215.5 mL/min (A) (by C-G formula based on SCr of 0.43 mg/dL (L)).  No results found for: \"AMMONIA\"    No results found for: \"HGBA1C\", \"POCGLU\"  No results found for: \"HGBA1C\"  No results found for: \"TSH\", \"FREET4\"    Blood Culture   Date Value Ref Range Status   09/26/2024 No growth at 2 days  Preliminary   09/26/2024 No growth at 2 days  Preliminary     Urine Culture   Date Value Ref Range Status   09/26/2024 >100,000 CFU/mL Escherichia coli ESBL (A)  Final     No results found for: \"WOUNDCX\"  No results found for: \"STOOLCX\"  No results found for: \"RESPCX\"  Pain Management Panel           No data to display              I have personally reviewed the above laboratory results.   ---------------------------------------------------------------------------------------------------------------------  Imaging Results (Last 7 Days)       Procedure Component Value Units Date/Time    XR Ankle 3+ View Left [[id number]] Collected: 09/26/24 1537     Updated: 09/26/24 1540    Narrative:      EXAM:    XR Left Ankle Complete, 3 or More Views     EXAM " DATE:    9/26/2024 3:17 PM     CLINICAL HISTORY:    left ankle injury     TECHNIQUE:    Frontal, lateral and oblique views of the left ankle.     COMPARISON:    No relevant prior studies available.     FINDINGS:    Bones/joints:  See below.      Soft tissues:  Soft tissue swelling, but no acute fracture or  dislocation.       Impression:        Soft tissue swelling, but no acute fracture or dislocation.        This report was finalized on 9/26/2024 3:38 PM by Dr. Moses Otto MD.       XR Foot 3+ View Left [[id number]] Collected: 09/26/24 1536     Updated: 09/26/24 1539    Narrative:      EXAM:    XR Left Foot Complete, 3 or More Views     EXAM DATE:    9/26/2024 3:16 PM     CLINICAL HISTORY:    left foot wound     TECHNIQUE:    Frontal, lateral and oblique views of the left foot.     COMPARISON:    No relevant prior studies available.     FINDINGS:    Bones/joints:  Unremarkable.  No acute fracture.  No dislocation.    Soft tissues:  Unremarkable.  No radiopaque foreign body.       Impression:        No acute findings in the left foot.        This report was finalized on 9/26/2024 3:37 PM by Dr. Moses Otto MD.       XR Chest 1 View [[id number]] Collected: 09/26/24 1406     Updated: 09/26/24 1408    Narrative:      XR CHEST 1 VW-     CLINICAL INDICATION: weakness        COMPARISON: None immediately available      TECHNIQUE: Single frontal view of the chest.     FINDINGS:     LUNGS: Lungs are adequately aerated.      HEART AND MEDIASTINUM: Heart and mediastinal contours are unremarkable        SKELETON: Bony and soft tissue structures are unremarkable.             Impression:      No radiographic evidence of acute cardiac or pulmonary disease.           This report was finalized on 9/26/2024 2:06 PM by Dr. Moses Otto MD.       CT Cervical Spine Without Contrast [[id number]] Collected: 09/26/24 1317     Updated: 09/26/24 1319    Narrative:      CT CERVICAL SPINE WO CONTRAST-     CLINICAL INDICATION: neck pain         COMPARISON: None available     TECHNIQUE: Axial images of the cervical spine were acquired with out any  intravenous contrast. Reformatted images were then created in the  sagittal and coronal planes.     DOSE:      Radiation dose reduction techniques were utilized per ALARA protocol.  Automated exposure control was initiated through either or [employer] or  [employer] software packages by  protocol.           FINDINGS:   The provided study demonstrates preservation of the vertebral body  heights in the sagittally reconstructed images.     There is no prevertebral soft tissue swelling.     Alignment is near anatomic.     The disc space heights are uniform.     I see no acute cervical spine fracture.       Impression:         1. No acute bony abnormality.     2. Other incidental findings as above     This report was finalized on 9/26/2024 1:17 PM by Dr. Moses Otto MD.       CT Lumbar Spine Without Contrast [[id number]] Collected: 09/26/24 1317     Updated: 09/26/24 1319    Narrative:      EXAM:    CT Lumbar Spine Without Intravenous Contrast     EXAM DATE:    9/26/2024 12:59 PM     CLINICAL HISTORY:    back pain     TECHNIQUE:    Axial computed tomography images of the lumbar spine without  intravenous contrast.  Sagittal and coronal reformatted images were  created and reviewed.  This CT exam was performed using one or more of  the following dose reduction techniques:  automated exposure control,  adjustment of the mA and/or kV according to patient size, and/or use of  iterative reconstruction technique.     COMPARISON:    No relevant prior studies available.     FINDINGS:    VERTEBRAE:  Unremarkable.  No acute fracture.    DISCS/SPINAL CANAL/NEURAL FORAMINA:  No acute findings.  No spinal  canal stenosis.    SOFT TISSUES:  Unremarkable.       Impression:        No acute findings in the lumbar spine.        This report was finalized on 9/26/2024 1:17 PM by Dr. Moses Otto MD.       CT Thoracic Spine  Without Contrast [[id number]] Collected: 09/26/24 1316     Updated: 09/26/24 1319    Narrative:      EXAM:    CT Thoracic Spine Without Intravenous Contrast     EXAM DATE:    9/26/2024 12:58 PM     CLINICAL HISTORY:    back pain     TECHNIQUE:    Axial computed tomography images of the thoracic spine without  intravenous contrast.  Sagittal and coronal reformatted images were  created and reviewed.  This CT exam was performed using one or more of  the following dose reduction techniques:  automated exposure control,  adjustment of the mA and/or kV according to patient size, and/or use of  iterative reconstruction technique.     COMPARISON:    No relevant prior studies available.     FINDINGS:    VERTEBRAE:  Unremarkable.  No acute fracture.    DISCS/SPINAL CANAL/NEURAL FORAMINA:  No acute findings.  No spinal  canal stenosis.    SOFT TISSUES:  Unremarkable.       Impression:        No acute findings in the thoracic spine.        This report was finalized on 9/26/2024 1:17 PM by Dr. Moses Otto MD.       CT Head Without Contrast [[id number]] Collected: 09/26/24 1259     Updated: 09/26/24 1302    Narrative:      CT HEAD WO CONTRAST-     CLINICAL INDICATION: weakness        COMPARISON: None available     TECHNIQUE: Axial images of the brain were obtained with out intravenous  contrast.  Reformatted images were created in the sagittal and coronal  planes.     DOSE:     Radiation dose reduction techniques were utilized per ALARA protocol.  Automated exposure control was initiated through either or [employer] or  [employer] software packages by  protocol.        FINDINGS:    BRAIN: Probable subacute ischemia right middle cerebral artery  territory    VENTRICLES:  Unremarkable.  No ventriculomegaly.       BONES/JOINTS:  Unremarkable.  No acute fracture.       SOFT TISSUES:  Unremarkable.       SINUSES:  no air fluid levels       MASTOID AIR CELLS:  Unremarkable as visualized.  No mastoid effusion.           Impression:         1. Probable remote right middle cerebral artery infarction  2. No acute parenchymal mass, hemorrhage, or midline shift     This report was finalized on 9/26/2024 1:00 PM by Dr. Moses Otto MD.             I have personally reviewed the above radiology results.   ---------------------------------------------------------------------------------------------------------------------      Pertinent Infectious Disease Results          Assessment & Plan      Assessment        ESBL UTI      Plan      Patient presented to [facility] Emergency Department on 9/26/2024 for evaluation after sliding into the floor due to weakness.  WBC 7.38.  Chlamydia gonorrhea and trichomonas negative.  CRP 3.80.  Urinalysis positive with culture finalizing is greater than 100,000 colonies of E. coli ESBL.  Blood cultures from 9/26/2024 show no growth thus far.  HIV 1 and 2 nonreactive.  Hepatitis panel nonreactive.  COVID-19 and influenza PCR negative.  Lactic acid normal on admission.    Patient resting in bed.  Denies dysuria, urgency, frequency.  Patient reports recurrent yeast infections.  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  Lungs clear to auscultation bilaterally.    Recommend to continue current antibiotic regimen of ertapenem 1 g IV every 24 hours x 7 days for treatment of ESBL UTI.  Okay to continue home suppressive acyclovir for history of genital herpes.  We will continue to follow closely and adjust antibiotic therapy as needed.        ANTIMICROBIAL THERAPY    acyclovir - 200 MG, 400 MG  ertapenem (INVanz) 1000 mg in 100 mL NS (VTB)       Again, thank you Dr. Marc for allowing us to participate in the care of your patient and please feel free to call for any questions you may have.        Code Status:     Code Status and Medical Interventions: CPR (Attempt to Resuscitate); Full Support   Ordered at: 09/26/24 5176     Code Status (Patient has no pulse and is not breathing):    CPR (Attempt  to Resuscitate)     Medical Interventions (Patient has pulse or is breathing):    Full Support         YUE Rivera  24  14:36 EDT    Electronically signed by Cuca Yuen APRN at 24 1532          Physical Therapy Notes (most recent note)        Dilma Ochoa, PT at 10/15/24 1308  Version 1 of 1         Acute Care - Physical Therapy Treatment Note  LILLIANA Haines     Patient Name: Lety Johnson  : 1980  MRN: [id number]  Today's Date: 10/15/2024   Onset of Illness/Injury or Date of Surgery: 24  Visit Dx:     ICD-10-CM ICD-9-CM   1. Hypokalemia  E87.6 276.8   2. Pressure injury of skin of sacral region, unspecified injury stage  L89.159 707.03     707.20   3. Pressure injury of skin of left heel, unspecified injury stage  L89.629 707.07     707.20   4. Acute cystitis without hematuria  N30.00 595.0     Patient Active Problem List   Diagnosis   (none) - all problems resolved or deleted     Past Medical History:   Diagnosis Date    Stroke      History reviewed. No pertinent surgical history.  PT Assessment (Last 12 Hours)       PT Evaluation and Treatment       Row Name 10/15/24 1302          Physical Therapy Time and Intention    Subjective Information complains of;weakness;fatigue;pain  -CT     Document Type therapy note (daily note)  -CT     Mode of Treatment physical therapy  -CT     Patient Effort adequate  -CT       Row Name 10/15/24 1302          General Information    Patient Profile Reviewed yes  -CT       Row Name 10/15/24 1302          Pain    Pretreatment Pain Rating 5/10  -CT     Posttreatment Pain Rating 8/10  -CT       Row Name 10/15/24 1302          Cognition    Affect/Mental Status (Cognition) emotionally labile  -CT     Orientation Status (Cognition) oriented x 3  -CT     Follows Commands (Cognition) WFL  -CT       Row Name 10/15/24 1302          Bed Mobility    Bed Mobility bed mobility (all) activities  -CT     All Activities, [place] (Bed Mobility) moderate  assist (50% patient effort);maximum assist (25% patient effort);verbal cues;nonverbal cues (demo/gesture)  -CT     Bed Mobility, Safety Issues decreased use of arms for pushing/pulling;decreased use of legs for bridging/pushing  -CT     Assistive Device (Bed Mobility) bed rails;head of bed elevated  -CT       Row Name 10/15/24 1302          Balance    Static Sitting Balance independent  -CT     Dynamic Sitting Balance set-up  -CT     Position, Sitting Balance sitting edge of bed  -CT     Balance Interventions sitting  -CT       Row Name             Wound 09/26/24 1809 Left posterior ankle Other (comment)    Wound - Properties Group Placement Date: 09/26/24  -KJ Placement Time: 1809  -KJ Side: Left  -KJ Orientation: posterior  -KJ Location: ankle  -KJ Primary Wound Type: Other  -TW, unknow etiology     Retired Wound - Properties Group Placement Date: 09/26/24  -KJ Placement Time: 1809  -KJ Side: Left  -KJ Orientation: posterior  -KJ Location: ankle  -KJ Primary Wound Type: Other  -TW, unknow etiology     Retired Wound - Properties Group Placement Date: 09/26/24  -KJ Placement Time: 1809  -KJ Side: Left  -KJ Orientation: posterior  -KJ Location: ankle  -KJ Primary Wound Type: Other  -TW, unknow etiology     Retired Wound - Properties Group Date first assessed: 09/26/24  -KJ Time first assessed: 1809  -KJ Side: Left  -KJ Location: ankle  -KJ Primary Wound Type: Other  -TW, unknow etiology       Row Name             Wound 10/11/24 1330 medial coccyx Fissure    Wound - Properties Group Placement Date: 10/11/24  -TW Placement Time: 1330 -TW Orientation: medial  -TW Location: coccyx  -TW Primary Wound Type: Fissure  -TW    Retired Wound - Properties Group Placement Date: 10/11/24  -TW Placement Time: 1330 -TW Orientation: medial  -TW Location: coccyx  -TW Primary Wound Type: Fissure  -TW    Retired Wound - Properties Group Placement Date: 10/11/24  -TW Placement Time: 1330 -TW Orientation: medial  -TW Location: coccyx   -TW Primary Wound Type: Fissure  -TW    Retired Wound - Properties Group Date first assessed: 10/11/24  -TW Time first assessed: 1330  -TW Location: coccyx  -TW Primary Wound Type: Fissure  -TW      Row Name 10/15/24 1302          Coping    Observed Emotional State calm;cooperative  -CT     Verbalized Emotional State acceptance  -CT       Row Name 10/15/24 1302          Plan of Care Review    Plan of Care Reviewed With patient  -CT       Row Name 10/15/24 1302          Positioning and Restraints    Pre-Treatment Position in bed  -CT     Post Treatment Position bed  -CT     In Bed sitting EOB;call light within reach;encouraged to call for assist;notified nsg  -CT       Row Name 10/15/24 1302          Therapy Assessment/Plan (PT)    Rehab Potential (PT) fair  -CT     Criteria for Skilled Interventions Met (PT) yes;meets criteria;skilled treatment is necessary  -CT     Therapy Frequency (PT) 2 times/wk  1-5 times/wk  -CT     Problem List (PT) problems related to;balance;mobility;hemiparesis/hemiplegia;coordination;strength;range of motion (ROM);muscle tone;motor control  -CT     Activity Limitations Related to Problem List (PT) unable to ambulate safely;unable to transfer safely;BADLs not performed adequately or safely  -CT               User Key  (r) = Recorded By, (t) = Taken By, (c) = Cosigned By      Initials Name Provider Type    TW Karen Gipson, RN Registered Nurse    CT Dlima Ochoa, GENARO Physical Therapist    Nory Keenan RN Registered Nurse                    Physical Therapy Education       Title: PT OT SLP Therapies (Done)       Topic: Physical Therapy (Done)       Point: Mobility training (Done)       Learning Progress Summary            Patient Acceptance, E,TB, VU by CF at 10/15/2024 0753    Acceptance, E,TB, VU by CF at 10/14/2024 0801    Acceptance, E,TB, VU by CF at 10/13/2024 1045    Acceptance, E,D, VU,NR by AG at 10/10/2024 1310    Acceptance, E,TB, VU by CB at 10/8/2024 0446     Acceptance, E, NR by RD at 10/2/2024 1101    Acceptance, E, NR by RD at 10/1/2024 0856    Acceptance, E,D, VU,NR by AG at 9/30/2024 1559                      Point: Home exercise program (Done)       Learning Progress Summary            Patient Acceptance, E,TB, VU by CF at 10/15/2024 0753    Acceptance, E,TB, VU by CF at 10/14/2024 0801    Acceptance, E,TB, VU by CF at 10/13/2024 1045    Acceptance, E,D, VU,NR by AG at 10/10/2024 1310    Acceptance, E,TB, VU by CB at 10/8/2024 0448    Acceptance, E, NR by RD at 10/2/2024 1101    Acceptance, E, NR by RD at 10/1/2024 0856    Acceptance, E,D, VU,NR by AG at 9/30/2024 1559                      Point: Body mechanics (Done)       Learning Progress Summary            Patient Acceptance, E,TB, VU by CF at 10/15/2024 0753    Acceptance, E,TB, VU by CF at 10/14/2024 0801    Acceptance, E,TB, VU by CF at 10/13/2024 1045    Acceptance, E,D, VU,NR by AG at 10/10/2024 1310    Acceptance, E,TB, VU by CB at 10/8/2024 0448    Acceptance, E, NR by RD at 10/2/2024 1101    Acceptance, E, NR by RD at 10/1/2024 0856    Acceptance, E,D, VU,NR by AG at 9/30/2024 1559                      Point: Precautions (Done)       Learning Progress Summary            Patient Acceptance, E,TB, VU by CF at 10/15/2024 0753    Acceptance, E,TB, VU by CF at 10/14/2024 0801    Acceptance, E,TB, VU by CF at 10/13/2024 1045    Acceptance, E,D, VU,NR by AG at 10/10/2024 1310    Acceptance, E,TB, VU by CB at 10/8/2024 0448    Acceptance, E, NR by RD at 10/2/2024 1101    Acceptance, E, NR by RD at 10/1/2024 0856    Acceptance, E,D, VU,NR by AG at 9/30/2024 6779                                      User Key       Initials Effective Dates Name Provider Type Discipline     06/16/21 -  Мария Joya, PT Physical Therapist PT    RD 06/16/21 -  Laisha Verdugo, RN Registered Nurse Nurse    CF 06/25/24 -  Cherelle Germain, RN Registered Nurse Nurse    CB 06/17/24 -  Fidelia Lombardo, RN Registered Nurse Nurse                   PT Recommendation and Plan  Anticipated Discharge Disposition (PT): skilled nursing facility, inpatient rehabilitation facility  Planned Therapy Interventions (PT): balance training, bed mobility training, gait training, home exercise program, manual therapy techniques, motor coordination training, neuromuscular re-education, patient/family education, postural re-education, strengthening, ROM (range of motion), transfer training, wheelchair management/propulsion training  Therapy Frequency (PT): 2 times/wk (1-5 times/wk)  Plan of Care Reviewed With: patient       Time Calculation:    PT Charges       Row Name 10/15/24 1307             Time Calculation    PT Received On 10/15/24  -CT         Time Calculation- PT    Total Timed Code Minutes- PT 34 minute(s)  -CT                User Key  (r) = Recorded By, (t) = Taken By, (c) = Cosigned By      Initials Name Provider Type    CT Dilma Ochoa, PT Physical Therapist                  Therapy Charges for Today       Code Description Service Date Service Provider Modifiers Qty    14331259139 HC PT THERAPEUTIC ACT EA 15 MIN 10/14/2024 Dilma Ochoa, PT GP 2    87931983038 HC PT NEUROMUSC RE EDUCATION EA 15 MIN 10/14/2024 Dilma Ochoa, PT GP 1    88958949725 HC PT RE-EVAL ESTABLISHED PLAN 2 10/14/2024 Dilma Ochoa, PT GP 1    32892391094 HC PT THERAPEUTIC ACT EA 15 MIN 10/15/2024 Dilma Ochoa, PT GP 2            PT G-Codes  AM-PAC 6 Clicks Score (PT): 8    Dilma Ochoa, PT  10/15/2024      Electronically signed by Dilma Ochoa PT at 10/15/24 1308          Occupational Therapy Notes (most recent note)        Neil Graf, OT at 10/15/24 1117          Acute Care - Occupational Therapy Treatment Note  [facility]     Patient Name: Lety Johnson  : 1980  MRN: [id number]  Today's Date: 10/15/2024  Onset of Illness/Injury or Date of Surgery: 24     Referring Physician: Dr. Marc    Admit Date: 2024       ICD-10-CM ICD-9-CM   1.  Hypokalemia  E87.6 276.8   2. Pressure injury of skin of sacral region, unspecified injury stage  L89.159 707.03     707.20   3. Pressure injury of skin of left heel, unspecified injury stage  L89.629 707.07     707.20   4. Acute cystitis without hematuria  N30.00 595.0     Patient Active Problem List   Diagnosis   (none) - all problems resolved or deleted     Past Medical History:   Diagnosis Date    Stroke      History reviewed. No pertinent surgical history.      OT ASSESSMENT FLOWSHEET (Last 12 Hours)       OT Evaluation and Treatment       Row Name 10/15/24 1113                   OT Time and Intention    Document Type therapy note (daily note)  -KR        Mode of Treatment occupational therapy  -KR        Patient Effort adequate  -KR        Comment Continued PROM LUE to promote fxl positioning, decrease deformity. Pt able to tolerate L elbow ext to WFL, slight flexion of shoulder, neutral wrist and hand for fxl positioning at side, while resting. Pt education continued for importance of PROM/positioning to decrease abnormal tone and prevent contractures. Pt reported comprehension. Nursing informed of positioning LUE as tolerated.  -KR           Motor Skills    Neuromuscular Function left;upper extremity;severe impairment  -KR           Shoulder (Therapeutic Exercise)    Shoulder PROM (Therapeutic Exercise) left;flexion  -KR           Elbow/Forearm (Therapeutic Exercise)    Elbow/Forearm PROM (Therapeutic Exercise) left;extension  -KR           Wrist (Therapeutic Exercise)    Wrist PROM (Therapeutic Exercise) left;flexion;extension  -KR           Hand (Therapeutic Exercise)    Hand PROM (Therapeutic Exercise) left;finger flexion;finger extension  -KR           Wound 09/26/24 1809 Left posterior ankle Other (comment)    Wound - Properties Group Placement Date: 09/26/24  -KJ Placement Time: 1809  -KJ Side: Left  -KJ Orientation: posterior  -KJ Location: ankle  -KJ Primary Wound Type: Other  -TW, unknow etiology      Retired Wound - Properties Group Placement Date: 09/26/24  -KJ Placement Time: 1809  -KJ Side: Left  -KJ Orientation: posterior  -KJ Location: ankle  -KJ Primary Wound Type: Other  -TW, unknow etiology     Retired Wound - Properties Group Placement Date: 09/26/24  -KJ Placement Time: 1809  -KJ Side: Left  -KJ Orientation: posterior  -KJ Location: ankle  -KJ Primary Wound Type: Other  -TW, unknow etiology     Retired Wound - Properties Group Date first assessed: 09/26/24  -KJ Time first assessed: 1809  -KJ Side: Left  -KJ Location: ankle  -KJ Primary Wound Type: Other  -TW, unknow etiology        Wound 10/11/24 1330 medial coccyx Fissure    Wound - Properties Group Placement Date: 10/11/24  -TW Placement Time: 1330 -TW Orientation: medial  -TW Location: coccyx  -TW Primary Wound Type: Fissure  -TW    Retired Wound - Properties Group Placement Date: 10/11/24  -TW Placement Time: 1330  -TW Orientation: medial  -TW Location: coccyx  -TW Primary Wound Type: Fissure  -TW    Retired Wound - Properties Group Placement Date: 10/11/24  -TW Placement Time: 1330  -TW Orientation: medial  -TW Location: coccyx  -TW Primary Wound Type: Fissure  -TW    Retired Wound - Properties Group Date first assessed: 10/11/24  -TW Time first assessed: 1330  -TW Location: coccyx  -TW Primary Wound Type: Fissure  -TW       Plan of Care Review    Plan of Care Reviewed With patient  -KR        Progress improving  -KR           ROM Goal 1 (OT)    Progress/Outcome (ROM Goal 1, OT) continuing progress toward goal  -KR                  User Key  (r) = Recorded By, (t) = Taken By, (c) = Cosigned By      Initials Name Effective Dates    TW Karen Gipson RN 06/16/21 -     KR Neil Graf OT 06/16/21 -     Nory Keenan RN 06/08/23 -                      Occupational Therapy Education        No education to display                      OT Recommendation and Plan  Planned Therapy Interventions (OT): activity tolerance training,  neuromuscular control/coordination retraining, ROM/therapeutic exercise  Plan of Care Review  Plan of Care Reviewed With: patient  Progress: improving  Plan of Care Reviewed With: patient        Time Calculation:     Therapy Charges for Today       Code Description Service Date Service Provider Modifiers Qty    44020267856  OT THERAPEUTIC ACT EA 15 MIN 10/14/2024 Neil Graf OT GO 2    96242574426  OT SELF CARE/MGMT/TRAIN EA 15 MIN 10/14/2024 Neil Graf OT GO 1    [id number]  OT THERAPEUTIC ACT EA 15 MIN 10/15/2024 Neil Graf OT GO 2                 Neil Graf OT  10/15/2024    Electronically signed by Neil Graf OT at 10/15/24 1114

## 2024-10-15 NOTE — PLAN OF CARE
Goal Outcome Evaluation:      Pt A/O, confusion noted at times. VSS, wound care completed per orders, pt c/o nausea given PRN n/v meds per orders. pt is resting in bed at this time without complaint. POC ongoing.

## 2024-10-15 NOTE — THERAPY TREATMENT NOTE
Acute Care - Physical Therapy Treatment Note   [facility]     Patient Name: Lety Johnson  : 1980  MRN: [id number]  Today's Date: 10/15/2024   Onset of Illness/Injury or Date of Surgery: 24  Visit Dx:     ICD-10-CM ICD-9-CM   1. Hypokalemia  E87.6 276.8   2. Pressure injury of skin of sacral region, unspecified injury stage  L89.159 707.03     707.20   3. Pressure injury of skin of left heel, unspecified injury stage  L89.629 707.07     707.20   4. Acute cystitis without hematuria  N30.00 595.0     Patient Active Problem List   Diagnosis   (none) - all problems resolved or deleted     Past Medical History:   Diagnosis Date    Stroke      History reviewed. No pertinent surgical history.  PT Assessment (Last 12 Hours)       PT Evaluation and Treatment       Row Name 10/15/24 1302          Physical Therapy Time and Intention    Subjective Information complains of;weakness;fatigue;pain  -CT     Document Type therapy note (daily note)  -CT     Mode of Treatment physical therapy  -CT     Patient Effort adequate  -CT       Row Name 10/15/24 1302          General Information    Patient Profile Reviewed yes  -CT       Row Name 10/15/24 1302          Pain    Pretreatment Pain Rating 5/10  -CT     Posttreatment Pain Rating 8/10  -CT       Row Name 10/15/24 1302          Cognition    Affect/Mental Status (Cognition) emotionally labile  -CT     Orientation Status (Cognition) oriented x 3  -CT     Follows Commands (Cognition) WFL  -CT       Row Name 10/15/24 1302          Bed Mobility    Bed Mobility bed mobility (all) activities  -CT     All Activities, Iron (Bed Mobility) moderate assist (50% patient effort);maximum assist (25% patient effort);verbal cues;nonverbal cues (demo/gesture)  -CT     Bed Mobility, Safety Issues decreased use of arms for pushing/pulling;decreased use of legs for bridging/pushing  -CT     Assistive Device (Bed Mobility) bed rails;head of bed elevated  -CT       Row Name 10/15/24  1302          Balance    Static Sitting Balance independent  -CT     Dynamic Sitting Balance set-up  -CT     Position, Sitting Balance sitting edge of bed  -CT     Balance Interventions sitting  -CT       Row Name             Wound 09/26/24 1809 Left posterior ankle Other (comment)    Wound - Properties Group Placement Date: 09/26/24  -KJ Placement Time: 1809  -KJ Side: Left  -KJ Orientation: posterior  -KJ Location: ankle  -KJ Primary Wound Type: Other  -TW, unknow etiology     Retired Wound - Properties Group Placement Date: 09/26/24  -KJ Placement Time: 1809  -KJ Side: Left  -KJ Orientation: posterior  -KJ Location: ankle  -KJ Primary Wound Type: Other  -TW, unknow etiology     Retired Wound - Properties Group Placement Date: 09/26/24  -KJ Placement Time: 1809  -KJ Side: Left  -KJ Orientation: posterior  -KJ Location: ankle  -KJ Primary Wound Type: Other  -TW, unknow etiology     Retired Wound - Properties Group Date first assessed: 09/26/24  -KJ Time first assessed: 1809  -KJ Side: Left  -KJ Location: ankle  -KJ Primary Wound Type: Other  -TW, unknow etiology       Row Name             Wound 10/11/24 1330 medial coccyx Fissure    Wound - Properties Group Placement Date: 10/11/24  -TW Placement Time: 1330  -TW Orientation: medial  -TW Location: coccyx  -TW Primary Wound Type: Fissure  -TW    Retired Wound - Properties Group Placement Date: 10/11/24  -TW Placement Time: 1330  -TW Orientation: medial  -TW Location: coccyx  -TW Primary Wound Type: Fissure  -TW    Retired Wound - Properties Group Placement Date: 10/11/24  -TW Placement Time: 1330  -TW Orientation: medial  -TW Location: coccyx  -TW Primary Wound Type: Fissure  -TW    Retired Wound - Properties Group Date first assessed: 10/11/24  -TW Time first assessed: 1330  -TW Location: coccyx  -TW Primary Wound Type: Fissure  -TW      Row Name 10/15/24 1302          Coping    Observed Emotional State calm;cooperative  -CT     Verbalized Emotional State acceptance   -CT       Row Name 10/15/24 1302          Plan of Care Review    Plan of Care Reviewed With patient  -CT       Row Name 10/15/24 1302          Positioning and Restraints    Pre-Treatment Position in bed  -CT     Post Treatment Position bed  -CT     In Bed sitting EOB;call light within reach;encouraged to call for assist;notified nsg  -CT       Row Name 10/15/24 1302          Therapy Assessment/Plan (PT)    Rehab Potential (PT) fair  -CT     Criteria for Skilled Interventions Met (PT) yes;meets criteria;skilled treatment is necessary  -CT     Therapy Frequency (PT) 2 times/wk  1-5 times/wk  -CT     Problem List (PT) problems related to;balance;mobility;hemiparesis/hemiplegia;coordination;strength;range of motion (ROM);muscle tone;motor control  -CT     Activity Limitations Related to Problem List (PT) unable to ambulate safely;unable to transfer safely;BADLs not performed adequately or safely  -CT               User Key  (r) = Recorded By, (t) = Taken By, (c) = Cosigned By      Initials Name Provider Type    Karen Peralta, RN Registered Nurse    Dilma Palomo, PT Physical Therapist    Nory Keenan, RN Registered Nurse                    Physical Therapy Education       Title: PT OT SLP Therapies (Done)       Topic: Physical Therapy (Done)       Point: Mobility training (Done)       Learning Progress Summary            Patient Acceptance, E,TB, VU by CF at 10/15/2024 0753    Acceptance, E,TB, VU by CF at 10/14/2024 0801    Acceptance, E,TB, VU by CF at 10/13/2024 1045    Acceptance, E,D, VU,NR by AG at 10/10/2024 1310    Acceptance, E,TB, VU by CB at 10/8/2024 0448    Acceptance, E, NR by RD at 10/2/2024 1101    Acceptance, E, NR by RD at 10/1/2024 0856    Acceptance, E,D, VU,NR by AG at 9/30/2024 1559                      Point: Home exercise program (Done)       Learning Progress Summary            Patient Acceptance, E,TB, VU by CF at 10/15/2024 0753    Acceptance, E,TB, VU by CF at 10/14/2024  0801    Acceptance, E,TB, VU by CF at 10/13/2024 1045    Acceptance, E,D, VU,NR by AG at 10/10/2024 1310    Acceptance, E,TB, VU by CB at 10/8/2024 0448    Acceptance, E, NR by RD at 10/2/2024 1101    Acceptance, E, NR by RD at 10/1/2024 0856    Acceptance, E,D, VU,NR by AG at 9/30/2024 1559                      Point: Body mechanics (Done)       Learning Progress Summary            Patient Acceptance, E,TB, VU by CF at 10/15/2024 0753    Acceptance, E,TB, VU by CF at 10/14/2024 0801    Acceptance, E,TB, VU by CF at 10/13/2024 1045    Acceptance, E,D, VU,NR by AG at 10/10/2024 1310    Acceptance, E,TB, VU by CB at 10/8/2024 0448    Acceptance, E, NR by RD at 10/2/2024 1101    Acceptance, E, NR by RD at 10/1/2024 0856    Acceptance, E,D, VU,NR by AG at 9/30/2024 1559                      Point: Precautions (Done)       Learning Progress Summary            Patient Acceptance, E,TB, VU by CF at 10/15/2024 0753    Acceptance, E,TB, VU by CF at 10/14/2024 0801    Acceptance, E,TB, VU by CF at 10/13/2024 1045    Acceptance, E,D, VU,NR by AG at 10/10/2024 1310    Acceptance, E,TB, VU by CB at 10/8/2024 0448    Acceptance, E, NR by RD at 10/2/2024 1101    Acceptance, E, NR by RD at 10/1/2024 0856    Acceptance, E,D, VU,NR by AG at 9/30/2024 1559                                      User Key       Initials Effective Dates Name Provider Type Discipline     06/16/21 -  Мария Joya, PT Physical Therapist PT    RD 06/16/21 -  Laisha Verdugo, RN Registered Nurse Nurse    CF 06/25/24 -  Cherelle Germain, RN Registered Nurse Nurse    CB 06/17/24 -  Fidelia Lombardo, RN Registered Nurse Nurse                  PT Recommendation and Plan  Anticipated Discharge Disposition (PT): skilled nursing facility, inpatient rehabilitation facility  Planned Therapy Interventions (PT): balance training, bed mobility training, gait training, home exercise program, manual therapy techniques, motor coordination training, neuromuscular  re-education, patient/family education, postural re-education, strengthening, ROM (range of motion), transfer training, wheelchair management/propulsion training  Therapy Frequency (PT): 2 times/wk (1-5 times/wk)  Plan of Care Reviewed With: patient       Time Calculation:    PT Charges       Row Name 10/15/24 1307             Time Calculation    PT Received On 10/15/24  -CT         Time Calculation- PT    Total Timed Code Minutes- PT 34 minute(s)  -CT                User Key  (r) = Recorded By, (t) = Taken By, (c) = Cosigned By      Initials Name Provider Type    CT Dilma Ochoa, PT Physical Therapist                  Therapy Charges for Today       Code Description Service Date Service Provider Modifiers Qty    03336469248 HC PT THERAPEUTIC ACT EA 15 MIN 10/14/2024 Dilma Ochoa, PT GP 2    [id number]  PT NEUROMUSC RE EDUCATION EA 15 MIN 10/14/2024 Dilma Ochoa, PT GP 1    35646433225 HC PT RE-EVAL ESTABLISHED PLAN 2 10/14/2024 Dilma Ochoa, PT GP 1    [id number]  PT THERAPEUTIC ACT EA 15 MIN 10/15/2024 Dilma Ochoa, PT GP 2            PT G-Codes  AM-PAC 6 Clicks Score (PT): 8    Dilma Ochoa PT  10/15/2024

## 2024-10-15 NOTE — PLAN OF CARE
Goal Outcome Evaluation:  Plan of Care Reviewed With: patient           Outcome Evaluation: Patient resting in bed at this time. VSS on RA. Pt c/o pain, PRN pain meds given per MAR. No acute changes or concerns noted at this time. Will continue with plan of care.

## 2024-10-15 NOTE — PROGRESS NOTES
[facility]     Patient Identification:  Name:  Lety Johnson  Age:  43 y.o.  Sex:  female  :  1980  MRN:  [id number]  Visit Number:  [id number]  Primary Care Provider:  Provider, No Known    Length of stay:  17    Chief complaint: Pain    Subjective:    Patient seen and examined at bedside with no nursing staff present.  Patient intermittently tearful but overall appears stable and at her baseline functioning status.  Per nursing staff, no new events overnight.  ----------------------------------------------------------------------------------------------------------------------  Current Hospital Meds:  acyclovir, 400 mg, Oral, Nightly  aspirin, 81 mg, Oral, Daily  atorvastatin, 80 mg, Oral, Daily  DULoxetine, 60 mg, Oral, Daily  heparin (porcine), 5,000 Units, Subcutaneous, Q8H  hydrOXYzine, 25 mg, Oral, Once  insulin glargine, 30 Units, Subcutaneous, Nightly  insulin lispro, 2-9 Units, Subcutaneous, 4x Daily PC & at Bedtime  lisinopril, 20 mg, Oral, Daily  [Held by provider] metFORMIN, 1,000 mg, Oral, BID With Meals  metoprolol tartrate, 25 mg, Oral, Q12H  nicotine, 1 patch, Transdermal, Q24H  nystatin, , Topical, Q12H  pantoprazole, 40 mg, Oral, Daily  sodium chloride, 10 mL, Intravenous, Q12H  sodium chloride, 10 mL, Intravenous, Q12H      Pharmacy Consult,       ----------------------------------------------------------------------------------------------------------------------  Vital Signs:  Temp:  [97.7 °F (36.5 °C)-98.6 °F (37 °C)] 98 °F (36.7 °C)  Heart Rate:  [] 109  Resp:  [16-18] 16  BP: (100-120)/(57-65) 109/59      10/07/24  0511 10/08/24  0500 10/09/24  0500   Weight: 102 kg (225 lb 14.4 oz) (FIFI cameron) 102 kg (225 lb 15.5 oz) 102 kg (224 lb 1.6 oz)     Body mass index is 36.73 kg/m².    Intake/Output Summary (Last 24 hours) at 10/15/2024 1916  Last data filed at 10/15/2024 1700  Gross per 24 hour   Intake 1270 ml   Output 1700 ml   Net  -430 ml     Diet: Regular/House, Diabetic; Consistent Carbohydrate; Fluid Consistency: Thin (IDDSI 0)  ----------------------------------------------------------------------------------------------------------------------  Physical exam:  Constitutional: Chronically ill-appearing  female in no apparent distress.     HENT:  Head:  Normocephalic and atraumatic.  Mouth:  Moist mucous membranes.    Eyes:  Conjunctivae and EOM are normal.  Pupils are equal, round, and reactive to light.  No scleral icterus.    Neck:  Neck supple. No thyromegaly.  No JVD present.    Cardiovascular:  Regular rate and rhythm with no murmurs, rubs, clicks or gallops appreciated.  Pulmonary/Chest:  Clear to auscultation bilaterally with no crackles, wheezes or rhonchi appreciated.  Abdominal:  Soft. Nontender. Nondistended  Bowel sounds are normal in all four quadrants. No organomegally appreciated.   Musculoskeletal:  No edema, no tenderness, and no deformity.  No red or swollen joints anywhere.    Neurological:  Alert, Cranial nerves II-XII intact with no focal deficits.  No facial droop.  No slurred speech.   Skin:  Warm and dry to palpation with no rashes or lesions appreciated.  Peripheral vascular:  2+ radial and pedal pulses in bilateral upper and lower extremities.  Psychiatric:  Alert and oriented x3, demonstrates appropriate judgment and insight.    No significant change in physical exam in comparison to 10/14/2024  -------------------------------------------------------------------------  ----------------------------------------------------------------------------------------------------------------------      Results from last 7 days   Lab Units 10/15/24  0741 10/13/24  0742 10/11/24  0814   WBC 10*3/mm3 9.56 11.31* 9.91   HEMOGLOBIN g/dL 13.6 13.9 13.7   HEMATOCRIT % 41.8 42.0 41.4   MCV fL 99.8* 100.0* 99.0*   MCHC g/dL 32.5 33.1 33.1   PLATELETS 10*3/mm3 634 274 516         Results from last 7 days   Lab Units  "10/15/24  0741 10/13/24  0742 10/11/24  1832 10/11/24  0814   SODIUM mmol/L 135* 132*  --  134*   POTASSIUM mmol/L 4.2 4.1 4.7 3.6   CHLORIDE mmol/L 98 96*  --  99   CO2 mmol/L 23.9 21.5*  --  23.5   BUN mg/dL 23* 30*  --  13   CREATININE mg/dL 0.49* 0.56*  --  0.44*   CALCIUM mg/dL 10.1 9.9  --  9.6   GLUCOSE mg/dL 324* 375*  --  404*   Estimated Creatinine Clearance: 178.7 mL/min (A) (by C-G formula based on SCr of 0.49 mg/dL (L)).    No results found for: \"AMMONIA\"      No results found for: \"BLOODCX\"  No results found for: \"URINECX\"  No results found for: \"WOUNDCX\"  No results found for: \"STOOLCX\"    I have personally looked at the labs and they are summarized above.  ----------------------------------------------------------------------------------------------------------------------  Imaging Results (Last 24 Hours)       ** No results found for the last 24 hours. **          ----------------------------------------------------------------------------------------------------------------------  Assessment and Plan:    ESBL acute cystitis -patient has completed full course of IV antibiotic therapy with Invanz     2.  Acute metabolic encephalopathy -resolved, initially secondary to acute UTI     3.  History of CVA -patient with left-sided hemiparesis secondary to history of CVA     4.  Debility -continue PT/OT     5.  History of genital herpes -continue acyclovir     6.  Morbid obesity -complicates all aspects of care    Disposition still pending placement    Marv Navas DO   10/15/24   19:16 EDT     "

## 2024-10-15 NOTE — CASE MANAGEMENT/SOCIAL WORK
Discharge Planning Assessment   [facility]     Patient Name: Lety Johnson  MRN: [id number]  Today's Date: 10/15/2024    Admit Date: 9/26/2024       Discharge Plan       Row Name 10/15/24 1341       Plan    Plan SS notified by [facility] per Janessa [phone] states Pt's insurance is out of network with facility. SS left message for [facility] [phone] per Raysa to return SS call. SS contacted [employer] per Balbir and faxed referral to fax [phone]. SS to follow.    15:02pm: SS attempted to notify [facility] to discuss discharge plan but was not successful, left message for a return call. SS to follow.                    SHELBY ReynoldsW

## 2024-10-16 LAB
GLUCOSE BLDC GLUCOMTR-MCNC: 296 MG/DL (ref 70–130)
GLUCOSE BLDC GLUCOMTR-MCNC: 383 MG/DL (ref 70–130)
GLUCOSE BLDC GLUCOMTR-MCNC: 396 MG/DL (ref 70–130)
GLUCOSE BLDC GLUCOMTR-MCNC: 421 MG/DL (ref 70–130)

## 2024-10-16 PROCEDURE — 99231 SBSQ HOSP IP/OBS SF/LOW 25: CPT | Performed by: INTERNAL MEDICINE

## 2024-10-16 PROCEDURE — 63710000001 INSULIN GLARGINE PER 5 UNITS: Performed by: INTERNAL MEDICINE

## 2024-10-16 PROCEDURE — 97530 THERAPEUTIC ACTIVITIES: CPT

## 2024-10-16 PROCEDURE — 97535 SELF CARE MNGMENT TRAINING: CPT

## 2024-10-16 PROCEDURE — 63710000001 INSULIN LISPRO (HUMAN) PER 5 UNITS: Performed by: INTERNAL MEDICINE

## 2024-10-16 PROCEDURE — 82948 REAGENT STRIP/BLOOD GLUCOSE: CPT

## 2024-10-16 PROCEDURE — 25010000002 HEPARIN (PORCINE) PER 1000 UNITS: Performed by: INTERNAL MEDICINE

## 2024-10-16 RX ADMIN — CYCLOBENZAPRINE 10 MG: 10 TABLET, FILM COATED ORAL at 21:47

## 2024-10-16 RX ADMIN — INSULIN LISPRO 8 UNITS: 100 INJECTION, SOLUTION INTRAVENOUS; SUBCUTANEOUS at 18:10

## 2024-10-16 RX ADMIN — HEPARIN SODIUM 5000 UNITS: 5000 INJECTION INTRAVENOUS; SUBCUTANEOUS at 21:34

## 2024-10-16 RX ADMIN — ACETAMINOPHEN 650 MG: 325 TABLET ORAL at 04:45

## 2024-10-16 RX ADMIN — NYSTATIN: 100000 POWDER TOPICAL at 21:39

## 2024-10-16 RX ADMIN — ATORVASTATIN CALCIUM 80 MG: 40 TABLET, FILM COATED ORAL at 09:19

## 2024-10-16 RX ADMIN — Medication 10 ML: at 09:22

## 2024-10-16 RX ADMIN — ACETAMINOPHEN 650 MG: 325 TABLET ORAL at 10:25

## 2024-10-16 RX ADMIN — NYSTATIN: 100000 POWDER TOPICAL at 09:22

## 2024-10-16 RX ADMIN — DULOXETINE HYDROCHLORIDE 60 MG: 60 CAPSULE, DELAYED RELEASE ORAL at 09:21

## 2024-10-16 RX ADMIN — METOPROLOL TARTRATE 25 MG: 25 TABLET, FILM COATED ORAL at 09:19

## 2024-10-16 RX ADMIN — INSULIN GLARGINE 30 UNITS: 100 INJECTION, SOLUTION SUBCUTANEOUS at 21:35

## 2024-10-16 RX ADMIN — INSULIN LISPRO 8 UNITS: 100 INJECTION, SOLUTION INTRAVENOUS; SUBCUTANEOUS at 13:03

## 2024-10-16 RX ADMIN — Medication 10 ML: at 21:39

## 2024-10-16 RX ADMIN — ACETAMINOPHEN 650 MG: 325 TABLET ORAL at 21:47

## 2024-10-16 RX ADMIN — ASPIRIN 81 MG: 81 TABLET, CHEWABLE ORAL at 09:21

## 2024-10-16 RX ADMIN — PANTOPRAZOLE SODIUM 40 MG: 40 TABLET, DELAYED RELEASE ORAL at 09:18

## 2024-10-16 RX ADMIN — INSULIN LISPRO 9 UNITS: 100 INJECTION, SOLUTION INTRAVENOUS; SUBCUTANEOUS at 21:35

## 2024-10-16 RX ADMIN — METOPROLOL TARTRATE 25 MG: 25 TABLET, FILM COATED ORAL at 21:33

## 2024-10-16 RX ADMIN — ACYCLOVIR 400 MG: 200 CAPSULE ORAL at 21:33

## 2024-10-16 RX ADMIN — LISINOPRIL 20 MG: 10 TABLET ORAL at 09:21

## 2024-10-16 RX ADMIN — NICOTINE 1 PATCH: 7 PATCH, EXTENDED RELEASE TRANSDERMAL at 09:19

## 2024-10-16 RX ADMIN — Medication 5 MG: at 21:47

## 2024-10-16 RX ADMIN — HEPARIN SODIUM 5000 UNITS: 5000 INJECTION INTRAVENOUS; SUBCUTANEOUS at 13:03

## 2024-10-16 RX ADMIN — INSULIN LISPRO 6 UNITS: 100 INJECTION, SOLUTION INTRAVENOUS; SUBCUTANEOUS at 09:22

## 2024-10-16 RX ADMIN — HEPARIN SODIUM 5000 UNITS: 5000 INJECTION INTRAVENOUS; SUBCUTANEOUS at 05:30

## 2024-10-16 NOTE — THERAPY TREATMENT NOTE
Acute Care - Physical Therapy Treatment Note   [place]     Patient Name: Lety Johnson  : 1980  MRN: [id number]  Today's Date: 10/16/2024   Onset of Illness/Injury or Date of Surgery: 24  Visit Dx:     ICD-10-CM ICD-9-CM   1. Hypokalemia  E87.6 276.8   2. Pressure injury of skin of sacral region, unspecified injury stage  L89.159 707.03     707.20   3. Pressure injury of skin of left heel, unspecified injury stage  L89.629 707.07     707.20   4. Acute cystitis without hematuria  N30.00 595.0     Patient Active Problem List   Diagnosis   (none) - all problems resolved or deleted     Past Medical History:   Diagnosis Date    Stroke      History reviewed. No pertinent surgical history.  PT Assessment (Last 12 Hours)       PT Evaluation and Treatment       Row Name 10/16/24 1139          Physical Therapy Time and Intention    Subjective Information complains of;weakness;fatigue;pain  -CT     Document Type therapy note (daily note)  -CT     Mode of Treatment physical therapy  -CT     Patient Effort adequate  -CT       Row Name 10/16/24 1139          General Information    Patient Profile Reviewed yes  -CT       Row Name 10/16/24 1139          Pain    Pretreatment Pain Rating 5/10  -CT     Posttreatment Pain Rating 7/10  -CT       Row Name 10/16/24 1139          Cognition    Affect/Mental Status (Cognition) emotionally labile  -CT     Orientation Status (Cognition) oriented x 3  -CT     Follows Commands (Cognition) WFL  -CT       Row Name 10/16/24 1139          Bed Mobility    Bed Mobility bed mobility (all) activities  -CT     All Activities, [place] (Bed Mobility) moderate assist (50% patient effort);maximum assist (25% patient effort);verbal cues;nonverbal cues (demo/gesture)  -CT     Bed Mobility, Safety Issues decreased use of arms for pushing/pulling;decreased use of legs for bridging/pushing  -CT     Assistive Device (Bed Mobility) bed rails;head of bed elevated  -CT       Row Name 10/16/24  1139          Balance    Balance Interventions sitting  -CT       Row Name             Wound 09/26/24 1809 Left posterior ankle Other (comment)    Wound - Properties Group Placement Date: 09/26/24  -KJ Placement Time: 1809  -KJ Side: Left  -KJ Orientation: posterior  -KJ Location: ankle  -KJ Primary Wound Type: Other  -TW, unknow etiology     Retired Wound - Properties Group Placement Date: 09/26/24  -KJ Placement Time: 1809  -KJ Side: Left  -KJ Orientation: posterior  -KJ Location: ankle  -KJ Primary Wound Type: Other  -TW, unknow etiology     Retired Wound - Properties Group Placement Date: 09/26/24  -KJ Placement Time: 1809  -KJ Side: Left  -KJ Orientation: posterior  -KJ Location: ankle  -KJ Primary Wound Type: Other  -TW, unknow etiology     Retired Wound - Properties Group Date first assessed: 09/26/24  -KJ Time first assessed: 1809  -KJ Side: Left  -KJ Location: ankle  -KJ Primary Wound Type: Other  -TW, unknow etiology       Row Name             Wound 10/11/24 1330 medial coccyx Fissure    Wound - Properties Group Placement Date: 10/11/24  -TW Placement Time: 1330  -TW Orientation: medial  -TW Location: coccyx  -TW Primary Wound Type: Fissure  -TW    Retired Wound - Properties Group Placement Date: 10/11/24  -TW Placement Time: 1330  -TW Orientation: medial  -TW Location: coccyx  -TW Primary Wound Type: Fissure  -TW    Retired Wound - Properties Group Placement Date: 10/11/24  -TW Placement Time: 1330  -TW Orientation: medial  -TW Location: coccyx  -TW Primary Wound Type: Fissure  -TW    Retired Wound - Properties Group Date first assessed: 10/11/24  -TW Time first assessed: 1330  -TW Location: coccyx  -TW Primary Wound Type: Fissure  -TW      Row Name             Wound 10/16/24 0705 Left medial gluteal MASD (moisture associated skin damage)    Wound - Properties Group Placement Date: 10/16/24  -MS Placement Time: 0705  -MS Side: Left  -MS Orientation: medial  -MS Location: gluteal  -MS Primary Wound Type:  MASD  -MS Type: MASD (moisture associated skin damage)  -MS Present on Original Admission: N  -MS Additional Comments: Noted at shift change skin assesment, Night shift RN stated it had been there her shift.  -MS    Retired Wound - Properties Group Placement Date: 10/16/24  -MS Placement Time: 0705 -MS Present on Original Admission: N  -MS Side: Left  -MS Orientation: medial  -MS Location: gluteal  -MS Primary Wound Type: MASD  -MS Additional Comments: Noted at shift change skin assesment, Night shift RN stated it had been there her shift.  -MS Type: MASD (moisture associated skin damage)  -MS    Retired Wound - Properties Group Placement Date: 10/16/24  -MS Placement Time: 0705 -MS Present on Original Admission: N  -MS Side: Left  -MS Orientation: medial  -MS Location: gluteal  -MS Primary Wound Type: MASD  -MS Additional Comments: Noted at shift change skin assesment, Night shift RN stated it had been there her shift.  -MS Type: MASD (moisture associated skin damage)  -MS    Retired Wound - Properties Group Date first assessed: 10/16/24  -MS Time first assessed: 0705 -MS Present on Original Admission: N  -MS Side: Left  -MS Location: gluteal  -MS Primary Wound Type: MASD  -MS Additional Comments: Noted at shift change skin assesment, Night shift RN stated it had been there her shift.  -MS Type: MASD (moisture associated skin damage)  -MS      Row Name             Wound 10/16/24 0705 Left posterior greater trochanter MASD (moisture associated skin damage)    Wound - Properties Group Placement Date: 10/16/24  -MS Placement Time: 0705 -MS Side: Left  -MS Orientation: posterior  -MS Location: greater trochanter  -MS Primary Wound Type: MASD  -MS Type: MASD (moisture associated skin damage)  -MS Present on Original Admission: N  -MS    Retired Wound - Properties Group Placement Date: 10/16/24  -MS Placement Time: 0705 -MS Present on Original Admission: N  -MS Side: Left  -MS Orientation: posterior  -MS Location:  greater trochanter  -MS Primary Wound Type: MASD  -MS Type: MASD (moisture associated skin damage)  -MS    Retired Wound - Properties Group Placement Date: 10/16/24  -MS Placement Time: 0705 -MS Present on Original Admission: N  -MS Side: Left  -MS Orientation: posterior  -MS Location: greater trochanter  -MS Primary Wound Type: MASD  -MS Type: MASD (moisture associated skin damage)  -MS    Retired Wound - Properties Group Date first assessed: 10/16/24  -MS Time first assessed: 0705  -MS Present on Original Admission: N  -MS Side: Left  -MS Location: greater trochanter  -MS Primary Wound Type: MASD  -MS Type: MASD (moisture associated skin damage)  -MS      Row Name 10/16/24 1139          Coping    Observed Emotional State calm;cooperative  -CT     Verbalized Emotional State acceptance  -CT       Row Name 10/16/24 1139          Plan of Care Review    Plan of Care Reviewed With patient  -CT       Row Name 10/16/24 1139          Positioning and Restraints    Pre-Treatment Position in bed  -CT     Post Treatment Position bed  -CT     In Bed sitting EOB;call light within reach;encouraged to call for assist;notified nsg  -CT       Row Name 10/16/24 1139          Therapy Assessment/Plan (PT)    Rehab Potential (PT) fair  -CT     Criteria for Skilled Interventions Met (PT) yes;meets criteria;skilled treatment is necessary  -CT     Therapy Frequency (PT) 2 times/wk  1-5 times/wk  -CT     Problem List (PT) problems related to;balance;mobility;hemiparesis/hemiplegia;coordination;strength;range of motion (ROM);muscle tone;motor control  -CT     Activity Limitations Related to Problem List (PT) unable to ambulate safely;unable to transfer safely;BADLs not performed adequately or safely  -CT               User Key  (r) = Recorded By, (t) = Taken By, (c) = Cosigned By      Initials Name Provider Type    Karen Peralta, RN Registered Nurse    Dilma Palomo PT Physical Therapist    Nory Keenan RN Registered  Nurse    Roe North, RN Registered Nurse                    Physical Therapy Education       Title: PT OT SLP Therapies (In Progress)       Topic: Physical Therapy (In Progress)       Point: Mobility training (In Progress)       Learning Progress Summary            Patient Nonacceptance, E, NR by WG at 10/16/2024 0228    Acceptance, E,TB, VU by CF at 10/15/2024 0753    Acceptance, E,TB, VU by CF at 10/14/2024 0801    Acceptance, E,TB, VU by CF at 10/13/2024 1045    Acceptance, E,D, VU,NR by AG at 10/10/2024 1310    Acceptance, E,TB, VU by CB at 10/8/2024 0448    Acceptance, E, NR by RD at 10/2/2024 1101    Acceptance, E, NR by RD at 10/1/2024 0856    Acceptance, E,D, VU,NR by AG at 9/30/2024 1559                      Point: Home exercise program (In Progress)       Learning Progress Summary            Patient Nonacceptance, E, NR by WG at 10/16/2024 0228    Acceptance, E,TB, VU by CF at 10/15/2024 0753    Acceptance, E,TB, VU by CF at 10/14/2024 0801    Acceptance, E,TB, VU by CF at 10/13/2024 1045    Acceptance, E,D, VU,NR by AG at 10/10/2024 1310    Acceptance, E,TB, VU by CB at 10/8/2024 0448    Acceptance, E, NR by RD at 10/2/2024 1101    Acceptance, E, NR by RD at 10/1/2024 0856    Acceptance, E,D, VU,NR by AG at 9/30/2024 1559                      Point: Body mechanics (In Progress)       Learning Progress Summary            Patient Nonacceptance, E, NR by WG at 10/16/2024 0228    Acceptance, E,TB, VU by CF at 10/15/2024 0753    Acceptance, E,TB, VU by CF at 10/14/2024 0801    Acceptance, E,TB, VU by CF at 10/13/2024 1045    Acceptance, E,D, VU,NR by AG at 10/10/2024 1310    Acceptance, E,TB, VU by CB at 10/8/2024 0448    Acceptance, E, NR by RD at 10/2/2024 1101    Acceptance, E, NR by RD at 10/1/2024 0856    Acceptance, E,D, VU,NR by AG at 9/30/2024 1559                      Point: Precautions (In Progress)       Learning Progress Summary            Patient Nonacceptance, E, NR by WG at 10/16/2024  0228    Acceptance, E,TB, VU by CF at 10/15/2024 0753    Acceptance, E,TB, VU by CF at 10/14/2024 0801    Acceptance, E,TB, VU by  at 10/13/2024 1045    Acceptance, E,D, VU,NR by AG at 10/10/2024 1310    Acceptance, E,TB, VU by CB at 10/8/2024 0448    Acceptance, E, NR by RD at 10/2/2024 1101    Acceptance, E, NR by RD at 10/1/2024 0856    Acceptance, E,D, VU,NR by AG at 9/30/2024 1559                                      User Key       Initials Effective Dates Name Provider Type Discipline     06/16/21 -  Мария Joya, PT Physical Therapist PT    RD 06/16/21 -  Laisha Verdugo, RN Registered Nurse Nurse     02/12/24 -  Shanthi Burden, RN Registered Nurse Nurse     06/25/24 -  Cherelle Germain, RN Registered Nurse Nurse     06/17/24 -  Fidelia Lombardo RN Registered Nurse Nurse                  PT Recommendation and Plan  Anticipated Discharge Disposition (PT): skilled nursing facility, inpatient rehabilitation facility  Planned Therapy Interventions (PT): balance training, bed mobility training, gait training, home exercise program, manual therapy techniques, motor coordination training, neuromuscular re-education, patient/family education, postural re-education, strengthening, ROM (range of motion), transfer training, wheelchair management/propulsion training  Therapy Frequency (PT): 2 times/wk (1-5 times/wk)  Plan of Care Reviewed With: patient       Time Calculation:    PT Charges       Row Name 10/16/24 1141             Time Calculation    PT Received On 10/16/24  -CT         Time Calculation- PT    Total Timed Code Minutes- PT 26 minute(s)  -CT                User Key  (r) = Recorded By, (t) = Taken By, (c) = Cosigned By      Initials Name Provider Type    CT Dilma Ochoa, GENARO Physical Therapist                  Therapy Charges for Today       Code Description Service Date Service Provider Modifiers Qty    56146390139  PT THERAPEUTIC ACT EA 15 MIN 10/15/2024 Dilma Ochoa, PT GP 2     [id number]  PT THERAPEUTIC ACT EA 15 MIN 10/16/2024 Dilma Ochoa, PT GP 2            PT G-Codes  AM-PAC 6 Clicks Score (PT): 8    Dilma Ochoa, PT  10/16/2024

## 2024-10-16 NOTE — THERAPY TREATMENT NOTE
Acute Care - Occupational Therapy Treatment Note   [facility]     Patient Name: Lety Johnson  : 1980  MRN: [id number]  Today's Date: 10/16/2024  Onset of Illness/Injury or Date of Surgery: 24     Referring Physician: Dr. Marc    Admit Date: 2024       ICD-10-CM ICD-9-CM   1. Hypokalemia  E87.6 276.8   2. Pressure injury of skin of sacral region, unspecified injury stage  L89.159 707.03     707.20   3. Pressure injury of skin of left heel, unspecified injury stage  L89.629 707.07     707.20   4. Acute cystitis without hematuria  N30.00 595.0     Patient Active Problem List   Diagnosis   (none) - all problems resolved or deleted     Past Medical History:   Diagnosis Date    Stroke      History reviewed. No pertinent surgical history.      OT ASSESSMENT FLOWSHEET (Last 12 Hours)       OT Evaluation and Treatment       Row Name 10/16/24 1139                   OT Time and Intention    Comment continued LUE PROM with pt tolerating positioning/.movement with improvement. SROM performed from supine to assist with positioning. /training continued for sling wear/care  -KR           Bed Mobility    Rolling Right [place] (Bed Mobility) moderate assist (50% patient effort);2 person assist  -KR        Supine-Sit [place] (Bed Mobility) maximum assist (25% patient effort);2 person assist  -KR           Shoulder (Therapeutic Exercise)    Shoulder PROM (Therapeutic Exercise) left;flexion;extension  -KR           Elbow/Forearm (Therapeutic Exercise)    Elbow/Forearm PROM (Therapeutic Exercise) flexion;extension;left  -KR           Hand (Therapeutic Exercise)    Hand PROM (Therapeutic Exercise) left;finger flexion;finger extension  -KR           Balance    Static Sitting Balance independent  -KR           Wound 24 180 Left posterior ankle Other (comment)    Wound - Properties Group Placement Date: 24  -KJ Placement Time:   - Side: Left  -KJ Orientation: posterior  -KJ Location: ankle   -KJ Primary Wound Type: Other  -TW, unknow etiology     Retired Wound - Properties Group Placement Date: 09/26/24  -KJ Placement Time: 1809  -KJ Side: Left  -KJ Orientation: posterior  -KJ Location: ankle  -KJ Primary Wound Type: Other  -TW, unknow etiology     Retired Wound - Properties Group Placement Date: 09/26/24  -KJ Placement Time: 1809  -KJ Side: Left  -KJ Orientation: posterior  -KJ Location: ankle  -KJ Primary Wound Type: Other  -TW, unknow etiology     Retired Wound - Properties Group Date first assessed: 09/26/24  -KJ Time first assessed: 1809  -KJ Side: Left  -KJ Location: ankle  -KJ Primary Wound Type: Other  -TW, unknow etiology        Wound 10/11/24 1330 medial coccyx Fissure    Wound - Properties Group Placement Date: 10/11/24  -TW Placement Time: 1330  -TW Orientation: medial  -TW Location: coccyx  -TW Primary Wound Type: Fissure  -TW    Retired Wound - Properties Group Placement Date: 10/11/24  -TW Placement Time: 1330  -TW Orientation: medial  -TW Location: coccyx  -TW Primary Wound Type: Fissure  -TW    Retired Wound - Properties Group Placement Date: 10/11/24  -TW Placement Time: 1330  -TW Orientation: medial  -TW Location: coccyx  -TW Primary Wound Type: Fissure  -TW    Retired Wound - Properties Group Date first assessed: 10/11/24  -TW Time first assessed: 1330  -TW Location: coccyx  -TW Primary Wound Type: Fissure  -TW       Wound 10/16/24 0705 Left medial gluteal MASD (moisture associated skin damage)    Wound - Properties Group Placement Date: 10/16/24  -MS Placement Time: 0705 -MS Side: Left  -MS Orientation: medial  -MS Location: gluteal  -MS Primary Wound Type: MASD  -MS Type: MASD (moisture associated skin damage)  -MS Present on Original Admission: N  -MS Additional Comments: Noted at shift change skin assesment, Night shift RN stated it had been there her shift.  -MS    Retired Wound - Properties Group Placement Date: 10/16/24  -MS Placement Time: 0705 -MS Present on Original  Admission: N  -MS Side: Left  -MS Orientation: medial  -MS Location: gluteal  -MS Primary Wound Type: MASD  -MS Additional Comments: Noted at shift change skin assesment, Night shift RN stated it had been there her shift.  -MS Type: MASD (moisture associated skin damage)  -MS    Retired Wound - Properties Group Placement Date: 10/16/24  -MS Placement Time: 0705 -MS Present on Original Admission: N  -MS Side: Left  -MS Orientation: medial  -MS Location: gluteal  -MS Primary Wound Type: MASD  -MS Additional Comments: Noted at shift change skin assesment, Night shift RN stated it had been there her shift.  -MS Type: MASD (moisture associated skin damage)  -MS    Retired Wound - Properties Group Date first assessed: 10/16/24  -MS Time first assessed: 0705 -MS Present on Original Admission: N  -MS Side: Left  -MS Location: gluteal  -MS Primary Wound Type: MASD  -MS Additional Comments: Noted at shift change skin assesment, Night shift RN stated it had been there her shift.  -MS Type: MASD (moisture associated skin damage)  -MS       Wound 10/16/24 0705 Left posterior greater trochanter MASD (moisture associated skin damage)    Wound - Properties Group Placement Date: 10/16/24  -MS Placement Time: 0705 -MS Side: Left  -MS Orientation: posterior  -MS Location: greater trochanter  -MS Primary Wound Type: MASD  -MS Type: MASD (moisture associated skin damage)  -MS Present on Original Admission: N  -MS    Retired Wound - Properties Group Placement Date: 10/16/24  -MS Placement Time: 0705 -MS Present on Original Admission: N  -MS Side: Left  -MS Orientation: posterior  -MS Location: greater trochanter  -MS Primary Wound Type: MASD  -MS Type: MASD (moisture associated skin damage)  -MS    Retired Wound - Properties Group Placement Date: 10/16/24  -MS Placement Time: 0705 -MS Present on Original Admission: N  -MS Side: Left  -MS Orientation: posterior  -MS Location: greater trochanter  -MS Primary Wound Type: MASD  -MS  Type: MASD (moisture associated skin damage)  -MS    Retired Wound - Properties Group Date first assessed: 10/16/24  -MS Time first assessed: 0705  -MS Present on Original Admission: N  -MS Side: Left  -MS Location: greater trochanter  -MS Primary Wound Type: MASD  -MS Type: MASD (moisture associated skin damage)  -MS       Plan of Care Review    Progress improving  -KR           ROM Goal 1 (OT)    Progress/Outcome (ROM Goal 1, OT) continuing progress toward goal  -KR            Activity Tolerance Goal 1 (OT)    Progress/Outcome (Activity Tolerance Goal 1, OT) continuing progress toward goal  -KR                  User Key  (r) = Recorded By, (t) = Taken By, (c) = Cosigned By      Initials Name Effective Dates    TW Karen Gipson RN 06/16/21 -     Neil Menezes OT 06/16/21 -     Nory Keenan RN 06/08/23 -     Roe North RN 06/04/24 -                      Occupational Therapy Education        No education to display                      OT Recommendation and Plan  Planned Therapy Interventions (OT): activity tolerance training, neuromuscular control/coordination retraining, ROM/therapeutic exercise  Plan of Care Review  Plan of Care Reviewed With: patient  Progress: improving  Plan of Care Reviewed With: patient        Time Calculation:     Therapy Charges for Today       Code Description Service Date Service Provider Modifiers Qty    36034802510 HC OT THERAPEUTIC ACT EA 15 MIN 10/15/2024 Neil Graf OT GO 2    57426309563 HC OT THERAPEUTIC ACT EA 15 MIN 10/16/2024 Neil Graf OT GO 1    91316143709 HC OT SELF CARE/MGMT/TRAIN EA 15 MIN 10/16/2024 Neil Graf OT GO 1                 Neil Graf OT  10/16/2024

## 2024-10-16 NOTE — DISCHARGE PLACEMENT REQUEST
"Lety Johnson (43 y.o. Female)       Date of Birth   1980    Social Security Number       Address   [address] [facility] [address]    Home Phone   [phone]    MRN   [id number]       [place]   [facility]    Marital Status   Single                            Admission Date   9/26/24    Admission Type   Emergency    Admitting Provider   Ramon Marc MD    Attending Provider   Marv Navas DO    Department, Room/Bed   [address], [address]/       Discharge Date       Discharge Disposition       Discharge Destination                                 Attending Provider: Marv Navas DO    Allergies: No Known Allergies    Isolation: None   Infection: None   Code Status: CPR    Ht: 166.4 cm (65.5\")   Wt: 102 kg (224 lb 1.6 oz)    Admission Cmt: None   Principal Problem: UTI (urinary tract infection) [N39.0]                   Active Insurance as of 9/26/2024       Primary Coverage       Payor Plan Insurance Group Employer/Plan Group    [place] [id number]       Payor Plan Address Payor Plan Phone Number Payor Plan Fax Number Effective Dates    [employer] [address] [address] [phone]  5/1/2024 - None Entered    [facility] [id number]         Subscriber Name Subscriber Birth Date Member ID       LETY JOHNSON 1980 [id number]                     Emergency Contacts        (Rel.) Home Phone Work Phone Mobile Phone    Moises Narvaez (Legal Guardian) -- -- [phone]              Insurance Information                  [place] Phone: [phone]    Subscriber: Lety Johnson Subscriber#: [id number]    Group#: [id number] Precert#: --    Authorization#: [id number] Effective Date: --             History & Physical        Ramon Marc MD at 09/26/24 [address] [place] [address] HOSPITALIST HISTORY AND PHYSICAL    Patient Identification:  Name:  Lety Johnson  Age:  43 y.o.  Sex:  " female  :  1980  MRN:  [id number]   Admit Date: 2024   Visit Number:  [id number]  Room number:  404/04  Primary Care Physician:  Provider, No Known     Subjective     Chief complaint:    Chief Complaint   Patient presents with    Fall     History of presenting illness:   43F Morbid Obesity by BMI PMH History Genital Herpes, Cerebrovascular Accident 2024 complicated by L sided paralysis, presented to [facility] emergency room  after sliding into the floor off her chair due to weakness.  Upon arrival patient afebrile, heart rate 109, respiratory rate 18, blood pressure 146/101, satting 98% on room air. Labs showed WBC count 10K, lactate 1.1, CRP 3.8, potassium 2.7, magnesium 1.5, HCG negative, blood cultures pending. CXR no acute cardiopulmonary processes.  Xray ankle/foot without fracture or dislocation.  Emergency room provider gave antibiotics, pain medications, zofran, potassium replacement.  Hospital Medicine consulted for admission. Patient seen and examined in the emergency room, notes fevers chills at home a few days ago, recently has been on antibiotics for lower extremity cellulitis, has had some dysuria and suprapubic pain, denies current sexual activity, reports her fiance was taken to shelter about a week ago and has been her primary caretaker, has had difficulty at home since prior stroke and caretakers not consistent, last 24hrs didn't have anyone to help her, sister endorses recent confusion/hallucinations beyond baseline, seeing dead family members, coinciding with onset of dysuria.  ---------------------------------------------------------------------------------------------------------------------   Review of Systems   Constitutional:  Positive for chills and fever.   HENT: Negative.     Eyes: Negative.    Respiratory:  Positive for shortness of breath.    Cardiovascular:  Positive for leg swelling. Negative for chest pain.   Gastrointestinal: Negative.    Endocrine:  Negative.    Genitourinary:  Positive for dysuria.   Musculoskeletal: Negative.    Skin:  Positive for rash.   Allergic/Immunologic: Negative.    Neurological:  Positive for weakness.   Hematological: Negative.    Psychiatric/Behavioral:  Positive for hallucinations.      ---------------------------------------------------------------------------------------------------------------------   Past Medical History:   Diagnosis Date    Stroke      No past surgical history on file.  No family history on file.  Social History     Socioeconomic History    Marital status: Single     ---------------------------------------------------------------------------------------------------------------------   Allergies:  Patient has no known allergies.  ---------------------------------------------------------------------------------------------------------------------   Medications below are reported home medications pulling from within the system; at this time, these medications have not been reconciled unless otherwise specified and are in the verification process for further verifcation as current home medications.    Prior to Admission Medications       Prescriptions Last Dose Informant Patient Reported? Taking?    aspirin 81 MG chewable tablet Unknown  Yes No    Chew 1 tablet Daily.    atorvastatin (LIPITOR) 80 MG tablet Unknown  Yes No    Take 1 tablet by mouth Daily.    cyclobenzaprine (FLEXERIL) 10 MG tablet Unknown  Yes No    Take 1 tablet by mouth 3 (Three) Times a Day As Needed for Muscle Spasms.    DULoxetine (CYMBALTA) 60 MG capsule Unknown  Yes No    Take 1 capsule by mouth Daily.    lisinopril (PRINIVIL,ZESTRIL) 20 MG tablet Unknown  Yes No    Take 1 tablet by mouth Daily.    metFORMIN (GLUCOPHAGE) 1000 MG tablet Unknown  Yes No    Take 1 tablet by mouth 2 (Two) Times a Day With Meals.    pantoprazole (PROTONIX) 40 MG EC tablet Unknown  Yes No    Take 1 tablet by mouth Daily.          Objective     Vital Signs:  Temp:   [98.2 °F (36.8 °C)] 98.2 °F (36.8 °C)  Heart Rate:  [] 118  Resp:  [18] 18  BP: (135-157)/() 140/79    Mean Arterial Pressure (Non-Invasive) for the past 24 hrs (Last 3 readings):   Noninvasive MAP (mmHg)   09/26/24 1645 90   09/26/24 1630 100   09/26/24 1615 103     SpO2:  [91 %-100 %] 94 %  on   ;   Device (Oxygen Therapy): room air  Body mass index is 43.26 kg/m².    Wt Readings from Last 3 Encounters:   09/26/24 120 kg (264 lb)   06/27/24 120 kg (264 lb)      ---------------------------------------------------------------------------------------------------------------------   Physical Exam:  Constitutional:  Well-developed and well-nourished. Older than stated age. No acute distress.      HENT:  Head:  Normocephalic and atraumatic.  Mouth:  Moist mucous membranes.    Eyes:  Conjunctivae and EOM are normal. No scleral icterus.    Neck:  Neck supple.  No JVD present.    Cardiovascular:  Normal rate, regular rhythm and normal heart sounds with no murmur.  Pulmonary/Chest:  No respiratory distress, no wheezes, no crackles, with normal breath sounds and good air movement.  Abdominal:  Soft. No distension and no tenderness.   Musculoskeletal:  No tenderness, and no deformity.  No red or swollen joints anywhere.    Neurological:  Alert and oriented to person, place, and time.  No cranial nerve deficit. Chronic L sided paralysis.    Skin:  Skin is warm and dry. No pallor. Significant intertriginous erythema under panus, consistent with yeast infection.   Peripheral vascular:  No clubbing, no cyanosis, trace edema.  Psychiatric: Appropriate mood and affect  Edited by: Ramon Marc MD at 9/26/2024 1701  ---------------------------------------------------------------------------------------------------------------------  EKG:  N/A    Telemetry:  normal sinus rhythm, no significant ST changes    I have personally looked at telemetry.    Last echocardiogram:  Ordered and pending  "  --------------------------------------------------------------------------------------------------------------------  Labs:  Results from last 7 days   Lab Units 09/26/24  1504 09/26/24  1350   LACTATE mmol/L  --  1.1   CRP mg/dL 3.80*  --    WBC 10*3/mm3  --  10.42   HEMOGLOBIN g/dL  --  12.5   HEMATOCRIT %  --  37.4   MCV fL  --  94.0   MCHC g/dL  --  33.4   PLATELETS 10*3/mm3  --  402         Results from last 7 days   Lab Units 09/26/24  1504   SODIUM mmol/L 140   POTASSIUM mmol/L 2.7*   MAGNESIUM mg/dL 1.5*   CHLORIDE mmol/L 99   CO2 mmol/L 28.9   BUN mg/dL 10   CREATININE mg/dL 0.51*   CALCIUM mg/dL 9.4   GLUCOSE mg/dL 220*   ALBUMIN g/dL 3.5   BILIRUBIN mg/dL 0.6   ALK PHOS U/L 119*   AST (SGOT) U/L 21   ALT (SGPT) U/L 8   Estimated Creatinine Clearance: 188.1 mL/min (A) (by C-G formula based on SCr of 0.51 mg/dL (L)).  No results found for: \"AMMONIA\"          No results found for: \"HGBA1C\", \"POCGLU\"  No results found for: \"TSH\", \"FREET4\"  No results found for: \"PREGTESTUR\", \"PREGSERUM\", \"HCG\", \"HCGQUANT\"  Pain Management Panel           No data to display              Brief Urine Lab Results  (Last result in the past 365 days)        Color   Clarity   Blood   Leuk Est   Nitrite   Protein   CREAT   Urine HCG        09/26/24 1419 Dark Yellow   Turbid   Trace   Moderate (2+)   Positive   30 mg/dL (1+)                 No results found for: \"BLOODCX\"  No results found for: \"URINECX\"  No results found for: \"WOUNDCX\"  No results found for: \"STOOLCX\"    I have personally looked at the labs and they are summarized above.  ----------------------------------------------------------------------------------------------------------------------  Detailed radiology reports for the last 24 hours:    Imaging Results (Last 24 Hours)       Procedure Component Value Units Date/Time    XR Ankle 3+ View Left [[id number]] Collected: 09/26/24 1537     Updated: 09/26/24 9559    Narrative:      EXAM:    XR Left Ankle Complete, 3 or " More Views     EXAM DATE:    9/26/2024 3:17 PM     CLINICAL HISTORY:    left ankle injury     TECHNIQUE:    Frontal, lateral and oblique views of the left ankle.     COMPARISON:    No relevant prior studies available.     FINDINGS:    Bones/joints:  See below.      Soft tissues:  Soft tissue swelling, but no acute fracture or  dislocation.       Impression:        Soft tissue swelling, but no acute fracture or dislocation.        This report was finalized on 9/26/2024 3:38 PM by Dr. Moses Otto MD.       XR Foot 3+ View Left [[id number]] Collected: 09/26/24 1536     Updated: 09/26/24 1539    Narrative:      EXAM:    XR Left Foot Complete, 3 or More Views     EXAM DATE:    9/26/2024 3:16 PM     CLINICAL HISTORY:    left foot wound     TECHNIQUE:    Frontal, lateral and oblique views of the left foot.     COMPARISON:    No relevant prior studies available.     FINDINGS:    Bones/joints:  Unremarkable.  No acute fracture.  No dislocation.    Soft tissues:  Unremarkable.  No radiopaque foreign body.       Impression:        No acute findings in the left foot.        This report was finalized on 9/26/2024 3:37 PM by Dr. Moses Otto MD.       XR Chest 1 View [[id number]] Collected: 09/26/24 1406     Updated: 09/26/24 1408    Narrative:      XR CHEST 1 VW-     CLINICAL INDICATION: weakness        COMPARISON: None immediately available      TECHNIQUE: Single frontal view of the chest.     FINDINGS:     LUNGS: Lungs are adequately aerated.      HEART AND MEDIASTINUM: Heart and mediastinal contours are unremarkable        SKELETON: Bony and soft tissue structures are unremarkable.             Impression:      No radiographic evidence of acute cardiac or pulmonary disease.           This report was finalized on 9/26/2024 2:06 PM by Dr. Moses Otto MD.       CT Cervical Spine Without Contrast [[id number]] Collected: 09/26/24 1317     Updated: 09/26/24 1319    Narrative:      CT CERVICAL SPINE WO CONTRAST-     CLINICAL  INDICATION: neck pain        COMPARISON: None available     TECHNIQUE: Axial images of the cervical spine were acquired with out any  intravenous contrast. Reformatted images were then created in the  sagittal and coronal planes.     DOSE:      Radiation dose reduction techniques were utilized per ALARA protocol.  Automated exposure control was initiated through either or [employer] or  [employer] software packages by  protocol.           FINDINGS:   The provided study demonstrates preservation of the vertebral body  heights in the sagittally reconstructed images.     There is no prevertebral soft tissue swelling.     Alignment is near anatomic.     The disc space heights are uniform.     I see no acute cervical spine fracture.       Impression:         1. No acute bony abnormality.     2. Other incidental findings as above     This report was finalized on 9/26/2024 1:17 PM by Dr. Moses Otto MD.       CT Lumbar Spine Without Contrast [[id number]] Collected: 09/26/24 1317     Updated: 09/26/24 1319    Narrative:      EXAM:    CT Lumbar Spine Without Intravenous Contrast     EXAM DATE:    9/26/2024 12:59 PM     CLINICAL HISTORY:    back pain     TECHNIQUE:    Axial computed tomography images of the lumbar spine without  intravenous contrast.  Sagittal and coronal reformatted images were  created and reviewed.  This CT exam was performed using one or more of  the following dose reduction techniques:  automated exposure control,  adjustment of the mA and/or kV according to patient size, and/or use of  iterative reconstruction technique.     COMPARISON:    No relevant prior studies available.     FINDINGS:    VERTEBRAE:  Unremarkable.  No acute fracture.    DISCS/SPINAL CANAL/NEURAL FORAMINA:  No acute findings.  No spinal  canal stenosis.    SOFT TISSUES:  Unremarkable.       Impression:        No acute findings in the lumbar spine.        This report was finalized on 9/26/2024 1:17 PM by Dr. Moses Otto  MD.       CT Thoracic Spine Without Contrast [[id number]] Collected: 09/26/24 1316     Updated: 09/26/24 1319    Narrative:      EXAM:    CT Thoracic Spine Without Intravenous Contrast     EXAM DATE:    9/26/2024 12:58 PM     CLINICAL HISTORY:    back pain     TECHNIQUE:    Axial computed tomography images of the thoracic spine without  intravenous contrast.  Sagittal and coronal reformatted images were  created and reviewed.  This CT exam was performed using one or more of  the following dose reduction techniques:  automated exposure control,  adjustment of the mA and/or kV according to patient size, and/or use of  iterative reconstruction technique.     COMPARISON:    No relevant prior studies available.     FINDINGS:    VERTEBRAE:  Unremarkable.  No acute fracture.    DISCS/SPINAL CANAL/NEURAL FORAMINA:  No acute findings.  No spinal  canal stenosis.    SOFT TISSUES:  Unremarkable.       Impression:        No acute findings in the thoracic spine.        This report was finalized on 9/26/2024 1:17 PM by Dr. Moses Otto MD.       CT Head Without Contrast [[id number]] Collected: 09/26/24 1259     Updated: 09/26/24 1302    Narrative:      CT HEAD WO CONTRAST-     CLINICAL INDICATION: weakness        COMPARISON: None available     TECHNIQUE: Axial images of the brain were obtained with out intravenous  contrast.  Reformatted images were created in the sagittal and coronal  planes.     DOSE:     Radiation dose reduction techniques were utilized per ALARA protocol.  Automated exposure control was initiated through either or [employer] or  [employer] software packages by  protocol.        FINDINGS:    BRAIN: Probable subacute ischemia right middle cerebral artery  territory    VENTRICLES:  Unremarkable.  No ventriculomegaly.       BONES/JOINTS:  Unremarkable.  No acute fracture.       SOFT TISSUES:  Unremarkable.       SINUSES:  no air fluid levels       MASTOID AIR CELLS:  Unremarkable as visualized.  No  mastoid effusion.          Impression:         1. Probable remote right middle cerebral artery infarction  2. No acute parenchymal mass, hemorrhage, or midline shift     This report was finalized on 9/26/2024 1:00 PM by Dr. Moses Otto MD.             I have personally looked at the radiology images and read the final radiology report.    Assessment & Plan    43F Morbid Obesity by BMI PMH History Genital Herpes, Cerebrovascular Accident 5/2024 complicated by L sided paralysis, presented to [facility] emergency room 9/26 after sliding into the floor off her chair due to weakness.     #Acute Metabolic Encephalopathy due to Acute Urinary Tract Infection in setting of probable neurogenic bladder  - Continue Ceftriaxone, follow up cultures, rule out STI    #Electrolyte Abnormalities  - Acute Mild Hypokalemia - Replacing, on protocol  - Acute Mild Hypomagnesemia - Replacing, on protocol    #History Cerebrovascular Accident complicated by L sided paralysis, unclear etiology  - Review home medications and resume as indicated, Supportive Care     #Debility  - Consult PT/OT, Social Work if placement needed     #History Genital Herpes  - Supportive Care, follow up medication reconciliation for any suppressive medications, check HIV & acute hepatitis panel     #Morbid Obesity by BMI  - Body mass index is 43.26 kg/m².; complicates all aspects of care    F: Oral  E: Monitor & Replace as needed   N: Regular Diet  PPx: SQH  Code Status (Patient has no pulse and is not breathing): CPR  Medical Interventions (Patient has pulse or is breathing): Full Support     Dispo: Pending workup and clinical improvement    *This patient is considered high risk secondary to Urinary Tract Infection, electrolyte abnormalities, chronic L sided paralysis from prior Cerebrovascular Accident.      Edited by: Ramon Marc MD at 9/26/2024 1701    Ramon Marc MD  [facility] Hospitalist  09/26/24  17:01 EDT        Electronically  signed by Ramon Marc MD at 09/26/24 1703       Current Facility-Administered Medications   Medication Dose Route Frequency Provider Last Rate Last Admin    acetaminophen (TYLENOL) tablet 650 mg  650 mg Oral Q6H PRN Ashleigh Nicholas PA-C   650 mg at 10/16/24 1025    acyclovir (ZOVIRAX) capsule 400 mg  400 mg Oral Nightly Ramon Marc MD   400 mg at 10/15/24 2022    aspirin chewable tablet 81 mg  81 mg Oral Daily Ramon Marc MD   81 mg at 10/16/24 0921    atorvastatin (LIPITOR) tablet 80 mg  80 mg Oral Daily Ramon Marc MD   80 mg at 10/16/24 0919    sennosides-docusate (PERICOLACE) 8.6-50 MG per tablet 2 tablet  2 tablet Oral BID PRN Ramon Marc MD   2 tablet at 10/12/24 1310    And    polyethylene glycol (MIRALAX) packet 17 g  17 g Oral Daily PRN Ramon Marc MD        And    bisacodyl (DULCOLAX) EC tablet 5 mg  5 mg Oral Daily PRN Ramon Marc MD        And    bisacodyl (DULCOLAX) suppository 10 mg  10 mg Rectal Daily PRN Ramon Marc MD        Calcium Replacement - Follow Nurse / BPA Driven Protocol   Does not apply PRN Ramon Marc MD        cyclobenzaprine (FLEXERIL) tablet 10 mg  10 mg Oral TID PRN Ramon Marc MD   10 mg at 10/12/24 2345    dextrose (D50W) (25 g/50 mL) IV injection 25 g  25 g Intravenous Q15 Min PRN Ramon Marc MD        dextrose (GLUTOSE) oral gel 15 g  15 g Oral Q15 Min PRN Ramon Marc MD        Diclofenac Sodium (VOLTAREN) 1 % gel 4 g  4 g Topical 4x Daily PRN Ashleigh Nicholas PA-C   4 g at 10/13/24 2042    DULoxetine (CYMBALTA) DR capsule 60 mg  60 mg Oral Daily Ramon Marc MD   60 mg at 10/16/24 0921    glucagon HCl (Diagnostic) injection 1 mg  1 mg Intramuscular Q15 Min PRN Ramon Marc MD        heparin (porcine) 5000 UNIT/ML injection 5,000 Units  5,000 Units Subcutaneous Q8H aRmon Marc MD   5,000 Units at 10/16/24 0530    hydrOXYzine (ATARAX) tablet 25 mg  25 mg Oral Once Ashleigh Nicholas PA-C        insulin glargine (LANTUS, SEMGLEE) injection  30 Units  30 Units Subcutaneous Nightly Marv Navas DO   30 Units at 10/15/24 2021    Insulin Lispro (humaLOG) injection 2-9 Units  2-9 Units Subcutaneous 4x Daily PC & at Bedtime Ramon Marc MD   6 Units at 10/16/24 0922    lisinopril (PRINIVIL,ZESTRIL) tablet 20 mg  20 mg Oral Daily Ramon Marc MD   20 mg at 10/16/24 0921    loperamide (IMODIUM) capsule 2 mg  2 mg Oral 4x Daily PRN Marv Navas DO   2 mg at 10/15/24 0314    Magnesium Standard Dose Replacement - Follow Nurse / BPA Driven Protocol   Does not apply PRN Ramon Marc MD        melatonin tablet 5 mg  5 mg Oral Nightly PRN Wesley Matthews APRN   5 mg at 10/15/24 2022    [Held by provider] metFORMIN (GLUCOPHAGE) tablet 1,000 mg  1,000 mg Oral BID With Meals Ramon Marc MD        metoprolol tartrate (LOPRESSOR) tablet 25 mg  25 mg Oral Q12H OculMara everett MD   25 mg at 10/16/24 0919    nicotine (NICODERM CQ) 7 MG/24HR patch 1 patch  1 patch Transdermal Q24H Ramon Marc MD   1 patch at 10/16/24 0919    nystatin (MYCOSTATIN) powder   Topical Q12H Ramon Marc MD   Given at 10/16/24 0922    pantoprazole (PROTONIX) EC tablet 40 mg  40 mg Oral Daily Ramon Marc MD   40 mg at 10/16/24 0918    Pharmacy Consult   Does not apply Continuous PRN Ramon Marc MD        Phosphorus Replacement - Follow Nurse / BPA Driven Protocol   Does not apply PRN Ramon Marc MD        Potassium Replacement - Follow Nurse / BPA Driven Protocol   Does not apply PRN Ramon Marc MD        prochlorperazine (COMPAZINE) injection 2.5 mg  2.5 mg Intravenous Q6H PRN Ashleigh Nicholas PA-C   2.5 mg at 10/14/24 2154    sodium chloride 0.9 % flush 10 mL  10 mL Intravenous Q12H Ramon Marc MD   10 mL at 10/16/24 0922    sodium chloride 0.9 % flush 10 mL  10 mL Intravenous PRN Ramon Marc MD        sodium chloride 0.9 % flush 10 mL  10 mL Intravenous Q12H Marv Navas DO   10 mL at 10/16/24 0922    sodium chloride 0.9 % flush  10 mL  10 mL Intravenous PRN Marv Navas DO        sodium chloride 0.9 % infusion 40 mL  40 mL Intravenous PRN Ramon Marc MD        sodium chloride 0.9 % infusion 40 mL  40 mL Intravenous PRN Marv Navas DO            Physician Progress Notes (most recent note)        Marv Navas DO at 10/15/24 191              [facility]     Patient Identification:  Name:  Lety Johnson  Age:  43 y.o.  Sex:  female  :  1980  MRN:  [id number]  Visit Number:  [id number]  Primary Care Provider:  Provider, No Known    Length of stay:  17    Chief complaint: Pain    Subjective:    Patient seen and examined at bedside with no nursing staff present.  Patient intermittently tearful but overall appears stable and at her baseline functioning status.  Per nursing staff, no new events overnight.  ----------------------------------------------------------------------------------------------------------------------  Current Hospital Meds:  acyclovir, 400 mg, Oral, Nightly  aspirin, 81 mg, Oral, Daily  atorvastatin, 80 mg, Oral, Daily  DULoxetine, 60 mg, Oral, Daily  heparin (porcine), 5,000 Units, Subcutaneous, Q8H  hydrOXYzine, 25 mg, Oral, Once  insulin glargine, 30 Units, Subcutaneous, Nightly  insulin lispro, 2-9 Units, Subcutaneous, 4x Daily PC & at Bedtime  lisinopril, 20 mg, Oral, Daily  [Held by provider] metFORMIN, 1,000 mg, Oral, BID With Meals  metoprolol tartrate, 25 mg, Oral, Q12H  nicotine, 1 patch, Transdermal, Q24H  nystatin, , Topical, Q12H  pantoprazole, 40 mg, Oral, Daily  sodium chloride, 10 mL, Intravenous, Q12H  sodium chloride, 10 mL, Intravenous, Q12H      Pharmacy Consult,       ----------------------------------------------------------------------------------------------------------------------  Vital Signs:  Temp:  [97.7 °F (36.5 °C)-98.6 °F (37 °C)] 98 °F (36.7 °C)  Heart Rate:  [] 109  Resp:  [16-18] 16  BP:  (100-120)/(57-65) 109/59      10/07/24  0511 10/08/24  0500 10/09/24  0500   Weight: 102 kg (225 lb 14.4 oz) (FIFI cameron) 102 kg (225 lb 15.5 oz) 102 kg (224 lb 1.6 oz)     Body mass index is 36.73 kg/m².    Intake/Output Summary (Last 24 hours) at 10/15/2024 1916  Last data filed at 10/15/2024 1700  Gross per 24 hour   Intake 1270 ml   Output 1700 ml   Net -430 ml     Diet: Regular/House, Diabetic; Consistent Carbohydrate; Fluid Consistency: Thin (IDDSI 0)  ----------------------------------------------------------------------------------------------------------------------  Physical exam:  Constitutional: Chronically ill-appearing  female in no apparent distress.     HENT:  Head:  Normocephalic and atraumatic.  Mouth:  Moist mucous membranes.    Eyes:  Conjunctivae and EOM are normal.  Pupils are equal, round, and reactive to light.  No scleral icterus.    Neck:  Neck supple. No thyromegaly.  No JVD present.    Cardiovascular:  Regular rate and rhythm with no murmurs, rubs, clicks or gallops appreciated.  Pulmonary/Chest:  Clear to auscultation bilaterally with no crackles, wheezes or rhonchi appreciated.  Abdominal:  Soft. Nontender. Nondistended  Bowel sounds are normal in all four quadrants. No organomegally appreciated.   Musculoskeletal:  No edema, no tenderness, and no deformity.  No red or swollen joints anywhere.    Neurological:  Alert, Cranial nerves II-XII intact with no focal deficits.  No facial droop.  No slurred speech.   Skin:  Warm and dry to palpation with no rashes or lesions appreciated.  Peripheral vascular:  2+ radial and pedal pulses in bilateral upper and lower extremities.  Psychiatric:  Alert and oriented x3, demonstrates appropriate judgment and insight.    No significant change in physical exam in comparison to  "10/14/2024  -------------------------------------------------------------------------  ----------------------------------------------------------------------------------------------------------------------      Results from last 7 days   Lab Units 10/15/24  0741 10/13/24  0742 10/11/24  0814   WBC 10*3/mm3 9.56 11.31* 9.91   HEMOGLOBIN g/dL 13.6 13.9 13.7   HEMATOCRIT % 41.8 42.0 41.4   MCV fL 99.8* 100.0* 99.0*   MCHC g/dL 32.5 33.1 33.1   PLATELETS 10*3/mm3 283 381 387         Results from last 7 days   Lab Units 10/15/24  0741 10/13/24  0742 10/11/24  1832 10/11/24  0814   SODIUM mmol/L 135* 132*  --  134*   POTASSIUM mmol/L 4.2 4.1 4.7 3.6   CHLORIDE mmol/L 98 96*  --  99   CO2 mmol/L 23.9 21.5*  --  23.5   BUN mg/dL 23* 30*  --  13   CREATININE mg/dL 0.49* 0.56*  --  0.44*   CALCIUM mg/dL 10.1 9.9  --  9.6   GLUCOSE mg/dL 324* 375*  --  404*   Estimated Creatinine Clearance: 178.7 mL/min (A) (by C-G formula based on SCr of 0.49 mg/dL (L)).    No results found for: \"AMMONIA\"      No results found for: \"BLOODCX\"  No results found for: \"URINECX\"  No results found for: \"WOUNDCX\"  No results found for: \"STOOLCX\"    I have personally looked at the labs and they are summarized above.  ----------------------------------------------------------------------------------------------------------------------  Imaging Results (Last 24 Hours)       ** No results found for the last 24 hours. **          ----------------------------------------------------------------------------------------------------------------------  Assessment and Plan:    ESBL acute cystitis -patient has completed full course of IV antibiotic therapy with Invanz     2.  Acute metabolic encephalopathy -resolved, initially secondary to acute UTI     3.  History of CVA -patient with left-sided hemiparesis secondary to history of CVA     4.  Debility -continue PT/OT     5.  History of genital herpes -continue acyclovir     6.  Morbid obesity -complicates all " aspects of care    Disposition still pending placement    Marv Navas DO   10/15/24   19:16 EDT       Electronically signed by Marv Navas DO at 10/15/24 1917          Physical Therapy Notes (most recent note)        Dilma Ochoa, PT at 10/15/24 1308  Version 1 of 1         Acute Care - Physical Therapy Treatment Note  LILLIANA Haines     Patient Name: Lety Johnson  : 1980  MRN: [id number]  Today's Date: 10/15/2024   Onset of Illness/Injury or Date of Surgery: 24  Visit Dx:     ICD-10-CM ICD-9-CM   1. Hypokalemia  E87.6 276.8   2. Pressure injury of skin of sacral region, unspecified injury stage  L89.159 707.03     707.20   3. Pressure injury of skin of left heel, unspecified injury stage  L89.629 707.07     707.20   4. Acute cystitis without hematuria  N30.00 595.0     Patient Active Problem List   Diagnosis   (none) - all problems resolved or deleted     Past Medical History:   Diagnosis Date    Stroke      History reviewed. No pertinent surgical history.  PT Assessment (Last 12 Hours)       PT Evaluation and Treatment       Row Name 10/15/24 1302          Physical Therapy Time and Intention    Subjective Information complains of;weakness;fatigue;pain  -CT     Document Type therapy note (daily note)  -CT     Mode of Treatment physical therapy  -CT     Patient Effort adequate  -CT       Row Name 10/15/24 1302          General Information    Patient Profile Reviewed yes  -CT       Row Name 10/15/24 1302          Pain    Pretreatment Pain Rating 5/10  -CT     Posttreatment Pain Rating 8/10  -CT       Row Name 10/15/24 1302          Cognition    Affect/Mental Status (Cognition) emotionally labile  -CT     Orientation Status (Cognition) oriented x 3  -CT     Follows Commands (Cognition) WFL  -CT       Row Name 10/15/24 1302          Bed Mobility    Bed Mobility bed mobility (all) activities  -CT     All Activities, [place] (Bed Mobility) moderate assist (50% patient  effort);maximum assist (25% patient effort);verbal cues;nonverbal cues (demo/gesture)  -CT     Bed Mobility, Safety Issues decreased use of arms for pushing/pulling;decreased use of legs for bridging/pushing  -CT     Assistive Device (Bed Mobility) bed rails;head of bed elevated  -CT       Row Name 10/15/24 1302          Balance    Static Sitting Balance independent  -CT     Dynamic Sitting Balance set-up  -CT     Position, Sitting Balance sitting edge of bed  -CT     Balance Interventions sitting  -CT       Row Name             Wound 09/26/24 1809 Left posterior ankle Other (comment)    Wound - Properties Group Placement Date: 09/26/24  -KJ Placement Time: 1809  -KJ Side: Left  -KJ Orientation: posterior  -KJ Location: ankle  -KJ Primary Wound Type: Other  -TW, unknow etiology     Retired Wound - Properties Group Placement Date: 09/26/24  -KJ Placement Time: 1809  -KJ Side: Left  -KJ Orientation: posterior  -KJ Location: ankle  -KJ Primary Wound Type: Other  -TW, unknow etiology     Retired Wound - Properties Group Placement Date: 09/26/24  -KJ Placement Time: 1809  -KJ Side: Left  -KJ Orientation: posterior  -KJ Location: ankle  -KJ Primary Wound Type: Other  -TW, unknow etiology     Retired Wound - Properties Group Date first assessed: 09/26/24  -KJ Time first assessed: 1809  -KJ Side: Left  -KJ Location: ankle  -KJ Primary Wound Type: Other  -TW, unknow etiology       Row Name             Wound 10/11/24 1330 medial coccyx Fissure    Wound - Properties Group Placement Date: 10/11/24  -TW Placement Time: 1330 -TW Orientation: medial  -TW Location: coccyx  -TW Primary Wound Type: Fissure  -TW    Retired Wound - Properties Group Placement Date: 10/11/24  -TW Placement Time: 1330  -TW Orientation: medial  -TW Location: coccyx  -TW Primary Wound Type: Fissure  -TW    Retired Wound - Properties Group Placement Date: 10/11/24  -TW Placement Time: 1330  -TW Orientation: medial  -TW Location: coccyx  -TW Primary Wound  Type: Fissure  -TW    Retired Wound - Properties Group Date first assessed: 10/11/24  -TW Time first assessed: 1330  -TW Location: coccyx  -TW Primary Wound Type: Fissure  -TW      Row Name 10/15/24 1302          Coping    Observed Emotional State calm;cooperative  -CT     Verbalized Emotional State acceptance  -CT       Row Name 10/15/24 1302          Plan of Care Review    Plan of Care Reviewed With patient  -CT       Row Name 10/15/24 1302          Positioning and Restraints    Pre-Treatment Position in bed  -CT     Post Treatment Position bed  -CT     In Bed sitting EOB;call light within reach;encouraged to call for assist;notified nsg  -CT       Row Name 10/15/24 1302          Therapy Assessment/Plan (PT)    Rehab Potential (PT) fair  -CT     Criteria for Skilled Interventions Met (PT) yes;meets criteria;skilled treatment is necessary  -CT     Therapy Frequency (PT) 2 times/wk  1-5 times/wk  -CT     Problem List (PT) problems related to;balance;mobility;hemiparesis/hemiplegia;coordination;strength;range of motion (ROM);muscle tone;motor control  -CT     Activity Limitations Related to Problem List (PT) unable to ambulate safely;unable to transfer safely;BADLs not performed adequately or safely  -CT               User Key  (r) = Recorded By, (t) = Taken By, (c) = Cosigned By      Initials Name Provider Type    Karen Peralta, RN Registered Nurse    CT Dilma Ochoa, GENARO Physical Therapist    Nory Keenan RN Registered Nurse                    Physical Therapy Education       Title: PT OT SLP Therapies (Done)       Topic: Physical Therapy (Done)       Point: Mobility training (Done)       Learning Progress Summary            Patient Acceptance, E,TB, VU by CF at 10/15/2024 0753    Acceptance, E,TB, VU by CF at 10/14/2024 0801    Acceptance, E,TB, VU by CF at 10/13/2024 1045    Acceptance, E,D, VU,NR by AG at 10/10/2024 1310    Acceptance, E,TB, VU by CB at 10/8/2024 0448    Acceptance, E, NR by MITCHEL  at 10/2/2024 1101    Acceptance, E, NR by RD at 10/1/2024 0856    Acceptance, E,D, VU,NR by AG at 9/30/2024 1559                      Point: Home exercise program (Done)       Learning Progress Summary            Patient Acceptance, E,TB, VU by CF at 10/15/2024 0753    Acceptance, E,TB, VU by CF at 10/14/2024 0801    Acceptance, E,TB, VU by CF at 10/13/2024 1045    Acceptance, E,D, VU,NR by AG at 10/10/2024 1310    Acceptance, E,TB, VU by CB at 10/8/2024 0448    Acceptance, E, NR by RD at 10/2/2024 1101    Acceptance, E, NR by RD at 10/1/2024 0856    Acceptance, E,D, VU,NR by AG at 9/30/2024 1559                      Point: Body mechanics (Done)       Learning Progress Summary            Patient Acceptance, E,TB, VU by CF at 10/15/2024 0753    Acceptance, E,TB, VU by CF at 10/14/2024 0801    Acceptance, E,TB, VU by CF at 10/13/2024 1045    Acceptance, E,D, VU,NR by AG at 10/10/2024 1310    Acceptance, E,TB, VU by CB at 10/8/2024 0448    Acceptance, E, NR by RD at 10/2/2024 1101    Acceptance, E, NR by RD at 10/1/2024 0856    Acceptance, E,D, VU,NR by AG at 9/30/2024 1559                      Point: Precautions (Done)       Learning Progress Summary            Patient Acceptance, E,TB, VU by CF at 10/15/2024 0753    Acceptance, E,TB, VU by CF at 10/14/2024 0801    Acceptance, E,TB, VU by CF at 10/13/2024 1045    Acceptance, E,D, VU,NR by AG at 10/10/2024 1310    Acceptance, E,TB, VU by CB at 10/8/2024 0448    Acceptance, E, NR by RD at 10/2/2024 1101    Acceptance, E, NR by RD at 10/1/2024 0856    Acceptance, E,D, VU,NR by AG at 9/30/2024 1559                                      User Key       Initials Effective Dates Name Provider Type Discipline    AG 06/16/21 -  Мария Joya, PT Physical Therapist PT    RD 06/16/21 -  Laisha Verdugo, RN Registered Nurse Nurse    CF 06/25/24 -  Cherelle Germain, RN Registered Nurse Nurse    CB 06/17/24 -  Fidelia Lombardo, RN Registered Nurse Nurse                  PT  Recommendation and Plan  Anticipated Discharge Disposition (PT): skilled nursing facility, inpatient rehabilitation facility  Planned Therapy Interventions (PT): balance training, bed mobility training, gait training, home exercise program, manual therapy techniques, motor coordination training, neuromuscular re-education, patient/family education, postural re-education, strengthening, ROM (range of motion), transfer training, wheelchair management/propulsion training  Therapy Frequency (PT): 2 times/wk (1-5 times/wk)  Plan of Care Reviewed With: patient       Time Calculation:    PT Charges       Row Name 10/15/24 1307             Time Calculation    PT Received On 10/15/24  -CT         Time Calculation- PT    Total Timed Code Minutes- PT 34 minute(s)  -CT                User Key  (r) = Recorded By, (t) = Taken By, (c) = Cosigned By      Initials Name Provider Type    CT Dilma Ochoa, PT Physical Therapist                  Therapy Charges for Today       Code Description Service Date Service Provider Modifiers Qty    90928532727 HC PT THERAPEUTIC ACT EA 15 MIN 10/14/2024 Dilma Ochoa, PT GP 2    35393906946 HC PT NEUROMUSC RE EDUCATION EA 15 MIN 10/14/2024 Dilma Ochoa, PT GP 1    31398628132 HC PT RE-EVAL ESTABLISHED PLAN 2 10/14/2024 Dilma Ochoa, PT GP 1    97659732759 HC PT THERAPEUTIC ACT EA 15 MIN 10/15/2024 Dilma Ochoa, PT GP 2            PT G-Codes  AM-PAC 6 Clicks Score (PT): 8    Dilma Ochoa, PT  10/15/2024      Electronically signed by Dilma Ochoa PT at 10/15/24 1308          Occupational Therapy Notes (most recent note)        Neil Graf, OT at 10/15/24 1117          Acute Care - Occupational Therapy Treatment Note  LILLIANA Haines     Patient Name: Lety Johnson  : 1980  MRN: [id number]  Today's Date: 10/15/2024  Onset of Illness/Injury or Date of Surgery: 24     Referring Physician: Dr. Marc    Admit Date: 2024       ICD-10-CM ICD-9-CM   1. Hypokalemia  E87.6  276.8   2. Pressure injury of skin of sacral region, unspecified injury stage  L89.159 707.03     707.20   3. Pressure injury of skin of left heel, unspecified injury stage  L89.629 707.07     707.20   4. Acute cystitis without hematuria  N30.00 595.0     Patient Active Problem List   Diagnosis   (none) - all problems resolved or deleted     Past Medical History:   Diagnosis Date    Stroke      History reviewed. No pertinent surgical history.      OT ASSESSMENT FLOWSHEET (Last 12 Hours)       OT Evaluation and Treatment       Row Name 10/15/24 1113                   OT Time and Intention    Document Type therapy note (daily note)  -KR        Mode of Treatment occupational therapy  -KR        Patient Effort adequate  -KR        Comment Continued PROM LUE to promote fxl positioning, decrease deformity. Pt able to tolerate L elbow ext to WFL, slight flexion of shoulder, neutral wrist and hand for fxl positioning at side, while resting. Pt education continued for importance of PROM/positioning to decrease abnormal tone and prevent contractures. Pt reported comprehension. Nursing informed of positioning LUE as tolerated.  -KR           Motor Skills    Neuromuscular Function left;upper extremity;severe impairment  -KR           Shoulder (Therapeutic Exercise)    Shoulder PROM (Therapeutic Exercise) left;flexion  -KR           Elbow/Forearm (Therapeutic Exercise)    Elbow/Forearm PROM (Therapeutic Exercise) left;extension  -KR           Wrist (Therapeutic Exercise)    Wrist PROM (Therapeutic Exercise) left;flexion;extension  -KR           Hand (Therapeutic Exercise)    Hand PROM (Therapeutic Exercise) left;finger flexion;finger extension  -KR           Wound 09/26/24 1809 Left posterior ankle Other (comment)    Wound - Properties Group Placement Date: 09/26/24  -KJ Placement Time: 1809  -KJ Side: Left  -KJ Orientation: posterior  -KJ Location: ankle  -KJ Primary Wound Type: Other  -TW, unknow etiology     Retired Wound -  Properties Group Placement Date: 09/26/24  -KJ Placement Time: 1809  -KJ Side: Left  -KJ Orientation: posterior  -KJ Location: ankle  -KJ Primary Wound Type: Other  -TW, unknow etiology     Retired Wound - Properties Group Placement Date: 09/26/24  -KJ Placement Time: 1809  -KJ Side: Left  -KJ Orientation: posterior  -KJ Location: ankle  -KJ Primary Wound Type: Other  -TW, unknow etiology     Retired Wound - Properties Group Date first assessed: 09/26/24  -KJ Time first assessed: 1809  -KJ Side: Left  -KJ Location: ankle  -KJ Primary Wound Type: Other  -TW, unknow etiology        Wound 10/11/24 1330 medial coccyx Fissure    Wound - Properties Group Placement Date: 10/11/24  -TW Placement Time: 1330 -TW Orientation: medial  -TW Location: coccyx  -TW Primary Wound Type: Fissure  -TW    Retired Wound - Properties Group Placement Date: 10/11/24  -TW Placement Time: 1330  -TW Orientation: medial  -TW Location: coccyx  -TW Primary Wound Type: Fissure  -TW    Retired Wound - Properties Group Placement Date: 10/11/24  -TW Placement Time: 1330  -TW Orientation: medial  -TW Location: coccyx  -TW Primary Wound Type: Fissure  -TW    Retired Wound - Properties Group Date first assessed: 10/11/24  -TW Time first assessed: 1330  -TW Location: coccyx  -TW Primary Wound Type: Fissure  -TW       Plan of Care Review    Plan of Care Reviewed With patient  -KR        Progress improving  -KR           ROM Goal 1 (OT)    Progress/Outcome (ROM Goal 1, OT) continuing progress toward goal  -KR                  User Key  (r) = Recorded By, (t) = Taken By, (c) = Cosigned By      Initials Name Effective Dates    TW Karen Gipson RN 06/16/21 -     KR Neil Graf OT 06/16/21 -     Nory Keenan RN 06/08/23 -                      Occupational Therapy Education        No education to display                      OT Recommendation and Plan  Planned Therapy Interventions (OT): activity tolerance training, neuromuscular  "control/coordination retraining, ROM/therapeutic exercise  Plan of Care Review  Plan of Care Reviewed With: patient  Progress: improving  Plan of Care Reviewed With: patient        Time Calculation:     Therapy Charges for Today       Code Description Service Date Service Provider Modifiers Qty    14015967820 HC OT THERAPEUTIC ACT EA 15 MIN 10/14/2024 Neil Graf OT GO 2    35885800346  OT SELF CARE/MGMT/TRAIN EA 15 MIN 10/14/2024 Neil Graf OT GO 1    95273908159 HC OT THERAPEUTIC ACT EA 15 MIN 10/15/2024 Neil Graf OT GO 2                 Neil Graf OT  10/15/2024    Electronically signed by Neil Graf OT at 10/15/24 1118          Speech Language Pathology Notes (most recent note)        Hafsa Melendez MA,CCC-SLP at 24 1101          Acute Care - Speech Language Pathology   Swallow Initial Evaluation  Zaki  Clinical Dysphagia Assessment     Patient Name: Lety Johnson  : 1980  MRN: [id number]  Today's Date: 2024             Admit Date: 2024    Visit Dx:     ICD-10-CM ICD-9-CM   1. Hypokalemia  E87.6 276.8   2. Pressure injury of skin of sacral region, unspecified injury stage  L89.159 707.03     707.20   3. Pressure injury of skin of left heel, unspecified injury stage  L89.629 707.07     707.20   4. Acute cystitis without hematuria  N30.00 595.0     Patient Active Problem List   Diagnosis    UTI (urinary tract infection)     Past Medical History:   Diagnosis Date    Stroke      History reviewed. No pertinent surgical history.    Lety Johnson  was seen at bedside this AM on 3S Rm. 3315-1s to assess safety/efficacy of swallowing fnx, determine safest/least restrictive diet tolerance.      Per report: pt \"is a 43 year old female patient who presents to the ER with chief complaint of back pain. 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 The patient had been living with her fiance who is in [facility] now. Her sister has been " "trying to help care for her. Today, she was in the floor and unable to get up so they called EMS for lift assist. Patient's sister and patient want the patient to go into a rehab facility for strengthening. She also complains of low back pain.\"    Social History     Socioeconomic History    Marital status: Single   Tobacco Use    Smoking status: Every Day     Average packs/day: 2.0 packs/day for 26.0 years (52.0 ttl pk-yrs)     Types: Cigarettes     Start date: 1998    Smokeless tobacco: Never   Vaping Use    Vaping status: Never Used   Substance and Sexual Activity    Alcohol use: Not Currently    Drug use: Never    Sexual activity: Not Currently     Partners: Male     Comment:  in [facility]        Imaging:  No recent chest imaging    Labs:  Sodium  142  09/30 0117  Potassium  3.5  09/30 0117  Chloride  110  09/30 0117  CO2  22.8  09/30 0117  BUN  5  09/30 0117  Creatinine  0.46  09/30 0117  Glucose  196  09/30 0117  Hemoglobin  10.5  09/30 0117  Hematocrit  32.7  09/30 0117  WBC  8.49  09/30 0117  Platelets  285  09/30 0117  Diet Orders (active) (From admission, onward)       Start     Ordered    09/28/24 1800  Dietary Nutrition Supplements Boost Plus (Ensure Enlive, Ensure Plus)  Daily With Breakfast & Dinner       09/28/24 1209    09/26/24 1748  Diet: Regular/House; Fluid Consistency: Thin (IDDSI 0)  Diet Effective Now         09/26/24 1747                  Pt is observed on RA.     Patient was positioned upright and centered in bed to accept multiple po presentations of ice chips, solid cracker, puree, and thin liquids via spoon, cup, and straw. Patient was able to self provide po trials.     Facial/oral structures were slightly asymmetrical upon observation. Pt with residual L side weakness from previous CVA. Lingual protrusion revealed no deviation. Oral mucosa were moist, pink, and clean. Secretions were clear, thin, and well controlled. OROM/ENDER was wfl to imitate oral postures. Gag is not assessed. " Volitional cough was intact w/ adequate  intensity, clear in quality, non-productive. Voice was adequate in intensity, clear in quality w/ intelligible speech.    Upon po presentations, adequate bolus anticipation and acceptance w/ good labial seal for bolus clearance via spoon bowl, cup rim stability and suction via straw. Bolus formation, manipulation and control were wfl w/ rotary mastication pattern. A-p transit was timely w/o significant oral residue appreciated. No overt s/s aspiration before the swallow.      Pharyngeal swallow was timely w/ adequate hyolaryngeal elevation per palpation. No overt s/s aspiration evidenced across this evaluation. No silent aspiration suspected. Patient denied odynophagia.    Pt reports difficulty swallowing large pills only.     Impression: Patient presented w/ wfl oropharyngeal swallow w/o s/s aspiration. No s/s indicative of silent aspiration. No odynophagia reported.      SLP Recommendation and Plan   1. Regular consistencies, thin liquids.    2. Medications whole in puree/thins. Crush PRN.  3. Upright and centered for all po intake.  4. MO precautions.  5. Oral care protocol.    No further formal SLP f/u warranted/recommended at this time.    D/w patient results and recommendations w/ verbal agreement.    D/w RN results and recommendations w/ verbal agreement.    Thank you for allowing me to participate in the care of your patient-  Hafsa Melendez M.A., CCC-SLP     EDUCATION  The patient has been educated in the following areas:   Oral Care/Hydration.      Time Calculation:     Therapy Charges for Today       Code Description Service Date Service Provider Modifiers Qty    37009022727 HC ST EVAL ORAL PHARYNG SWALLOW 4 9/30/2024 Hafsa Melendez MA,CCC-SLP GN 1          Hafsa Melendez MA,CCC-SLP  9/30/2024    Electronically signed by Hafsa Melendez MA,CCC-SLP at 09/30/24 1107       ADL Documentation (most recent)      Flowsheet Row Most Recent Value    Transferring 4 - completely dependent   Toileting 4 - completely dependent   Bathing 2 - assistive person   Dressing 2 - assistive person   Eating 2 - assistive person   Communication 0 - understands/communicates without difficulty   Swallowing 0 - swallows foods/liquids without difficulty   Equipment Currently Used at Home hospital bed, lift device, wheelchair        Continued Care and Services - Admitted Since 9/26/2024         Destination         Service Provider Request Status Services Address Phone Fax Patient Preferred     [facility] SWING BED UNIT Pending - No Request Sent -- [address], [place] [address] [phone] --     [facility] Declined  Cannot meet patient's needs -- [address] [facility] [place] [address] [phone] --     [facility] Acute Care Declined  Not eligible -- [address] [facility] [place] [address] [phone] --     [facility] - SWING Declined  Not eligible -- [address] [facility] [address] [phone] --     [facility] - [facility] Declined  Clinical Denial -- [address] FLOOR, [facility] [address] [phone] --     [facility] SW[facility] BED UNIT Declined  Cannot meet patient's needs -- [address] [facility] [address] [phone] --      [facility] Declined  Not eligible -- [address] [facility] [address] [phone] --     [facility] Declined  Bed not available -- [address] [facility] [place] [address] [phone] --     [facility] SKILLED CARE Declined  Bed not available -- [address] [facility] [address] [phone] --     [facility] Swing Declined  Out of network -- [address] [facility] [address] [phone] --     [facility] Decl[facility]  Clinical Denial -- [address] [facility] [address] [phone] --     [facility] SWING BED UNIT Declined  Insurance Denial, Out of network -- [address] [address] [phone] --     [facility] Declined  Out of network -- [address] [facility] [address] [phone] --

## 2024-10-16 NOTE — PROGRESS NOTES
[facility]     Patient Identification:  Name:  Lety Johnson  Age:  43 y.o.  Sex:  female  :  1980  MRN:  [id number]  Visit Number:  [id number]  Primary Care Provider:  Provider, No Known    Length of stay:  18    Chief complaint: Pain    Subjective:    Patient doing well today, no significant events overnight.  Patient continues to complain of left sided whole body pain.  ----------------------------------------------------------------------------------------------------------------------  Current Hospital Meds:  acyclovir, 400 mg, Oral, Nightly  aspirin, 81 mg, Oral, Daily  atorvastatin, 80 mg, Oral, Daily  DULoxetine, 60 mg, Oral, Daily  heparin (porcine), 5,000 Units, Subcutaneous, Q8H  hydrOXYzine, 25 mg, Oral, Once  insulin glargine, 30 Units, Subcutaneous, Nightly  insulin lispro, 2-9 Units, Subcutaneous, 4x Daily PC & at Bedtime  lisinopril, 20 mg, Oral, Daily  [Held by provider] metFORMIN, 1,000 mg, Oral, BID With Meals  metoprolol tartrate, 25 mg, Oral, Q12H  nicotine, 1 patch, Transdermal, Q24H  nystatin, , Topical, Q12H  pantoprazole, 40 mg, Oral, Daily  sodium chloride, 10 mL, Intravenous, Q12H  sodium chloride, 10 mL, Intravenous, Q12H      Pharmacy Consult,       ----------------------------------------------------------------------------------------------------------------------  Vital Signs:  Temp:  [97.2 °F (36.2 °C)-98 °F (36.7 °C)] 97.8 °F (36.6 °C)  Heart Rate:  [] 109  Resp:  [16-20] 18  BP: (100-133)/(58-83) 115/58      10/07/24  0511 10/08/24  0500 10/09/24  0500   Weight: 102 kg (225 lb 14.4 oz) (FIFI cameron) 102 kg (225 lb 15.5 oz) 102 kg (224 lb 1.6 oz)     Body mass index is 36.73 kg/m².    Intake/Output Summary (Last 24 hours) at 10/16/2024 1620  Last data filed at 10/16/2024 1439  Gross per 24 hour   Intake 1310 ml   Output 1500 ml   Net -190 ml     Diet: Regular/House, Diabetic; Consistent Carbohydrate; Fluid Consistency: Thin  (IDDSI 0)  ----------------------------------------------------------------------------------------------------------------------  Physical exam:  Constitutional: Chronically ill-appearing  female in no apparent distress.     HENT:  Head:  Normocephalic and atraumatic.  Mouth:  Moist mucous membranes.    Eyes:  Conjunctivae and EOM are normal.  Pupils are equal, round, and reactive to light.  No scleral icterus.    Neck:  Neck supple. No thyromegaly.  No JVD present.    Cardiovascular:  Regular rate and rhythm with no murmurs, rubs, clicks or gallops appreciated.  Pulmonary/Chest:  Clear to auscultation bilaterally with no crackles, wheezes or rhonchi appreciated.  Abdominal:  Soft. Nontender. Nondistended  Bowel sounds are normal in all four quadrants. No organomegally appreciated.   Musculoskeletal:  No edema, no tenderness, and no deformity.  No red or swollen joints anywhere.    Neurological:  Alert, Cranial nerves II-XII intact with no focal deficits.  No facial droop.  No slurred speech.   Skin:  Warm and dry to palpation with no rashes or lesions appreciated.  Peripheral vascular:  2+ radial and pedal pulses in bilateral upper and lower extremities.  Psychiatric:  Alert and oriented x3, demonstrates appropriate judgment and insight.    No significant change in physical exam in comparison to 10/15/2024  -------------------------------------------------------------------------  ----------------------------------------------------------------------------------------------------------------------      Results from last 7 days   Lab Units 10/15/24  0741 10/13/24  0742 10/11/24  0814   WBC 10*3/mm3 9.56 11.31* 9.91   HEMOGLOBIN g/dL 13.6 13.9 13.7   HEMATOCRIT % 41.8 42.0 41.4   MCV fL 99.8* 100.0* 99.0*   MCHC g/dL 32.5 33.1 33.1   PLATELETS 10*3/mm3 283 561 387         Results from last 7 days   Lab Units 10/15/24  0741 10/13/24  0742 10/11/24  1832 10/11/24  0814   SODIUM mmol/L 135* 132*  --  134*  "  POTASSIUM mmol/L 4.2 4.1 4.7 3.6   CHLORIDE mmol/L 98 96*  --  99   CO2 mmol/L 23.9 21.5*  --  23.5   BUN mg/dL 23* 30*  --  13   CREATININE mg/dL 0.49* 0.56*  --  0.44*   CALCIUM mg/dL 10.1 9.9  --  9.6   GLUCOSE mg/dL 324* 375*  --  404*   Estimated Creatinine Clearance: 178.7 mL/min (A) (by C-G formula based on SCr of 0.49 mg/dL (L)).    No results found for: \"AMMONIA\"      No results found for: \"BLOODCX\"  No results found for: \"URINECX\"  No results found for: \"WOUNDCX\"  No results found for: \"STOOLCX\"    I have personally looked at the labs and they are summarized above.  ----------------------------------------------------------------------------------------------------------------------  Imaging Results (Last 24 Hours)       ** No results found for the last 24 hours. **          ----------------------------------------------------------------------------------------------------------------------  Assessment and Plan:    ESBL acute cystitis -patient has completed full course of IV antibiotic therapy with Invanz     2.  Acute metabolic encephalopathy -resolved, initially secondary to acute UTI     3.  History of CVA -patient with left-sided hemiparesis secondary to history of CVA     4.  Debility -continue PT/OT     5.  History of genital herpes -continue acyclovir     6.  Morbid obesity -complicates all aspects of care    Disposition still pending placement    Marv Navas,    10/16/24   16:20 EDT     "

## 2024-10-16 NOTE — PLAN OF CARE
"Goal Outcome Evaluation:         Pt A/O, VSS, on RA. Pt frequently seeks out staff requesting pain meds despite being informed several times by MD and RN that she will not receive pain meds. PRN tylenol given, pt states \"tylenol does not help\". Pt is resting very well in between care. No acute changes or concerns noted this shift. POC ongoing.                                   "

## 2024-10-16 NOTE — CASE MANAGEMENT/SOCIAL WORK
Discharge Planning Assessment  [facility]     Patient Name: Lety Johnson  MRN: [id number]  Today's Date: 10/16/2024    Admit Date: 9/26/2024    Plan: SS followed up with [facility] per April on this date who states they are not in network with pt's insurance and will not be able to accept. SS contacted [facility] SB admissions per [facility] [phone] and left a voicemail with admissions. SS contacted [facility] on this date to discuss pt. APS SW requesting for updated clinicals to be sent. SS discussed goods and services with APS SW to see if they would be able to use that to assist with getting pt's significant other out of care home to assist pt at home. Per APS BOBY Euceda, she is going to contact her boss to see if this is an option and also plans to contact pt's sister to discuss discharge plan as well. APS SW to contact SS with an update. SS to follow.     Discharge Plan       Row Name 10/16/24 1026       Plan    Plan SS followed up with [facility] per April on this date who states they are not in network with pt's insurance and will not be able to accept. SS contacted [facility] admissions per [facility] [phone] and left a voicemail with admissions. SS contacted [facility] on this date to discuss pt. APS SW requesting for updated clinicals to be sent. SS discussed goods and services with APS SW to see if they would be able to use that to assist with getting pt's significant other out of care home to assist pt at home. Per ABELARDO Euceda, she is going to contact her boss to see if this is an option and also plans to contact pt's sister to discuss discharge plan as well. APS SW to contact SS with an update. SS to follow.        Continued Care and Services - Admitted Since 9/26/2024       Destination       Service Provider Request Status Services Address Phone Fax Patient Preferred    [facility] AND  [facility] SWING BED UNIT Pending - No Request Sent -- [address] [place] [id number] [phone] --    [facility] Declined  Cannot meet patient's needs -- [address] [place] [address] [phone] --     [facility] Acute Care Declined  Not eligible -- [address] [facility] [place] [address] [phone] --    [facility] SWING Declined  Not eligible -- [address] [facility] [address] [phone] --    [facility] Declined  Clinical Denial -- [address] [facility], [address] FLOOR, [facility] [address] [phone] --    [facility] SWING BED UNIT Declined  Cannot meet patient's needs -- [address] [facility] [address] [phone] --     [facility] Declined  Not eligible -- [address] [facility] [place] [address] [phone] --    [facility] Declined  Bed not available -- [address] [facility] [address] [phone] --    [facility] SKILLED CARE Declined  Bed not available -- [address] [facility] [address] [phone] --    [facility] Swing Declined  Out of network -- [address] [facility] [address] [phone] --    [facility] Declined  Clinical Denial -- [address] [facility] [address] [phone] --    [facility] SWING BED UNIT Declined  Insurance Denial, Out of network -- [address], [facility] [address] [phone] --    [facility] Declined  Out of network -- [address] [facility] [address] [phone] --        Expected Discharge Date and Time       Expected Discharge Date Expected Discharge Time    Oct 16, 2024      KAZ Avendano

## 2024-10-16 NOTE — PLAN OF CARE
Goal Outcome Evaluation:  Plan of Care Reviewed With: patient        Progress: no change  Outcome Evaluation: Patient VSS on room air this shift. Patient pure wick removed due to MASD. PRN medications requested and given per orders. Patient reports no further concerns at this time, will continue POC.

## 2024-10-16 NOTE — PROGRESS NOTES
Adult Nutrition  Assessment/PES    Patient Name:  Lety Johnson  YOB: 1980  MRN: [id number]  Admit Date:  9/26/2024    Assessment Date:  10/16/2024    Comments:  follow-up.    Good po intake 83% ave of meals.    Will stop Boost Plus supplement with good po intake.    Will cont to follow and monitor.      Reason for Assessment       Row Name 10/16/24 1433          Reason for Assessment    Reason For Assessment follow-up protocol  remote REZA White     Diagnosis infection/sepsis;neurologic conditions  cystitis, met enceph, debility hx CVA                    Nutrition/Diet History       Row Name 10/16/24 1433          Nutrition/Diet History    Typical Intake (Food/Fluid/EN/PN) called to room no answer                    Labs/Tests/Procedures/Meds       Row Name 10/16/24 1433          Labs/Procedures/Meds    Lab Results Reviewed reviewed     Lab Results Comments BUn 23 H, glu 296-396        Diagnostic Tests/Procedures    Diagnostic Test/Procedure Reviewed reviewed        Medications    Pertinent Medications Reviewed reviewed     Pertinent Medications Comments lantus, humalog, glucophage                        Nutrition Prescription Ordered       Row Name 10/16/24 1434          Nutrition Prescription PO    Supplement Boost Plus (Ensure Enlive, Ensure Plus)     Supplement Frequency 3 times a day     Common Modifiers Consistent Carbohydrate                    Evaluation of Received Nutrient/Fluid Intake       Row Name 10/16/24 1434          Fluid Intake Evaluation    Oral Fluid (mL) 950  ave x 3, 44%        PO Evaluation    Number of Meals 9     % PO Intake 83                       Problem/Interventions:   Problem 1       Row Name 10/16/24 1435          Nutrition Diagnoses Problem 1    Problem 1 Other (comment)  Inadequate energy intake related to cystitis, met eceph as evidenced po intake, ONS, skin                          Intervention Goal       Row Name 10/16/24 1433          Intervention Goal     General Meet nutritional needs for age/condition     PO Maintain intake;PO intake (%)     PO Intake % 80 %  or greater                    Nutrition Intervention       Row Name 10/16/24 1435          Nutrition Intervention    RD/Tech Action Encourage intake;Follow Tx progress;Adjusted supplement                    Nutrition Prescription       Row Name 10/16/24 1435          Nutrition Prescription PO    PO Prescription Discontinue supplement                    Education/Evaluation       Row Name 10/16/24 1436          Education    Education Education not appropriate at this time        Monitor/Evaluation    Monitor Per protocol;I&O;PO intake;Pertinent labs;Weight                     Electronically signed by:  Vee Van RD  10/16/24 14:36 EDT

## 2024-10-17 LAB
GLUCOSE BLDC GLUCOMTR-MCNC: 189 MG/DL (ref 70–130)
GLUCOSE BLDC GLUCOMTR-MCNC: 335 MG/DL (ref 70–130)
GLUCOSE BLDC GLUCOMTR-MCNC: 370 MG/DL (ref 70–130)
GLUCOSE BLDC GLUCOMTR-MCNC: 384 MG/DL (ref 70–130)
GLUCOSE BLDC GLUCOMTR-MCNC: 445 MG/DL (ref 70–130)

## 2024-10-17 PROCEDURE — 63710000001 INSULIN LISPRO (HUMAN) PER 5 UNITS: Performed by: INTERNAL MEDICINE

## 2024-10-17 PROCEDURE — 82948 REAGENT STRIP/BLOOD GLUCOSE: CPT

## 2024-10-17 PROCEDURE — 63710000001 INSULIN GLARGINE PER 5 UNITS: Performed by: INTERNAL MEDICINE

## 2024-10-17 PROCEDURE — 97530 THERAPEUTIC ACTIVITIES: CPT

## 2024-10-17 PROCEDURE — 25010000002 HEPARIN (PORCINE) PER 1000 UNITS: Performed by: INTERNAL MEDICINE

## 2024-10-17 PROCEDURE — 99231 SBSQ HOSP IP/OBS SF/LOW 25: CPT | Performed by: INTERNAL MEDICINE

## 2024-10-17 PROCEDURE — 97110 THERAPEUTIC EXERCISES: CPT

## 2024-10-17 RX ORDER — DEXTROSE MONOHYDRATE 25 G/50ML
25 INJECTION, SOLUTION INTRAVENOUS
Status: ACTIVE | OUTPATIENT
Start: 2024-10-17

## 2024-10-17 RX ORDER — GLUCAGON 1 MG/ML
1 KIT INJECTION
Status: ACTIVE | OUTPATIENT
Start: 2024-10-17

## 2024-10-17 RX ORDER — INSULIN LISPRO 100 [IU]/ML
4-24 INJECTION, SOLUTION INTRAVENOUS; SUBCUTANEOUS
Status: DISPENSED | OUTPATIENT
Start: 2024-10-17

## 2024-10-17 RX ORDER — NICOTINE POLACRILEX 4 MG
15 LOZENGE BUCCAL
Status: ACTIVE | OUTPATIENT
Start: 2024-10-17

## 2024-10-17 RX ADMIN — CYCLOBENZAPRINE 10 MG: 10 TABLET, FILM COATED ORAL at 21:43

## 2024-10-17 RX ADMIN — ACETAMINOPHEN 650 MG: 325 TABLET ORAL at 05:29

## 2024-10-17 RX ADMIN — Medication 5 MG: at 21:43

## 2024-10-17 RX ADMIN — Medication 10 ML: at 08:39

## 2024-10-17 RX ADMIN — METOPROLOL TARTRATE 25 MG: 25 TABLET, FILM COATED ORAL at 21:43

## 2024-10-17 RX ADMIN — INSULIN LISPRO 7 UNITS: 100 INJECTION, SOLUTION INTRAVENOUS; SUBCUTANEOUS at 08:36

## 2024-10-17 RX ADMIN — PANTOPRAZOLE SODIUM 40 MG: 40 TABLET, DELAYED RELEASE ORAL at 08:35

## 2024-10-17 RX ADMIN — INSULIN LISPRO 24 UNITS: 100 INJECTION, SOLUTION INTRAVENOUS; SUBCUTANEOUS at 20:00

## 2024-10-17 RX ADMIN — LISINOPRIL 20 MG: 10 TABLET ORAL at 08:35

## 2024-10-17 RX ADMIN — ACETAMINOPHEN 650 MG: 325 TABLET ORAL at 12:11

## 2024-10-17 RX ADMIN — HEPARIN SODIUM 5000 UNITS: 5000 INJECTION INTRAVENOUS; SUBCUTANEOUS at 05:29

## 2024-10-17 RX ADMIN — INSULIN LISPRO 8 UNITS: 100 INJECTION, SOLUTION INTRAVENOUS; SUBCUTANEOUS at 12:11

## 2024-10-17 RX ADMIN — METOPROLOL TARTRATE 25 MG: 25 TABLET, FILM COATED ORAL at 08:35

## 2024-10-17 RX ADMIN — ATORVASTATIN CALCIUM 80 MG: 40 TABLET, FILM COATED ORAL at 08:35

## 2024-10-17 RX ADMIN — ACYCLOVIR 400 MG: 200 CAPSULE ORAL at 21:43

## 2024-10-17 RX ADMIN — INSULIN LISPRO 8 UNITS: 100 INJECTION, SOLUTION INTRAVENOUS; SUBCUTANEOUS at 18:07

## 2024-10-17 RX ADMIN — DICLOFENAC SODIUM 4 G: 10 GEL TOPICAL at 15:35

## 2024-10-17 RX ADMIN — ASPIRIN 81 MG: 81 TABLET, CHEWABLE ORAL at 08:35

## 2024-10-17 RX ADMIN — Medication 10 ML: at 21:43

## 2024-10-17 RX ADMIN — HEPARIN SODIUM 5000 UNITS: 5000 INJECTION INTRAVENOUS; SUBCUTANEOUS at 13:39

## 2024-10-17 RX ADMIN — NYSTATIN: 100000 POWDER TOPICAL at 21:43

## 2024-10-17 RX ADMIN — INSULIN GLARGINE 45 UNITS: 100 INJECTION, SOLUTION SUBCUTANEOUS at 20:00

## 2024-10-17 RX ADMIN — ACETAMINOPHEN 650 MG: 325 TABLET ORAL at 18:07

## 2024-10-17 RX ADMIN — DULOXETINE HYDROCHLORIDE 60 MG: 60 CAPSULE, DELAYED RELEASE ORAL at 08:35

## 2024-10-17 RX ADMIN — HEPARIN SODIUM 5000 UNITS: 5000 INJECTION INTRAVENOUS; SUBCUTANEOUS at 21:43

## 2024-10-17 RX ADMIN — NYSTATIN: 100000 POWDER TOPICAL at 08:39

## 2024-10-17 RX ADMIN — NICOTINE 1 PATCH: 7 PATCH, EXTENDED RELEASE TRANSDERMAL at 08:36

## 2024-10-17 NOTE — THERAPY TREATMENT NOTE
Acute Care - Physical Therapy Treatment Note   [facility]     Patient Name: Lety Johnson  : 1980  MRN: [id number]  Today's Date: 10/17/2024   Onset of Illness/Injury or Date of Surgery: 24  Visit Dx:     ICD-10-CM ICD-9-CM   1. Hypokalemia  E87.6 276.8   2. Pressure injury of skin of sacral region, unspecified injury stage  L89.159 707.03     707.20   3. Pressure injury of skin of left heel, unspecified injury stage  L89.629 707.07     707.20   4. Acute cystitis without hematuria  N30.00 595.0     Patient Active Problem List   Diagnosis   (none) - all problems resolved or deleted     Past Medical History:   Diagnosis Date    Stroke      History reviewed. No pertinent surgical history.  PT Assessment (Last 12 Hours)       PT Evaluation and Treatment       Row Name 10/17/24 1333          Physical Therapy Time and Intention    Subjective Information complains of;weakness;pain  -CT     Document Type therapy note (daily note)  -CT     Mode of Treatment physical therapy  -CT     Patient Effort adequate  -CT       Row Name 10/17/24 1333          General Information    Patient Profile Reviewed yes  -CT       Row Name 10/17/24 1333          Cognition    Affect/Mental Status (Cognition) emotionally labile  -CT     Orientation Status (Cognition) oriented x 3  -CT     Follows Commands (Cognition) WFL  -CT       Row Name 10/17/24 1333          Bed Mobility    Bed Mobility bed mobility (all) activities  -CT     All Activities, [place] (Bed Mobility) moderate assist (50% patient effort);maximum assist (25% patient effort);verbal cues;nonverbal cues (demo/gesture)  -CT     Bed Mobility, Safety Issues decreased use of arms for pushing/pulling;decreased use of legs for bridging/pushing  -CT     Assistive Device (Bed Mobility) bed rails;head of bed elevated  -CT       Row Name 10/17/24 1333          Balance    Static Sitting Balance independent  -CT     Dynamic Sitting Balance standby assist  -CT     Balance  Interventions sitting  -CT       Row Name             Wound 09/26/24 1809 Left posterior ankle Other (comment)    Wound - Properties Group Placement Date: 09/26/24  -KJ Placement Time: 1809  -KJ Side: Left  -KJ Orientation: posterior  -KJ Location: ankle  -KJ Primary Wound Type: Other  -TW, unknow etiology     Retired Wound - Properties Group Placement Date: 09/26/24  -KJ Placement Time: 1809  -KJ Side: Left  -KJ Orientation: posterior  -KJ Location: ankle  -KJ Primary Wound Type: Other  -TW, unknow etiology     Retired Wound - Properties Group Placement Date: 09/26/24  -KJ Placement Time: 1809  -KJ Side: Left  -KJ Orientation: posterior  -KJ Location: ankle  -KJ Primary Wound Type: Other  -TW, unknow etiology     Retired Wound - Properties Group Date first assessed: 09/26/24  -KJ Time first assessed: 1809  -KJ Side: Left  -KJ Location: ankle  -KJ Primary Wound Type: Other  -TW, unknow etiology       Row Name             Wound 10/11/24 1330 medial coccyx Fissure    Wound - Properties Group Placement Date: 10/11/24  -TW Placement Time: 1330  -TW Orientation: medial  -TW Location: coccyx  -TW Primary Wound Type: Fissure  -TW    Retired Wound - Properties Group Placement Date: 10/11/24  -TW Placement Time: 1330  -TW Orientation: medial  -TW Location: coccyx  -TW Primary Wound Type: Fissure  -TW    Retired Wound - Properties Group Placement Date: 10/11/24  -TW Placement Time: 1330  -TW Orientation: medial  -TW Location: coccyx  -TW Primary Wound Type: Fissure  -TW    Retired Wound - Properties Group Date first assessed: 10/11/24  -TW Time first assessed: 1330  -TW Location: coccyx  -TW Primary Wound Type: Fissure  -TW      Row Name             Wound 10/16/24 0705 Left medial gluteal MASD (moisture associated skin damage)    Wound - Properties Group Placement Date: 10/16/24  -MS Placement Time: 0705  -MS Side: Left  -MS Orientation: medial  -MS Location: gluteal  -MS Primary Wound Type: MASD  -MS Type: MASD (moisture  associated skin damage)  -MS Present on Original Admission: N  -MS Additional Comments: Noted at shift change skin assesment, Night shift RN stated it had been there her shift.  -MS    Retired Wound - Properties Group Placement Date: 10/16/24  -MS Placement Time: 0705 -MS Present on Original Admission: N  -MS Side: Left  -MS Orientation: medial  -MS Location: gluteal  -MS Primary Wound Type: MASD  -MS Additional Comments: Noted at shift change skin assesment, Night shift RN stated it had been there her shift.  -MS Type: MASD (moisture associated skin damage)  -MS    Retired Wound - Properties Group Placement Date: 10/16/24  -MS Placement Time: 0705 -MS Present on Original Admission: N  -MS Side: Left  -MS Orientation: medial  -MS Location: gluteal  -MS Primary Wound Type: MASD  -MS Additional Comments: Noted at shift change skin assesment, Night shift RN stated it had been there her shift.  -MS Type: MASD (moisture associated skin damage)  -MS    Retired Wound - Properties Group Date first assessed: 10/16/24  -MS Time first assessed: 0705 -MS Present on Original Admission: N  -MS Side: Left  -MS Location: gluteal  -MS Primary Wound Type: MASD  -MS Additional Comments: Noted at shift change skin assesment, Night shift RN stated it had been there her shift.  -MS Type: MASD (moisture associated skin damage)  -MS      Row Name             Wound 10/16/24 0705 Left posterior greater trochanter MASD (moisture associated skin damage)    Wound - Properties Group Placement Date: 10/16/24  -MS Placement Time: 0705 -MS Side: Left  -MS Orientation: posterior  -MS Location: greater trochanter  -MS Primary Wound Type: MASD  -MS Type: MASD (moisture associated skin damage)  -MS Present on Original Admission: N  -MS    Retired Wound - Properties Group Placement Date: 10/16/24  -MS Placement Time: 0705 -MS Present on Original Admission: N  -MS Side: Left  -MS Orientation: posterior  -MS Location: greater trochanter  -MS Primary  Wound Type: MASD  -MS Type: MASD (moisture associated skin damage)  -MS    Retired Wound - Properties Group Placement Date: 10/16/24  -MS Placement Time: 0705 -MS Present on Original Admission: N  -MS Side: Left  -MS Orientation: posterior  -MS Location: greater trochanter  -MS Primary Wound Type: MASD  -MS Type: MASD (moisture associated skin damage)  -MS    Retired Wound - Properties Group Date first assessed: 10/16/24  -MS Time first assessed: 0705 -MS Present on Original Admission: N  -MS Side: Left  -MS Location: greater trochanter  -MS Primary Wound Type: MASD  -MS Type: MASD (moisture associated skin damage)  -MS      Row Name 10/17/24 1333          Coping    Observed Emotional State calm;cooperative  -CT     Verbalized Emotional State acceptance  -CT       Row Name 10/17/24 1339          Plan of Care Review    Plan of Care Reviewed With patient  -CT       Row Name 10/17/24 1337          Positioning and Restraints    Pre-Treatment Position in bed  -CT     Post Treatment Position bed  -CT     In Bed sitting EOB;call light within reach;encouraged to call for assist  -CT       Row Name 10/17/24 1335          Therapy Assessment/Plan (PT)    Rehab Potential (PT) fair  -CT     Criteria for Skilled Interventions Met (PT) yes;meets criteria;skilled treatment is necessary  -CT     Therapy Frequency (PT) 2 times/wk  1-5 times/wk  -CT     Problem List (PT) problems related to;balance;mobility;hemiparesis/hemiplegia;coordination;strength;range of motion (ROM);muscle tone;motor control  -CT     Activity Limitations Related to Problem List (PT) unable to ambulate safely;unable to transfer safely;BADLs not performed adequately or safely  -CT               User Key  (r) = Recorded By, (t) = Taken By, (c) = Cosigned By      Initials Name Provider Type    Karen Peralta, RN Registered Nurse    CT Dilma Ochoa, PT Physical Therapist    Nory Keenan, RN Registered Nurse    MS Roe Garcia, FIFI Registered  Nurse                    Physical Therapy Education       Title: PT OT SLP Therapies (In Progress)       Topic: Physical Therapy (In Progress)       Point: Mobility training (In Progress)       Learning Progress Summary            Patient Nonacceptance, E, NR by  at 10/16/2024 0228    Acceptance, E,TB, VU by CF at 10/15/2024 0753    Acceptance, E,TB, VU by CF at 10/14/2024 0801    Acceptance, E,TB, VU by CF at 10/13/2024 1045    Acceptance, E,D, VU,NR by AG at 10/10/2024 1310    Acceptance, E,TB, VU by CB at 10/8/2024 0448    Acceptance, E, NR by RD at 10/2/2024 1101    Acceptance, E, NR by RD at 10/1/2024 0856    Acceptance, E,D, VU,NR by AG at 9/30/2024 1559                      Point: Home exercise program (In Progress)       Learning Progress Summary            Patient Nonacceptance, E, NR by WG at 10/16/2024 0228    Acceptance, E,TB, VU by CF at 10/15/2024 0753    Acceptance, E,TB, VU by CF at 10/14/2024 0801    Acceptance, E,TB, VU by CF at 10/13/2024 1045    Acceptance, E,D, VU,NR by AG at 10/10/2024 1310    Acceptance, E,TB, VU by CB at 10/8/2024 0448    Acceptance, E, NR by RD at 10/2/2024 1101    Acceptance, E, NR by RD at 10/1/2024 0856    Acceptance, E,D, VU,NR by AG at 9/30/2024 1559                      Point: Body mechanics (In Progress)       Learning Progress Summary            Patient Nonacceptance, E, NR by WG at 10/16/2024 0228    Acceptance, E,TB, VU by CF at 10/15/2024 0753    Acceptance, E,TB, VU by CF at 10/14/2024 0801    Acceptance, E,TB, VU by CF at 10/13/2024 1045    Acceptance, E,D, VU,NR by AG at 10/10/2024 1310    Acceptance, E,TB, VU by CB at 10/8/2024 0448    Acceptance, E, NR by RD at 10/2/2024 1101    Acceptance, E, NR by RD at 10/1/2024 0856    Acceptance, E,D, VU,NR by AG at 9/30/2024 1559                      Point: Precautions (In Progress)       Learning Progress Summary            Patient Nonacceptance, E, NR by WG at 10/16/2024 0228    Acceptance, E,TB, VU by CF at  10/15/2024 0753    Acceptance, E,TB, VU by CF at 10/14/2024 0801    Acceptance, E,TB, VU by CF at 10/13/2024 1045    Acceptance, E,D, VU,NR by AG at 10/10/2024 1310    Acceptance, E,TB, VU by CB at 10/8/2024 0448    Acceptance, E, NR by RD at 10/2/2024 1101    Acceptance, E, NR by RD at 10/1/2024 0856    Acceptance, E,D, VU,NR by AG at 9/30/2024 1559                                      User Key       Initials Effective Dates Name Provider Type Discipline     06/16/21 -  Мария Joya, PT Physical Therapist PT    RD 06/16/21 -  Laisha Verdugo, RN Registered Nurse Nurse    WG 02/12/24 -  Shanthi Burden, RN Registered Nurse Nurse    CF 06/25/24 -  Cherelle Germain, RN Registered Nurse Nurse    CB 06/17/24 -  Fidelia Lombardo, RN Registered Nurse Nurse                  PT Recommendation and Plan  Anticipated Discharge Disposition (PT): skilled nursing facility, inpatient rehabilitation facility  Planned Therapy Interventions (PT): balance training, bed mobility training, gait training, home exercise program, manual therapy techniques, motor coordination training, neuromuscular re-education, patient/family education, postural re-education, strengthening, ROM (range of motion), transfer training, wheelchair management/propulsion training  Therapy Frequency (PT): 2 times/wk (1-5 times/wk)  Plan of Care Reviewed With: patient       Time Calculation:    PT Charges       Row Name 10/17/24 1338             Time Calculation    PT Received On 10/17/24  -CT         Time Calculation- PT    Total Timed Code Minutes- PT 30 minute(s)  -CT                User Key  (r) = Recorded By, (t) = Taken By, (c) = Cosigned By      Initials Name Provider Type    CT Dilma Ochoa, GENARO Physical Therapist                  Therapy Charges for Today       Code Description Service Date Service Provider Modifiers Qty    50567920635 HC PT THERAPEUTIC ACT EA 15 MIN 10/16/2024 Dilma Ochoa, PT GP 2    04018675722 HC PT THERAPEUTIC ACT EA 15 MIN  10/17/2024 Dilma Ochoa, PT GP 2            PT G-Codes  AM-PAC 6 Clicks Score (PT): 8    Dilma Ochoa, PT  10/17/2024

## 2024-10-17 NOTE — THERAPY TREATMENT NOTE
Acute Care - Occupational Therapy Treatment Note   [facility]     Patient Name: Lety Johnson  : 1980  MRN: [id number]  Today's Date: 10/17/2024  Onset of Illness/Injury or Date of Surgery: 24     Referring Physician: Dr. Marc    Admit Date: 2024       ICD-10-CM ICD-9-CM   1. Hypokalemia  E87.6 276.8   2. Pressure injury of skin of sacral region, unspecified injury stage  L89.159 707.03     707.20   3. Pressure injury of skin of left heel, unspecified injury stage  L89.629 707.07     707.20   4. Acute cystitis without hematuria  N30.00 595.0     Patient Active Problem List   Diagnosis   (none) - all problems resolved or deleted     Past Medical History:   Diagnosis Date    Stroke      History reviewed. No pertinent surgical history.      OT ASSESSMENT FLOWSHEET (Last 12 Hours)       OT Evaluation and Treatment       Row Name 10/17/24 1100                   OT Time and Intention    Subjective Information complains of;pain  -LA        Document Type therapy note (daily note)  -LA        Mode of Treatment occupational therapy  -LA        Patient Effort adequate  -LA        Symptoms Noted During/After Treatment none  -LA           General Information    Patient Profile Reviewed yes  -LA        General Observations of Patient Patient agreeable to therapy this date. Patient agreeable to sit EOB, bed mobility and PROM of left UE.  -LA        Existing Precautions/Restrictions fall  -LA           Cognition    Affect/Mental Status (Cognition) emotionally labile  -LA        Orientation Status (Cognition) oriented x 3  -LA        Follows Commands (Cognition) WFL  -LA           Bed Mobility    Rolling Left [place] (Bed Mobility) maximum assist (25% patient effort);2 person assist  -LA        Rolling Right [place] (Bed Mobility) maximum assist (25% patient effort);2 person assist  -LA        Supine-Sit [place] (Bed Mobility) maximum assist (25% patient effort);2 person assist  -LA           Motor  Skills    Motor Skills coordination;functional endurance  -LA        Neuromuscular Function upper extremity;left  -LA        Motor Control/Coordination Interventions therapeutic exercise/ROM  PROM of left UE as tolerated  -LA           Balance    Static Sitting Balance independent  -LA        Dynamic Sitting Balance standby assist  -LA           Wound 09/26/24 1809 Left posterior ankle Other (comment)    Wound - Properties Group Placement Date: 09/26/24  -KJ Placement Time: 1809  -KJ Side: Left  -KJ Orientation: posterior  -KJ Location: ankle  -KJ Primary Wound Type: Other  -TW, unknow etiology     Retired Wound - Properties Group Placement Date: 09/26/24  -KJ Placement Time: 1809  -KJ Side: Left  -KJ Orientation: posterior  -KJ Location: ankle  -KJ Primary Wound Type: Other  -TW, unknow etiology     Retired Wound - Properties Group Placement Date: 09/26/24  -KJ Placement Time: 1809  -KJ Side: Left  -KJ Orientation: posterior  -KJ Location: ankle  -KJ Primary Wound Type: Other  -TW, unknow etiology     Retired Wound - Properties Group Date first assessed: 09/26/24  -KJ Time first assessed: 1809  -KJ Side: Left  -KJ Location: ankle  -KJ Primary Wound Type: Other  -TW, unknow etiology        Wound 10/11/24 1330 medial coccyx Fissure    Wound - Properties Group Placement Date: 10/11/24  -TW Placement Time: 1330 -TW Orientation: medial  -TW Location: coccyx  -TW Primary Wound Type: Fissure  -TW    Retired Wound - Properties Group Placement Date: 10/11/24  -TW Placement Time: 1330  -TW Orientation: medial  -TW Location: coccyx  -TW Primary Wound Type: Fissure  -TW    Retired Wound - Properties Group Placement Date: 10/11/24  -TW Placement Time: 1330 -TW Orientation: medial  -TW Location: coccyx  -TW Primary Wound Type: Fissure  -TW    Retired Wound - Properties Group Date first assessed: 10/11/24  -TW Time first assessed: 1330  -TW Location: coccyx  -TW Primary Wound Type: Fissure  -TW       Wound 10/16/24 0705 Left  medial gluteal MASD (moisture associated skin damage)    Wound - Properties Group Placement Date: 10/16/24  -MS Placement Time: 0705 -MS Side: Left  -MS Orientation: medial  -MS Location: gluteal  -MS Primary Wound Type: MASD  -MS Type: MASD (moisture associated skin damage)  -MS Present on Original Admission: N  -MS Additional Comments: Noted at shift change skin assesment, Night shift RN stated it had been there her shift.  -MS    Retired Wound - Properties Group Placement Date: 10/16/24  -MS Placement Time: 0705 -MS Present on Original Admission: N  -MS Side: Left  -MS Orientation: medial  -MS Location: gluteal  -MS Primary Wound Type: MASD  -MS Additional Comments: Noted at shift change skin assesment, Night shift RN stated it had been there her shift.  -MS Type: MASD (moisture associated skin damage)  -MS    Retired Wound - Properties Group Placement Date: 10/16/24  -MS Placement Time: 0705 -MS Present on Original Admission: N  -MS Side: Left  -MS Orientation: medial  -MS Location: gluteal  -MS Primary Wound Type: MASD  -MS Additional Comments: Noted at shift change skin assesment, Night shift RN stated it had been there her shift.  -MS Type: MASD (moisture associated skin damage)  -MS    Retired Wound - Properties Group Date first assessed: 10/16/24  -MS Time first assessed: 0705 -MS Present on Original Admission: N  -MS Side: Left  -MS Location: gluteal  -MS Primary Wound Type: MASD  -MS Additional Comments: Noted at shift change skin assesment, Night shift RN stated it had been there her shift.  -MS Type: MASD (moisture associated skin damage)  -MS       Wound 10/16/24 0705 Left posterior greater trochanter MASD (moisture associated skin damage)    Wound - Properties Group Placement Date: 10/16/24  -MS Placement Time: 0705 -MS Side: Left  -MS Orientation: posterior  -MS Location: greater trochanter  -MS Primary Wound Type: MASD  -MS Type: MASD (moisture associated skin damage)  -MS Present on Original  Admission: N  -MS    Retired Wound - Properties Group Placement Date: 10/16/24  -MS Placement Time: 0705 -MS Present on Original Admission: N  -MS Side: Left  -MS Orientation: posterior  -MS Location: greater trochanter  -MS Primary Wound Type: MASD  -MS Type: MASD (moisture associated skin damage)  -MS    Retired Wound - Properties Group Placement Date: 10/16/24  -MS Placement Time: 0705 -MS Present on Original Admission: N  -MS Side: Left  -MS Orientation: posterior  -MS Location: greater trochanter  -MS Primary Wound Type: MASD  -MS Type: MASD (moisture associated skin damage)  -MS    Retired Wound - Properties Group Date first assessed: 10/16/24  -MS Time first assessed: 0705 -MS Present on Original Admission: N  -MS Side: Left  -MS Location: greater trochanter  -MS Primary Wound Type: MASD  -MS Type: MASD (moisture associated skin damage)  -MS       Plan of Care Review    Plan of Care Reviewed With patient  -LA           Positioning and Restraints    Pre-Treatment Position in bed  -LA        Post Treatment Position bed  -LA        In Bed sitting EOB;call light within reach;encouraged to call for assist;notified [facility]  -LA                  User Key  (r) = Recorded By, (t) = Taken By, (c) = Cosigned By      Initials Name Effective Dates    TW Karen Gipson RN 06/16/21 -     Nory Keenan RN 06/08/23 -     Loida Richard OT 02/14/22 -     Roe North RN 06/04/24 -                      Occupational Therapy Education        No education to display                      OT Recommendation and Plan     Plan of Care Review  Plan of Care Reviewed With: patient  Progress: improving  Plan of Care Reviewed With: patient        Time Calculation:     Therapy Charges for Today       Code Description Service Date Service Provider Modifiers Qty    49936446070 HC OT THER PROC EA 15 MIN 10/17/2024 Loida Flaherty OT GO 1    41505172607 HC OT THERAPEUTIC ACT EA 15 MIN 10/17/2024 Loida Flaherty OT GO 1                  Loida Flaherty, OT  10/17/2024

## 2024-10-17 NOTE — PROGRESS NOTES
[facility]     Patient Identification:  Name:  Lety Johnson  Age:  43 y.o.  Sex:  female  :  1980  MRN:  [id number]  Visit Number:  [id number]  Primary Care Provider:  Provider, No Known    Length of stay:  19    Chief complaint: Pain    Subjective:    Patient seen and examined at bedside with no nursing staff present.  Patient continues to report intermittent pain throughout the day.  Otherwise, no new events overnight.  Per nursing staff, no new events overnight.  ----------------------------------------------------------------------------------------------------------------------  Current Hospital Meds:  acyclovir, 400 mg, Oral, Nightly  aspirin, 81 mg, Oral, Daily  atorvastatin, 80 mg, Oral, Daily  DULoxetine, 60 mg, Oral, Daily  heparin (porcine), 5,000 Units, Subcutaneous, Q8H  hydrOXYzine, 25 mg, Oral, Once  insulin glargine, 30 Units, Subcutaneous, Nightly  insulin lispro, 2-9 Units, Subcutaneous, 4x Daily PC & at Bedtime  lisinopril, 20 mg, Oral, Daily  [Held by provider] metFORMIN, 1,000 mg, Oral, BID With Meals  metoprolol tartrate, 25 mg, Oral, Q12H  nicotine, 1 patch, Transdermal, Q24H  nystatin, , Topical, Q12H  pantoprazole, 40 mg, Oral, Daily  sodium chloride, 10 mL, Intravenous, Q12H  sodium chloride, 10 mL, Intravenous, Q12H      Pharmacy Consult,       ----------------------------------------------------------------------------------------------------------------------  Vital Signs:  Temp:  [97.3 °F (36.3 °C)-98.6 °F (37 °C)] 97.3 °F (36.3 °C)  Heart Rate:  [] 103  Resp:  [18-20] 18  BP: (102-140)/(63-97) 102/64      10/07/24  0511 10/08/24  0500 10/09/24  0500   Weight: 102 kg (225 lb 14.4 oz) (FIFI cameron) 102 kg (225 lb 15.5 oz) 102 kg (224 lb 1.6 oz)     Body mass index is 36.73 kg/m².    Intake/Output Summary (Last 24 hours) at 10/17/2024 2723  Last data filed at 10/17/2024 1719  Gross per 24 hour   Intake 960 ml   Output --   Net  960 ml     Diet: Regular/House, Diabetic; Consistent Carbohydrate; Fluid Consistency: Thin (IDDSI 0)  ----------------------------------------------------------------------------------------------------------------------  Physical exam:  Constitutional: Chronically ill-appearing  female in no apparent distress.     HENT:  Head:  Normocephalic and atraumatic.  Mouth:  Moist mucous membranes.    Eyes:  Conjunctivae and EOM are normal.  Pupils are equal, round, and reactive to light.  No scleral icterus.    Neck:  Neck supple. No thyromegaly.  No JVD present.    Cardiovascular:  Regular rate and rhythm with no murmurs, rubs, clicks or gallops appreciated.  Pulmonary/Chest:  Clear to auscultation bilaterally with no crackles, wheezes or rhonchi appreciated.  Abdominal:  Soft. Nontender. Nondistended  Bowel sounds are normal in all four quadrants. No organomegally appreciated.   Musculoskeletal:  No edema, no tenderness, and no deformity.  No red or swollen joints anywhere.    Neurological:  Alert, Cranial nerves II-XII intact with no focal deficits.  No facial droop.  No slurred speech.   Skin:  Warm and dry to palpation with no rashes or lesions appreciated.  Peripheral vascular:  2+ radial and pedal pulses in bilateral upper and lower extremities.  Psychiatric:  Alert and oriented x3, demonstrates appropriate judgment and insight.    No significant change in physical exam in comparison to 10/16/2024  -------------------------------------------------------------------------  ----------------------------------------------------------------------------------------------------------------------      Results from last 7 days   Lab Units 10/15/24  0741 10/13/24  0742 10/11/24  0814   WBC 10*3/mm3 9.56 11.31* 9.91   HEMOGLOBIN g/dL 13.6 13.9 13.7   HEMATOCRIT % 41.8 42.0 41.4   MCV fL 99.8* 100.0* 99.0*   MCHC g/dL 32.5 33.1 33.1   PLATELETS 10*3/mm3 977 148 357         Results from last 7 days   Lab Units  "10/15/24  0741 10/13/24  0742 10/11/24  1832 10/11/24  0814   SODIUM mmol/L 135* 132*  --  134*   POTASSIUM mmol/L 4.2 4.1 4.7 3.6   CHLORIDE mmol/L 98 96*  --  99   CO2 mmol/L 23.9 21.5*  --  23.5   BUN mg/dL 23* 30*  --  13   CREATININE mg/dL 0.49* 0.56*  --  0.44*   CALCIUM mg/dL 10.1 9.9  --  9.6   GLUCOSE mg/dL 324* 375*  --  404*   Estimated Creatinine Clearance: 178.7 mL/min (A) (by C-G formula based on SCr of 0.49 mg/dL (L)).    No results found for: \"AMMONIA\"      No results found for: \"BLOODCX\"  No results found for: \"URINECX\"  No results found for: \"WOUNDCX\"  No results found for: \"STOOLCX\"    I have personally looked at the labs and they are summarized above.  ----------------------------------------------------------------------------------------------------------------------  Imaging Results (Last 24 Hours)       ** No results found for the last 24 hours. **          ----------------------------------------------------------------------------------------------------------------------  Assessment and Plan:    ESBL acute cystitis -patient has completed full course of IV antibiotic therapy with Invanz     2.  Acute metabolic encephalopathy -resolved, initially secondary to acute UTI     3.  History of CVA -patient with left-sided hemiparesis secondary to history of CVA     4.  Debility -continue PT/OT     5.  History of genital herpes -continue acyclovir     6.  Morbid obesity -complicates all aspects of care    7.  Uncontrolled type 2 diabetes mellitus -have increased long-acting insulin to 45 units SQ nightly and increase sliding scale insulin to high-dose    Disposition still pending placement    Marv Navas,    10/17/24   18:54 EDT     "

## 2024-10-17 NOTE — PLAN OF CARE
Goal Outcome Evaluation:  Plan of Care Reviewed With: patient        Progress: no change  Outcome Evaluation: Patient resting in bed this shift.VSS on room air. Wound care completed per orders. PRN pain medication requested and given per orders. Patient reports no further concerns at this time, will continue POC.

## 2024-10-17 NOTE — PLAN OF CARE
Goal Outcome Evaluation:  Plan of Care Reviewed With: patient        Progress: no change  Outcome Evaluation: Patient resting in bed. VSS on room air. Wound care completed per orders. Patient c/o pain this shift, PRN pain medication given per MAR. Patient voices no concerns at this time, will continue with POC.

## 2024-10-17 NOTE — CASE MANAGEMENT/SOCIAL WORK
Discharge Planning Assessment  [facility]     Patient Name: Lety Johnson  MRN: [id number]  Today's Date: 10/17/2024    Admit Date: 9/26/2024    Plan: SS attempted to contact [facility] admissions on this date and awaiting a call back at this time. SS attempted to contact [facility]PS SW [place] on this date with no success. SS to attempt to contact APS SW again later on this date. SS to follow.     Discharge Plan       Row Name 10/17/24 0930       Plan    Plan SS attempted to contact [facility] admissions on this date and awaiting a call back at this time. SS attempted to contact [facility]PS SW [place] on this date with no success. SS to attempt to contact APS SW again later on this date. SS to follow.    1438: SS attempted to contact APS SW at this time. SS left voicemail and contact information. SS to follow.           Continued Care and Services - Admitted Since 9/26/2024       Destination       Service Provider Request Status Services Address Phone Fax Patient Preferred    [facility] SWING BED UNIT Pending - No Request Sent -- [address] [facility] [id number] [phone] --    [facility] Declined  Cannot meet patient's needs -- [address] [facility] [address] [phone] --    [facility] Acute Care Declined  Not eligible -- [address] [facility] [address] [phone] --    [facility] Declined  Not eligible -- [address] [facility] [address] [phone] --    [facility] SPECIALTY [facility] Declined  Clinical Denial -- [address] [facility], 4TH FLOOR, [facility] [address] [phone] --    [facility] SWING BED UNIT Declined  Cannot meet patient's needs -- [address] [facility] [address] [facility] [address] [phone] --     [facility] Declined  Not eligible -- [address] [address]  [phone] --    [facility] Declined  Bed not available -- [address] [place] [address] [phone] --    [facility] SKILLED CARE Declined  Bed not available -- [address] [facility] [phone] --    [facility] Swing Declined  Out of network -- [address] [facility] [place] [id number] [phone] --    [facility] Declined  Clinical Denial -- [address] [facility] [address] [phone] --    [facility] SWING BED UNIT Declined  Insurance Denial, Out of network -- [address], [facility] [address] [phone] --    [facility] Declined  Out of network -- [address] [facility] [address] [phone] --                  Expected Discharge Date and Time       Expected Discharge Date Expected Discharge Time    Oct 18, 2024           KAZ Avendano

## 2024-10-18 ENCOUNTER — APPOINTMENT (OUTPATIENT)
Dept: GENERAL RADIOLOGY | Facility: HOSPITAL | Age: 44
End: 2024-10-18
Payer: MEDICAID

## 2024-10-18 LAB
ANION GAP SERPL CALCULATED.3IONS-SCNC: 11.9 MMOL/L (ref 5–15)
BACTERIA UR QL AUTO: ABNORMAL /HPF
BILIRUB UR QL STRIP: NEGATIVE
BUN SERPL-MCNC: 22 MG/DL (ref 6–20)
BUN/CREAT SERPL: 42.3 (ref 7–25)
CALCIUM SPEC-SCNC: 10 MG/DL (ref 8.6–10.5)
CHLORIDE SERPL-SCNC: 101 MMOL/L (ref 98–107)
CLARITY UR: ABNORMAL
CO2 SERPL-SCNC: 24.1 MMOL/L (ref 22–29)
COLOR UR: YELLOW
CREAT SERPL-MCNC: 0.52 MG/DL (ref 0.57–1)
EGFRCR SERPLBLD CKD-EPI 2021: 119.1 ML/MIN/1.73
GLUCOSE BLDC GLUCOMTR-MCNC: 126 MG/DL (ref 70–130)
GLUCOSE BLDC GLUCOMTR-MCNC: 172 MG/DL (ref 70–130)
GLUCOSE BLDC GLUCOMTR-MCNC: 178 MG/DL (ref 70–130)
GLUCOSE BLDC GLUCOMTR-MCNC: 268 MG/DL (ref 70–130)
GLUCOSE BLDC GLUCOMTR-MCNC: 292 MG/DL (ref 70–130)
GLUCOSE BLDC GLUCOMTR-MCNC: 304 MG/DL (ref 70–130)
GLUCOSE BLDC GLUCOMTR-MCNC: 346 MG/DL (ref 70–130)
GLUCOSE BLDC GLUCOMTR-MCNC: 382 MG/DL (ref 70–130)
GLUCOSE BLDC GLUCOMTR-MCNC: 442 MG/DL (ref 70–130)
GLUCOSE BLDC GLUCOMTR-MCNC: 451 MG/DL (ref 70–130)
GLUCOSE BLDC GLUCOMTR-MCNC: 459 MG/DL (ref 70–130)
GLUCOSE BLDC GLUCOMTR-MCNC: 545 MG/DL (ref 70–130)
GLUCOSE SERPL-MCNC: 192 MG/DL (ref 65–99)
GLUCOSE UR STRIP-MCNC: ABNORMAL MG/DL
HGB UR QL STRIP.AUTO: ABNORMAL
HYALINE CASTS UR QL AUTO: ABNORMAL /LPF
KETONES UR QL STRIP: NEGATIVE
LEUKOCYTE ESTERASE UR QL STRIP.AUTO: ABNORMAL
MAGNESIUM SERPL-MCNC: 1.9 MG/DL (ref 1.6–2.6)
NITRITE UR QL STRIP: NEGATIVE
PH UR STRIP.AUTO: 6 [PH] (ref 5–8)
POTASSIUM SERPL-SCNC: 4.3 MMOL/L (ref 3.5–5.2)
PROCALCITONIN SERPL-MCNC: 0.07 NG/ML (ref 0–0.25)
PROT UR QL STRIP: ABNORMAL
QT INTERVAL: 312 MS
QTC INTERVAL: 459 MS
RBC # UR STRIP: ABNORMAL /HPF
REF LAB TEST METHOD: ABNORMAL
SODIUM SERPL-SCNC: 137 MMOL/L (ref 136–145)
SP GR UR STRIP: >1.03 (ref 1–1.03)
SQUAMOUS #/AREA URNS HPF: ABNORMAL /HPF
STARCH GRANULES URNS QL MICRO: ABNORMAL /HPF
UROBILINOGEN UR QL STRIP: ABNORMAL
WBC # UR STRIP: ABNORMAL /HPF

## 2024-10-18 PROCEDURE — 87186 SC STD MICRODIL/AGAR DIL: CPT | Performed by: INTERNAL MEDICINE

## 2024-10-18 PROCEDURE — 97530 THERAPEUTIC ACTIVITIES: CPT

## 2024-10-18 PROCEDURE — 63710000001 INSULIN GLARGINE PER 5 UNITS: Performed by: INTERNAL MEDICINE

## 2024-10-18 PROCEDURE — 63710000001 INSULIN LISPRO (HUMAN) PER 5 UNITS: Performed by: INTERNAL MEDICINE

## 2024-10-18 PROCEDURE — 93010 ELECTROCARDIOGRAM REPORT: CPT | Performed by: SPECIALIST

## 2024-10-18 PROCEDURE — 81001 URINALYSIS AUTO W/SCOPE: CPT | Performed by: INTERNAL MEDICINE

## 2024-10-18 PROCEDURE — 87077 CULTURE AEROBIC IDENTIFY: CPT | Performed by: INTERNAL MEDICINE

## 2024-10-18 PROCEDURE — 83735 ASSAY OF MAGNESIUM: CPT | Performed by: INTERNAL MEDICINE

## 2024-10-18 PROCEDURE — 71045 X-RAY EXAM CHEST 1 VIEW: CPT

## 2024-10-18 PROCEDURE — 87040 BLOOD CULTURE FOR BACTERIA: CPT | Performed by: INTERNAL MEDICINE

## 2024-10-18 PROCEDURE — 99232 SBSQ HOSP IP/OBS MODERATE 35: CPT | Performed by: INTERNAL MEDICINE

## 2024-10-18 PROCEDURE — 82948 REAGENT STRIP/BLOOD GLUCOSE: CPT

## 2024-10-18 PROCEDURE — 87147 CULTURE TYPE IMMUNOLOGIC: CPT | Performed by: INTERNAL MEDICINE

## 2024-10-18 PROCEDURE — 93005 ELECTROCARDIOGRAM TRACING: CPT | Performed by: INTERNAL MEDICINE

## 2024-10-18 PROCEDURE — 63710000001 INSULIN REGULAR HUMAN PER 5 UNITS: Performed by: INTERNAL MEDICINE

## 2024-10-18 PROCEDURE — 87086 URINE CULTURE/COLONY COUNT: CPT | Performed by: INTERNAL MEDICINE

## 2024-10-18 PROCEDURE — 80048 BASIC METABOLIC PNL TOTAL CA: CPT | Performed by: INTERNAL MEDICINE

## 2024-10-18 PROCEDURE — 84145 PROCALCITONIN (PCT): CPT | Performed by: INTERNAL MEDICINE

## 2024-10-18 PROCEDURE — 25010000002 PROCHLORPERAZINE 10 MG/2ML SOLUTION

## 2024-10-18 PROCEDURE — 87154 CUL TYP ID BLD PTHGN 6+ TRGT: CPT | Performed by: INTERNAL MEDICINE

## 2024-10-18 PROCEDURE — 25010000002 HEPARIN (PORCINE) PER 1000 UNITS: Performed by: INTERNAL MEDICINE

## 2024-10-18 PROCEDURE — 71045 X-RAY EXAM CHEST 1 VIEW: CPT | Performed by: RADIOLOGY

## 2024-10-18 RX ORDER — LIDOCAINE 4 G/G
1 PATCH TOPICAL EVERY 24 HOURS
Status: DISPENSED | OUTPATIENT
Start: 2024-10-18

## 2024-10-18 RX ADMIN — VITAMINS A AND D OINTMENT: 15.5; 53.4 OINTMENT TOPICAL at 20:49

## 2024-10-18 RX ADMIN — DULOXETINE HYDROCHLORIDE 60 MG: 60 CAPSULE, DELAYED RELEASE ORAL at 09:10

## 2024-10-18 RX ADMIN — LISINOPRIL 20 MG: 10 TABLET ORAL at 09:12

## 2024-10-18 RX ADMIN — HEPARIN SODIUM 5000 UNITS: 5000 INJECTION INTRAVENOUS; SUBCUTANEOUS at 05:19

## 2024-10-18 RX ADMIN — Medication 10 ML: at 20:50

## 2024-10-18 RX ADMIN — INSULIN LISPRO 16 UNITS: 100 INJECTION, SOLUTION INTRAVENOUS; SUBCUTANEOUS at 11:11

## 2024-10-18 RX ADMIN — ACYCLOVIR 400 MG: 200 CAPSULE ORAL at 20:47

## 2024-10-18 RX ADMIN — ASPIRIN 81 MG: 81 TABLET, CHEWABLE ORAL at 09:10

## 2024-10-18 RX ADMIN — HEPARIN SODIUM 5000 UNITS: 5000 INJECTION INTRAVENOUS; SUBCUTANEOUS at 13:39

## 2024-10-18 RX ADMIN — VITAMINS A AND D OINTMENT: 15.5; 53.4 OINTMENT TOPICAL at 13:39

## 2024-10-18 RX ADMIN — PANTOPRAZOLE SODIUM 40 MG: 40 TABLET, DELAYED RELEASE ORAL at 09:10

## 2024-10-18 RX ADMIN — NYSTATIN: 100000 POWDER TOPICAL at 09:16

## 2024-10-18 RX ADMIN — LIDOCAINE 1 PATCH: 4 PATCH TOPICAL at 18:16

## 2024-10-18 RX ADMIN — INSULIN GLARGINE 60 UNITS: 100 INJECTION, SOLUTION SUBCUTANEOUS at 20:47

## 2024-10-18 RX ADMIN — Medication 10 ML: at 09:17

## 2024-10-18 RX ADMIN — NYSTATIN: 100000 POWDER TOPICAL at 20:49

## 2024-10-18 RX ADMIN — PROCHLORPERAZINE EDISYLATE 2.5 MG: 5 INJECTION INTRAMUSCULAR; INTRAVENOUS at 21:51

## 2024-10-18 RX ADMIN — Medication 5 MG: at 20:47

## 2024-10-18 RX ADMIN — HEPARIN SODIUM 5000 UNITS: 5000 INJECTION INTRAVENOUS; SUBCUTANEOUS at 20:48

## 2024-10-18 RX ADMIN — ACETAMINOPHEN 650 MG: 325 TABLET ORAL at 18:39

## 2024-10-18 RX ADMIN — ATORVASTATIN CALCIUM 80 MG: 40 TABLET, FILM COATED ORAL at 09:12

## 2024-10-18 RX ADMIN — CYCLOBENZAPRINE 10 MG: 10 TABLET, FILM COATED ORAL at 20:47

## 2024-10-18 RX ADMIN — INSULIN HUMAN 10 UNITS: 100 INJECTION, SOLUTION PARENTERAL at 18:16

## 2024-10-18 RX ADMIN — METOPROLOL TARTRATE 25 MG: 25 TABLET, FILM COATED ORAL at 20:47

## 2024-10-18 RX ADMIN — ACETAMINOPHEN 650 MG: 325 TABLET ORAL at 09:27

## 2024-10-18 RX ADMIN — METOPROLOL TARTRATE 25 MG: 25 TABLET, FILM COATED ORAL at 09:12

## 2024-10-18 RX ADMIN — ACETAMINOPHEN 650 MG: 325 TABLET ORAL at 00:51

## 2024-10-18 RX ADMIN — INSULIN LISPRO 12 UNITS: 100 INJECTION, SOLUTION INTRAVENOUS; SUBCUTANEOUS at 09:09

## 2024-10-18 RX ADMIN — DICLOFENAC SODIUM 4 G: 10 GEL TOPICAL at 14:55

## 2024-10-18 RX ADMIN — INSULIN LISPRO 24 UNITS: 100 INJECTION, SOLUTION INTRAVENOUS; SUBCUTANEOUS at 19:36

## 2024-10-18 RX ADMIN — NICOTINE 1 PATCH: 7 PATCH, EXTENDED RELEASE TRANSDERMAL at 09:16

## 2024-10-18 NOTE — PROGRESS NOTES
[facility] HOSPITALIST PROGRESS NOTE     Patient Identification:  Name:  Lety Johnson  Age:  43 y.o.  Sex:  female  :  1980  MRN:  [id number]  Visit Number:  [id number]  Primary Care Provider:  Provider, No Known    Length of stay:  20    Chief complaint: Mild lethargy    Subjective:    Patient seen and examined at bedside with patient's nurse present.  Patient is more lethargic today and when awakened only states she wants more pain medicine for her left upper and left lower extremity chronic pain.  However, patient does appear slightly more confused today than yesterday.  Patient is also noted to have sinus tachycardia.  There is some concern for underlying developing infection.  ----------------------------------------------------------------------------------------------------------------------  Current Hospital Meds:  acyclovir, 400 mg, Oral, Nightly  aspirin, 81 mg, Oral, Daily  atorvastatin, 80 mg, Oral, Daily  DULoxetine, 60 mg, Oral, Daily  heparin (porcine), 5,000 Units, Subcutaneous, Q8H  hydrOXYzine, 25 mg, Oral, Once  insulin glargine, 45 Units, Subcutaneous, Nightly  insulin lispro, 4-24 Units, Subcutaneous, 4x Daily AC & at Bedtime  lisinopril, 20 mg, Oral, Daily  magic barrier cream, , Topical, BID  [Held by provider] metFORMIN, 1,000 mg, Oral, BID With Meals  metoprolol tartrate, 25 mg, Oral, Q12H  nicotine, 1 patch, Transdermal, Q24H  nystatin, , Topical, Q12H  pantoprazole, 40 mg, Oral, Daily  sodium chloride, 10 mL, Intravenous, Q12H  sodium chloride, 10 mL, Intravenous, Q12H      Pharmacy Consult,       ----------------------------------------------------------------------------------------------------------------------  Vital Signs:  Temp:  [97.6 °F (36.4 °C)-98.3 °F (36.8 °C)] 97.6 °F (36.4 °C)  Heart Rate:  [] 102  Resp:  [18] 18  BP: (101-118)/(62-73) 101/62      10/07/24  0511 10/08/24  0500 10/09/24  0500   Weight: 102 kg (225 lb 14.4 oz) (FIFI cameron) 102  kg (225 lb 15.5 oz) 102 kg (224 lb 1.6 oz)     Body mass index is 36.73 kg/m².    Intake/Output Summary (Last 24 hours) at 10/18/2024 1516  Last data filed at 10/18/2024 1300  Gross per 24 hour   Intake 1730 ml   Output --   Net 1730 ml     Diet: Regular/House, Diabetic; Consistent Carbohydrate; Fluid Consistency: Thin (IDDSI 0)  ----------------------------------------------------------------------------------------------------------------------  Physical exam:  Constitutional: Chronically ill-appearing  female in no apparent distress.     HENT:  Head:  Normocephalic and atraumatic.  Mouth:  Moist mucous membranes.    Eyes:  Conjunctivae and EOM are normal.  Pupils are equal, round, and reactive to light.  No scleral icterus.    Neck:  Neck supple. No thyromegaly.  No JVD present.    Cardiovascular: Sinus tachycardia with heart rate in the upper 120s, no murmurs, rubs, clicks or gallops appreciated.  Pulmonary/Chest:  Clear to auscultation bilaterally with no crackles, wheezes or rhonchi appreciated.  Abdominal:  Soft. Nontender. Nondistended  Bowel sounds are normal in all four quadrants. No organomegally appreciated.   Musculoskeletal:  No edema, no tenderness, and no deformity.  No red or swollen joints anywhere.    Neurological: Slightly lethargic, Cranial nerves II-XII intact with no focal deficits.  No facial droop.  No slurred speech.   Skin:  Warm and dry to palpation with no rashes or lesions appreciated.  Peripheral vascular:  2+ radial and pedal pulses in bilateral upper and lower extremities.      -------------------------------------------------------------------------  ----------------------------------------------------------------------------------------------------------------------      Results from last 7 days   Lab Units 10/15/24  0741 10/13/24  0742   WBC 10*3/mm3 9.56 11.31*   HEMOGLOBIN g/dL 13.6 13.9   HEMATOCRIT % 41.8 42.0   MCV fL 99.8* 100.0*   MCHC g/dL 32.5 33.1   PLATELETS  "10*3/mm3 283 381         Results from last 7 days   Lab Units 10/18/24  0156 10/15/24  0741 10/13/24  0742   SODIUM mmol/L 137 135* 132*   POTASSIUM mmol/L 4.3 4.2 4.1   MAGNESIUM mg/dL 1.9  --   --    CHLORIDE mmol/L 101 98 96*   CO2 mmol/L 24.1 23.9 21.5*   BUN mg/dL 22* 23* 30*   CREATININE mg/dL 0.52* 0.49* 0.56*   CALCIUM mg/dL 10.0 10.1 9.9   GLUCOSE mg/dL 192* 324* 375*   Estimated Creatinine Clearance: 168.4 mL/min (A) (by C-G formula based on SCr of 0.52 mg/dL (L)).    No results found for: \"AMMONIA\"      No results found for: \"BLOODCX\"  No results found for: \"URINECX\"  No results found for: \"WOUNDCX\"  No results found for: \"STOOLCX\"    I have personally looked at the labs and they are summarized above.  ----------------------------------------------------------------------------------------------------------------------  Imaging Results (Last 24 Hours)       ** No results found for the last 24 hours. **          ----------------------------------------------------------------------------------------------------------------------  Assessment and Plan:    ESBL acute cystitis -patient has completed full course of IV antibiotic therapy with Invanz     2.  Acute metabolic encephalopathy -patient with increased lethargy today, and presence of sinus tachycardia there is some suspicion of underlying infection.  Will repeat blood cultures x 2, urine analysis, chest x-ray and procalcitonin.       3.  History of CVA -patient with left-sided hemiparesis secondary to history of CVA     4.  Debility -continue PT/OT     5.  History of genital herpes -continue acyclovir     6.  Morbid obesity -complicates all aspects of care    7.  Uncontrolled type 2 diabetes mellitus -blood sugar remains uncontrolled, will increase long-acting insulin to 60 units SQ nightly and continue high-dose sliding scale insulin    Disposition still pending placement    Marv Navas DO   10/18/24   15:16 EDT     "

## 2024-10-18 NOTE — NURSING NOTE
Patient had critical lab with a blood glucose of 442. MD paged,  said to give IV insulin he placed orders and they were followed. He said to recheck in an hour and page him. Recheck was 451. MD paged waiting for response. Nightshift RN aware and present when blood glucose was checked.

## 2024-10-18 NOTE — PLAN OF CARE
Goal Outcome Evaluation:  Plan of Care Reviewed With: patient        Progress: no change  Outcome Evaluation: Patient VSS this shift, Tachycardia this shift, MD aware, orders placed and followed. patient was drowsy this shift. PRN medications requested and given per orders. Wound care completed per orders. Patient reports no further concerns at this time, will continue POC.

## 2024-10-18 NOTE — NURSING NOTE
Patient continues with fissure to medial gluteal fold.  Yeasty dermatitis noted to bilat gluteal.  Changed order from Z-Guard to Magic Barrier Cream.

## 2024-10-18 NOTE — THERAPY TREATMENT NOTE
Acute Care - Occupational Therapy Treatment Note   [facility]     Patient Name: Lety Johnson  : 1980  MRN: [id number]  Today's Date: 10/18/2024  Onset of Illness/Injury or Date of Surgery: 24     Referring Physician: Dr. Marc    Admit Date: 2024       ICD-10-CM ICD-9-CM   1. Hypokalemia  E87.6 276.8   2. Pressure injury of skin of sacral region, unspecified injury stage  L89.159 707.03     707.20   3. Pressure injury of skin of left heel, unspecified injury stage  L89.629 707.07     707.20   4. Acute cystitis without hematuria  N30.00 595.0     Patient Active Problem List   Diagnosis   (none) - all problems resolved or deleted     Past Medical History:   Diagnosis Date    Stroke      History reviewed. No pertinent surgical history.      OT ASSESSMENT FLOWSHEET (Last 12 Hours)       OT Evaluation and Treatment       Row Name 10/18/24 1138                   OT Time and Intention    Document Type therapy note (daily note)  -KR        Mode of Treatment occupational therapy  -KR        Patient Effort adequate  -KR           General Information    General Observations of Patient alert/cooperative  -KR           Cognition    Affect/Mental Status (Cognition) emotionally labile  -KR        Follows Commands (Cognition) WFL  -KR           Shoulder (Therapeutic Exercise)    Shoulder PROM (Therapeutic Exercise) flexion;extension;left  -KR           Elbow/Forearm (Therapeutic Exercise)    Elbow/Forearm PROM (Therapeutic Exercise) left;flexion;extension  -KR           Wrist (Therapeutic Exercise)    Wrist PROM (Therapeutic Exercise) left;flexion;extension  -KR           Hand (Therapeutic Exercise)    Hand PROM (Therapeutic Exercise) left;finger flexion;finger extension  -KR           Wound 24 1809 Left posterior ankle Other (comment)    Wound - Properties Group Placement Date: 24  -KJ Placement Time:   - Side: Left  -KJ Orientation: posterior  -KJ Location: ankle  -KJ Primary Wound Type: Other   -TW, unknow etiology     Retired Wound - Properties Group Placement Date: 09/26/24  -KJ Placement Time: 1809  -KJ Side: Left  -KJ Orientation: posterior  -KJ Location: ankle  -KJ Primary Wound Type: Other  -TW, unknow etiology     Retired Wound - Properties Group Placement Date: 09/26/24  -KJ Placement Time: 1809  -KJ Side: Left  -KJ Orientation: posterior  -KJ Location: ankle  -KJ Primary Wound Type: Other  -TW, unknow etiology     Retired Wound - Properties Group Date first assessed: 09/26/24  -KJ Time first assessed: 1809  -KJ Side: Left  -KJ Location: ankle  -KJ Primary Wound Type: Other  -TW, unknow etiology        Wound 10/11/24 1330 medial coccyx Fissure    Wound - Properties Group Placement Date: 10/11/24  -TW Placement Time: 1330 -TW Orientation: medial  -TW Location: coccyx  -TW Primary Wound Type: Fissure  -TW    Retired Wound - Properties Group Placement Date: 10/11/24  -TW Placement Time: 1330 -TW Orientation: medial  -TW Location: coccyx  -TW Primary Wound Type: Fissure  -TW    Retired Wound - Properties Group Placement Date: 10/11/24  -TW Placement Time: 1330  -TW Orientation: medial  -TW Location: coccyx  -TW Primary Wound Type: Fissure  -TW    Retired Wound - Properties Group Date first assessed: 10/11/24  -TW Time first assessed: 1330  -TW Location: coccyx  -TW Primary Wound Type: Fissure  -TW       Wound 10/16/24 0705 Left medial gluteal MASD (moisture associated skin damage)    Wound - Properties Group Placement Date: 10/16/24  -MS Placement Time: 0705 -MS Side: Left  -MS Orientation: medial  -MS Location: gluteal  -MS Primary Wound Type: MASD  -MS Type: MASD (moisture associated skin damage)  -MS Present on Original Admission: N  -MS Additional Comments: Noted at shift change skin assesment, Night shift RN stated it had been there her shift.  -MS    Retired Wound - Properties Group Placement Date: 10/16/24  -MS Placement Time: 0705 -MS Present on Original Admission: N  -MS Side: Left  -MS  Orientation: medial  -MS Location: gluteal  -MS Primary Wound Type: MASD  -MS Additional Comments: Noted at shift change skin assesment, Night shift RN stated it had been there her shift.  -MS Type: MASD (moisture associated skin damage)  -MS    Retired Wound - Properties Group Placement Date: 10/16/24  -MS Placement Time: 0705 -MS Present on Original Admission: N  -MS Side: Left  -MS Orientation: medial  -MS Location: gluteal  -MS Primary Wound Type: MASD  -MS Additional Comments: Noted at shift change skin assesment, Night shift RN stated it had been there her shift.  -MS Type: MASD (moisture associated skin damage)  -MS    Retired Wound - Properties Group Date first assessed: 10/16/24  -MS Time first assessed: 0705 -MS Present on Original Admission: N  -MS Side: Left  -MS Location: gluteal  -MS Primary Wound Type: MASD  -MS Additional Comments: Noted at shift change skin assesment, Night shift RN stated it had been there her shift.  -MS Type: MASD (moisture associated skin damage)  -MS       Wound 10/16/24 0705 Left posterior greater trochanter MASD (moisture associated skin damage)    Wound - Properties Group Placement Date: 10/16/24  -MS Placement Time: 0705 -MS Side: Left  -MS Orientation: posterior  -MS Location: greater trochanter  -MS Primary Wound Type: MASD  -MS Type: MASD (moisture associated skin damage)  -MS Present on Original Admission: N  -MS    Retired Wound - Properties Group Placement Date: 10/16/24  -MS Placement Time: 0705 -MS Present on Original Admission: N  -MS Side: Left  -MS Orientation: posterior  -MS Location: greater trochanter  -MS Primary Wound Type: MASD  -MS Type: MASD (moisture associated skin damage)  -MS    Retired Wound - Properties Group Placement Date: 10/16/24  -MS Placement Time: 0705  -MS Present on Original Admission: N  -MS Side: Left  -MS Orientation: posterior  -MS Location: greater trochanter  -MS Primary Wound Type: MASD  -MS Type: MASD (moisture associated skin  damage)  -MS    Retired Wound - Properties Group Date first assessed: 10/16/24  -MS Time first assessed: 0705  -MS Present on Original Admission: N  -MS Side: Left  -MS Location: greater trochanter  -MS Primary Wound Type: MASD  -MS Type: MASD (moisture associated skin damage)  -MS       Plan of Care Review    Plan of Care Reviewed With patient  -KR           ROM Goal 1 (OT)    Progress/Outcome (ROM Goal 1, OT) continuing progress toward goal  -KR            Activity Tolerance Goal 1 (OT)    Progress/Outcome (Activity Tolerance Goal 1, OT) continuing progress toward goal  -KR                  User Key  (r) = Recorded By, (t) = Taken By, (c) = Cosigned By      Initials Name Effective Dates    TW Karen Gipson RN 06/16/21 -     Neil Menezes OT 06/16/21 -     Nory Keenan RN 06/08/23 -     Roe North RN 06/04/24 -                      Occupational Therapy Education        No education to display                      OT Recommendation and Plan  Planned Therapy Interventions (OT): activity tolerance training, neuromuscular control/coordination retraining, ROM/therapeutic exercise  Plan of Care Review  Plan of Care Reviewed With: patient  Progress: improving  Plan of Care Reviewed With: patient        Time Calculation:     Therapy Charges for Today       Code Description Service Date Service Provider Modifiers Qty    [id number]  OT THERAPEUTIC ACT EA 15 MIN 10/18/2024 Neil Graf OT GO 1                 Neil Graf OT  10/18/2024

## 2024-10-18 NOTE — CONSULTS
"Diabetes Education  Assessment/Teaching    Patient Name:  Lety Johnson  YOB: 1980  MRN: [id number]  Admit Date:  9/26/2024      Assessment Date:  10/18/2024  Flowsheet Row Most Recent Value   General Information     Height 166.4 cm (65.5\")   Height Method Estimated   Weight 102 kg (224 lb 1.6 oz)   Weight Method Bed scale   Pregnancy Assessment    Diabetes History    Retired Feeling Down, Depressed or Hopeless 99-->patient declined   Retired Little Interest or Pleasure in Doing Things 99-->patient declined   Education Preferences    Nutrition Information    Assessment Topics    DM Goals             Flowsheet Row Most Recent Value   DM Education Needs    Meter Has own   Frequency of Testing Daily   Medication Oral   Problem Solving Hypoglycemia, Hyperglycemia, Sick days, Signs, Symptoms, Treatment   Reducing Risks Cardiovascular, Immunizations, Foot care, Dental exam, Eye exam, Blood pressure, Lipids, A1C testing, Neuropathy, Retinopathy   Healthy Eating Basic meal plan provided   Physical Activity Walking   Physical Activity Frequency Occasionally   Healthy Coping Appropriate   Discharge Plan Follow-up with PCP   Motivation Moderate   Teaching Method Explanation, Discussion, Handouts   Patient Response Verbalized understanding              Other Comments:  A1C 9.4 Patient was educated and received AADE7 and ADA handouts on diet, activity, checking blood glucose, taking medication as prescribed, checking feet daily and S/S of hypo/hyperglycemia. Patient was educated on sick rule days. Patient had no questions or concerns. Please re-consult or call for concerns or questions. Thank you.        Electronically signed by:  Shell Driscoll RN  10/18/24 13:16 EDT  "

## 2024-10-18 NOTE — PLAN OF CARE
Goal Outcome Evaluation:  Plan of Care Reviewed With: patient        Progress: no change  Outcome Evaluation: Patient resting in bed. VSS on room air. Patient c/o pain this shift, PRN medication given per MAR. Wound care completed per orders. Patient voices no concerns at this time, will continue with POC.

## 2024-10-18 NOTE — THERAPY TREATMENT NOTE
Acute Care - Physical Therapy Treatment Note   [place]     Patient Name: Lety Johnson  : 1980  MRN: [id number]  Today's Date: 10/18/2024   Onset of Illness/Injury or Date of Surgery: 24  Visit Dx:     ICD-10-CM ICD-9-CM   1. Hypokalemia  E87.6 276.8   2. Pressure injury of skin of sacral region, unspecified injury stage  L89.159 707.03     707.20   3. Pressure injury of skin of left heel, unspecified injury stage  L89.629 707.07     707.20   4. Acute cystitis without hematuria  N30.00 595.0     Patient Active Problem List   Diagnosis   (none) - all problems resolved or deleted     Past Medical History:   Diagnosis Date    Stroke      History reviewed. No pertinent surgical history.  PT Assessment (Last 12 Hours)       PT Evaluation and Treatment       Row Name 10/18/24 1145          Physical Therapy Time and Intention    Subjective Information complains of;weakness;fatigue  -CT     Document Type therapy note (daily note)  -CT     Mode of Treatment physical therapy  -CT     Patient Effort adequate  -CT       Row Name 10/18/24 1145          General Information    Patient Profile Reviewed yes  -CT       Row Name 10/18/24 1145          Cognition    Affect/Mental Status (Cognition) emotionally labile  -CT     Orientation Status (Cognition) oriented x 3  -CT     Follows Commands (Cognition) WFL  -CT       Row Name 10/18/24 1145          Bed Mobility    Bed Mobility bed mobility (all) activities  -CT     All Activities, [place] (Bed Mobility) moderate assist (50% patient effort);maximum assist (25% patient effort);verbal cues;nonverbal cues (demo/gesture)  -CT     Bed Mobility, Safety Issues decreased use of arms for pushing/pulling;decreased use of legs for bridging/pushing  -CT     Assistive Device (Bed Mobility) bed rails;head of bed elevated  -CT       Row Name 10/18/24 1145          Balance    Balance Interventions sitting  -CT       Row Name             Wound 24 1809 Left posterior ankle  Other (comment)    Wound - Properties Group Placement Date: 09/26/24  -KJ Placement Time: 1809  -KJ Side: Left  -KJ Orientation: posterior  -KJ Location: ankle  -KJ Primary Wound Type: Other  -TW, unknow etiology     Retired Wound - Properties Group Placement Date: 09/26/24  -KJ Placement Time: 1809  -KJ Side: Left  -KJ Orientation: posterior  -KJ Location: ankle  -KJ Primary Wound Type: Other  -TW, unknow etiology     Retired Wound - Properties Group Placement Date: 09/26/24  -KJ Placement Time: 1809  -KJ Side: Left  -KJ Orientation: posterior  -KJ Location: ankle  -KJ Primary Wound Type: Other  -TW, unknow etiology     Retired Wound - Properties Group Date first assessed: 09/26/24  -KJ Time first assessed: 1809  -KJ Side: Left  -KJ Location: ankle  -KJ Primary Wound Type: Other  -TW, unknow etiology       Row Name             Wound 10/11/24 1330 medial coccyx Fissure    Wound - Properties Group Placement Date: 10/11/24  -TW Placement Time: 1330 -TW Orientation: medial  -TW Location: coccyx  -TW Primary Wound Type: Fissure  -TW    Retired Wound - Properties Group Placement Date: 10/11/24  -TW Placement Time: 1330  -TW Orientation: medial  -TW Location: coccyx  -TW Primary Wound Type: Fissure  -TW    Retired Wound - Properties Group Placement Date: 10/11/24  -TW Placement Time: 1330  -TW Orientation: medial  -TW Location: coccyx  -TW Primary Wound Type: Fissure  -TW    Retired Wound - Properties Group Date first assessed: 10/11/24  -TW Time first assessed: 1330  -TW Location: coccyx  -TW Primary Wound Type: Fissure  -TW      Row Name             Wound 10/16/24 0705 Left medial gluteal MASD (moisture associated skin damage)    Wound - Properties Group Placement Date: 10/16/24  -MS Placement Time: 0705  -MS Side: Left  -MS Orientation: medial  -MS Location: gluteal  -MS Primary Wound Type: MASD  -MS Type: MASD (moisture associated skin damage)  -MS Present on Original Admission: N  -MS Additional Comments: Noted at  shift change skin assesment, Night shift RN stated it had been there her shift.  -MS    Retired Wound - Properties Group Placement Date: 10/16/24  -MS Placement Time: 0705 -MS Present on Original Admission: N  -MS Side: Left  -MS Orientation: medial  -MS Location: gluteal  -MS Primary Wound Type: MASD  -MS Additional Comments: Noted at shift change skin assesment, Night shift RN stated it had been there her shift.  -MS Type: MASD (moisture associated skin damage)  -MS    Retired Wound - Properties Group Placement Date: 10/16/24  -MS Placement Time: 0705 -MS Present on Original Admission: N  -MS Side: Left  -MS Orientation: medial  -MS Location: gluteal  -MS Primary Wound Type: MASD  -MS Additional Comments: Noted at shift change skin assesment, Night shift RN stated it had been there her shift.  -MS Type: MASD (moisture associated skin damage)  -MS    Retired Wound - Properties Group Date first assessed: 10/16/24  -MS Time first assessed: 0705 -MS Present on Original Admission: N  -MS Side: Left  -MS Location: gluteal  -MS Primary Wound Type: MASD  -MS Additional Comments: Noted at shift change skin assesment, Night shift RN stated it had been there her shift.  -MS Type: MASD (moisture associated skin damage)  -MS      Row Name             Wound 10/16/24 0705 Left posterior greater trochanter MASD (moisture associated skin damage)    Wound - Properties Group Placement Date: 10/16/24  -MS Placement Time: 0705 -MS Side: Left  -MS Orientation: posterior  -MS Location: greater trochanter  -MS Primary Wound Type: MASD  -MS Type: MASD (moisture associated skin damage)  -MS Present on Original Admission: N  -MS    Retired Wound - Properties Group Placement Date: 10/16/24  -MS Placement Time: 0705 -MS Present on Original Admission: N  -MS Side: Left  -MS Orientation: posterior  -MS Location: greater trochanter  -MS Primary Wound Type: MASD  -MS Type: MASD (moisture associated skin damage)  -MS    Retired Wound -  Properties Group Placement Date: 10/16/24  -MS Placement Time: 0705 -MS Present on Original Admission: N  -MS Side: Left  -MS Orientation: posterior  -MS Location: greater trochanter  -MS Primary Wound Type: MASD  -MS Type: MASD (moisture associated skin damage)  -MS    Retired Wound - Properties Group Date first assessed: 10/16/24  -MS Time first assessed: 0705  -MS Present on Original Admission: N  -MS Side: Left  -MS Location: greater trochanter  -MS Primary Wound Type: MASD  -MS Type: MASD (moisture associated skin damage)  -MS      Row Name 10/18/24 1145          Coping    Observed Emotional State calm;cooperative  -CT     Verbalized Emotional State acceptance  -CT       Row Name 10/18/24 1145          Plan of Care Review    Plan of Care Reviewed With patient  -CT       Row Name 10/18/24 1145          Positioning and Restraints    Pre-Treatment Position in bed  -CT     Post Treatment Position bed  -CT     In Bed sitting EOB;call light within reach;encouraged to call for assist;notified nsg  -CT       Row Name 10/18/24 1145          Therapy Assessment/Plan (PT)    Rehab Potential (PT) fair  -CT     Criteria for Skilled Interventions Met (PT) yes;meets criteria;skilled treatment is necessary  -CT     Therapy Frequency (PT) 2 times/wk  1-5 times/wk  -CT     Problem List (PT) problems related to;balance;mobility;hemiparesis/hemiplegia;coordination;strength;range of motion (ROM);muscle tone;motor control  -CT     Activity Limitations Related to Problem List (PT) unable to ambulate safely;unable to transfer safely;BADLs not performed adequately or safely  -CT               User Key  (r) = Recorded By, (t) = Taken By, (c) = Cosigned By      Initials Name Provider Type    TW Karen Gipson, RN Registered Nurse    CT Dilma Ochoa, GENARO Physical Therapist    Nory Keenan, RN Registered Nurse    Roe North, RN Registered Nurse                    Physical Therapy Education       Title: PT OT SLP  Therapies (In Progress)       Topic: Physical Therapy (In Progress)       Point: Mobility training (In Progress)       Learning Progress Summary            Patient Nonacceptance, E, NR by WG at 10/16/2024 0228    Acceptance, E,TB, VU by CF at 10/15/2024 0753    Acceptance, E,TB, VU by CF at 10/14/2024 0801    Acceptance, E,TB, VU by CF at 10/13/2024 1045    Acceptance, E,D, VU,NR by AG at 10/10/2024 1310    Acceptance, E,TB, VU by CB at 10/8/2024 0448    Acceptance, E, NR by RD at 10/2/2024 1101    Acceptance, E, NR by RD at 10/1/2024 0856    Acceptance, E,D, VU,NR by AG at 9/30/2024 1559                      Point: Home exercise program (In Progress)       Learning Progress Summary            Patient Nonacceptance, E, NR by WG at 10/16/2024 0228    Acceptance, E,TB, VU by CF at 10/15/2024 0753    Acceptance, E,TB, VU by CF at 10/14/2024 0801    Acceptance, E,TB, VU by CF at 10/13/2024 1045    Acceptance, E,D, VU,NR by AG at 10/10/2024 1310    Acceptance, E,TB, VU by CB at 10/8/2024 0448    Acceptance, E, NR by RD at 10/2/2024 1101    Acceptance, E, NR by RD at 10/1/2024 0856    Acceptance, E,D, VU,NR by AG at 9/30/2024 1559                      Point: Body mechanics (In Progress)       Learning Progress Summary            Patient Nonacceptance, E, NR by WG at 10/16/2024 0228    Acceptance, E,TB, VU by CF at 10/15/2024 0753    Acceptance, E,TB, VU by CF at 10/14/2024 0801    Acceptance, E,TB, VU by CF at 10/13/2024 1045    Acceptance, E,D, VU,NR by AG at 10/10/2024 1310    Acceptance, E,TB, VU by CB at 10/8/2024 0448    Acceptance, E, NR by RD at 10/2/2024 1101    Acceptance, E, NR by RD at 10/1/2024 0856    Acceptance, E,D, VU,NR by AG at 9/30/2024 1559                      Point: Precautions (In Progress)       Learning Progress Summary            Patient Nonacceptance, E, NR by WG at 10/16/2024 0228    Acceptance, E,TB, VU by CF at 10/15/2024 0753    Acceptance, E,TB, VU by CF at 10/14/2024 0801    Acceptance,  E,TB, VU by CF at 10/13/2024 1045    Acceptance, E,D, VU,NR by AG at 10/10/2024 1310    Acceptance, E,TB, VU by CB at 10/8/2024 0448    Acceptance, E, NR by RD at 10/2/2024 1101    Acceptance, E, NR by RD at 10/1/2024 0856    Acceptance, E,D, VU,NR by AG at 9/30/2024 1559                                      User Key       Initials Effective Dates Name Provider Type Discipline     06/16/21 -  Мария Joya, PT Physical Therapist PT    RD 06/16/21 -  Laisha Verdugo, RN Registered Nurse Nurse    WG 02/12/24 -  Shanthi Burden, RN Registered Nurse Nurse    CF 06/25/24 -  Cherelle Germain, RN Registered Nurse Nurse    CB 06/17/24 -  Fidelia Lombardo, RN Registered Nurse Nurse                  PT Recommendation and Plan  Anticipated Discharge Disposition (PT): skilled nursing facility, inpatient rehabilitation facility  Planned Therapy Interventions (PT): balance training, bed mobility training, gait training, home exercise program, manual therapy techniques, motor coordination training, neuromuscular re-education, patient/family education, postural re-education, strengthening, ROM (range of motion), transfer training, wheelchair management/propulsion training  Therapy Frequency (PT): 2 times/wk (1-5 times/wk)  Plan of Care Reviewed With: patient       Time Calculation:    PT Charges       Row Name 10/18/24 1145             Time Calculation    PT Received On 10/18/24  -CT         Time Calculation- PT    Total Timed Code Minutes- PT 33 minute(s)  -CT                User Key  (r) = Recorded By, (t) = Taken By, (c) = Cosigned By      Initials Name Provider Type    CT Dilma Ochoa, GENARO Physical Therapist                  Therapy Charges for Today       Code Description Service Date Service Provider Modifiers Qty    77179616730 HC PT THERAPEUTIC ACT EA 15 MIN 10/17/2024 Dilma Ochoa, PT GP 2    63717841700 HC PT THERAPEUTIC ACT EA 15 MIN 10/18/2024 Dilma Ochoa PT GP 2            PT G-Codes  AM-PAC 6 Clicks Score  (PT): 8    Dilma Ochoa, PT  10/18/2024

## 2024-10-18 NOTE — CASE MANAGEMENT/SOCIAL WORK
Discharge Planning Assessment   [place]     Patient Name: Lety Johnson  MRN: [id number]  Today's Date: 10/18/2024    Admit Date: 9/26/2024       Discharge Plan       Row Name 10/18/24 1041       Plan    Plan SS attempted to contact [facility] bed with no success. SS left voicemail. SS contacted [facility] per Ana who states they are out of network with pt's insurance. SS contacted [facility] per Nancy [phone] to check availability. SS left voicemail. SS contacted [facility] per Fabienne [phone] to check availabilty. SS left voicemail. SS attempted to contact [facility] on this date with no success.     1631: SS received a call from [facility] per Nancy who states they are not in network with Medicaid.                Continued Care and Services - Admitted Since 9/26/2024       Destination       Service Provider Request Status Services Address Phone Fax Patient Preferred    [facility] SWING BED UNIT Pending - No Request Sent -- [address] [facility] [address] [phone] --    [facility] Pending - No Request Sent -- [address] [facility] [address] [phone] --    [facility] Pending - No Request Sent -- [address] [address] [phone] -- --    [facility] Declined  Cannot meet patient's needs -- [address] [facility] [address] [phone] --    [facility] Acute Care Declined  Not eligible -- [address] [place] [address] [phone] --    [facility] SWING Declined  Not eligible -- [address] [facility] [id number] [phone] --    [facility] Declined  Clinical Denial -- [address] [facility], [address]TH FLOOR, [facility] [address] [phone] --    [facility]  SWING BED UNIT Declined  Cannot meet patient's needs -- [address] [place] [address] [phone] --     [facility] Declined  Not eligible -- [address] [place] [address] [phone] --    [facility] Declined  Bed not available -- [address] [place] [address] [phone] --    [facility] SKILLED CARE Declined  Bed not available -- [address] [facility] [address] [phone] --    [facility] Swing Declined  Out of network -- [address] [facility] [address] [phone] --    [facility] Declined  Clinical Denial -- [address] [facility] [address] [phone] --    [facility] SWING BED UNIT Declined  Insurance Denial, Out of network -- [address], [facility] [address] [phone] --    [facility] Declined  Out of network -- [address] [facility] [address] [phone] --    [facility] Declined  Out of network -- [address] [facility] [address] [phone] --            Expected Discharge Date and Time       Expected Discharge Date Expected Discharge Time    Oct 18, 2024             KAZ Avendano

## 2024-10-19 LAB
ANION GAP SERPL CALCULATED.3IONS-SCNC: 13.1 MMOL/L (ref 5–15)
BACTERIA BLD CULT: ABNORMAL
BOTTLE TYPE: ABNORMAL
BUN SERPL-MCNC: 18 MG/DL (ref 6–20)
BUN/CREAT SERPL: 37.5 (ref 7–25)
CALCIUM SPEC-SCNC: 9.8 MG/DL (ref 8.6–10.5)
CHLORIDE SERPL-SCNC: 102 MMOL/L (ref 98–107)
CO2 SERPL-SCNC: 22.9 MMOL/L (ref 22–29)
CREAT SERPL-MCNC: 0.48 MG/DL (ref 0.57–1)
DEPRECATED RDW RBC AUTO: 48 FL (ref 37–54)
EGFRCR SERPLBLD CKD-EPI 2021: 121.5 ML/MIN/1.73
ERYTHROCYTE [DISTWIDTH] IN BLOOD BY AUTOMATED COUNT: 13.2 % (ref 12.3–15.4)
GLUCOSE BLDC GLUCOMTR-MCNC: 241 MG/DL (ref 70–130)
GLUCOSE BLDC GLUCOMTR-MCNC: 284 MG/DL (ref 70–130)
GLUCOSE BLDC GLUCOMTR-MCNC: 336 MG/DL (ref 70–130)
GLUCOSE BLDC GLUCOMTR-MCNC: 378 MG/DL (ref 70–130)
GLUCOSE SERPL-MCNC: 252 MG/DL (ref 65–99)
HCT VFR BLD AUTO: 39.7 % (ref 34–46.6)
HGB BLD-MCNC: 13.3 G/DL (ref 12–15.9)
MCH RBC QN AUTO: 32.8 PG (ref 26.6–33)
MCHC RBC AUTO-ENTMCNC: 33.5 G/DL (ref 31.5–35.7)
MCV RBC AUTO: 97.8 FL (ref 79–97)
PLATELET # BLD AUTO: 317 10*3/MM3 (ref 140–450)
PMV BLD AUTO: 10.6 FL (ref 6–12)
POTASSIUM SERPL-SCNC: 4.2 MMOL/L (ref 3.5–5.2)
RBC # BLD AUTO: 4.06 10*6/MM3 (ref 3.77–5.28)
SODIUM SERPL-SCNC: 138 MMOL/L (ref 136–145)
WBC NRBC COR # BLD AUTO: 9.49 10*3/MM3 (ref 3.4–10.8)

## 2024-10-19 PROCEDURE — 63710000001 INSULIN GLARGINE PER 5 UNITS: Performed by: INTERNAL MEDICINE

## 2024-10-19 PROCEDURE — 85027 COMPLETE CBC AUTOMATED: CPT | Performed by: INTERNAL MEDICINE

## 2024-10-19 PROCEDURE — 80048 BASIC METABOLIC PNL TOTAL CA: CPT | Performed by: INTERNAL MEDICINE

## 2024-10-19 PROCEDURE — 25010000002 HEPARIN (PORCINE) PER 1000 UNITS: Performed by: INTERNAL MEDICINE

## 2024-10-19 PROCEDURE — 82948 REAGENT STRIP/BLOOD GLUCOSE: CPT

## 2024-10-19 PROCEDURE — 99232 SBSQ HOSP IP/OBS MODERATE 35: CPT | Performed by: INTERNAL MEDICINE

## 2024-10-19 PROCEDURE — 63710000001 INSULIN LISPRO (HUMAN) PER 5 UNITS: Performed by: INTERNAL MEDICINE

## 2024-10-19 RX ADMIN — INSULIN LISPRO 12 UNITS: 100 INJECTION, SOLUTION INTRAVENOUS; SUBCUTANEOUS at 16:47

## 2024-10-19 RX ADMIN — HEPARIN SODIUM 5000 UNITS: 5000 INJECTION INTRAVENOUS; SUBCUTANEOUS at 21:12

## 2024-10-19 RX ADMIN — HEPARIN SODIUM 5000 UNITS: 5000 INJECTION INTRAVENOUS; SUBCUTANEOUS at 05:33

## 2024-10-19 RX ADMIN — ATORVASTATIN CALCIUM 80 MG: 40 TABLET, FILM COATED ORAL at 08:17

## 2024-10-19 RX ADMIN — Medication 10 ML: at 08:19

## 2024-10-19 RX ADMIN — NYSTATIN: 100000 POWDER TOPICAL at 08:19

## 2024-10-19 RX ADMIN — INSULIN LISPRO 16 UNITS: 100 INJECTION, SOLUTION INTRAVENOUS; SUBCUTANEOUS at 11:41

## 2024-10-19 RX ADMIN — HEPARIN SODIUM 5000 UNITS: 5000 INJECTION INTRAVENOUS; SUBCUTANEOUS at 13:48

## 2024-10-19 RX ADMIN — METOPROLOL TARTRATE 25 MG: 25 TABLET, FILM COATED ORAL at 08:17

## 2024-10-19 RX ADMIN — LIDOCAINE 1 PATCH: 4 PATCH TOPICAL at 16:47

## 2024-10-19 RX ADMIN — INSULIN LISPRO 8 UNITS: 100 INJECTION, SOLUTION INTRAVENOUS; SUBCUTANEOUS at 08:18

## 2024-10-19 RX ADMIN — NICOTINE 1 PATCH: 7 PATCH, EXTENDED RELEASE TRANSDERMAL at 08:19

## 2024-10-19 RX ADMIN — INSULIN LISPRO 20 UNITS: 100 INJECTION, SOLUTION INTRAVENOUS; SUBCUTANEOUS at 21:12

## 2024-10-19 RX ADMIN — DULOXETINE HYDROCHLORIDE 60 MG: 60 CAPSULE, DELAYED RELEASE ORAL at 08:17

## 2024-10-19 RX ADMIN — INSULIN GLARGINE 40 UNITS: 100 INJECTION, SOLUTION SUBCUTANEOUS at 21:12

## 2024-10-19 RX ADMIN — PANTOPRAZOLE SODIUM 40 MG: 40 TABLET, DELAYED RELEASE ORAL at 08:18

## 2024-10-19 RX ADMIN — ACETAMINOPHEN 650 MG: 325 TABLET ORAL at 11:41

## 2024-10-19 RX ADMIN — VITAMINS A AND D OINTMENT: 15.5; 53.4 OINTMENT TOPICAL at 08:18

## 2024-10-19 RX ADMIN — Medication 10 ML: at 21:12

## 2024-10-19 RX ADMIN — NYSTATIN: 100000 POWDER TOPICAL at 21:12

## 2024-10-19 RX ADMIN — METOPROLOL TARTRATE 25 MG: 25 TABLET, FILM COATED ORAL at 21:11

## 2024-10-19 RX ADMIN — ACETAMINOPHEN 650 MG: 325 TABLET ORAL at 04:19

## 2024-10-19 RX ADMIN — CYCLOBENZAPRINE 10 MG: 10 TABLET, FILM COATED ORAL at 21:11

## 2024-10-19 RX ADMIN — Medication 5 MG: at 21:11

## 2024-10-19 RX ADMIN — DICLOFENAC SODIUM 4 G: 10 GEL TOPICAL at 13:48

## 2024-10-19 RX ADMIN — ACETAMINOPHEN 650 MG: 325 TABLET ORAL at 23:24

## 2024-10-19 RX ADMIN — ACYCLOVIR 400 MG: 200 CAPSULE ORAL at 21:11

## 2024-10-19 RX ADMIN — LISINOPRIL 20 MG: 10 TABLET ORAL at 08:18

## 2024-10-19 RX ADMIN — VITAMINS A AND D OINTMENT: 15.5; 53.4 OINTMENT TOPICAL at 21:12

## 2024-10-19 RX ADMIN — ASPIRIN 81 MG: 81 TABLET, CHEWABLE ORAL at 08:18

## 2024-10-19 NOTE — PROGRESS NOTES
[facility]     Patient Identification:  Name:  Lety Johnson  Age:  43 y.o.  Sex:  female  :  1980  MRN:  [id number]  Visit Number:  [id number]  Primary Care Provider:  Provider, No Known    Length of stay:  21    Chief complaint: None    Subjective:    Patient seen and examined at bedside with nursing staff present.  Patient was lying in bed watching TV and currently has no complaints except for chronic left upper extremity and left lower extremity pain.  Per nursing staff, no new events overnight.  ----------------------------------------------------------------------------------------------------------------------  Current Hospital Meds:  acyclovir, 400 mg, Oral, Nightly  aspirin, 81 mg, Oral, Daily  atorvastatin, 80 mg, Oral, Daily  DULoxetine, 60 mg, Oral, Daily  heparin (porcine), 5,000 Units, Subcutaneous, Q8H  hydrOXYzine, 25 mg, Oral, Once  insulin glargine, 60 Units, Subcutaneous, Nightly  insulin lispro, 4-24 Units, Subcutaneous, 4x Daily AC & at Bedtime  Lidocaine, 1 patch, Transdermal, Q24H  lisinopril, 20 mg, Oral, Daily  magic barrier cream, , Topical, BID  [Held by provider] metFORMIN, 1,000 mg, Oral, BID With Meals  metoprolol tartrate, 25 mg, Oral, Q12H  nicotine, 1 patch, Transdermal, Q24H  nystatin, , Topical, Q12H  pantoprazole, 40 mg, Oral, Daily  sodium chloride, 10 mL, Intravenous, Q12H  sodium chloride, 10 mL, Intravenous, Q12H      Pharmacy Consult,       ----------------------------------------------------------------------------------------------------------------------  Vital Signs:  Temp:  [97.5 °F (36.4 °C)-98.7 °F (37.1 °C)] 98.7 °F (37.1 °C)  Heart Rate:  [] 106  Resp:  [18] 18  BP: ()/(60-72) 91/63      10/07/24  0511 10/08/24  0500 10/09/24  0500   Weight: 102 kg (225 lb 14.4 oz) (FIFI cameron) 102 kg (225 lb 15.5 oz) 102 kg (224 lb 1.6 oz)     Body mass index is 36.73 kg/m².    Intake/Output Summary (Last 24 hours) at  10/19/2024 1211  Last data filed at 10/19/2024 0700  Gross per 24 hour   Intake 1490 ml   Output --   Net 1490 ml     Diet: Regular/House, Diabetic; Consistent Carbohydrate; Fluid Consistency: Thin (IDDSI 0)  ----------------------------------------------------------------------------------------------------------------------  Physical exam:  Constitutional: Chronically ill-appearing  female in no apparent distress.     HENT:  Head:  Normocephalic and atraumatic.  Mouth:  Moist mucous membranes.    Eyes:  Conjunctivae and EOM are normal.  Pupils are equal, round, and reactive to light.  No scleral icterus.    Neck:  Neck supple. No thyromegaly.  No JVD present.    Cardiovascular: Sinus tachycardia with heart rate in the upper 120s, no murmurs, rubs, clicks or gallops appreciated.  Pulmonary/Chest:  Clear to auscultation bilaterally with no crackles, wheezes or rhonchi appreciated.  Abdominal:  Soft. Nontender. Nondistended  Bowel sounds are normal in all four quadrants. No organomegally appreciated.   Musculoskeletal:  No edema, no tenderness, and no deformity.  No red or swollen joints anywhere.    Neurological: Slightly lethargic, Cranial nerves II-XII intact with no focal deficits.  No facial droop.  No slurred speech.   Skin:  Warm and dry to palpation with no rashes or lesions appreciated.  Peripheral vascular:  2+ radial and pedal pulses in bilateral upper and lower extremities.    No significant change in physical exam in comparison to 10/18/2024  -------------------------------------------------------------------------  ----------------------------------------------------------------------------------------------------------------------      Results from last 7 days   Lab Units 10/19/24  0815 10/15/24  0741 10/13/24  0742   WBC 10*3/mm3 9.49 9.56 11.31*   HEMOGLOBIN g/dL 13.3 13.6 13.9   HEMATOCRIT % 39.7 41.8 42.0   MCV fL 97.8* 99.8* 100.0*   MCHC g/dL 33.5 32.5 33.1   PLATELETS 10*3/mm3 423 829  "381         Results from last 7 days   Lab Units 10/19/24  0815 10/18/24  0156 10/15/24  0741   SODIUM mmol/L 138 137 135*   POTASSIUM mmol/L 4.2 4.3 4.2   MAGNESIUM mg/dL  --  1.9  --    CHLORIDE mmol/L 102 101 98   CO2 mmol/L 22.9 24.1 23.9   BUN mg/dL 18 22* 23*   CREATININE mg/dL 0.48* 0.52* 0.49*   CALCIUM mg/dL 9.8 10.0 10.1   GLUCOSE mg/dL 252* 192* 324*   Estimated Creatinine Clearance: 182.5 mL/min (A) (by C-G formula based on SCr of 0.48 mg/dL (L)).    No results found for: \"AMMONIA\"      No results found for: \"BLOODCX\"  No results found for: \"URINECX\"  No results found for: \"WOUNDCX\"  No results found for: \"STOOLCX\"    I have personally looked at the labs and they are summarized above.  ----------------------------------------------------------------------------------------------------------------------  Imaging Results (Last 24 Hours)       Procedure Component Value Units Date/Time    XR Chest 1 View [[id number]] Collected: 10/18/24 1946     Updated: 10/18/24 1952    Narrative:      PROCEDURE: Chest x-ray examination performed on October 18, 2024. Single  view. Upright position.     HISTORY: Possible pneumonia.     COMPARISON: None.     FINDINGS:     Normal heart size.  No lobar consolidation or edema.  Mild coarsened bronchovascular pattern to the lungs  No bronchial inflammation.  No pleural effusion. No pneumothorax  No fracture or foreign body.       Impression:         1.  No acute process seen in the chest  2.  Mild coarsened bronchovascular pattern to the lungs.  3.  No lobar consolidation or edema.   4.  No pleural effusion or pneumothorax.     This report was finalized on 10/18/2024 7:50 PM by Manas Griffiths MD.             ----------------------------------------------------------------------------------------------------------------------  Assessment and Plan:    ESBL acute cystitis -patient has completed full course of IV antibiotic therapy with Invanz     2.  Acute metabolic encephalopathy " -mental status has returned to baseline today.  All workup for possible new underlying infection is negative.  Continue to monitor closely.      3.  History of CVA - patient with left-sided hemiparesis secondary to history of CVA     4.  Debility -continue PT/OT     5.  History of genital herpes -continue acyclovir     6.  Morbid obesity -complicates all aspects of care    7.  Uncontrolled type 2 diabetes mellitus -patient with continued elevated blood sugar, will increase long-acting insulin to 40 units SQ twice daily and continue high-dose sliding scale insulin    Disposition still pending placement    Marv Navas DO   10/19/24   12:11 EDT

## 2024-10-19 NOTE — PLAN OF CARE
Goal Outcome Evaluation:  Plan of Care Reviewed With: patient        Progress: no change  Outcome Evaluation: Pt's resting in bed, A&O, stable on RA, pain controlled with tylenol, wound care done per orders, BS is better today. Con't with POC.

## 2024-10-19 NOTE — PLAN OF CARE
Goal Outcome Evaluation:  Plan of Care Reviewed With: patient        Progress: no change  Outcome Evaluation: Patient resting in bed. Pt c/o pain, PRN pain medication given per order. Wound care given per order. Blood sugar elevated during shift, NP aware, sliding scale and 60 units of lantus given. No concerns or complaints at this time. Will continue with plan of care.

## 2024-10-20 LAB
BACTERIA SPEC AEROBE CULT: ABNORMAL
BACTERIA SPEC AEROBE CULT: ABNORMAL
GLUCOSE BLDC GLUCOMTR-MCNC: 236 MG/DL (ref 70–130)
GLUCOSE BLDC GLUCOMTR-MCNC: 250 MG/DL (ref 70–130)
GLUCOSE BLDC GLUCOMTR-MCNC: 258 MG/DL (ref 70–130)
GLUCOSE BLDC GLUCOMTR-MCNC: 392 MG/DL (ref 70–130)
GRAM STN SPEC: ABNORMAL
ISOLATED FROM: ABNORMAL

## 2024-10-20 PROCEDURE — 25010000002 HEPARIN (PORCINE) PER 1000 UNITS: Performed by: INTERNAL MEDICINE

## 2024-10-20 PROCEDURE — 63710000001 INSULIN LISPRO (HUMAN) PER 5 UNITS: Performed by: INTERNAL MEDICINE

## 2024-10-20 PROCEDURE — 82948 REAGENT STRIP/BLOOD GLUCOSE: CPT

## 2024-10-20 PROCEDURE — 25010000002 DIPHENHYDRAMINE PER 50 MG

## 2024-10-20 PROCEDURE — 25010000002 PROCHLORPERAZINE 10 MG/2ML SOLUTION

## 2024-10-20 PROCEDURE — 25010000002 KETOROLAC TROMETHAMINE PER 15 MG

## 2024-10-20 PROCEDURE — 99232 SBSQ HOSP IP/OBS MODERATE 35: CPT | Performed by: INTERNAL MEDICINE

## 2024-10-20 PROCEDURE — 63710000001 INSULIN GLARGINE PER 5 UNITS: Performed by: INTERNAL MEDICINE

## 2024-10-20 RX ORDER — DIPHENHYDRAMINE HYDROCHLORIDE 50 MG/ML
25 INJECTION INTRAMUSCULAR; INTRAVENOUS ONCE
Status: COMPLETED | OUTPATIENT
Start: 2024-10-20 | End: 2024-10-20

## 2024-10-20 RX ORDER — KETOROLAC TROMETHAMINE 30 MG/ML
30 INJECTION, SOLUTION INTRAMUSCULAR; INTRAVENOUS ONCE
Status: COMPLETED | OUTPATIENT
Start: 2024-10-20 | End: 2024-10-20

## 2024-10-20 RX ADMIN — CYCLOBENZAPRINE 10 MG: 10 TABLET, FILM COATED ORAL at 15:15

## 2024-10-20 RX ADMIN — INSULIN GLARGINE 45 UNITS: 100 INJECTION, SOLUTION SUBCUTANEOUS at 20:12

## 2024-10-20 RX ADMIN — DICLOFENAC SODIUM 4 G: 10 GEL TOPICAL at 20:14

## 2024-10-20 RX ADMIN — ACETAMINOPHEN 650 MG: 325 TABLET ORAL at 15:13

## 2024-10-20 RX ADMIN — HEPARIN SODIUM 5000 UNITS: 5000 INJECTION INTRAVENOUS; SUBCUTANEOUS at 05:41

## 2024-10-20 RX ADMIN — PROCHLORPERAZINE EDISYLATE 2.5 MG: 5 INJECTION INTRAMUSCULAR; INTRAVENOUS at 03:58

## 2024-10-20 RX ADMIN — ASPIRIN 81 MG: 81 TABLET, CHEWABLE ORAL at 08:38

## 2024-10-20 RX ADMIN — LISINOPRIL 20 MG: 10 TABLET ORAL at 08:38

## 2024-10-20 RX ADMIN — NICOTINE 1 PATCH: 7 PATCH, EXTENDED RELEASE TRANSDERMAL at 08:38

## 2024-10-20 RX ADMIN — Medication 10 ML: at 20:14

## 2024-10-20 RX ADMIN — NYSTATIN: 100000 POWDER TOPICAL at 20:14

## 2024-10-20 RX ADMIN — ACYCLOVIR 400 MG: 200 CAPSULE ORAL at 20:13

## 2024-10-20 RX ADMIN — HEPARIN SODIUM 5000 UNITS: 5000 INJECTION INTRAVENOUS; SUBCUTANEOUS at 20:12

## 2024-10-20 RX ADMIN — DIPHENHYDRAMINE HYDROCHLORIDE 25 MG: 50 INJECTION, SOLUTION INTRAMUSCULAR; INTRAVENOUS at 03:58

## 2024-10-20 RX ADMIN — ATORVASTATIN CALCIUM 80 MG: 40 TABLET, FILM COATED ORAL at 08:38

## 2024-10-20 RX ADMIN — KETOROLAC TROMETHAMINE 30 MG: 30 INJECTION, SOLUTION INTRAMUSCULAR; INTRAVENOUS at 21:49

## 2024-10-20 RX ADMIN — METOPROLOL TARTRATE 25 MG: 25 TABLET, FILM COATED ORAL at 08:38

## 2024-10-20 RX ADMIN — INSULIN LISPRO 8 UNITS: 100 INJECTION, SOLUTION INTRAVENOUS; SUBCUTANEOUS at 08:38

## 2024-10-20 RX ADMIN — PANTOPRAZOLE SODIUM 40 MG: 40 TABLET, DELAYED RELEASE ORAL at 08:38

## 2024-10-20 RX ADMIN — NYSTATIN: 100000 POWDER TOPICAL at 08:39

## 2024-10-20 RX ADMIN — VITAMINS A AND D OINTMENT: 15.5; 53.4 OINTMENT TOPICAL at 20:14

## 2024-10-20 RX ADMIN — INSULIN LISPRO 12 UNITS: 100 INJECTION, SOLUTION INTRAVENOUS; SUBCUTANEOUS at 11:15

## 2024-10-20 RX ADMIN — INSULIN LISPRO 12 UNITS: 100 INJECTION, SOLUTION INTRAVENOUS; SUBCUTANEOUS at 17:08

## 2024-10-20 RX ADMIN — INSULIN LISPRO 20 UNITS: 100 INJECTION, SOLUTION INTRAVENOUS; SUBCUTANEOUS at 20:12

## 2024-10-20 RX ADMIN — VITAMINS A AND D OINTMENT: 15.5; 53.4 OINTMENT TOPICAL at 08:39

## 2024-10-20 RX ADMIN — INSULIN GLARGINE 40 UNITS: 100 INJECTION, SOLUTION SUBCUTANEOUS at 08:38

## 2024-10-20 RX ADMIN — CYCLOBENZAPRINE 10 MG: 10 TABLET, FILM COATED ORAL at 04:06

## 2024-10-20 RX ADMIN — METOPROLOL TARTRATE 25 MG: 25 TABLET, FILM COATED ORAL at 20:12

## 2024-10-20 RX ADMIN — DULOXETINE HYDROCHLORIDE 60 MG: 60 CAPSULE, DELAYED RELEASE ORAL at 08:38

## 2024-10-20 RX ADMIN — Medication 5 MG: at 20:12

## 2024-10-20 NOTE — PLAN OF CARE
Goal Outcome Evaluation:  Plan of Care Reviewed With: patient        Progress: no change  Outcome Evaluation: Patient resting in bed. VSS. Pt c/o pain, PRN medication given per MAR. Wound care completed per order. No concerns or complaints at this time. Will continue with plan of care.

## 2024-10-20 NOTE — PROGRESS NOTES
[facility]     Patient Identification:  Name:  Lety Johnson  Age:  43 y.o.  Sex:  female  :  1980  MRN:  [id number]  Visit Number:  [id number]  Primary Care Provider:  Provider, No Known    Length of stay:  22    Chief complaint: None    Subjective:    Patient doing well with no new complaints.  Per nursing staff, no new events overnight.  Still awaiting placement.  ----------------------------------------------------------------------------------------------------------------------  Current Hospital Meds:  acyclovir, 400 mg, Oral, Nightly  aspirin, 81 mg, Oral, Daily  atorvastatin, 80 mg, Oral, Daily  DULoxetine, 60 mg, Oral, Daily  heparin (porcine), 5,000 Units, Subcutaneous, Q8H  hydrOXYzine, 25 mg, Oral, Once  insulin glargine, 40 Units, Subcutaneous, Q12H  insulin lispro, 4-24 Units, Subcutaneous, 4x Daily AC & at Bedtime  Lidocaine, 1 patch, Transdermal, Q24H  lisinopril, 20 mg, Oral, Daily  magic barrier cream, , Topical, BID  [Held by provider] metFORMIN, 1,000 mg, Oral, BID With Meals  metoprolol tartrate, 25 mg, Oral, Q12H  nicotine, 1 patch, Transdermal, Q24H  nystatin, , Topical, Q12H  pantoprazole, 40 mg, Oral, Daily  sodium chloride, 10 mL, Intravenous, Q12H  sodium chloride, 10 mL, Intravenous, Q12H      Pharmacy Consult,       ----------------------------------------------------------------------------------------------------------------------  Vital Signs:  Temp:  [97.6 °F (36.4 °C)-98.6 °F (37 °C)] 98.4 °F (36.9 °C)  Heart Rate:  [] 92  Resp:  [18] 18  BP: (101-129)/(55-84) 108/62      10/07/24  0511 10/08/24  0500 10/09/24  0500   Weight: 102 kg (225 lb 14.4 oz) (FIFI cameron) 102 kg (225 lb 15.5 oz) 102 kg (224 lb 1.6 oz)     Body mass index is 36.73 kg/m².    Intake/Output Summary (Last 24 hours) at 10/20/2024 1108  Last data filed at 10/20/2024 0100  Gross per 24 hour   Intake 1000 ml   Output --   Net 1000 ml     Diet: Regular/House,  Diabetic; Consistent Carbohydrate; Fluid Consistency: Thin (IDDSI 0)  ----------------------------------------------------------------------------------------------------------------------  Physical exam:  Constitutional: Chronically ill-appearing  female in no apparent distress.     HENT:  Head:  Normocephalic and atraumatic.  Mouth:  Moist mucous membranes.    Eyes:  Conjunctivae and EOM are normal.  Pupils are equal, round, and reactive to light.  No scleral icterus.    Neck:  Neck supple. No thyromegaly.  No JVD present.    Cardiovascular: Sinus tachycardia with heart rate in the upper 120s, no murmurs, rubs, clicks or gallops appreciated.  Pulmonary/Chest:  Clear to auscultation bilaterally with no crackles, wheezes or rhonchi appreciated.  Abdominal:  Soft. Nontender. Nondistended  Bowel sounds are normal in all four quadrants. No organomegally appreciated.   Musculoskeletal:  No edema, no tenderness, and no deformity.  No red or swollen joints anywhere.    Neurological: Slightly lethargic, Cranial nerves II-XII intact with no focal deficits.  No facial droop.  No slurred speech.   Skin:  Warm and dry to palpation with no rashes or lesions appreciated.  Peripheral vascular:  2+ radial and pedal pulses in bilateral upper and lower extremities.    No significant change in physical exam in comparison to 10/19/2024  -------------------------------------------------------------------------  ----------------------------------------------------------------------------------------------------------------------      Results from last 7 days   Lab Units 10/19/24  0815 10/15/24  0741   WBC 10*3/mm3 9.49 9.56   HEMOGLOBIN g/dL 13.3 13.6   HEMATOCRIT % 39.7 41.8   MCV fL 97.8* 99.8*   MCHC g/dL 33.5 32.5   PLATELETS 10*3/mm3 317 283         Results from last 7 days   Lab Units 10/19/24  0815 10/18/24  0156 10/15/24  0741   SODIUM mmol/L 138 137 135*   POTASSIUM mmol/L 4.2 4.3 4.2   MAGNESIUM mg/dL  --  1.9  --   "  CHLORIDE mmol/L 102 101 98   CO2 mmol/L 22.9 24.1 23.9   BUN mg/dL 18 22* 23*   CREATININE mg/dL 0.48* 0.52* 0.49*   CALCIUM mg/dL 9.8 10.0 10.1   GLUCOSE mg/dL 252* 192* 324*   Estimated Creatinine Clearance: 182.5 mL/min (A) (by C-G formula based on SCr of 0.48 mg/dL (L)).    No results found for: \"AMMONIA\"      No results found for: \"BLOODCX\"  No results found for: \"URINECX\"  No results found for: \"WOUNDCX\"  No results found for: \"STOOLCX\"    I have personally looked at the labs and they are summarized above.  ----------------------------------------------------------------------------------------------------------------------  Imaging Results (Last 24 Hours)       ** No results found for the last 24 hours. **          ----------------------------------------------------------------------------------------------------------------------  Assessment and Plan:    ESBL acute cystitis -patient has completed full course of IV antibiotic therapy with Invanz     2.  Acute metabolic encephalopathy -acute encephalopathy resolved, continue to monitor closely for changes in mental status.     3.  History of CVA - patient with left-sided hemiparesis secondary to history of CVA     4.  Debility -continue PT/OT     5.  History of genital herpes -continue acyclovir     6.  Morbid obesity -complicates all aspects of care    7.  Uncontrolled type 2 diabetes mellitus -blood sugar slowly improving with increased long-acting insulin.  Will increase Lantus to 45 units SQ twice daily and continue high-dose sliding scale insulin.    Disposition still pending placement    Marv Navas,    10/20/24   11:08 EDT     "

## 2024-10-20 NOTE — PLAN OF CARE
Goal Outcome Evaluation:                 Pt resting in bed at this time. Wound care performed this shift. No other complaints at this time.

## 2024-10-21 LAB
GLUCOSE BLDC GLUCOMTR-MCNC: 187 MG/DL (ref 70–130)
GLUCOSE BLDC GLUCOMTR-MCNC: 252 MG/DL (ref 70–130)
GLUCOSE BLDC GLUCOMTR-MCNC: 310 MG/DL (ref 70–130)
GLUCOSE BLDC GLUCOMTR-MCNC: 352 MG/DL (ref 70–130)

## 2024-10-21 PROCEDURE — 99231 SBSQ HOSP IP/OBS SF/LOW 25: CPT | Performed by: HOSPITALIST

## 2024-10-21 PROCEDURE — 63710000001 INSULIN GLARGINE PER 5 UNITS: Performed by: INTERNAL MEDICINE

## 2024-10-21 PROCEDURE — 97535 SELF CARE MNGMENT TRAINING: CPT

## 2024-10-21 PROCEDURE — 63710000001 INSULIN LISPRO (HUMAN) PER 5 UNITS: Performed by: INTERNAL MEDICINE

## 2024-10-21 PROCEDURE — 97530 THERAPEUTIC ACTIVITIES: CPT

## 2024-10-21 PROCEDURE — 25010000002 HEPARIN (PORCINE) PER 1000 UNITS: Performed by: INTERNAL MEDICINE

## 2024-10-21 PROCEDURE — 82948 REAGENT STRIP/BLOOD GLUCOSE: CPT

## 2024-10-21 RX ADMIN — Medication 10 ML: at 08:31

## 2024-10-21 RX ADMIN — ACETAMINOPHEN 650 MG: 325 TABLET ORAL at 13:39

## 2024-10-21 RX ADMIN — HEPARIN SODIUM 5000 UNITS: 5000 INJECTION INTRAVENOUS; SUBCUTANEOUS at 05:52

## 2024-10-21 RX ADMIN — LIDOCAINE 1 PATCH: 4 PATCH TOPICAL at 16:30

## 2024-10-21 RX ADMIN — METOPROLOL TARTRATE 25 MG: 25 TABLET, FILM COATED ORAL at 08:29

## 2024-10-21 RX ADMIN — ATORVASTATIN CALCIUM 80 MG: 40 TABLET, FILM COATED ORAL at 08:29

## 2024-10-21 RX ADMIN — METOPROLOL TARTRATE 25 MG: 25 TABLET, FILM COATED ORAL at 20:02

## 2024-10-21 RX ADMIN — DULOXETINE HYDROCHLORIDE 60 MG: 60 CAPSULE, DELAYED RELEASE ORAL at 08:29

## 2024-10-21 RX ADMIN — NICOTINE 1 PATCH: 7 PATCH, EXTENDED RELEASE TRANSDERMAL at 08:30

## 2024-10-21 RX ADMIN — VITAMINS A AND D OINTMENT: 15.5; 53.4 OINTMENT TOPICAL at 20:02

## 2024-10-21 RX ADMIN — HEPARIN SODIUM 5000 UNITS: 5000 INJECTION INTRAVENOUS; SUBCUTANEOUS at 20:01

## 2024-10-21 RX ADMIN — INSULIN LISPRO 16 UNITS: 100 INJECTION, SOLUTION INTRAVENOUS; SUBCUTANEOUS at 10:43

## 2024-10-21 RX ADMIN — NYSTATIN: 100000 POWDER TOPICAL at 08:33

## 2024-10-21 RX ADMIN — ACYCLOVIR 400 MG: 200 CAPSULE ORAL at 20:01

## 2024-10-21 RX ADMIN — Medication 10 ML: at 20:05

## 2024-10-21 RX ADMIN — Medication 5 MG: at 20:01

## 2024-10-21 RX ADMIN — ASPIRIN 81 MG: 81 TABLET, CHEWABLE ORAL at 08:29

## 2024-10-21 RX ADMIN — INSULIN GLARGINE 45 UNITS: 100 INJECTION, SOLUTION SUBCUTANEOUS at 08:29

## 2024-10-21 RX ADMIN — ACETAMINOPHEN 650 MG: 325 TABLET ORAL at 20:01

## 2024-10-21 RX ADMIN — INSULIN LISPRO 4 UNITS: 100 INJECTION, SOLUTION INTRAVENOUS; SUBCUTANEOUS at 08:29

## 2024-10-21 RX ADMIN — DICLOFENAC SODIUM 4 G: 10 GEL TOPICAL at 15:28

## 2024-10-21 RX ADMIN — ACETAMINOPHEN 650 MG: 325 TABLET ORAL at 06:28

## 2024-10-21 RX ADMIN — SENNOSIDES AND DOCUSATE SODIUM 2 TABLET: 50; 8.6 TABLET ORAL at 10:58

## 2024-10-21 RX ADMIN — LISINOPRIL 20 MG: 10 TABLET ORAL at 08:29

## 2024-10-21 RX ADMIN — CYCLOBENZAPRINE 10 MG: 10 TABLET, FILM COATED ORAL at 16:31

## 2024-10-21 RX ADMIN — INSULIN LISPRO 12 UNITS: 100 INJECTION, SOLUTION INTRAVENOUS; SUBCUTANEOUS at 16:31

## 2024-10-21 RX ADMIN — CYCLOBENZAPRINE 10 MG: 10 TABLET, FILM COATED ORAL at 06:28

## 2024-10-21 RX ADMIN — VITAMINS A AND D OINTMENT: 15.5; 53.4 OINTMENT TOPICAL at 08:33

## 2024-10-21 RX ADMIN — PANTOPRAZOLE SODIUM 40 MG: 40 TABLET, DELAYED RELEASE ORAL at 08:29

## 2024-10-21 RX ADMIN — INSULIN GLARGINE 45 UNITS: 100 INJECTION, SOLUTION SUBCUTANEOUS at 20:00

## 2024-10-21 RX ADMIN — INSULIN LISPRO 20 UNITS: 100 INJECTION, SOLUTION INTRAVENOUS; SUBCUTANEOUS at 20:00

## 2024-10-21 RX ADMIN — HEPARIN SODIUM 5000 UNITS: 5000 INJECTION INTRAVENOUS; SUBCUTANEOUS at 13:09

## 2024-10-21 NOTE — THERAPY TREATMENT NOTE
Acute Care - Occupational Therapy Treatment Note   [facility]     Patient Name: Lety Johnson  : 1980  MRN: [id number]  Today's Date: 10/21/2024  Onset of Illness/Injury or Date of Surgery: 24     Referring Physician: Dr. Marc    Admit Date: 2024       ICD-10-CM ICD-9-CM   1. Hypokalemia  E87.6 276.8   2. Pressure injury of skin of sacral region, unspecified injury stage  L89.159 707.03     707.20   3. Pressure injury of skin of left heel, unspecified injury stage  L89.629 707.07     707.20   4. Acute cystitis without hematuria  N30.00 595.0     Patient Active Problem List   Diagnosis   (none) - all problems resolved or deleted     Past Medical History:   Diagnosis Date    Stroke      History reviewed. No pertinent surgical history.      OT ASSESSMENT FLOWSHEET (Last 12 Hours)       OT Evaluation and Treatment       Row Name 10/21/24 1721 10/21/24 1133                OT Time and Intention    Document Type therapy note (daily note)  -KR therapy note (daily note)  -KR       Mode of Treatment -- occupational therapy  -KR       Patient Effort adequate  -KR adequate  -KR       Comment During earlier tx session, pt reported that R wrist had been hurting during the night. She reports that she had previously worn a wrist support on that extremity, following CTS diagnosis. OT obtained wrist support and applied as pt requested, and as felt to be appropriate by therapist.  Nursing notified of brace, and need to monitor accordingly, for areas of redness/discomfort. Later in the day, nursing contacted therapist, stating pt was requesting similar brace for LUE. OT obtained brace and applied. This brace would allow L wrist to remain in neutral position and assist in maintaining fxl positioning at rest. Nursing notified splint was in place and informed of need for consistent monitoring during first 24 hours due to decreased sensation LUE. OT to continue to monitor and tx accordingly.  -KR Continues to c/o severe  pain LUE with any ROM attempt. Pt also requesting brace for right wrist due to reported CTS. OT will attempt to provide assist with this reported pain RUE.  -KR          General Information    General Observations of Patient -- alert/cooperative  -KR          Cognition    Affect/Mental Status (Cognition) -- emotionally labile  -KR       Follows Commands (Cognition) -- WFL  -KR          Bed Mobility    All Activities, [place] (Bed Mobility) -- moderate assist (50% patient effort)  -KR       Bed Mobility, Safety Issues -- decreased use of arms for pushing/pulling  -KR          Shoulder (Therapeutic Exercise)    Shoulder (Therapeutic Exercise) -- AROM (active range of motion)  -KR       Shoulder AROM (Therapeutic Exercise) -- right;flexion;extension  -KR          Elbow/Forearm (Therapeutic Exercise)    Elbow/Forearm (Therapeutic Exercise) -- AROM (active range of motion)  -KR       Elbow/Forearm AROM (Therapeutic Exercise) -- right;flexion;extension  -KR          Wrist (Therapeutic Exercise)    Wrist (Therapeutic Exercise) -- AROM (active range of motion)  -KR       Wrist AROM (Therapeutic Exercise) -- right;flexion;extension  -KR          Hand (Therapeutic Exercise)    Hand (Therapeutic Exercise) -- AROM (active range of motion)  -KR       Hand AROM/AAROM (Therapeutic Exercise) -- right;finger flexion;finger extension  -KR          Balance    Static Sitting Balance -- independent  -KR          Wound 09/26/24 1809 Left posterior ankle Other (comment)    Wound - Properties Group Placement Date: 09/26/24  -KJ Placement Time: 1809  -KJ Side: Left  -KJ Orientation: posterior  -KJ Location: ankle  -KJ Primary Wound Type: Other  -TW, unknow etiology     Retired Wound - Properties Group Placement Date: 09/26/24  -KJ Placement Time: 1809  -KJ Side: Left  -KJ Orientation: posterior  -KJ Location: ankle  -KJ Primary Wound Type: Other  -TW, unknow etiology     Retired Wound - Properties Group Placement Date: 09/26/24  -KJ  Placement Time: 1809  -KJ Side: Left  -KJ Orientation: posterior  -KJ Location: ankle  -KJ Primary Wound Type: Other  -TW, unknow etiology     Retired Wound - Properties Group Date first assessed: 09/26/24  -KJ Time first assessed: 1809  -KJ Side: Left  -KJ Location: ankle  -KJ Primary Wound Type: Other  -TW, unknow etiology        Wound 10/11/24 1330 medial coccyx Fissure    Wound - Properties Group Placement Date: 10/11/24  -TW Placement Time: 1330 -TW Orientation: medial  -TW Location: coccyx  -TW Primary Wound Type: Fissure  -TW    Retired Wound - Properties Group Placement Date: 10/11/24  -TW Placement Time: 1330  -TW Orientation: medial  -TW Location: coccyx  -TW Primary Wound Type: Fissure  -TW    Retired Wound - Properties Group Placement Date: 10/11/24  -TW Placement Time: 1330  -TW Orientation: medial  -TW Location: coccyx  -TW Primary Wound Type: Fissure  -TW    Retired Wound - Properties Group Date first assessed: 10/11/24  -TW Time first assessed: 1330  -TW Location: coccyx  -TW Primary Wound Type: Fissure  -TW       Wound 10/16/24 0705 Left medial gluteal MASD (moisture associated skin damage)    Wound - Properties Group Placement Date: 10/16/24  -MS Placement Time: 0705 -MS Side: Left  -MS Orientation: medial  -MS Location: gluteal  -MS Primary Wound Type: MASD  -MS Type: MASD (moisture associated skin damage)  -MS Present on Original Admission: N  -MS Additional Comments: Noted at shift change skin assesment, Night shift RN stated it had been there her shift.  -MS    Retired Wound - Properties Group Placement Date: 10/16/24  -MS Placement Time: 0705  -MS Present on Original Admission: N  -MS Side: Left  -MS Orientation: medial  -MS Location: gluteal  -MS Primary Wound Type: MASD  -MS Additional Comments: Noted at shift change skin assesment, Night shift RN stated it had been there her shift.  -MS Type: MASD (moisture associated skin damage)  -MS    Retired Wound - Properties Group Placement Date:  10/16/24  -MS Placement Time: 0705  -MS Present on Original Admission: N  -MS Side: Left  -MS Orientation: medial  -MS Location: gluteal  -MS Primary Wound Type: MASD  -MS Additional Comments: Noted at shift change skin assesment, Night shift RN stated it had been there her shift.  -MS Type: MASD (moisture associated skin damage)  -MS    Retired Wound - Properties Group Date first assessed: 10/16/24  -MS Time first assessed: 0705  -MS Present on Original Admission: N  -MS Side: Left  -MS Location: gluteal  -MS Primary Wound Type: MASD  -MS Additional Comments: Noted at shift change skin assesment, Night shift RN stated it had been there her shift.  -MS Type: MASD (moisture associated skin damage)  -MS       Wound 10/16/24 0705 Left posterior greater trochanter MASD (moisture associated skin damage)    Wound - Properties Group Placement Date: 10/16/24  -MS Placement Time: 0705  -MS Side: Left  -MS Orientation: posterior  -MS Location: greater trochanter  -MS Primary Wound Type: MASD  -MS Type: MASD (moisture associated skin damage)  -MS Present on Original Admission: N  -MS    Retired Wound - Properties Group Placement Date: 10/16/24  -MS Placement Time: 0705  -MS Present on Original Admission: N  -MS Side: Left  -MS Orientation: posterior  -MS Location: greater trochanter  -MS Primary Wound Type: MASD  -MS Type: MASD (moisture associated skin damage)  -MS    Retired Wound - Properties Group Placement Date: 10/16/24  -MS Placement Time: 0705  -MS Present on Original Admission: N  -MS Side: Left  -MS Orientation: posterior  -MS Location: greater trochanter  -MS Primary Wound Type: MASD  -MS Type: MASD (moisture associated skin damage)  -MS    Retired Wound - Properties Group Date first assessed: 10/16/24  -MS Time first assessed: 0705  -MS Present on Original Admission: N  -MS Side: Left  -MS Location: greater trochanter  -MS Primary Wound Type: MASD  -MS Type: MASD (moisture associated skin damage)  -MS       Plan of  Care Review    Plan of Care Reviewed With -- patient  -KR          ROM Goal 1 (OT)    Progress/Outcome (ROM Goal 1, OT) -- continuing progress toward goal  -KR           Activity Tolerance Goal 1 (OT)    Progress/Outcome (Activity Tolerance Goal 1, OT) -- continuing progress toward goal  -KR                 User Key  (r) = Recorded By, (t) = Taken By, (c) = Cosigned By      Initials Name Effective Dates    TW Karen Gipson RN 06/16/21 -     Neil Menezes OT 06/16/21 -     Nory Keenan RN 06/08/23 -     Roe North RN 06/04/24 -                      Occupational Therapy Education        No education to display                      OT Recommendation and Plan  Planned Therapy Interventions (OT): activity tolerance training, neuromuscular control/coordination retraining, ROM/therapeutic exercise  Plan of Care Review  Plan of Care Reviewed With: patient  Progress: improving  Plan of Care Reviewed With: patient        Time Calculation:     Therapy Charges for Today       Code Description Service Date Service Provider Modifiers Qty    [id number]  OT THERAPEUTIC ACT EA 15 MIN 10/21/2024 Neil Graf OT GO 1    [id number]  OT SELF CARE/MGMT/TRAIN EA 15 MIN 10/21/2024 Neil Graf OT GO 1    [id number]  OT THERAPEUTIC ACT EA 15 MIN 10/21/2024 Neil Graf OT GO 2                 Neil Graf OT  10/21/2024

## 2024-10-21 NOTE — PROGRESS NOTES
[facility] HOSPITALIST PROGRESS NOTE     Patient Identification:  Name:  Lety Johnson  Age:  43 y.o.  Sex:  female  :  1980  MRN:  [id number]  Visit Number:  [id number]  Primary Care Provider:  Provider, No Known    Length of stay:  23    Subjective: Patient seen and examined assisted by FIFI COTO.  Patient asleep but arousable, pleasant, no complaints except for pain all over.  She denies nausea or vomiting or constipation.  Still awaiting for placement.  Patient is debilitated but able to sit at the edge of the bed due to her previous CVA    Chief Complaint: Debility  ----------------------------------------------------------------------------------------------------------------------  Current Hospital Meds:  acyclovir, 400 mg, Oral, Nightly  aspirin, 81 mg, Oral, Daily  atorvastatin, 80 mg, Oral, Daily  DULoxetine, 60 mg, Oral, Daily  heparin (porcine), 5,000 Units, Subcutaneous, Q8H  hydrOXYzine, 25 mg, Oral, Once  insulin glargine, 45 Units, Subcutaneous, Q12H  insulin lispro, 4-24 Units, Subcutaneous, 4x Daily AC & at Bedtime  Lidocaine, 1 patch, Transdermal, Q24H  lisinopril, 20 mg, Oral, Daily  magic barrier cream, , Topical, BID  [Held by provider] metFORMIN, 1,000 mg, Oral, BID With Meals  metoprolol tartrate, 25 mg, Oral, Q12H  nicotine, 1 patch, Transdermal, Q24H  nystatin, , Topical, Q12H  pantoprazole, 40 mg, Oral, Daily  sodium chloride, 10 mL, Intravenous, Q12H  sodium chloride, 10 mL, Intravenous, Q12H      Pharmacy Consult,       ----------------------------------------------------------------------------------------------------------------------  Vital Signs:  Temp:  [97.8 °F (36.6 °C)-97.9 °F (36.6 °C)] 97.8 °F (36.6 °C)  Heart Rate:  [101-117] 117  Resp:  [18] 18  BP: ()/() 107/70       Tele:       10/07/24  0511 10/08/24  0500 10/09/24  0500   Weight: 102 kg (225 lb 14.4 oz) (FIFI cameron) 102 kg (225 lb 15.5 oz) 102 kg (224 lb 1.6 oz)     Body mass index is  36.73 kg/m².    Intake/Output Summary (Last 24 hours) at 10/21/2024 1223  Last data filed at 10/21/2024 0512  Gross per 24 hour   Intake 540 ml   Output --   Net 540 ml     Diet: Regular/House, Diabetic; Consistent Carbohydrate; Fluid Consistency: Thin (IDDSI 0)  ----------------------------------------------------------------------------------------------------------------------  Physical exam:  General: Sleeping arousable, conversant, obese.   No respiratory distress.    Skin:  Skin is warm and dry. No rash noted. No pallor.    HENT:  Head:  Normocephalic and atraumatic.  Mouth:  Moist mucous membranes.    Eyes:  Conjunctivae and EOM are normal.  Pupils are equal, round, and reactive to light.  No scleral icterus.    Neck:  Neck supple.  No JVD present.    Pulmonary/Chest:  No respiratory distress, no wheezes, no crackles, with normal breath sounds and good air movement.  Cardiovascular:  Normal rate, regular rhythm and normal heart sounds with no murmur.  Abdominal: Flabby, soft.  Bowel sounds are normal.  No distension and no tenderness.   Extremities: No edema, no cyanosis or clubbing.    Neurological: Left-sided hemiplegia, mild slurred speech.   Genitourinary: No Florentino catheter  Back:  ----------------------------------------------------------------------------------------------------------------------  ----------------------------------------------------------------------------------------------------------------------      Results from last 7 days   Lab Units 10/19/24  0815 10/15/24  0741   WBC 10*3/mm3 9.49 9.56   HEMOGLOBIN g/dL 13.3 13.6   HEMATOCRIT % 39.7 41.8   MCV fL 97.8* 99.8*   MCHC g/dL 33.5 32.5   PLATELETS 10*3/mm3 317 283         Results from last 7 days   Lab Units 10/19/24  0815 10/18/24  0156 10/15/24  0741   SODIUM mmol/L 138 137 135*   POTASSIUM mmol/L 4.2 4.3 4.2   MAGNESIUM mg/dL  --  1.9  --    CHLORIDE mmol/L 102 101 98   CO2 mmol/L 22.9 24.1 23.9   BUN mg/dL 18 22* 23*   CREATININE  "mg/dL 0.48* 0.52* 0.49*   CALCIUM mg/dL 9.8 10.0 10.1   GLUCOSE mg/dL 252* 192* 324*   Estimated Creatinine Clearance: 182.5 mL/min (A) (by C-G formula based on SCr of 0.48 mg/dL (L)).    No results found for: \"AMMONIA\"      Blood Culture   Date Value Ref Range Status   10/18/2024 Staphylococcus, coagulase negative (C)  Final   10/18/2024 No growth at 2 days  Preliminary     Urine Culture   Date Value Ref Range Status   10/18/2024 >100,000 CFU/mL Proteus vulgaris (A)  Final     No results found for: \"WOUNDCX\"  No results found for: \"STOOLCX\"    I have personally looked at the labs and they are summarized above.  ----------------------------------------------------------------------------------------------------------------------  Imaging Results (Last 24 Hours)       ** No results found for the last 24 hours. **          ----------------------------------------------------------------------------------------------------------------------  Assessment and Plan:  -Acute cystitis with ESBL positive completed treatment  -Acute metabolic encephalopathy secondary to above resolved  -History of CVA with left-sided hemiplegia  -Acute on chronic chronic debility  -Morbid obesity complicating all aspects of care  -history of genital herpes on chronic suppressive therapy  -History of diabetes with hyperglycemia noted in the past few days    Continue PT OT, adjust diabetes regimen as tolerated.    Disposition for placement      Mara Navas MD  10/21/24  12:23 EDT  "

## 2024-10-21 NOTE — PLAN OF CARE
Goal Outcome Evaluation:  Plan of Care Reviewed With: patient        Progress: no change  Outcome Evaluation: Pt has rested in bed overnight. VSS. Pt c/o pain, PRN meds administered. Wound care completed per orders. No acute changes. Will continue POC.

## 2024-10-21 NOTE — THERAPY TREATMENT NOTE
Acute Care - Physical Therapy Treatment Note   [place]     Patient Name: Lety Johnson  : 1980  MRN: [id number]  Today's Date: 10/21/2024   Onset of Illness/Injury or Date of Surgery: 24  Visit Dx:     ICD-10-CM ICD-9-CM   1. Hypokalemia  E87.6 276.8   2. Pressure injury of skin of sacral region, unspecified injury stage  L89.159 707.03     707.20   3. Pressure injury of skin of left heel, unspecified injury stage  L89.629 707.07     707.20   4. Acute cystitis without hematuria  N30.00 595.0     Patient Active Problem List   Diagnosis   (none) - all problems resolved or deleted     Past Medical History:   Diagnosis Date    Stroke      History reviewed. No pertinent surgical history.  PT Assessment (Last 12 Hours)       PT Evaluation and Treatment       Row Name 10/21/24 1122          Physical Therapy Time and Intention    Subjective Information complains of;weakness;fatigue  -CT     Document Type therapy note (daily note)  -CT     Mode of Treatment physical therapy  -CT     Patient Effort adequate  -CT       Row Name 10/21/24 1122          General Information    Patient Profile Reviewed yes  -CT       Row Name 10/21/24 1122          Cognition    Affect/Mental Status (Cognition) emotionally labile  -CT     Orientation Status (Cognition) oriented x 3  -CT     Follows Commands (Cognition) WFL  -CT       Row Name 10/21/24 1122          Bed Mobility    Bed Mobility bed mobility (all) activities  -CT     All Activities, [place] (Bed Mobility) moderate assist (50% patient effort);maximum assist (25% patient effort);verbal cues;nonverbal cues (demo/gesture)  -CT     Bed Mobility, Safety Issues decreased use of arms for pushing/pulling;decreased use of legs for bridging/pushing  -CT     Assistive Device (Bed Mobility) bed rails;head of bed elevated  -CT       Row Name 10/21/24 1122          Balance    Balance Interventions sitting  -CT       Row Name             Wound 24 1809 Left posterior ankle  Other (comment)    Wound - Properties Group Placement Date: 09/26/24  -KJ Placement Time: 1809  -KJ Side: Left  -KJ Orientation: posterior  -KJ Location: ankle  -KJ Primary Wound Type: Other  -TW, unknow etiology     Retired Wound - Properties Group Placement Date: 09/26/24  -KJ Placement Time: 1809  -KJ Side: Left  -KJ Orientation: posterior  -KJ Location: ankle  -KJ Primary Wound Type: Other  -TW, unknow etiology     Retired Wound - Properties Group Placement Date: 09/26/24  -KJ Placement Time: 1809  -KJ Side: Left  -KJ Orientation: posterior  -KJ Location: ankle  -KJ Primary Wound Type: Other  -TW, unknow etiology     Retired Wound - Properties Group Date first assessed: 09/26/24  -KJ Time first assessed: 1809  -KJ Side: Left  -KJ Location: ankle  -KJ Primary Wound Type: Other  -TW, unknow etiology       Row Name             Wound 10/11/24 1330 medial coccyx Fissure    Wound - Properties Group Placement Date: 10/11/24  -TW Placement Time: 1330 -TW Orientation: medial  -TW Location: coccyx  -TW Primary Wound Type: Fissure  -TW    Retired Wound - Properties Group Placement Date: 10/11/24  -TW Placement Time: 1330  -TW Orientation: medial  -TW Location: coccyx  -TW Primary Wound Type: Fissure  -TW    Retired Wound - Properties Group Placement Date: 10/11/24  -TW Placement Time: 1330  -TW Orientation: medial  -TW Location: coccyx  -TW Primary Wound Type: Fissure  -TW    Retired Wound - Properties Group Date first assessed: 10/11/24  -TW Time first assessed: 1330  -TW Location: coccyx  -TW Primary Wound Type: Fissure  -TW      Row Name             Wound 10/16/24 0705 Left medial gluteal MASD (moisture associated skin damage)    Wound - Properties Group Placement Date: 10/16/24  -MS Placement Time: 0705  -MS Side: Left  -MS Orientation: medial  -MS Location: gluteal  -MS Primary Wound Type: MASD  -MS Type: MASD (moisture associated skin damage)  -MS Present on Original Admission: N  -MS Additional Comments: Noted at  shift change skin assesment, Night shift RN stated it had been there her shift.  -MS    Retired Wound - Properties Group Placement Date: 10/16/24  -MS Placement Time: 0705 -MS Present on Original Admission: N  -MS Side: Left  -MS Orientation: medial  -MS Location: gluteal  -MS Primary Wound Type: MASD  -MS Additional Comments: Noted at shift change skin assesment, Night shift RN stated it had been there her shift.  -MS Type: MASD (moisture associated skin damage)  -MS    Retired Wound - Properties Group Placement Date: 10/16/24  -MS Placement Time: 0705 -MS Present on Original Admission: N  -MS Side: Left  -MS Orientation: medial  -MS Location: gluteal  -MS Primary Wound Type: MASD  -MS Additional Comments: Noted at shift change skin assesment, Night shift RN stated it had been there her shift.  -MS Type: MASD (moisture associated skin damage)  -MS    Retired Wound - Properties Group Date first assessed: 10/16/24  -MS Time first assessed: 0705 -MS Present on Original Admission: N  -MS Side: Left  -MS Location: gluteal  -MS Primary Wound Type: MASD  -MS Additional Comments: Noted at shift change skin assesment, Night shift RN stated it had been there her shift.  -MS Type: MASD (moisture associated skin damage)  -MS      Row Name             Wound 10/16/24 0705 Left posterior greater trochanter MASD (moisture associated skin damage)    Wound - Properties Group Placement Date: 10/16/24  -MS Placement Time: 0705 -MS Side: Left  -MS Orientation: posterior  -MS Location: greater trochanter  -MS Primary Wound Type: MASD  -MS Type: MASD (moisture associated skin damage)  -MS Present on Original Admission: N  -MS    Retired Wound - Properties Group Placement Date: 10/16/24  -MS Placement Time: 0705 -MS Present on Original Admission: N  -MS Side: Left  -MS Orientation: posterior  -MS Location: greater trochanter  -MS Primary Wound Type: MASD  -MS Type: MASD (moisture associated skin damage)  -MS    Retired Wound -  Properties Group Placement Date: 10/16/24  -MS Placement Time: 0705 -MS Present on Original Admission: N  -MS Side: Left  -MS Orientation: posterior  -MS Location: greater trochanter  -MS Primary Wound Type: MASD  -MS Type: MASD (moisture associated skin damage)  -MS    Retired Wound - Properties Group Date first assessed: 10/16/24  -MS Time first assessed: 0705  -MS Present on Original Admission: N  -MS Side: Left  -MS Location: greater trochanter  -MS Primary Wound Type: MASD  -MS Type: MASD (moisture associated skin damage)  -MS      Row Name 10/21/24 1122          Coping    Observed Emotional State calm;cooperative  -CT     Verbalized Emotional State acceptance  -CT       Row Name 10/21/24 1122          Plan of Care Review    Plan of Care Reviewed With patient  -CT       Row Name 10/21/24 1122          Positioning and Restraints    Pre-Treatment Position in bed  -CT     Post Treatment Position bed  -CT     In Bed sitting EOB;call light within reach;encouraged to call for assist;notified nsg  -CT       Row Name 10/21/24 1122          Therapy Assessment/Plan (PT)    Rehab Potential (PT) fair  -CT     Criteria for Skilled Interventions Met (PT) yes;meets criteria;skilled treatment is necessary  -CT     Therapy Frequency (PT) 2 times/wk  1-5 times/wk  -CT     Problem List (PT) problems related to;balance;mobility;hemiparesis/hemiplegia;coordination;strength;range of motion (ROM);muscle tone;motor control  -CT     Activity Limitations Related to Problem List (PT) unable to ambulate safely;unable to transfer safely;BADLs not performed adequately or safely  -CT               User Key  (r) = Recorded By, (t) = Taken By, (c) = Cosigned By      Initials Name Provider Type    TW Karen Gipson, RN Registered Nurse    CT Dilma Ochoa, GENARO Physical Therapist    Nory Keenan, RN Registered Nurse    Roe North, RN Registered Nurse                    Physical Therapy Education       Title: PT OT SLP  Therapies (Done)       Topic: Physical Therapy (Done)       Point: Mobility training (Done)       Learning Progress Summary            Patient Acceptance, TB,E, VU by RG at 10/21/2024 0907    Nonacceptance, E, NR by WG at 10/16/2024 0228    Acceptance, E,TB, VU by CF at 10/15/2024 0753    Acceptance, E,TB, VU by CF at 10/14/2024 0801    Acceptance, E,TB, VU by CF at 10/13/2024 1045    Acceptance, E,D, VU,NR by AG at 10/10/2024 1310    Acceptance, E,TB, VU by CB at 10/8/2024 0448    Acceptance, E, NR by RD at 10/2/2024 1101    Acceptance, E, NR by RD at 10/1/2024 0856    Acceptance, E,D, VU,NR by AG at 9/30/2024 1559                      Point: Home exercise program (Done)       Learning Progress Summary            Patient Acceptance, TB,E, VU by RG at 10/21/2024 0907    Nonacceptance, E, NR by WG at 10/16/2024 0228    Acceptance, E,TB, VU by CF at 10/15/2024 0753    Acceptance, E,TB, VU by CF at 10/14/2024 0801    Acceptance, E,TB, VU by CF at 10/13/2024 1045    Acceptance, E,D, VU,NR by AG at 10/10/2024 1310    Acceptance, E,TB, VU by CB at 10/8/2024 0448    Acceptance, E, NR by RD at 10/2/2024 1101    Acceptance, E, NR by RD at 10/1/2024 0856    Acceptance, E,D, VU,NR by AG at 9/30/2024 1559                      Point: Body mechanics (Done)       Learning Progress Summary            Patient Acceptance, TB,E, VU by RG at 10/21/2024 0907    Nonacceptance, E, NR by WG at 10/16/2024 0228    Acceptance, E,TB, VU by CF at 10/15/2024 0753    Acceptance, E,TB, VU by CF at 10/14/2024 0801    Acceptance, E,TB, VU by CF at 10/13/2024 1045    Acceptance, E,D, VU,NR by AG at 10/10/2024 1310    Acceptance, E,TB, VU by CB at 10/8/2024 0448    Acceptance, E, NR by RD at 10/2/2024 1101    Acceptance, E, NR by RD at 10/1/2024 0856    Acceptance, E,D, VU,NR by AG at 9/30/2024 1559                      Point: Precautions (Done)       Learning Progress Summary            Patient Acceptance, TB,E, VU by RG at 10/21/2024 0907     Nonacceptance, E, NR by WG at 10/16/2024 0228    Acceptance, E,TB, VU by CF at 10/15/2024 0753    Acceptance, E,TB, VU by CF at 10/14/2024 0801    Acceptance, E,TB, VU by CF at 10/13/2024 1045    Acceptance, E,D, VU,NR by AG at 10/10/2024 1310    Acceptance, E,TB, VU by CB at 10/8/2024 0448    Acceptance, E, NR by RD at 10/2/2024 1101    Acceptance, E, NR by RD at 10/1/2024 0856    Acceptance, E,D, VU,NR by AG at 9/30/2024 1559                                      User Key       Initials Effective Dates Name Provider Type Discipline     06/16/21 -  Мария Joya, PT Physical Therapist PT    RD 06/16/21 -  Laisha Verdugo, RN Registered Nurse Nurse    RG 06/06/23 -  Jesus Matthews, RN Registered Nurse Nurse    WG 02/12/24 -  Shanthi Burden, RN Registered Nurse Nurse    CF 06/25/24 -  Cherelle Germain, RN Registered Nurse Nurse    CB 06/17/24 -  Fidelia Lombardo, RN Registered Nurse Nurse                  PT Recommendation and Plan  Anticipated Discharge Disposition (PT): skilled nursing facility, inpatient rehabilitation facility  Planned Therapy Interventions (PT): balance training, bed mobility training, gait training, home exercise program, manual therapy techniques, motor coordination training, neuromuscular re-education, patient/family education, postural re-education, strengthening, ROM (range of motion), transfer training, wheelchair management/propulsion training  Therapy Frequency (PT): 2 times/wk (1-5 times/wk)  Plan of Care Reviewed With: patient       Time Calculation:    PT Charges       Row Name 10/21/24 1125             Time Calculation    PT Received On 10/21/24  -CT         Time Calculation- PT    Total Timed Code Minutes- PT 30 minute(s)  -CT                User Key  (r) = Recorded By, (t) = Taken By, (c) = Cosigned By      Initials Name Provider Type    CT Dilma Ochoa, PT Physical Therapist                  Therapy Charges for Today       Code Description Service Date Service Provider  Modifiers Qty    14327784414 HC PT THERAPEUTIC ACT EA 15 MIN 10/21/2024 Dilma Ochoa, PT GP 2            PT G-Codes  AM-PAC 6 Clicks Score (PT): 8    Dilma Ochoa, PT  10/21/2024

## 2024-10-21 NOTE — CASE MANAGEMENT/SOCIAL WORK
Discharge Planning Assessment  [facility]     Patient Name: Lety Johnson  MRN: [id number]  Today's Date: 10/21/2024    Admit Date: 9/26/2024       Discharge Plan       Row Name 10/21/24 1519       Plan    Plan SS sent a message via email to APS BOBY, Kinsey Dial and APS SW supervisor, Henrik Brannon requesting an update. SS contacted sister, Moises Narvaez regarding SSDI court hearing on 10/30/24. Sister voices that pt's  is aware that pt is hospitalized and will be unable to attend court hearing on 10/30/24. Sister voices that pt's mother plans to assist pt with a phone conference during court hearing. SS advised sister to inform SS if assistance is needed with zoom meeting. Sister request a statement from the provider including pt's condition and current level of function for SSDI court hearing. SS to discuss this with the provider, manager, and  on 10/23/24. SS to follow.                 Continued Care and Services - Admitted Since 9/26/2024       Destination       Service Provider Request Status Services Address Phone Fax Patient Preferred    [facility] Declined  Cannot meet patient's needs -- [address] [facility] [id number] [phone] --    [facility] Acute Care Declined  Not eligible -- [address] [facility] [place] [address] [phone] --    [facility] - SWING Declined  Not eligible -- [address] [facility] [address] [phone] --    [facility] SPECIALTY [facility] Declined  Clinical Denial -- [address]LO[address], [facility] [address] [phone] --    [facility] SWING BED UNIT Declined  Cannot meet patient's needs -- [address] [place] [address] [address] [facility] [address] [phone] --     [facility] Declined  Not eligible -- [address] [facility] [place] [id number] [phone] --    [facility]  Declined  Bed not available -- [address] [place] [address] [phone] --    [facility] SKILLED CARE Declined  Bed not available -- [address] [facility] [id number] [phone] --    [facility] Swing Declined  Out of network -- [address] [facility] [place] [id number] [phone] --    [facility] SWING BED UNIT Declined -- [address] [facility] [address] [phone] --    [facility] Declined  Clinical Denial -- [address] [facility] [address] [phone] --    [facility] SWING BED UNIT Declined  Insurance Denial, Out of network -- [address], [facility] [address] [phone] --    [facility] Declined  Out of network -- [address] [facility] [address] [phone] --    [facility] Declined  Out of network -- [address] [address] [phone] -- --    [facility] Declined  Out of network -- [address] [facility] [address] [phone] --                  Expected Discharge Date and Time       Expected Discharge Date Expected Discharge Time    Oct 23, 2024         HELEN Butterfield

## 2024-10-21 NOTE — THERAPY TREATMENT NOTE
Acute Care - Occupational Therapy Treatment Note   [place]     Patient Name: Lety Johnson  : 1980  MRN: [id number]  Today's Date: 10/21/2024  Onset of Illness/Injury or Date of Surgery: 24     Referring Physician: Dr. Marc    Admit Date: 2024       ICD-10-CM ICD-9-CM   1. Hypokalemia  E87.6 276.8   2. Pressure injury of skin of sacral region, unspecified injury stage  L89.159 707.03     707.20   3. Pressure injury of skin of left heel, unspecified injury stage  L89.629 707.07     707.20   4. Acute cystitis without hematuria  N30.00 595.0     Patient Active Problem List   Diagnosis   (none) - all problems resolved or deleted     Past Medical History:   Diagnosis Date    Stroke      History reviewed. No pertinent surgical history.      OT ASSESSMENT FLOWSHEET (Last 12 Hours)       OT Evaluation and Treatment       Row Name 10/21/24 1133                   OT Time and Intention    Document Type therapy note (daily note)  -KR        Mode of Treatment occupational therapy  -KR        Patient Effort adequate  -KR        Comment Continues to c/o severe pain LUE with any ROM attempt. Pt also requesting brace for right wrist due to reported CTS. OT will attempt to provide assist with this reported pain RUE.  -KR           General Information    General Observations of Patient alert/cooperative  -KR           Cognition    Affect/Mental Status (Cognition) emotionally labile  -KR        Follows Commands (Cognition) WFL  -KR           Bed Mobility    All Activities, [place] (Bed Mobility) moderate assist (50% patient effort)  -KR        Bed Mobility, Safety Issues decreased use of arms for pushing/pulling  -KR           Shoulder (Therapeutic Exercise)    Shoulder (Therapeutic Exercise) AROM (active range of motion)  -KR        Shoulder AROM (Therapeutic Exercise) right;flexion;extension  -KR           Elbow/Forearm (Therapeutic Exercise)    Elbow/Forearm (Therapeutic Exercise) AROM (active range of  motion)  -KR        Elbow/Forearm AROM (Therapeutic Exercise) right;flexion;extension  -KR           Wrist (Therapeutic Exercise)    Wrist (Therapeutic Exercise) AROM (active range of motion)  -KR        Wrist AROM (Therapeutic Exercise) right;flexion;extension  -KR           Hand (Therapeutic Exercise)    Hand (Therapeutic Exercise) AROM (active range of motion)  -KR        Hand AROM/AAROM (Therapeutic Exercise) right;finger flexion;finger extension  -KR           Balance    Static Sitting Balance independent  -KR           Wound 09/26/24 1809 Left posterior ankle Other (comment)    Wound - Properties Group Placement Date: 09/26/24  -KJ Placement Time: 1809  -KJ Side: Left  -KJ Orientation: posterior  -KJ Location: ankle  -KJ Primary Wound Type: Other  -TW, unknow etiology     Retired Wound - Properties Group Placement Date: 09/26/24  -KJ Placement Time: 1809  -KJ Side: Left  -KJ Orientation: posterior  -KJ Location: ankle  -KJ Primary Wound Type: Other  -TW, unknow etiology     Retired Wound - Properties Group Placement Date: 09/26/24  -KJ Placement Time: 1809  -KJ Side: Left  -KJ Orientation: posterior  -KJ Location: ankle  -KJ Primary Wound Type: Other  -TW, unknow etiology     Retired Wound - Properties Group Date first assessed: 09/26/24  -KJ Time first assessed: 1809  -KJ Side: Left  -KJ Location: ankle  -KJ Primary Wound Type: Other  -TW, unknow etiology        Wound 10/11/24 1330 medial coccyx Fissure    Wound - Properties Group Placement Date: 10/11/24  -TW Placement Time: 1330  -TW Orientation: medial  -TW Location: coccyx  -TW Primary Wound Type: Fissure  -TW    Retired Wound - Properties Group Placement Date: 10/11/24  -TW Placement Time: 1330  -TW Orientation: medial  -TW Location: coccyx  -TW Primary Wound Type: Fissure  -TW    Retired Wound - Properties Group Placement Date: 10/11/24  -TW Placement Time: 1330  -TW Orientation: medial  -TW Location: coccyx  -TW Primary Wound Type: Fissure  -TW     Retired Wound - Properties Group Date first assessed: 10/11/24  -TW Time first assessed: 1330  -TW Location: coccyx  -TW Primary Wound Type: Fissure  -TW       Wound 10/16/24 0705 Left medial gluteal MASD (moisture associated skin damage)    Wound - Properties Group Placement Date: 10/16/24  -MS Placement Time: 0705  -MS Side: Left  -MS Orientation: medial  -MS Location: gluteal  -MS Primary Wound Type: MASD  -MS Type: MASD (moisture associated skin damage)  -MS Present on Original Admission: N  -MS Additional Comments: Noted at shift change skin assesment, Night shift RN stated it had been there her shift.  -MS    Retired Wound - Properties Group Placement Date: 10/16/24  -MS Placement Time: 0705 -MS Present on Original Admission: N  -MS Side: Left  -MS Orientation: medial  -MS Location: gluteal  -MS Primary Wound Type: MASD  -MS Additional Comments: Noted at shift change skin assesment, Night shift RN stated it had been there her shift.  -MS Type: MASD (moisture associated skin damage)  -MS    Retired Wound - Properties Group Placement Date: 10/16/24  -MS Placement Time: 0705 -MS Present on Original Admission: N  -MS Side: Left  -MS Orientation: medial  -MS Location: gluteal  -MS Primary Wound Type: MASD  -MS Additional Comments: Noted at shift change skin assesment, Night shift RN stated it had been there her shift.  -MS Type: MASD (moisture associated skin damage)  -MS    Retired Wound - Properties Group Date first assessed: 10/16/24  -MS Time first assessed: 0705 -MS Present on Original Admission: N  -MS Side: Left  -MS Location: gluteal  -MS Primary Wound Type: MASD  -MS Additional Comments: Noted at shift change skin assesment, Night shift RN stated it had been there her shift.  -MS Type: MASD (moisture associated skin damage)  -MS       Wound 10/16/24 0705 Left posterior greater trochanter MASD (moisture associated skin damage)    Wound - Properties Group Placement Date: 10/16/24  -MS Placement Time: 0705   -MS Side: Left  -MS Orientation: posterior  -MS Location: greater trochanter  -MS Primary Wound Type: MASD  -MS Type: MASD (moisture associated skin damage)  -MS Present on Original Admission: N  -MS    Retired Wound - Properties Group Placement Date: 10/16/24  -MS Placement Time: 0705 -MS Present on Original Admission: N  -MS Side: Left  -MS Orientation: posterior  -MS Location: greater trochanter  -MS Primary Wound Type: MASD  -MS Type: MASD (moisture associated skin damage)  -MS    Retired Wound - Properties Group Placement Date: 10/16/24  -MS Placement Time: 0705 -MS Present on Original Admission: N  -MS Side: Left  -MS Orientation: posterior  -MS Location: greater trochanter  -MS Primary Wound Type: MASD  -MS Type: MASD (moisture associated skin damage)  -MS    Retired Wound - Properties Group Date first assessed: 10/16/24  -MS Time first assessed: 0705 -MS Present on Original Admission: N  -MS Side: Left  -MS Location: greater trochanter  -MS Primary Wound Type: MASD  -MS Type: MASD (moisture associated skin damage)  -MS       Plan of Care Review    Plan of Care Reviewed With patient  -KR           ROM Goal 1 (OT)    Progress/Outcome (ROM Goal 1, OT) continuing progress toward goal  -KR            Activity Tolerance Goal 1 (OT)    Progress/Outcome (Activity Tolerance Goal 1, OT) continuing progress toward goal  -KR                  User Key  (r) = Recorded By, (t) = Taken By, (c) = Cosigned By      Initials Name Effective Dates    TW Karen Gipson RN 06/16/21 -     Neil Menezes OT 06/16/21 -     Nory Keenan RN 06/08/23 -     MS Roe Garcia RN 06/04/24 -                      Occupational Therapy Education        No education to display                      OT Recommendation and Plan  Planned Therapy Interventions (OT): activity tolerance training, neuromuscular control/coordination retraining, ROM/therapeutic exercise  Plan of Care Review  Plan of Care Reviewed With:  patient  Progress: improving  Plan of Care Reviewed With: patient        Time Calculation:     Therapy Charges for Today       Code Description Service Date Service Provider Modifiers Qty    67109160338  OT THERAPEUTIC ACT EA 15 MIN 10/21/2024 Neil Graf OT GO 1    [id number]  OT SELF CARE/MGMT/TRAIN EA 15 MIN 10/21/2024 Neil Graf OT GO 1                 Neil Graf OT  10/21/2024

## 2024-10-21 NOTE — PLAN OF CARE
Goal Outcome Evaluation:  Plan of Care Reviewed With: patient        Progress: no change  Outcome Evaluation: Pt resting in bed at this time. Complaints of pain to which PRN medication was given. Wound care completed per orders. Pt has been up to side of bed this shift. Pt bathed this shift. No acute changes or concerns at this time. Plan of care ongoing.

## 2024-10-22 LAB
GLUCOSE BLDC GLUCOMTR-MCNC: 176 MG/DL (ref 70–130)
GLUCOSE BLDC GLUCOMTR-MCNC: 236 MG/DL (ref 70–130)
GLUCOSE BLDC GLUCOMTR-MCNC: 242 MG/DL (ref 70–130)
GLUCOSE BLDC GLUCOMTR-MCNC: 274 MG/DL (ref 70–130)

## 2024-10-22 PROCEDURE — 63710000001 INSULIN GLARGINE PER 5 UNITS: Performed by: HOSPITALIST

## 2024-10-22 PROCEDURE — 82948 REAGENT STRIP/BLOOD GLUCOSE: CPT

## 2024-10-22 PROCEDURE — 25010000002 HEPARIN (PORCINE) PER 1000 UNITS: Performed by: INTERNAL MEDICINE

## 2024-10-22 PROCEDURE — 99232 SBSQ HOSP IP/OBS MODERATE 35: CPT | Performed by: HOSPITALIST

## 2024-10-22 PROCEDURE — 63710000001 INSULIN GLARGINE PER 5 UNITS: Performed by: INTERNAL MEDICINE

## 2024-10-22 PROCEDURE — 97530 THERAPEUTIC ACTIVITIES: CPT

## 2024-10-22 PROCEDURE — 63710000001 INSULIN LISPRO (HUMAN) PER 5 UNITS: Performed by: INTERNAL MEDICINE

## 2024-10-22 RX ORDER — TRAMADOL HYDROCHLORIDE 50 MG/1
50 TABLET ORAL EVERY 8 HOURS PRN
Status: DISPENSED | OUTPATIENT
Start: 2024-10-22 | End: 2024-10-31

## 2024-10-22 RX ADMIN — METOPROLOL TARTRATE 25 MG: 25 TABLET, FILM COATED ORAL at 20:50

## 2024-10-22 RX ADMIN — Medication 10 ML: at 09:06

## 2024-10-22 RX ADMIN — ACETAMINOPHEN 650 MG: 325 TABLET ORAL at 09:03

## 2024-10-22 RX ADMIN — INSULIN LISPRO 8 UNITS: 100 INJECTION, SOLUTION INTRAVENOUS; SUBCUTANEOUS at 10:33

## 2024-10-22 RX ADMIN — ATORVASTATIN CALCIUM 80 MG: 40 TABLET, FILM COATED ORAL at 09:03

## 2024-10-22 RX ADMIN — HEPARIN SODIUM 5000 UNITS: 5000 INJECTION INTRAVENOUS; SUBCUTANEOUS at 05:24

## 2024-10-22 RX ADMIN — VITAMINS A AND D OINTMENT: 15.5; 53.4 OINTMENT TOPICAL at 20:52

## 2024-10-22 RX ADMIN — INSULIN GLARGINE 45 UNITS: 100 INJECTION, SOLUTION SUBCUTANEOUS at 09:03

## 2024-10-22 RX ADMIN — INSULIN GLARGINE 15 UNITS: 100 INJECTION, SOLUTION SUBCUTANEOUS at 20:51

## 2024-10-22 RX ADMIN — INSULIN LISPRO 12 UNITS: 100 INJECTION, SOLUTION INTRAVENOUS; SUBCUTANEOUS at 20:51

## 2024-10-22 RX ADMIN — ACYCLOVIR 400 MG: 200 CAPSULE ORAL at 20:51

## 2024-10-22 RX ADMIN — ASPIRIN 81 MG: 81 TABLET, CHEWABLE ORAL at 09:03

## 2024-10-22 RX ADMIN — HEPARIN SODIUM 5000 UNITS: 5000 INJECTION INTRAVENOUS; SUBCUTANEOUS at 13:34

## 2024-10-22 RX ADMIN — TRAMADOL HYDROCHLORIDE 50 MG: 50 TABLET ORAL at 10:50

## 2024-10-22 RX ADMIN — SENNOSIDES AND DOCUSATE SODIUM 2 TABLET: 50; 8.6 TABLET ORAL at 18:09

## 2024-10-22 RX ADMIN — NYSTATIN: 100000 POWDER TOPICAL at 20:53

## 2024-10-22 RX ADMIN — NICOTINE 1 PATCH: 7 PATCH, EXTENDED RELEASE TRANSDERMAL at 09:06

## 2024-10-22 RX ADMIN — LIDOCAINE 1 PATCH: 4 PATCH TOPICAL at 16:39

## 2024-10-22 RX ADMIN — HEPARIN SODIUM 5000 UNITS: 5000 INJECTION INTRAVENOUS; SUBCUTANEOUS at 20:51

## 2024-10-22 RX ADMIN — INSULIN LISPRO 4 UNITS: 100 INJECTION, SOLUTION INTRAVENOUS; SUBCUTANEOUS at 09:03

## 2024-10-22 RX ADMIN — Medication 10 ML: at 20:53

## 2024-10-22 RX ADMIN — PANTOPRAZOLE SODIUM 40 MG: 40 TABLET, DELAYED RELEASE ORAL at 09:03

## 2024-10-22 RX ADMIN — INSULIN LISPRO 8 UNITS: 100 INJECTION, SOLUTION INTRAVENOUS; SUBCUTANEOUS at 16:40

## 2024-10-22 RX ADMIN — DULOXETINE HYDROCHLORIDE 60 MG: 60 CAPSULE, DELAYED RELEASE ORAL at 09:03

## 2024-10-22 RX ADMIN — TRAMADOL HYDROCHLORIDE 50 MG: 50 TABLET ORAL at 20:50

## 2024-10-22 RX ADMIN — VITAMINS A AND D OINTMENT: 15.5; 53.4 OINTMENT TOPICAL at 09:06

## 2024-10-22 RX ADMIN — Medication 5 MG: at 20:50

## 2024-10-22 RX ADMIN — CYCLOBENZAPRINE 10 MG: 10 TABLET, FILM COATED ORAL at 20:50

## 2024-10-22 NOTE — CASE MANAGEMENT/SOCIAL WORK
Discharge Planning Assessment  [facility]     Patient Name: Lety Johnson  MRN: [id number]  Today's Date: 10/22/2024    Admit Date: 9/26/2024       Discharge Plan       Row Name 10/22/24 1000       Plan    Plan SS sent a message via email to Kinsey LAUREANO and APS SW supervisor, Henrik for an update on investigation. SS discussing in-home caregiver programs with ABELARDO LANDIS. SS to follow.    Addendum @ 13:33: Kinsey LAUREANO notified that [facility] APS Liliam LANDIS is willing to assist Kinsey LAUREANO with applying for Goods and Services if needed. SS notified Kinsey LAUREANO. SS to follow.                   Continued Care and Services - Admitted Since 9/26/2024       Destination       Service Provider Request Status Services Address Phone Fax Patient Preferred    [facility] Declined  Cannot meet patient's needs -- [address] [facility] [id number] [phone] --    [facility] Acute [facility] Declined  Not eligible -- [address] [facility] [address] [phone] --    [facility] Declined  Not eligible -- [address] [facility] [address] [phone] --    [facility] Declined  Clinical Denial -- [address] [facility], 4TH FLOOR, [facility] [address] [phone] --    [facility] SWING BED UNIT Declined  Cannot meet patient's needs -- [address] [facility] [address] [phone] --     [facility] Declined  Not eligible [facility] [address] [phone] --    [facility] Declined  Bed not available -- [address] [facility] [address] [phone] --    [facility] SKILLED CARE Declined  Bed not available -- [address] [facility] [address] [phone] --    [facility]  Swing Declined  Out of network -- [address] [facility] [place] [address] [phone] --    [facility] SWING BED UNIT Declined -- [address] [facility] [place] [place] [address] [phone] --    [facility] Declined  Clinical Denial -- [address] [facility] [id number] [phone] --    [facility] SWING BED UNIT Declined  Insurance Denial, Out of network -- [address] [id number] [phone] --    [facility] Declined  Out of network -- [address] [facility] [address] [phone] --    [facility] Declined  Out of network -- [address] [address] [phone] -- --    [facility] Declined  Out of network -- [address] [facility] [address] [phone] --                  Expected Discharge Date and Time       Expected Discharge Date Expected Discharge Time    Oct 23, 2024      HELEN Butterfield

## 2024-10-22 NOTE — PLAN OF CARE
Goal Outcome Evaluation:  Plan of Care Reviewed With: patient        Progress: no change  Outcome Evaluation: Pt has rested in bed overnight. VSS. Wound care completed per orders. No acute changes. Will continue POC.

## 2024-10-22 NOTE — PLAN OF CARE
Goal Outcome Evaluation:  Plan of Care Reviewed With: patient        Progress: no change  Outcome Evaluation: Pt resting in bed at this time. Pt has worked with PT this shift. Pt has complained of pain this shift. MD aware and new medication ordered. PRN medication given. Bath and wound care completed this shift. No acute changes or concerns at this time. Plan of care ongoing.

## 2024-10-22 NOTE — PROGRESS NOTES
[facility]     Patient Identification:  Name:  Lety Johnson  Age:  43 y.o.  Sex:  female  :  1980  MRN:  [id number]  Visit Number:  [id number]  Primary Care Provider:  Provider, No Known    Length of stay:  24    Subjective: Patient seen and examined chart reviewed, patient still hyperglycemic but improved, she is getting 40 units nightly, 6 AM Accu-Chek is 160s.  Complaining of back pain chronic pain not relieved with Tylenol.  Awaiting for placement.  Blood pressure stable.    Chief Complaint: Generalized pain  ----------------------------------------------------------------------------------------------------------------------  Current Hospital Meds:  acyclovir, 400 mg, Oral, Nightly  aspirin, 81 mg, Oral, Daily  atorvastatin, 80 mg, Oral, Daily  DULoxetine, 60 mg, Oral, Daily  heparin (porcine), 5,000 Units, Subcutaneous, Q8H  hydrOXYzine, 25 mg, Oral, Once  insulin glargine, 15 Units, Subcutaneous, Nightly  [START ON 10/23/2024] insulin glargine, 30 Units, Subcutaneous, Daily With Breakfast  insulin lispro, 4-24 Units, Subcutaneous, 4x Daily AC & at Bedtime  Lidocaine, 1 patch, Transdermal, Q24H  lisinopril, 20 mg, Oral, Daily  magic barrier cream, , Topical, BID  metoprolol tartrate, 25 mg, Oral, Q12H  nicotine, 1 patch, Transdermal, Q24H  nystatin, , Topical, Q12H  pantoprazole, 40 mg, Oral, Daily  sodium chloride, 10 mL, Intravenous, Q12H  sodium chloride, 10 mL, Intravenous, Q12H      Pharmacy Consult,       ----------------------------------------------------------------------------------------------------------------------  Vital Signs:  Temp:  [97.4 °F (36.3 °C)-98.4 °F (36.9 °C)] 97.4 °F (36.3 °C)  Heart Rate:  [] 118  Resp:  [16-18] 16  BP: ()/(63-76) 116/76       Tele:       10/07/24  0511 10/08/24  0500 10/09/24  0500   Weight: 102 kg (225 lb 14.4 oz) (FIFI cameron) 102 kg (225 lb 15.5 oz) 102 kg (224 lb 1.6 oz)     Body mass index is  36.73 kg/m².    Intake/Output Summary (Last 24 hours) at 10/22/2024 1043  Last data filed at 10/21/2024 2240  Gross per 24 hour   Intake 520 ml   Output --   Net 520 ml     Diet: Regular/House, Diabetic; Consistent Carbohydrate; Fluid Consistency: Thin (IDDSI 0)  ----------------------------------------------------------------------------------------------------------------------  Physical exam:  General: Chronically ill-appearing, comfortable,awake, alert, oriented to self, place, and time, No respiratory distress.    Skin:  Skin is warm and dry. No rash noted. No pallor.    HENT:  Head:  Normocephalic and atraumatic.  Mouth:  Moist mucous membranes.    Eyes:  Conjunctivae and EOM are normal.  Pupils are equal, round, and reactive to light.  No scleral icterus.    Neck:  Neck supple.  No JVD present.    Pulmonary/Chest:  No respiratory distress, no wheezes, no crackles, with normal breath sounds and good air movement.  Cardiovascular:  Normal rate, regular rhythm and normal heart sounds with no murmur.  Abdominal:  Soft.  Bowel sounds are normal.  No distension and no tenderness.   Extremities:  No edema, no tenderness, and no deformity.  No red or swollen joints anywhere.  Strong pulses in all 4 extremities with no clubbing, no cyanosis, no edema.  Neurological: Left-sided hemiplegia,   genitourinary: No Florentino catheter  Back:  ----------------------------------------------------------------------------------------------------------------------  ----------------------------------------------------------------------------------------------------------------------      Results from last 7 days   Lab Units 10/19/24  0815   WBC 10*3/mm3 9.49   HEMOGLOBIN g/dL 13.3   HEMATOCRIT % 39.7   MCV fL 97.8*   MCHC g/dL 33.5   PLATELETS 10*3/mm3 317         Results from last 7 days   Lab Units 10/19/24  0815 10/18/24  0156   SODIUM mmol/L 138 137   POTASSIUM mmol/L 4.2 4.3   MAGNESIUM mg/dL  --  1.9   CHLORIDE mmol/L 102 101  "  CO2 mmol/L 22.9 24.1   BUN mg/dL 18 22*   CREATININE mg/dL 0.48* 0.52*   CALCIUM mg/dL 9.8 10.0   GLUCOSE mg/dL 252* 192*   Estimated Creatinine Clearance: 182.5 mL/min (A) (by C-G formula based on SCr of 0.48 mg/dL (L)).    No results found for: \"AMMONIA\"      Blood Culture   Date Value Ref Range Status   10/18/2024 Staphylococcus, coagulase negative (C)  Final   10/18/2024 No growth at 3 days  Preliminary     Urine Culture   Date Value Ref Range Status   10/18/2024 >100,000 CFU/mL Proteus vulgaris (A)  Final     No results found for: \"WOUNDCX\"  No results found for: \"STOOLCX\"    I have personally looked at the labs and they are summarized above.  ----------------------------------------------------------------------------------------------------------------------  Imaging Results (Last 24 Hours)       ** No results found for the last 24 hours. **          ----------------------------------------------------------------------------------------------------------------------  Assessment and Plan:  -Acute cystitis ESBL E. coli completed treatment  -Acute on chronic debility  -Diabetes with hyperglycemia  -Obesity complicating all aspects of care  -history of CVA with left-sided hemiplegia  -History of genital herpes  -Acute metabolic encephalopathy resolved    Will change Lantus to twice daily with adjusted dose, continue Accu-Chek and sliding scale, PT OT, Ultram as needed.  Awaiting for placement.    Disposition for placement      Mara Navas MD  10/22/24  10:43 EDT  "

## 2024-10-22 NOTE — THERAPY TREATMENT NOTE
Acute Care - Physical Therapy Treatment Note   [place]     Patient Name: Lety Johnson  : 1980  MRN: [id number]  Today's Date: 10/22/2024   Onset of Illness/Injury or Date of Surgery: 24  Visit Dx:     ICD-10-CM ICD-9-CM   1. Hypokalemia  E87.6 276.8   2. Pressure injury of skin of sacral region, unspecified injury stage  L89.159 707.03     707.20   3. Pressure injury of skin of left heel, unspecified injury stage  L89.629 707.07     707.20   4. Acute cystitis without hematuria  N30.00 595.0     Patient Active Problem List   Diagnosis   (none) - all problems resolved or deleted     Past Medical History:   Diagnosis Date    Stroke      History reviewed. No pertinent surgical history.  PT Assessment (Last 12 Hours)       PT Evaluation and Treatment       Row Name 10/22/24 1040          Physical Therapy Time and Intention    Document Type therapy note (daily note)  -     Mode of Treatment physical therapy  -     Patient Effort fair  -     Comment Pt seen for therapy treatment this date, pleasant, cooperative mostly with therapy. Pt agreeable to LE TE given treatment options. Pt c/o L shoulder pain after OT worked with pt. PT encouraged this was for pt benefit. Rest breaks provided, time taken to explain exercises to pt.  -       Row Name 10/22/24 1040          Motor Skills    Therapeutic Exercise hip;knee  L knee ext (quad sets), grossly 2-3x5, 1x10, hip add grossly 3x10'  -       Row Name             Wound 24 180 Left posterior ankle Other (comment)    Wound - Properties Group Placement Date: 24  -KJ Placement Time: 1809  -KJ Side: Left  -KJ Orientation: posterior  -KJ Location: ankle  -KJ Primary Wound Type: Other  -TW, unknow etiology     Retired Wound - Properties Group Placement Date: 24  -KJ Placement Time: 1809  -KJ Side: Left  -KJ Orientation: posterior  -KJ Location: ankle  -KJ Primary Wound Type: Other  -TW, unknow etiology     Retired Wound - Properties Group  Placement Date: 09/26/24  -KJ Placement Time: 1809 -KJ Side: Left  -KJ Orientation: posterior  -KJ Location: ankle  -KJ Primary Wound Type: Other  -TW, unknow etiology     Retired Wound - Properties Group Date first assessed: 09/26/24  -KJ Time first assessed: 1809  -KJ Side: Left  -KJ Location: ankle  -KJ Primary Wound Type: Other  -TW, unknow etiology       Row Name             Wound 10/11/24 1330 medial coccyx Fissure    Wound - Properties Group Placement Date: 10/11/24  -TW Placement Time: 1330 -TW Orientation: medial  -TW Location: coccyx  -TW Primary Wound Type: Fissure  -TW    Retired Wound - Properties Group Placement Date: 10/11/24  -TW Placement Time: 1330 -TW Orientation: medial  -TW Location: coccyx  -TW Primary Wound Type: Fissure  -TW    Retired Wound - Properties Group Placement Date: 10/11/24  -TW Placement Time: 1330  -TW Orientation: medial  -TW Location: coccyx  -TW Primary Wound Type: Fissure  -TW    Retired Wound - Properties Group Date first assessed: 10/11/24  -TW Time first assessed: 1330  -TW Location: coccyx  -TW Primary Wound Type: Fissure  -TW      Row Name             Wound 10/16/24 0705 Left medial gluteal MASD (moisture associated skin damage)    Wound - Properties Group Placement Date: 10/16/24  -MS Placement Time: 0705 -MS Side: Left  -MS Orientation: medial  -MS Location: gluteal  -MS Primary Wound Type: MASD  -MS Type: MASD (moisture associated skin damage)  -MS Present on Original Admission: N  -MS Additional Comments: Noted at shift change skin assesment, Night shift RN stated it had been there her shift.  -MS    Retired Wound - Properties Group Placement Date: 10/16/24  -MS Placement Time: 0705 -MS Present on Original Admission: N  -MS Side: Left  -MS Orientation: medial  -MS Location: gluteal  -MS Primary Wound Type: MASD  -MS Additional Comments: Noted at shift change skin assesment, Night shift RN stated it had been there her shift.  -MS Type: MASD (moisture associated  skin damage)  -MS    Retired Wound - Properties Group Placement Date: 10/16/24  -MS Placement Time: 0705  -MS Present on Original Admission: N  -MS Side: Left  -MS Orientation: medial  -MS Location: gluteal  -MS Primary Wound Type: MASD  -MS Additional Comments: Noted at shift change skin assesment, Night shift RN stated it had been there her shift.  -MS Type: MASD (moisture associated skin damage)  -MS    Retired Wound - Properties Group Date first assessed: 10/16/24  -MS Time first assessed: 0705  -MS Present on Original Admission: N  -MS Side: Left  -MS Location: gluteal  -MS Primary Wound Type: MASD  -MS Additional Comments: Noted at shift change skin assesment, Night shift RN stated it had been there her shift.  -MS Type: MASD (moisture associated skin damage)  -MS      Row Name             Wound 10/16/24 0705 Left posterior greater trochanter MASD (moisture associated skin damage)    Wound - Properties Group Placement Date: 10/16/24  -MS Placement Time: 0705  -MS Side: Left  -MS Orientation: posterior  -MS Location: greater trochanter  -MS Primary Wound Type: MASD  -MS Type: MASD (moisture associated skin damage)  -MS Present on Original Admission: N  -MS    Retired Wound - Properties Group Placement Date: 10/16/24  -MS Placement Time: 0705  -MS Present on Original Admission: N  -MS Side: Left  -MS Orientation: posterior  -MS Location: greater trochanter  -MS Primary Wound Type: MASD  -MS Type: MASD (moisture associated skin damage)  -MS    Retired Wound - Properties Group Placement Date: 10/16/24  -MS Placement Time: 0705  -MS Present on Original Admission: N  -MS Side: Left  -MS Orientation: posterior  -MS Location: greater trochanter  -MS Primary Wound Type: MASD  -MS Type: MASD (moisture associated skin damage)  -MS    Retired Wound - Properties Group Date first assessed: 10/16/24  -MS Time first assessed: 0705  -MS Present on Original Admission: N  -MS Side: Left  -MS Location: greater trochanter  -MS  Primary Wound Type: MASD  -MS Type: MASD (moisture associated skin damage)  -MS      Row Name 10/22/24 1040          Positioning and Restraints    Pre-Treatment Position in bed  -     Post Treatment Position bed  -KH     In Bed supine;call light within reach  -       Row Name 10/22/24 1040          Progress Summary (PT)    Daily Progress Summary (PT) Pt encouraged to perform ankle pumps with better AROM, also educated on NM rehab/healing and post stroke rehab window. Pt guarded prognosis, aversion to pain, no change in POC. Pt tolerated treatment fair to well.  -               User Key  (r) = Recorded By, (t) = Taken By, (c) = Cosigned By      Initials Name Provider Type    TW Karen Gipson, RN Registered Nurse    Janessa Hassan, GENARO Physical Therapist    Nory Keenan RN Registered Nurse    Roe North RN Registered Nurse                    Physical Therapy Education       Title: PT OT SLP Therapies (Done)       Topic: Physical Therapy (Done)       Point: Mobility training (Done)       Learning Progress Summary            Patient Acceptance, E,TB, VU by RG at 10/22/2024 1002    Acceptance, TB,E, VU by RG at 10/21/2024 0907    Nonacceptance, E, NR by WG at 10/16/2024 0228    Acceptance, E,TB, VU by CF at 10/15/2024 0753    Acceptance, E,TB, VU by CF at 10/14/2024 0801    Acceptance, E,TB, VU by CF at 10/13/2024 1045    Acceptance, E,D, VU,NR by AG at 10/10/2024 1310    Acceptance, E,TB, VU by CB at 10/8/2024 0448    Acceptance, E, NR by RD at 10/2/2024 1101    Acceptance, E, NR by RD at 10/1/2024 0856    Acceptance, E,D, VU,NR by AG at 9/30/2024 1559                      Point: Home exercise program (Done)       Learning Progress Summary            Patient Acceptance, E,TB, VU by RG at 10/22/2024 1002    Acceptance, TB,E, VU by RG at 10/21/2024 0907    Nonacceptance, E, NR by WG at 10/16/2024 0228    Acceptance, E,TB, VU by CF at 10/15/2024 0753    Acceptance, E,TB, VU by CF at  10/14/2024 0801    Acceptance, E,TB, VU by CF at 10/13/2024 1045    Acceptance, E,D, VU,NR by AG at 10/10/2024 1310    Acceptance, E,TB, VU by CB at 10/8/2024 0448    Acceptance, E, NR by RD at 10/2/2024 1101    Acceptance, E, NR by RD at 10/1/2024 0856    Acceptance, E,D, VU,NR by AG at 9/30/2024 1559                      Point: Body mechanics (Done)       Learning Progress Summary            Patient Acceptance, E,TB, VU by RG at 10/22/2024 1002    Acceptance, TB,E, VU by RG at 10/21/2024 0907    Nonacceptance, E, NR by WG at 10/16/2024 0228    Acceptance, E,TB, VU by CF at 10/15/2024 0753    Acceptance, E,TB, VU by CF at 10/14/2024 0801    Acceptance, E,TB, VU by CF at 10/13/2024 1045    Acceptance, E,D, VU,NR by AG at 10/10/2024 1310    Acceptance, E,TB, VU by CB at 10/8/2024 0448    Acceptance, E, NR by RD at 10/2/2024 1101    Acceptance, E, NR by RD at 10/1/2024 0856    Acceptance, E,D, VU,NR by AG at 9/30/2024 1559                      Point: Precautions (Done)       Learning Progress Summary            Patient Acceptance, E,TB, VU by RG at 10/22/2024 1002    Acceptance, TB,E, VU by RG at 10/21/2024 0907    Nonacceptance, E, NR by WG at 10/16/2024 0228    Acceptance, E,TB, VU by CF at 10/15/2024 0753    Acceptance, E,TB, VU by CF at 10/14/2024 0801    Acceptance, E,TB, VU by CF at 10/13/2024 1045    Acceptance, E,D, VU,NR by AG at 10/10/2024 1310    Acceptance, E,TB, VU by CB at 10/8/2024 0448    Acceptance, E, NR by RD at 10/2/2024 1101    Acceptance, E, NR by RD at 10/1/2024 0856    Acceptance, E,D, VU,NR by AG at 9/30/2024 6034                                      User Key       Initials Effective Dates Name Provider Type Discipline    AG 06/16/21 -  Мария Joya, PT Physical Therapist PT    RD 06/16/21 -  Laisha Verdugo, RN Registered Nurse Nurse    RG 06/06/23 -  Jesus Matthews, RN Registered Nurse Nurse    WG 02/12/24 -  Shanthi Burden, RN Registered Nurse Nurse    CF 06/25/24 -  Cherelle Germain, FIFI  Registered Nurse Nurse    [place] 06/17/24 -  Fidelia Lombardo, RN Registered Nurse Nurse                  PT Recommendation and Plan     Progress Summary (PT)  Daily Progress Summary (PT): Pt encouraged to perform ankle pumps with better AROM, also educated on NM rehab/healing and post stroke rehab window. Pt guarded prognosis, aversion to pain, no change in POC. Pt tolerated treatment fair to well.       Time Calculation:    PT Charges       Row Name 10/22/24 1124             Time Calculation    Start Time 1040  -KH      PT Received On 10/22/24  -         Time Calculation- PT    Total Timed Code Minutes- PT 15 minute(s)  -                User Key  (r) = Recorded By, (t) = Taken By, (c) = Cosigned By      Initials Name Provider Type     Janessa Almeida, PT Physical Therapist                  Therapy Charges for Today       Code Description Service Date Service Provider Modifiers Qty    84270212205  PT THERAPEUTIC ACT EA 15 MIN 10/22/2024 Janessa Almeida PT GP 1            PT G-Codes  AM-PAC 6 Clicks Score (PT): 8    Janessa Almeida PT  10/22/2024

## 2024-10-22 NOTE — THERAPY TREATMENT NOTE
Acute Care - Occupational Therapy Treatment Note   [facility]     Patient Name: Lety Johnson  : 1980  MRN: [id number]  Today's Date: 10/22/2024  Onset of Illness/Injury or Date of Surgery: 24     Referring Physician: Dr. Marc    Admit Date: 2024       ICD-10-CM ICD-9-CM   1. Hypokalemia  E87.6 276.8   2. Pressure injury of skin of sacral region, unspecified injury stage  L89.159 707.03     707.20   3. Pressure injury of skin of left heel, unspecified injury stage  L89.629 707.07     707.20   4. Acute cystitis without hematuria  N30.00 595.0     Patient Active Problem List   Diagnosis   (none) - all problems resolved or deleted     Past Medical History:   Diagnosis Date    Stroke      History reviewed. No pertinent surgical history.      OT ASSESSMENT FLOWSHEET (Last 12 Hours)       OT Evaluation and Treatment       Row Name 10/22/24 1148                   OT Time and Intention    Document Type therapy note (daily note)  -KR        Mode of Treatment occupational therapy  -KR        Patient Effort adequate  -KR        Comment Pt reports splints have been beneficial for RUE pain. Both splints removed, monitored for fitting. No areas of redness noted. No discomfort reported. OT to continue to monitor. PROM LUE continued on this date to promote fxl positioning.  -KR           Shoulder (Therapeutic Exercise)    Shoulder PROM (Therapeutic Exercise) left;flexion;extension  -KR           Elbow/Forearm (Therapeutic Exercise)    Elbow/Forearm (Therapeutic Exercise) PROM (passive range of motion)  -KR        Elbow/Forearm PROM (Therapeutic Exercise) left;flexion;extension  -KR           Wrist (Therapeutic Exercise)    Wrist PROM (Therapeutic Exercise) left;flexion;extension  -KR           Hand (Therapeutic Exercise)    Hand PROM (Therapeutic Exercise) left;finger flexion;finger extension  -KR           Wound 24 1809 Left posterior ankle Other (comment)    Wound - Properties Group Placement Date:  09/26/24  -KJ Placement Time: 1809  -KJ Side: Left  -KJ Orientation: posterior  -KJ Location: ankle  -KJ Primary Wound Type: Other  -TW, unknow etiology     Retired Wound - Properties Group Placement Date: 09/26/24  -KJ Placement Time: 1809  -KJ Side: Left  -KJ Orientation: posterior  -KJ Location: ankle  -KJ Primary Wound Type: Other  -TW, unknow etiology     Retired Wound - Properties Group Placement Date: 09/26/24  -KJ Placement Time: 1809  -KJ Side: Left  -KJ Orientation: posterior  -KJ Location: ankle  -KJ Primary Wound Type: Other  -TW, unknow etiology     Retired Wound - Properties Group Date first assessed: 09/26/24  -KJ Time first assessed: 1809  -KJ Side: Left  -KJ Location: ankle  -KJ Primary Wound Type: Other  -TW, unknow etiology        Wound 10/11/24 1330 medial coccyx Fissure    Wound - Properties Group Placement Date: 10/11/24  -TW Placement Time: 1330  -TW Orientation: medial  -TW Location: coccyx  -TW Primary Wound Type: Fissure  -TW    Retired Wound - Properties Group Placement Date: 10/11/24  -TW Placement Time: 1330  -TW Orientation: medial  -TW Location: coccyx  -TW Primary Wound Type: Fissure  -TW    Retired Wound - Properties Group Placement Date: 10/11/24  -TW Placement Time: 1330  -TW Orientation: medial  -TW Location: coccyx  -TW Primary Wound Type: Fissure  -TW    Retired Wound - Properties Group Date first assessed: 10/11/24  -TW Time first assessed: 1330  -TW Location: coccyx  -TW Primary Wound Type: Fissure  -TW       Wound 10/16/24 0705 Left medial gluteal MASD (moisture associated skin damage)    Wound - Properties Group Placement Date: 10/16/24  -MS Placement Time: 0705  -MS Side: Left  -MS Orientation: medial  -MS Location: gluteal  -MS Primary Wound Type: MASD  -MS Type: MASD (moisture associated skin damage)  -MS Present on Original Admission: N  -MS Additional Comments: Noted at shift change skin assesment, Night shift RN stated it had been there her shift.  -MS    Retired Wound  - Properties Group Placement Date: 10/16/24  -MS Placement Time: 0705 -MS Present on Original Admission: N  -MS Side: Left  -MS Orientation: medial  -MS Location: gluteal  -MS Primary Wound Type: MASD  -MS Additional Comments: Noted at shift change skin assesment, Night shift RN stated it had been there her shift.  -MS Type: MASD (moisture associated skin damage)  -MS    Retired Wound - Properties Group Placement Date: 10/16/24  -MS Placement Time: 0705 -MS Present on Original Admission: N  -MS Side: Left  -MS Orientation: medial  -MS Location: gluteal  -MS Primary Wound Type: MASD  -MS Additional Comments: Noted at shift change skin assesment, Night shift RN stated it had been there her shift.  -MS Type: MASD (moisture associated skin damage)  -MS    Retired Wound - Properties Group Date first assessed: 10/16/24  -MS Time first assessed: 0705 -MS Present on Original Admission: N  -MS Side: Left  -MS Location: gluteal  -MS Primary Wound Type: MASD  -MS Additional Comments: Noted at shift change skin assesment, Night shift RN stated it had been there her shift.  -MS Type: MASD (moisture associated skin damage)  -MS       Wound 10/16/24 0705 Left posterior greater trochanter MASD (moisture associated skin damage)    Wound - Properties Group Placement Date: 10/16/24  -MS Placement Time: 0705  -MS Side: Left  -MS Orientation: posterior  -MS Location: greater trochanter  -MS Primary Wound Type: MASD  -MS Type: MASD (moisture associated skin damage)  -MS Present on Original Admission: N  -MS    Retired Wound - Properties Group Placement Date: 10/16/24  -MS Placement Time: 0705 -MS Present on Original Admission: N  -MS Side: Left  -MS Orientation: posterior  -MS Location: greater trochanter  -MS Primary Wound Type: MASD  -MS Type: MASD (moisture associated skin damage)  -MS    Retired Wound - Properties Group Placement Date: 10/16/24  -MS Placement Time: 0705  -MS Present on Original Admission: N  -MS Side: Left  -MS  Orientation: posterior  -MS Location: greater trochanter  -MS Primary Wound Type: MASD  -MS Type: MASD (moisture associated skin damage)  -MS    Retired Wound - Properties Group Date first assessed: 10/16/24  -MS Time first assessed: 0705  -MS Present on Original Admission: N  -MS Side: Left  -MS Location: greater trochanter  -MS Primary Wound Type: MASD  -MS Type: MASD (moisture associated skin damage)  -MS       Plan of Care Review    Plan of Care Reviewed With patient  -KR           ROM Goal 1 (OT)    Progress/Outcome (ROM Goal 1, OT) continuing progress toward goal  -KR            Activity Tolerance Goal 1 (OT)    Progress/Outcome (Activity Tolerance Goal 1, OT) continuing progress toward goal  -KR                  User Key  (r) = Recorded By, (t) = Taken By, (c) = Cosigned By      Initials Name Effective Dates    Karen Peralta RN 06/16/21 -     Neil Menezes OT 06/16/21 -     Nory Keenan RN 06/08/23 -     Roe North RN 06/04/24 -                      Occupational Therapy Education        No education to display                      OT Recommendation and Plan  Planned Therapy Interventions (OT): activity tolerance training, neuromuscular control/coordination retraining, ROM/therapeutic exercise  Plan of Care Review  Plan of Care Reviewed With: patient  Progress: improving  Plan of Care Reviewed With: patient        Time Calculation:     Therapy Charges for Today       Code Description Service Date Service Provider Modifiers Qty    47495693513 HC OT THERAPEUTIC ACT EA 15 MIN 10/21/2024 Neil Graf OT GO 1    63941351304 HC OT SELF CARE/MGMT/TRAIN EA 15 MIN 10/21/2024 Neil Graf OT GO 1    20019256176 HC OT THERAPEUTIC ACT EA 15 MIN 10/21/2024 Neil Graf, OT GO 2    59043458001 HC OT THERAPEUTIC ACT EA 15 MIN 10/22/2024 Neil Graf OT GO 2                 Neil Graf OT  10/22/2024

## 2024-10-22 NOTE — PROGRESS NOTES
Adult Nutrition  Assessment/PES    Patient Name:  Lety Johnson  YOB: 1980  MRN: [id number]  Admit Date:  9/26/2024    Assessment Date:  10/22/2024    Comments: Follow-up    Po intake 83% ave of meals.    Encourage po and voice food prefs.    Will cont to follow and monitor.          Reason for Assessment       Row Name 10/22/24 1828          Reason for Assessment    Reason For Assessment follow-up protocol  remote REZA White     Diagnosis infection/sepsis;neurologic conditions  cystitis, met enceph, debility hx CVA                    Nutrition/Diet History       Row Name 10/22/24 1828          Nutrition/Diet History    Typical Intake (Food/Fluid/EN/PN) called to room, no answer                    Labs/Tests/Procedures/Meds       Row Name 10/22/24 1828          Labs/Procedures/Meds    Lab Results Reviewed reviewed     Lab Results Comments glu 352, 236        Diagnostic Tests/Procedures    Diagnostic Test/Procedure Reviewed reviewed        Medications    Pertinent Medications Reviewed reviewed     Pertinent Medications Comments humalog, lantus                        Nutrition Prescription Ordered       Row Name 10/22/24 1828          Nutrition Prescription PO          Supplement Frequency      Common Modifiers Consistent Carbohydrate                    Evaluation of Received Nutrient/Fluid Intake       Row Name 10/22/24 1829          Fluid Intake Evaluation    Oral Fluid (mL) 1300  ave x 5        PO Evaluation    Number of Meals 12     % PO Intake 83                       Problem/Interventions:   Problem 1       Row Name 10/22/24 1829          Nutrition Diagnoses Problem 1    Problem 1 Other (comment)  Inadequate energy intake related to cystitis, met eceph as evidenced po intake, ONS, skin                          Intervention Goal       Row Name 10/22/24 1829          Intervention Goal    General Meet nutritional needs for age/condition     PO Maintain intake;PO intake (%)     PO Intake % 80 %                     Nutrition Intervention       Row Name 10/22/24 1829          Nutrition Intervention    RD/Tech Action Encourage intake;Follow Tx progress;Supplement provided                      Education/Evaluation       Row Name 10/22/24 1830          Education    Education Education not appropriate at this time        Monitor/Evaluation    Monitor Per protocol;I&O;PO intake;Supplement intake;Pertinent labs;Weight                     Electronically signed by:  Vee Van RD  10/22/24 18:30 EDT

## 2024-10-23 LAB
BACTERIA SPEC AEROBE CULT: NORMAL
GLUCOSE BLDC GLUCOMTR-MCNC: 154 MG/DL (ref 70–130)
GLUCOSE BLDC GLUCOMTR-MCNC: 223 MG/DL (ref 70–130)
GLUCOSE BLDC GLUCOMTR-MCNC: 282 MG/DL (ref 70–130)
GLUCOSE BLDC GLUCOMTR-MCNC: 285 MG/DL (ref 70–130)

## 2024-10-23 PROCEDURE — 63710000001 INSULIN LISPRO (HUMAN) PER 5 UNITS: Performed by: INTERNAL MEDICINE

## 2024-10-23 PROCEDURE — 97530 THERAPEUTIC ACTIVITIES: CPT

## 2024-10-23 PROCEDURE — 99232 SBSQ HOSP IP/OBS MODERATE 35: CPT | Performed by: HOSPITALIST

## 2024-10-23 PROCEDURE — 25010000002 ALTEPLASE 2 MG RECONSTITUTED SOLUTION 1 EACH VIAL

## 2024-10-23 PROCEDURE — 82948 REAGENT STRIP/BLOOD GLUCOSE: CPT

## 2024-10-23 PROCEDURE — 63710000001 INSULIN GLARGINE PER 5 UNITS: Performed by: HOSPITALIST

## 2024-10-23 PROCEDURE — 25010000002 HEPARIN (PORCINE) PER 1000 UNITS: Performed by: INTERNAL MEDICINE

## 2024-10-23 PROCEDURE — 97110 THERAPEUTIC EXERCISES: CPT

## 2024-10-23 RX ORDER — HYDROXYZINE HYDROCHLORIDE 25 MG/1
25 TABLET, FILM COATED ORAL ONCE
Status: COMPLETED | OUTPATIENT
Start: 2024-10-23 | End: 2024-10-23

## 2024-10-23 RX ADMIN — INSULIN LISPRO 12 UNITS: 100 INJECTION, SOLUTION INTRAVENOUS; SUBCUTANEOUS at 17:27

## 2024-10-23 RX ADMIN — HEPARIN SODIUM 5000 UNITS: 5000 INJECTION INTRAVENOUS; SUBCUTANEOUS at 06:06

## 2024-10-23 RX ADMIN — INSULIN GLARGINE 30 UNITS: 100 INJECTION, SOLUTION SUBCUTANEOUS at 08:54

## 2024-10-23 RX ADMIN — METOPROLOL TARTRATE 25 MG: 25 TABLET, FILM COATED ORAL at 08:54

## 2024-10-23 RX ADMIN — CYCLOBENZAPRINE 10 MG: 10 TABLET, FILM COATED ORAL at 21:57

## 2024-10-23 RX ADMIN — POLYETHYLENE GLYCOL (3350) 17 G: 17 POWDER, FOR SOLUTION ORAL at 16:42

## 2024-10-23 RX ADMIN — NYSTATIN: 100000 POWDER TOPICAL at 21:58

## 2024-10-23 RX ADMIN — METOPROLOL TARTRATE 25 MG: 25 TABLET, FILM COATED ORAL at 21:56

## 2024-10-23 RX ADMIN — HEPARIN SODIUM 5000 UNITS: 5000 INJECTION INTRAVENOUS; SUBCUTANEOUS at 21:58

## 2024-10-23 RX ADMIN — ATORVASTATIN CALCIUM 80 MG: 40 TABLET, FILM COATED ORAL at 08:53

## 2024-10-23 RX ADMIN — ACYCLOVIR 400 MG: 200 CAPSULE ORAL at 21:56

## 2024-10-23 RX ADMIN — DULOXETINE HYDROCHLORIDE 60 MG: 60 CAPSULE, DELAYED RELEASE ORAL at 08:53

## 2024-10-23 RX ADMIN — TRAMADOL HYDROCHLORIDE 50 MG: 50 TABLET ORAL at 09:02

## 2024-10-23 RX ADMIN — VITAMINS A AND D OINTMENT: 15.5; 53.4 OINTMENT TOPICAL at 08:55

## 2024-10-23 RX ADMIN — HEPARIN SODIUM 5000 UNITS: 5000 INJECTION INTRAVENOUS; SUBCUTANEOUS at 14:24

## 2024-10-23 RX ADMIN — Medication 10 ML: at 08:55

## 2024-10-23 RX ADMIN — INSULIN LISPRO 4 UNITS: 100 INJECTION, SOLUTION INTRAVENOUS; SUBCUTANEOUS at 08:55

## 2024-10-23 RX ADMIN — ALTEPLASE: 2.2 INJECTION, POWDER, LYOPHILIZED, FOR SOLUTION INTRAVENOUS at 03:43

## 2024-10-23 RX ADMIN — Medication 10 ML: at 21:58

## 2024-10-23 RX ADMIN — NICOTINE 1 PATCH: 7 PATCH, EXTENDED RELEASE TRANSDERMAL at 08:56

## 2024-10-23 RX ADMIN — ASPIRIN 81 MG: 81 TABLET, CHEWABLE ORAL at 08:54

## 2024-10-23 RX ADMIN — HYDROXYZINE HYDROCHLORIDE 25 MG: 25 TABLET ORAL at 22:48

## 2024-10-23 RX ADMIN — LISINOPRIL 20 MG: 10 TABLET ORAL at 08:54

## 2024-10-23 RX ADMIN — NYSTATIN: 100000 POWDER TOPICAL at 09:02

## 2024-10-23 RX ADMIN — DICLOFENAC SODIUM 4 G: 10 GEL TOPICAL at 17:30

## 2024-10-23 RX ADMIN — PANTOPRAZOLE SODIUM 40 MG: 40 TABLET, DELAYED RELEASE ORAL at 08:54

## 2024-10-23 RX ADMIN — VITAMINS A AND D OINTMENT: 15.5; 53.4 OINTMENT TOPICAL at 21:59

## 2024-10-23 RX ADMIN — SENNOSIDES AND DOCUSATE SODIUM 2 TABLET: 50; 8.6 TABLET ORAL at 11:44

## 2024-10-23 RX ADMIN — TRAMADOL HYDROCHLORIDE 50 MG: 50 TABLET ORAL at 17:28

## 2024-10-23 RX ADMIN — LIDOCAINE 1 PATCH: 4 PATCH TOPICAL at 16:42

## 2024-10-23 RX ADMIN — INSULIN LISPRO 8 UNITS: 100 INJECTION, SOLUTION INTRAVENOUS; SUBCUTANEOUS at 21:58

## 2024-10-23 RX ADMIN — INSULIN GLARGINE 20 UNITS: 100 INJECTION, SOLUTION SUBCUTANEOUS at 21:57

## 2024-10-23 RX ADMIN — INSULIN LISPRO 12 UNITS: 100 INJECTION, SOLUTION INTRAVENOUS; SUBCUTANEOUS at 11:45

## 2024-10-23 NOTE — CASE MANAGEMENT/SOCIAL WORK
Discharge Planning Assessment   [place]     Patient Name: Lety Johnson  MRN: [id number]  Today's Date: 10/23/2024    Admit Date: 9/26/2024       Discharge Plan       Row Name 10/23/24 1803       Plan    Plan SS received call from [employer]PS B[employer], Kinsey Dial. APS B[employer]Y is awaiting for call back from [employer]. APS BOBY states pt's significant other had a court hearing last week and his probation was revoked. Significant other received a two sentence last week per ABELARDO LANDIS. ABELARDO LANDIS is discussing discharge plan with sister, Moises Narvaez. SS discussed statement for SSDI court hearing with , manager, and hospitalist . SS spoke to HIM on this date who states medical records can't be released until pt is discharged. Hospitalist  to assist with statement for SSDI court hearing. SS spoke to sister, Moises who states pt's  is Kita and she provided contact information. SS discussed disposition options including home with 24/7 care, home with family with 24/7 care, or long-term care placement once pt has a monthly income.  made aware that pt will not receive therapy services at a long-term care facility.  voiced understanding.  continues to request long-term care placement once monthly income is approved. SS to follow.                  Continued Care and Services - Admitted Since 9/26/2024       Destination       Service Provider Request Status Services Address Phone Fax Patient Preferred    [facility] Declined  Cannot meet patient's needs -- [address] [facility] [address] [phone] --    [facility] Acute Care Declined  Not eligible -- [address] [facility] [place] [phone] --    [facility] Declined  Not eligible -- [address] [facility] [address] [phone] --    [facility] Declined  Clinical Denial -- [address] [place] [address]  STREET, 4TH FLOOR, [facility] [address] [phone] --    [facility] BED UNIT Declined  Cannot meet patient's needs -- [address] [facility] [address] [phone] --    [facility] Declined  Not eligible -- [address] [place] [address] [phone] --    [facility] Declined  Bed not available -- [address] [facility] [address] [phone] --    [facility] CARE Declined  Bed not available -- [address] [facility] [address] [phone] --    [facility]ing Declined  Out of network -- [address] [facility] [address] [phone] --    [facility] SWING BED UNIT Declined -- [address] [facility] [address] [phone] --    [facility] Declined  Clinical Denial -- [address] [facility] [address] [phone] --    [facility] SWING BED UNIT Declined  Insurance Denial, Out of network -- [address] [address] [phone] --    [facility] Declined  Out of network -- [address] [facility] [address] [phone] --    [facility] Declined  Out of network -- [address] [address] [phone] -- --    [facility] Declined  Out of network -- [address] [facility] [address] [phone] --                  Expected Discharge Date and Time       Expected Discharge Date Expected Discharge Time    Oct 23, 2024         HELEN Butterfield

## 2024-10-23 NOTE — PLAN OF CARE
Goal Outcome Evaluation:  Plan of Care Reviewed With: patient        Progress: no change  Outcome Evaluation: Patient is resting in bed watching TV at this time. Wound care complete per orders. Patient C/O pain this shift, PRN medication given per MAR. Patient worked with therapy this shift. VSS on RA. No acute changes noted at this time. Will continue with POC.

## 2024-10-23 NOTE — THERAPY TREATMENT NOTE
Acute Care - Physical Therapy Treatment Note   [facility]aki     Patient Name: Lety Johnson  : 1980  MRN: [id number]  Today's Date: 10/23/2024   Onset of Illness/Injury or Date of Surgery: 24  Visit Dx:     ICD-10-CM ICD-9-CM   1. Hypokalemia  E87.6 276.8   2. Pressure injury of skin of sacral region, unspecified injury stage  L89.159 707.03     707.20   3. Pressure injury of skin of left heel, unspecified injury stage  L89.629 707.07     707.20   4. Acute cystitis without hematuria  N30.00 595.0     Patient Active Problem List   Diagnosis   (none) - all problems resolved or deleted     Past Medical History:   Diagnosis Date    Stroke      History reviewed. No pertinent surgical history.  PT Assessment (Last 12 Hours)       PT Evaluation and Treatment       Row Name 10/23/24 1120          Physical Therapy Time and Intention    Subjective Information complains of;weakness;pain  -KM     Document Type therapy note (daily note)  -KM     Mode of Treatment individual therapy;physical therapy  -KM     Patient Effort adequate  -KM     Symptoms Noted During/After Treatment none  -KM       Row Name 10/23/24 1120          General Information    Patient Profile Reviewed yes  -KM     Patient Observations alert;cooperative;agree to therapy  -KM     Existing Precautions/Restrictions fall  flaccid LUE/LLE  -KM       Row Name 10/23/24 1120          Cognition    Affect/Mental Status (Cognition) emotionally labile  -KM     Orientation Status (Cognition) oriented x 3  -KM     Follows Commands (Cognition) WFL  -KM       Row Name 10/23/24 1120          Bed Mobility    Bed Mobility supine-sit  -KM     Supine-Sit Alligator (Bed Mobility) maximum assist (25% patient effort)  -KM     Bed Mobility, Safety Issues decreased use of arms for pushing/pulling;decreased use of legs for bridging/pushing  -KM     Assistive Device (Bed Mobility) bed rails;head of bed elevated  -KM       Row Name 10/23/24 1120          Gait/Stairs  (Locomotion)    Patient was able to Ambulate no, other medical factors prevent ambulation  -KM     Reason Patient was unable to Ambulate Non-Ambulatory at Baseline  -KM       Row Name 10/23/24 1120          Safety Issues/Impairments Affecting Functional Mobility    Impairments Affecting Function (Mobility) balance;endurance/activity tolerance;strength  -KM       Row Name 10/23/24 1120          Balance    Balance Assessment sitting static balance  -KM     Static Sitting Balance independent  -KM       Row Name 10/23/24 1120          Motor Skills    Comments, Therapeutic Exercise EOB ther-ex  -KM       Row Name             Wound 09/26/24 1809 Left posterior ankle Other (comment)    Wound - Properties Group Placement Date: 09/26/24  -KJ Placement Time: 1809  -KJ Side: Left  -KJ Orientation: posterior  -KJ Location: ankle  -KJ Primary Wound Type: Other  -TW, unknow etiology     Retired Wound - Properties Group Placement Date: 09/26/24  -KJ Placement Time: 1809  -KJ Side: Left  -KJ Orientation: posterior  -KJ Location: ankle  -KJ Primary Wound Type: Other  -TW, unknow etiology     Retired Wound - Properties Group Placement Date: 09/26/24  -KJ Placement Time: 1809  -KJ Side: Left  -KJ Orientation: posterior  -KJ Location: ankle  -KJ Primary Wound Type: Other  -TW, unknow etiology     Retired Wound - Properties Group Date first assessed: 09/26/24  -KJ Time first assessed: 1809  -KJ Side: Left  -KJ Location: ankle  -KJ Primary Wound Type: Other  -TW, unknow etiology       Row Name             Wound 10/11/24 1330 medial coccyx Fissure    Wound - Properties Group Placement Date: 10/11/24  -TW Placement Time: 1330 -TW Orientation: medial  -TW Location: coccyx  -TW Primary Wound Type: Fissure  -TW    Retired Wound - Properties Group Placement Date: 10/11/24  -TW Placement Time: 1330  -TW Orientation: medial  -TW Location: coccyx  -TW Primary Wound Type: Fissure  -TW    Retired Wound - Properties Group Placement Date: 10/11/24   -TW Placement Time: 1330 -TW Orientation: medial  -TW Location: coccyx  -TW Primary Wound Type: Fissure  -TW    Retired Wound - Properties Group Date first assessed: 10/11/24  -TW Time first assessed: 1330 -TW Location: coccyx  -TW Primary Wound Type: Fissure  -TW      Row Name             Wound 10/16/24 0705 Left medial gluteal MASD (moisture associated skin damage)    Wound - Properties Group Placement Date: 10/16/24  -MS Placement Time: 0705 -MS Side: Left  -MS Orientation: medial  -MS Location: gluteal  -MS Primary Wound Type: MASD  -MS Type: MASD (moisture associated skin damage)  -MS Present on Original Admission: N  -MS Additional Comments: Noted at shift change skin assesment, Night shift RN stated it had been there her shift.  -MS    Retired Wound - Properties Group Placement Date: 10/16/24  -MS Placement Time: 0705 -MS Present on Original Admission: N  -MS Side: Left  -MS Orientation: medial  -MS Location: gluteal  -MS Primary Wound Type: MASD  -MS Additional Comments: Noted at shift change skin assesment, Night shift RN stated it had been there her shift.  -MS Type: MASD (moisture associated skin damage)  -MS    Retired Wound - Properties Group Placement Date: 10/16/24  -MS Placement Time: 0705 -MS Present on Original Admission: N  -MS Side: Left  -MS Orientation: medial  -MS Location: gluteal  -MS Primary Wound Type: MASD  -MS Additional Comments: Noted at shift change skin assesment, Night shift RN stated it had been there her shift.  -MS Type: MASD (moisture associated skin damage)  -MS    Retired Wound - Properties Group Date first assessed: 10/16/24  -MS Time first assessed: 0705  -MS Present on Original Admission: N  -MS Side: Left  -MS Location: gluteal  -MS Primary Wound Type: MASD  -MS Additional Comments: Noted at shift change skin assesment, Night shift RN stated it had been there her shift.  -MS Type: MASD (moisture associated skin damage)  -MS      Row Name             Wound 10/16/24  0705 Left posterior greater trochanter MASD (moisture associated skin damage)    Wound - Properties Group Placement Date: 10/16/24  -MS Placement Time: 0705  -MS Side: Left  -MS Orientation: posterior  -MS Location: greater trochanter  -MS Primary Wound Type: MASD  -MS Type: MASD (moisture associated skin damage)  -MS Present on Original Admission: N  -MS    Retired Wound - Properties Group Placement Date: 10/16/24  -MS Placement Time: 0705 -MS Present on Original Admission: N  -MS Side: Left  -MS Orientation: posterior  -MS Location: greater trochanter  -MS Primary Wound Type: MASD  -MS Type: MASD (moisture associated skin damage)  -MS    Retired Wound - Properties Group Placement Date: 10/16/24  -MS Placement Time: 0705  -MS Present on Original Admission: N  -MS Side: Left  -MS Orientation: posterior  -MS Location: greater trochanter  -MS Primary Wound Type: MASD  -MS Type: MASD (moisture associated skin damage)  -MS    Retired Wound - Properties Group Date first assessed: 10/16/24  -MS Time first assessed: 0705  -MS Present on Original Admission: N  -MS Side: Left  -MS Location: greater trochanter  -MS Primary Wound Type: MASD  -MS Type: MASD (moisture associated skin damage)  -MS      Row Name 10/23/24 1120          Progress Summary (PT)    Daily Progress Summary (PT) Pt. was able to perform bed mobility w/ maxA. She was able to tolerate sitting EOB for duration of PT session. She tolerated EOB ther-ex. Pt. would continue to benefit from skilled PT services.  -KM               User Key  (r) = Recorded By, (t) = Taken By, (c) = Cosigned By      Initials Name Provider Type    TW Karen Gipson, RN Registered Nurse    Thierry Carter, PT Physical Therapist    Nory Keenan, RN Registered Nurse    Roe North, RN Registered Nurse                    Physical Therapy Education       Title: PT OT SLP Therapies (Done)       Topic: Physical Therapy (Done)       Point: Mobility training (Done)        Learning Progress Summary            Patient Acceptance, E,TB, VU by RG at 10/22/2024 1002    Acceptance, TB,E, VU by RG at 10/21/2024 0907    Nonacceptance, E, NR by WG at 10/16/2024 0228    Acceptance, E,TB, VU by CF at 10/15/2024 0753    Acceptance, E,TB, VU by CF at 10/14/2024 0801    Acceptance, E,TB, VU by CF at 10/13/2024 1045    Acceptance, E,D, VU,NR by AG at 10/10/2024 1310    Acceptance, E,TB, VU by CB at 10/8/2024 0448    Acceptance, E, NR by RD at 10/2/2024 1101    Acceptance, E, NR by RD at 10/1/2024 0856    Acceptance, E,D, VU,NR by AG at 9/30/2024 1559                      Point: Home exercise program (Done)       Learning Progress Summary            Patient Acceptance, E,TB, VU by RG at 10/22/2024 1002    Acceptance, TB,E, VU by RG at 10/21/2024 0907    Nonacceptance, E, NR by WG at 10/16/2024 0228    Acceptance, E,TB, VU by CF at 10/15/2024 0753    Acceptance, E,TB, VU by CF at 10/14/2024 0801    Acceptance, E,TB, VU by CF at 10/13/2024 1045    Acceptance, E,D, VU,NR by AG at 10/10/2024 1310    Acceptance, E,TB, VU by CB at 10/8/2024 0448    Acceptance, E, NR by RD at 10/2/2024 1101    Acceptance, E, NR by RD at 10/1/2024 0856    Acceptance, E,D, VU,NR by AG at 9/30/2024 1559                      Point: Body mechanics (Done)       Learning Progress Summary            Patient Acceptance, E,TB, VU by RG at 10/22/2024 1002    Acceptance, TB,E, VU by RG at 10/21/2024 0907    Nonacceptance, E, NR by WG at 10/16/2024 0228    Acceptance, E,TB, VU by CF at 10/15/2024 0753    Acceptance, E,TB, VU by CF at 10/14/2024 0801    Acceptance, E,TB, VU by CF at 10/13/2024 1045    Acceptance, E,D, VU,NR by AG at 10/10/2024 1310    Acceptance, E,TB, VU by CB at 10/8/2024 0448    Acceptance, E, NR by RD at 10/2/2024 1101    Acceptance, E, NR by RD at 10/1/2024 0856    Acceptance, E,D, VU,NR by AG at 9/30/2024 1559                      Point: Precautions (Done)       Learning Progress Summary            Patient  Acceptance, E,TB, VU by RG at 10/22/2024 1002    Acceptance, TB,E, VU by RG at 10/21/2024 0907    Nonacceptance, E, NR by WG at 10/16/2024 0228    Acceptance, E,TB, VU by CF at 10/15/2024 0753    Acceptance, E,TB, VU by CF at 10/14/2024 0801    Acceptance, E,TB, VU by CF at 10/13/2024 1045    Acceptance, E,D, VU,NR by AG at 10/10/2024 1310    Acceptance, E,TB, VU by CB at 10/8/2024 0448    Acceptance, E, NR by RD at 10/2/2024 1101    Acceptance, E, NR by RD at 10/1/2024 0856    Acceptance, E,D, VU,NR by  at 9/30/2024 1559                                      User Key       Initials Effective Dates Name Provider Type Discipline     06/16/21 -  Мария Joya, PT Physical Therapist PT    RD 06/16/21 -  Laisha Verdugo, RN Registered Nurse Nurse    RG 06/06/23 -  Jesus Matthews, RN Registered Nurse Nurse     02/12/24 -  Shanthi Burden, RN Registered Nurse Nurse     06/25/24 -  Cherelle Germain, RN Registered Nurse Nurse    CB 06/17/24 -  Fidelia Lombardo, RN Registered Nurse Nurse                  PT Recommendation and Plan     Progress Summary (PT)  Daily Progress Summary (PT): Pt. was able to perform bed mobility w/ maxA. She was able to tolerate sitting EOB for duration of PT session. She tolerated EOB ther-ex. Pt. would continue to benefit from skilled PT services.       Time Calculation:    PT Charges       Row Name 10/23/24 1119             Time Calculation    PT Received On 10/23/24  -KM         Time Calculation- PT    Total Timed Code Minutes- PT 23 minute(s)  -KM                User Key  (r) = Recorded By, (t) = Taken By, (c) = Cosigned By      Initials Name Provider Type    Thierry Carter, PT Physical Therapist                  Therapy Charges for Today       Code Description Service Date Service Provider Modifiers Qty    04702486070 HC PT THERAPEUTIC ACT EA 15 MIN 10/23/2024 Thierry Saldivar, PT GP 1    14149723666 HC PT THER PROC EA 15 MIN 10/23/2024 Thierry Saldivar, PT GP 1            PT FLORIDALMA-Mary  AM-PAC  6 Clicks Score (PT): 9    Thierry Saldivar, PT  10/23/2024

## 2024-10-23 NOTE — NURSING NOTE
Alteplase administered to midline due to no blood return. Followed order set and was able to get blood return from midline.

## 2024-10-23 NOTE — PLAN OF CARE
Goal Outcome Evaluation:  Plan of Care Reviewed With: patient        Progress: improving  Outcome Evaluation: Patient resting in bed at this time. VSS on RA. Wound care completed per orders. Patient had c/o pain this shift. PRN medication given per MAR. Patient voices no concerns at this time. Will continue with plan of care.                              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 Plan: She complains of lines in her vision OS. Exam shows early phthisis OU due to chronic uveitis. We discussed the findings. She has no discomfort. Discussed poor prognosis. 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 Unfortunately, there is no available treatment. She will call us with any pain or changes. continue AFTS
thanks Mechelle Fink RTC 1 year

## 2024-10-23 NOTE — THERAPY TREATMENT NOTE
Acute Care - Occupational Therapy Treatment Note   [facility]     Patient Name: Lety Johnson  : 1980  MRN: [id number]  Today's Date: 10/23/2024  Onset of Illness/Injury or Date of Surgery: 24     Referring Physician: Dr. Marc    Admit Date: 2024       ICD-10-CM ICD-9-CM   1. Hypokalemia  E87.6 276.8   2. Pressure injury of skin of sacral region, unspecified injury stage  L89.159 707.03     707.20   3. Pressure injury of skin of left heel, unspecified injury stage  L89.629 707.07     707.20   4. Acute cystitis without hematuria  N30.00 595.0     Patient Active Problem List   Diagnosis   (none) - all problems resolved or deleted     Past Medical History:   Diagnosis Date    Stroke      History reviewed. No pertinent surgical history.      OT ASSESSMENT FLOWSHEET (Last 12 Hours)       OT Evaluation and Treatment       Row Name 10/23/24 1154                   OT Time and Intention    Document Type therapy note (daily note)  -KR        Mode of Treatment occupational therapy  -KR        Patient Effort adequate  -KR        Comment LUE PROM/positioning continued on this date. Noted decreased pain reported/noted LUE, throughout PROM/positioning.  -KR           General Information    General Observations of Patient alert/cooperative  -KR           Elbow/Forearm (Therapeutic Exercise)    Elbow/Forearm PROM (Therapeutic Exercise) left;flexion;extension;supine  -KR           Wrist (Therapeutic Exercise)    Wrist PROM (Therapeutic Exercise) left;flexion;extension  -KR           Hand (Therapeutic Exercise)    Hand PROM (Therapeutic Exercise) left;finger flexion;finger extension  -KR           Wound 24 1809 Left posterior ankle Other (comment)    Wound - Properties Group Placement Date: 24  -KJ Placement Time: 1809 Side: Left  -KJ Orientation: posterior  -KJ Location: ankle  -KJ Primary Wound Type: Other  -TW, unknow etiology     Retired Wound - Properties Group Placement Date: 24  -KJ  Placement Time: 1809  -KJ Side: Left  -KJ Orientation: posterior  -KJ Location: ankle  -KJ Primary Wound Type: Other  -TW, unknow etiology     Retired Wound - Properties Group Placement Date: 09/26/24  -KJ Placement Time: 1809  -KJ Side: Left  -KJ Orientation: posterior  -KJ Location: ankle  -KJ Primary Wound Type: Other  -TW, unknow etiology     Retired Wound - Properties Group Date first assessed: 09/26/24  -KJ Time first assessed: 1809  -KJ Side: Left  -KJ Location: ankle  -KJ Primary Wound Type: Other  -TW, unknow etiology        Wound 10/11/24 1330 medial coccyx Fissure    Wound - Properties Group Placement Date: 10/11/24  -TW Placement Time: 1330 -TW Orientation: medial  -TW Location: coccyx  -TW Primary Wound Type: Fissure  -TW    Retired Wound - Properties Group Placement Date: 10/11/24  -TW Placement Time: 1330 -TW Orientation: medial  -TW Location: coccyx  -TW Primary Wound Type: Fissure  -TW    Retired Wound - Properties Group Placement Date: 10/11/24  -TW Placement Time: 1330  -TW Orientation: medial  -TW Location: coccyx  -TW Primary Wound Type: Fissure  -TW    Retired Wound - Properties Group Date first assessed: 10/11/24  -TW Time first assessed: 1330 -TW Location: coccyx  -TW Primary Wound Type: Fissure  -TW       Wound 10/16/24 0705 Left medial gluteal MASD (moisture associated skin damage)    Wound - Properties Group Placement Date: 10/16/24  -MS Placement Time: 0705 -MS Side: Left  -MS Orientation: medial  -MS Location: gluteal  -MS Primary Wound Type: MASD  -MS Type: MASD (moisture associated skin damage)  -MS Present on Original Admission: N  -MS Additional Comments: Noted at shift change skin assesment, Night shift RN stated it had been there her shift.  -MS    Retired Wound - Properties Group Placement Date: 10/16/24  -MS Placement Time: 0705 -MS Present on Original Admission: N  -MS Side: Left  -MS Orientation: medial  -MS Location: gluteal  -MS Primary Wound Type: MASD  -MS Additional  Comments: Noted at shift change skin assesment, Night shift RN stated it had been there her shift.  -MS Type: MASD (moisture associated skin damage)  -MS    Retired Wound - Properties Group Placement Date: 10/16/24  -MS Placement Time: 0705 -MS Present on Original Admission: N  -MS Side: Left  -MS Orientation: medial  -MS Location: gluteal  -MS Primary Wound Type: MASD  -MS Additional Comments: Noted at shift change skin assesment, Night shift RN stated it had been there her shift.  -MS Type: MASD (moisture associated skin damage)  -MS    Retired Wound - Properties Group Date first assessed: 10/16/24  -MS Time first assessed: 0705  -MS Present on Original Admission: N  -MS Side: Left  -MS Location: gluteal  -MS Primary Wound Type: MASD  -MS Additional Comments: Noted at shift change skin assesment, Night shift RN stated it had been there her shift.  -MS Type: MASD (moisture associated skin damage)  -MS       Wound 10/16/24 0705 Left posterior greater trochanter MASD (moisture associated skin damage)    Wound - Properties Group Placement Date: 10/16/24  -MS Placement Time: 0705 -MS Side: Left  -MS Orientation: posterior  -MS Location: greater trochanter  -MS Primary Wound Type: MASD  -MS Type: MASD (moisture associated skin damage)  -MS Present on Original Admission: N  -MS    Retired Wound - Properties Group Placement Date: 10/16/24  -MS Placement Time: 0705  -MS Present on Original Admission: N  -MS Side: Left  -MS Orientation: posterior  -MS Location: greater trochanter  -MS Primary Wound Type: MASD  -MS Type: MASD (moisture associated skin damage)  -MS    Retired Wound - Properties Group Placement Date: 10/16/24  -MS Placement Time: 0705  -MS Present on Original Admission: N  -MS Side: Left  -MS Orientation: posterior  -MS Location: greater trochanter  -MS Primary Wound Type: MASD  -MS Type: MASD (moisture associated skin damage)  -MS    Retired Wound - Properties Group Date first assessed: 10/16/24  -MS Time  first assessed: 0705  -MS Present on Original Admission: N  -MS Side: Left  -MS Location: greater trochanter  -MS Primary Wound Type: MASD  -MS Type: MASD (moisture associated skin damage)  -MS       Plan of Care Review    Plan of Care Reviewed With patient  -KR        Progress improving  tolerance to LUE PROM  -KR           Therapy Assessment/Plan (OT)    Planned Therapy Interventions (OT) ROM/therapeutic exercise  -KR           ROM Goal 1 (OT)    Progress/Outcome (ROM Goal 1, OT) continuing progress toward goal  -KR            Activity Tolerance Goal 1 (OT)    Progress/Outcome (Activity Tolerance Goal 1, OT) continuing progress toward goal  -KR                  User Key  (r) = Recorded By, (t) = Taken By, (c) = Cosigned By      Initials Name Effective Dates    TW Karen Gipson RN 06/16/21 -     Neil Menezes OT 06/16/21 -     Nory Keenan RN 06/08/23 -     Roe North RN 06/04/24 -                      Occupational Therapy Education        No education to display                      OT Recommendation and Plan  Planned Therapy Interventions (OT): ROM/therapeutic exercise  Plan of Care Review  Plan of Care Reviewed With: patient  Progress: improving (tolerance to LUE PROM)  Plan of Care Reviewed With: patient        Time Calculation:     Therapy Charges for Today       Code Description Service Date Service Provider Modifiers Qty    68574694937  OT THERAPEUTIC ACT EA 15 MIN 10/22/2024 Neil Graf OT GO 2    49902688872 HC OT THERAPEUTIC ACT EA 15 MIN 10/23/2024 Neil Graf OT GO 1                 Neil Graf OT  10/23/2024

## 2024-10-24 LAB
GLUCOSE BLDC GLUCOMTR-MCNC: 151 MG/DL (ref 70–130)
GLUCOSE BLDC GLUCOMTR-MCNC: 250 MG/DL (ref 70–130)
GLUCOSE BLDC GLUCOMTR-MCNC: 281 MG/DL (ref 70–130)
GLUCOSE BLDC GLUCOMTR-MCNC: 307 MG/DL (ref 70–130)

## 2024-10-24 PROCEDURE — 25010000002 HEPARIN (PORCINE) PER 1000 UNITS: Performed by: INTERNAL MEDICINE

## 2024-10-24 PROCEDURE — 97110 THERAPEUTIC EXERCISES: CPT

## 2024-10-24 PROCEDURE — 99231 SBSQ HOSP IP/OBS SF/LOW 25: CPT | Performed by: HOSPITALIST

## 2024-10-24 PROCEDURE — 63710000001 INSULIN GLARGINE PER 5 UNITS: Performed by: HOSPITALIST

## 2024-10-24 PROCEDURE — 97530 THERAPEUTIC ACTIVITIES: CPT

## 2024-10-24 PROCEDURE — 63710000001 INSULIN LISPRO (HUMAN) PER 5 UNITS: Performed by: INTERNAL MEDICINE

## 2024-10-24 PROCEDURE — 82948 REAGENT STRIP/BLOOD GLUCOSE: CPT

## 2024-10-24 RX ORDER — HYDROXYZINE HYDROCHLORIDE 25 MG/1
25 TABLET, FILM COATED ORAL ONCE
Status: COMPLETED | OUTPATIENT
Start: 2024-10-24 | End: 2024-10-24

## 2024-10-24 RX ADMIN — ASPIRIN 81 MG: 81 TABLET, CHEWABLE ORAL at 08:38

## 2024-10-24 RX ADMIN — NICOTINE 1 PATCH: 7 PATCH, EXTENDED RELEASE TRANSDERMAL at 08:39

## 2024-10-24 RX ADMIN — INSULIN LISPRO 16 UNITS: 100 INJECTION, SOLUTION INTRAVENOUS; SUBCUTANEOUS at 11:01

## 2024-10-24 RX ADMIN — LISINOPRIL 20 MG: 10 TABLET ORAL at 08:37

## 2024-10-24 RX ADMIN — Medication 10 ML: at 08:39

## 2024-10-24 RX ADMIN — DICLOFENAC SODIUM 4 G: 10 GEL TOPICAL at 12:58

## 2024-10-24 RX ADMIN — VITAMINS A AND D OINTMENT: 15.5; 53.4 OINTMENT TOPICAL at 08:38

## 2024-10-24 RX ADMIN — LIDOCAINE 1 PATCH: 4 PATCH TOPICAL at 16:55

## 2024-10-24 RX ADMIN — INSULIN LISPRO 12 UNITS: 100 INJECTION, SOLUTION INTRAVENOUS; SUBCUTANEOUS at 20:38

## 2024-10-24 RX ADMIN — HEPARIN SODIUM 5000 UNITS: 5000 INJECTION INTRAVENOUS; SUBCUTANEOUS at 05:49

## 2024-10-24 RX ADMIN — ACETAMINOPHEN 650 MG: 325 TABLET ORAL at 11:01

## 2024-10-24 RX ADMIN — CYCLOBENZAPRINE 10 MG: 10 TABLET, FILM COATED ORAL at 11:04

## 2024-10-24 RX ADMIN — PANTOPRAZOLE SODIUM 40 MG: 40 TABLET, DELAYED RELEASE ORAL at 08:38

## 2024-10-24 RX ADMIN — HYDROXYZINE HYDROCHLORIDE 25 MG: 25 TABLET ORAL at 20:39

## 2024-10-24 RX ADMIN — NYSTATIN: 100000 POWDER TOPICAL at 08:38

## 2024-10-24 RX ADMIN — METOPROLOL TARTRATE 25 MG: 25 TABLET, FILM COATED ORAL at 20:39

## 2024-10-24 RX ADMIN — ATORVASTATIN CALCIUM 80 MG: 40 TABLET, FILM COATED ORAL at 08:38

## 2024-10-24 RX ADMIN — INSULIN GLARGINE 35 UNITS: 100 INJECTION, SOLUTION SUBCUTANEOUS at 09:57

## 2024-10-24 RX ADMIN — HEPARIN SODIUM 5000 UNITS: 5000 INJECTION INTRAVENOUS; SUBCUTANEOUS at 20:40

## 2024-10-24 RX ADMIN — ACYCLOVIR 400 MG: 200 CAPSULE ORAL at 20:39

## 2024-10-24 RX ADMIN — INSULIN GLARGINE 25 UNITS: 100 INJECTION, SOLUTION SUBCUTANEOUS at 20:39

## 2024-10-24 RX ADMIN — INSULIN LISPRO 4 UNITS: 100 INJECTION, SOLUTION INTRAVENOUS; SUBCUTANEOUS at 08:38

## 2024-10-24 RX ADMIN — TRAMADOL HYDROCHLORIDE 50 MG: 50 TABLET ORAL at 20:39

## 2024-10-24 RX ADMIN — INSULIN LISPRO 12 UNITS: 100 INJECTION, SOLUTION INTRAVENOUS; SUBCUTANEOUS at 16:55

## 2024-10-24 RX ADMIN — Medication 10 ML: at 20:40

## 2024-10-24 RX ADMIN — NYSTATIN: 100000 POWDER TOPICAL at 20:39

## 2024-10-24 RX ADMIN — VITAMINS A AND D OINTMENT: 15.5; 53.4 OINTMENT TOPICAL at 20:40

## 2024-10-24 RX ADMIN — DULOXETINE HYDROCHLORIDE 60 MG: 60 CAPSULE, DELAYED RELEASE ORAL at 08:37

## 2024-10-24 RX ADMIN — METOPROLOL TARTRATE 25 MG: 25 TABLET, FILM COATED ORAL at 08:38

## 2024-10-24 RX ADMIN — TRAMADOL HYDROCHLORIDE 50 MG: 50 TABLET ORAL at 09:57

## 2024-10-24 RX ADMIN — SENNOSIDES AND DOCUSATE SODIUM 2 TABLET: 50; 8.6 TABLET ORAL at 16:01

## 2024-10-24 RX ADMIN — Medication 10 ML: at 20:39

## 2024-10-24 RX ADMIN — HEPARIN SODIUM 5000 UNITS: 5000 INJECTION INTRAVENOUS; SUBCUTANEOUS at 14:46

## 2024-10-24 NOTE — THERAPY TREATMENT NOTE
Acute Care - Physical Therapy Treatment Note   Points     Patient Name: Lety Johnson  : 1980  MRN: [id number]  Today's Date: 10/24/2024   Onset of Illness/Injury or Date of Surgery: 24  Visit Dx:     ICD-10-CM ICD-9-CM   1. Hypokalemia  E87.6 276.8   2. Pressure injury of skin of sacral region, unspecified injury stage  L89.159 707.03     707.20   3. Pressure injury of skin of left heel, unspecified injury stage  L89.629 707.07     707.20   4. Acute cystitis without hematuria  N30.00 595.0     Patient Active Problem List   Diagnosis   (none) - all problems resolved or deleted     Past Medical History:   Diagnosis Date    Stroke      History reviewed. No pertinent surgical history.  PT Assessment (Last 12 Hours)       PT Evaluation and Treatment       Row Name 10/24/24 1418          Physical Therapy Time and Intention    Subjective Information complains of;fatigue;pain;nausea/vomiting  -KM     Document Type therapy note (daily note)  -KM     Mode of Treatment individual therapy;physical therapy  -KM     Patient Effort fair  -KM     Symptoms Noted During/After Treatment fatigue;increased pain  -KM       Row Name 10/24/24 1418          General Information    Patient Profile Reviewed yes  -KM     Patient Observations alert;cooperative;agree to therapy  -KM     Existing Precautions/Restrictions fall  -KM       Row Name 10/24/24 1418          Cognition    Affect/Mental Status (Cognition) flat/blunted affect  -KM     Orientation Status (Cognition) oriented x 3  -KM     Follows Commands (Cognition) follows one-step commands  -KM       Row Name 10/24/24 1418          Bed Mobility    Comment, (Bed Mobility) pt. deferred d/t not feeling well  -KM       Row Name 10/24/24 1418          Gait/Stairs (Locomotion)    Patient was able to Ambulate no, other medical factors prevent ambulation  -KM     Reason Patient was unable to Ambulate Non-Ambulatory at Baseline  -KM       Row Name 10/24/24 1418          Safety  Issues/Impairments Affecting Functional Mobility    Impairments Affecting Function (Mobility) balance;endurance/activity tolerance;strength  -KM       Row Name 10/24/24 1418          Motor Skills    Comments, Therapeutic Exercise supine ther-ex RLE; LLE PROM  -KM       Row Name             Wound 09/26/24 1809 Left posterior ankle Other (comment)    Wound - Properties Group Placement Date: 09/26/24  -KJ Placement Time: 1809  -KJ Side: Left  -KJ Orientation: posterior  -KJ Location: ankle  -KJ Primary Wound Type: Other  -TW, unknow etiology     Retired Wound - Properties Group Placement Date: 09/26/24  -KJ Placement Time: 1809  -KJ Side: Left  -KJ Orientation: posterior  -KJ Location: ankle  -KJ Primary Wound Type: Other  -TW, unknow etiology     Retired Wound - Properties Group Placement Date: 09/26/24  -KJ Placement Time: 1809  -KJ Side: Left  -KJ Orientation: posterior  -KJ Location: ankle  -KJ Primary Wound Type: Other  -TW, unknow etiology     Retired Wound - Properties Group Date first assessed: 09/26/24  -KJ Time first assessed: 1809  -KJ Side: Left  -KJ Location: ankle  -KJ Primary Wound Type: Other  -TW, unknow etiology       Row Name             Wound 10/11/24 1330 medial coccyx Fissure    Wound - Properties Group Placement Date: 10/11/24  -TW Placement Time: 1330 -TW Orientation: medial  -TW Location: coccyx  -TW Primary Wound Type: Fissure  -TW    Retired Wound - Properties Group Placement Date: 10/11/24  -TW Placement Time: 1330 -TW Orientation: medial  -TW Location: coccyx  -TW Primary Wound Type: Fissure  -TW    Retired Wound - Properties Group Placement Date: 10/11/24  -TW Placement Time: 1330 -TW Orientation: medial  -TW Location: coccyx  -TW Primary Wound Type: Fissure  -TW    Retired Wound - Properties Group Date first assessed: 10/11/24  -TW Time first assessed: 1330 -TW Location: coccyx  -TW Primary Wound Type: Fissure  -TW      Row Name             Wound 10/16/24 0705 Left medial gluteal MASD  (moisture associated skin damage)    Wound - Properties Group Placement Date: 10/16/24  -MS Placement Time: 0705 -MS Side: Left  -MS Orientation: medial  -MS Location: gluteal  -MS Primary Wound Type: MASD  -MS Type: MASD (moisture associated skin damage)  -MS Present on Original Admission: N  -MS Additional Comments: Noted at shift change skin assesment, Night shift RN stated it had been there her shift.  -MS    Retired Wound - Properties Group Placement Date: 10/16/24  -MS Placement Time: 0705 -MS Present on Original Admission: N  -MS Side: Left  -MS Orientation: medial  -MS Location: gluteal  -MS Primary Wound Type: MASD  -MS Additional Comments: Noted at shift change skin assesment, Night shift RN stated it had been there her shift.  -MS Type: MASD (moisture associated skin damage)  -MS    Retired Wound - Properties Group Placement Date: 10/16/24  -MS Placement Time: 0705 -MS Present on Original Admission: N  -MS Side: Left  -MS Orientation: medial  -MS Location: gluteal  -MS Primary Wound Type: MASD  -MS Additional Comments: Noted at shift change skin assesment, Night shift RN stated it had been there her shift.  -MS Type: MASD (moisture associated skin damage)  -MS    Retired Wound - Properties Group Date first assessed: 10/16/24  -MS Time first assessed: 0705 -MS Present on Original Admission: N  -MS Side: Left  -MS Location: gluteal  -MS Primary Wound Type: MASD  -MS Additional Comments: Noted at shift change skin assesment, Night shift RN stated it had been there her shift.  -MS Type: MASD (moisture associated skin damage)  -MS      Row Name             Wound 10/16/24 0705 Left posterior greater trochanter MASD (moisture associated skin damage)    Wound - Properties Group Placement Date: 10/16/24  -MS Placement Time: 0705 -MS Side: Left  -MS Orientation: posterior  -MS Location: greater trochanter  -MS Primary Wound Type: MASD  -MS Type: MASD (moisture associated skin damage)  -MS Present on Original  Admission: N  -MS    Retired Wound - Properties Group Placement Date: 10/16/24  -MS Placement Time: 0705 -MS Present on Original Admission: N  -MS Side: Left  -MS Orientation: posterior  -MS Location: greater trochanter  -MS Primary Wound Type: MASD  -MS Type: MASD (moisture associated skin damage)  -MS    Retired Wound - Properties Group Placement Date: 10/16/24  -MS Placement Time: 0705 -MS Present on Original Admission: N  -MS Side: Left  -MS Orientation: posterior  -MS Location: greater trochanter  -MS Primary Wound Type: MASD  -MS Type: MASD (moisture associated skin damage)  -MS    Retired Wound - Properties Group Date first assessed: 10/16/24  -MS Time first assessed: 0705 -MS Present on Original Admission: N  -MS Side: Left  -MS Location: greater trochanter  -MS Primary Wound Type: MASD  -MS Type: MASD (moisture associated skin damage)  -MS      Row Name 10/24/24 1418          Progress Summary (PT)    Daily Progress Summary (PT) Pt. was not feeling well when PT arrived. She deferred mobility skills. She was able to tolerate supine ther-ex. Pt. would benefit from skilled PT services.  -KM               User Key  (r) = Recorded By, (t) = Taken By, (c) = Cosigned By      Initials Name Provider Type    Karen Peralta, RN Registered Nurse    Thierry Carter, PT Physical Therapist    Nory Keenan, RN Registered Nurse    Roe North, RN Registered Nurse                    Physical Therapy Education       Title: PT OT SLP Therapies (Done)       Topic: Physical Therapy (Done)       Point: Mobility training (Done)       Learning Progress Summary            Patient Acceptance, E,TB, VU by RG at 10/22/2024 1002    Acceptance, TB,E, VU by RG at 10/21/2024 0907    Nonacceptance, E, NR by WG at 10/16/2024 0228    Acceptance, E,TB, VU by CF at 10/15/2024 0753    Acceptance, E,TB, VU by CF at 10/14/2024 0801    Acceptance, E,TB, VU by CF at 10/13/2024 1045    Acceptance, E,D, VU,NR by  at 10/10/2024  1310    Acceptance, E,TB, VU by CB at 10/8/2024 0448    Acceptance, E, NR by RD at 10/2/2024 1101    Acceptance, E, NR by RD at 10/1/2024 0856    Acceptance, E,D, VU,NR by AG at 9/30/2024 1559                      Point: Home exercise program (Done)       Learning Progress Summary            Patient Acceptance, E,TB, VU by RG at 10/22/2024 1002    Acceptance, TB,E, VU by RG at 10/21/2024 0907    Nonacceptance, E, NR by WG at 10/16/2024 0228    Acceptance, E,TB, VU by CF at 10/15/2024 0753    Acceptance, E,TB, VU by CF at 10/14/2024 0801    Acceptance, E,TB, VU by CF at 10/13/2024 1045    Acceptance, E,D, VU,NR by AG at 10/10/2024 1310    Acceptance, E,TB, VU by CB at 10/8/2024 0448    Acceptance, E, NR by RD at 10/2/2024 1101    Acceptance, E, NR by RD at 10/1/2024 0856    Acceptance, E,D, VU,NR by AG at 9/30/2024 1559                      Point: Body mechanics (Done)       Learning Progress Summary            Patient Acceptance, E,TB, VU by RG at 10/22/2024 1002    Acceptance, TB,E, VU by RG at 10/21/2024 0907    Nonacceptance, E, NR by WG at 10/16/2024 0228    Acceptance, E,TB, VU by CF at 10/15/2024 0753    Acceptance, E,TB, VU by CF at 10/14/2024 0801    Acceptance, E,TB, VU by CF at 10/13/2024 1045    Acceptance, E,D, VU,NR by AG at 10/10/2024 1310    Acceptance, E,TB, VU by CB at 10/8/2024 0448    Acceptance, E, NR by RD at 10/2/2024 1101    Acceptance, E, NR by RD at 10/1/2024 0856    Acceptance, E,D, VU,NR by AG at 9/30/2024 1559                      Point: Precautions (Done)       Learning Progress Summary            Patient Acceptance, E,TB, VU by RG at 10/22/2024 1002    Acceptance, TB,E, VU by RG at 10/21/2024 0907    Nonacceptance, E, NR by WG at 10/16/2024 0228    Acceptance, E,TB, VU by CF at 10/15/2024 0753    Acceptance, E,TB, VU by CF at 10/14/2024 0801    Acceptance, E,TB, VU by CF at 10/13/2024 1045    Acceptance, E,D, VU,NR by AG at 10/10/2024 1310    Acceptance, E,TB, VU by CB at 10/8/2024 0448     Acceptance, E, NR by RD at 10/2/2024 1101    Acceptance, E, NR by RD at 10/1/2024 0856    Acceptance, E,D, VU,NR by  at 9/30/2024 1559                                      User Key       Initials Effective Dates Name Provider Type Discipline     06/16/21 -  Мария Joya, PT Physical Therapist PT    RD 06/16/21 -  Laisha Verdugo, RN Registered Nurse Nurse    RG 06/06/23 -  Jesus Matthews, RN Registered Nurse Nurse    WG 02/12/24 -  Shanthi Burden, RN Registered Nurse Nurse    CF 06/25/24 -  Cherelle Germain, RN Registered Nurse Nurse    CB 06/17/24 -  Fidelia Lombardo, RN Registered Nurse Nurse                  PT Recommendation and Plan     Progress Summary (PT)  Daily Progress Summary (PT): Pt. was not feeling well when PT arrived. She deferred mobility skills. She was able to tolerate supine ther-ex. Pt. would benefit from skilled PT services.       Time Calculation:    PT Charges       Row Name 10/24/24 1417             Time Calculation    PT Received On 10/24/24  -KM         Time Calculation- PT    Total Timed Code Minutes- PT 15 minute(s)  -KM                User Key  (r) = Recorded By, (t) = Taken By, (c) = Cosigned By      Initials Name Provider Type    Thierry Carter, PT Physical Therapist                  Therapy Charges for Today       Code Description Service Date Service Provider Modifiers Qty    91750948789 HC PT THERAPEUTIC ACT EA 15 MIN 10/23/2024 Thierry Saldivar, PT GP 1    15808123528 HC PT THER PROC EA 15 MIN 10/23/2024 Thierry Saldivar, PT GP 1    78760236577 HC PT THER PROC EA 15 MIN 10/24/2024 Thierry Saldivar, PT GP 1            PT G-Codes  AM-PAC 6 Clicks Score (PT): 9    Thierry Saldivar, PT  10/24/2024

## 2024-10-24 NOTE — CASE MANAGEMENT/SOCIAL WORK
Discharge Planning Assessment   [place]     Patient Name: Lety Johnson  MRN: [id number]  Today's Date: 10/24/2024    Admit Date: 9/26/2024       Discharge Plan       Row Name 10/24/24 1339       Plan    Plan SS discussed statement for  with hospitalist practice manager. 's , Deisy request a statement from provider including reasons that pt is unable to be present at disability court hearing on 10/30/24. Hospitalist practice manager agreeable to provide this statement. SS to follow.                  Continued Care and Services - Admitted Since 9/26/2024       Destination       Service Provider Request Status Services Address Phone Fax Patient Preferred    [facility] Declined  Cannot meet patient's needs -- [address] [facility] [address] [phone] --    [facility] Acute Care Declined  Not eligible -- [address] [facility] [address] [phone] --    [facility] Declined  Not eligible -- [address] [facility] [address] [phone] --    [facility] Declined  Clinical Denial -- [address] [facility], 4TH FLOOR, [facility] [address] [phone] --    [facility] SWING BED UNIT Declined  Cannot meet patient's needs -- [address] [facility] [address] [phone] --     [facility] Declined  Not eligible -- [address] [facility] [address] [phone] --    [facility] Declined  Bed not available -- [address] [facility] [address] [phone] --    [facility] SKILLED CARE Declined  Bed not available -- [address] [facility] [address] [phone] --    [facility] Swing Declined  Out of network -- [address] [facility] [place] [address] [phone] --     [facility] SWING BED UNIT Declined -- [address] [place] [id number] [phone] --    [facility] Declined  Clinical Denial -- [address] [facility] [address] [phone] --    [facility] SWING BED UNIT Declined  Insurance Denial, Out of network -- [address] [address] [phone] --    [facility] Declined  Out of network -- [address] [facility] [address] [phone] --    [facility] Declined  Out of network -- [address] [address] [phone] -- --    [facility] Declined  Out of network -- [address] [facility] [address] [phone] --                  Expected Discharge Date and Time       Expected Discharge Date Expected Discharge Time    Oct 30, 2024      HELEN Butterfield

## 2024-10-24 NOTE — PLAN OF CARE
Goal Outcome Evaluation:  Plan of Care Reviewed With: patient        Progress: no change  Outcome Evaluation: Patient resting in bed watching TV. Wound care complete per order. C/O pain and muscle spasms this shift, PRN medication given per MAR. VSS on RA. No acute changes noted at this time. Will continue with POC.

## 2024-10-24 NOTE — PLAN OF CARE
Goal Outcome Evaluation:  Plan of Care Reviewed With: patient        Progress: no change  Outcome Evaluation: Patient resting in bed. VSS on RA. Wound care completed per order. Pt requested medication to help sleep, PRN medication given per MAR. No concerns or complaints at this time. Will continue with plan of care.

## 2024-10-24 NOTE — PROGRESS NOTES
[facility] HOSPITALIST PROGRESS NOTE     Patient Identification:  Name:  Lety Johnson  Age:  43 y.o.  Sex:  female  :  1980  MRN:  [id number]  Visit Number:  [id number]  Primary Care Provider:  Provider, No Known    Length of stay:  26    Subjective: Patient seen and examined she was in bed asleep but arousable, conversant, review of Accu-Chek results still persistently hyperglycemic despite increasing her Lantus regimen and change of schedule to twice a day.  Blood pressure stable.    Chief Complaint: Debility, diabetes  ----------------------------------------------------------------------------------------------------------------------  Current Hospital Meds:  acyclovir, 400 mg, Oral, Nightly  aspirin, 81 mg, Oral, Daily  atorvastatin, 80 mg, Oral, Daily  DULoxetine, 60 mg, Oral, Daily  heparin (porcine), 5,000 Units, Subcutaneous, Q8H  insulin glargine, 25 Units, Subcutaneous, Nightly  insulin glargine, 35 Units, Subcutaneous, Daily With Breakfast  insulin lispro, 4-24 Units, Subcutaneous, 4x Daily AC & at Bedtime  Lidocaine, 1 patch, Transdermal, Q24H  lisinopril, 20 mg, Oral, Daily  magic barrier cream, , Topical, BID  metoprolol tartrate, 25 mg, Oral, Q12H  nicotine, 1 patch, Transdermal, Q24H  nystatin, , Topical, Q12H  pantoprazole, 40 mg, Oral, Daily  sodium chloride, 10 mL, Intravenous, Q12H  sodium chloride, 10 mL, Intravenous, Q12H      Pharmacy Consult,       ----------------------------------------------------------------------------------------------------------------------  Vital Signs:  Temp:  [97.6 °F (36.4 °C)-98 °F (36.7 °C)] 97.6 °F (36.4 °C)  Heart Rate:  [] 96  Resp:  [16-18] 16  BP: (103-119)/(58-68) 106/66       Tele:       10/07/24  0511 10/08/24  0500 10/09/24  0500   Weight: 102 kg (225 lb 14.4 oz) (FIFI cameron) 102 kg (225 lb 15.5 oz) 102 kg (224 lb 1.6 oz)     Body mass index is 36.73 kg/m².    Intake/Output Summary (Last 24 hours) at 10/24/2024  0859  Last data filed at 10/24/2024 0300  Gross per 24 hour   Intake 1020 ml   Output --   Net 1020 ml     Diet: Regular/House, Diabetic; Consistent Carbohydrate; Fluid Consistency: Thin (IDDSI 0)  ----------------------------------------------------------------------------------------------------------------------  Physical exam:  General: In bed, asleep but arousable, conversant she is not confused.  No respiratory distress.   Skin:  Skin is warm and dry. No rash noted. No pallor.    HENT:  Head:  Normocephalic and atraumatic.  Mouth:  Moist mucous membranes.    Eyes:  Conjunctivae and EOM are normal.  Pupils are equal, round, and reactive to light.  No scleral icterus.    Neck:  Neck supple.  No JVD present.    Pulmonary/Chest:  No respiratory distress, no wheezes, no crackles, with normal breath sounds and good air movement.  Cardiovascular:  Normal rate, regular rhythm and normal heart sounds with no murmur.  Abdominal: Obese soft.  Bowel sounds are normal.  No distension and no tenderness.   Extremities:  No edema, no tenderness, and no deformity.  No red or swollen joints anywhere.  Strong pulses in all 4 extremities with no clubbing, no cyanosis, no edema.  Neurological: Patient left-sided plegia. .    Genitourinary: No Florentino catheter  Back:  ----------------------------------------------------------------------------------------------------------------------  ----------------------------------------------------------------------------------------------------------------------      Results from last 7 days   Lab Units 10/19/24  0815   WBC 10*3/mm3 9.49   HEMOGLOBIN g/dL 13.3   HEMATOCRIT % 39.7   MCV fL 97.8*   MCHC g/dL 33.5   PLATELETS 10*3/mm3 317         Results from last 7 days   Lab Units 10/19/24  0815 10/18/24  0156   SODIUM mmol/L 138 137   POTASSIUM mmol/L 4.2 4.3   MAGNESIUM mg/dL  --  1.9   CHLORIDE mmol/L 102 101   CO2 mmol/L 22.9 24.1   BUN mg/dL 18 22*   CREATININE mg/dL 0.48* 0.52*   CALCIUM  "mg/dL 9.8 10.0   GLUCOSE mg/dL 252* 192*   Estimated Creatinine Clearance: 182.5 mL/min (A) (by C-G formula based on SCr of 0.48 mg/dL (L)).    No results found for: \"AMMONIA\"      Blood Culture   Date Value Ref Range Status   10/18/2024 Staphylococcus, coagulase negative (C)  Final   10/18/2024 No growth at 5 days  Final     Urine Culture   Date Value Ref Range Status   10/18/2024 >100,000 CFU/mL Proteus vulgaris (A)  Final     No results found for: \"WOUNDCX\"  No results found for: \"STOOLCX\"    I have personally looked at the labs and they are summarized above.  ----------------------------------------------------------------------------------------------------------------------  Imaging Results (Last 24 Hours)       ** No results found for the last 24 hours. **          ----------------------------------------------------------------------------------------------------------------------  Assessment and Plan:  -Recent ESBL E. coli UTI completed treatment  -Chronic debility  -Diabetes with persistent hyperglycemia  -Of CVA with left-sided hemiplegia  -History of genital herpes on chronic suppression therapy  -Acute metabolic encephalopathy present admission resolved  -Obesity complicating all aspects of care  -Bacteremia coagulase-negative staph likely contamination, patient asymptomatic off antimicrobial.    Patient's diabetes regimen has been aggressively adjusted, continue increase the dose accordingly, monitor for overt hypoglycemia, PT OT, for placement.    Disposition awaiting placement      Mara Navas MD  10/24/24  08:59 EDT  "

## 2024-10-24 NOTE — PROGRESS NOTES
"    [facility] HOSPITALIST PROGRESS NOTE     Patient Identification:  Name:  Lety Johnson  Age:  43 y.o.  Sex:  female  :  1980  MRN:  [id number]  Visit Number:  [id number]  Primary Care Provider:  Concha Rao APRN    Length of stay:  26    Subjective: Patient seen and examined, patient in bed, she is crying because of left shoulder pain after being \"rolled over \"multiple times patient was changed and clean and bathed.  Accu-Chek results noted still hyperglycemic.    Chief Complaint: Chronic pain  ----------------------------------------------------------------------------------------------------------------------  Current Hospital Meds:  acyclovir, 400 mg, Oral, Nightly  aspirin, 81 mg, Oral, Daily  atorvastatin, 80 mg, Oral, Daily  DULoxetine, 60 mg, Oral, Daily  heparin (porcine), 5,000 Units, Subcutaneous, Q8H  insulin glargine, 25 Units, Subcutaneous, Nightly  insulin glargine, 35 Units, Subcutaneous, Daily With Breakfast  insulin lispro, 4-24 Units, Subcutaneous, 4x Daily AC & at Bedtime  Lidocaine, 1 patch, Transdermal, Q24H  lisinopril, 20 mg, Oral, Daily  magic barrier cream, , Topical, BID  metoprolol tartrate, 25 mg, Oral, Q12H  nicotine, 1 patch, Transdermal, Q24H  nystatin, , Topical, Q12H  pantoprazole, 40 mg, Oral, Daily  sodium chloride, 10 mL, Intravenous, Q12H  sodium chloride, 10 mL, Intravenous, Q12H      Pharmacy Consult,       ----------------------------------------------------------------------------------------------------------------------  Vital Signs:  Temp:  [97.6 °F (36.4 °C)-98 °F (36.7 °C)] 98 °F (36.7 °C)  Heart Rate:  [] 112  Resp:  [16-18] 16  BP: (103-115)/(57-68) 115/57       Tele:       10/07/24  0511 10/08/24  0500 10/09/24  0500   Weight: 102 kg (225 lb 14.4 oz) (FIFI cameron) 102 kg (225 lb 15.5 oz) 102 kg (224 lb 1.6 oz)     Body mass index is 36.73 kg/m².    Intake/Output Summary (Last 24 hours) at 10/24/2024 7250  Last data filed at " 10/24/2024 1100  Gross per 24 hour   Intake 900 ml   Output --   Net 900 ml     Diet: Regular/House, Diabetic; Consistent Carbohydrate; Fluid Consistency: Thin (IDDSI 0)  ----------------------------------------------------------------------------------------------------------------------  Physical exam:  General: Chronically ill-appearing, conversant, tearful, awake, alert, oriented to self, place, and time, well-developed and well-nourished.  No respiratory distress.    Skin:  Skin is warm and dry. No rash noted. No pallor.    HENT:  Head:  Normocephalic and atraumatic.  Mouth:  Moist mucous membranes.    Eyes:  Conjunctivae and EOM are normal.  Pupils are equal, round, and reactive to light.  No scleral icterus.    Neck:  Neck supple.  No JVD present.    Pulmonary/Chest:  No respiratory distress, no wheezes, no crackles, with normal breath sounds and good air movement.  Cardiovascular:  Normal rate, regular rhythm and normal heart sounds with no murmur.  Abdominal:  Soft.  Bowel sounds are normal.  No distension and no tenderness.   Extremities: Left upper extremity with shoulder sling, no edema, no tenderness, and no deformity.  No red or swollen joints anywhere.  Strong pulses in all 4 extremities with no clubbing, no cyanosis, no edema.  Neurological: Left-sided hemiplegia    Genitourinary: No Florentino catheter  Back:  ----------------------------------------------------------------------------------------------------------------------  ----------------------------------------------------------------------------------------------------------------------      Results from last 7 days   Lab Units 10/19/24  0815   WBC 10*3/mm3 9.49   HEMOGLOBIN g/dL 13.3   HEMATOCRIT % 39.7   MCV fL 97.8*   MCHC g/dL 33.5   PLATELETS 10*3/mm3 317         Results from last 7 days   Lab Units 10/19/24  0815 10/18/24  0156   SODIUM mmol/L 138 137   POTASSIUM mmol/L 4.2 4.3   MAGNESIUM mg/dL  --  1.9   CHLORIDE mmol/L 102 101   CO2  "mmol/L 22.9 24.1   BUN mg/dL 18 22*   CREATININE mg/dL 0.48* 0.52*   CALCIUM mg/dL 9.8 10.0   GLUCOSE mg/dL 252* 192*   Estimated Creatinine Clearance: 182.5 mL/min (A) (by C-G formula based on SCr of 0.48 mg/dL (L)).    No results found for: \"AMMONIA\"      Blood Culture   Date Value Ref Range Status   10/18/2024 Staphylococcus, coagulase negative (C)  Final   10/18/2024 No growth at 5 days  Final     Urine Culture   Date Value Ref Range Status   10/18/2024 >100,000 CFU/mL Proteus vulgaris (A)  Final     No results found for: \"WOUNDCX\"  No results found for: \"STOOLCX\"    I have personally looked at the labs and they are summarized above.  ----------------------------------------------------------------------------------------------------------------------  Imaging Results (Last 24 Hours)       ** No results found for the last 24 hours. **          ----------------------------------------------------------------------------------------------------------------------  Assessment and Plan:  -Recent ESBL E. coli cystitis completed treatment  -Chronic debility  -Diabetes with hyperglycemia  -History of CVA with left-sided hemiplegia  -Chronic pain  -Neuropathy  -Obesity complicating all aspects of care  -History of genital herpes on chronic suppression with acyclovir    Continue PT OT, adjust diabetes regimen with careful monitoring for hypoglycemia, awaiting for placement.  Meantime continue Tylenol and Ultram as needed    Disposition for placement      Mara Navas MD  10/24/24  12:38 EDT  "

## 2024-10-24 NOTE — THERAPY TREATMENT NOTE
Acute Care - Occupational Therapy Treatment Note   Zaki     Patient Name: Lety Johnson  : 1980  MRN: [id number]  Today's Date: 10/24/2024  Onset of Illness/Injury or Date of Surgery: 24     Referring Physician: Dr. Marc    Admit Date: 2024       ICD-10-CM ICD-9-CM   1. Hypokalemia  E87.6 276.8   2. Pressure injury of skin of sacral region, unspecified injury stage  L89.159 707.03     707.20   3. Pressure injury of skin of left heel, unspecified injury stage  L89.629 707.07     707.20   4. Acute cystitis without hematuria  N30.00 595.0     Patient Active Problem List   Diagnosis   (none) - all problems resolved or deleted     Past Medical History:   Diagnosis Date    Stroke      History reviewed. No pertinent surgical history.      OT ASSESSMENT FLOWSHEET (Last 12 Hours)       OT Evaluation and Treatment       Row Name 10/24/24 1131                   OT Time and Intention    Document Type therapy note (daily note)  -KR        Mode of Treatment occupational therapy  -KR        Patient Effort fair  -KR        Symptoms Noted During/After Treatment increased pain  -KR        Comment LUE PROM at elbow/hand performed today to increase fxl positioning. Pt c/o increased pain LUE, tolerating minimal activity only  -KR           Cognition    Follows Commands (Cognition) WFL  -KR           Elbow/Forearm (Therapeutic Exercise)    Elbow/Forearm PROM (Therapeutic Exercise) left;flexion;extension  -KR           Hand (Therapeutic Exercise)    Hand PROM (Therapeutic Exercise) left;finger flexion;finger extension  -KR           Wound 24 1809 Left posterior ankle Other (comment)    Wound - Properties Group Placement Date: 24  -KJ Placement Time: 1809 Side: Left  -KJ Orientation: posterior  -KJ Location: ankle  -KJ Primary Wound Type: Other  -TW, unknow etiology     Retired Wound - Properties Group Placement Date: 24  -KJ Placement Time: 180 Side: Left  -KJ Orientation: posterior   -KJ Location: ankle  -KJ Primary Wound Type: Other  -TW, unknow etiology     Retired Wound - Properties Group Placement Date: 09/26/24  -KJ Placement Time: 1809  -KJ Side: Left  -KJ Orientation: posterior  -KJ Location: ankle  -KJ Primary Wound Type: Other  -TW, unknow etiology     Retired Wound - Properties Group Date first assessed: 09/26/24  -KJ Time first assessed: 1809  -KJ Side: Left  -KJ Location: ankle  -KJ Primary Wound Type: Other  -TW, unknow etiology        Wound 10/11/24 1330 medial coccyx Fissure    Wound - Properties Group Placement Date: 10/11/24  -TW Placement Time: 1330  -TW Orientation: medial  -TW Location: coccyx  -TW Primary Wound Type: Fissure  -TW    Retired Wound - Properties Group Placement Date: 10/11/24  -TW Placement Time: 1330  -TW Orientation: medial  -TW Location: coccyx  -TW Primary Wound Type: Fissure  -TW    Retired Wound - Properties Group Placement Date: 10/11/24  -TW Placement Time: 1330 -TW Orientation: medial  -TW Location: coccyx  -TW Primary Wound Type: Fissure  -TW    Retired Wound - Properties Group Date first assessed: 10/11/24  -TW Time first assessed: 1330  -TW Location: coccyx  -TW Primary Wound Type: Fissure  -TW       Wound 10/16/24 0705 Left medial gluteal MASD (moisture associated skin damage)    Wound - Properties Group Placement Date: 10/16/24  -MS Placement Time: 0705 -MS Side: Left  -MS Orientation: medial  -MS Location: gluteal  -MS Primary Wound Type: MASD  -MS Type: MASD (moisture associated skin damage)  -MS Present on Original Admission: N  -MS Additional Comments: Noted at shift change skin assesment, Night shift RN stated it had been there her shift.  -MS    Retired Wound - Properties Group Placement Date: 10/16/24  -MS Placement Time: 0705 -MS Present on Original Admission: N  -MS Side: Left  -MS Orientation: medial  -MS Location: gluteal  -MS Primary Wound Type: MASD  -MS Additional Comments: Noted at shift change skin assesment, Night shift RN  stated it had been there her shift.  -MS Type: MASD (moisture associated skin damage)  -MS    Retired Wound - Properties Group Placement Date: 10/16/24  -MS Placement Time: 0705 -MS Present on Original Admission: N  -MS Side: Left  -MS Orientation: medial  -MS Location: gluteal  -MS Primary Wound Type: MASD  -MS Additional Comments: Noted at shift change skin assesment, Night shift RN stated it had been there her shift.  -MS Type: MASD (moisture associated skin damage)  -MS    Retired Wound - Properties Group Date first assessed: 10/16/24  -MS Time first assessed: 0705 -MS Present on Original Admission: N  -MS Side: Left  -MS Location: gluteal  -MS Primary Wound Type: MASD  -MS Additional Comments: Noted at shift change skin assesment, Night shift RN stated it had been there her shift.  -MS Type: MASD (moisture associated skin damage)  -MS       Wound 10/16/24 0705 Left posterior greater trochanter MASD (moisture associated skin damage)    Wound - Properties Group Placement Date: 10/16/24  -MS Placement Time: 0705 -MS Side: Left  -MS Orientation: posterior  -MS Location: greater trochanter  -MS Primary Wound Type: MASD  -MS Type: MASD (moisture associated skin damage)  -MS Present on Original Admission: N  -MS    Retired Wound - Properties Group Placement Date: 10/16/24  -MS Placement Time: 0705 -MS Present on Original Admission: N  -MS Side: Left  -MS Orientation: posterior  -MS Location: greater trochanter  -MS Primary Wound Type: MASD  -MS Type: MASD (moisture associated skin damage)  -MS    Retired Wound - Properties Group Placement Date: 10/16/24  -MS Placement Time: 0705 -MS Present on Original Admission: N  -MS Side: Left  -MS Orientation: posterior  -MS Location: greater trochanter  -MS Primary Wound Type: MASD  -MS Type: MASD (moisture associated skin damage)  -MS    Retired Wound - Properties Group Date first assessed: 10/16/24  -MS Time first assessed: 0705  -MS Present on Original Admission: N  -MS  Side: Left  -MS Location: greater trochanter  -MS Primary Wound Type: MASD  -MS Type: MASD (moisture associated skin damage)  -MS       Plan of Care Review    Plan of Care Reviewed With patient  -KR           ROM Goal 1 (OT)    Progress/Outcome (ROM Goal 1, OT) continuing progress toward goal  -KR                  User Key  (r) = Recorded By, (t) = Taken By, (c) = Cosigned By      Initials Name Effective Dates    TW Karen Gipson RN 06/16/21 -     Neil Menezes OT 06/16/21 -     Nory Keenan RN 06/08/23 -     Roe North RN 06/04/24 -                      Occupational Therapy Education        No education to display                      OT Recommendation and Plan  Planned Therapy Interventions (OT): ROM/therapeutic exercise  Plan of Care Review  Plan of Care Reviewed With: patient  Progress: improving (tolerance to LUE PROM)  Plan of Care Reviewed With: patient        Time Calculation:     Therapy Charges for Today       Code Description Service Date Service Provider Modifiers Qty    45332934884  OT THERAPEUTIC ACT EA 15 MIN 10/23/2024 Neil Graf OT GO 1    [id number]  OT THERAPEUTIC ACT EA 15 MIN 10/24/2024 Neil Graf OT GO 1                 Neil Graf OT  10/24/2024

## 2024-10-24 NOTE — PROGRESS NOTES
10/23/24 @ 5350: Contacted by BOBY Raymond on this date regarding statement for SSDI court hearing. Please refer to note from Mandi on 10/23/2024 for details. Mandi provided me with information for attorneys office per patients sister. Information given was Kita (last name unknown) phone number .    10/24/24 @ 1912: I contacted number given and reach the office of [facility] [facility] [facility] located in [place]. Office states that the patient does have a disability hearing on 10/29/24 and that Kita EL is the  assisting patient in the firm with SSDI court hearing. Per the office, they need a statement showing the patient is hospitalized and ask for disability hearing to be rescheduled. Per the law firm, a representative cannot go on behalf of the patient as the patient as to be present during the hearing. Firm requests that statement be faxed to , Kita EL to fax # [phone].    Will discuss with BOBY Raymond and Dr. Navas, Attending before proceeding.

## 2024-10-25 LAB
GLUCOSE BLDC GLUCOMTR-MCNC: 173 MG/DL (ref 70–130)
GLUCOSE BLDC GLUCOMTR-MCNC: 233 MG/DL (ref 70–130)
GLUCOSE BLDC GLUCOMTR-MCNC: 276 MG/DL (ref 70–130)
GLUCOSE BLDC GLUCOMTR-MCNC: 292 MG/DL (ref 70–130)

## 2024-10-25 PROCEDURE — 97112 NEUROMUSCULAR REEDUCATION: CPT

## 2024-10-25 PROCEDURE — 97530 THERAPEUTIC ACTIVITIES: CPT

## 2024-10-25 PROCEDURE — 97110 THERAPEUTIC EXERCISES: CPT

## 2024-10-25 PROCEDURE — 82948 REAGENT STRIP/BLOOD GLUCOSE: CPT

## 2024-10-25 PROCEDURE — 99232 SBSQ HOSP IP/OBS MODERATE 35: CPT | Performed by: HOSPITALIST

## 2024-10-25 PROCEDURE — 63710000001 INSULIN LISPRO (HUMAN) PER 5 UNITS: Performed by: INTERNAL MEDICINE

## 2024-10-25 PROCEDURE — 25010000002 HEPARIN (PORCINE) PER 1000 UNITS: Performed by: INTERNAL MEDICINE

## 2024-10-25 PROCEDURE — 97535 SELF CARE MNGMENT TRAINING: CPT

## 2024-10-25 PROCEDURE — 63710000001 INSULIN GLARGINE PER 5 UNITS: Performed by: HOSPITALIST

## 2024-10-25 RX ADMIN — VITAMINS A AND D OINTMENT: 15.5; 53.4 OINTMENT TOPICAL at 21:30

## 2024-10-25 RX ADMIN — METOPROLOL TARTRATE 25 MG: 25 TABLET, FILM COATED ORAL at 08:17

## 2024-10-25 RX ADMIN — HEPARIN SODIUM 5000 UNITS: 5000 INJECTION INTRAVENOUS; SUBCUTANEOUS at 14:14

## 2024-10-25 RX ADMIN — PANTOPRAZOLE SODIUM 40 MG: 40 TABLET, DELAYED RELEASE ORAL at 08:17

## 2024-10-25 RX ADMIN — NYSTATIN: 100000 POWDER TOPICAL at 08:18

## 2024-10-25 RX ADMIN — TRAMADOL HYDROCHLORIDE 50 MG: 50 TABLET ORAL at 04:08

## 2024-10-25 RX ADMIN — ACYCLOVIR 400 MG: 200 CAPSULE ORAL at 21:29

## 2024-10-25 RX ADMIN — HEPARIN SODIUM 5000 UNITS: 5000 INJECTION INTRAVENOUS; SUBCUTANEOUS at 05:18

## 2024-10-25 RX ADMIN — INSULIN LISPRO 12 UNITS: 100 INJECTION, SOLUTION INTRAVENOUS; SUBCUTANEOUS at 17:20

## 2024-10-25 RX ADMIN — HEPARIN SODIUM 5000 UNITS: 5000 INJECTION INTRAVENOUS; SUBCUTANEOUS at 21:28

## 2024-10-25 RX ADMIN — INSULIN LISPRO 12 UNITS: 100 INJECTION, SOLUTION INTRAVENOUS; SUBCUTANEOUS at 21:28

## 2024-10-25 RX ADMIN — INSULIN GLARGINE 35 UNITS: 100 INJECTION, SOLUTION SUBCUTANEOUS at 08:17

## 2024-10-25 RX ADMIN — Medication 10 ML: at 08:19

## 2024-10-25 RX ADMIN — CYCLOBENZAPRINE 10 MG: 10 TABLET, FILM COATED ORAL at 05:18

## 2024-10-25 RX ADMIN — NYSTATIN: 100000 POWDER TOPICAL at 21:30

## 2024-10-25 RX ADMIN — VITAMINS A AND D OINTMENT: 15.5; 53.4 OINTMENT TOPICAL at 08:19

## 2024-10-25 RX ADMIN — NICOTINE 1 PATCH: 7 PATCH, EXTENDED RELEASE TRANSDERMAL at 08:19

## 2024-10-25 RX ADMIN — INSULIN LISPRO 8 UNITS: 100 INJECTION, SOLUTION INTRAVENOUS; SUBCUTANEOUS at 08:17

## 2024-10-25 RX ADMIN — LISINOPRIL 20 MG: 10 TABLET ORAL at 08:17

## 2024-10-25 RX ADMIN — TRAMADOL HYDROCHLORIDE 50 MG: 50 TABLET ORAL at 14:10

## 2024-10-25 RX ADMIN — ATORVASTATIN CALCIUM 80 MG: 40 TABLET, FILM COATED ORAL at 08:17

## 2024-10-25 RX ADMIN — INSULIN LISPRO 4 UNITS: 100 INJECTION, SOLUTION INTRAVENOUS; SUBCUTANEOUS at 12:02

## 2024-10-25 RX ADMIN — LIDOCAINE 1 PATCH: 4 PATCH TOPICAL at 17:20

## 2024-10-25 RX ADMIN — Medication 10 ML: at 21:30

## 2024-10-25 RX ADMIN — INSULIN GLARGINE 25 UNITS: 100 INJECTION, SOLUTION SUBCUTANEOUS at 21:28

## 2024-10-25 RX ADMIN — DULOXETINE HYDROCHLORIDE 60 MG: 60 CAPSULE, DELAYED RELEASE ORAL at 08:17

## 2024-10-25 RX ADMIN — ASPIRIN 81 MG: 81 TABLET, CHEWABLE ORAL at 08:17

## 2024-10-25 NOTE — PROGRESS NOTES
[facility] HOSPITALIST PROGRESS NOTE     Patient Identification:  Name:  Lety Johnson  Age:  43 y.o.  Sex:  female  :  1980  MRN:  [id number]  Visit Number:  [id number]  Primary Care Provider:  Concha Rao APRN    Length of stay:  27    Subjective: Despite aggressive adjustments of insulin regimen, patient continues to be hyperglycemic, this was discussed with staff, apparently patient gets food from outside through her relatives, this has been discussed with patient and staff to avoid this to prevent overt hypoglycemia and proper control of her diabetes.  Patient awaiting for placement.    Chief Complaint: Debility diabetes with hyperglycemia  ----------------------------------------------------------------------------------------------------------------------  Current Hospital Meds:  acyclovir, 400 mg, Oral, Nightly  aspirin, 81 mg, Oral, Daily  atorvastatin, 80 mg, Oral, Daily  DULoxetine, 60 mg, Oral, Daily  heparin (porcine), 5,000 Units, Subcutaneous, Q8H  insulin glargine, 25 Units, Subcutaneous, Nightly  insulin glargine, 35 Units, Subcutaneous, Daily With Breakfast  insulin lispro, 4-24 Units, Subcutaneous, 4x Daily AC & at Bedtime  Lidocaine, 1 patch, Transdermal, Q24H  lisinopril, 20 mg, Oral, Daily  magic barrier cream, , Topical, BID  metoprolol tartrate, 25 mg, Oral, Q12H  nicotine, 1 patch, Transdermal, Q24H  nystatin, , Topical, Q12H  pantoprazole, 40 mg, Oral, Daily  sodium chloride, 10 mL, Intravenous, Q12H  sodium chloride, 10 mL, Intravenous, Q12H      Pharmacy Consult,       ----------------------------------------------------------------------------------------------------------------------  Vital Signs:  Temp:  [97.4 °F (36.3 °C)-97.9 °F (36.6 °C)] 97.5 °F (36.4 °C)  Heart Rate:  [] 107  Resp:  [18-20] 18  BP: ()/(56-67) 97/56       Tele:       10/07/24  0511 10/08/24  0500 10/09/24  0500   Weight: 102 kg (225 lb 14.4 oz) (FIFI cameron) 102 kg  (225 lb 15.5 oz) 102 kg (224 lb 1.6 oz)     Body mass index is 36.73 kg/m².  No intake or output data in the 24 hours ending 10/25/24 2061  Diet: Regular/House, Diabetic; Consistent Carbohydrate; Fluid Consistency: Thin (IDDSI 0)  ----------------------------------------------------------------------------------------------------------------------  Physical exam:  General: Patient in bed awake and alert chronically ill-appearing, no complaints at this time, No respiratory distress.    Skin:  Skin is warm and dry. No rash noted. No pallor.    HENT:  Head:  Normocephalic and atraumatic.  Mouth:  Moist mucous membranes.    Eyes:  Conjunctivae and EOM are normal.  Pupils are equal, round, and reactive to light.  No scleral icterus.    Neck:  Neck supple.  No JVD present.  No bruit  Pulmonary/Chest:  No respiratory distress, no wheezes, no crackles, with normal breath sounds and good air movement.  Cardiovascular:  Normal rate, regular rhythm and normal heart sounds with no murmur.  Abdominal:  Soft.  Bowel sounds are normal.  No distension and no tenderness.   Extremities:  No edema, no tenderness, and no deformity.  No red or swollen joints anywhere.  Strong pulses in all 4 extremities with no clubbing, no cyanosis, no edema.  Neurological: Left-sided hemiplegia   Genitourinary: No Florentino catheter  Back:  ----------------------------------------------------------------------------------------------------------------------  ----------------------------------------------------------------------------------------------------------------------      Results from last 7 days   Lab Units 10/19/24  0815   WBC 10*3/mm3 9.49   HEMOGLOBIN g/dL 13.3   HEMATOCRIT % 39.7   MCV fL 97.8*   MCHC g/dL 33.5   PLATELETS 10*3/mm3 317         Results from last 7 days   Lab Units 10/19/24  0815   SODIUM mmol/L 138   POTASSIUM mmol/L 4.2   CHLORIDE mmol/L 102   CO2 mmol/L 22.9   BUN mg/dL 18   CREATININE mg/dL 0.48*   CALCIUM mg/dL 9.8  "  GLUCOSE mg/dL 252*   Estimated Creatinine Clearance: 182.5 mL/min (A) (by C-G formula based on SCr of 0.48 mg/dL (L)).    No results found for: \"AMMONIA\"      Blood Culture   Date Value Ref Range Status   10/18/2024 Staphylococcus, coagulase negative (C)  Final   10/18/2024 No growth at 5 days  Final     Urine Culture   Date Value Ref Range Status   10/18/2024 >100,000 CFU/mL Proteus vulgaris (A)  Final     No results found for: \"WOUNDCX\"  No results found for: \"STOOLCX\"    I have personally looked at the labs and they are summarized above.  ----------------------------------------------------------------------------------------------------------------------  Imaging Results (Last 24 Hours)       ** No results found for the last 24 hours. **          ----------------------------------------------------------------------------------------------------------------------  Assessment and Plan:  -ESBL E. coli UTI completed treatment  -Chronic debility  -History of CVA left-sided hemiplegia  -Diabetes with hyperglycemia  -Diabetic polyneuropathy    Discussed with staff, reported patient gets food from relatives, patient was counseled and instructed staff not to allow food from outside unless sugar-free due to her hyperglycemia and with increasing dose of insulin regimen high risk for overt hypoglycemia.  In the meantime continue PT OT, for placement.    Disposition for placement      Mara Navas MD  10/25/24  14:54 EDT  "

## 2024-10-25 NOTE — CASE MANAGEMENT/SOCIAL WORK
Discharge Planning Assessment   Zaki     Patient Name: Lety Johnson  MRN: [id number]  Today's Date: 10/25/2024    Admit Date: 9/26/2024     Discharge Plan       Row Name 10/25/24 1721       Plan    Plan SS spoke to APS BOBY, Kinsey Dial, sister, Moises, and  at  per Deisy on this date. [facility] medicine practice manager provided another statement to the .  confirmed that disability court hearing is 10/30/24 at 14:45 EST. SS will follow up with State Representative Justin Balaji's office on Monday, 10/28/24 to further discuss SSDI court hearing scheduled on 10/30/24. SS to follow.                  Continued Care and Services - Admitted Since 9/26/2024       Destination       Service Provider Request Status Services Address Phone Fax Patient Preferred    [facility] Declined  Cannot meet patient's needs -- [address] [facility] [id number] [phone] --     [facility] Acute Care Declined  Not eligible -- [address] [facility] [address] [phone] --    [facility] Declined  Not eligible -- [address] [facility] [address] [phone] --    [facility] Declined  Clinical Denial -- [address] [facility], 4TH FLOOR, [facility] [address] [phone] --    [facility] HOSPITAL SWING BED UNIT Declined  Cannot meet patient's needs -- [address] [facility] [address] [phone] --     [facility] Declined  Not eligible -- [address] [facility] [place] [address] [phone] --    [facility] Declined  Bed not available -- [address] [facility] [address] [address] [phone] --    [facility] SKILLED CARE Declined  Bed not available -- [address] [facility] [address] [phone] --    [facility]  Swing Declined  Out of network -- [address] [facility] [place] [address] [phone] --    [facility] SWING BED UNIT Declined -- [address] [facility] [place] [place] [address] [phone] --    [facility] Declined  Clinical Denial -- [address] [facility] [id number] [phone] --    [facility] SWING BED UNIT Declined  Insurance Denial, Out of network -- [address] [id number] [phone] --    [facility] Declined  Out of network -- [address] [facility] [address] [phone] --    [facility] Declined  Out of network -- [address] [address] [phone] -- --    [facility] Declined  Out of network -- [address] [facility] [address] [phone] --                  Expected Discharge Date and Time       Expected Discharge Date Expected Discharge Time    Oct 30, 2024         HELEN Butterfield

## 2024-10-25 NOTE — THERAPY TREATMENT NOTE
Acute Care - Physical Therapy Treatment Note   [place]     Patient Name: Lety Johnson  : 1980  MRN: [id number]  Today's Date: 10/25/2024   Onset of Illness/Injury or Date of Surgery: 24  Visit Dx:     ICD-10-CM ICD-9-CM   1. Hypokalemia  E87.6 276.8   2. Pressure injury of skin of sacral region, unspecified injury stage  L89.159 707.03     707.20   3. Pressure injury of skin of left heel, unspecified injury stage  L89.629 707.07     707.20   4. Acute cystitis without hematuria  N30.00 595.0     Patient Active Problem List   Diagnosis   (none) - all problems resolved or deleted     Past Medical History:   Diagnosis Date    Stroke      History reviewed. No pertinent surgical history.  PT Assessment (Last 12 Hours)       PT Evaluation and Treatment       Row Name 10/25/24 1242          Physical Therapy Time and Intention    Subjective Information complains of;pain  -KM     Document Type therapy note (daily note)  -KM     Mode of Treatment physical therapy  -KM     Patient Effort adequate  -KM     Symptoms Noted During/After Treatment fatigue;increased pain  -KM       Row Name 10/25/24 1242          General Information    Patient Profile Reviewed yes  -KM     Patient Observations alert;cooperative;agree to therapy  -KM     Existing Precautions/Restrictions fall  -KM       Row Name 10/25/24 1242          Cognition    Affect/Mental Status (Cognition) flat/blunted affect;emotionally labile  -KM     Orientation Status (Cognition) oriented x 3  -KM     Follows Commands (Cognition) follows one-step commands  -KM       Row Name 10/25/24 1242          Bed Mobility    Bed Mobility supine-sit  -KM     Supine-Sit [place] (Bed Mobility) maximum assist (25% patient effort);2 person assist  -KM     Bed Mobility, Safety Issues decreased use of arms for pushing/pulling;decreased use of legs for bridging/pushing  -KM     Assistive Device (Bed Mobility) bed rails;head of bed elevated  -KM       Row Name 10/25/24  1242          Gait/Stairs (Locomotion)    Patient was able to Ambulate no, other medical factors prevent ambulation  -KM     Reason Patient was unable to Ambulate Non-Ambulatory at Baseline  -KM       Row Name 10/25/24 1242          Safety Issues/Impairments Affecting Functional Mobility    Impairments Affecting Function (Mobility) balance;endurance/activity tolerance;pain;strength  -KM       Row Name 10/25/24 1242          Motor Skills    Comments, Therapeutic Exercise EOB ther-ex  -KM       Row Name             Wound 09/26/24 1809 Left posterior ankle Other (comment)    Wound - Properties Group Placement Date: 09/26/24  -KJ Placement Time: 1809  -KJ Side: Left  -KJ Orientation: posterior  -KJ Location: ankle  -KJ Primary Wound Type: Other  -TW, unknow etiology     Retired Wound - Properties Group Placement Date: 09/26/24  -KJ Placement Time: 1809  -KJ Side: Left  -KJ Orientation: posterior  -KJ Location: ankle  -KJ Primary Wound Type: Other  -TW, unknow etiology     Retired Wound - Properties Group Placement Date: 09/26/24  -KJ Placement Time: 1809  -KJ Side: Left  -KJ Orientation: posterior  -KJ Location: ankle  -KJ Primary Wound Type: Other  -TW, unknow etiology     Retired Wound - Properties Group Date first assessed: 09/26/24  -KJ Time first assessed: 1809  -KJ Side: Left  -KJ Location: ankle  -KJ Primary Wound Type: Other  -TW, unknow etiology       Row Name             Wound 10/11/24 1330 medial coccyx Fissure    Wound - Properties Group Placement Date: 10/11/24  -TW Placement Time: 1330  -TW Orientation: medial  -TW Location: coccyx  -TW Primary Wound Type: Fissure  -TW    Retired Wound - Properties Group Placement Date: 10/11/24  -TW Placement Time: 1330  -TW Orientation: medial  -TW Location: coccyx  -TW Primary Wound Type: Fissure  -TW    Retired Wound - Properties Group Placement Date: 10/11/24  -TW Placement Time: 1330  -TW Orientation: medial  -TW Location: coccyx  -TW Primary Wound Type: Fissure  -TW     Retired Wound - Properties Group Date first assessed: 10/11/24  -TW Time first assessed: 1330  -TW Location: coccyx  -TW Primary Wound Type: Fissure  -TW      Row Name             Wound 10/16/24 0705 Left medial gluteal MASD (moisture associated skin damage)    Wound - Properties Group Placement Date: 10/16/24  -MS Placement Time: 0705  -MS Side: Left  -MS Orientation: medial  -MS Location: gluteal  -MS Primary Wound Type: MASD  -MS Type: MASD (moisture associated skin damage)  -MS Present on Original Admission: N  -MS Additional Comments: Noted at shift change skin assesment, Night shift RN stated it had been there her shift.  -MS    Retired Wound - Properties Group Placement Date: 10/16/24  -MS Placement Time: 0705 -MS Present on Original Admission: N  -MS Side: Left  -MS Orientation: medial  -MS Location: gluteal  -MS Primary Wound Type: MASD  -MS Additional Comments: Noted at shift change skin assesment, Night shift RN stated it had been there her shift.  -MS Type: MASD (moisture associated skin damage)  -MS    Retired Wound - Properties Group Placement Date: 10/16/24  -MS Placement Time: 0705 -MS Present on Original Admission: N  -MS Side: Left  -MS Orientation: medial  -MS Location: gluteal  -MS Primary Wound Type: MASD  -MS Additional Comments: Noted at shift change skin assesment, Night shift RN stated it had been there her shift.  -MS Type: MASD (moisture associated skin damage)  -MS    Retired Wound - Properties Group Date first assessed: 10/16/24  -MS Time first assessed: 0705 -MS Present on Original Admission: N  -MS Side: Left  -MS Location: gluteal  -MS Primary Wound Type: MASD  -MS Additional Comments: Noted at shift change skin assesment, Night shift RN stated it had been there her shift.  -MS Type: MASD (moisture associated skin damage)  -MS      Row Name             Wound 10/16/24 0705 Left posterior greater trochanter MASD (moisture associated skin damage)    Wound - Properties Group  Placement Date: 10/16/24  -MS Placement Time: 0705 -MS Side: Left  -MS Orientation: posterior  -MS Location: greater trochanter  -MS Primary Wound Type: MASD  -MS Type: MASD (moisture associated skin damage)  -MS Present on Original Admission: N  -MS    Retired Wound - Properties Group Placement Date: 10/16/24  -MS Placement Time: 0705 -MS Present on Original Admission: N  -MS Side: Left  -MS Orientation: posterior  -MS Location: greater trochanter  -MS Primary Wound Type: MASD  -MS Type: MASD (moisture associated skin damage)  -MS    Retired Wound - Properties Group Placement Date: 10/16/24  -MS Placement Time: 0705  -MS Present on Original Admission: N  -MS Side: Left  -MS Orientation: posterior  -MS Location: greater trochanter  -MS Primary Wound Type: MASD  -MS Type: MASD (moisture associated skin damage)  -MS    Retired Wound - Properties Group Date first assessed: 10/16/24  -MS Time first assessed: 0705 -MS Present on Original Admission: N  -MS Side: Left  -MS Location: greater trochanter  -MS Primary Wound Type: MASD  -MS Type: MASD (moisture associated skin damage)  -MS      Row Name 10/25/24 1242          Progress Summary (PT)    Daily Progress Summary (PT) Pt. was able to perform bed mobility w/ maxA x2. She was able to maintain sitting EOB for increased duration. She tolerated ther-ex w/ complaints of pain. Pt. would continue to benefit from skilled PT services as she is able to improve participation.  -KM               User Key  (r) = Recorded By, (t) = Taken By, (c) = Cosigned By      Initials Name Provider Type    Kraen Peralta, RN Registered Nurse    Thierry Carter, PT Physical Therapist    Nory Keenan, RN Registered Nurse    Roe North, RN Registered Nurse                    Physical Therapy Education       Title: PT OT SLP Therapies (Done)       Topic: Physical Therapy (Done)       Point: Mobility training (Done)       Learning Progress Summary            Patient  Acceptance, E,TB, VU by RG at 10/22/2024 1002    Acceptance, TB,E, VU by RG at 10/21/2024 0907    Nonacceptance, E, NR by WG at 10/16/2024 0228    Acceptance, E,TB, VU by CF at 10/15/2024 0753    Acceptance, E,TB, VU by CF at 10/14/2024 0801    Acceptance, E,TB, VU by CF at 10/13/2024 1045    Acceptance, E,D, VU,NR by AG at 10/10/2024 1310    Acceptance, E,TB, VU by CB at 10/8/2024 0448    Acceptance, E, NR by RD at 10/2/2024 1101    Acceptance, E, NR by RD at 10/1/2024 0856    Acceptance, E,D, VU,NR by AG at 9/30/2024 1559                      Point: Home exercise program (Done)       Learning Progress Summary            Patient Acceptance, E,TB, VU by RG at 10/22/2024 1002    Acceptance, TB,E, VU by RG at 10/21/2024 0907    Nonacceptance, E, NR by WG at 10/16/2024 0228    Acceptance, E,TB, VU by CF at 10/15/2024 0753    Acceptance, E,TB, VU by CF at 10/14/2024 0801    Acceptance, E,TB, VU by CF at 10/13/2024 1045    Acceptance, E,D, VU,NR by AG at 10/10/2024 1310    Acceptance, E,TB, VU by CB at 10/8/2024 0448    Acceptance, E, NR by RD at 10/2/2024 1101    Acceptance, E, NR by RD at 10/1/2024 0856    Acceptance, E,D, VU,NR by AG at 9/30/2024 1559                      Point: Body mechanics (Done)       Learning Progress Summary            Patient Acceptance, E,TB, VU by RG at 10/22/2024 1002    Acceptance, TB,E, VU by RG at 10/21/2024 0907    Nonacceptance, E, NR by WG at 10/16/2024 0228    Acceptance, E,TB, VU by CF at 10/15/2024 0753    Acceptance, E,TB, VU by CF at 10/14/2024 0801    Acceptance, E,TB, VU by CF at 10/13/2024 1045    Acceptance, E,D, VU,NR by AG at 10/10/2024 1310    Acceptance, E,TB, VU by CB at 10/8/2024 0448    Acceptance, E, NR by RD at 10/2/2024 1101    Acceptance, E, NR by RD at 10/1/2024 0856    Acceptance, E,D, VU,NR by AG at 9/30/2024 1559                      Point: Precautions (Done)       Learning Progress Summary            Patient Acceptance, E,TB, VU by RG at 10/22/2024 1002     Acceptance, TB,E, VU by RG at 10/21/2024 0907    Nonacceptance, E, NR by WG at 10/16/2024 0228    Acceptance, E,TB, VU by CF at 10/15/2024 0753    Acceptance, E,TB, VU by CF at 10/14/2024 0801    Acceptance, E,TB, VU by CF at 10/13/2024 1045    Acceptance, E,D, VU,NR by AG at 10/10/2024 1310    Acceptance, E,TB, VU by CB at 10/8/2024 0448    Acceptance, E, NR by RD at 10/2/2024 1101    Acceptance, E, NR by RD at 10/1/2024 0856    Acceptance, E,D, VU,NR by  at 9/30/2024 1559                                      User Key       Initials Effective Dates Name Provider Type Discipline     06/16/21 -  Мария Joya, PT Physical Therapist PT    RD 06/16/21 -  Laisha Verdugo, RN Registered Nurse Nurse    RG 06/06/23 -  Jesus Matthews, RN Registered Nurse Nurse    WG 02/12/24 -  Shanthi Burden, RN Registered Nurse Nurse     06/25/24 -  Cherelle Germain, RN Registered Nurse Nurse     06/17/24 -  Fidelia Lombardo, RN Registered Nurse Nurse                  PT Recommendation and Plan     Progress Summary (PT)  Daily Progress Summary (PT): Pt. was able to perform bed mobility w/ maxA x2. She was able to maintain sitting EOB for increased duration. She tolerated ther-ex w/ complaints of pain. Pt. would continue to benefit from skilled PT services as she is able to improve participation.       Time Calculation:    PT Charges       Row Name 10/25/24 1241             Time Calculation    PT Received On 10/25/24  -KM         Time Calculation- PT    Total Timed Code Minutes- PT 23 minute(s)  -KM                User Key  (r) = Recorded By, (t) = Taken By, (c) = Cosigned By      Initials Name Provider Type    Thierry Carter, PT Physical Therapist                  Therapy Charges for Today       Code Description Service Date Service Provider Modifiers Qty    08797803389 HC PT THER PROC EA 15 MIN 10/24/2024 Thierry Saldivar, PT GP 1    47459126838 HC PT THERAPEUTIC ACT EA 15 MIN 10/25/2024 Thierry Saldivar, PT GP 1    [id number]  PT  THER PROC EA 15 MIN 10/25/2024 Thierry Saldivar, PT GP 1            PT G-Codes  AM-PAC 6 Clicks Score (PT): 9    Thierry Saldivar, PT  10/25/2024

## 2024-10-25 NOTE — THERAPY TREATMENT NOTE
Acute Care - Occupational Therapy Treatment Note   [place]     Patient Name: Lety Johnson  : 1980  MRN: [id number]  Today's Date: 10/25/2024  Onset of Illness/Injury or Date of Surgery: 24     Referring Physician: Dr. Marc    Admit Date: 2024       ICD-10-CM ICD-9-CM   1. Hypokalemia  E87.6 276.8   2. Pressure injury of skin of sacral region, unspecified injury stage  L89.159 707.03     707.20   3. Pressure injury of skin of left heel, unspecified injury stage  L89.629 707.07     707.20   4. Acute cystitis without hematuria  N30.00 595.0     Patient Active Problem List   Diagnosis   (none) - all problems resolved or deleted     Past Medical History:   Diagnosis Date    Stroke      History reviewed. No pertinent surgical history.      OT ASSESSMENT FLOWSHEET (Last 12 Hours)       OT Evaluation and Treatment       Row Name 10/25/24 1239                   OT Time and Intention    Subjective Information complains of;weakness;fatigue;pain  -LM        Document Type therapy note (daily note)  -LM        Mode of Treatment occupational therapy  -LM        Patient Effort adequate  -LM        Comment Patient seen this date for adl retraining/fxl mobility.  Patient requires max x 2 for supine to sit.  Patient utilizing wrist cock up splints for comfort.  Doffed splints with max assist to and recommended patient to take off breaks with splints approx 2 hors during day and on during night as tolerated.  Patient unable to tolerate prom of LUE due to pain and request no movement.  Therapist educated patient importance of neuro re-ed.  -LM           General Information    Existing Precautions/Restrictions fall  -LM           Cognition    Affect/Mental Status (Cognition) flat/blunted affect  -LM           Wound 24 180 Left posterior ankle Other (comment)    Wound - Properties Group Placement Date: 24  -KJ Placement Time: 180  - Side: Left  -KJ Orientation: posterior  -KJ Location: ankle  -KJ  Primary Wound Type: Other  -TW, unknow etiology     Retired Wound - Properties Group Placement Date: 09/26/24  -KJ Placement Time: 1809  -KJ Side: Left  -KJ Orientation: posterior  -KJ Location: ankle  -KJ Primary Wound Type: Other  -TW, unknow etiology     Retired Wound - Properties Group Placement Date: 09/26/24  -KJ Placement Time: 1809  -KJ Side: Left  -KJ Orientation: posterior  -KJ Location: ankle  -KJ Primary Wound Type: Other  -TW, unknow etiology     Retired Wound - Properties Group Date first assessed: 09/26/24  -KJ Time first assessed: 1809  -KJ Side: Left  -KJ Location: ankle  -KJ Primary Wound Type: Other  -TW, unknow etiology        Wound 10/11/24 1330 medial coccyx Fissure    Wound - Properties Group Placement Date: 10/11/24  -TW Placement Time: 1330  -TW Orientation: medial  -TW Location: coccyx  -TW Primary Wound Type: Fissure  -TW    Retired Wound - Properties Group Placement Date: 10/11/24  -TW Placement Time: 1330  -TW Orientation: medial  -TW Location: coccyx  -TW Primary Wound Type: Fissure  -TW    Retired Wound - Properties Group Placement Date: 10/11/24  -TW Placement Time: 1330  -TW Orientation: medial  -TW Location: coccyx  -TW Primary Wound Type: Fissure  -TW    Retired Wound - Properties Group Date first assessed: 10/11/24  -TW Time first assessed: 1330  -TW Location: coccyx  -TW Primary Wound Type: Fissure  -TW       Wound 10/16/24 0705 Left medial gluteal MASD (moisture associated skin damage)    Wound - Properties Group Placement Date: 10/16/24  -MS Placement Time: 0705 -MS Side: Left  -MS Orientation: medial  -MS Location: gluteal  -MS Primary Wound Type: MASD  -MS Type: MASD (moisture associated skin damage)  -MS Present on Original Admission: N  -MS Additional Comments: Noted at shift change skin assesment, Night shift RN stated it had been there her shift.  -MS    Retired Wound - Properties Group Placement Date: 10/16/24  -MS Placement Time: 0705 -MS Present on Original  Admission: N  -MS Side: Left  -MS Orientation: medial  -MS Location: gluteal  -MS Primary Wound Type: MASD  -MS Additional Comments: Noted at shift change skin assesment, Night shift RN stated it had been there her shift.  -MS Type: MASD (moisture associated skin damage)  -MS    Retired Wound - Properties Group Placement Date: 10/16/24  -MS Placement Time: 0705 -MS Present on Original Admission: N  -MS Side: Left  -MS Orientation: medial  -MS Location: gluteal  -MS Primary Wound Type: MASD  -MS Additional Comments: Noted at shift change skin assesment, Night shift RN stated it had been there her shift.  -MS Type: MASD (moisture associated skin damage)  -MS    Retired Wound - Properties Group Date first assessed: 10/16/24  -MS Time first assessed: 0705 -MS Present on Original Admission: N  -MS Side: Left  -MS Location: gluteal  -MS Primary Wound Type: MASD  -MS Additional Comments: Noted at shift change skin assesment, Night shift RN stated it had been there her shift.  -MS Type: MASD (moisture associated skin damage)  -MS       Wound 10/16/24 0705 Left posterior greater trochanter MASD (moisture associated skin damage)    Wound - Properties Group Placement Date: 10/16/24  -MS Placement Time: 0705 -MS Side: Left  -MS Orientation: posterior  -MS Location: greater trochanter  -MS Primary Wound Type: MASD  -MS Type: MASD (moisture associated skin damage)  -MS Present on Original Admission: N  -MS    Retired Wound - Properties Group Placement Date: 10/16/24  -MS Placement Time: 0705 -MS Present on Original Admission: N  -MS Side: Left  -MS Orientation: posterior  -MS Location: greater trochanter  -MS Primary Wound Type: MASD  -MS Type: MASD (moisture associated skin damage)  -MS    Retired Wound - Properties Group Placement Date: 10/16/24  -MS Placement Time: 0705 -MS Present on Original Admission: N  -MS Side: Left  -MS Orientation: posterior  -MS Location: greater trochanter  -MS Primary Wound Type: MASD  -MS  Type: MASD (moisture associated skin damage)  -MS    Retired Wound - Properties Group Date first assessed: 10/16/24  -MS Time first assessed: 0705  -MS Present on Original Admission: N  -MS Side: Left  -MS Location: greater trochanter  -MS Primary Wound Type: MASD  -MS Type: MASD (moisture associated skin damage)  -MS       Positioning and Restraints    Post Treatment Position bed  -LM        In Bed sitting EOB;call light within reach;encouraged to call for assist;notified nsg  discussed on/off splint wearing schedule with rn  -LM                  User Key  (r) = Recorded By, (t) = Taken By, (c) = Cosigned By      Initials Name Effective Dates    TW Karen Gipson RN 06/16/21 -     LM Samantha Ragland OT 06/16/21 -     Nory Keenan RN 06/08/23 -     MS Roe Garcia RN 06/04/24 -                      Occupational Therapy Education        No education to display                      OT Recommendation and Plan              Time Calculation:     Therapy Charges for Today       Code Description Service Date Service Provider Modifiers Qty    57882139926 HC OT SELF CARE/MGMT/TRAIN EA 15 MIN 10/25/2024 Samantha Ragland OT GO 1    15016049267 HC OT NEUROMUSC RE EDUCATION EA 15 MIN 10/25/2024 Samantha Ragland OT GO 1                 Samantha Ragland OT  10/25/2024

## 2024-10-25 NOTE — PLAN OF CARE
Goal Outcome Evaluation:  Plan of Care Reviewed With: patient        Progress: no change  Outcome Evaluation: Patient is resting in bed at this time watching TV. C/O pain this shift, PRN medication given per MAR. Wound care completed per order. No acute changes noted at this time. Will continue with POC.

## 2024-10-25 NOTE — PROGRESS NOTES
1300: See notes from BOBY Raymond for details. Statement provided to attorneys office, signed by Dr Navas. BOBY Raymond aware. A copy of statement faxed to [facility] fax to be scanned into the record. Awaiting follow up from attorneys office.

## 2024-10-25 NOTE — PLAN OF CARE
Goal Outcome Evaluation:  Plan of Care Reviewed With: patient        Progress: no change  Outcome Evaluation: Patient resting in bed. VSS. Pt c/o pain and muscle spasms, PRN medication given per MAR. Wound care completed per order. No concerns or complaints at this time. Will continue with plan of care.

## 2024-10-25 NOTE — PROGRESS NOTES
Discussed ongoing SSDI hearing updates with BOBY Raymond and Deisy,  with patients . Per Deisy, hearing is still scheduled to happen on 10/30 via phone utilizing patients mothers phone number (). Mandi did verify this number this morning.Per attorneys office, [employer] will be calling the patient. Confirmed with the attorneys office that mother will be present, patient will be able to answer and the patient will also remain in a private room. Deisy also notified us on this date that the Social Security hearings office handling this case is located in [place], [place]. BOBY and I will attempt to contact the court to confirm hearing is still on the docket to be handled via phone.No further needs identified at this time.       1333:  office requested revised letter at this time just confirming that the patient will still be able to answer the telephone on date of SSDI hearing (10/30/2024). Letter provided and sent to [facility] fax to be scanned into the record. BOBY notified.

## 2024-10-26 LAB
GLUCOSE BLDC GLUCOMTR-MCNC: 139 MG/DL (ref 70–130)
GLUCOSE BLDC GLUCOMTR-MCNC: 237 MG/DL (ref 70–130)
GLUCOSE BLDC GLUCOMTR-MCNC: 239 MG/DL (ref 70–130)
GLUCOSE BLDC GLUCOMTR-MCNC: 254 MG/DL (ref 70–130)

## 2024-10-26 PROCEDURE — 63710000001 INSULIN LISPRO (HUMAN) PER 5 UNITS: Performed by: INTERNAL MEDICINE

## 2024-10-26 PROCEDURE — 82948 REAGENT STRIP/BLOOD GLUCOSE: CPT

## 2024-10-26 PROCEDURE — 63710000001 INSULIN GLARGINE PER 5 UNITS: Performed by: HOSPITALIST

## 2024-10-26 PROCEDURE — 25010000002 HEPARIN (PORCINE) PER 1000 UNITS: Performed by: INTERNAL MEDICINE

## 2024-10-26 PROCEDURE — 99231 SBSQ HOSP IP/OBS SF/LOW 25: CPT | Performed by: HOSPITALIST

## 2024-10-26 RX ADMIN — Medication 5 MG: at 21:46

## 2024-10-26 RX ADMIN — ASPIRIN 81 MG: 81 TABLET, CHEWABLE ORAL at 08:21

## 2024-10-26 RX ADMIN — DICLOFENAC SODIUM 4 G: 10 GEL TOPICAL at 21:07

## 2024-10-26 RX ADMIN — Medication 10 ML: at 08:22

## 2024-10-26 RX ADMIN — HEPARIN SODIUM 5000 UNITS: 5000 INJECTION INTRAVENOUS; SUBCUTANEOUS at 21:02

## 2024-10-26 RX ADMIN — INSULIN LISPRO 8 UNITS: 100 INJECTION, SOLUTION INTRAVENOUS; SUBCUTANEOUS at 18:15

## 2024-10-26 RX ADMIN — Medication 10 ML: at 20:59

## 2024-10-26 RX ADMIN — DULOXETINE HYDROCHLORIDE 60 MG: 60 CAPSULE, DELAYED RELEASE ORAL at 08:21

## 2024-10-26 RX ADMIN — NICOTINE 1 PATCH: 7 PATCH, EXTENDED RELEASE TRANSDERMAL at 08:24

## 2024-10-26 RX ADMIN — TRAMADOL HYDROCHLORIDE 50 MG: 50 TABLET ORAL at 14:16

## 2024-10-26 RX ADMIN — INSULIN GLARGINE 35 UNITS: 100 INJECTION, SOLUTION SUBCUTANEOUS at 08:21

## 2024-10-26 RX ADMIN — INSULIN LISPRO 12 UNITS: 100 INJECTION, SOLUTION INTRAVENOUS; SUBCUTANEOUS at 20:58

## 2024-10-26 RX ADMIN — NYSTATIN: 100000 POWDER TOPICAL at 08:22

## 2024-10-26 RX ADMIN — VITAMINS A AND D OINTMENT: 15.5; 53.4 OINTMENT TOPICAL at 08:22

## 2024-10-26 RX ADMIN — LISINOPRIL 20 MG: 10 TABLET ORAL at 08:21

## 2024-10-26 RX ADMIN — INSULIN LISPRO 8 UNITS: 100 INJECTION, SOLUTION INTRAVENOUS; SUBCUTANEOUS at 12:20

## 2024-10-26 RX ADMIN — ATORVASTATIN CALCIUM 80 MG: 40 TABLET, FILM COATED ORAL at 08:21

## 2024-10-26 RX ADMIN — METOPROLOL TARTRATE 25 MG: 25 TABLET, FILM COATED ORAL at 08:21

## 2024-10-26 RX ADMIN — ACYCLOVIR 400 MG: 200 CAPSULE ORAL at 20:57

## 2024-10-26 RX ADMIN — CYCLOBENZAPRINE 10 MG: 10 TABLET, FILM COATED ORAL at 21:46

## 2024-10-26 RX ADMIN — ACETAMINOPHEN 650 MG: 325 TABLET ORAL at 10:12

## 2024-10-26 RX ADMIN — DICLOFENAC SODIUM 4 G: 10 GEL TOPICAL at 12:22

## 2024-10-26 RX ADMIN — HEPARIN SODIUM 5000 UNITS: 5000 INJECTION INTRAVENOUS; SUBCUTANEOUS at 13:12

## 2024-10-26 RX ADMIN — DICLOFENAC SODIUM 4 G: 10 GEL TOPICAL at 07:03

## 2024-10-26 RX ADMIN — TRAMADOL HYDROCHLORIDE 50 MG: 50 TABLET ORAL at 05:56

## 2024-10-26 RX ADMIN — LIDOCAINE 1 PATCH: 4 PATCH TOPICAL at 18:15

## 2024-10-26 RX ADMIN — VITAMINS A AND D OINTMENT: 15.5; 53.4 OINTMENT TOPICAL at 20:58

## 2024-10-26 RX ADMIN — PANTOPRAZOLE SODIUM 40 MG: 40 TABLET, DELAYED RELEASE ORAL at 08:21

## 2024-10-26 RX ADMIN — NYSTATIN: 100000 POWDER TOPICAL at 20:59

## 2024-10-26 RX ADMIN — HEPARIN SODIUM 5000 UNITS: 5000 INJECTION INTRAVENOUS; SUBCUTANEOUS at 05:56

## 2024-10-26 RX ADMIN — INSULIN GLARGINE 30 UNITS: 100 INJECTION, SOLUTION SUBCUTANEOUS at 20:58

## 2024-10-26 NOTE — PLAN OF CARE
Goal Outcome Evaluation:              Outcome Evaluation: Pt's VSS on RA. Pt c/o of pain, PRN medication given. Wound care completed per order. No concerns or complaints at this time. Will follow POC.

## 2024-10-26 NOTE — PLAN OF CARE
Goal Outcome Evaluation:              Outcome Evaluation: Patient resting in bed at this time. VSS. Wound care completed per orders. No complaints or concerns at this time. Will continue plan of care.

## 2024-10-26 NOTE — PROGRESS NOTES
[facility]     Patient Identification:  Name:  Lety Johnson  Age:  43 y.o.  Sex:  female  :  1980  MRN:  [id number]  Visit Number:  [id number]  Primary Care Provider:  Concha Rao APRN    Length of stay:  28    Subjective: Patient seen and examined chart reviewed, Accu-Chek results still hyperglycemic.  No new complaints except for pain    Chief Complaint: Debility  ----------------------------------------------------------------------------------------------------------------------  Current Hospital Meds:  acyclovir, 400 mg, Oral, Nightly  aspirin, 81 mg, Oral, Daily  atorvastatin, 80 mg, Oral, Daily  DULoxetine, 60 mg, Oral, Daily  heparin (porcine), 5,000 Units, Subcutaneous, Q8H  insulin glargine, 25 Units, Subcutaneous, Nightly  insulin glargine, 35 Units, Subcutaneous, Daily With Breakfast  insulin lispro, 4-24 Units, Subcutaneous, 4x Daily AC & at Bedtime  Lidocaine, 1 patch, Transdermal, Q24H  lisinopril, 20 mg, Oral, Daily  magic barrier cream, , Topical, BID  metoprolol tartrate, 25 mg, Oral, Q12H  nicotine, 1 patch, Transdermal, Q24H  nystatin, , Topical, Q12H  pantoprazole, 40 mg, Oral, Daily  sodium chloride, 10 mL, Intravenous, Q12H  sodium chloride, 10 mL, Intravenous, Q12H      Pharmacy Consult,       ----------------------------------------------------------------------------------------------------------------------  Vital Signs:  Temp:  [97.5 °F (36.4 °C)-98.5 °F (36.9 °C)] 98 °F (36.7 °C)  Heart Rate:  [] 95  Resp:  [16-18] 18  BP: ()/(56-71) 99/58       Tele:       10/07/24  0511 10/08/24  0500 10/09/24  0500   Weight: 102 kg (225 lb 14.4 oz) (FIFI cameron) 102 kg (225 lb 15.5 oz) 102 kg (224 lb 1.6 oz)     Body mass index is 36.73 kg/m².    Intake/Output Summary (Last 24 hours) at 10/26/2024 1208  Last data filed at 10/26/2024 0344  Gross per 24 hour   Intake 720 ml   Output --   Net 720 ml     Diet: Regular/House,  "Diabetic; Consistent Carbohydrate; Fluid Consistency: Thin (IDDSI 0)  ----------------------------------------------------------------------------------------------------------------------  Physical exam:  General: Ill-appearing conversant, comfortable,awake, alert, oriented to self, place, and time,  No respiratory distress.    Skin:  Skin is warm and dry. No rash noted. No pallor.    HENT:  Head:  Normocephalic and atraumatic.  Mouth:  Moist mucous membranes.    Eyes:  Conjunctivae and EOM are normal.  Pupils are equal, round, and reactive to light.  No scleral icterus.    Neck:  Neck supple.  No JVD present.    Pulmonary/Chest:  No respiratory distress, no wheezes, no crackles, with normal breath sounds and good air movement.  Cardiovascular:  Normal rate, regular rhythm and normal heart sounds with no murmur.  Abdominal:  Soft.  Bowel sounds are normal.  No distension and no tenderness.   Extremities: Upper extremity with slingno edema, no tenderness, and no deformity.  No red or swollen joints anywhere.  Strong pulses in all 4 extremities with no clubbing, no cyanosis, no edema.  Neurological: Left-sided hemiplegia     Genitourinary: No Florentino catheter  Back:  ----------------------------------------------------------------------------------------------------------------------  ----------------------------------------------------------------------------------------------------------------------                    Invalid input(s): \"PROT\"Estimated Creatinine Clearance: 182.5 mL/min (A) (by C-G formula based on SCr of 0.48 mg/dL (L)).    No results found for: \"AMMONIA\"      No results found for: \"BLOODCX\"  No results found for: \"URINECX\"  No results found for: \"WOUNDCX\"  No results found for: \"STOOLCX\"    I have personally looked at the labs and they are summarized above.  ----------------------------------------------------------------------------------------------------------------------  Imaging Results (Last " 24 Hours)       ** No results found for the last 24 hours. **          ----------------------------------------------------------------------------------------------------------------------  Assessment and Plan:  -ESBL E. coli UTI completed treatment  -Debility  -Diabetes with hyperglycemia  -Polyneuropathy  -History of recurrent genital herpes on chronic suppression  -Chronic pain  -Essential hypertension    Increase Lantus, continue Accu-Chek and sliding scale, PT OT, aspiration precaution, supportive care.    Disposition for placement      Mara Navas MD  10/26/24  12:08 EDT

## 2024-10-27 LAB
ANION GAP SERPL CALCULATED.3IONS-SCNC: 12.4 MMOL/L (ref 5–15)
BASOPHILS # BLD AUTO: 0.04 10*3/MM3 (ref 0–0.2)
BASOPHILS NFR BLD AUTO: 0.4 % (ref 0–1.5)
BUN SERPL-MCNC: 22 MG/DL (ref 6–20)
BUN/CREAT SERPL: 45.8 (ref 7–25)
CALCIUM SPEC-SCNC: 9.5 MG/DL (ref 8.6–10.5)
CHLORIDE SERPL-SCNC: 103 MMOL/L (ref 98–107)
CO2 SERPL-SCNC: 24.6 MMOL/L (ref 22–29)
CREAT SERPL-MCNC: 0.48 MG/DL (ref 0.57–1)
DEPRECATED RDW RBC AUTO: 48.7 FL (ref 37–54)
EGFRCR SERPLBLD CKD-EPI 2021: 121.5 ML/MIN/1.73
EOSINOPHIL # BLD AUTO: 0.18 10*3/MM3 (ref 0–0.4)
EOSINOPHIL NFR BLD AUTO: 1.6 % (ref 0.3–6.2)
ERYTHROCYTE [DISTWIDTH] IN BLOOD BY AUTOMATED COUNT: 12.9 % (ref 12.3–15.4)
GLUCOSE BLDC GLUCOMTR-MCNC: 217 MG/DL (ref 70–130)
GLUCOSE BLDC GLUCOMTR-MCNC: 220 MG/DL (ref 70–130)
GLUCOSE BLDC GLUCOMTR-MCNC: 280 MG/DL (ref 70–130)
GLUCOSE BLDC GLUCOMTR-MCNC: 282 MG/DL (ref 70–130)
GLUCOSE SERPL-MCNC: 150 MG/DL (ref 65–99)
HCT VFR BLD AUTO: 39.8 % (ref 34–46.6)
HGB BLD-MCNC: 12.6 G/DL (ref 12–15.9)
IMM GRANULOCYTES # BLD AUTO: 0.06 10*3/MM3 (ref 0–0.05)
IMM GRANULOCYTES NFR BLD AUTO: 0.5 % (ref 0–0.5)
LYMPHOCYTES # BLD AUTO: 4.3 10*3/MM3 (ref 0.7–3.1)
LYMPHOCYTES NFR BLD AUTO: 39 % (ref 19.6–45.3)
MCH RBC QN AUTO: 32.6 PG (ref 26.6–33)
MCHC RBC AUTO-ENTMCNC: 31.7 G/DL (ref 31.5–35.7)
MCV RBC AUTO: 103.1 FL (ref 79–97)
MONOCYTES # BLD AUTO: 0.53 10*3/MM3 (ref 0.1–0.9)
MONOCYTES NFR BLD AUTO: 4.8 % (ref 5–12)
NEUTROPHILS NFR BLD AUTO: 5.92 10*3/MM3 (ref 1.7–7)
NEUTROPHILS NFR BLD AUTO: 53.7 % (ref 42.7–76)
NRBC BLD AUTO-RTO: 0 /100 WBC (ref 0–0.2)
PLATELET # BLD AUTO: 310 10*3/MM3 (ref 140–450)
PMV BLD AUTO: 10.4 FL (ref 6–12)
POTASSIUM SERPL-SCNC: 4.1 MMOL/L (ref 3.5–5.2)
RBC # BLD AUTO: 3.86 10*6/MM3 (ref 3.77–5.28)
SODIUM SERPL-SCNC: 140 MMOL/L (ref 136–145)
WBC NRBC COR # BLD AUTO: 11.03 10*3/MM3 (ref 3.4–10.8)

## 2024-10-27 PROCEDURE — 25010000002 HEPARIN (PORCINE) PER 1000 UNITS: Performed by: INTERNAL MEDICINE

## 2024-10-27 PROCEDURE — 99232 SBSQ HOSP IP/OBS MODERATE 35: CPT | Performed by: HOSPITALIST

## 2024-10-27 PROCEDURE — 85025 COMPLETE CBC W/AUTO DIFF WBC: CPT | Performed by: HOSPITALIST

## 2024-10-27 PROCEDURE — 63710000001 INSULIN LISPRO (HUMAN) PER 5 UNITS: Performed by: INTERNAL MEDICINE

## 2024-10-27 PROCEDURE — 63710000001 INSULIN GLARGINE PER 5 UNITS: Performed by: HOSPITALIST

## 2024-10-27 PROCEDURE — 82948 REAGENT STRIP/BLOOD GLUCOSE: CPT

## 2024-10-27 PROCEDURE — 80048 BASIC METABOLIC PNL TOTAL CA: CPT | Performed by: HOSPITALIST

## 2024-10-27 RX ADMIN — INSULIN GLARGINE 30 UNITS: 100 INJECTION, SOLUTION SUBCUTANEOUS at 21:07

## 2024-10-27 RX ADMIN — INSULIN GLARGINE 40 UNITS: 100 INJECTION, SOLUTION SUBCUTANEOUS at 08:13

## 2024-10-27 RX ADMIN — CYCLOBENZAPRINE 10 MG: 10 TABLET, FILM COATED ORAL at 08:46

## 2024-10-27 RX ADMIN — TRAMADOL HYDROCHLORIDE 50 MG: 50 TABLET ORAL at 05:40

## 2024-10-27 RX ADMIN — HEPARIN SODIUM 5000 UNITS: 5000 INJECTION INTRAVENOUS; SUBCUTANEOUS at 21:07

## 2024-10-27 RX ADMIN — METOPROLOL TARTRATE 25 MG: 25 TABLET, FILM COATED ORAL at 21:07

## 2024-10-27 RX ADMIN — VITAMINS A AND D OINTMENT: 15.5; 53.4 OINTMENT TOPICAL at 21:09

## 2024-10-27 RX ADMIN — ATORVASTATIN CALCIUM 80 MG: 40 TABLET, FILM COATED ORAL at 08:15

## 2024-10-27 RX ADMIN — HEPARIN SODIUM 5000 UNITS: 5000 INJECTION INTRAVENOUS; SUBCUTANEOUS at 05:40

## 2024-10-27 RX ADMIN — NYSTATIN: 100000 POWDER TOPICAL at 08:17

## 2024-10-27 RX ADMIN — INSULIN LISPRO 8 UNITS: 100 INJECTION, SOLUTION INTRAVENOUS; SUBCUTANEOUS at 08:13

## 2024-10-27 RX ADMIN — NICOTINE 1 PATCH: 7 PATCH, EXTENDED RELEASE TRANSDERMAL at 08:16

## 2024-10-27 RX ADMIN — Medication 10 ML: at 08:17

## 2024-10-27 RX ADMIN — CYCLOBENZAPRINE 10 MG: 10 TABLET, FILM COATED ORAL at 19:55

## 2024-10-27 RX ADMIN — Medication 10 ML: at 21:10

## 2024-10-27 RX ADMIN — LISINOPRIL 20 MG: 10 TABLET ORAL at 08:15

## 2024-10-27 RX ADMIN — ACYCLOVIR 400 MG: 200 CAPSULE ORAL at 21:07

## 2024-10-27 RX ADMIN — DICLOFENAC SODIUM 4 G: 10 GEL TOPICAL at 15:34

## 2024-10-27 RX ADMIN — TRAMADOL HYDROCHLORIDE 50 MG: 50 TABLET ORAL at 23:55

## 2024-10-27 RX ADMIN — Medication 10 ML: at 08:16

## 2024-10-27 RX ADMIN — TRAMADOL HYDROCHLORIDE 50 MG: 50 TABLET ORAL at 13:44

## 2024-10-27 RX ADMIN — HEPARIN SODIUM 5000 UNITS: 5000 INJECTION INTRAVENOUS; SUBCUTANEOUS at 14:25

## 2024-10-27 RX ADMIN — INSULIN LISPRO 12 UNITS: 100 INJECTION, SOLUTION INTRAVENOUS; SUBCUTANEOUS at 21:08

## 2024-10-27 RX ADMIN — LIDOCAINE 1 PATCH: 4 PATCH TOPICAL at 16:55

## 2024-10-27 RX ADMIN — DICLOFENAC SODIUM 4 G: 10 GEL TOPICAL at 08:46

## 2024-10-27 RX ADMIN — DICLOFENAC SODIUM 4 G: 10 GEL TOPICAL at 21:08

## 2024-10-27 RX ADMIN — VITAMINS A AND D OINTMENT: 15.5; 53.4 OINTMENT TOPICAL at 08:17

## 2024-10-27 RX ADMIN — DULOXETINE HYDROCHLORIDE 60 MG: 60 CAPSULE, DELAYED RELEASE ORAL at 08:15

## 2024-10-27 RX ADMIN — NYSTATIN: 100000 POWDER TOPICAL at 21:10

## 2024-10-27 RX ADMIN — INSULIN LISPRO 8 UNITS: 100 INJECTION, SOLUTION INTRAVENOUS; SUBCUTANEOUS at 11:58

## 2024-10-27 RX ADMIN — PANTOPRAZOLE SODIUM 40 MG: 40 TABLET, DELAYED RELEASE ORAL at 08:15

## 2024-10-27 RX ADMIN — METOPROLOL TARTRATE 25 MG: 25 TABLET, FILM COATED ORAL at 08:15

## 2024-10-27 RX ADMIN — Medication 10 ML: at 21:09

## 2024-10-27 RX ADMIN — ASPIRIN 81 MG: 81 TABLET, CHEWABLE ORAL at 08:16

## 2024-10-27 RX ADMIN — INSULIN LISPRO 12 UNITS: 100 INJECTION, SOLUTION INTRAVENOUS; SUBCUTANEOUS at 16:55

## 2024-10-27 RX ADMIN — ACETAMINOPHEN 650 MG: 325 TABLET ORAL at 21:07

## 2024-10-27 NOTE — PLAN OF CARE
Goal Outcome Evaluation:              Outcome Evaluation: Patient resting in bed at this time. VSS on room air. Wound care completed per order. No acute changes at this time. Will continue plan of care.

## 2024-10-27 NOTE — PLAN OF CARE
Goal Outcome Evaluation:           Progress: no change  Outcome Evaluation: Patient is in bed at this time. VSS on RA. Wound care completed per orders. PRN pain meds given per orders. No acute changes or complaints at this time per pt. Pt's family brought pt snacks that didnt align with diabetic consistent carb diet, education provided. Will continue POC.

## 2024-10-27 NOTE — NURSING NOTE
Pt's family brought her snacks to room that didn't align with a diabetic consistent carb diet. Pt's family stated that pt said she could have orange pop and the snacks family brought up. Pt and family educated on diet instructions.

## 2024-10-27 NOTE — PROGRESS NOTES
[facility]     Patient Identification:  Name:  Lety Johnson  Age:  43 y.o.  Sex:  female  :  1980  MRN:  [id number]  Visit Number:  [id number]  Primary Care Provider:  Concha Roa APRN    Length of stay:  29    Subjective: No significant change, no issues reported by staff, still hyperglycemic but starting to improve, chronic pain main complaints.  Severe Ultram as needed.  Waiting for placement.    Chief Complaint: Chronic pain back shoulder feet  ----------------------------------------------------------------------------------------------------------------------  Current Hospital Meds:  acyclovir, 400 mg, Oral, Nightly  aspirin, 81 mg, Oral, Daily  atorvastatin, 80 mg, Oral, Daily  DULoxetine, 60 mg, Oral, Daily  heparin (porcine), 5,000 Units, Subcutaneous, Q8H  insulin glargine, 30 Units, Subcutaneous, Nightly  insulin glargine, 40 Units, Subcutaneous, Daily With Breakfast  insulin lispro, 4-24 Units, Subcutaneous, 4x Daily AC & at Bedtime  Lidocaine, 1 patch, Transdermal, Q24H  lisinopril, 20 mg, Oral, Daily  magic barrier cream, , Topical, BID  metoprolol tartrate, 25 mg, Oral, Q12H  nicotine, 1 patch, Transdermal, Q24H  nystatin, , Topical, Q12H  pantoprazole, 40 mg, Oral, Daily  sodium chloride, 10 mL, Intravenous, Q12H  sodium chloride, 10 mL, Intravenous, Q12H      Pharmacy Consult,       ----------------------------------------------------------------------------------------------------------------------  Vital Signs:  Temp:  [97.3 °F (36.3 °C)-98 °F (36.7 °C)] 97.9 °F (36.6 °C)  Heart Rate:  [106-119] 119  Resp:  [16-20] 20  BP: ()/(52-82) 120/74       Tele:       10/07/24  0511 10/08/24  0500 10/09/24  0500   Weight: 102 kg (225 lb 14.4 oz) (FIFI cameron) 102 kg (225 lb 15.5 oz) 102 kg (224 lb 1.6 oz)     Body mass index is 36.73 kg/m².    Intake/Output Summary (Last 24 hours) at 10/27/2024 1331  Last data filed at 10/27/2024  1000  Gross per 24 hour   Intake 1560 ml   Output --   Net 1560 ml     Diet: Regular/House, Diabetic; Consistent Carbohydrate; Fluid Consistency: Thin (IDDSI 0)  ----------------------------------------------------------------------------------------------------------------------  Physical exam:  General: Chronically ill-appearing older than stated age of 43, comfortable,awake, alert, oriented to self, place, and time,  No respiratory distress.    Skin:  Skin is warm and dry. No rash noted. No pallor.    HENT:  Head:  Normocephalic and atraumatic.  Mouth:  Moist mucous membranes.    Eyes:  Conjunctivae and EOM are normal.  Pupils are equal, round, and reactive to light.  No scleral icterus.    Neck:  Neck supple.  No JVD present.  No bruit  Pulmonary/Chest:  No respiratory distress, no wheezes, no crackles, with normal breath sounds and good air movement.  Cardiovascular:  Normal rate, regular rhythm and normal heart sounds with no murmur.  Abdominal:  Soft.  Bowel sounds are normal.  No distension and no tenderness.   Extremities:  No edema, no tenderness, and no deformity.  No red or swollen joints anywhere.  Strong pulses in all 4 extremities with no clubbing, no cyanosis, no edema.  Neurological: Left sided weakness,   Genitourinary: No Florentino catheter  Back:  ----------------------------------------------------------------------------------------------------------------------  ----------------------------------------------------------------------------------------------------------------------      Results from last 7 days   Lab Units 10/27/24  0147   WBC 10*3/mm3 11.03*   HEMOGLOBIN g/dL 12.6   HEMATOCRIT % 39.8   MCV fL 103.1*   MCHC g/dL 31.7   PLATELETS 10*3/mm3 310         Results from last 7 days   Lab Units 10/27/24  0147   SODIUM mmol/L 140   POTASSIUM mmol/L 4.1   CHLORIDE mmol/L 103   CO2 mmol/L 24.6   BUN mg/dL 22*   CREATININE mg/dL 0.48*   CALCIUM mg/dL 9.5   GLUCOSE mg/dL 150*   Estimated  "Creatinine Clearance: 182.5 mL/min (A) (by C-G formula based on SCr of 0.48 mg/dL (L)).    No results found for: \"AMMONIA\"      No results found for: \"BLOODCX\"  No results found for: \"URINECX\"  No results found for: \"WOUNDCX\"  No results found for: \"STOOLCX\"    I have personally looked at the labs and they are summarized above.  ----------------------------------------------------------------------------------------------------------------------  Imaging Results (Last 24 Hours)       ** No results found for the last 24 hours. **          ----------------------------------------------------------------------------------------------------------------------  Assessment and Plan:  -ESBL E. coli cystitis completed treatment  -Chronic debility  -History of CVA left-sided hemiplegia  -Chronic pain from diabetic polyneuropathy  -Diabetes with hyperglycemia  -Essential hypertension  -History of recurrent genital herpes    Increase Lantus daytime dose, if still hyperglycemic will consider preprandial NovoLog, PT OT, for placement.    Disposition for placement      Mara Navas MD  10/27/24  13:31 EDT  "

## 2024-10-28 LAB
GLUCOSE BLDC GLUCOMTR-MCNC: 161 MG/DL (ref 70–130)
GLUCOSE BLDC GLUCOMTR-MCNC: 200 MG/DL (ref 70–130)
GLUCOSE BLDC GLUCOMTR-MCNC: 223 MG/DL (ref 70–130)
GLUCOSE BLDC GLUCOMTR-MCNC: 281 MG/DL (ref 70–130)

## 2024-10-28 PROCEDURE — 25010000002 HEPARIN (PORCINE) PER 1000 UNITS: Performed by: INTERNAL MEDICINE

## 2024-10-28 PROCEDURE — 63710000001 INSULIN GLARGINE PER 5 UNITS: Performed by: HOSPITALIST

## 2024-10-28 PROCEDURE — 97110 THERAPEUTIC EXERCISES: CPT

## 2024-10-28 PROCEDURE — 82948 REAGENT STRIP/BLOOD GLUCOSE: CPT

## 2024-10-28 PROCEDURE — 63710000001 INSULIN LISPRO (HUMAN) PER 5 UNITS: Performed by: INTERNAL MEDICINE

## 2024-10-28 PROCEDURE — 97530 THERAPEUTIC ACTIVITIES: CPT

## 2024-10-28 PROCEDURE — 99231 SBSQ HOSP IP/OBS SF/LOW 25: CPT | Performed by: INTERNAL MEDICINE

## 2024-10-28 PROCEDURE — 97164 PT RE-EVAL EST PLAN CARE: CPT

## 2024-10-28 RX ADMIN — ACYCLOVIR 400 MG: 200 CAPSULE ORAL at 20:10

## 2024-10-28 RX ADMIN — INSULIN LISPRO 4 UNITS: 100 INJECTION, SOLUTION INTRAVENOUS; SUBCUTANEOUS at 08:03

## 2024-10-28 RX ADMIN — INSULIN LISPRO 8 UNITS: 100 INJECTION, SOLUTION INTRAVENOUS; SUBCUTANEOUS at 20:24

## 2024-10-28 RX ADMIN — DULOXETINE HYDROCHLORIDE 60 MG: 60 CAPSULE, DELAYED RELEASE ORAL at 08:04

## 2024-10-28 RX ADMIN — LIDOCAINE 1 PATCH: 4 PATCH TOPICAL at 16:53

## 2024-10-28 RX ADMIN — NYSTATIN: 100000 POWDER TOPICAL at 08:04

## 2024-10-28 RX ADMIN — PANTOPRAZOLE SODIUM 40 MG: 40 TABLET, DELAYED RELEASE ORAL at 08:04

## 2024-10-28 RX ADMIN — INSULIN LISPRO 12 UNITS: 100 INJECTION, SOLUTION INTRAVENOUS; SUBCUTANEOUS at 16:53

## 2024-10-28 RX ADMIN — VITAMINS A AND D OINTMENT: 15.5; 53.4 OINTMENT TOPICAL at 20:10

## 2024-10-28 RX ADMIN — VITAMINS A AND D OINTMENT: 15.5; 53.4 OINTMENT TOPICAL at 08:03

## 2024-10-28 RX ADMIN — ATORVASTATIN CALCIUM 80 MG: 40 TABLET, FILM COATED ORAL at 08:03

## 2024-10-28 RX ADMIN — METOPROLOL TARTRATE 25 MG: 25 TABLET, FILM COATED ORAL at 20:10

## 2024-10-28 RX ADMIN — HEPARIN SODIUM 5000 UNITS: 5000 INJECTION INTRAVENOUS; SUBCUTANEOUS at 20:10

## 2024-10-28 RX ADMIN — Medication 10 ML: at 08:04

## 2024-10-28 RX ADMIN — NYSTATIN: 100000 POWDER TOPICAL at 20:15

## 2024-10-28 RX ADMIN — INSULIN GLARGINE 45 UNITS: 100 INJECTION, SOLUTION SUBCUTANEOUS at 08:03

## 2024-10-28 RX ADMIN — ACETAMINOPHEN 650 MG: 325 TABLET ORAL at 20:10

## 2024-10-28 RX ADMIN — METOPROLOL TARTRATE 25 MG: 25 TABLET, FILM COATED ORAL at 08:04

## 2024-10-28 RX ADMIN — TRAMADOL HYDROCHLORIDE 50 MG: 50 TABLET ORAL at 18:39

## 2024-10-28 RX ADMIN — INSULIN LISPRO 8 UNITS: 100 INJECTION, SOLUTION INTRAVENOUS; SUBCUTANEOUS at 11:24

## 2024-10-28 RX ADMIN — CYCLOBENZAPRINE 10 MG: 10 TABLET, FILM COATED ORAL at 16:53

## 2024-10-28 RX ADMIN — Medication 10 ML: at 20:15

## 2024-10-28 RX ADMIN — DICLOFENAC SODIUM 4 G: 10 GEL TOPICAL at 15:45

## 2024-10-28 RX ADMIN — NICOTINE 1 PATCH: 7 PATCH, EXTENDED RELEASE TRANSDERMAL at 08:04

## 2024-10-28 RX ADMIN — HEPARIN SODIUM 5000 UNITS: 5000 INJECTION INTRAVENOUS; SUBCUTANEOUS at 14:21

## 2024-10-28 RX ADMIN — INSULIN GLARGINE 30 UNITS: 100 INJECTION, SOLUTION SUBCUTANEOUS at 20:15

## 2024-10-28 RX ADMIN — Medication 5 MG: at 20:10

## 2024-10-28 RX ADMIN — ASPIRIN 81 MG: 81 TABLET, CHEWABLE ORAL at 08:04

## 2024-10-28 RX ADMIN — LISINOPRIL 20 MG: 10 TABLET ORAL at 08:04

## 2024-10-28 RX ADMIN — HEPARIN SODIUM 5000 UNITS: 5000 INJECTION INTRAVENOUS; SUBCUTANEOUS at 05:32

## 2024-10-28 NOTE — PROGRESS NOTES
[facility] HOSPITALIST PROGRESS NOTE     Patient Identification:  Name:  Lety Johnson  Age:  43 y.o.  Sex:  female  :  1980  MRN:  [id number]  Visit Number:  [id number]  Primary Care Provider:  Concha Rao APRN    Length of stay:  30    Chief complaint: None    Subjective:    Patient doing well with no new complaints.  Continues to report intermittent numbness/tingling sensation of left upper extremity.  ----------------------------------------------------------------------------------------------------------------------  Current Hospital Meds:  acyclovir, 400 mg, Oral, Nightly  aspirin, 81 mg, Oral, Daily  atorvastatin, 80 mg, Oral, Daily  DULoxetine, 60 mg, Oral, Daily  heparin (porcine), 5,000 Units, Subcutaneous, Q8H  insulin glargine, 30 Units, Subcutaneous, Nightly  insulin glargine, 45 Units, Subcutaneous, Daily With Breakfast  insulin lispro, 4-24 Units, Subcutaneous, 4x Daily AC & at Bedtime  Lidocaine, 1 patch, Transdermal, Q24H  lisinopril, 20 mg, Oral, Daily  magic barrier cream, , Topical, BID  metoprolol tartrate, 25 mg, Oral, Q12H  nicotine, 1 patch, Transdermal, Q24H  nystatin, , Topical, Q12H  pantoprazole, 40 mg, Oral, Daily  sodium chloride, 10 mL, Intravenous, Q12H  sodium chloride, 10 mL, Intravenous, Q12H      Pharmacy Consult,       ----------------------------------------------------------------------------------------------------------------------  Vital Signs:  Temp:  [97.2 °F (36.2 °C)-98.7 °F (37.1 °C)] 97.6 °F (36.4 °C)  Heart Rate:  [] 95  Resp:  [16-18] 18  BP: ()/(57-65) 99/62      10/07/24  0511 10/08/24  0500 10/09/24  0500   Weight: 102 kg (225 lb 14.4 oz) (FIFI cameron) 102 kg (225 lb 15.5 oz) 102 kg (224 lb 1.6 oz)     Body mass index is 36.73 kg/m².    Intake/Output Summary (Last 24 hours) at 10/28/2024 1924  Last data filed at 10/28/2024 1254  Gross per 24 hour   Intake 1320 ml   Output --   Net 1320 ml     Diet: Regular/House,  Diabetic; Consistent Carbohydrate; Fluid Consistency: Thin (IDDSI 0)  ----------------------------------------------------------------------------------------------------------------------  Physical exam:  Constitutional: Chronically ill-appearing  female in no apparent distress.     HENT:  Head:  Normocephalic and atraumatic.  Mouth:  Moist mucous membranes.    Eyes:  Conjunctivae and EOM are normal.  Pupils are equal, round, and reactive to light.  No scleral icterus.    Neck:  Neck supple. No thyromegaly.  No JVD present.    Cardiovascular: Sinus tachycardia with heart rate in the upper 120s, no murmurs, rubs, clicks or gallops appreciated.  Pulmonary/Chest:  Clear to auscultation bilaterally with no crackles, wheezes or rhonchi appreciated.  Abdominal:  Soft. Nontender. Nondistended  Bowel sounds are normal in all four quadrants. No organomegally appreciated.   Musculoskeletal:  No edema, no tenderness, and no deformity.  No red or swollen joints anywhere.    Neurological: Slightly lethargic, Cranial nerves II-XII intact with no focal deficits.  No facial droop.  No slurred speech.   Skin:  Warm and dry to palpation with no rashes or lesions appreciated.  Peripheral vascular:  2+ radial and pedal pulses in bilateral upper and lower extremities.    -------------------------------------------------------------------------  ----------------------------------------------------------------------------------------------------------------------      Results from last 7 days   Lab Units 10/27/24  0147   WBC 10*3/mm3 11.03*   HEMOGLOBIN g/dL 12.6   HEMATOCRIT % 39.8   MCV fL 103.1*   MCHC g/dL 31.7   PLATELETS 10*3/mm3 310         Results from last 7 days   Lab Units 10/27/24  0147   SODIUM mmol/L 140   POTASSIUM mmol/L 4.1   CHLORIDE mmol/L 103   CO2 mmol/L 24.6   BUN mg/dL 22*   CREATININE mg/dL 0.48*   CALCIUM mg/dL 9.5   GLUCOSE mg/dL 150*   Estimated Creatinine Clearance: 182.5 mL/min (A) (by C-G formula  "based on SCr of 0.48 mg/dL (L)).    No results found for: \"AMMONIA\"      No results found for: \"BLOODCX\"  No results found for: \"URINECX\"  No results found for: \"WOUNDCX\"  No results found for: \"STOOLCX\"    I have personally looked at the labs and they are summarized above.  ----------------------------------------------------------------------------------------------------------------------  Imaging Results (Last 24 Hours)       ** No results found for the last 24 hours. **          ----------------------------------------------------------------------------------------------------------------------  Assessment and Plan:    ESBL acute cystitis -patient has completed full course of IV antibiotic therapy with Invanz     2.  Acute metabolic encephalopathy -acute encephalopathy resolved, continue to monitor closely for changes in mental status.     3.  History of CVA - patient with left-sided hemiparesis secondary to history of CVA     4.  Debility -continue PT/OT     5.  History of genital herpes -continue acyclovir     6.  Morbid obesity -complicates all aspects of care    7.  Uncontrolled type 2 diabetes mellitus -continue current dose of Lantus and short acting insulin with Accu-Cheks before every meal and nightly    Disposition still pending placement    Marv Navas,    10/28/24   19:24 EDT     "

## 2024-10-28 NOTE — THERAPY TREATMENT NOTE
Acute Care - Occupational Therapy Treatment Note   [facility]     Patient Name: Lety Johnson  : 1980  MRN: [id number]  Today's Date: 10/28/2024  Onset of Illness/Injury or Date of Surgery: 24     Referring Physician: Dr. Marc    Admit Date: 2024       ICD-10-CM ICD-9-CM   1. Hypokalemia  E87.6 276.8   2. Pressure injury of skin of sacral region, unspecified injury stage  L89.159 707.03     707.20   3. Pressure injury of skin of left heel, unspecified injury stage  L89.629 707.07     707.20   4. Acute cystitis without hematuria  N30.00 595.0     Patient Active Problem List   Diagnosis   (none) - all problems resolved or deleted     Past Medical History:   Diagnosis Date    Stroke      History reviewed. No pertinent surgical history.      OT ASSESSMENT FLOWSHEET (Last 12 Hours)       OT Evaluation and Treatment       Row Name 10/28/24 1133                   OT Time and Intention    Document Type therapy note (daily note)  -KR        Mode of Treatment occupational therapy  -KR        Patient Effort fair  -KR        Symptoms Noted During/After Treatment increased pain  -KR           Cognition    Follows Commands (Cognition) WFL  -KR           Hand (Therapeutic Exercise)    Hand (Therapeutic Exercise) PROM (passive range of motion)  -KR        Hand PROM (Therapeutic Exercise) finger flexion;finger extension;left  -KR           Wound 24 1809 Left posterior ankle Other (comment)    Wound - Properties Group Placement Date: 24  -KJ Placement Time: 1809  -KJ Side: Left  -KJ Orientation: posterior  -KJ Location: ankle  -KJ Primary Wound Type: Other  -TW, unknow etiology     Retired Wound - Properties Group Placement Date: 24  -KJ Placement Time: 1809  -KJ Side: Left  -KJ Orientation: posterior  -KJ Location: ankle  -KJ Primary Wound Type: Other  -TW, unknow etiology     Retired Wound - Properties Group Placement Date: 24  -KJ Placement Time: 1809  -KJ Side: Left  -KJ Orientation:  posterior  -KJ Location: ankle  -KJ Primary Wound Type: Other  -TW, unknow etiology     Retired Wound - Properties Group Date first assessed: 09/26/24  -KJ Time first assessed: 1809  -KJ Side: Left  -KJ Location: ankle  -KJ Primary Wound Type: Other  -TW, unknow etiology        Wound 10/11/24 1330 medial coccyx Fissure    Wound - Properties Group Placement Date: 10/11/24  -TW Placement Time: 1330 -TW Orientation: medial  -TW Location: coccyx  -TW Primary Wound Type: Fissure  -TW    Retired Wound - Properties Group Placement Date: 10/11/24  -TW Placement Time: 1330 -TW Orientation: medial  -TW Location: coccyx  -TW Primary Wound Type: Fissure  -TW    Retired Wound - Properties Group Placement Date: 10/11/24  -TW Placement Time: 1330 -TW Orientation: medial  -TW Location: coccyx  -TW Primary Wound Type: Fissure  -TW    Retired Wound - Properties Group Date first assessed: 10/11/24  -TW Time first assessed: 1330  -TW Location: coccyx  -TW Primary Wound Type: Fissure  -TW       Wound 10/16/24 0705 Left medial gluteal MASD (moisture associated skin damage)    Wound - Properties Group Placement Date: 10/16/24  -MS Placement Time: 0705 -MS Side: Left  -MS Orientation: medial  -MS Location: gluteal  -MS Primary Wound Type: MASD  -MS Type: MASD (moisture associated skin damage)  -MS Present on Original Admission: N  -MS Additional Comments: Noted at shift change skin assesment, Night shift RN stated it had been there her shift.  -MS    Retired Wound - Properties Group Placement Date: 10/16/24  -MS Placement Time: 0705  -MS Present on Original Admission: N  -MS Side: Left  -MS Orientation: medial  -MS Location: gluteal  -MS Primary Wound Type: MASD  -MS Additional Comments: Noted at shift change skin assesment, Night shift RN stated it had been there her shift.  -MS Type: MASD (moisture associated skin damage)  -MS    Retired Wound - Properties Group Placement Date: 10/16/24  -MS Placement Time: 0705  -MS Present on  Original Admission: N  -MS Side: Left  -MS Orientation: medial  -MS Location: gluteal  -MS Primary Wound Type: MASD  -MS Additional Comments: Noted at shift change skin assesment, Night shift RN stated it had been there her shift.  -MS Type: MASD (moisture associated skin damage)  -MS    Retired Wound - Properties Group Date first assessed: 10/16/24  -MS Time first assessed: 0705  -MS Present on Original Admission: N  -MS Side: Left  -MS Location: gluteal  -MS Primary Wound Type: MASD  -MS Additional Comments: Noted at shift change skin assesment, Night shift RN stated it had been there her shift.  -MS Type: MASD (moisture associated skin damage)  -MS       Wound 10/16/24 0705 Left posterior greater trochanter MASD (moisture associated skin damage)    Wound - Properties Group Placement Date: 10/16/24  -MS Placement Time: 0705  -MS Side: Left  -MS Orientation: posterior  -MS Location: greater trochanter  -MS Primary Wound Type: MASD  -MS Type: MASD (moisture associated skin damage)  -MS Present on Original Admission: N  -MS    Retired Wound - Properties Group Placement Date: 10/16/24  -MS Placement Time: 0705  -MS Present on Original Admission: N  -MS Side: Left  -MS Orientation: posterior  -MS Location: greater trochanter  -MS Primary Wound Type: MASD  -MS Type: MASD (moisture associated skin damage)  -MS    Retired Wound - Properties Group Placement Date: 10/16/24  -MS Placement Time: 0705  -MS Present on Original Admission: N  -MS Side: Left  -MS Orientation: posterior  -MS Location: greater trochanter  -MS Primary Wound Type: MASD  -MS Type: MASD (moisture associated skin damage)  -MS    Retired Wound - Properties Group Date first assessed: 10/16/24  -MS Time first assessed: 0705  -MS Present on Original Admission: N  -MS Side: Left  -MS Location: greater trochanter  -MS Primary Wound Type: MASD  -MS Type: MASD (moisture associated skin damage)  -MS       ROM Goal 1 (OT)    Progress/Outcome (ROM Goal 1, OT)  continuing progress toward goal  -KR            Activity Tolerance Goal 1 (OT)    Progress/Outcome (Activity Tolerance Goal 1, OT) continuing progress toward goal  -KR                  User Key  (r) = Recorded By, (t) = Taken By, (c) = Cosigned By      Initials Name Effective Dates    TW Karen Gipson, FIFI 06/16/21 -     Neil Menezes OT 06/16/21 -     Nory Keenan RN 06/08/23 -     Roe North RN 06/04/24 -                      Occupational Therapy Education        No education to display                      OT Recommendation and Plan  Planned Therapy Interventions (OT): ROM/therapeutic exercise  Plan of Care Review  Plan of Care Reviewed With: patient  Progress: improving (tolerance to LUE PROM)  Plan of Care Reviewed With: patient        Time Calculation:     Therapy Charges for Today       Code Description Service Date Service Provider Modifiers Qty    [id number]  OT THERAPEUTIC ACT EA 15 MIN 10/28/2024 Neil Graf OT GO 1                 Neil Graf OT  10/28/2024

## 2024-10-28 NOTE — PLAN OF CARE
Goal Outcome Evaluation:  Plan of Care Reviewed With: patient        Progress: no change        Pt is resting well no acute changes at this time will continue to monitor and follow current plan of care.

## 2024-10-28 NOTE — PLAN OF CARE
Goal Outcome Evaluation:           Progress: no change  Outcome Evaluation: Patient is in bed at this time. VSS on RA. WC completed per orders. No acute changes per pt at this time. Will continue POC.

## 2024-10-28 NOTE — CASE MANAGEMENT/SOCIAL WORK
Discharge Planning Assessment   Ira     Patient Name: Lety Johnson  MRN: [id number]  Today's Date: 10/28/2024    Admit Date: 9/26/2024       Discharge Plan       Row Name 10/28/24 1605       Plan    Plan SS contacted State [employer], Justin Carpio's office [phone] per Jahaira on this date. Jahaira is unable to verify disability court hearing, however states it is not uncommon for hearing to take place out of the [facility]. SS provided contact information to 's office in the event they are unable to reach pt via mother's contact number during the court hearing. SS to follow.                  Continued Care and Services - Admitted Since 9/26/2024       Destination       Service Provider Request Status Services Address Phone Fax Patient Preferred    [facility] Declined  Cannot meet patient's needs -- [address] [facility] [address] [phone] --    [facility] Acute Care Declined  Not eligible -- [address] [facility] [place] [place] [address] [phone] --    [facility] Declined  Not eligible -- [address] [facility] [address] [phone] --    [facility] SPECIALTY [facility] Declined  Clinical Denial -- [address] [facility], 4TH FLOOR, [facility] [address] [phone] --    [facility] SWING BED UNIT Declined  Cannot meet patient's needs -- [address] [facility] [place] [facility] [address] [phone] --     [facility] Declined  Not eligible -- [address] [facility] [place] [address] [phone] --    [facility] Declined  Bed not available -- [address] [facility] [address] [phone] --    [facility] SKILLED CARE Declined  Bed not available -- [address] [facility] [address] [phone] --    [facility] Swing Declined  Out of network --  [address] [place] [address] [phone] --    [facility] SW[facility] BED UNIT Declined -- [address] [facility] [place] [address] [phone] --    [facility] Declined  Clinical Denial -- [address] [facility] [address] [phone] --    [facility] SWING BED UNIT Declined  Insurance Denial, Out of network -- [address] [address] [phone] --    [facility] Declined  Out of network -- [address] [facility] [address] [phone] --    [facility] Declined  Out of network -- [address] [address] [phone] -- --    [facility] Declined  Out of network -- [address] [facility] [address] [phone] --                  Expected Discharge Date and Time       Expected Discharge Date Expected Discharge Time    Oct 30, 2024      HELEN Butterfield

## 2024-10-28 NOTE — THERAPY RE-EVALUATION
Acute Care - Physical Therapy Re-Evaluation   Zaki     Patient Name: Lety Johnson  : 1980  MRN: [id number]  Today's Date: 10/28/2024   Onset of Illness/Injury or Date of Surgery: 24  Visit Dx:     ICD-10-CM ICD-9-CM   1. Hypokalemia  E87.6 276.8   2. Pressure injury of skin of sacral region, unspecified injury stage  L89.159 707.03     707.20   3. Pressure injury of skin of left heel, unspecified injury stage  L89.629 707.07     707.20   4. Acute cystitis without hematuria  N30.00 595.0     Patient Active Problem List   Diagnosis   (none) - all problems resolved or deleted     Past Medical History:   Diagnosis Date    Stroke      History reviewed. No pertinent surgical history.  PT Assessment (Last 12 Hours)       PT Evaluation and Treatment       Row Name 10/28/24 142          Physical Therapy Time and Intention    Subjective Information complains of;pain  -KM     Document Type re-evaluation  -KM     Mode of Treatment individual therapy;physical therapy  -KM     Patient Effort fair  -KM     Symptoms Noted During/After Treatment increased pain  -KM       Row Name 10/28/24 142          General Information    Patient Profile Reviewed yes  -KM     Patient Observations alert;cooperative;agree to therapy  -KM     Existing Precautions/Restrictions fall  -KM       Row Name 10/28/24 1427          Cognition    Affect/Mental Status (Cognition) flat/blunted affect;emotionally labile  -KM     Orientation Status (Cognition) oriented x 3  -KM     Follows Commands (Cognition) WFL  -KM       Row Name 10/28/24 1427          Bed Mobility    Comment, (Bed Mobility) pt. deferred bed mobility d/t complaints of pain  -KM       Row Name 10/28/24 1427          Safety Issues/Impairments Affecting Functional Mobility    Impairments Affecting Function (Mobility) balance;endurance/activity tolerance;pain;strength  -KM       Row Name 10/28/24 1427          Balance    Comment, Balance unable to re-assess  -KM       Row Name  10/28/24 1427          Motor Skills    Comments, Therapeutic Exercise supine ther-ex RLE; PROM LLE  -KM       Row Name             Wound 09/26/24 1809 Left posterior ankle Other (comment)    Wound - Properties Group Placement Date: 09/26/24  -KJ Placement Time: 1809  -KJ Side: Left  -KJ Orientation: posterior  -KJ Location: ankle  -KJ Primary Wound Type: Other  -TW, unknow etiology     Retired Wound - Properties Group Placement Date: 09/26/24  -KJ Placement Time: 1809  -KJ Side: Left  -KJ Orientation: posterior  -KJ Location: ankle  -KJ Primary Wound Type: Other  -TW, unknow etiology     Retired Wound - Properties Group Placement Date: 09/26/24  -KJ Placement Time: 1809  -KJ Side: Left  -KJ Orientation: posterior  -KJ Location: ankle  -KJ Primary Wound Type: Other  -TW, unknow etiology     Retired Wound - Properties Group Date first assessed: 09/26/24  -KJ Time first assessed: 1809  -KJ Side: Left  -KJ Location: ankle  -KJ Primary Wound Type: Other  -TW, unknow etiology       Row Name             Wound 10/11/24 1330 medial coccyx Fissure    Wound - Properties Group Placement Date: 10/11/24  -TW Placement Time: 1330  -TW Orientation: medial  -TW Location: coccyx  -TW Primary Wound Type: Fissure  -TW    Retired Wound - Properties Group Placement Date: 10/11/24  -TW Placement Time: 1330  -TW Orientation: medial  -TW Location: coccyx  -TW Primary Wound Type: Fissure  -TW    Retired Wound - Properties Group Placement Date: 10/11/24  -TW Placement Time: 1330  -TW Orientation: medial  -TW Location: coccyx  -TW Primary Wound Type: Fissure  -TW    Retired Wound - Properties Group Date first assessed: 10/11/24  -TW Time first assessed: 1330  -TW Location: coccyx  -TW Primary Wound Type: Fissure  -TW      Row Name             Wound 10/16/24 0705 Left medial gluteal MASD (moisture associated skin damage)    Wound - Properties Group Placement Date: 10/16/24  -MS Placement Time: 0705  -MS Side: Left  -MS Orientation: medial  -MS  Location: gluteal  -MS Primary Wound Type: MASD  -MS Type: MASD (moisture associated skin damage)  -MS Present on Original Admission: N  -MS Additional Comments: Noted at shift change skin assesment, Night shift RN stated it had been there her shift.  -MS    Retired Wound - Properties Group Placement Date: 10/16/24  -MS Placement Time: 0705 -MS Present on Original Admission: N  -MS Side: Left  -MS Orientation: medial  -MS Location: gluteal  -MS Primary Wound Type: MASD  -MS Additional Comments: Noted at shift change skin assesment, Night shift RN stated it had been there her shift.  -MS Type: MASD (moisture associated skin damage)  -MS    Retired Wound - Properties Group Placement Date: 10/16/24  -MS Placement Time: 0705 -MS Present on Original Admission: N  -MS Side: Left  -MS Orientation: medial  -MS Location: gluteal  -MS Primary Wound Type: MASD  -MS Additional Comments: Noted at shift change skin assesment, Night shift RN stated it had been there her shift.  -MS Type: MASD (moisture associated skin damage)  -MS    Retired Wound - Properties Group Date first assessed: 10/16/24  -MS Time first assessed: 0705 -MS Present on Original Admission: N  -MS Side: Left  -MS Location: gluteal  -MS Primary Wound Type: MASD  -MS Additional Comments: Noted at shift change skin assesment, Night shift RN stated it had been there her shift.  -MS Type: MASD (moisture associated skin damage)  -MS      Row Name             Wound 10/16/24 0705 Left posterior greater trochanter MASD (moisture associated skin damage)    Wound - Properties Group Placement Date: 10/16/24  -MS Placement Time: 0705 -MS Side: Left  -MS Orientation: posterior  -MS Location: greater trochanter  -MS Primary Wound Type: MASD  -MS Type: MASD (moisture associated skin damage)  -MS Present on Original Admission: N  -MS    Retired Wound - Properties Group Placement Date: 10/16/24  -MS Placement Time: 0705 -MS Present on Original Admission: N  -MS Side: Left   -MS Orientation: posterior  -MS Location: greater trochanter  -MS Primary Wound Type: MASD  -MS Type: MASD (moisture associated skin damage)  -MS    Retired Wound - Properties Group Placement Date: 10/16/24  -MS Placement Time: 0705 -MS Present on Original Admission: N  -MS Side: Left  -MS Orientation: posterior  -MS Location: greater trochanter  -MS Primary Wound Type: MASD  -MS Type: MASD (moisture associated skin damage)  -MS    Retired Wound - Properties Group Date first assessed: 10/16/24  -MS Time first assessed: 0705 -MS Present on Original Admission: N  -MS Side: Left  -MS Location: greater trochanter  -MS Primary Wound Type: MASD  -MS Type: MASD (moisture associated skin damage)  -MS      Row Name 10/28/24 1427          Progress Summary (PT)    Daily Progress Summary (PT) Pt. re-evaluation completed during PT session. She deferred mobility skills d/t increased pain. She was able to demonstrate supine ther-ex w/ RLE. Pt. would continue to benefit from skilled PT services as she is able to improve participation.  -KM       Row Name 10/28/24 1427          Physical Therapy Goals    Bed Mobility Goal Selection (PT) bed mobility, PT goal 1  -KM     Transfer Goal Selection (PT) transfer, PT goal 1  -KM       Row Name 10/28/24 1427          Bed Mobility Goal 1 (PT)    Activity/Assistive Device (Bed Mobility Goal 1, PT) rolling to left;rolling to right;scooting;sit to supine;supine to sit  -KM     Evans Level/Cues Needed (Bed Mobility Goal 1, PT) moderate assist (50-74% patient effort)  -KM     Time Frame (Bed Mobility Goal 1, PT) by discharge  -KM     Progress/Outcomes (Bed Mobility Goal 1, PT) goal ongoing  -KM       Row Name 10/28/24 1427          Transfer Goal 1 (PT)    Activity/Assistive Device (Transfer Goal 1, PT) toilet;wheelchair transfer;sliding board  -KM     Evans Level/Cues Needed (Transfer Goal 1, PT) moderate assist (50-74% patient effort)  -KM     Time Frame (Transfer Goal 1, PT) by  discharge  -KM     Progress/Outcome (Transfer Goal 1, PT) goal revised this date  -KM               User Key  (r) = Recorded By, (t) = Taken By, (c) = Cosigned By      Initials Name Provider Type    Karen Peralta, RN Registered Nurse    Thierry Carter, PT Physical Therapist    Nory Keenan, RN Registered Nurse    Roe North RN Registered Nurse                    Physical Therapy Education       Title: PT OT SLP Therapies (Done)       Topic: Physical Therapy (Done)       Point: Mobility training (Done)       Learning Progress Summary            Patient Acceptance, E,TB, VU by RR at 10/27/2024 0031    Acceptance, E,TB, VU by RG at 10/22/2024 1002    Acceptance, TB,E, VU by RG at 10/21/2024 0907    Nonacceptance, E, NR by WG at 10/16/2024 0228    Acceptance, E,TB, VU by CF at 10/15/2024 0753    Acceptance, E,TB, VU by CF at 10/14/2024 0801    Acceptance, E,TB, VU by CF at 10/13/2024 1045    Acceptance, E,D, VU,NR by AG at 10/10/2024 1310    Acceptance, E,TB, VU by CB at 10/8/2024 0448    Acceptance, E, NR by RD at 10/2/2024 1101    Acceptance, E, NR by RD at 10/1/2024 0856    Acceptance, E,D, VU,NR by AG at 9/30/2024 1559                      Point: Home exercise program (Done)       Learning Progress Summary            Patient Acceptance, E,TB, VU by RR at 10/27/2024 0031    Acceptance, E,TB, VU by RG at 10/22/2024 1002    Acceptance, TB,E, VU by RG at 10/21/2024 0907    Nonacceptance, E, NR by WG at 10/16/2024 0228    Acceptance, E,TB, VU by CF at 10/15/2024 0753    Acceptance, E,TB, VU by CF at 10/14/2024 0801    Acceptance, E,TB, VU by CF at 10/13/2024 1045    Acceptance, E,D, VU,NR by AG at 10/10/2024 1310    Acceptance, E,TB, VU by CB at 10/8/2024 0448    Acceptance, E, NR by RD at 10/2/2024 1101    Acceptance, E, NR by RD at 10/1/2024 0856    Acceptance, E,D, VU,NR by MARI at 9/30/2024 1559                      Point: Body mechanics (Done)       Learning Progress Summary            Patient  Acceptance, E,TB, VU by RR at 10/27/2024 0031    Acceptance, E,TB, VU by RG at 10/22/2024 1002    Acceptance, TB,E, VU by RG at 10/21/2024 0907    Nonacceptance, E, NR by WG at 10/16/2024 0228    Acceptance, E,TB, VU by CF at 10/15/2024 0753    Acceptance, E,TB, VU by CF at 10/14/2024 0801    Acceptance, E,TB, VU by CF at 10/13/2024 1045    Acceptance, E,D, VU,NR by AG at 10/10/2024 1310    Acceptance, E,TB, VU by CB at 10/8/2024 0448    Acceptance, E, NR by RD at 10/2/2024 1101    Acceptance, E, NR by RD at 10/1/2024 0856    Acceptance, E,D, VU,NR by AG at 9/30/2024 1559                      Point: Precautions (Done)       Learning Progress Summary            Patient Acceptance, E,TB, VU by RR at 10/27/2024 0031    Acceptance, E,TB, VU by RG at 10/22/2024 1002    Acceptance, TB,E, VU by RG at 10/21/2024 0907    Nonacceptance, E, NR by WG at 10/16/2024 0228    Acceptance, E,TB, VU by CF at 10/15/2024 0753    Acceptance, E,TB, VU by CF at 10/14/2024 0801    Acceptance, E,TB, VU by CF at 10/13/2024 1045    Acceptance, E,D, VU,NR by AG at 10/10/2024 1310    Acceptance, E,TB, VU by CB at 10/8/2024 0448    Acceptance, E, NR by RD at 10/2/2024 1101    Acceptance, E, NR by RD at 10/1/2024 0856    Acceptance, E,D, VU,NR by AG at 9/30/2024 1559                                      User Key       Initials Effective Dates Name Provider Type Discipline    AG 06/16/21 -  Мария Joya, PT Physical Therapist PT    RD 06/16/21 -  Laisha Verdugo, RN Registered Nurse Nurse    RR 06/11/24 -  Jaymie Dominguez, FIFI Registered Nurse Nurse    RG 06/06/23 -  Jesus Matthews, RN Registered Nurse Nurse    WG 02/12/24 -  Shanthi Burden, RN Registered Nurse Nurse    CF 06/25/24 -  Cherelle Germain, RN Registered Nurse Nurse    CB 06/17/24 -  Fidelia Lombardo, RN Registered Nurse Nurse                  PT Recommendation and Plan     Progress Summary (PT)  Daily Progress Summary (PT): Pt. re-evaluation completed during PT session. She deferred  mobility skills d/t increased pain. She was able to demonstrate supine ther-ex w/ RLE. Pt. would continue to benefit from skilled PT services as she is able to improve participation.       Time Calculation:    PT Charges       Row Name 10/28/24 1437             Time Calculation    PT Received On 10/28/24  -KM      PT Goal Re-Cert Due Date 11/11/24  -KM         Time Calculation- PT    Total Timed Code Minutes- PT 15 minute(s)  -KM                User Key  (r) = Recorded By, (t) = Taken By, (c) = Cosigned By      Initials Name Provider Type    Thierry Carter, PT Physical Therapist                  Therapy Charges for Today       Code Description Service Date Service Provider Modifiers Qty    88768447877 HC PT RE-EVAL ESTABLISHED PLAN 2 10/28/2024 Thierry Saldivar, PT GP 1    76386919122 HC PT THER PROC EA 15 MIN 10/28/2024 Thierry Saldivar, PT GP 1            PT G-Codes  AM-PAC 6 Clicks Score (PT): 9    Thierry Saldivar PT  10/28/2024

## 2024-10-29 LAB
GLUCOSE BLDC GLUCOMTR-MCNC: 190 MG/DL (ref 70–130)
GLUCOSE BLDC GLUCOMTR-MCNC: 230 MG/DL (ref 70–130)
GLUCOSE BLDC GLUCOMTR-MCNC: 261 MG/DL (ref 70–130)
GLUCOSE BLDC GLUCOMTR-MCNC: 263 MG/DL (ref 70–130)

## 2024-10-29 PROCEDURE — 25010000002 HEPARIN (PORCINE) PER 1000 UNITS: Performed by: INTERNAL MEDICINE

## 2024-10-29 PROCEDURE — 82948 REAGENT STRIP/BLOOD GLUCOSE: CPT

## 2024-10-29 PROCEDURE — 63710000001 INSULIN GLARGINE PER 5 UNITS: Performed by: HOSPITALIST

## 2024-10-29 PROCEDURE — 63710000001 INSULIN LISPRO (HUMAN) PER 5 UNITS: Performed by: INTERNAL MEDICINE

## 2024-10-29 PROCEDURE — 97530 THERAPEUTIC ACTIVITIES: CPT

## 2024-10-29 PROCEDURE — 99231 SBSQ HOSP IP/OBS SF/LOW 25: CPT | Performed by: INTERNAL MEDICINE

## 2024-10-29 PROCEDURE — 63710000001 INSULIN GLARGINE PER 5 UNITS: Performed by: INTERNAL MEDICINE

## 2024-10-29 RX ADMIN — INSULIN LISPRO 8 UNITS: 100 INJECTION, SOLUTION INTRAVENOUS; SUBCUTANEOUS at 11:46

## 2024-10-29 RX ADMIN — METOPROLOL TARTRATE 25 MG: 25 TABLET, FILM COATED ORAL at 08:20

## 2024-10-29 RX ADMIN — INSULIN LISPRO 4 UNITS: 100 INJECTION, SOLUTION INTRAVENOUS; SUBCUTANEOUS at 08:20

## 2024-10-29 RX ADMIN — TRAMADOL HYDROCHLORIDE 50 MG: 50 TABLET ORAL at 13:26

## 2024-10-29 RX ADMIN — INSULIN LISPRO 12 UNITS: 100 INJECTION, SOLUTION INTRAVENOUS; SUBCUTANEOUS at 21:59

## 2024-10-29 RX ADMIN — HEPARIN SODIUM 5000 UNITS: 5000 INJECTION INTRAVENOUS; SUBCUTANEOUS at 13:26

## 2024-10-29 RX ADMIN — INSULIN LISPRO 12 UNITS: 100 INJECTION, SOLUTION INTRAVENOUS; SUBCUTANEOUS at 16:55

## 2024-10-29 RX ADMIN — Medication 10 ML: at 08:21

## 2024-10-29 RX ADMIN — DULOXETINE HYDROCHLORIDE 60 MG: 60 CAPSULE, DELAYED RELEASE ORAL at 08:20

## 2024-10-29 RX ADMIN — NYSTATIN: 100000 POWDER TOPICAL at 21:59

## 2024-10-29 RX ADMIN — METOPROLOL TARTRATE 25 MG: 25 TABLET, FILM COATED ORAL at 22:11

## 2024-10-29 RX ADMIN — ASPIRIN 81 MG: 81 TABLET, CHEWABLE ORAL at 08:20

## 2024-10-29 RX ADMIN — LIDOCAINE 1 PATCH: 4 PATCH TOPICAL at 16:56

## 2024-10-29 RX ADMIN — HEPARIN SODIUM 5000 UNITS: 5000 INJECTION INTRAVENOUS; SUBCUTANEOUS at 05:39

## 2024-10-29 RX ADMIN — VITAMINS A AND D OINTMENT: 15.5; 53.4 OINTMENT TOPICAL at 21:59

## 2024-10-29 RX ADMIN — Medication 10 ML: at 22:01

## 2024-10-29 RX ADMIN — TRAMADOL HYDROCHLORIDE 50 MG: 50 TABLET ORAL at 22:00

## 2024-10-29 RX ADMIN — PANTOPRAZOLE SODIUM 40 MG: 40 TABLET, DELAYED RELEASE ORAL at 08:20

## 2024-10-29 RX ADMIN — HEPARIN SODIUM 5000 UNITS: 5000 INJECTION INTRAVENOUS; SUBCUTANEOUS at 22:00

## 2024-10-29 RX ADMIN — NYSTATIN: 100000 POWDER TOPICAL at 08:21

## 2024-10-29 RX ADMIN — VITAMINS A AND D OINTMENT: 15.5; 53.4 OINTMENT TOPICAL at 08:21

## 2024-10-29 RX ADMIN — NICOTINE 1 PATCH: 7 PATCH, EXTENDED RELEASE TRANSDERMAL at 08:21

## 2024-10-29 RX ADMIN — ACYCLOVIR 400 MG: 200 CAPSULE ORAL at 22:00

## 2024-10-29 RX ADMIN — ATORVASTATIN CALCIUM 80 MG: 40 TABLET, FILM COATED ORAL at 08:20

## 2024-10-29 RX ADMIN — LISINOPRIL 20 MG: 10 TABLET ORAL at 08:20

## 2024-10-29 RX ADMIN — INSULIN GLARGINE 40 UNITS: 100 INJECTION, SOLUTION SUBCUTANEOUS at 21:59

## 2024-10-29 RX ADMIN — INSULIN GLARGINE 45 UNITS: 100 INJECTION, SOLUTION SUBCUTANEOUS at 08:21

## 2024-10-29 NOTE — PLAN OF CARE
Goal Outcome Evaluation:  Plan of Care Reviewed With: patient        Progress: no change  Outcome Evaluation: Pt's resting in bed, A&O, stable on RA, pain controlled PRN meds, wound care done per orders, BS is better today. Con't with POC.

## 2024-10-29 NOTE — THERAPY TREATMENT NOTE
Acute Care - Occupational Therapy Treatment Note   Zaki     Patient Name: Lety Johnson  : 1980  MRN: [id number]  Today's Date: 10/29/2024  Onset of Illness/Injury or Date of Surgery: 24     Referring Physician: Dr. Marc    Admit Date: 2024       ICD-10-CM ICD-9-CM   1. Hypokalemia  E87.6 276.8   2. Pressure injury of skin of sacral region, unspecified injury stage  L89.159 707.03     707.20   3. Pressure injury of skin of left heel, unspecified injury stage  L89.629 707.07     707.20   4. Acute cystitis without hematuria  N30.00 595.0     Patient Active Problem List   Diagnosis   (none) - all problems resolved or deleted     Past Medical History:   Diagnosis Date    Stroke      History reviewed. No pertinent surgical history.      OT ASSESSMENT FLOWSHEET (Last 12 Hours)       OT Evaluation and Treatment       Row Name 10/29/24 1117                   OT Time and Intention    Document Type therapy note (daily note)  -KR        Mode of Treatment occupational therapy  -KR        Patient Effort adequate  -KR           General Information    General Observations of Patient alert/cooperative  -KR           Cognition    Affect/Mental Status (Cognition) WFL  -KR        Orientation Status (Cognition) oriented x 3  -KR        Follows Commands (Cognition) WFL  -KR           Bathing Assessment/Intervention    Sullivan Level (Bathing) maximum assist (25% patient effort)  -KR           Upper Body Dressing Assessment/Training    Sullivan Level (Upper Body Dressing) maximum assist (25% patient effort)  -KR           Lower Body Dressing Assessment/Training    Sullivan Level (Lower Body Dressing) dependent (less than 25% patient effort)  -KR           Grooming Assessment/Training    Sullivan Level (Grooming) moderate assist (50% patient effort)  -KR           Self-Feeding Assessment/Training    Sullivan Level (Feeding) set up  -KR           Elbow/Forearm (Therapeutic Exercise)     Elbow/Forearm PROM (Therapeutic Exercise) left;flexion;extension;supine  -KR           Hand (Therapeutic Exercise)    Hand PROM (Therapeutic Exercise) left;finger flexion;finger extension  -KR           Wound 09/26/24 1809 Left posterior ankle Other (comment)    Wound - Properties Group Placement Date: 09/26/24  -KJ Placement Time: 1809  -KJ Side: Left  -KJ Orientation: posterior  -KJ Location: ankle  -KJ Primary Wound Type: Other  -TW, unknow etiology     Retired Wound - Properties Group Placement Date: 09/26/24  -KJ Placement Time: 1809  -KJ Side: Left  -KJ Orientation: posterior  -KJ Location: ankle  -KJ Primary Wound Type: Other  -TW, unknow etiology     Retired Wound - Properties Group Placement Date: 09/26/24  -KJ Placement Time: 1809  -KJ Side: Left  -KJ Orientation: posterior  -KJ Location: ankle  -KJ Primary Wound Type: Other  -TW, unknow etiology     Retired Wound - Properties Group Date first assessed: 09/26/24  -KJ Time first assessed: 1809  -KJ Side: Left  -KJ Location: ankle  -KJ Primary Wound Type: Other  -TW, unknow etiology        Wound 10/11/24 1330 medial coccyx Fissure    Wound - Properties Group Placement Date: 10/11/24  -TW Placement Time: 1330  -TW Orientation: medial  -TW Location: coccyx  -TW Primary Wound Type: Fissure  -TW    Retired Wound - Properties Group Placement Date: 10/11/24  -TW Placement Time: 1330  -TW Orientation: medial  -TW Location: coccyx  -TW Primary Wound Type: Fissure  -TW    Retired Wound - Properties Group Placement Date: 10/11/24  -TW Placement Time: 1330  -TW Orientation: medial  -TW Location: coccyx  -TW Primary Wound Type: Fissure  -TW    Retired Wound - Properties Group Date first assessed: 10/11/24  -TW Time first assessed: 1330  -TW Location: coccyx  -TW Primary Wound Type: Fissure  -TW       Wound 10/16/24 0705 Left medial gluteal MASD (moisture associated skin damage)    Wound - Properties Group Placement Date: 10/16/24  -MS Placement Time: 0705  -MS Side:  Left  -MS Orientation: medial  -MS Location: gluteal  -MS Primary Wound Type: MASD  -MS Type: MASD (moisture associated skin damage)  -MS Present on Original Admission: N  -MS Additional Comments: Noted at shift change skin assesment, Night shift RN stated it had been there her shift.  -MS    Retired Wound - Properties Group Placement Date: 10/16/24  -MS Placement Time: 0705 -MS Present on Original Admission: N  -MS Side: Left  -MS Orientation: medial  -MS Location: gluteal  -MS Primary Wound Type: MASD  -MS Additional Comments: Noted at shift change skin assesment, Night shift RN stated it had been there her shift.  -MS Type: MASD (moisture associated skin damage)  -MS    Retired Wound - Properties Group Placement Date: 10/16/24  -MS Placement Time: 0705 -MS Present on Original Admission: N  -MS Side: Left  -MS Orientation: medial  -MS Location: gluteal  -MS Primary Wound Type: MASD  -MS Additional Comments: Noted at shift change skin assesment, Night shift RN stated it had been there her shift.  -MS Type: MASD (moisture associated skin damage)  -MS    Retired Wound - Properties Group Date first assessed: 10/16/24  -MS Time first assessed: 0705 -MS Present on Original Admission: N  -MS Side: Left  -MS Location: gluteal  -MS Primary Wound Type: MASD  -MS Additional Comments: Noted at shift change skin assesment, Night shift RN stated it had been there her shift.  -MS Type: MASD (moisture associated skin damage)  -MS       Wound 10/16/24 0705 Left posterior greater trochanter MASD (moisture associated skin damage)    Wound - Properties Group Placement Date: 10/16/24  -MS Placement Time: 0705 -MS Side: Left  -MS Orientation: posterior  -MS Location: greater trochanter  -MS Primary Wound Type: MASD  -MS Type: MASD (moisture associated skin damage)  -MS Present on Original Admission: N  -MS    Retired Wound - Properties Group Placement Date: 10/16/24  -MS Placement Time: 0705 -MS Present on Original Admission: N   -MS Side: Left  -MS Orientation: posterior  -MS Location: greater trochanter  -MS Primary Wound Type: MASD  -MS Type: MASD (moisture associated skin damage)  -MS    Retired Wound - Properties Group Placement Date: 10/16/24  -MS Placement Time: 0705 -MS Present on Original Admission: N  -MS Side: Left  -MS Orientation: posterior  -MS Location: greater trochanter  -MS Primary Wound Type: MASD  -MS Type: MASD (moisture associated skin damage)  -MS    Retired Wound - Properties Group Date first assessed: 10/16/24  -MS Time first assessed: 0705 -MS Present on Original Admission: N  -MS Side: Left  -MS Location: greater trochanter  -MS Primary Wound Type: MASD  -MS Type: MASD (moisture associated skin damage)  -MS       Plan of Care Review    Plan of Care Reviewed With patient  -KR           ROM Goal 1 (OT)    Progress/Outcome (ROM Goal 1, OT) continuing progress toward goal  -KR            Activity Tolerance Goal 1 (OT)    Progress/Outcome (Activity Tolerance Goal 1, OT) continuing progress toward goal  -KR                  User Key  (r) = Recorded By, (t) = Taken By, (c) = Cosigned By      Initials Name Effective Dates    TW Karen Gipson RN 06/16/21 -     Neil Menezes OT 06/16/21 -     Nory Keenan RN 06/08/23 -     Roe North RN 06/04/24 -                      Occupational Therapy Education        No education to display                      OT Recommendation and Plan  Planned Therapy Interventions (OT): ROM/therapeutic exercise  Plan of Care Review  Plan of Care Reviewed With: patient  Progress: improving (tolerance to LUE PROM)  Plan of Care Reviewed With: patient        Time Calculation:     Therapy Charges for Today       Code Description Service Date Service Provider Modifiers Qty    97314984516 HC OT THERAPEUTIC ACT EA 15 MIN 10/28/2024 Neil Graf OT GO 1    97867622135 HC OT THERAPEUTIC ACT EA 15 MIN 10/29/2024 Neil Graf OT GO 1                 Neil Graf  OT  10/29/2024

## 2024-10-29 NOTE — PROGRESS NOTES
Adult Nutrition  Assessment/PES    Patient Name:  Lety Johnson  YOB: 1980  MRN: [id number]  Admit Date:  9/26/2024    Assessment Date:  10/29/2024    Comments:  follow-up    Po 88% ave of meals.    Encourage cont good po intake.    Will cont to follow and monitor.      Reason for Assessment       Row Name 10/29/24 1411          Reason for Assessment    Reason For Assessment follow-up protocol  remote REZA White     Diagnosis infection/sepsis;neurologic conditions  cystitis, met enceph, debility hx CVA                    Nutrition/Diet History       Row Name 10/29/24 1412          Nutrition/Diet History    Typical Intake (Food/Fluid/EN/PN) called to room, no answer                    Labs/Tests/Procedures/Meds       Row Name 10/29/24 1412          Labs/Procedures/Meds    Lab Results Reviewed reviewed     Lab Results Comments glu 190-230        Diagnostic Tests/Procedures    Diagnostic Test/Procedure Reviewed reviewed        Medications    Pertinent Medications Reviewed reviewed     Pertinent Medications Comments lantus, humalog                        Nutrition Prescription Ordered       Row Name 10/29/24 1412          Nutrition Prescription PO    Fluid Consistency Thin     Common Modifiers Consistent Carbohydrate                    Evaluation of Received Nutrient/Fluid Intake       Row Name 10/29/24 1412          Fluid Intake Evaluation    Oral Fluid (mL) 1555  ave x 3        PO Evaluation    Number of Meals 9     % PO Intake 88                       Problem/Interventions:   Problem 1       Row Name 10/29/24 1413          Nutrition Diagnoses Problem 1    Problem 1 Other (comment)  Inadequate energy intake related to cystitis, met eceph as evidenced po intake, ONS, skin                          Intervention Goal       Row Name 10/29/24 1413          Intervention Goal    General Meet nutritional needs for age/condition     PO Maintain intake;PO intake (%)     PO Intake % 80 %  or greater                     Nutrition Intervention       Row Name 10/29/24 1413          Nutrition Intervention    RD/Tech Action Encourage intake;Follow Tx progress                      Education/Evaluation       Row Name 10/29/24 1413          Education    Education No discharge needs identified at this time        Monitor/Evaluation    Monitor Per protocol;I&O;PO intake;Pertinent labs;Weight;Skin status                     Electronically signed by:  Vee Van RD  10/29/24 14:13 EDT

## 2024-10-29 NOTE — CASE MANAGEMENT/SOCIAL WORK
Discharge Planning Assessment   [place]     Patient Name: Lety Johnson  MRN: [id number]  Today's Date: 10/29/2024    Admit Date: 9/26/2024       Discharge Plan       Row Name 10/29/24 1032       Plan    Plan SS received a call from APS BOBY, Kinsey Dial. APS plans to contact sisterMoises today to further disucss pt's discharge plan. Disability court hearing is tomorrow. Pt will be present in court hearing via telephone. SS to follow.    Addendum @ 15:49: SS received a call from , Peter Jaramillo ([phone]). SS and [facility] Medicine Practice Manager contacted  back. Practice Manager provided statement including pt's current functional status and disposition plan to . Disability court hearing is tomorrow at 14:45. SS to follow.                   Continued Care and Services - Admitted Since 9/26/2024       Destination       Service Provider Request Status Services Address Phone Fax Patient Preferred    [facility] Declined  Cannot meet patient's needs -- [address] [facility] [address] [phone] --     [facility]ined  Not eligible -- [address] [facility] [place] [address] [phone] --    [facility] Declined  Not eligible -- [address] [facility] [address] [phone] --    [facility] Declined  Clinical Denial -- [address] [facility], 4TH FLOOR, [facility] [address] [phone] --    [facility] SWING BED UNIT Declined  Cannot meet patient's needs -- [address] [place] [address] [address] [facility] [address] [phone] --     [facility] Declined  Not eligible -- [address] [facility] [place] [address] [phone] --    [facility] Declined  Bed not available -- [address] [facility] [address] [phone] --    [facility] SKILLED CARE Declined  Bed not available -- [address] [facility] [id number] [phone] --    [facility] Swing Declined  Out of network -- [address] [facility] [place] [address] [phone] --    [facility] SWING BED UNIT Declined -- [address] [facility] [place] [address] [phone] --    [facility] Declined  Clinical Denial -- [address] [facility] [id number] [phone] --    [facility] SWING BED UNIT Declined  Insurance Denial, Out of network -- [address], [facility] [address] [phone] --    [facility] Declined  Out of network -- [address] [facility] [address] [phone] --    [facility] Declined  Out of network -- [address] [address] [phone] -- --    [facility] Declined  Out of network -- [address] [facility] [address] [phone] --                  Expected Discharge Date and Time       Expected Discharge Date Expected Discharge Time    Oct 30, 2024      HELEN Butterfield

## 2024-10-30 LAB
GLUCOSE BLDC GLUCOMTR-MCNC: 117 MG/DL (ref 70–130)
GLUCOSE BLDC GLUCOMTR-MCNC: 250 MG/DL (ref 70–130)
GLUCOSE BLDC GLUCOMTR-MCNC: 265 MG/DL (ref 70–130)
GLUCOSE BLDC GLUCOMTR-MCNC: 304 MG/DL (ref 70–130)

## 2024-10-30 PROCEDURE — 63710000001 INSULIN LISPRO (HUMAN) PER 5 UNITS: Performed by: INTERNAL MEDICINE

## 2024-10-30 PROCEDURE — 99232 SBSQ HOSP IP/OBS MODERATE 35: CPT | Performed by: INTERNAL MEDICINE

## 2024-10-30 PROCEDURE — 97530 THERAPEUTIC ACTIVITIES: CPT

## 2024-10-30 PROCEDURE — 63710000001 INSULIN GLARGINE PER 5 UNITS: Performed by: INTERNAL MEDICINE

## 2024-10-30 PROCEDURE — 97110 THERAPEUTIC EXERCISES: CPT

## 2024-10-30 PROCEDURE — 82948 REAGENT STRIP/BLOOD GLUCOSE: CPT

## 2024-10-30 PROCEDURE — 25010000002 HEPARIN (PORCINE) PER 1000 UNITS: Performed by: INTERNAL MEDICINE

## 2024-10-30 PROCEDURE — 63710000001 INSULIN GLARGINE PER 5 UNITS: Performed by: HOSPITALIST

## 2024-10-30 RX ADMIN — ATORVASTATIN CALCIUM 80 MG: 40 TABLET, FILM COATED ORAL at 08:34

## 2024-10-30 RX ADMIN — Medication 10 ML: at 08:36

## 2024-10-30 RX ADMIN — HEPARIN SODIUM 5000 UNITS: 5000 INJECTION INTRAVENOUS; SUBCUTANEOUS at 13:54

## 2024-10-30 RX ADMIN — HEPARIN SODIUM 5000 UNITS: 5000 INJECTION INTRAVENOUS; SUBCUTANEOUS at 21:13

## 2024-10-30 RX ADMIN — VITAMINS A AND D OINTMENT: 15.5; 53.4 OINTMENT TOPICAL at 08:35

## 2024-10-30 RX ADMIN — METOPROLOL TARTRATE 25 MG: 25 TABLET, FILM COATED ORAL at 08:35

## 2024-10-30 RX ADMIN — CYCLOBENZAPRINE 10 MG: 10 TABLET, FILM COATED ORAL at 05:55

## 2024-10-30 RX ADMIN — NICOTINE 1 PATCH: 7 PATCH, EXTENDED RELEASE TRANSDERMAL at 08:36

## 2024-10-30 RX ADMIN — VITAMINS A AND D OINTMENT: 15.5; 53.4 OINTMENT TOPICAL at 20:10

## 2024-10-30 RX ADMIN — INSULIN GLARGINE 45 UNITS: 100 INJECTION, SOLUTION SUBCUTANEOUS at 08:35

## 2024-10-30 RX ADMIN — INSULIN GLARGINE 45 UNITS: 100 INJECTION, SOLUTION SUBCUTANEOUS at 20:10

## 2024-10-30 RX ADMIN — DULOXETINE HYDROCHLORIDE 60 MG: 60 CAPSULE, DELAYED RELEASE ORAL at 08:34

## 2024-10-30 RX ADMIN — TRAMADOL HYDROCHLORIDE 50 MG: 50 TABLET ORAL at 17:51

## 2024-10-30 RX ADMIN — ACYCLOVIR 400 MG: 200 CAPSULE ORAL at 20:09

## 2024-10-30 RX ADMIN — Medication 10 ML: at 20:10

## 2024-10-30 RX ADMIN — PANTOPRAZOLE SODIUM 40 MG: 40 TABLET, DELAYED RELEASE ORAL at 08:35

## 2024-10-30 RX ADMIN — HEPARIN SODIUM 5000 UNITS: 5000 INJECTION INTRAVENOUS; SUBCUTANEOUS at 05:13

## 2024-10-30 RX ADMIN — TRAMADOL HYDROCHLORIDE 50 MG: 50 TABLET ORAL at 05:55

## 2024-10-30 RX ADMIN — NYSTATIN: 100000 POWDER TOPICAL at 08:35

## 2024-10-30 RX ADMIN — INSULIN LISPRO 12 UNITS: 100 INJECTION, SOLUTION INTRAVENOUS; SUBCUTANEOUS at 16:39

## 2024-10-30 RX ADMIN — LIDOCAINE 1 PATCH: 4 PATCH TOPICAL at 16:40

## 2024-10-30 RX ADMIN — INSULIN LISPRO 16 UNITS: 100 INJECTION, SOLUTION INTRAVENOUS; SUBCUTANEOUS at 20:09

## 2024-10-30 RX ADMIN — NYSTATIN: 100000 POWDER TOPICAL at 20:11

## 2024-10-30 RX ADMIN — METOPROLOL TARTRATE 25 MG: 25 TABLET, FILM COATED ORAL at 20:09

## 2024-10-30 RX ADMIN — ASPIRIN 81 MG: 81 TABLET, CHEWABLE ORAL at 08:34

## 2024-10-30 RX ADMIN — POLYETHYLENE GLYCOL (3350) 17 G: 17 POWDER, FOR SOLUTION ORAL at 17:51

## 2024-10-30 RX ADMIN — INSULIN LISPRO 12 UNITS: 100 INJECTION, SOLUTION INTRAVENOUS; SUBCUTANEOUS at 11:33

## 2024-10-30 RX ADMIN — LISINOPRIL 20 MG: 10 TABLET ORAL at 08:34

## 2024-10-30 RX ADMIN — Medication 5 MG: at 20:09

## 2024-10-30 NOTE — THERAPY TREATMENT NOTE
Acute Care - Physical Therapy Treatment Note   [place]     Patient Name: Lety Johnson  : 1980  MRN: [id number]  Today's Date: 10/30/2024   Onset of Illness/Injury or Date of Surgery: 24  Visit Dx:     ICD-10-CM ICD-9-CM   1. Hypokalemia  E87.6 276.8   2. Pressure injury of skin of sacral region, unspecified injury stage  L89.159 707.03     707.20   3. Pressure injury of skin of left heel, unspecified injury stage  L89.629 707.07     707.20   4. Acute cystitis without hematuria  N30.00 595.0     Patient Active Problem List   Diagnosis   (none) - all problems resolved or deleted     Past Medical History:   Diagnosis Date    Stroke      History reviewed. No pertinent surgical history.  PT Assessment (Last 12 Hours)       PT Evaluation and Treatment       Row Name 10/30/24 0957          Physical Therapy Time and Intention    Subjective Information complains of;pain  -KM     Document Type therapy note (daily note)  -KM     Mode of Treatment individual therapy;physical therapy  -KM     Patient Effort adequate  -KM     Symptoms Noted During/After Treatment increased pain  -KM       Row Name 10/30/24 0957          General Information    Patient Profile Reviewed yes  -KM     Patient Observations alert;cooperative;agree to therapy  -KM     Existing Precautions/Restrictions fall  -KM       Row Name 10/30/24 0957          Cognition    Affect/Mental Status (Cognition) WFL  -KM     Orientation Status (Cognition) oriented x 3  -KM     Follows Commands (Cognition) WFL  -KM       Row Name 10/30/24 0957          Bed Mobility    Bed Mobility supine-sit  -KM     Supine-Sit Grady (Bed Mobility) maximum assist (25% patient effort)  -KM     Bed Mobility, Safety Issues decreased use of arms for pushing/pulling;decreased use of legs for bridging/pushing  -KM     Assistive Device (Bed Mobility) bed rails;head of bed elevated  -KM       Row Name 10/30/24 0957          Gait/Stairs (Locomotion)    Patient was able to  Ambulate no, other medical factors prevent ambulation  -KM     Reason Patient was unable to Ambulate Non-Ambulatory at Baseline  -KM       Row Name 10/30/24 0957          Safety Issues/Impairments Affecting Functional Mobility    Impairments Affecting Function (Mobility) balance;endurance/activity tolerance;pain;strength  -KM       Row Name 10/30/24 0957          Balance    Balance Assessment sitting static balance  -KM     Static Sitting Balance modified independence  -KM     Position, Sitting Balance sitting edge of bed  -KM       Row Name 10/30/24 0957          Motor Skills    Comments, Therapeutic Exercise EOB ther-ex  -KM       Row Name             Wound 09/26/24 1809 Left posterior ankle Other (comment)    Wound - Properties Group Placement Date: 09/26/24  -KJ Placement Time: 1809  -KJ Side: Left  -KJ Orientation: posterior  -KJ Location: ankle  -KJ Primary Wound Type: Other  -TW, unknow etiology     Retired Wound - Properties Group Placement Date: 09/26/24  -KJ Placement Time: 1809  -KJ Side: Left  -KJ Orientation: posterior  -KJ Location: ankle  -KJ Primary Wound Type: Other  -TW, unknow etiology     Retired Wound - Properties Group Placement Date: 09/26/24  -KJ Placement Time: 1809  -KJ Side: Left  -KJ Orientation: posterior  -KJ Location: ankle  -KJ Primary Wound Type: Other  -TW, unknow etiology     Retired Wound - Properties Group Date first assessed: 09/26/24  -KJ Time first assessed: 1809  -KJ Side: Left  -KJ Location: ankle  -KJ Primary Wound Type: Other  -TW, unknow etiology       Row Name             Wound 10/11/24 1330 medial coccyx Fissure    Wound - Properties Group Placement Date: 10/11/24  -TW Placement Time: 1330 -TW Orientation: medial  -TW Location: coccyx  -TW Primary Wound Type: Fissure  -TW    Retired Wound - Properties Group Placement Date: 10/11/24  -TW Placement Time: 1330  -TW Orientation: medial  -TW Location: coccyx  -TW Primary Wound Type: Fissure  -TW    Retired Wound -  Properties Group Placement Date: 10/11/24  -TW Placement Time: 1330 -TW Orientation: medial  -TW Location: coccyx  -TW Primary Wound Type: Fissure  -TW    Retired Wound - Properties Group Date first assessed: 10/11/24  -TW Time first assessed: 1330 -TW Location: coccyx  -TW Primary Wound Type: Fissure  -TW      Row Name             Wound 10/16/24 0705 Left medial gluteal MASD (moisture associated skin damage)    Wound - Properties Group Placement Date: 10/16/24  -MS Placement Time: 0705  -MS Side: Left  -MS Orientation: medial  -MS Location: gluteal  -MS Primary Wound Type: MASD  -MS Type: MASD (moisture associated skin damage)  -MS Present on Original Admission: N  -MS Additional Comments: Noted at shift change skin assesment, Night shift RN stated it had been there her shift.  -MS    Retired Wound - Properties Group Placement Date: 10/16/24  -MS Placement Time: 0705 -MS Present on Original Admission: N  -MS Side: Left  -MS Orientation: medial  -MS Location: gluteal  -MS Primary Wound Type: MASD  -MS Additional Comments: Noted at shift change skin assesment, Night shift RN stated it had been there her shift.  -MS Type: MASD (moisture associated skin damage)  -MS    Retired Wound - Properties Group Placement Date: 10/16/24  -MS Placement Time: 0705 -MS Present on Original Admission: N  -MS Side: Left  -MS Orientation: medial  -MS Location: gluteal  -MS Primary Wound Type: MASD  -MS Additional Comments: Noted at shift change skin assesment, Night shift RN stated it had been there her shift.  -MS Type: MASD (moisture associated skin damage)  -MS    Retired Wound - Properties Group Date first assessed: 10/16/24  -MS Time first assessed: 0705  -MS Present on Original Admission: N  -MS Side: Left  -MS Location: gluteal  -MS Primary Wound Type: MASD  -MS Additional Comments: Noted at shift change skin assesment, Night shift RN stated it had been there her shift.  -MS Type: MASD (moisture associated skin damage)  -MS       Row Name             Wound 10/16/24 0705 Left posterior greater trochanter MASD (moisture associated skin damage)    Wound - Properties Group Placement Date: 10/16/24  -MS Placement Time: 0705 -MS Side: Left  -MS Orientation: posterior  -MS Location: greater trochanter  -MS Primary Wound Type: MASD  -MS Type: MASD (moisture associated skin damage)  -MS Present on Original Admission: N  -MS    Retired Wound - Properties Group Placement Date: 10/16/24  -MS Placement Time: 0705 -MS Present on Original Admission: N  -MS Side: Left  -MS Orientation: posterior  -MS Location: greater trochanter  -MS Primary Wound Type: MASD  -MS Type: MASD (moisture associated skin damage)  -MS    Retired Wound - Properties Group Placement Date: 10/16/24  -MS Placement Time: 0705  -MS Present on Original Admission: N  -MS Side: Left  -MS Orientation: posterior  -MS Location: greater trochanter  -MS Primary Wound Type: MASD  -MS Type: MASD (moisture associated skin damage)  -MS    Retired Wound - Properties Group Date first assessed: 10/16/24  -MS Time first assessed: 0705 -MS Present on Original Admission: N  -MS Side: Left  -MS Location: greater trochanter  -MS Primary Wound Type: MASD  -MS Type: MASD (moisture associated skin damage)  -MS      Row Name 10/30/24 0957          Progress Summary (PT)    Daily Progress Summary (PT) Pt. was able to perform bed mobility w/ maxA. She was able to tolerate sititng EOB for increased time. She c/o increased pain and weakness. Pt. would continue to benefit from skilled PT services.  -KM               User Key  (r) = Recorded By, (t) = Taken By, (c) = Cosigned By      Initials Name Provider Type    Karen Peralta, RN Registered Nurse    Thierry Carter, GENARO Physical Therapist    Nory Keenan, RN Registered Nurse    Roe North, RN Registered Nurse                    Physical Therapy Education       Title: PT OT SLP Therapies (Done)       Topic: Physical Therapy (Done)        Point: Mobility training (Done)       Learning Progress Summary            Patient Acceptance, E,TB, VU by RR at 10/27/2024 0031    Acceptance, E,TB, VU by RG at 10/22/2024 1002    Acceptance, TB,E, VU by RG at 10/21/2024 0907    Nonacceptance, E, NR by WG at 10/16/2024 0228    Acceptance, E,TB, VU by CF at 10/15/2024 0753    Acceptance, E,TB, VU by CF at 10/14/2024 0801    Acceptance, E,TB, VU by CF at 10/13/2024 1045    Acceptance, E,D, VU,NR by AG at 10/10/2024 1310    Acceptance, E,TB, VU by CB at 10/8/2024 0448    Acceptance, E, NR by RD at 10/2/2024 1101    Acceptance, E, NR by RD at 10/1/2024 0856    Acceptance, E,D, VU,NR by AG at 9/30/2024 1559                      Point: Home exercise program (Done)       Learning Progress Summary            Patient Acceptance, E,TB, VU by RR at 10/27/2024 0031    Acceptance, E,TB, VU by RG at 10/22/2024 1002    Acceptance, TB,E, VU by RG at 10/21/2024 0907    Nonacceptance, E, NR by WG at 10/16/2024 0228    Acceptance, E,TB, VU by CF at 10/15/2024 0753    Acceptance, E,TB, VU by CF at 10/14/2024 0801    Acceptance, E,TB, VU by CF at 10/13/2024 1045    Acceptance, E,D, VU,NR by AG at 10/10/2024 1310    Acceptance, E,TB, VU by CB at 10/8/2024 0448    Acceptance, E, NR by RD at 10/2/2024 1101    Acceptance, E, NR by RD at 10/1/2024 0856    Acceptance, E,D, VU,NR by AG at 9/30/2024 1559                      Point: Body mechanics (Done)       Learning Progress Summary            Patient Acceptance, E,TB, VU by RR at 10/27/2024 0031    Acceptance, E,TB, VU by RG at 10/22/2024 1002    Acceptance, TB,E, VU by RG at 10/21/2024 0907    Nonacceptance, E, NR by WG at 10/16/2024 0228    Acceptance, E,TB, VU by CF at 10/15/2024 0753    Acceptance, E,TB, VU by CF at 10/14/2024 0801    Acceptance, E,TB, VU by CF at 10/13/2024 1045    Acceptance, E,D, VU,NR by AG at 10/10/2024 1310    Acceptance, E,TB, VU by CB at 10/8/2024 0448    Acceptance, E, NR by RD at 10/2/2024 1101    Acceptance,  E, NR by RD at 10/1/2024 0856    Acceptance, E,D, VU,NR by AG at 9/30/2024 1559                      Point: Precautions (Done)       Learning Progress Summary            Patient Acceptance, E,TB, VU by RR at 10/27/2024 0031    Acceptance, E,TB, VU by RG at 10/22/2024 1002    Acceptance, TB,E, VU by RG at 10/21/2024 0907    Nonacceptance, E, NR by WG at 10/16/2024 0228    Acceptance, E,TB, VU by CF at 10/15/2024 0753    Acceptance, E,TB, VU by CF at 10/14/2024 0801    Acceptance, E,TB, VU by CF at 10/13/2024 1045    Acceptance, E,D, VU,NR by AG at 10/10/2024 1310    Acceptance, E,TB, VU by CB at 10/8/2024 0448    Acceptance, E, NR by RD at 10/2/2024 1101    Acceptance, E, NR by RD at 10/1/2024 0856    Acceptance, E,D, VU,NR by AG at 9/30/2024 1559                                      User Key       Initials Effective Dates Name Provider Type Discipline    AG 06/16/21 -  Мария Joya, PT Physical Therapist PT    RD 06/16/21 -  Laisha Verdugo, RN Registered Nurse Nurse    RR 06/11/24 -  Jaymie Dominguez, RN Registered Nurse Nurse    RG 06/06/23 -  Jesus Matthews, RN Registered Nurse Nurse    WG 02/12/24 -  Shanthi Burden, RN Registered Nurse Nurse    CF 06/25/24 -  Cherelle Germain, RN Registered Nurse Nurse    CB 06/17/24 -  Fidelia Lombardo, RN Registered Nurse Nurse                  PT Recommendation and Plan     Progress Summary (PT)  Daily Progress Summary (PT): Pt. was able to perform bed mobility w/ maxA. She was able to tolerate sititng EOB for increased time. She c/o increased pain and weakness. Pt. would continue to benefit from skilled PT services.       Time Calculation:    PT Charges       Row Name 10/30/24 0956             Time Calculation    PT Received On 10/30/24  -KM         Time Calculation- PT    Total Timed Code Minutes- PT 25 minute(s)  -KM                User Key  (r) = Recorded By, (t) = Taken By, (c) = Cosigned By      Initials Name Provider Type    Thierry Carter, PT Physical Therapist                   Therapy Charges for Today       Code Description Service Date Service Provider Modifiers Qty    90908085659  PT THERAPEUTIC ACT EA 15 MIN 10/30/2024 Thierry Saldivar, PT GP 1    47815859671 HC PT THER PROC EA 15 MIN 10/30/2024 Thierry Saldivar, PT GP 1            PT G-Codes  AM-PAC 6 Clicks Score (PT): 9    Thierry Saldivar, PT  10/30/2024

## 2024-10-30 NOTE — PLAN OF CARE
Goal Outcome Evaluation:  Plan of Care Reviewed With: patient        Progress: no change  Outcome Evaluation: Pt rested well in bed through night. VSS on RA. Wound care done per oders this shift. PRN pain meds provided, intervention successful. No acute changes or complaints. Will continue POC.

## 2024-10-30 NOTE — CASE MANAGEMENT/SOCIAL WORK
Discharge Planning Assessment   [place]     Patient Name: Lety Johnson  MRN: [id number]  Today's Date: 10/30/2024    Admit Date: 9/26/2024       Discharge Plan       Row Name 10/30/24 1432       Plan    Plan SS contacted sisterMoises who states pt and mother plan to contact  prior to court hearing at 14:45 per 's request. Mother is present in pt's room. SS to follow.    Addendum @ 16:29: SS visited pt and mother was at bedside. Pt spoke to  and was present via telephone during court hearing. Pt informed SS that therapy has been discussing inpatient rehab. SS sent a secure chat to PT and OT regarding inpatient rehab. SS to follow.                  Continued Care and Services - Admitted Since 9/26/2024       Destination       Service Provider Request Status Services Address Phone Fax Patient Preferred    [facility] Declined  Cannot meet patient's needs -- [address] [facility] [address] [phone] --    [facility] Acute Care Declined  Not eligible -- [address] [facility] [address] [phone] --    [facility] Declined  Not eligible -- [address] [facility] [address] [phone] --    [facility] Declined  Clinical Denial -- [address] [facility] [address] [phone] --    [facility] SWING BED UNIT Declined  Cannot meet patient's needs -- [address] [facility] [address] [phone] --     [facility] Declined  Not eligible -- [address] [facility] [address] [phone] --    [facility] Declined  Bed not available -- [address] [facility] [address] [phone] --    [facility] SKILLED CARE Declined  Bed not available -- [address] [facility] [address] [phone]  [phone] --    [facility] Swing Declined  Out of network -- [address] [facility] [place] [address] [phone] --    [facility] SWING BED UNIT Declined -- [address] [place] [id number] [phone] --    [facility] Declined  Clinical Denial -- [address] [facility] [address] [phone] --    [facility] SWING BED UNIT Declined  Insurance Denial, Out of network -- [address] [place] [address] [phone] --    [facility] Declined  Out of network -- [address] [facility] [address] [phone] --    [facility] Declined  Out of network -- [address] [place] [address] [phone] -- --    [facility] Declined  Out of network -- [address] [facility] [address] [phone] --                  Expected Discharge Date and Time       Expected Discharge Date Expected Discharge Time    Nov 4, 2024         HELEN Butterfield

## 2024-10-30 NOTE — PROGRESS NOTES
[facility]     Patient Identification:  Name:  Lety Johnson  Age:  43 y.o.  Sex:  female  :  1980  MRN:  [id number]  Visit Number:  [id number]  Primary Care Provider:  Concha Rao APRN    Length of stay:  32    Chief complaint: None    Subjective:    Patient doing well today with no new complaints  ----------------------------------------------------------------------------------------------------------------------  Current Hospital Meds:  acyclovir, 400 mg, Oral, Nightly  aspirin, 81 mg, Oral, Daily  atorvastatin, 80 mg, Oral, Daily  DULoxetine, 60 mg, Oral, Daily  heparin (porcine), 5,000 Units, Subcutaneous, Q8H  insulin glargine, 40 Units, Subcutaneous, Nightly  insulin glargine, 45 Units, Subcutaneous, Daily With Breakfast  insulin lispro, 4-24 Units, Subcutaneous, 4x Daily AC & at Bedtime  Lidocaine, 1 patch, Transdermal, Q24H  lisinopril, 20 mg, Oral, Daily  magic barrier cream, , Topical, BID  metoprolol tartrate, 25 mg, Oral, Q12H  nicotine, 1 patch, Transdermal, Q24H  nystatin, , Topical, Q12H  pantoprazole, 40 mg, Oral, Daily  sodium chloride, 10 mL, Intravenous, Q12H  sodium chloride, 10 mL, Intravenous, Q12H      Pharmacy Consult,       ----------------------------------------------------------------------------------------------------------------------  Vital Signs:  Temp:  [97.5 °F (36.4 °C)-98.3 °F (36.8 °C)] 97.9 °F (36.6 °C)  Heart Rate:  [] 95  Resp:  [16-18] 18  BP: (100-120)/(57-68) 104/57      10/07/24  0511 10/08/24  0500 10/09/24  0500   Weight: 102 kg (225 lb 14.4 oz) (FFII camreon) 102 kg (225 lb 15.5 oz) 102 kg (224 lb 1.6 oz)     Body mass index is 36.73 kg/m².    Intake/Output Summary (Last 24 hours) at 10/30/2024 1825  Last data filed at 10/30/2024 1736  Gross per 24 hour   Intake 1060 ml   Output --   Net 1060 ml     Diet: Regular/House, Diabetic; Consistent Carbohydrate; Fluid Consistency: Thin (IDDSI  0)  ----------------------------------------------------------------------------------------------------------------------  Physical exam:  Constitutional: Chronically ill-appearing  female in no apparent distress.     HENT:  Head:  Normocephalic and atraumatic.  Mouth:  Moist mucous membranes.    Eyes:  Conjunctivae and EOM are normal.  Pupils are equal, round, and reactive to light.  No scleral icterus.    Neck:  Neck supple. No thyromegaly.  No JVD present.    Cardiovascular: Sinus tachycardia with heart rate in the upper 120s, no murmurs, rubs, clicks or gallops appreciated.  Pulmonary/Chest:  Clear to auscultation bilaterally with no crackles, wheezes or rhonchi appreciated.  Abdominal:  Soft. Nontender. Nondistended  Bowel sounds are normal in all four quadrants. No organomegally appreciated.   Musculoskeletal:  No edema, no tenderness, and no deformity.  No red or swollen joints anywhere.    Neurological: Slightly lethargic, Cranial nerves II-XII intact with no focal deficits.  No facial droop.  No slurred speech.   Skin:  Warm and dry to palpation with no rashes or lesions appreciated.  Peripheral vascular:  2+ radial and pedal pulses in bilateral upper and lower extremities.    No significant change in physical exam in comparison to 10/29/2024  -------------------------------------------------------------------------  ----------------------------------------------------------------------------------------------------------------------      Results from last 7 days   Lab Units 10/27/24  0147   WBC 10*3/mm3 11.03*   HEMOGLOBIN g/dL 12.6   HEMATOCRIT % 39.8   MCV fL 103.1*   MCHC g/dL 31.7   PLATELETS 10*3/mm3 310         Results from last 7 days   Lab Units 10/27/24  0147   SODIUM mmol/L 140   POTASSIUM mmol/L 4.1   CHLORIDE mmol/L 103   CO2 mmol/L 24.6   BUN mg/dL 22*   CREATININE mg/dL 0.48*   CALCIUM mg/dL 9.5   GLUCOSE mg/dL 150*   Estimated Creatinine Clearance: 182.5 mL/min (A) (by C-G formula  "based on SCr of 0.48 mg/dL (L)).    No results found for: \"AMMONIA\"      No results found for: \"BLOODCX\"  No results found for: \"URINECX\"  No results found for: \"WOUNDCX\"  No results found for: \"STOOLCX\"    I have personally looked at the labs and they are summarized above.  ----------------------------------------------------------------------------------------------------------------------  Imaging Results (Last 24 Hours)       ** No results found for the last 24 hours. **          ----------------------------------------------------------------------------------------------------------------------  Assessment and Plan:    ESBL acute cystitis - patient has completed full course of IV antibiotic therapy with Invanz     2.  Acute metabolic encephalopathy -acute encephalopathy resolved, continue to monitor closely for changes in mental status.     3.  History of CVA - patient with left-sided hemiparesis secondary to history of CVA     4.  Debility -continue PT/OT     5.  History of genital herpes -continue acyclovir     6.  Morbid obesity -complicates all aspects of care    7.  Uncontrolled type 2 diabetes mellitus -blood sugar remains uncontrolled, will increase nightly dose of Lantus to 45 units subcu nightly.  Will continue Lantus 45 units SQ daily and Accu-Cheks before every meal/nightly with sliding scale insulin.    Disposition still pending placement    Marv Navas, DO   10/30/24   18:25 EDT     "

## 2024-10-30 NOTE — PLAN OF CARE
Goal Outcome Evaluation:  Plan of Care Reviewed With: patient        Progress: no change  Outcome Evaluation: Pt's resting in bed, A&O, stable on RA, pain controlled PRN meds, wound care done per orders. Stool softener given per request. Con't with POC.

## 2024-10-31 LAB
GLUCOSE BLDC GLUCOMTR-MCNC: 188 MG/DL (ref 70–130)
GLUCOSE BLDC GLUCOMTR-MCNC: 213 MG/DL (ref 70–130)
GLUCOSE BLDC GLUCOMTR-MCNC: 260 MG/DL (ref 70–130)
GLUCOSE BLDC GLUCOMTR-MCNC: 95 MG/DL (ref 70–130)

## 2024-10-31 PROCEDURE — 63710000001 INSULIN GLARGINE PER 5 UNITS: Performed by: INTERNAL MEDICINE

## 2024-10-31 PROCEDURE — 25010000002 HEPARIN (PORCINE) PER 1000 UNITS: Performed by: INTERNAL MEDICINE

## 2024-10-31 PROCEDURE — 63710000001 INSULIN LISPRO (HUMAN) PER 5 UNITS: Performed by: INTERNAL MEDICINE

## 2024-10-31 PROCEDURE — 63710000001 INSULIN GLARGINE PER 5 UNITS: Performed by: HOSPITALIST

## 2024-10-31 PROCEDURE — 82948 REAGENT STRIP/BLOOD GLUCOSE: CPT

## 2024-10-31 PROCEDURE — 97112 NEUROMUSCULAR REEDUCATION: CPT

## 2024-10-31 PROCEDURE — 99231 SBSQ HOSP IP/OBS SF/LOW 25: CPT | Performed by: INTERNAL MEDICINE

## 2024-10-31 PROCEDURE — 97110 THERAPEUTIC EXERCISES: CPT

## 2024-10-31 RX ORDER — TRAMADOL HYDROCHLORIDE 50 MG/1
50 TABLET ORAL EVERY 8 HOURS PRN
Status: DISPENSED | OUTPATIENT
Start: 2024-10-31 | End: 2024-11-10

## 2024-10-31 RX ADMIN — ACYCLOVIR 400 MG: 200 CAPSULE ORAL at 21:22

## 2024-10-31 RX ADMIN — NICOTINE 1 PATCH: 7 PATCH, EXTENDED RELEASE TRANSDERMAL at 09:19

## 2024-10-31 RX ADMIN — HEPARIN SODIUM 5000 UNITS: 5000 INJECTION INTRAVENOUS; SUBCUTANEOUS at 21:22

## 2024-10-31 RX ADMIN — DULOXETINE HYDROCHLORIDE 60 MG: 60 CAPSULE, DELAYED RELEASE ORAL at 09:17

## 2024-10-31 RX ADMIN — METOPROLOL TARTRATE 25 MG: 25 TABLET, FILM COATED ORAL at 09:17

## 2024-10-31 RX ADMIN — Medication 10 ML: at 09:18

## 2024-10-31 RX ADMIN — LISINOPRIL 20 MG: 10 TABLET ORAL at 09:17

## 2024-10-31 RX ADMIN — METOPROLOL TARTRATE 25 MG: 25 TABLET, FILM COATED ORAL at 21:21

## 2024-10-31 RX ADMIN — Medication 10 ML: at 21:23

## 2024-10-31 RX ADMIN — HEPARIN SODIUM 5000 UNITS: 5000 INJECTION INTRAVENOUS; SUBCUTANEOUS at 05:03

## 2024-10-31 RX ADMIN — Medication 5 MG: at 21:22

## 2024-10-31 RX ADMIN — PANTOPRAZOLE SODIUM 40 MG: 40 TABLET, DELAYED RELEASE ORAL at 09:17

## 2024-10-31 RX ADMIN — NYSTATIN: 100000 POWDER TOPICAL at 21:23

## 2024-10-31 RX ADMIN — HEPARIN SODIUM 5000 UNITS: 5000 INJECTION INTRAVENOUS; SUBCUTANEOUS at 14:12

## 2024-10-31 RX ADMIN — TRAMADOL HYDROCHLORIDE 50 MG: 50 TABLET, COATED ORAL at 14:12

## 2024-10-31 RX ADMIN — ATORVASTATIN CALCIUM 80 MG: 40 TABLET, FILM COATED ORAL at 09:17

## 2024-10-31 RX ADMIN — LIDOCAINE 1 PATCH: 4 PATCH TOPICAL at 17:29

## 2024-10-31 RX ADMIN — INSULIN LISPRO 4 UNITS: 100 INJECTION, SOLUTION INTRAVENOUS; SUBCUTANEOUS at 21:22

## 2024-10-31 RX ADMIN — TRAMADOL HYDROCHLORIDE 50 MG: 50 TABLET ORAL at 05:03

## 2024-10-31 RX ADMIN — VITAMINS A AND D OINTMENT: 15.5; 53.4 OINTMENT TOPICAL at 09:18

## 2024-10-31 RX ADMIN — INSULIN LISPRO 12 UNITS: 100 INJECTION, SOLUTION INTRAVENOUS; SUBCUTANEOUS at 11:43

## 2024-10-31 RX ADMIN — VITAMINS A AND D OINTMENT: 15.5; 53.4 OINTMENT TOPICAL at 21:23

## 2024-10-31 RX ADMIN — NYSTATIN: 100000 POWDER TOPICAL at 09:18

## 2024-10-31 RX ADMIN — INSULIN LISPRO 8 UNITS: 100 INJECTION, SOLUTION INTRAVENOUS; SUBCUTANEOUS at 17:29

## 2024-10-31 RX ADMIN — ASPIRIN 81 MG: 81 TABLET, CHEWABLE ORAL at 09:17

## 2024-10-31 RX ADMIN — INSULIN GLARGINE 45 UNITS: 100 INJECTION, SOLUTION SUBCUTANEOUS at 09:17

## 2024-10-31 RX ADMIN — INSULIN GLARGINE 45 UNITS: 100 INJECTION, SOLUTION SUBCUTANEOUS at 21:22

## 2024-10-31 RX ADMIN — Medication 10 ML: at 21:22

## 2024-10-31 RX ADMIN — BISACODYL 5 MG: 5 TABLET, COATED ORAL at 09:39

## 2024-10-31 NOTE — PROGRESS NOTES
[facility] HOSPITALIST PROGRESS NOTE     Patient Identification:  Name:  Lety Johnson  Age:  43 y.o.  Sex:  female  :  1980  MRN:  [id number]  Visit Number:  [id number]  Primary Care Provider:  Concha Rao APRN    Length of stay:  33    Chief complaint: None    Subjective:    Patient doing well today with no new complaints.  Currently awaiting decision regarding Social Security disability income.  ----------------------------------------------------------------------------------------------------------------------  Current [facility] Meds:  acyclovir, 400 mg, Oral, Nightly  aspirin, 81 mg, Oral, Daily  atorvastatin, 80 mg, Oral, Daily  DULoxetine, 60 mg, Oral, Daily  heparin (porcine), 5,000 Units, Subcutaneous, Q8H  insulin glargine, 45 Units, Subcutaneous, Daily With Breakfast  insulin glargine, 45 Units, Subcutaneous, Nightly  insulin lispro, 4-24 Units, Subcutaneous, 4x Daily AC & at Bedtime  Lidocaine, 1 patch, Transdermal, Q24H  lisinopril, 20 mg, Oral, Daily  magic barrier cream, , Topical, BID  metoprolol tartrate, 25 mg, Oral, Q12H  nicotine, 1 patch, Transdermal, Q24H  nystatin, , Topical, Q12H  pantoprazole, 40 mg, Oral, Daily  sodium chloride, 10 mL, Intravenous, Q12H  sodium chloride, 10 mL, Intravenous, Q12H      Pharmacy Consult,       ----------------------------------------------------------------------------------------------------------------------  Vital Signs:  Temp:  [97.5 °F (36.4 °C)-97.9 °F (36.6 °C)] 97.9 °F (36.6 °C)  Heart Rate:  [] 98  Resp:  [16-18] 18  BP: ()/(55-75) 94/57      10/07/24  0511 10/08/24  0500 10/09/24  0500   Weight: 102 kg (225 lb 14.4 oz) (FIFI cameron) 102 kg (225 lb 15.5 oz) 102 kg (224 lb 1.6 oz)     Body mass index is 36.73 kg/m².    Intake/Output Summary (Last 24 hours) at 10/31/2024 1512  Last data filed at 10/31/2024 1323  Gross per 24 hour   Intake 1440 ml   Output --   Net 1440 ml     Diet: Regular/House, Diabetic;  Consistent Carbohydrate; Fluid Consistency: Thin (IDDSI 0)  ----------------------------------------------------------------------------------------------------------------------  Physical exam:  Constitutional: Chronically ill-appearing  female in no apparent distress.     HENT:  Head:  Normocephalic and atraumatic.  Mouth:  Moist mucous membranes.    Eyes:  Conjunctivae and EOM are normal.  Pupils are equal, round, and reactive to light.  No scleral icterus.    Neck:  Neck supple. No thyromegaly.  No JVD present.    Cardiovascular: Sinus tachycardia with heart rate in the upper 120s, no murmurs, rubs, clicks or gallops appreciated.  Pulmonary/Chest:  Clear to auscultation bilaterally with no crackles, wheezes or rhonchi appreciated.  Abdominal:  Soft. Nontender. Nondistended  Bowel sounds are normal in all four quadrants. No organomegally appreciated.   Musculoskeletal:  No edema, no tenderness, and no deformity.  No red or swollen joints anywhere.    Neurological: Slightly lethargic, Cranial nerves II-XII intact with no focal deficits.  No facial droop.  No slurred speech.   Skin:  Warm and dry to palpation with no rashes or lesions appreciated.  Peripheral vascular:  2+ radial and pedal pulses in bilateral upper and lower extremities.    No significant change in physical exam in comparison to 10/30/2024  -------------------------------------------------------------------------  ----------------------------------------------------------------------------------------------------------------------      Results from last 7 days   Lab Units 10/27/24  0147   WBC 10*3/mm3 11.03*   HEMOGLOBIN g/dL 12.6   HEMATOCRIT % 39.8   MCV fL 103.1*   MCHC g/dL 31.7   PLATELETS 10*3/mm3 310         Results from last 7 days   Lab Units 10/27/24  0147   SODIUM mmol/L 140   POTASSIUM mmol/L 4.1   CHLORIDE mmol/L 103   CO2 mmol/L 24.6   BUN mg/dL 22*   CREATININE mg/dL 0.48*   CALCIUM mg/dL 9.5   GLUCOSE mg/dL 150*   Estimated  "Creatinine Clearance: 182.5 mL/min (A) (by C-G formula based on SCr of 0.48 mg/dL (L)).    No results found for: \"AMMONIA\"      No results found for: \"BLOODCX\"  No results found for: \"URINECX\"  No results found for: \"WOUNDCX\"  No results found for: \"STOOLCX\"    I have personally looked at the labs and they are summarized above.  ----------------------------------------------------------------------------------------------------------------------  Imaging Results (Last 24 Hours)       ** No results found for the last 24 hours. **          ----------------------------------------------------------------------------------------------------------------------  Assessment and Plan:    ESBL acute cystitis - patient has completed full course of IV antibiotic therapy with Invanz     2.  Acute metabolic encephalopathy -acute encephalopathy resolved, continue to monitor closely for changes in mental status.     3.  History of CVA - patient with left-sided hemiparesis secondary to history of CVA     4.  Debility -continue PT/OT     5.  History of genital herpes -continue acyclovir     6.  Morbid obesity -complicates all aspects of care    7.  Uncontrolled type 2 diabetes mellitus -blood sugar slightly improved, will continue Lantus 45 units subcu BID.    Disposition still pending placement    Marv Navas DO   10/31/24   15:12 EDT     "

## 2024-10-31 NOTE — CASE MANAGEMENT/SOCIAL WORK
Discharge Planning Assessment  [facility]     Patient Name: Lety Johnson  MRN: [id number]  Today's Date: 10/31/2024    Admit Date: 9/26/2024     Discharge Plan       Row Name 10/31/24 1359       Plan    Plan SS discussed pt with ABELARDO LANDIS supervisor, Henrik Brannon and manager on this date. ABELARDO LANDIS does not have an update about disposition at this time. SS contacted  office per Deisy who states a decision about SSDI (Social Security Disability Income) should be made within 60-90 days. SS to follow.                  Continued Care and Services - Admitted Since 9/26/2024       Destination       Service Provider Request Status Services Address Phone Fax Patient Preferred    [facility] Declined  Cannot meet patient's needs -- [address] [facility] [address] [phone] --    [facility] Acute Care Declined  Not eligible -- [address] [facility] [address] [phone] --    [facility] SW[facility] Declined  Not eligible -- [address] [facility] [address] [phone] --    [facility] Declined  Clinical Denial -- [address] [facility], 4TH FLOOR, [facility] [address] [phone] --    [facility] SWING BED UNIT Declined  Cannot meet patient's needs -- [address] [facility] [address] [phone] --     [facility] Declined  Not eligible -- [address] [facility] [address] [phone] --    [facility] Declined  Bed not available -- [address] [facility] [address] [phone] --    [facility] SKILLED CARE Declined  Bed not available -- [address] [facility] [address] [phone] --    [facility]ing Declined  Out of network -- [address] [facility] [place] [address] [phone] --    [facility]  SWING BED UNIT Declined -- [address] [place] [id number] [phone] --    [facility] Declined  Clinical Denial -- [address] [facility] [address] [phone] --    [facility] SWING BED UNIT Declined  Insurance Denial, Out of network -- [address] [address] [phone] --    [facility] Declined  Out of network -- [address] [address] [phone] --    [facility] Declined  Out of network -- [address] [address] [phone] -- --    [facility] Declined  Out of network -- [address] [facility] [address] [phone] --                  Expected Discharge Date and Time       Expected Discharge Date Expected Discharge Time    Nov 4, 2024         HELEN Butterfield

## 2024-10-31 NOTE — THERAPY TREATMENT NOTE
"Acute Care - Physical Therapy Treatment Note   [facility]aki     Patient Name: Lety Johnson  : 1980  MRN: [id number]  Today's Date: 10/31/2024   Onset of Illness/Injury or Date of Surgery: 24  Visit Dx:     ICD-10-CM ICD-9-CM   1. Hypokalemia  E87.6 276.8   2. Pressure injury of skin of sacral region, unspecified injury stage  L89.159 707.03     707.20   3. Pressure injury of skin of left heel, unspecified injury stage  L89.629 707.07     707.20   4. Acute cystitis without hematuria  N30.00 595.0     Patient Active Problem List   Diagnosis   (none) - all problems resolved or deleted     Past Medical History:   Diagnosis Date    Stroke      History reviewed. No pertinent surgical history.  PT Assessment (Last 12 Hours)       PT Evaluation and Treatment       Row Name 10/31/24 1236          Physical Therapy Time and Intention    Subjective Information complains of;weakness;pain  -AG     Document Type therapy note (daily note)  -AG     Mode of Treatment physical therapy  -AG     Patient Effort adequate  -AG       Row Name 10/31/24 1236          General Information    Patient Observations alert;cooperative;agree to therapy  -AG     Patient/Family/Caregiver Comments/Observations pt. supine, wearing B wrist cock up splints for comfort.  Pt. reports incr pain and \"spasms\" L LE and adamantly refuses to sit EOB today despite strong encouraement from therapist.  -AG     Existing Precautions/Restrictions fall  -AG       Row Name 10/31/24 1236          Pain    Pain Side/Orientation left  pt. reports L shoulder, entire UE pain and \"into neck and back of head\" with mobilization of extremity.  -AG       Row Name 10/31/24 1236          Cognition    Affect/Mental Status (Cognition) emotionally labile  -AG     Behavioral Issues (Cognition) overwhelmed easily  -AG     Orientation Status (Cognition) oriented x 3  -AG     Follows Commands (Cognition) WFL  -AG     Personal Safety Interventions supervised activity;nonskid " shoes/slippers when out of bed  -[facility] Name 10/31/24 1236          Bed Mobility    Comment, (Bed Mobility) pt. declined to attempt bed mobility  -       Row Name 10/31/24 1236          Gait/Stairs (Locomotion)    Patient was able to Ambulate no, other medical factors prevent ambulation  -     Reason Patient was unable to Ambulate Non-Ambulatory at Baseline  -       Row Name 10/31/24 1236          Safety Issues/Impairments Affecting Functional Mobility    Impairments Affecting Function (Mobility) endurance/activity tolerance;pain;range of motion (ROM);strength;coordination;motor control;muscle tone abnormal  -       Row Name 10/31/24 1236          Motor Skills    Therapeutic Exercise hip;knee;ankle  -[facility] Name 10/31/24 1236          Hip (Therapeutic Exercise)    Hip (Therapeutic Exercise) PROM (passive range of motion)  -     Hip PROM (Therapeutic Exercise) left;flexion;extension;aBduction;aDduction;external rotation;internal rotation;supine  -[facility] Name 10/31/24 1236          Knee (Therapeutic Exercise)    Knee (Therapeutic Exercise) PROM (passive range of motion)  -     Knee PROM (Therapeutic Exercise) left;flexion;extension;supine  -[facility] Name 10/31/24 1236          Ankle (Therapeutic Exercise)    Ankle (Therapeutic Exercise) PROM (passive range of motion)  -     Ankle PROM (Therapeutic Exercise) left;dorsiflexion;supine  -[facility] Name             Wound 09/26/24 1809 Left posterior ankle Other (comment)    Wound - Properties Group Placement Date: 09/26/24  -KJ Placement Time: 1809  -KJ Side: Left  -KJ Orientation: posterior  -KJ Location: ankle  -KJ Primary Wound Type: Other  -TW, unknow etiology     Retired Wound - Properties Group Placement Date: 09/26/24  -KJ Placement Time: 1809  -KJ Side: Left  -KJ Orientation: posterior  -KJ Location: ankle  -KJ Primary Wound Type: Other  -TW, unknow etiology     Retired Wound - Properties Group Placement Date: 09/26/24  -KJ  Placement Time: 1809  -KJ Side: Left  -KJ Orientation: posterior  -KJ Location: ankle  -KJ Primary Wound Type: Other  -TW, unknow etiology     Retired Wound - Properties Group Date first assessed: 09/26/24  -KJ Time first assessed: 1809  -KJ Side: Left  -KJ Location: ankle  -KJ Primary Wound Type: Other  -TW, unknow etiology       Row Name             Wound 10/11/24 1330 medial coccyx Fissure    Wound - Properties Group Placement Date: 10/11/24  -TW Placement Time: 1330 -TW Orientation: medial  -TW Location: coccyx  -TW Primary Wound Type: Fissure  -TW    Retired Wound - Properties Group Placement Date: 10/11/24  -TW Placement Time: 1330  -TW Orientation: medial  -TW Location: coccyx  -TW Primary Wound Type: Fissure  -TW    Retired Wound - Properties Group Placement Date: 10/11/24  -TW Placement Time: 1330  -TW Orientation: medial  -TW Location: coccyx  -TW Primary Wound Type: Fissure  -TW    Retired Wound - Properties Group Date first assessed: 10/11/24  -TW Time first assessed: 1330  -TW Location: coccyx  -TW Primary Wound Type: Fissure  -TW      Row Name             Wound 10/16/24 0705 Left medial gluteal MASD (moisture associated skin damage)    Wound - Properties Group Placement Date: 10/16/24  -MS Placement Time: 0705 -MS Side: Left  -MS Orientation: medial  -MS Location: gluteal  -MS Primary Wound Type: MASD  -MS Type: MASD (moisture associated skin damage)  -MS Present on Original Admission: N  -MS Additional Comments: Noted at shift change skin assesment, Night shift RN stated it had been there her shift.  -MS    Retired Wound - Properties Group Placement Date: 10/16/24  -MS Placement Time: 0705 -MS Present on Original Admission: N  -MS Side: Left  -MS Orientation: medial  -MS Location: gluteal  -MS Primary Wound Type: MASD  -MS Additional Comments: Noted at shift change skin assesment, Night shift RN stated it had been there her shift.  -MS Type: MASD (moisture associated skin damage)  -MS    Retired  Wound - Properties Group Placement Date: 10/16/24  -MS Placement Time: 0705  -MS Present on Original Admission: N  -MS Side: Left  -MS Orientation: medial  -MS Location: gluteal  -MS Primary Wound Type: MASD  -MS Additional Comments: Noted at shift change skin assesment, Night shift RN stated it had been there her shift.  -MS Type: MASD (moisture associated skin damage)  -MS    Retired Wound - Properties Group Date first assessed: 10/16/24  -MS Time first assessed: 0705  -MS Present on Original Admission: N  -MS Side: Left  -MS Location: gluteal  -MS Primary Wound Type: MASD  -MS Additional Comments: Noted at shift change skin assesment, Night shift RN stated it had been there her shift.  -MS Type: MASD (moisture associated skin damage)  -MS      Row Name             Wound 10/16/24 0705 Left posterior greater trochanter MASD (moisture associated skin damage)    Wound - Properties Group Placement Date: 10/16/24  -MS Placement Time: 0705  -MS Side: Left  -MS Orientation: posterior  -MS Location: greater trochanter  -MS Primary Wound Type: MASD  -MS Type: MASD (moisture associated skin damage)  -MS Present on Original Admission: N  -MS    Retired Wound - Properties Group Placement Date: 10/16/24  -MS Placement Time: 0705  -MS Present on Original Admission: N  -MS Side: Left  -MS Orientation: posterior  -MS Location: greater trochanter  -MS Primary Wound Type: MASD  -MS Type: MASD (moisture associated skin damage)  -MS    Retired Wound - Properties Group Placement Date: 10/16/24  -MS Placement Time: 0705  -MS Present on Original Admission: N  -MS Side: Left  -MS Orientation: posterior  -MS Location: greater trochanter  -MS Primary Wound Type: MASD  -MS Type: MASD (moisture associated skin damage)  -MS    Retired Wound - Properties Group Date first assessed: 10/16/24  -MS Time first assessed: 0705  -MS Present on Original Admission: N  -MS Side: Left  -MS Location: greater trochanter  -MS Primary Wound Type: MASD  -MS  "Type: MASD (moisture associated skin damage)  -MS      Row Name 10/31/24 1236          Coping    Observed Emotional State cooperative;anxious;tearful/crying  -AG     Verbalized Emotional State acceptance  -AG     Trust Relationship/Rapport care explained;choices provided;thoughts/feelings acknowledged  -     Family/Support Persons family  -AG     Involvement in Care not present at bedside  -     Family/Support System Care support provided;self-care encouraged  -AG       Row Name 10/31/24 1236          Plan of Care Review    Plan of Care Reviewed With patient  -AG     Progress no change  -AG     Outcome Evaluation PT brief treatment performed as pt. only agreeable to supine L LE PROM, declines sitting EOB d/t pain and worsening \"spasms L LE\".  Pt. intermittently crying and anxious throughout encounter with painful PROM.  Pt. is again strongly encouraged to perform R LE AROM, L UE self ROM; PT encouraged mobilization and sitting EOB frequently.  Will continue to follow.  -       Row Name 10/31/24 1236          Positioning and Restraints    Pre-Treatment Position in bed  -AG     Post Treatment Position bed  -AG     In Bed supine;call light within reach;encouraged to call for assist;exit alarm on;side rails up x3;heels elevated  -       Row Name 10/31/24 1236          Therapy Plan Review/Discharge Plan (PT)    Therapy Plan Review (PT) patient;participants included;participants voiced agreement with care plan;risks/benefits reviewed;current/potential barriers reviewed;care plan/treatment goals reviewed;evaluation/treatment results reviewed  -               User Key  (r) = Recorded By, (t) = Taken By, (c) = Cosigned By      Initials Name Provider Type    Karen Peralta, RN Registered Nurse    Мария Russo, PT Physical Therapist    Nory Keenan, RN Registered Nurse    Roe North, RN Registered Nurse                    Physical Therapy Education       Title: PT OT SLP Therapies (Done)  "      Topic: Physical Therapy (Done)       Point: Mobility training (Done)       Learning Progress Summary            Patient Acceptance, E,D, VU,NR by AG at 10/31/2024 1234    Comment: PT educated pt. in importance of mobilization and L L/UE PROM and self ROM to prevent contracture.  Pt. is encouraged to to perform R LE AROM frequently while supine or sitting EOB.    Acceptance, E,TB, VU by RR at 10/27/2024 0031    Acceptance, E,TB, VU by RG at 10/22/2024 1002    Acceptance, TB,E, VU by RG at 10/21/2024 0907    Nonacceptance, E, NR by WG at 10/16/2024 0228    Acceptance, E,TB, VU by CF at 10/15/2024 0753    Acceptance, E,TB, VU by CF at 10/14/2024 0801    Acceptance, E,TB, VU by CF at 10/13/2024 1045    Acceptance, E,D, VU,NR by AG at 10/10/2024 1310    Acceptance, E,TB, VU by CB at 10/8/2024 0448    Acceptance, E, NR by RD at 10/2/2024 1101    Acceptance, E, NR by RD at 10/1/2024 0856    Acceptance, E,D, VU,NR by AG at 9/30/2024 1559                      Point: Home exercise program (Done)       Learning Progress Summary            Patient Acceptance, E,D, VU,NR by AG at 10/31/2024 1234    Comment: PT educated pt. in importance of mobilization and L L/UE PROM and self ROM to prevent contracture.  Pt. is encouraged to to perform R LE AROM frequently while supine or sitting EOB.    Acceptance, E,TB, VU by RR at 10/27/2024 0031    Acceptance, E,TB, VU by RG at 10/22/2024 1002    Acceptance, TB,E, VU by RG at 10/21/2024 0907    Nonacceptance, E, NR by WG at 10/16/2024 0228    Acceptance, E,TB, VU by CF at 10/15/2024 0753    Acceptance, E,TB, VU by CF at 10/14/2024 0801    Acceptance, E,TB, VU by CF at 10/13/2024 1045    Acceptance, E,D, VU,NR by AG at 10/10/2024 1310    Acceptance, E,TB, VU by CB at 10/8/2024 0448    Acceptance, E, NR by RD at 10/2/2024 1101    Acceptance, E, NR by RD at 10/1/2024 0856    Acceptance, E,D, VU,NR by AG at 9/30/2024 1559                      Point: Body mechanics (Done)       Learning  Progress Summary            Patient Acceptance, E,D, VU,NR by AG at 10/31/2024 1234    Comment: PT educated pt. in importance of mobilization and L L/UE PROM and self ROM to prevent contracture.  Pt. is encouraged to to perform R LE AROM frequently while supine or sitting EOB.    Acceptance, E,TB, VU by RR at 10/27/2024 0031    Acceptance, E,TB, VU by RG at 10/22/2024 1002    Acceptance, TB,E, VU by RG at 10/21/2024 0907    Nonacceptance, E, NR by WG at 10/16/2024 0228    Acceptance, E,TB, VU by CF at 10/15/2024 0753    Acceptance, E,TB, VU by CF at 10/14/2024 0801    Acceptance, E,TB, VU by CF at 10/13/2024 1045    Acceptance, E,D, VU,NR by AG at 10/10/2024 1310    Acceptance, E,TB, VU by CB at 10/8/2024 0448    Acceptance, E, NR by RD at 10/2/2024 1101    Acceptance, E, NR by RD at 10/1/2024 0856    Acceptance, E,D, VU,NR by AG at 9/30/2024 1559                      Point: Precautions (Done)       Learning Progress Summary            Patient Acceptance, E,D, VU,NR by AG at 10/31/2024 1234    Comment: PT educated pt. in importance of mobilization and L L/UE PROM and self ROM to prevent contracture.  Pt. is encouraged to to perform R LE AROM frequently while supine or sitting EOB.    Acceptance, E,TB, VU by RR at 10/27/2024 0031    Acceptance, E,TB, VU by RG at 10/22/2024 1002    Acceptance, TB,E, VU by RG at 10/21/2024 0907    Nonacceptance, E, NR by WG at 10/16/2024 0228    Acceptance, E,TB, VU by CF at 10/15/2024 0753    Acceptance, E,TB, VU by CF at 10/14/2024 0801    Acceptance, E,TB, VU by CF at 10/13/2024 1045    Acceptance, E,D, VU,NR by AG at 10/10/2024 1310    Acceptance, E,TB, VU by CB at 10/8/2024 0448    Acceptance, E, NR by RD at 10/2/2024 1101    Acceptance, E, NR by RD at 10/1/2024 0856    Acceptance, E,D, VU,NR by MARI at 9/30/2024 1559                                      User Key       Initials Effective Dates Name Provider Type Discipline     06/16/21 -  Мария Joya, PT Physical Therapist PT  "   RD 06/16/21 -  Laisha Verdugo, RN Registered Nurse Nurse    RR 06/11/24 -  Jaymie Dominguez, FIFI Registered Nurse Nurse    RG 06/06/23 -  Jesus Matthews, RN Registered Nurse Nurse    WG 02/12/24 -  Shanthi Burden, RN Registered Nurse Nurse    CF 06/25/24 -  Cherelle Germain, RN Registered Nurse Nurse    CB 06/17/24 -  Fidelia Lombardo, RN Registered Nurse Nurse                  PT Recommendation and Plan  Anticipated Discharge Disposition (PT): skilled nursing facility  Planned Therapy Interventions (PT): balance training, bed mobility training, gait training, home exercise program, transfer training, strengthening, ROM (range of motion), patient/family education, neuromuscular re-education  Therapy Frequency (PT): 2 times/wk (1-5 times/ week per priority)  Plan of Care Reviewed With: patient  Progress: no change  Outcome Evaluation: PT brief treatment performed as pt. only agreeable to supine L LE PROM, declines sitting EOB d/t pain and worsening \"spasms L LE\".  Pt. intermittently crying and anxious throughout encounter with painful PROM.  Pt. is again strongly encouraged to perform R LE AROM, L UE self ROM; PT encouraged mobilization and sitting EOB frequently.  Will continue to follow.       Time Calculation:    PT Charges       Row Name 10/31/24 1233             Time Calculation    PT Received On 10/31/24  -                User Key  (r) = Recorded By, (t) = Taken By, (c) = Cosigned By      Initials Name Provider Type    AG Мария Joya, PT Physical Therapist                  Therapy Charges for Today       Code Description Service Date Service Provider Modifiers Qty    46557833835 HC PT THER PROC EA 15 MIN 10/31/2024 Мария Joya, PT GP 1            PT G-Codes  AM-PAC 6 Clicks Score (PT): 9    Мария Joya PT  10/31/2024    "

## 2024-10-31 NOTE — THERAPY TREATMENT NOTE
Acute Care - Occupational Therapy Treatment Note   [facility]     Patient Name: Lety Johnson  : 1980  MRN: [id number]  Today's Date: 10/31/2024  Onset of Illness/Injury or Date of Surgery: 24     Referring Physician: Dr. Marc    Admit Date: 2024       ICD-10-CM ICD-9-CM   1. Hypokalemia  E87.6 276.8   2. Pressure injury of skin of sacral region, unspecified injury stage  L89.159 707.03     707.20   3. Pressure injury of skin of left heel, unspecified injury stage  L89.629 707.07     707.20   4. Acute cystitis without hematuria  N30.00 595.0     Patient Active Problem List   Diagnosis   (none) - all problems resolved or deleted     Past Medical History:   Diagnosis Date    Stroke      History reviewed. No pertinent surgical history.      OT ASSESSMENT FLOWSHEET (Last 12 Hours)       OT Evaluation and Treatment       Row Name 10/31/24 1113                   OT Time and Intention    Subjective Information complains of;weakness;fatigue;pain  -LM        Document Type therapy note (daily note)  -LM        Patient Effort adequate  -LM        Comment Patient seen this date for neuro re-education regarding LUE.  Patient c/o of neuro pain during any prom.  Recommended patient just to wear LUE wrist brace during sleeping and remove during day to perform daily prom as tolerated and self rom.  Patient was educated on self-rom with min cues for LUE.  Patient dedeclined to attempt sitting on eob this date due to LLE spasms.  -LM           General Information    Existing Precautions/Restrictions fall  -LM           Cognition    Cognitive Function (Cognition) executive function deficit  -LM        Executive Function Deficit (Cognition) insight/awareness of deficits;judgment;problem-solving;moderate deficit  -LM           Wound 24 180 Left posterior ankle Other (comment)    Wound - Properties Group Placement Date: 24  -KJ Placement Time:   - Side: Left  -KJ Orientation: posterior  -KJ Location:  ankle  -KJ Primary Wound Type: Other  -TW, unknow etiology     Retired Wound - Properties Group Placement Date: 09/26/24  -KJ Placement Time: 1809  -KJ Side: Left  -KJ Orientation: posterior  -KJ Location: ankle  -KJ Primary Wound Type: Other  -TW, unknow etiology     Retired Wound - Properties Group Placement Date: 09/26/24  -KJ Placement Time: 1809  -KJ Side: Left  -KJ Orientation: posterior  -KJ Location: ankle  -KJ Primary Wound Type: Other  -TW, unknow etiology     Retired Wound - Properties Group Date first assessed: 09/26/24  -KJ Time first assessed: 1809  -KJ Side: Left  -KJ Location: ankle  -KJ Primary Wound Type: Other  -TW, unknow etiology        Wound 10/11/24 1330 medial coccyx Fissure    Wound - Properties Group Placement Date: 10/11/24  -TW Placement Time: 1330  -TW Orientation: medial  -TW Location: coccyx  -TW Primary Wound Type: Fissure  -TW    Retired Wound - Properties Group Placement Date: 10/11/24  -TW Placement Time: 1330  -TW Orientation: medial  -TW Location: coccyx  -TW Primary Wound Type: Fissure  -TW    Retired Wound - Properties Group Placement Date: 10/11/24  -TW Placement Time: 1330  -TW Orientation: medial  -TW Location: coccyx  -TW Primary Wound Type: Fissure  -TW    Retired Wound - Properties Group Date first assessed: 10/11/24  -TW Time first assessed: 1330  -TW Location: coccyx  -TW Primary Wound Type: Fissure  -TW       Wound 10/16/24 0705 Left medial gluteal MASD (moisture associated skin damage)    Wound - Properties Group Placement Date: 10/16/24  -MS Placement Time: 0705 -MS Side: Left  -MS Orientation: medial  -MS Location: gluteal  -MS Primary Wound Type: MASD  -MS Type: MASD (moisture associated skin damage)  -MS Present on Original Admission: N  -MS Additional Comments: Noted at shift change skin assesment, Night shift RN stated it had been there her shift.  -MS    Retired Wound - Properties Group Placement Date: 10/16/24  -MS Placement Time: 0705 -MS Present on  Original Admission: N  -MS Side: Left  -MS Orientation: medial  -MS Location: gluteal  -MS Primary Wound Type: MASD  -MS Additional Comments: Noted at shift change skin assesment, Night shift RN stated it had been there her shift.  -MS Type: MASD (moisture associated skin damage)  -MS    Retired Wound - Properties Group Placement Date: 10/16/24  -MS Placement Time: 0705 -MS Present on Original Admission: N  -MS Side: Left  -MS Orientation: medial  -MS Location: gluteal  -MS Primary Wound Type: MASD  -MS Additional Comments: Noted at shift change skin assesment, Night shift RN stated it had been there her shift.  -MS Type: MASD (moisture associated skin damage)  -MS    Retired Wound - Properties Group Date first assessed: 10/16/24  -MS Time first assessed: 0705 -MS Present on Original Admission: N  -MS Side: Left  -MS Location: gluteal  -MS Primary Wound Type: MASD  -MS Additional Comments: Noted at shift change skin assesment, Night shift RN stated it had been there her shift.  -MS Type: MASD (moisture associated skin damage)  -MS       Wound 10/16/24 0705 Left posterior greater trochanter MASD (moisture associated skin damage)    Wound - Properties Group Placement Date: 10/16/24  -MS Placement Time: 0705 -MS Side: Left  -MS Orientation: posterior  -MS Location: greater trochanter  -MS Primary Wound Type: MASD  -MS Type: MASD (moisture associated skin damage)  -MS Present on Original Admission: N  -MS    Retired Wound - Properties Group Placement Date: 10/16/24  -MS Placement Time: 0705 -MS Present on Original Admission: N  -MS Side: Left  -MS Orientation: posterior  -MS Location: greater trochanter  -MS Primary Wound Type: MASD  -MS Type: MASD (moisture associated skin damage)  -MS    Retired Wound - Properties Group Placement Date: 10/16/24  -MS Placement Time: 0705 -MS Present on Original Admission: N  -MS Side: Left  -MS Orientation: posterior  -MS Location: greater trochanter  -MS Primary Wound Type: MASD   -MS Type: MASD (moisture associated skin damage)  -MS    Retired Wound - Properties Group Date first assessed: 10/16/24  -MS Time first assessed: 0705  -MS Present on Original Admission: N  -MS Side: Left  -MS Location: greater trochanter  -MS Primary Wound Type: MASD  -MS Type: MASD (moisture associated skin damage)  -MS       Positioning and Restraints    Post Treatment Position bed  -LM        In Bed call light within reach;encouraged to call for assist;exit alarm on  -LM                  User Key  (r) = Recorded By, (t) = Taken By, (c) = Cosigned By      Initials Name Effective Dates    TW Karen Gipson, RN 06/16/21 -     LM Samantha Ragland OT 06/16/21 -     Nory Keenan, RN 06/08/23 -     Roe North RN 06/04/24 -                      Occupational Therapy Education        No education to display                      OT Recommendation and Plan              Time Calculation:     Therapy Charges for Today       Code Description Service Date Service Provider Modifiers Qty    51846914300 HC OT NEUROMUSC RE EDUCATION EA 15 MIN 10/31/2024 Samantha Ragland OT GO 2                 Samantha Ragland OT  10/31/2024

## 2024-10-31 NOTE — PLAN OF CARE
Goal Outcome Evaluation:  Plan of Care Reviewed With: patient        Progress: no change  Outcome Evaluation: Patient alert/oriented and resting in bed during shift. VSS on room air. Wound care complete per orders. Patient requested something for sleep, PRN meds given. No acute changes. Will continue with plan of care.

## 2024-11-01 LAB
GLUCOSE BLDC GLUCOMTR-MCNC: 147 MG/DL (ref 70–130)
GLUCOSE BLDC GLUCOMTR-MCNC: 238 MG/DL (ref 70–130)
GLUCOSE BLDC GLUCOMTR-MCNC: 241 MG/DL (ref 70–130)
GLUCOSE BLDC GLUCOMTR-MCNC: 257 MG/DL (ref 70–130)

## 2024-11-01 PROCEDURE — 97110 THERAPEUTIC EXERCISES: CPT

## 2024-11-01 PROCEDURE — 63710000001 INSULIN LISPRO (HUMAN) PER 5 UNITS: Performed by: INTERNAL MEDICINE

## 2024-11-01 PROCEDURE — 25010000002 HEPARIN (PORCINE) PER 1000 UNITS: Performed by: INTERNAL MEDICINE

## 2024-11-01 PROCEDURE — 63710000001 INSULIN GLARGINE PER 5 UNITS: Performed by: HOSPITALIST

## 2024-11-01 PROCEDURE — 82948 REAGENT STRIP/BLOOD GLUCOSE: CPT

## 2024-11-01 PROCEDURE — 99231 SBSQ HOSP IP/OBS SF/LOW 25: CPT | Performed by: INTERNAL MEDICINE

## 2024-11-01 PROCEDURE — 63710000001 INSULIN GLARGINE PER 5 UNITS: Performed by: INTERNAL MEDICINE

## 2024-11-01 RX ADMIN — INSULIN GLARGINE 45 UNITS: 100 INJECTION, SOLUTION SUBCUTANEOUS at 08:35

## 2024-11-01 RX ADMIN — CYCLOBENZAPRINE 10 MG: 10 TABLET, FILM COATED ORAL at 14:47

## 2024-11-01 RX ADMIN — DULOXETINE HYDROCHLORIDE 60 MG: 60 CAPSULE, DELAYED RELEASE ORAL at 08:35

## 2024-11-01 RX ADMIN — HEPARIN SODIUM 5000 UNITS: 5000 INJECTION INTRAVENOUS; SUBCUTANEOUS at 14:00

## 2024-11-01 RX ADMIN — Medication 10 ML: at 08:36

## 2024-11-01 RX ADMIN — ASPIRIN 81 MG: 81 TABLET, CHEWABLE ORAL at 08:35

## 2024-11-01 RX ADMIN — ACYCLOVIR 400 MG: 200 CAPSULE ORAL at 20:45

## 2024-11-01 RX ADMIN — INSULIN GLARGINE 45 UNITS: 100 INJECTION, SOLUTION SUBCUTANEOUS at 20:45

## 2024-11-01 RX ADMIN — ATORVASTATIN CALCIUM 80 MG: 40 TABLET, FILM COATED ORAL at 08:35

## 2024-11-01 RX ADMIN — INSULIN LISPRO 12 UNITS: 100 INJECTION, SOLUTION INTRAVENOUS; SUBCUTANEOUS at 20:45

## 2024-11-01 RX ADMIN — TRAMADOL HYDROCHLORIDE 50 MG: 50 TABLET, COATED ORAL at 01:16

## 2024-11-01 RX ADMIN — NYSTATIN: 100000 POWDER TOPICAL at 08:36

## 2024-11-01 RX ADMIN — VITAMINS A AND D OINTMENT: 15.5; 53.4 OINTMENT TOPICAL at 20:46

## 2024-11-01 RX ADMIN — TRAMADOL HYDROCHLORIDE 50 MG: 50 TABLET, COATED ORAL at 09:50

## 2024-11-01 RX ADMIN — HEPARIN SODIUM 5000 UNITS: 5000 INJECTION INTRAVENOUS; SUBCUTANEOUS at 22:08

## 2024-11-01 RX ADMIN — VITAMINS A AND D OINTMENT: 15.5; 53.4 OINTMENT TOPICAL at 08:35

## 2024-11-01 RX ADMIN — Medication 10 ML: at 20:46

## 2024-11-01 RX ADMIN — SENNOSIDES AND DOCUSATE SODIUM 2 TABLET: 50; 8.6 TABLET ORAL at 17:20

## 2024-11-01 RX ADMIN — LIDOCAINE 1 PATCH: 4 PATCH TOPICAL at 17:15

## 2024-11-01 RX ADMIN — INSULIN LISPRO 8 UNITS: 100 INJECTION, SOLUTION INTRAVENOUS; SUBCUTANEOUS at 11:39

## 2024-11-01 RX ADMIN — METOPROLOL TARTRATE 25 MG: 25 TABLET, FILM COATED ORAL at 08:35

## 2024-11-01 RX ADMIN — NICOTINE 1 PATCH: 7 PATCH, EXTENDED RELEASE TRANSDERMAL at 08:35

## 2024-11-01 RX ADMIN — HEPARIN SODIUM 5000 UNITS: 5000 INJECTION INTRAVENOUS; SUBCUTANEOUS at 06:12

## 2024-11-01 RX ADMIN — PANTOPRAZOLE SODIUM 40 MG: 40 TABLET, DELAYED RELEASE ORAL at 08:35

## 2024-11-01 RX ADMIN — NYSTATIN: 100000 POWDER TOPICAL at 20:46

## 2024-11-01 RX ADMIN — LISINOPRIL 20 MG: 10 TABLET ORAL at 08:35

## 2024-11-01 RX ADMIN — INSULIN LISPRO 8 UNITS: 100 INJECTION, SOLUTION INTRAVENOUS; SUBCUTANEOUS at 17:15

## 2024-11-01 RX ADMIN — Medication 5 MG: at 20:45

## 2024-11-01 RX ADMIN — TRAMADOL HYDROCHLORIDE 50 MG: 50 TABLET, COATED ORAL at 20:52

## 2024-11-01 NOTE — THERAPY TREATMENT NOTE
Acute Care - Physical Therapy Treatment Note   [place]     Patient Name: Lety Johnson  : 1980  MRN: [id number]  Today's Date: 2024   Onset of Illness/Injury or Date of Surgery: 24  Visit Dx:     ICD-10-CM ICD-9-CM   1. Hypokalemia  E87.6 276.8   2. Pressure injury of skin of sacral region, unspecified injury stage  L89.159 707.03     707.20   3. Pressure injury of skin of left heel, unspecified injury stage  L89.629 707.07     707.20   4. Acute cystitis without hematuria  N30.00 595.0     Patient Active Problem List   Diagnosis   (none) - all problems resolved or deleted     Past Medical History:   Diagnosis Date    Stroke      History reviewed. No pertinent surgical history.  PT Assessment (Last 12 Hours)       PT Evaluation and Treatment       Row Name 24 1405          Physical Therapy Time and Intention    Subjective Information complains of;weakness;pain  -KM     Document Type therapy note (daily note)  -KM     Mode of Treatment individual therapy;physical therapy  -KM     Patient Effort fair  -KM     Symptoms Noted During/After Treatment increased pain  -KM       Row Name 24 1405          General Information    Patient Profile Reviewed yes  -KM     Patient Observations alert;cooperative;agree to therapy  -KM     Existing Precautions/Restrictions fall  -KM       Row Name 24 1405          Cognition    Affect/Mental Status (Cognition) emotionally labile  -KM     Orientation Status (Cognition) oriented x 3  -KM     Follows Commands (Cognition) WFL  -KM       Row Name 24 1405          Bed Mobility    Comment, (Bed Mobility) pt. deferred d/t pain  -KM       Row Name 24 1405          Gait/Stairs (Locomotion)    Patient was able to Ambulate no, other medical factors prevent ambulation  -KM     Reason Patient was unable to Ambulate Non-Ambulatory at Baseline  -KM       Row Name 24 1405          Safety Issues/Impairments Affecting Functional Mobility     Impairments Affecting Function (Mobility) endurance/activity tolerance;pain;range of motion (ROM);strength;coordination;motor control;muscle tone abnormal  -KM       Row Name 11/01/24 1405          Motor Skills    Comments, Therapeutic Exercise supine ther-ex  -KM       Row Name             Wound 09/26/24 1809 Left posterior ankle Other (comment)    Wound - Properties Group Placement Date: 09/26/24  -KJ Placement Time: 1809  -KJ Side: Left  -KJ Orientation: posterior  -KJ Location: ankle  -KJ Primary Wound Type: Other  -TW, unknow etiology     Retired Wound - Properties Group Placement Date: 09/26/24  -KJ Placement Time: 1809  -KJ Side: Left  -KJ Orientation: posterior  -KJ Location: ankle  -KJ Primary Wound Type: Other  -TW, unknow etiology     Retired Wound - Properties Group Placement Date: 09/26/24  -KJ Placement Time: 1809  -KJ Side: Left  -KJ Orientation: posterior  -KJ Location: ankle  -KJ Primary Wound Type: Other  -TW, unknow etiology     Retired Wound - Properties Group Date first assessed: 09/26/24  -KJ Time first assessed: 1809  -KJ Side: Left  -KJ Location: ankle  -KJ Primary Wound Type: Other  -TW, unknow etiology       Row Name             Wound 10/11/24 1330 medial coccyx Fissure    Wound - Properties Group Placement Date: 10/11/24  -TW Placement Time: 1330 -TW Orientation: medial  -TW Location: coccyx  -TW Primary Wound Type: Fissure  -TW    Retired Wound - Properties Group Placement Date: 10/11/24  -TW Placement Time: 1330 -TW Orientation: medial  -TW Location: coccyx  -TW Primary Wound Type: Fissure  -TW    Retired Wound - Properties Group Placement Date: 10/11/24  -TW Placement Time: 1330 -TW Orientation: medial  -TW Location: coccyx  -TW Primary Wound Type: Fissure  -TW    Retired Wound - Properties Group Date first assessed: 10/11/24  -TW Time first assessed: 1330 -TW Location: coccyx  -TW Primary Wound Type: Fissure  -TW      Row Name             Wound 10/16/24 0705 Left medial gluteal  MASD (moisture associated skin damage)    Wound - Properties Group Placement Date: 10/16/24  -MS Placement Time: 0705 -MS Side: Left  -MS Orientation: medial  -MS Location: gluteal  -MS Primary Wound Type: MASD  -MS Type: MASD (moisture associated skin damage)  -MS Present on Original Admission: N  -MS Additional Comments: Noted at shift change skin assesment, Night shift RN stated it had been there her shift.  -MS    Retired Wound - Properties Group Placement Date: 10/16/24  -MS Placement Time: 0705 -MS Present on Original Admission: N  -MS Side: Left  -MS Orientation: medial  -MS Location: gluteal  -MS Primary Wound Type: MASD  -MS Additional Comments: Noted at shift change skin assesment, Night shift RN stated it had been there her shift.  -MS Type: MASD (moisture associated skin damage)  -MS    Retired Wound - Properties Group Placement Date: 10/16/24  -MS Placement Time: 0705 -MS Present on Original Admission: N  -MS Side: Left  -MS Orientation: medial  -MS Location: gluteal  -MS Primary Wound Type: MASD  -MS Additional Comments: Noted at shift change skin assesment, Night shift RN stated it had been there her shift.  -MS Type: MASD (moisture associated skin damage)  -MS    Retired Wound - Properties Group Date first assessed: 10/16/24  -MS Time first assessed: 0705 -MS Present on Original Admission: N  -MS Side: Left  -MS Location: gluteal  -MS Primary Wound Type: MASD  -MS Additional Comments: Noted at shift change skin assesment, Night shift RN stated it had been there her shift.  -MS Type: MASD (moisture associated skin damage)  -MS      Row Name             Wound 10/16/24 0705 Left posterior greater trochanter MASD (moisture associated skin damage)    Wound - Properties Group Placement Date: 10/16/24  -MS Placement Time: 0705 -MS Side: Left  -MS Orientation: posterior  -MS Location: greater trochanter  -MS Primary Wound Type: MASD  -MS Type: MASD (moisture associated skin damage)  -MS Present on  Original Admission: N  -MS    Retired Wound - Properties Group Placement Date: 10/16/24  -MS Placement Time: 0705 -MS Present on Original Admission: N  -MS Side: Left  -MS Orientation: posterior  -MS Location: greater trochanter  -MS Primary Wound Type: MASD  -MS Type: MASD (moisture associated skin damage)  -MS    Retired Wound - Properties Group Placement Date: 10/16/24  -MS Placement Time: 0705 -MS Present on Original Admission: N  -MS Side: Left  -MS Orientation: posterior  -MS Location: greater trochanter  -MS Primary Wound Type: MASD  -MS Type: MASD (moisture associated skin damage)  -MS    Retired Wound - Properties Group Date first assessed: 10/16/24  -MS Time first assessed: 0705 -MS Present on Original Admission: N  -MS Side: Left  -MS Location: greater trochanter  -MS Primary Wound Type: MASD  -MS Type: MASD (moisture associated skin damage)  -MS      Row Name 11/01/24 1421          Progress Summary (PT)    Daily Progress Summary (PT) Pt. tolerated ther-ex. She deferred all mobility skills d/t increased pain. Pt. would benefit from PT as she is able to participate.  -KM               User Key  (r) = Recorded By, (t) = Taken By, (c) = Cosigned By      Initials Name Provider Type    Karen Peralta, RN Registered Nurse    Thierry Carter, PT Physical Therapist    Nory Keenan, RN Registered Nurse    Roe North, RN Registered Nurse                    Physical Therapy Education       Title: PT OT SLP Therapies (Done)       Topic: Physical Therapy (Done)       Point: Mobility training (Done)       Learning Progress Summary            Patient Acceptance, E,D, VU,NR by AG at 10/31/2024 1234    Comment: PT educated pt. in importance of mobilization and L L/UE PROM and self ROM to prevent contracture.  Pt. is encouraged to to perform R LE AROM frequently while supine or sitting EOB.    Acceptance, E,TB, VU by RR at 10/27/2024 0031    Acceptance, E,TB, VU by RG at 10/22/2024 1002     Acceptance, TB,E, VU by RG at 10/21/2024 0907    Nonacceptance, E, NR by WG at 10/16/2024 0228    Acceptance, E,TB, VU by CF at 10/15/2024 0753    Acceptance, E,TB, VU by CF at 10/14/2024 0801    Acceptance, E,TB, VU by CF at 10/13/2024 1045    Acceptance, E,D, VU,NR by AG at 10/10/2024 1310    Acceptance, E,TB, VU by CB at 10/8/2024 0448    Acceptance, E, NR by RD at 10/2/2024 1101    Acceptance, E, NR by RD at 10/1/2024 0856    Acceptance, E,D, VU,NR by AG at 9/30/2024 1559                      Point: Home exercise program (Done)       Learning Progress Summary            Patient Acceptance, E,D, VU,NR by AG at 10/31/2024 1234    Comment: PT educated pt. in importance of mobilization and L L/UE PROM and self ROM to prevent contracture.  Pt. is encouraged to to perform R LE AROM frequently while supine or sitting EOB.    Acceptance, E,TB, VU by RR at 10/27/2024 0031    Acceptance, E,TB, VU by RG at 10/22/2024 1002    Acceptance, TB,E, VU by RG at 10/21/2024 0907    Nonacceptance, E, NR by WG at 10/16/2024 0228    Acceptance, E,TB, VU by CF at 10/15/2024 0753    Acceptance, E,TB, VU by CF at 10/14/2024 0801    Acceptance, E,TB, VU by CF at 10/13/2024 1045    Acceptance, E,D, VU,NR by AG at 10/10/2024 1310    Acceptance, E,TB, VU by CB at 10/8/2024 0448    Acceptance, E, NR by RD at 10/2/2024 1101    Acceptance, E, NR by RD at 10/1/2024 0856    Acceptance, E,D, VU,NR by AG at 9/30/2024 1559                      Point: Body mechanics (Done)       Learning Progress Summary            Patient Acceptance, E,D, VU,NR by AG at 10/31/2024 1234    Comment: PT educated pt. in importance of mobilization and L L/UE PROM and self ROM to prevent contracture.  Pt. is encouraged to to perform R LE AROM frequently while supine or sitting EOB.    Acceptance, E,TB, VU by RR at 10/27/2024 0031    Acceptance, E,TB, VU by RG at 10/22/2024 1002    Acceptance, TB,E, VU by RG at 10/21/2024 0907    Nonacceptance, E, NR by WG at 10/16/2024  0228    Acceptance, E,TB, VU by CF at 10/15/2024 0753    Acceptance, E,TB, VU by CF at 10/14/2024 0801    Acceptance, E,TB, VU by CF at 10/13/2024 1045    Acceptance, E,D, VU,NR by AG at 10/10/2024 1310    Acceptance, E,TB, VU by CB at 10/8/2024 0448    Acceptance, E, NR by RD at 10/2/2024 1101    Acceptance, E, NR by RD at 10/1/2024 0856    Acceptance, E,D, VU,NR by AG at 9/30/2024 1559                      Point: Precautions (Done)       Learning Progress Summary            Patient Acceptance, E,D, VU,NR by  at 10/31/2024 1234    Comment: PT educated pt. in importance of mobilization and L L/UE PROM and self ROM to prevent contracture.  Pt. is encouraged to to perform R LE AROM frequently while supine or sitting EOB.    Acceptance, E,TB, VU by RR at 10/27/2024 0031    Acceptance, E,TB, VU by RG at 10/22/2024 1002    Acceptance, TB,E, VU by RG at 10/21/2024 0907    Nonacceptance, E, NR by WG at 10/16/2024 0228    Acceptance, E,TB, VU by CF at 10/15/2024 0753    Acceptance, E,TB, VU by CF at 10/14/2024 0801    Acceptance, E,TB, VU by CF at 10/13/2024 1045    Acceptance, E,D, VU,NR by AG at 10/10/2024 1310    Acceptance, E,TB, VU by CB at 10/8/2024 0448    Acceptance, E, NR by RD at 10/2/2024 1101    Acceptance, E, NR by RD at 10/1/2024 0856    Acceptance, E,D, VU,NR by  at 9/30/2024 1559                                      User Key       Initials Effective Dates Name Provider Type Discipline     06/16/21 -  Маиря Joya, PT Physical Therapist PT    RD 06/16/21 -  Laisha Verdugo, RN Registered Nurse Nurse    RR 06/11/24 -  Jaymie Dominguez, RN Registered Nurse Nurse    RG 06/06/23 -  Jesus Matthews, RN Registered Nurse Nurse    WG 02/12/24 -  Shanthi Burden, RN Registered Nurse Nurse    CF 06/25/24 -  Cherelle Germain, RN Registered Nurse Nurse    CB 06/17/24 -  Fidelia Lombardo, RN Registered Nurse Nurse                  PT Recommendation and Plan     Progress Summary (PT)  Daily Progress Summary (PT): Pt.  tolerated ther-ex. She deferred all mobility skills d/t increased pain. Pt. would benefit from PT as she is able to participate.       Time Calculation:    PT Charges       Row Name 11/01/24 1419             Time Calculation    PT Received On 11/01/24  -KM         Time Calculation- PT    Total Timed Code Minutes- PT 15 minute(s)  -KM                User Key  (r) = Recorded By, (t) = Taken By, (c) = Cosigned By      Initials Name Provider Type    Thierry Carter, PT Physical Therapist                  Therapy Charges for Today       Code Description Service Date Service Provider Modifiers Qty    [id number]  PT THER PROC EA 15 MIN 11/1/2024 Thierry Saldivar, PT GP 1            PT G-Codes  AM-PAC 6 Clicks Score (PT): 9    Thierry Saldivar PT  11/1/2024

## 2024-11-01 NOTE — CASE MANAGEMENT/SOCIAL WORK
Discharge Planning Assessment   [place]     Patient Name: Lety Johnson  MRN: [id number]  Today's Date: 11/1/2024    Admit Date: 9/26/2024       Discharge Plan       Row Name 11/01/24 1033       Plan    Plan SS contacted Jodie Lara [facility] [phone] per Cherelle who states [facility]PS [facility] has not yet made contact with them but intake will review her referral once APS SW has made contact. SS attempted to call APS SW but was not successful. SS left voicemail and requested a return call. SS noted, Pt had SSDI hearing and decision about social security disability income should be made within 60-90 days. SS to follow.                 Continued Care and Services - Admitted Since 9/26/2024       Destination       Service Provider Request Status Services Address Phone Fax Patient Preferred    [facility] Declined  Cannot meet patient's needs -- [address] [facility] [id number] [phone] --    [facility] Care Declined  Not eligible -- [address] [facility] [place] [address] [phone] --    [facility] Declined  Not eligible -- [address] [facility] [address] [phone] --    [facility] SPECIALTY [facility] Declined  Clinical Denial -- [address] [facility], 4TH FLOOR, [facility] [address] [phone] --    [facility] SWING BED UNIT Declined  Cannot meet patient's needs -- [address] [facility] [address] [phone] --     [facility] Declined  Not eligible -- [address] [facility] [place] [address] [phone] --    [facility] Declined  Bed not available -- [address] [facility] [address] [phone] --    [facility]ILLED CARE Declined  Bed not available -- [address] [facility] [address] [phone] --    [facility]   Out of network -- [address] [facility] [address] [phone] --    [facility] SW[facility] BED UNIT Declined -- [address] [facility] [address] [phone] --    [facility]  Clinical Denial -- [address] [facility] [id number] [phone] --    [facility] SWING BED UNIT Declined  Insurance Denial, Out of network -- [address] [id number] [phone] --    [facility] Declined  Out of network -- [address] [facility] [address] [phone] --    [facility] Declined  Out of network -- [address] [address] [phone] -- --    [facility] Declined  Out of network -- [address] [facility] [address] [phone] --                    KAZ Reynolds

## 2024-11-01 NOTE — PLAN OF CARE
Goal Outcome Evaluation:  Plan of Care Reviewed With: patient        Progress: no change  Outcome Evaluation: Pt resting in bed. PRN pain medication provided per pt request, see MAR. Wound care completed per order. No acute changes or complaints at this time. Will continune POC.

## 2024-11-01 NOTE — NURSING NOTE
Pt refuses q2hr turns, education provided about the importance of consistent turning in order to avoid skin breakdown.

## 2024-11-01 NOTE — PLAN OF CARE
Goal Outcome Evaluation:           Progress: no change  Outcome Evaluation: Patient has been resting in the bed throughout the shift, PRN medication has been given for pain, wound care has been completed per orders. Patient has no complaints or concerns at this time

## 2024-11-01 NOTE — PROGRESS NOTES
[facility]     Patient Identification:  Name:  Lety Johnson  Age:  43 y.o.  Sex:  female  :  1980  MRN:  [id number]  Visit Number:  [id number]  Primary Care Provider:  Concha Rao APRN    Length of stay:  34    Chief complaint: None    Subjective:    Patient doing well, no new complaints.  Nursing staff states no new events overnight.  ----------------------------------------------------------------------------------------------------------------------  Current Hospital Meds:  acyclovir, 400 mg, Oral, Nightly  aspirin, 81 mg, Oral, Daily  atorvastatin, 80 mg, Oral, Daily  DULoxetine, 60 mg, Oral, Daily  heparin (porcine), 5,000 Units, Subcutaneous, Q8H  insulin glargine, 45 Units, Subcutaneous, Daily With Breakfast  insulin glargine, 45 Units, Subcutaneous, Nightly  insulin lispro, 4-24 Units, Subcutaneous, 4x Daily AC & at Bedtime  Lidocaine, 1 patch, Transdermal, Q24H  lisinopril, 20 mg, Oral, Daily  magic barrier cream, , Topical, BID  metoprolol tartrate, 25 mg, Oral, Q12H  nicotine, 1 patch, Transdermal, Q24H  nystatin, , Topical, Q12H  pantoprazole, 40 mg, Oral, Daily  sodium chloride, 10 mL, Intravenous, Q12H  sodium chloride, 10 mL, Intravenous, Q12H      Pharmacy Consult,       ----------------------------------------------------------------------------------------------------------------------  Vital Signs:  Temp:  [97.6 °F (36.4 °C)-97.7 °F (36.5 °C)] 97.6 °F (36.4 °C)  Heart Rate:  [] 99  Resp:  [16-18] 18  BP: ()/(56-64) 93/56      10/07/24  0511 10/08/24  0500 10/09/24  0500   Weight: 102 kg (225 lb 14.4 oz) (FIFI cameron) 102 kg (225 lb 15.5 oz) 102 kg (224 lb 1.6 oz)     Body mass index is 36.73 kg/m².    Intake/Output Summary (Last 24 hours) at 2024 1753  Last data filed at 2024 1731  Gross per 24 hour   Intake 1680 ml   Output --   Net 1680 ml     Diet: Regular/House, Diabetic; Consistent Carbohydrate; Fluid  Consistency: Thin (IDDSI 0)  ----------------------------------------------------------------------------------------------------------------------  Physical exam:  Constitutional: Chronically ill-appearing  female in no apparent distress.     HENT:  Head:  Normocephalic and atraumatic.  Mouth:  Moist mucous membranes.    Eyes:  Conjunctivae and EOM are normal.  Pupils are equal, round, and reactive to light.  No scleral icterus.    Neck:  Neck supple. No thyromegaly.  No JVD present.    Cardiovascular: Sinus tachycardia with heart rate in the upper 120s, no murmurs, rubs, clicks or gallops appreciated.  Pulmonary/Chest:  Clear to auscultation bilaterally with no crackles, wheezes or rhonchi appreciated.  Abdominal:  Soft. Nontender. Nondistended  Bowel sounds are normal in all four quadrants. No organomegally appreciated.   Musculoskeletal:  No edema, no tenderness, and no deformity.  No red or swollen joints anywhere.    Neurological: Slightly lethargic, Cranial nerves II-XII intact with no focal deficits.  No facial droop.  No slurred speech.   Skin:  Warm and dry to palpation with no rashes or lesions appreciated.  Peripheral vascular:  2+ radial and pedal pulses in bilateral upper and lower extremities.    No significant change in physical exam in comparison to 10/31/2024  -------------------------------------------------------------------------  ----------------------------------------------------------------------------------------------------------------------      Results from last 7 days   Lab Units 10/27/24  0147   WBC 10*3/mm3 11.03*   HEMOGLOBIN g/dL 12.6   HEMATOCRIT % 39.8   MCV fL 103.1*   MCHC g/dL 31.7   PLATELETS 10*3/mm3 310         Results from last 7 days   Lab Units 10/27/24  0147   SODIUM mmol/L 140   POTASSIUM mmol/L 4.1   CHLORIDE mmol/L 103   CO2 mmol/L 24.6   BUN mg/dL 22*   CREATININE mg/dL 0.48*   CALCIUM mg/dL 9.5   GLUCOSE mg/dL 150*   Estimated Creatinine Clearance: 182.5  "mL/min (A) (by C-G formula based on SCr of 0.48 mg/dL (L)).    No results found for: \"AMMONIA\"      No results found for: \"BLOODCX\"  No results found for: \"URINECX\"  No results found for: \"WOUNDCX\"  No results found for: \"STOOLCX\"    I have personally looked at the labs and they are summarized above.  ----------------------------------------------------------------------------------------------------------------------  Imaging Results (Last 24 Hours)       ** No results found for the last 24 hours. **          ----------------------------------------------------------------------------------------------------------------------  Assessment and Plan:    ESBL acute cystitis - patient has completed full course of IV antibiotic therapy with Invanz     2.  Acute metabolic encephalopathy -acute encephalopathy resolved, continue to monitor closely for changes in mental status.     3.  History of CVA - patient with left-sided hemiparesis secondary to history of CVA     4.  Debility -continue PT/OT     5.  History of genital herpes -continue acyclovir     6.  Morbid obesity -complicates all aspects of care    7.  Uncontrolled type 2 diabetes mellitus -blood sugar slightly improved, will continue Lantus 45 units subcu BID.    Disposition still pending placement    Marv Navas DO   11/01/24   17:53 EDT     "

## 2024-11-02 LAB
GLUCOSE BLDC GLUCOMTR-MCNC: 156 MG/DL (ref 70–130)
GLUCOSE BLDC GLUCOMTR-MCNC: 157 MG/DL (ref 70–130)
GLUCOSE BLDC GLUCOMTR-MCNC: 240 MG/DL (ref 70–130)
GLUCOSE BLDC GLUCOMTR-MCNC: 344 MG/DL (ref 70–130)

## 2024-11-02 PROCEDURE — 99231 SBSQ HOSP IP/OBS SF/LOW 25: CPT | Performed by: INTERNAL MEDICINE

## 2024-11-02 PROCEDURE — 63710000001 INSULIN LISPRO (HUMAN) PER 5 UNITS: Performed by: INTERNAL MEDICINE

## 2024-11-02 PROCEDURE — 63710000001 INSULIN GLARGINE PER 5 UNITS: Performed by: HOSPITALIST

## 2024-11-02 PROCEDURE — 82948 REAGENT STRIP/BLOOD GLUCOSE: CPT

## 2024-11-02 PROCEDURE — 25010000002 HEPARIN (PORCINE) PER 1000 UNITS: Performed by: INTERNAL MEDICINE

## 2024-11-02 PROCEDURE — 63710000001 INSULIN GLARGINE PER 5 UNITS: Performed by: INTERNAL MEDICINE

## 2024-11-02 RX ADMIN — CYCLOBENZAPRINE 10 MG: 10 TABLET, FILM COATED ORAL at 08:13

## 2024-11-02 RX ADMIN — HEPARIN SODIUM 5000 UNITS: 5000 INJECTION INTRAVENOUS; SUBCUTANEOUS at 05:05

## 2024-11-02 RX ADMIN — TRAMADOL HYDROCHLORIDE 50 MG: 50 TABLET, COATED ORAL at 13:20

## 2024-11-02 RX ADMIN — PANTOPRAZOLE SODIUM 40 MG: 40 TABLET, DELAYED RELEASE ORAL at 08:13

## 2024-11-02 RX ADMIN — NICOTINE 1 PATCH: 7 PATCH, EXTENDED RELEASE TRANSDERMAL at 08:16

## 2024-11-02 RX ADMIN — ATORVASTATIN CALCIUM 80 MG: 40 TABLET, FILM COATED ORAL at 08:12

## 2024-11-02 RX ADMIN — INSULIN GLARGINE 45 UNITS: 100 INJECTION, SOLUTION SUBCUTANEOUS at 20:34

## 2024-11-02 RX ADMIN — LISINOPRIL 20 MG: 10 TABLET ORAL at 08:13

## 2024-11-02 RX ADMIN — INSULIN LISPRO 4 UNITS: 100 INJECTION, SOLUTION INTRAVENOUS; SUBCUTANEOUS at 11:45

## 2024-11-02 RX ADMIN — ASPIRIN 81 MG: 81 TABLET, CHEWABLE ORAL at 08:13

## 2024-11-02 RX ADMIN — Medication 10 ML: at 08:15

## 2024-11-02 RX ADMIN — INSULIN LISPRO 4 UNITS: 100 INJECTION, SOLUTION INTRAVENOUS; SUBCUTANEOUS at 08:16

## 2024-11-02 RX ADMIN — Medication 10 ML: at 22:57

## 2024-11-02 RX ADMIN — INSULIN LISPRO 20 UNITS: 100 INJECTION, SOLUTION INTRAVENOUS; SUBCUTANEOUS at 20:34

## 2024-11-02 RX ADMIN — HEPARIN SODIUM 5000 UNITS: 5000 INJECTION INTRAVENOUS; SUBCUTANEOUS at 22:40

## 2024-11-02 RX ADMIN — ACETAMINOPHEN 650 MG: 325 TABLET ORAL at 04:02

## 2024-11-02 RX ADMIN — Medication 5 MG: at 20:35

## 2024-11-02 RX ADMIN — CYCLOBENZAPRINE 10 MG: 10 TABLET, FILM COATED ORAL at 22:40

## 2024-11-02 RX ADMIN — METOPROLOL TARTRATE 25 MG: 25 TABLET, FILM COATED ORAL at 08:12

## 2024-11-02 RX ADMIN — NYSTATIN: 100000 POWDER TOPICAL at 20:36

## 2024-11-02 RX ADMIN — METOPROLOL TARTRATE 25 MG: 25 TABLET, FILM COATED ORAL at 20:35

## 2024-11-02 RX ADMIN — DULOXETINE HYDROCHLORIDE 60 MG: 60 CAPSULE, DELAYED RELEASE ORAL at 08:13

## 2024-11-02 RX ADMIN — INSULIN LISPRO 8 UNITS: 100 INJECTION, SOLUTION INTRAVENOUS; SUBCUTANEOUS at 17:11

## 2024-11-02 RX ADMIN — LIDOCAINE 1 PATCH: 4 PATCH TOPICAL at 16:31

## 2024-11-02 RX ADMIN — DICLOFENAC SODIUM 4 G: 10 GEL TOPICAL at 04:03

## 2024-11-02 RX ADMIN — VITAMINS A AND D OINTMENT: 15.5; 53.4 OINTMENT TOPICAL at 20:36

## 2024-11-02 RX ADMIN — INSULIN GLARGINE 45 UNITS: 100 INJECTION, SOLUTION SUBCUTANEOUS at 08:13

## 2024-11-02 RX ADMIN — DICLOFENAC SODIUM 4 G: 10 GEL TOPICAL at 11:45

## 2024-11-02 RX ADMIN — VITAMINS A AND D OINTMENT: 15.5; 53.4 OINTMENT TOPICAL at 08:16

## 2024-11-02 RX ADMIN — ACYCLOVIR 400 MG: 200 CAPSULE ORAL at 20:35

## 2024-11-02 RX ADMIN — DICLOFENAC SODIUM 4 G: 10 GEL TOPICAL at 20:36

## 2024-11-02 RX ADMIN — Medication 10 ML: at 08:14

## 2024-11-02 RX ADMIN — TRAMADOL HYDROCHLORIDE 50 MG: 50 TABLET, COATED ORAL at 05:05

## 2024-11-02 RX ADMIN — HEPARIN SODIUM 5000 UNITS: 5000 INJECTION INTRAVENOUS; SUBCUTANEOUS at 13:20

## 2024-11-02 RX ADMIN — TRAMADOL HYDROCHLORIDE 50 MG: 50 TABLET, COATED ORAL at 22:40

## 2024-11-02 RX ADMIN — NYSTATIN: 100000 POWDER TOPICAL at 08:16

## 2024-11-02 NOTE — PROGRESS NOTES
[facility]     Patient Identification:  Name:  Lety Johnson  Age:  43 y.o.  Sex:  female  :  1980  MRN:  [id number]  Visit Number:  [id number]  Primary Care Provider:  Concha Rao APRN    Length of stay:  35    Chief complaint: None    Subjective:    Patient doing well today with no new complaints.  Per nursing staff, no new events overnight.  Patient is still pending placement.  ----------------------------------------------------------------------------------------------------------------------  Current Hospital Meds:  acyclovir, 400 mg, Oral, Nightly  aspirin, 81 mg, Oral, Daily  atorvastatin, 80 mg, Oral, Daily  DULoxetine, 60 mg, Oral, Daily  heparin (porcine), 5,000 Units, Subcutaneous, Q8H  insulin glargine, 45 Units, Subcutaneous, Daily With Breakfast  insulin glargine, 45 Units, Subcutaneous, Nightly  insulin lispro, 4-24 Units, Subcutaneous, 4x Daily AC & at Bedtime  Lidocaine, 1 patch, Transdermal, Q24H  lisinopril, 20 mg, Oral, Daily  magic barrier cream, , Topical, BID  metoprolol tartrate, 25 mg, Oral, Q12H  nicotine, 1 patch, Transdermal, Q24H  nystatin, , Topical, Q12H  pantoprazole, 40 mg, Oral, Daily  sodium chloride, 10 mL, Intravenous, Q12H  sodium chloride, 10 mL, Intravenous, Q12H      Pharmacy Consult,       ----------------------------------------------------------------------------------------------------------------------  Vital Signs:  Temp:  [97.6 °F (36.4 °C)-98.6 °F (37 °C)] 97.7 °F (36.5 °C)  Heart Rate:  [] 97  Resp:  [16-19] 18  BP: ()/(51-71) 101/65      10/07/24  0511 10/08/24  0500 10/09/24  0500   Weight: 102 kg (225 lb 14.4 oz) (FIFI cameron) 102 kg (225 lb 15.5 oz) 102 kg (224 lb 1.6 oz)     Body mass index is 36.73 kg/m².    Intake/Output Summary (Last 24 hours) at 2024 1239  Last data filed at 2024 0346  Gross per 24 hour   Intake 1440 ml   Output --   Net 1440 ml     Diet: Regular/House,  Diabetic; Consistent Carbohydrate; Fluid Consistency: Thin (IDDSI 0)  ----------------------------------------------------------------------------------------------------------------------  Physical exam:  Constitutional: Chronically ill-appearing  female in no apparent distress.     HENT:  Head:  Normocephalic and atraumatic.  Mouth:  Moist mucous membranes.    Eyes:  Conjunctivae and EOM are normal.  Pupils are equal, round, and reactive to light.  No scleral icterus.    Neck:  Neck supple. No thyromegaly.  No JVD present.    Cardiovascular: Sinus tachycardia with heart rate in the upper 120s, no murmurs, rubs, clicks or gallops appreciated.  Pulmonary/Chest:  Clear to auscultation bilaterally with no crackles, wheezes or rhonchi appreciated.  Abdominal:  Soft. Nontender. Nondistended  Bowel sounds are normal in all four quadrants. No organomegally appreciated.   Musculoskeletal:  No edema, no tenderness, and no deformity.  No red or swollen joints anywhere.    Neurological: Slightly lethargic, Cranial nerves II-XII intact with no focal deficits.  No facial droop.  No slurred speech.   Skin:  Warm and dry to palpation with no rashes or lesions appreciated.  Peripheral vascular:  2+ radial and pedal pulses in bilateral upper and lower extremities.    No significant change in physical exam in comparison to 11/1/2024  -------------------------------------------------------------------------  ----------------------------------------------------------------------------------------------------------------------      Results from last 7 days   Lab Units 10/27/24  0147   WBC 10*3/mm3 11.03*   HEMOGLOBIN g/dL 12.6   HEMATOCRIT % 39.8   MCV fL 103.1*   MCHC g/dL 31.7   PLATELETS 10*3/mm3 310         Results from last 7 days   Lab Units 10/27/24  0147   SODIUM mmol/L 140   POTASSIUM mmol/L 4.1   CHLORIDE mmol/L 103   CO2 mmol/L 24.6   BUN mg/dL 22*   CREATININE mg/dL 0.48*   CALCIUM mg/dL 9.5   GLUCOSE mg/dL 150*  "  Estimated Creatinine Clearance: 182.5 mL/min (A) (by C-G formula based on SCr of 0.48 mg/dL (L)).    No results found for: \"AMMONIA\"      No results found for: \"BLOODCX\"  No results found for: \"URINECX\"  No results found for: \"WOUNDCX\"  No results found for: \"STOOLCX\"    I have personally looked at the labs and they are summarized above.  ----------------------------------------------------------------------------------------------------------------------  Imaging Results (Last 24 Hours)       ** No results found for the last 24 hours. **          ----------------------------------------------------------------------------------------------------------------------  Assessment and Plan:    ESBL acute cystitis - patient has completed full course of IV antibiotic therapy with Invanz     2.  Acute metabolic encephalopathy -acute encephalopathy resolved, continue to monitor closely for changes in mental status.     3.  History of CVA - patient with left-sided hemiparesis secondary to history of CVA     4.  Debility -continue PT/OT     5.  History of genital herpes -continue acyclovir     6.  Morbid obesity -complicates all aspects of care    7.  Uncontrolled type 2 diabetes mellitus -blood sugar slightly improved, will continue Lantus 45 units subcu BID.    Disposition still pending placement    Marv Navas,    11/02/24   12:39 EDT     "

## 2024-11-02 NOTE — PLAN OF CARE
Goal Outcome Evaluation:  Plan of Care Reviewed With: patient        Progress: no change     Pt resting in bed, VSS. Pt complained of pain, PRN pain medication given per orders. Pt has voiced no concerns or complaints at this time, will continue with POC.

## 2024-11-02 NOTE — PLAN OF CARE
Goal Outcome Evaluation:              Outcome Evaluation: patient resting in bed. VSS. PRN pain medication given per MAR. Wound care completed. No complaints or concerns at this time. Will continue plan of care.

## 2024-11-03 LAB
GLUCOSE BLDC GLUCOMTR-MCNC: 132 MG/DL (ref 70–130)
GLUCOSE BLDC GLUCOMTR-MCNC: 191 MG/DL (ref 70–130)
GLUCOSE BLDC GLUCOMTR-MCNC: 232 MG/DL (ref 70–130)
GLUCOSE BLDC GLUCOMTR-MCNC: 271 MG/DL (ref 70–130)

## 2024-11-03 PROCEDURE — 63710000001 INSULIN GLARGINE PER 5 UNITS: Performed by: HOSPITALIST

## 2024-11-03 PROCEDURE — 25010000002 HEPARIN (PORCINE) PER 1000 UNITS: Performed by: INTERNAL MEDICINE

## 2024-11-03 PROCEDURE — 82948 REAGENT STRIP/BLOOD GLUCOSE: CPT

## 2024-11-03 PROCEDURE — 63710000001 INSULIN GLARGINE PER 5 UNITS: Performed by: INTERNAL MEDICINE

## 2024-11-03 PROCEDURE — 99231 SBSQ HOSP IP/OBS SF/LOW 25: CPT | Performed by: INTERNAL MEDICINE

## 2024-11-03 PROCEDURE — 63710000001 INSULIN LISPRO (HUMAN) PER 5 UNITS: Performed by: INTERNAL MEDICINE

## 2024-11-03 RX ORDER — HYDROXYZINE HYDROCHLORIDE 25 MG/1
25 TABLET, FILM COATED ORAL ONCE
Status: COMPLETED | OUTPATIENT
Start: 2024-11-03 | End: 2024-11-03

## 2024-11-03 RX ADMIN — METOPROLOL TARTRATE 25 MG: 25 TABLET, FILM COATED ORAL at 09:51

## 2024-11-03 RX ADMIN — Medication 5 MG: at 20:34

## 2024-11-03 RX ADMIN — INSULIN GLARGINE 45 UNITS: 100 INJECTION, SOLUTION SUBCUTANEOUS at 20:34

## 2024-11-03 RX ADMIN — ACYCLOVIR 400 MG: 200 CAPSULE ORAL at 20:34

## 2024-11-03 RX ADMIN — CYCLOBENZAPRINE 10 MG: 10 TABLET, FILM COATED ORAL at 20:34

## 2024-11-03 RX ADMIN — INSULIN LISPRO 4 UNITS: 100 INJECTION, SOLUTION INTRAVENOUS; SUBCUTANEOUS at 20:34

## 2024-11-03 RX ADMIN — DULOXETINE HYDROCHLORIDE 60 MG: 60 CAPSULE, DELAYED RELEASE ORAL at 08:01

## 2024-11-03 RX ADMIN — HEPARIN SODIUM 5000 UNITS: 5000 INJECTION INTRAVENOUS; SUBCUTANEOUS at 05:13

## 2024-11-03 RX ADMIN — VITAMINS A AND D OINTMENT: 15.5; 53.4 OINTMENT TOPICAL at 20:35

## 2024-11-03 RX ADMIN — NYSTATIN: 100000 POWDER TOPICAL at 20:35

## 2024-11-03 RX ADMIN — ASPIRIN 81 MG: 81 TABLET, CHEWABLE ORAL at 08:01

## 2024-11-03 RX ADMIN — INSULIN GLARGINE 45 UNITS: 100 INJECTION, SOLUTION SUBCUTANEOUS at 08:01

## 2024-11-03 RX ADMIN — Medication 10 ML: at 08:08

## 2024-11-03 RX ADMIN — METOPROLOL TARTRATE 25 MG: 25 TABLET, FILM COATED ORAL at 20:34

## 2024-11-03 RX ADMIN — PANTOPRAZOLE SODIUM 40 MG: 40 TABLET, DELAYED RELEASE ORAL at 08:01

## 2024-11-03 RX ADMIN — HEPARIN SODIUM 5000 UNITS: 5000 INJECTION INTRAVENOUS; SUBCUTANEOUS at 13:20

## 2024-11-03 RX ADMIN — INSULIN LISPRO 4 UNITS: 100 INJECTION, SOLUTION INTRAVENOUS; SUBCUTANEOUS at 11:26

## 2024-11-03 RX ADMIN — NYSTATIN: 100000 POWDER TOPICAL at 08:05

## 2024-11-03 RX ADMIN — NICOTINE 1 PATCH: 7 PATCH, EXTENDED RELEASE TRANSDERMAL at 08:08

## 2024-11-03 RX ADMIN — DICLOFENAC SODIUM 4 G: 10 GEL TOPICAL at 08:05

## 2024-11-03 RX ADMIN — LIDOCAINE 1 PATCH: 4 PATCH TOPICAL at 16:47

## 2024-11-03 RX ADMIN — INSULIN LISPRO 8 UNITS: 100 INJECTION, SOLUTION INTRAVENOUS; SUBCUTANEOUS at 16:47

## 2024-11-03 RX ADMIN — HYDROXYZINE HYDROCHLORIDE 25 MG: 25 TABLET ORAL at 20:34

## 2024-11-03 RX ADMIN — ATORVASTATIN CALCIUM 80 MG: 40 TABLET, FILM COATED ORAL at 08:01

## 2024-11-03 RX ADMIN — TRAMADOL HYDROCHLORIDE 50 MG: 50 TABLET, COATED ORAL at 08:00

## 2024-11-03 RX ADMIN — Medication 10 ML: at 20:35

## 2024-11-03 RX ADMIN — LISINOPRIL 20 MG: 10 TABLET ORAL at 11:44

## 2024-11-03 RX ADMIN — VITAMINS A AND D OINTMENT: 15.5; 53.4 OINTMENT TOPICAL at 08:05

## 2024-11-03 RX ADMIN — HEPARIN SODIUM 5000 UNITS: 5000 INJECTION INTRAVENOUS; SUBCUTANEOUS at 20:34

## 2024-11-03 RX ADMIN — TRAMADOL HYDROCHLORIDE 50 MG: 50 TABLET, COATED ORAL at 17:53

## 2024-11-03 NOTE — PLAN OF CARE
Goal Outcome Evaluation:  Plan of Care Reviewed With: patient        Progress: no change  Outcome Evaluation: VSS on RA. Pt a&o x4. Pt c/o pain, PRN medication given per orders. Wound care completed. Pt voices no complaints or concerns at this time. Will continue POC.

## 2024-11-03 NOTE — PROGRESS NOTES
[facility]     Patient Identification:  Name:  Lety Johnson  Age:  43 y.o.  Sex:  female  :  1980  MRN:  [id number]  Visit Number:  [id number]  Primary Care Provider:  Concha Rao APRN    Length of stay:  36    Chief complaint: None    Subjective:    Patient doing well today with no specific complaints other than intermittent left upper and left lower extremity numbness and tingling.  Per nursing staff, no new events overnight.  Patient still pending placement.  ----------------------------------------------------------------------------------------------------------------------  Current Hospital Meds:  acyclovir, 400 mg, Oral, Nightly  aspirin, 81 mg, Oral, Daily  atorvastatin, 80 mg, Oral, Daily  DULoxetine, 60 mg, Oral, Daily  heparin (porcine), 5,000 Units, Subcutaneous, Q8H  insulin glargine, 45 Units, Subcutaneous, Daily With Breakfast  insulin glargine, 45 Units, Subcutaneous, Nightly  insulin lispro, 4-24 Units, Subcutaneous, 4x Daily AC & at Bedtime  Lidocaine, 1 patch, Transdermal, Q24H  lisinopril, 20 mg, Oral, Daily  magic barrier cream, , Topical, BID  metoprolol tartrate, 25 mg, Oral, Q12H  nicotine, 1 patch, Transdermal, Q24H  nystatin, , Topical, Q12H  pantoprazole, 40 mg, Oral, Daily  sodium chloride, 10 mL, Intravenous, Q12H  sodium chloride, 10 mL, Intravenous, Q12H      Pharmacy Consult,       ----------------------------------------------------------------------------------------------------------------------  Vital Signs:  Temp:  [97.8 °F (36.6 °C)-98.1 °F (36.7 °C)] 98.1 °F (36.7 °C)  Heart Rate:  [] 96  Resp:  [18] 18  BP: ()/(55-76) 110/76      10/07/24  0511 10/08/24  0500 10/09/24  0500   Weight: 102 kg (225 lb 14.4 oz) (FIFI cameron) 102 kg (225 lb 15.5 oz) 102 kg (224 lb 1.6 oz)     Body mass index is 36.73 kg/m².    Intake/Output Summary (Last 24 hours) at 11/3/2024 1232  Last data filed at 11/3/2024 1144  Gross  "per 24 hour   Intake 1680 ml   Output --   Net 1680 ml     Diet: Regular/House, Diabetic; Consistent Carbohydrate; Fluid Consistency: Thin (IDDSI 0)  ----------------------------------------------------------------------------------------------------------------------  Physical exam:  Constitutional: Chronically ill-appearing  female in no apparent distress.     HENT:  Head:  Normocephalic and atraumatic.  Mouth:  Moist mucous membranes.    Eyes:  Conjunctivae and EOM are normal.  Pupils are equal, round, and reactive to light.  No scleral icterus.    Neck:  Neck supple. No thyromegaly.  No JVD present.    Cardiovascular: Sinus tachycardia with heart rate in the upper 120s, no murmurs, rubs, clicks or gallops appreciated.  Pulmonary/Chest:  Clear to auscultation bilaterally with no crackles, wheezes or rhonchi appreciated.  Abdominal:  Soft. Nontender. Nondistended  Bowel sounds are normal in all four quadrants. No organomegally appreciated.   Musculoskeletal:  No edema, no tenderness, and no deformity.  No red or swollen joints anywhere.    Neurological: Slightly lethargic, Cranial nerves II-XII intact with no focal deficits.  No facial droop.  No slurred speech.   Skin:  Warm and dry to palpation with no rashes or lesions appreciated.  Peripheral vascular:  2+ radial and pedal pulses in bilateral upper and lower extremities.    No significant change in physical exam in comparison to 11/2/2024  -------------------------------------------------------------------------  ----------------------------------------------------------------------------------------------------------------------                      Invalid input(s): \"PROT\"  Estimated Creatinine Clearance: 182.5 mL/min (A) (by C-G formula based on SCr of 0.48 mg/dL (L)).    No results found for: \"AMMONIA\"      No results found for: \"BLOODCX\"  No results found for: \"URINECX\"  No results found for: \"WOUNDCX\"  No results found for: \"STOOLCX\"    I have " personally looked at the labs and they are summarized above.  ----------------------------------------------------------------------------------------------------------------------  Imaging Results (Last 24 Hours)       ** No results found for the last 24 hours. **          ----------------------------------------------------------------------------------------------------------------------  Assessment and Plan:    ESBL acute cystitis - patient has completed full course of IV antibiotic therapy with Invanz     2.  Acute metabolic encephalopathy -acute encephalopathy resolved, continue to monitor closely for changes in mental status.     3.  History of CVA - patient with left-sided hemiparesis secondary to history of CVA     4.  Debility -continue PT/OT     5.  History of genital herpes -continue acyclovir     6.  Morbid obesity -complicates all aspects of care    7.  Uncontrolled type 2 diabetes mellitus - continue Lantus 45 units subcu BID.    Disposition still pending placement    Marv Navas DO   11/03/24   12:32 EST

## 2024-11-03 NOTE — PLAN OF CARE
Goal Outcome Evaluation:  Plan of Care Reviewed With: patient         Pt resting in bed no distress noted no c/o voiced

## 2024-11-03 NOTE — NURSING NOTE
Patient resting in bed. Patient is alert and oriented. VSS on RA. Wound care completed per orders. Will continue with plan of care.

## 2024-11-03 NOTE — NURSING NOTE
Pt refused to be turned for full skin assessment. Pt has been refusing turns at this time. Will continue POC.

## 2024-11-04 LAB
GLUCOSE BLDC GLUCOMTR-MCNC: 105 MG/DL (ref 70–130)
GLUCOSE BLDC GLUCOMTR-MCNC: 196 MG/DL (ref 70–130)
GLUCOSE BLDC GLUCOMTR-MCNC: 214 MG/DL (ref 70–130)
GLUCOSE BLDC GLUCOMTR-MCNC: 275 MG/DL (ref 70–130)
GLUCOSE BLDC GLUCOMTR-MCNC: 295 MG/DL (ref 70–130)
GLUCOSE BLDC GLUCOMTR-MCNC: 317 MG/DL (ref 70–130)

## 2024-11-04 PROCEDURE — 97112 NEUROMUSCULAR REEDUCATION: CPT

## 2024-11-04 PROCEDURE — 63710000001 INSULIN GLARGINE PER 5 UNITS: Performed by: HOSPITALIST

## 2024-11-04 PROCEDURE — 25010000002 HEPARIN (PORCINE) PER 1000 UNITS: Performed by: INTERNAL MEDICINE

## 2024-11-04 PROCEDURE — 99231 SBSQ HOSP IP/OBS SF/LOW 25: CPT

## 2024-11-04 PROCEDURE — 63710000001 INSULIN LISPRO (HUMAN) PER 5 UNITS: Performed by: INTERNAL MEDICINE

## 2024-11-04 PROCEDURE — 63710000001 INSULIN GLARGINE PER 5 UNITS: Performed by: INTERNAL MEDICINE

## 2024-11-04 PROCEDURE — 97530 THERAPEUTIC ACTIVITIES: CPT

## 2024-11-04 PROCEDURE — 82948 REAGENT STRIP/BLOOD GLUCOSE: CPT

## 2024-11-04 PROCEDURE — 97110 THERAPEUTIC EXERCISES: CPT

## 2024-11-04 RX ORDER — IBUPROFEN 400 MG/1
800 TABLET, FILM COATED ORAL ONCE
Status: COMPLETED | OUTPATIENT
Start: 2024-11-04 | End: 2024-11-04

## 2024-11-04 RX ADMIN — DULOXETINE HYDROCHLORIDE 60 MG: 60 CAPSULE, DELAYED RELEASE ORAL at 09:23

## 2024-11-04 RX ADMIN — INSULIN LISPRO 4 UNITS: 100 INJECTION, SOLUTION INTRAVENOUS; SUBCUTANEOUS at 10:57

## 2024-11-04 RX ADMIN — VITAMINS A AND D OINTMENT: 15.5; 53.4 OINTMENT TOPICAL at 09:24

## 2024-11-04 RX ADMIN — IBUPROFEN 800 MG: 400 TABLET, FILM COATED ORAL at 10:57

## 2024-11-04 RX ADMIN — Medication 10 ML: at 09:24

## 2024-11-04 RX ADMIN — NYSTATIN: 100000 POWDER TOPICAL at 09:24

## 2024-11-04 RX ADMIN — ACYCLOVIR 400 MG: 200 CAPSULE ORAL at 21:16

## 2024-11-04 RX ADMIN — INSULIN GLARGINE 45 UNITS: 100 INJECTION, SOLUTION SUBCUTANEOUS at 21:26

## 2024-11-04 RX ADMIN — HEPARIN SODIUM 5000 UNITS: 5000 INJECTION INTRAVENOUS; SUBCUTANEOUS at 06:00

## 2024-11-04 RX ADMIN — ATORVASTATIN CALCIUM 80 MG: 40 TABLET, FILM COATED ORAL at 09:24

## 2024-11-04 RX ADMIN — TRAMADOL HYDROCHLORIDE 50 MG: 50 TABLET, COATED ORAL at 06:00

## 2024-11-04 RX ADMIN — METOPROLOL TARTRATE 25 MG: 25 TABLET, FILM COATED ORAL at 09:23

## 2024-11-04 RX ADMIN — PANTOPRAZOLE SODIUM 40 MG: 40 TABLET, DELAYED RELEASE ORAL at 09:24

## 2024-11-04 RX ADMIN — DICLOFENAC SODIUM 4 G: 10 GEL TOPICAL at 17:03

## 2024-11-04 RX ADMIN — HEPARIN SODIUM 5000 UNITS: 5000 INJECTION INTRAVENOUS; SUBCUTANEOUS at 21:14

## 2024-11-04 RX ADMIN — INSULIN GLARGINE 15 UNITS: 100 INJECTION, SOLUTION SUBCUTANEOUS at 09:24

## 2024-11-04 RX ADMIN — NICOTINE 1 PATCH: 7 PATCH, EXTENDED RELEASE TRANSDERMAL at 09:24

## 2024-11-04 RX ADMIN — INSULIN LISPRO 12 UNITS: 100 INJECTION, SOLUTION INTRAVENOUS; SUBCUTANEOUS at 17:02

## 2024-11-04 RX ADMIN — HEPARIN SODIUM 5000 UNITS: 5000 INJECTION INTRAVENOUS; SUBCUTANEOUS at 13:30

## 2024-11-04 RX ADMIN — VITAMINS A AND D OINTMENT: 15.5; 53.4 OINTMENT TOPICAL at 21:16

## 2024-11-04 RX ADMIN — INSULIN LISPRO 16 UNITS: 100 INJECTION, SOLUTION INTRAVENOUS; SUBCUTANEOUS at 21:26

## 2024-11-04 RX ADMIN — NYSTATIN: 100000 POWDER TOPICAL at 21:16

## 2024-11-04 RX ADMIN — LISINOPRIL 20 MG: 10 TABLET ORAL at 09:23

## 2024-11-04 RX ADMIN — Medication 5 MG: at 21:36

## 2024-11-04 RX ADMIN — ASPIRIN 81 MG: 81 TABLET, CHEWABLE ORAL at 09:23

## 2024-11-04 RX ADMIN — CYCLOBENZAPRINE 10 MG: 10 TABLET, FILM COATED ORAL at 21:36

## 2024-11-04 RX ADMIN — Medication 10 ML: at 21:16

## 2024-11-04 RX ADMIN — CYCLOBENZAPRINE 10 MG: 10 TABLET, FILM COATED ORAL at 06:00

## 2024-11-04 RX ADMIN — LIDOCAINE 1 PATCH: 4 PATCH TOPICAL at 17:02

## 2024-11-04 NOTE — PLAN OF CARE
Goal Outcome Evaluation:  Plan of Care Reviewed With: patient        Progress: no change  Outcome Evaluation: Patient VSS on room air this shift. One time dose of Ibuprofen given per orders. Patient sat at side of bed this shift. Patient reports no further concerns at this time, will continue POC.

## 2024-11-04 NOTE — THERAPY TREATMENT NOTE
Acute Care - Physical Therapy Treatment Note   [place]     Patient Name: Lety Johnson  : 1980  MRN: [id number]  Today's Date: 2024   Onset of Illness/Injury or Date of Surgery: 24  Visit Dx:     ICD-10-CM ICD-9-CM   1. Hypokalemia  E87.6 276.8   2. Pressure injury of skin of sacral region, unspecified injury stage  L89.159 707.03     707.20   3. Pressure injury of skin of left heel, unspecified injury stage  L89.629 707.07     707.20   4. Acute cystitis without hematuria  N30.00 595.0     Patient Active Problem List   Diagnosis   (none) - all problems resolved or deleted     Past Medical History:   Diagnosis Date    Stroke      History reviewed. No pertinent surgical history.  PT Assessment (Last 12 Hours)       PT Evaluation and Treatment       Row Name 24 1129          Physical Therapy Time and Intention    Subjective Information complains of;weakness;fatigue;pain  -KM     Document Type therapy note (daily note)  -KM     Mode of Treatment physical therapy  -KM     Patient Effort good  -KM     Symptoms Noted During/After Treatment increased pain  -KM       Row Name 24 1129          General Information    Patient Profile Reviewed yes  -KM     Patient Observations alert;cooperative;agree to therapy  -KM     Existing Precautions/Restrictions fall  -KM       Row Name 24 1129          Cognition    Affect/Mental Status (Cognition) emotionally labile  -KM     Orientation Status (Cognition) oriented x 3  -KM     Follows Commands (Cognition) WFL  -KM       Row Name 24 1129          Bed Mobility    Bed Mobility supine-sit  -KM     Supine-Sit [place] (Bed Mobility) maximum assist (25% patient effort);2 person assist  -KM     Bed Mobility, Safety Issues decreased use of arms for pushing/pulling;decreased use of legs for bridging/pushing  -KM     Assistive Device (Bed Mobility) bed rails;head of bed elevated  -KM       Row Name 24 1129          Transfers    Comment,  (Transfers) unsafe and unable to perform  -KM       Row Name 11/04/24 1129          Gait/Stairs (Locomotion)    Patient was able to Ambulate no, other medical factors prevent ambulation  -KM     Reason Patient was unable to Ambulate Excessive Weakness;Non-Ambulatory at Baseline  -KM       Row Name 11/04/24 1129          Safety Issues/Impairments Affecting Functional Mobility    Impairments Affecting Function (Mobility) endurance/activity tolerance;pain;range of motion (ROM);strength;coordination;motor control;muscle tone abnormal  -KM       Row Name 11/04/24 1129          Balance    Balance Assessment sitting static balance  -KM     Static Sitting Balance modified independence  -KM       Row Name 11/04/24 1129          Motor Skills    Comments, Therapeutic Exercise seated RLE ther-ex; LLE stretching and neuro re-ed  -KM       Row Name             Wound 09/26/24 1809 Left posterior ankle Other (comment)    Wound - Properties Group Placement Date: 09/26/24  -KJ Placement Time: 1809  -KJ Side: Left  -KJ Orientation: posterior  -KJ Location: ankle  -KJ Primary Wound Type: Other  -TW, unknow etiology     Retired Wound - Properties Group Placement Date: 09/26/24  -KJ Placement Time: 1809  -KJ Side: Left  -KJ Orientation: posterior  -KJ Location: ankle  -KJ Primary Wound Type: Other  -TW, unknow etiology     Retired Wound - Properties Group Placement Date: 09/26/24  -KJ Placement Time: 1809  -KJ Side: Left  -KJ Orientation: posterior  -KJ Location: ankle  -KJ Primary Wound Type: Other  -TW, unknow etiology     Retired Wound - Properties Group Date first assessed: 09/26/24  -KJ Time first assessed: 1809  -KJ Side: Left  -KJ Location: ankle  -KJ Primary Wound Type: Other  -TW, unknow etiology       Row Name             Wound 10/11/24 1330 medial coccyx Fissure    Wound - Properties Group Placement Date: 10/11/24  -TW Placement Time: 1330  -TW Orientation: medial  -TW Location: coccyx  -TW Primary Wound Type: Fissure  -TW     Retired Wound - Properties Group Placement Date: 10/11/24  -TW Placement Time: 1330 -TW Orientation: medial  -TW Location: coccyx  -TW Primary Wound Type: Fissure  -TW    Retired Wound - Properties Group Placement Date: 10/11/24  -TW Placement Time: 1330 -TW Orientation: medial  -TW Location: coccyx  -TW Primary Wound Type: Fissure  -TW    Retired Wound - Properties Group Date first assessed: 10/11/24  -TW Time first assessed: 1330 -TW Location: coccyx  -TW Primary Wound Type: Fissure  -TW      Row Name             Wound 10/16/24 0705 Left medial gluteal MASD (moisture associated skin damage)    Wound - Properties Group Placement Date: 10/16/24  -MS Placement Time: 0705 -MS Side: Left  -MS Orientation: medial  -MS Location: gluteal  -MS Primary Wound Type: MASD  -MS Type: MASD (moisture associated skin damage)  -MS Present on Original Admission: N  -MS Additional Comments: Noted at shift change skin assesment, Night shift RN stated it had been there her shift.  -MS    Retired Wound - Properties Group Placement Date: 10/16/24  -MS Placement Time: 0705 -MS Present on Original Admission: N  -MS Side: Left  -MS Orientation: medial  -MS Location: gluteal  -MS Primary Wound Type: MASD  -MS Additional Comments: Noted at shift change skin assesment, Night shift RN stated it had been there her shift.  -MS Type: MASD (moisture associated skin damage)  -MS    Retired Wound - Properties Group Placement Date: 10/16/24  -MS Placement Time: 0705 -MS Present on Original Admission: N  -MS Side: Left  -MS Orientation: medial  -MS Location: gluteal  -MS Primary Wound Type: MASD  -MS Additional Comments: Noted at shift change skin assesment, Night shift RN stated it had been there her shift.  -MS Type: MASD (moisture associated skin damage)  -MS    Retired Wound - Properties Group Date first assessed: 10/16/24  -MS Time first assessed: 0705 -MS Present on Original Admission: N  -MS Side: Left  -MS Location: gluteal  -MS Primary  Wound Type: MASD  -MS Additional Comments: Noted at shift change skin assesment, Night shift RN stated it had been there her shift.  -MS Type: MASD (moisture associated skin damage)  -MS      Row Name             Wound 10/16/24 0705 Left posterior greater trochanter MASD (moisture associated skin damage)    Wound - Properties Group Placement Date: 10/16/24  -MS Placement Time: 0705  -MS Side: Left  -MS Orientation: posterior  -MS Location: greater trochanter  -MS Primary Wound Type: MASD  -MS Type: MASD (moisture associated skin damage)  -MS Present on Original Admission: N  -MS    Retired Wound - Properties Group Placement Date: 10/16/24  -MS Placement Time: 0705  -MS Present on Original Admission: N  -MS Side: Left  -MS Orientation: posterior  -MS Location: greater trochanter  -MS Primary Wound Type: MASD  -MS Type: MASD (moisture associated skin damage)  -MS    Retired Wound - Properties Group Placement Date: 10/16/24  -MS Placement Time: 0705  -MS Present on Original Admission: N  -MS Side: Left  -MS Orientation: posterior  -MS Location: greater trochanter  -MS Primary Wound Type: MASD  -MS Type: MASD (moisture associated skin damage)  -MS    Retired Wound - Properties Group Date first assessed: 10/16/24  -MS Time first assessed: 0705  -MS Present on Original Admission: N  -MS Side: Left  -MS Location: greater trochanter  -MS Primary Wound Type: MASD  -MS Type: MASD (moisture associated skin damage)  -MS      Row Name 11/04/24 1129          Progress Summary (PT)    Daily Progress Summary (PT) Pt. was able to demonstrate bed mobility w/ maxA x2. She was able to tolerate sitting EOB for increased time. She had difficulty tolerating exercises d/t increase pain. Pt. would continue to benefit from skilled PT services.  -KM               User Key  (r) = Recorded By, (t) = Taken By, (c) = Cosigned By      Initials Name Provider Type    Karen Peralta, RN Registered Nurse    Thierry Carter, PT Physical Therapist     Nory Keenan, RN Registered Nurse    Roe North, RN Registered Nurse                    Physical Therapy Education       Title: PT OT SLP Therapies (In Progress)       Topic: Physical Therapy (In Progress)       Point: Mobility training (In Progress)       Learning Progress Summary            Patient Acceptance, E, NR by SC at 11/3/2024 0025    Acceptance, E,TB, VU by RR at 11/1/2024 2315    Acceptance, E,D, VU,NR by AG at 10/31/2024 1234    Comment: PT educated pt. in importance of mobilization and L L/UE PROM and self ROM to prevent contracture.  Pt. is encouraged to to perform R LE AROM frequently while supine or sitting EOB.    Acceptance, E,TB, VU by RR at 10/27/2024 0031    Acceptance, E,TB, VU by RG at 10/22/2024 1002    Acceptance, TB,E, VU by RG at 10/21/2024 0907    Nonacceptance, E, NR by WG at 10/16/2024 0228    Acceptance, E,TB, VU by CF at 10/15/2024 0753    Acceptance, E,TB, VU by CF at 10/14/2024 0801    Acceptance, E,TB, VU by CF at 10/13/2024 1045    Acceptance, E,D, VU,NR by AG at 10/10/2024 1310    Acceptance, E,TB, VU by CB at 10/8/2024 0448    Acceptance, E, NR by RD at 10/2/2024 1101    Acceptance, E, NR by RD at 10/1/2024 0856    Acceptance, E,D, VU,NR by AG at 9/30/2024 1559                      Point: Home exercise program (In Progress)       Learning Progress Summary            Patient Acceptance, E, NR by SC at 11/3/2024 0025    Acceptance, E,TB, VU by RR at 11/1/2024 2315    Acceptance, E,D, VU,NR by AG at 10/31/2024 1234    Comment: PT educated pt. in importance of mobilization and L L/UE PROM and self ROM to prevent contracture.  Pt. is encouraged to to perform R LE AROM frequently while supine or sitting EOB.    Acceptance, E,TB, VU by RR at 10/27/2024 0031    Acceptance, E,TB, VU by RG at 10/22/2024 1002    Acceptance, TB,E, VU by RG at 10/21/2024 0907    Nonacceptance, E, NR by WG at 10/16/2024 0228    Acceptance, E,TB, VU by CF at 10/15/2024 0753    Acceptance,  E,TB, VU by CF at 10/14/2024 0801    Acceptance, E,TB, VU by CF at 10/13/2024 1045    Acceptance, E,D, VU,NR by AG at 10/10/2024 1310    Acceptance, E,TB, VU by CB at 10/8/2024 0448    Acceptance, E, NR by RD at 10/2/2024 1101    Acceptance, E, NR by RD at 10/1/2024 0856    Acceptance, E,D, VU,NR by AG at 9/30/2024 1559                      Point: Body mechanics (In Progress)       Learning Progress Summary            Patient Acceptance, E, NR by SC at 11/3/2024 0025    Acceptance, E,TB, VU by RR at 11/1/2024 2315    Acceptance, E,D, VU,NR by AG at 10/31/2024 1234    Comment: PT educated pt. in importance of mobilization and L L/UE PROM and self ROM to prevent contracture.  Pt. is encouraged to to perform R LE AROM frequently while supine or sitting EOB.    Acceptance, E,TB, VU by RR at 10/27/2024 0031    Acceptance, E,TB, VU by RG at 10/22/2024 1002    Acceptance, TB,E, VU by RG at 10/21/2024 0907    Nonacceptance, E, NR by WG at 10/16/2024 0228    Acceptance, E,TB, VU by CF at 10/15/2024 0753    Acceptance, E,TB, VU by CF at 10/14/2024 0801    Acceptance, E,TB, VU by CF at 10/13/2024 1045    Acceptance, E,D, VU,NR by AG at 10/10/2024 1310    Acceptance, E,TB, VU by CB at 10/8/2024 0448    Acceptance, E, NR by RD at 10/2/2024 1101    Acceptance, E, NR by RD at 10/1/2024 0856    Acceptance, E,D, VU,NR by AG at 9/30/2024 1559                      Point: Precautions (In Progress)       Learning Progress Summary            Patient Acceptance, E, NR by SC at 11/3/2024 0025    Acceptance, E,TB, VU by RR at 11/1/2024 2315    Acceptance, E,D, VU,NR by AG at 10/31/2024 1234    Comment: PT educated pt. in importance of mobilization and L L/UE PROM and self ROM to prevent contracture.  Pt. is encouraged to to perform R LE AROM frequently while supine or sitting EOB.    Acceptance, E,TB, VU by RR at 10/27/2024 0031    Acceptance, E,TB, VU by RG at 10/22/2024 1002    Acceptance, TB,E, VU by RG at 10/21/2024 0907     Nonacceptance, E, NR by WG at 10/16/2024 0228    Acceptance, E,TB, VU by CF at 10/15/2024 0753    Acceptance, E,TB, VU by CF at 10/14/2024 0801    Acceptance, E,TB, VU by CF at 10/13/2024 1045    Acceptance, E,D, VU,NR by AG at 10/10/2024 1310    Acceptance, E,TB, VU by CB at 10/8/2024 0448    Acceptance, E, NR by RD at 10/2/2024 1101    Acceptance, E, NR by RD at 10/1/2024 0856    Acceptance, E,D, VU,NR by AG at 9/30/2024 1559                                      User Key       Initials Effective Dates Name Provider Type Discipline     06/16/21 -  Мария Joya, PT Physical Therapist PT    RD 06/16/21 -  Laisha Verdugo, RN Registered Nurse Nurse    RR 06/11/24 -  Jaymie Dominguez, RN Registered Nurse Nurse    RG 06/06/23 -  Jesus Matthews, RN Registered Nurse Nurse    WG 02/12/24 -  Shanthi Burden, RN Registered Nurse Nurse    CF 06/25/24 -  Cherelle Germain, RN Registered Nurse Nurse    CB 06/17/24 -  Fidelia Lombardo, RN Registered Nurse Nurse    SC 09/11/24 -  Sally Hair, RN Registered Nurse Nurse                  PT Recommendation and Plan     Progress Summary (PT)  Daily Progress Summary (PT): Pt. was able to demonstrate bed mobility w/ maxA x2. She was able to tolerate sitting EOB for increased time. She had difficulty tolerating exercises d/t increase pain. Pt. would continue to benefit from skilled PT services.       Time Calculation:    PT Charges       Row Name 11/04/24 1129             Time Calculation    PT Received On 11/04/24  -KM         Time Calculation- PT    Total Timed Code Minutes- PT 23 minute(s)  -KM                User Key  (r) = Recorded By, (t) = Taken By, (c) = Cosigned By      Initials Name Provider Type    Thierry Carter, PT Physical Therapist                  Therapy Charges for Today       Code Description Service Date Service Provider Modifiers Qty    09245945177 HC PT THERAPEUTIC ACT EA 15 MIN 11/4/2024 Thierry Saldivar, PT GP 1    37802138234 HC PT THER PROC EA 15 MIN 11/4/2024  Thierry Saldivar, PT GP 1            PT G-Codes  AM-PAC 6 Clicks Score (PT): 9    Thierry Saldivar, PT  11/4/2024

## 2024-11-04 NOTE — THERAPY TREATMENT NOTE
Patient Name: Lety Johnson  : 1980    MRN: [id number]                              Today's Date: 2024       Admit Date: 2024    Visit Dx:     ICD-10-CM ICD-9-CM   1. Hypokalemia  E87.6 276.8   2. Pressure injury of skin of sacral region, unspecified injury stage  L89.159 707.03     707.20   3. Pressure injury of skin of left heel, unspecified injury stage  L89.629 707.07     707.20   4. Acute cystitis without hematuria  N30.00 595.0     Patient Active Problem List   Diagnosis   (none) - all problems resolved or deleted     Past Medical History:   Diagnosis Date    Stroke      History reviewed. No pertinent surgical history.   General Information       Row Name 24 1150          OT Time and Intention    Subjective Information pain  -     Document Type therapy note (daily note)  -     Mode of Treatment individual therapy;occupational therapy  -     Patient Effort good  -     Comment Patient seen for OT treatment. She was educated on progression of sensation, sometimes painful, status post CVA. Education on left wrist breace wearing schedule, donned on during day due to increased pain but recommended to do skin checks and remove if any irritation occurs. Also educated on not leaving on for extended periods of time.  -       Row Name 24 1150          Cognition    Orientation Status (Cognition) oriented x 3  -       Row Name 24 1150          Safety Issues/Impairments Affecting Functional Mobility    Impairments Affecting Function (Mobility) endurance/activity tolerance;pain;range of motion (ROM);strength;coordination;motor control;muscle tone abnormal  -               User Key  (r) = Recorded By, (t) = Taken By, (c) = Cosigned By      Initials Name Provider Type     Kasey Bella OT Occupational Therapist                     Mobility/ADL's       Row Name 24 1151          Bed Mobility    Supine-Sit [place] (Bed Mobility) maximum assist (25% patient  effort);2 person assist  -     Assistive Device (Bed Mobility) bed rails;head of bed elevated  -               User Key  (r) = Recorded By, (t) = Taken By, (c) = Cosigned By      Initials Name Provider Type    Kasey Yang OT Occupational Therapist                   Obj/Interventions       Row Name 11/04/24 1152          Motor Skills    Motor Skills functional endurance  -     Functional Endurance fair  -               User Key  (r) = Recorded By, (t) = Taken By, (c) = Cosigned By      Initials Name Provider Type    Kasey Yang OT Occupational Therapist                   Goals/Plan    No documentation.                  Clinical Impression       Row Name 11/04/24 1152          Pain Assessment    Pretreatment Pain Rating 2/10  -KP     Posttreatment Pain Rating 4/10  -     Pain Location wrist  -     Pain Side/Orientation left  -       Row Name 11/04/24 1152          Plan of Care Review    Plan of Care Reviewed With patient  -     Progress no change  -     Outcome Evaluation Patient seen for OT treatment. She tolerated sitting unsupported at edge of bed with standby assist. Brief PROM with NMR techniques to LUE as tolerated, which was brief on this date due to complaints of increased pain. Continue plan of care.  -       Row Name 11/04/24 1152          Therapy Plan Review/Discharge Plan (OT)    Anticipated Discharge Disposition (OT) inpatient rehabilitation facility;extended care facility  -       Row Name 11/04/24 1152          Positioning and Restraints    Pre-Treatment Position in bed  -     Post Treatment Position bed  -KP     In Bed sitting EOB;call light within reach;encouraged to call for assist;with nsg  -               User Key  (r) = Recorded By, (t) = Taken By, (c) = Cosigned By      Initials Name Provider Type    Kasey Yang OT Occupational Therapist                   Outcome Measures    No documentation.                   Occupational Therapy Education        No  education to display                  OT Recommendation and Plan     Plan of Care Review  Plan of Care Reviewed With: patient  Progress: no change  Outcome Evaluation: Patient seen for OT treatment. She tolerated sitting unsupported at edge of bed with standby assist. Brief PROM with NMR techniques to LUE as tolerated, which was brief on this date due to complaints of increased pain. Continue plan of care.     Time Calculation:         Time Calculation- OT       Row Name 11/04/24 1153             Time Calculation- OT    OT Received On 11/04/24  -                User Key  (r) = Recorded By, (t) = Taken By, (c) = Cosigned By      Initials Name Provider Type     Kasey Bella OT Occupational Therapist                  Therapy Charges for Today       Code Description Service Date Service Provider Modifiers Qty    41376577365 HC OT NEUROMUSC RE EDUCATION EA 15 MIN 11/4/2024 Kasey Bella OT GO 1                 Kasey Blela OT  11/4/2024

## 2024-11-04 NOTE — PLAN OF CARE
Goal Outcome Evaluation:  Plan of Care Reviewed With: patient        Progress: no change  Outcome Evaluation: Patient resting in bed. VSS on RA. Pt c/o pain, PRN pain medication given per MAR. Wound care completed. No concerns or complaints at this time. Will continue with plan of care.

## 2024-11-04 NOTE — PAYOR COMM NOTE
"CONTACT:  RONAL RACHEL RN  UTILIZATION MANAGEMENT DEPT.   [facility]   [address], [address]   PHONE:  [phone]   FAX: [phone]         CLINICAL UPDATES    AUTH # [id number]            Lety Johnson (43 y.o. Female)       Date of Birth   1980    Social Security Number       Address   [address] [address]    Home Phone   [phone]    MRN   [id number]       [employer]   [employer]    Marital Status   Single                            Admission Date   9/26/24    Admission Type   Emergency    Admitting Provider   Ramon Marc MD    Attending Provider   Mara Navas MD    Department, Room/Bed   [address], [facility]/       Discharge Date       Discharge Disposition       Discharge Destination                                 Attending Provider: Mara Navas MD    Allergies: No Known Allergies    Isolation: None   Infection: None   Code Status: CPR    Ht: 166.4 cm (65.5\")   Wt: 102 kg (224 lb 1.6 oz)    Admission Cmt: None   Principal Problem: UTI (urinary tract infection) [N39.0]                   Active Insurance as of 9/26/2024       Primary Coverage       Payor Plan Insurance Group Employer/Plan Group    [place] [id number]       Payor Plan Address Payor Plan Phone Number Payor Plan Fax Number Effective Dates    [employer] [address] [address] [phone]  5/1/2024 - None Entered    [facility] [id number]         Subscriber Name Subscriber Birth Date Member ID       LETY JOHNSON 1980 [id number]                     Emergency Contacts        (Rel.) Home Phone Work Phone Mobile Phone    Moises Rachel (Legal Guardian) -- -- [phone]              Orders (last 48 hrs)        Start     Ordered    11/04/24 0642  POC Glucose Once  PROCEDURE ONCE        Comments: Complete no more than 45 minutes prior to patient eating      11/04/24 0636    11/03/24 2100  hydrOXYzine (ATARAX) " tablet 25 mg  Once         11/03/24 2010 11/03/24 1959  POC Glucose Once  PROCEDURE ONCE        Comments: Complete no more than 45 minutes prior to patient eating      11/03/24 1952    11/03/24 1632  POC Glucose Once  PROCEDURE ONCE        Comments: Complete no more than 45 minutes prior to patient eating      11/03/24 1625    11/03/24 1121  POC Glucose Once  PROCEDURE ONCE        Comments: Complete no more than 45 minutes prior to patient eating      11/03/24 1112    11/03/24 0635  POC Glucose Once  PROCEDURE ONCE        Comments: Complete no more than 45 minutes prior to patient eating      11/03/24 0627    11/02/24 1936  POC Glucose Once  PROCEDURE ONCE        Comments: Complete no more than 45 minutes prior to patient eating      11/02/24 1929    11/02/24 1642  POC Glucose Once  PROCEDURE ONCE        Comments: Complete no more than 45 minutes prior to patient eating      11/02/24 1635    11/02/24 1110  POC Glucose Once  PROCEDURE ONCE        Comments: Complete no more than 45 minutes prior to patient eating      11/02/24 1103    10/31/24 1356  traMADol (ULTRAM) tablet 50 mg  Every 8 Hours PRN         10/31/24 1356    10/30/24 2100  insulin glargine (LANTUS, SEMGLEE) injection 45 Units  Nightly         10/30/24 1828    10/28/24 0800  insulin glargine (LANTUS, SEMGLEE) injection 45 Units  Daily With Breakfast         10/27/24 1332    10/23/24 0334  alteplase (CATHFLO/ACTIVASE) injection 2 mg  As Needed         10/23/24 0335    10/18/24 1700  Lidocaine 4 % 1 patch  Every 24 Hours         10/18/24 1606    10/18/24 1245  magic barrier cream  2 Times Daily         10/18/24 1157    10/17/24 2200  POC Glucose 4x Daily Before Meals & at Bedtime  4 Times Daily Before Meals & at Bedtime      Comments: Complete no more than 45 minutes prior to patient eating      10/17/24 1934    10/17/24 2100  Insulin Lispro (humaLOG) injection 4-24 Units  4 Times Daily Before Meals & Nightly         10/17/24 1934    10/17/24 1934   dextrose (GLUTOSE) oral gel 15 g  Every 15 Minutes PRN         10/17/24 1934    10/17/24 1934  dextrose (D50W) (25 g/50 mL) IV injection 25 g  Every 15 Minutes PRN         10/17/24 1934    10/17/24 1934  glucagon HCl (Diagnostic) injection 1 mg  Every 15 Minutes PRN         10/17/24 1934    10/11/24 1339  Wound Care Other (comment) (unknow etiology)  Daily       10/11/24 1338    10/11/24 0940  POC Glucose 4x Daily PC & at Bedtime  4 Times Daily After Meals & at Bedtime      Comments: Complete no more than 45 minutes prior to patient eating      10/11/24 0939    10/07/24 0225  melatonin tablet 5 mg  Nightly PRN         10/07/24 0225    10/05/24 1330  metoprolol tartrate (LOPRESSOR) tablet 25 mg  Every 12 Hours Scheduled         10/05/24 1239    10/03/24 0000  Discharge Follow-up with PCP         10/03/24 1402    10/03/24 0000  acyclovir (ZOVIRAX) 400 MG tablet  Nightly         10/03/24 1509    10/01/24 0924  loperamide (IMODIUM) capsule 2 mg  4 Times Daily PRN         10/01/24 0924    09/30/24 1100  sodium chloride 0.9 % flush 10 mL  Every 12 Hours Scheduled         09/30/24 1004    09/30/24 1004  sodium chloride 0.9 % flush 10 mL  As Needed         09/30/24 1004    09/30/24 1004  sodium chloride 0.9 % infusion 40 mL  As Needed         09/30/24 1004    09/29/24 1100  nystatin (MYCOSTATIN) powder  Every 12 Hours Scheduled         09/29/24 0948    09/27/24 2100  acyclovir (ZOVIRAX) capsule 400 mg  Nightly         09/27/24 0921    09/27/24 0737  Pharmacy Consult  Continuous PRN         09/27/24 0738    09/27/24 0103  acetaminophen (TYLENOL) tablet 650 mg  Every 6 Hours PRN         09/27/24 0105    09/27/24 0013  Diclofenac Sodium (VOLTAREN) 1 % gel 4 g  4 Times Daily PRN         09/27/24 0013    09/26/24 2200  heparin (porcine) 5000 UNIT/ML injection 5,000 Units  Every 8 Hours Scheduled         09/26/24 1747    09/26/24 2149  prochlorperazine (COMPAZINE) injection 2.5 mg  Every 6 Hours PRN         09/26/24 2144     "09/26/24 2100  sodium chloride 0.9 % flush 10 mL  Every 12 Hours Scheduled         09/26/24 1747    09/26/24 2000  Vital Signs  Every 4 Hours       09/26/24 1747    09/26/24 1845  aspirin chewable tablet 81 mg  Daily         09/26/24 1747    09/26/24 1845  atorvastatin (LIPITOR) tablet 80 mg  Daily         09/26/24 1747    09/26/24 1845  DULoxetine (CYMBALTA) DR capsule 60 mg  Daily         09/26/24 1747    09/26/24 1845  lisinopril (PRINIVIL,ZESTRIL) tablet 20 mg  Daily         09/26/24 1747 09/26/24 1845  pantoprazole (PROTONIX) EC tablet 40 mg  Daily         09/26/24 1747 09/26/24 1845  nicotine (NICODERM CQ) 7 MG/24HR patch 1 patch  Every 24 Hours Scheduled         09/26/24 1747    09/26/24 1800  Oral Care  2 Times Daily       09/26/24 1747    09/26/24 1748  Intake & Output  Every Shift       09/26/24 1747    09/26/24 1747  sodium chloride 0.9 % flush 10 mL  As Needed         09/26/24 1747    09/26/24 1747  sodium chloride 0.9 % infusion 40 mL  As Needed         09/26/24 1747    09/26/24 1747  Potassium Replacement - Follow Nurse / BPA Driven Protocol  As Needed         09/26/24 1747    09/26/24 1747  Magnesium Standard Dose Replacement - Follow Nurse / BPA Driven Protocol  As Needed         09/26/24 1747    09/26/24 1747  Phosphorus Replacement - Follow Nurse / BPA Driven Protocol  As Needed         09/26/24 1747    09/26/24 1747  Calcium Replacement - Follow Nurse / BPA Driven Protocol  As Needed         09/26/24 1747    09/26/24 1747  sennosides-docusate (PERICOLACE) 8.6-50 MG per tablet 2 tablet  2 Times Daily PRN        Placed in \"And\" Linked Group    09/26/24 1747    09/26/24 1747  polyethylene glycol (MIRALAX) packet 17 g  Daily PRN        Placed in \"And\" Linked Group    09/26/24 1747    09/26/24 1747  bisacodyl (DULCOLAX) EC tablet 5 mg  Daily PRN        Placed in \"And\" Linked Group    09/26/24 1747    09/26/24 1747  bisacodyl (DULCOLAX) suppository 10 mg  Daily PRN        Placed in \"And\" Linked " "Group    09/26/24 1747    09/26/24 1747  cyclobenzaprine (FLEXERIL) tablet 10 mg  3 Times Daily PRN         09/26/24 1747    Unscheduled  Straight cath  As Needed       09/26/24 1359    Unscheduled  Potassium  As Needed        Comments: Release/collect/run 4 hours after completion of last potassium dose.     Placed in \"And\" Linked Group    09/26/24 1545    Unscheduled  Wound Care  As Needed       09/30/24 0105    Unscheduled  Change Dressing to IV Site As Needed When Damp, Loose or Soiled  As Needed       09/30/24 1004    Unscheduled  Change Needleless Connectors  As Needed      Comments: Change Needleless Connectors When:  - Administration Set Changed  - Dressing Changed  - Removed For Any Reason  - Residual Blood or Debris Within Connector  - Prior to Drawing Blood Cultures  - Contamination of Connector  - After Administration of Blood or Blood Components    09/30/24 1004    Unscheduled  Follow Hypoglycemia Standing Orders For Blood Glucose <70 & Notify Provider of Treatment  As Needed      Comments: Follow Hypoglycemia Orders As Outlined in Process Instructions (Open Order Report to View Full Instructions)  Notify Provider Any Time Hypoglycemia Treatment is Administered    10/11/24 0939    Unscheduled  Follow Hypoglycemia Standing Orders For Blood Glucose <70 & Notify Provider of Treatment  As Needed      Comments: Follow Hypoglycemia Orders As Outlined in Process Instructions (Open Order Report to View Full Instructions)  Notify Provider Any Time Hypoglycemia Treatment is Administered    10/17/24 1934    Unscheduled  Initiate & Follow Central Venous Catheter Occlusion Protocol  As Needed       10/22/24 2144    --  aspirin 81 MG chewable tablet  Daily         09/26/24 1641    --  atorvastatin (LIPITOR) 80 MG tablet  Daily         09/26/24 1641    --  cyclobenzaprine (FLEXERIL) 10 MG tablet  3 Times Daily PRN         09/26/24 1641    --  DULoxetine (CYMBALTA) 60 MG capsule  Daily         09/26/24 1641    --  " lisinopril (PRINIVIL,ZESTRIL) 20 MG tablet  Daily         24 164    --  metFORMIN (GLUCOPHAGE) 1000 MG tablet  2 Times Daily With Meals         24    --  pantoprazole (PROTONIX) 40 MG EC tablet  Daily         24 164                     Physician Progress Notes (last 48 hours)        Mavr Navas DO at 24 1232              [facility]     Patient Identification:  Name:  Lety Johnson  Age:  43 y.o.  Sex:  female  :  1980  MRN:  [id number]  Visit Number:  [id number]  Primary Care Provider:  Concha Rao APRN    Length of stay:  36    Chief complaint: None    Subjective:    Patient doing well today with no specific complaints other than intermittent left upper and left lower extremity numbness and tingling.  Per nursing staff, no new events overnight.  Patient still pending placement.  ----------------------------------------------------------------------------------------------------------------------  Current Hospital Meds:  acyclovir, 400 mg, Oral, Nightly  aspirin, 81 mg, Oral, Daily  atorvastatin, 80 mg, Oral, Daily  DULoxetine, 60 mg, Oral, Daily  heparin (porcine), 5,000 Units, Subcutaneous, Q8H  insulin glargine, 45 Units, Subcutaneous, Daily With Breakfast  insulin glargine, 45 Units, Subcutaneous, Nightly  insulin lispro, 4-24 Units, Subcutaneous, 4x Daily AC & at Bedtime  Lidocaine, 1 patch, Transdermal, Q24H  lisinopril, 20 mg, Oral, Daily  magic barrier cream, , Topical, BID  metoprolol tartrate, 25 mg, Oral, Q12H  nicotine, 1 patch, Transdermal, Q24H  nystatin, , Topical, Q12H  pantoprazole, 40 mg, Oral, Daily  sodium chloride, 10 mL, Intravenous, Q12H  sodium chloride, 10 mL, Intravenous, Q12H      Pharmacy Consult,       ----------------------------------------------------------------------------------------------------------------------  Vital Signs:  Temp:  [97.8 °F (36.6 °C)-98.1 °F (36.7 °C)] 98.1 °F  (36.7 °C)  Heart Rate:  [] 96  Resp:  [18] 18  BP: ()/(55-76) 110/76      10/07/24  0511 10/08/24  0500 10/09/24  0500   Weight: 102 kg (225 lb 14.4 oz) (FIFI cameron) 102 kg (225 lb 15.5 oz) 102 kg (224 lb 1.6 oz)     Body mass index is 36.73 kg/m².    Intake/Output Summary (Last 24 hours) at 11/3/2024 1232  Last data filed at 11/3/2024 1144  Gross per 24 hour   Intake 1680 ml   Output --   Net 1680 ml     Diet: Regular/House, Diabetic; Consistent Carbohydrate; Fluid Consistency: Thin (IDDSI 0)  ----------------------------------------------------------------------------------------------------------------------  Physical exam:  Constitutional: Chronically ill-appearing  female in no apparent distress.     HENT:  Head:  Normocephalic and atraumatic.  Mouth:  Moist mucous membranes.    Eyes:  Conjunctivae and EOM are normal.  Pupils are equal, round, and reactive to light.  No scleral icterus.    Neck:  Neck supple. No thyromegaly.  No JVD present.    Cardiovascular: Sinus tachycardia with heart rate in the upper 120s, no murmurs, rubs, clicks or gallops appreciated.  Pulmonary/Chest:  Clear to auscultation bilaterally with no crackles, wheezes or rhonchi appreciated.  Abdominal:  Soft. Nontender. Nondistended  Bowel sounds are normal in all four quadrants. No organomegally appreciated.   Musculoskeletal:  No edema, no tenderness, and no deformity.  No red or swollen joints anywhere.    Neurological: Slightly lethargic, Cranial nerves II-XII intact with no focal deficits.  No facial droop.  No slurred speech.   Skin:  Warm and dry to palpation with no rashes or lesions appreciated.  Peripheral vascular:  2+ radial and pedal pulses in bilateral upper and lower extremities.    No significant change in physical exam in comparison to  "2024  -------------------------------------------------------------------------  ----------------------------------------------------------------------------------------------------------------------                      Invalid input(s): \"PROT\"  Estimated Creatinine Clearance: 182.5 mL/min (A) (by C-G formula based on SCr of 0.48 mg/dL (L)).    No results found for: \"AMMONIA\"      No results found for: \"BLOODCX\"  No results found for: \"URINECX\"  No results found for: \"WOUNDCX\"  No results found for: \"STOOLCX\"    I have personally looked at the labs and they are summarized above.  ----------------------------------------------------------------------------------------------------------------------  Imaging Results (Last 24 Hours)       ** No results found for the last 24 hours. **          ----------------------------------------------------------------------------------------------------------------------  Assessment and Plan:    ESBL acute cystitis - patient has completed full course of IV antibiotic therapy with Invanz     2.  Acute metabolic encephalopathy -acute encephalopathy resolved, continue to monitor closely for changes in mental status.     3.  History of CVA - patient with left-sided hemiparesis secondary to history of CVA     4.  Debility -continue PT/OT     5.  History of genital herpes -continue acyclovir     6.  Morbid obesity -complicates all aspects of care    7.  Uncontrolled type 2 diabetes mellitus - continue Lantus 45 units subcu BID.    Disposition still pending placement    Marv Navas DO   24   12:32 EST       Electronically signed by Marv Navas DO at 24 1234       Marv Navas DO at 24 1239              [facility]     Patient Identification:  Name:  Lety Johnson  Age:  43 y.o.  Sex:  female  :  1980  MRN:  [id number]  Visit Number:  [id number]  Primary Care Provider:  Concha Rao" YUE Mendez    Length of stay:  35    Chief complaint: None    Subjective:    Patient doing well today with no new complaints.  Per nursing staff, no new events overnight.  Patient is still pending placement.  ----------------------------------------------------------------------------------------------------------------------  Current [facility] Meds:  acyclovir, 400 mg, Oral, Nightly  aspirin, 81 mg, Oral, Daily  atorvastatin, 80 mg, Oral, Daily  DULoxetine, 60 mg, Oral, Daily  heparin (porcine), 5,000 Units, Subcutaneous, Q8H  insulin glargine, 45 Units, Subcutaneous, Daily With Breakfast  insulin glargine, 45 Units, Subcutaneous, Nightly  insulin lispro, 4-24 Units, Subcutaneous, 4x Daily AC & at Bedtime  Lidocaine, 1 patch, Transdermal, Q24H  lisinopril, 20 mg, Oral, Daily  magic barrier cream, , Topical, BID  metoprolol tartrate, 25 mg, Oral, Q12H  nicotine, 1 patch, Transdermal, Q24H  nystatin, , Topical, Q12H  pantoprazole, 40 mg, Oral, Daily  sodium chloride, 10 mL, Intravenous, Q12H  sodium chloride, 10 mL, Intravenous, Q12H      Pharmacy Consult,       ----------------------------------------------------------------------------------------------------------------------  Vital Signs:  Temp:  [97.6 °F (36.4 °C)-98.6 °F (37 °C)] 97.7 °F (36.5 °C)  Heart Rate:  [] 97  Resp:  [16-19] 18  BP: ()/(51-71) 101/65      10/07/24  0511 10/08/24  0500 10/09/24  0500   Weight: 102 kg (225 lb 14.4 oz) (FIFI cameron) 102 kg (225 lb 15.5 oz) 102 kg (224 lb 1.6 oz)     Body mass index is 36.73 kg/m².    Intake/Output Summary (Last 24 hours) at 11/2/2024 1239  Last data filed at 11/2/2024 0346  Gross per 24 hour   Intake 1440 ml   Output --   Net 1440 ml     Diet: Regular/House, Diabetic; Consistent Carbohydrate; Fluid Consistency: Thin (IDDSI 0)  ----------------------------------------------------------------------------------------------------------------------  Physical exam:  Constitutional: Chronically  "ill-appearing  female in no apparent distress.     HENT:  Head:  Normocephalic and atraumatic.  Mouth:  Moist mucous membranes.    Eyes:  Conjunctivae and EOM are normal.  Pupils are equal, round, and reactive to light.  No scleral icterus.    Neck:  Neck supple. No thyromegaly.  No JVD present.    Cardiovascular: Sinus tachycardia with heart rate in the upper 120s, no murmurs, rubs, clicks or gallops appreciated.  Pulmonary/Chest:  Clear to auscultation bilaterally with no crackles, wheezes or rhonchi appreciated.  Abdominal:  Soft. Nontender. Nondistended  Bowel sounds are normal in all four quadrants. No organomegally appreciated.   Musculoskeletal:  No edema, no tenderness, and no deformity.  No red or swollen joints anywhere.    Neurological: Slightly lethargic, Cranial nerves II-XII intact with no focal deficits.  No facial droop.  No slurred speech.   Skin:  Warm and dry to palpation with no rashes or lesions appreciated.  Peripheral vascular:  2+ radial and pedal pulses in bilateral upper and lower extremities.    No significant change in physical exam in comparison to 11/1/2024  -------------------------------------------------------------------------  ----------------------------------------------------------------------------------------------------------------------      Results from last 7 days   Lab Units 10/27/24  0147   WBC 10*3/mm3 11.03*   HEMOGLOBIN g/dL 12.6   HEMATOCRIT % 39.8   MCV fL 103.1*   MCHC g/dL 31.7   PLATELETS 10*3/mm3 310         Results from last 7 days   Lab Units 10/27/24  0147   SODIUM mmol/L 140   POTASSIUM mmol/L 4.1   CHLORIDE mmol/L 103   CO2 mmol/L 24.6   BUN mg/dL 22*   CREATININE mg/dL 0.48*   CALCIUM mg/dL 9.5   GLUCOSE mg/dL 150*   Estimated Creatinine Clearance: 182.5 mL/min (A) (by C-G formula based on SCr of 0.48 mg/dL (L)).    No results found for: \"AMMONIA\"      No results found for: \"BLOODCX\"  No results found for: \"URINECX\"  No results found for: " "\"WOUNDCX\"  No results found for: \"STOOLCX\"    I have personally looked at the labs and they are summarized above.  ----------------------------------------------------------------------------------------------------------------------  Imaging Results (Last 24 Hours)       ** No results found for the last 24 hours. **          ----------------------------------------------------------------------------------------------------------------------  Assessment and Plan:    ESBL acute cystitis - patient has completed full course of IV antibiotic therapy with Invanz     2.  Acute metabolic encephalopathy -acute encephalopathy resolved, continue to monitor closely for changes in mental status.     3.  History of CVA - patient with left-sided hemiparesis secondary to history of CVA     4.  Debility -continue PT/OT     5.  History of genital herpes -continue acyclovir     6.  Morbid obesity -complicates all aspects of care    7.  Uncontrolled type 2 diabetes mellitus -blood sugar slightly improved, will continue Lantus 45 units subcu BID.    Disposition still pending placement    Marv Navas DO   11/02/24   12:39 EDT       Electronically signed by Marv Navas DO at 11/02/24 2157       Consult Notes (last 48 hours)  Notes from 11/02/24 0700 through 11/04/24 0700   No notes of this type exist for this encounter.       "

## 2024-11-04 NOTE — CASE MANAGEMENT/SOCIAL WORK
Discharge Planning Assessment  [facility]     Patient Name: Lety Johnson  MRN: [id number]  Today's Date: 11/4/2024    Admit Date: 9/26/2024       Discharge Plan       Row Name 11/04/24 1015       Plan    Plan SS contacted [place] [place] office [phone] per Pretty. Pretty sent SS a privacy form that must be completed prior to pt's Disability case being reviewed. SS attempted contact with [facility] [phone] ext. [phone] and left a message. SS sent a message via email to [employer] LANDIS requesting an update. SS to follow.    Addendum @ 16:45: SS visited pt at bedside and she signed privacy form for [facility] [place] office to review SSDI case. SS notified Congressman [place] office per Pretty and faxed privacy form back to [phone]. SS to follow.                   Continued Care and Services - Admitted Since 9/26/2024       Destination       Service Provider Request Status Services Address Phone Fax Patient Preferred    [facility] Declined  Cannot meet patient's needs -- [address] [facility] [address] [phone] --    [facility] Acute Care Declined  Not eligible -- [address] [facility] [place] [address] [phone] --    [facility] Declined  Not eligible -- [address] [facility] [address] [phone] --    [facility] SPECIALTY [facility] Declined  Clinical Denial -- [address] [facility] [address] [phone] --    [facility] SWING BED UNIT Declined  Cannot meet patient's needs -- [address] [place] [address] [address] [facility] [address] [phone] --     [facility] Declined  Not eligible -- [address] [facility] [place] [address] [phone] --    [facility] Declined  Bed not available -- [address] [facility] [address] [phone] --    [facility]  Declined  Bed not available -- [address] [facility] [id number] [phone] --    [facility] Swing Declined  Out of network -- [address] [facility] [address] [phone] --    [facility] SWING BED UNIT Declined -- [address] [facility] [address] [phone] --    [facility] Declined  Clinical Denial -- [address] [facility] [id number] [phone] --    [facility] SWING BED UNIT Declined  Insurance Denial, Out of network -- [address] [address] [phone] --    [facility] Declined  Out of network -- [address] [facility] [address] [phone] --    [facility] Declined  Out of network -- [address] [address] [phone] -- --    [facility] Declined  Out of network -- [address] [facility] [address] [phone] --                  Expected Discharge Date and Time       Expected Discharge Date Expected Discharge Time    Nov 4, 2024      HELEN Butterfield

## 2024-11-04 NOTE — PROGRESS NOTES
Patient Identification:  Name:  Lety Johnson  Age:  43 y.o.  Sex:  female  :  1980  MRN:  [id number]  Visit Number:  [id number]  Primary Care Provider:  Concha Rao APRN    Length of stay:  37    Subjective/Interval History/Consultants/Procedures     Chief Complaint:   Chief Complaint   Patient presents with    Fall       Subjective/Interval History:    43 y.o. female who was admitted on 2024 with UTI    PMH is significant for CVA w/ left sided hemiparesis, debility, hx genital herpes on suppressive therapy, morbid obesity, T2DM  For complete admission information, please see history and physical.     Consultations:  Infectious disease  PT/OT  CM/SW    Procedures/Scans:  CT head without contrast  CT C-spine without contrast  CT T-spine without contrast  CT L-spine without contrast  CXR x 2  XR left foot  XR left ankle    Today, the patient was seen and examined with no family present at the bedside. She was resting comfortably. She complained of a headache and neuropathic type pain to the bilateral feet and left arm. No other new or acute complaints noted.      Room location at the time of evaluation was [facility].    ----------------------------------------------------------------------------------------------------------------------  Current Hospital Meds:  acyclovir, 400 mg, Oral, Nightly  aspirin, 81 mg, Oral, Daily  atorvastatin, 80 mg, Oral, Daily  DULoxetine, 60 mg, Oral, Daily  heparin (porcine), 5,000 Units, Subcutaneous, Q8H  ibuprofen, 800 mg, Oral, Once  insulin glargine, 15 Units, Subcutaneous, Daily With Breakfast  insulin glargine, 45 Units, Subcutaneous, Nightly  insulin lispro, 4-24 Units, Subcutaneous, 4x Daily AC & at Bedtime  Lidocaine, 1 patch, Transdermal, Q24H  lisinopril, 20 mg, Oral, Daily  magic barrier cream, , Topical, BID  metoprolol tartrate, 25 mg, Oral, Q12H  nicotine, 1 patch, Transdermal, Q24H  nystatin, , Topical, Q12H  pantoprazole, 40 mg, Oral, Daily  sodium  chloride, 10 mL, Intravenous, Q12H  sodium chloride, 10 mL, Intravenous, Q12H      Pharmacy Consult,       ----------------------------------------------------------------------------------------------------------------------      Objective     Vital Signs:  Temp:  [98 °F (36.7 °C)-98.7 °F (37.1 °C)] 98.2 °F (36.8 °C)  Heart Rate:  [] 106  Resp:  [18-20] 18  BP: ()/(53-82) 117/63      10/07/24  0511 10/08/24  0500 10/09/24  0500   Weight: 102 kg (225 lb 14.4 oz) (FIFI cameron) 102 kg (225 lb 15.5 oz) 102 kg (224 lb 1.6 oz)     Body mass index is 36.73 kg/m².    Intake/Output Summary (Last 24 hours) at 11/4/2024 1025  Last data filed at 11/4/2024 0836  Gross per 24 hour   Intake 2040 ml   Output 1200 ml   Net 840 ml     I/O this shift:  In: 360 [P.O.:360]  Out: -   Diet: Regular/House, Diabetic; Consistent Carbohydrate; Fluid Consistency: Thin (IDDSI 0)  ----------------------------------------------------------------------------------------------------------------------    Physical Exam  Vitals and nursing note reviewed.   Constitutional:       General: She is not in acute distress.     Appearance: She is obese.   HENT:      Head: Normocephalic and atraumatic.   Eyes:      Extraocular Movements: Extraocular movements intact.   Cardiovascular:      Rate and Rhythm: Normal rate.   Pulmonary:      Effort: Pulmonary effort is normal. No respiratory distress.   Abdominal:      Palpations: Abdomen is soft.   Musculoskeletal:      Right lower leg: No edema.      Left lower leg: No edema.   Skin:     General: Skin is warm and dry.   Neurological:      Mental Status: She is alert. Mental status is at baseline.      Comments: L hemiparesis   Psychiatric:         Mood and Affect: Mood normal.                ----------------------------------------------------------------------------------------------------------------------  Tele:   "    ----------------------------------------------------------------------------------------------------------------------                    Invalid input(s): \"PROT\"Estimated Creatinine Clearance: 182.5 mL/min (A) (by C-G formula based on SCr of 0.48 mg/dL (L)).  No results found for: \"AMMONIA\"      No results found for: \"BLOODCX\"  No results found for: \"URINECX\"  No results found for: \"WOUNDCX\"  No results found for: \"STOOLCX\"  ----------------------------------------------------------------------------------------------------------------------  Imaging Results (Last 24 Hours)       ** No results found for the last 24 hours. **          ----------------------------------------------------------------------------------------------------------------------   I have reviewed the above laboratory values for 11/04/24    Assessment/Plan     There are no active hospital problems to display for this patient.        ASSESSMENT/PLAN:    ESBL E. coli UTI  Acute metabolic encephalopathy, resolved  S/p Invanz.  Infectious disease input appreciated.    Hx CVA with left-sided hemiparesis  Chronic debility  Continue PT OT  CM/SW assisting with discharge planning.  Patient is pending placement.    T2DM  Diabetic neuropathy  Continue insulin regimen-titrate as needed.  Continue supportive care measures, Cymbalta, as needed pain regimen for extremity pain    Hx genital herpes  Continue suppressive acyclovir    Morbid obesity  Complicates all aspects of care    -----------  -DVT prophylaxis: Subcu heparin  -Disposition plans/anticipated needs: Pending placement        The patient is high risk due to the following diagnoses/reasons: Debility        Bruce Branham PA-C  11/04/24  10:25 EST   "

## 2024-11-05 LAB
ANION GAP SERPL CALCULATED.3IONS-SCNC: 10.8 MMOL/L (ref 5–15)
BUN SERPL-MCNC: 22 MG/DL (ref 6–20)
BUN/CREAT SERPL: 40 (ref 7–25)
CALCIUM SPEC-SCNC: 9.6 MG/DL (ref 8.6–10.5)
CHLORIDE SERPL-SCNC: 100 MMOL/L (ref 98–107)
CO2 SERPL-SCNC: 25.2 MMOL/L (ref 22–29)
CREAT SERPL-MCNC: 0.55 MG/DL (ref 0.57–1)
DEPRECATED RDW RBC AUTO: 46.4 FL (ref 37–54)
EGFRCR SERPLBLD CKD-EPI 2021: 117.5 ML/MIN/1.73
ERYTHROCYTE [DISTWIDTH] IN BLOOD BY AUTOMATED COUNT: 12.7 % (ref 12.3–15.4)
GLUCOSE BLDC GLUCOMTR-MCNC: 145 MG/DL (ref 70–130)
GLUCOSE BLDC GLUCOMTR-MCNC: 231 MG/DL (ref 70–130)
GLUCOSE BLDC GLUCOMTR-MCNC: 240 MG/DL (ref 70–130)
GLUCOSE BLDC GLUCOMTR-MCNC: 271 MG/DL (ref 70–130)
GLUCOSE SERPL-MCNC: 91 MG/DL (ref 65–99)
HCT VFR BLD AUTO: 36.3 % (ref 34–46.6)
HGB BLD-MCNC: 12.2 G/DL (ref 12–15.9)
MCH RBC QN AUTO: 33.3 PG (ref 26.6–33)
MCHC RBC AUTO-ENTMCNC: 33.6 G/DL (ref 31.5–35.7)
MCV RBC AUTO: 99.2 FL (ref 79–97)
PLATELET # BLD AUTO: 299 10*3/MM3 (ref 140–450)
PMV BLD AUTO: 10.3 FL (ref 6–12)
POTASSIUM SERPL-SCNC: 3.9 MMOL/L (ref 3.5–5.2)
RBC # BLD AUTO: 3.66 10*6/MM3 (ref 3.77–5.28)
SODIUM SERPL-SCNC: 136 MMOL/L (ref 136–145)
WBC NRBC COR # BLD AUTO: 13.22 10*3/MM3 (ref 3.4–10.8)

## 2024-11-05 PROCEDURE — 80048 BASIC METABOLIC PNL TOTAL CA: CPT

## 2024-11-05 PROCEDURE — 97530 THERAPEUTIC ACTIVITIES: CPT

## 2024-11-05 PROCEDURE — 82948 REAGENT STRIP/BLOOD GLUCOSE: CPT

## 2024-11-05 PROCEDURE — 85027 COMPLETE CBC AUTOMATED: CPT

## 2024-11-05 PROCEDURE — 63710000001 INSULIN GLARGINE PER 5 UNITS: Performed by: HOSPITALIST

## 2024-11-05 PROCEDURE — 63710000001 INSULIN LISPRO (HUMAN) PER 5 UNITS: Performed by: INTERNAL MEDICINE

## 2024-11-05 PROCEDURE — 97110 THERAPEUTIC EXERCISES: CPT

## 2024-11-05 PROCEDURE — 25010000002 HEPARIN (PORCINE) PER 1000 UNITS: Performed by: INTERNAL MEDICINE

## 2024-11-05 PROCEDURE — 63710000001 INSULIN GLARGINE PER 5 UNITS

## 2024-11-05 PROCEDURE — 99231 SBSQ HOSP IP/OBS SF/LOW 25: CPT

## 2024-11-05 RX ADMIN — Medication 5 MG: at 20:35

## 2024-11-05 RX ADMIN — Medication 10 ML: at 20:36

## 2024-11-05 RX ADMIN — VITAMINS A AND D OINTMENT: 15.5; 53.4 OINTMENT TOPICAL at 20:35

## 2024-11-05 RX ADMIN — HEPARIN SODIUM 5000 UNITS: 5000 INJECTION INTRAVENOUS; SUBCUTANEOUS at 05:30

## 2024-11-05 RX ADMIN — INSULIN LISPRO 12 UNITS: 100 INJECTION, SOLUTION INTRAVENOUS; SUBCUTANEOUS at 20:39

## 2024-11-05 RX ADMIN — METOPROLOL TARTRATE 25 MG: 25 TABLET, FILM COATED ORAL at 08:02

## 2024-11-05 RX ADMIN — CYCLOBENZAPRINE 10 MG: 10 TABLET, FILM COATED ORAL at 14:03

## 2024-11-05 RX ADMIN — METOPROLOL TARTRATE 25 MG: 25 TABLET, FILM COATED ORAL at 20:33

## 2024-11-05 RX ADMIN — ATORVASTATIN CALCIUM 80 MG: 40 TABLET, FILM COATED ORAL at 08:02

## 2024-11-05 RX ADMIN — INSULIN GLARGINE 15 UNITS: 100 INJECTION, SOLUTION SUBCUTANEOUS at 08:03

## 2024-11-05 RX ADMIN — HEPARIN SODIUM 5000 UNITS: 5000 INJECTION INTRAVENOUS; SUBCUTANEOUS at 14:01

## 2024-11-05 RX ADMIN — LIDOCAINE 1 PATCH: 4 PATCH TOPICAL at 16:50

## 2024-11-05 RX ADMIN — NYSTATIN: 100000 POWDER TOPICAL at 08:04

## 2024-11-05 RX ADMIN — INSULIN LISPRO 8 UNITS: 100 INJECTION, SOLUTION INTRAVENOUS; SUBCUTANEOUS at 16:49

## 2024-11-05 RX ADMIN — INSULIN LISPRO 8 UNITS: 100 INJECTION, SOLUTION INTRAVENOUS; SUBCUTANEOUS at 11:54

## 2024-11-05 RX ADMIN — NICOTINE 1 PATCH: 7 PATCH, EXTENDED RELEASE TRANSDERMAL at 08:03

## 2024-11-05 RX ADMIN — NYSTATIN: 100000 POWDER TOPICAL at 20:36

## 2024-11-05 RX ADMIN — PANTOPRAZOLE SODIUM 40 MG: 40 TABLET, DELAYED RELEASE ORAL at 08:02

## 2024-11-05 RX ADMIN — TRAMADOL HYDROCHLORIDE 50 MG: 50 TABLET, COATED ORAL at 20:35

## 2024-11-05 RX ADMIN — DICLOFENAC SODIUM 4 G: 10 GEL TOPICAL at 14:05

## 2024-11-05 RX ADMIN — LISINOPRIL 20 MG: 10 TABLET ORAL at 08:02

## 2024-11-05 RX ADMIN — Medication 10 ML: at 08:04

## 2024-11-05 RX ADMIN — ACYCLOVIR 400 MG: 200 CAPSULE ORAL at 20:33

## 2024-11-05 RX ADMIN — VITAMINS A AND D OINTMENT: 15.5; 53.4 OINTMENT TOPICAL at 08:03

## 2024-11-05 RX ADMIN — INSULIN GLARGINE 40 UNITS: 100 INJECTION, SOLUTION SUBCUTANEOUS at 20:38

## 2024-11-05 RX ADMIN — ASPIRIN 81 MG: 81 TABLET, CHEWABLE ORAL at 08:02

## 2024-11-05 RX ADMIN — TRAMADOL HYDROCHLORIDE 50 MG: 50 TABLET, COATED ORAL at 08:02

## 2024-11-05 RX ADMIN — DICLOFENAC SODIUM 4 G: 10 GEL TOPICAL at 05:34

## 2024-11-05 RX ADMIN — DULOXETINE HYDROCHLORIDE 60 MG: 60 CAPSULE, DELAYED RELEASE ORAL at 08:02

## 2024-11-05 RX ADMIN — HEPARIN SODIUM 5000 UNITS: 5000 INJECTION INTRAVENOUS; SUBCUTANEOUS at 20:33

## 2024-11-05 NOTE — PLAN OF CARE
Goal Outcome Evaluation:              Outcome Evaluation: patient resting in bed with no complaints or concerns at this time. VSS on room air. will continue plan of care.

## 2024-11-05 NOTE — THERAPY TREATMENT NOTE
Acute Care - Physical Therapy Treatment Note   [place]     Patient Name: Lety Johnson  : 1980  MRN: [id number]  Today's Date: 2024   Onset of Illness/Injury or Date of Surgery: 24  Visit Dx:     ICD-10-CM ICD-9-CM   1. Hypokalemia  E87.6 276.8   2. Pressure injury of skin of sacral region, unspecified injury stage  L89.159 707.03     707.20   3. Pressure injury of skin of left heel, unspecified injury stage  L89.629 707.07     707.20   4. Acute cystitis without hematuria  N30.00 595.0     Patient Active Problem List   Diagnosis   (none) - all problems resolved or deleted     Past Medical History:   Diagnosis Date    Stroke      History reviewed. No pertinent surgical history.  PT Assessment (Last 12 Hours)       PT Evaluation and Treatment       Row Name 24 1134          Physical Therapy Time and Intention    Subjective Information complains of;pain  -KM     Document Type therapy note (daily note)  -KM     Mode of Treatment physical therapy  -KM     Patient Effort good  -KM     Symptoms Noted During/After Treatment increased pain  -KM       Row Name 24 1134          General Information    Patient Profile Reviewed yes  -KM     Patient Observations alert;cooperative;agree to therapy  -KM     Existing Precautions/Restrictions fall  -KM       Row Name 24 1134          Cognition    Affect/Mental Status (Cognition) emotionally labile  -KM     Orientation Status (Cognition) oriented x 3  -KM     Follows Commands (Cognition) WFL  -KM       Row Name 24 1134          Bed Mobility    Bed Mobility supine-sit;sit-supine  -KM     Supine-Sit [place] (Bed Mobility) maximum assist (25% patient effort);2 person assist  -KM     Sit-Supine [place] (Bed Mobility) maximum assist (25% patient effort);2 person assist  -KM     Bed Mobility, Safety Issues decreased use of arms for pushing/pulling;decreased use of legs for bridging/pushing  -KM     Assistive Device (Bed Mobility) bed  rails;head of bed elevated  -       Row Name 11/05/24 1134          Gait/Stairs (Locomotion)    Patient was able to Ambulate no, other medical factors prevent ambulation  -     Reason Patient was unable to Ambulate Non-Ambulatory at Baseline  -       Row Name 11/05/24 1134          Safety Issues/Impairments Affecting Functional Mobility    Impairments Affecting Function (Mobility) endurance/activity tolerance;pain;range of motion (ROM);strength;coordination;motor control;muscle tone abnormal  -KM       Row Name 11/05/24 1134          Balance    Balance Assessment sitting static balance  -KM     Static Sitting Balance standby assist  -       Row Name 11/05/24 1134          Motor Skills    Comments, Therapeutic Exercise seated RLE ther-ex; LLE stretching  -       Row Name             Wound 09/26/24 1809 Left posterior ankle Other (comment)    Wound - Properties Group Placement Date: 09/26/24  -KJ Placement Time: 1809  -KJ Side: Left  -KJ Orientation: posterior  -KJ Location: ankle  -KJ Primary Wound Type: Other  -TW, unknow etiology     Retired Wound - Properties Group Placement Date: 09/26/24  -KJ Placement Time: 1809  -KJ Side: Left  -KJ Orientation: posterior  -KJ Location: ankle  -KJ Primary Wound Type: Other  -TW, unknow etiology     Retired Wound - Properties Group Placement Date: 09/26/24  -KJ Placement Time: 1809  -KJ Side: Left  -KJ Orientation: posterior  -KJ Location: ankle  -KJ Primary Wound Type: Other  -TW, unknow etiology     Retired Wound - Properties Group Date first assessed: 09/26/24  -KJ Time first assessed: 1809  -KJ Side: Left  -KJ Location: ankle  -KJ Primary Wound Type: Other  -TW, unknow etiology       Row Name             Wound 10/11/24 1330 medial coccyx Fissure    Wound - Properties Group Placement Date: 10/11/24  -TW Placement Time: 1330  -TW Orientation: medial  -TW Location: coccyx  -TW Primary Wound Type: Fissure  -TW    Retired Wound - Properties Group Placement Date:  10/11/24  -TW Placement Time: 1330 -TW Orientation: medial  -TW Location: coccyx  -TW Primary Wound Type: Fissure  -TW    Retired Wound - Properties Group Placement Date: 10/11/24  -TW Placement Time: 1330 -TW Orientation: medial  -TW Location: coccyx  -TW Primary Wound Type: Fissure  -TW    Retired Wound - Properties Group Date first assessed: 10/11/24  -TW Time first assessed: 1330 -TW Location: coccyx  -TW Primary Wound Type: Fissure  -TW      Row Name             Wound 10/16/24 0705 Left medial gluteal MASD (moisture associated skin damage)    Wound - Properties Group Placement Date: 10/16/24  -MS Placement Time: 0705 -MS Side: Left  -MS Orientation: medial  -MS Location: gluteal  -MS Primary Wound Type: MASD  -MS Type: MASD (moisture associated skin damage)  -MS Present on Original Admission: N  -MS Additional Comments: Noted at shift change skin assesment, Night shift RN stated it had been there her shift.  -MS    Retired Wound - Properties Group Placement Date: 10/16/24  -MS Placement Time: 0705 -MS Present on Original Admission: N  -MS Side: Left  -MS Orientation: medial  -MS Location: gluteal  -MS Primary Wound Type: MASD  -MS Additional Comments: Noted at shift change skin assesment, Night shift RN stated it had been there her shift.  -MS Type: MASD (moisture associated skin damage)  -MS    Retired Wound - Properties Group Placement Date: 10/16/24  -MS Placement Time: 0705 -MS Present on Original Admission: N  -MS Side: Left  -MS Orientation: medial  -MS Location: gluteal  -MS Primary Wound Type: MASD  -MS Additional Comments: Noted at shift change skin assesment, Night shift RN stated it had been there her shift.  -MS Type: MASD (moisture associated skin damage)  -MS    Retired Wound - Properties Group Date first assessed: 10/16/24  -MS Time first assessed: 0705 -MS Present on Original Admission: N  -MS Side: Left  -MS Location: gluteal  -MS Primary Wound Type: MASD  -MS Additional Comments: Noted  at shift change skin assesment, Night shift RN stated it had been there her shift.  -MS Type: MASD (moisture associated skin damage)  -MS      Row Name             Wound 10/16/24 0705 Left posterior greater trochanter MASD (moisture associated skin damage)    Wound - Properties Group Placement Date: 10/16/24  -MS Placement Time: 0705  -MS Side: Left  -MS Orientation: posterior  -MS Location: greater trochanter  -MS Primary Wound Type: MASD  -MS Type: MASD (moisture associated skin damage)  -MS Present on Original Admission: N  -MS    Retired Wound - Properties Group Placement Date: 10/16/24  -MS Placement Time: 0705  -MS Present on Original Admission: N  -MS Side: Left  -MS Orientation: posterior  -MS Location: greater trochanter  -MS Primary Wound Type: MASD  -MS Type: MASD (moisture associated skin damage)  -MS    Retired Wound - Properties Group Placement Date: 10/16/24  -MS Placement Time: 0705  -MS Present on Original Admission: N  -MS Side: Left  -MS Orientation: posterior  -MS Location: greater trochanter  -MS Primary Wound Type: MASD  -MS Type: MASD (moisture associated skin damage)  -MS    Retired Wound - Properties Group Date first assessed: 10/16/24  -MS Time first assessed: 0705  -MS Present on Original Admission: N  -MS Side: Left  -MS Location: greater trochanter  -MS Primary Wound Type: MASD  -MS Type: MASD (moisture associated skin damage)  -MS      Row Name 11/05/24 1134          Progress Summary (PT)    Daily Progress Summary (PT) Pt. was able to perform bed mobility w/ maxA x2. She was able to sit EOB for short duration. She had difficulty tolerating ther-ex d/t increased pain. Pt. would continue to benefit from PT services as she improves activity tolerance.  -KM               User Key  (r) = Recorded By, (t) = Taken By, (c) = Cosigned By      Initials Name Provider Type    Karen Peralta, RN Registered Nurse    Thierry Carter, PT Physical Therapist    Nory Keenan, FIFI Registered  Nurse    Roe North, RN Registered Nurse                    Physical Therapy Education       Title: PT OT SLP Therapies (In Progress)       Topic: Physical Therapy (In Progress)       Point: Mobility training (In Progress)       Learning Progress Summary            Patient Acceptance, E, NR by SC at 11/3/2024 0025    Acceptance, E,TB, VU by RR at 11/1/2024 2315    Acceptance, E,D, VU,NR by AG at 10/31/2024 1234    Comment: PT educated pt. in importance of mobilization and L L/UE PROM and self ROM to prevent contracture.  Pt. is encouraged to to perform R LE AROM frequently while supine or sitting EOB.    Acceptance, E,TB, VU by RR at 10/27/2024 0031    Acceptance, E,TB, VU by RG at 10/22/2024 1002    Acceptance, TB,E, VU by RG at 10/21/2024 0907    Nonacceptance, E, NR by WG at 10/16/2024 0228    Acceptance, E,TB, VU by CF at 10/15/2024 0753    Acceptance, E,TB, VU by CF at 10/14/2024 0801    Acceptance, E,TB, VU by CF at 10/13/2024 1045    Acceptance, E,D, VU,NR by AG at 10/10/2024 1310    Acceptance, E,TB, VU by CB at 10/8/2024 0448    Acceptance, E, NR by RD at 10/2/2024 1101    Acceptance, E, NR by RD at 10/1/2024 0856    Acceptance, E,D, VU,NR by AG at 9/30/2024 1559                      Point: Home exercise program (In Progress)       Learning Progress Summary            Patient Acceptance, E, NR by SC at 11/3/2024 0025    Acceptance, E,TB, VU by RR at 11/1/2024 2315    Acceptance, E,D, VU,NR by AG at 10/31/2024 1234    Comment: PT educated pt. in importance of mobilization and L L/UE PROM and self ROM to prevent contracture.  Pt. is encouraged to to perform R LE AROM frequently while supine or sitting EOB.    Acceptance, E,TB, VU by RR at 10/27/2024 0031    Acceptance, E,TB, VU by RG at 10/22/2024 1002    Acceptance, TB,E, VU by RG at 10/21/2024 0907    Nonacceptance, E, NR by WG at 10/16/2024 0228    Acceptance, E,TB, VU by CF at 10/15/2024 0753    Acceptance, E,TB, VU by CF at 10/14/2024 0801     Acceptance, E,TB, VU by CF at 10/13/2024 1045    Acceptance, E,D, VU,NR by AG at 10/10/2024 1310    Acceptance, E,TB, VU by CB at 10/8/2024 0448    Acceptance, E, NR by RD at 10/2/2024 1101    Acceptance, E, NR by RD at 10/1/2024 0856    Acceptance, E,D, VU,NR by AG at 9/30/2024 1559                      Point: Body mechanics (In Progress)       Learning Progress Summary            Patient Acceptance, E, NR by SC at 11/3/2024 0025    Acceptance, E,TB, VU by RR at 11/1/2024 2315    Acceptance, E,D, VU,NR by AG at 10/31/2024 1234    Comment: PT educated pt. in importance of mobilization and L L/UE PROM and self ROM to prevent contracture.  Pt. is encouraged to to perform R LE AROM frequently while supine or sitting EOB.    Acceptance, E,TB, VU by RR at 10/27/2024 0031    Acceptance, E,TB, VU by RG at 10/22/2024 1002    Acceptance, TB,E, VU by RG at 10/21/2024 0907    Nonacceptance, E, NR by WG at 10/16/2024 0228    Acceptance, E,TB, VU by CF at 10/15/2024 0753    Acceptance, E,TB, VU by CF at 10/14/2024 0801    Acceptance, E,TB, VU by CF at 10/13/2024 1045    Acceptance, E,D, VU,NR by AG at 10/10/2024 1310    Acceptance, E,TB, VU by CB at 10/8/2024 0448    Acceptance, E, NR by RD at 10/2/2024 1101    Acceptance, E, NR by RD at 10/1/2024 0856    Acceptance, E,D, VU,NR by AG at 9/30/2024 1559                      Point: Precautions (In Progress)       Learning Progress Summary            Patient Acceptance, E, NR by SC at 11/3/2024 0025    Acceptance, E,TB, VU by RR at 11/1/2024 2315    Acceptance, E,D, VU,NR by AG at 10/31/2024 1234    Comment: PT educated pt. in importance of mobilization and L L/UE PROM and self ROM to prevent contracture.  Pt. is encouraged to to perform R LE AROM frequently while supine or sitting EOB.    Acceptance, E,TB, VU by RR at 10/27/2024 0031    Acceptance, E,TB, VU by RG at 10/22/2024 1002    Acceptance, TB,E, VU by RG at 10/21/2024 0907    Nonacceptance, E, NR by WG at 10/16/2024 0226     Acceptance, E,TB, VU by CF at 10/15/2024 0753    Acceptance, E,TB, VU by CF at 10/14/2024 0801    Acceptance, E,TB, VU by CF at 10/13/2024 1045    Acceptance, E,D, VU,NR by AG at 10/10/2024 1310    Acceptance, E,TB, VU by CB at 10/8/2024 0448    Acceptance, E, NR by RD at 10/2/2024 1101    Acceptance, E, NR by RD at 10/1/2024 0856    Acceptance, E,D, VU,NR by AG at 9/30/2024 1559                                      User Key       Initials Effective Dates Name Provider Type Discipline     06/16/21 -  Мария Joya, PT Physical Therapist PT    RD 06/16/21 -  Laisha Verdugo, RN Registered Nurse Nurse    RR 06/11/24 -  Jaymie Dominguez, RN Registered Nurse Nurse    RG 06/06/23 -  Jesus Matthews, RN Registered Nurse Nurse    WG 02/12/24 -  Shanthi Burden, RN Registered Nurse Nurse    CF 06/25/24 -  Cherelle Germain, RN Registered Nurse Nurse    CB 06/17/24 -  Fidelia Lombardo, RN Registered Nurse Nurse    SC 09/11/24 -  Sally Hair, RN Registered Nurse Nurse                  PT Recommendation and Plan     Progress Summary (PT)  Daily Progress Summary (PT): Pt. was able to perform bed mobility w/ maxA x2. She was able to sit EOB for short duration. She had difficulty tolerating ther-ex d/t increased pain. Pt. would continue to benefit from PT services as she improves activity tolerance.       Time Calculation:    PT Charges       Row Name 11/05/24 1134             Time Calculation    PT Received On 11/05/24  -KM         Time Calculation- PT    Total Timed Code Minutes- PT 23 minute(s)  -KM                User Key  (r) = Recorded By, (t) = Taken By, (c) = Cosigned By      Initials Name Provider Type    Thierry Carter, PT Physical Therapist                  Therapy Charges for Today       Code Description Service Date Service Provider Modifiers Qty    71599194007  PT THERAPEUTIC ACT EA 15 MIN 11/4/2024 Thierry Saldivar, PT GP 1    00601048796 HC PT THER PROC EA 15 MIN 11/4/2024 Thierry Saldivar, PT GP 1    79451310590 HC  PT THERAPEUTIC ACT EA 15 MIN 11/5/2024 Thierry Saldivar, PT GP 1    75603296061 HC PT THER PROC EA 15 MIN 11/5/2024 Thierry Saldivar, PT GP 1            PT G-Codes  AM-PAC 6 Clicks Score (PT): 9    Thierry Saldivar, PT  11/5/2024

## 2024-11-05 NOTE — PLAN OF CARE
Goal Outcome Evaluation:  Plan of Care Reviewed With: patient        Progress: no change  Outcome Evaluation: Pt's resting in bed, A&O, stable on RA, pain controlled with PRN meds. Pt worked with PT today, wound care done per orders. Will con't with POC.

## 2024-11-05 NOTE — THERAPY TREATMENT NOTE
Patient Name: Lety Johnson  : 1980    MRN: [id number]                              Today's Date: 2024       Admit Date: 2024    Visit Dx:     ICD-10-CM ICD-9-CM   1. Hypokalemia  E87.6 276.8   2. Pressure injury of skin of sacral region, unspecified injury stage  L89.159 707.03     707.20   3. Pressure injury of skin of left heel, unspecified injury stage  L89.629 707.07     707.20   4. Acute cystitis without hematuria  N30.00 595.0     Patient Active Problem List   Diagnosis   (none) - all problems resolved or deleted     Past Medical History:   Diagnosis Date    Stroke      History reviewed. No pertinent surgical history.   General Information       Row Name 24 1114          OT Time and Intention    Subjective Information pain  -     Document Type therapy note (daily note)  -     Mode of Treatment individual therapy;occupational therapy  -     Symptoms Noted During/After Treatment increased pain  -     Comment Patient seen for OT treatment. She reported increased pain throughout body, especially LUE and LLE. Once returning to bed, she was content.  -       Row Name 24 1114          General Information    Patient Profile Reviewed yes  -     Existing Precautions/Restrictions fall  -       Row Name 24 1114          Cognition    Orientation Status (Cognition) oriented x 3  -       Row Name 24 1114          Safety Issues/Impairments Affecting Functional Mobility    Impairments Affecting Function (Mobility) endurance/activity tolerance;pain;range of motion (ROM);strength;coordination;motor control;muscle tone abnormal  -               User Key  (r) = Recorded By, (t) = Taken By, (c) = Cosigned By      Initials Name Provider Type     Kasey Bella OT Occupational Therapist                     Mobility/ADL's       Row Name 24 1116          Bed Mobility    Bed Mobility supine-sit;sit-supine  -     Supine-Sit [place] (Bed Mobility) maximum assist  (25% patient effort);2 person assist  -     Sit-Supine [place] (Bed Mobility) maximum assist (25% patient effort);2 person assist  -KP     Bed Mobility, Safety Issues decreased use of arms for pushing/pulling;decreased use of legs for bridging/pushing  -     Assistive Device (Bed Mobility) bed rails;head of bed elevated  -               User Key  (r) = Recorded By, (t) = Taken By, (c) = Cosigned By      Initials Name Provider Type    Kasey Yang OT Occupational Therapist                   Obj/Interventions       Row Name 11/05/24 [facility]6          Motor Skills    Motor Skills functional endurance  -     Functional Endurance poor  -       Row Name 11/05/24 1116          Balance    Balance Assessment sitting static balance  -     Static Sitting Balance standby assist  -               User Key  (r) = Recorded By, (t) = Taken By, (c) = Cosigned By      Initials Name Provider Type    Kasey Yang OT Occupational Therapist                   Goals/Plan    No documentation.                  Clinical Impression       Row Name 11/05/24 1117          Pain Assessment    Pretreatment Pain Rating 2/10  -KP     Posttreatment Pain Rating 4/10  -     Pain Location extremity  -     Pain Side/Orientation left;upper  -       Row Name 11/05/24 1117          Plan of Care Review    Plan of Care Reviewed With patient  -     Outcome Evaluation Patient seen for OT treatment. Complaints of increased pain, tolerated sitting at edge of bed <5 minutes. No NMR to LUE due to complaints of pain. Continue plan of care.  -       Row Name 11/05/24 1117          Therapy Plan Review/Discharge Plan (OT)    Anticipated Discharge Disposition (OT) inpatient rehabilitation facility;extended care facility  -       Row Name 11/05/24 1117          Positioning and Restraints    Pre-Treatment Position in bed  -     Post Treatment Position bed  -KP     In Bed supine;call light within reach;encouraged to call for  assist;exit alarm on  -               User Key  (r) = Recorded By, (t) = Taken By, (c) = Cosigned By      Initials Name Provider Type    Kasey Yang OT Occupational Therapist                   Outcome Measures    No documentation.                   Occupational Therapy Education        No education to display                  OT Recommendation and Plan     Plan of Care Review  Plan of Care Reviewed With: patient  Progress: no change  Outcome Evaluation: Patient seen for OT treatment. Complaints of increased pain, tolerated sitting at edge of bed <5 minutes. No NMR to LUE due to complaints of pain. Continue plan of care.     Time Calculation:         Time Calculation- OT       Row Name 11/05/24 1118             Time Calculation- OT    OT Received On 11/05/24  -                User Key  (r) = Recorded By, (t) = Taken By, (c) = Cosigned By      Initials Name Provider Type    Kasey Yang OT Occupational Therapist                  Therapy Charges for Today       Code Description Service Date Service Provider Modifiers Qty    37015857794  OT NEUROMUSC RE EDUCATION EA 15 MIN 11/4/2024 Kasey Bella OT GO 1    64534594766  OT THERAPEUTIC ACT EA 15 MIN 11/5/2024 Kasey Bella OT GO 1                 Kasey Bella OT  11/5/2024

## 2024-11-05 NOTE — PROGRESS NOTES
Patient Identification:  Name:  Lety Johnson  Age:  43 y.o.  Sex:  female  :  1980  MRN:  [id number]  Visit Number:  [id number]  Primary Care Provider:  Concha Rao APRN    Length of stay:  38    Subjective/Interval History/Consultants/Procedures     Chief Complaint:   Chief Complaint   Patient presents with    Fall       Subjective/Interval History:    43 y.o. female who was admitted on 2024 with  UTI     PMH is significant for  CVA w/ left sided hemiparesis, debility, hx genital herpes on suppressive therapy, morbid obesity, T2DM   For complete admission information, please see history and physical.     Consultations:  Infectious disease  PT/OT  CM/SW    Procedures/Scans:  CT head without contrast  CT C-spine without contrast  CT T-spine without contrast  CT L-spine without contrast  CXR x 2  XR left foot  XR left ankle    Today, the patient was seen and examined with no family present at the bedside. She was sleeping soundly upon entering the room but woke easily. She reported no new complaints today. Continues to complain of diffuse left sided pain and paresthesias. She states headache improved today.      Room location at the time of evaluation was [facility].    ----------------------------------------------------------------------------------------------------------------------  Current Hospital Meds:  acyclovir, 400 mg, Oral, Nightly  aspirin, 81 mg, Oral, Daily  atorvastatin, 80 mg, Oral, Daily  DULoxetine, 60 mg, Oral, Daily  heparin (porcine), 5,000 Units, Subcutaneous, Q8H  insulin glargine, 15 Units, Subcutaneous, Daily With Breakfast  insulin glargine, 40 Units, Subcutaneous, Nightly  insulin lispro, 4-24 Units, Subcutaneous, 4x Daily AC & at Bedtime  Lidocaine, 1 patch, Transdermal, Q24H  lisinopril, 20 mg, Oral, Daily  magic barrier cream, , Topical, BID  metoprolol tartrate, 25 mg, Oral, Q12H  nicotine, 1 patch, Transdermal, Q24H  nystatin, , Topical, Q12H  pantoprazole, 40 mg,  Oral, Daily  sodium chloride, 10 mL, Intravenous, Q12H  sodium chloride, 10 mL, Intravenous, Q12H      Pharmacy Consult,       ----------------------------------------------------------------------------------------------------------------------      Objective     Vital Signs:  Temp:  [97.6 °F (36.4 °C)-98.7 °F (37.1 °C)] 98.1 °F (36.7 °C)  Heart Rate:  [] 114  Resp:  [18] 18  BP: ()/(53-78) 108/62      10/07/24  0511 10/08/24  0500 10/09/24  0500   Weight: 102 kg (225 lb 14.4 oz) (FIFI [place]) 102 kg (225 lb 15.5 oz) 102 kg (224 lb 1.6 oz)     Body mass index is 36.73 kg/m².    Intake/Output Summary (Last 24 hours) at 11/5/2024 1154  Last data filed at 11/5/2024 0300  Gross per 24 hour   Intake 600 ml   Output 1000 ml   Net -400 ml     No intake/output data recorded.  Diet: Regular/House, Diabetic; Consistent Carbohydrate; Fluid Consistency: Thin (IDDSI 0)  ----------------------------------------------------------------------------------------------------------------------    Physical Exam  Vitals and nursing note reviewed.   Constitutional:       General: She is not in acute distress.  HENT:      Head: Normocephalic and atraumatic.   Eyes:      Extraocular Movements: Extraocular movements intact.   Cardiovascular:      Rate and Rhythm: Normal rate and regular rhythm.   Pulmonary:      Effort: Pulmonary effort is normal.   Abdominal:      Palpations: Abdomen is soft.   Musculoskeletal:      Right lower leg: No edema.      Left lower leg: No edema.   Skin:     General: Skin is warm and dry.   Neurological:      Mental Status: She is alert. Mental status is at baseline.   Psychiatric:         Mood and Affect: Mood normal.         Behavior: Behavior normal.                ----------------------------------------------------------------------------------------------------------------------  Tele:   "      ----------------------------------------------------------------------------------------------------------------------      Results from last 7 days   Lab Units 11/05/24  0032   WBC 10*3/mm3 13.22*   HEMOGLOBIN g/dL 12.2   HEMATOCRIT % 36.3   MCV fL 99.2*   MCHC g/dL 33.6   PLATELETS 10*3/mm3 299         Results from last 7 days   Lab Units 11/05/24  0032   SODIUM mmol/L 136   POTASSIUM mmol/L 3.9   CHLORIDE mmol/L 100   CO2 mmol/L 25.2   BUN mg/dL 22*   CREATININE mg/dL 0.55*   CALCIUM mg/dL 9.6   GLUCOSE mg/dL 91   Estimated Creatinine Clearance: 159.2 mL/min (A) (by C-G formula based on SCr of 0.55 mg/dL (L)).  No results found for: \"AMMONIA\"      No results found for: \"BLOODCX\"  No results found for: \"URINECX\"  No results found for: \"WOUNDCX\"  No results found for: \"STOOLCX\"  ----------------------------------------------------------------------------------------------------------------------  Imaging Results (Last 24 Hours)       ** No results found for the last 24 hours. **          ----------------------------------------------------------------------------------------------------------------------   I have reviewed the above laboratory values for 11/05/24    Assessment/Plan     There are no active hospital problems to display for this patient.        ASSESSMENT/PLAN:    ESBL E. coli UTI  Acute metabolic encephalopathy, resolved  S/p Invanz.  Infectious disease input appreciated.     Hx CVA with left-sided hemiparesis  Chronic debility  Continue PT OT  CM/SW assisting with discharge planning.  Patient is pending placement.     T2DM  Diabetic neuropathy  Continue insulin regimen-titrate as needed.  Continue supportive care measures, Cymbalta, as needed pain regimen for extremity pain     Hx genital herpes  Continue suppressive acyclovir     Morbid obesity  Complicates all aspects of care     A/P reviewed and up to date as of 11/5/24    -----------  -DVT prophylaxis: Subcu heparin  -Disposition " plans/anticipated needs: Pending placement      The patient is high risk due to the following diagnoses/reasons:  Chronic debility, left sided hemiparesis         Bruce Branham PA-C  11/05/24  11:54 EST

## 2024-11-06 LAB
GLUCOSE BLDC GLUCOMTR-MCNC: 129 MG/DL (ref 70–130)
GLUCOSE BLDC GLUCOMTR-MCNC: 245 MG/DL (ref 70–130)
GLUCOSE BLDC GLUCOMTR-MCNC: 278 MG/DL (ref 70–130)
GLUCOSE BLDC GLUCOMTR-MCNC: 281 MG/DL (ref 70–130)

## 2024-11-06 PROCEDURE — 99231 SBSQ HOSP IP/OBS SF/LOW 25: CPT

## 2024-11-06 PROCEDURE — 63710000001 INSULIN GLARGINE PER 5 UNITS: Performed by: HOSPITALIST

## 2024-11-06 PROCEDURE — 97110 THERAPEUTIC EXERCISES: CPT

## 2024-11-06 PROCEDURE — 63710000001 INSULIN GLARGINE PER 5 UNITS

## 2024-11-06 PROCEDURE — 25010000002 HEPARIN (PORCINE) PER 1000 UNITS: Performed by: INTERNAL MEDICINE

## 2024-11-06 PROCEDURE — 97530 THERAPEUTIC ACTIVITIES: CPT

## 2024-11-06 PROCEDURE — 82948 REAGENT STRIP/BLOOD GLUCOSE: CPT

## 2024-11-06 PROCEDURE — 63710000001 INSULIN LISPRO (HUMAN) PER 5 UNITS: Performed by: INTERNAL MEDICINE

## 2024-11-06 RX ADMIN — LISINOPRIL 20 MG: 10 TABLET ORAL at 08:53

## 2024-11-06 RX ADMIN — PANTOPRAZOLE SODIUM 40 MG: 40 TABLET, DELAYED RELEASE ORAL at 08:25

## 2024-11-06 RX ADMIN — INSULIN GLARGINE 15 UNITS: 100 INJECTION, SOLUTION SUBCUTANEOUS at 08:27

## 2024-11-06 RX ADMIN — HEPARIN SODIUM 5000 UNITS: 5000 INJECTION INTRAVENOUS; SUBCUTANEOUS at 14:44

## 2024-11-06 RX ADMIN — INSULIN GLARGINE 40 UNITS: 100 INJECTION, SOLUTION SUBCUTANEOUS at 20:45

## 2024-11-06 RX ADMIN — CYCLOBENZAPRINE 10 MG: 10 TABLET, FILM COATED ORAL at 20:53

## 2024-11-06 RX ADMIN — Medication 10 ML: at 08:27

## 2024-11-06 RX ADMIN — DULOXETINE HYDROCHLORIDE 60 MG: 60 CAPSULE, DELAYED RELEASE ORAL at 08:25

## 2024-11-06 RX ADMIN — ACETAMINOPHEN 650 MG: 325 TABLET ORAL at 14:44

## 2024-11-06 RX ADMIN — Medication 5 MG: at 20:53

## 2024-11-06 RX ADMIN — INSULIN LISPRO 12 UNITS: 100 INJECTION, SOLUTION INTRAVENOUS; SUBCUTANEOUS at 17:14

## 2024-11-06 RX ADMIN — VITAMINS A AND D OINTMENT: 15.5; 53.4 OINTMENT TOPICAL at 20:46

## 2024-11-06 RX ADMIN — Medication 10 ML: at 20:46

## 2024-11-06 RX ADMIN — VITAMINS A AND D OINTMENT: 15.5; 53.4 OINTMENT TOPICAL at 08:28

## 2024-11-06 RX ADMIN — DICLOFENAC SODIUM 4 G: 10 GEL TOPICAL at 20:49

## 2024-11-06 RX ADMIN — Medication 10 ML: at 08:28

## 2024-11-06 RX ADMIN — HEPARIN SODIUM 5000 UNITS: 5000 INJECTION INTRAVENOUS; SUBCUTANEOUS at 05:41

## 2024-11-06 RX ADMIN — NICOTINE 1 PATCH: 7 PATCH, EXTENDED RELEASE TRANSDERMAL at 08:29

## 2024-11-06 RX ADMIN — ACETAMINOPHEN 650 MG: 325 TABLET ORAL at 20:52

## 2024-11-06 RX ADMIN — NYSTATIN: 100000 POWDER TOPICAL at 20:46

## 2024-11-06 RX ADMIN — HEPARIN SODIUM 5000 UNITS: 5000 INJECTION INTRAVENOUS; SUBCUTANEOUS at 21:27

## 2024-11-06 RX ADMIN — ACYCLOVIR 400 MG: 200 CAPSULE ORAL at 20:44

## 2024-11-06 RX ADMIN — METOPROLOL TARTRATE 25 MG: 25 TABLET, FILM COATED ORAL at 08:53

## 2024-11-06 RX ADMIN — TRAMADOL HYDROCHLORIDE 50 MG: 50 TABLET, COATED ORAL at 08:53

## 2024-11-06 RX ADMIN — ASPIRIN 81 MG: 81 TABLET, CHEWABLE ORAL at 08:25

## 2024-11-06 RX ADMIN — ATORVASTATIN CALCIUM 80 MG: 40 TABLET, FILM COATED ORAL at 08:25

## 2024-11-06 RX ADMIN — NYSTATIN: 100000 POWDER TOPICAL at 08:28

## 2024-11-06 RX ADMIN — LIDOCAINE 1 PATCH: 4 PATCH TOPICAL at 17:14

## 2024-11-06 RX ADMIN — INSULIN LISPRO 12 UNITS: 100 INJECTION, SOLUTION INTRAVENOUS; SUBCUTANEOUS at 20:45

## 2024-11-06 RX ADMIN — INSULIN LISPRO 8 UNITS: 100 INJECTION, SOLUTION INTRAVENOUS; SUBCUTANEOUS at 11:17

## 2024-11-06 NOTE — THERAPY TREATMENT NOTE
Patient Name: Lety Johnson  : 1980    MRN: [id number]                              Today's Date: 2024       Admit Date: 2024    Visit Dx:     ICD-10-CM ICD-9-CM   1. Hypokalemia  E87.6 276.8   2. Pressure injury of skin of sacral region, unspecified injury stage  L89.159 707.03     707.20   3. Pressure injury of skin of left heel, unspecified injury stage  L89.629 707.07     707.20   4. Acute cystitis without hematuria  N30.00 595.0     Patient Active Problem List   Diagnosis   (none) - all problems resolved or deleted     Past Medical History:   Diagnosis Date    Stroke      History reviewed. No pertinent surgical history.   General Information       Row Name 24 1436          OT Time and Intention    Subjective Information complains of;pain  -     Document Type therapy note (daily note)  -     Mode of Treatment individual therapy;occupational therapy  -     Patient Effort adequate  -     Symptoms Noted During/After Treatment increased pain  -     Comment Patient seen for OT treatment. She declined NMR to LUE due to pain.  -       Row Name 24 1436          General Information    Patient Profile Reviewed yes  -     Existing Precautions/Restrictions fall  -       Row Name 24 1436          Cognition    Orientation Status (Cognition) oriented x 3  -       Row Name 24 1436          Safety Issues/Impairments Affecting Functional Mobility    Impairments Affecting Function (Mobility) endurance/activity tolerance;pain;range of motion (ROM);strength;coordination;motor control;muscle tone abnormal  -               User Key  (r) = Recorded By, (t) = Taken By, (c) = Cosigned By      Initials Name Provider Type    Kasey Yang OT Occupational Therapist                     Mobility/ADL's       Row Name 24 1438          Bed Mobility    Bed Mobility supine-sit;sit-supine  -     Supine-Sit [place] (Bed Mobility) moderate assist (50% patient  effort);maximum assist (25% patient effort);2 person assist  -     Sit-Supine [place] (Bed Mobility) maximum assist (25% patient effort);2 person assist  -     Bed Mobility, Safety Issues decreased use of arms for pushing/pulling;decreased use of legs for bridging/pushing  -     Assistive Device (Bed Mobility) bed rails;head of bed elevated  -               User Key  (r) = Recorded By, (t) = Taken By, (c) = Cosigned By      Initials Name Provider Type    Kasey Yang OT Occupational Therapist                   Obj/Interventions       Row Name 11/06/24 1438          Motor Skills    Motor Skills functional endurance  -     Functional Endurance poor  -       Row Name 11/06/24 1438          Balance    Balance Interventions sitting;static;minimal challenge;occupation based/functional task  -               User Key  (r) = Recorded By, (t) = Taken By, (c) = Cosigned By      Initials Name Provider Type    Kasey Yang OT Occupational Therapist                   Goals/Plan    No documentation.                  Clinical Impression       Row Name 11/06/24 1439          Pain Assessment    Pretreatment Pain Rating 0/10 - no pain  -     Posttreatment Pain Rating 2/10  -     Pain Side/Orientation generalized  -       Row Name 11/06/24 1439          Plan of Care Review    Plan of Care Reviewed With patient  -     Progress no change  -     Outcome Evaluation Patient seen for OT treatment. She tolerated sitting at edge of bed, but declined any NMR to LUE. Pain impacts tolerance for therapeutic interventions.  -       Row Name 11/06/24 1439          Therapy Plan Review/Discharge Plan (OT)    Anticipated Discharge Disposition (OT) inpatient rehabilitation facility;extended care facility  -       Row Name 11/06/24 1439          Positioning and Restraints    Pre-Treatment Position in bed  -     Post Treatment Position bed  -     In Bed fowlers;call light within reach;encouraged to call for  assist;exit alarm on  -               User Key  (r) = Recorded By, (t) = Taken By, (c) = Cosigned By      Initials Name Provider Type    Kasey Yang OT Occupational Therapist                   Outcome Measures       Row Name 11/06/24 0830          How much help from another person do you currently need...    Turning from your back to your side while in flat bed without using bedrails? 2  -SR     Moving from lying on back to sitting on the side of a flat bed without bedrails? 2  -SR     Moving to and from a bed to a chair (including a wheelchair)? 2  -SR     Standing up from a chair using your arms (e.g., wheelchair, bedside chair)? 1  -SR     Climbing 3-5 steps with a railing? 1  -SR     To walk in hospital room? 1  -SR     AM-PAC 6 Clicks Score (PT) 9  -SR               User Key  (r) = Recorded By, (t) = Taken By, (c) = Cosigned By      Initials Name Provider Type    Arianne Godwin RN Registered Nurse                    Occupational Therapy Education        No education to display                  OT Recommendation and Plan     Plan of Care Review  Plan of Care Reviewed With: patient  Progress: no change  Outcome Evaluation: Patient seen for OT treatment. She tolerated sitting at edge of bed, but declined any NMR to LUE. Pain impacts tolerance for therapeutic interventions.     Time Calculation:         Time Calculation- OT       Row Name 11/06/24 1440             Time Calculation- OT    OT Received On 11/06/24  -                User Key  (r) = Recorded By, (t) = Taken By, (c) = Cosigned By      Initials Name Provider Type    Kasey Yang OT Occupational Therapist                  Therapy Charges for Today       Code Description Service Date Service Provider Modifiers Qty    13499437330 HC OT THERAPEUTIC ACT EA 15 MIN 11/5/2024 Kasey Bella OT GO 1    04584149558 HC OT THERAPEUTIC ACT EA 15 MIN 11/6/2024 Kasey Bella OT GO 1                 Kasey Bella OT  11/6/2024

## 2024-11-06 NOTE — PLAN OF CARE
Goal Outcome Evaluation:  Plan of Care Reviewed With: patient        Progress: no change  Outcome Evaluation: Pt resting in bed during assessment. Pt C/O pain, PRN meds given per orders. Pt has no other complaints or concerns at this time. Will cont POC.

## 2024-11-06 NOTE — PLAN OF CARE
Goal Outcome Evaluation:  Plan of Care Reviewed With: patient        Progress: no change  Outcome Evaluation: VSS on room air. Pt a&o x4. Pt c/o pain, PRN medication given per MAR. Wound care completed per orders. Pt worked with PT and OT this shift. Pt voices no complaints or concerns at this time. Will continue POC.

## 2024-11-06 NOTE — THERAPY TREATMENT NOTE
Acute Care - Physical Therapy Treatment Note   [facility]     Patient Name: Lety Johnson  : 1980  MRN: [id number]  Today's Date: 2024   Onset of Illness/Injury or Date of Surgery: 24  Visit Dx:     ICD-10-CM ICD-9-CM   1. Hypokalemia  E87.6 276.8   2. Pressure injury of skin of sacral region, unspecified injury stage  L89.159 707.03     707.20   3. Pressure injury of skin of left heel, unspecified injury stage  L89.629 707.07     707.20   4. Acute cystitis without hematuria  N30.00 595.0     Patient Active Problem List   Diagnosis   (none) - all problems resolved or deleted     Past Medical History:   Diagnosis Date    Stroke      History reviewed. No pertinent surgical history.  PT Assessment (Last 12 Hours)       PT Evaluation and Treatment       Row Name 24 1439          Physical Therapy Time and Intention    Subjective Information complains of;pain  -KM     Document Type therapy note (daily note)  -KM     Mode of Treatment physical therapy  -KM     Patient Effort adequate  -KM     Symptoms Noted During/After Treatment increased pain  -KM       Row Name 24 1439          General Information    Patient Profile Reviewed yes  -KM     Patient Observations alert;cooperative;agree to therapy  -KM     Existing Precautions/Restrictions fall  -KM       Row Name 24 1439          Cognition    Affect/Mental Status (Cognition) emotionally labile  -KM     Orientation Status (Cognition) oriented x 3  -KM     Follows Commands (Cognition) WFL  -KM       Row Name 24 1439          Bed Mobility    Bed Mobility supine-sit;sit-supine  -KM     Supine-Sit [place] (Bed Mobility) moderate assist (50% patient effort);maximum assist (25% patient effort);2 person assist  -KM     Sit-Supine [place] (Bed Mobility) maximum assist (25% patient effort);2 person assist  -KM     Bed Mobility, Safety Issues decreased use of arms for pushing/pulling;decreased use of legs for bridging/pushing  -KM        Row Name 11/06/24 1439          Gait/Stairs (Locomotion)    Patient was able to Ambulate no, other medical factors prevent ambulation  -KM     Reason Patient was unable to Ambulate Non-Ambulatory at Baseline  -KM       Row Name 11/06/24 1439          Motor Skills    Comments, Therapeutic Exercise seated RLE ther-ex; LLE stretching  -KM       Row Name             Wound 09/26/24 1809 Left posterior ankle Other (comment)    Wound - Properties Group Placement Date: 09/26/24  -KJ Placement Time: 1809  -KJ Side: Left  -KJ Orientation: posterior  -KJ Location: ankle  -KJ Primary Wound Type: Other  -TW, unknow etiology     Retired Wound - Properties Group Placement Date: 09/26/24  -KJ Placement Time: 1809  -KJ Side: Left  -KJ Orientation: posterior  -KJ Location: ankle  -KJ Primary Wound Type: Other  -TW, unknow etiology     Retired Wound - Properties Group Placement Date: 09/26/24  -KJ Placement Time: 1809  -KJ Side: Left  -KJ Orientation: posterior  -KJ Location: ankle  -KJ Primary Wound Type: Other  -TW, unknow etiology     Retired Wound - Properties Group Date first assessed: 09/26/24  -KJ Time first assessed: 1809  -KJ Side: Left  -KJ Location: ankle  -KJ Primary Wound Type: Other  -TW, unknow etiology       Row Name             Wound 10/11/24 1330 medial coccyx Fissure    Wound - Properties Group Placement Date: 10/11/24  -TW Placement Time: 1330 -TW Orientation: medial  -TW Location: coccyx  -TW Primary Wound Type: Fissure  -TW    Retired Wound - Properties Group Placement Date: 10/11/24  -TW Placement Time: 1330 -TW Orientation: medial  -TW Location: coccyx  -TW Primary Wound Type: Fissure  -TW    Retired Wound - Properties Group Placement Date: 10/11/24  -TW Placement Time: 1330 -TW Orientation: medial  -TW Location: coccyx  -TW Primary Wound Type: Fissure  -TW    Retired Wound - Properties Group Date first assessed: 10/11/24  -TW Time first assessed: 1330 -TW Location: coccyx  -TW Primary Wound Type:  Fissure  -TW      Row Name             Wound 10/16/24 0705 Left medial gluteal MASD (moisture associated skin damage)    Wound - Properties Group Placement Date: 10/16/24  -MS Placement Time: 0705 -MS Side: Left  -MS Orientation: medial  -MS Location: gluteal  -MS Primary Wound Type: MASD  -MS Type: MASD (moisture associated skin damage)  -MS Present on Original Admission: N  -MS Additional Comments: Noted at shift change skin assesment, Night shift RN stated it had been there her shift.  -MS    Retired Wound - Properties Group Placement Date: 10/16/24  -MS Placement Time: 0705 -MS Present on Original Admission: N  -MS Side: Left  -MS Orientation: medial  -MS Location: gluteal  -MS Primary Wound Type: MASD  -MS Additional Comments: Noted at shift change skin assesment, Night shift RN stated it had been there her shift.  -MS Type: MASD (moisture associated skin damage)  -MS    Retired Wound - Properties Group Placement Date: 10/16/24  -MS Placement Time: 0705 -MS Present on Original Admission: N  -MS Side: Left  -MS Orientation: medial  -MS Location: gluteal  -MS Primary Wound Type: MASD  -MS Additional Comments: Noted at shift change skin assesment, Night shift RN stated it had been there her shift.  -MS Type: MASD (moisture associated skin damage)  -MS    Retired Wound - Properties Group Date first assessed: 10/16/24  -MS Time first assessed: 0705 -MS Present on Original Admission: N  -MS Side: Left  -MS Location: gluteal  -MS Primary Wound Type: MASD  -MS Additional Comments: Noted at shift change skin assesment, Night shift RN stated it had been there her shift.  -MS Type: MASD (moisture associated skin damage)  -MS      Row Name             Wound 10/16/24 0705 Left posterior greater trochanter MASD (moisture associated skin damage)    Wound - Properties Group Placement Date: 10/16/24  -MS Placement Time: 0705 -MS Side: Left  -MS Orientation: posterior  -MS Location: greater trochanter  -MS Primary Wound  Type: MASD  -MS Type: MASD (moisture associated skin damage)  -MS Present on Original Admission: N  -MS    Retired Wound - Properties Group Placement Date: 10/16/24  -MS Placement Time: 0705 -MS Present on Original Admission: N  -MS Side: Left  -MS Orientation: posterior  -MS Location: greater trochanter  -MS Primary Wound Type: MASD  -MS Type: MASD (moisture associated skin damage)  -MS    Retired Wound - Properties Group Placement Date: 10/16/24  -MS Placement Time: 0705 -MS Present on Original Admission: N  -MS Side: Left  -MS Orientation: posterior  -MS Location: greater trochanter  -MS Primary Wound Type: MASD  -MS Type: MASD (moisture associated skin damage)  -MS    Retired Wound - Properties Group Date first assessed: 10/16/24  -MS Time first assessed: 0705 -MS Present on Original Admission: N  -MS Side: Left  -MS Location: greater trochanter  -MS Primary Wound Type: MASD  -MS Type: MASD (moisture associated skin damage)  -MS      Row Name 11/06/24 1459          Progress Summary (PT)    Daily Progress Summary (PT) Pt. was able to demonstrate bed mobility at similar level as prior sessions. She sat EOB and had difficulty tolerating ther-ex d/t pain. Pt. would continue to benefit from skilled PT services.  -KM               User Key  (r) = Recorded By, (t) = Taken By, (c) = Cosigned By      Initials Name Provider Type    Karen Peralta, RN Registered Nurse    Thierry Carter, GENARO Physical Therapist    Nory Keenan, RN Registered Nurse    Roe North, RN Registered Nurse                    Physical Therapy Education       Title: PT OT SLP Therapies (In Progress)       Topic: Physical Therapy (In Progress)       Point: Mobility training (In Progress)       Learning Progress Summary            Patient Acceptance, E, NR by SC at 11/3/2024 0025    Acceptance, E,TB, VU by RR at 11/1/2024 2315    Acceptance, E,D, VU,NR by AG at 10/31/2024 1234    Comment: PT educated pt. in importance of  mobilization and L L/UE PROM and self ROM to prevent contracture.  Pt. is encouraged to to perform R LE AROM frequently while supine or sitting EOB.    Acceptance, E,TB, VU by RR at 10/27/2024 0031    Acceptance, E,TB, VU by RG at 10/22/2024 1002    Acceptance, TB,E, VU by RG at 10/21/2024 0907    Nonacceptance, E, NR by WG at 10/16/2024 0228    Acceptance, E,TB, VU by CF at 10/15/2024 0753    Acceptance, E,TB, VU by CF at 10/14/2024 0801    Acceptance, E,TB, VU by CF at 10/13/2024 1045    Acceptance, E,D, VU,NR by AG at 10/10/2024 1310    Acceptance, E,TB, VU by CB at 10/8/2024 0448    Acceptance, E, NR by RD at 10/2/2024 1101    Acceptance, E, NR by RD at 10/1/2024 0856    Acceptance, E,D, VU,NR by AG at 9/30/2024 1559                      Point: Home exercise program (In Progress)       Learning Progress Summary            Patient Acceptance, E, NR by SC at 11/3/2024 0025    Acceptance, E,TB, VU by RR at 11/1/2024 2315    Acceptance, E,D, VU,NR by AG at 10/31/2024 1234    Comment: PT educated pt. in importance of mobilization and L L/UE PROM and self ROM to prevent contracture.  Pt. is encouraged to to perform R LE AROM frequently while supine or sitting EOB.    Acceptance, E,TB, VU by RR at 10/27/2024 0031    Acceptance, E,TB, VU by RG at 10/22/2024 1002    Acceptance, TB,E, VU by RG at 10/21/2024 0907    Nonacceptance, E, NR by WG at 10/16/2024 0228    Acceptance, E,TB, VU by CF at 10/15/2024 0753    Acceptance, E,TB, VU by CF at 10/14/2024 0801    Acceptance, E,TB, VU by CF at 10/13/2024 1045    Acceptance, E,D, VU,NR by AG at 10/10/2024 1310    Acceptance, E,TB, VU by CB at 10/8/2024 0448    Acceptance, E, NR by RD at 10/2/2024 1101    Acceptance, E, NR by RD at 10/1/2024 0856    Acceptance, E,D, VU,NR by AG at 9/30/2024 1559                      Point: Body mechanics (In Progress)       Learning Progress Summary            Patient Acceptance, E, NR by SC at 11/3/2024 0025    Acceptance, E,TB, VU by RR at  11/1/2024 2315    Acceptance, E,D, VU,NR by AG at 10/31/2024 1234    Comment: PT educated pt. in importance of mobilization and L L/UE PROM and self ROM to prevent contracture.  Pt. is encouraged to to perform R LE AROM frequently while supine or sitting EOB.    Acceptance, E,TB, VU by RR at 10/27/2024 0031    Acceptance, E,TB, VU by RG at 10/22/2024 1002    Acceptance, TB,E, VU by RG at 10/21/2024 0907    Nonacceptance, E, NR by WG at 10/16/2024 0228    Acceptance, E,TB, VU by CF at 10/15/2024 0753    Acceptance, E,TB, VU by CF at 10/14/2024 0801    Acceptance, E,TB, VU by CF at 10/13/2024 1045    Acceptance, E,D, VU,NR by AG at 10/10/2024 1310    Acceptance, E,TB, VU by CB at 10/8/2024 0448    Acceptance, E, NR by RD at 10/2/2024 1101    Acceptance, E, NR by RD at 10/1/2024 0856    Acceptance, E,D, VU,NR by AG at 9/30/2024 1559                      Point: Precautions (In Progress)       Learning Progress Summary            Patient Acceptance, E, NR by SC at 11/3/2024 0025    Acceptance, E,TB, VU by RR at 11/1/2024 2315    Acceptance, E,D, VU,NR by AG at 10/31/2024 1234    Comment: PT educated pt. in importance of mobilization and L L/UE PROM and self ROM to prevent contracture.  Pt. is encouraged to to perform R LE AROM frequently while supine or sitting EOB.    Acceptance, E,TB, VU by RR at 10/27/2024 0031    Acceptance, E,TB, VU by RG at 10/22/2024 1002    Acceptance, TB,E, VU by RG at 10/21/2024 0907    Nonacceptance, E, NR by WG at 10/16/2024 0228    Acceptance, E,TB, VU by CF at 10/15/2024 0753    Acceptance, E,TB, VU by CF at 10/14/2024 0801    Acceptance, E,TB, VU by CF at 10/13/2024 1045    Acceptance, E,D, VU,NR by AG at 10/10/2024 1310    Acceptance, E,TB, VU by CB at 10/8/2024 0448    Acceptance, E, NR by RD at 10/2/2024 1101    Acceptance, E, NR by RD at 10/1/2024 0856    Acceptance, E,D, VU,NR by AG at 9/30/2024 1559                                      User Key       Initials Effective Dates Name  Provider Type Discipline    AG 06/16/21 -  Мария Joya, PT Physical Therapist PT    RD 06/16/21 -  Laisha Verdugo, RN Registered Nurse Nurse    RR 06/11/24 -  Jaymie Dominguez, RN Registered Nurse Nurse    RG 06/06/23 -  Jesus Matthews, RN Registered Nurse Nurse    WG 02/12/24 -  Shanthi Burden, RN Registered Nurse Nurse    CF 06/25/24 -  Cherelle Germain, RN Registered Nurse Nurse    CB 06/17/24 -  Fidelia Lombardo, RN Registered Nurse Nurse    SC 09/11/24 -  Sally Hair RN Registered Nurse Nurse                  PT Recommendation and Plan     Progress Summary (PT)  Daily Progress Summary (PT): Pt. was able to demonstrate bed mobility at similar level as prior sessions. She sat EOB and had difficulty tolerating ther-ex d/t pain. Pt. would continue to benefit from skilled PT services.       Time Calculation:    PT Charges       Row Name 11/06/24 1446             Time Calculation    PT Received On 11/06/24  -KM         Time Calculation- PT    Total Timed Code Minutes- PT 23 minute(s)  -KM                User Key  (r) = Recorded By, (t) = Taken By, (c) = Cosigned By      Initials Name Provider Type    KM Thierry Saldivar, PT Physical Therapist                  Therapy Charges for Today       Code Description Service Date Service Provider Modifiers Qty    10938294316 HC PT THERAPEUTIC ACT EA 15 MIN 11/5/2024 Thierry Saldivar, PT GP 1    41443856899 HC PT THER PROC EA 15 MIN 11/5/2024 Thierry Saldivar, PT GP 1    34050039195 HC PT THERAPEUTIC ACT EA 15 MIN 11/6/2024 Thierry Saldivar, PT GP 1    74919668776 HC PT THER PROC EA 15 MIN 11/6/2024 Thierry Saldivar, PT GP 1            PT G-Codes  AM-PAC 6 Clicks Score (PT): 9    Thierry Saldivar PT  11/6/2024

## 2024-11-06 NOTE — PROGRESS NOTES
"Patient Identification:  Name:  Lety Johnson  Age:  43 y.o.  Sex:  female  :  1980  MRN:  [id number]  Visit Number:  [id number]  Primary Care Provider:  Concha Rao APRN    Length of stay:  39    Subjective/Interval History/Consultants/Procedures     Chief Complaint:   Chief Complaint   Patient presents with    Fall       Subjective/Interval History:    43 y.o. female who was admitted on 2024 with UTI     PMH is significant for CVA w/ left sided hemiparesis, debility, hx genital herpes on suppressive therapy, morbid obesity, T2DM   For complete admission information, please see history and physical.     Consultations:  Infectious disease  PT/OT  CM/SW    Procedures/Scans:  CT head without contrast  CT C-spine without contrast  CT T-spine without contrast  CT L-spine without contrast  CXR x 2  XR left foot  XR left ankle    Today, the patient was seen and examined. She reported overall feeling about the same today. No new complaints including chest pain, shortness of breath, headache, abdominal pain. She continues to complaint of diffuse \"pins and needles\" type pain, worse in the left foot and lower leg.      Room location at the time of evaluation was 332.    ----------------------------------------------------------------------------------------------------------------------  Current Hospital Meds:  acyclovir, 400 mg, Oral, Nightly  aspirin, 81 mg, Oral, Daily  atorvastatin, 80 mg, Oral, Daily  DULoxetine, 60 mg, Oral, Daily  heparin (porcine), 5,000 Units, Subcutaneous, Q8H  insulin glargine, 15 Units, Subcutaneous, Daily With Breakfast  insulin glargine, 40 Units, Subcutaneous, Nightly  insulin lispro, 4-24 Units, Subcutaneous, 4x Daily AC & at Bedtime  Lidocaine, 1 patch, Transdermal, Q24H  lisinopril, 20 mg, Oral, Daily  magic barrier cream, , Topical, BID  metoprolol tartrate, 25 mg, Oral, Q12H  nicotine, 1 patch, Transdermal, Q24H  nystatin, , Topical, Q12H  pantoprazole, 40 mg, Oral, " Daily  sodium chloride, 10 mL, Intravenous, Q12H  sodium chloride, 10 mL, Intravenous, Q12H      Pharmacy Consult,       ----------------------------------------------------------------------------------------------------------------------      Objective     Vital Signs:  Temp:  [97.7 °F (36.5 °C)-98.2 °F (36.8 °C)] 97.7 °F (36.5 °C)  Heart Rate:  [] 109  Resp:  [16-20] 20  BP: ()/(59-66) 118/66      10/07/24  0511 10/08/24  0500 10/09/24  0500   Weight: 102 kg (225 lb 14.4 oz) (FIFI cameron) 102 kg (225 lb 15.5 oz) 102 kg (224 lb 1.6 oz)     Body mass index is 36.73 kg/m².    Intake/Output Summary (Last 24 hours) at 11/6/2024 1106  Last data filed at 11/6/2024 0300  Gross per 24 hour   Intake 840 ml   Output 1900 ml   Net -1060 ml     No intake/output data recorded.  Diet: Regular/House, Diabetic; Consistent Carbohydrate; Fluid Consistency: Thin (IDDSI 0)  ----------------------------------------------------------------------------------------------------------------------    Physical Exam  Vitals and nursing note reviewed.   Constitutional:       General: She is not in acute distress.     Appearance: She is obese.   HENT:      Head: Normocephalic and atraumatic.   Eyes:      Extraocular Movements: Extraocular movements intact.   Cardiovascular:      Rate and Rhythm: Normal rate.   Pulmonary:      Effort: Pulmonary effort is normal. No respiratory distress.   Abdominal:      Palpations: Abdomen is soft.   Musculoskeletal:      Right lower leg: No edema.      Left lower leg: No edema.   Skin:     General: Skin is warm and dry.   Neurological:      Mental Status: She is alert. Mental status is at baseline.   Psychiatric:         Mood and Affect: Mood normal.         Behavior: Behavior normal.                ----------------------------------------------------------------------------------------------------------------------  Tele:   "    ----------------------------------------------------------------------------------------------------------------------      Results from last 7 days   Lab Units 11/05/24  0032   WBC 10*3/mm3 13.22*   HEMOGLOBIN g/dL 12.2   HEMATOCRIT % 36.3   MCV fL 99.2*   MCHC g/dL 33.6   PLATELETS 10*3/mm3 299         Results from last 7 days   Lab Units 11/05/24  0032   SODIUM mmol/L 136   POTASSIUM mmol/L 3.9   CHLORIDE mmol/L 100   CO2 mmol/L 25.2   BUN mg/dL 22*   CREATININE mg/dL 0.55*   CALCIUM mg/dL 9.6   GLUCOSE mg/dL 91   Estimated Creatinine Clearance: 159.2 mL/min (A) (by C-G formula based on SCr of 0.55 mg/dL (L)).  No results found for: \"AMMONIA\"      No results found for: \"BLOODCX\"  No results found for: \"URINECX\"  No results found for: \"WOUNDCX\"  No results found for: \"STOOLCX\"  ----------------------------------------------------------------------------------------------------------------------  Imaging Results (Last 24 Hours)       ** No results found for the last 24 hours. **          ----------------------------------------------------------------------------------------------------------------------   I have reviewed the above laboratory values for 11/06/24    Assessment/Plan     There are no active hospital problems to display for this patient.        ASSESSMENT/PLAN:    ESBL E. coli UTI  Acute metabolic encephalopathy, resolved  S/p Invanz.  Infectious disease input appreciated.     Hx CVA with left-sided hemiparesis  Chronic debility  Continue PT OT  CM/SW assisting with discharge planning.  Patient is pending placement.     T2DM  Diabetic neuropathy  Continue insulin regimen-titrate as needed.  Continue supportive care measures, Cymbalta, as needed pain regimen for extremity pain     Hx genital herpes  Continue suppressive acyclovir     Morbid obesity  Complicates all aspects of care     A/P reviewed and up to date as of 11/6/24         -----------  -DVT prophylaxis: Subcu heparin  -Disposition " plans/anticipated needs: Pending placement         The patient is high risk due to the following diagnoses/reasons:  chronic debility, left sided hemiparesis         Bruce Branham PA-C  11/06/24  11:06 EST

## 2024-11-06 NOTE — PROGRESS NOTES
Adult Nutrition  Assessment/PES    Patient Name:  Lety Johnson  YOB: 1980  MRN: [id number]  Admit Date:  9/26/2024    Assessment Date:  11/6/2024    Comments:  follow-up    Po 83% ave of intake    Will cont to follow and monitor.      Reason for Assessment       Row Name 11/06/24 1423          Reason for Assessment    Reason For Assessment follow-up protocol  remote REZA White     Diagnosis infection/sepsis;neurologic conditions  cystitis, met enceph, debility hx CVA                    Nutrition/Diet History       Row Name 11/06/24 1424          Nutrition/Diet History    Typical Intake (Food/Fluid/EN/PN) Good po intake                    Labs/Tests/Procedures/Meds       Row Name 11/06/24 1424          Labs/Procedures/Meds    Lab Results Reviewed reviewed     Lab Results Comments 129, 245, 271        Diagnostic Tests/Procedures    Diagnostic Test/Procedure Reviewed reviewed        Medications    Pertinent Medications Reviewed reviewed     Pertinent Medications Comments lantus, humalog                        Nutrition Prescription Ordered       Row Name 11/06/24 1425          Nutrition Prescription PO    Common Modifiers Consistent Carbohydrate                    Evaluation of Received Nutrient/Fluid Intake       Row Name 11/06/24 1425          Fluid Intake Evaluation    Oral Fluid (mL) 1480  ave x 3        PO Evaluation    Number of Meals 9     % PO Intake 83                       Problem/Interventions:   Problem 1       Row Name 11/06/24 1425          Nutrition Diagnoses Problem 1    Problem 1 Other (comment)  Inadequate energy intake related to cystitis, met eceph as evidenced po intake, ONS, skin     Resolved? Yes                          Intervention Goal       Row Name 11/06/24 1425          Intervention Goal    General Meet nutritional needs for age/condition     PO Maintain intake;PO intake (%)     PO Intake % 80 %  or greater                    Nutrition Intervention       Row Name  11/06/24 1426          Nutrition Intervention    RD/Tech Action Encourage intake;Follow Tx progress                      Education/Evaluation       Row Name 11/06/24 1426          Education    Education No discharge needs identified at this time        Monitor/Evaluation    Monitor Per protocol;I&O;PO intake;Pertinent labs;Weight                     Electronically signed by:  Vee Van RD  11/06/24 14:26 EST

## 2024-11-06 NOTE — CASE MANAGEMENT/SOCIAL WORK
Discharge Planning Assessment  [facility]     Patient Name: Lety Johnson  MRN: [id number]  Today's Date: 11/6/2024    Admit Date: 9/26/2024    Plan: SS attempted to contact [facility] per Pretty [phone] with no success. SS left voicemail on this date. SS attempted to contact [facility] [phone] on this date with no sucess.  SS to follow.       Discharge Plan       Row Name 11/06/24 1530       Plan    Plan SS attempted to contact [facility] per Pretty [phone] with no success. SS left voicemail on this date. SS attempted to contact [facility] [phone] on this date with no sucess.  SS to follow.          Continued Care and Services - Admitted Since 9/26/2024       Destination       Service Provider Request Status Services Address Phone Fax Patient Preferred    [facility] Declined  Cannot meet patient's needs -- [address] [facility] [address] [phone] --    [facility] Acute Care Declined  Not eligible -- [address] [place] [place] [address] [phone] --    [facility] Declined  Not eligible -- [address] [facility] [address] [phone] --    [facility] Declined  Clinical Denial -- [address] [facility], 4TH FLOOR, [facility] [address] [phone] --    [facility] SWING BED UNIT Declined  Cannot meet patient's needs -- [address] [facility] [address] [facility] [address] [phone] --     [facility] Declined  Not eligible -- [address] [facility] [place] [address] [phone] --    [facility] Declined  Bed not available -- [address] [address] [phone] --    [facility] C[facility] Declined  Bed not available -- [address] [facility] [address] [phone] --    [facility]   Out of network -- [address] [facility] [place] [address] [phone] --    [facility] SWING BED UNIT Declined -- [address] [facility] [place] [address] [phone] --    [facility]  Clinical Denial -- [address] [facility] [id number] [phone] --    [facility] SWING BED UNIT Declined  Insurance Denial, Out of network -- [address], [place] [id number] [phone] --    [facility] Declined  Out of network -- [address] [facility] [address] [phone] --    [facility] Declined  Out of network -- [address] [address] [phone] -- --    [facility] Declined  Out of network -- [address] [facility] [address] [phone] --          Expected Discharge Date and Time       Expected Discharge Date Expected Discharge Time    Nov 8, 2024            KAZ Avendano

## 2024-11-07 LAB
GLUCOSE BLDC GLUCOMTR-MCNC: 149 MG/DL (ref 70–130)
GLUCOSE BLDC GLUCOMTR-MCNC: 214 MG/DL (ref 70–130)
GLUCOSE BLDC GLUCOMTR-MCNC: 238 MG/DL (ref 70–130)
GLUCOSE BLDC GLUCOMTR-MCNC: 261 MG/DL (ref 70–130)

## 2024-11-07 PROCEDURE — 97530 THERAPEUTIC ACTIVITIES: CPT

## 2024-11-07 PROCEDURE — 63710000001 INSULIN LISPRO (HUMAN) PER 5 UNITS: Performed by: INTERNAL MEDICINE

## 2024-11-07 PROCEDURE — 97110 THERAPEUTIC EXERCISES: CPT

## 2024-11-07 PROCEDURE — 63710000001 INSULIN GLARGINE PER 5 UNITS

## 2024-11-07 PROCEDURE — 99231 SBSQ HOSP IP/OBS SF/LOW 25: CPT

## 2024-11-07 PROCEDURE — 82948 REAGENT STRIP/BLOOD GLUCOSE: CPT

## 2024-11-07 PROCEDURE — 25010000002 HEPARIN (PORCINE) PER 1000 UNITS: Performed by: INTERNAL MEDICINE

## 2024-11-07 RX ADMIN — VITAMINS A AND D OINTMENT: 15.5; 53.4 OINTMENT TOPICAL at 08:06

## 2024-11-07 RX ADMIN — NICOTINE 1 PATCH: 7 PATCH, EXTENDED RELEASE TRANSDERMAL at 08:07

## 2024-11-07 RX ADMIN — NYSTATIN: 100000 POWDER TOPICAL at 08:06

## 2024-11-07 RX ADMIN — VITAMINS A AND D OINTMENT: 15.5; 53.4 OINTMENT TOPICAL at 20:45

## 2024-11-07 RX ADMIN — DICLOFENAC SODIUM 4 G: 10 GEL TOPICAL at 15:49

## 2024-11-07 RX ADMIN — Medication 10 ML: at 08:07

## 2024-11-07 RX ADMIN — METOPROLOL TARTRATE 25 MG: 25 TABLET, FILM COATED ORAL at 08:05

## 2024-11-07 RX ADMIN — INSULIN LISPRO 8 UNITS: 100 INJECTION, SOLUTION INTRAVENOUS; SUBCUTANEOUS at 20:43

## 2024-11-07 RX ADMIN — INSULIN GLARGINE 40 UNITS: 100 INJECTION, SOLUTION SUBCUTANEOUS at 20:44

## 2024-11-07 RX ADMIN — CYCLOBENZAPRINE 10 MG: 10 TABLET, FILM COATED ORAL at 21:45

## 2024-11-07 RX ADMIN — DICLOFENAC SODIUM 4 G: 10 GEL TOPICAL at 08:13

## 2024-11-07 RX ADMIN — NYSTATIN: 100000 POWDER TOPICAL at 20:45

## 2024-11-07 RX ADMIN — ASPIRIN 81 MG: 81 TABLET, CHEWABLE ORAL at 08:04

## 2024-11-07 RX ADMIN — DULOXETINE HYDROCHLORIDE 60 MG: 60 CAPSULE, DELAYED RELEASE ORAL at 08:04

## 2024-11-07 RX ADMIN — ACYCLOVIR 400 MG: 200 CAPSULE ORAL at 20:44

## 2024-11-07 RX ADMIN — PANTOPRAZOLE SODIUM 40 MG: 40 TABLET, DELAYED RELEASE ORAL at 08:05

## 2024-11-07 RX ADMIN — LIDOCAINE 1 PATCH: 4 PATCH TOPICAL at 16:52

## 2024-11-07 RX ADMIN — ACETAMINOPHEN 650 MG: 325 TABLET ORAL at 08:03

## 2024-11-07 RX ADMIN — HEPARIN SODIUM 5000 UNITS: 5000 INJECTION INTRAVENOUS; SUBCUTANEOUS at 21:45

## 2024-11-07 RX ADMIN — HEPARIN SODIUM 5000 UNITS: 5000 INJECTION INTRAVENOUS; SUBCUTANEOUS at 06:08

## 2024-11-07 RX ADMIN — Medication 10 ML: at 22:00

## 2024-11-07 RX ADMIN — TRAMADOL HYDROCHLORIDE 50 MG: 50 TABLET, COATED ORAL at 06:15

## 2024-11-07 RX ADMIN — INSULIN LISPRO 12 UNITS: 100 INJECTION, SOLUTION INTRAVENOUS; SUBCUTANEOUS at 16:52

## 2024-11-07 RX ADMIN — INSULIN LISPRO 8 UNITS: 100 INJECTION, SOLUTION INTRAVENOUS; SUBCUTANEOUS at 11:31

## 2024-11-07 RX ADMIN — TRAMADOL HYDROCHLORIDE 50 MG: 50 TABLET, COATED ORAL at 15:49

## 2024-11-07 RX ADMIN — DICLOFENAC SODIUM 4 G: 10 GEL TOPICAL at 23:52

## 2024-11-07 RX ADMIN — METOPROLOL TARTRATE 25 MG: 25 TABLET, FILM COATED ORAL at 20:44

## 2024-11-07 RX ADMIN — ATORVASTATIN CALCIUM 80 MG: 40 TABLET, FILM COATED ORAL at 08:05

## 2024-11-07 RX ADMIN — LISINOPRIL 20 MG: 10 TABLET ORAL at 08:03

## 2024-11-07 RX ADMIN — HEPARIN SODIUM 5000 UNITS: 5000 INJECTION INTRAVENOUS; SUBCUTANEOUS at 14:23

## 2024-11-07 RX ADMIN — INSULIN GLARGINE 20 UNITS: 100 INJECTION, SOLUTION SUBCUTANEOUS at 08:03

## 2024-11-07 RX ADMIN — CYCLOBENZAPRINE 10 MG: 10 TABLET, FILM COATED ORAL at 08:03

## 2024-11-07 NOTE — CASE MANAGEMENT/SOCIAL WORK
Discharge Planning Assessment  [facility]     Patient Name: Lety Johnson  MRN: [id number]  Today's Date: 11/7/2024    Admit Date: 9/26/2024       Discharge Plan       Row Name 11/07/24 1349       Plan    Plan SS contacted [facility] office [phone] per Pretty who states privacy form was received and case has been opending with Social Security Administration. [facility] office is waiting for an update from [facility]. SS received a message back from APS BOBY, Kinsey Dial stating she has spoke to one of pt's sister, Moises about disposition. APS BOBY discussed waiver services in [place] and assistance for DME from [employer] with sister. Sister informed APS BOBY that pt could be discharged to her home as a last resort. ABELARDO LANDIS discussed long-term care Medicaid with sister. Pt has [employer] Medicaid and does not qualify for long-term care Medicaid until she is approved for a [employer] income. SS to follow.                  Continued Care and Services - Admitted Since 9/26/2024       Destination       Service Provider Request Status Services Address Phone Fax Patient Preferred    [facility] Declined  Cannot meet patient's needs -- [address] [facility] [id number] [phone] --    [facility] Acute Care Declined  Not eligible -- [address] [facility] [place] [address] [phone] --    [facility] Declined  Not eligible -- [address] [facility] [address] [phone] --    [facility] SPECIALTY [facility] Declined  Clinical Denial -- [address] [facility], 4TH FLOOR, [facility] [address] [phone] --    [facility] SWING BED UNIT Declined  Cannot meet patient's needs -- [address] [place] [address] [address] [facility] [address] [phone] --     [facility] Declined  Not eligible -- [address] [facility] [place] [address] [phone] --    [facility] Declined  Bed not available -- [address] [address] [phone] --    [facility] SKILLED CARE Declined  Bed not available -- [address] [facility] [id number] [phone] --    [facility] Swing Declined  Out of network -- [address] [facility] [place] [id number] [phone] --    [facility] SWING BED UNIT Declined -- [address] [facility] [address] [phone] --    [facility] Declined  Clinical Denial -- [address] [facility] [address] [phone] --    [facility] SWING BED UNIT Declined  Insurance Denial, Out of network -- [address], [facility] [address] [phone] --    [facility] Declined  Out of network -- [address] [facility] [address] [phone] --    [facility] Declined  Out of network -- [address] [address] [phone] -- --    [facility]  Out of network -- [address] [facility] [address] [phone] --                  Expected Discharge Date and Time       Expected Discharge Date Expected Discharge Time    Nov 8, 2024         HELEN Butterfield

## 2024-11-07 NOTE — PROGRESS NOTES
Patient Identification:  Name:  Lety Johnson  Age:  43 y.o.  Sex:  female  :  1980  MRN:  [id number]  Visit Number:  [id number]  Primary Care Provider:  Concha Rao APRN    Length of stay:  40    Subjective/Interval History/Consultants/Procedures     Chief Complaint:   Chief Complaint   Patient presents with    Fall       Subjective/Interval History:    43 y.o. female who was admitted on 2024 with UTI     PMH is significant for CVA w/ left sided hemiparesis, debility, hx genital herpes on suppressive therapy, morbid obesity, T2DM   For complete admission information, please see history and physical.     Consultations:  Infectious disease  PT/OT  CM/SW    Procedures/Scans:  CT head without contrast  CT C-spine without contrast  CT T-spine without contrast  CT L-spine without contrast  CXR x 2  XR left foot  XR left ankle    Today, the patient was seen and examined. Resting comfortably. No family at bedside. No acute events overnight. No new complaints today.      Room location at the time of evaluation was [facility].    ----------------------------------------------------------------------------------------------------------------------  Current Hospital Meds:  acyclovir, 400 mg, Oral, Nightly  aspirin, 81 mg, Oral, Daily  atorvastatin, 80 mg, Oral, Daily  DULoxetine, 60 mg, Oral, Daily  heparin (porcine), 5,000 Units, Subcutaneous, Q8H  insulin glargine, 20 Units, Subcutaneous, Daily With Breakfast  insulin glargine, 40 Units, Subcutaneous, Nightly  insulin lispro, 4-24 Units, Subcutaneous, 4x Daily AC & at Bedtime  Lidocaine, 1 patch, Transdermal, Q24H  lisinopril, 20 mg, Oral, Daily  magic barrier cream, , Topical, BID  metoprolol tartrate, 25 mg, Oral, Q12H  nicotine, 1 patch, Transdermal, Q24H  nystatin, , Topical, Q12H  pantoprazole, 40 mg, Oral, Daily  sodium chloride, 10 mL, Intravenous, Q12H  sodium chloride, 10 mL, Intravenous, Q12H      Pharmacy Consult,        ----------------------------------------------------------------------------------------------------------------------      Objective     Vital Signs:  Temp:  [98 °F (36.7 °C)-98.2 °F (36.8 °C)] 98 °F (36.7 °C)  Heart Rate:  [100-123] 104  Resp:  [16-22] 16  BP: ()/(56-86) 121/65      10/07/24  0511 10/08/24  0500 10/09/24  0500   Weight: 102 kg (225 lb 14.4 oz) (FIFI cameron) 102 kg (225 lb 15.5 oz) 102 kg (224 lb 1.6 oz)     Body mass index is 36.73 kg/m².    Intake/Output Summary (Last 24 hours) at 11/7/2024 1201  Last data filed at 11/7/2024 0750  Gross per 24 hour   Intake 600 ml   Output 1500 ml   Net -900 ml     I/O this shift:  In: 360 [P.O.:360]  Out: -   Diet: Regular/House, Diabetic; Consistent Carbohydrate; Fluid Consistency: Thin (IDDSI 0)  ----------------------------------------------------------------------------------------------------------------------    Physical Exam  Vitals and nursing note reviewed.   Constitutional:       General: She is not in acute distress.  HENT:      Head: Normocephalic and atraumatic.   Eyes:      Extraocular Movements: Extraocular movements intact.   Cardiovascular:      Rate and Rhythm: Normal rate.   Pulmonary:      Effort: Pulmonary effort is normal. No respiratory distress.   Abdominal:      Palpations: Abdomen is soft.   Skin:     General: Skin is warm and dry.   Neurological:      Mental Status: She is alert. Mental status is at baseline.   Psychiatric:         Mood and Affect: Mood normal.         Behavior: Behavior normal.              ----------------------------------------------------------------------------------------------------------------------  Tele:      ----------------------------------------------------------------------------------------------------------------------      Results from last 7 days   Lab Units 11/05/24  0032   WBC 10*3/mm3 13.22*   HEMOGLOBIN g/dL 12.2   HEMATOCRIT % 36.3   MCV fL 99.2*   MCHC g/dL 33.6   PLATELETS  "10*3/mm3 299         Results from last 7 days   Lab Units 11/05/24  0032   SODIUM mmol/L 136   POTASSIUM mmol/L 3.9   CHLORIDE mmol/L 100   CO2 mmol/L 25.2   BUN mg/dL 22*   CREATININE mg/dL 0.55*   CALCIUM mg/dL 9.6   GLUCOSE mg/dL 91   Estimated Creatinine Clearance: 159.2 mL/min (A) (by C-G formula based on SCr of 0.55 mg/dL (L)).  No results found for: \"AMMONIA\"      No results found for: \"BLOODCX\"  No results found for: \"URINECX\"  No results found for: \"WOUNDCX\"  No results found for: \"STOOLCX\"  ----------------------------------------------------------------------------------------------------------------------  Imaging Results (Last 24 Hours)       ** No results found for the last 24 hours. **          ----------------------------------------------------------------------------------------------------------------------   I have reviewed the above laboratory values for 11/07/24    Assessment/Plan     There are no active hospital problems to display for this patient.        ASSESSMENT/PLAN:    ESBL E. coli UTI  Acute metabolic encephalopathy, resolved  S/p Invanz.  Infectious disease input appreciated.     Hx CVA with left-sided hemiparesis  Chronic debility  Continue PT OT  CM/SW assisting with discharge planning.  Patient is pending placement.     T2DM  Diabetic neuropathy  Continue insulin regimen-titrate as needed.  Continue supportive care measures, Cymbalta, as needed pain regimen for extremity pain     Hx genital herpes  Continue suppressive acyclovir     Morbid obesity  Complicates all aspects of care     A/P reviewed and up to date as of 11/7/24      -----------  -DVT prophylaxis: subcu heparin  -Disposition plans/anticipated needs: pending placement        The patient is high risk due to the following diagnoses/reasons:  chronic debility, left sided hemiparesis         Bruce Branham PA-C  11/07/24  12:01 EST   "

## 2024-11-07 NOTE — PLAN OF CARE
Goal Outcome Evaluation:              Outcome Evaluation: Patient resting in bed at this time. VSS on room air. PRN medication given per MAR. Wound care completed per orders. No complaints or concerns at this time. Will continue plan of care.

## 2024-11-07 NOTE — THERAPY TREATMENT NOTE
Acute Care - Physical Therapy Treatment Note   [place]     Patient Name: Lety Johnson  : 1980  MRN: [id number]  Today's Date: 2024   Onset of Illness/Injury or Date of Surgery: 24  Visit Dx:     ICD-10-CM ICD-9-CM   1. Hypokalemia  E87.6 276.8   2. Pressure injury of skin of sacral region, unspecified injury stage  L89.159 707.03     707.20   3. Pressure injury of skin of left heel, unspecified injury stage  L89.629 707.07     707.20   4. Acute cystitis without hematuria  N30.00 595.0     Patient Active Problem List   Diagnosis   (none) - all problems resolved or deleted     Past Medical History:   Diagnosis Date    Stroke      History reviewed. No pertinent surgical history.  PT Assessment (Last 12 Hours)       PT Evaluation and Treatment       Row Name 24 1407          Physical Therapy Time and Intention    Subjective Information complains of;pain  -KM     Document Type therapy note (daily note)  -KM     Mode of Treatment physical therapy  -KM     Patient Effort adequate  -KM     Symptoms Noted During/After Treatment increased pain  -KM       Row Name 24 1407          General Information    Patient Profile Reviewed yes  -KM     Patient Observations alert;cooperative;agree to therapy  -KM     Existing Precautions/Restrictions fall  -KM       Row Name 24 1407          Pain    Pretreatment Pain Rating 0/10 - no pain  -KM     Posttreatment Pain Rating 2/10  -KM     Pain Side/Orientation generalized  -KM       Row Name 24 1407          Cognition    Affect/Mental Status (Cognition) emotionally labile  -KM     Orientation Status (Cognition) oriented x 3  -KM     Follows Commands (Cognition) WFL  -KM       Row Name 24 1407          Bed Mobility    Bed Mobility supine-sit;sit-supine  -KM     Supine-Sit [place] (Bed Mobility) maximum assist (25% patient effort);2 person assist  -KM     Sit-Supine [place] (Bed Mobility) maximum assist (25% patient effort);2 person  assist  -KM     Bed Mobility, Safety Issues decreased use of arms for pushing/pulling;decreased use of legs for bridging/pushing  -       Row Name 11/07/24 1407          Gait/Stairs (Locomotion)    Patient was able to Ambulate no, other medical factors prevent ambulation  -     Reason Patient was unable to Ambulate Non-Ambulatory at Baseline  -       Row Name 11/07/24 1407          Safety Issues/Impairments Affecting Functional Mobility    Impairments Affecting Function (Mobility) endurance/activity tolerance;pain;range of motion (ROM);strength;coordination;motor control;muscle tone abnormal  -       Row Name 11/07/24 1407          Motor Skills    Comments, Therapeutic Exercise seated RLE ther-ex; LLE stretching  -       Row Name             Wound 09/26/24 1809 Left posterior ankle Other (comment)    Wound - Properties Group Placement Date: 09/26/24  -KJ Placement Time: 1809  -KJ Side: Left  -KJ Orientation: posterior  -KJ Location: ankle  -KJ Primary Wound Type: Other  -TW, unknow etiology     Retired Wound - Properties Group Placement Date: 09/26/24  -KJ Placement Time: 1809  -KJ Side: Left  -KJ Orientation: posterior  -KJ Location: ankle  -KJ Primary Wound Type: Other  -TW, unknow etiology     Retired Wound - Properties Group Placement Date: 09/26/24  -KJ Placement Time: 1809  -KJ Side: Left  -KJ Orientation: posterior  -KJ Location: ankle  -KJ Primary Wound Type: Other  -TW, unknow etiology     Retired Wound - Properties Group Date first assessed: 09/26/24  -KJ Time first assessed: 1809  -KJ Side: Left  -KJ Location: ankle  -KJ Primary Wound Type: Other  -TW, unknow etiology       Row Name             Wound 10/11/24 1330 medial coccyx Fissure    Wound - Properties Group Placement Date: 10/11/24  -TW Placement Time: 1330 -TW Orientation: medial  -TW Location: coccyx  -TW Primary Wound Type: Fissure  -TW    Retired Wound - Properties Group Placement Date: 10/11/24  -TW Placement Time: 1330 -TW  Orientation: medial  -TW Location: coccyx  -TW Primary Wound Type: Fissure  -TW    Retired Wound - Properties Group Placement Date: 10/11/24  -TW Placement Time: 1330  -TW Orientation: medial  -TW Location: coccyx  -TW Primary Wound Type: Fissure  -TW    Retired Wound - Properties Group Date first assessed: 10/11/24  -TW Time first assessed: 1330  -TW Location: coccyx  -TW Primary Wound Type: Fissure  -TW      Row Name             Wound 10/16/24 0705 Left medial gluteal MASD (moisture associated skin damage)    Wound - Properties Group Placement Date: 10/16/24  -MS Placement Time: 0705 -MS Side: Left  -MS Orientation: medial  -MS Location: gluteal  -MS Primary Wound Type: MASD  -MS Type: MASD (moisture associated skin damage)  -MS Present on Original Admission: N  -MS Additional Comments: Noted at shift change skin assesment, Night shift RN stated it had been there her shift.  -MS    Retired Wound - Properties Group Placement Date: 10/16/24  -MS Placement Time: 0705 -MS Present on Original Admission: N  -MS Side: Left  -MS Orientation: medial  -MS Location: gluteal  -MS Primary Wound Type: MASD  -MS Additional Comments: Noted at shift change skin assesment, Night shift RN stated it had been there her shift.  -MS Type: MASD (moisture associated skin damage)  -MS    Retired Wound - Properties Group Placement Date: 10/16/24  -MS Placement Time: 0705 -MS Present on Original Admission: N  -MS Side: Left  -MS Orientation: medial  -MS Location: gluteal  -MS Primary Wound Type: MASD  -MS Additional Comments: Noted at shift change skin assesment, Night shift RN stated it had been there her shift.  -MS Type: MASD (moisture associated skin damage)  -MS    Retired Wound - Properties Group Date first assessed: 10/16/24  -MS Time first assessed: 0705 -MS Present on Original Admission: N  -MS Side: Left  -MS Location: gluteal  -MS Primary Wound Type: MASD  -MS Additional Comments: Noted at shift change skin assesment, Night  shift RN stated it had been there her shift.  -MS Type: MASD (moisture associated skin damage)  -MS      Row Name             Wound 10/16/24 0705 Left posterior greater trochanter MASD (moisture associated skin damage)    Wound - Properties Group Placement Date: 10/16/24  -MS Placement Time: 0705  -MS Side: Left  -MS Orientation: posterior  -MS Location: greater trochanter  -MS Primary Wound Type: MASD  -MS Type: MASD (moisture associated skin damage)  -MS Present on Original Admission: N  -MS    Retired Wound - Properties Group Placement Date: 10/16/24  -MS Placement Time: 0705  -MS Present on Original Admission: N  -MS Side: Left  -MS Orientation: posterior  -MS Location: greater trochanter  -MS Primary Wound Type: MASD  -MS Type: MASD (moisture associated skin damage)  -MS    Retired Wound - Properties Group Placement Date: 10/16/24  -MS Placement Time: 0705  -MS Present on Original Admission: N  -MS Side: Left  -MS Orientation: posterior  -MS Location: greater trochanter  -MS Primary Wound Type: MASD  -MS Type: MASD (moisture associated skin damage)  -MS    Retired Wound - Properties Group Date first assessed: 10/16/24  -MS Time first assessed: 0705  -MS Present on Original Admission: N  -MS Side: Left  -MS Location: greater trochanter  -MS Primary Wound Type: MASD  -MS Type: MASD (moisture associated skin damage)  -MS      Row Name 11/07/24 1407          Progress Summary (PT)    Daily Progress Summary (PT) Pt. was able to perform bed mobility skills similar as other sessions. She was able to sit EOB to perform ther-ex. Pt. would continue to benefit from skilled PT services.  -KM               User Key  (r) = Recorded By, (t) = Taken By, (c) = Cosigned By      Initials Name Provider Type    Karen Peralta, RN Registered Nurse    Thierry Carter, PT Physical Therapist    Nory Keenan, RN Registered Nurse    Roe North, RN Registered Nurse                    Physical Therapy Education        Title: PT OT SLP Therapies (In Progress)       Topic: Physical Therapy (In Progress)       Point: Mobility training (In Progress)       Learning Progress Summary            Patient Acceptance, E, NR by SC at 11/3/2024 0025    Acceptance, E,TB, VU by RR at 11/1/2024 2315    Acceptance, E,D, VU,NR by AG at 10/31/2024 1234    Comment: PT educated pt. in importance of mobilization and L L/UE PROM and self ROM to prevent contracture.  Pt. is encouraged to to perform R LE AROM frequently while supine or sitting EOB.    Acceptance, E,TB, VU by RR at 10/27/2024 0031    Acceptance, E,TB, VU by RG at 10/22/2024 1002    Acceptance, TB,E, VU by RG at 10/21/2024 0907    Nonacceptance, E, NR by WG at 10/16/2024 0228    Acceptance, E,TB, VU by CF at 10/15/2024 0753    Acceptance, E,TB, VU by CF at 10/14/2024 0801    Acceptance, E,TB, VU by CF at 10/13/2024 1045    Acceptance, E,D, VU,NR by AG at 10/10/2024 1310    Acceptance, E,TB, VU by CB at 10/8/2024 0448    Acceptance, E, NR by RD at 10/2/2024 1101    Acceptance, E, NR by RD at 10/1/2024 0856    Acceptance, E,D, VU,NR by AG at 9/30/2024 1559                      Point: Home exercise program (In Progress)       Learning Progress Summary            Patient Acceptance, E, NR by SC at 11/3/2024 0025    Acceptance, E,TB, VU by RR at 11/1/2024 2315    Acceptance, E,D, VU,NR by AG at 10/31/2024 1234    Comment: PT educated pt. in importance of mobilization and L L/UE PROM and self ROM to prevent contracture.  Pt. is encouraged to to perform R LE AROM frequently while supine or sitting EOB.    Acceptance, E,TB, VU by RR at 10/27/2024 0031    Acceptance, E,TB, VU by RG at 10/22/2024 1002    Acceptance, TB,E, VU by RG at 10/21/2024 0907    Nonacceptance, E, NR by WG at 10/16/2024 0228    Acceptance, E,TB, VU by CF at 10/15/2024 0753    Acceptance, E,TB, VU by CF at 10/14/2024 0801    Acceptance, E,TB, VU by CF at 10/13/2024 1045    Acceptance, E,D, VU,NR by AG at 10/10/2024 1310     Acceptance, E,TB, VU by CB at 10/8/2024 0448    Acceptance, E, NR by RD at 10/2/2024 1101    Acceptance, E, NR by RD at 10/1/2024 0856    Acceptance, E,D, VU,NR by AG at 9/30/2024 1559                      Point: Body mechanics (In Progress)       Learning Progress Summary            Patient Acceptance, E, NR by SC at 11/3/2024 0025    Acceptance, E,TB, VU by RR at 11/1/2024 2315    Acceptance, E,D, VU,NR by AG at 10/31/2024 1234    Comment: PT educated pt. in importance of mobilization and L L/UE PROM and self ROM to prevent contracture.  Pt. is encouraged to to perform R LE AROM frequently while supine or sitting EOB.    Acceptance, E,TB, VU by RR at 10/27/2024 0031    Acceptance, E,TB, VU by RG at 10/22/2024 1002    Acceptance, TB,E, VU by RG at 10/21/2024 0907    Nonacceptance, E, NR by WG at 10/16/2024 0228    Acceptance, E,TB, VU by CF at 10/15/2024 0753    Acceptance, E,TB, VU by CF at 10/14/2024 0801    Acceptance, E,TB, VU by CF at 10/13/2024 1045    Acceptance, E,D, VU,NR by AG at 10/10/2024 1310    Acceptance, E,TB, VU by CB at 10/8/2024 0448    Acceptance, E, NR by RD at 10/2/2024 1101    Acceptance, E, NR by RD at 10/1/2024 0856    Acceptance, E,D, VU,NR by AG at 9/30/2024 1559                      Point: Precautions (In Progress)       Learning Progress Summary            Patient Acceptance, E, NR by SC at 11/3/2024 0025    Acceptance, E,TB, VU by RR at 11/1/2024 2315    Acceptance, E,D, VU,NR by AG at 10/31/2024 1234    Comment: PT educated pt. in importance of mobilization and L L/UE PROM and self ROM to prevent contracture.  Pt. is encouraged to to perform R LE AROM frequently while supine or sitting EOB.    Acceptance, E,TB, VU by RR at 10/27/2024 0031    Acceptance, E,TB, VU by RG at 10/22/2024 1002    Acceptance, TB,E, VU by RG at 10/21/2024 0907    Nonacceptance, E, NR by WG at 10/16/2024 0228    Acceptance, E,TB, VU by CF at 10/15/2024 0753    Acceptance, E,TB, VU by CF at 10/14/2024 0801     Acceptance, E,TB, VU by CF at 10/13/2024 1045    Acceptance, E,D, VU,NR by AG at 10/10/2024 1310    Acceptance, E,TB, VU by CB at 10/8/2024 0448    Acceptance, E, NR by RD at 10/2/2024 1101    Acceptance, E, NR by RD at 10/1/2024 0856    Acceptance, E,D, VU,NR by AG at 9/30/2024 1559                                      User Key       Initials Effective Dates Name Provider Type Discipline    AG 06/16/21 -  Мария Joya, PT Physical Therapist PT    RD 06/16/21 -  Laisha Verdugo, RN Registered Nurse Nurse    RR 06/11/24 -  Jaymie Dominguez, RN Registered Nurse Nurse    RG 06/06/23 -  Jesus Matthews, RN Registered Nurse Nurse    WG 02/12/24 -  Shanthi Burden, RN Registered Nurse Nurse    CF 06/25/24 -  Cherelle Germain, RN Registered Nurse Nurse    CB 06/17/24 -  Fidelia Lombardo, RN Registered Nurse Nurse    SC 09/11/24 -  Sally aHir, RN Registered Nurse Nurse                  PT Recommendation and Plan     Progress Summary (PT)  Daily Progress Summary (PT): Pt. was able to perform bed mobility skills similar as other sessions. She was able to sit EOB to perform ther-ex. Pt. would continue to benefit from skilled PT services.       Time Calculation:    PT Charges       Row Name 11/07/24 1400             Time Calculation    PT Received On 11/07/24  -KM         Time Calculation- PT    Total Timed Code Minutes- PT 23 minute(s)  -KM                User Key  (r) = Recorded By, (t) = Taken By, (c) = Cosigned By      Initials Name Provider Type    KM Thierry Saldivar, PT Physical Therapist                  Therapy Charges for Today       Code Description Service Date Service Provider Modifiers Qty    56635984356 HC PT THERAPEUTIC ACT EA 15 MIN 11/6/2024 Thierry Saldivar, PT GP 1    90157417557 HC PT THER PROC EA 15 MIN 11/6/2024 Thierry Saldivar, PT GP 1    76870932464 HC PT THERAPEUTIC ACT EA 15 MIN 11/7/2024 MagThierry sebastian, PT GP 1    28898111184 HC PT THER PROC EA 15 MIN 11/7/2024 Thierry Saldivar, PT GP 1            PT  G-Codes  AM-PAC 6 Clicks Score (PT): 9    Thierry Saldivar, PT  11/7/2024

## 2024-11-07 NOTE — PLAN OF CARE
Goal Outcome Evaluation:   Pt resting in bed at this time. Pt had complaints of pain, prn meds given. Wound care completed per orders. Pt seen by PT/OT this shift. Pt refused to bath this shift, pt educated on importance on daily hygiene. No acute changes noted at this time. Will continue to follow plan of care.

## 2024-11-08 LAB
ANION GAP SERPL CALCULATED.3IONS-SCNC: 13 MMOL/L (ref 5–15)
BASOPHILS # BLD AUTO: 0.05 10*3/MM3 (ref 0–0.2)
BASOPHILS NFR BLD AUTO: 0.4 % (ref 0–1.5)
BUN SERPL-MCNC: 20 MG/DL (ref 6–20)
BUN/CREAT SERPL: 44.4 (ref 7–25)
CALCIUM SPEC-SCNC: 9.1 MG/DL (ref 8.6–10.5)
CHLORIDE SERPL-SCNC: 99 MMOL/L (ref 98–107)
CO2 SERPL-SCNC: 23 MMOL/L (ref 22–29)
CREAT SERPL-MCNC: 0.45 MG/DL (ref 0.57–1)
DEPRECATED RDW RBC AUTO: 46.8 FL (ref 37–54)
EGFRCR SERPLBLD CKD-EPI 2021: 122.6 ML/MIN/1.73
EOSINOPHIL # BLD AUTO: 0.14 10*3/MM3 (ref 0–0.4)
EOSINOPHIL NFR BLD AUTO: 1.2 % (ref 0.3–6.2)
ERYTHROCYTE [DISTWIDTH] IN BLOOD BY AUTOMATED COUNT: 12.5 % (ref 12.3–15.4)
GLUCOSE BLDC GLUCOMTR-MCNC: 160 MG/DL (ref 70–130)
GLUCOSE BLDC GLUCOMTR-MCNC: 160 MG/DL (ref 70–130)
GLUCOSE BLDC GLUCOMTR-MCNC: 175 MG/DL (ref 70–130)
GLUCOSE BLDC GLUCOMTR-MCNC: 306 MG/DL (ref 70–130)
GLUCOSE SERPL-MCNC: 255 MG/DL (ref 65–99)
HCT VFR BLD AUTO: 35.9 % (ref 34–46.6)
HGB BLD-MCNC: 11.9 G/DL (ref 12–15.9)
IMM GRANULOCYTES # BLD AUTO: 0.05 10*3/MM3 (ref 0–0.05)
IMM GRANULOCYTES NFR BLD AUTO: 0.4 % (ref 0–0.5)
LYMPHOCYTES # BLD AUTO: 4.31 10*3/MM3 (ref 0.7–3.1)
LYMPHOCYTES NFR BLD AUTO: 36.9 % (ref 19.6–45.3)
MCH RBC QN AUTO: 33.1 PG (ref 26.6–33)
MCHC RBC AUTO-ENTMCNC: 33.1 G/DL (ref 31.5–35.7)
MCV RBC AUTO: 100 FL (ref 79–97)
MONOCYTES # BLD AUTO: 0.61 10*3/MM3 (ref 0.1–0.9)
MONOCYTES NFR BLD AUTO: 5.2 % (ref 5–12)
NEUTROPHILS NFR BLD AUTO: 55.9 % (ref 42.7–76)
NEUTROPHILS NFR BLD AUTO: 6.52 10*3/MM3 (ref 1.7–7)
NRBC BLD AUTO-RTO: 0 /100 WBC (ref 0–0.2)
PLATELET # BLD AUTO: 265 10*3/MM3 (ref 140–450)
PMV BLD AUTO: 10.1 FL (ref 6–12)
POTASSIUM SERPL-SCNC: 4.3 MMOL/L (ref 3.5–5.2)
RBC # BLD AUTO: 3.59 10*6/MM3 (ref 3.77–5.28)
SODIUM SERPL-SCNC: 135 MMOL/L (ref 136–145)
WBC NRBC COR # BLD AUTO: 11.68 10*3/MM3 (ref 3.4–10.8)

## 2024-11-08 PROCEDURE — 63710000001 INSULIN LISPRO (HUMAN) PER 5 UNITS: Performed by: INTERNAL MEDICINE

## 2024-11-08 PROCEDURE — 85025 COMPLETE CBC W/AUTO DIFF WBC: CPT

## 2024-11-08 PROCEDURE — 25010000002 HEPARIN (PORCINE) PER 1000 UNITS: Performed by: INTERNAL MEDICINE

## 2024-11-08 PROCEDURE — 99231 SBSQ HOSP IP/OBS SF/LOW 25: CPT

## 2024-11-08 PROCEDURE — 25010000002 PROCHLORPERAZINE 10 MG/2ML SOLUTION

## 2024-11-08 PROCEDURE — 63710000001 INSULIN GLARGINE PER 5 UNITS

## 2024-11-08 PROCEDURE — 82948 REAGENT STRIP/BLOOD GLUCOSE: CPT

## 2024-11-08 PROCEDURE — 80048 BASIC METABOLIC PNL TOTAL CA: CPT

## 2024-11-08 RX ADMIN — LISINOPRIL 20 MG: 10 TABLET ORAL at 08:13

## 2024-11-08 RX ADMIN — CYCLOBENZAPRINE 10 MG: 10 TABLET, FILM COATED ORAL at 13:11

## 2024-11-08 RX ADMIN — HEPARIN SODIUM 5000 UNITS: 5000 INJECTION INTRAVENOUS; SUBCUTANEOUS at 21:15

## 2024-11-08 RX ADMIN — ASPIRIN 81 MG: 81 TABLET, CHEWABLE ORAL at 08:13

## 2024-11-08 RX ADMIN — TRAMADOL HYDROCHLORIDE 50 MG: 50 TABLET, COATED ORAL at 00:35

## 2024-11-08 RX ADMIN — Medication 5 MG: at 00:35

## 2024-11-08 RX ADMIN — TRAMADOL HYDROCHLORIDE 50 MG: 50 TABLET, COATED ORAL at 16:11

## 2024-11-08 RX ADMIN — INSULIN GLARGINE 20 UNITS: 100 INJECTION, SOLUTION SUBCUTANEOUS at 08:13

## 2024-11-08 RX ADMIN — METOPROLOL TARTRATE 25 MG: 25 TABLET, FILM COATED ORAL at 08:13

## 2024-11-08 RX ADMIN — Medication 10 ML: at 21:22

## 2024-11-08 RX ADMIN — ATORVASTATIN CALCIUM 80 MG: 40 TABLET, FILM COATED ORAL at 08:13

## 2024-11-08 RX ADMIN — HEPARIN SODIUM 5000 UNITS: 5000 INJECTION INTRAVENOUS; SUBCUTANEOUS at 13:11

## 2024-11-08 RX ADMIN — DICLOFENAC SODIUM 4 G: 10 GEL TOPICAL at 08:14

## 2024-11-08 RX ADMIN — DICLOFENAC SODIUM 4 G: 10 GEL TOPICAL at 22:31

## 2024-11-08 RX ADMIN — PROCHLORPERAZINE EDISYLATE 2.5 MG: 5 INJECTION INTRAMUSCULAR; INTRAVENOUS at 00:40

## 2024-11-08 RX ADMIN — NYSTATIN: 100000 POWDER TOPICAL at 08:14

## 2024-11-08 RX ADMIN — DULOXETINE HYDROCHLORIDE 60 MG: 60 CAPSULE, DELAYED RELEASE ORAL at 08:13

## 2024-11-08 RX ADMIN — VITAMINS A AND D OINTMENT: 15.5; 53.4 OINTMENT TOPICAL at 08:14

## 2024-11-08 RX ADMIN — NICOTINE 1 PATCH: 7 PATCH, EXTENDED RELEASE TRANSDERMAL at 08:14

## 2024-11-08 RX ADMIN — TRAMADOL HYDROCHLORIDE 50 MG: 50 TABLET, COATED ORAL at 08:13

## 2024-11-08 RX ADMIN — Medication 10 ML: at 08:14

## 2024-11-08 RX ADMIN — Medication 5 MG: at 21:48

## 2024-11-08 RX ADMIN — INSULIN LISPRO 16 UNITS: 100 INJECTION, SOLUTION INTRAVENOUS; SUBCUTANEOUS at 21:15

## 2024-11-08 RX ADMIN — INSULIN LISPRO 4 UNITS: 100 INJECTION, SOLUTION INTRAVENOUS; SUBCUTANEOUS at 11:54

## 2024-11-08 RX ADMIN — PANTOPRAZOLE SODIUM 40 MG: 40 TABLET, DELAYED RELEASE ORAL at 08:13

## 2024-11-08 RX ADMIN — METOPROLOL TARTRATE 25 MG: 25 TABLET, FILM COATED ORAL at 21:16

## 2024-11-08 RX ADMIN — LIDOCAINE 1 PATCH: 4 PATCH TOPICAL at 17:03

## 2024-11-08 RX ADMIN — INSULIN GLARGINE 40 UNITS: 100 INJECTION, SOLUTION SUBCUTANEOUS at 21:18

## 2024-11-08 RX ADMIN — PROCHLORPERAZINE EDISYLATE 2.5 MG: 5 INJECTION INTRAMUSCULAR; INTRAVENOUS at 21:48

## 2024-11-08 RX ADMIN — INSULIN LISPRO 4 UNITS: 100 INJECTION, SOLUTION INTRAVENOUS; SUBCUTANEOUS at 17:03

## 2024-11-08 RX ADMIN — INSULIN LISPRO 4 UNITS: 100 INJECTION, SOLUTION INTRAVENOUS; SUBCUTANEOUS at 08:14

## 2024-11-08 RX ADMIN — ACYCLOVIR 400 MG: 200 CAPSULE ORAL at 21:17

## 2024-11-08 RX ADMIN — NYSTATIN: 100000 POWDER TOPICAL at 21:22

## 2024-11-08 RX ADMIN — HEPARIN SODIUM 5000 UNITS: 5000 INJECTION INTRAVENOUS; SUBCUTANEOUS at 05:31

## 2024-11-08 RX ADMIN — VITAMINS A AND D OINTMENT: 15.5; 53.4 OINTMENT TOPICAL at 21:21

## 2024-11-08 RX ADMIN — DICLOFENAC SODIUM 4 G: 10 GEL TOPICAL at 16:11

## 2024-11-08 NOTE — PROGRESS NOTES
Patient Identification:  Name:  Lety Johnson  Age:  44 y.o.  Sex:  female  :  1980  MRN:  [id number]  Visit Number:  [id number]  Primary Care Provider:  Concha Rao APRN    Length of stay:  41    Subjective/Interval History/Consultants/Procedures     Chief Complaint:   Chief Complaint   Patient presents with    Fall       Subjective/Interval History:    44 y.o. female who was admitted on 2024 with UTI     PMH is significant for CVA w/ left sided hemiparesis, debility, hx genital herpes on suppressive therapy, morbid obesity, T2DM   For complete admission information, please see history and physical.     Consultations:  Infectious disease  PT/OT  CM/SW    Procedures/Scans:  CT head without contrast  CT C-spine without contrast  CT T-spine without contrast  CT L-spine without contrast  CXR x 2  XR left foot  XR left ankle    Today, the patient was seen and examined with no family present at the bedside. She was sleeping soundly but woke easily. No new complaints today. CM/SW continuing to work on placement.     Room location at the time of evaluation was 332.    ----------------------------------------------------------------------------------------------------------------------  Current Hospital Meds:  acyclovir, 400 mg, Oral, Nightly  aspirin, 81 mg, Oral, Daily  atorvastatin, 80 mg, Oral, Daily  DULoxetine, 60 mg, Oral, Daily  heparin (porcine), 5,000 Units, Subcutaneous, Q8H  insulin glargine, 20 Units, Subcutaneous, Daily With Breakfast  insulin glargine, 40 Units, Subcutaneous, Nightly  insulin lispro, 4-24 Units, Subcutaneous, 4x Daily AC & at Bedtime  Lidocaine, 1 patch, Transdermal, Q24H  lisinopril, 20 mg, Oral, Daily  magic barrier cream, , Topical, BID  metoprolol tartrate, 25 mg, Oral, Q12H  nicotine, 1 patch, Transdermal, Q24H  nystatin, , Topical, Q12H  pantoprazole, 40 mg, Oral, Daily  sodium chloride, 10 mL, Intravenous, Q12H  sodium chloride, 10 mL, Intravenous,  Q12H      Pharmacy Consult,       ----------------------------------------------------------------------------------------------------------------------      Objective     Vital Signs:  Temp:  [97 °F (36.1 °C)-99 °F (37.2 °C)] 97.3 °F (36.3 °C)  Heart Rate:  [] 87  Resp:  [16-18] 16  BP: (100-120)/(60-71) 100/68      10/07/24  0511 10/08/24  0500 10/09/24  0500   Weight: 102 kg (225 lb 14.4 oz) (FIFI cameron) 102 kg (225 lb 15.5 oz) 102 kg (224 lb 1.6 oz)     Body mass index is 36.73 kg/m².    Intake/Output Summary (Last 24 hours) at 11/8/2024 1152  Last data filed at 11/8/2024 0900  Gross per 24 hour   Intake 1470 ml   Output 2350 ml   Net -880 ml     I/O this shift:  In: 360 [P.O.:360]  Out: 700 [Urine:700]  Diet: Regular/House, Diabetic; Consistent Carbohydrate; Fluid Consistency: Thin (IDDSI 0)  ----------------------------------------------------------------------------------------------------------------------    Physical Exam  Vitals and nursing note reviewed.   Constitutional:       General: She is not in acute distress.  HENT:      Head: Normocephalic and atraumatic.   Eyes:      Extraocular Movements: Extraocular movements intact.   Cardiovascular:      Rate and Rhythm: Normal rate.   Pulmonary:      Effort: Pulmonary effort is normal. No respiratory distress.   Abdominal:      Palpations: Abdomen is soft.   Skin:     General: Skin is warm and dry.   Neurological:      Mental Status: She is alert. Mental status is at baseline.   Psychiatric:         Mood and Affect: Mood normal.         Behavior: Behavior normal.              ----------------------------------------------------------------------------------------------------------------------  Tele:      ----------------------------------------------------------------------------------------------------------------------      Results from last 7 days   Lab Units 11/08/24  0233 11/05/24  0032   WBC 10*3/mm3 11.68* 13.22*   HEMOGLOBIN g/dL 11.9* 12.2  "  HEMATOCRIT % 35.9 36.3   MCV fL 100.0* 99.2*   MCHC g/dL 33.1 33.6   PLATELETS 10*3/mm3 265 299         Results from last 7 days   Lab Units 11/08/24  0233 11/05/24  0032   SODIUM mmol/L 135* 136   POTASSIUM mmol/L 4.3 3.9   CHLORIDE mmol/L 99 100   CO2 mmol/L 23.0 25.2   BUN mg/dL 20 22*   CREATININE mg/dL 0.45* 0.55*   CALCIUM mg/dL 9.1 9.6   GLUCOSE mg/dL 255* 91   Estimated Creatinine Clearance: 192.6 mL/min (A) (by C-G formula based on SCr of 0.45 mg/dL (L)).  No results found for: \"AMMONIA\"      No results found for: \"BLOODCX\"  No results found for: \"URINECX\"  No results found for: \"WOUNDCX\"  No results found for: \"STOOLCX\"  ----------------------------------------------------------------------------------------------------------------------  Imaging Results (Last 24 Hours)       ** No results found for the last 24 hours. **          ----------------------------------------------------------------------------------------------------------------------   I have reviewed the above laboratory values for 11/08/24    Assessment/Plan     There are no active hospital problems to display for this patient.        ASSESSMENT/PLAN:    ESBL E. coli UTI  Acute metabolic encephalopathy, resolved  S/p Invanz.  Infectious disease input appreciated.     Hx CVA with left-sided hemiparesis  Chronic debility  Continue PT OT  CM/SW assisting with discharge planning.  Patient is pending placement.     T2DM  Diabetic neuropathy  Continue insulin regimen-titrate as needed.  Continue supportive care measures, Cymbalta, as needed pain regimen for extremity pain     Hx genital herpes  Continue suppressive acyclovir     Morbid obesity  Complicates all aspects of care     A/P reviewed and up to date as of 11/8/24  Labs repeated today and stable overall  -----------  -DVT prophylaxis: subcu heparin  -Disposition plans/anticipated needs:Pending placement        The patient is high risk due to the following diagnoses/reasons: chronic " debility, left sided hemiparesis         Bruce Branham PA-C  11/08/24  11:52 EST

## 2024-11-08 NOTE — CASE MANAGEMENT/SOCIAL WORK
Discharge Planning Assessment   [place]     Patient Name: Lety Johnson  MRN: [id number]  Today's Date: 11/8/2024    Admit Date: 9/26/2024     Discharge Plan       Row Name 11/08/24 1417       Plan    Plan SS discussed pt with manager. Manager plans to arrange a meeting with SS, ABELARDO LANDIS, ABELARDO LANDIS supervisor and  to further discuss disposition plan. SS to follow.                  Continued Care and Services - Admitted Since 9/26/2024       Destination       Service Provider Request Status Services Address Phone Fax Patient Preferred    [facility] Declined  Cannot meet patient's needs -- [address] [facility] [address] [phone] --    [facility] Acute Care Declined  Not eligible -- [address] [facility] [place] [address] [phone] --    [facility] Declined  Not eligible -- [address] [facility] [address] [phone] --    [facility] SPECIALTY [facility] Declined  Clinical Denial -- [address] [facility]LOOR, [facility] [address] [phone] --    [facility] SWING BED UNIT Declined  Cannot meet patient's needs -- [address]  [facility] [address] [phone] --     [facility] Declined  Not eligible -- [address] [facility] [place] [address] [phone] --    [facility] Declined  Bed not available -- [address] [facility] [address] [phone] --    [facility] SKILLED CARE Declined  Bed not available -- [address] [facility] [address] [phone] --    [facility] Declined  Out of network -- [address] [facility] [id number] [phone] --    [facility] SWING BED UNIT Declined -- [address] [place] [address] [phone] --    [facility]  Declined  Clinical Denial -- [address] [facility] [address] [phone] --    [facility] SWING BED UNIT Declined  Insurance Denial, Out of network -- [address], [place] [address] [phone] --    [facility] Declined  Out of network -- [address] [facility] [address] [phone] --    [facility] Declined  Out of network -- [address] [address] [phone] -- --    [facility] Declined  Out of network -- [address] [facility] [address] [phone] --                  Expected Discharge Date and Time       Expected Discharge Date Expected Discharge Time    Nov 15, 2024      HELEN Butterfield

## 2024-11-08 NOTE — PLAN OF CARE
Goal Outcome Evaluation:   Pt resting in bed at this time. Pt had complaints of pain, prn meds given. Wound care completed per orders. No acute changes noted at this time. Will continue to follow plan of care.

## 2024-11-08 NOTE — PLAN OF CARE
Goal Outcome Evaluation:  Plan of Care Reviewed With: patient      Pt resting queitly in bed sister at bedside,no c/o voiced,tm

## 2024-11-09 LAB
GLUCOSE BLDC GLUCOMTR-MCNC: 214 MG/DL (ref 70–130)
GLUCOSE BLDC GLUCOMTR-MCNC: 295 MG/DL (ref 70–130)
GLUCOSE BLDC GLUCOMTR-MCNC: 346 MG/DL (ref 70–130)
GLUCOSE BLDC GLUCOMTR-MCNC: 92 MG/DL (ref 70–130)

## 2024-11-09 PROCEDURE — 99231 SBSQ HOSP IP/OBS SF/LOW 25: CPT | Performed by: HOSPITALIST

## 2024-11-09 PROCEDURE — 63710000001 INSULIN GLARGINE PER 5 UNITS

## 2024-11-09 PROCEDURE — 82948 REAGENT STRIP/BLOOD GLUCOSE: CPT

## 2024-11-09 PROCEDURE — 63710000001 INSULIN LISPRO (HUMAN) PER 5 UNITS: Performed by: INTERNAL MEDICINE

## 2024-11-09 PROCEDURE — 25010000002 HEPARIN (PORCINE) PER 1000 UNITS: Performed by: INTERNAL MEDICINE

## 2024-11-09 RX ADMIN — DICLOFENAC SODIUM 4 G: 10 GEL TOPICAL at 05:32

## 2024-11-09 RX ADMIN — NYSTATIN: 100000 POWDER TOPICAL at 08:22

## 2024-11-09 RX ADMIN — LISINOPRIL 20 MG: 10 TABLET ORAL at 08:20

## 2024-11-09 RX ADMIN — DULOXETINE HYDROCHLORIDE 60 MG: 60 CAPSULE, DELAYED RELEASE ORAL at 08:20

## 2024-11-09 RX ADMIN — DICLOFENAC SODIUM 4 G: 10 GEL TOPICAL at 21:14

## 2024-11-09 RX ADMIN — VITAMINS A AND D OINTMENT: 15.5; 53.4 OINTMENT TOPICAL at 08:22

## 2024-11-09 RX ADMIN — VITAMINS A AND D OINTMENT: 15.5; 53.4 OINTMENT TOPICAL at 20:58

## 2024-11-09 RX ADMIN — HEPARIN SODIUM 5000 UNITS: 5000 INJECTION INTRAVENOUS; SUBCUTANEOUS at 14:02

## 2024-11-09 RX ADMIN — TRAMADOL HYDROCHLORIDE 50 MG: 50 TABLET, COATED ORAL at 17:07

## 2024-11-09 RX ADMIN — PANTOPRAZOLE SODIUM 40 MG: 40 TABLET, DELAYED RELEASE ORAL at 08:20

## 2024-11-09 RX ADMIN — NYSTATIN: 100000 POWDER TOPICAL at 20:59

## 2024-11-09 RX ADMIN — LIDOCAINE 1 PATCH: 4 PATCH TOPICAL at 17:07

## 2024-11-09 RX ADMIN — ACETAMINOPHEN 650 MG: 325 TABLET ORAL at 14:36

## 2024-11-09 RX ADMIN — ASPIRIN 81 MG: 81 TABLET, CHEWABLE ORAL at 08:20

## 2024-11-09 RX ADMIN — INSULIN LISPRO 12 UNITS: 100 INJECTION, SOLUTION INTRAVENOUS; SUBCUTANEOUS at 17:07

## 2024-11-09 RX ADMIN — Medication 10 ML: at 08:40

## 2024-11-09 RX ADMIN — INSULIN GLARGINE 20 UNITS: 100 INJECTION, SOLUTION SUBCUTANEOUS at 08:19

## 2024-11-09 RX ADMIN — Medication 5 MG: at 20:58

## 2024-11-09 RX ADMIN — INSULIN GLARGINE 40 UNITS: 100 INJECTION, SOLUTION SUBCUTANEOUS at 20:58

## 2024-11-09 RX ADMIN — INSULIN LISPRO 8 UNITS: 100 INJECTION, SOLUTION INTRAVENOUS; SUBCUTANEOUS at 11:59

## 2024-11-09 RX ADMIN — Medication 10 ML: at 20:59

## 2024-11-09 RX ADMIN — HEPARIN SODIUM 5000 UNITS: 5000 INJECTION INTRAVENOUS; SUBCUTANEOUS at 21:13

## 2024-11-09 RX ADMIN — HEPARIN SODIUM 5000 UNITS: 5000 INJECTION INTRAVENOUS; SUBCUTANEOUS at 05:08

## 2024-11-09 RX ADMIN — METOPROLOL TARTRATE 25 MG: 25 TABLET, FILM COATED ORAL at 08:20

## 2024-11-09 RX ADMIN — INSULIN LISPRO 16 UNITS: 100 INJECTION, SOLUTION INTRAVENOUS; SUBCUTANEOUS at 21:12

## 2024-11-09 RX ADMIN — ACYCLOVIR 400 MG: 200 CAPSULE ORAL at 20:58

## 2024-11-09 RX ADMIN — NICOTINE 1 PATCH: 7 PATCH, EXTENDED RELEASE TRANSDERMAL at 09:00

## 2024-11-09 RX ADMIN — ATORVASTATIN CALCIUM 80 MG: 40 TABLET, FILM COATED ORAL at 08:20

## 2024-11-09 NOTE — PROGRESS NOTES
"    [facility] HOSPITALIST PROGRESS NOTE     Patient Identification:  Name:  Lety Johnson  Age:  44 y.o.  Sex:  female  :  1980  MRN:  [id number]  Visit Number:  [id number]  Primary Care Provider:  Concha Rao APRN    Length of stay:  42    Subjective: Patient seen and examined, she is awake and alert she is pleasant conversant, Accu-Chek reviewed appears to have improved, no issues reported except \"hurting all over and she is not due for Ultram \"patient waiting for placement.    Chief Complaint: ESBL E. coli UTI  ----------------------------------------------------------------------------------------------------------------------  Current Hospital Meds:  acyclovir, 400 mg, Oral, Nightly  aspirin, 81 mg, Oral, Daily  atorvastatin, 80 mg, Oral, Daily  DULoxetine, 60 mg, Oral, Daily  heparin (porcine), 5,000 Units, Subcutaneous, Q8H  insulin glargine, 20 Units, Subcutaneous, Daily With Breakfast  insulin glargine, 40 Units, Subcutaneous, Nightly  insulin lispro, 4-24 Units, Subcutaneous, 4x Daily AC & at Bedtime  Lidocaine, 1 patch, Transdermal, Q24H  lisinopril, 20 mg, Oral, Daily  magic barrier cream, , Topical, BID  metoprolol tartrate, 25 mg, Oral, Q12H  nicotine, 1 patch, Transdermal, Q24H  nystatin, , Topical, Q12H  pantoprazole, 40 mg, Oral, Daily  sodium chloride, 10 mL, Intravenous, Q12H  sodium chloride, 10 mL, Intravenous, Q12H      Pharmacy Consult,       ----------------------------------------------------------------------------------------------------------------------  Vital Signs:  Temp:  [97.3 °F (36.3 °C)-97.6 °F (36.4 °C)] 97.6 °F (36.4 °C)  Heart Rate:  [] 114  Resp:  [16-18] 16  BP: ()/(57-73) 95/61       Tele:       10/07/24  0511 10/08/24  0500 10/09/24  0500   Weight: 102 kg (225 lb 14.4 oz) (FIFI cameron) 102 kg (225 lb 15.5 oz) 102 kg (224 lb 1.6 oz)     Body mass index is 36.73 kg/m².    Intake/Output Summary (Last 24 hours) at 2024 1106  Last " data filed at 11/9/2024 0100  Gross per 24 hour   Intake 1310 ml   Output 1200 ml   Net 110 ml     Diet: Regular/House, Diabetic; Consistent Carbohydrate; Fluid Consistency: Thin (IDDSI 0)  ----------------------------------------------------------------------------------------------------------------------  Physical exam:  General: Comfortable,awake, alert, oriented to self, place, and time,   No respiratory distress.    Skin:  Skin is warm and dry. No rash noted. No pallor.    HENT:  Head:  Normocephalic and atraumatic.  Mouth:  Moist mucous membranes.    Eyes:  Conjunctivae and EOM are normal.  Pupils are equal, round, and reactive to light.  No scleral icterus.    Neck:  Neck supple.  No JVD present.    Pulmonary/Chest:  No respiratory distress, no wheezes, no crackles, with normal breath sounds and good air movement.  Cardiovascular:  Normal rate, regular rhythm and normal heart sounds with no murmur.  Abdominal: Obese soft.  Bowel sounds are normal.  No distension and no tenderness.   Extremities:  No edema, no tenderness, and no deformity.  No red or swollen joints anywhere.  Strong pulses in all 4 extremities with no clubbing, no cyanosis, no edema.  Neurological: Left-sided weakness.  No slurring of speech  Genitourinary: No Florentino catheter  Back:  ----------------------------------------------------------------------------------------------------------------------  ----------------------------------------------------------------------------------------------------------------------      Results from last 7 days   Lab Units 11/08/24 0233 11/05/24 0032   WBC 10*3/mm3 11.68* 13.22*   HEMOGLOBIN g/dL 11.9* 12.2   HEMATOCRIT % 35.9 36.3   MCV fL 100.0* 99.2*   MCHC g/dL 33.1 33.6   PLATELETS 10*3/mm3 265 299         Results from last 7 days   Lab Units 11/08/24 0233 11/05/24 0032   SODIUM mmol/L 135* 136   POTASSIUM mmol/L 4.3 3.9   CHLORIDE mmol/L 99 100   CO2 mmol/L 23.0 25.2   BUN mg/dL 20 22*  "  CREATININE mg/dL 0.45* 0.55*   CALCIUM mg/dL 9.1 9.6   GLUCOSE mg/dL 255* 91   Estimated Creatinine Clearance: 192.6 mL/min (A) (by C-G formula based on SCr of 0.45 mg/dL (L)).    No results found for: \"AMMONIA\"      No results found for: \"BLOODCX\"  No results found for: \"URINECX\"  No results found for: \"WOUNDCX\"  No results found for: \"STOOLCX\"    I have personally looked at the labs and they are summarized above.  ----------------------------------------------------------------------------------------------------------------------  Imaging Results (Last 24 Hours)       ** No results found for the last 24 hours. **          ----------------------------------------------------------------------------------------------------------------------  Assessment and Plan:  -Recent ESBL E. coli UTI completed treatment  -Chronic debility  -History of CVA  -Chronic pain  -Obesity  -Diabetes with hyperglycemia improved  -History of recurrent genital herpes    Supportive care, PT OT, awaiting for placement.    Disposition for placement      Mara Navas MD  11/09/24  11:06 EST  "

## 2024-11-09 NOTE — PLAN OF CARE
Goal Outcome Evaluation:              Outcome Evaluation: Patient resting in bed at this time. PRN medication given per MAR Patient complained of nausea, PRN medication given per MAR.. Wound care completed per orders. Patient voices no concerns or complaints at this time. Will continue plan of care.

## 2024-11-09 NOTE — PLAN OF CARE
Goal Outcome Evaluation:  Plan of Care Reviewed With: patient           Outcome Evaluation: Patient resting in bed at this time. A&O. VSS. Pt c/o pain, PRN pain meds given per MAR. Wound care completed per orders. No acute changes or concerns at this time. Will continue with plan of care.

## 2024-11-10 LAB
GLUCOSE BLDC GLUCOMTR-MCNC: 133 MG/DL (ref 70–130)
GLUCOSE BLDC GLUCOMTR-MCNC: 288 MG/DL (ref 70–130)
GLUCOSE BLDC GLUCOMTR-MCNC: 289 MG/DL (ref 70–130)
GLUCOSE BLDC GLUCOMTR-MCNC: 361 MG/DL (ref 70–130)

## 2024-11-10 PROCEDURE — 99232 SBSQ HOSP IP/OBS MODERATE 35: CPT | Performed by: HOSPITALIST

## 2024-11-10 PROCEDURE — 82948 REAGENT STRIP/BLOOD GLUCOSE: CPT

## 2024-11-10 PROCEDURE — 25010000002 HEPARIN (PORCINE) PER 1000 UNITS: Performed by: INTERNAL MEDICINE

## 2024-11-10 PROCEDURE — 25010000002 PROCHLORPERAZINE 10 MG/2ML SOLUTION

## 2024-11-10 PROCEDURE — 63710000001 INSULIN GLARGINE PER 5 UNITS

## 2024-11-10 PROCEDURE — 63710000001 INSULIN LISPRO (HUMAN) PER 5 UNITS: Performed by: INTERNAL MEDICINE

## 2024-11-10 RX ADMIN — Medication 10 ML: at 08:38

## 2024-11-10 RX ADMIN — NYSTATIN: 100000 POWDER TOPICAL at 22:36

## 2024-11-10 RX ADMIN — INSULIN GLARGINE 40 UNITS: 100 INJECTION, SOLUTION SUBCUTANEOUS at 22:35

## 2024-11-10 RX ADMIN — DICLOFENAC SODIUM 4 G: 10 GEL TOPICAL at 11:03

## 2024-11-10 RX ADMIN — INSULIN GLARGINE 20 UNITS: 100 INJECTION, SOLUTION SUBCUTANEOUS at 08:35

## 2024-11-10 RX ADMIN — Medication 10 ML: at 22:36

## 2024-11-10 RX ADMIN — NICOTINE 1 PATCH: 7 PATCH, EXTENDED RELEASE TRANSDERMAL at 08:38

## 2024-11-10 RX ADMIN — ATORVASTATIN CALCIUM 80 MG: 40 TABLET, FILM COATED ORAL at 08:36

## 2024-11-10 RX ADMIN — LIDOCAINE 1 PATCH: 4 PATCH TOPICAL at 16:36

## 2024-11-10 RX ADMIN — TRAMADOL HYDROCHLORIDE 50 MG: 50 TABLET, COATED ORAL at 08:40

## 2024-11-10 RX ADMIN — ACETAMINOPHEN 650 MG: 325 TABLET ORAL at 22:34

## 2024-11-10 RX ADMIN — LISINOPRIL 20 MG: 10 TABLET ORAL at 08:35

## 2024-11-10 RX ADMIN — ACYCLOVIR 400 MG: 200 CAPSULE ORAL at 22:33

## 2024-11-10 RX ADMIN — INSULIN LISPRO 20 UNITS: 100 INJECTION, SOLUTION INTRAVENOUS; SUBCUTANEOUS at 16:36

## 2024-11-10 RX ADMIN — NYSTATIN: 100000 POWDER TOPICAL at 08:38

## 2024-11-10 RX ADMIN — PROCHLORPERAZINE EDISYLATE 2.5 MG: 5 INJECTION INTRAMUSCULAR; INTRAVENOUS at 22:36

## 2024-11-10 RX ADMIN — VITAMINS A AND D OINTMENT: 15.5; 53.4 OINTMENT TOPICAL at 08:38

## 2024-11-10 RX ADMIN — VITAMINS A AND D OINTMENT: 15.5; 53.4 OINTMENT TOPICAL at 23:06

## 2024-11-10 RX ADMIN — INSULIN LISPRO 12 UNITS: 100 INJECTION, SOLUTION INTRAVENOUS; SUBCUTANEOUS at 22:34

## 2024-11-10 RX ADMIN — HEPARIN SODIUM 5000 UNITS: 5000 INJECTION INTRAVENOUS; SUBCUTANEOUS at 13:03

## 2024-11-10 RX ADMIN — HEPARIN SODIUM 5000 UNITS: 5000 INJECTION INTRAVENOUS; SUBCUTANEOUS at 05:58

## 2024-11-10 RX ADMIN — HEPARIN SODIUM 5000 UNITS: 5000 INJECTION INTRAVENOUS; SUBCUTANEOUS at 22:35

## 2024-11-10 RX ADMIN — PANTOPRAZOLE SODIUM 40 MG: 40 TABLET, DELAYED RELEASE ORAL at 08:36

## 2024-11-10 RX ADMIN — Medication 5 MG: at 22:42

## 2024-11-10 RX ADMIN — ASPIRIN 81 MG: 81 TABLET, CHEWABLE ORAL at 08:35

## 2024-11-10 RX ADMIN — Medication 10 ML: at 22:35

## 2024-11-10 RX ADMIN — ACETAMINOPHEN 650 MG: 325 TABLET ORAL at 16:36

## 2024-11-10 RX ADMIN — METOPROLOL TARTRATE 25 MG: 25 TABLET, FILM COATED ORAL at 08:35

## 2024-11-10 RX ADMIN — INSULIN LISPRO 12 UNITS: 100 INJECTION, SOLUTION INTRAVENOUS; SUBCUTANEOUS at 10:51

## 2024-11-10 RX ADMIN — DULOXETINE HYDROCHLORIDE 60 MG: 60 CAPSULE, DELAYED RELEASE ORAL at 08:36

## 2024-11-10 RX ADMIN — CYCLOBENZAPRINE 10 MG: 10 TABLET, FILM COATED ORAL at 22:42

## 2024-11-10 NOTE — PLAN OF CARE
Goal Outcome Evaluation:      Pt A/O, VSS, PRN arthritis gel applied to affected areas, wound care completed per orders, pt is resting in bed at this time without complaint, no acute changes noted, POC ongoing

## 2024-11-10 NOTE — PLAN OF CARE
Goal Outcome Evaluation:  Plan of Care Reviewed With: patient        Progress: no change  Outcome Evaluation: Pt resting in bed at this time. Pt bathed and wound care completed this shift. Pt unable to ambulate. Complaints of pain to which PRN medication was given. No acute changes or concerns noted at this time. Plan of care ongoing.

## 2024-11-10 NOTE — PROGRESS NOTES
[facility] HOSPITALIST PROGRESS NOTE     Patient Identification:  Name:  Lety Johnson  Age:  44 y.o.  Sex:  female  :  1980  MRN:  [id number]  Visit Number:  [id number]  Primary Care Provider:  Concha Rao APRN    Length of stay:  43    Subjective: Patient in bed, no issues reported by staff, patient reports other than aches and pains especially her back, no complaints at this time.  Accu-Chek results still fluctuating.    Chief Complaint: Debility  ----------------------------------------------------------------------------------------------------------------------  Current Hospital Meds:  acyclovir, 400 mg, Oral, Nightly  aspirin, 81 mg, Oral, Daily  atorvastatin, 80 mg, Oral, Daily  DULoxetine, 60 mg, Oral, Daily  heparin (porcine), 5,000 Units, Subcutaneous, Q8H  insulin glargine, 20 Units, Subcutaneous, Daily With Breakfast  insulin glargine, 40 Units, Subcutaneous, Nightly  insulin lispro, 4-24 Units, Subcutaneous, 4x Daily AC & at Bedtime  Lidocaine, 1 patch, Transdermal, Q24H  lisinopril, 20 mg, Oral, Daily  magic barrier cream, , Topical, BID  metoprolol tartrate, 25 mg, Oral, Q12H  nicotine, 1 patch, Transdermal, Q24H  nystatin, , Topical, Q12H  pantoprazole, 40 mg, Oral, Daily  sodium chloride, 10 mL, Intravenous, Q12H  sodium chloride, 10 mL, Intravenous, Q12H      Pharmacy Consult,       ----------------------------------------------------------------------------------------------------------------------  Vital Signs:  Temp:  [97.4 °F (36.3 °C)-98.4 °F (36.9 °C)] 98.1 °F (36.7 °C)  Heart Rate:  [100-115] 105  Resp:  [16-20] 18  BP: ()/(56-63) 110/58       Tele:       10/07/24  0511 10/08/24  0500 10/09/24  0500   Weight: 102 kg (225 lb 14.4 oz) (FIFI cameron) 102 kg (225 lb 15.5 oz) 102 kg (224 lb 1.6 oz)     Body mass index is 36.73 kg/m².    Intake/Output Summary (Last 24 hours) at 11/10/2024 1232  Last data filed at 11/10/2024 1100  Gross per 24 hour   Intake  1080 ml   Output 2650 ml   Net -1570 ml     Diet: Regular/House, Diabetic; Consistent Carbohydrate; Fluid Consistency: Thin (IDDSI 0)  ----------------------------------------------------------------------------------------------------------------------  Physical exam:  General: Comfortable,awake, alert, oriented to self, place, and time, person, chronically ill-appearing.    No respiratory distress.    Skin:  Skin is warm and dry. No rash noted. No pallor.    HENT:  Head:  Normocephalic and atraumatic.  Mouth:  Moist mucous membranes.    Eyes:  Conjunctivae and EOM are normal.  Pupils are equal, round, and reactive to light.  No scleral icterus.    Neck:  Neck supple.  No JVD present.    Pulmonary/Chest:  No respiratory distress, no wheezes, no crackles, with normal breath sounds and good air movement.  Cardiovascular:  Normal rate, regular rhythm and normal heart sounds with no murmur.  Abdominal:  Soft.  Bowel sounds are normal.  No distension and no tenderness.   Extremities:  No edema, no tenderness, and no deformity.  No red or swollen joints anywhere.  Strong pulses in all 4 extremities with no clubbing, no cyanosis, no edema.  Neurological: Mild left-sided plegic, No slurred speech.    Genitourinary: No Florentino catheter  Back:  ----------------------------------------------------------------------------------------------------------------------  ----------------------------------------------------------------------------------------------------------------------      Results from last 7 days   Lab Units 11/08/24 0233 11/05/24 0032   WBC 10*3/mm3 11.68* 13.22*   HEMOGLOBIN g/dL 11.9* 12.2   HEMATOCRIT % 35.9 36.3   MCV fL 100.0* 99.2*   MCHC g/dL 33.1 33.6   PLATELETS 10*3/mm3 265 299         Results from last 7 days   Lab Units 11/08/24 0233 11/05/24 0032   SODIUM mmol/L 135* 136   POTASSIUM mmol/L 4.3 3.9   CHLORIDE mmol/L 99 100   CO2 mmol/L 23.0 25.2   BUN mg/dL 20 22*   CREATININE mg/dL 0.45* 0.55*  "  CALCIUM mg/dL 9.1 9.6   GLUCOSE mg/dL 255* 91   Estimated Creatinine Clearance: 192.6 mL/min (A) (by C-G formula based on SCr of 0.45 mg/dL (L)).    No results found for: \"AMMONIA\"      No results found for: \"BLOODCX\"  No results found for: \"URINECX\"  No results found for: \"WOUNDCX\"  No results found for: \"STOOLCX\"    I have personally looked at the labs and they are summarized above.  ----------------------------------------------------------------------------------------------------------------------  Imaging Results (Last 24 Hours)       ** No results found for the last 24 hours. **          ----------------------------------------------------------------------------------------------------------------------  Assessment and Plan:  -Chronic debility  -History of severe left-sided weakness  -Diabetes poorly controlled  -Recent ESBL E. coli completed treatment  -Obesity  -History of recurrent genital herpes  -Chronic neuropathic pain    Supportive care, diabetic diet, Accu-Chek with sliding scale.  PT OT, for placement.    Disposition for placement      Mara Navas MD  11/10/24  12:32 EST  "

## 2024-11-11 LAB
GLUCOSE BLDC GLUCOMTR-MCNC: 157 MG/DL (ref 70–130)
GLUCOSE BLDC GLUCOMTR-MCNC: 177 MG/DL (ref 70–130)
GLUCOSE BLDC GLUCOMTR-MCNC: 293 MG/DL (ref 70–130)
GLUCOSE BLDC GLUCOMTR-MCNC: 334 MG/DL (ref 70–130)

## 2024-11-11 PROCEDURE — 82948 REAGENT STRIP/BLOOD GLUCOSE: CPT

## 2024-11-11 PROCEDURE — 63710000001 INSULIN LISPRO (HUMAN) PER 5 UNITS: Performed by: INTERNAL MEDICINE

## 2024-11-11 PROCEDURE — 25010000002 PROCHLORPERAZINE 10 MG/2ML SOLUTION

## 2024-11-11 PROCEDURE — 63710000001 INSULIN GLARGINE PER 5 UNITS

## 2024-11-11 PROCEDURE — 25010000002 HEPARIN (PORCINE) PER 1000 UNITS: Performed by: INTERNAL MEDICINE

## 2024-11-11 PROCEDURE — 99231 SBSQ HOSP IP/OBS SF/LOW 25: CPT

## 2024-11-11 PROCEDURE — 97164 PT RE-EVAL EST PLAN CARE: CPT

## 2024-11-11 PROCEDURE — 97110 THERAPEUTIC EXERCISES: CPT

## 2024-11-11 RX ORDER — IBUPROFEN 400 MG/1
400 TABLET, FILM COATED ORAL ONCE
Status: COMPLETED | OUTPATIENT
Start: 2024-11-11 | End: 2024-11-11

## 2024-11-11 RX ADMIN — INSULIN LISPRO 4 UNITS: 100 INJECTION, SOLUTION INTRAVENOUS; SUBCUTANEOUS at 09:06

## 2024-11-11 RX ADMIN — LIDOCAINE 1 PATCH: 4 PATCH TOPICAL at 16:30

## 2024-11-11 RX ADMIN — INSULIN LISPRO 12 UNITS: 100 INJECTION, SOLUTION INTRAVENOUS; SUBCUTANEOUS at 16:30

## 2024-11-11 RX ADMIN — VITAMINS A AND D OINTMENT: 15.5; 53.4 OINTMENT TOPICAL at 20:14

## 2024-11-11 RX ADMIN — NYSTATIN: 100000 POWDER TOPICAL at 20:15

## 2024-11-11 RX ADMIN — PROCHLORPERAZINE EDISYLATE 2.5 MG: 5 INJECTION INTRAMUSCULAR; INTRAVENOUS at 20:14

## 2024-11-11 RX ADMIN — DULOXETINE HYDROCHLORIDE 60 MG: 60 CAPSULE, DELAYED RELEASE ORAL at 09:16

## 2024-11-11 RX ADMIN — LISINOPRIL 20 MG: 10 TABLET ORAL at 09:19

## 2024-11-11 RX ADMIN — IBUPROFEN 400 MG: 400 TABLET, FILM COATED ORAL at 21:09

## 2024-11-11 RX ADMIN — DICLOFENAC SODIUM 4 G: 10 GEL TOPICAL at 10:58

## 2024-11-11 RX ADMIN — HEPARIN SODIUM 5000 UNITS: 5000 INJECTION INTRAVENOUS; SUBCUTANEOUS at 21:09

## 2024-11-11 RX ADMIN — VITAMINS A AND D OINTMENT: 15.5; 53.4 OINTMENT TOPICAL at 09:07

## 2024-11-11 RX ADMIN — DICLOFENAC SODIUM 4 G: 10 GEL TOPICAL at 05:12

## 2024-11-11 RX ADMIN — HEPARIN SODIUM 5000 UNITS: 5000 INJECTION INTRAVENOUS; SUBCUTANEOUS at 13:27

## 2024-11-11 RX ADMIN — Medication 5 MG: at 20:14

## 2024-11-11 RX ADMIN — HEPARIN SODIUM 5000 UNITS: 5000 INJECTION INTRAVENOUS; SUBCUTANEOUS at 05:11

## 2024-11-11 RX ADMIN — ACETAMINOPHEN 650 MG: 325 TABLET ORAL at 09:06

## 2024-11-11 RX ADMIN — INSULIN LISPRO 16 UNITS: 100 INJECTION, SOLUTION INTRAVENOUS; SUBCUTANEOUS at 20:14

## 2024-11-11 RX ADMIN — Medication 10 ML: at 20:13

## 2024-11-11 RX ADMIN — ACETAMINOPHEN 650 MG: 325 TABLET ORAL at 16:30

## 2024-11-11 RX ADMIN — INSULIN GLARGINE 20 UNITS: 100 INJECTION, SOLUTION SUBCUTANEOUS at 09:07

## 2024-11-11 RX ADMIN — INSULIN LISPRO 4 UNITS: 100 INJECTION, SOLUTION INTRAVENOUS; SUBCUTANEOUS at 10:58

## 2024-11-11 RX ADMIN — Medication 10 ML: at 09:08

## 2024-11-11 RX ADMIN — Medication 10 ML: at 20:14

## 2024-11-11 RX ADMIN — INSULIN GLARGINE 40 UNITS: 100 INJECTION, SOLUTION SUBCUTANEOUS at 20:14

## 2024-11-11 RX ADMIN — ACYCLOVIR 400 MG: 200 CAPSULE ORAL at 20:14

## 2024-11-11 RX ADMIN — NICOTINE 1 PATCH: 7 PATCH, EXTENDED RELEASE TRANSDERMAL at 09:07

## 2024-11-11 RX ADMIN — CYCLOBENZAPRINE 10 MG: 10 TABLET, FILM COATED ORAL at 09:06

## 2024-11-11 RX ADMIN — DICLOFENAC SODIUM 4 G: 10 GEL TOPICAL at 18:24

## 2024-11-11 RX ADMIN — ATORVASTATIN CALCIUM 80 MG: 40 TABLET, FILM COATED ORAL at 09:17

## 2024-11-11 RX ADMIN — ASPIRIN 81 MG: 81 TABLET, CHEWABLE ORAL at 09:19

## 2024-11-11 RX ADMIN — PANTOPRAZOLE SODIUM 40 MG: 40 TABLET, DELAYED RELEASE ORAL at 09:18

## 2024-11-11 RX ADMIN — METOPROLOL TARTRATE 25 MG: 25 TABLET, FILM COATED ORAL at 09:16

## 2024-11-11 RX ADMIN — NYSTATIN: 100000 POWDER TOPICAL at 09:07

## 2024-11-11 NOTE — PLAN OF CARE
Goal Outcome Evaluation:  Plan of Care Reviewed With: patient        Progress: no change  Outcome Evaluation: Patient alert/oriented and resting in bed at this time. Wound care complete per orders. Patient complained of pain, PRN meds given. No acute changes noted at this time. Will continue with plan of care.

## 2024-11-11 NOTE — PROGRESS NOTES
Patient Identification:  Name:  Lety Johnson  Age:  44 y.o.  Sex:  female  :  1980  MRN:  [id number]  Visit Number:  [id number]  Primary Care Provider:  Concha Rao APRN    Length of stay:  44    Subjective/Interval History/Consultants/Procedures     Chief Complaint:   Chief Complaint   Patient presents with    Fall       Subjective/Interval History:    44 y.o. female who was admitted on 2024 with UTI    PMH is significant for CVA w/ left sided hemiparesis, debility, hx genital herpes on suppressive therapy, morbid obesity, T2DM   For complete admission information, please see history and physical.     Consultations:  Infectious disease  PT/OT  CM/SW    Procedures/Scans:  CT head without contrast  CT C-spine without contrast  CT T-spine without contrast  CT L-spine without contrast  CXR x 2  XR left foot  XR left ankle    Today, the patient was seen and examined with no family present at bedside.  She reported a mild headache this morning. She reported a history of right sided tooth infections and was curious if may be the cause. No facial swelling or redness noted. No other new complaints voiced today.     Room location at the time of evaluation was [facility].    ----------------------------------------------------------------------------------------------------------------------  Current Hospital Meds:  acyclovir, 400 mg, Oral, Nightly  aspirin, 81 mg, Oral, Daily  atorvastatin, 80 mg, Oral, Daily  DULoxetine, 60 mg, Oral, Daily  heparin (porcine), 5,000 Units, Subcutaneous, Q8H  insulin glargine, 20 Units, Subcutaneous, Daily With Breakfast  insulin glargine, 40 Units, Subcutaneous, Nightly  insulin lispro, 4-24 Units, Subcutaneous, 4x Daily AC & at Bedtime  Lidocaine, 1 patch, Transdermal, Q24H  lisinopril, 20 mg, Oral, Daily  magic barrier cream, , Topical, BID  metoprolol tartrate, 25 mg, Oral, Q12H  nicotine, 1 patch, Transdermal, Q24H  nystatin, , Topical, Q12H  pantoprazole, 40 mg, Oral,  Daily  sodium chloride, 10 mL, Intravenous, Q12H  sodium chloride, 10 mL, Intravenous, Q12H      Pharmacy Consult,       ----------------------------------------------------------------------------------------------------------------------      Objective     Vital Signs:  Temp:  [97.5 °F (36.4 °C)-98.6 °F (37 °C)] 97.5 °F (36.4 °C)  Heart Rate:  [] 100  Resp:  [20] 20  BP: ()/(47-75) 104/66      10/07/24  0511 10/08/24  0500 10/09/24  0500   Weight: 102 kg (225 lb 14.4 oz) (FIFI cameron) 102 kg (225 lb 15.5 oz) 102 kg (224 lb 1.6 oz)     Body mass index is 36.73 kg/m².    Intake/Output Summary (Last 24 hours) at 11/11/2024 1248  Last data filed at 11/11/2024 0936  Gross per 24 hour   Intake 740 ml   Output 1200 ml   Net -460 ml     I/O this shift:  In: 480 [P.O.:480]  Out: -   Diet: Regular/House, Diabetic; Consistent Carbohydrate; Fluid Consistency: Thin (IDDSI 0)  ----------------------------------------------------------------------------------------------------------------------    Physical Exam  Vitals and nursing note reviewed.   Constitutional:       General: She is not in acute distress.     Appearance: She is obese. She is ill-appearing.   HENT:      Head: Normocephalic and atraumatic.   Eyes:      Extraocular Movements: Extraocular movements intact.   Cardiovascular:      Rate and Rhythm: Normal rate.   Pulmonary:      Effort: Pulmonary effort is normal. No respiratory distress.   Abdominal:      Palpations: Abdomen is soft.   Musculoskeletal:      Right lower leg: No edema.      Left lower leg: No edema.   Skin:     General: Skin is warm and dry.   Neurological:      Mental Status: She is alert. Mental status is at baseline.   Psychiatric:         Mood and Affect: Mood normal.         Behavior: Behavior normal.              ----------------------------------------------------------------------------------------------------------------------  Tele:   "      ----------------------------------------------------------------------------------------------------------------------      Results from last 7 days   Lab Units 11/08/24  0233 11/05/24  0032   WBC 10*3/mm3 11.68* 13.22*   HEMOGLOBIN g/dL 11.9* 12.2   HEMATOCRIT % 35.9 36.3   MCV fL 100.0* 99.2*   MCHC g/dL 33.1 33.6   PLATELETS 10*3/mm3 265 299         Results from last 7 days   Lab Units 11/08/24 0233 11/05/24  0032   SODIUM mmol/L 135* 136   POTASSIUM mmol/L 4.3 3.9   CHLORIDE mmol/L 99 100   CO2 mmol/L 23.0 25.2   BUN mg/dL 20 22*   CREATININE mg/dL 0.45* 0.55*   CALCIUM mg/dL 9.1 9.6   GLUCOSE mg/dL 255* 91   Estimated Creatinine Clearance: 192.6 mL/min (A) (by C-G formula based on SCr of 0.45 mg/dL (L)).  No results found for: \"AMMONIA\"      No results found for: \"BLOODCX\"  No results found for: \"URINECX\"  No results found for: \"WOUNDCX\"  No results found for: \"STOOLCX\"  ----------------------------------------------------------------------------------------------------------------------  Imaging Results (Last 24 Hours)       ** No results found for the last 24 hours. **          ----------------------------------------------------------------------------------------------------------------------   I have reviewed the above laboratory values for 11/11/24    Assessment/Plan     There are no active hospital problems to display for this patient.        ASSESSMENT/PLAN:    ESBL E. coli UTI  Acute metabolic encephalopathy, resolved  S/p Invanz.  Infectious disease input appreciated.     Hx CVA with left-sided hemiparesis  Chronic debility  Continue PT OT  CM/SW assisting with discharge planning.  Patient is pending placement.     T2DM  Diabetic neuropathy  Continue insulin regimen-titrate as needed.  Continue supportive care measures, Cymbalta, as needed pain regimen for extremity pain     Hx genital herpes  Continue suppressive acyclovir     Morbid obesity  Complicates all aspects of care     Will repeat " labs in the morning to trend.   -----------  -DVT prophylaxis: subcu heparin  -Disposition plans/anticipated needs:Pending placement      The patient is high risk due to the following diagnoses/reasons:  chronic debility, s/p CVA w/ left sided hemiparesis         Bruce Branham PA-C  11/11/24  12:48 EST

## 2024-11-11 NOTE — CASE MANAGEMENT/SOCIAL WORK
Discharge Planning Assessment  [facility]     Patient Name: Lety Johnson  MRN: [id number]  Today's Date: 11/11/2024    Admit Date: 9/26/2024       Discharge Plan       Row Name 11/11/24 1601       Plan    Plan SS discussed pt with manager and . Manager scheduled a meeting with SS, , ABELARDO LANDIS, and APS BOBY supervisor for Wednesday, 11/13/24. SS will follow up with [facility] office and [facility] tomorrow due to them being closed today for the federal holiday. SS to follow.                  Continued Care and Services - Admitted Since 9/26/2024       Destination       Service Provider Request Status Services Address Phone Fax Patient Preferred    [facility] Declined  Cannot meet patient's needs -- [address] [facility] [address] [phone] --    [facility] Acute Care Declined  Not eligible -- [address] [facility] [place] [address] [phone] --    [facility] Declined  Not eligible -- [address] [facility] [address] [phone] --    [facility] Declined  Clinical Denial -- [address] [facility] [address] [phone] --    [facility] BED UNIT Declined  Cannot meet patient's needs -- [address] [facility] [address] [phone] --     [facility] Declined  Not eligible -- [address] [facility] [id number] [phone] --    [facility] Declined  Bed not available -- [address] [facility] [address] [phone] --    [facility] Declined  Bed not available -- [address] [facility] [address] [phone] --    [facility] Declined  Out of network -- [address] [place] [address] [phone] --    [facility] Declined -- [address] [place] [id number] [phone] --    [facility] Declined  Clinical Denial -- [address] [facility] [address] [phone] --    [facility] SW[facility] BED UNIT Declined  Insurance Denial, Out of network -- [address] [facility] [address] [phone] --    [facility] Declined  Out of network -- [address] [facility] [address] [phone] --    [facility] Declined  Out of network -- [address] [address] [phone] -- --    [facility] Declined  Out of network -- [address] [facility] [address] [phone] --                  Expected Discharge Date and Time       Expected Discharge Date Expected Discharge Time    Nov 15, 2024         HELEN Butterfield

## 2024-11-11 NOTE — PLAN OF CARE
Goal Outcome Evaluation:  Plan of Care Reviewed With: patient        Progress: no change  Outcome Evaluation: Pt resting in bed at this time. Pt took part in bed activities this shift. Pt bathed and wound care completed per orders. Complaints of pain to which PRN medication was given. No acute changes noted at this time. Plan of care ongoing.

## 2024-11-11 NOTE — THERAPY RE-EVALUATION
Acute Care - Physical Therapy Re-Evaluation   Zaki     Patient Name: Lety Johnson  : 1980  MRN: [id number]  Today's Date: 2024   Onset of Illness/Injury or Date of Surgery: 24  Visit Dx:     ICD-10-CM ICD-9-CM   1. Hypokalemia  E87.6 276.8   2. Pressure injury of skin of sacral region, unspecified injury stage  L89.159 707.03     707.20   3. Pressure injury of skin of left heel, unspecified injury stage  L89.629 707.07     707.20   4. Acute cystitis without hematuria  N30.00 595.0     Patient Active Problem List   Diagnosis   (none) - all problems resolved or deleted     Past Medical History:   Diagnosis Date    Stroke      History reviewed. No pertinent surgical history.  PT Assessment (Last 12 Hours)       PT Evaluation and Treatment       Row Name 24 1339          Physical Therapy Time and Intention    Subjective Information complains of;pain  -KM     Document Type re-evaluation  -KM     Mode of Treatment individual therapy;physical therapy  -KM     Patient Effort adequate  -KM     Symptoms Noted During/After Treatment increased pain  -KM       Row Name 24 1339          General Information    Patient Profile Reviewed yes  -KM     Patient Observations alert;cooperative;agree to therapy  -KM     Existing Precautions/Restrictions fall  -KM       Row Name 24 1339          Cognition    Affect/Mental Status (Cognition) emotionally labile  -KM     Orientation Status (Cognition) oriented x 3  -KM     Follows Commands (Cognition) WFL  -KM       Row Name 24 1339          Bed Mobility    Bed Mobility supine-sit;sit-supine  -KM     Supine-Sit [place] (Bed Mobility) maximum assist (25% patient effort);2 person assist  -KM     Sit-Supine [place] (Bed Mobility) maximum assist (25% patient effort);2 person assist  -KM     Bed Mobility, Safety Issues decreased use of arms for pushing/pulling;decreased use of legs for bridging/pushing  -KM     Assistive Device (Bed  Mobility) bed rails;head of bed elevated  -KM       Row Name 11/11/24 1339          Gait/Stairs (Locomotion)    Patient was able to Ambulate no, other medical factors prevent ambulation  -KM     Reason Patient was unable to Ambulate Non-Ambulatory at Baseline  -KM       Row Name 11/11/24 1339          Safety Issues/Impairments Affecting Functional Mobility    Impairments Affecting Function (Mobility) endurance/activity tolerance;pain;range of motion (ROM);strength;coordination;motor control;muscle tone abnormal  -KM       Row Name 11/11/24 1339          Balance    Balance Assessment sitting static balance  -KM     Static Sitting Balance standby assist  -KM     Position, Sitting Balance sitting edge of bed  -KM       Row Name 11/11/24 1339          Motor Skills    Comments, Therapeutic Exercise supine RLE ther-ex; LLE PROM and stretching  -KM       Row Name             Wound 09/26/24 1809 Left posterior ankle Other (comment)    Wound - Properties Group Placement Date: 09/26/24  -KJ Placement Time: 1809  -KJ Side: Left  -KJ Orientation: posterior  -KJ Location: ankle  -KJ Primary Wound Type: Other  -TW, unknow etiology     Retired Wound - Properties Group Placement Date: 09/26/24  -KJ Placement Time: 1809  -KJ Side: Left  -KJ Orientation: posterior  -KJ Location: ankle  -KJ Primary Wound Type: Other  -TW, unknow etiology     Retired Wound - Properties Group Placement Date: 09/26/24  -KJ Placement Time: 1809  -KJ Side: Left  -KJ Orientation: posterior  -KJ Location: ankle  -KJ Primary Wound Type: Other  -TW, unknow etiology     Retired Wound - Properties Group Date first assessed: 09/26/24  -KJ Time first assessed: 1809  -KJ Side: Left  -KJ Location: ankle  -KJ Primary Wound Type: Other  -TW, unknow etiology       Row Name             Wound 10/11/24 1330 medial coccyx Fissure    Wound - Properties Group Placement Date: 10/11/24  -TW Placement Time: 1330  -TW Orientation: medial  -TW Location: coccyx  -TW Primary Wound  Type: Fissure  -TW    Retired Wound - Properties Group Placement Date: 10/11/24  -TW Placement Time: 1330 -TW Orientation: medial  -TW Location: coccyx  -TW Primary Wound Type: Fissure  -TW    Retired Wound - Properties Group Placement Date: 10/11/24  -TW Placement Time: 1330  -TW Orientation: medial  -TW Location: coccyx  -TW Primary Wound Type: Fissure  -TW    Retired Wound - Properties Group Date first assessed: 10/11/24  -TW Time first assessed: 1330 -TW Location: coccyx  -TW Primary Wound Type: Fissure  -TW      Row Name             Wound 10/16/24 0705 Left medial gluteal MASD (moisture associated skin damage)    Wound - Properties Group Placement Date: 10/16/24  -MS Placement Time: 0705 -MS Side: Left  -MS Orientation: medial  -MS Location: gluteal  -MS Primary Wound Type: MASD  -MS Type: MASD (moisture associated skin damage)  -MS Present on Original Admission: N  -MS Additional Comments: Noted at shift change skin assesment, Night shift RN stated it had been there her shift.  -MS    Retired Wound - Properties Group Placement Date: 10/16/24  -MS Placement Time: 0705 -MS Present on Original Admission: N  -MS Side: Left  -MS Orientation: medial  -MS Location: gluteal  -MS Primary Wound Type: MASD  -MS Additional Comments: Noted at shift change skin assesment, Night shift RN stated it had been there her shift.  -MS Type: MASD (moisture associated skin damage)  -MS    Retired Wound - Properties Group Placement Date: 10/16/24  -MS Placement Time: 0705 -MS Present on Original Admission: N  -MS Side: Left  -MS Orientation: medial  -MS Location: gluteal  -MS Primary Wound Type: MASD  -MS Additional Comments: Noted at shift change skin assesment, Night shift RN stated it had been there her shift.  -MS Type: MASD (moisture associated skin damage)  -MS    Retired Wound - Properties Group Date first assessed: 10/16/24  -MS Time first assessed: 0705  -MS Present on Original Admission: N  -MS Side: Left  -MS Location:  gluteal  -MS Primary Wound Type: MASD  -MS Additional Comments: Noted at shift change skin assesment, Night shift RN stated it had been there her shift.  -MS Type: MASD (moisture associated skin damage)  -MS      Row Name             Wound 10/16/24 0705 Left posterior greater trochanter MASD (moisture associated skin damage)    Wound - Properties Group Placement Date: 10/16/24  -MS Placement Time: 0705  -MS Side: Left  -MS Orientation: posterior  -MS Location: greater trochanter  -MS Primary Wound Type: MASD  -MS Type: MASD (moisture associated skin damage)  -MS Present on Original Admission: N  -MS    Retired Wound - Properties Group Placement Date: 10/16/24  -MS Placement Time: 0705  -MS Present on Original Admission: N  -MS Side: Left  -MS Orientation: posterior  -MS Location: greater trochanter  -MS Primary Wound Type: MASD  -MS Type: MASD (moisture associated skin damage)  -MS    Retired Wound - Properties Group Placement Date: 10/16/24  -MS Placement Time: 0705  -MS Present on Original Admission: N  -MS Side: Left  -MS Orientation: posterior  -MS Location: greater trochanter  -MS Primary Wound Type: MASD  -MS Type: MASD (moisture associated skin damage)  -MS    Retired Wound - Properties Group Date first assessed: 10/16/24  -MS Time first assessed: 0705  -MS Present on Original Admission: N  -MS Side: Left  -MS Location: greater trochanter  -MS Primary Wound Type: MASD  -MS Type: MASD (moisture associated skin damage)  -MS      Row Name 11/11/24 1334          Progress Summary (PT)    Daily Progress Summary (PT) Pt. re-evaluation completed during PT session. She was able to continue performing mobility skills at low levels. She was able to tolerate ther-ex. Pt. would continue to benefit from skilled PT services.  -KM       Row Name 11/11/24 8419          Physical Therapy Goals    Bed Mobility Goal Selection (PT) bed mobility, PT goal 1  -KM     Transfer Goal Selection (PT) transfer, PT goal 1  -KM       Row  Name 11/11/24 1339          Bed Mobility Goal 1 (PT)    Activity/Assistive Device (Bed Mobility Goal 1, PT) bed mobility activities, all  -KM     [place] Level/Cues Needed (Bed Mobility Goal 1, PT) moderate assist (50-74% patient effort)  -KM     Time Frame (Bed Mobility Goal 1, PT) by discharge  -KM     Progress/Outcomes (Bed Mobility Goal 1, PT) goal ongoing  -KM       Row Name 11/11/24 1339          Transfer Goal 1 (PT)    Activity/Assistive Device (Transfer Goal 1, PT) toilet;wheelchair transfer;sliding board  -KM     [place] Level/Cues Needed (Transfer Goal 1, PT) moderate assist (50-74% patient effort)  -KM     Time Frame (Transfer Goal 1, PT) by discharge  -KM     Progress/Outcome (Transfer Goal 1, PT) goal ongoing  -KM               User Key  (r) = Recorded By, (t) = Taken By, (c) = Cosigned By      Initials Name Provider Type    Karen Peralta, RN Registered Nurse    KM Thierry Saldivar, PT Physical Therapist    Nory Keenan RN Registered Nurse    Roe North RN Registered Nurse                    Physical Therapy Education       Title: PT OT SLP Therapies (Done)       Topic: Physical Therapy (Done)       Point: Mobility training (Done)       Learning Progress Summary            Patient Acceptance, TB,E, VU by RG at 11/11/2024 0944    Acceptance, E,TB, VU by RG at 11/10/2024 0931    Acceptance, E, VU by  at 11/10/2024 0233    Acceptance, E,TB, VU by CF at 11/9/2024 1016    Acceptance, E, NR by SC at 11/3/2024 0025    Acceptance, E,TB, VU by RR at 11/1/2024 2315    Acceptance, E,D, VU,NR by  at 10/31/2024 1234    Comment: PT educated pt. in importance of mobilization and L L/UE PROM and self ROM to prevent contracture.  Pt. is encouraged to to perform R LE AROM frequently while supine or sitting EOB.    Acceptance, E,TB, VU by RR at 10/27/2024 0031    Acceptance, E,TB, VU by RG at 10/22/2024 1002    Acceptance, TB,E, VU by RG at 10/21/2024 0907    Nonacceptance, E, NR by   at 10/16/2024 0228    Acceptance, E,TB, VU by CF at 10/15/2024 0753    Acceptance, E,TB, VU by CF at 10/14/2024 0801    Acceptance, E,TB, VU by CF at 10/13/2024 1045    Acceptance, E,D, VU,NR by AG at 10/10/2024 1310    Acceptance, E,TB, VU by CB at 10/8/2024 0448    Acceptance, E, NR by RD at 10/2/2024 1101    Acceptance, E, NR by RD at 10/1/2024 0856    Acceptance, E,D, VU,NR by AG at 9/30/2024 1559                      Point: Home exercise program (Done)       Learning Progress Summary            Patient Acceptance, TB,E, VU by RG at 11/11/2024 0944    Acceptance, E,TB, VU by RG at 11/10/2024 0931    Acceptance, E, VU by WG at 11/10/2024 0233    Acceptance, E,TB, VU by CF at 11/9/2024 1016    Acceptance, E, NR by SC at 11/3/2024 0025    Acceptance, E,TB, VU by RR at 11/1/2024 2315    Acceptance, E,D, VU,NR by AG at 10/31/2024 1234    Comment: PT educated pt. in importance of mobilization and L L/UE PROM and self ROM to prevent contracture.  Pt. is encouraged to to perform R LE AROM frequently while supine or sitting EOB.    Acceptance, E,TB, VU by RR at 10/27/2024 0031    Acceptance, E,TB, VU by RG at 10/22/2024 1002    Acceptance, TB,E, VU by RG at 10/21/2024 0907    Nonacceptance, E, NR by WG at 10/16/2024 0228    Acceptance, E,TB, VU by CF at 10/15/2024 0753    Acceptance, E,TB, VU by CF at 10/14/2024 0801    Acceptance, E,TB, VU by CF at 10/13/2024 1045    Acceptance, E,D, VU,NR by AG at 10/10/2024 1310    Acceptance, E,TB, VU by CB at 10/8/2024 0448    Acceptance, E, NR by RD at 10/2/2024 1101    Acceptance, E, NR by RD at 10/1/2024 0856    Acceptance, E,D, VU,NR by AG at 9/30/2024 1559                      Point: Body mechanics (Done)       Learning Progress Summary            Patient Acceptance, TB,E, VU by RG at 11/11/2024 0944    Acceptance, E,TB, VU by RG at 11/10/2024 0931    Acceptance, E, VU by WG at 11/10/2024 0233    Acceptance, E,TB, VU by CF at 11/9/2024 1016    Acceptance, E, NR by SC at  11/3/2024 0025    Acceptance, E,TB, VU by RR at 11/1/2024 2315    Acceptance, E,D, VU,NR by AG at 10/31/2024 1234    Comment: PT educated pt. in importance of mobilization and L L/UE PROM and self ROM to prevent contracture.  Pt. is encouraged to to perform R LE AROM frequently while supine or sitting EOB.    Acceptance, E,TB, VU by RR at 10/27/2024 0031    Acceptance, E,TB, VU by RG at 10/22/2024 1002    Acceptance, TB,E, VU by RG at 10/21/2024 0907    Nonacceptance, E, NR by WG at 10/16/2024 0228    Acceptance, E,TB, VU by CF at 10/15/2024 0753    Acceptance, E,TB, VU by CF at 10/14/2024 0801    Acceptance, E,TB, VU by CF at 10/13/2024 1045    Acceptance, E,D, VU,NR by AG at 10/10/2024 1310    Acceptance, E,TB, VU by CB at 10/8/2024 0448    Acceptance, E, NR by RD at 10/2/2024 1101    Acceptance, E, NR by RD at 10/1/2024 0856    Acceptance, E,D, VU,NR by AG at 9/30/2024 1559                      Point: Precautions (Done)       Learning Progress Summary            Patient Acceptance, TB,E, VU by RG at 11/11/2024 0944    Acceptance, E,TB, VU by RG at 11/10/2024 0931    Acceptance, E, VU by WG at 11/10/2024 0233    Acceptance, E,TB, VU by CF at 11/9/2024 1016    Acceptance, E, NR by SC at 11/3/2024 0025    Acceptance, E,TB, VU by RR at 11/1/2024 2315    Acceptance, E,D, VU,NR by AG at 10/31/2024 1234    Comment: PT educated pt. in importance of mobilization and L L/UE PROM and self ROM to prevent contracture.  Pt. is encouraged to to perform R LE AROM frequently while supine or sitting EOB.    Acceptance, E,TB, VU by RR at 10/27/2024 0031    Acceptance, E,TB, VU by RG at 10/22/2024 1002    Acceptance, TB,E, VU by RG at 10/21/2024 0907    Nonacceptance, E, NR by WG at 10/16/2024 0228    Acceptance, E,TB, VU by CF at 10/15/2024 0753    Acceptance, E,TB, VU by CF at 10/14/2024 0801    Acceptance, E,TB, VU by CF at 10/13/2024 1045    Acceptance, E,D, VU,NR by AG at 10/10/2024 1310    Acceptance, E,TB, VU by CB at  10/8/2024 0448    Acceptance, E, NR by RD at 10/2/2024 1101    Acceptance, E, NR by RD at 10/1/2024 0856    Acceptance, E,D, VU,NR by  at 9/30/2024 1559                                      User Key       Initials Effective Dates Name Provider Type Discipline     06/16/21 -  Мария Joya, PT Physical Therapist PT    RD 06/16/21 -  Laisha Verdugo, RN Registered Nurse Nurse    RR 06/11/24 -  Jaymie Dominguez, RN Registered Nurse Nurse    RG 06/06/23 -  Jesus Matthews, RN Registered Nurse Nurse    WG 02/12/24 -  Shanthi Burden, RN Registered Nurse Nurse    CF 06/25/24 -  Cherelle Germain, RN Registered Nurse Nurse    [place] 06/17/24 -  Fidelia Lombardo, RN Registered Nurse Nurse    SC 09/11/24 -  Sally Hair, RN Registered Nurse Nurse                  PT Recommendation and Plan  Anticipated Discharge Disposition (PT): home with 24/7 [facility], skilled nursing facility  Progress Summary (PT)  Daily Progress Summary (PT): Pt. re-evaluation completed during PT session. She was able to continue performing mobility skills at low levels. She was able to tolerate ther-ex. Pt. would continue to benefit from skilled PT services.       Time Calculation:    PT Charges       Row Name 11/11/24 1339             Time Calculation    PT Received On 11/11/24  -KM      PT Goal Re-Cert Due Date 11/25/24  -KM         Time Calculation- PT    Total Timed Code Minutes- PT 15 minute(s)  -KM                User Key  (r) = Recorded By, (t) = Taken By, (c) = Cosigned By      Initials Name Provider Type     Thierry Saldivar, PT Physical Therapist                  Therapy Charges for Today       Code Description Service Date Service Provider Modifiers Qty    42776139934 HC PT THER PROC EA 15 MIN 11/11/2024 Thierry Saldivar, PT GP 1    25164631814 HC PT RE-EVAL ESTABLISHED PLAN 2 11/11/2024 Thierry Saldivar, PT GP 1            PT G-Codes  AM-PAC 6 Clicks Score (PT): 8    Thierry Saldivar PT  11/11/2024

## 2024-11-12 VITALS
HEART RATE: 108 BPM | TEMPERATURE: 97.8 F | RESPIRATION RATE: 20 BRPM | BODY MASS INDEX: 36.02 KG/M2 | SYSTOLIC BLOOD PRESSURE: 99 MMHG | DIASTOLIC BLOOD PRESSURE: 64 MMHG | WEIGHT: 224.1 LBS | OXYGEN SATURATION: 98 % | HEIGHT: 66 IN

## 2024-11-12 LAB
ANION GAP SERPL CALCULATED.3IONS-SCNC: 11.1 MMOL/L (ref 5–15)
BASOPHILS # BLD AUTO: 0.04 10*3/MM3 (ref 0–0.2)
BASOPHILS NFR BLD AUTO: 0.3 % (ref 0–1.5)
BUN SERPL-MCNC: 17 MG/DL (ref 6–20)
BUN/CREAT SERPL: 42.5 (ref 7–25)
CALCIUM SPEC-SCNC: 9.2 MG/DL (ref 8.6–10.5)
CHLORIDE SERPL-SCNC: 100 MMOL/L (ref 98–107)
CO2 SERPL-SCNC: 23.9 MMOL/L (ref 22–29)
CREAT SERPL-MCNC: 0.4 MG/DL (ref 0.57–1)
DEPRECATED RDW RBC AUTO: 46.1 FL (ref 37–54)
EGFRCR SERPLBLD CKD-EPI 2021: 126.1 ML/MIN/1.73
EOSINOPHIL # BLD AUTO: 0.09 10*3/MM3 (ref 0–0.4)
EOSINOPHIL NFR BLD AUTO: 0.8 % (ref 0.3–6.2)
ERYTHROCYTE [DISTWIDTH] IN BLOOD BY AUTOMATED COUNT: 12.7 % (ref 12.3–15.4)
GLUCOSE BLDC GLUCOMTR-MCNC: 157 MG/DL (ref 70–130)
GLUCOSE BLDC GLUCOMTR-MCNC: 161 MG/DL (ref 70–130)
GLUCOSE BLDC GLUCOMTR-MCNC: 292 MG/DL (ref 70–130)
GLUCOSE BLDC GLUCOMTR-MCNC: 297 MG/DL (ref 70–130)
GLUCOSE SERPL-MCNC: 291 MG/DL (ref 65–99)
HCT VFR BLD AUTO: 37.1 % (ref 34–46.6)
HGB BLD-MCNC: 12.3 G/DL (ref 12–15.9)
IMM GRANULOCYTES # BLD AUTO: 0.05 10*3/MM3 (ref 0–0.05)
IMM GRANULOCYTES NFR BLD AUTO: 0.4 % (ref 0–0.5)
LYMPHOCYTES # BLD AUTO: 2.5 10*3/MM3 (ref 0.7–3.1)
LYMPHOCYTES NFR BLD AUTO: 21.7 % (ref 19.6–45.3)
MCH RBC QN AUTO: 33 PG (ref 26.6–33)
MCHC RBC AUTO-ENTMCNC: 33.2 G/DL (ref 31.5–35.7)
MCV RBC AUTO: 99.5 FL (ref 79–97)
MONOCYTES # BLD AUTO: 0.43 10*3/MM3 (ref 0.1–0.9)
MONOCYTES NFR BLD AUTO: 3.7 % (ref 5–12)
NEUTROPHILS NFR BLD AUTO: 73.1 % (ref 42.7–76)
NEUTROPHILS NFR BLD AUTO: 8.41 10*3/MM3 (ref 1.7–7)
NRBC BLD AUTO-RTO: 0 /100 WBC (ref 0–0.2)
PLATELET # BLD AUTO: 265 10*3/MM3 (ref 140–450)
PMV BLD AUTO: 10 FL (ref 6–12)
POTASSIUM SERPL-SCNC: 4.4 MMOL/L (ref 3.5–5.2)
RBC # BLD AUTO: 3.73 10*6/MM3 (ref 3.77–5.28)
SODIUM SERPL-SCNC: 135 MMOL/L (ref 136–145)
WBC NRBC COR # BLD AUTO: 11.52 10*3/MM3 (ref 3.4–10.8)

## 2024-11-12 PROCEDURE — 97530 THERAPEUTIC ACTIVITIES: CPT

## 2024-11-12 PROCEDURE — 85025 COMPLETE CBC W/AUTO DIFF WBC: CPT

## 2024-11-12 PROCEDURE — 99232 SBSQ HOSP IP/OBS MODERATE 35: CPT

## 2024-11-12 PROCEDURE — 25010000002 ALTEPLASE 2 MG RECONSTITUTED SOLUTION 1 EACH VIAL

## 2024-11-12 PROCEDURE — 63710000001 INSULIN GLARGINE PER 5 UNITS

## 2024-11-12 PROCEDURE — 25010000002 HEPARIN (PORCINE) PER 1000 UNITS: Performed by: INTERNAL MEDICINE

## 2024-11-12 PROCEDURE — 97110 THERAPEUTIC EXERCISES: CPT

## 2024-11-12 PROCEDURE — 82948 REAGENT STRIP/BLOOD GLUCOSE: CPT

## 2024-11-12 PROCEDURE — 63710000001 INSULIN LISPRO (HUMAN) PER 5 UNITS: Performed by: INTERNAL MEDICINE

## 2024-11-12 PROCEDURE — 63710000001 INSULIN LISPRO (HUMAN) PER 5 UNITS

## 2024-11-12 PROCEDURE — 80048 BASIC METABOLIC PNL TOTAL CA: CPT

## 2024-11-12 PROCEDURE — 25010000002 PROCHLORPERAZINE 10 MG/2ML SOLUTION

## 2024-11-12 RX ORDER — INSULIN LISPRO 100 [IU]/ML
8 INJECTION, SOLUTION INTRAVENOUS; SUBCUTANEOUS
Status: DISCONTINUED | OUTPATIENT
Start: 2024-11-12 | End: 2024-11-29 | Stop reason: HOSPADM

## 2024-11-12 RX ORDER — PROCHLORPERAZINE MALEATE 5 MG/1
5 TABLET ORAL EVERY 6 HOURS PRN
Status: DISCONTINUED | OUTPATIENT
Start: 2024-11-12 | End: 2024-11-29 | Stop reason: HOSPADM

## 2024-11-12 RX ORDER — PREGABALIN 25 MG/1
25 CAPSULE ORAL DAILY
Status: DISCONTINUED | OUTPATIENT
Start: 2024-11-12 | End: 2024-11-14

## 2024-11-12 RX ADMIN — PANTOPRAZOLE SODIUM 40 MG: 40 TABLET, DELAYED RELEASE ORAL at 08:23

## 2024-11-12 RX ADMIN — VITAMINS A AND D OINTMENT: 15.5; 53.4 OINTMENT TOPICAL at 08:29

## 2024-11-12 RX ADMIN — VITAMINS A AND D OINTMENT: 15.5; 53.4 OINTMENT TOPICAL at 21:12

## 2024-11-12 RX ADMIN — DULOXETINE HYDROCHLORIDE 60 MG: 60 CAPSULE, DELAYED RELEASE ORAL at 08:23

## 2024-11-12 RX ADMIN — INSULIN LISPRO 8 UNITS: 100 INJECTION, SOLUTION INTRAVENOUS; SUBCUTANEOUS at 11:04

## 2024-11-12 RX ADMIN — INSULIN LISPRO 4 UNITS: 100 INJECTION, SOLUTION INTRAVENOUS; SUBCUTANEOUS at 16:48

## 2024-11-12 RX ADMIN — INSULIN GLARGINE 20 UNITS: 100 INJECTION, SOLUTION SUBCUTANEOUS at 08:27

## 2024-11-12 RX ADMIN — INSULIN LISPRO 4 UNITS: 100 INJECTION, SOLUTION INTRAVENOUS; SUBCUTANEOUS at 08:27

## 2024-11-12 RX ADMIN — NYSTATIN: 100000 POWDER TOPICAL at 21:12

## 2024-11-12 RX ADMIN — ALTEPLASE: 2.2 INJECTION, POWDER, LYOPHILIZED, FOR SOLUTION INTRAVENOUS at 03:51

## 2024-11-12 RX ADMIN — ACETAMINOPHEN 650 MG: 325 TABLET ORAL at 08:23

## 2024-11-12 RX ADMIN — ACYCLOVIR 400 MG: 200 CAPSULE ORAL at 21:12

## 2024-11-12 RX ADMIN — Medication 10 ML: at 08:29

## 2024-11-12 RX ADMIN — NICOTINE 1 PATCH: 7 PATCH, EXTENDED RELEASE TRANSDERMAL at 08:29

## 2024-11-12 RX ADMIN — HEPARIN SODIUM 5000 UNITS: 5000 INJECTION INTRAVENOUS; SUBCUTANEOUS at 05:50

## 2024-11-12 RX ADMIN — ATORVASTATIN CALCIUM 80 MG: 40 TABLET, FILM COATED ORAL at 08:23

## 2024-11-12 RX ADMIN — PREGABALIN 25 MG: 25 CAPSULE ORAL at 11:04

## 2024-11-12 RX ADMIN — ASPIRIN 81 MG: 81 TABLET, CHEWABLE ORAL at 08:23

## 2024-11-12 RX ADMIN — LIDOCAINE 1 PATCH: 4 PATCH TOPICAL at 16:48

## 2024-11-12 RX ADMIN — HEPARIN SODIUM 5000 UNITS: 5000 INJECTION INTRAVENOUS; SUBCUTANEOUS at 21:11

## 2024-11-12 RX ADMIN — PROCHLORPERAZINE EDISYLATE 2.5 MG: 5 INJECTION INTRAMUSCULAR; INTRAVENOUS at 08:23

## 2024-11-12 RX ADMIN — DICLOFENAC SODIUM 4 G: 10 GEL TOPICAL at 13:15

## 2024-11-12 RX ADMIN — HEPARIN SODIUM 5000 UNITS: 5000 INJECTION INTRAVENOUS; SUBCUTANEOUS at 13:15

## 2024-11-12 RX ADMIN — LISINOPRIL 20 MG: 10 TABLET ORAL at 08:23

## 2024-11-12 RX ADMIN — CYCLOBENZAPRINE 10 MG: 10 TABLET, FILM COATED ORAL at 08:23

## 2024-11-12 RX ADMIN — ACETAMINOPHEN 650 MG: 325 TABLET ORAL at 21:12

## 2024-11-12 RX ADMIN — METOPROLOL TARTRATE 25 MG: 25 TABLET, FILM COATED ORAL at 08:23

## 2024-11-12 RX ADMIN — Medication 5 MG: at 21:16

## 2024-11-12 RX ADMIN — INSULIN LISPRO 12 UNITS: 100 INJECTION, SOLUTION INTRAVENOUS; SUBCUTANEOUS at 21:11

## 2024-11-12 RX ADMIN — INSULIN GLARGINE 40 UNITS: 100 INJECTION, SOLUTION SUBCUTANEOUS at 21:11

## 2024-11-12 RX ADMIN — NYSTATIN: 100000 POWDER TOPICAL at 08:29

## 2024-11-12 RX ADMIN — INSULIN LISPRO 12 UNITS: 100 INJECTION, SOLUTION INTRAVENOUS; SUBCUTANEOUS at 11:04

## 2024-11-12 RX ADMIN — METOPROLOL TARTRATE 25 MG: 25 TABLET, FILM COATED ORAL at 21:12

## 2024-11-12 NOTE — PLAN OF CARE
Goal Outcome Evaluation:  Plan of Care Reviewed With: patient        Progress: no change  Outcome Evaluation: Pt resting in bed at this time. Wound care completed per orders. Bath completed. Pt on bedrest. Pt worked with PT this shift. Pt complain of pain and muscle spasms to which PRN medication was given. No acute changes noted at this time. Plan of care ongoing.

## 2024-11-12 NOTE — THERAPY TREATMENT NOTE
Acute Care - Occupational Therapy Treatment Note   Zaki     Patient Name: Leyt Johnson  : 1980  MRN: [id number]  Today's Date: 2024  Onset of Illness/Injury or Date of Surgery: 24     Referring Physician: Dr. Marc    Admit Date: 2024       ICD-10-CM ICD-9-CM   1. Hypokalemia  E87.6 276.8   2. Pressure injury of skin of sacral region, unspecified injury stage  L89.159 707.03     707.20   3. Pressure injury of skin of left heel, unspecified injury stage  L89.629 707.07     707.20   4. Acute cystitis without hematuria  N30.00 595.0     Patient Active Problem List   Diagnosis   (none) - all problems resolved or deleted     Past Medical History:   Diagnosis Date    Stroke      History reviewed. No pertinent surgical history.      OT ASSESSMENT FLOWSHEET (Last 12 Hours)       OT Evaluation and Treatment       Row Name 24 1047                   OT Time and Intention    Document Type therapy note (daily note)  -KR        Mode of Treatment occupational therapy  -KR        Patient Effort adequate  -KR           Cognition    Follows Commands (Cognition) WFL  -KR           Motor Skills    Neuromuscular Function left;upper extremity;severe impairment  -KR           Shoulder (Therapeutic Exercise)    Shoulder PROM (Therapeutic Exercise) left;flexion  trace + PROM  -KR           Elbow/Forearm (Therapeutic Exercise)    Elbow/Forearm PROM (Therapeutic Exercise) left;flexion;extension  -KR           Wrist (Therapeutic Exercise)    Wrist PROM (Therapeutic Exercise) left;flexion;extension  -KR           Hand (Therapeutic Exercise)    Hand PROM (Therapeutic Exercise) left;finger flexion;finger extension  -KR           Wound 24 180 Left posterior ankle Other (comment)    Wound - Properties Group Placement Date: 24  -KJ Placement Time: 1809 Side: Left  -KJ Orientation: posterior  -KJ Location: ankle  -KJ Primary Wound Type: Other  -TW, unknow etiology     Retired Wound - Properties  Group Placement Date: 09/26/24  -KJ Placement Time: 1809  -KJ Side: Left  -KJ Orientation: posterior  -KJ Location: ankle  -KJ Primary Wound Type: Other  -TW, unknow etiology     Retired Wound - Properties Group Placement Date: 09/26/24  -KJ Placement Time: 1809  -KJ Side: Left  -KJ Orientation: posterior  -KJ Location: ankle  -KJ Primary Wound Type: Other  -TW, unknow etiology     Retired Wound - Properties Group Date first assessed: 09/26/24  -KJ Time first assessed: 1809  -KJ Side: Left  -KJ Location: ankle  -KJ Primary Wound Type: Other  -TW, unknow etiology        Wound 10/11/24 1330 medial coccyx Fissure    Wound - Properties Group Placement Date: 10/11/24  -TW Placement Time: 1330 -TW Orientation: medial  -TW Location: coccyx  -TW Primary Wound Type: Fissure  -TW    Retired Wound - Properties Group Placement Date: 10/11/24  -TW Placement Time: 1330  -TW Orientation: medial  -TW Location: coccyx  -TW Primary Wound Type: Fissure  -TW    Retired Wound - Properties Group Placement Date: 10/11/24  -TW Placement Time: 1330  -TW Orientation: medial  -TW Location: coccyx  -TW Primary Wound Type: Fissure  -TW    Retired Wound - Properties Group Date first assessed: 10/11/24  -TW Time first assessed: 1330  -TW Location: coccyx  -TW Primary Wound Type: Fissure  -TW       Wound 10/16/24 0705 Left medial gluteal MASD (moisture associated skin damage)    Wound - Properties Group Placement Date: 10/16/24  -MS Placement Time: 0705 -MS Side: Left  -MS Orientation: medial  -MS Location: gluteal  -MS Primary Wound Type: MASD  -MS Type: MASD (moisture associated skin damage)  -MS Present on Original Admission: N  -MS Additional Comments: Noted at shift change skin assesment, Night shift RN stated it had been there her shift.  -MS    Retired Wound - Properties Group Placement Date: 10/16/24  -MS Placement Time: 0705 -MS Present on Original Admission: N  -MS Side: Left  -MS Orientation: medial  -MS Location: gluteal  -MS  Primary Wound Type: MASD  -MS Additional Comments: Noted at shift change skin assesment, Night shift RN stated it had been there her shift.  -MS Type: MASD (moisture associated skin damage)  -MS    Retired Wound - Properties Group Placement Date: 10/16/24  -MS Placement Time: 0705 -MS Present on Original Admission: N  -MS Side: Left  -MS Orientation: medial  -MS Location: gluteal  -MS Primary Wound Type: MASD  -MS Additional Comments: Noted at shift change skin assesment, Night shift RN stated it had been there her shift.  -MS Type: MASD (moisture associated skin damage)  -MS    Retired Wound - Properties Group Date first assessed: 10/16/24  -MS Time first assessed: 0705 -MS Present on Original Admission: N  -MS Side: Left  -MS Location: gluteal  -MS Primary Wound Type: MASD  -MS Additional Comments: Noted at shift change skin assesment, Night shift RN stated it had been there her shift.  -MS Type: MASD (moisture associated skin damage)  -MS       Wound 10/16/24 0705 Left posterior greater trochanter MASD (moisture associated skin damage)    Wound - Properties Group Placement Date: 10/16/24  -MS Placement Time: 0705  -MS Side: Left  -MS Orientation: posterior  -MS Location: greater trochanter  -MS Primary Wound Type: MASD  -MS Type: MASD (moisture associated skin damage)  -MS Present on Original Admission: N  -MS    Retired Wound - Properties Group Placement Date: 10/16/24  -MS Placement Time: 0705  -MS Present on Original Admission: N  -MS Side: Left  -MS Orientation: posterior  -MS Location: greater trochanter  -MS Primary Wound Type: MASD  -MS Type: MASD (moisture associated skin damage)  -MS    Retired Wound - Properties Group Placement Date: 10/16/24  -MS Placement Time: 0705  -MS Present on Original Admission: N  -MS Side: Left  -MS Orientation: posterior  -MS Location: greater trochanter  -MS Primary Wound Type: MASD  -MS Type: MASD (moisture associated skin damage)  -MS    Retired Wound - Properties Group  Date first assessed: 10/16/24  -MS Time first assessed: 0705  -MS Present on Original Admission: N  -MS Side: Left  -MS Location: greater trochanter  -MS Primary Wound Type: MASD  -MS Type: MASD (moisture associated skin damage)  -MS       Plan of Care Review    Plan of Care Reviewed With patient  -KR           Therapy Assessment/Plan (OT)    Therapy Assessment/Plan (OT) Pt continues LUE severe impairment, no fxl ROM noted. Pt tolerated minimal PROM LUE, due to pain. LUE in fxl position at rest/supine, following session. OT to continue as tolerated.  -KR                  User Key  (r) = Recorded By, (t) = Taken By, (c) = Cosigned By      Initials Name Effective Dates    TW Karen Gipson RN 06/16/21 -     Neil Menezes OT 06/16/21 -     Nory Keenan RN 06/08/23 -     Roe North RN 06/04/24 -                      Occupational Therapy Education        No education to display                      OT Recommendation and Plan  Planned Therapy Interventions (OT): ROM/therapeutic exercise  Plan of Care Review  Plan of Care Reviewed With: patient  Progress: improving (tolerance to LUE PROM)  Plan of Care Reviewed With: patient        Time Calculation:     Therapy Charges for Today       Code Description Service Date Service Provider Modifiers Qty    12201842427  OT THERAPEUTIC ACT EA 15 MIN 11/12/2024 Neil Graf OT GO 1                 Neil Graf OT  11/12/2024

## 2024-11-12 NOTE — THERAPY TREATMENT NOTE
Acute Care - Physical Therapy Treatment Note   [place]     Patient Name: Lety Johnson  : 1980  MRN: [id number]  Today's Date: 2024   Onset of Illness/Injury or Date of Surgery: 24  Visit Dx:     ICD-10-CM ICD-9-CM   1. Hypokalemia  E87.6 276.8   2. Pressure injury of skin of sacral region, unspecified injury stage  L89.159 707.03     707.20   3. Pressure injury of skin of left heel, unspecified injury stage  L89.629 707.07     707.20   4. Acute cystitis without hematuria  N30.00 595.0     Patient Active Problem List   Diagnosis   (none) - all problems resolved or deleted     Past Medical History:   Diagnosis Date    Stroke      History reviewed. No pertinent surgical history.  PT Assessment (Last 12 Hours)       PT Evaluation and Treatment       Row Name 24 1155          Physical Therapy Time and Intention    Subjective Information complains of;pain  -KM     Document Type therapy note (daily note)  -KM     Mode of Treatment individual therapy;physical therapy  -KM     Patient Effort adequate  -KM     Symptoms Noted During/After Treatment increased pain  -KM       Row Name 24 1155          General Information    Patient Profile Reviewed yes  -KM     Patient Observations alert;cooperative;agree to therapy  -KM     Existing Precautions/Restrictions fall  -KM       Row Name 24 1155          Cognition    Affect/Mental Status (Cognition) emotionally labile  -KM     Orientation Status (Cognition) oriented x 3  -KM     Follows Commands (Cognition) WFL  -KM       Row Name 24 1155          Bed Mobility    Bed Mobility supine-sit;sit-supine  -KM     Supine-Sit [place] (Bed Mobility) maximum assist (25% patient effort);2 person assist  -KM     Sit-Supine [place] (Bed Mobility) maximum assist (25% patient effort);2 person assist  -KM     Bed Mobility, Safety Issues decreased use of arms for pushing/pulling;decreased use of legs for bridging/pushing  -KM     Assistive  Device (Bed Mobility) bed rails;head of bed elevated  -KM       Row Name 11/12/24 1155          Gait/Stairs (Locomotion)    Patient was able to Ambulate no, other medical factors prevent ambulation  -     Reason Patient was unable to Ambulate Non-Ambulatory at Baseline  -KM       Row Name 11/12/24 1155          Safety Issues/Impairments Affecting Functional Mobility    Impairments Affecting Function (Mobility) endurance/activity tolerance;pain;range of motion (ROM);strength;coordination;motor control;muscle tone abnormal  -KM       Row Name 11/12/24 1155          Motor Skills    Comments, Therapeutic Exercise EOB ther-ex  -       Row Name             Wound 09/26/24 1809 Left posterior ankle Other (comment)    Wound - Properties Group Placement Date: 09/26/24  -KJ Placement Time: 1809  -KJ Side: Left  -KJ Orientation: posterior  -KJ Location: ankle  -KJ Primary Wound Type: Other  -TW, unknow etiology     Retired Wound - Properties Group Placement Date: 09/26/24  -KJ Placement Time: 1809  -KJ Side: Left  -KJ Orientation: posterior  -KJ Location: ankle  -KJ Primary Wound Type: Other  -TW, unknow etiology     Retired Wound - Properties Group Placement Date: 09/26/24  -KJ Placement Time: 1809  -KJ Side: Left  -KJ Orientation: posterior  -KJ Location: ankle  -KJ Primary Wound Type: Other  -TW, unknow etiology     Retired Wound - Properties Group Date first assessed: 09/26/24  -KJ Time first assessed: 1809  -KJ Side: Left  -KJ Location: ankle  -KJ Primary Wound Type: Other  -TW, unknow etiology       Row Name             Wound 10/11/24 1330 medial coccyx Fissure    Wound - Properties Group Placement Date: 10/11/24  -TW Placement Time: 1330 -TW Orientation: medial  -TW Location: coccyx  -TW Primary Wound Type: Fissure  -TW    Retired Wound - Properties Group Placement Date: 10/11/24  -TW Placement Time: 1330 -TW Orientation: medial  -TW Location: coccyx  -TW Primary Wound Type: Fissure  -TW    Retired Wound -  Properties Group Placement Date: 10/11/24  -TW Placement Time: 1330 -TW Orientation: medial  -TW Location: coccyx  -TW Primary Wound Type: Fissure  -TW    Retired Wound - Properties Group Date first assessed: 10/11/24  -TW Time first assessed: 1330 -TW Location: coccyx  -TW Primary Wound Type: Fissure  -TW      Row Name             Wound 10/16/24 0705 Left medial gluteal MASD (moisture associated skin damage)    Wound - Properties Group Placement Date: 10/16/24  -MS Placement Time: 0705  -MS Side: Left  -MS Orientation: medial  -MS Location: gluteal  -MS Primary Wound Type: MASD  -MS Type: MASD (moisture associated skin damage)  -MS Present on Original Admission: N  -MS Additional Comments: Noted at shift change skin assesment, Night shift RN stated it had been there her shift.  -MS    Retired Wound - Properties Group Placement Date: 10/16/24  -MS Placement Time: 0705 -MS Present on Original Admission: N  -MS Side: Left  -MS Orientation: medial  -MS Location: gluteal  -MS Primary Wound Type: MASD  -MS Additional Comments: Noted at shift change skin assesment, Night shift RN stated it had been there her shift.  -MS Type: MASD (moisture associated skin damage)  -MS    Retired Wound - Properties Group Placement Date: 10/16/24  -MS Placement Time: 0705 -MS Present on Original Admission: N  -MS Side: Left  -MS Orientation: medial  -MS Location: gluteal  -MS Primary Wound Type: MASD  -MS Additional Comments: Noted at shift change skin assesment, Night shift RN stated it had been there her shift.  -MS Type: MASD (moisture associated skin damage)  -MS    Retired Wound - Properties Group Date first assessed: 10/16/24  -MS Time first assessed: 0705  -MS Present on Original Admission: N  -MS Side: Left  -MS Location: gluteal  -MS Primary Wound Type: MASD  -MS Additional Comments: Noted at shift change skin assesment, Night shift RN stated it had been there her shift.  -MS Type: MASD (moisture associated skin damage)  -MS       Row Name             Wound 10/16/24 0705 Left posterior greater trochanter MASD (moisture associated skin damage)    Wound - Properties Group Placement Date: 10/16/24  -MS Placement Time: 0705 -MS Side: Left  -MS Orientation: posterior  -MS Location: greater trochanter  -MS Primary Wound Type: MASD  -MS Type: MASD (moisture associated skin damage)  -MS Present on Original Admission: N  -MS    Retired Wound - Properties Group Placement Date: 10/16/24  -MS Placement Time: 0705 -MS Present on Original Admission: N  -MS Side: Left  -MS Orientation: posterior  -MS Location: greater trochanter  -MS Primary Wound Type: MASD  -MS Type: MASD (moisture associated skin damage)  -MS    Retired Wound - Properties Group Placement Date: 10/16/24  -MS Placement Time: 0705  -MS Present on Original Admission: N  -MS Side: Left  -MS Orientation: posterior  -MS Location: greater trochanter  -MS Primary Wound Type: MASD  -MS Type: MASD (moisture associated skin damage)  -MS    Retired Wound - Properties Group Date first assessed: 10/16/24  -MS Time first assessed: 0705 -MS Present on Original Admission: N  -MS Side: Left  -MS Location: greater trochanter  -MS Primary Wound Type: MASD  -MS Type: MASD (moisture associated skin damage)  -MS      Row Name 11/12/24 1155          Progress Summary (PT)    Daily Progress Summary (PT) Pt. was able to perform bed mobility w/ maxA. She was able to sit EOB for minimal duration d/t increased pain. She c/o pain throughout duration of session. Pt. would continue to benefit from skilled PT services as she can improve activity tolerance.  -KM               User Key  (r) = Recorded By, (t) = Taken By, (c) = Cosigned By      Initials Name Provider Type    Karen Peralta, RN Registered Nurse    Thierry Carter, PT Physical Therapist    Nory Keenan, RN Registered Nurse    Roe North, RN Registered Nurse                    Physical Therapy Education       Title: PT OT SLP  Therapies (Done)       Topic: Physical Therapy (Done)       Point: Mobility training (Done)       Learning Progress Summary            Patient Acceptance, E,TB, VU by RG at 11/12/2024 0927    Acceptance, TB,E, VU by RG at 11/11/2024 0944    Acceptance, E,TB, VU by RG at 11/10/2024 0931    Acceptance, E, VU by WG at 11/10/2024 0233    Acceptance, E,TB, VU by CF at 11/9/2024 1016    Acceptance, E, NR by SC at 11/3/2024 0025    Acceptance, E,TB, VU by RR at 11/1/2024 2315    Acceptance, E,D, VU,NR by AG at 10/31/2024 1234    Comment: PT educated pt. in importance of mobilization and L L/UE PROM and self ROM to prevent contracture.  Pt. is encouraged to to perform R LE AROM frequently while supine or sitting EOB.    Acceptance, E,TB, VU by RR at 10/27/2024 0031    Acceptance, E,TB, VU by RG at 10/22/2024 1002    Acceptance, TB,E, VU by RG at 10/21/2024 0907    Nonacceptance, E, NR by WG at 10/16/2024 0228    Acceptance, E,TB, VU by CF at 10/15/2024 0753    Acceptance, E,TB, VU by CF at 10/14/2024 0801    Acceptance, E,TB, VU by CF at 10/13/2024 1045    Acceptance, E,D, VU,NR by AG at 10/10/2024 1310    Acceptance, E,TB, VU by CB at 10/8/2024 0448    Acceptance, E, NR by RD at 10/2/2024 1101    Acceptance, E, NR by RD at 10/1/2024 0856    Acceptance, E,D, VU,NR by AG at 9/30/2024 1559                      Point: Home exercise program (Done)       Learning Progress Summary            Patient Acceptance, E,TB, VU by RG at 11/12/2024 0927    Acceptance, TB,E, VU by RG at 11/11/2024 0944    Acceptance, E,TB, VU by RG at 11/10/2024 0931    Acceptance, E, VU by WG at 11/10/2024 0233    Acceptance, E,TB, VU by CF at 11/9/2024 1016    Acceptance, E, NR by SC at 11/3/2024 0025    Acceptance, E,TB, VU by RR at 11/1/2024 2315    Acceptance, E,D, VU,NR by AG at 10/31/2024 1234    Comment: PT educated pt. in importance of mobilization and L L/UE PROM and self ROM to prevent contracture.  Pt. is encouraged to to perform R LE AROM  frequently while supine or sitting EOB.    Acceptance, E,TB, VU by RR at 10/27/2024 0031    Acceptance, E,TB, VU by RG at 10/22/2024 1002    Acceptance, TB,E, VU by RG at 10/21/2024 0907    Nonacceptance, E, NR by WG at 10/16/2024 0228    Acceptance, E,TB, VU by CF at 10/15/2024 0753    Acceptance, E,TB, VU by CF at 10/14/2024 0801    Acceptance, E,TB, VU by CF at 10/13/2024 1045    Acceptance, E,D, VU,NR by AG at 10/10/2024 1310    Acceptance, E,TB, VU by CB at 10/8/2024 0448    Acceptance, E, NR by RD at 10/2/2024 1101    Acceptance, E, NR by RD at 10/1/2024 0856    Acceptance, E,D, VU,NR by AG at 9/30/2024 1559                      Point: Body mechanics (Done)       Learning Progress Summary            Patient Acceptance, E,TB, VU by RG at 11/12/2024 0927    Acceptance, TB,E, VU by RG at 11/11/2024 0944    Acceptance, E,TB, VU by RG at 11/10/2024 0931    Acceptance, E, VU by WG at 11/10/2024 0233    Acceptance, E,TB, VU by CF at 11/9/2024 1016    Acceptance, E, NR by SC at 11/3/2024 0025    Acceptance, E,TB, VU by RR at 11/1/2024 2315    Acceptance, E,D, VU,NR by AG at 10/31/2024 1234    Comment: PT educated pt. in importance of mobilization and L L/UE PROM and self ROM to prevent contracture.  Pt. is encouraged to to perform R LE AROM frequently while supine or sitting EOB.    Acceptance, E,TB, VU by RR at 10/27/2024 0031    Acceptance, E,TB, VU by RG at 10/22/2024 1002    Acceptance, TB,E, VU by RG at 10/21/2024 0907    Nonacceptance, E, NR by WG at 10/16/2024 0228    Acceptance, E,TB, VU by CF at 10/15/2024 0753    Acceptance, E,TB, VU by CF at 10/14/2024 0801    Acceptance, E,TB, VU by CF at 10/13/2024 1045    Acceptance, E,D, VU,NR by AG at 10/10/2024 1310    Acceptance, E,TB, VU by CB at 10/8/2024 0448    Acceptance, E, NR by RD at 10/2/2024 1101    Acceptance, E, NR by RD at 10/1/2024 0856    Acceptance, E,D, VU,NR by AG at 9/30/2024 1559                      Point: Precautions (Done)       Learning  Progress Summary            Patient Acceptance, E,TB, VU by RG at 11/12/2024 0927    Acceptance, TB,E, VU by RG at 11/11/2024 0944    Acceptance, E,TB, VU by RG at 11/10/2024 0931    Acceptance, E, VU by WG at 11/10/2024 0233    Acceptance, E,TB, VU by CF at 11/9/2024 1016    Acceptance, E, NR by SC at 11/3/2024 0025    Acceptance, E,TB, VU by RR at 11/1/2024 2315    Acceptance, E,D, VU,NR by AG at 10/31/2024 1234    Comment: PT educated pt. in importance of mobilization and L L/UE PROM and self ROM to prevent contracture.  Pt. is encouraged to to perform R LE AROM frequently while supine or sitting EOB.    Acceptance, E,TB, VU by RR at 10/27/2024 0031    Acceptance, E,TB, VU by RG at 10/22/2024 1002    Acceptance, TB,E, VU by RG at 10/21/2024 0907    Nonacceptance, E, NR by WG at 10/16/2024 0228    Acceptance, E,TB, VU by CF at 10/15/2024 0753    Acceptance, E,TB, VU by CF at 10/14/2024 0801    Acceptance, E,TB, VU by CF at 10/13/2024 1045    Acceptance, E,D, VU,NR by AG at 10/10/2024 1310    Acceptance, E,TB, VU by  at 10/8/2024 0448    Acceptance, E, NR by RD at 10/2/2024 1101    Acceptance, E, NR by RD at 10/1/2024 0856    Acceptance, E,D, VU,NR by AG at 9/30/2024 1559                                      User Key       Initials Effective Dates Name Provider Type Discipline    AG 06/16/21 -  Мария Joya, PT Physical Therapist PT    RD 06/16/21 -  Laisha Verdugo, RN Registered Nurse Nurse    RR 06/11/24 -  Jaymie Dominguez, RN Registered Nurse Nurse    RG 06/06/23 -  Jesus Matthews, RN Registered Nurse Nurse    WG 02/12/24 -  Shanthi Burden, RN Registered Nurse Nurse    CF 06/25/24 -  Cherelle Germain, RN Registered Nurse Nurse    CB 06/17/24 -  Fidelia Lombardo, RN Registered Nurse Nurse    SC 09/11/24 -  Sally Hair RN Registered Nurse Nurse                  PT Recommendation and Plan  Anticipated Discharge Disposition (PT): home with 24/7 care, skilled nursing facility  Progress Summary (PT)  Daily  Progress Summary (PT): Pt. was able to perform bed mobility w/ maxA. She was able to sit EOB for minimal duration d/t increased pain. She c/o pain throughout duration of session. Pt. would continue to benefit from skilled PT services as she can improve activity tolerance.       Time Calculation:    PT Charges       Row Name 11/12/24 1149             Time Calculation    PT Received On 11/12/24  -KM         Time Calculation- PT    Total Timed Code Minutes- PT 23 minute(s)  -KM                User Key  (r) = Recorded By, (t) = Taken By, (c) = Cosigned By      Initials Name Provider Type    Thierry Carter, PT Physical Therapist                  Therapy Charges for Today       Code Description Service Date Service Provider Modifiers Qty    04643215001  PT THER PROC EA 15 MIN 11/11/2024 Thierry Saldivar, PT GP 1    90014169639  PT RE-EVAL ESTABLISHED PLAN 2 11/11/2024 Thierry Saldivar, PT GP 1    [id number]  PT THERAPEUTIC ACT EA 15 MIN 11/12/2024 Thierry Saldivar, PT GP 2    36132181069 HC PT THER PROC EA 15 MIN 11/12/2024 Thierry Saldivar, PT GP 1            PT G-Codes  AM-PAC 6 Clicks Score (PT): 8    Thierry Saldivar PT  11/12/2024

## 2024-11-12 NOTE — PROGRESS NOTES
Patient Identification:  Name:  Leyt Johnson  Age:  44 y.o.  Sex:  female  :  1980  MRN:  [id number]  Visit Number:  [id number]  Primary Care Provider:  Concha Rao APRN    Length of stay:  45    Subjective/Interval History/Consultants/Procedures     Chief Complaint:   Chief Complaint   Patient presents with    Fall       Subjective/Interval History:    44 y.o. female who was admitted on 2024 with  UTI     PMH is significant for CVA w/ left sided hemiparesis, debility, hx genital herpes on suppressive therapy, morbid obesity, T2DM   For complete admission information, please see history and physical.     Consultations:  Infectious disease  PT/OT  CM/SW    Procedures/Scans:  CT head without contrast  CT C-spine without contrast  CT T-spine without contrast  CT L-spine without contrast  CXR x 2  XR left foot  XR left ankle    Today, the patient was seen and examined with therapy at the bedside. She had no new complaints today. No acute events overnight.      Room location at the time of evaluation was 332.    ----------------------------------------------------------------------------------------------------------------------  Current Hospital Meds:  acyclovir, 400 mg, Oral, Nightly  aspirin, 81 mg, Oral, Daily  atorvastatin, 80 mg, Oral, Daily  DULoxetine, 60 mg, Oral, Daily  heparin (porcine), 5,000 Units, Subcutaneous, Q8H  insulin glargine, 20 Units, Subcutaneous, Daily With Breakfast  insulin glargine, 40 Units, Subcutaneous, Nightly  insulin lispro, 4-24 Units, Subcutaneous, 4x Daily AC & at Bedtime  insulin lispro, 8 Units, Subcutaneous, Daily With Lunch  Lidocaine, 1 patch, Transdermal, Q24H  lisinopril, 20 mg, Oral, Daily  magic barrier cream, , Topical, BID  metoprolol tartrate, 25 mg, Oral, Q12H  nicotine, 1 patch, Transdermal, Q24H  nystatin, , Topical, Q12H  pantoprazole, 40 mg, Oral, Daily  pregabalin, 25 mg, Oral, Daily  sodium chloride, 10 mL, Intravenous, Q12H  sodium chloride,  10 mL, Intravenous, Q12H      Pharmacy Consult,       ----------------------------------------------------------------------------------------------------------------------      Objective     Vital Signs:  Temp:  [97.8 °F (36.6 °C)-99.5 °F (37.5 °C)] 98.8 °F (37.1 °C)  Heart Rate:  [] 94  Resp:  [18-20] 18  BP: ()/(55-69) 100/57      10/07/24  0511 10/08/24  0500 10/09/24  0500   Weight: 102 kg (225 lb 14.4 oz) (FIFI cameron) 102 kg (225 lb 15.5 oz) 102 kg (224 lb 1.6 oz)     Body mass index is 36.73 kg/m².    Intake/Output Summary (Last 24 hours) at 11/12/2024 1553  Last data filed at 11/12/2024 1422  Gross per 24 hour   Intake 1580 ml   Output 2100 ml   Net -520 ml     I/O this shift:  In: 480 [P.O.:480]  Out: 1200 [Urine:1200]  Diet: Regular/House, Diabetic; Consistent Carbohydrate; Fluid Consistency: Thin (IDDSI 0)  ----------------------------------------------------------------------------------------------------------------------    Physical Exam  Vitals and nursing note reviewed.   Constitutional:       General: She is not in acute distress.     Appearance: She is obese.   HENT:      Head: Normocephalic and atraumatic.   Eyes:      Extraocular Movements: Extraocular movements intact.   Cardiovascular:      Rate and Rhythm: Normal rate.   Pulmonary:      Effort: Pulmonary effort is normal. No respiratory distress.   Abdominal:      Palpations: Abdomen is soft.   Musculoskeletal:      Right lower leg: No edema.      Left lower leg: No edema.   Skin:     General: Skin is warm and dry.   Neurological:      Mental Status: She is alert. Mental status is at baseline.   Psychiatric:         Mood and Affect: Mood normal.         Behavior: Behavior normal.                ----------------------------------------------------------------------------------------------------------------------  Tele:   "    ----------------------------------------------------------------------------------------------------------------------      Results from last 7 days   Lab Units 11/12/24  1057 11/08/24  0233   WBC 10*3/mm3 11.52* 11.68*   HEMOGLOBIN g/dL 12.3 11.9*   HEMATOCRIT % 37.1 35.9   MCV fL 99.5* 100.0*   MCHC g/dL 33.2 33.1   PLATELETS 10*3/mm3 265 265         Results from last 7 days   Lab Units 11/12/24  1057 11/08/24  0233   SODIUM mmol/L 135* 135*   POTASSIUM mmol/L 4.4 4.3   CHLORIDE mmol/L 100 99   CO2 mmol/L 23.9 23.0   BUN mg/dL 17 20   CREATININE mg/dL 0.40* 0.45*   CALCIUM mg/dL 9.2 9.1   GLUCOSE mg/dL 291* 255*   Estimated Creatinine Clearance: 216.7 mL/min (A) (by C-G formula based on SCr of 0.4 mg/dL (L)).  No results found for: \"AMMONIA\"      No results found for: \"BLOODCX\"  No results found for: \"URINECX\"  No results found for: \"WOUNDCX\"  No results found for: \"STOOLCX\"  ----------------------------------------------------------------------------------------------------------------------  Imaging Results (Last 24 Hours)       ** No results found for the last 24 hours. **          ----------------------------------------------------------------------------------------------------------------------   I have reviewed the above laboratory values for 11/12/24    Assessment/Plan     There are no active hospital problems to display for this patient.        ASSESSMENT/PLAN:    ESBL E. coli UTI  Acute metabolic encephalopathy, resolved  S/p Invanz.  Infectious disease input appreciated.     Hx CVA with left-sided hemiparesis  Chronic debility  Continue PT OT  CM/SW assisting with discharge planning.  Patient is pending placement.     T2DM  Diabetic neuropathy  Continue insulin regimen-titrate as needed. Add scheduled meal time insulin w/ lunch.   Continue supportive care measures, Cymbalta, add lyrica today given persistence of pain.     Hx genital herpes  Continue suppressive acyclovir     Morbid " obesity  Complicates all aspects of care    Labs repeated this AM and stable overall.   -----------  -DVT prophylaxis: SQH  -Disposition plans/anticipated needs:Pending placement.        The patient is high risk due to the following diagnoses/reasons:  chronic debility, s/p CVA w/ left sided hemiparesis         Bruce Branham PA-C  11/12/24  15:53 EST

## 2024-11-12 NOTE — CASE MANAGEMENT/SOCIAL WORK
Discharge Planning Assessment   [place]     Patient Name: Lety Johnson  MRN: [id number]  Today's Date: 11/12/2024    Admit Date: 9/26/2024       Discharge Plan       Row Name 11/12/24 1047       Plan    Plan SS attempted contact with Social [employer] and left a message. SS contacted CongressJustin yeboah's office per Pretty who states she does not have an update from [facility]. SS to follow.                  Continued Care and Services - Admitted Since 9/26/2024       Destination       Service Provider Request Status Services Address Phone Fax Patient Preferred    [facility] Declined  Cannot meet patient's needs -- [address] [facility] [id number] [phone] --    [facility] Acute Care Declined  Not eligible -- [address] [facility] [place] [address] [phone] --    [facility] Declined  Not eligible -- [address] [facility] [address] [phone] --    [facility] Declined  Clinical Denial -- [address] [facility], 4TH FLOOR, [facility] [address] [phone] --    [facility] SWING BED UNIT Declined  Cannot meet patient's needs -- [address] [facility] [address] [phone] --     [facility] Declined  Not eligible -- [address] [facility] [place] [place] [facility] [address] [phone] --    [facility] Declined  Bed not available -- [address] [facility] [address] [phone] --    [facility] Declined  Bed not available -- [address] [facility] [address] [phone] --    [facility] Declined  Out of network -- [address] [facility] [id number] [phone] --    [facility] BED UNIT Declined -- [address] [facility], [facility] [address] [phone] --    [facility] Declined  Clinical Denial -- [address] [facility] [address] [phone] --    [facility] SWING BED UNIT Declined  Insurance Denial, Out of network -- [address], [place] [address] [phone] --    [facility] Declined  Out of network -- [address] [facility] [address] [phone] --    [facility] Declined  Out of network -- [address] [address] [phone] -- --    [facility] Declined  Out of network -- [address] [facility] [address] [phone] --                  Expected Discharge Date and Time       Expected Discharge Date Expected Discharge Time    Nov 15, 2024         HELEN Butterfield

## 2024-11-12 NOTE — PLAN OF CARE
Goal Outcome Evaluation:  Plan of Care Reviewed With: patient        Progress: no change  Outcome Evaluation: Patient resting in bed during shift. Wound care complete per orders. Patient complained of pain, PRN meds given. Patient refused to turn during shift,  patient was educated. No acute changes noted at this time. Will continue with plan of care.

## 2024-11-13 LAB
GLUCOSE BLDC GLUCOMTR-MCNC: 169 MG/DL (ref 70–130)
GLUCOSE BLDC GLUCOMTR-MCNC: 179 MG/DL (ref 70–130)
GLUCOSE BLDC GLUCOMTR-MCNC: 231 MG/DL (ref 70–130)
GLUCOSE BLDC GLUCOMTR-MCNC: 281 MG/DL (ref 70–130)

## 2024-11-13 PROCEDURE — 97530 THERAPEUTIC ACTIVITIES: CPT

## 2024-11-13 PROCEDURE — 97112 NEUROMUSCULAR REEDUCATION: CPT

## 2024-11-13 PROCEDURE — 82948 REAGENT STRIP/BLOOD GLUCOSE: CPT

## 2024-11-13 PROCEDURE — 63710000001 INSULIN LISPRO (HUMAN) PER 5 UNITS: Performed by: INTERNAL MEDICINE

## 2024-11-13 PROCEDURE — 99231 SBSQ HOSP IP/OBS SF/LOW 25: CPT

## 2024-11-13 PROCEDURE — 63710000001 INSULIN GLARGINE PER 5 UNITS

## 2024-11-13 PROCEDURE — 97110 THERAPEUTIC EXERCISES: CPT

## 2024-11-13 PROCEDURE — 25010000002 HEPARIN (PORCINE) PER 1000 UNITS: Performed by: INTERNAL MEDICINE

## 2024-11-13 PROCEDURE — 63710000001 INSULIN LISPRO (HUMAN) PER 5 UNITS

## 2024-11-13 RX ORDER — IBUPROFEN 400 MG/1
400 TABLET, FILM COATED ORAL EVERY 6 HOURS PRN
Status: COMPLETED | OUTPATIENT
Start: 2024-11-13 | End: 2024-11-14

## 2024-11-13 RX ORDER — LIDOCAINE 4 G/G
2 PATCH TOPICAL EVERY 24 HOURS
Status: DISCONTINUED | OUTPATIENT
Start: 2024-11-14 | End: 2024-11-15

## 2024-11-13 RX ADMIN — INSULIN LISPRO 12 UNITS: 100 INJECTION, SOLUTION INTRAVENOUS; SUBCUTANEOUS at 18:08

## 2024-11-13 RX ADMIN — INSULIN LISPRO 8 UNITS: 100 INJECTION, SOLUTION INTRAVENOUS; SUBCUTANEOUS at 11:21

## 2024-11-13 RX ADMIN — PREGABALIN 25 MG: 25 CAPSULE ORAL at 08:29

## 2024-11-13 RX ADMIN — NICOTINE 1 PATCH: 7 PATCH, EXTENDED RELEASE TRANSDERMAL at 08:31

## 2024-11-13 RX ADMIN — VITAMINS A AND D OINTMENT: 15.5; 53.4 OINTMENT TOPICAL at 08:33

## 2024-11-13 RX ADMIN — Medication 5 MG: at 21:10

## 2024-11-13 RX ADMIN — LIDOCAINE 1 PATCH: 4 PATCH TOPICAL at 18:08

## 2024-11-13 RX ADMIN — PANTOPRAZOLE SODIUM 40 MG: 40 TABLET, DELAYED RELEASE ORAL at 08:29

## 2024-11-13 RX ADMIN — INSULIN LISPRO 4 UNITS: 100 INJECTION, SOLUTION INTRAVENOUS; SUBCUTANEOUS at 21:10

## 2024-11-13 RX ADMIN — HEPARIN SODIUM 5000 UNITS: 5000 INJECTION INTRAVENOUS; SUBCUTANEOUS at 14:17

## 2024-11-13 RX ADMIN — DULOXETINE HYDROCHLORIDE 60 MG: 60 CAPSULE, DELAYED RELEASE ORAL at 08:29

## 2024-11-13 RX ADMIN — HEPARIN SODIUM 5000 UNITS: 5000 INJECTION INTRAVENOUS; SUBCUTANEOUS at 21:10

## 2024-11-13 RX ADMIN — HEPARIN SODIUM 5000 UNITS: 5000 INJECTION INTRAVENOUS; SUBCUTANEOUS at 06:26

## 2024-11-13 RX ADMIN — ACETAMINOPHEN 650 MG: 325 TABLET ORAL at 08:29

## 2024-11-13 RX ADMIN — NYSTATIN: 100000 POWDER TOPICAL at 08:33

## 2024-11-13 RX ADMIN — INSULIN GLARGINE 40 UNITS: 100 INJECTION, SOLUTION SUBCUTANEOUS at 21:10

## 2024-11-13 RX ADMIN — LISINOPRIL 20 MG: 10 TABLET ORAL at 08:29

## 2024-11-13 RX ADMIN — INSULIN LISPRO 4 UNITS: 100 INJECTION, SOLUTION INTRAVENOUS; SUBCUTANEOUS at 08:29

## 2024-11-13 RX ADMIN — VITAMINS A AND D OINTMENT: 15.5; 53.4 OINTMENT TOPICAL at 21:12

## 2024-11-13 RX ADMIN — ATORVASTATIN CALCIUM 80 MG: 40 TABLET, FILM COATED ORAL at 08:29

## 2024-11-13 RX ADMIN — INSULIN LISPRO 4 UNITS: 100 INJECTION, SOLUTION INTRAVENOUS; SUBCUTANEOUS at 11:21

## 2024-11-13 RX ADMIN — METOPROLOL TARTRATE 25 MG: 25 TABLET, FILM COATED ORAL at 08:29

## 2024-11-13 RX ADMIN — ASPIRIN 81 MG: 81 TABLET, CHEWABLE ORAL at 08:29

## 2024-11-13 RX ADMIN — IBUPROFEN 400 MG: 400 TABLET, FILM COATED ORAL at 11:21

## 2024-11-13 RX ADMIN — NYSTATIN: 100000 POWDER TOPICAL at 21:12

## 2024-11-13 RX ADMIN — INSULIN GLARGINE 20 UNITS: 100 INJECTION, SOLUTION SUBCUTANEOUS at 08:29

## 2024-11-13 RX ADMIN — IBUPROFEN 400 MG: 400 TABLET, FILM COATED ORAL at 21:10

## 2024-11-13 RX ADMIN — ACYCLOVIR 400 MG: 200 CAPSULE ORAL at 21:10

## 2024-11-13 NOTE — PLAN OF CARE
Goal Outcome Evaluation:  Plan of Care Reviewed With: patient  Plan of Care Reviewed With: patient        Progress: no change  Outcome Evaluation: Patient resting in bed at this time. A&O. VSS on RA. No acute changes noted. Patient continues to refuse turns this shift, in floated position with pillows at this time. Wound care completed per orders. Patient had bath this shift. Worked with PT this shift. C/o left shoulder, GERONIMO knee/ankle pain. PRN tylenol and motrin given per orders. Scheduled voltaren gel applied, lidocaine patch in place. No other requests or complaints at this time. Will continue with POC.

## 2024-11-13 NOTE — THERAPY TREATMENT NOTE
Patient Name: Lety Johnson  : 1980    MRN: [id number]                              Today's Date: 2024       Admit Date: 2024    Visit Dx:     ICD-10-CM ICD-9-CM   1. Hypokalemia  E87.6 276.8   2. Pressure injury of skin of sacral region, unspecified injury stage  L89.159 707.03     707.20   3. Pressure injury of skin of left heel, unspecified injury stage  L89.629 707.07     707.20   4. Acute cystitis without hematuria  N30.00 595.0     Patient Active Problem List   Diagnosis   (none) - all problems resolved or deleted     Past Medical History:   Diagnosis Date    Stroke      History reviewed. No pertinent surgical history.   General Information       Row Name 24 1427          OT Time and Intention    Subjective Information pain  -     Document Type therapy note (daily note)  -     Mode of Treatment individual therapy;occupational therapy  -     Patient Effort adequate  -     Symptoms Noted During/After Treatment increased pain  -     Comment Patient agreeable to therapy. She complained of pain, but tolerated NMR to LUE.  -       Row Name 24 1427          General Information    Patient Profile Reviewed yes  -     Existing Precautions/Restrictions fall  -       Row Name 24 1427          Cognition    Orientation Status (Cognition) oriented x 3  -       Row Name 24 1427          Safety Issues/Impairments Affecting Functional Mobility    Impairments Affecting Function (Mobility) endurance/activity tolerance;pain;range of motion (ROM);strength;coordination;motor control;muscle tone abnormal  -               User Key  (r) = Recorded By, (t) = Taken By, (c) = Cosigned By      Initials Name Provider Type     Kasey Bella OT Occupational Therapist                     Mobility/ADL's       Row Name 24 1428          Bed Mobility    Bed Mobility supine-sit;sit-supine  -     Supine-Sit [place] (Bed Mobility) maximum assist (25% patient effort);2  person assist  -     Sit-Supine [place] (Bed Mobility) maximum assist (25% patient effort);2 person assist  -     Bed Mobility, Safety Issues decreased use of arms for pushing/pulling;decreased use of legs for bridging/pushing  -     Assistive Device (Bed Mobility) bed rails;head of bed elevated  -               User Key  (r) = Recorded By, (t) = Taken By, (c) = Cosigned By      Initials Name Provider Type    Kasey Yang OT Occupational Therapist                   Obj/Interventions       Row Name 11/13/24 1429          Motor Skills    Motor Skills functional endurance;motor control/coordination interventions  -     Functional Endurance fair  -     Motor Control/Coordination Interventions neuro-muscular re-education  -       Row Name 11/13/24 1429          Neuromuscular Re-education    Interventions (Neuromuscular Re-education) deep pressure awareness;inhibitory positioning;weight bearing  -     Positioning (Neuromuscular Re-education) sitting  -               User Key  (r) = Recorded By, (t) = Taken By, (c) = Cosigned By      Initials Name Provider Type    Kasey Yang OT Occupational Therapist                   Goals/Plan    No documentation.                  Clinical Impression       Row Name 11/13/24 1430          Pain Assessment    Pretreatment Pain Rating 0/10 - no pain  -     Posttreatment Pain Rating 1/10  -     Pain Side/Orientation generalized  -       Row Name 11/13/24 1430          Plan of Care Review    Plan of Care Reviewed With patient  -     Progress improving  -     Outcome Evaluation Patient seen for OT treatment. She tolerated sitting at edge of bed, and NMR to LUE through weightbearing and deep pressure. Continue plan of care.  -       Row Name 11/13/24 1430          Therapy Plan Review/Discharge Plan (OT)    Anticipated Discharge Disposition (OT) inpatient rehabilitation facility;extended care facility  -       Row Name 11/13/24 1435           Positioning and Restraints    Pre-Treatment Position in bed  -KP     Post Treatment Position bed  -KP     In Bed fowlers;call light within reach;encouraged to call for assist;exit alarm on  -KP               User Key  (r) = Recorded By, (t) = Taken By, (c) = Cosigned By      Initials Name Provider Type    Kasey Yang OT Occupational Therapist                   Outcome Measures       Row Name 11/13/24 0820          How much help from another person do you currently need...    Turning from your back to your side while in flat bed without using bedrails? 2  -AW     Moving from lying on back to sitting on the side of a flat bed without bedrails? 2  -AW     Moving to and from a bed to a chair (including a wheelchair)? 1  -AW     Standing up from a chair using your arms (e.g., wheelchair, bedside chair)? 1  -AW     Climbing 3-5 steps with a railing? 1  -AW     To walk in hospital room? 1  -AW     AM-PAC 6 Clicks Score (PT) 8  -AW               User Key  (r) = Recorded By, (t) = Taken By, (c) = Cosigned By      Initials Name Provider Type    Toshia Ivy, RN Registered Nurse                    Occupational Therapy Education        No education to display                  OT Recommendation and Plan     Plan of Care Review  Plan of Care Reviewed With: patient  Progress: improving  Outcome Evaluation: Patient seen for OT treatment. She tolerated sitting at edge of bed, and NMR to LUE through weightbearing and deep pressure. Continue plan of care.     Time Calculation:         Time Calculation- OT       Row Name 11/13/24 1431             Time Calculation- OT    OT Received On 11/13/24  -                User Key  (r) = Recorded By, (t) = Taken By, (c) = Cosigned By      Initials Name Provider Type    Kasey Yang OT Occupational Therapist                  Therapy Charges for Today       Code Description Service Date Service Provider Modifiers Qty    60814664079 HC OT NEUROMUSC RE EDUCATION EA 15 MIN  11/13/2024 Kasey Bella, OT GO 1                 Kasey Bella, OT  11/13/2024

## 2024-11-13 NOTE — PLAN OF CARE
Goal Outcome Evaluation:              Outcome Evaluation: Patient resting in bed. VSS. Wound care completed per orders. PRN pain medication given per MAR. No acute changes at this time. Will continue plan of care.

## 2024-11-13 NOTE — PROGRESS NOTES
Adult Nutrition  Assessment/PES    Patient Name:  Lety Johnson  YOB: 1980  MRN: [id number]  Admit Date:  9/26/2024    Assessment Date:  11/13/2024    Comments:  follow-up    Po intake 88% ave meals.    Encourage cont good po.    Will cont to follow and monitor.      Reason for Assessment       Row Name 11/13/24 1423          Reason for Assessment    Reason For Assessment follow-up protocol  remote REZA White     Diagnosis infection/sepsis;neurologic conditions  cystitis, met enceph, debility hx CVA                    Nutrition/Diet History       Row Name 11/13/24 1423          Nutrition/Diet History    Typical Intake (Food/Fluid/EN/PN) Spoke w pt via phone, reports good po and no needs                    Labs/Tests/Procedures/Meds       Row Name 11/13/24 1423          Labs/Procedures/Meds    Lab Results Reviewed reviewed     Lab Results Comments glu [id number]        Diagnostic Tests/Procedures    Diagnostic Test/Procedure Reviewed reviewed        Medications    Pertinent Medications Reviewed reviewed     Pertinent Medications Comments lanuts, humalog                        Nutrition Prescription Ordered       Row Name 11/13/24 1424          Nutrition Prescription PO    Common Modifiers Consistent Carbohydrate                    Evaluation of Received Nutrient/Fluid Intake       Row Name 11/13/24 1424          Fluid Intake Evaluation    Oral Fluid (mL) 1133  ave x 3        PO Evaluation    Number of Meals 10     % PO Intake 88                       Problem/Interventions:   Problem 1       Row Name 11/13/24 1426          Nutrition Diagnoses Problem 1    Problem 1 Other (comment)  No nutrition dx at this time                          Intervention Goal       Row Name 11/13/24 1426          Intervention Goal    General Meet nutritional needs for age/condition     PO Maintain intake;PO intake (%)     PO Intake % 80 %  or greater                    Nutrition Intervention       Row Name 11/13/24 1421           Nutrition Intervention    RD/Tech Action Follow Tx progress                      Education/Evaluation       Row Name 11/13/24 1426          Education    Education No discharge needs identified at this time        Monitor/Evaluation    Monitor Per protocol;I&O;PO intake;Pertinent labs;Weight                     Electronically signed by:  Vee Van RD  11/13/24 14:27 EST

## 2024-11-13 NOTE — PROGRESS NOTES
Patient Identification:  Name:  Lety Johnson  Age:  44 y.o.  Sex:  female  :  1980  MRN:  [id number]  Visit Number:  [id number]  Primary Care Provider:  Concha Rao APRN    Length of stay:  46    Subjective/Interval History/Consultants/Procedures     Chief Complaint:   Chief Complaint   Patient presents with    Fall       Subjective/Interval History:    44 y.o. female who was admitted on 2024 with UTI     PMH is significant for CVA w/ left sided hemiparesis, debility, hx genital herpes on suppressive therapy, morbid obesity, T2DM   For complete admission information, please see history and physical.     Consultations:  Infectious disease  PT/OT  CM/SW    Procedures/Scans:  CT head without contrast  CT C-spine without contrast  CT T-spine without contrast  CT L-spine without contrast  CXR x 2  XR left foot  XR left ankle    Today, the patient was seen and examined w/ no family present at the bedside. She was in no distress. She reported lyrica seemed to help some with pain/paresthesias but persisting. Glucose trend improved today. Patient voiced no acute complaints.      Room location at the time of evaluation was [facility].    ----------------------------------------------------------------------------------------------------------------------  Current Hospital Meds:  acyclovir, 400 mg, Oral, Nightly  aspirin, 81 mg, Oral, Daily  atorvastatin, 80 mg, Oral, Daily  DULoxetine, 60 mg, Oral, Daily  heparin (porcine), 5,000 Units, Subcutaneous, Q8H  insulin glargine, 20 Units, Subcutaneous, Daily With Breakfast  insulin glargine, 40 Units, Subcutaneous, Nightly  insulin lispro, 4-24 Units, Subcutaneous, 4x Daily AC & at Bedtime  insulin lispro, 8 Units, Subcutaneous, Daily With Lunch  Lidocaine, 1 patch, Transdermal, Q24H  lisinopril, 20 mg, Oral, Daily  magic barrier cream, , Topical, BID  metoprolol tartrate, 25 mg, Oral, Q12H  nicotine, 1 patch, Transdermal, Q24H  nystatin, , Topical,  Q12H  pantoprazole, 40 mg, Oral, Daily  pregabalin, 25 mg, Oral, Daily  sodium chloride, 10 mL, Intravenous, Q12H  sodium chloride, 10 mL, Intravenous, Q12H      Pharmacy Consult,       ----------------------------------------------------------------------------------------------------------------------      Objective     Vital Signs:  Temp:  [97.6 °F (36.4 °C)-98.8 °F (37.1 °C)] 97.6 °F (36.4 °C)  Heart Rate:  [] 92  Resp:  [16-18] 18  BP: ()/(53-63) 103/62      10/07/24  0511 10/08/24  0500 10/09/24  0500   Weight: 102 kg (225 lb 14.4 oz) (FIFI [place]) 102 kg (225 lb 15.5 oz) 102 kg (224 lb 1.6 oz)     Body mass index is 36.73 kg/m².    Intake/Output Summary (Last 24 hours) at 11/13/2024 1257  Last data filed at 11/13/2024 1248  Gross per 24 hour   Intake 660 ml   Output 1800 ml   Net -1140 ml     I/O this shift:  In: 300 [P.O.:300]  Out: -   Diet: Regular/House, Diabetic; Consistent Carbohydrate; Fluid Consistency: Thin (IDDSI 0)  ----------------------------------------------------------------------------------------------------------------------    Physical Exam  Vitals and nursing note reviewed.   Constitutional:       General: She is not in acute distress.     Appearance: She is obese.   HENT:      Head: Normocephalic and atraumatic.   Eyes:      Extraocular Movements: Extraocular movements intact.   Cardiovascular:      Rate and Rhythm: Normal rate.   Pulmonary:      Effort: Pulmonary effort is normal. No respiratory distress.   Abdominal:      Palpations: Abdomen is soft.   Musculoskeletal:      Right lower leg: No edema.      Left lower leg: No edema.   Skin:     General: Skin is warm and dry.   Neurological:      Mental Status: She is alert. Mental status is at baseline.   Psychiatric:         Mood and Affect: Mood normal.         Behavior: Behavior normal.               "  ----------------------------------------------------------------------------------------------------------------------  Tele:      ----------------------------------------------------------------------------------------------------------------------      Results from last 7 days   Lab Units 11/12/24  1057 11/08/24  0233   WBC 10*3/mm3 11.52* 11.68*   HEMOGLOBIN g/dL 12.3 11.9*   HEMATOCRIT % 37.1 35.9   MCV fL 99.5* 100.0*   MCHC g/dL 33.2 33.1   PLATELETS 10*3/mm3 265 265         Results from last 7 days   Lab Units 11/12/24  1057 11/08/24  0233   SODIUM mmol/L 135* 135*   POTASSIUM mmol/L 4.4 4.3   CHLORIDE mmol/L 100 99   CO2 mmol/L 23.9 23.0   BUN mg/dL 17 20   CREATININE mg/dL 0.40* 0.45*   CALCIUM mg/dL 9.2 9.1   GLUCOSE mg/dL 291* 255*   Estimated Creatinine Clearance: 216.7 mL/min (A) (by C-G formula based on SCr of 0.4 mg/dL (L)).  No results found for: \"AMMONIA\"      No results found for: \"BLOODCX\"  No results found for: \"URINECX\"  No results found for: \"WOUNDCX\"  No results found for: \"STOOLCX\"  ----------------------------------------------------------------------------------------------------------------------  Imaging Results (Last 24 Hours)       ** No results found for the last 24 hours. **          ----------------------------------------------------------------------------------------------------------------------   I have reviewed the above laboratory values for 11/13/24    Assessment/Plan     There are no active hospital problems to display for this patient.        ASSESSMENT/PLAN:    ESBL E. coli UTI  Acute metabolic encephalopathy, resolved  S/p Invanz.  Infectious disease input appreciated.     Hx CVA with left-sided hemiparesis  Chronic debility  Continue PT OT  CM/SW assisting with discharge planning.  Patient is pending placement.     T2DM  Diabetic neuropathy  Continue insulin regimen-titrate as needed. Add scheduled meal time insulin w/ lunch.   Continue supportive care measures, " Cymbalta  Lyrica added 11/13 given persistent pain. Started low dose at 25 mg daily. Pt noted some improvement but pain still bothersome. Will continue to titrate as indicated     Hx genital herpes  Continue suppressive acyclovir     Morbid obesity  Complicates all aspects of care     -----------  -DVT prophylaxis: SQH  -Disposition plans/anticipated needs: Pending placement        The patient is high risk due to the following diagnoses/reasons:  chronic debility, s/p CVA w/ left sided hemiparesis         Bruce Branham PA-C  11/13/24  12:57 EST

## 2024-11-13 NOTE — THERAPY TREATMENT NOTE
Acute Care - Physical Therapy Treatment Note   [place]     Patient Name: Lety Johnson  : 1980  MRN: [id number]  Today's Date: 2024   Onset of Illness/Injury or Date of Surgery: 24  Visit Dx:     ICD-10-CM ICD-9-CM   1. Hypokalemia  E87.6 276.8   2. Pressure injury of skin of sacral region, unspecified injury stage  L89.159 707.03     707.20   3. Pressure injury of skin of left heel, unspecified injury stage  L89.629 707.07     707.20   4. Acute cystitis without hematuria  N30.00 595.0     Patient Active Problem List   Diagnosis   (none) - all problems resolved or deleted     Past Medical History:   Diagnosis Date    Stroke      History reviewed. No pertinent surgical history.  PT Assessment (Last 12 Hours)       PT Evaluation and Treatment       Row Name 24 1411          Physical Therapy Time and Intention    Subjective Information complains of;pain  -KM     Document Type therapy note (daily note)  -KM     Mode of Treatment physical therapy  -KM     Patient Effort adequate  -KM     Symptoms Noted During/After Treatment increased pain  -KM       Row Name 24 1411          General Information    Patient Profile Reviewed yes  -KM     Patient Observations alert;cooperative;agree to therapy  -KM     Existing Precautions/Restrictions fall  -KM       Row Name 24 1411          Cognition    Affect/Mental Status (Cognition) WFL  -KM     Orientation Status (Cognition) oriented x 3  -KM     Follows Commands (Cognition) WFL  -KM       Row Name 24 1411          Bed Mobility    Bed Mobility supine-sit;sit-supine  -KM     Supine-Sit Clinch (Bed Mobility) maximum assist (25% patient effort);2 person assist  -KM     Sit-Supine Clinch (Bed Mobility) maximum assist (25% patient effort);2 person assist  -KM     Bed Mobility, Safety Issues decreased use of arms for pushing/pulling;decreased use of legs for bridging/pushing  -KM     Assistive Device (Bed Mobility) bed rails;head of  bed elevated  -KM       Row Name 11/13/24 1411          Gait/Stairs (Locomotion)    Patient was able to Ambulate no, other medical factors prevent ambulation  -KM     Reason Patient was unable to Ambulate Non-Ambulatory at Baseline  -KM       Row Name 11/13/24 1411          Safety Issues/Impairments Affecting Functional Mobility    Impairments Affecting Function (Mobility) endurance/activity tolerance;pain;range of motion (ROM);strength;coordination;motor control;muscle tone abnormal  -KM       Row Name 11/13/24 1411          Motor Skills    Comments, Therapeutic Exercise RLE ther-ex; LLE stretch and neuro re-ed  -KM       Row Name             Wound 09/26/24 1809 Left posterior ankle Other (comment)    Wound - Properties Group Placement Date: 09/26/24  -KJ Placement Time: 1809  -KJ Side: Left  -KJ Orientation: posterior  -KJ Location: ankle  -KJ Primary Wound Type: Other  -TW, unknow etiology     Retired Wound - Properties Group Placement Date: 09/26/24  -KJ Placement Time: 1809  -KJ Side: Left  -KJ Orientation: posterior  -KJ Location: ankle  -KJ Primary Wound Type: Other  -TW, unknow etiology     Retired Wound - Properties Group Placement Date: 09/26/24  -KJ Placement Time: 1809  -KJ Side: Left  -KJ Orientation: posterior  -KJ Location: ankle  -KJ Primary Wound Type: Other  -TW, unknow etiology     Retired Wound - Properties Group Date first assessed: 09/26/24  -KJ Time first assessed: 1809  -KJ Side: Left  -KJ Location: ankle  -KJ Primary Wound Type: Other  -TW, unknow etiology       Row Name             Wound 10/11/24 1330 medial coccyx Fissure    Wound - Properties Group Placement Date: 10/11/24  -TW Placement Time: 1330 -TW Orientation: medial  -TW Location: coccyx  -TW Primary Wound Type: Fissure  -TW    Retired Wound - Properties Group Placement Date: 10/11/24  -TW Placement Time: 1330 -TW Orientation: medial  -TW Location: coccyx  -TW Primary Wound Type: Fissure  -TW    Retired Wound - Properties Group  Placement Date: 10/11/24  -TW Placement Time: 1330 -TW Orientation: medial  -TW Location: coccyx  -TW Primary Wound Type: Fissure  -TW    Retired Wound - Properties Group Date first assessed: 10/11/24  -TW Time first assessed: 1330 -TW Location: coccyx  -TW Primary Wound Type: Fissure  -TW      Row Name             Wound 10/16/24 0705 Left medial gluteal MASD (moisture associated skin damage)    Wound - Properties Group Placement Date: 10/16/24  -MS Placement Time: 0705 -MS Side: Left  -MS Orientation: medial  -MS Location: gluteal  -MS Primary Wound Type: MASD  -MS Type: MASD (moisture associated skin damage)  -MS Present on Original Admission: N  -MS Additional Comments: Noted at shift change skin assesment, Night shift RN stated it had been there her shift.  -MS    Retired Wound - Properties Group Placement Date: 10/16/24  -MS Placement Time: 0705 -MS Present on Original Admission: N  -MS Side: Left  -MS Orientation: medial  -MS Location: gluteal  -MS Primary Wound Type: MASD  -MS Additional Comments: Noted at shift change skin assesment, Night shift RN stated it had been there her shift.  -MS Type: MASD (moisture associated skin damage)  -MS    Retired Wound - Properties Group Placement Date: 10/16/24  -MS Placement Time: 0705 -MS Present on Original Admission: N  -MS Side: Left  -MS Orientation: medial  -MS Location: gluteal  -MS Primary Wound Type: MASD  -MS Additional Comments: Noted at shift change skin assesment, Night shift RN stated it had been there her shift.  -MS Type: MASD (moisture associated skin damage)  -MS    Retired Wound - Properties Group Date first assessed: 10/16/24  -MS Time first assessed: 0705  -MS Present on Original Admission: N  -MS Side: Left  -MS Location: gluteal  -MS Primary Wound Type: MASD  -MS Additional Comments: Noted at shift change skin assesment, Night shift RN stated it had been there her shift.  -MS Type: MASD (moisture associated skin damage)  -MS      Row Name              Wound 10/16/24 0705 Left posterior greater trochanter MASD (moisture associated skin damage)    Wound - Properties Group Placement Date: 10/16/24  -MS Placement Time: 0705 -MS Side: Left  -MS Orientation: posterior  -MS Location: greater trochanter  -MS Primary Wound Type: MASD  -MS Type: MASD (moisture associated skin damage)  -MS Present on Original Admission: N  -MS    Retired Wound - Properties Group Placement Date: 10/16/24  -MS Placement Time: 0705 -MS Present on Original Admission: N  -MS Side: Left  -MS Orientation: posterior  -MS Location: greater trochanter  -MS Primary Wound Type: MASD  -MS Type: MASD (moisture associated skin damage)  -MS    Retired Wound - Properties Group Placement Date: 10/16/24  -MS Placement Time: 0705  -MS Present on Original Admission: N  -MS Side: Left  -MS Orientation: posterior  -MS Location: greater trochanter  -MS Primary Wound Type: MASD  -MS Type: MASD (moisture associated skin damage)  -MS    Retired Wound - Properties Group Date first assessed: 10/16/24  -MS Time first assessed: 0705 -MS Present on Original Admission: N  -MS Side: Left  -MS Location: greater trochanter  -MS Primary Wound Type: MASD  -MS Type: MASD (moisture associated skin damage)  -MS      Row Name 11/13/24 1411          Progress Summary (PT)    Daily Progress Summary (PT) Pt. was able to perform bed mobility w/ maxA. She was able to sit EOB for increased time compared to prior session. Pt. would continue to benefit from skilled PT services.  -KM               User Key  (r) = Recorded By, (t) = Taken By, (c) = Cosigned By      Initials Name Provider Type    TW Karen Gipson, RN Registered Nurse    Thierry Carter, PT Physical Therapist    Nory Keenan, RN Registered Nurse    MS Roe Garcia, RN Registered Nurse                    Physical Therapy Education       Title: PT OT SLP Therapies (Done)       Topic: Physical Therapy (Done)       Point: Mobility training (Done)        Learning Progress Summary            Patient Acceptance, E,TB, VU by RR at 11/13/2024 0218    Acceptance, E,TB, VU by RG at 11/12/2024 0927    Acceptance, TB,E, VU by RG at 11/11/2024 0944    Acceptance, E,TB, VU by RG at 11/10/2024 0931    Acceptance, E, VU by WG at 11/10/2024 0233    Acceptance, E,TB, VU by CF at 11/9/2024 1016    Acceptance, E, NR by SC at 11/3/2024 0025    Acceptance, E,TB, VU by RR at 11/1/2024 2315    Acceptance, E,D, VU,NR by AG at 10/31/2024 1234    Comment: PT educated pt. in importance of mobilization and L L/UE PROM and self ROM to prevent contracture.  Pt. is encouraged to to perform R LE AROM frequently while supine or sitting EOB.    Acceptance, E,TB, VU by RR at 10/27/2024 0031    Acceptance, E,TB, VU by RG at 10/22/2024 1002    Acceptance, TB,E, VU by RG at 10/21/2024 0907    Nonacceptance, E, NR by WG at 10/16/2024 0228    Acceptance, E,TB, VU by CF at 10/15/2024 0753    Acceptance, E,TB, VU by CF at 10/14/2024 0801    Acceptance, E,TB, VU by CF at 10/13/2024 1045    Acceptance, E,D, VU,NR by AG at 10/10/2024 1310    Acceptance, E,TB, VU by CB at 10/8/2024 0448    Acceptance, E, NR by RD at 10/2/2024 1101    Acceptance, E, NR by RD at 10/1/2024 0856    Acceptance, E,D, VU,NR by AG at 9/30/2024 1559                      Point: Home exercise program (Done)       Learning Progress Summary            Patient Acceptance, E,TB, VU by RR at 11/13/2024 0218    Acceptance, E,TB, VU by RG at 11/12/2024 0927    Acceptance, TB,E, VU by RG at 11/11/2024 0944    Acceptance, E,TB, VU by RG at 11/10/2024 0931    Acceptance, E, VU by WG at 11/10/2024 0233    Acceptance, E,TB, VU by CF at 11/9/2024 1016    Acceptance, E, NR by SC at 11/3/2024 0025    Acceptance, E,TB, VU by RR at 11/1/2024 2315    Acceptance, E,D, VU,NR by AG at 10/31/2024 1234    Comment: PT educated pt. in importance of mobilization and L L/UE PROM and self ROM to prevent contracture.  Pt. is encouraged to to perform R LE AROM  frequently while supine or sitting EOB.    Acceptance, E,TB, VU by RR at 10/27/2024 0031    Acceptance, E,TB, VU by RG at 10/22/2024 1002    Acceptance, TB,E, VU by RG at 10/21/2024 0907    Nonacceptance, E, NR by WG at 10/16/2024 0228    Acceptance, E,TB, VU by CF at 10/15/2024 0753    Acceptance, E,TB, VU by CF at 10/14/2024 0801    Acceptance, E,TB, VU by CF at 10/13/2024 1045    Acceptance, E,D, VU,NR by AG at 10/10/2024 1310    Acceptance, E,TB, VU by CB at 10/8/2024 0448    Acceptance, E, NR by RD at 10/2/2024 1101    Acceptance, E, NR by RD at 10/1/2024 0856    Acceptance, E,D, VU,NR by AG at 9/30/2024 1559                      Point: Body mechanics (Done)       Learning Progress Summary            Patient Acceptance, E,TB, VU by RR at 11/13/2024 0218    Acceptance, E,TB, VU by RG at 11/12/2024 0927    Acceptance, TB,E, VU by RG at 11/11/2024 0944    Acceptance, E,TB, VU by RG at 11/10/2024 0931    Acceptance, E, VU by WG at 11/10/2024 0233    Acceptance, E,TB, VU by CF at 11/9/2024 1016    Acceptance, E, NR by SC at 11/3/2024 0025    Acceptance, E,TB, VU by RR at 11/1/2024 2315    Acceptance, E,D, VU,NR by AG at 10/31/2024 1234    Comment: PT educated pt. in importance of mobilization and L L/UE PROM and self ROM to prevent contracture.  Pt. is encouraged to to perform R LE AROM frequently while supine or sitting EOB.    Acceptance, E,TB, VU by RR at 10/27/2024 0031    Acceptance, E,TB, VU by RG at 10/22/2024 1002    Acceptance, TB,E, VU by RG at 10/21/2024 0907    Nonacceptance, E, NR by WG at 10/16/2024 0228    Acceptance, E,TB, VU by CF at 10/15/2024 0753    Acceptance, E,TB, VU by CF at 10/14/2024 0801    Acceptance, E,TB, VU by CF at 10/13/2024 1045    Acceptance, E,D, VU,NR by AG at 10/10/2024 1310    Acceptance, E,TB, VU by CB at 10/8/2024 0448    Acceptance, E, NR by RD at 10/2/2024 1101    Acceptance, E, NR by RD at 10/1/2024 0856    Acceptance, E,D, VU,NR by AG at 9/30/2024 1559                       Point: Precautions (Done)       Learning Progress Summary            Patient Acceptance, E,TB, VU by RR at 11/13/2024 0218    Acceptance, E,TB, VU by RG at 11/12/2024 0927    Acceptance, TB,E, VU by RG at 11/11/2024 0944    Acceptance, E,TB, VU by RG at 11/10/2024 0931    Acceptance, E, VU by WG at 11/10/2024 0233    Acceptance, E,TB, VU by CF at 11/9/2024 1016    Acceptance, E, NR by SC at 11/3/2024 0025    Acceptance, E,TB, VU by RR at 11/1/2024 2315    Acceptance, E,D, VU,NR by AG at 10/31/2024 1234    Comment: PT educated pt. in importance of mobilization and L L/UE PROM and self ROM to prevent contracture.  Pt. is encouraged to to perform R LE AROM frequently while supine or sitting EOB.    Acceptance, E,TB, VU by RR at 10/27/2024 0031    Acceptance, E,TB, VU by RG at 10/22/2024 1002    Acceptance, TB,E, VU by RG at 10/21/2024 0907    Nonacceptance, E, NR by WG at 10/16/2024 0228    Acceptance, E,TB, VU by CF at 10/15/2024 0753    Acceptance, E,TB, VU by CF at 10/14/2024 0801    Acceptance, E,TB, VU by CF at 10/13/2024 1045    Acceptance, E,D, VU,NR by AG at 10/10/2024 1310    Acceptance, E,TB, VU by CB at 10/8/2024 0448    Acceptance, E, NR by RD at 10/2/2024 1101    Acceptance, E, NR by RD at 10/1/2024 0856    Acceptance, E,D, VU,NR by AG at 9/30/2024 1559                                      User Key       Initials Effective Dates Name Provider Type Discipline     06/16/21 -  Мария Joya, PT Physical Therapist PT    RD 06/16/21 -  Laisha Verdugo, RN Registered Nurse Nurse    RR 06/11/24 -  Jaymie Dominguez, RN Registered Nurse Nurse    RG 06/06/23 -  Jesus Matthews, RN Registered Nurse Nurse    WG 02/12/24 -  Shanthi Burden, RN Registered Nurse Nurse    CF 06/25/24 -  Cherelle Germain, RN Registered Nurse Nurse    CB 06/17/24 -  Fidelia Lombardo, RN Registered Nurse Nurse    SC 09/11/24 -  Sally Hair, RN Registered Nurse Nurse                  PT Recommendation and Plan  Anticipated Discharge  Disposition (PT): home with 24/7 care, skilled nursing facility  Progress Summary (PT)  Daily Progress Summary (PT): Pt. was able to perform bed mobility w/ maxA. She was able to sit EOB for increased time compared to prior session. Pt. would continue to benefit from skilled PT services.       Time Calculation:    PT Charges       Row Name 11/13/24 1411             Time Calculation    PT Received On 11/13/24  -KM         Time Calculation- PT    Total Timed Code Minutes- PT 23 minute(s)  -KM                User Key  (r) = Recorded By, (t) = Taken By, (c) = Cosigned By      Initials Name Provider Type    Thierry Carter, PT Physical Therapist                  Therapy Charges for Today       Code Description Service Date Service Provider Modifiers Qty    41830413565 HC PT THERAPEUTIC ACT EA 15 MIN 11/12/2024 Thierry Saldivar, PT GP 2    61322136767 HC PT THER PROC EA 15 MIN 11/12/2024 Thierry Saldivar, PT GP 1    98138679376 HC PT THERAPEUTIC ACT EA 15 MIN 11/13/2024 Thierry Saldivar, PT GP 1    71890057502 HC PT THER PROC EA 15 MIN 11/13/2024 Thierry Saldivar, PT GP 1            PT G-Codes  AM-PAC 6 Clicks Score (PT): 8    Thierry Saldivar PT  11/13/2024

## 2024-11-14 LAB
GLUCOSE BLDC GLUCOMTR-MCNC: 125 MG/DL (ref 70–130)
GLUCOSE BLDC GLUCOMTR-MCNC: 180 MG/DL (ref 70–130)
GLUCOSE BLDC GLUCOMTR-MCNC: 200 MG/DL (ref 70–130)
GLUCOSE BLDC GLUCOMTR-MCNC: 250 MG/DL (ref 70–130)

## 2024-11-14 PROCEDURE — 63710000001 INSULIN GLARGINE PER 5 UNITS

## 2024-11-14 PROCEDURE — 82948 REAGENT STRIP/BLOOD GLUCOSE: CPT

## 2024-11-14 PROCEDURE — 99231 SBSQ HOSP IP/OBS SF/LOW 25: CPT

## 2024-11-14 PROCEDURE — 97110 THERAPEUTIC EXERCISES: CPT

## 2024-11-14 PROCEDURE — 25010000002 HEPARIN (PORCINE) PER 1000 UNITS: Performed by: INTERNAL MEDICINE

## 2024-11-14 PROCEDURE — 63710000001 INSULIN LISPRO (HUMAN) PER 5 UNITS: Performed by: INTERNAL MEDICINE

## 2024-11-14 PROCEDURE — 63710000001 INSULIN LISPRO (HUMAN) PER 5 UNITS

## 2024-11-14 RX ORDER — PREGABALIN 25 MG/1
25 CAPSULE ORAL EVERY 8 HOURS SCHEDULED
Status: DISCONTINUED | OUTPATIENT
Start: 2024-11-14 | End: 2024-11-29 | Stop reason: HOSPADM

## 2024-11-14 RX ADMIN — NICOTINE 1 PATCH: 7 PATCH, EXTENDED RELEASE TRANSDERMAL at 08:41

## 2024-11-14 RX ADMIN — PREGABALIN 25 MG: 25 CAPSULE ORAL at 21:50

## 2024-11-14 RX ADMIN — INSULIN GLARGINE 20 UNITS: 100 INJECTION, SOLUTION SUBCUTANEOUS at 08:39

## 2024-11-14 RX ADMIN — INSULIN GLARGINE 40 UNITS: 100 INJECTION, SOLUTION SUBCUTANEOUS at 21:42

## 2024-11-14 RX ADMIN — ASPIRIN 81 MG: 81 TABLET, CHEWABLE ORAL at 08:39

## 2024-11-14 RX ADMIN — INSULIN LISPRO 4 UNITS: 100 INJECTION, SOLUTION INTRAVENOUS; SUBCUTANEOUS at 17:09

## 2024-11-14 RX ADMIN — CYCLOBENZAPRINE 10 MG: 10 TABLET, FILM COATED ORAL at 09:15

## 2024-11-14 RX ADMIN — VITAMINS A AND D OINTMENT: 15.5; 53.4 OINTMENT TOPICAL at 08:40

## 2024-11-14 RX ADMIN — HEPARIN SODIUM 5000 UNITS: 5000 INJECTION INTRAVENOUS; SUBCUTANEOUS at 05:29

## 2024-11-14 RX ADMIN — LISINOPRIL 20 MG: 10 TABLET ORAL at 08:39

## 2024-11-14 RX ADMIN — ACYCLOVIR 400 MG: 200 CAPSULE ORAL at 20:48

## 2024-11-14 RX ADMIN — DULOXETINE HYDROCHLORIDE 60 MG: 60 CAPSULE, DELAYED RELEASE ORAL at 08:40

## 2024-11-14 RX ADMIN — ACETAMINOPHEN 650 MG: 325 TABLET ORAL at 20:48

## 2024-11-14 RX ADMIN — IBUPROFEN 400 MG: 400 TABLET, FILM COATED ORAL at 04:23

## 2024-11-14 RX ADMIN — HEPARIN SODIUM 5000 UNITS: 5000 INJECTION INTRAVENOUS; SUBCUTANEOUS at 21:41

## 2024-11-14 RX ADMIN — METOPROLOL TARTRATE 25 MG: 25 TABLET, FILM COATED ORAL at 08:39

## 2024-11-14 RX ADMIN — ACETAMINOPHEN 650 MG: 325 TABLET ORAL at 10:20

## 2024-11-14 RX ADMIN — LIDOCAINE 2 PATCH: 4 PATCH TOPICAL at 17:09

## 2024-11-14 RX ADMIN — VITAMINS A AND D OINTMENT: 15.5; 53.4 OINTMENT TOPICAL at 20:49

## 2024-11-14 RX ADMIN — METOPROLOL TARTRATE 25 MG: 25 TABLET, FILM COATED ORAL at 20:48

## 2024-11-14 RX ADMIN — INSULIN LISPRO 8 UNITS: 100 INJECTION, SOLUTION INTRAVENOUS; SUBCUTANEOUS at 11:52

## 2024-11-14 RX ADMIN — DICLOFENAC SODIUM 4 G: 10 GEL TOPICAL at 10:20

## 2024-11-14 RX ADMIN — NYSTATIN: 100000 POWDER TOPICAL at 08:40

## 2024-11-14 RX ADMIN — DICLOFENAC SODIUM 4 G: 10 GEL TOPICAL at 04:23

## 2024-11-14 RX ADMIN — HEPARIN SODIUM 5000 UNITS: 5000 INJECTION INTRAVENOUS; SUBCUTANEOUS at 13:31

## 2024-11-14 RX ADMIN — INSULIN LISPRO 12 UNITS: 100 INJECTION, SOLUTION INTRAVENOUS; SUBCUTANEOUS at 21:41

## 2024-11-14 RX ADMIN — NYSTATIN: 100000 POWDER TOPICAL at 20:50

## 2024-11-14 RX ADMIN — PANTOPRAZOLE SODIUM 40 MG: 40 TABLET, DELAYED RELEASE ORAL at 08:39

## 2024-11-14 RX ADMIN — PREGABALIN 25 MG: 25 CAPSULE ORAL at 08:39

## 2024-11-14 RX ADMIN — Medication 5 MG: at 20:48

## 2024-11-14 RX ADMIN — ATORVASTATIN CALCIUM 80 MG: 40 TABLET, FILM COATED ORAL at 08:40

## 2024-11-14 NOTE — THERAPY TREATMENT NOTE
Acute Care - Physical Therapy Treatment Note   [facility]     Patient Name: Lety Johnson  : 1980  MRN: [id number]  Today's Date: 2024   Onset of Illness/Injury or Date of Surgery: 24  Visit Dx:     ICD-10-CM ICD-9-CM   1. Hypokalemia  E87.6 276.8   2. Pressure injury of skin of sacral region, unspecified injury stage  L89.159 707.03     707.20   3. Pressure injury of skin of left heel, unspecified injury stage  L89.629 707.07     707.20   4. Acute cystitis without hematuria  N30.00 595.0     Patient Active Problem List   Diagnosis   (none) - all problems resolved or deleted     Past Medical History:   Diagnosis Date    Stroke      History reviewed. No pertinent surgical history.  PT Assessment (Last 12 Hours)       PT Evaluation and Treatment       Row Name 24 1400          Physical Therapy Time and Intention    Subjective Information complains of;pain;weakness (P)   -     Document Type therapy note (daily note) (P)   -     Mode of Treatment physical therapy (P)   -     Patient Effort adequate (P)   -     Symptoms Noted During/After Treatment increased pain (P)   -       Row Name 24 1400          General Information    Patient Profile Reviewed yes (P)   -       Row Name 24 1400          Motor Skills    Therapeutic Exercise hip;knee;ankle (P)   -       Row Name 24 1400          Hip (Therapeutic Exercise)    Hip (Therapeutic Exercise) isometric exercises (P)   -     Hip Isometrics (Therapeutic Exercise) bilateral;aDduction;supine (P)   3X10 each side  -       Row Name 24 1400          Knee (Therapeutic Exercise)    Knee (Therapeutic Exercise) isometric exercises (P)   -     Knee Isometrics (Therapeutic Exercise) bilateral;quad sets;supine (P)   3X10 each side  -       Row Name 24 1400          Ankle (Therapeutic Exercise)    Ankle (Therapeutic Exercise) strengthening exercise (P)   -     Ankle Strengthening (Therapeutic Exercise)  bilateral;plantarflexion;supine (P)   PT student provided resistance. 3x12 each side  -HP       Row Name             Wound 09/26/24 1809 Left posterior ankle Other (comment)    Wound - Properties Group Placement Date: 09/26/24  -KJ Placement Time: 1809  -KJ Side: Left  -KJ Orientation: posterior  -KJ Location: ankle  -KJ Primary Wound Type: Other  -TW, unknow etiology     Retired Wound - Properties Group Placement Date: 09/26/24  -KJ Placement Time: 1809  -KJ Side: Left  -KJ Orientation: posterior  -KJ Location: ankle  -KJ Primary Wound Type: Other  -TW, unknow etiology     Retired Wound - Properties Group Placement Date: 09/26/24  -KJ Placement Time: 1809  -KJ Side: Left  -KJ Orientation: posterior  -KJ Location: ankle  -KJ Primary Wound Type: Other  -TW, unknow etiology     Retired Wound - Properties Group Date first assessed: 09/26/24  -KJ Time first assessed: 1809  -KJ Side: Left  -KJ Location: ankle  -KJ Primary Wound Type: Other  -TW, unknow etiology       Row Name             Wound 10/11/24 1330 medial coccyx Fissure    Wound - Properties Group Placement Date: 10/11/24  -TW Placement Time: 1330 -TW Orientation: medial  -TW Location: coccyx  -TW Primary Wound Type: Fissure  -TW    Retired Wound - Properties Group Placement Date: 10/11/24  -TW Placement Time: 1330 -TW Orientation: medial  -TW Location: coccyx  -TW Primary Wound Type: Fissure  -TW    Retired Wound - Properties Group Placement Date: 10/11/24  -TW Placement Time: 1330 -TW Orientation: medial  -TW Location: coccyx  -TW Primary Wound Type: Fissure  -TW    Retired Wound - Properties Group Date first assessed: 10/11/24  -TW Time first assessed: 1330  -TW Location: coccyx  -TW Primary Wound Type: Fissure  -TW      Row Name             Wound 10/16/24 0705 Left medial gluteal MASD (moisture associated skin damage)    Wound - Properties Group Placement Date: 10/16/24  -MS Placement Time: 0705  -MS Side: Left  -MS Orientation: medial  -MS Location:  gluteal  -MS Primary Wound Type: MASD  -MS Type: MASD (moisture associated skin damage)  -MS Present on Original Admission: N  -MS Additional Comments: Noted at shift change skin assesment, Night shift RN stated it had been there her shift.  -MS    Retired Wound - Properties Group Placement Date: 10/16/24  -MS Placement Time: 0705 -MS Present on Original Admission: N  -MS Side: Left  -MS Orientation: medial  -MS Location: gluteal  -MS Primary Wound Type: MASD  -MS Additional Comments: Noted at shift change skin assesment, Night shift RN stated it had been there her shift.  -MS Type: MASD (moisture associated skin damage)  -MS    Retired Wound - Properties Group Placement Date: 10/16/24  -MS Placement Time: 0705 -MS Present on Original Admission: N  -MS Side: Left  -MS Orientation: medial  -MS Location: gluteal  -MS Primary Wound Type: MASD  -MS Additional Comments: Noted at shift change skin assesment, Night shift RN stated it had been there her shift.  -MS Type: MASD (moisture associated skin damage)  -MS    Retired Wound - Properties Group Date first assessed: 10/16/24  -MS Time first assessed: 0705 -MS Present on Original Admission: N  -MS Side: Left  -MS Location: gluteal  -MS Primary Wound Type: MASD  -MS Additional Comments: Noted at shift change skin assesment, Night shift RN stated it had been there her shift.  -MS Type: MASD (moisture associated skin damage)  -MS      Row Name             Wound 10/16/24 0705 Left posterior greater trochanter MASD (moisture associated skin damage)    Wound - Properties Group Placement Date: 10/16/24  -MS Placement Time: 0705 -MS Side: Left  -MS Orientation: posterior  -MS Location: greater trochanter  -MS Primary Wound Type: MASD  -MS Type: MASD (moisture associated skin damage)  -MS Present on Original Admission: N  -MS    Retired Wound - Properties Group Placement Date: 10/16/24  -MS Placement Time: 0705 -MS Present on Original Admission: N  -MS Side: Left  -MS  Orientation: posterior  -MS Location: greater trochanter  -MS Primary Wound Type: MASD  -MS Type: MASD (moisture associated skin damage)  -MS    Retired Wound - Properties Group Placement Date: 10/16/24  -MS Placement Time: 0705 -MS Present on Original Admission: N  -MS Side: Left  -MS Orientation: posterior  -MS Location: greater trochanter  -MS Primary Wound Type: MASD  -MS Type: MASD (moisture associated skin damage)  -MS    Retired Wound - Properties Group Date first assessed: 10/16/24  -MS Time first assessed: 0705 -MS Present on Original Admission: N  -MS Side: Left  -MS Location: greater trochanter  -MS Primary Wound Type: MASD  -MS Type: MASD (moisture associated skin damage)  -MS      Row Name 11/14/24 1400          Positioning and Restraints    Pre-Treatment Position in bed (P)   -HP     Post Treatment Position bed (P)   -HP     In Bed supine;call light within reach;encouraged to call for assist;exit alarm on (P)   -HP       Row Name 11/14/24 1400          Progress Summary (PT)    Daily Progress Summary (PT) Pt. tolerated therapy well but reported that she was in too much pain to sit EOB. Completed supine therex w/ pt. and gave verbal/tactile cues for pt. to complete reps at an appropriate pace and activate quads. Will continue w/ POC. (P)   -HP               User Key  (r) = Recorded By, (t) = Taken By, (c) = Cosigned By      Initials Name Provider Type    Karen Peralta, RN Registered Nurse    Nory Keenan, RN Registered Nurse    Roe North RN Registered Nurse    Giovana Verdin, GENARO Student PT Student                    Physical Therapy Education       Title: PT OT SLP Therapies (Done)       Topic: Physical Therapy (Done)       Point: Mobility training (Done)       Learning Progress Summary            Patient Acceptance, E,TB, VU by RR at 11/14/2024 0148    Acceptance, E,TB, VU by RR at 11/13/2024 0218    Acceptance, E,TB, VU by RG at 11/12/2024 0927    Acceptance, TB,E, VU by ALEJANDRA  at 11/11/2024 0944    Acceptance, E,TB, VU by RG at 11/10/2024 0931    Acceptance, E, VU by WG at 11/10/2024 0233    Acceptance, E,TB, VU by CF at 11/9/2024 1016    Acceptance, E, NR by SC at 11/3/2024 0025    Acceptance, E,TB, VU by RR at 11/1/2024 2315    Acceptance, E,D, VU,NR by AG at 10/31/2024 1234    Comment: PT educated pt. in importance of mobilization and L L/UE PROM and self ROM to prevent contracture.  Pt. is encouraged to to perform R LE AROM frequently while supine or sitting EOB.    Acceptance, E,TB, VU by RR at 10/27/2024 0031    Acceptance, E,TB, VU by RG at 10/22/2024 1002    Acceptance, TB,E, VU by RG at 10/21/2024 0907    Nonacceptance, E, NR by WG at 10/16/2024 0228    Acceptance, E,TB, VU by CF at 10/15/2024 0753    Acceptance, E,TB, VU by CF at 10/14/2024 0801    Acceptance, E,TB, VU by CF at 10/13/2024 1045    Acceptance, E,D, VU,NR by AG at 10/10/2024 1310    Acceptance, E,TB, VU by CB at 10/8/2024 0448    Acceptance, E, NR by RD at 10/2/2024 1101    Acceptance, E, NR by RD at 10/1/2024 0856    Acceptance, E,D, VU,NR by AG at 9/30/2024 1559                      Point: Home exercise program (Done)       Learning Progress Summary            Patient Acceptance, E,TB, VU by RR at 11/14/2024 0148    Acceptance, E,TB, VU by RR at 11/13/2024 0218    Acceptance, E,TB, VU by RG at 11/12/2024 0927    Acceptance, TB,E, VU by RG at 11/11/2024 0944    Acceptance, E,TB, VU by RG at 11/10/2024 0931    Acceptance, E, VU by WG at 11/10/2024 0233    Acceptance, E,TB, VU by CF at 11/9/2024 1016    Acceptance, E, NR by SC at 11/3/2024 0025    Acceptance, E,TB, VU by RR at 11/1/2024 2315    Acceptance, E,D, VU,NR by AG at 10/31/2024 1234    Comment: PT educated pt. in importance of mobilization and L L/UE PROM and self ROM to prevent contracture.  Pt. is encouraged to to perform R LE AROM frequently while supine or sitting EOB.    Acceptance, E,TB, VU by RR at 10/27/2024 0031    Acceptance, E,TB, VU by RG at  10/22/2024 1002    Acceptance, TB,E, VU by RG at 10/21/2024 0907    Nonacceptance, E, NR by WG at 10/16/2024 0228    Acceptance, E,TB, VU by CF at 10/15/2024 0753    Acceptance, E,TB, VU by CF at 10/14/2024 0801    Acceptance, E,TB, VU by CF at 10/13/2024 1045    Acceptance, E,D, VU,NR by AG at 10/10/2024 1310    Acceptance, E,TB, VU by CB at 10/8/2024 0448    Acceptance, E, NR by RD at 10/2/2024 1101    Acceptance, E, NR by RD at 10/1/2024 0856    Acceptance, E,D, VU,NR by AG at 9/30/2024 1559                      Point: Body mechanics (Done)       Learning Progress Summary            Patient Acceptance, E,TB, VU by RR at 11/14/2024 0148    Acceptance, E,TB, VU by RR at 11/13/2024 0218    Acceptance, E,TB, VU by RG at 11/12/2024 0927    Acceptance, TB,E, VU by RG at 11/11/2024 0944    Acceptance, E,TB, VU by RG at 11/10/2024 0931    Acceptance, E, VU by WG at 11/10/2024 0233    Acceptance, E,TB, VU by CF at 11/9/2024 1016    Acceptance, E, NR by SC at 11/3/2024 0025    Acceptance, E,TB, VU by RR at 11/1/2024 2315    Acceptance, E,D, VU,NR by AG at 10/31/2024 1234    Comment: PT educated pt. in importance of mobilization and L L/UE PROM and self ROM to prevent contracture.  Pt. is encouraged to to perform R LE AROM frequently while supine or sitting EOB.    Acceptance, E,TB, VU by RR at 10/27/2024 0031    Acceptance, E,TB, VU by RG at 10/22/2024 1002    Acceptance, TB,E, VU by RG at 10/21/2024 0907    Nonacceptance, E, NR by WG at 10/16/2024 0228    Acceptance, E,TB, VU by CF at 10/15/2024 0753    Acceptance, E,TB, VU by CF at 10/14/2024 0801    Acceptance, E,TB, VU by CF at 10/13/2024 1045    Acceptance, E,D, VU,NR by AG at 10/10/2024 1310    Acceptance, E,TB, VU by CB at 10/8/2024 0448    Acceptance, E, NR by RD at 10/2/2024 1101    Acceptance, E, NR by RD at 10/1/2024 0856    Acceptance, E,D, VU,NR by AG at 9/30/2024 1559                      Point: Precautions (Done)       Learning Progress Summary             Patient Acceptance, E,TB, VU by RR at 11/14/2024 0148    Acceptance, E,TB, VU by RR at 11/13/2024 0218    Acceptance, E,TB, VU by RG at 11/12/2024 0927    Acceptance, TB,E, VU by RG at 11/11/2024 0944    Acceptance, E,TB, VU by RG at 11/10/2024 0931    Acceptance, E, VU by WG at 11/10/2024 0233    Acceptance, E,TB, VU by CF at 11/9/2024 1016    Acceptance, E, NR by SC at 11/3/2024 0025    Acceptance, E,TB, VU by RR at 11/1/2024 2315    Acceptance, E,D, VU,NR by AG at 10/31/2024 1234    Comment: PT educated pt. in importance of mobilization and L L/UE PROM and self ROM to prevent contracture.  Pt. is encouraged to to perform R LE AROM frequently while supine or sitting EOB.    Acceptance, E,TB, VU by RR at 10/27/2024 0031    Acceptance, E,TB, VU by RG at 10/22/2024 1002    Acceptance, TB,E, VU by RG at 10/21/2024 0907    Nonacceptance, E, NR by WG at 10/16/2024 0228    Acceptance, E,TB, VU by CF at 10/15/2024 0753    Acceptance, E,TB, VU by CF at 10/14/2024 0801    Acceptance, E,TB, VU by CF at 10/13/2024 1045    Acceptance, E,D, VU,NR by AG at 10/10/2024 1310    Acceptance, E,TB, VU by CB at 10/8/2024 0448    Acceptance, E, NR by RD at 10/2/2024 1101    Acceptance, E, NR by RD at 10/1/2024 0856    Acceptance, E,D, VU,NR by  at 9/30/2024 1559                                      User Key       Initials Effective Dates Name Provider Type Discipline     06/16/21 -  Мария Joya, PT Physical Therapist PT    RD 06/16/21 -  Laisha Verdugo, RN Registered Nurse Nurse    RR 06/11/24 -  Jaymie Dominguez, RN Registered Nurse Nurse    RG 06/06/23 -  Jesus Matthews, RN Registered Nurse Nurse    WG 02/12/24 -  Shanthi Burden, RN Registered Nurse Nurse    CF 06/25/24 -  Cherelle Germain, RN Registered Nurse Nurse    CB 06/17/24 -  Fidelia Lombardo, RN Registered Nurse Nurse    SC 09/11/24 -  Sally Hair, RN Registered Nurse Nurse                  PT Recommendation and Plan     Progress Summary (PT)  Daily Progress Summary  (PT): (P) Pt. tolerated therapy well but reported that she was in too much pain to sit EOB. Completed supine therex w/ pt. and gave verbal/tactile cues for pt. to complete reps at an appropriate pace and activate quads. Will continue w/ POC.       Time Calculation:    PT Charges       Row Name 11/14/24 1436             Time Calculation    PT Received On 11/14/24 (P)   -HP         Time Calculation- PT    Total Timed Code Minutes- PT 15 minute(s) (P)   -HP                User Key  (r) = Recorded By, (t) = Taken By, (c) = Cosigned By      Initials Name Provider Type     Giovana Quinonez, PT Student PT Student                  Therapy Charges for Today       Code Description Service Date Service Provider Modifiers Qty    32418565513 HC PT THER PROC EA 15 MIN 11/14/2024 Giovana Quinonez, PT Student GP 1            PT G-Codes  AM-PAC 6 Clicks Score (PT): 7    Giovana Quinonez PT Student  11/14/2024

## 2024-11-14 NOTE — CASE MANAGEMENT/SOCIAL WORK
Case Management/Social Work    Patient Name:  Lety Johnson  YOB: 1980  MRN: [id number]  Admit Date:  9/26/2024    SS sent a message to the 's office regarding pending disability case and was informed that pt's sister, Moises will be notified once a decision is made about pt's disability benefits. Disability is still pending post hearing review status. SS attempted contact with DORI Olsen and left a message. SS was present in meeting with ABELARDO LANDIS, [employer], , and manager on this date. ABELARDO LANDIS has sent information to the family support office to see if pt qualifies for long-term care Medicaid. ABELARDO LANDIS plans to contact [facility] back to further discuss resources. ABELARDO LANDIS also plans to discuss pt's discharge with family. ABELARDO LANDIS plans to further discuss their Goods and Services assistance program with her supervisor. ТАТЬЯНА attempted contact with [facility] [phone] and left a message for the family caregiver coordinator, Laisha Davies. SS to follow.     Electronically signed by:  HELEN Butterfield

## 2024-11-14 NOTE — PLAN OF CARE
Goal Outcome Evaluation:              Outcome Evaluation: Patient resting in bed. VSS on room air. PRN pain medication given per MAR. No acute changes at this time. Will continue plan of care.

## 2024-11-14 NOTE — CASE MANAGEMENT/SOCIAL WORK
Discharge Planning Assessment  [facility]     Patient Name: Lety Johnson  MRN: [id number]  Today's Date: 11/14/2024    Admit Date: 9/26/2024     Discharge Plan       Row Name 11/14/24 1601       Plan    Plan SS contacted [employer] program per Laisha who states pt can receives services with no monthly income, but Pt must have medicaid and be 55 years old to [facility] for services. SS contacted [facility] [phone] per April who states that pt must quailify for medicaid. Per April Addison Nogueira offer Day services at facility and in home care servics for cooking, cleaning and Dr. nicolas Monday- Friday 8-4, eight hrs a day and requested SS to faxed referral if needed [phone]. SS spoke with pt at bedside on this date. SS discussed if any family or friends would be able to stay and help assist with care and pt voiced that she has a son who won't help assist with care at home. SS contacted [employer] who states she plans to make a visist to pt's home in a.m to check and see if son is at pt's home. SS to follow                  Continued Care and Services - Admitted Since 9/26/2024       Destination       Service Provider Request Status Services Address Phone Fax Patient Preferred    [facility] Declined  Cannot meet patient's needs -- [address] [facility] [address] [phone] --    [facility] Acute Care Declined  Not eligible -- [address] [place] [address] [phone] --    [facility] Declined  Not eligible -- [address] [facility] [address] [phone] --    [facility] - [facility] Declined  Clinical Denial -- [address] [facility], 4TH FLOOR, [facility] [address] [phone] --    [facility] SWING BED UNIT Declined  Cannot meet patient's needs -- [address] [facility] [address] [phone] --    [facility] Declined  Not  eligible -- [address], [place] [id number] [phone] --    [facility] Declined  Bed not available -- [address] [place] [address] [phone] --    [facility] SKILLED CARE Declined  Bed not available -- [address] [facility] [address] [phone] --    [facility] Declined  Out of network -- [address] [facility] [place] [id number] [phone] --    [facility] SWING BED UNIT Declined -- [address] [facility] [address] [phone] --    [facility] Declined  Clinical Denial -- [address] [facility] [id number] [phone] --    [facility] SWING BED UNIT Declined  Insurance Denial, Out of network -- [address] [place] [address] [phone] --    [facility] Declined  Out of network -- [address] [facility] [address] [phone] --    [facility] Declined  Out of network -- [address] [address] [phone] -- --    [facility] Declined  Out of network -- [address] [facility] [address] [phone] --               KAZ Ivan

## 2024-11-14 NOTE — CASE MANAGEMENT/SOCIAL WORK
Discharge Planning Assessment   [place]     Patient Name: Lety Johnson  MRN: [id number]  Today's Date: 11/14/2024    Admit Date: 9/26/2024       Discharge Plan       Row Name 11/14/24 1639       Plan    Plan SS discussed Social Security Disability with [employer] and on 11/8/24 additional evidence was requested by the . SS sent a message to 's office to inquire about request by . SS to follow.                 Continued Care and Services - Admitted Since 9/26/2024       Destination       Service Provider Request Status Services Address Phone Fax Patient Preferred    [facility] Declined  Cannot meet patient's needs -- [address] [facility] [id number] [phone] --    [facility] Acute Care Declined  Not eligible -- [address] [facility] [place] [address] [phone] --    [facility] Declined  Not eligible -- [address] [facility] [address] [phone] --    [facility] SPECIALTY [facility] Declined  Clinical Denial -- [address] [facility], 4TH FLOOR, [facility] [address] [phone] --    [facility] SWING BED UNIT Declined  Cannot meet patient's needs -- [address] [facility] [address] [phone] --     [facility] Declined  Not eligible -- [address] [facility] [address] [phone] --    [facility] Declined  Bed not available -- [address] [facility] [address] [phone] --    [facility] SKILLED CARE Declined  Bed not available -- [address] [facility] [address] [phone] --    [facility] Swing Declined  Out of network -- [address] [facility] [id number] [phone] --    [facility] SWING BED UNIT Declined -- [address] [facility] [address] [phone] --     [facility] Declined  Clinical Denial -- [address] [facility] [address] [phone] --    [facility] SWING BED UNIT Declined  Insurance Denial, Out of network -- [address], [facility] [address] [phone] --    [facility] Declined  Out of network -- [address] [facility] [address] [phone] --    [facility] Declined  Out of network -- [address] [address] [phone] -- --    [facility] Declined  Out of network -- [address] [facility] [address] [phone] --                  Expected Discharge Date and Time       Expected Discharge Date Expected Discharge Time    Nov 15, 2024         HELEN Butterfield

## 2024-11-14 NOTE — PROGRESS NOTES
Patient Identification:  Name:  Lety Johnson  Age:  44 y.o.  Sex:  female  :  1980  MRN:  [id number]  Visit Number:  [id number]  Primary Care Provider:  Concha Rao APRN    Length of stay:  47    Subjective/Interval History/Consultants/Procedures     Chief Complaint:   Chief Complaint   Patient presents with    Fall       Subjective/Interval History:    44 y.o. female who was admitted on 2024 with UTI     PMH is significant for CVA w/ left sided hemiparesis, debility, hx genital herpes on suppressive therapy, morbid obesity, T2DM   For complete admission information, please see history and physical.     Consultations:  Infectious disease  PT/OT  CM/SW    Procedures/Scans:  CT head without contrast  CT C-spine without contrast  CT T-spine without contrast  CT L-spine without contrast  CXR x 2  XR left foot  XR left ankle    Today, the patient was seen and examined with no family present at the bedside. She continued to complain of diffuse pain, primarily left sided- like pins and needles. She stated lyrica does seem to help some. She denied new complaint. Denied further HA thus far today.      Room location at the time of evaluation was 332.    ----------------------------------------------------------------------------------------------------------------------  Current Hospital Meds:  acyclovir, 400 mg, Oral, Nightly  aspirin, 81 mg, Oral, Daily  atorvastatin, 80 mg, Oral, Daily  DULoxetine, 60 mg, Oral, Daily  heparin (porcine), 5,000 Units, Subcutaneous, Q8H  insulin glargine, 20 Units, Subcutaneous, Daily With Breakfast  insulin glargine, 40 Units, Subcutaneous, Nightly  insulin lispro, 4-24 Units, Subcutaneous, 4x Daily AC & at Bedtime  insulin lispro, 8 Units, Subcutaneous, Daily With Lunch  Lidocaine, 2 patch, Transdermal, Q24H  lisinopril, 20 mg, Oral, Daily  magic barrier cream, , Topical, BID  metoprolol tartrate, 25 mg, Oral, Q12H  nicotine, 1 patch, Transdermal, Q24H  nystatin, ,  Topical, Q12H  pantoprazole, 40 mg, Oral, Daily  pregabalin, 25 mg, Oral, Q8H  sodium chloride, 10 mL, Intravenous, Q12H  sodium chloride, 10 mL, Intravenous, Q12H      Pharmacy Consult,       ----------------------------------------------------------------------------------------------------------------------      Objective     Vital Signs:  Temp:  [97.3 °F (36.3 °C)-98 °F (36.7 °C)] 97.9 °F (36.6 °C)  Heart Rate:  [] 97  Resp:  [16-18] 16  BP: ()/(61-91) 113/66      10/07/24  0511 10/08/24  0500 10/09/24  0500   Weight: 102 kg (225 lb 14.4 oz) (FIFI cameron) 102 kg (225 lb 15.5 oz) 102 kg (224 lb 1.6 oz)     Body mass index is 36.73 kg/m².    Intake/Output Summary (Last 24 hours) at 11/14/2024 1119  Last data filed at 11/14/2024 0350  Gross per 24 hour   Intake 640 ml   Output 1450 ml   Net -810 ml     No intake/output data recorded.  Diet: Regular/House, Diabetic; Consistent Carbohydrate; Fluid Consistency: Thin (IDDSI 0)  ----------------------------------------------------------------------------------------------------------------------    Physical Exam  Vitals and nursing note reviewed.   Constitutional:       General: She is not in acute distress.     Appearance: She is obese. She is ill-appearing.   HENT:      Head: Normocephalic and atraumatic.   Cardiovascular:      Rate and Rhythm: Normal rate.   Pulmonary:      Effort: Pulmonary effort is normal. No respiratory distress.   Abdominal:      Palpations: Abdomen is soft.   Musculoskeletal:      Right lower leg: No edema.      Left lower leg: No edema.   Skin:     General: Skin is warm and dry.   Neurological:      Mental Status: She is alert. Mental status is at baseline.   Psychiatric:         Mood and Affect: Mood normal.                ----------------------------------------------------------------------------------------------------------------------  Tele:   "    ----------------------------------------------------------------------------------------------------------------------      Results from last 7 days   Lab Units 11/12/24  1057 11/08/24  0233   WBC 10*3/mm3 11.52* 11.68*   HEMOGLOBIN g/dL 12.3 11.9*   HEMATOCRIT % 37.1 35.9   MCV fL 99.5* 100.0*   MCHC g/dL 33.2 33.1   PLATELETS 10*3/mm3 265 265         Results from last 7 days   Lab Units 11/12/24  1057 11/08/24  0233   SODIUM mmol/L 135* 135*   POTASSIUM mmol/L 4.4 4.3   CHLORIDE mmol/L 100 99   CO2 mmol/L 23.9 23.0   BUN mg/dL 17 20   CREATININE mg/dL 0.40* 0.45*   CALCIUM mg/dL 9.2 9.1   GLUCOSE mg/dL 291* 255*   Estimated Creatinine Clearance: 216.7 mL/min (A) (by C-G formula based on SCr of 0.4 mg/dL (L)).  No results found for: \"AMMONIA\"      No results found for: \"BLOODCX\"  No results found for: \"URINECX\"  No results found for: \"WOUNDCX\"  No results found for: \"STOOLCX\"  ----------------------------------------------------------------------------------------------------------------------  Imaging Results (Last 24 Hours)       ** No results found for the last 24 hours. **          ----------------------------------------------------------------------------------------------------------------------   I have reviewed the above laboratory values for 11/14/24    Assessment/Plan     There are no active hospital problems to display for this patient.        ASSESSMENT/PLAN:    ESBL E. coli UTI  Acute metabolic encephalopathy, resolved  S/p Invanz.  Infectious disease input appreciated.     Hx CVA with left-sided hemiparesis  Chronic debility  Continue PT OT  CM/SW assisting with discharge planning.  Patient is pending placement.     T2DM  Diabetic neuropathy  Continue insulin regimen-titrate as needed. Add scheduled meal time insulin w/ lunch.   Continue supportive care measures, Cymbalta  Lyrica added 11/13 given persistent pain. . Pt noted some improvement but pain still bothersome. Increased frequency to TID " as she has tolerated well thus far.  Will continue to titrate as indicated     Hx genital herpes  Continue suppressive acyclovir     Morbid obesity  Complicates all aspects of care    Repeat BMP in the AM  -----------  -DVT prophylaxis: SQH  -Disposition plans/anticipated needs:Pending placement.         The patient is high risk due to the following diagnoses/reasons:  chronic debility, s/p CVA w/ left hemiparesis         Bruce Branham PA-C  11/14/24  11:19 EST

## 2024-11-15 LAB
ANION GAP SERPL CALCULATED.3IONS-SCNC: 10.9 MMOL/L (ref 5–15)
BUN SERPL-MCNC: 20 MG/DL (ref 6–20)
BUN/CREAT SERPL: 40 (ref 7–25)
CALCIUM SPEC-SCNC: 9.6 MG/DL (ref 8.6–10.5)
CHLORIDE SERPL-SCNC: 104 MMOL/L (ref 98–107)
CO2 SERPL-SCNC: 25.1 MMOL/L (ref 22–29)
CREAT SERPL-MCNC: 0.5 MG/DL (ref 0.57–1)
EGFRCR SERPLBLD CKD-EPI 2021: 119.5 ML/MIN/1.73
GLUCOSE BLDC GLUCOMTR-MCNC: 150 MG/DL (ref 70–130)
GLUCOSE BLDC GLUCOMTR-MCNC: 190 MG/DL (ref 70–130)
GLUCOSE BLDC GLUCOMTR-MCNC: 201 MG/DL (ref 70–130)
GLUCOSE BLDC GLUCOMTR-MCNC: 202 MG/DL (ref 70–130)
GLUCOSE BLDC GLUCOMTR-MCNC: 267 MG/DL (ref 70–130)
GLUCOSE BLDC GLUCOMTR-MCNC: 269 MG/DL (ref 70–130)
GLUCOSE SERPL-MCNC: 125 MG/DL (ref 65–99)
POTASSIUM SERPL-SCNC: 4.4 MMOL/L (ref 3.5–5.2)
SODIUM SERPL-SCNC: 140 MMOL/L (ref 136–145)

## 2024-11-15 PROCEDURE — 25010000002 HEPARIN (PORCINE) PER 1000 UNITS: Performed by: INTERNAL MEDICINE

## 2024-11-15 PROCEDURE — 63710000001 INSULIN LISPRO (HUMAN) PER 5 UNITS: Performed by: INTERNAL MEDICINE

## 2024-11-15 PROCEDURE — 99231 SBSQ HOSP IP/OBS SF/LOW 25: CPT

## 2024-11-15 PROCEDURE — 80048 BASIC METABOLIC PNL TOTAL CA: CPT

## 2024-11-15 PROCEDURE — 82948 REAGENT STRIP/BLOOD GLUCOSE: CPT

## 2024-11-15 PROCEDURE — 63710000001 INSULIN GLARGINE PER 5 UNITS

## 2024-11-15 PROCEDURE — 63710000001 INSULIN LISPRO (HUMAN) PER 5 UNITS

## 2024-11-15 RX ORDER — IBUPROFEN 400 MG/1
600 TABLET, FILM COATED ORAL ONCE
Status: COMPLETED | OUTPATIENT
Start: 2024-11-15 | End: 2024-11-15

## 2024-11-15 RX ORDER — LIDOCAINE 4 G/G
3 PATCH TOPICAL EVERY 24 HOURS
Status: DISCONTINUED | OUTPATIENT
Start: 2024-11-15 | End: 2024-11-29 | Stop reason: HOSPADM

## 2024-11-15 RX ADMIN — PREGABALIN 25 MG: 25 CAPSULE ORAL at 21:17

## 2024-11-15 RX ADMIN — ACETAMINOPHEN 650 MG: 325 TABLET ORAL at 14:34

## 2024-11-15 RX ADMIN — INSULIN LISPRO 4 UNITS: 100 INJECTION, SOLUTION INTRAVENOUS; SUBCUTANEOUS at 09:17

## 2024-11-15 RX ADMIN — INSULIN GLARGINE 20 UNITS: 100 INJECTION, SOLUTION SUBCUTANEOUS at 09:16

## 2024-11-15 RX ADMIN — Medication 5 MG: at 21:17

## 2024-11-15 RX ADMIN — NYSTATIN: 100000 POWDER TOPICAL at 21:37

## 2024-11-15 RX ADMIN — METOPROLOL TARTRATE 25 MG: 25 TABLET, FILM COATED ORAL at 09:19

## 2024-11-15 RX ADMIN — ACETAMINOPHEN 650 MG: 325 TABLET ORAL at 05:36

## 2024-11-15 RX ADMIN — VITAMINS A AND D OINTMENT: 15.5; 53.4 OINTMENT TOPICAL at 21:36

## 2024-11-15 RX ADMIN — DULOXETINE HYDROCHLORIDE 60 MG: 60 CAPSULE, DELAYED RELEASE ORAL at 09:19

## 2024-11-15 RX ADMIN — PREGABALIN 25 MG: 25 CAPSULE ORAL at 14:34

## 2024-11-15 RX ADMIN — VITAMINS A AND D OINTMENT: 15.5; 53.4 OINTMENT TOPICAL at 09:19

## 2024-11-15 RX ADMIN — ASPIRIN 81 MG: 81 TABLET, CHEWABLE ORAL at 09:19

## 2024-11-15 RX ADMIN — LISINOPRIL 20 MG: 10 TABLET ORAL at 09:18

## 2024-11-15 RX ADMIN — ACYCLOVIR 400 MG: 200 CAPSULE ORAL at 21:17

## 2024-11-15 RX ADMIN — NYSTATIN: 100000 POWDER TOPICAL at 09:19

## 2024-11-15 RX ADMIN — INSULIN LISPRO 4 UNITS: 100 INJECTION, SOLUTION INTRAVENOUS; SUBCUTANEOUS at 17:58

## 2024-11-15 RX ADMIN — ATORVASTATIN CALCIUM 80 MG: 40 TABLET, FILM COATED ORAL at 09:19

## 2024-11-15 RX ADMIN — HEPARIN SODIUM 5000 UNITS: 5000 INJECTION INTRAVENOUS; SUBCUTANEOUS at 14:34

## 2024-11-15 RX ADMIN — HEPARIN SODIUM 5000 UNITS: 5000 INJECTION INTRAVENOUS; SUBCUTANEOUS at 21:18

## 2024-11-15 RX ADMIN — IBUPROFEN 600 MG: 400 TABLET, FILM COATED ORAL at 11:27

## 2024-11-15 RX ADMIN — DICLOFENAC SODIUM 4 G: 10 GEL TOPICAL at 09:19

## 2024-11-15 RX ADMIN — INSULIN LISPRO 12 UNITS: 100 INJECTION, SOLUTION INTRAVENOUS; SUBCUTANEOUS at 21:34

## 2024-11-15 RX ADMIN — LIDOCAINE 3 PATCH: 4 PATCH TOPICAL at 17:58

## 2024-11-15 RX ADMIN — PANTOPRAZOLE SODIUM 40 MG: 40 TABLET, DELAYED RELEASE ORAL at 09:19

## 2024-11-15 RX ADMIN — INSULIN LISPRO 12 UNITS: 100 INJECTION, SOLUTION INTRAVENOUS; SUBCUTANEOUS at 11:28

## 2024-11-15 RX ADMIN — INSULIN LISPRO 8 UNITS: 100 INJECTION, SOLUTION INTRAVENOUS; SUBCUTANEOUS at 11:28

## 2024-11-15 RX ADMIN — CYCLOBENZAPRINE 10 MG: 10 TABLET, FILM COATED ORAL at 09:19

## 2024-11-15 RX ADMIN — INSULIN GLARGINE 40 UNITS: 100 INJECTION, SOLUTION SUBCUTANEOUS at 21:18

## 2024-11-15 RX ADMIN — PREGABALIN 25 MG: 25 CAPSULE ORAL at 05:33

## 2024-11-15 RX ADMIN — NICOTINE 1 PATCH: 7 PATCH, EXTENDED RELEASE TRANSDERMAL at 09:16

## 2024-11-15 RX ADMIN — HEPARIN SODIUM 5000 UNITS: 5000 INJECTION INTRAVENOUS; SUBCUTANEOUS at 05:33

## 2024-11-15 NOTE — CASE MANAGEMENT/SOCIAL WORK
"Discharge Planning Assessment   [facility]     Patient Name: Lety Johnson  MRN: [id number]  Today's Date: 11/15/2024    Admit Date: 9/26/2024     Discharge Plan       Row Name 11/15/24 1015       Plan    Plan SS received a message from  at 's office stating, \"At this point there is nothing they are missing from us. Anything the  is requesting is an internal request within [employer]. On their end, the last activity on the claim is that the case is in post hearing review as of the date of the hearing, with the last activity in her file being the congressional request that was made 11/4 and their response telling you it is pending 11/8. As explained before, decisions take 60-90 days on average to be written and released\". SS to follow.                  Continued Care and Services - Admitted Since 9/26/2024       Destination       Service Provider Request Status Services Address Phone Fax Patient Preferred    [facility] Declined  Cannot meet patient's needs -- [address] [facility] [address] [phone] --    [facility] Acute Care Declined  Not eligible -- [address] [facility] [place] [address] [phone] --    [facility] Declined  Not eligible -- [address] [facility] [address] [phone] --    [facility] SPECIALTY [facility] Declined  Clinical Denial -- [address] [facility], 4TH FLOOR, [facility] [address] [phone] --    [facility] SWING BED UNIT Declined  Cannot meet patient's needs -- [address] [facility] [address] [phone] --     [facility] Declined  Not eligible -- [address] [facility] [place] [address] [phone] --    [facility] Declined  Bed not available -- [address] [facility] [address] [phone] --    [facility] Declined  Bed not available -- [address] [facility] [address] [phone] --    [facility] Sw[facility] Declined  Out of network -- [address] [facility] [place] [address] [phone] --    [facility] SWING BED UNIT Declined -- [address] [facility] [place] [address] [phone] --    [facility] Declined  Clinical Denial -- [address] [facility] [id number] [phone] --    [facility] SWING BED UNIT Declined  Insurance Denial, Out of network -- [address] [address] [phone] --    [facility] Declined  Out of network -- [address] [facility] [address] [phone] --    [facility] Declined  Out of network -- [address] [address] [phone] -- --    [facility] Declined  Out of network -- [address] [facility] [address] [phone] --                  Expected Discharge Date and Time       Expected Discharge Date Expected Discharge Time    Nov 15, 2024         HELEN Butterfield

## 2024-11-15 NOTE — CASE MANAGEMENT/SOCIAL WORK
Discharge Planning Assessment  [facility]     Patient Name: Lety Johnson  MRN: [id number]  Today's Date: 11/15/2024    Admit Date: 9/26/2024     Discharge Plan       Row Name 11/15/24 1445       Plan    Plan SS spoke with APS BOBY Kinsey Dial who states she has not spoke with son just yet and voiced she plans to do so later this afternoon. ABELARDO LANDIS requested SS contact her back. SS attempted to contact APS BOBY without succss. SS was unable to leave voicemail. SS sent message to return call. SS to follow.    14:56: SS received call from APS BOBY Dial who states she went by pt's residence earlier today and son was not at pt's resident. ABELARDO LANDIS voiced she plans visit pt home again and see if son is there this evening. SS to follow.                   Continued Care and Services - Admitted Since 9/26/2024       Destination       Service Provider Request Status Services Address Phone Fax Patient Preferred    [facility] Declined  Cannot meet patient's needs -- [address] [facility] [address] [phone] --     [facility]ined  Not eligible -- [address] [facility] [address] [phone] --    [facility] Declined  Not eligible -- [address] [facility] [address] [phone] --    [facility] Declined  Clinical Denial -- [address] [facility], 4TH FLOOR, [facility] [address] [phone] --    [facility] SWING BED UNIT Declined  Cannot meet patient's needs -- [address] [facility] [address] [phone] --     [facility] Declined  Not eligible -- [address] [facility] [address] [phone] --    [facility] Declined  Bed not available -- [address] [facility] [address] [phone] --    [facility] SKILLED CARE Declined  Bed not  available -- [address] [facility] [id number] [phone] --    [facility] Sw[facility] Declined  Out of network -- [address] [facility] [address] [phone] --    [facility] SWING BED UNIT Declined -- [address] [facility] [address] [phone] --    [facility] Declined  Clinical Denial -- [address] [facility] [id number] [phone] --    [facility] SWING BED UNIT Declined  Insurance Denial, Out of network -- [address], [facility] [address] [phone] --    [facility] Declined  Out of network -- [address] [facility] [address] [phone] --    [facility] Declined  Out of network -- [address] [address] [phone] -- --    [facility] Declined  Out of network -- [address] [facility] [address] [phone] --             KAZ Ivan

## 2024-11-15 NOTE — PLAN OF CARE
Goal Outcome Evaluation:  Plan of Care Reviewed With: patient  Plan of Care Reviewed With: patient        Progress: no change  Outcome Evaluation: Patient resting in bed at this time. A&O. VSS on RA. No acute changes. Patient had bath this shift. PRN tylenol and voltaren given per orders. One time dose motrin given per order. Wound care completed. No requests or complaints at this time. Will continue with POC.

## 2024-11-15 NOTE — PLAN OF CARE
Goal Outcome Evaluation:              Outcome Evaluation: Patient has rested in bed, no complaints or request at this time, VSS, Will continue plan of care.

## 2024-11-15 NOTE — PROGRESS NOTES
Patient Identification:  Name:  Lety Johnson  Age:  44 y.o.  Sex:  female  :  1980  MRN:  [id number]  Visit Number:  [id number]  Primary Care Provider:  Concha Rao APRN    Length of stay:  48    Subjective/Interval History/Consultants/Procedures     Chief Complaint:   Chief Complaint   Patient presents with    Fall       Subjective/Interval History:    44 y.o. female who was admitted on 2024 with UTI     PMH is significant for  CVA w/ left sided hemiparesis, debility, hx genital herpes on suppressive therapy, morbid obesity, T2DM   For complete admission information, please see history and physical.     Consultations:  Infectious disease  PT/OT  CM/SW    Procedures/Scans:  CT head without contrast  CT C-spine without contrast  CT T-spine without contrast  CT L-spine without contrast  CXR x 2  XR left foot  XR left ankle    Today, the patient was seen and examined. No acute distress. No new complaints today. Did request a dose of ibuprofen this morning for shoulder pain.      Room location at the time of evaluation was [facility].    ----------------------------------------------------------------------------------------------------------------------  Current Hospital Meds:  acyclovir, 400 mg, Oral, Nightly  aspirin, 81 mg, Oral, Daily  atorvastatin, 80 mg, Oral, Daily  DULoxetine, 60 mg, Oral, Daily  heparin (porcine), 5,000 Units, Subcutaneous, Q8H  ibuprofen, 600 mg, Oral, Once  insulin glargine, 20 Units, Subcutaneous, Daily With Breakfast  insulin glargine, 40 Units, Subcutaneous, Nightly  insulin lispro, 4-24 Units, Subcutaneous, 4x Daily AC & at Bedtime  insulin lispro, 8 Units, Subcutaneous, Daily With Lunch  Lidocaine, 3 patch, Transdermal, Q24H  lisinopril, 20 mg, Oral, Daily  magic barrier cream, , Topical, BID  metoprolol tartrate, 25 mg, Oral, Q12H  nicotine, 1 patch, Transdermal, Q24H  nystatin, , Topical, Q12H  pantoprazole, 40 mg, Oral, Daily  pregabalin, 25 mg, Oral, Q8H  sodium  chloride, 10 mL, Intravenous, Q12H  sodium chloride, 10 mL, Intravenous, Q12H      Pharmacy Consult,       ----------------------------------------------------------------------------------------------------------------------      Objective     Vital Signs:  Temp:  [97.6 °F (36.4 °C)-98.1 °F (36.7 °C)] 97.8 °F (36.6 °C)  Heart Rate:  [] 94  Resp:  [16-18] 18  BP: ()/(54-63) 115/54      10/07/24  0511 10/08/24  0500 10/09/24  0500   Weight: 102 kg (225 lb 14.4 oz) (FIFI cameron) 102 kg (225 lb 15.5 oz) 102 kg (224 lb 1.6 oz)     Body mass index is 36.73 kg/m².    Intake/Output Summary (Last 24 hours) at 11/15/2024 1118  Last data filed at 11/15/2024 0900  Gross per 24 hour   Intake 290 ml   Output 950 ml   Net -660 ml     I/O this shift:  In: 120 [P.O.:120]  Out: -   Diet: Regular/House, Diabetic; Consistent Carbohydrate; Fluid Consistency: Thin (IDDSI 0)  ----------------------------------------------------------------------------------------------------------------------    Physical Exam  Vitals and nursing note reviewed.   Constitutional:       General: She is not in acute distress.     Appearance: She is obese.   HENT:      Head: Normocephalic and atraumatic.   Eyes:      Extraocular Movements: Extraocular movements intact.   Cardiovascular:      Rate and Rhythm: Normal rate.   Pulmonary:      Effort: Pulmonary effort is normal. No respiratory distress.   Abdominal:      Palpations: Abdomen is soft.   Musculoskeletal:      Right lower leg: No edema.      Left lower leg: No edema.   Skin:     General: Skin is warm.   Neurological:      Mental Status: She is alert. Mental status is at baseline.   Psychiatric:         Mood and Affect: Mood normal.                ----------------------------------------------------------------------------------------------------------------------  Tele:   "    ----------------------------------------------------------------------------------------------------------------------      Results from last 7 days   Lab Units 11/12/24  1057   WBC 10*3/mm3 11.52*   HEMOGLOBIN g/dL 12.3   HEMATOCRIT % 37.1   MCV fL 99.5*   MCHC g/dL 33.2   PLATELETS 10*3/mm3 265         Results from last 7 days   Lab Units 11/15/24  0237 11/12/24  1057   SODIUM mmol/L 140 135*   POTASSIUM mmol/L 4.4 4.4   CHLORIDE mmol/L 104 100   CO2 mmol/L 25.1 23.9   BUN mg/dL 20 17   CREATININE mg/dL 0.50* 0.40*   CALCIUM mg/dL 9.6 9.2   GLUCOSE mg/dL 125* 291*   Estimated Creatinine Clearance: 173.4 mL/min (A) (by C-G formula based on SCr of 0.5 mg/dL (L)).  No results found for: \"AMMONIA\"      No results found for: \"BLOODCX\"  No results found for: \"URINECX\"  No results found for: \"WOUNDCX\"  No results found for: \"STOOLCX\"  ----------------------------------------------------------------------------------------------------------------------  Imaging Results (Last 24 Hours)       ** No results found for the last 24 hours. **          ----------------------------------------------------------------------------------------------------------------------   I have reviewed the above laboratory values for 11/15/24    Assessment/Plan     There are no active hospital problems to display for this patient.        ASSESSMENT/PLAN:    ESBL E. coli UTI  Acute metabolic encephalopathy, resolved  S/p Invanz.  Infectious disease input appreciated.     Hx CVA with left-sided hemiparesis  Chronic debility  Continue PT OT  CM/SW assisting with discharge planning.  Patient is pending placement.     T2DM  Diabetic neuropathy  Continue insulin regimen-titrate as needed. Add scheduled meal time insulin w/ lunch.   Continue supportive care measures, Cymbalta  Lyrica added 11/13 given persistent pain. Pt noted some improvement but pain still bothersome. Increased frequency to TID as she has tolerated well thus far.  Will continue " to titrate as indicated     Hx genital herpes  Continue suppressive acyclovir     Morbid obesity  Complicates all aspects of care     -----------  -DVT prophylaxis: SQH  -Disposition plans/anticipated needs: Pending placement         The patient is high risk due to the following diagnoses/reasons:  debility, hemiparesis         Bruce Branham PA-C  11/15/24  11:18 EST

## 2024-11-16 LAB
GLUCOSE BLDC GLUCOMTR-MCNC: 124 MG/DL (ref 70–130)
GLUCOSE BLDC GLUCOMTR-MCNC: 181 MG/DL (ref 70–130)
GLUCOSE BLDC GLUCOMTR-MCNC: 186 MG/DL (ref 70–130)
GLUCOSE BLDC GLUCOMTR-MCNC: 211 MG/DL (ref 70–130)
GLUCOSE BLDC GLUCOMTR-MCNC: 272 MG/DL (ref 70–130)

## 2024-11-16 PROCEDURE — 63710000001 INSULIN LISPRO (HUMAN) PER 5 UNITS: Performed by: INTERNAL MEDICINE

## 2024-11-16 PROCEDURE — 25010000002 HEPARIN (PORCINE) PER 1000 UNITS: Performed by: INTERNAL MEDICINE

## 2024-11-16 PROCEDURE — 63710000001 INSULIN LISPRO (HUMAN) PER 5 UNITS

## 2024-11-16 PROCEDURE — 82948 REAGENT STRIP/BLOOD GLUCOSE: CPT

## 2024-11-16 PROCEDURE — 99232 SBSQ HOSP IP/OBS MODERATE 35: CPT | Performed by: INTERNAL MEDICINE

## 2024-11-16 PROCEDURE — 63710000001 INSULIN GLARGINE PER 5 UNITS

## 2024-11-16 RX ORDER — CELECOXIB 100 MG/1
100 CAPSULE ORAL EVERY 12 HOURS SCHEDULED
Status: DISCONTINUED | OUTPATIENT
Start: 2024-11-16 | End: 2024-11-16

## 2024-11-16 RX ADMIN — PANTOPRAZOLE SODIUM 40 MG: 40 TABLET, DELAYED RELEASE ORAL at 08:47

## 2024-11-16 RX ADMIN — HEPARIN SODIUM 5000 UNITS: 5000 INJECTION INTRAVENOUS; SUBCUTANEOUS at 05:47

## 2024-11-16 RX ADMIN — PREGABALIN 25 MG: 25 CAPSULE ORAL at 21:18

## 2024-11-16 RX ADMIN — HEPARIN SODIUM 5000 UNITS: 5000 INJECTION INTRAVENOUS; SUBCUTANEOUS at 15:50

## 2024-11-16 RX ADMIN — VITAMINS A AND D OINTMENT: 15.5; 53.4 OINTMENT TOPICAL at 21:26

## 2024-11-16 RX ADMIN — PREGABALIN 25 MG: 25 CAPSULE ORAL at 05:47

## 2024-11-16 RX ADMIN — ATORVASTATIN CALCIUM 80 MG: 40 TABLET, FILM COATED ORAL at 08:46

## 2024-11-16 RX ADMIN — DULOXETINE HYDROCHLORIDE 60 MG: 60 CAPSULE, DELAYED RELEASE ORAL at 08:46

## 2024-11-16 RX ADMIN — NYSTATIN: 100000 POWDER TOPICAL at 21:25

## 2024-11-16 RX ADMIN — INSULIN LISPRO 4 UNITS: 100 INJECTION, SOLUTION INTRAVENOUS; SUBCUTANEOUS at 17:59

## 2024-11-16 RX ADMIN — DICLOFENAC SODIUM 4 G: 10 GEL TOPICAL at 08:47

## 2024-11-16 RX ADMIN — PREGABALIN 25 MG: 25 CAPSULE ORAL at 15:50

## 2024-11-16 RX ADMIN — ACYCLOVIR 400 MG: 200 CAPSULE ORAL at 21:18

## 2024-11-16 RX ADMIN — LIDOCAINE 3 PATCH: 4 PATCH TOPICAL at 17:59

## 2024-11-16 RX ADMIN — ASPIRIN 81 MG: 81 TABLET, CHEWABLE ORAL at 08:46

## 2024-11-16 RX ADMIN — Medication 5 MG: at 21:18

## 2024-11-16 RX ADMIN — INSULIN GLARGINE 40 UNITS: 100 INJECTION, SOLUTION SUBCUTANEOUS at 21:23

## 2024-11-16 RX ADMIN — ACETAMINOPHEN 650 MG: 325 TABLET ORAL at 15:50

## 2024-11-16 RX ADMIN — INSULIN LISPRO 12 UNITS: 100 INJECTION, SOLUTION INTRAVENOUS; SUBCUTANEOUS at 21:23

## 2024-11-16 RX ADMIN — INSULIN LISPRO 8 UNITS: 100 INJECTION, SOLUTION INTRAVENOUS; SUBCUTANEOUS at 11:55

## 2024-11-16 RX ADMIN — VITAMINS A AND D OINTMENT: 15.5; 53.4 OINTMENT TOPICAL at 08:47

## 2024-11-16 RX ADMIN — HEPARIN SODIUM 5000 UNITS: 5000 INJECTION INTRAVENOUS; SUBCUTANEOUS at 21:21

## 2024-11-16 RX ADMIN — INSULIN LISPRO 4 UNITS: 100 INJECTION, SOLUTION INTRAVENOUS; SUBCUTANEOUS at 11:55

## 2024-11-16 RX ADMIN — NYSTATIN: 100000 POWDER TOPICAL at 08:46

## 2024-11-16 RX ADMIN — LISINOPRIL 20 MG: 10 TABLET ORAL at 08:47

## 2024-11-16 RX ADMIN — NICOTINE 1 PATCH: 7 PATCH, EXTENDED RELEASE TRANSDERMAL at 08:46

## 2024-11-16 RX ADMIN — METOPROLOL TARTRATE 25 MG: 25 TABLET, FILM COATED ORAL at 08:47

## 2024-11-16 RX ADMIN — ACETAMINOPHEN 650 MG: 325 TABLET ORAL at 08:46

## 2024-11-16 RX ADMIN — INSULIN GLARGINE 20 UNITS: 100 INJECTION, SOLUTION SUBCUTANEOUS at 08:46

## 2024-11-16 NOTE — PLAN OF CARE
Goal Outcome Evaluation:  Plan of Care Reviewed With: patient  Plan of Care Reviewed With: patient        Progress: no change  Outcome Evaluation: Patient resting in bed at this time. A&O. VSS on RA. Patient frequently seeking out staff this shift with emotional outbursts, consoled as we could PRN pain meds given. Patient had bath this shift. Voltaren gel applied to knees and ankles. Wound care completed. No other issues noted at this time. Will continue with POC.

## 2024-11-16 NOTE — PROGRESS NOTES
Patient Identification:  Name:  Lety Johnson  Age:  44 y.o.  Sex:  female  :  1980  MRN:  [id number]  Visit Number:  [id number]  Primary Care Provider:  Concha Rao APRN    Length of stay:  49    Subjective/Interval History/Consultants/Procedures     Chief Complaint:   Chief Complaint   Patient presents with    Fall       Subjective/Interval History:    44 y.o. female who was admitted on 2024 with UTI     PMH is significant for  CVA w/ left sided hemiparesis, debility, hx genital herpes on suppressive therapy, morbid obesity, T2DM   .    Consultations:  Infectious disease  PT/OT  CM/SW    Procedures/Scans:  CT head without contrast  CT C-spine without contrast  CT T-spine without contrast  CT L-spine without contrast  CXR x 2  XR left foot  XR left ankle    Was seen and evaluated with the nursing staff.  She continues to report severe pain sharp and burning mostly in the left upper extremity.  She also has neuropathic pain on both lower extremities she reports that when her arm is touched she feels a lot of pain    Room location at the time of evaluation was 332.    ----------------------------------------------------------------------------------------------------------------------  Current Hospital Meds:  acyclovir, 400 mg, Oral, Nightly  aspirin, 81 mg, Oral, Daily  atorvastatin, 80 mg, Oral, Daily  DULoxetine, 60 mg, Oral, Daily  heparin (porcine), 5,000 Units, Subcutaneous, Q8H  insulin glargine, 20 Units, Subcutaneous, Daily With Breakfast  insulin glargine, 40 Units, Subcutaneous, Nightly  insulin lispro, 4-24 Units, Subcutaneous, 4x Daily AC & at Bedtime  insulin lispro, 8 Units, Subcutaneous, Daily With Lunch  Lidocaine, 3 patch, Transdermal, Q24H  lisinopril, 20 mg, Oral, Daily  magic barrier cream, , Topical, BID  metoprolol tartrate, 25 mg, Oral, Q12H  nicotine, 1 patch, Transdermal, Q24H  nystatin, , Topical, Q12H  pantoprazole, 40 mg, Oral, Daily  pregabalin, 25 mg, Oral,  Q8H  sodium chloride, 10 mL, Intravenous, Q12H  sodium chloride, 10 mL, Intravenous, Q12H      Pharmacy Consult,       ----------------------------------------------------------------------------------------------------------------------      Objective     Vital Signs:  Temp:  [97.7 °F (36.5 °C)-99.1 °F (37.3 °C)] 97.7 °F (36.5 °C)  Heart Rate:  [] 105  Resp:  [16-18] 18  BP: ()/(52-78) 127/76      10/07/24  0511 10/08/24  0500 10/09/24  0500   Weight: 102 kg (225 lb 14.4 oz) (FIFI cameron) 102 kg (225 lb 15.5 oz) 102 kg (224 lb 1.6 oz)     Body mass index is 36.73 kg/m².    Intake/Output Summary (Last 24 hours) at 11/16/2024 0959  Last data filed at 11/16/2024 0311  Gross per 24 hour   Intake 1080 ml   Output 1350 ml   Net -270 ml     No intake/output data recorded.  Diet: Regular/House, Diabetic; Consistent Carbohydrate; Fluid Consistency: Thin (IDDSI 0)  ----------------------------------------------------------------------------------------------------------------------    Physical Exam  Vitals and nursing note reviewed.   Constitutional:       General: She is not in acute distress.     Appearance: She is obese.   HENT:      Head: Normocephalic and atraumatic.   Eyes:      Extraocular Movements: Extraocular movements intact.   Cardiovascular:      Rate and Rhythm: Normal rate.   Pulmonary:      Effort: Pulmonary effort is normal. No respiratory distress.   Abdominal:      Palpations: Abdomen is soft.   Musculoskeletal:      Right lower leg: No edema.      Left lower leg: No edema.   Skin:     General: Skin is warm.   Neurological:      Mental Status: She is alert. Mental status is at baseline.   Psychiatric:         Mood and Affect: Mood normal.       ----------------------------------------------------------------------------------------------------------------------  Tele:      ----------------------------------------------------------------------------------------------------------------------     "  Results from last 7 days   Lab Units 11/12/24  1057   WBC 10*3/mm3 11.52*   HEMOGLOBIN g/dL 12.3   HEMATOCRIT % 37.1   MCV fL 99.5*   MCHC g/dL 33.2   PLATELETS 10*3/mm3 265         Results from last 7 days   Lab Units 11/15/24  0237 11/12/24  1057   SODIUM mmol/L 140 135*   POTASSIUM mmol/L 4.4 4.4   CHLORIDE mmol/L 104 100   CO2 mmol/L 25.1 23.9   BUN mg/dL 20 17   CREATININE mg/dL 0.50* 0.40*   CALCIUM mg/dL 9.6 9.2   GLUCOSE mg/dL 125* 291*   Estimated Creatinine Clearance: 173.4 mL/min (A) (by C-G formula based on SCr of 0.5 mg/dL (L)).  No results found for: \"AMMONIA\"      No results found for: \"BLOODCX\"  No results found for: \"URINECX\"  No results found for: \"WOUNDCX\"  No results found for: \"STOOLCX\"  ----------------------------------------------------------------------------------------------------------------------  Imaging Results (Last 24 Hours)       ** No results found for the last 24 hours. **          ----------------------------------------------------------------------------------------------------------------------   I have reviewed the above laboratory values for 11/16/24    Assessment/Plan     There are no active hospital problems to display for this patient.        ASSESSMENT/PLAN:    ESBL E. coli UTI  Acute metabolic encephalopathy, resolved  S/p Invanz.  Infectious disease input appreciated.     Hx CVA with left-sided hemiparesis  Chronic debility  Continue PT OT  CM/SW assisting with discharge planning.  Patient is pending placement.     T2DM  Diabetic neuropathy  Continue insulin regimen-titrate as needed. Add scheduled meal time insulin w/ lunch.   Continue supportive care measures, Cymbalta  Lyrica added 11/13 given persistent pain. Pt noted some improvement but pain still bothersome. Increased frequency to TID as she has tolerated well thus far.  Will continue to titrate as indicated     Hx genital herpes  Continue suppressive acyclovir     Morbid obesity  Complicates all aspects of " care     -----------  -DVT prophylaxis: [facility]  -Disposition plans/anticipated needs: Pending placement     The patient is high risk due to the following diagnoses/reasons:  debility, hemiparesis    Berhane Segundo MD  11/16/24  09:59 EST     Dragon disclaimer:  Part of this note may be an electronic transcription/translation of spoken language to printed text using the Dragon Dictation System.

## 2024-11-16 NOTE — PLAN OF CARE
Goal Outcome Evaluation:              Outcome Evaluation: pt resting in bed awake. pt has no complaints or concerns at this time. wound care completed this shift. no acute changes this shift. will continue plan of care

## 2024-11-17 LAB
GLUCOSE BLDC GLUCOMTR-MCNC: 155 MG/DL (ref 70–130)
GLUCOSE BLDC GLUCOMTR-MCNC: 164 MG/DL (ref 70–130)
GLUCOSE BLDC GLUCOMTR-MCNC: 207 MG/DL (ref 70–130)
GLUCOSE BLDC GLUCOMTR-MCNC: 212 MG/DL (ref 70–130)
GLUCOSE BLDC GLUCOMTR-MCNC: 271 MG/DL (ref 70–130)

## 2024-11-17 PROCEDURE — 63710000001 INSULIN LISPRO (HUMAN) PER 5 UNITS

## 2024-11-17 PROCEDURE — 82948 REAGENT STRIP/BLOOD GLUCOSE: CPT

## 2024-11-17 PROCEDURE — 25010000002 HEPARIN (PORCINE) PER 1000 UNITS: Performed by: INTERNAL MEDICINE

## 2024-11-17 PROCEDURE — 63710000001 INSULIN LISPRO (HUMAN) PER 5 UNITS: Performed by: INTERNAL MEDICINE

## 2024-11-17 PROCEDURE — 63710000001 INSULIN GLARGINE PER 5 UNITS

## 2024-11-17 PROCEDURE — 63710000001 PROCHLORPERAZINE MALEATE PER 5 MG: Performed by: STUDENT IN AN ORGANIZED HEALTH CARE EDUCATION/TRAINING PROGRAM

## 2024-11-17 PROCEDURE — 99232 SBSQ HOSP IP/OBS MODERATE 35: CPT | Performed by: INTERNAL MEDICINE

## 2024-11-17 RX ADMIN — CYCLOBENZAPRINE 10 MG: 10 TABLET, FILM COATED ORAL at 03:39

## 2024-11-17 RX ADMIN — INSULIN GLARGINE 20 UNITS: 100 INJECTION, SOLUTION SUBCUTANEOUS at 09:38

## 2024-11-17 RX ADMIN — HEPARIN SODIUM 5000 UNITS: 5000 INJECTION INTRAVENOUS; SUBCUTANEOUS at 14:25

## 2024-11-17 RX ADMIN — METOPROLOL TARTRATE 25 MG: 25 TABLET, FILM COATED ORAL at 09:38

## 2024-11-17 RX ADMIN — NYSTATIN: 100000 POWDER TOPICAL at 21:41

## 2024-11-17 RX ADMIN — DICLOFENAC SODIUM 4 G: 10 GEL TOPICAL at 03:39

## 2024-11-17 RX ADMIN — PREGABALIN 25 MG: 25 CAPSULE ORAL at 14:25

## 2024-11-17 RX ADMIN — PROCHLORPERAZINE MALEATE 5 MG: 5 TABLET ORAL at 21:40

## 2024-11-17 RX ADMIN — INSULIN LISPRO 4 UNITS: 100 INJECTION, SOLUTION INTRAVENOUS; SUBCUTANEOUS at 09:38

## 2024-11-17 RX ADMIN — ACYCLOVIR 400 MG: 200 CAPSULE ORAL at 21:40

## 2024-11-17 RX ADMIN — METOPROLOL TARTRATE 25 MG: 25 TABLET, FILM COATED ORAL at 21:43

## 2024-11-17 RX ADMIN — ASPIRIN 81 MG: 81 TABLET, CHEWABLE ORAL at 09:38

## 2024-11-17 RX ADMIN — DULOXETINE HYDROCHLORIDE 60 MG: 60 CAPSULE, DELAYED RELEASE ORAL at 09:38

## 2024-11-17 RX ADMIN — HEPARIN SODIUM 5000 UNITS: 5000 INJECTION INTRAVENOUS; SUBCUTANEOUS at 05:38

## 2024-11-17 RX ADMIN — PREGABALIN 25 MG: 25 CAPSULE ORAL at 05:38

## 2024-11-17 RX ADMIN — HEPARIN SODIUM 5000 UNITS: 5000 INJECTION INTRAVENOUS; SUBCUTANEOUS at 21:36

## 2024-11-17 RX ADMIN — VITAMINS A AND D OINTMENT: 15.5; 53.4 OINTMENT TOPICAL at 09:40

## 2024-11-17 RX ADMIN — LISINOPRIL 20 MG: 10 TABLET ORAL at 09:38

## 2024-11-17 RX ADMIN — DICLOFENAC SODIUM 4 G: 10 GEL TOPICAL at 19:56

## 2024-11-17 RX ADMIN — INSULIN LISPRO 8 UNITS: 100 INJECTION, SOLUTION INTRAVENOUS; SUBCUTANEOUS at 12:01

## 2024-11-17 RX ADMIN — INSULIN LISPRO 12 UNITS: 100 INJECTION, SOLUTION INTRAVENOUS; SUBCUTANEOUS at 21:37

## 2024-11-17 RX ADMIN — Medication 5 MG: at 21:40

## 2024-11-17 RX ADMIN — ACETAMINOPHEN 650 MG: 325 TABLET ORAL at 15:56

## 2024-11-17 RX ADMIN — NYSTATIN: 100000 POWDER TOPICAL at 09:38

## 2024-11-17 RX ADMIN — PANTOPRAZOLE SODIUM 40 MG: 40 TABLET, DELAYED RELEASE ORAL at 09:38

## 2024-11-17 RX ADMIN — VITAMINS A AND D OINTMENT: 15.5; 53.4 OINTMENT TOPICAL at 21:41

## 2024-11-17 RX ADMIN — NICOTINE 1 PATCH: 7 PATCH, EXTENDED RELEASE TRANSDERMAL at 09:50

## 2024-11-17 RX ADMIN — ATORVASTATIN CALCIUM 80 MG: 40 TABLET, FILM COATED ORAL at 09:38

## 2024-11-17 RX ADMIN — LIDOCAINE 3 PATCH: 4 PATCH TOPICAL at 16:06

## 2024-11-17 RX ADMIN — INSULIN GLARGINE 40 UNITS: 100 INJECTION, SOLUTION SUBCUTANEOUS at 21:36

## 2024-11-17 RX ADMIN — PREGABALIN 25 MG: 25 CAPSULE ORAL at 21:40

## 2024-11-17 NOTE — PLAN OF CARE
Goal Outcome Evaluation:              Outcome Evaluation: pt resting in bed at this time with no complaints at this time. pt a&o x4. pt complaining of muscle spasms, gave prn flexeril. Voltaren gel applied to legs from knee to ankles. wound care completed this shift. vss on room air. will continue plan of care.

## 2024-11-17 NOTE — PROGRESS NOTES
Patient Identification:  Name:  Lety Johnson  Age:  44 y.o.  Sex:  female  :  1980  MRN:  [id number]  Visit Number:  [id number]  Primary Care Provider:  Concha Rao APRN    Length of stay:  50    Subjective/Interval History/Consultants/Procedures     Chief Complaint:   Chief Complaint   Patient presents with    Fall       Subjective/Interval History:    44 y.o. female who was admitted on 2024 with UTI     PMH is significant for  CVA w/ left sided hemiparesis, debility, hx genital herpes on suppressive therapy, morbid obesity, T2DM   .    Consultations:  Infectious disease  PT/OT  CM/SW    Procedures/Scans:  CT head without contrast  CT C-spine without contrast  CT T-spine without contrast  CT L-spine without contrast  CXR x 2  XR left foot  XR left ankle    Was seen and evaluated with the nursing staff.  She continues to reports improvement in her pain.    Room location at the time of evaluation was [facility].    ----------------------------------------------------------------------------------------------------------------------  Current Hospital Meds:  acyclovir, 400 mg, Oral, Nightly  aspirin, 81 mg, Oral, Daily  atorvastatin, 80 mg, Oral, Daily  DULoxetine, 60 mg, Oral, Daily  heparin (porcine), 5,000 Units, Subcutaneous, Q8H  insulin glargine, 20 Units, Subcutaneous, Daily With Breakfast  insulin glargine, 40 Units, Subcutaneous, Nightly  insulin lispro, 4-24 Units, Subcutaneous, 4x Daily AC & at Bedtime  insulin lispro, 8 Units, Subcutaneous, Daily With Lunch  Lidocaine, 3 patch, Transdermal, Q24H  lisinopril, 20 mg, Oral, Daily  magic barrier cream, , Topical, BID  metoprolol tartrate, 25 mg, Oral, Q12H  nicotine, 1 patch, Transdermal, Q24H  nystatin, , Topical, Q12H  pantoprazole, 40 mg, Oral, Daily  pregabalin, 25 mg, Oral, Q8H  sodium chloride, 10 mL, Intravenous, Q12H  sodium chloride, 10 mL, Intravenous, Q12H      Pharmacy Consult,        ----------------------------------------------------------------------------------------------------------------------      Objective     Vital Signs:  Temp:  [96.7 °F (35.9 °C)-98.7 °F (37.1 °C)] 98.6 °F (37 °C)  Heart Rate:  [101-111] 111  Resp:  [18-20] 20  BP: (102-121)/(52-78) 121/78      10/07/24  0511 10/08/24  0500 10/09/24  0500   Weight: 102 kg (225 lb 14.4 oz) (FIFI cameron) 102 kg (225 lb 15.5 oz) 102 kg (224 lb 1.6 oz)     Body mass index is 36.73 kg/m².    Intake/Output Summary (Last 24 hours) at 11/17/2024 0922  Last data filed at 11/17/2024 0900  Gross per 24 hour   Intake 600 ml   Output 1600 ml   Net -1000 ml     I/O this shift:  In: 360 [P.O.:360]  Out: -   Diet: Regular/House, Diabetic; Consistent Carbohydrate; Fluid Consistency: Thin (IDDSI 0)  ----------------------------------------------------------------------------------------------------------------------    Physical Exam  Vitals and nursing note reviewed.   Constitutional:       General: She is not in acute distress.     Appearance: She is obese.   HENT:      Head: Normocephalic and atraumatic.   Eyes:      Extraocular Movements: Extraocular movements intact.   Cardiovascular:      Rate and Rhythm: Normal rate.   Pulmonary:      Effort: Pulmonary effort is normal. No respiratory distress.   Abdominal:      Palpations: Abdomen is soft.   Musculoskeletal:      Right lower leg: No edema.      Left lower leg: No edema.   Skin:     General: Skin is warm.   Neurological:      Mental Status: She is alert. Mental status is at baseline.   Psychiatric:         Mood and Affect: Mood normal.       ----------------------------------------------------------------------------------------------------------------------  Tele:      ----------------------------------------------------------------------------------------------------------------------      Results from last 7 days   Lab Units 11/12/24  1057   WBC 10*3/mm3 11.52*   HEMOGLOBIN g/dL 12.3  "  HEMATOCRIT % 37.1   MCV fL 99.5*   MCHC g/dL 33.2   PLATELETS 10*3/mm3 265         Results from last 7 days   Lab Units 11/15/24  0237 11/12/24  1057   SODIUM mmol/L 140 135*   POTASSIUM mmol/L 4.4 4.4   CHLORIDE mmol/L 104 100   CO2 mmol/L 25.1 23.9   BUN mg/dL 20 17   CREATININE mg/dL 0.50* 0.40*   CALCIUM mg/dL 9.6 9.2   GLUCOSE mg/dL 125* 291*   Estimated Creatinine Clearance: 173.4 mL/min (A) (by C-G formula based on SCr of 0.5 mg/dL (L)).  No results found for: \"AMMONIA\"      No results found for: \"BLOODCX\"  No results found for: \"URINECX\"  No results found for: \"WOUNDCX\"  No results found for: \"STOOLCX\"  ----------------------------------------------------------------------------------------------------------------------  Imaging Results (Last 24 Hours)       ** No results found for the last 24 hours. **          ----------------------------------------------------------------------------------------------------------------------   I have reviewed the above laboratory values for 11/17/24    Assessment/Plan     There are no active hospital problems to display for this patient.        ASSESSMENT/PLAN:    ESBL E. coli UTI  Acute metabolic encephalopathy, resolved  S/p Invanz.  Infectious disease input appreciated.     Hx CVA with left-sided hemiparesis  Chronic debility  Continue PT OT  CM/SW assisting with discharge planning.  Patient is pending placement.     T2DM  Diabetic neuropathy  Continue insulin regimen-titrate as needed. Add scheduled meal time insulin w/ lunch.   Continue supportive care measures, Cymbalta  Lyrica added 11/13 given persistent pain. Pt noted some improvement but pain still bothersome. Increased frequency to TID as she has tolerated well thus far.  Will continue to titrate as indicated     Hx genital herpes  Continue suppressive acyclovir     Morbid obesity  Complicates all aspects of care     -----------  -DVT prophylaxis: [facility]  -Disposition plans/anticipated needs: Pending placement "     The patient is high risk due to the following diagnoses/reasons:  debility, hemiparesis    Berhane Segundo MD  11/17/24  09:22 EST     Dragon disclaimer:  Part of this note may be an electronic transcription/translation of spoken language to printed text using the Dragon Dictation System.                      Dragon disclaimer:  Part of this note may be an electronic transcription/translation of spoken language to printed text using the Dragon Dictation System.

## 2024-11-17 NOTE — PLAN OF CARE
Goal Outcome Evaluation:  Plan of Care Reviewed With: patient           Outcome Evaluation: Patient resting in bed at this time. A&Ox4 VSS on RA. Wound care completed per orders. No acute changes or concerns at this time. Will continue plan of care.

## 2024-11-18 LAB
GLUCOSE BLDC GLUCOMTR-MCNC: 151 MG/DL (ref 70–130)
GLUCOSE BLDC GLUCOMTR-MCNC: 156 MG/DL (ref 70–130)
GLUCOSE BLDC GLUCOMTR-MCNC: 211 MG/DL (ref 70–130)
GLUCOSE BLDC GLUCOMTR-MCNC: 248 MG/DL (ref 70–130)

## 2024-11-18 PROCEDURE — 63710000001 PROCHLORPERAZINE MALEATE PER 5 MG: Performed by: STUDENT IN AN ORGANIZED HEALTH CARE EDUCATION/TRAINING PROGRAM

## 2024-11-18 PROCEDURE — 97110 THERAPEUTIC EXERCISES: CPT

## 2024-11-18 PROCEDURE — 93010 ELECTROCARDIOGRAM REPORT: CPT | Performed by: INTERNAL MEDICINE

## 2024-11-18 PROCEDURE — 25010000002 HEPARIN (PORCINE) PER 1000 UNITS: Performed by: INTERNAL MEDICINE

## 2024-11-18 PROCEDURE — 82948 REAGENT STRIP/BLOOD GLUCOSE: CPT

## 2024-11-18 PROCEDURE — 99231 SBSQ HOSP IP/OBS SF/LOW 25: CPT

## 2024-11-18 PROCEDURE — 63710000001 INSULIN LISPRO (HUMAN) PER 5 UNITS: Performed by: INTERNAL MEDICINE

## 2024-11-18 PROCEDURE — 63710000001 INSULIN LISPRO (HUMAN) PER 5 UNITS

## 2024-11-18 PROCEDURE — 93005 ELECTROCARDIOGRAM TRACING: CPT | Performed by: HOSPITALIST

## 2024-11-18 PROCEDURE — 63710000001 INSULIN GLARGINE PER 5 UNITS

## 2024-11-18 RX ORDER — TRAMADOL HYDROCHLORIDE 50 MG/1
50 TABLET ORAL EVERY 8 HOURS PRN
Status: DISPENSED | OUTPATIENT
Start: 2024-11-18 | End: 2024-11-23

## 2024-11-18 RX ORDER — METOPROLOL TARTRATE 1 MG/ML
5 INJECTION, SOLUTION INTRAVENOUS ONCE
Status: DISCONTINUED | OUTPATIENT
Start: 2024-11-18 | End: 2024-11-18

## 2024-11-18 RX ORDER — METOPROLOL TARTRATE 25 MG/1
12.5 TABLET, FILM COATED ORAL ONCE
Status: COMPLETED | OUTPATIENT
Start: 2024-11-18 | End: 2024-11-18

## 2024-11-18 RX ADMIN — NYSTATIN: 100000 POWDER TOPICAL at 20:32

## 2024-11-18 RX ADMIN — TRAMADOL HYDROCHLORIDE 50 MG: 50 TABLET, FILM COATED ORAL at 14:55

## 2024-11-18 RX ADMIN — INSULIN GLARGINE 40 UNITS: 100 INJECTION, SOLUTION SUBCUTANEOUS at 20:28

## 2024-11-18 RX ADMIN — ATORVASTATIN CALCIUM 80 MG: 40 TABLET, FILM COATED ORAL at 09:01

## 2024-11-18 RX ADMIN — Medication 5 MG: at 21:42

## 2024-11-18 RX ADMIN — HEPARIN SODIUM 5000 UNITS: 5000 INJECTION INTRAVENOUS; SUBCUTANEOUS at 14:03

## 2024-11-18 RX ADMIN — INSULIN LISPRO 8 UNITS: 100 INJECTION, SOLUTION INTRAVENOUS; SUBCUTANEOUS at 20:28

## 2024-11-18 RX ADMIN — PREGABALIN 25 MG: 25 CAPSULE ORAL at 21:38

## 2024-11-18 RX ADMIN — ASPIRIN 81 MG: 81 TABLET, CHEWABLE ORAL at 09:01

## 2024-11-18 RX ADMIN — INSULIN GLARGINE 20 UNITS: 100 INJECTION, SOLUTION SUBCUTANEOUS at 08:55

## 2024-11-18 RX ADMIN — INSULIN LISPRO 4 UNITS: 100 INJECTION, SOLUTION INTRAVENOUS; SUBCUTANEOUS at 12:08

## 2024-11-18 RX ADMIN — PANTOPRAZOLE SODIUM 40 MG: 40 TABLET, DELAYED RELEASE ORAL at 09:02

## 2024-11-18 RX ADMIN — DULOXETINE HYDROCHLORIDE 60 MG: 60 CAPSULE, DELAYED RELEASE ORAL at 08:56

## 2024-11-18 RX ADMIN — NYSTATIN: 100000 POWDER TOPICAL at 09:06

## 2024-11-18 RX ADMIN — VITAMINS A AND D OINTMENT: 15.5; 53.4 OINTMENT TOPICAL at 09:05

## 2024-11-18 RX ADMIN — HEPARIN SODIUM 5000 UNITS: 5000 INJECTION INTRAVENOUS; SUBCUTANEOUS at 06:12

## 2024-11-18 RX ADMIN — PROCHLORPERAZINE MALEATE 5 MG: 5 TABLET ORAL at 11:42

## 2024-11-18 RX ADMIN — INSULIN LISPRO 4 UNITS: 100 INJECTION, SOLUTION INTRAVENOUS; SUBCUTANEOUS at 08:55

## 2024-11-18 RX ADMIN — INSULIN LISPRO 8 UNITS: 100 INJECTION, SOLUTION INTRAVENOUS; SUBCUTANEOUS at 18:01

## 2024-11-18 RX ADMIN — HEPARIN SODIUM 5000 UNITS: 5000 INJECTION INTRAVENOUS; SUBCUTANEOUS at 21:42

## 2024-11-18 RX ADMIN — ACYCLOVIR 400 MG: 200 CAPSULE ORAL at 21:42

## 2024-11-18 RX ADMIN — LIDOCAINE 3 PATCH: 4 PATCH TOPICAL at 18:01

## 2024-11-18 RX ADMIN — PREGABALIN 25 MG: 25 CAPSULE ORAL at 06:12

## 2024-11-18 RX ADMIN — VITAMINS A AND D OINTMENT: 15.5; 53.4 OINTMENT TOPICAL at 20:31

## 2024-11-18 RX ADMIN — NICOTINE 1 PATCH: 7 PATCH, EXTENDED RELEASE TRANSDERMAL at 08:57

## 2024-11-18 RX ADMIN — METOPROLOL TARTRATE 12.5 MG: 25 TABLET, FILM COATED ORAL at 18:00

## 2024-11-18 RX ADMIN — DICLOFENAC SODIUM 4 G: 10 GEL TOPICAL at 06:15

## 2024-11-18 RX ADMIN — DICLOFENAC SODIUM 4 G: 10 GEL TOPICAL at 12:09

## 2024-11-18 RX ADMIN — INSULIN LISPRO 8 UNITS: 100 INJECTION, SOLUTION INTRAVENOUS; SUBCUTANEOUS at 12:09

## 2024-11-18 RX ADMIN — METOPROLOL TARTRATE 25 MG: 25 TABLET, FILM COATED ORAL at 21:39

## 2024-11-18 RX ADMIN — PREGABALIN 25 MG: 25 CAPSULE ORAL at 14:03

## 2024-11-18 NOTE — THERAPY TREATMENT NOTE
Acute Care - Physical Therapy Treatment Note   Zaki     Patient Name: Lety Johnson  : 1980  MRN: [id number]  Today's Date: 2024   Onset of Illness/Injury or Date of Surgery: 24  Visit Dx:     ICD-10-CM ICD-9-CM   1. Hypokalemia  E87.6 276.8   2. Pressure injury of skin of sacral region, unspecified injury stage  L89.159 707.03     707.20   3. Pressure injury of skin of left heel, unspecified injury stage  L89.629 707.07     707.20   4. Acute cystitis without hematuria  N30.00 595.0     Patient Active Problem List   Diagnosis   (none) - all problems resolved or deleted     Past Medical History:   Diagnosis Date    Stroke      History reviewed. No pertinent surgical history.  PT Assessment (Last 12 Hours)       PT Evaluation and Treatment       Row Name 24 1526          Physical Therapy Time and Intention    Subjective Information complains of;pain  -KM     Document Type therapy note (daily note)  -KM     Mode of Treatment individual therapy;physical therapy  -KM     Patient Effort fair  -KM     Symptoms Noted During/After Treatment increased pain  -KM       Row Name 24 1526          General Information    Patient Profile Reviewed yes  -KM     Patient Observations alert;cooperative;agree to therapy  -KM     Existing Precautions/Restrictions fall  -KM       Row Name 24 1526          Cognition    Affect/Mental Status (Cognition) WFL  -KM     Orientation Status (Cognition) oriented x 3  -KM     Follows Commands (Cognition) WFL  -KM       Row Name 24 1526          Bed Mobility    Comment, (Bed Mobility) pt. deferred d/t LUE pain. PT attempted to convince pt. multiple times while explaining importance of mobility w/ no success.  -KM       Row Name 24 1526          Transfers    Comment, (Transfers) pt. deferred  -KM       Row Name 24 1526          Gait/Stairs (Locomotion)    Patient was able to Ambulate no, other medical factors prevent ambulation  -KM     Reason  Patient was unable to Ambulate Non-Ambulatory at Baseline  -KM       Row Name 11/18/24 1526          Safety Issues/Impairments Affecting Functional Mobility    Impairments Affecting Function (Mobility) endurance/activity tolerance;pain;range of motion (ROM);strength;coordination;motor control;muscle tone abnormal  -KM       Row Name 11/18/24 1526          Motor Skills    Comments, Therapeutic Exercise supine ther-ex  -KM       Row Name             Wound 09/26/24 1809 Left posterior ankle Other (comment)    Wound - Properties Group Placement Date: 09/26/24  -KJ Placement Time: 1809  -KJ Side: Left  -KJ Orientation: posterior  -KJ Location: ankle  -KJ Primary Wound Type: Other  -TW, unknow etiology     Retired Wound - Properties Group Placement Date: 09/26/24  -KJ Placement Time: 1809  -KJ Side: Left  -KJ Orientation: posterior  -KJ Location: ankle  -KJ Primary Wound Type: Other  -TW, unknow etiology     Retired Wound - Properties Group Placement Date: 09/26/24  -KJ Placement Time: 1809  -KJ Side: Left  -KJ Orientation: posterior  -KJ Location: ankle  -KJ Primary Wound Type: Other  -TW, unknow etiology     Retired Wound - Properties Group Date first assessed: 09/26/24  -KJ Time first assessed: 1809  -KJ Side: Left  -KJ Location: ankle  -KJ Primary Wound Type: Other  -TW, unknow etiology       Row Name             Wound 10/11/24 1330 medial coccyx Fissure    Wound - Properties Group Placement Date: 10/11/24  -TW Placement Time: 1330  -TW Orientation: medial  -TW Location: coccyx  -TW Primary Wound Type: Fissure  -TW    Retired Wound - Properties Group Placement Date: 10/11/24  -TW Placement Time: 1330  -TW Orientation: medial  -TW Location: coccyx  -TW Primary Wound Type: Fissure  -TW    Retired Wound - Properties Group Placement Date: 10/11/24  -TW Placement Time: 1330  -TW Orientation: medial  -TW Location: coccyx  -TW Primary Wound Type: Fissure  -TW    Retired Wound - Properties Group Date first assessed: 10/11/24   -TW Time first assessed: 1330  -TW Location: coccyx  -TW Primary Wound Type: Fissure  -TW      Row Name             Wound 10/16/24 0705 Left medial gluteal MASD (moisture associated skin damage)    Wound - Properties Group Placement Date: 10/16/24  -MS Placement Time: 0705 -MS Side: Left  -MS Orientation: medial  -MS Location: gluteal  -MS Primary Wound Type: MASD  -MS Type: MASD (moisture associated skin damage)  -MS Present on Original Admission: N  -MS Additional Comments: Noted at shift change skin assesment, Night shift RN stated it had been there her shift.  -MS    Retired Wound - Properties Group Placement Date: 10/16/24  -MS Placement Time: 0705 -MS Present on Original Admission: N  -MS Side: Left  -MS Orientation: medial  -MS Location: gluteal  -MS Primary Wound Type: MASD  -MS Additional Comments: Noted at shift change skin assesment, Night shift RN stated it had been there her shift.  -MS Type: MASD (moisture associated skin damage)  -MS    Retired Wound - Properties Group Placement Date: 10/16/24  -MS Placement Time: 0705 -MS Present on Original Admission: N  -MS Side: Left  -MS Orientation: medial  -MS Location: gluteal  -MS Primary Wound Type: MASD  -MS Additional Comments: Noted at shift change skin assesment, Night shift RN stated it had been there her shift.  -MS Type: MASD (moisture associated skin damage)  -MS    Retired Wound - Properties Group Date first assessed: 10/16/24  -MS Time first assessed: 0705 -MS Present on Original Admission: N  -MS Side: Left  -MS Location: gluteal  -MS Primary Wound Type: MASD  -MS Additional Comments: Noted at shift change skin assesment, Night shift RN stated it had been there her shift.  -MS Type: MASD (moisture associated skin damage)  -MS      Row Name             Wound 10/16/24 0705 Left posterior greater trochanter MASD (moisture associated skin damage)    Wound - Properties Group Placement Date: 10/16/24  -MS Placement Time: 0705 -MS Side: Left  -MS  Orientation: posterior  -MS Location: greater trochanter  -MS Primary Wound Type: MASD  -MS Type: MASD (moisture associated skin damage)  -MS Present on Original Admission: N  -MS    Retired Wound - Properties Group Placement Date: 10/16/24  -MS Placement Time: 0705 -MS Present on Original Admission: N  -MS Side: Left  -MS Orientation: posterior  -MS Location: greater trochanter  -MS Primary Wound Type: MASD  -MS Type: MASD (moisture associated skin damage)  -MS    Retired Wound - Properties Group Placement Date: 10/16/24  -MS Placement Time: 0705 -MS Present on Original Admission: N  -MS Side: Left  -MS Orientation: posterior  -MS Location: greater trochanter  -MS Primary Wound Type: MASD  -MS Type: MASD (moisture associated skin damage)  -MS    Retired Wound - Properties Group Date first assessed: 10/16/24  -MS Time first assessed: 0705 -MS Present on Original Admission: N  -MS Side: Left  -MS Location: greater trochanter  -MS Primary Wound Type: MASD  -MS Type: MASD (moisture associated skin damage)  -MS      Row Name 11/18/24 1526          Progress Summary (PT)    Daily Progress Summary (PT) Pt. deferred all mobility skills d/t LUE pain. She tolerated supine ther-ex well w/ minor complaints of pain. Pt. would benefit from PT services if she would improve willingness to participate.  -KM               User Key  (r) = Recorded By, (t) = Taken By, (c) = Cosigned By      Initials Name Provider Type    Karen Peralta, RN Registered Nurse    Thierry Carter, PT Physical Therapist    Nory Keenan RN Registered Nurse    Roe North, RN Registered Nurse                    Physical Therapy Education       Title: PT OT SLP Therapies (Done)       Topic: Physical Therapy (Done)       Point: Mobility training (Done)       Learning Progress Summary            Patient Acceptance, E,TB, VU by CF at 11/17/2024 1024    Acceptance, E,TB, VU by RR at 11/14/2024 0148    Acceptance, E,TB, VU by RR at  11/13/2024 0218    Acceptance, E,TB, VU by RG at 11/12/2024 0927    Acceptance, TB,E, VU by RG at 11/11/2024 0944    Acceptance, E,TB, VU by RG at 11/10/2024 0931    Acceptance, E, VU by WG at 11/10/2024 0233    Acceptance, E,TB, VU by CF at 11/9/2024 1016    Acceptance, E, NR by SC at 11/3/2024 0025    Acceptance, E,TB, VU by RR at 11/1/2024 2315    Acceptance, E,D, VU,NR by AG at 10/31/2024 1234    Comment: PT educated pt. in importance of mobilization and L L/UE PROM and self ROM to prevent contracture.  Pt. is encouraged to to perform R LE AROM frequently while supine or sitting EOB.    Acceptance, E,TB, VU by RR at 10/27/2024 0031    Acceptance, E,TB, VU by RG at 10/22/2024 1002    Acceptance, TB,E, VU by RG at 10/21/2024 0907    Nonacceptance, E, NR by WG at 10/16/2024 0228    Acceptance, E,TB, VU by CF at 10/15/2024 0753    Acceptance, E,TB, VU by CF at 10/14/2024 0801    Acceptance, E,TB, VU by CF at 10/13/2024 1045    Acceptance, E,D, VU,NR by AG at 10/10/2024 1310    Acceptance, E,TB, VU by CB at 10/8/2024 0448    Acceptance, E, NR by RD at 10/2/2024 1101    Acceptance, E, NR by RD at 10/1/2024 0856    Acceptance, E,D, VU,NR by AG at 9/30/2024 1559                      Point: Home exercise program (Done)       Learning Progress Summary            Patient Acceptance, E,TB, VU by CF at 11/17/2024 1024    Acceptance, E,TB, VU by RR at 11/14/2024 0148    Acceptance, E,TB, VU by RR at 11/13/2024 0218    Acceptance, E,TB, VU by RG at 11/12/2024 0927    Acceptance, TB,E, VU by RG at 11/11/2024 0944    Acceptance, E,TB, VU by RG at 11/10/2024 0931    Acceptance, E, VU by WG at 11/10/2024 0233    Acceptance, E,TB, VU by CF at 11/9/2024 1016    Acceptance, E, NR by SC at 11/3/2024 0025    Acceptance, E,TB, VU by RR at 11/1/2024 2315    Acceptance, E,D, VU,NR by AG at 10/31/2024 1234    Comment: PT educated pt. in importance of mobilization and L L/UE PROM and self ROM to prevent contracture.  Pt. is encouraged to to  perform R LE AROM frequently while supine or sitting EOB.    Acceptance, E,TB, VU by RR at 10/27/2024 0031    Acceptance, E,TB, VU by RG at 10/22/2024 1002    Acceptance, TB,E, VU by RG at 10/21/2024 0907    Nonacceptance, E, NR by WG at 10/16/2024 0228    Acceptance, E,TB, VU by CF at 10/15/2024 0753    Acceptance, E,TB, VU by CF at 10/14/2024 0801    Acceptance, E,TB, VU by CF at 10/13/2024 1045    Acceptance, E,D, VU,NR by AG at 10/10/2024 1310    Acceptance, E,TB, VU by CB at 10/8/2024 0448    Acceptance, E, NR by RD at 10/2/2024 1101    Acceptance, E, NR by RD at 10/1/2024 0856    Acceptance, E,D, VU,NR by AG at 9/30/2024 1559                      Point: Body mechanics (Done)       Learning Progress Summary            Patient Acceptance, E,TB, VU by CF at 11/17/2024 1024    Acceptance, E,TB, VU by RR at 11/14/2024 0148    Acceptance, E,TB, VU by RR at 11/13/2024 0218    Acceptance, E,TB, VU by RG at 11/12/2024 0927    Acceptance, TB,E, VU by RG at 11/11/2024 0944    Acceptance, E,TB, VU by RG at 11/10/2024 0931    Acceptance, E, VU by WG at 11/10/2024 0233    Acceptance, E,TB, VU by CF at 11/9/2024 1016    Acceptance, E, NR by SC at 11/3/2024 0025    Acceptance, E,TB, VU by RR at 11/1/2024 2315    Acceptance, E,D, VU,NR by AG at 10/31/2024 1234    Comment: PT educated pt. in importance of mobilization and L L/UE PROM and self ROM to prevent contracture.  Pt. is encouraged to to perform R LE AROM frequently while supine or sitting EOB.    Acceptance, E,TB, VU by RR at 10/27/2024 0031    Acceptance, E,TB, VU by RG at 10/22/2024 1002    Acceptance, TB,E, VU by RG at 10/21/2024 0907    Nonacceptance, E, NR by WG at 10/16/2024 0228    Acceptance, E,TB, VU by CF at 10/15/2024 0753    Acceptance, E,TB, VU by CF at 10/14/2024 0801    Acceptance, E,TB, VU by CF at 10/13/2024 1045    Acceptance, E,D, VU,NR by AG at 10/10/2024 1310    Acceptance, E,TB, VU by CB at 10/8/2024 0448    Acceptance, E, NR by RD at 10/2/2024  1101    Acceptance, E, NR by RD at 10/1/2024 0856    Acceptance, E,D, VU,NR by AG at 9/30/2024 1559                      Point: Precautions (Done)       Learning Progress Summary            Patient Acceptance, E,TB, VU by CF at 11/17/2024 1024    Acceptance, E,TB, VU by RR at 11/14/2024 0148    Acceptance, E,TB, VU by RR at 11/13/2024 0218    Acceptance, E,TB, VU by RG at 11/12/2024 0927    Acceptance, TB,E, VU by RG at 11/11/2024 0944    Acceptance, E,TB, VU by RG at 11/10/2024 0931    Acceptance, E, VU by WG at 11/10/2024 0233    Acceptance, E,TB, VU by CF at 11/9/2024 1016    Acceptance, E, NR by SC at 11/3/2024 0025    Acceptance, E,TB, VU by RR at 11/1/2024 2315    Acceptance, E,D, VU,NR by AG at 10/31/2024 1234    Comment: PT educated pt. in importance of mobilization and L L/UE PROM and self ROM to prevent contracture.  Pt. is encouraged to to perform R LE AROM frequently while supine or sitting EOB.    Acceptance, E,TB, VU by RR at 10/27/2024 0031    Acceptance, E,TB, VU by RG at 10/22/2024 1002    Acceptance, TB,E, VU by RG at 10/21/2024 0907    Nonacceptance, E, NR by WG at 10/16/2024 0228    Acceptance, E,TB, VU by CF at 10/15/2024 0753    Acceptance, E,TB, VU by CF at 10/14/2024 0801    Acceptance, E,TB, VU by CF at 10/13/2024 1045    Acceptance, E,D, VU,NR by AG at 10/10/2024 1310    Acceptance, E,TB, VU by CB at 10/8/2024 0448    Acceptance, E, NR by RD at 10/2/2024 1101    Acceptance, E, NR by RD at 10/1/2024 0856    Acceptance, E,D, VU,NR by AG at 9/30/2024 1559                                      User Key       Initials Effective Dates Name Provider Type Discipline    AG 06/16/21 -  Мария Joya, PT Physical Therapist PT    RD 06/16/21 -  Laisha Verdugo, RN Registered Nurse Nurse    RR 06/11/24 -  Jaymie Dominguez, FIFI Registered Nurse Nurse    RG 06/06/23 -  Jesus Matthews, RN Registered Nurse Nurse    WG 02/12/24 -  Shanthi Burden, RN Registered Nurse Nurse    CF 06/25/24 -  Cherelle Germain RN  Registered Nurse Nurse    CB 06/17/24 -  Fidelia Lombardo, RN Registered Nurse Nurse    SC 09/11/24 -  Sally Hair, RN Registered Nurse Nurse                  PT Recommendation and Plan  Anticipated Discharge Disposition (PT): home with 24/7 [facility], skilled nursing facility  Progress Summary (PT)  Daily Progress Summary (PT): Pt. deferred all mobility skills d/t LUE pain. She tolerated supine ther-ex well w/ minor complaints of pain. Pt. would benefit from PT services if she would improve willingness to participate.       Time Calculation:    PT Charges       Row Name 11/18/24 1525             Time Calculation    PT Received On 11/18/24  -KM         Time Calculation- PT    Total Timed Code Minutes- PT 23 minute(s)  -KM                User Key  (r) = Recorded By, (t) = Taken By, (c) = Cosigned By      Initials Name Provider Type    Thierry Carter, PT Physical Therapist                  Therapy Charges for Today       Code Description Service Date Service Provider Modifiers Qty    28854784231 HC PT THER PROC EA 15 MIN 11/18/2024 Thierry Saldivar, PT GP 2            PT G-Codes  AM-PAC 6 Clicks Score (PT): 6    Thierry Saldivar PT  11/18/2024

## 2024-11-18 NOTE — CASE MANAGEMENT/SOCIAL WORK
Discharge Planning Assessment  LILLIANA Haines     Patient Name: Lety Johnson  MRN: [id number]  Today's Date: 11/18/2024    Admit Date: 9/26/2024       Discharge Plan       Row Name 11/18/24 1546       Plan    Plan SS spoke to pt at bedside. Pt was very tearful. Pt voices being prescribed medication for depression in the past, however she hasn't received medication since she has been here. SS notified provider and recommended psych consult. Pt states her son who has TBI continues to live at the residence with his two friends also living there. Pt voices that son won't provide 24 hour care. Pt voices that she spoke to significant other, Kris and he has applied for a home incarceration bracelet. Pt also voices that significant other's niece was assisting him with her care prior to admission. Pt has a andreia list, hospital  bed, and a wheelchair at home. Pt notified that [facility] has attempted contact with son to discuss the possibility of her returning home. Pt continues to request rehab. SS notified pt of options for therapy services. Pt made aware that she will not receive therapy services if she is accepted by a nursing home for long-term care. Pt declines long-term care placement. SS discussed pt with PT on this date. Pt still does not qualify for inpatient rehab at this time. SS contacted [facility]PS SW, Kinsey Dial and made her aware of all the above information. [facility]PS SW will [facility] with [facility] regarding significant other. SS provided an update about SSDI benefits to [facility]. SS will follow up with pt in the am and discuss contact information for son. SS to follow.                  Continued Care and Services - Admitted Since 9/26/2024       Destination       Service Provider Request Status Services Address Phone Fax Patient Preferred    [facility] Declined  Cannot meet patient's needs -- [address] [facility] [address] [phone] --      [facility] Declined  Not eligible -- [address] [facility] [facility] [place] [address] [phone] --    [facility] Declined  Not eligible -- [address] [facility] [address] [phone] --    [facility] Declined  Clinical Denial -- [address] [facility], [address] FLOOR, [facility] [address] [phone] --    [facility]ED UN[facility] Declined  Cannot meet patient's needs -- [address]  [facility] [address] [phone] --    [facility] Declined  Not eligible -- [address] [place] [address] [phone] --    [facility] Declined  Bed not available -- [address] [facility] [address] [phone] --    [facility] Declined  Bed not available -- [address] [facility] [address] [phone] --    [facility] Declined  Out of network -- [address] [facility] [id number] [phone] --    [facility] UN[facility] Declined -- [address] [facility] [address] [phone] --    [facility] Declined  Clinical Denial -- [address] [facility] [address] [phone] --    [facility]ED UN[facility] Declined  Insurance Denial, Out of network -- [address], [facility] [address] [phone] --    [facility] Declined  Out of network -- [address] [facility] [address] [phone] --    [facility] Declined  Out of network -- [address] [address] [phone] -- --    [facility] Declined  Out of network -- [address] [facility] [address] [phone] --                  Expected Discharge Date and Time        Expected Discharge Date Expected Discharge Time    Nov 15, 2024         HELEN Butterfield

## 2024-11-18 NOTE — PROGRESS NOTES
Patient Identification:  Name:  Lety Johnson  Age:  44 y.o.  Sex:  female  :  1980  MRN:  [id number]  Visit Number:  [id number]  Primary Care Provider:  Concha Rao APRN    Length of stay:  51    Subjective/Interval History/Consultants/Procedures     Chief Complaint:   Chief Complaint   Patient presents with    Fall       Subjective/Interval History:    44 y.o. female who was admitted on 2024 with UTI     PMH is significant for CVA w/ left sided hemiparesis, debility, hx genital herpes on suppressive therapy, morbid obesity, T2DM   For complete admission information, please see history and physical.     Consultations:  Infectious disease  PT/OT  CM/SW    Procedures/Scans:  CT head without contrast  CT C-spine without contrast  CT T-spine without contrast  CT L-spine without contrast  CXR x 2  XR left foot  XR left ankle    Today, the patient was seen and examined with no family present at the bedside. No new complaints noted. Continues to have bothersome pain, now complaining of right sided arthralgias she believes due to compensating for left side. She reports she is eating and drinking well.      Room location at the time of evaluation was 332.    ----------------------------------------------------------------------------------------------------------------------  Current Hospital Meds:  acyclovir, 400 mg, Oral, Nightly  aspirin, 81 mg, Oral, Daily  atorvastatin, 80 mg, Oral, Daily  DULoxetine, 60 mg, Oral, Daily  heparin (porcine), 5,000 Units, Subcutaneous, Q8H  insulin glargine, 20 Units, Subcutaneous, Daily With Breakfast  insulin glargine, 40 Units, Subcutaneous, Nightly  insulin lispro, 4-24 Units, Subcutaneous, 4x Daily AC & at Bedtime  insulin lispro, 8 Units, Subcutaneous, Daily With Lunch  Lidocaine, 3 patch, Transdermal, Q24H  lisinopril, 20 mg, Oral, Daily  magic barrier cream, , Topical, BID  metoprolol tartrate, 25 mg, Oral, Q12H  nicotine, 1 patch, Transdermal,  Q24H  nystatin, , Topical, Q12H  pantoprazole, 40 mg, Oral, Daily  pregabalin, 25 mg, Oral, Q8H  sodium chloride, 10 mL, Intravenous, Q12H  sodium chloride, 10 mL, Intravenous, Q12H      Pharmacy Consult,       ----------------------------------------------------------------------------------------------------------------------      Objective     Vital Signs:  Temp:  [97.5 °F (36.4 °C)-98.5 °F (36.9 °C)] 98.5 °F (36.9 °C)  Heart Rate:  [] 112  Resp:  [16-18] 16  BP: ()/(53-86) 97/58      10/07/24  0511 10/08/24  0500 10/09/24  0500   Weight: 102 kg (225 lb 14.4 oz) (FIFI cameron) 102 kg (225 lb 15.5 oz) 102 kg (224 lb 1.6 oz)     Body mass index is 36.73 kg/m².    Intake/Output Summary (Last 24 hours) at 11/18/2024 1256  Last data filed at 11/18/2024 0901  Gross per 24 hour   Intake 2400 ml   Output 1700 ml   Net 700 ml     I/O this shift:  In: 360 [P.O.:360]  Out: -   Diet: Regular/House, Diabetic; Consistent Carbohydrate; Fluid Consistency: Thin (IDDSI 0)  ----------------------------------------------------------------------------------------------------------------------    Physical Exam  Vitals and nursing note reviewed.   Constitutional:       General: She is not in acute distress.     Appearance: She is obese. She is ill-appearing.   HENT:      Head: Normocephalic and atraumatic.   Eyes:      Extraocular Movements: Extraocular movements intact.   Cardiovascular:      Rate and Rhythm: Normal rate.   Pulmonary:      Effort: Pulmonary effort is normal. No respiratory distress.   Abdominal:      Palpations: Abdomen is soft.   Musculoskeletal:      Right lower leg: No edema.      Left lower leg: No edema.   Skin:     General: Skin is warm and dry.   Neurological:      Mental Status: She is alert. Mental status is at baseline.   Psychiatric:         Mood and Affect: Mood normal.         Behavior: Behavior normal.               "  ----------------------------------------------------------------------------------------------------------------------  Tele:        ----------------------------------------------------------------------------------------------------------------------      Results from last 7 days   Lab Units 11/12/24  1057   WBC 10*3/mm3 11.52*   HEMOGLOBIN g/dL 12.3   HEMATOCRIT % 37.1   MCV fL 99.5*   MCHC g/dL 33.2   PLATELETS 10*3/mm3 265         Results from last 7 days   Lab Units 11/15/24  0237 11/12/24  1057   SODIUM mmol/L 140 135*   POTASSIUM mmol/L 4.4 4.4   CHLORIDE mmol/L 104 100   CO2 mmol/L 25.1 23.9   BUN mg/dL 20 17   CREATININE mg/dL 0.50* 0.40*   CALCIUM mg/dL 9.6 9.2   GLUCOSE mg/dL 125* 291*   Estimated Creatinine Clearance: 173.4 mL/min (A) (by C-G formula based on SCr of 0.5 mg/dL (L)).  No results found for: \"AMMONIA\"      No results found for: \"BLOODCX\"  No results found for: \"URINECX\"  No results found for: \"WOUNDCX\"  No results found for: \"STOOLCX\"  ----------------------------------------------------------------------------------------------------------------------  Imaging Results (Last 24 Hours)       ** No results found for the last 24 hours. **          ----------------------------------------------------------------------------------------------------------------------   I have reviewed the above laboratory values for 11/18/24    Assessment/Plan     There are no active hospital problems to display for this patient.        ASSESSMENT/PLAN:    ESBL E. coli UTI  Acute metabolic encephalopathy, resolved  S/p Invanz.  Infectious disease input appreciated.     Hx CVA with left-sided hemiparesis  Chronic debility  Continue PT OT  CM/SW assisting with discharge planning.  Patient is pending placement.     T2DM  Diabetic neuropathy  Continue insulin regimen-titrate as needed. Add scheduled meal time insulin w/ lunch.   Continue supportive care measures, Cymbalta  Lyrica added 11/13 given persistent pain " with some improvement noted.      Hx genital herpes  Continue suppressive acyclovir     Morbid obesity  Complicates all aspects of care     -----------  -DVT prophylaxis: SQH   -Disposition plans/anticipated needs: Placement        The patient is high risk due to the following diagnoses/reasons:  debility, hemiparesis         Bruce Branham PA-C  11/18/24  12:56 EST

## 2024-11-18 NOTE — PLAN OF CARE
Goal Outcome Evaluation:              Outcome Evaluation: pt resting in bed at this time. pt a&o x4. vss on room air. wound care completed as ordered. no acute changes this shift. will continue plan of care.

## 2024-11-19 LAB
ANION GAP SERPL CALCULATED.3IONS-SCNC: 10.6 MMOL/L (ref 5–15)
BUN SERPL-MCNC: 21 MG/DL (ref 6–20)
BUN/CREAT SERPL: 36.8 (ref 7–25)
CALCIUM SPEC-SCNC: 9.3 MG/DL (ref 8.6–10.5)
CHLORIDE SERPL-SCNC: 101 MMOL/L (ref 98–107)
CO2 SERPL-SCNC: 24.4 MMOL/L (ref 22–29)
CREAT SERPL-MCNC: 0.57 MG/DL (ref 0.57–1)
EGFRCR SERPLBLD CKD-EPI 2021: 115.8 ML/MIN/1.73
GLUCOSE BLDC GLUCOMTR-MCNC: 110 MG/DL (ref 70–130)
GLUCOSE BLDC GLUCOMTR-MCNC: 177 MG/DL (ref 70–130)
GLUCOSE BLDC GLUCOMTR-MCNC: 280 MG/DL (ref 70–130)
GLUCOSE BLDC GLUCOMTR-MCNC: 310 MG/DL (ref 70–130)
GLUCOSE SERPL-MCNC: 142 MG/DL (ref 65–99)
MAGNESIUM SERPL-MCNC: 1.8 MG/DL (ref 1.6–2.6)
POTASSIUM SERPL-SCNC: 4.3 MMOL/L (ref 3.5–5.2)
QT INTERVAL: 312 MS
QTC INTERVAL: 448 MS
SODIUM SERPL-SCNC: 136 MMOL/L (ref 136–145)

## 2024-11-19 PROCEDURE — 63710000001 INSULIN LISPRO (HUMAN) PER 5 UNITS

## 2024-11-19 PROCEDURE — 83735 ASSAY OF MAGNESIUM: CPT | Performed by: HOSPITALIST

## 2024-11-19 PROCEDURE — 63710000001 INSULIN LISPRO (HUMAN) PER 5 UNITS: Performed by: INTERNAL MEDICINE

## 2024-11-19 PROCEDURE — 97530 THERAPEUTIC ACTIVITIES: CPT

## 2024-11-19 PROCEDURE — 63710000001 INSULIN GLARGINE PER 5 UNITS

## 2024-11-19 PROCEDURE — 97110 THERAPEUTIC EXERCISES: CPT

## 2024-11-19 PROCEDURE — 25010000002 HEPARIN (PORCINE) PER 1000 UNITS: Performed by: INTERNAL MEDICINE

## 2024-11-19 PROCEDURE — 99231 SBSQ HOSP IP/OBS SF/LOW 25: CPT

## 2024-11-19 PROCEDURE — 82948 REAGENT STRIP/BLOOD GLUCOSE: CPT

## 2024-11-19 PROCEDURE — 80048 BASIC METABOLIC PNL TOTAL CA: CPT | Performed by: HOSPITALIST

## 2024-11-19 RX ADMIN — VITAMINS A AND D OINTMENT: 15.5; 53.4 OINTMENT TOPICAL at 08:23

## 2024-11-19 RX ADMIN — INSULIN LISPRO 8 UNITS: 100 INJECTION, SOLUTION INTRAVENOUS; SUBCUTANEOUS at 11:40

## 2024-11-19 RX ADMIN — HEPARIN SODIUM 5000 UNITS: 5000 INJECTION INTRAVENOUS; SUBCUTANEOUS at 06:22

## 2024-11-19 RX ADMIN — ATORVASTATIN CALCIUM 80 MG: 40 TABLET, FILM COATED ORAL at 08:21

## 2024-11-19 RX ADMIN — ACYCLOVIR 400 MG: 200 CAPSULE ORAL at 21:20

## 2024-11-19 RX ADMIN — INSULIN GLARGINE 40 UNITS: 100 INJECTION, SOLUTION SUBCUTANEOUS at 21:20

## 2024-11-19 RX ADMIN — LISINOPRIL 20 MG: 10 TABLET ORAL at 08:21

## 2024-11-19 RX ADMIN — INSULIN LISPRO 16 UNITS: 100 INJECTION, SOLUTION INTRAVENOUS; SUBCUTANEOUS at 21:21

## 2024-11-19 RX ADMIN — PREGABALIN 25 MG: 25 CAPSULE ORAL at 13:47

## 2024-11-19 RX ADMIN — METOPROLOL TARTRATE 25 MG: 25 TABLET, FILM COATED ORAL at 08:21

## 2024-11-19 RX ADMIN — LIDOCAINE 3 PATCH: 4 PATCH TOPICAL at 17:44

## 2024-11-19 RX ADMIN — Medication 5 MG: at 21:22

## 2024-11-19 RX ADMIN — NICOTINE 1 PATCH: 7 PATCH, EXTENDED RELEASE TRANSDERMAL at 08:23

## 2024-11-19 RX ADMIN — TRAMADOL HYDROCHLORIDE 50 MG: 50 TABLET, FILM COATED ORAL at 13:47

## 2024-11-19 RX ADMIN — INSULIN LISPRO 12 UNITS: 100 INJECTION, SOLUTION INTRAVENOUS; SUBCUTANEOUS at 11:39

## 2024-11-19 RX ADMIN — HEPARIN SODIUM 5000 UNITS: 5000 INJECTION INTRAVENOUS; SUBCUTANEOUS at 13:47

## 2024-11-19 RX ADMIN — INSULIN GLARGINE 20 UNITS: 100 INJECTION, SOLUTION SUBCUTANEOUS at 08:21

## 2024-11-19 RX ADMIN — PREGABALIN 25 MG: 25 CAPSULE ORAL at 06:22

## 2024-11-19 RX ADMIN — PANTOPRAZOLE SODIUM 40 MG: 40 TABLET, DELAYED RELEASE ORAL at 08:21

## 2024-11-19 RX ADMIN — PREGABALIN 25 MG: 25 CAPSULE ORAL at 21:20

## 2024-11-19 RX ADMIN — HEPARIN SODIUM 5000 UNITS: 5000 INJECTION INTRAVENOUS; SUBCUTANEOUS at 21:22

## 2024-11-19 RX ADMIN — CYCLOBENZAPRINE 10 MG: 10 TABLET, FILM COATED ORAL at 06:22

## 2024-11-19 RX ADMIN — DULOXETINE HYDROCHLORIDE 60 MG: 60 CAPSULE, DELAYED RELEASE ORAL at 08:21

## 2024-11-19 RX ADMIN — INSULIN LISPRO 4 UNITS: 100 INJECTION, SOLUTION INTRAVENOUS; SUBCUTANEOUS at 17:44

## 2024-11-19 RX ADMIN — NYSTATIN: 100000 POWDER TOPICAL at 08:23

## 2024-11-19 RX ADMIN — TRAMADOL HYDROCHLORIDE 50 MG: 50 TABLET, FILM COATED ORAL at 22:17

## 2024-11-19 RX ADMIN — VITAMINS A AND D OINTMENT: 15.5; 53.4 OINTMENT TOPICAL at 21:22

## 2024-11-19 RX ADMIN — NYSTATIN: 100000 POWDER TOPICAL at 21:22

## 2024-11-19 RX ADMIN — ASPIRIN 81 MG: 81 TABLET, CHEWABLE ORAL at 08:21

## 2024-11-19 NOTE — PROGRESS NOTES
Patient Identification:  Name:  Lety Johnson  Age:  44 y.o.  Sex:  female  :  1980  MRN:  [id number]  Visit Number:  [id number]  Primary Care Provider:  Concha Rao APRN    Length of stay:  52    Subjective/Interval History/Consultants/Procedures     Chief Complaint:   Chief Complaint   Patient presents with    Fall       Subjective/Interval History:    44 y.o. female who was admitted on 2024 with  UTI     PMH is significant for  CVA w/ left sided hemiparesis, debility, hx genital herpes on suppressive therapy, morbid obesity, T2DM   For complete admission information, please see history and physical.     Consultations:  Infectious disease  PT/OT  CM/SW    Procedures/Scans:  CT head without contrast  CT C-spine without contrast  CT T-spine without contrast  CT L-spine without contrast  CXR x 2  XR left foot  XR left ankle    Today, the patient was seen with nursing staff at the bedside. Discussed care with RN. No acute changes today. Per review of the chart no acute events overnight. Pending placement.     Room location at the time of evaluation was [facility].    ----------------------------------------------------------------------------------------------------------------------  Current Hospital Meds:  acyclovir, 400 mg, Oral, Nightly  aspirin, 81 mg, Oral, Daily  atorvastatin, 80 mg, Oral, Daily  DULoxetine, 60 mg, Oral, Daily  heparin (porcine), 5,000 Units, Subcutaneous, Q8H  insulin glargine, 20 Units, Subcutaneous, Daily With Breakfast  insulin glargine, 40 Units, Subcutaneous, Nightly  insulin lispro, 4-24 Units, Subcutaneous, 4x Daily AC & at Bedtime  insulin lispro, 8 Units, Subcutaneous, Daily With Lunch  Lidocaine, 3 patch, Transdermal, Q24H  lisinopril, 20 mg, Oral, Daily  magic barrier cream, , Topical, BID  metoprolol tartrate, 25 mg, Oral, Q12H  nicotine, 1 patch, Transdermal, Q24H  nystatin, , Topical, Q12H  pantoprazole, 40 mg, Oral, Daily  pregabalin, 25 mg, Oral, Q8H  sodium  chloride, 10 mL, Intravenous, Q12H  sodium chloride, 10 mL, Intravenous, Q12H      Pharmacy Consult,       ----------------------------------------------------------------------------------------------------------------------      Objective     Vital Signs:  Temp:  [97.2 °F (36.2 °C)-98.4 °F (36.9 °C)] 97.4 °F (36.3 °C)  Heart Rate:  [112-120] 112  Resp:  [16-20] 16  BP: (102-126)/(57-70) 126/68      10/07/24  0511 10/08/24  0500 10/09/24  0500   Weight: 102 kg (225 lb 14.4 oz) (FIFI cameron) 102 kg (225 lb 15.5 oz) 102 kg (224 lb 1.6 oz)     Body mass index is 36.73 kg/m².    Intake/Output Summary (Last 24 hours) at 11/19/2024 1421  Last data filed at 11/19/2024 1300  Gross per 24 hour   Intake 1150 ml   Output 2700 ml   Net -1550 ml     I/O this shift:  In: 600 [P.O.:600]  Out: 800 [Urine:800]  Diet: Regular/House, Diabetic; Consistent Carbohydrate; Fluid Consistency: Thin (IDDSI 0)  ----------------------------------------------------------------------------------------------------------------------    Physical Exam  Vitals and nursing note reviewed.   Constitutional:       General: She is not in acute distress.     Appearance: She is obese. She is ill-appearing.   HENT:      Head: Normocephalic and atraumatic.   Eyes:      Extraocular Movements: Extraocular movements intact.   Cardiovascular:      Rate and Rhythm: Normal rate.   Pulmonary:      Effort: Pulmonary effort is normal. No respiratory distress.   Abdominal:      Palpations: Abdomen is soft.   Musculoskeletal:      Right lower leg: No edema.      Left lower leg: No edema.   Skin:     General: Skin is warm.   Neurological:      Mental Status: She is alert. Mental status is at baseline.                ----------------------------------------------------------------------------------------------------------------------  Tele:      ----------------------------------------------------------------------------------------------------------------------         "      Results from last 7 days   Lab Units 11/19/24  0129 11/15/24  0237   SODIUM mmol/L 136 140   POTASSIUM mmol/L 4.3 4.4   MAGNESIUM mg/dL 1.8  --    CHLORIDE mmol/L 101 104   CO2 mmol/L 24.4 25.1   BUN mg/dL 21* 20   CREATININE mg/dL 0.57 0.50*   CALCIUM mg/dL 9.3 9.6   GLUCOSE mg/dL 142* 125*   Estimated Creatinine Clearance: 152.1 mL/min (by C-G formula based on SCr of 0.57 mg/dL).  No results found for: \"AMMONIA\"      No results found for: \"BLOODCX\"  No results found for: \"URINECX\"  No results found for: \"WOUNDCX\"  No results found for: \"STOOLCX\"  ----------------------------------------------------------------------------------------------------------------------  Imaging Results (Last 24 Hours)       ** No results found for the last 24 hours. **          ----------------------------------------------------------------------------------------------------------------------   I have reviewed the above laboratory values for 11/19/24    Assessment/Plan     There are no active hospital problems to display for this patient.        ASSESSMENT/PLAN:    ESBL E. coli UTI  Acute metabolic encephalopathy, resolved  S/p Invanz.  Infectious disease input appreciated.     Hx CVA with left-sided hemiparesis  Chronic debility  Continue PT OT  CM/SW assisting with discharge planning.  Patient is pending placement.     T2DM  Diabetic neuropathy  Continue insulin regimen-titrate as needed. Add scheduled meal time insulin w/ lunch.   Continue supportive care measures, Cymbalta  Lyrica added 11/13 given persistent pain with some improvement noted.      Hx genital herpes  Continue suppressive acyclovir     Morbid obesity  Complicates all aspects of care    Depression  Patient with somewhat labile mood, tearful at times. Reports history of depression as well, likely exacerbated by current health status/hospitalization.  She is taking cymbalta as above  Will consult psychiatry for further assistance and recommendations, appreciated. "     -----------  -DVT prophylaxis: SQH  -Disposition plans/anticipated needs: Pending placement        The patient is high risk due to the following diagnoses/reasons:  debility, hemiparesis         Burce Branham PA-C  11/19/24  14:21 EST

## 2024-11-19 NOTE — PLAN OF CARE
Goal Outcome Evaluation:           Progress: no change  Outcome Evaluation: Patient is in bed at this time. VSS on RA. WC completed per orders, PRN pain medication given per orders. No acute changes or complaints at this time per pt. Will continue POC.

## 2024-11-19 NOTE — CASE MANAGEMENT/SOCIAL WORK
Discharge Planning Assessment  [facility]     Patient Name: Lety Johnson  MRN: [id number]  Today's Date: 11/19/2024    Admit Date: 9/26/2024       Discharge Plan       Row Name 11/19/24 1654       Plan    Plan SS provided contact information for sonArnoldo to APS BOBY, [place]. APS BOBY spoke to significant other yesterday and he informed her that he is working on completing classes to reduce is sentence. APS BOBY has attempted contact with sonArnoldo and plans to attempt visit to pt's home again in the am. SS to follow.                  Continued Care and Services - Admitted Since 9/26/2024       Destination       Service Provider Request Status Services Address Phone Fax Patient Preferred    [facility] Declined  Cannot meet patient's needs -- [address] [facility] [address] [phone] --    [facility] Acute Care Declined  Not eligible -- [address] [facility] [place] [address] [phone] --    [facility] Declined  Not eligible -- [address] [facility] [address] [phone] --    [facility] Declined  Clinical Denial -- [address] [facility]TH FLOOR, [facility] [address] [phone] --    [facility] SWING BED UNIT Declined  Cannot meet patient's needs -- [address] [facility] [address] [phone] --     [facility] Declined  Not eligible -- [address] [facility] [place] [address] [phone] --    [facility] Declined  Bed not available -- [address] [facility] [address] [phone] --    [facility] SKILLED CARE Declined  Bed not available -- [address] [facility] [address] [phone] --    [facility] Declined  Out of network -- [address] [place] [address] [phone] --    [facility] SWING BED UNIT Declined -- [address] [place] [id number] [phone] --    [facility] Declined  Clinical Denial -- [address] [facility] [address] [phone] --    [facility] SWING BED UNIT Declined  Insurance Denial, Out of network -- [address], [facility] [address] [phone] --    [facility] Declined  Out of network -- [address] [facility] [address] [phone] --    [facility] Declined  Out of network -- [address] [address] [phone] -- --    [facility] Declined  Out of network -- [address] [facility] [address] [phone] --                  Expected Discharge Date and Time       Expected Discharge Date Expected Discharge Time    Nov 22, 2024         HELEN Butterfield

## 2024-11-19 NOTE — THERAPY TREATMENT NOTE
Acute Care - Physical Therapy Treatment Note   Zaki     Patient Name: Lety Johnson  : 1980  MRN: [id number]  Today's Date: 2024   Onset of Illness/Injury or Date of Surgery: 24  Visit Dx:     ICD-10-CM ICD-9-CM   1. Hypokalemia  E87.6 276.8   2. Pressure injury of skin of sacral region, unspecified injury stage  L89.159 707.03     707.20   3. Pressure injury of skin of left heel, unspecified injury stage  L89.629 707.07     707.20   4. Acute cystitis without hematuria  N30.00 595.0     Patient Active Problem List   Diagnosis   (none) - all problems resolved or deleted     Past Medical History:   Diagnosis Date    Stroke      History reviewed. No pertinent surgical history.  PT Assessment (Last 12 Hours)       PT Evaluation and Treatment       Row Name 24 1128          Physical Therapy Time and Intention    Subjective Information complains of;pain  -KM     Document Type therapy note (daily note)  -KM     Mode of Treatment individual therapy;physical therapy  -KM     Patient Effort adequate  -KM     Symptoms Noted During/After Treatment increased pain  -KM       Row Name 24 1128          General Information    Patient Profile Reviewed yes  -KM     Patient Observations alert;cooperative;agree to therapy  -KM     Existing Precautions/Restrictions fall  -KM       Row Name 24 1128          Cognition    Affect/Mental Status (Cognition) WFL  -KM     Orientation Status (Cognition) oriented x 3  -KM     Follows Commands (Cognition) WFL  -KM       Row Name 24 1128          Bed Mobility    Bed Mobility supine-sit;sit-supine  -KM     Supine-Sit [place] (Bed Mobility) maximum assist (25% patient effort);2 person assist  -KM     Sit-Supine [place] (Bed Mobility) maximum assist (25% patient effort);2 person assist  -KM     Bed Mobility, Safety Issues decreased use of arms for pushing/pulling;decreased use of legs for bridging/pushing  -KM     Assistive Device (Bed  Mobility) bed rails;head of bed elevated  -KM       Row Name 11/19/24 1128          Transfers    Comment, (Transfers) pt. deferred  -KM       Row Name 11/19/24 1128          Gait/Stairs (Locomotion)    Patient was able to Ambulate no, other medical factors prevent ambulation  -KM     Reason Patient was unable to Ambulate Non-Ambulatory at Baseline  -       Row Name 11/19/24 1128          Safety Issues/Impairments Affecting Functional Mobility    Impairments Affecting Function (Mobility) endurance/activity tolerance;pain;range of motion (ROM);strength;coordination;motor control;muscle tone abnormal  -KM       Row Name 11/19/24 1128          Balance    Balance Assessment sitting static balance  -KM     Static Sitting Balance supervision  -       Row Name 11/19/24 1128          Motor Skills    Comments, Therapeutic Exercise EOB ther-ex  -KM       Row Name             Wound 09/26/24 1809 Left posterior ankle Other (comment)    Wound - Properties Group Placement Date: 09/26/24  -KJ Placement Time: 1809  -KJ Side: Left  -KJ Orientation: posterior  -KJ Location: ankle  -KJ Primary Wound Type: Other  -TW, unknow etiology     Retired Wound - Properties Group Placement Date: 09/26/24  -KJ Placement Time: 1809  -KJ Side: Left  -KJ Orientation: posterior  -KJ Location: ankle  -KJ Primary Wound Type: Other  -TW, unknow etiology     Retired Wound - Properties Group Placement Date: 09/26/24  -KJ Placement Time: 1809  -KJ Side: Left  -KJ Orientation: posterior  -KJ Location: ankle  -KJ Primary Wound Type: Other  -TW, unknow etiology     Retired Wound - Properties Group Date first assessed: 09/26/24  -KJ Time first assessed: 1809  -KJ Side: Left  -KJ Location: ankle  -KJ Primary Wound Type: Other  -TW, unknow etiology       Row Name             Wound 10/11/24 1330 medial coccyx Fissure    Wound - Properties Group Placement Date: 10/11/24  -TW Placement Time: 1330  -TW Orientation: medial  -TW Location: coccyx  -TW Primary Wound  Type: Fissure  -TW    Retired Wound - Properties Group Placement Date: 10/11/24  -TW Placement Time: 1330 -TW Orientation: medial  -TW Location: coccyx  -TW Primary Wound Type: Fissure  -TW    Retired Wound - Properties Group Placement Date: 10/11/24  -TW Placement Time: 1330  -TW Orientation: medial  -TW Location: coccyx  -TW Primary Wound Type: Fissure  -TW    Retired Wound - Properties Group Date first assessed: 10/11/24  -TW Time first assessed: 1330 -TW Location: coccyx  -TW Primary Wound Type: Fissure  -TW      Row Name             Wound 10/16/24 0705 Left medial gluteal MASD (moisture associated skin damage)    Wound - Properties Group Placement Date: 10/16/24  -MS Placement Time: 0705 -MS Side: Left  -MS Orientation: medial  -MS Location: gluteal  -MS Primary Wound Type: MASD  -MS Type: MASD (moisture associated skin damage)  -MS Present on Original Admission: N  -MS Additional Comments: Noted at shift change skin assesment, Night shift RN stated it had been there her shift.  -MS    Retired Wound - Properties Group Placement Date: 10/16/24  -MS Placement Time: 0705 -MS Present on Original Admission: N  -MS Side: Left  -MS Orientation: medial  -MS Location: gluteal  -MS Primary Wound Type: MASD  -MS Additional Comments: Noted at shift change skin assesment, Night shift RN stated it had been there her shift.  -MS Type: MASD (moisture associated skin damage)  -MS    Retired Wound - Properties Group Placement Date: 10/16/24  -MS Placement Time: 0705 -MS Present on Original Admission: N  -MS Side: Left  -MS Orientation: medial  -MS Location: gluteal  -MS Primary Wound Type: MASD  -MS Additional Comments: Noted at shift change skin assesment, Night shift RN stated it had been there her shift.  -MS Type: MASD (moisture associated skin damage)  -MS    Retired Wound - Properties Group Date first assessed: 10/16/24  -MS Time first assessed: 0705  -MS Present on Original Admission: N  -MS Side: Left  -MS Location:  gluteal  -MS Primary Wound Type: MASD  -MS Additional Comments: Noted at shift change skin assesment, Night shift RN stated it had been there her shift.  -MS Type: MASD (moisture associated skin damage)  -MS      Row Name             Wound 10/16/24 0705 Left posterior greater trochanter MASD (moisture associated skin damage)    Wound - Properties Group Placement Date: 10/16/24  -MS Placement Time: 0705  -MS Side: Left  -MS Orientation: posterior  -MS Location: greater trochanter  -MS Primary Wound Type: MASD  -MS Type: MASD (moisture associated skin damage)  -MS Present on Original Admission: N  -MS    Retired Wound - Properties Group Placement Date: 10/16/24  -MS Placement Time: 0705  -MS Present on Original Admission: N  -MS Side: Left  -MS Orientation: posterior  -MS Location: greater trochanter  -MS Primary Wound Type: MASD  -MS Type: MASD (moisture associated skin damage)  -MS    Retired Wound - Properties Group Placement Date: 10/16/24  -MS Placement Time: 0705  -MS Present on Original Admission: N  -MS Side: Left  -MS Orientation: posterior  -MS Location: greater trochanter  -MS Primary Wound Type: MASD  -MS Type: MASD (moisture associated skin damage)  -MS    Retired Wound - Properties Group Date first assessed: 10/16/24  -MS Time first assessed: 0705  -MS Present on Original Admission: N  -MS Side: Left  -MS Location: greater trochanter  -MS Primary Wound Type: MASD  -MS Type: MASD (moisture associated skin damage)  -MS      Row Name 11/19/24 1128          Progress Summary (PT)    Daily Progress Summary (PT) Pt. was able to perform bed mobility w/ maxA x2. She tolerated sitting EOB for increased duration w/ continuous complaints of back and shoulder pain. Pt. is not showing any progress w/ PT at this time.  -KM               User Key  (r) = Recorded By, (t) = Taken By, (c) = Cosigned By      Initials Name Provider Type    Karen Peralta, RN Registered Nurse    Thierry Carter, PT Physical Therapist     Nory Keenan, RN Registered Nurse    Roe North RN Registered Nurse                    Physical Therapy Education       Title: PT OT SLP Therapies (Done)       Topic: Physical Therapy (Done)       Point: Mobility training (Done)       Learning Progress Summary            Patient Acceptance, E,TB, VU by CF at 11/17/2024 1024    Acceptance, E,TB, VU by RR at 11/14/2024 0148    Acceptance, E,TB, VU by RR at 11/13/2024 0218    Acceptance, E,TB, VU by RG at 11/12/2024 0927    Acceptance, TB,E, VU by RG at 11/11/2024 0944    Acceptance, E,TB, VU by RG at 11/10/2024 0931    Acceptance, E, VU by WG at 11/10/2024 0233    Acceptance, E,TB, VU by CF at 11/9/2024 1016    Acceptance, E, NR by SC at 11/3/2024 0025    Acceptance, E,TB, VU by RR at 11/1/2024 2315    Acceptance, E,D, VU,NR by AG at 10/31/2024 1234    Comment: PT educated pt. in importance of mobilization and L L/UE PROM and self ROM to prevent contracture.  Pt. is encouraged to to perform R LE AROM frequently while supine or sitting EOB.    Acceptance, E,TB, VU by RR at 10/27/2024 0031    Acceptance, E,TB, VU by RG at 10/22/2024 1002    Acceptance, TB,E, VU by RG at 10/21/2024 0907    Nonacceptance, E, NR by WG at 10/16/2024 0228    Acceptance, E,TB, VU by CF at 10/15/2024 0753    Acceptance, E,TB, VU by CF at 10/14/2024 0801    Acceptance, E,TB, VU by CF at 10/13/2024 1045    Acceptance, E,D, VU,NR by AG at 10/10/2024 1310    Acceptance, E,TB, VU by CB at 10/8/2024 0448    Acceptance, E, NR by RD at 10/2/2024 1101    Acceptance, E, NR by RD at 10/1/2024 0856    Acceptance, E,D, VU,NR by AG at 9/30/2024 1559                      Point: Home exercise program (Done)       Learning Progress Summary            Patient Acceptance, E,TB, VU by CF at 11/17/2024 1024    Acceptance, E,TB, VU by RR at 11/14/2024 0148    Acceptance, E,TB, VU by RR at 11/13/2024 0218    Acceptance, E,TB, VU by RG at 11/12/2024 0927    Acceptance, TB,E, VU by RG at 11/11/2024  0944    Acceptance, E,TB, VU by RG at 11/10/2024 0931    Acceptance, E, VU by WG at 11/10/2024 0233    Acceptance, E,TB, VU by CF at 11/9/2024 1016    Acceptance, E, NR by SC at 11/3/2024 0025    Acceptance, E,TB, VU by RR at 11/1/2024 2315    Acceptance, E,D, VU,NR by AG at 10/31/2024 1234    Comment: PT educated pt. in importance of mobilization and L L/UE PROM and self ROM to prevent contracture.  Pt. is encouraged to to perform R LE AROM frequently while supine or sitting EOB.    Acceptance, E,TB, VU by RR at 10/27/2024 0031    Acceptance, E,TB, VU by RG at 10/22/2024 1002    Acceptance, TB,E, VU by RG at 10/21/2024 0907    Nonacceptance, E, NR by WG at 10/16/2024 0228    Acceptance, E,TB, VU by CF at 10/15/2024 0753    Acceptance, E,TB, VU by CF at 10/14/2024 0801    Acceptance, E,TB, VU by CF at 10/13/2024 1045    Acceptance, E,D, VU,NR by AG at 10/10/2024 1310    Acceptance, E,TB, VU by CB at 10/8/2024 0448    Acceptance, E, NR by RD at 10/2/2024 1101    Acceptance, E, NR by RD at 10/1/2024 0856    Acceptance, E,D, VU,NR by AG at 9/30/2024 1559                      Point: Body mechanics (Done)       Learning Progress Summary            Patient Acceptance, E,TB, VU by CF at 11/17/2024 1024    Acceptance, E,TB, VU by RR at 11/14/2024 0148    Acceptance, E,TB, VU by RR at 11/13/2024 0218    Acceptance, E,TB, VU by RG at 11/12/2024 0927    Acceptance, TB,E, VU by RG at 11/11/2024 0944    Acceptance, E,TB, VU by RG at 11/10/2024 0931    Acceptance, E, VU by WG at 11/10/2024 0233    Acceptance, E,TB, VU by CF at 11/9/2024 1016    Acceptance, E, NR by SC at 11/3/2024 0025    Acceptance, E,TB, VU by RR at 11/1/2024 2315    Acceptance, E,D, VU,NR by AG at 10/31/2024 1234    Comment: PT educated pt. in importance of mobilization and L L/UE PROM and self ROM to prevent contracture.  Pt. is encouraged to to perform R LE AROM frequently while supine or sitting EOB.    Acceptance, E,TB, VU by RR at 10/27/2024 0031     Acceptance, E,TB, VU by RG at 10/22/2024 1002    Acceptance, TB,E, VU by RG at 10/21/2024 0907    Nonacceptance, E, NR by WG at 10/16/2024 0228    Acceptance, E,TB, VU by CF at 10/15/2024 0753    Acceptance, E,TB, VU by CF at 10/14/2024 0801    Acceptance, E,TB, VU by CF at 10/13/2024 1045    Acceptance, E,D, VU,NR by AG at 10/10/2024 1310    Acceptance, E,TB, VU by CB at 10/8/2024 0448    Acceptance, E, NR by RD at 10/2/2024 1101    Acceptance, E, NR by RD at 10/1/2024 0856    Acceptance, E,D, VU,NR by AG at 9/30/2024 1559                      Point: Precautions (Done)       Learning Progress Summary            Patient Acceptance, E,TB, VU by CF at 11/17/2024 1024    Acceptance, E,TB, VU by RR at 11/14/2024 0148    Acceptance, E,TB, VU by RR at 11/13/2024 0218    Acceptance, E,TB, VU by RG at 11/12/2024 0927    Acceptance, TB,E, VU by RG at 11/11/2024 0944    Acceptance, E,TB, VU by RG at 11/10/2024 0931    Acceptance, E, VU by WG at 11/10/2024 0233    Acceptance, E,TB, VU by CF at 11/9/2024 1016    Acceptance, E, NR by SC at 11/3/2024 0025    Acceptance, E,TB, VU by RR at 11/1/2024 2315    Acceptance, E,D, VU,NR by AG at 10/31/2024 1234    Comment: PT educated pt. in importance of mobilization and L L/UE PROM and self ROM to prevent contracture.  Pt. is encouraged to to perform R LE AROM frequently while supine or sitting EOB.    Acceptance, E,TB, VU by RR at 10/27/2024 0031    Acceptance, E,TB, VU by RG at 10/22/2024 1002    Acceptance, TB,E, VU by RG at 10/21/2024 0907    Nonacceptance, E, NR by WG at 10/16/2024 0228    Acceptance, E,TB, VU by CF at 10/15/2024 0753    Acceptance, E,TB, VU by CF at 10/14/2024 0801    Acceptance, E,TB, VU by CF at 10/13/2024 1045    Acceptance, E,D, VU,NR by AG at 10/10/2024 1310    Acceptance, E,TB, VU by CB at 10/8/2024 0448    Acceptance, E, NR by RD at 10/2/2024 1101    Acceptance, E, NR by RD at 10/1/2024 0856    Acceptance, E,D, VU,NR by AG at 9/30/2024 1559                                       User Key       Initials Effective Dates Name Provider Type Discipline    AG 06/16/21 -  Мария Joya, PT Physical Therapist PT    RD 06/16/21 -  Laisha Verdugo, RN Registered Nurse Nurse    RR 06/11/24 -  Jaymie Dominguez, RN Registered Nurse Nurse    RG 06/06/23 -  Jesus Matthews, RN Registered Nurse Nurse    WG 02/12/24 -  Shanthi Burden, RN Registered Nurse Nurse    CF 06/25/24 -  Cherelle Germain, RN Registered Nurse Nurse    CB 06/17/24 -  Fidelia Lombardo, RN Registered Nurse Nurse    SC 09/11/24 -  Sally Hair RN Registered Nurse Nurse                  PT Recommendation and Plan  Anticipated Discharge Disposition (PT): home with 24/7 [facility], skilled nursing facility  Progress Summary (PT)  Daily Progress Summary (PT): Pt. was able to perform bed mobility w/ maxA x2. She tolerated sitting EOB for increased duration w/ continuous complaints of back and shoulder pain. Pt. is not showing any progress w/ PT at this time.       Time Calculation:    PT Charges       Row Name 11/19/24 1128             Time Calculation    PT Received On 11/19/24  -KM         Time Calculation- PT    Total Timed Code Minutes- PT 23 minute(s)  -KM                User Key  (r) = Recorded By, (t) = Taken By, (c) = Cosigned By      Initials Name Provider Type    KM Thierry Saldivar, PT Physical Therapist                  Therapy Charges for Today       Code Description Service Date Service Provider Modifiers Qty    55501001046 HC PT THER PROC EA 15 MIN 11/18/2024 Thierry Saldivar, PT GP 2    32157471148 HC PT THERAPEUTIC ACT EA 15 MIN 11/19/2024 Thierry Saldivar, PT GP 1    81605688990 HC PT THER PROC EA 15 MIN 11/19/2024 Thierry Saldivar, PT GP 1            PT G-Codes  AM-PAC 6 Clicks Score (PT): 6    Thierry Saldivar PT  11/19/2024

## 2024-11-19 NOTE — PLAN OF CARE
Goal Outcome Evaluation:  Plan of Care Reviewed With: patient  Plan of Care Reviewed With: patient        Progress: no change  Outcome Evaluation: Pt resting in bed throughout shift. VSS on RA. Wound care complete as ordered. No acute changes. No concerns or requests at this time. Will continue plan of care.

## 2024-11-19 NOTE — THERAPY TREATMENT NOTE
Acute Care - Occupational Therapy Treatment Note   Zaki     Patient Name: Lety Johnson  : 1980  MRN: [id number]  Today's Date: 2024  Onset of Illness/Injury or Date of Surgery: 24     Referring Physician: Dr. Marc    Admit Date: 2024       ICD-10-CM ICD-9-CM   1. Hypokalemia  E87.6 276.8   2. Pressure injury of skin of sacral region, unspecified injury stage  L89.159 707.03     707.20   3. Pressure injury of skin of left heel, unspecified injury stage  L89.629 707.07     707.20   4. Acute cystitis without hematuria  N30.00 595.0     Patient Active Problem List   Diagnosis   (none) - all problems resolved or deleted     Past Medical History:   Diagnosis Date    Stroke      History reviewed. No pertinent surgical history.      OT ASSESSMENT FLOWSHEET (Last 12 Hours)       OT Evaluation and Treatment       Row Name 24 1157                   OT Time and Intention    Document Type therapy note (daily note)  -KR        Mode of Treatment occupational therapy  -KR        Patient Effort adequate  -KR        Comment Pt seen on this date for LUE PROM/AAROM from supine.  -KR           Motor Skills    Motor Skills --  L hand/wrist SROM. L shoulder elbow PROM as tolerated. Pt continues to experience increased pain upon movement throughout LUE. Continued training for importance of fxl positioning, as tolerated, LUE to increase mobility/decrease deformity  -KR           Shoulder (Therapeutic Exercise)    Shoulder PROM (Therapeutic Exercise) left;flexion;supine  -KR           Elbow/Forearm (Therapeutic Exercise)    Elbow/Forearm PROM (Therapeutic Exercise) left;flexion;supine  -KR           Wrist (Therapeutic Exercise)    Wrist (Therapeutic Exercise) --  SROM LUE  -KR           Hand (Therapeutic Exercise)    Hand (Therapeutic Exercise) --  SROM LUE  -KR           Wound 24 1809 Left posterior ankle Other (comment)    Wound - Properties Group Placement Date: 24  -KJ Placement Time:  1809  -KJ Side: Left  -KJ Orientation: posterior  -KJ Location: ankle  -KJ Primary Wound Type: Other  -TW, unknow etiology     Retired Wound - Properties Group Placement Date: 09/26/24  -KJ Placement Time: 1809  -KJ Side: Left  -KJ Orientation: posterior  -KJ Location: ankle  -KJ Primary Wound Type: Other  -TW, unknow etiology     Retired Wound - Properties Group Placement Date: 09/26/24  -KJ Placement Time: 1809  -KJ Side: Left  -KJ Orientation: posterior  -KJ Location: ankle  -KJ Primary Wound Type: Other  -TW, unknow etiology     Retired Wound - Properties Group Date first assessed: 09/26/24  -KJ Time first assessed: 1809  -KJ Side: Left  -KJ Location: ankle  -KJ Primary Wound Type: Other  -TW, unknow etiology        Wound 10/11/24 1330 medial coccyx Fissure    Wound - Properties Group Placement Date: 10/11/24  -TW Placement Time: 1330  -TW Orientation: medial  -TW Location: coccyx  -TW Primary Wound Type: Fissure  -TW    Retired Wound - Properties Group Placement Date: 10/11/24  -TW Placement Time: 1330  -TW Orientation: medial  -TW Location: coccyx  -TW Primary Wound Type: Fissure  -TW    Retired Wound - Properties Group Placement Date: 10/11/24  -TW Placement Time: 1330  -TW Orientation: medial  -TW Location: coccyx  -TW Primary Wound Type: Fissure  -TW    Retired Wound - Properties Group Date first assessed: 10/11/24  -TW Time first assessed: 1330  -TW Location: coccyx  -TW Primary Wound Type: Fissure  -TW       Wound 10/16/24 0705 Left medial gluteal MASD (moisture associated skin damage)    Wound - Properties Group Placement Date: 10/16/24  -MS Placement Time: 0705  -MS Side: Left  -MS Orientation: medial  -MS Location: gluteal  -MS Primary Wound Type: MASD  -MS Type: MASD (moisture associated skin damage)  -MS Present on Original Admission: N  -MS Additional Comments: Noted at shift change skin assesment, Night shift RN stated it had been there her shift.  -MS    Retired Wound - Properties Group Placement  Date: 10/16/24  -MS Placement Time: 0705  -MS Present on Original Admission: N  -MS Side: Left  -MS Orientation: medial  -MS Location: gluteal  -MS Primary Wound Type: MASD  -MS Additional Comments: Noted at shift change skin assesment, Night shift RN stated it had been there her shift.  -MS Type: MASD (moisture associated skin damage)  -MS    Retired Wound - Properties Group Placement Date: 10/16/24  -MS Placement Time: 0705 -MS Present on Original Admission: N  -MS Side: Left  -MS Orientation: medial  -MS Location: gluteal  -MS Primary Wound Type: MASD  -MS Additional Comments: Noted at shift change skin assesment, Night shift RN stated it had been there her shift.  -MS Type: MASD (moisture associated skin damage)  -MS    Retired Wound - Properties Group Date first assessed: 10/16/24  -MS Time first assessed: 0705 -MS Present on Original Admission: N  -MS Side: Left  -MS Location: gluteal  -MS Primary Wound Type: MASD  -MS Additional Comments: Noted at shift change skin assesment, Night shift RN stated it had been there her shift.  -MS Type: MASD (moisture associated skin damage)  -MS       Wound 10/16/24 0705 Left posterior greater trochanter MASD (moisture associated skin damage)    Wound - Properties Group Placement Date: 10/16/24  -MS Placement Time: 0705 -MS Side: Left  -MS Orientation: posterior  -MS Location: greater trochanter  -MS Primary Wound Type: MASD  -MS Type: MASD (moisture associated skin damage)  -MS Present on Original Admission: N  -MS    Retired Wound - Properties Group Placement Date: 10/16/24  -MS Placement Time: 0705 -MS Present on Original Admission: N  -MS Side: Left  -MS Orientation: posterior  -MS Location: greater trochanter  -MS Primary Wound Type: MASD  -MS Type: MASD (moisture associated skin damage)  -MS    Retired Wound - Properties Group Placement Date: 10/16/24  -MS Placement Time: 0705  -MS Present on Original Admission: N  -MS Side: Left  -MS Orientation: posterior  -MS  Location: greater trochanter  -MS Primary Wound Type: MASD  -MS Type: MASD (moisture associated skin damage)  -MS    Retired Wound - Properties Group Date first assessed: 10/16/24  -MS Time first assessed: 0705  -MS Present on Original Admission: N  -MS Side: Left  -MS Location: greater trochanter  -MS Primary Wound Type: MASD  -MS Type: MASD (moisture associated skin damage)  -MS       Plan of Care Review    Plan of Care Reviewed With patient  -KR           ROM Goal 1 (OT)    Progress/Outcome (ROM Goal 1, OT) continuing progress toward goal  -KR            Activity Tolerance Goal 1 (OT)    Progress/Outcome (Activity Tolerance Goal 1, OT) continuing progress toward goal  -KR                  User Key  (r) = Recorded By, (t) = Taken By, (c) = Cosigned By      Initials Name Effective Dates    TW Karen Gipson, FIFI 06/16/21 -     Neil Menezes OT 06/16/21 -     Nory Keenan RN 06/08/23 -     Roe North RN 06/04/24 -                      Occupational Therapy Education        No education to display                      OT Recommendation and Plan  Planned Therapy Interventions (OT): ROM/therapeutic exercise  Plan of Care Review  Plan of Care Reviewed With: patient  Progress: improving (tolerance to LUE PROM)  Plan of Care Reviewed With: patient        Time Calculation:     Therapy Charges for Today       Code Description Service Date Service Provider Modifiers Qty    15975976495  OT THERAPEUTIC ACT EA 15 MIN 11/19/2024 Neil Graf OT GO 1                 Neil Graf OT  11/19/2024

## 2024-11-19 NOTE — PLAN OF CARE
Goal Outcome Evaluation:  Plan of Care Reviewed With: patient        Progress: no change  Outcome Evaluation: Pt resting in bed, VSS. Pts heart rate has been elevated this shift, one time dose of Lopressor given per orders. Pts wound care completed per orders. Pt voices no concerns or complaints at this time, will continue with POC.

## 2024-11-20 LAB
GLUCOSE BLDC GLUCOMTR-MCNC: 102 MG/DL (ref 70–130)
GLUCOSE BLDC GLUCOMTR-MCNC: 218 MG/DL (ref 70–130)
GLUCOSE BLDC GLUCOMTR-MCNC: 233 MG/DL (ref 70–130)
GLUCOSE BLDC GLUCOMTR-MCNC: 268 MG/DL (ref 70–130)

## 2024-11-20 PROCEDURE — 63710000001 PROCHLORPERAZINE MALEATE PER 5 MG: Performed by: STUDENT IN AN ORGANIZED HEALTH CARE EDUCATION/TRAINING PROGRAM

## 2024-11-20 PROCEDURE — 97530 THERAPEUTIC ACTIVITIES: CPT

## 2024-11-20 PROCEDURE — 99231 SBSQ HOSP IP/OBS SF/LOW 25: CPT

## 2024-11-20 PROCEDURE — 99253 IP/OBS CNSLTJ NEW/EST LOW 45: CPT | Performed by: PSYCHIATRY & NEUROLOGY

## 2024-11-20 PROCEDURE — 97110 THERAPEUTIC EXERCISES: CPT

## 2024-11-20 PROCEDURE — 82948 REAGENT STRIP/BLOOD GLUCOSE: CPT

## 2024-11-20 PROCEDURE — 63710000001 INSULIN GLARGINE PER 5 UNITS

## 2024-11-20 PROCEDURE — 25010000002 HEPARIN (PORCINE) PER 1000 UNITS: Performed by: INTERNAL MEDICINE

## 2024-11-20 PROCEDURE — 63710000001 INSULIN LISPRO (HUMAN) PER 5 UNITS

## 2024-11-20 PROCEDURE — 63710000001 INSULIN LISPRO (HUMAN) PER 5 UNITS: Performed by: INTERNAL MEDICINE

## 2024-11-20 RX ADMIN — HEPARIN SODIUM 5000 UNITS: 5000 INJECTION INTRAVENOUS; SUBCUTANEOUS at 14:47

## 2024-11-20 RX ADMIN — DULOXETINE HYDROCHLORIDE 60 MG: 60 CAPSULE, DELAYED RELEASE ORAL at 09:04

## 2024-11-20 RX ADMIN — VITAMINS A AND D OINTMENT: 15.5; 53.4 OINTMENT TOPICAL at 09:05

## 2024-11-20 RX ADMIN — PREGABALIN 25 MG: 25 CAPSULE ORAL at 21:00

## 2024-11-20 RX ADMIN — INSULIN LISPRO 8 UNITS: 100 INJECTION, SOLUTION INTRAVENOUS; SUBCUTANEOUS at 11:24

## 2024-11-20 RX ADMIN — PANTOPRAZOLE SODIUM 40 MG: 40 TABLET, DELAYED RELEASE ORAL at 09:04

## 2024-11-20 RX ADMIN — ACYCLOVIR 400 MG: 200 CAPSULE ORAL at 20:03

## 2024-11-20 RX ADMIN — INSULIN GLARGINE 40 UNITS: 100 INJECTION, SOLUTION SUBCUTANEOUS at 20:04

## 2024-11-20 RX ADMIN — HEPARIN SODIUM 5000 UNITS: 5000 INJECTION INTRAVENOUS; SUBCUTANEOUS at 21:00

## 2024-11-20 RX ADMIN — HEPARIN SODIUM 5000 UNITS: 5000 INJECTION INTRAVENOUS; SUBCUTANEOUS at 05:29

## 2024-11-20 RX ADMIN — PROCHLORPERAZINE MALEATE 5 MG: 5 TABLET ORAL at 23:04

## 2024-11-20 RX ADMIN — Medication 5 MG: at 20:03

## 2024-11-20 RX ADMIN — INSULIN LISPRO 8 UNITS: 100 INJECTION, SOLUTION INTRAVENOUS; SUBCUTANEOUS at 20:03

## 2024-11-20 RX ADMIN — ASPIRIN 81 MG: 81 TABLET, CHEWABLE ORAL at 09:04

## 2024-11-20 RX ADMIN — ATORVASTATIN CALCIUM 80 MG: 40 TABLET, FILM COATED ORAL at 09:04

## 2024-11-20 RX ADMIN — INSULIN LISPRO 12 UNITS: 100 INJECTION, SOLUTION INTRAVENOUS; SUBCUTANEOUS at 17:24

## 2024-11-20 RX ADMIN — NYSTATIN: 100000 POWDER TOPICAL at 20:04

## 2024-11-20 RX ADMIN — LIDOCAINE 3 PATCH: 4 PATCH TOPICAL at 17:23

## 2024-11-20 RX ADMIN — NYSTATIN: 100000 POWDER TOPICAL at 09:05

## 2024-11-20 RX ADMIN — VITAMINS A AND D OINTMENT: 15.5; 53.4 OINTMENT TOPICAL at 20:05

## 2024-11-20 RX ADMIN — NICOTINE 1 PATCH: 7 PATCH, EXTENDED RELEASE TRANSDERMAL at 09:03

## 2024-11-20 RX ADMIN — PREGABALIN 25 MG: 25 CAPSULE ORAL at 14:47

## 2024-11-20 RX ADMIN — TRAMADOL HYDROCHLORIDE 50 MG: 50 TABLET, FILM COATED ORAL at 23:49

## 2024-11-20 RX ADMIN — TRAMADOL HYDROCHLORIDE 50 MG: 50 TABLET, FILM COATED ORAL at 05:29

## 2024-11-20 RX ADMIN — PREGABALIN 25 MG: 25 CAPSULE ORAL at 05:29

## 2024-11-20 RX ADMIN — INSULIN GLARGINE 20 UNITS: 100 INJECTION, SOLUTION SUBCUTANEOUS at 09:02

## 2024-11-20 NOTE — THERAPY TREATMENT NOTE
Acute Care - Physical Therapy Treatment Note   [facility]     Patient Name: Lety Johnson  : 1980  MRN: [id number]  Today's Date: 2024   Onset of Illness/Injury or Date of Surgery: 24  Visit Dx:     ICD-10-CM ICD-9-CM   1. Hypokalemia  E87.6 276.8   2. Pressure injury of skin of sacral region, unspecified injury stage  L89.159 707.03     707.20   3. Pressure injury of skin of left heel, unspecified injury stage  L89.629 707.07     707.20   4. Acute cystitis without hematuria  N30.00 595.0     Patient Active Problem List   Diagnosis   (none) - all problems resolved or deleted     Past Medical History:   Diagnosis Date    Stroke      History reviewed. No pertinent surgical history.  PT Assessment (Last 12 Hours)       PT Evaluation and Treatment       Row Name 24 1129          Physical Therapy Time and Intention    Subjective Information complains of;pain  -KM     Document Type therapy note (daily note)  -KM     Mode of Treatment physical therapy  -KM     Patient Effort adequate  -KM     Symptoms Noted During/After Treatment increased pain  -KM       Row Name 24 1129          General Information    Patient Profile Reviewed yes  -KM     Patient Observations alert;cooperative;agree to therapy  -KM     Existing Precautions/Restrictions fall  -KM       Row Name 24 1129          Cognition    Affect/Mental Status (Cognition) WFL  -KM     Orientation Status (Cognition) oriented x 3  -KM     Follows Commands (Cognition) WFL  -KM       Row Name 24 1129          Bed Mobility    Bed Mobility supine-sit;sit-supine  -KM     Supine-Sit [place] (Bed Mobility) maximum assist (25% patient effort);2 person assist  -KM     Sit-Supine [place] (Bed Mobility) maximum assist (25% patient effort);2 person assist  -KM     Bed Mobility, Safety Issues decreased use of arms for pushing/pulling;decreased use of legs for bridging/pushing  -KM     Assistive Device (Bed Mobility) bed rails;head of  bed elevated  -KM       Row Name 11/20/24 1129          Gait/Stairs (Locomotion)    Patient was able to Ambulate no, other medical factors prevent ambulation  -KM     Reason Patient was unable to Ambulate Non-Ambulatory at Baseline  -KM       Row Name 11/20/24 1129          Safety Issues/Impairments Affecting Functional Mobility    Impairments Affecting Function (Mobility) endurance/activity tolerance;pain;range of motion (ROM);strength;coordination;motor control;muscle tone abnormal  -KM       Row Name 11/20/24 1129          Motor Skills    Comments, Therapeutic Exercise EOB ther-ex  -KM       Row Name             Wound 09/26/24 1809 Left posterior ankle Other (comment)    Wound - Properties Group Placement Date: 09/26/24  -KJ Placement Time: 1809  -KJ Side: Left  -KJ Orientation: posterior  -KJ Location: ankle  -KJ Primary Wound Type: Other  -TW, unknow etiology     Retired Wound - Properties Group Placement Date: 09/26/24  -KJ Placement Time: 1809  -KJ Side: Left  -KJ Orientation: posterior  -KJ Location: ankle  -KJ Primary Wound Type: Other  -TW, unknow etiology     Retired Wound - Properties Group Placement Date: 09/26/24  -KJ Placement Time: 1809  -KJ Side: Left  -KJ Orientation: posterior  -KJ Location: ankle  -KJ Primary Wound Type: Other  -TW, unknow etiology     Retired Wound - Properties Group Date first assessed: 09/26/24  -KJ Time first assessed: 1809  -KJ Side: Left  -KJ Location: ankle  -KJ Primary Wound Type: Other  -TW, unknow etiology       Row Name             Wound 10/11/24 1330 medial coccyx Fissure    Wound - Properties Group Placement Date: 10/11/24  -TW Placement Time: 1330 -TW Orientation: medial  -TW Location: coccyx  -TW Primary Wound Type: Fissure  -TW    Retired Wound - Properties Group Placement Date: 10/11/24  -TW Placement Time: 1330 -TW Orientation: medial  -TW Location: coccyx  -TW Primary Wound Type: Fissure  -TW    Retired Wound - Properties Group Placement Date: 10/11/24  -TW  Placement Time: 1330 -TW Orientation: medial  -TW Location: coccyx  -TW Primary Wound Type: Fissure  -TW    Retired Wound - Properties Group Date first assessed: 10/11/24  -TW Time first assessed: 1330 -TW Location: coccyx  -TW Primary Wound Type: Fissure  -TW      Row Name             Wound 10/16/24 0705 Left medial gluteal MASD (moisture associated skin damage)    Wound - Properties Group Placement Date: 10/16/24  -MS Placement Time: 0705 -MS Side: Left  -MS Orientation: medial  -MS Location: gluteal  -MS Primary Wound Type: MASD  -MS Type: MASD (moisture associated skin damage)  -MS Present on Original Admission: N  -MS Additional Comments: Noted at shift change skin assesment, Night shift RN stated it had been there her shift.  -MS    Retired Wound - Properties Group Placement Date: 10/16/24  -MS Placement Time: 0705 -MS Present on Original Admission: N  -MS Side: Left  -MS Orientation: medial  -MS Location: gluteal  -MS Primary Wound Type: MASD  -MS Additional Comments: Noted at shift change skin assesment, Night shift RN stated it had been there her shift.  -MS Type: MASD (moisture associated skin damage)  -MS    Retired Wound - Properties Group Placement Date: 10/16/24  -MS Placement Time: 0705 -MS Present on Original Admission: N  -MS Side: Left  -MS Orientation: medial  -MS Location: gluteal  -MS Primary Wound Type: MASD  -MS Additional Comments: Noted at shift change skin assesment, Night shift RN stated it had been there her shift.  -MS Type: MASD (moisture associated skin damage)  -MS    Retired Wound - Properties Group Date first assessed: 10/16/24  -MS Time first assessed: 0705 -MS Present on Original Admission: N  -MS Side: Left  -MS Location: gluteal  -MS Primary Wound Type: MASD  -MS Additional Comments: Noted at shift change skin assesment, Night shift RN stated it had been there her shift.  -MS Type: MASD (moisture associated skin damage)  -MS      Row Name             Wound 10/16/24 0705  "Left posterior greater trochanter MASD (moisture associated skin damage)    Wound - Properties Group Placement Date: 10/16/24  -MS Placement Time: 0705 -MS Side: Left  -MS Orientation: posterior  -MS Location: greater trochanter  -MS Primary Wound Type: MASD  -MS Type: MASD (moisture associated skin damage)  -MS Present on Original Admission: N  -MS    Retired Wound - Properties Group Placement Date: 10/16/24  -MS Placement Time: 0705 -MS Present on Original Admission: N  -MS Side: Left  -MS Orientation: posterior  -MS Location: greater trochanter  -MS Primary Wound Type: MASD  -MS Type: MASD (moisture associated skin damage)  -MS    Retired Wound - Properties Group Placement Date: 10/16/24  -MS Placement Time: 0705  -MS Present on Original Admission: N  -MS Side: Left  -MS Orientation: posterior  -MS Location: greater trochanter  -MS Primary Wound Type: MASD  -MS Type: MASD (moisture associated skin damage)  -MS    Retired Wound - Properties Group Date first assessed: 10/16/24  -MS Time first assessed: 0705 -MS Present on Original Admission: N  -MS Side: Left  -MS Location: greater trochanter  -MS Primary Wound Type: MASD  -MS Type: MASD (moisture associated skin damage)  -MS      Row Name 11/20/24 1129          Progress Summary (PT)    Daily Progress Summary (PT) Pt. continues to perform bed mobility at same level. She continues to c/o pain \"from head to toe\". She was able to sit EOB for short duration. Pt. struggles to show any improvement w/ mobility.  -KM               User Key  (r) = Recorded By, (t) = Taken By, (c) = Cosigned By      Initials Name Provider Type    Karen Peralta, RN Registered Nurse    Thierry Carter, GENARO Physical Therapist    Nory Keenan, RN Registered Nurse    Roe North, RN Registered Nurse                    Physical Therapy Education       Title: PT OT SLP Therapies (Done)       Topic: Physical Therapy (Done)       Point: Mobility training (Done)       Learning " Progress Summary            Patient Acceptance, E,TB, VU by CF at 11/17/2024 1024    Acceptance, E,TB, VU by RR at 11/14/2024 0148    Acceptance, E,TB, VU by RR at 11/13/2024 0218    Acceptance, E,TB, VU by RG at 11/12/2024 0927    Acceptance, TB,E, VU by RG at 11/11/2024 0944    Acceptance, E,TB, VU by RG at 11/10/2024 0931    Acceptance, E, VU by WG at 11/10/2024 0233    Acceptance, E,TB, VU by CF at 11/9/2024 1016    Acceptance, E, NR by SC at 11/3/2024 0025    Acceptance, E,TB, VU by RR at 11/1/2024 2315    Acceptance, E,D, VU,NR by AG at 10/31/2024 1234    Comment: PT educated pt. in importance of mobilization and L L/UE PROM and self ROM to prevent contracture.  Pt. is encouraged to to perform R LE AROM frequently while supine or sitting EOB.    Acceptance, E,TB, VU by RR at 10/27/2024 0031    Acceptance, E,TB, VU by RG at 10/22/2024 1002    Acceptance, TB,E, VU by RG at 10/21/2024 0907    Nonacceptance, E, NR by WG at 10/16/2024 0228    Acceptance, E,TB, VU by CF at 10/15/2024 0753    Acceptance, E,TB, VU by CF at 10/14/2024 0801    Acceptance, E,TB, VU by CF at 10/13/2024 1045    Acceptance, E,D, VU,NR by AG at 10/10/2024 1310    Acceptance, E,TB, VU by CB at 10/8/2024 0448    Acceptance, E, NR by RD at 10/2/2024 1101    Acceptance, E, NR by RD at 10/1/2024 0856    Acceptance, E,D, VU,NR by AG at 9/30/2024 1559                      Point: Home exercise program (Done)       Learning Progress Summary            Patient Acceptance, E,TB, VU by CF at 11/17/2024 1024    Acceptance, E,TB, VU by RR at 11/14/2024 0148    Acceptance, E,TB, VU by RR at 11/13/2024 0218    Acceptance, E,TB, VU by RG at 11/12/2024 0927    Acceptance, TB,E, VU by RG at 11/11/2024 0944    Acceptance, E,TB, VU by RG at 11/10/2024 0931    Acceptance, E, VU by WG at 11/10/2024 0233    Acceptance, E,TB, VU by CF at 11/9/2024 1016    Acceptance, E, NR by SC at 11/3/2024 0025    Acceptance, E,TB, VU by RR at 11/1/2024 2315    Acceptance, E,D,  VU,NR by AG at 10/31/2024 1234    Comment: PT educated pt. in importance of mobilization and L L/UE PROM and self ROM to prevent contracture.  Pt. is encouraged to to perform R LE AROM frequently while supine or sitting EOB.    Acceptance, E,TB, VU by RR at 10/27/2024 0031    Acceptance, E,TB, VU by RG at 10/22/2024 1002    Acceptance, TB,E, VU by RG at 10/21/2024 0907    Nonacceptance, E, NR by WG at 10/16/2024 0228    Acceptance, E,TB, VU by CF at 10/15/2024 0753    Acceptance, E,TB, VU by CF at 10/14/2024 0801    Acceptance, E,TB, VU by CF at 10/13/2024 1045    Acceptance, E,D, VU,NR by AG at 10/10/2024 1310    Acceptance, E,TB, VU by CB at 10/8/2024 0448    Acceptance, E, NR by RD at 10/2/2024 1101    Acceptance, E, NR by RD at 10/1/2024 0856    Acceptance, E,D, VU,NR by AG at 9/30/2024 1559                      Point: Body mechanics (Done)       Learning Progress Summary            Patient Acceptance, E,TB, VU by CF at 11/17/2024 1024    Acceptance, E,TB, VU by RR at 11/14/2024 0148    Acceptance, E,TB, VU by RR at 11/13/2024 0218    Acceptance, E,TB, VU by RG at 11/12/2024 0927    Acceptance, TB,E, VU by RG at 11/11/2024 0944    Acceptance, E,TB, VU by RG at 11/10/2024 0931    Acceptance, E, VU by WG at 11/10/2024 0233    Acceptance, E,TB, VU by CF at 11/9/2024 1016    Acceptance, E, NR by SC at 11/3/2024 0025    Acceptance, E,TB, VU by RR at 11/1/2024 2315    Acceptance, E,D, VU,NR by AG at 10/31/2024 1234    Comment: PT educated pt. in importance of mobilization and L L/UE PROM and self ROM to prevent contracture.  Pt. is encouraged to to perform R LE AROM frequently while supine or sitting EOB.    Acceptance, E,TB, VU by RR at 10/27/2024 0031    Acceptance, E,TB, VU by RG at 10/22/2024 1002    Acceptance, TB,E, VU by RG at 10/21/2024 0907    Nonacceptance, E, NR by WG at 10/16/2024 0228    Acceptance, E,TB, VU by CF at 10/15/2024 0753    Acceptance, E,TB, VU by CF at 10/14/2024 0801    Acceptance, E,TB, VU  by CF at 10/13/2024 1045    Acceptance, E,D, VU,NR by AG at 10/10/2024 1310    Acceptance, E,TB, VU by CB at 10/8/2024 0448    Acceptance, E, NR by RD at 10/2/2024 1101    Acceptance, E, NR by RD at 10/1/2024 0856    Acceptance, E,D, VU,NR by AG at 9/30/2024 1559                      Point: Precautions (Done)       Learning Progress Summary            Patient Acceptance, E,TB, VU by CF at 11/17/2024 1024    Acceptance, E,TB, VU by RR at 11/14/2024 0148    Acceptance, E,TB, VU by RR at 11/13/2024 0218    Acceptance, E,TB, VU by RG at 11/12/2024 0927    Acceptance, TB,E, VU by RG at 11/11/2024 0944    Acceptance, E,TB, VU by RG at 11/10/2024 0931    Acceptance, E, VU by WG at 11/10/2024 0233    Acceptance, E,TB, VU by CF at 11/9/2024 1016    Acceptance, E, NR by SC at 11/3/2024 0025    Acceptance, E,TB, VU by RR at 11/1/2024 2315    Acceptance, E,D, VU,NR by AG at 10/31/2024 1234    Comment: PT educated pt. in importance of mobilization and L L/UE PROM and self ROM to prevent contracture.  Pt. is encouraged to to perform R LE AROM frequently while supine or sitting EOB.    Acceptance, E,TB, VU by RR at 10/27/2024 0031    Acceptance, E,TB, VU by RG at 10/22/2024 1002    Acceptance, TB,E, VU by RG at 10/21/2024 0907    Nonacceptance, E, NR by WG at 10/16/2024 0228    Acceptance, E,TB, VU by CF at 10/15/2024 0753    Acceptance, E,TB, VU by CF at 10/14/2024 0801    Acceptance, E,TB, VU by CF at 10/13/2024 1045    Acceptance, E,D, VU,NR by AG at 10/10/2024 1310    Acceptance, E,TB, VU by CB at 10/8/2024 0448    Acceptance, E, NR by RD at 10/2/2024 1101    Acceptance, E, NR by RD at 10/1/2024 0856    Acceptance, E,D, VU,NR by AG at 9/30/2024 1559                                      User Key       Initials Effective Dates Name Provider Type Discipline     06/16/21 -  Мария Joya, PT Physical Therapist PT    RD 06/16/21 -  Laisha Verdugo, RN Registered Nurse Nurse     06/11/24 -  Jaymie Dominguez, RN Registered Nurse Nurse  "   RG 06/06/23 -  Jesus Matthews, RN Registered Nurse Nurse    WG 02/12/24 -  Shanthi Burden, RN Registered Nurse Nurse    CF 06/25/24 -  Cherelle Germain, RN Registered Nurse Nurse    CB 06/17/24 -  Fidelia Lombardo, RN Registered Nurse Nurse    SC 09/11/24 -  Sally Hair, RN Registered Nurse Nurse                  PT Recommendation and Plan  Anticipated Discharge Disposition (PT): home with 24/7 [facility], skilled nursing facility  Progress Summary (PT)  Daily Progress Summary (PT): Pt. continues to perform bed mobility at same level. She continues to c/o pain \"from head to toe\". She was able to sit EOB for short duration. Pt. struggles to show any improvement w/ mobility.       Time Calculation:    PT Charges       Row Name 11/20/24 1129             Time Calculation    PT Received On 11/20/24  -KM         Time Calculation- PT    Total Timed Code Minutes- PT 23 minute(s)  -KM                User Key  (r) = Recorded By, (t) = Taken By, (c) = Cosigned By      Initials Name Provider Type    Thierry Carter, PT Physical Therapist                  Therapy Charges for Today       Code Description Service Date Service Provider Modifiers Qty    54372935593 HC PT THERAPEUTIC ACT EA 15 MIN 11/19/2024 Thierry Saldivar, PT GP 1    64972318610 HC PT THER PROC EA 15 MIN 11/19/2024 Thierry Saldivar, PT GP 1    25419556145 HC PT THERAPEUTIC ACT EA 15 MIN 11/20/2024 Thierry Saldivar, PT GP 1    21646070861 HC PT THER PROC EA 15 MIN 11/20/2024 Thierry Saldivar, PT GP 1            PT G-Codes  AM-PAC 6 Clicks Score (PT): 6    Thierry Saldivar PT  11/20/2024    "

## 2024-11-20 NOTE — PLAN OF CARE
Goal Outcome Evaluation:  Plan of Care Reviewed With: patient  Plan of Care Reviewed With: patient        Progress: no change  Outcome Evaluation: Patient resting in bed at this itme. A&O. VSS on RA. No acute changes noted. Wound care completed per order, fissure noted to be healed at this time. Worked with PT this shift. No requests or complaints. Will continue with POC.

## 2024-11-20 NOTE — CASE MANAGEMENT/SOCIAL WORK
Discharge Planning Assessment  [facility]     Patient Name: Lety Johnson  MRN: [id number]  Today's Date: 11/20/2024    Admit Date: 9/26/2024       Discharge Plan       Row Name 11/20/24 1629       Plan    Plan SS spoke to APS BOBY, Kinsey Dial on this date. APS BOBY visited pt's residence again today with law enforcement and son, Arnoldo was not home. Law enforcement was informed that son had a court hearing today. APS BOBY will continue to attempt contact with pt's son. APS SW voices receiving a call from pt's mother on this date. APS BOBY plans to contact [employer] and provide a referral for waiver services. SS attempted contact with sonArnoldo [phone] and left a message requesting a call back. SS to follow.                  Continued Care and Services - Admitted Since 9/26/2024       Destination       Service Provider Request Status Services Address Phone Fax Patient Preferred    [facility] Declined  Cannot meet patient's needs -- [address] [facility] [address] [phone] --    [facility] Acute Care Declined  Not eligible -- [address] [facility] [place] [address] [phone] --    [facility] Declined  Not eligible -- [address] [facility] [address] [phone] --    [facility] Declined  Clinical Denial -- [address] [facility], 4TH FLOOR, [facility] [address] [phone] --    [facility] SWING BED UNIT Declined  Cannot meet patient's needs -- [address]  [facility] [address] [phone] --     [facility] Declined  Not eligible -- [address] [facility] [place] [address] [phone] --    [facility] Declined  Bed not available -- [address] [address] [phone] --    [facility] SKILLED CARE Declined  Bed not available -- [address] [facility]  [facility] [id number] [phone] --    [facility] Sw[facility] Declined  Out of network -- [address] [facility] [place] [address] [phone] --    [facility] SWING BED UNIT Declined -- [address] [place] [address] [phone] --    [facility]  Clinical Denial -- [address] [facility] [id number] [phone] --    [facility] SWING BED UNIT Declined  Insurance Denial, Out of network -- [address] [address] [phone] --    [facility] Declined  Out of network -- [address] [facility] [address] [phone] --    [facility] Declined  Out of network -- [address] [address] [phone] -- --    [facility] Decl[facility]  Out of network -- [address] [facility] [address] [phone] --                  Expected Discharge Date and Time       Expected Discharge Date Expected Discharge Time    Nov 22, 2024         HELEN Butterfield

## 2024-11-20 NOTE — CONSULTS
Referring Provider: Yonas  Reason for Consultation: depression      Chief complaint/Focus of Exam: depression    Subjective .     History of present illness:    43-year-old female with a history of CVA admitted on 9/26/2024 for management of encephalopathy, weakness, fall.  Psychiatry consulted for depression.    Patient is seen in room today.  She was awake, alert and oriented x 4.  She reports feeling down earlier in hospitalization, but says that she was able to speak to her family and dwight last night, which helped a lot.  Dwight has a court date and will potentially be moved to house arrest in January.  She denies significant depressive symptoms or need for medication changes at this time.  Denies SI, HI, AVH.    Review of Systems  Pertinent items are noted in HPI, all other systems reviewed and negative    History  Past Medical History:   Diagnosis Date    Stroke    , History reviewed. No pertinent surgical history., History reviewed. No pertinent family history.,   Social History     Socioeconomic History    Marital status: Single   Tobacco Use    Smoking status: Every Day     Average packs/day: 2.0 packs/day for 26.0 years (52.0 ttl pk-yrs)     Types: Cigarettes     Start date: 1998    Smokeless tobacco: Never   Vaping Use    Vaping status: Never Used   Substance and Sexual Activity    Alcohol use: Not Currently    Drug use: Never    Sexual activity: Not Currently     Partners: Male     Comment:  in skilled nursing     E-cigarette/Vaping    E-cigarette/Vaping Use Never User     Passive Exposure No     Counseling Given No      E-cigarette/Vaping Substances    Nicotine No     THC No     CBD No     Flavoring No      E-cigarette/Vaping Devices    Disposable No     Pre-filled or Refillable Cartridge No     Refillable Tank No     Pre-filled Pod No          ,   Medications Prior to Admission   Medication Sig Dispense Refill Last Dose/Taking    aspirin 81 MG chewable tablet Chew 1 tablet Daily.   Unknown     atorvastatin (LIPITOR) 80 MG tablet Take 1 tablet by mouth Daily.   Unknown    cyclobenzaprine (FLEXERIL) 10 MG tablet Take 1 tablet by mouth 3 (Three) Times a Day As Needed for Muscle Spasms.   Unknown    DULoxetine (CYMBALTA) 60 MG capsule Take 1 capsule by mouth Daily.   Unknown    lisinopril (PRINIVIL,ZESTRIL) 20 MG tablet Take 1 tablet by mouth Daily.   Unknown    metFORMIN (GLUCOPHAGE) 1000 MG tablet Take 1 tablet by mouth 2 (Two) Times a Day With Meals.   Unknown    pantoprazole (PROTONIX) 40 MG EC tablet Take 1 tablet by mouth Daily.   Unknown    [DISCONTINUED] acyclovir (ZOVIRAX) 400 MG tablet Take 1 tablet by mouth Every Night.      , Scheduled Meds:  acyclovir, 400 mg, Oral, Nightly  aspirin, 81 mg, Oral, Daily  atorvastatin, 80 mg, Oral, Daily  DULoxetine, 60 mg, Oral, Daily  heparin (porcine), 5,000 Units, Subcutaneous, Q8H  insulin glargine, 20 Units, Subcutaneous, Daily With Breakfast  insulin glargine, 40 Units, Subcutaneous, Nightly  insulin lispro, 4-24 Units, Subcutaneous, 4x Daily AC & at Bedtime  insulin lispro, 8 Units, Subcutaneous, Daily With Lunch  Lidocaine, 3 patch, Transdermal, Q24H  lisinopril, 20 mg, Oral, Daily  magic barrier cream, , Topical, BID  metoprolol tartrate, 25 mg, Oral, Q12H  nicotine, 1 patch, Transdermal, Q24H  nystatin, , Topical, Q12H  pantoprazole, 40 mg, Oral, Daily  pregabalin, 25 mg, Oral, Q8H  sodium chloride, 10 mL, Intravenous, Q12H  sodium chloride, 10 mL, Intravenous, Q12H   , Continuous Infusions:  Pharmacy Consult,    , PRN Meds:    acetaminophen    senna-docusate sodium **AND** polyethylene glycol **AND** bisacodyl **AND** bisacodyl    Calcium Replacement - Follow Nurse / BPA Driven Protocol    cyclobenzaprine    dextrose    dextrose    Diclofenac Sodium    glucagon (human recombinant)    loperamide    Magnesium Standard Dose Replacement - Follow Nurse / BPA Driven Protocol    melatonin    Pharmacy Consult    Phosphorus Replacement - Follow Nurse / BPA  Driven Protocol    Potassium Replacement - Follow Nurse / BPA Driven Protocol    prochlorperazine    sodium chloride    sodium chloride    sodium chloride    sodium chloride    traMADol, and Allergies:  Patient has no known allergies.    Objective     Vital Signs   Temp:  [97.4 °F (36.3 °C)-98.1 °F (36.7 °C)] 98.1 °F (36.7 °C)  Heart Rate:  [] 97  Resp:  [16] 16  BP: ()/(53-68) 93/59    Mental Status Exam:  Hygiene:   good  Cooperation:  Cooperative  Eye Contact:  Good  Psychomotor Behavior:  Appropriate  Affect:  Full range  Hopelessness: Denies  Speech:  Normal  Thought Progress:  Goal directed and Linear  Thought Content:  Normal  Suicidal:  None  Homicidal:  None  Hallucinations:  None  Delusion:  None  Memory:  Intact  Orientation:  Person, Place, Time, and Situation  Reliability:  fair  Insight:  Fair  Judgement:  Fair  Impulse Control:  Fair    Results Review:   I reviewed the patient's new clinical results.  I reviewed the patient's other test results and agree with the interpretation  I personally viewed and interpreted the patient's EKG/Telemetry data  Lab Results (last 24 hours)       Procedure Component Value Units Date/Time    POC Glucose Once [[id number]]  (Normal) Collected: 11/20/24 0650    Specimen: Blood Updated: 11/20/24 0656     Glucose 102 mg/dL     POC Glucose Once [[id number]]  (Abnormal) Collected: 11/19/24 1944    Specimen: Blood Updated: 11/19/24 1950     Glucose 310 mg/dL     POC Glucose Once [[id number]]  (Abnormal) Collected: 11/19/24 1557    Specimen: Blood Updated: 11/19/24 1604     Glucose 177 mg/dL     POC Glucose Once [[id number]]  (Abnormal) Collected: 11/19/24 1033    Specimen: Blood Updated: 11/19/24 1039     Glucose 280 mg/dL           Imaging Results (Last 24 Hours)       ** No results found for the last 24 hours. **              Assessment & Plan     Active Problems:    * No active hospital problems. *     Adjustment disorder with disturbance of mood and  anxiety  -Recent stressors appear situational, potentially personality related.  Patient reports feeling better now and denies need for medication changes.  -Refer for outpatient psychotherapy at discharge    Unspecified depressive disorder  -Continue Cymbalta 60 mg daily.  Could increase to 90 mg daily if depressive symptoms worsen    I discussed the patient's findings and my recommendations with patient    Prasanth Ramirez MD  11/20/24  09:59 EST

## 2024-11-20 NOTE — THERAPY TREATMENT NOTE
Patient Name: Lety Johnson  : 1980    MRN: [id number]                              Today's Date: 2024       Admit Date: 2024    Visit Dx:     ICD-10-CM ICD-9-CM   1. Hypokalemia  E87.6 276.8   2. Pressure injury of skin of sacral region, unspecified injury stage  L89.159 707.03     707.20   3. Pressure injury of skin of left heel, unspecified injury stage  L89.629 707.07     707.20   4. Acute cystitis without hematuria  N30.00 595.0     Patient Active Problem List   Diagnosis   (none) - all problems resolved or deleted     Past Medical History:   Diagnosis Date    Stroke      History reviewed. No pertinent surgical history.   General Information       Row Name 24 1344          OT Time and Intention    Subjective Information complains of;pain  -     Document Type therapy note (daily note)  -     Mode of Treatment individual therapy;occupational therapy  -     Patient Effort adequate  -     Symptoms Noted During/After Treatment increased pain  -     Comment Patient agreeable to therapy. She complained of pain upon sitting, but improved throughout session.  -       Row Name 24 1344          General Information    Patient Profile Reviewed yes  -     Existing Precautions/Restrictions fall  -       Row Name 24 1344          Cognition    Orientation Status (Cognition) oriented x 3  -       Row Name 24 1344          Safety Issues/Impairments Affecting Functional Mobility    Impairments Affecting Function (Mobility) endurance/activity tolerance;pain;range of motion (ROM);strength;coordination;motor control;muscle tone abnormal  -               User Key  (r) = Recorded By, (t) = Taken By, (c) = Cosigned By      Initials Name Provider Type     Kasey Bella OT Occupational Therapist                     Mobility/ADL's       Row Name 24 1347          Bed Mobility    Bed Mobility supine-sit;sit-supine  -KP     Supine-Sit [place] (Bed Mobility) maximum  "assist (25% patient effort);2 person assist  -     Sit-Supine [place] (Bed Mobility) maximum assist (25% patient effort);2 person assist  -     Bed Mobility, Safety Issues decreased use of arms for pushing/pulling;decreased use of legs for bridging/pushing  -     Assistive Device (Bed Mobility) bed rails;head of bed elevated  -               User Key  (r) = Recorded By, (t) = Taken By, (c) = Cosigned By      Initials Name Provider Type    Kasey Yang OT Occupational Therapist                   Obj/Interventions       Row Name 11/20/24 1347          Motor Skills    Motor Skills functional endurance  -     Functional Endurance fair minus  -[facility] Name 11/20/24 1343          Balance    Balance Interventions sitting;static;minimal challenge;occupation based/functional task  -               User Key  (r) = Recorded By, (t) = Taken By, (c) = Cosigned By      Initials Name Provider Type    Kasey Yang OT Occupational Therapist                   Goals/Plan    No documentation.                  Clinical Impression       Row Name 11/20/24 135          Pain Assessment    Pretreatment Pain Rating 0/10 - no pain  -     Posttreatment Pain Rating 1/10  -     Pain Side/Orientation generalized  -       Row Name 11/20/24 1352          Plan of Care Review    Plan of Care Reviewed With patient  -     Progress no change  -     Outcome Evaluation Patient agreeable to therapy. She continues to be dependent for bed mobility. She tolerated sitting at edge of bed with standby assist, but complained of incresaed pain \"all over\" initially. She requested to tika bilateral wrist supports due to pain and therapist assisted. She reported she had not been wearing them prior. Continue plan of care.  -       Row Name 11/20/24 1354          Therapy Plan Review/Discharge Plan (OT)    Anticipated Discharge Disposition (OT) skilled nursing facility  -       Row Name 11/20/24 1359          Positioning " "and Restraints    Pre-Treatment Position in bed  -KP     Post Treatment Position bed  -KP     In Bed fowlers;call light within reach;encouraged to call for assist;exit alarm on  -KP               User Key  (r) = Recorded By, (t) = Taken By, (c) = Cosigned By      Initials Name Provider Type    Kasey Yang OT Occupational Therapist                   Outcome Measures       Row Name 11/20/24 0850          How much help from another person do you currently need...    Turning from your back to your side while in flat bed without using bedrails? 1  -AW     Moving from lying on back to sitting on the side of a flat bed without bedrails? 1  -AW     Moving to and from a bed to a chair (including a wheelchair)? 1  -AW     Standing up from a chair using your arms (e.g., wheelchair, bedside chair)? 1  -AW     Climbing 3-5 steps with a railing? 1  -AW     To walk in hospital room? 1  -AW     AM-PAC 6 Clicks Score (PT) 6  -AW               User Key  (r) = Recorded By, (t) = Taken By, (c) = Cosigned By      Initials Name Provider Type    Toshia Ivy, RN Registered Nurse                    Occupational Therapy Education        No education to display                  OT Recommendation and Plan     Plan of Care Review  Plan of Care Reviewed With: patient  Progress: no change  Outcome Evaluation: Patient agreeable to therapy. She continues to be dependent for bed mobility. She tolerated sitting at edge of bed with standby assist, but complained of incresaed pain \"all over\" initially. She requested to tika bilateral wrist supports due to pain and therapist assisted. She reported she had not been wearing them prior. Continue plan of care.     Time Calculation:         Time Calculation- OT       Row Name 11/20/24 1353             Time Calculation- OT    OT Received On 11/20/24  -                User Key  (r) = Recorded By, (t) = Taken By, (c) = Cosigned By      Initials Name Provider Type    Kasey Yang OT " Occupational Therapist                  Therapy Charges for Today       Code Description Service Date Service Provider Modifiers Qty    [id number]  OT THERAPEUTIC ACT EA 15 MIN 11/20/2024 Kasey Bella, ST[facility] 1                 Kasey Bella OT  11/20/2024

## 2024-11-20 NOTE — PROGRESS NOTES
Patient Identification:  Name:  Lety Johnson  Age:  44 y.o.  Sex:  female  :  1980  MRN:  [id number]  Visit Number:  [id number]  Primary Care Provider:  Concha Rao APRN    Length of stay:  53    Subjective/Interval History/Consultants/Procedures     Chief Complaint:   Chief Complaint   Patient presents with    Fall       Subjective/Interval History:    44 y.o. female who was admitted on 2024 with UTI     PMH is significant for CVA w/ left sided hemiparesis, debility, hx genital herpes on suppressive therapy, morbid obesity, T2DM   For complete admission information, please see history and physical.     Consultations:  Infectious disease  PT/OT  CM/SW  Psychiatry     Procedures/Scans:  CT head without contrast  CT C-spine without contrast  CT T-spine without contrast  CT L-spine without contrast  CXR x 2  XR left foot  XR left ankle    Today, the patient was seen and examined, resting comfortably. No new complains voiced today. Psychiatry followed up, see below. No acute events overnight.      Room location at the time of evaluation was [facility].    ----------------------------------------------------------------------------------------------------------------------  Current Hospital Meds:  acyclovir, 400 mg, Oral, Nightly  aspirin, 81 mg, Oral, Daily  atorvastatin, 80 mg, Oral, Daily  DULoxetine, 60 mg, Oral, Daily  heparin (porcine), 5,000 Units, Subcutaneous, Q8H  insulin glargine, 20 Units, Subcutaneous, Daily With Breakfast  insulin glargine, 40 Units, Subcutaneous, Nightly  insulin lispro, 4-24 Units, Subcutaneous, 4x Daily AC & at Bedtime  insulin lispro, 8 Units, Subcutaneous, Daily With Lunch  Lidocaine, 3 patch, Transdermal, Q24H  lisinopril, 20 mg, Oral, Daily  magic barrier cream, , Topical, BID  metoprolol tartrate, 25 mg, Oral, Q12H  nicotine, 1 patch, Transdermal, Q24H  nystatin, , Topical, Q12H  pantoprazole, 40 mg, Oral, Daily  pregabalin, 25 mg, Oral, Q8H  sodium chloride, 10  mL, Intravenous, Q12H  sodium chloride, 10 mL, Intravenous, Q12H      Pharmacy Consult,       ----------------------------------------------------------------------------------------------------------------------      Objective     Vital Signs:  Temp:  [97.4 °F (36.3 °C)-98.1 °F (36.7 °C)] 97.5 °F (36.4 °C)  Heart Rate:  [] 113  Resp:  [16] 16  BP: ()/(53-63) 108/63      10/07/24  0511 10/08/24  0500 10/09/24  0500   Weight: 102 kg (225 lb 14.4 oz) (FIFI [place]) 102 kg (225 lb 15.5 oz) 102 kg (224 lb 1.6 oz)     Body mass index is 36.73 kg/m².    Intake/Output Summary (Last 24 hours) at 11/20/2024 1228  Last data filed at 11/20/2024 0300  Gross per 24 hour   Intake 600 ml   Output 1800 ml   Net -1200 ml     No intake/output data recorded.  Diet: Regular/House, Diabetic; Consistent Carbohydrate; Fluid Consistency: Thin (IDDSI 0)  ----------------------------------------------------------------------------------------------------------------------    Physical Exam  Vitals and nursing note reviewed.   Constitutional:       General: She is not in acute distress.     Appearance: She is obese. She is ill-appearing.   HENT:      Head: Normocephalic and atraumatic.   Eyes:      Extraocular Movements: Extraocular movements intact.   Cardiovascular:      Rate and Rhythm: Normal rate.   Pulmonary:      Effort: Pulmonary effort is normal. No respiratory distress.   Abdominal:      Palpations: Abdomen is soft.   Musculoskeletal:      Right lower leg: No edema.      Left lower leg: No edema.   Skin:     General: Skin is warm and dry.   Neurological:      Mental Status: She is alert. Mental status is at baseline.   Psychiatric:         Mood and Affect: Mood normal.         Behavior: Behavior normal.                ----------------------------------------------------------------------------------------------------------------------  Tele:   "    ----------------------------------------------------------------------------------------------------------------------              Results from last 7 days   Lab Units 11/19/24  0129 11/15/24  0237   SODIUM mmol/L 136 140   POTASSIUM mmol/L 4.3 4.4   MAGNESIUM mg/dL 1.8  --    CHLORIDE mmol/L 101 104   CO2 mmol/L 24.4 25.1   BUN mg/dL 21* 20   CREATININE mg/dL 0.57 0.50*   CALCIUM mg/dL 9.3 9.6   GLUCOSE mg/dL 142* 125*   Estimated Creatinine Clearance: 152.1 mL/min (by C-G formula based on SCr of 0.57 mg/dL).  No results found for: \"AMMONIA\"      No results found for: \"BLOODCX\"  No results found for: \"URINECX\"  No results found for: \"WOUNDCX\"  No results found for: \"STOOLCX\"  ----------------------------------------------------------------------------------------------------------------------  Imaging Results (Last 24 Hours)       ** No results found for the last 24 hours. **          ----------------------------------------------------------------------------------------------------------------------   I have reviewed the above laboratory values for 11/20/24    Assessment/Plan     There are no active hospital problems to display for this patient.        ASSESSMENT/PLAN:    ESBL E. coli UTI  Acute metabolic encephalopathy, resolved  S/p Invanz.  Infectious disease input appreciated.     Hx CVA with left-sided hemiparesis  Chronic debility  Continue PT OT  CM/SW assisting with discharge planning.  Patient is pending placement.     T2DM  Diabetic neuropathy  Continue insulin regimen-titrate as needed. Add scheduled meal time insulin w/ lunch.   Continue supportive care measures, Cymbalta  Lyrica added 11/13 given persistent pain with some improvement noted.      Hx genital herpes  Continue suppressive acyclovir     Morbid obesity  Complicates all aspects of care     Depressive disorder  Adjustment disorder w/ disturbance of mood and anxiety   Patient with somewhat labile mood, tearful at times. Reports history " of depression as well, likely exacerbated by current health status/hospitalization.  She is taking cymbalta as above  Psychiatry consulted, input appreciated. Felt in the setting of recent stressors findings consistent with adjustment disorder on top of patient's unspecified depressive disorder with patient declining any medication adjustments at this time.   May consider increasing cymbalta if symptoms worsen.  May benefit from psychotherapy referral at discharge.     -----------  -DVT prophylaxis: [facility]  -Disposition plans/anticipated needs: Pending placement        The patient is high risk due to the following diagnoses/reasons:  debility, hx CVA w/ hemiparesis         Bruce Branham PA-C  11/20/24  12:28 EST

## 2024-11-20 NOTE — PLAN OF CARE
Goal Outcome Evaluation:  Plan of Care Reviewed With: patient  Plan of Care Reviewed With: patient        Progress: no change  Outcome Evaluation: Pt resting in bed throughout shift. VSS on RA. WC completed as ordered. PRN medication given for pain. No acute changes. No concerns or requests. Will continue plan of care.

## 2024-11-21 LAB
GLUCOSE BLDC GLUCOMTR-MCNC: 148 MG/DL (ref 70–130)
GLUCOSE BLDC GLUCOMTR-MCNC: 203 MG/DL (ref 70–130)
GLUCOSE BLDC GLUCOMTR-MCNC: 205 MG/DL (ref 70–130)
GLUCOSE BLDC GLUCOMTR-MCNC: 265 MG/DL (ref 70–130)

## 2024-11-21 PROCEDURE — 63710000001 INSULIN LISPRO (HUMAN) PER 5 UNITS

## 2024-11-21 PROCEDURE — 97110 THERAPEUTIC EXERCISES: CPT

## 2024-11-21 PROCEDURE — 63710000001 PROCHLORPERAZINE MALEATE PER 5 MG: Performed by: STUDENT IN AN ORGANIZED HEALTH CARE EDUCATION/TRAINING PROGRAM

## 2024-11-21 PROCEDURE — 82948 REAGENT STRIP/BLOOD GLUCOSE: CPT

## 2024-11-21 PROCEDURE — 99231 SBSQ HOSP IP/OBS SF/LOW 25: CPT

## 2024-11-21 PROCEDURE — 25010000002 HEPARIN (PORCINE) PER 1000 UNITS: Performed by: INTERNAL MEDICINE

## 2024-11-21 PROCEDURE — 63710000001 INSULIN LISPRO (HUMAN) PER 5 UNITS: Performed by: INTERNAL MEDICINE

## 2024-11-21 PROCEDURE — 63710000001 INSULIN GLARGINE PER 5 UNITS

## 2024-11-21 PROCEDURE — 97530 THERAPEUTIC ACTIVITIES: CPT

## 2024-11-21 RX ORDER — NALOXONE HCL 0.4 MG/ML
0.1 VIAL (ML) INJECTION
Status: CANCELLED | OUTPATIENT
Start: 2024-11-21

## 2024-11-21 RX ORDER — SODIUM CHLORIDE 9 MG/ML
30 INJECTION, SOLUTION INTRAVENOUS CONTINUOUS PRN
Status: CANCELLED | OUTPATIENT
Start: 2024-11-21

## 2024-11-21 RX ADMIN — DULOXETINE HYDROCHLORIDE 60 MG: 60 CAPSULE, DELAYED RELEASE ORAL at 09:16

## 2024-11-21 RX ADMIN — VITAMINS A AND D OINTMENT: 15.5; 53.4 OINTMENT TOPICAL at 09:17

## 2024-11-21 RX ADMIN — VITAMINS A AND D OINTMENT: 15.5; 53.4 OINTMENT TOPICAL at 20:02

## 2024-11-21 RX ADMIN — ATORVASTATIN CALCIUM 80 MG: 40 TABLET, FILM COATED ORAL at 09:16

## 2024-11-21 RX ADMIN — HEPARIN SODIUM 5000 UNITS: 5000 INJECTION INTRAVENOUS; SUBCUTANEOUS at 21:13

## 2024-11-21 RX ADMIN — PREGABALIN 25 MG: 25 CAPSULE ORAL at 05:07

## 2024-11-21 RX ADMIN — LISINOPRIL 20 MG: 10 TABLET ORAL at 09:16

## 2024-11-21 RX ADMIN — NYSTATIN: 100000 POWDER TOPICAL at 09:17

## 2024-11-21 RX ADMIN — NICOTINE 1 PATCH: 7 PATCH, EXTENDED RELEASE TRANSDERMAL at 09:15

## 2024-11-21 RX ADMIN — HEPARIN SODIUM 5000 UNITS: 5000 INJECTION INTRAVENOUS; SUBCUTANEOUS at 14:18

## 2024-11-21 RX ADMIN — PANTOPRAZOLE SODIUM 40 MG: 40 TABLET, DELAYED RELEASE ORAL at 09:16

## 2024-11-21 RX ADMIN — ASPIRIN 81 MG: 81 TABLET, CHEWABLE ORAL at 09:16

## 2024-11-21 RX ADMIN — INSULIN GLARGINE 20 UNITS: 100 INJECTION, SOLUTION SUBCUTANEOUS at 09:16

## 2024-11-21 RX ADMIN — HEPARIN SODIUM 5000 UNITS: 5000 INJECTION INTRAVENOUS; SUBCUTANEOUS at 05:07

## 2024-11-21 RX ADMIN — METOPROLOL TARTRATE 25 MG: 25 TABLET, FILM COATED ORAL at 09:16

## 2024-11-21 RX ADMIN — LIDOCAINE 3 PATCH: 4 PATCH TOPICAL at 17:20

## 2024-11-21 RX ADMIN — ACETAMINOPHEN 650 MG: 325 TABLET ORAL at 17:21

## 2024-11-21 RX ADMIN — PREGABALIN 25 MG: 25 CAPSULE ORAL at 21:13

## 2024-11-21 RX ADMIN — INSULIN LISPRO 8 UNITS: 100 INJECTION, SOLUTION INTRAVENOUS; SUBCUTANEOUS at 11:44

## 2024-11-21 RX ADMIN — Medication 5 MG: at 20:01

## 2024-11-21 RX ADMIN — ACYCLOVIR 400 MG: 200 CAPSULE ORAL at 20:01

## 2024-11-21 RX ADMIN — INSULIN LISPRO 12 UNITS: 100 INJECTION, SOLUTION INTRAVENOUS; SUBCUTANEOUS at 20:01

## 2024-11-21 RX ADMIN — INSULIN GLARGINE 40 UNITS: 100 INJECTION, SOLUTION SUBCUTANEOUS at 20:01

## 2024-11-21 RX ADMIN — PROCHLORPERAZINE MALEATE 5 MG: 5 TABLET ORAL at 23:00

## 2024-11-21 RX ADMIN — DICLOFENAC SODIUM 4 G: 10 GEL TOPICAL at 16:27

## 2024-11-21 RX ADMIN — PREGABALIN 25 MG: 25 CAPSULE ORAL at 14:18

## 2024-11-21 RX ADMIN — INSULIN LISPRO 8 UNITS: 100 INJECTION, SOLUTION INTRAVENOUS; SUBCUTANEOUS at 17:20

## 2024-11-21 RX ADMIN — TRAMADOL HYDROCHLORIDE 50 MG: 50 TABLET, FILM COATED ORAL at 20:01

## 2024-11-21 RX ADMIN — ACETAMINOPHEN 650 MG: 325 TABLET ORAL at 10:54

## 2024-11-21 RX ADMIN — NYSTATIN: 100000 POWDER TOPICAL at 20:02

## 2024-11-21 NOTE — THERAPY TREATMENT NOTE
Acute Care - Physical Therapy Treatment Note   [place]     Patient Name: Lety Johnson  : 1980  MRN: [id number]  Today's Date: 2024   Onset of Illness/Injury or Date of Surgery: 24  Visit Dx:     ICD-10-CM ICD-9-CM   1. Hypokalemia  E87.6 276.8   2. Pressure injury of skin of sacral region, unspecified injury stage  L89.159 707.03     707.20   3. Pressure injury of skin of left heel, unspecified injury stage  L89.629 707.07     707.20   4. Acute cystitis without hematuria  N30.00 595.0     Patient Active Problem List   Diagnosis   (none) - all problems resolved or deleted     Past Medical History:   Diagnosis Date    Stroke      History reviewed. No pertinent surgical history.  PT Assessment (Last 12 Hours)       PT Evaluation and Treatment       Row Name 24 1000          Physical Therapy Time and Intention    Subjective Information complains of;weakness;pain;numbness (P)   -HP     Document Type therapy note (daily note) (P)   -HP     Mode of Treatment physical therapy (P)   -HP     Patient Effort adequate (P)   -HP     Symptoms Noted During/After Treatment increased pain (P)   -HP       Row Name 24 1000          General Information    Patient Profile Reviewed yes (P)   -HP       Row Name 24 1000          Bed Mobility    Bed Mobility supine-sit;sit-supine (P)   -HP     Rolling Left [place] (Bed Mobility) maximum assist (25% patient effort);2 person assist (P)   -HP     Rolling Right [place] (Bed Mobility) maximum assist (25% patient effort);2 person assist (P)   -HP     Scooting/Bridging [place] (Bed Mobility) dependent (less than 25% patient effort) (P)   -HP     Supine-Sit [place] (Bed Mobility) maximum assist (25% patient effort);2 person assist (P)   -HP     Sit-Supine [place] (Bed Mobility) maximum assist (25% patient effort);2 person assist (P)   -HP       Row Name 24 1000          Motor Skills    Therapeutic Exercise knee;hip (P)   -HP        Row Name 11/21/24 1000          Hip (Therapeutic Exercise)    Hip (Therapeutic Exercise) isometric exercises (P)   -HP     Hip Isometrics (Therapeutic Exercise) supine;right;aDduction (P)   3x10  -HP       Row Name 11/21/24 1000          Knee (Therapeutic Exercise)    Knee (Therapeutic Exercise) isometric exercises (P)   -HP     Knee Isometrics (Therapeutic Exercise) supine;quad sets;bilateral (P)   3x10 on R 10 reps on L  -HP       Row Name             Wound 09/26/24 1809 Left posterior ankle Other (comment)    Wound - Properties Group Placement Date: 09/26/24  -KJ Placement Time: 1809  -KJ Side: Left  -KJ Orientation: posterior  -KJ Location: ankle  -KJ Primary Wound Type: Other  -TW, unknow etiology     Retired Wound - Properties Group Placement Date: 09/26/24  -KJ Placement Time: 1809  -KJ Side: Left  -KJ Orientation: posterior  -KJ Location: ankle  -KJ Primary Wound Type: Other  -TW, unknow etiology     Retired Wound - Properties Group Placement Date: 09/26/24  -KJ Placement Time: 1809  -KJ Side: Left  -KJ Orientation: posterior  -KJ Location: ankle  -KJ Primary Wound Type: Other  -TW, unknow etiology     Retired Wound - Properties Group Date first assessed: 09/26/24  -KJ Time first assessed: 1809  -KJ Side: Left  -KJ Location: ankle  -KJ Primary Wound Type: Other  -TW, unknow etiology       Row Name             Wound 10/11/24 1330 medial coccyx Fissure    Wound - Properties Group Placement Date: 10/11/24  -TW Placement Time: 1330  -TW Orientation: medial  -TW Location: coccyx  -TW Primary Wound Type: Fissure  -TW    Retired Wound - Properties Group Placement Date: 10/11/24  -TW Placement Time: 1330  -TW Orientation: medial  -TW Location: coccyx  -TW Primary Wound Type: Fissure  -TW    Retired Wound - Properties Group Placement Date: 10/11/24  -TW Placement Time: 1330  -TW Orientation: medial  -TW Location: coccyx  -TW Primary Wound Type: Fissure  -TW    Retired Wound - Properties Group Date first  assessed: 10/11/24  -TW Time first assessed: 1330  -TW Location: coccyx  -TW Primary Wound Type: Fissure  -TW      Row Name             Wound 10/16/24 0705 Left medial gluteal MASD (moisture associated skin damage)    Wound - Properties Group Placement Date: 10/16/24  -MS Placement Time: 0705 -MS Side: Left  -MS Orientation: medial  -MS Location: gluteal  -MS Primary Wound Type: MASD  -MS Type: MASD (moisture associated skin damage)  -MS Present on Original Admission: N  -MS Additional Comments: Noted at shift change skin assesment, Night shift RN stated it had been there her shift.  -MS    Retired Wound - Properties Group Placement Date: 10/16/24  -MS Placement Time: 0705 -MS Present on Original Admission: N  -MS Side: Left  -MS Orientation: medial  -MS Location: gluteal  -MS Primary Wound Type: MASD  -MS Additional Comments: Noted at shift change skin assesment, Night shift RN stated it had been there her shift.  -MS Type: MASD (moisture associated skin damage)  -MS    Retired Wound - Properties Group Placement Date: 10/16/24  -MS Placement Time: 0705 -MS Present on Original Admission: N  -MS Side: Left  -MS Orientation: medial  -MS Location: gluteal  -MS Primary Wound Type: MASD  -MS Additional Comments: Noted at shift change skin assesment, Night shift RN stated it had been there her shift.  -MS Type: MASD (moisture associated skin damage)  -MS    Retired Wound - Properties Group Date first assessed: 10/16/24  -MS Time first assessed: 0705 -MS Present on Original Admission: N  -MS Side: Left  -MS Location: gluteal  -MS Primary Wound Type: MASD  -MS Additional Comments: Noted at shift change skin assesment, Night shift RN stated it had been there her shift.  -MS Type: MASD (moisture associated skin damage)  -MS      Row Name             Wound 10/16/24 0705 Left posterior greater trochanter MASD (moisture associated skin damage)    Wound - Properties Group Placement Date: 10/16/24  -MS Placement Time: 0705   -MS Side: Left  -MS Orientation: posterior  -MS Location: greater trochanter  -MS Primary Wound Type: MASD  -MS Type: MASD (moisture associated skin damage)  -MS Present on Original Admission: N  -MS    Retired Wound - Properties Group Placement Date: 10/16/24  -MS Placement Time: 0705 -MS Present on Original Admission: N  -MS Side: Left  -MS Orientation: posterior  -MS Location: greater trochanter  -MS Primary Wound Type: MASD  -MS Type: MASD (moisture associated skin damage)  -MS    Retired Wound - Properties Group Placement Date: 10/16/24  -MS Placement Time: 0705 -MS Present on Original Admission: N  -MS Side: Left  -MS Orientation: posterior  -MS Location: greater trochanter  -MS Primary Wound Type: MASD  -MS Type: MASD (moisture associated skin damage)  -MS    Retired Wound - Properties Group Date first assessed: 10/16/24  -MS Time first assessed: 0705 -MS Present on Original Admission: N  -MS Side: Left  -MS Location: greater trochanter  -MS Primary Wound Type: MASD  -MS Type: MASD (moisture associated skin damage)  -MS      Row Name 11/21/24 1000          Plan of Care Review    Plan of Care Reviewed With patient (P)   -HP     Progress no change (P)   -HP     Outcome Evaluation Pt. completed PT treatment session this AM. Pt. completed bed mobility w/ maxAx2-dependent assist. Pt. mainted sitting on EOB for around 10mins. Pt. c/o  L arm, R leg pain during bed mobility and numbness in the R leg while sitting. Pt completed supine therex as well. Will continue w/ POC. (P)   -HP       Row Name 11/21/24 1000          Positioning and Restraints    Pre-Treatment Position in bed (P)   -HP     Post Treatment Position bed (P)   -HP     In Bed supine;call light within reach;encouraged to call for assist (P)   -HP               User Key  (r) = Recorded By, (t) = Taken By, (c) = Cosigned By      Initials Name Provider Type    Karen Peralta, RN Registered Nurse    Nory Keenan RN Registered Nurse    MS  Roe Garcia, RN Registered Nurse    Giovana Verdin, PT Student PT Student                    Physical Therapy Education       Title: PT OT SLP Therapies (Done)       Topic: Physical Therapy (Done)       Point: Mobility training (Done)       Learning Progress Summary            Patient Acceptance, E, VU by WG at 11/21/2024 0236    Acceptance, E,TB, VU by CF at 11/17/2024 1024    Acceptance, E,TB, VU by RR at 11/14/2024 0148    Acceptance, E,TB, VU by RR at 11/13/2024 0218    Acceptance, E,TB, VU by RG at 11/12/2024 0927    Acceptance, TB,E, VU by RG at 11/11/2024 0944    Acceptance, E,TB, VU by RG at 11/10/2024 0931    Acceptance, E, VU by WG at 11/10/2024 0233    Acceptance, E,TB, VU by CF at 11/9/2024 1016    Acceptance, E, NR by SC at 11/3/2024 0025    Acceptance, E,TB, VU by RR at 11/1/2024 2315    Acceptance, E,D, VU,NR by  at 10/31/2024 1234    Comment: PT educated pt. in importance of mobilization and L L/UE PROM and self ROM to prevent contracture.  Pt. is encouraged to to perform R LE AROM frequently while supine or sitting EOB.    Acceptance, E,TB, VU by RR at 10/27/2024 0031    Acceptance, E,TB, VU by RG at 10/22/2024 1002    Acceptance, TB,E, VU by RG at 10/21/2024 0907    Nonacceptance, E, NR by WG at 10/16/2024 0228    Acceptance, E,TB, VU by CF at 10/15/2024 0753    Acceptance, E,TB, VU by CF at 10/14/2024 0801    Acceptance, E,TB, VU by CF at 10/13/2024 1045    Acceptance, E,D, VU,NR by AG at 10/10/2024 1310    Acceptance, E,TB, VU by CB at 10/8/2024 0448    Acceptance, E, NR by RD at 10/2/2024 1101    Acceptance, E, NR by RD at 10/1/2024 0856    Acceptance, E,D, VU,NR by AG at 9/30/2024 1559                      Point: Home exercise program (Done)       Learning Progress Summary            Patient Acceptance, E, VU by WG at 11/21/2024 0236    Acceptance, E,TB, VU by CF at 11/17/2024 1024    Acceptance, E,TB, VU by RR at 11/14/2024 0148    Acceptance, E,TB, VU by RR at 11/13/2024 0218     Acceptance, E,TB, VU by RG at 11/12/2024 0927    Acceptance, TB,E, VU by RG at 11/11/2024 0944    Acceptance, E,TB, VU by RG at 11/10/2024 0931    Acceptance, E, VU by WG at 11/10/2024 0233    Acceptance, E,TB, VU by CF at 11/9/2024 1016    Acceptance, E, NR by SC at 11/3/2024 0025    Acceptance, E,TB, VU by RR at 11/1/2024 2315    Acceptance, E,D, VU,NR by AG at 10/31/2024 1234    Comment: PT educated pt. in importance of mobilization and L L/UE PROM and self ROM to prevent contracture.  Pt. is encouraged to to perform R LE AROM frequently while supine or sitting EOB.    Acceptance, E,TB, VU by RR at 10/27/2024 0031    Acceptance, E,TB, VU by RG at 10/22/2024 1002    Acceptance, TB,E, VU by RG at 10/21/2024 0907    Nonacceptance, E, NR by WG at 10/16/2024 0228    Acceptance, E,TB, VU by CF at 10/15/2024 0753    Acceptance, E,TB, VU by CF at 10/14/2024 0801    Acceptance, E,TB, VU by CF at 10/13/2024 1045    Acceptance, E,D, VU,NR by AG at 10/10/2024 1310    Acceptance, E,TB, VU by CB at 10/8/2024 0448    Acceptance, E, NR by RD at 10/2/2024 1101    Acceptance, E, NR by RD at 10/1/2024 0856    Acceptance, E,D, VU,NR by AG at 9/30/2024 1559                      Point: Body mechanics (Done)       Learning Progress Summary            Patient Acceptance, E, VU by WG at 11/21/2024 0236    Acceptance, E,TB, VU by CF at 11/17/2024 1024    Acceptance, E,TB, VU by RR at 11/14/2024 0148    Acceptance, E,TB, VU by RR at 11/13/2024 0218    Acceptance, E,TB, VU by RG at 11/12/2024 0927    Acceptance, TB,E, VU by RG at 11/11/2024 0944    Acceptance, E,TB, VU by RG at 11/10/2024 0931    Acceptance, E, VU by WG at 11/10/2024 0233    Acceptance, E,TB, VU by CF at 11/9/2024 1016    Acceptance, E, NR by SC at 11/3/2024 0025    Acceptance, E,TB, VU by RR at 11/1/2024 2315    Acceptance, E,D, VU,NR by AG at 10/31/2024 1234    Comment: PT educated pt. in importance of mobilization and L L/UE PROM and self ROM to prevent contracture.  Pt.  is encouraged to to perform R LE AROM frequently while supine or sitting EOB.    Acceptance, E,TB, VU by RR at 10/27/2024 0031    Acceptance, E,TB, VU by RG at 10/22/2024 1002    Acceptance, TB,E, VU by RG at 10/21/2024 0907    Nonacceptance, E, NR by WG at 10/16/2024 0228    Acceptance, E,TB, VU by CF at 10/15/2024 0753    Acceptance, E,TB, VU by CF at 10/14/2024 0801    Acceptance, E,TB, VU by CF at 10/13/2024 1045    Acceptance, E,D, VU,NR by AG at 10/10/2024 1310    Acceptance, E,TB, VU by CB at 10/8/2024 0448    Acceptance, E, NR by RD at 10/2/2024 1101    Acceptance, E, NR by RD at 10/1/2024 0856    Acceptance, E,D, VU,NR by AG at 9/30/2024 1559                      Point: Precautions (Done)       Learning Progress Summary            Patient Acceptance, E, VU by WG at 11/21/2024 0236    Acceptance, E,TB, VU by CF at 11/17/2024 1024    Acceptance, E,TB, VU by RR at 11/14/2024 0148    Acceptance, E,TB, VU by RR at 11/13/2024 0218    Acceptance, E,TB, VU by RG at 11/12/2024 0927    Acceptance, TB,E, VU by RG at 11/11/2024 0944    Acceptance, E,TB, VU by RG at 11/10/2024 0931    Acceptance, E, VU by WG at 11/10/2024 0233    Acceptance, E,TB, VU by CF at 11/9/2024 1016    Acceptance, E, NR by SC at 11/3/2024 0025    Acceptance, E,TB, VU by RR at 11/1/2024 2315    Acceptance, E,D, VU,NR by AG at 10/31/2024 1234    Comment: PT educated pt. in importance of mobilization and L L/UE PROM and self ROM to prevent contracture.  Pt. is encouraged to to perform R LE AROM frequently while supine or sitting EOB.    Acceptance, E,TB, VU by RR at 10/27/2024 0031    Acceptance, E,TB, VU by RG at 10/22/2024 1002    Acceptance, TB,E, VU by RG at 10/21/2024 0907    Nonacceptance, E, NR by WG at 10/16/2024 0228    Acceptance, E,TB, VU by CF at 10/15/2024 0753    Acceptance, E,TB, VU by CF at 10/14/2024 0801    Acceptance, E,TB, VU by CF at 10/13/2024 1045    Acceptance, E,D, VU,NR by AG at 10/10/2024 1310    Acceptance, E,TB, VU by  EMERSON at 10/8/2024 0448    Acceptance, E, NR by RD at 10/2/2024 1101    Acceptance, E, NR by RD at 10/1/2024 0856    Acceptance, E,D, VU,NR by  at 9/30/2024 1559                                      User Key       Initials Effective Dates Name Provider Type Discipline     06/16/21 -  Мария Joya, PT Physical Therapist PT    RD 06/16/21 -  Laisha Verdugo, RN Registered Nurse Nurse    RR 06/11/24 -  Jaymie Dominguez, FIFI Registered Nurse Nurse    RG 06/06/23 -  Jesus Matthews, RN Registered Nurse Nurse    WG 02/12/24 -  Shanthi Burden, RN Registered Nurse Nurse    CF 06/25/24 -  Cherelle Germain, RN Registered Nurse Nurse    [place] 06/17/24 -  Fidelia Lombardo, RN Registered Nurse Nurse    SC 09/11/24 -  Sally Hair RN Registered Nurse Nurse                  PT Recommendation and Plan     Progress Summary (PT)  Daily Progress Summary (PT): Pt. tolerated therapy well but reported that she was in too much pain to sit EOB. Completed supine therex w/ pt. and gave verbal/tactile cues for pt. to complete reps at an appropriate pace and activate quads. Will continue w/ POC.  Plan of Care Reviewed With: (P) patient  Progress: (P) no change  Outcome Evaluation: (P) Pt. completed PT treatment session this AM. Pt. completed bed mobility w/ maxAx2-dependent assist. Pt. mainted sitting on EOB for around 10mins. Pt. c/o  L arm, R leg pain during bed mobility and numbness in the R leg while sitting. Pt completed supine therex as well. Will continue w/ POC.       Time Calculation:    PT Charges       Row Name 11/21/24 1115             Time Calculation    PT Received On 11/21/24 (P)   -HP         Time Calculation- PT    Total Timed Code Minutes- PT 23 minute(s) (P)   -                User Key  (r) = Recorded By, (t) = Taken By, (c) = Cosigned By      Initials Name Provider Type    HP Giovana Quinonez, PT Student PT Student                  Therapy Charges for Today       Code Description Service Date Service Provider Modifiers Qty     32295204948 HC PT THER PROC EA 15 MIN 11/21/2024 Giovana Quinonez, PT Student GP 1    86271127277 HC PT THERAPEUTIC ACT EA 15 MIN 11/21/2024 Giovana Quinonez, PT Student GP 1            PT G-Codes  AM-PAC 6 Clicks Score (PT): 8    Giovana Quinonez, PT Student  11/21/2024

## 2024-11-21 NOTE — PROGRESS NOTES
Adult Nutrition  Assessment/PES    Patient Name:  Lety Johnson  YOB: 1980  MRN: [id number]  Admit Date:  9/26/2024    Assessment Date:  11/21/2024    Comments:  follow-up    Po intake 93% ave    Encourage po and voice food prefs    Will cont to follow and monitor.      Reason for Assessment       Row Name 11/21/24 1601          Reason for Assessment    Reason For Assessment follow-up protocol  remote REZA White     Diagnosis infection/sepsis;neurologic conditions  cystitis, met enceph, debility hx CVA                    Nutrition/Diet History       Row Name 11/21/24 1601          Nutrition/Diet History    Typical Intake (Food/Fluid/EN/PN) Called to room no answer.                    Labs/Tests/Procedures/Meds       Row Name 11/21/24 1602          Labs/Procedures/Meds    Lab Results Reviewed reviewed     Lab Results Comments glu [id number]        Diagnostic Tests/Procedures    Diagnostic Test/Procedure Reviewed reviewed        Medications    Pertinent Medications Reviewed reviewed     Pertinent Medications Comments humalog, lantus                        Nutrition Prescription Ordered       Row Name 11/21/24 1602          Nutrition Prescription PO    Common Modifiers Consistent Carbohydrate                    Evaluation of Received Nutrient/Fluid Intake       Row Name 11/21/24 1602          Fluid Intake Evaluation    Oral Fluid (mL) 1277  ave x 3        PO Evaluation    Number of Meals 7     % PO Intake 93                       Problem/Interventions:   Problem 1       Row Name 11/21/24 1603          Nutrition Diagnoses Problem 1    Problem 1 Other (comment)  No nutrition dx at this time                          Intervention Goal       Row Name 11/21/24 1603          Intervention Goal    General Meet nutritional needs for age/condition     PO Maintain intake;PO intake (%)     PO Intake % 80 %  or greater                    Nutrition Intervention       Row Name 11/21/24 1604          Nutrition  Intervention    RD/Tech Action Follow Tx progress                      Education/Evaluation       Row Name 11/21/24 1604          Education    Education No discharge needs identified at this time        Monitor/Evaluation    Monitor Per protocol;I&O;PO intake;Pertinent labs;Weight                     Electronically signed by:  Vee Van RD  11/21/24 16:04 EST

## 2024-11-21 NOTE — DISCHARGE PLACEMENT REQUEST
"Lety Johnson (44 y.o. Female)       Date of Birth   1980    Social Security Number       Address   [address] [facility] [facility] [address]    Home Phone   [phone]    MRN   [id number]       [facility]    Marital Status   Single                            Admission Date   24    Admission Type   Emergency    Admitting Provider   Ramon Marc MD    Attending Provider   Mara Navas MD    Department, Room/Bed   [address], [address]/       Discharge Date       Discharge Disposition       Discharge Destination                                 Attending Provider: Mara Navas MD    Allergies: No Known Allergies    Isolation: None   Infection: None   Code Status: CPR    Ht: 166.4 cm (65.5\")   Wt: 102 kg (224 lb 1.6 oz)    Admission Cmt: None   Principal Problem: UTI (urinary tract infection) [N39.0]                   Active Insurance as of 2024       Primary Coverage       Payor Plan Insurance Group Employer/Plan Group    [place] [id number]       Payor Plan Address Payor Plan Phone Number Payor Plan Fax Number Effective Dates    [employer] [address] [address] [phone]  2024 - None Entered    [place] [id number]         Subscriber Name Subscriber Birth Date Member ID       LETY JOHNSON 1980 [id number]                     Emergency Contacts        (Rel.) Home Phone Work Phone Mobile Phone    Arnoldo Arguelles (Son) -- -- [phone]    Moises Narvaez (Legal Guardian) -- -- [phone]                 History & Physical        Ramon Marc MD at [address] [place] [address] HOSPITALIST HISTORY AND PHYSICAL    Patient Identification:  Name:  Lety Johnson  Age:  43 y.o.  Sex:  female  :  1980  MRN:  [id number]   Admit Date: 2024   Visit Number:  [id number]  Room number:  404/04  Primary Care Physician:  Provider, No Known     Subjective     Chief complaint:  "   Chief Complaint   Patient presents with    Fall     History of presenting illness:   43F Morbid Obesity by BMI PMH History Genital Herpes, Cerebrovascular Accident 5/2024 complicated by L sided paralysis, presented to [facility] emergency room 9/26 after sliding into the floor off her chair due to weakness.  Upon arrival patient afebrile, heart rate 109, respiratory rate 18, blood pressure 146/101, satting 98% on room air. Labs showed WBC count 10K, lactate 1.1, CRP 3.8, potassium 2.7, magnesium 1.5, HCG negative, blood cultures pending. CXR no acute cardiopulmonary processes.  Xray ankle/foot without fracture or dislocation.  Emergency room provider gave antibiotics, pain medications, zofran, potassium replacement.  Hospital Medicine consulted for admission. Patient seen and examined in the emergency room, notes fevers chills at home a few days ago, recently has been on antibiotics for lower extremity cellulitis, has had some dysuria and suprapubic pain, denies current sexual activity, reports her fiance was taken to group home about a week ago and has been her primary caretaker, has had difficulty at home since prior stroke and caretakers not consistent, last 24hrs didn't have anyone to help her, sister endorses recent confusion/hallucinations beyond baseline, seeing dead family members, coinciding with onset of dysuria.  ---------------------------------------------------------------------------------------------------------------------   Review of Systems   Constitutional:  Positive for chills and fever.   HENT: Negative.     Eyes: Negative.    Respiratory:  Positive for shortness of breath.    Cardiovascular:  Positive for leg swelling. Negative for chest pain.   Gastrointestinal: Negative.    Endocrine: Negative.    Genitourinary:  Positive for dysuria.   Musculoskeletal: Negative.    Skin:  Positive for rash.   Allergic/Immunologic: Negative.    Neurological:  Positive for weakness.   Hematological:  Negative.    Psychiatric/Behavioral:  Positive for hallucinations.      ---------------------------------------------------------------------------------------------------------------------   Past Medical History:   Diagnosis Date    Stroke      No past surgical history on file.  No family history on file.  Social History     Socioeconomic History    Marital status: Single     ---------------------------------------------------------------------------------------------------------------------   Allergies:  Patient has no known allergies.  ---------------------------------------------------------------------------------------------------------------------   Medications below are reported home medications pulling from within the system; at this time, these medications have not been reconciled unless otherwise specified and are in the verification process for further verifcation as current home medications.    Prior to Admission Medications       Prescriptions Last Dose Informant Patient Reported? Taking?    aspirin 81 MG chewable tablet Unknown  Yes No    Chew 1 tablet Daily.    atorvastatin (LIPITOR) 80 MG tablet Unknown  Yes No    Take 1 tablet by mouth Daily.    cyclobenzaprine (FLEXERIL) 10 MG tablet Unknown  Yes No    Take 1 tablet by mouth 3 (Three) Times a Day As Needed for Muscle Spasms.    DULoxetine (CYMBALTA) 60 MG capsule Unknown  Yes No    Take 1 capsule by mouth Daily.    lisinopril (PRINIVIL,ZESTRIL) 20 MG tablet Unknown  Yes No    Take 1 tablet by mouth Daily.    metFORMIN (GLUCOPHAGE) 1000 MG tablet Unknown  Yes No    Take 1 tablet by mouth 2 (Two) Times a Day With Meals.    pantoprazole (PROTONIX) 40 MG EC tablet Unknown  Yes No    Take 1 tablet by mouth Daily.          Objective     Vital Signs:  Temp:  [98.2 °F (36.8 °C)] 98.2 °F (36.8 °C)  Heart Rate:  [] 118  Resp:  [18] 18  BP: (135-157)/() 140/79    Mean Arterial Pressure (Non-Invasive) for the past 24 hrs (Last 3 readings):    Noninvasive MAP (mmHg)   09/26/24 1645 90   09/26/24 1630 100   09/26/24 1615 103     SpO2:  [91 %-100 %] 94 %  on   ;   Device (Oxygen Therapy): room air  Body mass index is 43.26 kg/m².    Wt Readings from Last 3 Encounters:   09/26/24 120 kg (264 lb)   06/27/24 120 kg (264 lb)      ---------------------------------------------------------------------------------------------------------------------   Physical Exam:  Constitutional:  Well-developed and well-nourished. Older than stated age. No acute distress.      HENT:  Head:  Normocephalic and atraumatic.  Mouth:  Moist mucous membranes.    Eyes:  Conjunctivae and EOM are normal. No scleral icterus.    Neck:  Neck supple.  No JVD present.    Cardiovascular:  Normal rate, regular rhythm and normal heart sounds with no murmur.  Pulmonary/Chest:  No respiratory distress, no wheezes, no crackles, with normal breath sounds and good air movement.  Abdominal:  Soft. No distension and no tenderness.   Musculoskeletal:  No tenderness, and no deformity.  No red or swollen joints anywhere.    Neurological:  Alert and oriented to person, place, and time.  No cranial nerve deficit. Chronic L sided paralysis.    Skin:  Skin is warm and dry. No pallor. Significant intertriginous erythema under panus, consistent with yeast infection.   Peripheral vascular:  No clubbing, no cyanosis, trace edema.  Psychiatric: Appropriate mood and affect  Edited by: Ramon Marc MD at 9/26/2024 1701  ---------------------------------------------------------------------------------------------------------------------  EKG:  N/A    Telemetry:  normal sinus rhythm, no significant ST changes    I have personally looked at telemetry.    Last echocardiogram:  Ordered and pending   --------------------------------------------------------------------------------------------------------------------  Labs:  Results from last 7 days   Lab Units 09/26/24  1504 09/26/24  1350   LACTATE mmol/L  --  1.1   CRP  "mg/dL 3.80*  --    WBC 10*3/mm3  --  10.42   HEMOGLOBIN g/dL  --  12.5   HEMATOCRIT %  --  37.4   MCV fL  --  94.0   MCHC g/dL  --  33.4   PLATELETS 10*3/mm3  --  402         Results from last 7 days   Lab Units 09/26/24  1504   SODIUM mmol/L 140   POTASSIUM mmol/L 2.7*   MAGNESIUM mg/dL 1.5*   CHLORIDE mmol/L 99   CO2 mmol/L 28.9   BUN mg/dL 10   CREATININE mg/dL 0.51*   CALCIUM mg/dL 9.4   GLUCOSE mg/dL 220*   ALBUMIN g/dL 3.5   BILIRUBIN mg/dL 0.6   ALK PHOS U/L 119*   AST (SGOT) U/L 21   ALT (SGPT) U/L 8   Estimated Creatinine Clearance: 188.1 mL/min (A) (by C-G formula based on SCr of 0.51 mg/dL (L)).  No results found for: \"AMMONIA\"          No results found for: \"HGBA1C\", \"POCGLU\"  No results found for: \"TSH\", \"FREET4\"  No results found for: \"PREGTESTUR\", \"PREGSERUM\", \"HCG\", \"HCGQUANT\"  Pain Management Panel           No data to display              Brief Urine Lab Results  (Last result in the past 365 days)        Color   Clarity   Blood   Leuk Est   Nitrite   Protein   CREAT   Urine HCG        09/26/24 1419 Dark Yellow   Turbid   Trace   Moderate (2+)   Positive   30 mg/dL (1+)                 No results found for: \"BLOODCX\"  No results found for: \"URINECX\"  No results found for: \"WOUNDCX\"  No results found for: \"STOOLCX\"    I have personally looked at the labs and they are summarized above.  ----------------------------------------------------------------------------------------------------------------------  Detailed radiology reports for the last 24 hours:    Imaging Results (Last 24 Hours)       Procedure Component Value Units Date/Time    XR Ankle 3+ View Left [[id number]] Collected: 09/26/24 1537     Updated: 09/26/24 1540    Narrative:      EXAM:    XR Left Ankle Complete, 3 or More Views     EXAM DATE:    9/26/2024 3:17 PM     CLINICAL HISTORY:    left ankle injury     TECHNIQUE:    Frontal, lateral and oblique views of the left ankle.     COMPARISON:    No relevant prior studies available.   "   FINDINGS:    Bones/joints:  See below.      Soft tissues:  Soft tissue swelling, but no acute fracture or  dislocation.       Impression:        Soft tissue swelling, but no acute fracture or dislocation.        This report was finalized on 9/26/2024 3:38 PM by Dr. Moses Otto MD.       XR Foot 3+ View Left [[id number]] Collected: 09/26/24 1536     Updated: 09/26/24 1539    Narrative:      EXAM:    XR Left Foot Complete, 3 or More Views     EXAM DATE:    9/26/2024 3:16 PM     CLINICAL HISTORY:    left foot wound     TECHNIQUE:    Frontal, lateral and oblique views of the left foot.     COMPARISON:    No relevant prior studies available.     FINDINGS:    Bones/joints:  Unremarkable.  No acute fracture.  No dislocation.    Soft tissues:  Unremarkable.  No radiopaque foreign body.       Impression:        No acute findings in the left foot.        This report was finalized on 9/26/2024 3:37 PM by Dr. Moses Otto MD.       XR Chest 1 View [[id number]] Collected: 09/26/24 1406     Updated: 09/26/24 1408    Narrative:      XR CHEST 1 VW-     CLINICAL INDICATION: weakness        COMPARISON: None immediately available      TECHNIQUE: Single frontal view of the chest.     FINDINGS:     LUNGS: Lungs are adequately aerated.      HEART AND MEDIASTINUM: Heart and mediastinal contours are unremarkable        SKELETON: Bony and soft tissue structures are unremarkable.             Impression:      No radiographic evidence of acute cardiac or pulmonary disease.           This report was finalized on 9/26/2024 2:06 PM by Dr. Moses Otto MD.       CT Cervical Spine Without Contrast [[id number]] Collected: 09/26/24 1317     Updated: 09/26/24 1319    Narrative:      CT CERVICAL SPINE WO CONTRAST-     CLINICAL INDICATION: neck pain        COMPARISON: None available     TECHNIQUE: Axial images of the cervical spine were acquired with out any  intravenous contrast. Reformatted images were then created in the  sagittal and coronal  planes.     DOSE:      Radiation dose reduction techniques were utilized per ALARA protocol.  Automated exposure control was initiated through either or [employer] or  "[employer] software packages by  protocol.           FINDINGS:   The provided study demonstrates preservation of the vertebral body  heights in the sagittally reconstructed images.     There is no prevertebral soft tissue swelling.     Alignment is near anatomic.     The disc space heights are uniform.     I see no acute cervical spine fracture.       Impression:         1. No acute bony abnormality.     2. Other incidental findings as above     This report was finalized on 9/26/2024 1:17 PM by Dr. Moses Otto MD.       CT Lumbar Spine Without Contrast [[id number]] Collected: 09/26/24 1317     Updated: 09/26/24 1319    Narrative:      EXAM:    CT Lumbar Spine Without Intravenous Contrast     EXAM DATE:    9/26/2024 12:59 PM     CLINICAL HISTORY:    back pain     TECHNIQUE:    Axial computed tomography images of the lumbar spine without  intravenous contrast.  Sagittal and coronal reformatted images were  created and reviewed.  This CT exam was performed using one or more of  the following dose reduction techniques:  automated exposure control,  adjustment of the mA and/or kV according to patient size, and/or use of  iterative reconstruction technique.     COMPARISON:    No relevant prior studies available.     FINDINGS:    VERTEBRAE:  Unremarkable.  No acute fracture.    DISCS/SPINAL CANAL/NEURAL FORAMINA:  No acute findings.  No spinal  canal stenosis.    SOFT TISSUES:  Unremarkable.       Impression:        No acute findings in the lumbar spine.        This report was finalized on 9/26/2024 1:17 PM by Dr. Moses Otto MD.       CT Thoracic Spine Without Contrast [[id number]] Collected: 09/26/24 1316     Updated: 09/26/24 1319    Narrative:      EXAM:    CT Thoracic Spine Without Intravenous Contrast     EXAM DATE:    9/26/2024 12:58 PM      CLINICAL HISTORY:    back pain     TECHNIQUE:    Axial computed tomography images of the thoracic spine without  intravenous contrast.  Sagittal and coronal reformatted images were  created and reviewed.  This CT exam was performed using one or more of  the following dose reduction techniques:  automated exposure control,  adjustment of the mA and/or kV according to patient size, and/or use of  iterative reconstruction technique.     COMPARISON:    No relevant prior studies available.     FINDINGS:    VERTEBRAE:  Unremarkable.  No acute fracture.    DISCS/SPINAL CANAL/NEURAL FORAMINA:  No acute findings.  No spinal  canal stenosis.    SOFT TISSUES:  Unremarkable.       Impression:        No acute findings in the thoracic spine.        This report was finalized on 9/26/2024 1:17 PM by Dr. Moses Otto MD.       CT Head Without Contrast [[id number]] Collected: 09/26/24 1259     Updated: 09/26/24 1302    Narrative:      CT HEAD WO CONTRAST-     CLINICAL INDICATION: weakness        COMPARISON: None available     TECHNIQUE: Axial images of the brain were obtained with out intravenous  contrast.  Reformatted images were created in the sagittal and coronal  planes.     DOSE:     Radiation dose reduction techniques were utilized per ALARA protocol.  Automated exposure control was initiated through either or [employer] or  [employer] software packages by  protocol.        FINDINGS:    BRAIN: Probable subacute ischemia right middle cerebral artery  territory    VENTRICLES:  Unremarkable.  No ventriculomegaly.       BONES/JOINTS:  Unremarkable.  No acute fracture.       SOFT TISSUES:  Unremarkable.       SINUSES:  no air fluid levels       MASTOID AIR CELLS:  Unremarkable as visualized.  No mastoid effusion.          Impression:         1. Probable remote right middle cerebral artery infarction  2. No acute parenchymal mass, hemorrhage, or midline shift     This report was finalized on 9/26/2024 1:00 PM by   Moses Otto MD.             I have personally looked at the radiology images and read the final radiology report.    Assessment & Plan    43F Morbid Obesity by BMI PMH History Genital Herpes, Cerebrovascular Accident 5/2024 complicated by L sided paralysis, presented to [facility] emergency room 9/26 after sliding into the floor off her chair due to weakness.     #Acute Metabolic Encephalopathy due to Acute Urinary Tract Infection in setting of probable neurogenic bladder  - Continue Ceftriaxone, follow up cultures, rule out STI    #Electrolyte Abnormalities  - Acute Mild Hypokalemia - Replacing, on protocol  - Acute Mild Hypomagnesemia - Replacing, on protocol    #History Cerebrovascular Accident complicated by L sided paralysis, unclear etiology  - Review home medications and resume as indicated, Supportive Care     #Debility  - Consult PT/OT, Social Work if placement needed     #History Genital Herpes  - Supportive Care, follow up medication reconciliation for any suppressive medications, check HIV & acute hepatitis panel     #Morbid Obesity by BMI  - Body mass index is 43.26 kg/m².; complicates all aspects of care    F: Oral  E: Monitor & Replace as needed   N: Regular Diet  PPx: SQH  Code Status (Patient has no pulse and is not breathing): CPR  Medical Interventions (Patient has pulse or is breathing): Full Support     Dispo: Pending workup and clinical improvement    *This patient is considered high risk secondary to Urinary Tract Infection, electrolyte abnormalities, chronic L sided paralysis from prior Cerebrovascular Accident.      Edited by: Ramon Marc MD at 9/26/2024 1701    Ramon Marc MD  [facility] Hospitalist  09/26/24  17:01 EDT        Electronically signed by Ramon Marc MD at 09/26/24 1703       Vital Signs (last day)       Date/Time Temp Temp src Pulse Resp BP Patient Position SpO2    11/21/24 1400 97.8 (36.6) Oral -- 16 101/54 Lying --    11/21/24 0916 -- -- 111  -- 125/78 Lying --    11/21/24 0600 97.8 (36.6) Oral 99 16 108/65 Lying --    11/20/24 2331 97.8 (36.6) Oral 110 16 94/55 Lying --    11/20/24 1900 98.2 (36.8) Oral 107 16 107/60 Lying --    11/20/24 1000 97.5 (36.4) Oral 113 16 108/63 Lying --    11/20/24 0600 98.1 (36.7) Oral 97 16 93/59 Lying --          Intake & Output (last day)         11/20 0701  11/21 0700 11/21 0701  11/22 0700    P.O. 1360 840    Total Intake(mL/kg) 1360 (13.3) 840 (8.2)    Urine (mL/kg/hr) 1800 (0.7)     Total Output 1800     Net -440 +840          Urine Unmeasured Occurrence 3 x           Lines, Drains & Airways       Active LDAs       Name Placement date Placement time Site Days    External Urinary Catheter --  --  --  --                  Current Facility-Administered Medications   Medication Dose Route Frequency Provider Last Rate Last Admin    acetaminophen (TYLENOL) tablet 650 mg  650 mg Oral Q6H PRN Ashleigh Nicholas PA-C   650 mg at 11/21/24 1054    acyclovir (ZOVIRAX) capsule 400 mg  400 mg Oral Nightly OculamMara MD   400 mg at 11/20/24 2003    aspirin chewable tablet 81 mg  81 mg Oral Daily Ramon Marc MD   81 mg at 11/21/24 0916    atorvastatin (LIPITOR) tablet 80 mg  80 mg Oral Daily Ramon Marc MD   80 mg at 11/21/24 0916    sennosides-docusate (PERICOLACE) 8.6-50 MG per tablet 2 tablet  2 tablet Oral BID PRN Ramon Marc MD   2 tablet at 11/01/24 1720    And    polyethylene glycol (MIRALAX) packet 17 g  17 g Oral Daily PRN Ramon Marc MD   17 g at 10/30/24 1751    And    bisacodyl (DULCOLAX) EC tablet 5 mg  5 mg Oral Daily PRN Ramon Marc MD   5 mg at 10/31/24 0939    And    bisacodyl (DULCOLAX) suppository 10 mg  10 mg Rectal Daily PRN Ramon Marc MD        Calcium Replacement - Follow Nurse / BPA Driven Protocol   Not Applicable PRN Ramon Marc MD        cyclobenzaprine (FLEXERIL) tablet 10 mg  10 mg Oral TID PRN Ramon Marc MD   10 mg at 11/19/24 0622    dextrose (D50W) (25 g/50 mL) IV  injection 25 g  25 g Intravenous Q15 Min PRN Marv Navas DO        dextrose (GLUTOSE) oral gel 15 g  15 g Oral Q15 Min PRN Marv Navas DO        Diclofenac Sodium (VOLTAREN) 1 % gel 4 g  4 g Topical 4x Daily PRN Ashleigh Nicholas PA-C   4 g at 11/18/24 1209    [START ON 11/22/2024] DULoxetine (CYMBALTA) DR capsule 90 mg  90 mg Oral Daily Bruce Branham PA-C        glucagon HCl (Diagnostic) injection 1 mg  1 mg Intramuscular Q15 Min PRN Marv Navas DO        heparin (porcine) 5000 UNIT/ML injection 5,000 Units  5,000 Units Subcutaneous Q8H Ramon Marc MD   5,000 Units at 11/21/24 1418    insulin glargine (LANTUS, SEMGLEE) injection 20 Units  20 Units Subcutaneous Daily With Breakfast Bruce Branham PA-C   20 Units at 11/21/24 0916    insulin glargine (LANTUS, SEMGLEE) injection 40 Units  40 Units Subcutaneous Nightly Bruce Branham PA-C   40 Units at 11/20/24 2004    Insulin Lispro (humaLOG) injection 4-24 Units  4-24 Units Subcutaneous 4x Daily AC & at Bedtime Marv Navas DO   8 Units at 11/21/24 1144    Insulin Lispro (humaLOG) injection 8 Units  8 Units Subcutaneous Daily With Lunch Bruce Branham PA-C   8 Units at 11/21/24 1144    Lidocaine 4 % 3 patch  3 patch Transdermal Q24H Bruce Branham PA-C   3 patch at 11/20/24 1723    lisinopril (PRINIVIL,ZESTRIL) tablet 20 mg  20 mg Oral Daily Ramon Marc MD   20 mg at 11/21/24 0916    loperamide (IMODIUM) capsule 2 mg  2 mg Oral 4x Daily PRN Marv Navas DO   2 mg at 10/15/24 0314    magic barrier cream   Topical BID Marv Navas DO   Given at 11/21/24 0917    Magnesium Standard Dose Replacement - Follow Nurse / BPA Driven Protocol   Not Applicable PRN Ramon Marc MD        melatonin tablet 5 mg  5 mg Oral Nightly PRN Wesley Matthews APRN   5 mg at 11/20/24 2003    metoprolol tartrate (LOPRESSOR) tablet 25 mg  25 mg Oral Q12H Mara Navas  MD Addison   25 mg at 24 0916    nicotine (NICODERM CQ) 7 MG/24HR patch 1 patch  1 patch Transdermal Q24H Ramon Marc MD   1 patch at 24 0915    nystatin (MYCOSTATIN) powder   Topical Q12H Ramon Marc MD   Given at 24 0917    pantoprazole (PROTONIX) EC tablet 40 mg  40 mg Oral Daily Ramon Marc MD   40 mg at 24 0916    Pharmacy Consult   Not Applicable Continuous PRN Ramon Marc MD        Phosphorus Replacement - Follow Nurse / BPA Driven Protocol   Not Applicable PRN Ramon Marc MD        Potassium Replacement - Follow Nurse / BPA Driven Protocol   Not Applicable PRN Ramon Marc MD        pregabalin (LYRICA) capsule 25 mg  25 mg Oral Q8H Berhane Segundo MD   25 mg at 24 1418    prochlorperazine (COMPAZINE) tablet 5 mg  5 mg Oral Q6H PRN Sandeep Cunha MD   5 mg at 24 2304    sodium chloride 0.9 % flush 10 mL  10 mL Intravenous Q12H Ramon Marc MD   10 mL at 24 0829    sodium chloride 0.9 % flush 10 mL  10 mL Intravenous PRN Ramon Marc MD        sodium chloride 0.9 % flush 10 mL  10 mL Intravenous Q12H Marv Navas DO   10 mL at 24 0829    sodium chloride 0.9 % flush 10 mL  10 mL Intravenous PRN Marv Navas DO        sodium chloride 0.9 % infusion 40 mL  40 mL Intravenous PRN Ramon Marc MD        sodium chloride 0.9 % infusion 40 mL  40 mL Intravenous PRN Marv Navas DO        traMADol (ULTRAM) tablet 50 mg  50 mg Oral Q8H PRN Mara Navas MD   50 mg at 24 2349     Operative/Procedure Notes (most recent note)    No notes of this type exist for this encounter.          Physician Progress Notes (most recent note)        Bruce Branham PA-C at 24 1448          Patient Identification:  Name:  Lety Johnson  Age:  44 y.o.  Sex:  female  :  1980  MRN:  [id number]  Visit Number:  [id number]  Primary Care Provider:  Concha Rao APRN    Length of  stay:  54    Subjective/Interval History/Consultants/Procedures     Chief Complaint:   Chief Complaint   Patient presents with    Fall       Subjective/Interval History:    44 y.o. female who was admitted on 9/26/2024 with UTI     PMH is significant for CVA w/ left sided hemiparesis, debility, hx genital herpes on suppressive therapy, morbid obesity, T2DM   For complete admission information, please see history and physical.     Consultations:  Infectious disease  PT/OT  CM/SW  Psychiatry     Procedures/Scans:  CT head without contrast  CT C-spine without contrast  CT T-spine without contrast  CT L-spine without contrast  CXR x 2  XR left foot  XR left ankle    Today, the patient was seen and examined, resting in no distress. No new complaints noted today. She did ask about increasing her cymbalta per psychiatry recommendation.      Room location at the time of evaluation was 332.    ----------------------------------------------------------------------------------------------------------------------  Current Hospital Meds:  acyclovir, 400 mg, Oral, Nightly  aspirin, 81 mg, Oral, Daily  atorvastatin, 80 mg, Oral, Daily  [START ON 11/22/2024] DULoxetine, 90 mg, Oral, Daily  heparin (porcine), 5,000 Units, Subcutaneous, Q8H  insulin glargine, 20 Units, Subcutaneous, Daily With Breakfast  insulin glargine, 40 Units, Subcutaneous, Nightly  insulin lispro, 4-24 Units, Subcutaneous, 4x Daily AC & at Bedtime  insulin lispro, 8 Units, Subcutaneous, Daily With Lunch  Lidocaine, 3 patch, Transdermal, Q24H  lisinopril, 20 mg, Oral, Daily  magic barrier cream, , Topical, BID  metoprolol tartrate, 25 mg, Oral, Q12H  nicotine, 1 patch, Transdermal, Q24H  nystatin, , Topical, Q12H  pantoprazole, 40 mg, Oral, Daily  pregabalin, 25 mg, Oral, Q8H  sodium chloride, 10 mL, Intravenous, Q12H  sodium chloride, 10 mL, Intravenous, Q12H      Pharmacy Consult,        ----------------------------------------------------------------------------------------------------------------------      Objective     Vital Signs:  Temp:  [97.8 °F (36.6 °C)-98.2 °F (36.8 °C)] 97.8 °F (36.6 °C)  Heart Rate:  [] 111  Resp:  [16] 16  BP: ()/(54-78) 101/54      10/07/24  0511 10/08/24  0500 10/09/24  0500   Weight: 102 kg (225 lb 14.4 oz) (FIFI cameron) 102 kg (225 lb 15.5 oz) 102 kg (224 lb 1.6 oz)     Body mass index is 36.73 kg/m².    Intake/Output Summary (Last 24 hours) at 11/21/2024 1448  Last data filed at 11/21/2024 1200  Gross per 24 hour   Intake 1340 ml   Output 1800 ml   Net -460 ml     I/O this shift:  In: 840 [P.O.:840]  Out: -   Diet: Regular/House, Diabetic; Consistent Carbohydrate; Fluid Consistency: Thin (IDDSI 0)  ----------------------------------------------------------------------------------------------------------------------    Physical Exam  Vitals and nursing note reviewed.   Constitutional:       General: She is not in acute distress.     Appearance: She is obese.   HENT:      Head: Normocephalic and atraumatic.   Eyes:      Extraocular Movements: Extraocular movements intact.   Cardiovascular:      Rate and Rhythm: Normal rate.   Pulmonary:      Effort: Pulmonary effort is normal. No respiratory distress.   Abdominal:      Palpations: Abdomen is soft.   Musculoskeletal:      Right lower leg: No edema.      Left lower leg: No edema.   Skin:     General: Skin is warm and dry.   Neurological:      Mental Status: She is alert. Mental status is at baseline.   Psychiatric:         Mood and Affect: Mood normal.         Behavior: Behavior normal.                ----------------------------------------------------------------------------------------------------------------------  Tele:      ----------------------------------------------------------------------------------------------------------------------              Results from last 7 days   Lab Units  "11/19/24  0129 11/15/24  0237   SODIUM mmol/L 136 140   POTASSIUM mmol/L 4.3 4.4   MAGNESIUM mg/dL 1.8  --    CHLORIDE mmol/L 101 104   CO2 mmol/L 24.4 25.1   BUN mg/dL 21* 20   CREATININE mg/dL 0.57 0.50*   CALCIUM mg/dL 9.3 9.6   GLUCOSE mg/dL 142* 125*   Estimated Creatinine Clearance: 152.1 mL/min (by C-G formula based on SCr of 0.57 mg/dL).  No results found for: \"AMMONIA\"      No results found for: \"BLOODCX\"  No results found for: \"URINECX\"  No results found for: \"WOUNDCX\"  No results found for: \"STOOLCX\"  ----------------------------------------------------------------------------------------------------------------------  Imaging Results (Last 24 Hours)       ** No results found for the last 24 hours. **          ----------------------------------------------------------------------------------------------------------------------   I have reviewed the above laboratory values for 11/21/24    Assessment/Plan     There are no active hospital problems to display for this patient.        ASSESSMENT/PLAN:    ESBL E. coli UTI  Acute metabolic encephalopathy, resolved  S/p Invanz.  Infectious disease input appreciated.     Hx CVA with left-sided hemiparesis  Chronic debility  Continue PT OT  CM/SW assisting with discharge planning.  Patient is pending placement.     T2DM  Diabetic neuropathy  Continue insulin regimen-titrate as needed. Add scheduled meal time insulin w/ lunch.   Continue supportive care measures, Cymbalta  Lyrica added 11/13 given persistent pain with some improvement noted.      Hx genital herpes  Continue suppressive acyclovir     Morbid obesity  Complicates all aspects of care     Depressive disorder  Adjustment disorder w/ disturbance of mood and anxiety   Patient with somewhat labile mood, tearful at times. Reports history of depression as well, likely exacerbated by current health status/hospitalization.  She is taking cymbalta as above  Psychiatry consulted, input appreciated. Felt in the " setting of recent stressors findings consistent with adjustment disorder on top of patient's unspecified depressive disorder with patient declining any medication adjustments at this time.   Patient asked today about increasing cymbalta, will increase to 90 daily per psychiatry recommendation.  May benefit from psychotherapy referral at discharge.     Repeat labs in the AM to monitor    -----------  -DVT prophylaxis: [facility]  -Disposition plans/anticipated needs: pending placement.        The patient is high risk due to the following diagnoses/reasons:  debility, hx CVA w/ hemiparesis         Bruce Branham PA-C  11/21/24  14:48 EST     Electronically signed by Bruce Branham PA-C at 11/21/24 1451          Consult Notes (most recent note)        Prasanth Ramirez MD at 11/20/24 0946          Referring Provider: Yonas  Reason for Consultation: depression      Chief complaint/Focus of Exam: depression    Subjective .     History of present illness:    43-year-old female with a history of CVA admitted on 9/26/2024 for management of encephalopathy, weakness, fall.  Psychiatry consulted for depression.    Patient is seen in room today.  She was awake, alert and oriented x 4.  She reports feeling down earlier in hospitalization, but says that she was able to speak to her family and dwight last night, which helped a lot.  Dwight has a court date and will potentially be moved to house arrest in January.  She denies significant depressive symptoms or need for medication changes at this time.  Denies SI, HI, AVH.    Review of Systems  Pertinent items are noted in HPI, all other systems reviewed and negative    History  Past Medical History:   Diagnosis Date    Stroke    , History reviewed. No pertinent surgical history., History reviewed. No pertinent family history.,   Social History     Socioeconomic History    Marital status: Single   Tobacco Use    Smoking status: Every Day     Average packs/day: 2.0 packs/day  for 26.0 years (52.0 ttl pk-yrs)     Types: Cigarettes     Start date: 1998    Smokeless tobacco: Never   Vaping Use    Vaping status: Never Used   Substance and Sexual Activity    Alcohol use: Not Currently    Drug use: Never    Sexual activity: Not Currently     Partners: Male     Comment:  in snf     E-cigarette/Vaping    E-cigarette/Vaping Use Never User     Passive Exposure No     Counseling Given No      E-cigarette/Vaping Substances    Nicotine No     THC No     CBD No     Flavoring No      E-cigarette/Vaping Devices    Disposable No     Pre-filled or Refillable Cartridge No     Refillable Tank No     Pre-filled Pod No          ,   Medications Prior to Admission   Medication Sig Dispense Refill Last Dose/Taking    aspirin 81 MG chewable tablet Chew 1 tablet Daily.   Unknown    atorvastatin (LIPITOR) 80 MG tablet Take 1 tablet by mouth Daily.   Unknown    cyclobenzaprine (FLEXERIL) 10 MG tablet Take 1 tablet by mouth 3 (Three) Times a Day As Needed for Muscle Spasms.   Unknown    DULoxetine (CYMBALTA) 60 MG capsule Take 1 capsule by mouth Daily.   Unknown    lisinopril (PRINIVIL,ZESTRIL) 20 MG tablet Take 1 tablet by mouth Daily.   Unknown    metFORMIN (GLUCOPHAGE) 1000 MG tablet Take 1 tablet by mouth 2 (Two) Times a Day With Meals.   Unknown    pantoprazole (PROTONIX) 40 MG EC tablet Take 1 tablet by mouth Daily.   Unknown    [DISCONTINUED] acyclovir (ZOVIRAX) 400 MG tablet Take 1 tablet by mouth Every Night.      , Scheduled Meds:  acyclovir, 400 mg, Oral, Nightly  aspirin, 81 mg, Oral, Daily  atorvastatin, 80 mg, Oral, Daily  DULoxetine, 60 mg, Oral, Daily  heparin (porcine), 5,000 Units, Subcutaneous, Q8H  insulin glargine, 20 Units, Subcutaneous, Daily With Breakfast  insulin glargine, 40 Units, Subcutaneous, Nightly  insulin lispro, 4-24 Units, Subcutaneous, 4x Daily AC & at Bedtime  insulin lispro, 8 Units, Subcutaneous, Daily With Lunch  Lidocaine, 3 patch, Transdermal, Q24H  lisinopril, 20  mg, Oral, Daily  magic barrier cream, , Topical, BID  metoprolol tartrate, 25 mg, Oral, Q12H  nicotine, 1 patch, Transdermal, Q24H  nystatin, , Topical, Q12H  pantoprazole, 40 mg, Oral, Daily  pregabalin, 25 mg, Oral, Q8H  sodium chloride, 10 mL, Intravenous, Q12H  sodium chloride, 10 mL, Intravenous, Q12H   , Continuous Infusions:  Pharmacy Consult,    , PRN Meds:    acetaminophen    senna-docusate sodium **AND** polyethylene glycol **AND** bisacodyl **AND** bisacodyl    Calcium Replacement - Follow Nurse / BPA Driven Protocol    cyclobenzaprine    dextrose    dextrose    Diclofenac Sodium    glucagon (human recombinant)    loperamide    Magnesium Standard Dose Replacement - Follow Nurse / BPA Driven Protocol    melatonin    Pharmacy Consult    Phosphorus Replacement - Follow Nurse / BPA Driven Protocol    Potassium Replacement - Follow Nurse / BPA Driven Protocol    prochlorperazine    sodium chloride    sodium chloride    sodium chloride    sodium chloride    traMADol, and Allergies:  Patient has no known allergies.    Objective     Vital Signs   Temp:  [97.4 °F (36.3 °C)-98.1 °F (36.7 °C)] 98.1 °F (36.7 °C)  Heart Rate:  [] 97  Resp:  [16] 16  BP: ()/(53-68) 93/59    Mental Status Exam:  Hygiene:   good  Cooperation:  Cooperative  Eye Contact:  Good  Psychomotor Behavior:  Appropriate  Affect:  Full range  Hopelessness: Denies  Speech:  Normal  Thought Progress:  Goal directed and Linear  Thought Content:  Normal  Suicidal:  None  Homicidal:  None  Hallucinations:  None  Delusion:  None  Memory:  Intact  Orientation:  Person, Place, Time, and Situation  Reliability:  fair  Insight:  Fair  Judgement:  Fair  Impulse Control:  Fair    Results Review:   I reviewed the patient's new clinical results.  I reviewed the patient's other test results and agree with the interpretation  I personally viewed and interpreted the patient's EKG/Telemetry data  Lab Results (last 24 hours)       Procedure Component  Value Units Date/Time    POC Glucose Once [[id number]]  (Normal) Collected: 24 0650    Specimen: Blood Updated: 24 0656     Glucose 102 mg/dL     POC Glucose Once [[id number]]  (Abnormal) Collected: 24 1944    Specimen: Blood Updated: 24 1950     Glucose 310 mg/dL     POC Glucose Once [[id number]]  (Abnormal) Collected: 24 1557    Specimen: Blood Updated: 24 1604     Glucose 177 mg/dL     POC Glucose Once [[id number]]  (Abnormal) Collected: 24 1033    Specimen: Blood Updated: 24 1039     Glucose 280 mg/dL           Imaging Results (Last 24 Hours)       ** No results found for the last 24 hours. **              Assessment & Plan     Active Problems:    * No active hospital problems. *     Adjustment disorder with disturbance of mood and anxiety  -Recent stressors appear situational, potentially personality related.  Patient reports feeling better now and denies need for medication changes.  -Refer for outpatient psychotherapy at discharge    Unspecified depressive disorder  -Continue Cymbalta 60 mg daily.  Could increase to 90 mg daily if depressive symptoms worsen    I discussed the patient's findings and my recommendations with patient    Prasanth Ramirez MD  24  09:59 EST    Electronically signed by Prasanth Ramirez MD at 24 1113          Physical Therapy Notes (most recent note)        Giovana Quinonez, PT Student at 24 1116  Version 1 of 1      Attestation signed by Thierry Saldiavr, PT at 24 1121                     Acute Care - Physical Therapy Treatment Note  [facility]     Patient Name: Lety Johnson  : 1980  MRN: [id number]  Today's Date: 2024   Onset of Illness/Injury or Date of Surgery: 24  Visit Dx:     ICD-10-CM ICD-9-CM   1. Hypokalemia  E87.6 276.8   2. Pressure injury of skin of sacral region, unspecified injury stage  L89.159 707.03     707.20   3. Pressure injury of skin of left heel, unspecified injury stage  L89.629  707.07     707.20   4. Acute cystitis without hematuria  N30.00 595.0     Patient Active Problem List   Diagnosis   (none) - all problems resolved or deleted     Past Medical History:   Diagnosis Date    Stroke      History reviewed. No pertinent surgical history.  PT Assessment (Last 12 Hours)       PT Evaluation and Treatment       Row Name 11/21/24 1000          Physical Therapy Time and Intention    Subjective Information complains of;weakness;pain;numbness (P)   -     Document Type therapy note (daily note) (P)   -     Mode of Treatment physical therapy (P)   -     Patient Effort adequate (P)   -     Symptoms Noted During/After Treatment increased pain (P)   -       Row Name 11/21/24 1000          General Information    Patient Profile Reviewed yes (P)   -       Row Name 11/21/24 1000          Bed Mobility    Bed Mobility supine-sit;sit-supine (P)   -HP     Rolling Left Wise (Bed Mobility) maximum assist (25% patient effort);2 person assist (P)   -HP     Rolling Right Wise (Bed Mobility) maximum assist (25% patient effort);2 person assist (P)   -HP     Scooting/Bridging Wise (Bed Mobility) dependent (less than 25% patient effort) (P)   -HP     Supine-Sit Wise (Bed Mobility) maximum assist (25% patient effort);2 person assist (P)   -HP     Sit-Supine Wise (Bed Mobility) maximum assist (25% patient effort);2 person assist (P)   -       Row Name 11/21/24 1000          Motor Skills    Therapeutic Exercise knee;hip (P)   -       Row Name 11/21/24 1000          Hip (Therapeutic Exercise)    Hip (Therapeutic Exercise) isometric exercises (P)   -     Hip Isometrics (Therapeutic Exercise) supine;right;aDduction (P)   3x10  -       Row Name 11/21/24 1000          Knee (Therapeutic Exercise)    Knee (Therapeutic Exercise) isometric exercises (P)   -     Knee Isometrics (Therapeutic Exercise) supine;quad sets;bilateral (P)   3x10 on R 10 reps on L  -       Row  Name             Wound 09/26/24 1809 Left posterior ankle Other (comment)    Wound - Properties Group Placement Date: 09/26/24  -KJ Placement Time: 1809  -KJ Side: Left  -KJ Orientation: posterior  -KJ Location: ankle  -KJ Primary Wound Type: Other  -TW, unknow etiology     Retired Wound - Properties Group Placement Date: 09/26/24  -KJ Placement Time: 1809  -KJ Side: Left  -KJ Orientation: posterior  -KJ Location: ankle  -KJ Primary Wound Type: Other  -TW, unknow etiology     Retired Wound - Properties Group Placement Date: 09/26/24  -KJ Placement Time: 1809  -KJ Side: Left  -KJ Orientation: posterior  -KJ Location: ankle  -KJ Primary Wound Type: Other  -TW, unknow etiology     Retired Wound - Properties Group Date first assessed: 09/26/24  -KJ Time first assessed: 1809  -KJ Side: Left  -KJ Location: ankle  -KJ Primary Wound Type: Other  -TW, unknow etiology       Row Name             Wound 10/11/24 1330 medial coccyx Fissure    Wound - Properties Group Placement Date: 10/11/24  -TW Placement Time: 1330  -TW Orientation: medial  -TW Location: coccyx  -TW Primary Wound Type: Fissure  -TW    Retired Wound - Properties Group Placement Date: 10/11/24  -TW Placement Time: 1330  -TW Orientation: medial  -TW Location: coccyx  -TW Primary Wound Type: Fissure  -TW    Retired Wound - Properties Group Placement Date: 10/11/24  -TW Placement Time: 1330  -TW Orientation: medial  -TW Location: coccyx  -TW Primary Wound Type: Fissure  -TW    Retired Wound - Properties Group Date first assessed: 10/11/24  -TW Time first assessed: 1330  -TW Location: coccyx  -TW Primary Wound Type: Fissure  -TW      Row Name             Wound 10/16/24 0705 Left medial gluteal MASD (moisture associated skin damage)    Wound - Properties Group Placement Date: 10/16/24  -MS Placement Time: 0705  -MS Side: Left  -MS Orientation: medial  -MS Location: gluteal  -MS Primary Wound Type: MASD  -MS Type: MASD (moisture associated skin damage)  -MS Present on  Original Admission: N  -MS Additional Comments: Noted at shift change skin assesment, Night shift RN stated it had been there her shift.  -MS    Retired Wound - Properties Group Placement Date: 10/16/24  -MS Placement Time: 0705 -MS Present on Original Admission: N  -MS Side: Left  -MS Orientation: medial  -MS Location: gluteal  -MS Primary Wound Type: MASD  -MS Additional Comments: Noted at shift change skin assesment, Night shift RN stated it had been there her shift.  -MS Type: MASD (moisture associated skin damage)  -MS    Retired Wound - Properties Group Placement Date: 10/16/24  -MS Placement Time: 0705 -MS Present on Original Admission: N  -MS Side: Left  -MS Orientation: medial  -MS Location: gluteal  -MS Primary Wound Type: MASD  -MS Additional Comments: Noted at shift change skin assesment, Night shift RN stated it had been there her shift.  -MS Type: MASD (moisture associated skin damage)  -MS    Retired Wound - Properties Group Date first assessed: 10/16/24  -MS Time first assessed: 0705  -MS Present on Original Admission: N  -MS Side: Left  -MS Location: gluteal  -MS Primary Wound Type: MASD  -MS Additional Comments: Noted at shift change skin assesment, Night shift RN stated it had been there her shift.  -MS Type: MASD (moisture associated skin damage)  -MS      Row Name             Wound 10/16/24 0705 Left posterior greater trochanter MASD (moisture associated skin damage)    Wound - Properties Group Placement Date: 10/16/24  -MS Placement Time: 0705 -MS Side: Left  -MS Orientation: posterior  -MS Location: greater trochanter  -MS Primary Wound Type: MASD  -MS Type: MASD (moisture associated skin damage)  -MS Present on Original Admission: N  -MS    Retired Wound - Properties Group Placement Date: 10/16/24  -MS Placement Time: 0705 -MS Present on Original Admission: N  -MS Side: Left  -MS Orientation: posterior  -MS Location: greater trochanter  -MS Primary Wound Type: MASD  -MS Type: MASD  (moisture associated skin damage)  -MS    Retired Wound - Properties Group Placement Date: 10/16/24  -MS Placement Time: 0705 -MS Present on Original Admission: N  -MS Side: Left  -MS Orientation: posterior  -MS Location: greater trochanter  -MS Primary Wound Type: MASD  -MS Type: MASD (moisture associated skin damage)  -MS    Retired Wound - Properties Group Date first assessed: 10/16/24  -MS Time first assessed: 0705  -MS Present on Original Admission: N  -MS Side: Left  -MS Location: greater trochanter  -MS Primary Wound Type: MASD  -MS Type: MASD (moisture associated skin damage)  -MS      Row Name 11/21/24 1000          Plan of Care Review    Plan of Care Reviewed With patient (P)   -HP     Progress no change (P)   -HP     Outcome Evaluation Pt. completed PT treatment session this AM. Pt. completed bed mobility w/ maxAx2-dependent assist. Pt. mainted sitting on EOB for around 10mins. Pt. c/o  L arm, R leg pain during bed mobility and numbness in the R leg while sitting. Pt completed supine therex as well. Will continue w/ POC. (P)   -HP       Row Name 11/21/24 1000          Positioning and Restraints    Pre-Treatment Position in bed (P)   -HP     Post Treatment Position bed (P)   -HP     In Bed supine;call light within reach;encouraged to call for assist (P)   -HP               User Key  (r) = Recorded By, (t) = Taken By, (c) = Cosigned By      Initials Name Provider Type    Karen Peralta, RN Registered Nurse    Nory Keenan, RN Registered Nurse    Roe North, RN Registered Nurse    Giovana Verdin, PT Student PT Student                    Physical Therapy Education       Title: PT OT SLP Therapies (Done)       Topic: Physical Therapy (Done)       Point: Mobility training (Done)       Learning Progress Summary            Patient Acceptance, E, VU by WG at 11/21/2024 0236    Acceptance, E,TB, VU by CF at 11/17/2024 1024    Acceptance, E,TB, VU by RR at 11/14/2024 0148    Acceptance,  E,TB, VU by RR at 11/13/2024 0218    Acceptance, E,TB, VU by RG at 11/12/2024 0927    Acceptance, TB,E, VU by RG at 11/11/2024 0944    Acceptance, E,TB, VU by RG at 11/10/2024 0931    Acceptance, E, VU by WG at 11/10/2024 0233    Acceptance, E,TB, VU by CF at 11/9/2024 1016    Acceptance, E, NR by SC at 11/3/2024 0025    Acceptance, E,TB, VU by RR at 11/1/2024 2315    Acceptance, E,D, VU,NR by AG at 10/31/2024 1234    Comment: PT educated pt. in importance of mobilization and L L/UE PROM and self ROM to prevent contracture.  Pt. is encouraged to to perform R LE AROM frequently while supine or sitting EOB.    Acceptance, E,TB, VU by RR at 10/27/2024 0031    Acceptance, E,TB, VU by RG at 10/22/2024 1002    Acceptance, TB,E, VU by RG at 10/21/2024 0907    Nonacceptance, E, NR by WG at 10/16/2024 0228    Acceptance, E,TB, VU by CF at 10/15/2024 0753    Acceptance, E,TB, VU by CF at 10/14/2024 0801    Acceptance, E,TB, VU by CF at 10/13/2024 1045    Acceptance, E,D, VU,NR by AG at 10/10/2024 1310    Acceptance, E,TB, VU by CB at 10/8/2024 0448    Acceptance, E, NR by RD at 10/2/2024 1101    Acceptance, E, NR by RD at 10/1/2024 0856    Acceptance, E,D, VU,NR by AG at 9/30/2024 1559                      Point: Home exercise program (Done)       Learning Progress Summary            Patient Acceptance, E, VU by WG at 11/21/2024 0236    Acceptance, E,TB, VU by CF at 11/17/2024 1024    Acceptance, E,TB, VU by RR at 11/14/2024 0148    Acceptance, E,TB, VU by RR at 11/13/2024 0218    Acceptance, E,TB, VU by RG at 11/12/2024 0927    Acceptance, TB,E, VU by RG at 11/11/2024 0944    Acceptance, E,TB, VU by RG at 11/10/2024 0931    Acceptance, E, VU by WG at 11/10/2024 0233    Acceptance, E,TB, VU by CF at 11/9/2024 1016    Acceptance, E, NR by SC at 11/3/2024 0025    Acceptance, E,TB, VU by RR at 11/1/2024 2315    Acceptance, E,D, VU,NR by AG at 10/31/2024 1234    Comment: PT educated pt. in importance of mobilization and L L/UE  PROM and self ROM to prevent contracture.  Pt. is encouraged to to perform R LE AROM frequently while supine or sitting EOB.    Acceptance, E,TB, VU by RR at 10/27/2024 0031    Acceptance, E,TB, VU by RG at 10/22/2024 1002    Acceptance, TB,E, VU by RG at 10/21/2024 0907    Nonacceptance, E, NR by WG at 10/16/2024 0228    Acceptance, E,TB, VU by CF at 10/15/2024 0753    Acceptance, E,TB, VU by CF at 10/14/2024 0801    Acceptance, E,TB, VU by CF at 10/13/2024 1045    Acceptance, E,D, VU,NR by AG at 10/10/2024 1310    Acceptance, E,TB, VU by CB at 10/8/2024 0448    Acceptance, E, NR by RD at 10/2/2024 1101    Acceptance, E, NR by RD at 10/1/2024 0856    Acceptance, E,D, VU,NR by AG at 9/30/2024 1559                      Point: Body mechanics (Done)       Learning Progress Summary            Patient Acceptance, E, VU by WG at 11/21/2024 0236    Acceptance, E,TB, VU by CF at 11/17/2024 1024    Acceptance, E,TB, VU by RR at 11/14/2024 0148    Acceptance, E,TB, VU by RR at 11/13/2024 0218    Acceptance, E,TB, VU by RG at 11/12/2024 0927    Acceptance, TB,E, VU by RG at 11/11/2024 0944    Acceptance, E,TB, VU by RG at 11/10/2024 0931    Acceptance, E, VU by WG at 11/10/2024 0233    Acceptance, E,TB, VU by CF at 11/9/2024 1016    Acceptance, E, NR by SC at 11/3/2024 0025    Acceptance, E,TB, VU by RR at 11/1/2024 2315    Acceptance, E,D, VU,NR by AG at 10/31/2024 1234    Comment: PT educated pt. in importance of mobilization and L L/UE PROM and self ROM to prevent contracture.  Pt. is encouraged to to perform R LE AROM frequently while supine or sitting EOB.    Acceptance, E,TB, VU by RR at 10/27/2024 0031    Acceptance, E,TB, VU by RG at 10/22/2024 1002    Acceptance, TB,E, VU by RG at 10/21/2024 0907    Nonacceptance, E, NR by WG at 10/16/2024 0228    Acceptance, E,TB, VU by CF at 10/15/2024 0753    Acceptance, E,TB, VU by CF at 10/14/2024 0801    Acceptance, E,TB, VU by CF at 10/13/2024 1045    Acceptance, E,D, VU,NR by  AG at 10/10/2024 1310    Acceptance, E,TB, VU by CB at 10/8/2024 0448    Acceptance, E, NR by RD at 10/2/2024 1101    Acceptance, E, NR by RD at 10/1/2024 0856    Acceptance, E,D, VU,NR by AG at 9/30/2024 1559                      Point: Precautions (Done)       Learning Progress Summary            Patient Acceptance, E, VU by WG at 11/21/2024 0236    Acceptance, E,TB, VU by CF at 11/17/2024 1024    Acceptance, E,TB, VU by RR at 11/14/2024 0148    Acceptance, E,TB, VU by RR at 11/13/2024 0218    Acceptance, E,TB, VU by RG at 11/12/2024 0927    Acceptance, TB,E, VU by RG at 11/11/2024 0944    Acceptance, E,TB, VU by RG at 11/10/2024 0931    Acceptance, E, VU by WG at 11/10/2024 0233    Acceptance, E,TB, VU by CF at 11/9/2024 1016    Acceptance, E, NR by SC at 11/3/2024 0025    Acceptance, E,TB, VU by RR at 11/1/2024 2315    Acceptance, E,D, VU,NR by AG at 10/31/2024 1234    Comment: PT educated pt. in importance of mobilization and L L/UE PROM and self ROM to prevent contracture.  Pt. is encouraged to to perform R LE AROM frequently while supine or sitting EOB.    Acceptance, E,TB, VU by RR at 10/27/2024 0031    Acceptance, E,TB, VU by RG at 10/22/2024 1002    Acceptance, TB,E, VU by RG at 10/21/2024 0907    Nonacceptance, E, NR by WG at 10/16/2024 0228    Acceptance, E,TB, VU by CF at 10/15/2024 0753    Acceptance, E,TB, VU by CF at 10/14/2024 0801    Acceptance, E,TB, VU by CF at 10/13/2024 1045    Acceptance, E,D, VU,NR by AG at 10/10/2024 1310    Acceptance, E,TB, VU by CB at 10/8/2024 0448    Acceptance, E, NR by RD at 10/2/2024 1101    Acceptance, E, NR by RD at 10/1/2024 0856    Acceptance, E,D, VU,NR by AG at 9/30/2024 1555                                      User Key       Initials Effective Dates Name Provider Type Discipline     06/16/21 -  Мария Joya, PT Physical Therapist PT    RD 06/16/21 -  Laisha Verdugo, RN Registered Nurse Nurse    RR 06/11/24 -  Jaymie Dominguez, RN Registered Nurse Nurse    RG  06/06/23 -  Jesus Matthews, RN Registered Nurse Nurse    WG 02/12/24 -  Shanthi Burden, RN Registered Nurse Nurse    CF 06/25/24 -  Cherelle Germain, RN Registered Nurse Nurse    CB 06/17/24 -  Fidelia Lombardo, RN Registered Nurse Nurse    SC 09/11/24 -  Sally Hair, RN Registered Nurse Nurse                  PT Recommendation and Plan     Progress Summary (PT)  Daily Progress Summary (PT): Pt. tolerated therapy well but reported that she was in too much pain to sit EOB. Completed supine therex w/ pt. and gave verbal/tactile cues for pt. to complete reps at an appropriate pace and activate quads. Will continue w/ POC.  Plan of Care Reviewed With: (P) patient  Progress: (P) no change  Outcome Evaluation: (P) Pt. completed PT treatment session this AM. Pt. completed bed mobility w/ maxAx2-dependent assist. Pt. mainted sitting on EOB for around 10mins. Pt. c/o  L arm, R leg pain during bed mobility and numbness in the R leg while sitting. Pt completed supine therex as well. Will continue w/ POC.       Time Calculation:    PT Charges       Row Name 11/21/24 1115             Time Calculation    PT Received On 11/21/24 (P)   -HP         Time Calculation- PT    Total Timed Code Minutes- PT 23 minute(s) (P)   -HP                User Key  (r) = Recorded By, (t) = Taken By, (c) = Cosigned By      Initials Name Provider Type     Giovana Quinonez, PT Student PT Student                  Therapy Charges for Today       Code Description Service Date Service Provider Modifiers Qty    71486056692  PT THER PROC EA 15 MIN 11/21/2024 Giovana Quinonez, PT Student GP 1    [id number]  PT THERAPEUTIC ACT EA 15 MIN 11/21/2024 Giovana Quinonez, PT Student GP 1            PT G-Codes  AM-PAC 6 Clicks Score (PT): 8    Giovana Quinonez PT Student  11/21/2024      Electronically signed by Thierry Saldivar, PT at 11/21/24 1121          Occupational Therapy Notes (most recent note)        Kasey Bella, OT at 11/20/24 1354          Patient Name:  Lety Johnson  : 1980    MRN: [id number]                              Today's Date: 2024       Admit Date: 2024    Visit Dx:     ICD-10-CM ICD-9-CM   1. Hypokalemia  E87.6 276.8   2. Pressure injury of skin of sacral region, unspecified injury stage  L89.159 707.03     707.20   3. Pressure injury of skin of left heel, unspecified injury stage  L89.629 707.07     707.20   4. Acute cystitis without hematuria  N30.00 595.0     Patient Active Problem List   Diagnosis   (none) - all problems resolved or deleted     Past Medical History:   Diagnosis Date    Stroke      History reviewed. No pertinent surgical history.   General Information       Row Name 24 1344          OT Time and Intention    Subjective Information complains of;pain  -     Document Type therapy note (daily note)  -     Mode of Treatment individual therapy;occupational therapy  -     Patient Effort adequate  -     Symptoms Noted During/After Treatment increased pain  -     Comment Patient agreeable to therapy. She complained of pain upon sitting, but improved throughout session.  -       Row Name 24 1344          General Information    Patient Profile Reviewed yes  -     Existing Precautions/Restrictions fall  -       Row Name 24 1344          Cognition    Orientation Status (Cognition) oriented x 3  -       Row Name 24 1344          Safety Issues/Impairments Affecting Functional Mobility    Impairments Affecting Function (Mobility) endurance/activity tolerance;pain;range of motion (ROM);strength;coordination;motor control;muscle tone abnormal  -               User Key  (r) = Recorded By, (t) = Taken By, (c) = Cosigned By      Initials Name Provider Type     Kasey Bella OT Occupational Therapist                     Mobility/ADL's       Row Name 24 1347          Bed Mobility    Bed Mobility supine-sit;sit-supine  -KP     Supine-Sit [place] (Bed Mobility) maximum assist (25%  "patient effort);2 person assist  -     Sit-Supine [place] (Bed Mobility) maximum assist (25% patient effort);2 person assist  -     Bed Mobility, Safety Issues decreased use of arms for pushing/pulling;decreased use of legs for bridging/pushing  -     Assistive Device (Bed Mobility) bed rails;head of bed elevated  -               User Key  (r) = Recorded By, (t) = Taken By, (c) = Cosigned By      Initials Name Provider Type    Kasey Yang OT Occupational Therapist                   Obj/Interventions       Row Name 11/20/24 1346          Motor Skills    Motor Skills functional endurance  -     Functional Endurance fair minus  -       Row Name 11/20/24 134          Balance    Balance Interventions sitting;static;minimal challenge;occupation based/functional task  -               User Key  (r) = Recorded By, (t) = Taken By, (c) = Cosigned By      Initials Name Provider Type    Kasey Yang OT Occupational Therapist                   Goals/Plan    No documentation.                  Clinical Impression       Row Name 11/20/24 1355          Pain Assessment    Pretreatment Pain Rating 0/10 - no pain  -     Posttreatment Pain Rating 1/10  -     Pain Side/Orientation generalized  -       Row Name 11/20/24 1354          Plan of Care Review    Plan of Care Reviewed With patient  -     Progress no change  -     Outcome Evaluation Patient agreeable to therapy. She continues to be dependent for bed mobility. She tolerated sitting at edge of bed with standby assist, but complained of incresaed pain \"all over\" initially. She requested to tika bilateral wrist supports due to pain and therapist assisted. She reported she had not been wearing them prior. Continue plan of care.  -       Row Name 11/20/24 1353          Therapy Plan Review/Discharge Plan (OT)    Anticipated Discharge Disposition (OT) skilled nursing facility  -       Row Name 11/20/24 1353          Positioning and Restraints " "   Pre-Treatment Position in bed  -KP     Post Treatment Position bed  -KP     In Bed fowlers;call light within reach;encouraged to call for assist;exit alarm on  -KP               User Key  (r) = Recorded By, (t) = Taken By, (c) = Cosigned By      Initials Name Provider Type    Kasey Yang OT Occupational Therapist                   Outcome Measures       Row Name 11/20/24 0850          How much help from another person do you currently need...    Turning from your back to your side while in flat bed without using bedrails? 1  -AW     Moving from lying on back to sitting on the side of a flat bed without bedrails? 1  -AW     Moving to and from a bed to a chair (including a wheelchair)? 1  -AW     Standing up from a chair using your arms (e.g., wheelchair, bedside chair)? 1  -AW     Climbing 3-5 steps with a railing? 1  -AW     To walk in hospital room? 1  -AW     AM-PAC 6 Clicks Score (PT) 6  -AW               User Key  (r) = Recorded By, (t) = Taken By, (c) = Cosigned By      Initials Name Provider Type    Toshia Ivy, RN Registered Nurse                    Occupational Therapy Education        No education to display                  OT Recommendation and Plan     Plan of Care Review  Plan of Care Reviewed With: patient  Progress: no change  Outcome Evaluation: Patient agreeable to therapy. She continues to be dependent for bed mobility. She tolerated sitting at edge of bed with standby assist, but complained of incresaed pain \"all over\" initially. She requested to tika bilateral wrist supports due to pain and therapist assisted. She reported she had not been wearing them prior. Continue plan of care.     Time Calculation:         Time Calculation- OT       Row Name 11/20/24 1353             Time Calculation- OT    OT Received On 11/20/24  -                User Key  (r) = Recorded By, (t) = Taken By, (c) = Cosigned By      Initials Name Provider Type    Kasey Yang OT Occupational Therapist "                  Therapy Charges for Today       Code Description Service Date Service Provider Modifiers Qty    [id number]  OT THERAPEUTIC ACT EA 15 MIN 11/20/2024 Kasey Bella OT GO 1                 Kasey Bella OT  11/20/2024    Electronically signed by Kasey Bella OT at 11/20/24 1206

## 2024-11-21 NOTE — PROGRESS NOTES
Patient Identification:  Name:  Lety Johnson  Age:  44 y.o.  Sex:  female  :  1980  MRN:  [id number]  Visit Number:  [id number]  Primary Care Provider:  Concha Rao APRN    Length of stay:  54    Subjective/Interval History/Consultants/Procedures     Chief Complaint:   Chief Complaint   Patient presents with    Fall       Subjective/Interval History:    44 y.o. female who was admitted on 2024 with UTI     PMH is significant for CVA w/ left sided hemiparesis, debility, hx genital herpes on suppressive therapy, morbid obesity, T2DM   For complete admission information, please see history and physical.     Consultations:  Infectious disease  PT/OT  CM/SW  Psychiatry     Procedures/Scans:  CT head without contrast  CT C-spine without contrast  CT T-spine without contrast  CT L-spine without contrast  CXR x 2  XR left foot  XR left ankle    Today, the patient was seen and examined, resting in no distress. No new complaints noted today. She did ask about increasing her cymbalta per psychiatry recommendation.      Room location at the time of evaluation was [facility].    ----------------------------------------------------------------------------------------------------------------------  Current Hospital Meds:  acyclovir, 400 mg, Oral, Nightly  aspirin, 81 mg, Oral, Daily  atorvastatin, 80 mg, Oral, Daily  [START ON 2024] DULoxetine, 90 mg, Oral, Daily  heparin (porcine), 5,000 Units, Subcutaneous, Q8H  insulin glargine, 20 Units, Subcutaneous, Daily With Breakfast  insulin glargine, 40 Units, Subcutaneous, Nightly  insulin lispro, 4-24 Units, Subcutaneous, 4x Daily AC & at Bedtime  insulin lispro, 8 Units, Subcutaneous, Daily With Lunch  Lidocaine, 3 patch, Transdermal, Q24H  lisinopril, 20 mg, Oral, Daily  magic barrier cream, , Topical, BID  metoprolol tartrate, 25 mg, Oral, Q12H  nicotine, 1 patch, Transdermal, Q24H  nystatin, , Topical, Q12H  pantoprazole, 40 mg, Oral, Daily  pregabalin, 25  mg, Oral, Q8H  sodium chloride, 10 mL, Intravenous, Q12H  sodium chloride, 10 mL, Intravenous, Q12H      Pharmacy Consult,       ----------------------------------------------------------------------------------------------------------------------      Objective     Vital Signs:  Temp:  [97.8 °F (36.6 °C)-98.2 °F (36.8 °C)] 97.8 °F (36.6 °C)  Heart Rate:  [] 111  Resp:  [16] 16  BP: ()/(54-78) 101/54      10/07/24  0511 10/08/24  0500 10/09/24  0500   Weight: 102 kg (225 lb 14.4 oz) (FIFI cameron) 102 kg (225 lb 15.5 oz) 102 kg (224 lb 1.6 oz)     Body mass index is 36.73 kg/m².    Intake/Output Summary (Last 24 hours) at 11/21/2024 1448  Last data filed at 11/21/2024 1200  Gross per 24 hour   Intake 1340 ml   Output 1800 ml   Net -460 ml     I/O this shift:  In: 840 [P.O.:840]  Out: -   Diet: Regular/House, Diabetic; Consistent Carbohydrate; Fluid Consistency: Thin (IDDSI 0)  ----------------------------------------------------------------------------------------------------------------------    Physical Exam  Vitals and nursing note reviewed.   Constitutional:       General: She is not in acute distress.     Appearance: She is obese.   HENT:      Head: Normocephalic and atraumatic.   Eyes:      Extraocular Movements: Extraocular movements intact.   Cardiovascular:      Rate and Rhythm: Normal rate.   Pulmonary:      Effort: Pulmonary effort is normal. No respiratory distress.   Abdominal:      Palpations: Abdomen is soft.   Musculoskeletal:      Right lower leg: No edema.      Left lower leg: No edema.   Skin:     General: Skin is warm and dry.   Neurological:      Mental Status: She is alert. Mental status is at baseline.   Psychiatric:         Mood and Affect: Mood normal.         Behavior: Behavior normal.                ----------------------------------------------------------------------------------------------------------------------  Tele:   "    ----------------------------------------------------------------------------------------------------------------------              Results from last 7 days   Lab Units 11/19/24  0129 11/15/24  0237   SODIUM mmol/L 136 140   POTASSIUM mmol/L 4.3 4.4   MAGNESIUM mg/dL 1.8  --    CHLORIDE mmol/L 101 104   CO2 mmol/L 24.4 25.1   BUN mg/dL 21* 20   CREATININE mg/dL 0.57 0.50*   CALCIUM mg/dL 9.3 9.6   GLUCOSE mg/dL 142* 125*   Estimated Creatinine Clearance: 152.1 mL/min (by C-G formula based on SCr of 0.57 mg/dL).  No results found for: \"AMMONIA\"      No results found for: \"BLOODCX\"  No results found for: \"URINECX\"  No results found for: \"WOUNDCX\"  No results found for: \"STOOLCX\"  ----------------------------------------------------------------------------------------------------------------------  Imaging Results (Last 24 Hours)       ** No results found for the last 24 hours. **          ----------------------------------------------------------------------------------------------------------------------   I have reviewed the above laboratory values for 11/21/24    Assessment/Plan     There are no active hospital problems to display for this patient.        ASSESSMENT/PLAN:    ESBL E. coli UTI  Acute metabolic encephalopathy, resolved  S/p Invanz.  Infectious disease input appreciated.     Hx CVA with left-sided hemiparesis  Chronic debility  Continue PT OT  CM/SW assisting with discharge planning.  Patient is pending placement.     T2DM  Diabetic neuropathy  Continue insulin regimen-titrate as needed. Add scheduled meal time insulin w/ lunch.   Continue supportive care measures, Cymbalta  Lyrica added 11/13 given persistent pain with some improvement noted.      Hx genital herpes  Continue suppressive acyclovir     Morbid obesity  Complicates all aspects of care     Depressive disorder  Adjustment disorder w/ disturbance of mood and anxiety   Patient with somewhat labile mood, tearful at times. Reports history " of depression as well, likely exacerbated by current health status/hospitalization.  She is taking cymbalta as above  Psychiatry consulted, input appreciated. Felt in the setting of recent stressors findings consistent with adjustment disorder on top of patient's unspecified depressive disorder with patient declining any medication adjustments at this time.   Patient asked today about increasing cymbalta, will increase to 90 daily per psychiatry recommendation.  May benefit from psychotherapy referral at discharge.     Repeat labs in the AM to monitor    -----------  -DVT prophylaxis: SQ  -Disposition plans/anticipated needs: pending placement.        The patient is high risk due to the following diagnoses/reasons:  debility, hx CVA w/ hemiparesis         Bruce Branham PA-C  11/21/24  14:48 EST

## 2024-11-21 NOTE — PLAN OF CARE
Goal Outcome Evaluation:      Pt A/O, VSS, metoprolol held due to BP, pt continues to complain of extreme pain and becoming tearful, PRN pain ,med given, pt continues to frequently seek out staff for various requests. Wound care completed, Pt is resting in bed at this time without further complaint, no acute changes noted this shift. POC ongoing

## 2024-11-21 NOTE — PLAN OF CARE
Goal Outcome Evaluation:  Plan of Care Reviewed With: patient        Progress: no change  Outcome Evaluation: Patient resting in bed this shift. VSS on room air. Patient refused turns this shift. educated on the importance of turns. PRN medications requested and given per oders. Patient reports no further concerns at this time, will continue POC.

## 2024-11-21 NOTE — DISCHARGE PLACEMENT REQUEST
"Lety Johnson (44 y.o. Female)       Date of Birth   1980    Social Security Number       Address   [address] [facility] [address]    Home Phone   [phone]    MRN   [id number]       [facility]    Marital Status   Single                            Admission Date   24    Admission Type   Emergency    Admitting Provider   Ramon Marc MD    Attending Provider   Mara Navas MD    Department, Room/Bed   [address], [address]/       Discharge Date       Discharge Disposition       Discharge Destination                                 Attending Provider: Mara Navas MD    Allergies: No Known Allergies    Isolation: None   Infection: None   Code Status: CPR    Ht: 166.4 cm (65.5\")   Wt: 102 kg (224 lb 1.6 oz)    Admission Cmt: None   Principal Problem: UTI (urinary tract infection) [N39.0]                   Active Insurance as of 2024       Primary Coverage       Payor Plan Insurance Group Employer/Plan Group    [place] [id number]       Payor Plan Address Payor Plan Phone Number Payor Plan Fax Number Effective Dates    [employer] [address] [address] [phone]  2024 - None Entered    [place] [id number]         Subscriber Name Subscriber Birth Date Member ID       LETY JOHNSON 1980 [id number]                     Emergency Contacts        (Rel.) Home Phone Work Phone Mobile Phone    Arnoldo Arguelles (Son) -- -- [phone]    Moises Narvaez (Legal Guardian) -- -- [phone]                 History & Physical        Ramon Marc MD at 24 [address] [place] [address] HOSPITALIST HISTORY AND PHYSICAL    Patient Identification:  Name:  Lety Johnson  Age:  43 y.o.  Sex:  female  :  1980  MRN:  [id number]   Admit Date: 2024   Visit Number:  [id number]  Room number:  404/04  Primary Care Physician:  Provider, No Known     Subjective     Chief complaint:  "   Chief Complaint   Patient presents with    Fall     History of presenting illness:   43F Morbid Obesity by BMI PMH History Genital Herpes, Cerebrovascular Accident 5/2024 complicated by L sided paralysis, presented to [facility] emergency room 9/26 after sliding into the floor off her chair due to weakness.  Upon arrival patient afebrile, heart rate 109, respiratory rate 18, blood pressure 146/101, satting 98% on room air. Labs showed WBC count 10K, lactate 1.1, CRP 3.8, potassium 2.7, magnesium 1.5, HCG negative, blood cultures pending. CXR no acute cardiopulmonary processes.  Xray ankle/foot without fracture or dislocation.  Emergency room provider gave antibiotics, pain medications, zofran, potassium replacement.  Hospital Medicine consulted for admission. Patient seen and examined in the emergency room, notes fevers chills at home a few days ago, recently has been on antibiotics for lower extremity cellulitis, has had some dysuria and suprapubic pain, denies current sexual activity, reports her fiance was taken to assisted about a week ago and has been her primary caretaker, has had difficulty at home since prior stroke and caretakers not consistent, last 24hrs didn't have anyone to help her, sister endorses recent confusion/hallucinations beyond baseline, seeing dead family members, coinciding with onset of dysuria.  ---------------------------------------------------------------------------------------------------------------------   Review of Systems   Constitutional:  Positive for chills and fever.   HENT: Negative.     Eyes: Negative.    Respiratory:  Positive for shortness of breath.    Cardiovascular:  Positive for leg swelling. Negative for chest pain.   Gastrointestinal: Negative.    Endocrine: Negative.    Genitourinary:  Positive for dysuria.   Musculoskeletal: Negative.    Skin:  Positive for rash.   Allergic/Immunologic: Negative.    Neurological:  Positive for weakness.   Hematological:  Negative.    Psychiatric/Behavioral:  Positive for hallucinations.      ---------------------------------------------------------------------------------------------------------------------   Past Medical History:   Diagnosis Date    Stroke      No past surgical history on file.  No family history on file.  Social History     Socioeconomic History    Marital status: Single     ---------------------------------------------------------------------------------------------------------------------   Allergies:  Patient has no known allergies.  ---------------------------------------------------------------------------------------------------------------------   Medications below are reported home medications pulling from within the system; at this time, these medications have not been reconciled unless otherwise specified and are in the verification process for further verifcation as current home medications.    Prior to Admission Medications       Prescriptions Last Dose Informant Patient Reported? Taking?    aspirin 81 MG chewable tablet Unknown  Yes No    Chew 1 tablet Daily.    atorvastatin (LIPITOR) 80 MG tablet Unknown  Yes No    Take 1 tablet by mouth Daily.    cyclobenzaprine (FLEXERIL) 10 MG tablet Unknown  Yes No    Take 1 tablet by mouth 3 (Three) Times a Day As Needed for Muscle Spasms.    DULoxetine (CYMBALTA) 60 MG capsule Unknown  Yes No    Take 1 capsule by mouth Daily.    lisinopril (PRINIVIL,ZESTRIL) 20 MG tablet Unknown  Yes No    Take 1 tablet by mouth Daily.    metFORMIN (GLUCOPHAGE) 1000 MG tablet Unknown  Yes No    Take 1 tablet by mouth 2 (Two) Times a Day With Meals.    pantoprazole (PROTONIX) 40 MG EC tablet Unknown  Yes No    Take 1 tablet by mouth Daily.          Objective     Vital Signs:  Temp:  [98.2 °F (36.8 °C)] 98.2 °F (36.8 °C)  Heart Rate:  [] 118  Resp:  [18] 18  BP: (135-157)/() 140/79    Mean Arterial Pressure (Non-Invasive) for the past 24 hrs (Last 3 readings):    Noninvasive MAP (mmHg)   09/26/24 1645 90   09/26/24 1630 100   09/26/24 1615 103     SpO2:  [91 %-100 %] 94 %  on   ;   Device (Oxygen Therapy): room air  Body mass index is 43.26 kg/m².    Wt Readings from Last 3 Encounters:   09/26/24 120 kg (264 lb)   06/27/24 120 kg (264 lb)      ---------------------------------------------------------------------------------------------------------------------   Physical Exam:  Constitutional:  Well-developed and well-nourished. Older than stated age. No acute distress.      HENT:  Head:  Normocephalic and atraumatic.  Mouth:  Moist mucous membranes.    Eyes:  Conjunctivae and EOM are normal. No scleral icterus.    Neck:  Neck supple.  No JVD present.    Cardiovascular:  Normal rate, regular rhythm and normal heart sounds with no murmur.  Pulmonary/Chest:  No respiratory distress, no wheezes, no crackles, with normal breath sounds and good air movement.  Abdominal:  Soft. No distension and no tenderness.   Musculoskeletal:  No tenderness, and no deformity.  No red or swollen joints anywhere.    Neurological:  Alert and oriented to person, place, and time.  No cranial nerve deficit. Chronic L sided paralysis.    Skin:  Skin is warm and dry. No pallor. Significant intertriginous erythema under panus, consistent with yeast infection.   Peripheral vascular:  No clubbing, no cyanosis, trace edema.  Psychiatric: Appropriate mood and affect  Edited by: Ramon Marc MD at 9/26/2024 1701  ---------------------------------------------------------------------------------------------------------------------  EKG:  N/A    Telemetry:  normal sinus rhythm, no significant ST changes    I have personally looked at telemetry.    Last echocardiogram:  Ordered and pending   --------------------------------------------------------------------------------------------------------------------  Labs:  Results from last 7 days   Lab Units 09/26/24  1504 09/26/24  1350   LACTATE mmol/L  --  1.1   CRP  "mg/dL 3.80*  --    WBC 10*3/mm3  --  10.42   HEMOGLOBIN g/dL  --  12.5   HEMATOCRIT %  --  37.4   MCV fL  --  94.0   MCHC g/dL  --  33.4   PLATELETS 10*3/mm3  --  402         Results from last 7 days   Lab Units 09/26/24  1504   SODIUM mmol/L 140   POTASSIUM mmol/L 2.7*   MAGNESIUM mg/dL 1.5*   CHLORIDE mmol/L 99   CO2 mmol/L 28.9   BUN mg/dL 10   CREATININE mg/dL 0.51*   CALCIUM mg/dL 9.4   GLUCOSE mg/dL 220*   ALBUMIN g/dL 3.5   BILIRUBIN mg/dL 0.6   ALK PHOS U/L 119*   AST (SGOT) U/L 21   ALT (SGPT) U/L 8   Estimated Creatinine Clearance: 188.1 mL/min (A) (by C-G formula based on SCr of 0.51 mg/dL (L)).  No results found for: \"AMMONIA\"          No results found for: \"HGBA1C\", \"POCGLU\"  No results found for: \"TSH\", \"FREET4\"  No results found for: \"PREGTESTUR\", \"PREGSERUM\", \"HCG\", \"HCGQUANT\"  Pain Management Panel           No data to display              Brief Urine Lab Results  (Last result in the past 365 days)        Color   Clarity   Blood   Leuk Est   Nitrite   Protein   CREAT   Urine HCG        09/26/24 1419 Dark Yellow   Turbid   Trace   Moderate (2+)   Positive   30 mg/dL (1+)                 No results found for: \"BLOODCX\"  No results found for: \"URINECX\"  No results found for: \"WOUNDCX\"  No results found for: \"STOOLCX\"    I have personally looked at the labs and they are summarized above.  ----------------------------------------------------------------------------------------------------------------------  Detailed radiology reports for the last 24 hours:    Imaging Results (Last 24 Hours)       Procedure Component Value Units Date/Time    XR Ankle 3+ View Left [[id number]] Collected: 09/26/24 1537     Updated: 09/26/24 1540    Narrative:      EXAM:    XR Left Ankle Complete, 3 or More Views     EXAM DATE:    9/26/2024 3:17 PM     CLINICAL HISTORY:    left ankle injury     TECHNIQUE:    Frontal, lateral and oblique views of the left ankle.     COMPARISON:    No relevant prior studies available.   "   FINDINGS:    Bones/joints:  See below.      Soft tissues:  Soft tissue swelling, but no acute fracture or  dislocation.       Impression:        Soft tissue swelling, but no acute fracture or dislocation.        This report was finalized on 9/26/2024 3:38 PM by Dr. Moses Otto MD.       XR Foot 3+ View Left [[id number]] Collected: 09/26/24 1536     Updated: 09/26/24 1539    Narrative:      EXAM:    XR Left Foot Complete, 3 or More Views     EXAM DATE:    9/26/2024 3:16 PM     CLINICAL HISTORY:    left foot wound     TECHNIQUE:    Frontal, lateral and oblique views of the left foot.     COMPARISON:    No relevant prior studies available.     FINDINGS:    Bones/joints:  Unremarkable.  No acute fracture.  No dislocation.    Soft tissues:  Unremarkable.  No radiopaque foreign body.       Impression:        No acute findings in the left foot.        This report was finalized on 9/26/2024 3:37 PM by Dr. Moses Otto MD.       XR Chest 1 View [[id number]] Collected: 09/26/24 1406     Updated: 09/26/24 1408    Narrative:      XR CHEST 1 VW-     CLINICAL INDICATION: weakness        COMPARISON: None immediately available      TECHNIQUE: Single frontal view of the chest.     FINDINGS:     LUNGS: Lungs are adequately aerated.      HEART AND MEDIASTINUM: Heart and mediastinal contours are unremarkable        SKELETON: Bony and soft tissue structures are unremarkable.             Impression:      No radiographic evidence of acute cardiac or pulmonary disease.           This report was finalized on 9/26/2024 2:06 PM by Dr. Moses Otto MD.       CT Cervical Spine Without Contrast [[id number]] Collected: 09/26/24 1317     Updated: 09/26/24 1319    Narrative:      CT CERVICAL SPINE WO CONTRAST-     CLINICAL INDICATION: neck pain        COMPARISON: None available     TECHNIQUE: Axial images of the cervical spine were acquired with out any  intravenous contrast. Reformatted images were then created in the  sagittal and coronal  planes.     DOSE:      Radiation dose reduction techniques were utilized per ALARA protocol.  Automated exposure control was initiated through either or [employer] or  [employer] software packages by  protocol.           FINDINGS:   The provided study demonstrates preservation of the vertebral body  heights in the sagittally reconstructed images.     There is no prevertebral soft tissue swelling.     Alignment is near anatomic.     The disc space heights are uniform.     I see no acute cervical spine fracture.       Impression:         1. No acute bony abnormality.     2. Other incidental findings as above     This report was finalized on 9/26/2024 1:17 PM by Dr. Moses Otto MD.       CT Lumbar Spine Without Contrast [[id number]] Collected: 09/26/24 1317     Updated: 09/26/24 1319    Narrative:      EXAM:    CT Lumbar Spine Without Intravenous Contrast     EXAM DATE:    9/26/2024 12:59 PM     CLINICAL HISTORY:    back pain     TECHNIQUE:    Axial computed tomography images of the lumbar spine without  intravenous contrast.  Sagittal and coronal reformatted images were  created and reviewed.  This CT exam was performed using one or more of  the following dose reduction techniques:  automated exposure control,  adjustment of the mA and/or kV according to patient size, and/or use of  iterative reconstruction technique.     COMPARISON:    No relevant prior studies available.     FINDINGS:    VERTEBRAE:  Unremarkable.  No acute fracture.    DISCS/SPINAL CANAL/NEURAL FORAMINA:  No acute findings.  No spinal  canal stenosis.    SOFT TISSUES:  Unremarkable.       Impression:        No acute findings in the lumbar spine.        This report was finalized on 9/26/2024 1:17 PM by Dr. Moses Otto MD.       CT Thoracic Spine Without Contrast [[id number]] Collected: 09/26/24 1316     Updated: 09/26/24 1319    Narrative:      EXAM:    CT Thoracic Spine Without Intravenous Contrast     EXAM DATE:    9/26/2024 12:58 PM      CLINICAL HISTORY:    back pain     TECHNIQUE:    Axial computed tomography images of the thoracic spine without  intravenous contrast.  Sagittal and coronal reformatted images were  created and reviewed.  This CT exam was performed using one or more of  the following dose reduction techniques:  automated exposure control,  adjustment of the mA and/or kV according to patient size, and/or use of  iterative reconstruction technique.     COMPARISON:    No relevant prior studies available.     FINDINGS:    VERTEBRAE:  Unremarkable.  No acute fracture.    DISCS/SPINAL CANAL/NEURAL FORAMINA:  No acute findings.  No spinal  canal stenosis.    SOFT TISSUES:  Unremarkable.       Impression:        No acute findings in the thoracic spine.        This report was finalized on 9/26/2024 1:17 PM by Dr. Moses Otto MD.       CT Head Without Contrast [[id number]] Collected: 09/26/24 1259     Updated: 09/26/24 1302    Narrative:      CT HEAD WO CONTRAST-     CLINICAL INDICATION: weakness        COMPARISON: None available     TECHNIQUE: Axial images of the brain were obtained with out intravenous  contrast.  Reformatted images were created in the sagittal and coronal  planes.     DOSE:     Radiation dose reduction techniques were utilized per ALARA protocol.  Automated exposure control was initiated through either or [employer] or  [employer] software packages by  protocol.        FINDINGS:    BRAIN: Probable subacute ischemia right middle cerebral artery  territory    VENTRICLES:  Unremarkable.  No ventriculomegaly.       BONES/JOINTS:  Unremarkable.  No acute fracture.       SOFT TISSUES:  Unremarkable.       SINUSES:  no air fluid levels       MASTOID AIR CELLS:  Unremarkable as visualized.  No mastoid effusion.          Impression:         1. Probable remote right middle cerebral artery infarction  2. No acute parenchymal mass, hemorrhage, or midline shift     This report was finalized on 9/26/2024 1:00 PM by   Moses Otto MD.             I have personally looked at the radiology images and read the final radiology report.    Assessment & Plan    43F Morbid Obesity by BMI PMH History Genital Herpes, Cerebrovascular Accident 5/2024 complicated by L sided paralysis, presented to [facility] emergency room 9/26 after sliding into the floor off her chair due to weakness.     #Acute Metabolic Encephalopathy due to Acute Urinary Tract Infection in setting of probable neurogenic bladder  - Continue Ceftriaxone, follow up cultures, rule out STI    #Electrolyte Abnormalities  - Acute Mild Hypokalemia - Replacing, on protocol  - Acute Mild Hypomagnesemia - Replacing, on protocol    #History Cerebrovascular Accident complicated by L sided paralysis, unclear etiology  - Review home medications and resume as indicated, Supportive Care     #Debility  - Consult PT/OT, Social Work if placement needed     #History Genital Herpes  - Supportive Care, follow up medication reconciliation for any suppressive medications, check HIV & acute hepatitis panel     #Morbid Obesity by BMI  - Body mass index is 43.26 kg/m².; complicates all aspects of care    F: Oral  E: Monitor & Replace as needed   N: Regular Diet  PPx: SQH  Code Status (Patient has no pulse and is not breathing): CPR  Medical Interventions (Patient has pulse or is breathing): Full Support     Dispo: Pending workup and clinical improvement    *This patient is considered high risk secondary to Urinary Tract Infection, electrolyte abnormalities, chronic L sided paralysis from prior Cerebrovascular Accident.      Edited by: Ramon Marc MD at 9/26/2024 1701    Ramon Marc MD  [facility] Hospitalist  09/26/24  17:01 EDT        Electronically signed by Ramon Marc MD at 09/26/24 1703       Vital Signs (last day)       Date/Time Temp Temp src Pulse Resp BP Patient Position SpO2    11/21/24 0916 -- -- 111 -- 125/78 Lying --    11/21/24 0600 97.8 (36.6) Oral 99  16 108/65 Lying --    11/20/24 2331 97.8 (36.6) Oral 110 16 94/55 Lying --    11/20/24 1900 98.2 (36.8) Oral 107 16 107/60 Lying --    11/20/24 1000 97.5 (36.4) Oral 113 16 108/63 Lying --    11/20/24 0600 98.1 (36.7) Oral 97 16 93/59 Lying --          Intake & Output (last day)         11/20 0701  11/21 0700 11/21 0701  11/22 0700    P.O. 1360 840    Total Intake(mL/kg) 1360 (13.3) 840 (8.2)    Urine (mL/kg/hr) 1800 (0.7)     Total Output 1800     Net -440 +840          Urine Unmeasured Occurrence 3 x           Lines, Drains & Airways       Active LDAs       Name Placement date Placement time Site Days    External Urinary Catheter --  --  --  --                  Current Facility-Administered Medications   Medication Dose Route Frequency Provider Last Rate Last Admin    acetaminophen (TYLENOL) tablet 650 mg  650 mg Oral Q6H PRN Ashleigh Nicholas PA-C   650 mg at 11/21/24 1054    acyclovir (ZOVIRAX) capsule 400 mg  400 mg Oral Nightly OculMara everett MD   400 mg at 11/20/24 2003    aspirin chewable tablet 81 mg  81 mg Oral Daily Ramon Marc MD   81 mg at 11/21/24 0916    atorvastatin (LIPITOR) tablet 80 mg  80 mg Oral Daily Ramon Marc MD   80 mg at 11/21/24 0916    sennosides-docusate (PERICOLACE) 8.6-50 MG per tablet 2 tablet  2 tablet Oral BID PRN Ramon Marc MD   2 tablet at 11/01/24 1720    And    polyethylene glycol (MIRALAX) packet 17 g  17 g Oral Daily PRN Ramon Marc MD   17 g at 10/30/24 1751    And    bisacodyl (DULCOLAX) EC tablet 5 mg  5 mg Oral Daily PRN Ramon Marc MD   5 mg at 10/31/24 0939    And    bisacodyl (DULCOLAX) suppository 10 mg  10 mg Rectal Daily PRN Ramon Marc MD        Calcium Replacement - Follow Nurse / BPA Driven Protocol   Not Applicable PRN Ramon Marc MD        cyclobenzaprine (FLEXERIL) tablet 10 mg  10 mg Oral TID PRN Ramon Marc MD   10 mg at 11/19/24 0622    dextrose (D50W) (25 g/50 mL) IV injection 25 g  25 g Intravenous Q15 Min PRN Marv Navas  DO Sacha        dextrose (GLUTOSE) oral gel 15 g  15 g Oral Q15 Min PRN Marv Navas DO        Diclofenac Sodium (VOLTAREN) 1 % gel 4 g  4 g Topical 4x Daily PRN Ashleigh Nichoals PA-C   4 g at 11/18/24 1209    DULoxetine (CYMBALTA) DR capsule 60 mg  60 mg Oral Daily Ramon Marc MD   60 mg at 11/21/24 0916    glucagon HCl (Diagnostic) injection 1 mg  1 mg Intramuscular Q15 Min PRN Marv Navas DO        heparin (porcine) 5000 UNIT/ML injection 5,000 Units  5,000 Units Subcutaneous Q8H Ramon Marc MD   5,000 Units at 11/21/24 0507    insulin glargine (LANTUS, SEMGLEE) injection 20 Units  20 Units Subcutaneous Daily With Breakfast Bruce Branham PA-C   20 Units at 11/21/24 0916    insulin glargine (LANTUS, SEMGLEE) injection 40 Units  40 Units Subcutaneous Nightly Bruce Branham PA-C   40 Units at 11/20/24 2004    Insulin Lispro (humaLOG) injection 4-24 Units  4-24 Units Subcutaneous 4x Daily AC & at Bedtime Marv Navas DO   8 Units at 11/21/24 1144    Insulin Lispro (humaLOG) injection 8 Units  8 Units Subcutaneous Daily With Lunch Bruce Branham PA-C   8 Units at 11/21/24 1144    Lidocaine 4 % 3 patch  3 patch Transdermal Q24H Bruce Branham PA-C   3 patch at 11/20/24 1723    lisinopril (PRINIVIL,ZESTRIL) tablet 20 mg  20 mg Oral Daily Ramon Marc MD   20 mg at 11/21/24 0916    loperamide (IMODIUM) capsule 2 mg  2 mg Oral 4x Daily PRN Marv Navas DO   2 mg at 10/15/24 0314    magic barrier cream   Topical BID Marv Navas DO   Given at 11/21/24 0917    Magnesium Standard Dose Replacement - Follow Nurse / BPA Driven Protocol   Not Applicable PRN Ramon Marc MD        melatonin tablet 5 mg  5 mg Oral Nightly PRN Wesley Matthews APRN   5 mg at 11/20/24 2003    metoprolol tartrate (LOPRESSOR) tablet 25 mg  25 mg Oral Q12H Mara Navas MD   25 mg at 11/21/24 0916    nicotine (NICODERM CQ) 7 MG/24HR  patch 1 patch  1 patch Transdermal Q24H Ramon Marc MD   1 patch at 24 0915    nystatin (MYCOSTATIN) powder   Topical Q12H Ramon Marc MD   Given at 24 0917    pantoprazole (PROTONIX) EC tablet 40 mg  40 mg Oral Daily Ramon Marc MD   40 mg at 24 0916    Pharmacy Consult   Not Applicable Continuous PRN Ramon Marc MD        Phosphorus Replacement - Follow Nurse / BPA Driven Protocol   Not Applicable PRN Ramon Marc MD        Potassium Replacement - Follow Nurse / BPA Driven Protocol   Not Applicable PRN Ramon Marc MD        pregabalin (LYRICA) capsule 25 mg  25 mg Oral Q8H NwBerhane vicente MD   25 mg at 24 0507    prochlorperazine (COMPAZINE) tablet 5 mg  5 mg Oral Q6H PRN Sandeep Cunha MD   5 mg at 24 2304    sodium chloride 0.9 % flush 10 mL  10 mL Intravenous Q12H Ramon Marc MD   10 mL at 24 0829    sodium chloride 0.9 % flush 10 mL  10 mL Intravenous PRN Ramon Marc MD        sodium chloride 0.9 % flush 10 mL  10 mL Intravenous Q12H Marv Navas DO   10 mL at 24 0829    sodium chloride 0.9 % flush 10 mL  10 mL Intravenous PRN Marv Navas DO        sodium chloride 0.9 % infusion 40 mL  40 mL Intravenous PRN Ramon Marc MD        sodium chloride 0.9 % infusion 40 mL  40 mL Intravenous PRN Marv Navas DO        traMADol (ULTRAM) tablet 50 mg  50 mg Oral Q8H PRN Mara Navas MD   50 mg at 24 2349     Operative/Procedure Notes (most recent note)    No notes of this type exist for this encounter.          Physician Progress Notes (most recent note)        Bruce Branham PA-C at 24 1228          Patient Identification:  Name:  Lety Johnson  Age:  44 y.o.  Sex:  female  :  1980  MRN:  [id number]  Visit Number:  [id number]  Primary Care Provider:  Concha Rao, YUE    Length of stay:  53    Subjective/Interval History/Consultants/Procedures      Chief Complaint:   Chief Complaint   Patient presents with    Fall       Subjective/Interval History:    44 y.o. female who was admitted on 9/26/2024 with UTI     PMH is significant for CVA w/ left sided hemiparesis, debility, hx genital herpes on suppressive therapy, morbid obesity, T2DM   For complete admission information, please see history and physical.     Consultations:  Infectious disease  PT/OT  CM/SW  Psychiatry     Procedures/Scans:  CT head without contrast  CT C-spine without contrast  CT T-spine without contrast  CT L-spine without contrast  CXR x 2  XR left foot  XR left ankle    Today, the patient was seen and examined, resting comfortably. No new complains voiced today. Psychiatry followed up, see below. No acute events overnight.      Room location at the time of evaluation was 332.    ----------------------------------------------------------------------------------------------------------------------  Current Hospital Meds:  acyclovir, 400 mg, Oral, Nightly  aspirin, 81 mg, Oral, Daily  atorvastatin, 80 mg, Oral, Daily  DULoxetine, 60 mg, Oral, Daily  heparin (porcine), 5,000 Units, Subcutaneous, Q8H  insulin glargine, 20 Units, Subcutaneous, Daily With Breakfast  insulin glargine, 40 Units, Subcutaneous, Nightly  insulin lispro, 4-24 Units, Subcutaneous, 4x Daily AC & at Bedtime  insulin lispro, 8 Units, Subcutaneous, Daily With Lunch  Lidocaine, 3 patch, Transdermal, Q24H  lisinopril, 20 mg, Oral, Daily  magic barrier cream, , Topical, BID  metoprolol tartrate, 25 mg, Oral, Q12H  nicotine, 1 patch, Transdermal, Q24H  nystatin, , Topical, Q12H  pantoprazole, 40 mg, Oral, Daily  pregabalin, 25 mg, Oral, Q8H  sodium chloride, 10 mL, Intravenous, Q12H  sodium chloride, 10 mL, Intravenous, Q12H      Pharmacy Consult,       ----------------------------------------------------------------------------------------------------------------------      Objective     Vital Signs:  Temp:  [97.4 °F (36.3  °C)-98.1 °F (36.7 °C)] 97.5 °F (36.4 °C)  Heart Rate:  [] 113  Resp:  [16] 16  BP: ()/(53-63) 108/63      10/07/24  0511 10/08/24  0500 10/09/24  0500   Weight: 102 kg (225 lb 14.4 oz) (FIFI cameron) 102 kg (225 lb 15.5 oz) 102 kg (224 lb 1.6 oz)     Body mass index is 36.73 kg/m².    Intake/Output Summary (Last 24 hours) at 11/20/2024 1228  Last data filed at 11/20/2024 0300  Gross per 24 hour   Intake 600 ml   Output 1800 ml   Net -1200 ml     No intake/output data recorded.  Diet: Regular/House, Diabetic; Consistent Carbohydrate; Fluid Consistency: Thin (IDDSI 0)  ----------------------------------------------------------------------------------------------------------------------    Physical Exam  Vitals and nursing note reviewed.   Constitutional:       General: She is not in acute distress.     Appearance: She is obese. She is ill-appearing.   HENT:      Head: Normocephalic and atraumatic.   Eyes:      Extraocular Movements: Extraocular movements intact.   Cardiovascular:      Rate and Rhythm: Normal rate.   Pulmonary:      Effort: Pulmonary effort is normal. No respiratory distress.   Abdominal:      Palpations: Abdomen is soft.   Musculoskeletal:      Right lower leg: No edema.      Left lower leg: No edema.   Skin:     General: Skin is warm and dry.   Neurological:      Mental Status: She is alert. Mental status is at baseline.   Psychiatric:         Mood and Affect: Mood normal.         Behavior: Behavior normal.                ----------------------------------------------------------------------------------------------------------------------  Tele:      ----------------------------------------------------------------------------------------------------------------------              Results from last 7 days   Lab Units 11/19/24  0129 11/15/24  0237   SODIUM mmol/L 136 140   POTASSIUM mmol/L 4.3 4.4   MAGNESIUM mg/dL 1.8  --    CHLORIDE mmol/L 101 104   CO2 mmol/L 24.4 25.1   BUN mg/dL 21*  "20   CREATININE mg/dL 0.57 0.50*   CALCIUM mg/dL 9.3 9.6   GLUCOSE mg/dL 142* 125*   Estimated Creatinine Clearance: 152.1 mL/min (by C-G formula based on SCr of 0.57 mg/dL).  No results found for: \"AMMONIA\"      No results found for: \"BLOODCX\"  No results found for: \"URINECX\"  No results found for: \"WOUNDCX\"  No results found for: \"STOOLCX\"  ----------------------------------------------------------------------------------------------------------------------  Imaging Results (Last 24 Hours)       ** No results found for the last 24 hours. **          ----------------------------------------------------------------------------------------------------------------------   I have reviewed the above laboratory values for 11/20/24    Assessment/Plan     There are no active hospital problems to display for this patient.        ASSESSMENT/PLAN:    ESBL E. coli UTI  Acute metabolic encephalopathy, resolved  S/p Invanz.  Infectious disease input appreciated.     Hx CVA with left-sided hemiparesis  Chronic debility  Continue PT OT  CM/SW assisting with discharge planning.  Patient is pending placement.     T2DM  Diabetic neuropathy  Continue insulin regimen-titrate as needed. Add scheduled meal time insulin w/ lunch.   Continue supportive care measures, Cymbalta  Lyrica added 11/13 given persistent pain with some improvement noted.      Hx genital herpes  Continue suppressive acyclovir     Morbid obesity  Complicates all aspects of care     Depressive disorder  Adjustment disorder w/ disturbance of mood and anxiety   Patient with somewhat labile mood, tearful at times. Reports history of depression as well, likely exacerbated by current health status/hospitalization.  She is taking cymbalta as above  Psychiatry consulted, input appreciated. Felt in the setting of recent stressors findings consistent with adjustment disorder on top of patient's unspecified depressive disorder with patient declining any medication adjustments " at this time.   May consider increasing cymbalta if symptoms worsen.  May benefit from psychotherapy referral at discharge.     -----------  -DVT prophylaxis: SQH  -Disposition plans/anticipated needs: Pending placement        The patient is high risk due to the following diagnoses/reasons:  debility, hx CVA w/ hemiparesis         Bruce Branham PA-C  11/20/24  12:28 EST     Electronically signed by Bruce Branham PA-C at 11/20/24 1233          Consult Notes (most recent note)        Prasanth Ramirez MD at 11/20/24 0959          Referring Provider: Yonas  Reason for Consultation: depression      Chief complaint/Focus of Exam: depression    Subjective .     History of present illness:    43-year-old female with a history of CVA admitted on 9/26/2024 for management of encephalopathy, weakness, fall.  Psychiatry consulted for depression.    Patient is seen in room today.  She was awake, alert and oriented x 4.  She reports feeling down earlier in hospitalization, but says that she was able to speak to her family and avancé last night, which helped a lot.  Sharon has a court date and will potentially be moved to house arrest in January.  She denies significant depressive symptoms or need for medication changes at this time.  Denies SI, HI, AVH.    Review of Systems  Pertinent items are noted in HPI, all other systems reviewed and negative    History  Past Medical History:   Diagnosis Date    Stroke    , History reviewed. No pertinent surgical history., History reviewed. No pertinent family history.,   Social History     Socioeconomic History    Marital status: Single   Tobacco Use    Smoking status: Every Day     Average packs/day: 2.0 packs/day for 26.0 years (52.0 ttl pk-yrs)     Types: Cigarettes     Start date: 1998    Smokeless tobacco: Never   Vaping Use    Vaping status: Never Used   Substance and Sexual Activity    Alcohol use: Not Currently    Drug use: Never    Sexual activity: Not Currently      Partners: Male     Comment:  in longterm     E-cigarette/Vaping    E-cigarette/Vaping Use Never User     Passive Exposure No     Counseling Given No      E-cigarette/Vaping Substances    Nicotine No     THC No     CBD No     Flavoring No      E-cigarette/Vaping Devices    Disposable No     Pre-filled or Refillable Cartridge No     Refillable Tank No     Pre-filled Pod No          ,   Medications Prior to Admission   Medication Sig Dispense Refill Last Dose/Taking    aspirin 81 MG chewable tablet Chew 1 tablet Daily.   Unknown    atorvastatin (LIPITOR) 80 MG tablet Take 1 tablet by mouth Daily.   Unknown    cyclobenzaprine (FLEXERIL) 10 MG tablet Take 1 tablet by mouth 3 (Three) Times a Day As Needed for Muscle Spasms.   Unknown    DULoxetine (CYMBALTA) 60 MG capsule Take 1 capsule by mouth Daily.   Unknown    lisinopril (PRINIVIL,ZESTRIL) 20 MG tablet Take 1 tablet by mouth Daily.   Unknown    metFORMIN (GLUCOPHAGE) 1000 MG tablet Take 1 tablet by mouth 2 (Two) Times a Day With Meals.   Unknown    pantoprazole (PROTONIX) 40 MG EC tablet Take 1 tablet by mouth Daily.   Unknown    [DISCONTINUED] acyclovir (ZOVIRAX) 400 MG tablet Take 1 tablet by mouth Every Night.      , Scheduled Meds:  acyclovir, 400 mg, Oral, Nightly  aspirin, 81 mg, Oral, Daily  atorvastatin, 80 mg, Oral, Daily  DULoxetine, 60 mg, Oral, Daily  heparin (porcine), 5,000 Units, Subcutaneous, Q8H  insulin glargine, 20 Units, Subcutaneous, Daily With Breakfast  insulin glargine, 40 Units, Subcutaneous, Nightly  insulin lispro, 4-24 Units, Subcutaneous, 4x Daily AC & at Bedtime  insulin lispro, 8 Units, Subcutaneous, Daily With Lunch  Lidocaine, 3 patch, Transdermal, Q24H  lisinopril, 20 mg, Oral, Daily  magic barrier cream, , Topical, BID  metoprolol tartrate, 25 mg, Oral, Q12H  nicotine, 1 patch, Transdermal, Q24H  nystatin, , Topical, Q12H  pantoprazole, 40 mg, Oral, Daily  pregabalin, 25 mg, Oral, Q8H  sodium chloride, 10 mL, Intravenous,  Q12H  sodium chloride, 10 mL, Intravenous, Q12H   , Continuous Infusions:  Pharmacy Consult,    , PRN Meds:    acetaminophen    senna-docusate sodium **AND** polyethylene glycol **AND** bisacodyl **AND** bisacodyl    Calcium Replacement - Follow Nurse / BPA Driven Protocol    cyclobenzaprine    dextrose    dextrose    Diclofenac Sodium    glucagon (human recombinant)    loperamide    Magnesium Standard Dose Replacement - Follow Nurse / BPA Driven Protocol    melatonin    Pharmacy Consult    Phosphorus Replacement - Follow Nurse / BPA Driven Protocol    Potassium Replacement - Follow Nurse / BPA Driven Protocol    prochlorperazine    sodium chloride    sodium chloride    sodium chloride    sodium chloride    traMADol, and Allergies:  Patient has no known allergies.    Objective     Vital Signs   Temp:  [97.4 °F (36.3 °C)-98.1 °F (36.7 °C)] 98.1 °F (36.7 °C)  Heart Rate:  [] 97  Resp:  [16] 16  BP: ()/(53-68) 93/59    Mental Status Exam:  Hygiene:   good  Cooperation:  Cooperative  Eye Contact:  Good  Psychomotor Behavior:  Appropriate  Affect:  Full range  Hopelessness: Denies  Speech:  Normal  Thought Progress:  Goal directed and Linear  Thought Content:  Normal  Suicidal:  None  Homicidal:  None  Hallucinations:  None  Delusion:  None  Memory:  Intact  Orientation:  Person, Place, Time, and Situation  Reliability:  fair  Insight:  Fair  Judgement:  Fair  Impulse Control:  Fair    Results Review:   I reviewed the patient's new clinical results.  I reviewed the patient's other test results and agree with the interpretation  I personally viewed and interpreted the patient's EKG/Telemetry data  Lab Results (last 24 hours)       Procedure Component Value Units Date/Time    POC Glucose Once [[id number]]  (Normal) Collected: 11/20/24 0650    Specimen: Blood Updated: 11/20/24 0656     Glucose 102 mg/dL     POC Glucose Once [[id number]]  (Abnormal) Collected: 11/19/24 1944    Specimen: Blood Updated: 11/19/24 1950      Glucose 310 mg/dL     POC Glucose Once [[id number]]  (Abnormal) Collected: 24 1557    Specimen: Blood Updated: 24 1604     Glucose 177 mg/dL     POC Glucose Once [[id number]]  (Abnormal) Collected: 24 1033    Specimen: Blood Updated: 24 1039     Glucose 280 mg/dL           Imaging Results (Last 24 Hours)       ** No results found for the last 24 hours. **              Assessment & Plan     Active Problems:    * No active hospital problems. *     Adjustment disorder with disturbance of mood and anxiety  -Recent stressors appear situational, potentially personality related.  Patient reports feeling better now and denies need for medication changes.  -Refer for outpatient psychotherapy at discharge    Unspecified depressive disorder  -Continue Cymbalta 60 mg daily.  Could increase to 90 mg daily if depressive symptoms worsen    I discussed the patient's findings and my recommendations with patient    Prasanth Ramirez MD  24  09:59 EST    Electronically signed by Prasanth Ramirez MD at 24 1113          Physical Therapy Notes (most recent note)        Giovana Quinonez, PT Student at 24 1116  Version 1 of 1      Attestation signed by Thierry Saldivar, PT at 24 1121                     Acute Care - Physical Therapy Treatment Note  [facility]     Patient Name: Lety Johnson  : 1980  MRN: [id number]  Today's Date: 2024   Onset of Illness/Injury or Date of Surgery: 24  Visit Dx:     ICD-10-CM ICD-9-CM   1. Hypokalemia  E87.6 276.8   2. Pressure injury of skin of sacral region, unspecified injury stage  L89.159 707.03     707.20   3. Pressure injury of skin of left heel, unspecified injury stage  L89.629 707.07     707.20   4. Acute cystitis without hematuria  N30.00 595.0     Patient Active Problem List   Diagnosis   (none) - all problems resolved or deleted     Past Medical History:   Diagnosis Date    Stroke      History reviewed. No pertinent surgical  history.  PT Assessment (Last 12 Hours)       PT Evaluation and Treatment       Row Name 11/21/24 1000          Physical Therapy Time and Intention    Subjective Information complains of;weakness;pain;numbness (P)   -     Document Type therapy note (daily note) (P)   -     Mode of Treatment physical therapy (P)   -     Patient Effort adequate (P)   -     Symptoms Noted During/After Treatment increased pain (P)   -       Row Name 11/21/24 1000          General Information    Patient Profile Reviewed yes (P)   -       Row Name 11/21/24 1000          Bed Mobility    Bed Mobility supine-sit;sit-supine (P)   -     Rolling Left Roane (Bed Mobility) maximum assist (25% patient effort);2 person assist (P)   -HP     Rolling Right [place] (Bed Mobility) maximum assist (25% patient effort);2 person assist (P)   -     Scooting/Bridging Roane (Bed Mobility) dependent (less than 25% patient effort) (P)   -     Supine-Sit Roane (Bed Mobility) maximum assist (25% patient effort);2 person assist (P)   -     Sit-Supine Roane (Bed Mobility) maximum assist (25% patient effort);2 person assist (P)   -       Row Name 11/21/24 1000          Motor Skills    Therapeutic Exercise knee;hip (P)   -       Row Name 11/21/24 1000          Hip (Therapeutic Exercise)    Hip (Therapeutic Exercise) isometric exercises (P)   -     Hip Isometrics (Therapeutic Exercise) supine;right;aDduction (P)   3x10  -[facility] Name 11/21/24 1000          Knee (Therapeutic Exercise)    Knee (Therapeutic Exercise) isometric exercises (P)   -     Knee Isometrics (Therapeutic Exercise) supine;quad sets;bilateral (P)   3x10 on R 10 reps on L  -       Row Name             Wound 09/26/24 1809 Left posterior ankle Other (comment)    Wound - Properties Group Placement Date: 09/26/24  -KJ Placement Time: 1809 -KJ Side: Left  -KJ Orientation: posterior  -KJ Location: ankle  -KJ Primary Wound Type: Other  -TW,  unknow etiology     Retired Wound - Properties Group Placement Date: 09/26/24  -KJ Placement Time: 1809  -KJ Side: Left  -KJ Orientation: posterior  -KJ Location: ankle  -KJ Primary Wound Type: Other  -TW, unknow etiology     Retired Wound - Properties Group Placement Date: 09/26/24  -KJ Placement Time: 1809  -KJ Side: Left  -KJ Orientation: posterior  -KJ Location: ankle  -KJ Primary Wound Type: Other  -TW, unknow etiology     Retired Wound - Properties Group Date first assessed: 09/26/24  -KJ Time first assessed: 1809  -KJ Side: Left  -KJ Location: ankle  -KJ Primary Wound Type: Other  -TW, unknow etiology       Row Name             Wound 10/11/24 1330 medial coccyx Fissure    Wound - Properties Group Placement Date: 10/11/24  -TW Placement Time: 1330 -TW Orientation: medial  -TW Location: coccyx  -TW Primary Wound Type: Fissure  -TW    Retired Wound - Properties Group Placement Date: 10/11/24  -TW Placement Time: 1330 -TW Orientation: medial  -TW Location: coccyx  -TW Primary Wound Type: Fissure  -TW    Retired Wound - Properties Group Placement Date: 10/11/24  -TW Placement Time: 1330  -TW Orientation: medial  -TW Location: coccyx  -TW Primary Wound Type: Fissure  -TW    Retired Wound - Properties Group Date first assessed: 10/11/24  -TW Time first assessed: 1330  -TW Location: coccyx  -TW Primary Wound Type: Fissure  -TW      Row Name             Wound 10/16/24 0705 Left medial gluteal MASD (moisture associated skin damage)    Wound - Properties Group Placement Date: 10/16/24  -MS Placement Time: 0705 -MS Side: Left  -MS Orientation: medial  -MS Location: gluteal  -MS Primary Wound Type: MASD  -MS Type: MASD (moisture associated skin damage)  -MS Present on Original Admission: N  -MS Additional Comments: Noted at shift change skin assesment, Night shift RN stated it had been there her shift.  -MS    Retired Wound - Properties Group Placement Date: 10/16/24  -MS Placement Time: 0705 -MS Present on Original  Admission: N  -MS Side: Left  -MS Orientation: medial  -MS Location: gluteal  -MS Primary Wound Type: MASD  -MS Additional Comments: Noted at shift change skin assesment, Night shift RN stated it had been there her shift.  -MS Type: MASD (moisture associated skin damage)  -MS    Retired Wound - Properties Group Placement Date: 10/16/24  -MS Placement Time: 0705 -MS Present on Original Admission: N  -MS Side: Left  -MS Orientation: medial  -MS Location: gluteal  -MS Primary Wound Type: MASD  -MS Additional Comments: Noted at shift change skin assesment, Night shift RN stated it had been there her shift.  -MS Type: MASD (moisture associated skin damage)  -MS    Retired Wound - Properties Group Date first assessed: 10/16/24  -MS Time first assessed: 0705 -MS Present on Original Admission: N  -MS Side: Left  -MS Location: gluteal  -MS Primary Wound Type: MASD  -MS Additional Comments: Noted at shift change skin assesment, Night shift RN stated it had been there her shift.  -MS Type: MASD (moisture associated skin damage)  -MS      Row Name             Wound 10/16/24 0705 Left posterior greater trochanter MASD (moisture associated skin damage)    Wound - Properties Group Placement Date: 10/16/24  -MS Placement Time: 0705 -MS Side: Left  -MS Orientation: posterior  -MS Location: greater trochanter  -MS Primary Wound Type: MASD  -MS Type: MASD (moisture associated skin damage)  -MS Present on Original Admission: N  -MS    Retired Wound - Properties Group Placement Date: 10/16/24  -MS Placement Time: 0705 -MS Present on Original Admission: N  -MS Side: Left  -MS Orientation: posterior  -MS Location: greater trochanter  -MS Primary Wound Type: MASD  -MS Type: MASD (moisture associated skin damage)  -MS    Retired Wound - Properties Group Placement Date: 10/16/24  -MS Placement Time: 0705 -MS Present on Original Admission: N  -MS Side: Left  -MS Orientation: posterior  -MS Location: greater trochanter  -MS Primary Wound  Type: MASD  -MS Type: MASD (moisture associated skin damage)  -MS    Retired Wound - Properties Group Date first assessed: 10/16/24  -MS Time first assessed: 0705  -MS Present on Original Admission: N  -MS Side: Left  -MS Location: greater trochanter  -MS Primary Wound Type: MASD  -MS Type: MASD (moisture associated skin damage)  -MS      Row Name 11/21/24 1000          Plan of Care Review    Plan of Care Reviewed With patient (P)   -HP     Progress no change (P)   -HP     Outcome Evaluation Pt. completed PT treatment session this AM. Pt. completed bed mobility w/ maxAx2-dependent assist. Pt. mainted sitting on EOB for around 10mins. Pt. c/o  L arm, R leg pain during bed mobility and numbness in the R leg while sitting. Pt completed supine therex as well. Will continue w/ POC. (P)   -HP       Row Name 11/21/24 1000          Positioning and Restraints    Pre-Treatment Position in bed (P)   -HP     Post Treatment Position bed (P)   -HP     In Bed supine;call light within reach;encouraged to call for assist (P)   -HP               User Key  (r) = Recorded By, (t) = Taken By, (c) = Cosigned By      Initials Name Provider Type    Karen Peralta, RN Registered Nurse    Nory Keenan, RN Registered Nurse    Roe North, RN Registered Nurse    Giovana Verdin, PT Student PT Student                    Physical Therapy Education       Title: PT OT SLP Therapies (Done)       Topic: Physical Therapy (Done)       Point: Mobility training (Done)       Learning Progress Summary            Patient Acceptance, E, VU by WG at 11/21/2024 0236    Acceptance, E,TB, VU by CF at 11/17/2024 1024    Acceptance, E,TB, VU by RR at 11/14/2024 0148    Acceptance, E,TB, VU by RR at 11/13/2024 0218    Acceptance, E,TB, VU by RG at 11/12/2024 0927    Acceptance, TB,E, VU by RG at 11/11/2024 0944    Acceptance, E,TB, VU by RG at 11/10/2024 0931    Acceptance, E, VU by WG at 11/10/2024 0233    Acceptance, E,TB, VU by CF at  11/9/2024 1016    Acceptance, E, NR by SC at 11/3/2024 0025    Acceptance, E,TB, VU by RR at 11/1/2024 2315    Acceptance, E,D, VU,NR by AG at 10/31/2024 1234    Comment: PT educated pt. in importance of mobilization and L L/UE PROM and self ROM to prevent contracture.  Pt. is encouraged to to perform R LE AROM frequently while supine or sitting EOB.    Acceptance, E,TB, VU by RR at 10/27/2024 0031    Acceptance, E,TB, VU by RG at 10/22/2024 1002    Acceptance, TB,E, VU by RG at 10/21/2024 0907    Nonacceptance, E, NR by WG at 10/16/2024 0228    Acceptance, E,TB, VU by CF at 10/15/2024 0753    Acceptance, E,TB, VU by CF at 10/14/2024 0801    Acceptance, E,TB, VU by CF at 10/13/2024 1045    Acceptance, E,D, VU,NR by AG at 10/10/2024 1310    Acceptance, E,TB, VU by CB at 10/8/2024 0448    Acceptance, E, NR by RD at 10/2/2024 1101    Acceptance, E, NR by RD at 10/1/2024 0856    Acceptance, E,D, VU,NR by AG at 9/30/2024 1559                      Point: Home exercise program (Done)       Learning Progress Summary            Patient Acceptance, E, VU by WG at 11/21/2024 0236    Acceptance, E,TB, VU by CF at 11/17/2024 1024    Acceptance, E,TB, VU by RR at 11/14/2024 0148    Acceptance, E,TB, VU by RR at 11/13/2024 0218    Acceptance, E,TB, VU by RG at 11/12/2024 0927    Acceptance, TB,E, VU by RG at 11/11/2024 0944    Acceptance, E,TB, VU by RG at 11/10/2024 0931    Acceptance, E, VU by WG at 11/10/2024 0233    Acceptance, E,TB, VU by CF at 11/9/2024 1016    Acceptance, E, NR by SC at 11/3/2024 0025    Acceptance, E,TB, VU by RR at 11/1/2024 2315    Acceptance, E,D, VU,NR by AG at 10/31/2024 1234    Comment: PT educated pt. in importance of mobilization and L L/UE PROM and self ROM to prevent contracture.  Pt. is encouraged to to perform R LE AROM frequently while supine or sitting EOB.    Acceptance, E,TB, VU by RR at 10/27/2024 0031    Acceptance, E,TB, VU by RG at 10/22/2024 1002    Acceptance, TB,E, VU by RG at  10/21/2024 0907    Nonacceptance, E, NR by WG at 10/16/2024 0228    Acceptance, E,TB, VU by CF at 10/15/2024 0753    Acceptance, E,TB, VU by CF at 10/14/2024 0801    Acceptance, E,TB, VU by CF at 10/13/2024 1045    Acceptance, E,D, VU,NR by AG at 10/10/2024 1310    Acceptance, E,TB, VU by CB at 10/8/2024 0448    Acceptance, E, NR by RD at 10/2/2024 1101    Acceptance, E, NR by RD at 10/1/2024 0856    Acceptance, E,D, VU,NR by AG at 9/30/2024 1559                      Point: Body mechanics (Done)       Learning Progress Summary            Patient Acceptance, E, VU by WG at 11/21/2024 0236    Acceptance, E,TB, VU by CF at 11/17/2024 1024    Acceptance, E,TB, VU by RR at 11/14/2024 0148    Acceptance, E,TB, VU by RR at 11/13/2024 0218    Acceptance, E,TB, VU by RG at 11/12/2024 0927    Acceptance, TB,E, VU by RG at 11/11/2024 0944    Acceptance, E,TB, VU by RG at 11/10/2024 0931    Acceptance, E, VU by WG at 11/10/2024 0233    Acceptance, E,TB, VU by CF at 11/9/2024 1016    Acceptance, E, NR by SC at 11/3/2024 0025    Acceptance, E,TB, VU by RR at 11/1/2024 2315    Acceptance, E,D, VU,NR by AG at 10/31/2024 1234    Comment: PT educated pt. in importance of mobilization and L L/UE PROM and self ROM to prevent contracture.  Pt. is encouraged to to perform R LE AROM frequently while supine or sitting EOB.    Acceptance, E,TB, VU by RR at 10/27/2024 0031    Acceptance, E,TB, VU by RG at 10/22/2024 1002    Acceptance, TB,E, VU by RG at 10/21/2024 0907    Nonacceptance, E, NR by WG at 10/16/2024 0228    Acceptance, E,TB, VU by CF at 10/15/2024 0753    Acceptance, E,TB, VU by CF at 10/14/2024 0801    Acceptance, E,TB, VU by CF at 10/13/2024 1045    Acceptance, E,D, VU,NR by AG at 10/10/2024 1310    Acceptance, E,TB, VU by CB at 10/8/2024 0448    Acceptance, E, NR by RD at 10/2/2024 1101    Acceptance, E, NR by RD at 10/1/2024 0856    Acceptance, E,D, VU,NR by AG at 9/30/2024 1559                      Point: Precautions (Done)        Learning Progress Summary            Patient Acceptance, E, VU by WG at 11/21/2024 0236    Acceptance, E,TB, VU by CF at 11/17/2024 1024    Acceptance, E,TB, VU by RR at 11/14/2024 0148    Acceptance, E,TB, VU by RR at 11/13/2024 0218    Acceptance, E,TB, VU by RG at 11/12/2024 0927    Acceptance, TB,E, VU by RG at 11/11/2024 0944    Acceptance, E,TB, VU by RG at 11/10/2024 0931    Acceptance, E, VU by WG at 11/10/2024 0233    Acceptance, E,TB, VU by CF at 11/9/2024 1016    Acceptance, E, NR by SC at 11/3/2024 0025    Acceptance, E,TB, VU by RR at 11/1/2024 2315    Acceptance, E,D, VU,NR by AG at 10/31/2024 1234    Comment: PT educated pt. in importance of mobilization and L L/UE PROM and self ROM to prevent contracture.  Pt. is encouraged to to perform R LE AROM frequently while supine or sitting EOB.    Acceptance, E,TB, VU by RR at 10/27/2024 0031    Acceptance, E,TB, VU by RG at 10/22/2024 1002    Acceptance, TB,E, VU by RG at 10/21/2024 0907    Nonacceptance, E, NR by WG at 10/16/2024 0228    Acceptance, E,TB, VU by CF at 10/15/2024 0753    Acceptance, E,TB, VU by CF at 10/14/2024 0801    Acceptance, E,TB, VU by CF at 10/13/2024 1045    Acceptance, E,D, VU,NR by AG at 10/10/2024 1310    Acceptance, E,TB, VU by CB at 10/8/2024 0448    Acceptance, E, NR by RD at 10/2/2024 1101    Acceptance, E, NR by RD at 10/1/2024 0856    Acceptance, E,D, VU,NR by AG at 9/30/2024 1559                                      User Key       Initials Effective Dates Name Provider Type Discipline    AG 06/16/21 -  Мария Joya, PT Physical Therapist PT    RD 06/16/21 -  Laisha Verdugo, RN Registered Nurse Nurse    RR 06/11/24 -  Jaymie Dominguez, RN Registered Nurse Nurse    RG 06/06/23 -  Jesus Matthews, RN Registered Nurse Nurse    WG 02/12/24 -  Shanthi Burden, RN Registered Nurse Nurse    CF 06/25/24 -  Cherelle Germain, RN Registered Nurse Nurse    CB 06/17/24 -  Fidelia Lombardo, RN Registered Nurse Nurse    SC 09/11/24 -   Sally Hair, RN Registered Nurse Nurse                  PT Recommendation and Plan     Progress Summary (PT)  Daily Progress Summary (PT): Pt. tolerated therapy well but reported that she was in too much pain to sit EOB. Completed supine therex w/ pt. and gave verbal/tactile cues for pt. to complete reps at an appropriate pace and activate quads. Will continue w/ POC.  Plan of Care Reviewed With: (P) patient  Progress: (P) no change  Outcome Evaluation: (P) Pt. completed PT treatment session this AM. Pt. completed bed mobility w/ maxAx2-dependent assist. Pt. mainted sitting on EOB for around 10mins. Pt. c/o  L arm, R leg pain during bed mobility and numbness in the R leg while sitting. Pt completed supine therex as well. Will continue w/ POC.       Time Calculation:    PT Charges       Row Name 24 1115             Time Calculation    PT Received On 24 (P)   -HP         Time Calculation- PT    Total Timed Code Minutes- PT 23 minute(s) (P)   -HP                User Key  (r) = Recorded By, (t) = Taken By, (c) = Cosigned By      Initials Name Provider Type    HP Giovana Quinonez, PT Student PT Student                  Therapy Charges for Today       Code Description Service Date Service Provider Modifiers Qty    42803284863 HC PT THER PROC EA 15 MIN 2024 Giovana Quinonez, PT Student GP 1    [id number]  PT THERAPEUTIC ACT EA 15 MIN 2024 Giovana Quinonez PT Student GP 1            PT G-Codes  AM-PAC 6 Clicks Score (PT): 8    Giovana Quinonez PT Student  2024      Electronically signed by Thierry Saldivar, PT at 24 1121          Occupational Therapy Notes (most recent note)        Kasey Bella, OT at 24 1354          Patient Name: Lety Johnson  : 1980    MRN: [id number]                              Today's Date: 2024       Admit Date: 2024    Visit Dx:     ICD-10-CM ICD-9-CM   1. Hypokalemia  E87.6 276.8   2. Pressure injury of skin of sacral region, unspecified  injury stage  L89.159 707.03     707.20   3. Pressure injury of skin of left heel, unspecified injury stage  L89.629 707.07     707.20   4. Acute cystitis without hematuria  N30.00 595.0     Patient Active Problem List   Diagnosis   (none) - all problems resolved or deleted     Past Medical History:   Diagnosis Date    Stroke      History reviewed. No pertinent surgical history.   General Information       Row Name 11/20/24 1344          OT Time and Intention    Subjective Information complains of;pain  -     Document Type therapy note (daily note)  -     Mode of Treatment individual therapy;occupational therapy  -     Patient Effort adequate  -     Symptoms Noted During/After Treatment increased pain  -     Comment Patient agreeable to therapy. She complained of pain upon sitting, but improved throughout session.  -       Row Name 11/20/24 1344          General Information    Patient Profile Reviewed yes  -     Existing Precautions/Restrictions fall  -       Row Name 11/20/24 1344          Cognition    Orientation Status (Cognition) oriented x 3  -       Row Name 11/20/24 1340          Safety Issues/Impairments Affecting Functional Mobility    Impairments Affecting Function (Mobility) endurance/activity tolerance;pain;range of motion (ROM);strength;coordination;motor control;muscle tone abnormal  -               User Key  (r) = Recorded By, (t) = Taken By, (c) = Cosigned By      Initials Name Provider Type     Kasey Bella OT Occupational Therapist                     Mobility/ADL's       Row Name 11/20/24 1341          Bed Mobility    Bed Mobility supine-sit;sit-supine  -     Supine-Sit [place] (Bed Mobility) maximum assist (25% patient effort);2 person assist  -     Sit-Supine [place] (Bed Mobility) maximum assist (25% patient effort);2 person assist  -     Bed Mobility, Safety Issues decreased use of arms for pushing/pulling;decreased use of legs for bridging/pushing  -  "    Assistive Device (Bed Mobility) bed rails;head of bed elevated  -               User Key  (r) = Recorded By, (t) = Taken By, (c) = Cosigned By      Initials Name Provider Type    Kasey Yang OT Occupational Therapist                   Obj/Interventions       Row Name 11/20/24 1349          Motor Skills    Motor Skills functional endurance  -     Functional Endurance fair minus  -       Row Name 11/20/24 1349          Balance    Balance Interventions sitting;static;minimal challenge;occupation based/functional task  -               User Key  (r) = Recorded By, (t) = Taken By, (c) = Cosigned By      Initials Name Provider Type    Kasey Yang OT Occupational Therapist                   Goals/Plan    No documentation.                  Clinical Impression       Row Name 11/20/24 1350          Pain Assessment    Pretreatment Pain Rating 0/10 - no pain  -     Posttreatment Pain Rating 1/10  -     Pain Side/Orientation generalized  -       Row Name 11/20/24 1350          Plan of Care Review    Plan of Care Reviewed With patient  -     Progress no change  -     Outcome Evaluation Patient agreeable to therapy. She continues to be dependent for bed mobility. She tolerated sitting at edge of bed with standby assist, but complained of incresaed pain \"all over\" initially. She requested to tika bilateral wrist supports due to pain and therapist assisted. She reported she had not been wearing them prior. Continue plan of care.  -       Row Name 11/20/24 1356          Therapy Plan Review/Discharge Plan (OT)    Anticipated Discharge Disposition (OT) skilled nursing facility  -       Row Name 11/20/24 135          Positioning and Restraints    Pre-Treatment Position in bed  -     Post Treatment Position bed  -     In Bed fowlers;call light within reach;encouraged to call for assist;exit alarm on  -               User Key  (r) = Recorded By, (t) = Taken By, (c) = Cosigned By      Initials " "Name Provider Type    Kasey Yang OT Occupational Therapist                   Outcome Measures       Row Name 11/20/24 0850          How much help from another person do you currently need...    Turning from your back to your side while in flat bed without using bedrails? 1  -AW     Moving from lying on back to sitting on the side of a flat bed without bedrails? 1  -AW     Moving to and from a bed to a chair (including a wheelchair)? 1  -AW     Standing up from a chair using your arms (e.g., wheelchair, bedside chair)? 1  -AW     Climbing 3-5 steps with a railing? 1  -AW     To walk in hospital room? 1  -AW     AM-PAC 6 Clicks Score (PT) 6  -AW               User Key  (r) = Recorded By, (t) = Taken By, (c) = Cosigned By      Initials Name Provider Type    Toshia Ivy RN Registered Nurse                    Occupational Therapy Education        No education to display                  OT Recommendation and Plan     Plan of Care Review  Plan of Care Reviewed With: patient  Progress: no change  Outcome Evaluation: Patient agreeable to therapy. She continues to be dependent for bed mobility. She tolerated sitting at edge of bed with standby assist, but complained of incresaed pain \"all over\" initially. She requested to tika bilateral wrist supports due to pain and therapist assisted. She reported she had not been wearing them prior. Continue plan of care.     Time Calculation:         Time Calculation- OT       Row Name 11/20/24 1353             Time Calculation- OT    OT Received On 11/20/24  -                User Key  (r) = Recorded By, (t) = Taken By, (c) = Cosigned By      Initials Name Provider Type    Kasey Yang OT Occupational Therapist                  Therapy Charges for Today       Code Description Service Date Service Provider Modifiers Qty    [id number]  OT THERAPEUTIC ACT EA 15 MIN 11/20/2024 Kasey Bella OT GO 1                 Kasey Bella OT  11/20/2024    Electronically " signed by Kasey Bella, OT at 11/20/24 1194

## 2024-11-22 LAB
ALBUMIN SERPL-MCNC: 3.3 G/DL (ref 3.5–5.2)
ALBUMIN/GLOB SERPL: 1 G/DL
ALP SERPL-CCNC: 109 U/L (ref 39–117)
ALT SERPL W P-5'-P-CCNC: 16 U/L (ref 1–33)
ANION GAP SERPL CALCULATED.3IONS-SCNC: 13.2 MMOL/L (ref 5–15)
AST SERPL-CCNC: 16 U/L (ref 1–32)
BASOPHILS # BLD AUTO: 0.05 10*3/MM3 (ref 0–0.2)
BASOPHILS NFR BLD AUTO: 0.5 % (ref 0–1.5)
BILIRUB SERPL-MCNC: 0.2 MG/DL (ref 0–1.2)
BUN SERPL-MCNC: 23 MG/DL (ref 6–20)
BUN/CREAT SERPL: 43.4 (ref 7–25)
CALCIUM SPEC-SCNC: 9.2 MG/DL (ref 8.6–10.5)
CHLORIDE SERPL-SCNC: 102 MMOL/L (ref 98–107)
CO2 SERPL-SCNC: 22.8 MMOL/L (ref 22–29)
CREAT SERPL-MCNC: 0.53 MG/DL (ref 0.57–1)
DEPRECATED RDW RBC AUTO: 47.2 FL (ref 37–54)
EGFRCR SERPLBLD CKD-EPI 2021: 117.9 ML/MIN/1.73
EOSINOPHIL # BLD AUTO: 0.13 10*3/MM3 (ref 0–0.4)
EOSINOPHIL NFR BLD AUTO: 1.4 % (ref 0.3–6.2)
ERYTHROCYTE [DISTWIDTH] IN BLOOD BY AUTOMATED COUNT: 12.5 % (ref 12.3–15.4)
GLOBULIN UR ELPH-MCNC: 3.2 GM/DL
GLUCOSE BLDC GLUCOMTR-MCNC: 135 MG/DL (ref 70–130)
GLUCOSE BLDC GLUCOMTR-MCNC: 173 MG/DL (ref 70–130)
GLUCOSE BLDC GLUCOMTR-MCNC: 225 MG/DL (ref 70–130)
GLUCOSE BLDC GLUCOMTR-MCNC: 228 MG/DL (ref 70–130)
GLUCOSE SERPL-MCNC: 126 MG/DL (ref 65–99)
HCT VFR BLD AUTO: 36.4 % (ref 34–46.6)
HGB BLD-MCNC: 11.7 G/DL (ref 12–15.9)
IMM GRANULOCYTES # BLD AUTO: 0.04 10*3/MM3 (ref 0–0.05)
IMM GRANULOCYTES NFR BLD AUTO: 0.4 % (ref 0–0.5)
LYMPHOCYTES # BLD AUTO: 3.78 10*3/MM3 (ref 0.7–3.1)
LYMPHOCYTES NFR BLD AUTO: 39.7 % (ref 19.6–45.3)
MCH RBC QN AUTO: 32.9 PG (ref 26.6–33)
MCHC RBC AUTO-ENTMCNC: 32.1 G/DL (ref 31.5–35.7)
MCV RBC AUTO: 102.2 FL (ref 79–97)
MONOCYTES # BLD AUTO: 0.51 10*3/MM3 (ref 0.1–0.9)
MONOCYTES NFR BLD AUTO: 5.4 % (ref 5–12)
NEUTROPHILS NFR BLD AUTO: 5 10*3/MM3 (ref 1.7–7)
NEUTROPHILS NFR BLD AUTO: 52.6 % (ref 42.7–76)
NRBC BLD AUTO-RTO: 0 /100 WBC (ref 0–0.2)
PLATELET # BLD AUTO: 334 10*3/MM3 (ref 140–450)
PMV BLD AUTO: 10.5 FL (ref 6–12)
POTASSIUM SERPL-SCNC: 3.8 MMOL/L (ref 3.5–5.2)
PROT SERPL-MCNC: 6.5 G/DL (ref 6–8.5)
RBC # BLD AUTO: 3.56 10*6/MM3 (ref 3.77–5.28)
SODIUM SERPL-SCNC: 138 MMOL/L (ref 136–145)
WBC NRBC COR # BLD AUTO: 9.51 10*3/MM3 (ref 3.4–10.8)

## 2024-11-22 PROCEDURE — 63710000001 INSULIN LISPRO (HUMAN) PER 5 UNITS

## 2024-11-22 PROCEDURE — 97530 THERAPEUTIC ACTIVITIES: CPT

## 2024-11-22 PROCEDURE — 63710000001 INSULIN LISPRO (HUMAN) PER 5 UNITS: Performed by: INTERNAL MEDICINE

## 2024-11-22 PROCEDURE — 85025 COMPLETE CBC W/AUTO DIFF WBC: CPT

## 2024-11-22 PROCEDURE — 99231 SBSQ HOSP IP/OBS SF/LOW 25: CPT

## 2024-11-22 PROCEDURE — 25010000002 HEPARIN (PORCINE) PER 1000 UNITS: Performed by: INTERNAL MEDICINE

## 2024-11-22 PROCEDURE — 80053 COMPREHEN METABOLIC PANEL: CPT

## 2024-11-22 PROCEDURE — 63710000001 INSULIN GLARGINE PER 5 UNITS

## 2024-11-22 PROCEDURE — 82948 REAGENT STRIP/BLOOD GLUCOSE: CPT

## 2024-11-22 PROCEDURE — 97110 THERAPEUTIC EXERCISES: CPT

## 2024-11-22 RX ORDER — IBUPROFEN 400 MG/1
600 TABLET, FILM COATED ORAL ONCE
Status: COMPLETED | OUTPATIENT
Start: 2024-11-22 | End: 2024-11-22

## 2024-11-22 RX ADMIN — VITAMINS A AND D OINTMENT: 15.5; 53.4 OINTMENT TOPICAL at 09:12

## 2024-11-22 RX ADMIN — METOPROLOL TARTRATE 25 MG: 25 TABLET, FILM COATED ORAL at 22:16

## 2024-11-22 RX ADMIN — DULOXETINE HYDROCHLORIDE 90 MG: 60 CAPSULE, DELAYED RELEASE ORAL at 09:10

## 2024-11-22 RX ADMIN — PANTOPRAZOLE SODIUM 40 MG: 40 TABLET, DELAYED RELEASE ORAL at 09:10

## 2024-11-22 RX ADMIN — INSULIN LISPRO 8 UNITS: 100 INJECTION, SOLUTION INTRAVENOUS; SUBCUTANEOUS at 22:25

## 2024-11-22 RX ADMIN — PREGABALIN 25 MG: 25 CAPSULE ORAL at 13:39

## 2024-11-22 RX ADMIN — ASPIRIN 81 MG: 81 TABLET, CHEWABLE ORAL at 09:10

## 2024-11-22 RX ADMIN — IBUPROFEN 600 MG: 400 TABLET, FILM COATED ORAL at 11:47

## 2024-11-22 RX ADMIN — TRAMADOL HYDROCHLORIDE 50 MG: 50 TABLET, FILM COATED ORAL at 22:18

## 2024-11-22 RX ADMIN — NYSTATIN: 100000 POWDER TOPICAL at 22:17

## 2024-11-22 RX ADMIN — INSULIN LISPRO 8 UNITS: 100 INJECTION, SOLUTION INTRAVENOUS; SUBCUTANEOUS at 11:47

## 2024-11-22 RX ADMIN — TRAMADOL HYDROCHLORIDE 50 MG: 50 TABLET, FILM COATED ORAL at 09:27

## 2024-11-22 RX ADMIN — PREGABALIN 25 MG: 25 CAPSULE ORAL at 22:25

## 2024-11-22 RX ADMIN — INSULIN LISPRO 4 UNITS: 100 INJECTION, SOLUTION INTRAVENOUS; SUBCUTANEOUS at 16:55

## 2024-11-22 RX ADMIN — HEPARIN SODIUM 5000 UNITS: 5000 INJECTION INTRAVENOUS; SUBCUTANEOUS at 05:42

## 2024-11-22 RX ADMIN — HEPARIN SODIUM 5000 UNITS: 5000 INJECTION INTRAVENOUS; SUBCUTANEOUS at 22:17

## 2024-11-22 RX ADMIN — INSULIN GLARGINE 40 UNITS: 100 INJECTION, SOLUTION SUBCUTANEOUS at 22:25

## 2024-11-22 RX ADMIN — INSULIN LISPRO 8 UNITS: 100 INJECTION, SOLUTION INTRAVENOUS; SUBCUTANEOUS at 11:48

## 2024-11-22 RX ADMIN — ATORVASTATIN CALCIUM 80 MG: 40 TABLET, FILM COATED ORAL at 09:10

## 2024-11-22 RX ADMIN — ACYCLOVIR 400 MG: 200 CAPSULE ORAL at 22:17

## 2024-11-22 RX ADMIN — LIDOCAINE 3 PATCH: 4 PATCH TOPICAL at 16:55

## 2024-11-22 RX ADMIN — METOPROLOL TARTRATE 25 MG: 25 TABLET, FILM COATED ORAL at 09:10

## 2024-11-22 RX ADMIN — Medication 5 MG: at 22:18

## 2024-11-22 RX ADMIN — NYSTATIN: 100000 POWDER TOPICAL at 09:12

## 2024-11-22 RX ADMIN — NICOTINE 1 PATCH: 7 PATCH, EXTENDED RELEASE TRANSDERMAL at 09:12

## 2024-11-22 RX ADMIN — INSULIN GLARGINE 20 UNITS: 100 INJECTION, SOLUTION SUBCUTANEOUS at 09:10

## 2024-11-22 RX ADMIN — LISINOPRIL 20 MG: 10 TABLET ORAL at 09:10

## 2024-11-22 RX ADMIN — PREGABALIN 25 MG: 25 CAPSULE ORAL at 05:42

## 2024-11-22 RX ADMIN — VITAMINS A AND D OINTMENT: 15.5; 53.4 OINTMENT TOPICAL at 22:17

## 2024-11-22 RX ADMIN — HEPARIN SODIUM 5000 UNITS: 5000 INJECTION INTRAVENOUS; SUBCUTANEOUS at 13:39

## 2024-11-22 NOTE — DISCHARGE PLACEMENT REQUEST
"Lety Johnson (44 y.o. Female)       Date of Birth   1980    Social Security Number       Address   [address] [facility] [address]    Home Phone   [phone]    MRN   [id number]       [facility]    Marital Status   Single                            Admission Date   24    Admission Type   Emergency    Admitting Provider   Ramon Marc MD    Attending Provider   Mara Navas MD    Department, Room/Bed   [address], [address]/       Discharge Date       Discharge Disposition       Discharge Destination                                 Attending Provider: Mara Navas MD    Allergies: No Known Allergies    Isolation: None   Infection: None   Code Status: CPR    Ht: 166.4 cm (65.5\")   Wt: 102 kg (224 lb 1.6 oz)    Admission Cmt: None   Principal Problem: UTI (urinary tract infection) [N39.0]                   Active Insurance as of 2024       Primary Coverage       Payor Plan Insurance Group Employer/Plan Group    [place] [id number]       Payor Plan Address Payor Plan Phone Number Payor Plan Fax Number Effective Dates    [employer] [address] [address] [phone]  2024 - None Entered    [place] [id number]         Subscriber Name Subscriber Birth Date Member ID       LETY JOHNSON 1980 [id number]                     Emergency Contacts        (Rel.) Home Phone Work Phone Mobile Phone    Arnoldo Arguelles (Son) -- -- [phone]    Moises Narvaez (Legal Guardian) -- -- [phone]                 History & Physical        Ramon Marc MD at 24 [address] [place] [address] HOSPITALIST HISTORY AND PHYSICAL    Patient Identification:  Name:  Lety Johnson  Age:  43 y.o.  Sex:  female  :  1980  MRN:  [id number]   Admit Date: 2024   Visit Number:  [id number]  Room number:  404/04  Primary Care Physician:  Provider, No Known     Subjective     Chief complaint:  "   Chief Complaint   Patient presents with    Fall     History of presenting illness:   43F Morbid Obesity by BMI PMH History Genital Herpes, Cerebrovascular Accident 5/2024 complicated by L sided paralysis, presented to [facility] emergency room 9/26 after sliding into the floor off her chair due to weakness.  Upon arrival patient afebrile, heart rate 109, respiratory rate 18, blood pressure 146/101, satting 98% on room air. Labs showed WBC count 10K, lactate 1.1, CRP 3.8, potassium 2.7, magnesium 1.5, HCG negative, blood cultures pending. CXR no acute cardiopulmonary processes.  Xray ankle/foot without fracture or dislocation.  Emergency room provider gave antibiotics, pain medications, zofran, potassium replacement.  Hospital Medicine consulted for admission. Patient seen and examined in the emergency room, notes fevers chills at home a few days ago, recently has been on antibiotics for lower extremity cellulitis, has had some dysuria and suprapubic pain, denies current sexual activity, reports her fiance was taken to longterm about a week ago and has been her primary caretaker, has had difficulty at home since prior stroke and caretakers not consistent, last 24hrs didn't have anyone to help her, sister endorses recent confusion/hallucinations beyond baseline, seeing dead family members, coinciding with onset of dysuria.  ---------------------------------------------------------------------------------------------------------------------   Review of Systems   Constitutional:  Positive for chills and fever.   HENT: Negative.     Eyes: Negative.    Respiratory:  Positive for shortness of breath.    Cardiovascular:  Positive for leg swelling. Negative for chest pain.   Gastrointestinal: Negative.    Endocrine: Negative.    Genitourinary:  Positive for dysuria.   Musculoskeletal: Negative.    Skin:  Positive for rash.   Allergic/Immunologic: Negative.    Neurological:  Positive for weakness.   Hematological:  Negative.    Psychiatric/Behavioral:  Positive for hallucinations.      ---------------------------------------------------------------------------------------------------------------------   Past Medical History:   Diagnosis Date    Stroke      No past surgical history on file.  No family history on file.  Social History     Socioeconomic History    Marital status: Single     ---------------------------------------------------------------------------------------------------------------------   Allergies:  Patient has no known allergies.  ---------------------------------------------------------------------------------------------------------------------   Medications below are reported home medications pulling from within the system; at this time, these medications have not been reconciled unless otherwise specified and are in the verification process for further verifcation as current home medications.    Prior to Admission Medications       Prescriptions Last Dose Informant Patient Reported? Taking?    aspirin 81 MG chewable tablet Unknown  Yes No    Chew 1 tablet Daily.    atorvastatin (LIPITOR) 80 MG tablet Unknown  Yes No    Take 1 tablet by mouth Daily.    cyclobenzaprine (FLEXERIL) 10 MG tablet Unknown  Yes No    Take 1 tablet by mouth 3 (Three) Times a Day As Needed for Muscle Spasms.    DULoxetine (CYMBALTA) 60 MG capsule Unknown  Yes No    Take 1 capsule by mouth Daily.    lisinopril (PRINIVIL,ZESTRIL) 20 MG tablet Unknown  Yes No    Take 1 tablet by mouth Daily.    metFORMIN (GLUCOPHAGE) 1000 MG tablet Unknown  Yes No    Take 1 tablet by mouth 2 (Two) Times a Day With Meals.    pantoprazole (PROTONIX) 40 MG EC tablet Unknown  Yes No    Take 1 tablet by mouth Daily.          Objective     Vital Signs:  Temp:  [98.2 °F (36.8 °C)] 98.2 °F (36.8 °C)  Heart Rate:  [] 118  Resp:  [18] 18  BP: (135-157)/() 140/79    Mean Arterial Pressure (Non-Invasive) for the past 24 hrs (Last 3 readings):    Noninvasive MAP (mmHg)   09/26/24 1645 90   09/26/24 1630 100   09/26/24 1615 103     SpO2:  [91 %-100 %] 94 %  on   ;   Device (Oxygen Therapy): room air  Body mass index is 43.26 kg/m².    Wt Readings from Last 3 Encounters:   09/26/24 120 kg (264 lb)   06/27/24 120 kg (264 lb)      ---------------------------------------------------------------------------------------------------------------------   Physical Exam:  Constitutional:  Well-developed and well-nourished. Older than stated age. No acute distress.      HENT:  Head:  Normocephalic and atraumatic.  Mouth:  Moist mucous membranes.    Eyes:  Conjunctivae and EOM are normal. No scleral icterus.    Neck:  Neck supple.  No JVD present.    Cardiovascular:  Normal rate, regular rhythm and normal heart sounds with no murmur.  Pulmonary/Chest:  No respiratory distress, no wheezes, no crackles, with normal breath sounds and good air movement.  Abdominal:  Soft. No distension and no tenderness.   Musculoskeletal:  No tenderness, and no deformity.  No red or swollen joints anywhere.    Neurological:  Alert and oriented to person, place, and time.  No cranial nerve deficit. Chronic L sided paralysis.    Skin:  Skin is warm and dry. No pallor. Significant intertriginous erythema under panus, consistent with yeast infection.   Peripheral vascular:  No clubbing, no cyanosis, trace edema.  Psychiatric: Appropriate mood and affect  Edited by: Ramon Marc MD at 9/26/2024 1701  ---------------------------------------------------------------------------------------------------------------------  EKG:  N/A    Telemetry:  normal sinus rhythm, no significant ST changes    I have personally looked at telemetry.    Last echocardiogram:  Ordered and pending   --------------------------------------------------------------------------------------------------------------------  Labs:  Results from last 7 days   Lab Units 09/26/24  1504 09/26/24  1350   LACTATE mmol/L  --  1.1   CRP  "mg/dL 3.80*  --    WBC 10*3/mm3  --  10.42   HEMOGLOBIN g/dL  --  12.5   HEMATOCRIT %  --  37.4   MCV fL  --  94.0   MCHC g/dL  --  33.4   PLATELETS 10*3/mm3  --  402         Results from last 7 days   Lab Units 09/26/24  1504   SODIUM mmol/L 140   POTASSIUM mmol/L 2.7*   MAGNESIUM mg/dL 1.5*   CHLORIDE mmol/L 99   CO2 mmol/L 28.9   BUN mg/dL 10   CREATININE mg/dL 0.51*   CALCIUM mg/dL 9.4   GLUCOSE mg/dL 220*   ALBUMIN g/dL 3.5   BILIRUBIN mg/dL 0.6   ALK PHOS U/L 119*   AST (SGOT) U/L 21   ALT (SGPT) U/L 8   Estimated Creatinine Clearance: 188.1 mL/min (A) (by C-G formula based on SCr of 0.51 mg/dL (L)).  No results found for: \"AMMONIA\"          No results found for: \"HGBA1C\", \"POCGLU\"  No results found for: \"TSH\", \"FREET4\"  No results found for: \"PREGTESTUR\", \"PREGSERUM\", \"HCG\", \"HCGQUANT\"  Pain Management Panel           No data to display              Brief Urine Lab Results  (Last result in the past 365 days)        Color   Clarity   Blood   Leuk Est   Nitrite   Protein   CREAT   Urine HCG        09/26/24 1419 Dark Yellow   Turbid   Trace   Moderate (2+)   Positive   30 mg/dL (1+)                 No results found for: \"BLOODCX\"  No results found for: \"URINECX\"  No results found for: \"WOUNDCX\"  No results found for: \"STOOLCX\"    I have personally looked at the labs and they are summarized above.  ----------------------------------------------------------------------------------------------------------------------  Detailed radiology reports for the last 24 hours:    Imaging Results (Last 24 Hours)       Procedure Component Value Units Date/Time    XR Ankle 3+ View Left [[id number]] Collected: 09/26/24 1537     Updated: 09/26/24 1540    Narrative:      EXAM:    XR Left Ankle Complete, 3 or More Views     EXAM DATE:    9/26/2024 3:17 PM     CLINICAL HISTORY:    left ankle injury     TECHNIQUE:    Frontal, lateral and oblique views of the left ankle.     COMPARISON:    No relevant prior studies available.   "   FINDINGS:    Bones/joints:  See below.      Soft tissues:  Soft tissue swelling, but no acute fracture or  dislocation.       Impression:        Soft tissue swelling, but no acute fracture or dislocation.        This report was finalized on 9/26/2024 3:38 PM by Dr. Moses Otto MD.       XR Foot 3+ View Left [[id number]] Collected: 09/26/24 1536     Updated: 09/26/24 1539    Narrative:      EXAM:    XR Left Foot Complete, 3 or More Views     EXAM DATE:    9/26/2024 3:16 PM     CLINICAL HISTORY:    left foot wound     TECHNIQUE:    Frontal, lateral and oblique views of the left foot.     COMPARISON:    No relevant prior studies available.     FINDINGS:    Bones/joints:  Unremarkable.  No acute fracture.  No dislocation.    Soft tissues:  Unremarkable.  No radiopaque foreign body.       Impression:        No acute findings in the left foot.        This report was finalized on 9/26/2024 3:37 PM by Dr. Moses Otto MD.       XR Chest 1 View [[id number]] Collected: 09/26/24 1406     Updated: 09/26/24 1408    Narrative:      XR CHEST 1 VW-     CLINICAL INDICATION: weakness        COMPARISON: None immediately available      TECHNIQUE: Single frontal view of the chest.     FINDINGS:     LUNGS: Lungs are adequately aerated.      HEART AND MEDIASTINUM: Heart and mediastinal contours are unremarkable        SKELETON: Bony and soft tissue structures are unremarkable.             Impression:      No radiographic evidence of acute cardiac or pulmonary disease.           This report was finalized on 9/26/2024 2:06 PM by Dr. Moses Otto MD.       CT Cervical Spine Without Contrast [[id number]] Collected: 09/26/24 1317     Updated: 09/26/24 1319    Narrative:      CT CERVICAL SPINE WO CONTRAST-     CLINICAL INDICATION: neck pain        COMPARISON: None available     TECHNIQUE: Axial images of the cervical spine were acquired with out any  intravenous contrast. Reformatted images were then created in the  sagittal and coronal  planes.     DOSE:      Radiation dose reduction techniques were utilized per ALARA protocol.  Automated exposure control was initiated through either or [employer] or  [employer] software packages by  protocol.           FINDINGS:   The provided study demonstrates preservation of the vertebral body  heights in the sagittally reconstructed images.     There is no prevertebral soft tissue swelling.     Alignment is near anatomic.     The disc space heights are uniform.     I see no acute cervical spine fracture.       Impression:         1. No acute bony abnormality.     2. Other incidental findings as above     This report was finalized on 9/26/2024 1:17 PM by Dr. Moses Otto MD.       CT Lumbar Spine Without Contrast [[id number]] Collected: 09/26/24 1317     Updated: 09/26/24 1319    Narrative:      EXAM:    CT Lumbar Spine Without Intravenous Contrast     EXAM DATE:    9/26/2024 12:59 PM     CLINICAL HISTORY:    back pain     TECHNIQUE:    Axial computed tomography images of the lumbar spine without  intravenous contrast.  Sagittal and coronal reformatted images were  created and reviewed.  This CT exam was performed using one or more of  the following dose reduction techniques:  automated exposure control,  adjustment of the mA and/or kV according to patient size, and/or use of  iterative reconstruction technique.     COMPARISON:    No relevant prior studies available.     FINDINGS:    VERTEBRAE:  Unremarkable.  No acute fracture.    DISCS/SPINAL CANAL/NEURAL FORAMINA:  No acute findings.  No spinal  canal stenosis.    SOFT TISSUES:  Unremarkable.       Impression:        No acute findings in the lumbar spine.        This report was finalized on 9/26/2024 1:17 PM by Dr. Moses Otto MD.       CT Thoracic Spine Without Contrast [[id number]] Collected: 09/26/24 1316     Updated: 09/26/24 1319    Narrative:      EXAM:    CT Thoracic Spine Without Intravenous Contrast     EXAM DATE:    9/26/2024 12:58 PM      CLINICAL HISTORY:    back pain     TECHNIQUE:    Axial computed tomography images of the thoracic spine without  intravenous contrast.  Sagittal and coronal reformatted images were  created and reviewed.  This CT exam was performed using one or more of  the following dose reduction techniques:  automated exposure control,  adjustment of the mA and/or kV according to patient size, and/or use of  iterative reconstruction technique.     COMPARISON:    No relevant prior studies available.     FINDINGS:    VERTEBRAE:  Unremarkable.  No acute fracture.    DISCS/SPINAL CANAL/NEURAL FORAMINA:  No acute findings.  No spinal  canal stenosis.    SOFT TISSUES:  Unremarkable.       Impression:        No acute findings in the thoracic spine.        This report was finalized on 9/26/2024 1:17 PM by Dr. Moses Otto MD.       CT Head Without Contrast [[id number]] Collected: 09/26/24 1259     Updated: 09/26/24 1302    Narrative:      CT HEAD WO CONTRAST-     CLINICAL INDICATION: weakness        COMPARISON: None available     TECHNIQUE: Axial images of the brain were obtained with out intravenous  contrast.  Reformatted images were created in the sagittal and coronal  planes.     DOSE:     Radiation dose reduction techniques were utilized per ALARA protocol.  Automated exposure control was initiated through either or [employer] or  [employer] software packages by  protocol.        FINDINGS:    BRAIN: Probable subacute ischemia right middle cerebral artery  territory    VENTRICLES:  Unremarkable.  No ventriculomegaly.       BONES/JOINTS:  Unremarkable.  No acute fracture.       SOFT TISSUES:  Unremarkable.       SINUSES:  no air fluid levels       MASTOID AIR CELLS:  Unremarkable as visualized.  No mastoid effusion.          Impression:         1. Probable remote right middle cerebral artery infarction  2. No acute parenchymal mass, hemorrhage, or midline shift     This report was finalized on 9/26/2024 1:00 PM by   Moses Otto MD.             I have personally looked at the radiology images and read the final radiology report.    Assessment & Plan    43F Morbid Obesity by BMI PMH History Genital Herpes, Cerebrovascular Accident 5/2024 complicated by L sided paralysis, presented to [facility] emergency room 9/26 after sliding into the floor off her chair due to weakness.     #Acute Metabolic Encephalopathy due to Acute Urinary Tract Infection in setting of probable neurogenic bladder  - Continue Ceftriaxone, follow up cultures, rule out STI    #Electrolyte Abnormalities  - Acute Mild Hypokalemia - Replacing, on protocol  - Acute Mild Hypomagnesemia - Replacing, on protocol    #History Cerebrovascular Accident complicated by L sided paralysis, unclear etiology  - Review home medications and resume as indicated, Supportive Care     #Debility  - Consult PT/OT, Social Work if placement needed     #History Genital Herpes  - Supportive Care, follow up medication reconciliation for any suppressive medications, check HIV & acute hepatitis panel     #Morbid Obesity by BMI  - Body mass index is 43.26 kg/m².; complicates all aspects of care    F: Oral  E: Monitor & Replace as needed   N: Regular Diet  PPx: SQH  Code Status (Patient has no pulse and is not breathing): CPR  Medical Interventions (Patient has pulse or is breathing): Full Support     Dispo: Pending workup and clinical improvement    *This patient is considered high risk secondary to Urinary Tract Infection, electrolyte abnormalities, chronic L sided paralysis from prior Cerebrovascular Accident.      Edited by: Ramon Marc MD at 9/26/2024 1701    Ramon Marc MD  [facility] Hospitalist  09/26/24  17:01 EDT        Electronically signed by Ramon Marc MD at 09/26/24 1703       Vital Signs (last day)       Date/Time Temp Temp src Pulse Resp BP Patient Position SpO2    11/22/24 1500 97.9 (36.6) Oral 97 16 -- -- 93    11/22/24 0910 -- -- 112 --  122/66 -- --    11/22/24 0700 97.8 (36.6) Oral 96 16 104/63 Lying 93    11/22/24 0300 98.2 (36.8) Oral 104 16 119/71 Lying --    11/21/24 2210 97.7 (36.5) Oral -- 16 110/66 Lying --    11/21/24 1900 97.6 (36.4) Oral 105 20 110/63 Lying 96    11/21/24 1700 -- -- 94 -- -- -- 94    11/21/24 1500 -- -- 94 -- -- -- 96    11/21/24 1400 97.8 (36.6) Oral -- 16 101/54 Lying --    11/21/24 1300 -- -- 80 -- -- -- 92    11/21/24 1100 -- -- 113 -- -- -- 97    11/21/24 0916 -- -- 111 -- 125/78 Lying --    11/21/24 0600 97.8 (36.6) Oral 99 16 108/65 Lying --          Intake & Output (last day)         11/21 0701  11/22 0700 11/22 0701  11/23 0700    P.O. 1320     Total Intake(mL/kg) 1320 (12.9)     Urine (mL/kg/hr) 1200 (0.5)     Stool 0     Total Output 1200     Net +120           Stool Unmeasured Occurrence 1 x           Lines, Drains & Airways       Active LDAs       Name Placement date Placement time Site Days    External Urinary Catheter --  --  --  --                  Current Facility-Administered Medications   Medication Dose Route Frequency Provider Last Rate Last Admin    acetaminophen (TYLENOL) tablet 650 mg  650 mg Oral Q6H PRN Ashleigh Nicholas PA-C   650 mg at 11/21/24 1721    acyclovir (ZOVIRAX) capsule 400 mg  400 mg Oral Nightly Mara Navas MD   400 mg at 11/21/24 2001    aspirin chewable tablet 81 mg  81 mg Oral Daily Ramon Marc MD   81 mg at 11/22/24 0910    atorvastatin (LIPITOR) tablet 80 mg  80 mg Oral Daily Ramon Marc MD   80 mg at 11/22/24 0910    sennosides-docusate (PERICOLACE) 8.6-50 MG per tablet 2 tablet  2 tablet Oral BID PRN Ramon Marc MD   2 tablet at 11/01/24 1720    And    polyethylene glycol (MIRALAX) packet 17 g  17 g Oral Daily PRN Ramon Marc MD   17 g at 10/30/24 1751    And    bisacodyl (DULCOLAX) EC tablet 5 mg  5 mg Oral Daily PRN Ramon Marc MD   5 mg at 10/31/24 0939    And    bisacodyl (DULCOLAX) suppository 10 mg  10 mg Rectal Daily PRN Ramon Marc MD         Calcium Replacement - Follow Nurse / BPA Driven Protocol   Not Applicable PRN Ramon Marc MD        cyclobenzaprine (FLEXERIL) tablet 10 mg  10 mg Oral TID PRN Ramon Marc MD   10 mg at 11/19/24 0622    dextrose (D50W) (25 g/50 mL) IV injection 25 g  25 g Intravenous Q15 Min PRN Marv Navas DO        dextrose (GLUTOSE) oral gel 15 g  15 g Oral Q15 Min PRN Marv Navas DO        Diclofenac Sodium (VOLTAREN) 1 % gel 4 g  4 g Topical 4x Daily PRN Ashleigh Nicholas PA-C   4 g at 11/21/24 1627    DULoxetine (CYMBALTA) DR capsule 90 mg  90 mg Oral Daily Bruce Branham PA-C   90 mg at 11/22/24 0910    glucagon HCl (Diagnostic) injection 1 mg  1 mg Intramuscular Q15 Min PRN Marv Navas DO        heparin (porcine) 5000 UNIT/ML injection 5,000 Units  5,000 Units Subcutaneous Q8H Ramon Marc MD   5,000 Units at 11/22/24 1339    insulin glargine (LANTUS, SEMGLEE) injection 20 Units  20 Units Subcutaneous Daily With Breakfast Bruce Branham PA-C   20 Units at 11/22/24 0910    insulin glargine (LANTUS, SEMGLEE) injection 40 Units  40 Units Subcutaneous Nightly Bruce Branham PA-C   40 Units at 11/21/24 2001    Insulin Lispro (humaLOG) injection 4-24 Units  4-24 Units Subcutaneous 4x Daily AC & at Bedtime Marv Navas DO   8 Units at 11/22/24 1147    Insulin Lispro (humaLOG) injection 8 Units  8 Units Subcutaneous Daily With Lunch Bruce Branham PA-C   8 Units at 11/22/24 1148    Lidocaine 4 % 3 patch  3 patch Transdermal Q24H Bruce Branham PA-C   3 patch at 11/21/24 1720    lisinopril (PRINIVIL,ZESTRIL) tablet 20 mg  20 mg Oral Daily Ramon Marc MD   20 mg at 11/22/24 0910    loperamide (IMODIUM) capsule 2 mg  2 mg Oral 4x Daily PRN Marv Navas DO   2 mg at 10/15/24 0314    magic barrier cream   Topical BID Marv Navas DO   Given at 11/22/24 0912    Magnesium Standard Dose Replacement - Follow  Nurse / BPA Driven Protocol   Not Applicable PRN Ramon Marc MD        melatonin tablet 5 mg  5 mg Oral Nightly PRN Wesley Matthews APRN   5 mg at 11/21/24 2001    metoprolol tartrate (LOPRESSOR) tablet 25 mg  25 mg Oral Q12H Mara Navas MD   25 mg at 11/22/24 0910    nicotine (NICODERM CQ) 7 MG/24HR patch 1 patch  1 patch Transdermal Q24H Ramon Marc MD   1 patch at 11/22/24 0912    nystatin (MYCOSTATIN) powder   Topical Q12H Ramon Marc MD   Given at 11/22/24 0912    pantoprazole (PROTONIX) EC tablet 40 mg  40 mg Oral Daily Ramon Marc MD   40 mg at 11/22/24 0910    Pharmacy Consult   Not Applicable Continuous PRN Ramon Marc MD        Phosphorus Replacement - Follow Nurse / BPA Driven Protocol   Not Applicable PRN Ramon Marc MD        Potassium Replacement - Follow Nurse / BPA Driven Protocol   Not Applicable PRN Ramon Marc MD        pregabalin (LYRICA) capsule 25 mg  25 mg Oral Q8H Berhane Segundo MD   25 mg at 11/22/24 1339    prochlorperazine (COMPAZINE) tablet 5 mg  5 mg Oral Q6H PRN Sandeep Cunha MD   5 mg at 11/21/24 2300    sodium chloride 0.9 % flush 10 mL  10 mL Intravenous Q12H Ramon Marc MD   10 mL at 11/12/24 0829    sodium chloride 0.9 % flush 10 mL  10 mL Intravenous PRN Ramon Marc MD        sodium chloride 0.9 % flush 10 mL  10 mL Intravenous Q12H Marv Navas,    10 mL at 11/12/24 0829    sodium chloride 0.9 % flush 10 mL  10 mL Intravenous PRN Marv Navas DO        sodium chloride 0.9 % infusion 40 mL  40 mL Intravenous PRN Ramon Marc MD        sodium chloride 0.9 % infusion 40 mL  40 mL Intravenous PRN Marv Navas DO        traMADol (ULTRAM) tablet 50 mg  50 mg Oral Q8H PRN Mara Navas MD   50 mg at 11/22/24 0927     Lab Results (most recent)       Procedure Component Value Units Date/Time    POC Glucose Once [[id number]]  (Abnormal) Collected: 11/22/24 1032    Specimen: Blood Updated:  11/22/24 1038     Glucose 228 mg/dL     POC Glucose Once [[id number]]  (Abnormal) Collected: 11/22/24 0655    Specimen: Blood Updated: 11/22/24 0701     Glucose 135 mg/dL     Comprehensive Metabolic Panel [[id number]]  (Abnormal) Collected: 11/22/24 0036    Specimen: Blood Updated: 11/22/24 0156     Glucose 126 mg/dL      BUN 23 mg/dL      Creatinine 0.53 mg/dL      Sodium 138 mmol/L      Potassium 3.8 mmol/L      Comment: Slight hemolysis detected by analyzer. Result may be falsely elevated.        Chloride 102 mmol/L      CO2 22.8 mmol/L      Calcium 9.2 mg/dL      Total Protein 6.5 g/dL      Albumin 3.3 g/dL      ALT (SGPT) 16 U/L      AST (SGOT) 16 U/L      Alkaline Phosphatase 109 U/L      Total Bilirubin 0.2 mg/dL      Globulin 3.2 gm/dL      A/G Ratio 1.0 g/dL      BUN/Creatinine Ratio 43.4     Anion Gap 13.2 mmol/L      eGFR 117.9 mL/min/1.73     Narrative:      GFR Normal >60  Chronic Kidney Disease <60  Kidney Failure <15      CBC & Differential [[id number]]  (Abnormal) Collected: 11/22/24 0036    Specimen: Blood Updated: 11/22/24 0133    Narrative:      The following orders were created for panel order CBC & Differential.  Procedure                               Abnormality         Status                     ---------                               -----------         ------                     CBC Auto Differential[[id number]]        Abnormal            Final result                 Please view results for these tests on the individual orders.    CBC Auto Differential [[id number]]  (Abnormal) Collected: 11/22/24 0036    Specimen: Blood Updated: 11/22/24 0133     WBC 9.51 10*3/mm3      RBC 3.56 10*6/mm3      Hemoglobin 11.7 g/dL      Hematocrit 36.4 %      .2 fL      MCH 32.9 pg      MCHC 32.1 g/dL      RDW 12.5 %      RDW-SD 47.2 fl      MPV 10.5 fL      Platelets 334 10*3/mm3      Neutrophil % 52.6 %      Lymphocyte % 39.7 %      Monocyte % 5.4 %      Eosinophil % 1.4 %      Basophil % 0.5 %       Immature Grans % 0.4 %      Neutrophils, Absolute 5.00 10*3/mm3      Lymphocytes, Absolute 3.78 10*3/mm3      Monocytes, Absolute 0.51 10*3/mm3      Eosinophils, Absolute 0.13 10*3/mm3      Basophils, Absolute 0.05 10*3/mm3      Immature Grans, Absolute 0.04 10*3/mm3      nRBC 0.0 /100 WBC     Basic Metabolic Panel [[id number]]  (Abnormal) Collected: 11/19/24 0129    Specimen: Blood Updated: 11/19/24 0224     Glucose 142 mg/dL      BUN 21 mg/dL      Creatinine 0.57 mg/dL      Sodium 136 mmol/L      Potassium 4.3 mmol/L      Comment: Slight hemolysis detected by analyzer. Result may be falsely elevated.        Chloride 101 mmol/L      CO2 24.4 mmol/L      Calcium 9.3 mg/dL      BUN/Creatinine Ratio 36.8     Anion Gap 10.6 mmol/L      eGFR 115.8 mL/min/1.73     Narrative:      GFR Normal >60  Chronic Kidney Disease <60  Kidney Failure <15      Magnesium [[id number]]  (Normal) Collected: 11/19/24 0129    Specimen: Blood Updated: 11/19/24 0224     Magnesium 1.8 mg/dL     Basic Metabolic Panel [[id number]]  (Abnormal) Collected: 11/15/24 0237    Specimen: Blood Updated: 11/15/24 0313     Glucose 125 mg/dL      BUN 20 mg/dL      Creatinine 0.50 mg/dL      Sodium 140 mmol/L      Potassium 4.4 mmol/L      Comment: Slight hemolysis detected by analyzer. Result may be falsely elevated.        Chloride 104 mmol/L      CO2 25.1 mmol/L      Calcium 9.6 mg/dL      BUN/Creatinine Ratio 40.0     Anion Gap 10.9 mmol/L      eGFR 119.5 mL/min/1.73     Narrative:      GFR Normal >60  Chronic Kidney Disease <60  Kidney Failure <15      CBC & Differential [[id number]]  (Abnormal) Collected: 11/12/24 0908    Specimen: Blood Updated: 11/12/24 1117    Narrative:      The following orders were created for panel order CBC & Differential.  Procedure                               Abnormality         Status                     ---------                               -----------         ------                     CBC Auto Differential[[id number]]         Abnormal            Final result               Scan Slide[[id number]]                                                                    Please view results for these tests on the individual orders.    CBC Auto Differential [[id number]]  (Abnormal) Collected: 11/12/24 1057    Specimen: Blood Updated: 11/12/24 1117     WBC 11.52 10*3/mm3      RBC 3.73 10*6/mm3      Hemoglobin 12.3 g/dL      Hematocrit 37.1 %      MCV 99.5 fL      MCH 33.0 pg      MCHC 33.2 g/dL      RDW 12.7 %      RDW-SD 46.1 fl      MPV 10.0 fL      Platelets 265 10*3/mm3      Neutrophil % 73.1 %      Lymphocyte % 21.7 %      Monocyte % 3.7 %      Eosinophil % 0.8 %      Basophil % 0.3 %      Immature Grans % 0.4 %      Neutrophils, Absolute 8.41 10*3/mm3      Lymphocytes, Absolute 2.50 10*3/mm3      Monocytes, Absolute 0.43 10*3/mm3      Eosinophils, Absolute 0.09 10*3/mm3      Basophils, Absolute 0.04 10*3/mm3      Immature Grans, Absolute 0.05 10*3/mm3      nRBC 0.0 /100 WBC     CBC (No Diff) [[id number]]  (Abnormal) Collected: 11/05/24 0032    Specimen: Blood Updated: 11/05/24 0131     WBC 13.22 10*3/mm3      RBC 3.66 10*6/mm3      Hemoglobin 12.2 g/dL      Hematocrit 36.3 %      MCV 99.2 fL      MCH 33.3 pg      MCHC 33.6 g/dL      RDW 12.7 %      RDW-SD 46.4 fl      MPV 10.3 fL      Platelets 299 10*3/mm3     Blood Culture - Blood, Arm, Left [[id number]]  (Normal) Collected: 10/18/24 1659    Specimen: Blood from Arm, Left Updated: 10/23/24 1745     Blood Culture No growth at 5 days    Urine Culture - Urine, Urine, Clean Catch [[id number]]  (Abnormal)  (Susceptibility) Collected: 10/18/24 1811    Specimen: Urine, Clean Catch Updated: 10/20/24 1417     Urine Culture >100,000 CFU/mL Proteus vulgaris    Narrative:      Colonization of the urinary tract without infection is common. Treatment is discouraged unless the patient is symptomatic, pregnant, or undergoing an invasive urologic procedure.    Susceptibility        Proteus vulgaris      LASHAWN       Amoxicillin + Clavulanate Susceptible      Ampicillin Resistant      Ampicillin + Sulbactam Susceptible      Cefazolin Resistant      Cefepime Susceptible      Ceftazidime Susceptible      Ceftriaxone Susceptible      Gentamicin Susceptible      Levofloxacin Susceptible      Nitrofurantoin Resistant      Piperacillin + Tazobactam Susceptible      Trimethoprim + Sulfamethoxazole Resistant                           Blood Culture - Blood, Arm, Right [[id number]]  (Abnormal) Collected: 10/18/24 1659    Specimen: Blood from Arm, Right Updated: 10/20/24 0756     Blood Culture Staphylococcus, coagulase negative     Isolated from Pediatric Bottle     Gram Stain Pediatric Bottle Gram positive cocci in clusters    Narrative:      Probable contaminant requires clinical correlation, susceptibility not performed unless requested by physician.      Blood Culture ID, PCR - Blood, Arm, Right [[id number]]  (Abnormal) Collected: 10/18/24 1659    Specimen: Blood from Arm, Right Updated: 10/19/24 1422     BCID, PCR Staph spp, not aureus or lugdunensis. Identification by BCID2 PCR.     BOTTLE TYPE Pediatric Bottle    CBC (No Diff) [[id number]]  (Abnormal) Collected: 10/19/24 0815    Specimen: Blood Updated: 10/19/24 0829     WBC 9.49 10*3/mm3      RBC 4.06 10*6/mm3      Hemoglobin 13.3 g/dL      Hematocrit 39.7 %      MCV 97.8 fL      MCH 32.8 pg      MCHC 33.5 g/dL      RDW 13.2 %      RDW-SD 48.0 fl      MPV 10.6 fL      Platelets 317 10*3/mm3     Urinalysis With Culture If Indicated - Urine, Clean Catch [[id number]]  (Abnormal) Collected: 10/18/24 1811    Specimen: Urine, Clean Catch Updated: 10/18/24 1840     Color, UA Yellow     Appearance, UA Turbid     pH, UA 6.0     Specific Gravity, UA >1.030     Glucose, UA >=1000 mg/dL (3+)     Ketones, UA Negative     Bilirubin, UA Negative     Blood, UA Moderate (2+)     Protein,  mg/dL (2+)     Leuk Esterase, UA Moderate (2+)     Nitrite, UA Negative     Urobilinogen, UA 1.0 E.U./dL     "Narrative:      In absence of clinical symptoms, the presence of pyuria, bacteria, and/or nitrites on the urinalysis result does not correlate with infection.    Urinalysis, Microscopic Only - Urine, Clean Catch [[id number]]  (Abnormal) Collected: 10/18/24 1811    Specimen: Urine, Clean Catch Updated: 10/18/24 1840     RBC, UA 6-10 /HPF      WBC, UA 21-50 /HPF      Bacteria, UA 2+ /HPF      Squamous Epithelial Cells, UA 31-50 /HPF      Hyaline Casts, UA 0-2 /LPF      Starch, UA Small/1+ /HPF      Methodology Manual Light Microscopy    Procalcitonin [[id number]]  (Normal) Collected: 10/18/24 1659    Specimen: Blood Updated: 10/18/24 1806     Procalcitonin 0.07 ng/mL     Narrative:      As a Marker for Sepsis (Non-Neonates):    1. <0.5 ng/mL represents a low risk of severe sepsis and/or septic shock.  2. >2 ng/mL represents a high risk of severe sepsis and/or septic shock.    As a Marker for Lower Respiratory Tract Infections that require antibiotic therapy:    PCT on Admission    Antibiotic Therapy       6-12 Hrs later    >0.5                Strongly Recommended  >0.25 - <0.5        Recommended   0.1 - 0.25          Discouraged              Remeasure/reassess PCT  <0.1                Strongly Discouraged     Remeasure/reassess PCT    As 28 day mortality risk marker: \"Change in Procalcitonin Result\" (>80% or <=80%) if Day 0 (or Day 1) and Day 4 values are available. Refer to http://[email or web address]-pct-calculator.com    Change in PCT <=80%  A decrease of PCT levels below or equal to 80% defines a positive change in PCT test result representing a higher risk for 28-day all-cause mortality of patients diagnosed with severe sepsis for septic shock.    Change in PCT >80%  A decrease of PCT levels of more than 80% defines a negative change in PCT result representing a lower risk for 28-day all-cause mortality of patients diagnosed with severe sepsis or septic shock.       Magnesium [[id number]]  (Normal) Collected: 10/18/24 0156    " Specimen: Blood Updated: 10/18/24 0242     Magnesium 1.9 mg/dL     Potassium [[id number]]  (Normal) Collected: 10/11/24 1832    Specimen: Blood Updated: 10/11/24 1925     Potassium 4.7 mmol/L     Hemoglobin A1c [[id number]]  (Abnormal) Collected: 10/05/24 1209    Specimen: Blood Updated: 10/05/24 1241     Hemoglobin A1C 9.40 %     Narrative:      Hemoglobin A1C Ranges:    Increased Risk for Diabetes  5.7% to 6.4%  Diabetes                     >= 6.5%  Diabetic Goal                < 7.0%    Comprehensive Metabolic Panel [[id number]]  (Abnormal) Collected: 09/30/24 0117    Specimen: Blood Updated: 09/30/24 0202     Glucose 196 mg/dL      BUN 5 mg/dL      Creatinine 0.46 mg/dL      Sodium 142 mmol/L      Potassium 3.5 mmol/L      Chloride 110 mmol/L      CO2 22.8 mmol/L      Calcium 8.0 mg/dL      Total Protein 5.1 g/dL      Albumin 2.5 g/dL      ALT (SGPT) 7 U/L      AST (SGOT) 11 U/L      Alkaline Phosphatase 79 U/L      Total Bilirubin 0.2 mg/dL      Globulin 2.6 gm/dL      A/G Ratio 1.0 g/dL      BUN/Creatinine Ratio 10.9     Anion Gap 9.2 mmol/L      eGFR 122.7 mL/min/1.73     Narrative:      GFR Normal >60  Chronic Kidney Disease <60  Kidney Failure <15      Urine Culture - Urine, Straight Cath [[id number]]  (Abnormal)  (Susceptibility) Collected: 09/26/24 1451    Specimen: Urine from Straight Cath Updated: 09/28/24 1408     Urine Culture >100,000 CFU/mL Escherichia coli ESBL    Narrative:      Colonization of the urinary tract without infection is common. Treatment is discouraged unless the patient is symptomatic, pregnant, or undergoing an invasive urologic procedure.  Recent outcomes data supports the use of pip/tazo in the treatment of susceptible ESBL infections for uncomplicated UTI. Consider use of pip/tazo as a carbapenem-sparing regimen in applicable patients.    Susceptibility        Escherichia coli ESBL      LASHAWN      Ertapenem Susceptible      Gentamicin Susceptible      Levofloxacin Intermediate       Meropenem Susceptible      Nitrofurantoin Susceptible      Piperacillin + Tazobactam Susceptible      Trimethoprim + Sulfamethoxazole Resistant                           Potassium [[id number]]  (Normal) Collected: 09/27/24 1904    Specimen: Blood Updated: 09/27/24 1920     Potassium 4.0 mmol/L      Comment: Slight hemolysis detected by analyzer. Result may be falsely elevated.       Chlamydia trachomatis, Neisseria gonorrhoeae, Trichomonas vaginalis, PCR - Swab, Vagina [[id number]]  (Normal) Collected: 09/26/24 1837    Specimen: Swab from Vagina Updated: 09/26/24 2018     Chlamydia DNA by PCR Not Detected     Neisseria gonorrhoeae by PCR Not Detected     Trichomonas vaginalis PCR Not Detected    HIV-1 & HIV-2 Antibodies [[id number]]  (Normal) Collected: 09/26/24 1350    Specimen: Blood from Arm, Right Updated: 09/26/24 1748    Narrative:      The following orders were created for panel order HIV-1 & HIV-2 Antibodies.  Procedure                               Abnormality         Status                     ---------                               -----------         ------                     HIV-1 / O / 2 Ag / Antibody[[id number]]  Normal              Final result                 Please view results for these tests on the individual orders.    Hepatitis Panel, Acute [[id number]]  (Normal) Collected: 09/26/24 1350    Specimen: Blood from Arm, Right Updated: 09/26/24 1748     Hepatitis B Surface Ag Non-Reactive     Hep A IgM Non-Reactive     Hep B C IgM Non-Reactive     Hepatitis C Ab Non-Reactive    Narrative:      Results may be falsely decreased if patient taking Biotin.     HIV-1 / O / 2 Ag / Antibody [[id number]]  (Normal) Collected: 09/26/24 1350    Specimen: Blood from Arm, Right Updated: 09/26/24 1748     HIV-1/ HIV-2 Non-Reactive     Comment: A non-reactive test result does not preclude the possibility of exposure to HIV or infection with HIV. An antibody response to recent exposure may take several months to reach  detectable levels.       Narrative:      The HIV antibody/antigen combo assay is a qualitative assay for HIV that includes the p24 antigen as well as antibodies to HIV types 1 and 2. This test is intended to be used as a screening assay in the diagnosis of HIV infection in patients over the age of 2.    C-reactive Protein [[id number]]  (Abnormal) Collected: 09/26/24 1504    Specimen: Blood from Arm, Right Updated: 09/26/24 1530     C-Reactive Protein 3.80 mg/dL     Urinalysis, Microscopic Only - Urine, Clean Catch [[id number]]  (Abnormal) Collected: 09/26/24 1419    Specimen: Urine, Clean Catch Updated: 09/26/24 1459     RBC, UA 0-2 /HPF      WBC, UA 3-5 /HPF      Bacteria, UA 4+ /HPF      Squamous Epithelial Cells, UA None Seen /HPF      Hyaline Casts, UA None Seen /LPF      Methodology Manual Light Microscopy    Urinalysis With Microscopic If Indicated (No Culture) - Urine, Clean Catch [[id number]]  (Abnormal) Collected: 09/26/24 1419    Specimen: Urine, Clean Catch Updated: 09/26/24 1440     Color, UA Dark Yellow     Appearance, UA Turbid     pH, UA 6.0     Specific Gravity, UA >=1.030     Glucose, UA Negative     Ketones, UA 15 mg/dL (1+)     Bilirubin, UA Small (1+)     Blood, UA Trace     Protein, UA 30 mg/dL (1+)     Leuk Esterase, UA Moderate (2+)     Nitrite, UA Positive     Urobilinogen, UA 2.0 E.U./dL    hCG, Serum, Qualitative [[id number]]  (Normal) Collected: 09/26/24 1350    Specimen: Blood from Arm, Right Updated: 09/26/24 1425     HCG Qualitative Negative    Lactic Acid, Plasma [[id number]]  (Normal) Collected: 09/26/24 1350    Specimen: Blood from Arm, Right Updated: 09/26/24 1424     Lactate 1.1 mmol/L     COVID-19 and FLU A/B PCR, 1 HR TAT - Swab, Nasopharynx [[id number]]  (Normal) Collected: 09/26/24 1326    Specimen: Swab from Nasopharynx Updated: 09/26/24 1353     COVID19 Not Detected     Influenza A PCR Not Detected     Influenza B PCR Not Detected    Narrative:      Fact sheet for providers:  https://www.fda.gov/media/669420/download    Fact sheet for patients: https://www.fda.gov/media/311713/download    Test performed by PCR.          Imaging Results (Most Recent)       Procedure Component Value Units Date/Time    XR Chest 1 View [[id number]] Collected: 10/18/24 1946     Updated: 10/18/24 1952    Narrative:      PROCEDURE: Chest x-ray examination performed on October 18, 2024. Single  view. Upright position.     HISTORY: Possible pneumonia.     COMPARISON: None.     FINDINGS:     Normal heart size.  No lobar consolidation or edema.  Mild coarsened bronchovascular pattern to the lungs  No bronchial inflammation.  No pleural effusion. No pneumothorax  No fracture or foreign body.       Impression:         1.  No acute process seen in the chest  2.  Mild coarsened bronchovascular pattern to the lungs.  3.  No lobar consolidation or edema.   4.  No pleural effusion or pneumothorax.     This report was finalized on 10/18/2024 7:50 PM by Manas Griffiths MD.       XR Ankle 3+ View Left [[id number]] Collected: 09/26/24 1537     Updated: 09/26/24 1540    Narrative:      EXAM:    XR Left Ankle Complete, 3 or More Views     EXAM DATE:    9/26/2024 3:17 PM     CLINICAL HISTORY:    left ankle injury     TECHNIQUE:    Frontal, lateral and oblique views of the left ankle.     COMPARISON:    No relevant prior studies available.     FINDINGS:    Bones/joints:  See below.      Soft tissues:  Soft tissue swelling, but no acute fracture or  dislocation.       Impression:        Soft tissue swelling, but no acute fracture or dislocation.        This report was finalized on 9/26/2024 3:38 PM by Dr. Moses Otto MD.       XR Foot 3+ View Left [[id number]] Collected: 09/26/24 1536     Updated: 09/26/24 1539    Narrative:      EXAM:    XR Left Foot Complete, 3 or More Views     EXAM DATE:    9/26/2024 3:16 PM     CLINICAL HISTORY:    left foot wound     TECHNIQUE:    Frontal, lateral and oblique views of the left foot.      COMPARISON:    No relevant prior studies available.     FINDINGS:    Bones/joints:  Unremarkable.  No acute fracture.  No dislocation.    Soft tissues:  Unremarkable.  No radiopaque foreign body.       Impression:        No acute findings in the left foot.        This report was finalized on 9/26/2024 3:37 PM by Dr. Moses Otto MD.       XR Chest 1 View [[id number]] Collected: 09/26/24 1406     Updated: 09/26/24 1408    Narrative:      XR CHEST 1 VW-     CLINICAL INDICATION: weakness        COMPARISON: None immediately available      TECHNIQUE: Single frontal view of the chest.     FINDINGS:     LUNGS: Lungs are adequately aerated.      HEART AND MEDIASTINUM: Heart and mediastinal contours are unremarkable        SKELETON: Bony and soft tissue structures are unremarkable.             Impression:      No radiographic evidence of acute cardiac or pulmonary disease.           This report was finalized on 9/26/2024 2:06 PM by Dr. Moses Otto MD.       CT Cervical Spine Without Contrast [[id number]] Collected: 09/26/24 1317     Updated: 09/26/24 1319    Narrative:      CT CERVICAL SPINE WO CONTRAST-     CLINICAL INDICATION: neck pain        COMPARISON: None available     TECHNIQUE: Axial images of the cervical spine were acquired with out any  intravenous contrast. Reformatted images were then created in the  sagittal and coronal planes.     DOSE:      Radiation dose reduction techniques were utilized per ALARA protocol.  Automated exposure control was initiated through either or [employer] or  DoseRight software packages by  protocol.           FINDINGS:   The provided study demonstrates preservation of the vertebral body  heights in the sagittally reconstructed images.     There is no prevertebral soft tissue swelling.     Alignment is near anatomic.     The disc space heights are uniform.     I see no acute cervical spine fracture.       Impression:         1. No acute bony abnormality.     2. Other  incidental findings as above     This report was finalized on 9/26/2024 1:17 PM by Dr. Moses Otto MD.       CT Lumbar Spine Without Contrast [[id number]] Collected: 09/26/24 1317     Updated: 09/26/24 1319    Narrative:      EXAM:    CT Lumbar Spine Without Intravenous Contrast     EXAM DATE:    9/26/2024 12:59 PM     CLINICAL HISTORY:    back pain     TECHNIQUE:    Axial computed tomography images of the lumbar spine without  intravenous contrast.  Sagittal and coronal reformatted images were  created and reviewed.  This CT exam was performed using one or more of  the following dose reduction techniques:  automated exposure control,  adjustment of the mA and/or kV according to patient size, and/or use of  iterative reconstruction technique.     COMPARISON:    No relevant prior studies available.     FINDINGS:    VERTEBRAE:  Unremarkable.  No acute fracture.    DISCS/SPINAL CANAL/NEURAL FORAMINA:  No acute findings.  No spinal  canal stenosis.    SOFT TISSUES:  Unremarkable.       Impression:        No acute findings in the lumbar spine.        This report was finalized on 9/26/2024 1:17 PM by Dr. Moses Otto MD.       CT Thoracic Spine Without Contrast [[id number]] Collected: 09/26/24 1316     Updated: 09/26/24 1319    Narrative:      EXAM:    CT Thoracic Spine Without Intravenous Contrast     EXAM DATE:    9/26/2024 12:58 PM     CLINICAL HISTORY:    back pain     TECHNIQUE:    Axial computed tomography images of the thoracic spine without  intravenous contrast.  Sagittal and coronal reformatted images were  created and reviewed.  This CT exam was performed using one or more of  the following dose reduction techniques:  automated exposure control,  adjustment of the mA and/or kV according to patient size, and/or use of  iterative reconstruction technique.     COMPARISON:    No relevant prior studies available.     FINDINGS:    VERTEBRAE:  Unremarkable.  No acute fracture.    DISCS/SPINAL CANAL/NEURAL FORAMINA:   No acute findings.  No spinal  canal stenosis.    SOFT TISSUES:  Unremarkable.       Impression:        No acute findings in the thoracic spine.        This report was finalized on 2024 1:17 PM by Dr. Moses Otto MD.       CT Head Without Contrast [[id number]] Collected: 24 1259     Updated: 24 1302    Narrative:      CT HEAD WO CONTRAST-     CLINICAL INDICATION: weakness        COMPARISON: None available     TECHNIQUE: Axial images of the brain were obtained with out intravenous  contrast.  Reformatted images were created in the sagittal and coronal  planes.     DOSE:     Radiation dose reduction techniques were utilized per ALARA protocol.  Automated exposure control was initiated through either or [employer] or  DoseRight software packages by  protocol.        FINDINGS:    BRAIN: Probable subacute ischemia right middle cerebral artery  territory    VENTRICLES:  Unremarkable.  No ventriculomegaly.       BONES/JOINTS:  Unremarkable.  No acute fracture.       SOFT TISSUES:  Unremarkable.       SINUSES:  no air fluid levels       MASTOID AIR CELLS:  Unremarkable as visualized.  No mastoid effusion.          Impression:         1. Probable remote right middle cerebral artery infarction  2. No acute parenchymal mass, hemorrhage, or midline shift     This report was finalized on 2024 1:00 PM by Dr. Moses Otto MD.             Operative/Procedure Notes (most recent note)    No notes of this type exist for this encounter.          Physician Progress Notes (most recent note)        Bruce Branham PA-C at 24 1002          Patient Identification:  Name:  Lety Johnson  Age:  44 y.o.  Sex:  female  :  1980  MRN:  [id number]  Visit Number:  [id number]  Primary Care Provider:  Concha Rao APRN    Length of stay:  55    Subjective/Interval History/Consultants/Procedures     Chief Complaint:   Chief Complaint   Patient presents with    Fall        Subjective/Interval History:    44 y.o. female who was admitted on 9/26/2024 with UTI     PMH is significant for CVA w/ left sided hemiparesis, debility, hx genital herpes on suppressive therapy, morbid obesity, T2DM   For complete admission information, please see history and physical.     Consultations:  Infectious disease  PT/OT  CM/BOBY  Psychiatry     Procedures/Scans:  CT head without contrast  CT C-spine without contrast  CT T-spine without contrast  CT L-spine without contrast  CXR x 2  XR left foot  XR left ankle    Today, the patient was seen and examined during therapy session. She did complain of increased pain with physical activity. No acute events noted overnight.      Room location at the time of evaluation was [facility].    ----------------------------------------------------------------------------------------------------------------------  Current Hospital Meds:  acyclovir, 400 mg, Oral, Nightly  aspirin, 81 mg, Oral, Daily  atorvastatin, 80 mg, Oral, Daily  DULoxetine, 90 mg, Oral, Daily  heparin (porcine), 5,000 Units, Subcutaneous, Q8H  ibuprofen, 600 mg, Oral, Once  insulin glargine, 20 Units, Subcutaneous, Daily With Breakfast  insulin glargine, 40 Units, Subcutaneous, Nightly  insulin lispro, 4-24 Units, Subcutaneous, 4x Daily AC & at Bedtime  insulin lispro, 8 Units, Subcutaneous, Daily With Lunch  Lidocaine, 3 patch, Transdermal, Q24H  lisinopril, 20 mg, Oral, Daily  magic barrier cream, , Topical, BID  metoprolol tartrate, 25 mg, Oral, Q12H  nicotine, 1 patch, Transdermal, Q24H  nystatin, , Topical, Q12H  pantoprazole, 40 mg, Oral, Daily  pregabalin, 25 mg, Oral, Q8H  sodium chloride, 10 mL, Intravenous, Q12H  sodium chloride, 10 mL, Intravenous, Q12H      Pharmacy Consult,       ----------------------------------------------------------------------------------------------------------------------      Objective     Vital Signs:  Temp:  [97.6 °F (36.4 °C)-98.2 °F (36.8 °C)] 97.8 °F (36.6  °C)  Heart Rate:  [] 112  Resp:  [16-20] 16  BP: (101-122)/(54-71) 122/66      10/07/24  0511 10/08/24  0500 10/09/24  0500   Weight: 102 kg (225 lb 14.4 oz) (FIFI cameron) 102 kg (225 lb 15.5 oz) 102 kg (224 lb 1.6 oz)     Body mass index is 36.73 kg/m².    Intake/Output Summary (Last 24 hours) at 11/22/2024 1002  Last data filed at 11/22/2024 0300  Gross per 24 hour   Intake 960 ml   Output 1200 ml   Net -240 ml     No intake/output data recorded.  Diet: Regular/House, Diabetic; Consistent Carbohydrate; Fluid Consistency: Thin (IDDSI 0)  ----------------------------------------------------------------------------------------------------------------------    Physical Exam  Vitals and nursing note reviewed.   Constitutional:       General: She is not in acute distress.     Appearance: She is obese.   HENT:      Head: Normocephalic and atraumatic.   Eyes:      Extraocular Movements: Extraocular movements intact.   Pulmonary:      Effort: Pulmonary effort is normal. No respiratory distress.   Skin:     General: Skin is warm and dry.   Neurological:      Mental Status: She is alert. Mental status is at baseline.              ----------------------------------------------------------------------------------------------------------------------  Tele:      ----------------------------------------------------------------------------------------------------------------------      Results from last 7 days   Lab Units 11/22/24  0036   WBC 10*3/mm3 9.51   HEMOGLOBIN g/dL 11.7*   HEMATOCRIT % 36.4   MCV fL 102.2*   MCHC g/dL 32.1   PLATELETS 10*3/mm3 334         Results from last 7 days   Lab Units 11/22/24  0036 11/19/24  0129   SODIUM mmol/L 138 136   POTASSIUM mmol/L 3.8 4.3   MAGNESIUM mg/dL  --  1.8   CHLORIDE mmol/L 102 101   CO2 mmol/L 22.8 24.4   BUN mg/dL 23* 21*   CREATININE mg/dL 0.53* 0.57   CALCIUM mg/dL 9.2 9.3   GLUCOSE mg/dL 126* 142*   ALBUMIN g/dL 3.3*  --    BILIRUBIN mg/dL 0.2  --    ALK PHOS U/L 109   "--    AST (SGOT) U/L 16  --    ALT (SGPT) U/L 16  --    Estimated Creatinine Clearance: 163.6 mL/min (A) (by C-G formula based on SCr of 0.53 mg/dL (L)).  No results found for: \"AMMONIA\"      No results found for: \"BLOODCX\"  No results found for: \"URINECX\"  No results found for: \"WOUNDCX\"  No results found for: \"STOOLCX\"  ----------------------------------------------------------------------------------------------------------------------  Imaging Results (Last 24 Hours)       ** No results found for the last 24 hours. **          ----------------------------------------------------------------------------------------------------------------------   I have reviewed the above laboratory values for 11/22/24    Assessment/Plan     There are no active hospital problems to display for this patient.        ASSESSMENT/PLAN:    ESBL E. coli UTI  Acute metabolic encephalopathy, resolved  S/p Invanz.  Infectious disease input appreciated.     Hx CVA with left-sided hemiparesis  Chronic debility  Continue PT OT  CM/SW assisting with discharge planning.  Patient is pending placement.     T2DM  Diabetic neuropathy  Continue insulin regimen-titrate as needed. Add scheduled meal time insulin w/ lunch.   Continue supportive care measures, Cymbalta  Lyrica added 11/13 given persistent pain with some improvement noted.      Hx genital herpes  Continue suppressive acyclovir     Morbid obesity  Complicates all aspects of care     Depressive disorder  Adjustment disorder w/ disturbance of mood and anxiety   Patient with somewhat labile mood, tearful at times. Reports history of depression as well, likely exacerbated by current health status/hospitalization.  She is taking cymbalta as above  Psychiatry consulted, input appreciated. Felt in the setting of recent stressors findings consistent with adjustment disorder on top of patient's unspecified depressive disorder with patient declining any medication adjustments at this time. "   Cymbalta to 90 daily per psychiatry recommendation.  May benefit from psychotherapy referral at discharge.     Labs repeated this morning and grossly stable.      -----------  -DVT prophylaxis: SQH  -Disposition plans/anticipated needs: Pending placement        The patient is high risk due to the following diagnoses/reasons:  hemiparesis, debility        Bruce Branham PA-C  11/22/24  10:02 EST     Electronically signed by Bruce Branham PA-C at 11/22/24 1008          Consult Notes (most recent note)        Prasanth Ramirez MD at 11/20/24 0959          Referring Provider: Yonas  Reason for Consultation: depression      Chief complaint/Focus of Exam: depression    Subjective .     History of present illness:    43-year-old female with a history of CVA admitted on 9/26/2024 for management of encephalopathy, weakness, fall.  Psychiatry consulted for depression.    Patient is seen in room today.  She was awake, alert and oriented x 4.  She reports feeling down earlier in hospitalization, but says that she was able to speak to her family and avancé last night, which helped a lot.  Sharon has a court date and will potentially be moved to house arrest in January.  She denies significant depressive symptoms or need for medication changes at this time.  Denies SI, HI, AVH.    Review of Systems  Pertinent items are noted in HPI, all other systems reviewed and negative    History  Past Medical History:   Diagnosis Date    Stroke    , History reviewed. No pertinent surgical history., History reviewed. No pertinent family history.,   Social History     Socioeconomic History    Marital status: Single   Tobacco Use    Smoking status: Every Day     Average packs/day: 2.0 packs/day for 26.0 years (52.0 ttl pk-yrs)     Types: Cigarettes     Start date: 1998    Smokeless tobacco: Never   Vaping Use    Vaping status: Never Used   Substance and Sexual Activity    Alcohol use: Not Currently    Drug use: Never    Sexual  activity: Not Currently     Partners: Male     Comment:  in half-way     E-cigarette/Vaping    E-cigarette/Vaping Use Never User     Passive Exposure No     Counseling Given No      E-cigarette/Vaping Substances    Nicotine No     THC No     CBD No     Flavoring No      E-cigarette/Vaping Devices    Disposable No     Pre-filled or Refillable Cartridge No     Refillable Tank No     Pre-filled Pod No          ,   Medications Prior to Admission   Medication Sig Dispense Refill Last Dose/Taking    aspirin 81 MG chewable tablet Chew 1 tablet Daily.   Unknown    atorvastatin (LIPITOR) 80 MG tablet Take 1 tablet by mouth Daily.   Unknown    cyclobenzaprine (FLEXERIL) 10 MG tablet Take 1 tablet by mouth 3 (Three) Times a Day As Needed for Muscle Spasms.   Unknown    DULoxetine (CYMBALTA) 60 MG capsule Take 1 capsule by mouth Daily.   Unknown    lisinopril (PRINIVIL,ZESTRIL) 20 MG tablet Take 1 tablet by mouth Daily.   Unknown    metFORMIN (GLUCOPHAGE) 1000 MG tablet Take 1 tablet by mouth 2 (Two) Times a Day With Meals.   Unknown    pantoprazole (PROTONIX) 40 MG EC tablet Take 1 tablet by mouth Daily.   Unknown    [DISCONTINUED] acyclovir (ZOVIRAX) 400 MG tablet Take 1 tablet by mouth Every Night.      , Scheduled Meds:  acyclovir, 400 mg, Oral, Nightly  aspirin, 81 mg, Oral, Daily  atorvastatin, 80 mg, Oral, Daily  DULoxetine, 60 mg, Oral, Daily  heparin (porcine), 5,000 Units, Subcutaneous, Q8H  insulin glargine, 20 Units, Subcutaneous, Daily With Breakfast  insulin glargine, 40 Units, Subcutaneous, Nightly  insulin lispro, 4-24 Units, Subcutaneous, 4x Daily AC & at Bedtime  insulin lispro, 8 Units, Subcutaneous, Daily With Lunch  Lidocaine, 3 patch, Transdermal, Q24H  lisinopril, 20 mg, Oral, Daily  magic barrier cream, , Topical, BID  metoprolol tartrate, 25 mg, Oral, Q12H  nicotine, 1 patch, Transdermal, Q24H  nystatin, , Topical, Q12H  pantoprazole, 40 mg, Oral, Daily  pregabalin, 25 mg, Oral, Q8H  sodium chloride,  10 mL, Intravenous, Q12H  sodium chloride, 10 mL, Intravenous, Q12H   , Continuous Infusions:  Pharmacy Consult,    , PRN Meds:    acetaminophen    senna-docusate sodium **AND** polyethylene glycol **AND** bisacodyl **AND** bisacodyl    Calcium Replacement - Follow Nurse / BPA Driven Protocol    cyclobenzaprine    dextrose    dextrose    Diclofenac Sodium    glucagon (human recombinant)    loperamide    Magnesium Standard Dose Replacement - Follow Nurse / BPA Driven Protocol    melatonin    Pharmacy Consult    Phosphorus Replacement - Follow Nurse / BPA Driven Protocol    Potassium Replacement - Follow Nurse / BPA Driven Protocol    prochlorperazine    sodium chloride    sodium chloride    sodium chloride    sodium chloride    traMADol, and Allergies:  Patient has no known allergies.    Objective     Vital Signs   Temp:  [97.4 °F (36.3 °C)-98.1 °F (36.7 °C)] 98.1 °F (36.7 °C)  Heart Rate:  [] 97  Resp:  [16] 16  BP: ()/(53-68) 93/59    Mental Status Exam:  Hygiene:   good  Cooperation:  Cooperative  Eye Contact:  Good  Psychomotor Behavior:  Appropriate  Affect:  Full range  Hopelessness: Denies  Speech:  Normal  Thought Progress:  Goal directed and Linear  Thought Content:  Normal  Suicidal:  None  Homicidal:  None  Hallucinations:  None  Delusion:  None  Memory:  Intact  Orientation:  Person, Place, Time, and Situation  Reliability:  fair  Insight:  Fair  Judgement:  Fair  Impulse Control:  Fair    Results Review:   I reviewed the patient's new clinical results.  I reviewed the patient's other test results and agree with the interpretation  I personally viewed and interpreted the patient's EKG/Telemetry data  Lab Results (last 24 hours)       Procedure Component Value Units Date/Time    POC Glucose Once [[id number]]  (Normal) Collected: 11/20/24 0650    Specimen: Blood Updated: 11/20/24 0656     Glucose 102 mg/dL     POC Glucose Once [[id number]]  (Abnormal) Collected: 11/19/24 1944    Specimen: Blood  Updated: 11/19/24 1950     Glucose 310 mg/dL     POC Glucose Once [[id number]]  (Abnormal) Collected: 11/19/24 1557    Specimen: Blood Updated: 11/19/24 1604     Glucose 177 mg/dL     POC Glucose Once [[id number]]  (Abnormal) Collected: 11/19/24 1033    Specimen: Blood Updated: 11/19/24 1039     Glucose 280 mg/dL           Imaging Results (Last 24 Hours)       ** No results found for the last 24 hours. **              Assessment & Plan     Active Problems:    * No active hospital problems. *     Adjustment disorder with disturbance of mood and anxiety  -Recent stressors appear situational, potentially personality related.  Patient reports feeling better now and denies need for medication changes.  -Refer for outpatient psychotherapy at discharge    Unspecified depressive disorder  -Continue Cymbalta 60 mg daily.  Could increase to 90 mg daily if depressive symptoms worsen    I discussed the patient's findings and my recommendations with patient    Prasanth Ramirez MD  11/20/24  09:59 EST    Electronically signed by Prasanth Ramirez MD at 11/20/24 1113          Nutrition Notes (most recent note)        Vee Van RD at 11/21/24 1604          Adult Nutrition  Assessment/PES    Patient Name:  Lety Johnson  YOB: 1980  MRN: [id number]  Admit Date:  9/26/2024    Assessment Date:  11/21/2024    Comments:  follow-up    Po intake 93% ave    Encourage po and voice food prefs    Will cont to follow and monitor.      Reason for Assessment       Row Name 11/21/24 1601          Reason for Assessment    Reason For Assessment follow-up protocol  [facility] REZA White     Diagnosis infection/sepsis;neurologic conditions  cystitis, met enceph, debility hx CVA                    Nutrition/Diet History       Row Name 11/21/24 1601          Nutrition/Diet History    Typical Intake (Food/Fluid/EN/PN) Called to room no answer.                    Labs/Tests/Procedures/Meds       Row Name 11/21/24 1608           Labs/Procedures/Meds    Lab Results Reviewed reviewed     Lab Results Comments glu 148-218        Diagnostic Tests/Procedures    Diagnostic Test/Procedure Reviewed reviewed        Medications    Pertinent Medications Reviewed reviewed     Pertinent Medications Comments humalog, lantus                        Nutrition Prescription Ordered       Row Name 24 1602          Nutrition Prescription PO    Common Modifiers Consistent Carbohydrate                    Evaluation of Received Nutrient/Fluid Intake       Row Name 24 1602          Fluid Intake Evaluation    Oral Fluid (mL) 1277  ave x 3        PO Evaluation    Number of Meals 7     % PO Intake 93                       Problem/Interventions:   Problem 1       Row Name 24 1603          Nutrition Diagnoses Problem 1    Problem 1 Other (comment)  No nutrition dx at this time                          Intervention Goal       Row Name 24 1603          Intervention Goal    General Meet nutritional needs for age/condition     PO Maintain intake;PO intake (%)     PO Intake % 80 %  or greater                    Nutrition Intervention       Row Name 24 1604          Nutrition Intervention    RD/Tech Action Follow Tx progress                      Education/Evaluation       Row Name 24 1604          Education    Education No discharge needs identified at this time        Monitor/Evaluation    Monitor Per protocol;I&O;PO intake;Pertinent labs;Weight                     Electronically signed by:  Vee Van RD  24 16:04 EST    Electronically signed by Vee Van RD at 24 1605          Physical Therapy Notes (most recent note)        Giovana Quinonez, PT Student at 24 1106  Version 1 of 1      Attestation signed by Thierry Saldivar, PT at 24 1512                     Acute Care - Physical Therapy Treatment Note  LILLIANA Haines     Patient Name: Lety Johnson  : 1980  MRN: [id number]  Today's Date: 2024   Onset  of Illness/Injury or Date of Surgery: 09/26/24  Visit Dx:     ICD-10-CM ICD-9-CM   1. Hypokalemia  E87.6 276.8   2. Pressure injury of skin of sacral region, unspecified injury stage  L89.159 707.03     707.20   3. Pressure injury of skin of left heel, unspecified injury stage  L89.629 707.07     707.20   4. Acute cystitis without hematuria  N30.00 595.0     Patient Active Problem List   Diagnosis   (none) - all problems resolved or deleted     Past Medical History:   Diagnosis Date    Stroke      History reviewed. No pertinent surgical history.  PT Assessment (Last 12 Hours)       PT Evaluation and Treatment       [facility] Name 11/22/24 0930          Physical Therapy Time and Intention    Subjective Information complains of;pain;numbness;weakness (P)   -     Document Type therapy note (daily note) (P)   -     Mode of Treatment physical therapy (P)   -     Patient Effort adequate (P)   -     Symptoms Noted During/After Treatment increased pain (P)   -[facility] Name 11/22/24 0930          General Information    Patient Profile Reviewed yes (P)   -[facility] Name 11/22/24 0930          Bed Mobility    Bed Mobility supine-sit;sit-supine (P)   -     Supine-Sit [place] (Bed Mobility) maximum assist (25% patient effort);2 person assist (P)   -     Sit-Supine [place] (Bed Mobility) maximum assist (25% patient effort);2 person assist (P)   -[facility] Name 11/22/24 0930          Balance    Balance Interventions sitting;static (P)   -     Comment, Balance Pt. maintained sitting position for around 20 mins. (P)   -[facility] Name 11/22/24 0930          Motor Skills    Therapeutic Exercise ankle (P)   -[facility] Name 11/22/24 0930          Ankle (Therapeutic Exercise)    Ankle (Therapeutic Exercise) AROM (active range of motion) (P)   -     Ankle AROM (Therapeutic Exercise) right;plantarflexion (P)   -       Row Name             Wound 09/26/24 1809 Left posterior ankle Other (comment)     Wound - Properties Group Placement Date: 09/26/24  -KJ Placement Time: 1809  -KJ Side: Left  -KJ Orientation: posterior  -KJ Location: ankle  -KJ Primary Wound Type: Other  -TW, unknow etiology     Retired Wound - Properties Group Placement Date: 09/26/24  -KJ Placement Time: 1809  -KJ Side: Left  -KJ Orientation: posterior  -KJ Location: ankle  -KJ Primary Wound Type: Other  -TW, unknow etiology     Retired Wound - Properties Group Placement Date: 09/26/24  -KJ Placement Time: 1809  -KJ Side: Left  -KJ Orientation: posterior  -KJ Location: ankle  -KJ Primary Wound Type: Other  -TW, unknow etiology     Retired Wound - Properties Group Date first assessed: 09/26/24  -KJ Time first assessed: 1809  -KJ Side: Left  -KJ Location: ankle  -KJ Primary Wound Type: Other  -TW, unknow etiology       Row Name             Wound 10/11/24 1330 medial coccyx Fissure    Wound - Properties Group Placement Date: 10/11/24  -TW Placement Time: 1330  -TW Orientation: medial  -TW Location: coccyx  -TW Primary Wound Type: Fissure  -TW    Retired Wound - Properties Group Placement Date: 10/11/24  -TW Placement Time: 1330  -TW Orientation: medial  -TW Location: coccyx  -TW Primary Wound Type: Fissure  -TW    Retired Wound - Properties Group Placement Date: 10/11/24  -TW Placement Time: 1330  -TW Orientation: medial  -TW Location: coccyx  -TW Primary Wound Type: Fissure  -TW    Retired Wound - Properties Group Date first assessed: 10/11/24  -TW Time first assessed: 1330  -TW Location: coccyx  -TW Primary Wound Type: Fissure  -TW      Row Name             Wound 10/16/24 0705 Left medial gluteal MASD (moisture associated skin damage)    Wound - Properties Group Placement Date: 10/16/24  -MS Placement Time: 0705  -MS Side: Left  -MS Orientation: medial  -MS Location: gluteal  -MS Primary Wound Type: MASD  -MS Type: MASD (moisture associated skin damage)  -MS Present on Original Admission: N  -MS Additional Comments: Noted at shift change skin  assesment, Night shift RN stated it had been there her shift.  -MS    Retired Wound - Properties Group Placement Date: 10/16/24  -MS Placement Time: 0705 -MS Present on Original Admission: N  -MS Side: Left  -MS Orientation: medial  -MS Location: gluteal  -MS Primary Wound Type: MASD  -MS Additional Comments: Noted at shift change skin assesment, Night shift RN stated it had been there her shift.  -MS Type: MASD (moisture associated skin damage)  -MS    Retired Wound - Properties Group Placement Date: 10/16/24  -MS Placement Time: 0705 -MS Present on Original Admission: N  -MS Side: Left  -MS Orientation: medial  -MS Location: gluteal  -MS Primary Wound Type: MASD  -MS Additional Comments: Noted at shift change skin assesment, Night shift RN stated it had been there her shift.  -MS Type: MASD (moisture associated skin damage)  -MS    Retired Wound - Properties Group Date first assessed: 10/16/24  -MS Time first assessed: 0705 -MS Present on Original Admission: N  -MS Side: Left  -MS Location: gluteal  -MS Primary Wound Type: MASD  -MS Additional Comments: Noted at shift change skin assesment, Night shift RN stated it had been there her shift.  -MS Type: MASD (moisture associated skin damage)  -MS      Row Name             Wound 10/16/24 0705 Left posterior greater trochanter MASD (moisture associated skin damage)    Wound - Properties Group Placement Date: 10/16/24  -MS Placement Time: 0705 -MS Side: Left  -MS Orientation: posterior  -MS Location: greater trochanter  -MS Primary Wound Type: MASD  -MS Type: MASD (moisture associated skin damage)  -MS Present on Original Admission: N  -MS    Retired Wound - Properties Group Placement Date: 10/16/24  -MS Placement Time: 0705 -MS Present on Original Admission: N  -MS Side: Left  -MS Orientation: posterior  -MS Location: greater trochanter  -MS Primary Wound Type: MASD  -MS Type: MASD (moisture associated skin damage)  -MS    Retired Wound - Properties Group  Placement Date: 10/16/24  -MS Placement Time: 0705 -MS Present on Original Admission: N  -MS Side: Left  -MS Orientation: posterior  -MS Location: greater trochanter  -MS Primary Wound Type: MASD  -MS Type: MASD (moisture associated skin damage)  -MS    Retired Wound - Properties Group Date first assessed: 10/16/24  -MS Time first assessed: 0705 -MS Present on Original Admission: N  -MS Side: Left  -MS Location: greater trochanter  -MS Primary Wound Type: MASD  -MS Type: MASD (moisture associated skin damage)  -MS      Row Name 11/22/24 0930          Plan of Care Review    Plan of Care Reviewed With patient (P)   -HP     Progress no change (P)   -HP       Row Name 11/22/24 0930          Positioning and Restraints    Pre-Treatment Position in bed (P)   -HP     Post Treatment Position bed (P)   -HP     In Bed supine;call light within reach;encouraged to call for assist;exit alarm on (P)   -HP       Row Name 11/22/24 0930          Progress Summary (PT)    Daily Progress Summary (PT) Pt. tolerated therapy good today w/ c/o pain and numbness. Pt. completed bed mobility w/ maxAx2 and maintained a sitting position for around 20 mins. Pt. completed seated exercise on the R leg as well. Will continue w/ POC. (P)   -HP               User Key  (r) = Recorded By, (t) = Taken By, (c) = Cosigned By      Initials Name Provider Type    Karen Peralta, RN Registered Nurse    Nory Keenan, RN Registered Nurse    Roe North, RN Registered Nurse    Giovana Verdin, GENARO Student PT Student                    Physical Therapy Education       Title: PT OT SLP Therapies (Done)       Topic: Physical Therapy (Done)       Point: Mobility training (Done)       Learning Progress Summary            Patient Acceptance, E, VU by WG at 11/22/2024 0229    Acceptance, E, VU by WG at 11/21/2024 0236    Acceptance, E,TB, VU by CF at 11/17/2024 1024    Acceptance, E,TB, VU by RR at 11/14/2024 0148    Acceptance, E,TB, VU by RR at  11/13/2024 0218    Acceptance, E,TB, VU by RG at 11/12/2024 0927    Acceptance, TB,E, VU by RG at 11/11/2024 0944    Acceptance, E,TB, VU by RG at 11/10/2024 0931    Acceptance, E, VU by WG at 11/10/2024 0233    Acceptance, E,TB, VU by CF at 11/9/2024 1016    Acceptance, E, NR by SC at 11/3/2024 0025    Acceptance, E,TB, VU by RR at 11/1/2024 2315    Acceptance, E,D, VU,NR by AG at 10/31/2024 1234    Comment: PT educated pt. in importance of mobilization and L L/UE PROM and self ROM to prevent contracture.  Pt. is encouraged to to perform R LE AROM frequently while supine or sitting EOB.    Acceptance, E,TB, VU by RR at 10/27/2024 0031    Acceptance, E,TB, VU by RG at 10/22/2024 1002    Acceptance, TB,E, VU by RG at 10/21/2024 0907    Nonacceptance, E, NR by WG at 10/16/2024 0228    Acceptance, E,TB, VU by CF at 10/15/2024 0753    Acceptance, E,TB, VU by CF at 10/14/2024 0801    Acceptance, E,TB, VU by CF at 10/13/2024 1045    Acceptance, E,D, VU,NR by AG at 10/10/2024 1310    Acceptance, E,TB, VU by CB at 10/8/2024 0448    Acceptance, E, NR by RD at 10/2/2024 1101    Acceptance, E, NR by RD at 10/1/2024 0856    Acceptance, E,D, VU,NR by AG at 9/30/2024 1559                      Point: Home exercise program (Done)       Learning Progress Summary            Patient Acceptance, E, VU by WG at 11/22/2024 0229    Acceptance, E, VU by WG at 11/21/2024 0236    Acceptance, E,TB, VU by CF at 11/17/2024 1024    Acceptance, E,TB, VU by RR at 11/14/2024 0148    Acceptance, E,TB, VU by RR at 11/13/2024 0218    Acceptance, E,TB, VU by RG at 11/12/2024 0927    Acceptance, TB,E, VU by RG at 11/11/2024 0944    Acceptance, E,TB, VU by RG at 11/10/2024 0931    Acceptance, E, VU by WG at 11/10/2024 0233    Acceptance, E,TB, VU by CF at 11/9/2024 1016    Acceptance, E, NR by SC at 11/3/2024 0025    Acceptance, E,TB, VU by RR at 11/1/2024 2315    Acceptance, E,D, VU,NR by AG at 10/31/2024 1234    Comment: PT educated pt. in importance  of mobilization and L L/UE PROM and self ROM to prevent contracture.  Pt. is encouraged to to perform R LE AROM frequently while supine or sitting EOB.    Acceptance, E,TB, VU by RR at 10/27/2024 0031    Acceptance, E,TB, VU by RG at 10/22/2024 1002    Acceptance, TB,E, VU by RG at 10/21/2024 0907    Nonacceptance, E, NR by WG at 10/16/2024 0228    Acceptance, E,TB, VU by CF at 10/15/2024 0753    Acceptance, E,TB, VU by CF at 10/14/2024 0801    Acceptance, E,TB, VU by CF at 10/13/2024 1045    Acceptance, E,D, VU,NR by AG at 10/10/2024 1310    Acceptance, E,TB, VU by CB at 10/8/2024 0448    Acceptance, E, NR by RD at 10/2/2024 1101    Acceptance, E, NR by RD at 10/1/2024 0856    Acceptance, E,D, VU,NR by AG at 9/30/2024 1559                      Point: Body mechanics (Done)       Learning Progress Summary            Patient Acceptance, E, VU by WG at 11/22/2024 0229    Acceptance, E, VU by WG at 11/21/2024 0236    Acceptance, E,TB, VU by CF at 11/17/2024 1024    Acceptance, E,TB, VU by RR at 11/14/2024 0148    Acceptance, E,TB, VU by RR at 11/13/2024 0218    Acceptance, E,TB, VU by RG at 11/12/2024 0927    Acceptance, TB,E, VU by RG at 11/11/2024 0944    Acceptance, E,TB, VU by RG at 11/10/2024 0931    Acceptance, E, VU by WG at 11/10/2024 0233    Acceptance, E,TB, VU by CF at 11/9/2024 1016    Acceptance, E, NR by SC at 11/3/2024 0025    Acceptance, E,TB, VU by RR at 11/1/2024 2315    Acceptance, E,D, VU,NR by AG at 10/31/2024 1234    Comment: PT educated pt. in importance of mobilization and L L/UE PROM and self ROM to prevent contracture.  Pt. is encouraged to to perform R LE AROM frequently while supine or sitting EOB.    Acceptance, E,TB, VU by RR at 10/27/2024 0031    Acceptance, E,TB, VU by RG at 10/22/2024 1002    Acceptance, TB,E, VU by RG at 10/21/2024 0907    Nonacceptance, E, NR by WG at 10/16/2024 0228    Acceptance, E,TB, VU by CF at 10/15/2024 0753    Acceptance, E,TB, VU by CF at 10/14/2024 0801     Acceptance, E,TB, VU by CF at 10/13/2024 1045    Acceptance, E,D, VU,NR by AG at 10/10/2024 1310    Acceptance, E,TB, VU by CB at 10/8/2024 0448    Acceptance, E, NR by RD at 10/2/2024 1101    Acceptance, E, NR by RD at 10/1/2024 0856    Acceptance, E,D, VU,NR by AG at 9/30/2024 1559                      Point: Precautions (Done)       Learning Progress Summary            Patient Acceptance, E, VU by WG at 11/22/2024 0229    Acceptance, E, VU by WG at 11/21/2024 0236    Acceptance, E,TB, VU by CF at 11/17/2024 1024    Acceptance, E,TB, VU by RR at 11/14/2024 0148    Acceptance, E,TB, VU by RR at 11/13/2024 0218    Acceptance, E,TB, VU by RG at 11/12/2024 0927    Acceptance, TB,E, VU by RG at 11/11/2024 0944    Acceptance, E,TB, VU by RG at 11/10/2024 0931    Acceptance, E, VU by WG at 11/10/2024 0233    Acceptance, E,TB, VU by CF at 11/9/2024 1016    Acceptance, E, NR by SC at 11/3/2024 0025    Acceptance, E,TB, VU by RR at 11/1/2024 2315    Acceptance, E,D, VU,NR by AG at 10/31/2024 1234    Comment: PT educated pt. in importance of mobilization and L L/UE PROM and self ROM to prevent contracture.  Pt. is encouraged to to perform R LE AROM frequently while supine or sitting EOB.    Acceptance, E,TB, VU by RR at 10/27/2024 0031    Acceptance, E,TB, VU by RG at 10/22/2024 1002    Acceptance, TB,E, VU by RG at 10/21/2024 0907    Nonacceptance, E, NR by WG at 10/16/2024 0228    Acceptance, E,TB, VU by CF at 10/15/2024 0753    Acceptance, E,TB, VU by CF at 10/14/2024 0801    Acceptance, E,TB, VU by CF at 10/13/2024 1045    Acceptance, E,D, VU,NR by AG at 10/10/2024 1310    Acceptance, E,TB, VU by CB at 10/8/2024 0448    Acceptance, E, NR by RD at 10/2/2024 1101    Acceptance, E, NR by RD at 10/1/2024 0856    Acceptance, E,D, VU,NR by AG at 9/30/2024 1559                                      User Key       Initials Effective Dates Name Provider Type Discipline     06/16/21 -  Мария Joya, PT Physical Therapist PT     RD 06/16/21 -  Laisha Verdugo, RN Registered Nurse Nurse    RR 06/11/24 -  Jaymie Dominguez, RN Registered Nurse Nurse    RG 06/06/23 -  Jesus Matthews, RN Registered Nurse Nurse    WG 02/12/24 -  Shanthi Budren, RN Registered Nurse Nurse    CF 06/25/24 -  Cherelle Germain, RN Registered Nurse Nurse    CB 06/17/24 -  Fidelia Lombardo, RN Registered Nurse Nurse    SC 09/11/24 -  Sally Hair, RN Registered Nurse Nurse                  PT Recommendation and Plan     Progress Summary (PT)  Daily Progress Summary (PT): (P) Pt. tolerated therapy good today w/ c/o pain and numbness. Pt. completed bed mobility w/ maxAx2 and maintained a sitting position for around 20 mins. Pt. completed seated exercise on the R leg as well. Will continue w/ POC.  Plan of Care Reviewed With: (P) patient  Progress: (P) no change  Outcome Evaluation: Pt. completed PT treatment session this AM. Pt. completed bed mobility w/ maxAx2-dependent assist. Pt. mainted sitting on EOB for around 10mins. Pt. c/o  L arm, R leg pain during bed mobility and numbness in the R leg while sitting. Pt completed supine therex as well. Will continue w/ POC.       Time Calculation:    PT Charges       Row Name 11/22/24 1104             Time Calculation    PT Received On 11/22/24 (P)   -         Time Calculation- PT    Total Timed Code Minutes- PT 23 minute(s) (P)   -                User Key  (r) = Recorded By, (t) = Taken By, (c) = Cosigned By      Initials Name Provider Type     Giovana Quinonez, PT Student PT Student                  Therapy Charges for Today       Code Description Service Date Service Provider Modifiers Qty    34927329765 HC PT THER PROC EA 15 MIN 11/21/2024 Giovana Quinonez, PT Student GP 1    84130131006 HC PT THERAPEUTIC ACT EA 15 MIN 11/21/2024 Giovana Quinonez, PT Student GP 1    46010539024 HC PT THER PROC EA 15 MIN 11/22/2024 Giovana Quinonez, PT Student GP 1    72986981171 HC PT THERAPEUTIC ACT EA 15 MIN 11/22/2024 Giovana Quinonez PT Student  GP 1            PT G-Codes  AM-PAC 6 Clicks Score (PT): 8    Giovana Quinonez, PT Student  2024      Electronically signed by Thierry Saldivar, PT at 24 1512          Occupational Therapy Notes (most recent note)        Loida Flaherty, OT at 24 1102          Acute Care - Occupational Therapy Treatment Note  LILLIANA Zaki     Patient Name: Lety Johnson  : 1980  MRN: [id number]  Today's Date: 2024  Onset of Illness/Injury or Date of Surgery: 24     Referring Physician: Dr. Marc    Admit Date: 2024       ICD-10-CM ICD-9-CM   1. Hypokalemia  E87.6 276.8   2. Pressure injury of skin of sacral region, unspecified injury stage  L89.159 707.03     707.20   3. Pressure injury of skin of left heel, unspecified injury stage  L89.629 707.07     707.20   4. Acute cystitis without hematuria  N30.00 595.0     Patient Active Problem List   Diagnosis   (none) - all problems resolved or deleted     Past Medical History:   Diagnosis Date    Stroke      History reviewed. No pertinent surgical history.      OT ASSESSMENT FLOWSHEET (Last 12 Hours)       OT Evaluation and Treatment       Row Name 24 1055                   OT Time and Intention    Subjective Information complains of;weakness;pain  -LA        Document Type therapy note (daily note)  -LA        Mode of Treatment occupational therapy  -LA        Patient Effort adequate  -LA        Symptoms Noted During/After Treatment increased pain;fatigue  -LA           General Information    Patient Profile Reviewed yes  -LA        Patient/Family/Caregiver Comments/Observations Patient agreeable to therapy. Patient refuses OT to provide any therapy to left UE secondary to pain. Patient tolerates sitting up and fx reaching with right UE.  -LA        Existing Precautions/Restrictions fall  -LA           Cognition    Affect/Mental Status (Cognition) emotionally labile  -LA        Orientation Status (Cognition) oriented x 3  -LA        Follows Commands  (Cognition) WFL  -LA           Bed Mobility    Supine-Sit [place] (Bed Mobility) maximum assist (25% patient effort);2 person assist  -LA        Sit-Supine [place] (Bed Mobility) maximum assist (25% patient effort);2 person assist  -LA           Motor Skills    Motor Skills coordination;functional endurance  -LA        Coordination fine motor deficit;gross motor deficit;left;upper extremity;severe impairment  -LA        Functional Endurance poor  -LA        Muscle Tone left;severe impairment;spasticity  -LA        Motor Control/Coordination Interventions gross motor coordination activities  -LA           Neuromuscular Re-education    Interventions (Neuromuscular Re-education) --  refused any movement of left UE this date  -LA           Balance    Static Sitting Balance standby assist  -LA        Dynamic Sitting Balance contact guard;minimal assist  -LA           Wound 09/26/24 1809 Left posterior ankle Other (comment)    Wound - Properties Group Placement Date: 09/26/24  -KJ Placement Time: 1809  -KJ Side: Left  -KJ Orientation: posterior  -KJ Location: ankle  -KJ Primary Wound Type: Other  -TW, unknow etiology     Retired Wound - Properties Group Placement Date: 09/26/24  -KJ Placement Time: 1809  -KJ Side: Left  -KJ Orientation: posterior  -KJ Location: ankle  -KJ Primary Wound Type: Other  -TW, unknow etiology     Retired Wound - Properties Group Placement Date: 09/26/24  -KJ Placement Time: 1809  -KJ Side: Left  -KJ Orientation: posterior  -KJ Location: ankle  -KJ Primary Wound Type: Other  -TW, unknow etiology     Retired Wound - Properties Group Date first assessed: 09/26/24  -KJ Time first assessed: 1809  -KJ Side: Left  -KJ Location: ankle  -KJ Primary Wound Type: Other  -TW, unknow etiology        Wound 10/11/24 1330 medial coccyx Fissure    Wound - Properties Group Placement Date: 10/11/24  -TW Placement Time: 1330  -TW Orientation: medial  -TW Location: coccyx  -TW Primary Wound Type: Fissure   -TW    Retired Wound - Properties Group Placement Date: 10/11/24  -TW Placement Time: 1330 -TW Orientation: medial  -TW Location: coccyx  -TW Primary Wound Type: Fissure  -TW    Retired Wound - Properties Group Placement Date: 10/11/24  -TW Placement Time: 1330 -TW Orientation: medial  -TW Location: coccyx  -TW Primary Wound Type: Fissure  -TW    Retired Wound - Properties Group Date first assessed: 10/11/24  -TW Time first assessed: 1330 -TW Location: coccyx  -TW Primary Wound Type: Fissure  -TW       Wound 10/16/24 0705 Left medial gluteal MASD (moisture associated skin damage)    Wound - Properties Group Placement Date: 10/16/24  -MS Placement Time: 0705 -MS Side: Left  -MS Orientation: medial  -MS Location: gluteal  -MS Primary Wound Type: MASD  -MS Type: MASD (moisture associated skin damage)  -MS Present on Original Admission: N  -MS Additional Comments: Noted at shift change skin assesment, Night shift RN stated it had been there her shift.  -MS    Retired Wound - Properties Group Placement Date: 10/16/24  -MS Placement Time: 0705 -MS Present on Original Admission: N  -MS Side: Left  -MS Orientation: medial  -MS Location: gluteal  -MS Primary Wound Type: MASD  -MS Additional Comments: Noted at shift change skin assesment, Night shift RN stated it had been there her shift.  -MS Type: MASD (moisture associated skin damage)  -MS    Retired Wound - Properties Group Placement Date: 10/16/24  -MS Placement Time: 0705 -MS Present on Original Admission: N  -MS Side: Left  -MS Orientation: medial  -MS Location: gluteal  -MS Primary Wound Type: MASD  -MS Additional Comments: Noted at shift change skin assesment, Night shift RN stated it had been there her shift.  -MS Type: MASD (moisture associated skin damage)  -MS    Retired Wound - Properties Group Date first assessed: 10/16/24  -MS Time first assessed: 0705 -MS Present on Original Admission: N  -MS Side: Left  -MS Location: gluteal  -MS Primary Wound Type:  MASD  -MS Additional Comments: Noted at shift change skin assesment, Night shift RN stated it had been there her shift.  -MS Type: MASD (moisture associated skin damage)  -MS       Wound 10/16/24 0705 Left posterior greater trochanter MASD (moisture associated skin damage)    Wound - Properties Group Placement Date: 10/16/24  -MS Placement Time: 0705 -MS Side: Left  -MS Orientation: posterior  -MS Location: greater trochanter  -MS Primary Wound Type: MASD  -MS Type: MASD (moisture associated skin damage)  -MS Present on Original Admission: N  -MS    Retired Wound - Properties Group Placement Date: 10/16/24  -MS Placement Time: 0705 -MS Present on Original Admission: N  -MS Side: Left  -MS Orientation: posterior  -MS Location: greater trochanter  -MS Primary Wound Type: MASD  -MS Type: MASD (moisture associated skin damage)  -MS    Retired Wound - Properties Group Placement Date: 10/16/24  -MS Placement Time: 0705  -MS Present on Original Admission: N  -MS Side: Left  -MS Orientation: posterior  -MS Location: greater trochanter  -MS Primary Wound Type: MASD  -MS Type: MASD (moisture associated skin damage)  -MS    Retired Wound - Properties Group Date first assessed: 10/16/24  -MS Time first assessed: 0705 -MS Present on Original Admission: N  -MS Side: Left  -MS Location: greater trochanter  -MS Primary Wound Type: MASD  -MS Type: MASD (moisture associated skin damage)  -MS       Plan of Care Review    Plan of Care Reviewed With patient  -LA        Progress no change  -LA           Positioning and Restraints    Pre-Treatment Position in bed  -LA        Post Treatment Position bed  -LA        In Bed supine;call light within reach;encouraged to call for assist;exit alarm on  -LA                  User Key  (r) = Recorded By, (t) = Taken By, (c) = Cosigned By      Initials Name Effective Dates    TW Karen Gipson RN 06/16/21 -     Nory Keenan RN 06/08/23 -     Loida Richard OT 02/14/22 -     MS  "Roe Garcia RN 24 -                      Occupational Therapy Education        No education to display                      OT Recommendation and Plan     Plan of Care Review  Plan of Care Reviewed With: patient  Progress: no change  Plan of Care Reviewed With: patient        Time Calculation:     Therapy Charges for Today       Code Description Service Date Service Provider Modifiers Qty    51554409384  OT THERAPEUTIC ACT EA 15 MIN 2024 Loida Flaherty OT GO 2                 Loida Flaherty OT  2024    Electronically signed by Loida Flaherty OT at 24 1102          Speech Language Pathology Notes (most recent note)        Hafsa Melendez MA,CCC-SLP at 24 1101          Acute Care - Speech Language Pathology   Swallow Initial Evaluation  Zaki  Clinical Dysphagia Assessment     Patient Name: Lety Johnson  : 1980  MRN: [id number]  Today's Date: 2024             Admit Date: 2024    Visit Dx:     ICD-10-CM ICD-9-CM   1. Hypokalemia  E87.6 276.8   2. Pressure injury of skin of sacral region, unspecified injury stage  L89.159 707.03     707.20   3. Pressure injury of skin of left heel, unspecified injury stage  L89.629 707.07     707.20   4. Acute cystitis without hematuria  N30.00 595.0     Patient Active Problem List   Diagnosis    UTI (urinary tract infection)     Past Medical History:   Diagnosis Date    Stroke      History reviewed. No pertinent surgical history.    Lety Johnson  was seen at bedside this AM on 3S Rm. 3315-1s to assess safety/efficacy of swallowing fnx, determine safest/least restrictive diet tolerance.      Per report: pt \"is a 43 year old female patient who presents to the ER with chief complaint of back pain. 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 The patient had been living with her fiance who is in [facility] now. Her sister has been trying to help care for her. Today, she was in the floor " "and unable to get up so they called EMS for lift assist. Patient's sister and patient want the patient to go into a rehab facility for strengthening. She also complains of low back pain.\"    Social History     Socioeconomic History    Marital status: Single   Tobacco Use    Smoking status: Every Day     Average packs/day: 2.0 packs/day for 26.0 years (52.0 ttl pk-yrs)     Types: Cigarettes     Start date: 1998    Smokeless tobacco: Never   Vaping Use    Vaping status: Never Used   Substance and Sexual Activity    Alcohol use: Not Currently    Drug use: Never    Sexual activity: Not Currently     Partners: Male     Comment:  in [facility]        Imaging:  No recent chest imaging    Labs:  Sodium  142  09/30 0117  Potassium  3.5  09/30 0117  Chloride  110  09/30 0117  CO2  22.8  09/30 0117  BUN  5  09/30 0117  Creatinine  0.46  09/30 0117  Glucose  196  09/30 0117  Hemoglobin  10.5  09/30 0117  Hematocrit  32.7  09/30 0117  WBC  8.49  09/30 0117  Platelets  285  09/30 0117  Diet Orders (active) (From admission, onward)       Start     Ordered    09/28/24 1800  Dietary Nutrition Supplements Boost Plus (Ensure Enlive, Ensure Plus)  Daily With Breakfast & Dinner       09/28/24 1209    09/26/24 1748  Diet: Regular/House; Fluid Consistency: Thin (IDDSI 0)  Diet Effective Now         09/26/24 1747                  Pt is observed on RA.     Patient was positioned upright and centered in bed to accept multiple po presentations of ice chips, solid cracker, puree, and thin liquids via spoon, cup, and straw. Patient was able to self provide po trials.     Facial/oral structures were slightly asymmetrical upon observation. Pt with residual L side weakness from previous CVA. Lingual protrusion revealed no deviation. Oral mucosa were moist, pink, and clean. Secretions were clear, thin, and well controlled. OROM/ENDER was wfl to imitate oral postures. Gag is not assessed. Volitional cough was intact w/ adequate  intensity, clear in " quality, non-productive. Voice was adequate in intensity, clear in quality w/ intelligible speech.    Upon po presentations, adequate bolus anticipation and acceptance w/ good labial seal for bolus clearance via spoon bowl, cup rim stability and suction via straw. Bolus formation, manipulation and control were wfl w/ rotary mastication pattern. A-p transit was timely w/o significant oral residue appreciated. No overt s/s aspiration before the swallow.      Pharyngeal swallow was timely w/ adequate hyolaryngeal elevation per palpation. No overt s/s aspiration evidenced across this evaluation. No silent aspiration suspected. Patient denied odynophagia.    Pt reports difficulty swallowing large pills only.     Impression: Patient presented w/ wfl oropharyngeal swallow w/o s/s aspiration. No s/s indicative of silent aspiration. No odynophagia reported.      SLP Recommendation and Plan   1. Regular consistencies, thin liquids.    2. Medications whole in puree/thins. Crush PRN.  3. Upright and centered for all po intake.  4. MO precautions.  5. Oral care protocol.    No further formal SLP f/u warranted/recommended at this time.    D/w patient results and recommendations w/ verbal agreement.    D/w RN results and recommendations w/ verbal agreement.    Thank you for allowing me to participate in the care of your patient-  Hafsa Melendez M.A., CCC-SLP     EDUCATION  The patient has been educated in the following areas:   Oral Care/Hydration.      Time Calculation:     Therapy Charges for Today       Code Description Service Date Service Provider Modifiers Qty    79950871205 HC ST EVAL ORAL PHARYNG SWALLOW 4 9/30/2024 Hafsa Melendez MA,CCC-SLP GN 1          Hafsa Melendez MA, CCC-SLP  9/30/2024    Electronically signed by Hafsa Melendez MA,CCC-SLP at 09/30/24 1107       Respiratory Therapy Notes (most recent note)    No notes exist for this encounter.       ADL Documentation (most recent)      Flowsheet  Row Most Recent Value   Transferring 4 - completely dependent   Toileting 3 - assistive equipment and person   Bathing 2 - assistive person   Dressing 2 - assistive person   Eating 0 - independent   Communication 0 - understands/communicates without difficulty   Swallowing 0 - swallows foods/liquids without difficulty   Equipment Currently Used at Home hospital bed, lift device, wheelchair

## 2024-11-22 NOTE — CASE MANAGEMENT/SOCIAL WORK
Case Management/Social Work    Patient Name:  Lety Johnson  YOB: 1980  MRN: [id number]  Admit Date:  9/26/2024    SS contacted son, Arnoldo [phone]. Son notified that pt does not qualify for inpatient rehab or SNF placement at this time. SS discussed in-home services for pt with son. Son voices concerns about being able to care for pt. Son has two friends who are living in pt's residence. Son has not spoke to APS SW. SS notified son that APS SW wants to speak to him. Son voices that he requires a payee over his monthly income. Son does not have contact information for pt's significant other's niece, Althea Mccauley. Son states Althea works from 7 am to 8 pm, therefore she would not be able to provide care for pt during that time frame. SS sent a message to Althea. SS sent a referral to [facility] yesterday. SS will follow with [facility]. SS to follow.     Addendum @ 16:30: SS discussed referral with [facility] per Bronwyn and she voices agreement to review referral. SS sent referral to [facility]. SS to follow.     Electronically signed by:  HELEN Butterfield  11/22/24

## 2024-11-22 NOTE — PLAN OF CARE
Goal Outcome Evaluation:         Pt A/O, VS, on RA tolerating well, wound care done per orders, PRN pain med given per orders, pt is resting in bed at this time without concern or complaint. No acute changes noted this shift. POC ongoing.

## 2024-11-22 NOTE — THERAPY TREATMENT NOTE
Acute Care - Physical Therapy Treatment Note   [place]     Patient Name: Lety Johnson  : 1980  MRN: [id number]  Today's Date: 2024   Onset of Illness/Injury or Date of Surgery: 24  Visit Dx:     ICD-10-CM ICD-9-CM   1. Hypokalemia  E87.6 276.8   2. Pressure injury of skin of sacral region, unspecified injury stage  L89.159 707.03     707.20   3. Pressure injury of skin of left heel, unspecified injury stage  L89.629 707.07     707.20   4. Acute cystitis without hematuria  N30.00 595.0     Patient Active Problem List   Diagnosis   (none) - all problems resolved or deleted     Past Medical History:   Diagnosis Date    Stroke      History reviewed. No pertinent surgical history.  PT Assessment (Last 12 Hours)       PT Evaluation and Treatment       Row Name 24          Physical Therapy Time and Intention    Subjective Information complains of;pain;numbness;weakness (P)   -     Document Type therapy note (daily note) (P)   -HP     Mode of Treatment physical therapy (P)   -     Patient Effort adequate (P)   -HP     Symptoms Noted During/After Treatment increased pain (P)   -HP       Row Name 24          General Information    Patient Profile Reviewed yes (P)   -HP       Row Name 24          Bed Mobility    Bed Mobility supine-sit;sit-supine (P)   -HP     Supine-Sit [place] (Bed Mobility) maximum assist (25% patient effort);2 person assist (P)   -HP     Sit-Supine [place] (Bed Mobility) maximum assist (25% patient effort);2 person assist (P)   -HP       Row Name 24          Balance    Balance Interventions sitting;static (P)   -     Comment, Balance Pt. maintained sitting position for around 20 mins. (P)   -HP       Row Name 24          Motor Skills    Therapeutic Exercise ankle (P)   -HP       Row Name 24          Ankle (Therapeutic Exercise)    Ankle (Therapeutic Exercise) AROM (active range of motion) (P)   -HP      Ankle AROM (Therapeutic Exercise) right;plantarflexion (P)   -HP       Row Name             Wound 09/26/24 1809 Left posterior ankle Other (comment)    Wound - Properties Group Placement Date: 09/26/24  -KJ Placement Time: 1809  -KJ Side: Left  -KJ Orientation: posterior  -KJ Location: ankle  -KJ Primary Wound Type: Other  -TW, unknow etiology     Retired Wound - Properties Group Placement Date: 09/26/24  -KJ Placement Time: 1809  -KJ Side: Left  -KJ Orientation: posterior  -KJ Location: ankle  -KJ Primary Wound Type: Other  -TW, unknow etiology     Retired Wound - Properties Group Placement Date: 09/26/24  -KJ Placement Time: 1809  -KJ Side: Left  -KJ Orientation: posterior  -KJ Location: ankle  -KJ Primary Wound Type: Other  -TW, unknow etiology     Retired Wound - Properties Group Date first assessed: 09/26/24  -KJ Time first assessed: 1809  -KJ Side: Left  -KJ Location: ankle  -KJ Primary Wound Type: Other  -TW, unknow etiology       Row Name             Wound 10/11/24 1330 medial coccyx Fissure    Wound - Properties Group Placement Date: 10/11/24  -TW Placement Time: 1330  -TW Orientation: medial  -TW Location: coccyx  -TW Primary Wound Type: Fissure  -TW    Retired Wound - Properties Group Placement Date: 10/11/24  -TW Placement Time: 1330  -TW Orientation: medial  -TW Location: coccyx  -TW Primary Wound Type: Fissure  -TW    Retired Wound - Properties Group Placement Date: 10/11/24  -TW Placement Time: 1330  -TW Orientation: medial  -TW Location: coccyx  -TW Primary Wound Type: Fissure  -TW    Retired Wound - Properties Group Date first assessed: 10/11/24  -TW Time first assessed: 1330  -TW Location: coccyx  -TW Primary Wound Type: Fissure  -TW      Row Name             Wound 10/16/24 0705 Left medial gluteal MASD (moisture associated skin damage)    Wound - Properties Group Placement Date: 10/16/24  -MS Placement Time: 0705  -MS Side: Left  -MS Orientation: medial  -MS Location: gluteal  -MS Primary Wound  Type: MASD  -MS Type: MASD (moisture associated skin damage)  -MS Present on Original Admission: N  -MS Additional Comments: Noted at shift change skin assesment, Night shift RN stated it had been there her shift.  -MS    Retired Wound - Properties Group Placement Date: 10/16/24  -MS Placement Time: 0705 -MS Present on Original Admission: N  -MS Side: Left  -MS Orientation: medial  -MS Location: gluteal  -MS Primary Wound Type: MASD  -MS Additional Comments: Noted at shift change skin assesment, Night shift RN stated it had been there her shift.  -MS Type: MASD (moisture associated skin damage)  -MS    Retired Wound - Properties Group Placement Date: 10/16/24  -MS Placement Time: 0705 -MS Present on Original Admission: N  -MS Side: Left  -MS Orientation: medial  -MS Location: gluteal  -MS Primary Wound Type: MASD  -MS Additional Comments: Noted at shift change skin assesment, Night shift RN stated it had been there her shift.  -MS Type: MASD (moisture associated skin damage)  -MS    Retired Wound - Properties Group Date first assessed: 10/16/24  -MS Time first assessed: 0705 -MS Present on Original Admission: N  -MS Side: Left  -MS Location: gluteal  -MS Primary Wound Type: MASD  -MS Additional Comments: Noted at shift change skin assesment, Night shift RN stated it had been there her shift.  -MS Type: MASD (moisture associated skin damage)  -MS      Row Name             Wound 10/16/24 0705 Left posterior greater trochanter MASD (moisture associated skin damage)    Wound - Properties Group Placement Date: 10/16/24  -MS Placement Time: 0705 -MS Side: Left  -MS Orientation: posterior  -MS Location: greater trochanter  -MS Primary Wound Type: MASD  -MS Type: MASD (moisture associated skin damage)  -MS Present on Original Admission: N  -MS    Retired Wound - Properties Group Placement Date: 10/16/24  -MS Placement Time: 0705 -MS Present on Original Admission: N  -MS Side: Left  -MS Orientation: posterior  -MS  Location: greater trochanter  -MS Primary Wound Type: MASD  -MS Type: MASD (moisture associated skin damage)  -MS    Retired Wound - Properties Group Placement Date: 10/16/24  -MS Placement Time: 0705 -MS Present on Original Admission: N  -MS Side: Left  -MS Orientation: posterior  -MS Location: greater trochanter  -MS Primary Wound Type: MASD  -MS Type: MASD (moisture associated skin damage)  -MS    Retired Wound - Properties Group Date first assessed: 10/16/24  -MS Time first assessed: 0705 -MS Present on Original Admission: N  -MS Side: Left  -MS Location: greater trochanter  -MS Primary Wound Type: MASD  -MS Type: MASD (moisture associated skin damage)  -MS      Row Name 11/22/24 0930          Plan of Care Review    Plan of Care Reviewed With patient (P)   -HP     Progress no change (P)   -HP       Row Name 11/22/24 0930          Positioning and Restraints    Pre-Treatment Position in bed (P)   -HP     Post Treatment Position bed (P)   -HP     In Bed supine;call light within reach;encouraged to call for assist;exit alarm on (P)   -HP       Row Name 11/22/24 0930          Progress Summary (PT)    Daily Progress Summary (PT) Pt. tolerated therapy good today w/ c/o pain and numbness. Pt. completed bed mobility w/ maxAx2 and maintained a sitting position for around 20 mins. Pt. completed seated exercise on the R leg as well. Will continue w/ POC. (P)   -HP               User Key  (r) = Recorded By, (t) = Taken By, (c) = Cosigned By      Initials Name Provider Type     Karen Gipson, RN Registered Nurse    Nory Keenan, RN Registered Nurse    Roe North, RN Registered Nurse    Giovana Verdin, PT Student PT Student                    Physical Therapy Education       Title: PT OT SLP Therapies (Done)       Topic: Physical Therapy (Done)       Point: Mobility training (Done)       Learning Progress Summary            Patient Acceptance, E, VU by YO at 11/22/2024 0229    Acceptance, E, VU by YO  at 11/21/2024 0236    Acceptance, E,TB, VU by CF at 11/17/2024 1024    Acceptance, E,TB, VU by RR at 11/14/2024 0148    Acceptance, E,TB, VU by RR at 11/13/2024 0218    Acceptance, E,TB, VU by RG at 11/12/2024 0927    Acceptance, TB,E, VU by RG at 11/11/2024 0944    Acceptance, E,TB, VU by RG at 11/10/2024 0931    Acceptance, E, VU by WG at 11/10/2024 0233    Acceptance, E,TB, VU by CF at 11/9/2024 1016    Acceptance, E, NR by SC at 11/3/2024 0025    Acceptance, E,TB, VU by RR at 11/1/2024 2315    Acceptance, E,D, VU,NR by AG at 10/31/2024 1234    Comment: PT educated pt. in importance of mobilization and L L/UE PROM and self ROM to prevent contracture.  Pt. is encouraged to to perform R LE AROM frequently while supine or sitting EOB.    Acceptance, E,TB, VU by RR at 10/27/2024 0031    Acceptance, E,TB, VU by RG at 10/22/2024 1002    Acceptance, TB,E, VU by RG at 10/21/2024 0907    Nonacceptance, E, NR by WG at 10/16/2024 0228    Acceptance, E,TB, VU by CF at 10/15/2024 0753    Acceptance, E,TB, VU by CF at 10/14/2024 0801    Acceptance, E,TB, VU by CF at 10/13/2024 1045    Acceptance, E,D, VU,NR by AG at 10/10/2024 1310    Acceptance, E,TB, VU by CB at 10/8/2024 0448    Acceptance, E, NR by RD at 10/2/2024 1101    Acceptance, E, NR by RD at 10/1/2024 0856    Acceptance, E,D, VU,NR by AG at 9/30/2024 1559                      Point: Home exercise program (Done)       Learning Progress Summary            Patient Acceptance, E, VU by WG at 11/22/2024 0229    Acceptance, E, VU by WG at 11/21/2024 0236    Acceptance, E,TB, VU by CF at 11/17/2024 1024    Acceptance, E,TB, VU by RR at 11/14/2024 0148    Acceptance, E,TB, VU by RR at 11/13/2024 0218    Acceptance, E,TB, VU by RG at 11/12/2024 0927    Acceptance, TB,E, VU by RG at 11/11/2024 0944    Acceptance, E,TB, VU by RG at 11/10/2024 0931    Acceptance, E, VU by WG at 11/10/2024 0233    Acceptance, E,TB, VU by CF at 11/9/2024 1016    Acceptance, E, NR by SC at  11/3/2024 0025    Acceptance, E,TB, VU by RR at 11/1/2024 2315    Acceptance, E,D, VU,NR by AG at 10/31/2024 1234    Comment: PT educated pt. in importance of mobilization and L L/UE PROM and self ROM to prevent contracture.  Pt. is encouraged to to perform R LE AROM frequently while supine or sitting EOB.    Acceptance, E,TB, VU by RR at 10/27/2024 0031    Acceptance, E,TB, VU by RG at 10/22/2024 1002    Acceptance, TB,E, VU by RG at 10/21/2024 0907    Nonacceptance, E, NR by WG at 10/16/2024 0228    Acceptance, E,TB, VU by CF at 10/15/2024 0753    Acceptance, E,TB, VU by CF at 10/14/2024 0801    Acceptance, E,TB, VU by CF at 10/13/2024 1045    Acceptance, E,D, VU,NR by AG at 10/10/2024 1310    Acceptance, E,TB, VU by CB at 10/8/2024 0448    Acceptance, E, NR by RD at 10/2/2024 1101    Acceptance, E, NR by RD at 10/1/2024 0856    Acceptance, E,D, VU,NR by AG at 9/30/2024 1559                      Point: Body mechanics (Done)       Learning Progress Summary            Patient Acceptance, E, VU by WG at 11/22/2024 0229    Acceptance, E, VU by WG at 11/21/2024 0236    Acceptance, E,TB, VU by CF at 11/17/2024 1024    Acceptance, E,TB, VU by RR at 11/14/2024 0148    Acceptance, E,TB, VU by RR at 11/13/2024 0218    Acceptance, E,TB, VU by RG at 11/12/2024 0927    Acceptance, TB,E, VU by RG at 11/11/2024 0944    Acceptance, E,TB, VU by RG at 11/10/2024 0931    Acceptance, E, VU by WG at 11/10/2024 0233    Acceptance, E,TB, VU by CF at 11/9/2024 1016    Acceptance, E, NR by SC at 11/3/2024 0025    Acceptance, E,TB, VU by RR at 11/1/2024 2315    Acceptance, E,D, VU,NR by AG at 10/31/2024 1234    Comment: PT educated pt. in importance of mobilization and L L/UE PROM and self ROM to prevent contracture.  Pt. is encouraged to to perform R LE AROM frequently while supine or sitting EOB.    Acceptance, E,TB, VU by RR at 10/27/2024 0031    Acceptance, E,TB, VU by RG at 10/22/2024 1002    Acceptance, TB,E, VU by RG at 10/21/2024  0907    Nonacceptance, E, NR by WG at 10/16/2024 0228    Acceptance, E,TB, VU by CF at 10/15/2024 0753    Acceptance, E,TB, VU by CF at 10/14/2024 0801    Acceptance, E,TB, VU by CF at 10/13/2024 1045    Acceptance, E,D, VU,NR by AG at 10/10/2024 1310    Acceptance, E,TB, VU by CB at 10/8/2024 0448    Acceptance, E, NR by RD at 10/2/2024 1101    Acceptance, E, NR by RD at 10/1/2024 0856    Acceptance, E,D, VU,NR by AG at 9/30/2024 1559                      Point: Precautions (Done)       Learning Progress Summary            Patient Acceptance, E, VU by WG at 11/22/2024 0229    Acceptance, E, VU by WG at 11/21/2024 0236    Acceptance, E,TB, VU by CF at 11/17/2024 1024    Acceptance, E,TB, VU by RR at 11/14/2024 0148    Acceptance, E,TB, VU by RR at 11/13/2024 0218    Acceptance, E,TB, VU by RG at 11/12/2024 0927    Acceptance, TB,E, VU by RG at 11/11/2024 0944    Acceptance, E,TB, VU by RG at 11/10/2024 0931    Acceptance, E, VU by WG at 11/10/2024 0233    Acceptance, E,TB, VU by CF at 11/9/2024 1016    Acceptance, E, NR by SC at 11/3/2024 0025    Acceptance, E,TB, VU by RR at 11/1/2024 2315    Acceptance, E,D, VU,NR by AG at 10/31/2024 1234    Comment: PT educated pt. in importance of mobilization and L L/UE PROM and self ROM to prevent contracture.  Pt. is encouraged to to perform R LE AROM frequently while supine or sitting EOB.    Acceptance, E,TB, VU by RR at 10/27/2024 0031    Acceptance, E,TB, VU by RG at 10/22/2024 1002    Acceptance, TB,E, VU by RG at 10/21/2024 0907    Nonacceptance, E, NR by WG at 10/16/2024 0228    Acceptance, E,TB, VU by CF at 10/15/2024 0753    Acceptance, E,TB, VU by CF at 10/14/2024 0801    Acceptance, E,TB, VU by CF at 10/13/2024 1045    Acceptance, E,D, VU,NR by AG at 10/10/2024 1310    Acceptance, E,TB, VU by CB at 10/8/2024 0448    Acceptance, E, NR by RD at 10/2/2024 1101    Acceptance, E, NR by RD at 10/1/2024 0856    Acceptance, E,D, VU,NR by AG at 9/30/2024 1559                                       User Key       Initials Effective Dates Name Provider Type Discipline    AG 06/16/21 -  Мария Joya, PT Physical Therapist PT    RD 06/16/21 -  Laisha Verdugo, RN Registered Nurse Nurse    RR 06/11/24 -  Jaymie Dominguez, RN Registered Nurse Nurse    RG 06/06/23 -  Jesus Matthews, RN Registered Nurse Nurse    WG 02/12/24 -  Shanthi Burden, RN Registered Nurse Nurse    CF 06/25/24 -  Cherelle Germain, RN Registered Nurse Nurse    CB 06/17/24 -  Fidelia Lombardo, RN Registered Nurse Nurse    SC 09/11/24 -  Sally Hair RN Registered Nurse Nurse                  PT Recommendation and Plan     Progress Summary (PT)  Daily Progress Summary (PT): (P) Pt. tolerated therapy good today w/ c/o pain and numbness. Pt. completed bed mobility w/ maxAx2 and maintained a sitting position for around 20 mins. Pt. completed seated exercise on the R leg as well. Will continue w/ POC.  Plan of Care Reviewed With: (P) patient  Progress: (P) no change  Outcome Evaluation: Pt. completed PT treatment session this AM. Pt. completed bed mobility w/ maxAx2-dependent assist. Pt. mainted sitting on EOB for around 10mins. Pt. c/o  L arm, R leg pain during bed mobility and numbness in the R leg while sitting. Pt completed supine therex as well. Will continue w/ POC.       Time Calculation:    PT Charges       Row Name 11/22/24 1104             Time Calculation    PT Received On 11/22/24 (P)   -         Time Calculation- PT    Total Timed Code Minutes- PT 23 minute(s) (P)   -                User Key  (r) = Recorded By, (t) = Taken By, (c) = Cosigned By      Initials Name Provider Type    HP Giovana Quinonez, PT Student PT Student                  Therapy Charges for Today       Code Description Service Date Service Provider Modifiers Qty    48093473865 HC PT THER PROC EA 15 MIN 11/21/2024 Giovana Quinonez, PT Student GP 1    65768497764 HC PT THERAPEUTIC ACT EA 15 MIN 11/21/2024 Giovana Quinonez, PT Student GP 1     [id number]  PT THER PROC EA 15 MIN 11/22/2024 Giovana Quinonez, PT Student GP 1    12330271668 HC PT THERAPEUTIC ACT EA 15 MIN 11/22/2024 Giovana Quinonez, PT Student GP 1            PT G-Codes  AM-PAC 6 Clicks Score (PT): 8    Giovana Quinonez, PT Student  11/22/2024

## 2024-11-22 NOTE — CASE MANAGEMENT/SOCIAL WORK
Case Management/Social Work    Patient Name:  Lety Johnson  YOB: 1980  MRN: [id number]  Admit Date:  9/26/2024    SS received a message from [facility] in [place] per Jahaira [phone]. [phone]. SS attempted contact with [facility] twice per Jahaira without success. SS contacted [facility] office [phone] per Pretty who states she has not received an update from [facility] in November. Pretty states she receives monthly updates from [facility] in [place]. SS has not received a call back from son, Arnoldo Arguelles. Pt agreeable for SS to talk with significant other who is [facility] and significant other's family regarding care for her at home. SS contacted [facility] and spoke to significant other, Kris Mccauley. Significant other informed SS that son and two of son's friends are living in his residence and they should be able to care for pt until he is released from senior living. Significant other provided contact information for his brother, Rk Mccauley ([phone]). Significant other voices that his family can be contacted regarding assisting pt with care at home. Pt's Althea troncoso was assist pt with care at home prior to admission.  sent a message to Althea troncoso. SS plans to make a referral to [facility] [phone]. SS will provide an update to [facility], Kinsey Dial. SS to follow.       Electronically signed by:  HELEN Butterfield

## 2024-11-22 NOTE — PLAN OF CARE
Goal Outcome Evaluation:  Plan of Care Reviewed With: patient        Progress: no change  VSS on RA. Pt A&Ox4. Pt c/o pain, PRN medication given per MAR. Pt refused some turns this shift. Pt educated on importance of turning. Wound care completed per orders. Pt voices no concerns or complaints at this time. Will continue POC.

## 2024-11-22 NOTE — PROGRESS NOTES
Patient Identification:  Name:  Lety Johnson  Age:  44 y.o.  Sex:  female  :  1980  MRN:  [id number]  Visit Number:  [id number]  Primary Care Provider:  Concha Rao APRN    Length of stay:  55    Subjective/Interval History/Consultants/Procedures     Chief Complaint:   Chief Complaint   Patient presents with    Fall       Subjective/Interval History:    44 y.o. female who was admitted on 2024 with UTI     PMH is significant for CVA w/ left sided hemiparesis, debility, hx genital herpes on suppressive therapy, morbid obesity, T2DM   For complete admission information, please see history and physical.     Consultations:  Infectious disease  PT/OT  CM/SW  Psychiatry     Procedures/Scans:  CT head without contrast  CT C-spine without contrast  CT T-spine without contrast  CT L-spine without contrast  CXR x 2  XR left foot  XR left ankle    Today, the patient was seen and examined during therapy session. She did complain of increased pain with physical activity. No acute events noted overnight.      Room location at the time of evaluation was [facility].    ----------------------------------------------------------------------------------------------------------------------  Current Hospital Meds:  acyclovir, 400 mg, Oral, Nightly  aspirin, 81 mg, Oral, Daily  atorvastatin, 80 mg, Oral, Daily  DULoxetine, 90 mg, Oral, Daily  heparin (porcine), 5,000 Units, Subcutaneous, Q8H  ibuprofen, 600 mg, Oral, Once  insulin glargine, 20 Units, Subcutaneous, Daily With Breakfast  insulin glargine, 40 Units, Subcutaneous, Nightly  insulin lispro, 4-24 Units, Subcutaneous, 4x Daily AC & at Bedtime  insulin lispro, 8 Units, Subcutaneous, Daily With Lunch  Lidocaine, 3 patch, Transdermal, Q24H  lisinopril, 20 mg, Oral, Daily  magic barrier cream, , Topical, BID  metoprolol tartrate, 25 mg, Oral, Q12H  nicotine, 1 patch, Transdermal, Q24H  nystatin, , Topical, Q12H  pantoprazole, 40 mg, Oral, Daily  pregabalin, 25 mg,  Oral, Q8H  sodium chloride, 10 mL, Intravenous, Q12H  sodium chloride, 10 mL, Intravenous, Q12H      Pharmacy Consult,       ----------------------------------------------------------------------------------------------------------------------      Objective     Vital Signs:  Temp:  [97.6 °F (36.4 °C)-98.2 °F (36.8 °C)] 97.8 °F (36.6 °C)  Heart Rate:  [] 112  Resp:  [16-20] 16  BP: (101-122)/(54-71) 122/66      10/07/24  0511 10/08/24  0500 10/09/24  0500   Weight: 102 kg (225 lb 14.4 oz) (FIFI cameron) 102 kg (225 lb 15.5 oz) 102 kg (224 lb 1.6 oz)     Body mass index is 36.73 kg/m².    Intake/Output Summary (Last 24 hours) at 11/22/2024 1002  Last data filed at 11/22/2024 0300  Gross per 24 hour   Intake 960 ml   Output 1200 ml   Net -240 ml     No intake/output data recorded.  Diet: Regular/House, Diabetic; Consistent Carbohydrate; Fluid Consistency: Thin (IDDSI 0)  ----------------------------------------------------------------------------------------------------------------------    Physical Exam  Vitals and nursing note reviewed.   Constitutional:       General: She is not in acute distress.     Appearance: She is obese.   HENT:      Head: Normocephalic and atraumatic.   Eyes:      Extraocular Movements: Extraocular movements intact.   Pulmonary:      Effort: Pulmonary effort is normal. No respiratory distress.   Skin:     General: Skin is warm and dry.   Neurological:      Mental Status: She is alert. Mental status is at baseline.              ----------------------------------------------------------------------------------------------------------------------  Tele:      ----------------------------------------------------------------------------------------------------------------------      Results from last 7 days   Lab Units 11/22/24  0036   WBC 10*3/mm3 9.51   HEMOGLOBIN g/dL 11.7*   HEMATOCRIT % 36.4   MCV fL 102.2*   MCHC g/dL 32.1   PLATELETS 10*3/mm3 334         Results from last 7 days  "  Lab Units 11/22/24  0036 11/19/24  0129   SODIUM mmol/L 138 136   POTASSIUM mmol/L 3.8 4.3   MAGNESIUM mg/dL  --  1.8   CHLORIDE mmol/L 102 101   CO2 mmol/L 22.8 24.4   BUN mg/dL 23* 21*   CREATININE mg/dL 0.53* 0.57   CALCIUM mg/dL 9.2 9.3   GLUCOSE mg/dL 126* 142*   ALBUMIN g/dL 3.3*  --    BILIRUBIN mg/dL 0.2  --    ALK PHOS U/L 109  --    AST (SGOT) U/L 16  --    ALT (SGPT) U/L 16  --    Estimated Creatinine Clearance: 163.6 mL/min (A) (by C-G formula based on SCr of 0.53 mg/dL (L)).  No results found for: \"AMMONIA\"      No results found for: \"BLOODCX\"  No results found for: \"URINECX\"  No results found for: \"WOUNDCX\"  No results found for: \"STOOLCX\"  ----------------------------------------------------------------------------------------------------------------------  Imaging Results (Last 24 Hours)       ** No results found for the last 24 hours. **          ----------------------------------------------------------------------------------------------------------------------   I have reviewed the above laboratory values for 11/22/24    Assessment/Plan     There are no active hospital problems to display for this patient.        ASSESSMENT/PLAN:    ESBL E. coli UTI  Acute metabolic encephalopathy, resolved  S/p Invanz.  Infectious disease input appreciated.     Hx CVA with left-sided hemiparesis  Chronic debility  Continue PT OT  CM/SW assisting with discharge planning.  Patient is pending placement.     T2DM  Diabetic neuropathy  Continue insulin regimen-titrate as needed. Add scheduled meal time insulin w/ lunch.   Continue supportive care measures, Cymbalta  Lyrica added 11/13 given persistent pain with some improvement noted.      Hx genital herpes  Continue suppressive acyclovir     Morbid obesity  Complicates all aspects of care     Depressive disorder  Adjustment disorder w/ disturbance of mood and anxiety   Patient with somewhat labile mood, tearful at times. Reports history of depression as well, " likely exacerbated by current health status/hospitalization.  She is taking cymbalta as above  Psychiatry consulted, input appreciated. Felt in the setting of recent stressors findings consistent with adjustment disorder on top of patient's unspecified depressive disorder with patient declining any medication adjustments at this time.   Cymbalta to 90 daily per psychiatry recommendation.  May benefit from psychotherapy referral at discharge.     Labs repeated this morning and grossly stable.      -----------  -DVT prophylaxis: [facility]  -Disposition plans/anticipated needs: Pending placement        The patient is high risk due to the following diagnoses/reasons:  hemiparesis, debility        Bruce Branham PA-C  11/22/24  10:02 EST

## 2024-11-22 NOTE — THERAPY TREATMENT NOTE
Acute Care - Occupational Therapy Treatment Note   [facility]     Patient Name: Lety Johnson  : 1980  MRN: [id number]  Today's Date: 2024  Onset of Illness/Injury or Date of Surgery: 24     Referring Physician: Dr. Marc    Admit Date: 2024       ICD-10-CM ICD-9-CM   1. Hypokalemia  E87.6 276.8   2. Pressure injury of skin of sacral region, unspecified injury stage  L89.159 707.03     707.20   3. Pressure injury of skin of left heel, unspecified injury stage  L89.629 707.07     707.20   4. Acute cystitis without hematuria  N30.00 595.0     Patient Active Problem List   Diagnosis   (none) - all problems resolved or deleted     Past Medical History:   Diagnosis Date    Stroke      History reviewed. No pertinent surgical history.      OT ASSESSMENT FLOWSHEET (Last 12 Hours)       OT Evaluation and Treatment       Row Name 24 1055                   OT Time and Intention    Subjective Information complains of;weakness;pain  -LA        Document Type therapy note (daily note)  -LA        Mode of Treatment occupational therapy  -LA        Patient Effort adequate  -LA        Symptoms Noted During/After Treatment increased pain;fatigue  -LA           General Information    Patient Profile Reviewed yes  -LA        Patient/Family/Caregiver Comments/Observations Patient agreeable to therapy. Patient refuses OT to provide any therapy to left UE secondary to pain. Patient tolerates sitting up and fx reaching with right UE.  -LA        Existing Precautions/Restrictions fall  -LA           Cognition    Affect/Mental Status (Cognition) emotionally labile  -LA        Orientation Status (Cognition) oriented x 3  -LA        Follows Commands (Cognition) WFL  -LA           Bed Mobility    Supine-Sit [place] (Bed Mobility) maximum assist (25% patient effort);2 person assist  -LA        Sit-Supine [place] (Bed Mobility) maximum assist (25% patient effort);2 person assist  -LA           Motor Skills     Motor Skills coordination;functional endurance  -LA        Coordination fine motor deficit;gross motor deficit;left;upper extremity;severe impairment  -LA        Functional Endurance poor  -LA        Muscle Tone left;severe impairment;spasticity  -LA        Motor Control/Coordination Interventions gross motor coordination activities  -LA           Neuromuscular Re-education    Interventions (Neuromuscular Re-education) --  refused any movement of left UE this date  -LA           Balance    Static Sitting Balance standby assist  -LA        Dynamic Sitting Balance contact guard;minimal assist  -LA           Wound 09/26/24 1809 Left posterior ankle Other (comment)    Wound - Properties Group Placement Date: 09/26/24  -KJ Placement Time: 1809  -KJ Side: Left  -KJ Orientation: posterior  -KJ Location: ankle  -KJ Primary Wound Type: Other  -TW, unknow etiology     Retired Wound - Properties Group Placement Date: 09/26/24  -KJ Placement Time: 1809  -KJ Side: Left  -KJ Orientation: posterior  -KJ Location: ankle  -KJ Primary Wound Type: Other  -TW, unknow etiology     Retired Wound - Properties Group Placement Date: 09/26/24  -KJ Placement Time: 1809  -KJ Side: Left  -KJ Orientation: posterior  -KJ Location: ankle  -KJ Primary Wound Type: Other  -TW, unknow etiology     Retired Wound - Properties Group Date first assessed: 09/26/24  -KJ Time first assessed: 1809  -KJ Side: Left  -KJ Location: ankle  -KJ Primary Wound Type: Other  -TW, unknow etiology        Wound 10/11/24 1330 medial coccyx Fissure    Wound - Properties Group Placement Date: 10/11/24  -TW Placement Time: 1330  -TW Orientation: medial  -TW Location: coccyx  -TW Primary Wound Type: Fissure  -TW    Retired Wound - Properties Group Placement Date: 10/11/24  -TW Placement Time: 1330  -TW Orientation: medial  -TW Location: coccyx  -TW Primary Wound Type: Fissure  -TW    Retired Wound - Properties Group Placement Date: 10/11/24  -TW Placement Time: 1330  -TW  Orientation: medial  -TW Location: coccyx  -TW Primary Wound Type: Fissure  -TW    Retired Wound - Properties Group Date first assessed: 10/11/24  -TW Time first assessed: 1330  -TW Location: coccyx  -TW Primary Wound Type: Fissure  -TW       Wound 10/16/24 0705 Left medial gluteal MASD (moisture associated skin damage)    Wound - Properties Group Placement Date: 10/16/24  -MS Placement Time: 0705 -MS Side: Left  -MS Orientation: medial  -MS Location: gluteal  -MS Primary Wound Type: MASD  -MS Type: MASD (moisture associated skin damage)  -MS Present on Original Admission: N  -MS Additional Comments: Noted at shift change skin assesment, Night shift RN stated it had been there her shift.  -MS    Retired Wound - Properties Group Placement Date: 10/16/24  -MS Placement Time: 0705 -MS Present on Original Admission: N  -MS Side: Left  -MS Orientation: medial  -MS Location: gluteal  -MS Primary Wound Type: MASD  -MS Additional Comments: Noted at shift change skin assesment, Night shift RN stated it had been there her shift.  -MS Type: MASD (moisture associated skin damage)  -MS    Retired Wound - Properties Group Placement Date: 10/16/24  -MS Placement Time: 0705 -MS Present on Original Admission: N  -MS Side: Left  -MS Orientation: medial  -MS Location: gluteal  -MS Primary Wound Type: MASD  -MS Additional Comments: Noted at shift change skin assesment, Night shift RN stated it had been there her shift.  -MS Type: MASD (moisture associated skin damage)  -MS    Retired Wound - Properties Group Date first assessed: 10/16/24  -MS Time first assessed: 0705 -MS Present on Original Admission: N  -MS Side: Left  -MS Location: gluteal  -MS Primary Wound Type: MASD  -MS Additional Comments: Noted at shift change skin assesment, Night shift RN stated it had been there her shift.  -MS Type: MASD (moisture associated skin damage)  -MS       Wound 10/16/24 0705 Left posterior greater trochanter MASD (moisture associated skin  damage)    Wound - Properties Group Placement Date: 10/16/24  -MS Placement Time: 0705 -MS Side: Left  -MS Orientation: posterior  -MS Location: greater trochanter  -MS Primary Wound Type: MASD  -MS Type: MASD (moisture associated skin damage)  -MS Present on Original Admission: N  -MS    Retired Wound - Properties Group Placement Date: 10/16/24  -MS Placement Time: 0705 -MS Present on Original Admission: N  -MS Side: Left  -MS Orientation: posterior  -MS Location: greater trochanter  -MS Primary Wound Type: MASD  -MS Type: MASD (moisture associated skin damage)  -MS    Retired Wound - Properties Group Placement Date: 10/16/24  -MS Placement Time: 0705 -MS Present on Original Admission: N  -MS Side: Left  -MS Orientation: posterior  -MS Location: greater trochanter  -MS Primary Wound Type: MASD  -MS Type: MASD (moisture associated skin damage)  -MS    Retired Wound - Properties Group Date first assessed: 10/16/24  -MS Time first assessed: 0705 -MS Present on Original Admission: N  -MS Side: Left  -MS Location: greater trochanter  -MS Primary Wound Type: MASD  -MS Type: MASD (moisture associated skin damage)  -MS       Plan of Care Review    Plan of Care Reviewed With patient  -LA        Progress no change  -LA           Positioning and Restraints    Pre-Treatment Position in bed  -LA        Post Treatment Position bed  -LA        In Bed supine;call light within reach;encouraged to call for assist;exit alarm on  -LA                  User Key  (r) = Recorded By, (t) = Taken By, (c) = Cosigned By      Initials Name Effective Dates    TW Karen Gipson RN 06/16/21 -     Nory Keenan RN 06/08/23 -     Loida Richard OT 02/14/22 -     Roe North RN 06/04/24 -                      Occupational Therapy Education        No education to display                      OT Recommendation and Plan     Plan of Care Review  Plan of Care Reviewed With: patient  Progress: no change  Plan of Care Reviewed  With: patient        Time Calculation:     Therapy Charges for Today       Code Description Service Date Service Provider Modifiers Qty    [id number]  OT THERAPEUTIC ACT EA 15 MIN 11/22/2024 Loida Flaherty OT GO 2                 Loida Flaherty OT  11/22/2024

## 2024-11-23 LAB
GLUCOSE BLDC GLUCOMTR-MCNC: 122 MG/DL (ref 70–130)
GLUCOSE BLDC GLUCOMTR-MCNC: 131 MG/DL (ref 70–130)
GLUCOSE BLDC GLUCOMTR-MCNC: 153 MG/DL (ref 70–130)
GLUCOSE BLDC GLUCOMTR-MCNC: 230 MG/DL (ref 70–130)

## 2024-11-23 PROCEDURE — 82948 REAGENT STRIP/BLOOD GLUCOSE: CPT

## 2024-11-23 PROCEDURE — 63710000001 INSULIN LISPRO (HUMAN) PER 5 UNITS: Performed by: INTERNAL MEDICINE

## 2024-11-23 PROCEDURE — 63710000001 INSULIN GLARGINE PER 5 UNITS

## 2024-11-23 PROCEDURE — 63710000001 INSULIN LISPRO (HUMAN) PER 5 UNITS

## 2024-11-23 PROCEDURE — 25010000002 HEPARIN (PORCINE) PER 1000 UNITS: Performed by: INTERNAL MEDICINE

## 2024-11-23 PROCEDURE — 99231 SBSQ HOSP IP/OBS SF/LOW 25: CPT | Performed by: HOSPITALIST

## 2024-11-23 RX ADMIN — CYCLOBENZAPRINE 10 MG: 10 TABLET, FILM COATED ORAL at 21:45

## 2024-11-23 RX ADMIN — LIDOCAINE 3 PATCH: 4 PATCH TOPICAL at 17:44

## 2024-11-23 RX ADMIN — INSULIN LISPRO 4 UNITS: 100 INJECTION, SOLUTION INTRAVENOUS; SUBCUTANEOUS at 17:44

## 2024-11-23 RX ADMIN — NYSTATIN: 100000 POWDER TOPICAL at 21:46

## 2024-11-23 RX ADMIN — VITAMINS A AND D OINTMENT: 15.5; 53.4 OINTMENT TOPICAL at 21:46

## 2024-11-23 RX ADMIN — NICOTINE 1 PATCH: 7 PATCH, EXTENDED RELEASE TRANSDERMAL at 09:44

## 2024-11-23 RX ADMIN — PREGABALIN 25 MG: 25 CAPSULE ORAL at 21:48

## 2024-11-23 RX ADMIN — PREGABALIN 25 MG: 25 CAPSULE ORAL at 13:47

## 2024-11-23 RX ADMIN — INSULIN LISPRO 8 UNITS: 100 INJECTION, SOLUTION INTRAVENOUS; SUBCUTANEOUS at 11:46

## 2024-11-23 RX ADMIN — NYSTATIN: 100000 POWDER TOPICAL at 09:44

## 2024-11-23 RX ADMIN — ASPIRIN 81 MG: 81 TABLET, CHEWABLE ORAL at 09:43

## 2024-11-23 RX ADMIN — HEPARIN SODIUM 5000 UNITS: 5000 INJECTION INTRAVENOUS; SUBCUTANEOUS at 05:34

## 2024-11-23 RX ADMIN — DULOXETINE HYDROCHLORIDE 90 MG: 60 CAPSULE, DELAYED RELEASE ORAL at 09:42

## 2024-11-23 RX ADMIN — LISINOPRIL 20 MG: 10 TABLET ORAL at 09:42

## 2024-11-23 RX ADMIN — HEPARIN SODIUM 5000 UNITS: 5000 INJECTION INTRAVENOUS; SUBCUTANEOUS at 13:47

## 2024-11-23 RX ADMIN — PREGABALIN 25 MG: 25 CAPSULE ORAL at 05:34

## 2024-11-23 RX ADMIN — ACYCLOVIR 400 MG: 200 CAPSULE ORAL at 21:45

## 2024-11-23 RX ADMIN — INSULIN LISPRO 8 UNITS: 100 INJECTION, SOLUTION INTRAVENOUS; SUBCUTANEOUS at 21:44

## 2024-11-23 RX ADMIN — HEPARIN SODIUM 5000 UNITS: 5000 INJECTION INTRAVENOUS; SUBCUTANEOUS at 21:45

## 2024-11-23 RX ADMIN — METOPROLOL TARTRATE 25 MG: 25 TABLET, FILM COATED ORAL at 09:42

## 2024-11-23 RX ADMIN — Medication 5 MG: at 21:45

## 2024-11-23 RX ADMIN — VITAMINS A AND D OINTMENT: 15.5; 53.4 OINTMENT TOPICAL at 09:44

## 2024-11-23 RX ADMIN — INSULIN GLARGINE 40 UNITS: 100 INJECTION, SOLUTION SUBCUTANEOUS at 21:45

## 2024-11-23 RX ADMIN — ATORVASTATIN CALCIUM 80 MG: 40 TABLET, FILM COATED ORAL at 09:42

## 2024-11-23 RX ADMIN — INSULIN GLARGINE 20 UNITS: 100 INJECTION, SOLUTION SUBCUTANEOUS at 09:42

## 2024-11-23 RX ADMIN — PANTOPRAZOLE SODIUM 40 MG: 40 TABLET, DELAYED RELEASE ORAL at 09:42

## 2024-11-23 NOTE — PLAN OF CARE
Goal Outcome Evaluation:  Plan of Care Reviewed With: patient        Progress: no change  Outcome Evaluation: VSS on RA. Pt A&Ox4. Wound care completed per orders. Pt has refused some turns this shift. Pt educated on importance of turning. Pt voices no concerns or complaints at this time. Will continue POC.

## 2024-11-23 NOTE — PLAN OF CARE
Goal Outcome Evaluation:  Plan of Care Reviewed With: patient        Progress: no change  Outcome Evaluation: Patient alert/oriented and resting in bed during shift. Pt complained of pain, PRN meds given. Patient refused turns this shift, encouraged and educated to turn. No acute changes noted at this time. Will continue with plan of care.

## 2024-11-23 NOTE — PROGRESS NOTES
[facility] HOSPITALIST PROGRESS NOTE     Patient Identification:  Name:  Lety Johnson  Age:  44 y.o.  Sex:  female  :  1980  MRN:  [id number]  Visit Number:  [id number]  Primary Care Provider:  Concha Rao APRN    Length of stay:  56    Subjective: Overnight uneventful, Accu-Chek results so far has been acceptable, eating well, no issues reported by staff, vital signs stable.  Still waiting for placement.    Chief Complaint: Debility  ----------------------------------------------------------------------------------------------------------------------  Current Hospital Meds:  acyclovir, 400 mg, Oral, Nightly  aspirin, 81 mg, Oral, Daily  atorvastatin, 80 mg, Oral, Daily  DULoxetine, 90 mg, Oral, Daily  heparin (porcine), 5,000 Units, Subcutaneous, Q8H  insulin glargine, 20 Units, Subcutaneous, Daily With Breakfast  insulin glargine, 40 Units, Subcutaneous, Nightly  insulin lispro, 4-24 Units, Subcutaneous, 4x Daily AC & at Bedtime  insulin lispro, 8 Units, Subcutaneous, Daily With Lunch  Lidocaine, 3 patch, Transdermal, Q24H  lisinopril, 20 mg, Oral, Daily  magic barrier cream, , Topical, BID  metoprolol tartrate, 25 mg, Oral, Q12H  nicotine, 1 patch, Transdermal, Q24H  nystatin, , Topical, Q12H  pantoprazole, 40 mg, Oral, Daily  pregabalin, 25 mg, Oral, Q8H  sodium chloride, 10 mL, Intravenous, Q12H  sodium chloride, 10 mL, Intravenous, Q12H      Pharmacy Consult,       ----------------------------------------------------------------------------------------------------------------------  Vital Signs:  Temp:  [97.7 °F (36.5 °C)-98 °F (36.7 °C)] 98 °F (36.7 °C)  Heart Rate:  [88-99] 99  Resp:  [16-18] 18  BP: ()/(54-64) 97/54       Tele:       10/07/24  0511 10/08/24  0500 10/09/24  0500   Weight: 102 kg (225 lb 14.4 oz) (FIFI cameron) 102 kg (225 lb 15.5 oz) 102 kg (224 lb 1.6 oz)     Body mass index is 36.73 kg/m².    Intake/Output Summary (Last 24 hours) at 2024  1405  Last data filed at 11/23/2024 1100  Gross per 24 hour   Intake 700 ml   Output 1700 ml   Net -1000 ml     Diet: Regular/House, Diabetic; Consistent Carbohydrate; Fluid Consistency: Thin (IDDSI 0)  ----------------------------------------------------------------------------------------------------------------------  Physical exam:  General: Comfortable, asleep but arousable,  oriented to self, place, and time, well-developed and well-nourished.  No respiratory distress.    Skin:  Skin is warm and dry. No rash noted. No pallor.    HENT:  Head:  Normocephalic and atraumatic.  Mouth:  Moist mucous membranes.    Eyes:  Conjunctivae and EOM are normal.  Pupils are equal, round, and reactive to light.  No scleral icterus.    Neck:  Neck supple.  No JVD present.    Pulmonary/Chest:  No respiratory distress, no wheezes, no crackles, with normal breath sounds and good air movement.  Cardiovascular:  Normal rate, regular rhythm and normal heart sounds with no murmur.  Abdominal:  Soft.  Bowel sounds are normal.  No distension and no tenderness.   Extremities:  No edema, no tenderness, and no deformity.  No red or swollen joints anywhere.  Strong pulses in all 4 extremities with no clubbing, no cyanosis, no edema.  Neurological: Left-sided mild plegia,  No slurred speech.    Genitourinary: External urinary catheter  Back:  ----------------------------------------------------------------------------------------------------------------------  ----------------------------------------------------------------------------------------------------------------------      Results from last 7 days   Lab Units 11/22/24  0036   WBC 10*3/mm3 9.51   HEMOGLOBIN g/dL 11.7*   HEMATOCRIT % 36.4   MCV fL 102.2*   MCHC g/dL 32.1   PLATELETS 10*3/mm3 334         Results from last 7 days   Lab Units 11/22/24  0036 11/19/24  0129   SODIUM mmol/L 138 136   POTASSIUM mmol/L 3.8 4.3   MAGNESIUM mg/dL  --  1.8   CHLORIDE mmol/L 102 101   CO2 mmol/L  "22.8 24.4   BUN mg/dL 23* 21*   CREATININE mg/dL 0.53* 0.57   CALCIUM mg/dL 9.2 9.3   GLUCOSE mg/dL 126* 142*   ALBUMIN g/dL 3.3*  --    BILIRUBIN mg/dL 0.2  --    ALK PHOS U/L 109  --    AST (SGOT) U/L 16  --    ALT (SGPT) U/L 16  --    Estimated Creatinine Clearance: 163.6 mL/min (A) (by C-G formula based on SCr of 0.53 mg/dL (L)).    No results found for: \"AMMONIA\"      No results found for: \"BLOODCX\"  No results found for: \"URINECX\"  No results found for: \"WOUNDCX\"  No results found for: \"STOOLCX\"    I have personally looked at the labs and they are summarized above.  ----------------------------------------------------------------------------------------------------------------------  Imaging Results (Last 24 Hours)       ** No results found for the last 24 hours. **          ----------------------------------------------------------------------------------------------------------------------  Assessment and Plan:  -Debility  -History of CVA  -Morbid obesity complicating all aspects of care  -Diabetes with hyperglycemia resolved  -Recent ESBL UTI completed treatment  -Essential hypertension  -Situational depression and anxiety    Continue current treatment, PT OT, supportive care, for placement.    Disposition for placement      Mara Navas MD  11/23/24  14:05 EST  "

## 2024-11-24 LAB
GLUCOSE BLDC GLUCOMTR-MCNC: 130 MG/DL (ref 70–130)
GLUCOSE BLDC GLUCOMTR-MCNC: 146 MG/DL (ref 70–130)
GLUCOSE BLDC GLUCOMTR-MCNC: 186 MG/DL (ref 70–130)
GLUCOSE BLDC GLUCOMTR-MCNC: 236 MG/DL (ref 70–130)
GLUCOSE BLDC GLUCOMTR-MCNC: 275 MG/DL (ref 70–130)

## 2024-11-24 PROCEDURE — 25010000002 HEPARIN (PORCINE) PER 1000 UNITS: Performed by: INTERNAL MEDICINE

## 2024-11-24 PROCEDURE — 63710000001 PROCHLORPERAZINE MALEATE PER 5 MG: Performed by: STUDENT IN AN ORGANIZED HEALTH CARE EDUCATION/TRAINING PROGRAM

## 2024-11-24 PROCEDURE — 25810000003 SODIUM CHLORIDE 0.9 % SOLUTION

## 2024-11-24 PROCEDURE — 63710000001 INSULIN GLARGINE PER 5 UNITS

## 2024-11-24 PROCEDURE — 63710000001 INSULIN LISPRO (HUMAN) PER 5 UNITS

## 2024-11-24 PROCEDURE — 82948 REAGENT STRIP/BLOOD GLUCOSE: CPT

## 2024-11-24 PROCEDURE — 99231 SBSQ HOSP IP/OBS SF/LOW 25: CPT | Performed by: HOSPITALIST

## 2024-11-24 PROCEDURE — 63710000001 INSULIN LISPRO (HUMAN) PER 5 UNITS: Performed by: INTERNAL MEDICINE

## 2024-11-24 RX ADMIN — PROCHLORPERAZINE MALEATE 5 MG: 5 TABLET ORAL at 22:10

## 2024-11-24 RX ADMIN — Medication 5 MG: at 20:09

## 2024-11-24 RX ADMIN — ASPIRIN 81 MG: 81 TABLET, CHEWABLE ORAL at 08:12

## 2024-11-24 RX ADMIN — INSULIN LISPRO 8 UNITS: 100 INJECTION, SOLUTION INTRAVENOUS; SUBCUTANEOUS at 11:42

## 2024-11-24 RX ADMIN — DULOXETINE HYDROCHLORIDE 90 MG: 60 CAPSULE, DELAYED RELEASE ORAL at 08:12

## 2024-11-24 RX ADMIN — NYSTATIN: 100000 POWDER TOPICAL at 20:10

## 2024-11-24 RX ADMIN — ATORVASTATIN CALCIUM 80 MG: 40 TABLET, FILM COATED ORAL at 08:11

## 2024-11-24 RX ADMIN — PREGABALIN 25 MG: 25 CAPSULE ORAL at 05:37

## 2024-11-24 RX ADMIN — VITAMINS A AND D OINTMENT: 15.5; 53.4 OINTMENT TOPICAL at 08:13

## 2024-11-24 RX ADMIN — PANTOPRAZOLE SODIUM 40 MG: 40 TABLET, DELAYED RELEASE ORAL at 08:12

## 2024-11-24 RX ADMIN — VITAMINS A AND D OINTMENT: 15.5; 53.4 OINTMENT TOPICAL at 21:01

## 2024-11-24 RX ADMIN — METOPROLOL TARTRATE 25 MG: 25 TABLET, FILM COATED ORAL at 08:11

## 2024-11-24 RX ADMIN — INSULIN LISPRO 12 UNITS: 100 INJECTION, SOLUTION INTRAVENOUS; SUBCUTANEOUS at 20:09

## 2024-11-24 RX ADMIN — HEPARIN SODIUM 5000 UNITS: 5000 INJECTION INTRAVENOUS; SUBCUTANEOUS at 21:02

## 2024-11-24 RX ADMIN — ACETAMINOPHEN 650 MG: 325 TABLET ORAL at 01:07

## 2024-11-24 RX ADMIN — SODIUM CHLORIDE 500 ML: 9 INJECTION, SOLUTION INTRAVENOUS at 23:09

## 2024-11-24 RX ADMIN — ACYCLOVIR 400 MG: 200 CAPSULE ORAL at 20:09

## 2024-11-24 RX ADMIN — INSULIN LISPRO 4 UNITS: 100 INJECTION, SOLUTION INTRAVENOUS; SUBCUTANEOUS at 17:48

## 2024-11-24 RX ADMIN — LIDOCAINE 3 PATCH: 4 PATCH TOPICAL at 17:48

## 2024-11-24 RX ADMIN — METOPROLOL TARTRATE 25 MG: 25 TABLET, FILM COATED ORAL at 20:10

## 2024-11-24 RX ADMIN — PREGABALIN 25 MG: 25 CAPSULE ORAL at 21:01

## 2024-11-24 RX ADMIN — NYSTATIN: 100000 POWDER TOPICAL at 08:13

## 2024-11-24 RX ADMIN — NICOTINE 1 PATCH: 7 PATCH, EXTENDED RELEASE TRANSDERMAL at 08:14

## 2024-11-24 RX ADMIN — INSULIN GLARGINE 20 UNITS: 100 INJECTION, SOLUTION SUBCUTANEOUS at 08:13

## 2024-11-24 RX ADMIN — LISINOPRIL 20 MG: 10 TABLET ORAL at 08:12

## 2024-11-24 RX ADMIN — HEPARIN SODIUM 5000 UNITS: 5000 INJECTION INTRAVENOUS; SUBCUTANEOUS at 05:37

## 2024-11-24 RX ADMIN — INSULIN GLARGINE 40 UNITS: 100 INJECTION, SOLUTION SUBCUTANEOUS at 20:09

## 2024-11-24 RX ADMIN — PREGABALIN 25 MG: 25 CAPSULE ORAL at 14:22

## 2024-11-24 RX ADMIN — HEPARIN SODIUM 5000 UNITS: 5000 INJECTION INTRAVENOUS; SUBCUTANEOUS at 14:22

## 2024-11-24 NOTE — PLAN OF CARE
Goal Outcome Evaluation:           Progress: no change  Outcome Evaluation: Patient is in bed at this time. VSS on RA, No acute changes or complaints at this time per pt. WC done per orders, Pt didnt want a full bath, education provided on importance. Will continue POC.

## 2024-11-24 NOTE — PLAN OF CARE
Goal Outcome Evaluation:  Plan of Care Reviewed With: patient        Progress: no change  Outcome Evaluation: Patient resting in bed during shift. VSS on room air. Patient alert/oriented. Patient has refused some turns during this shift, educated on the importance of  turning. Patient complained of pain, PRN meds given. No acute changes noted at this time. Will continue with plan of care.

## 2024-11-24 NOTE — PROGRESS NOTES
[facility] HOSPITALIST PROGRESS NOTE     Patient Identification:  Name:  Lety Johnson  Age:  44 y.o.  Sex:  female  :  1980  MRN:  [id number]  Visit Number:  [id number]  Primary Care Provider:  Concha Rao APRN    Length of stay:  57    Subjective: Patient seen and examined she is in bed, no issues reported by staff, occasional request for pain medication.  Accu-Chek is improved.  Vital signs stable.    Chief Complaint: Debility  ----------------------------------------------------------------------------------------------------------------------  Current Hospital Meds:  acyclovir, 400 mg, Oral, Nightly  aspirin, 81 mg, Oral, Daily  atorvastatin, 80 mg, Oral, Daily  DULoxetine, 90 mg, Oral, Daily  heparin (porcine), 5,000 Units, Subcutaneous, Q8H  insulin glargine, 20 Units, Subcutaneous, Daily With Breakfast  insulin glargine, 40 Units, Subcutaneous, Nightly  insulin lispro, 4-24 Units, Subcutaneous, 4x Daily AC & at Bedtime  insulin lispro, 8 Units, Subcutaneous, Daily With Lunch  Lidocaine, 3 patch, Transdermal, Q24H  lisinopril, 20 mg, Oral, Daily  magic barrier cream, , Topical, BID  metoprolol tartrate, 25 mg, Oral, Q12H  nicotine, 1 patch, Transdermal, Q24H  nystatin, , Topical, Q12H  pantoprazole, 40 mg, Oral, Daily  pregabalin, 25 mg, Oral, Q8H  sodium chloride, 10 mL, Intravenous, Q12H  sodium chloride, 10 mL, Intravenous, Q12H      Pharmacy Consult,       ----------------------------------------------------------------------------------------------------------------------  Vital Signs:  Temp:  [98.2 °F (36.8 °C)-98.3 °F (36.8 °C)] 98.2 °F (36.8 °C)  Heart Rate:  [105] 105  Resp:  [18-19] 19  BP: ()/(50-63) 101/60       Tele:       10/07/24  0511 10/08/24  0500 10/09/24  0500   Weight: 102 kg (225 lb 14.4 oz) (FIFI cameron) 102 kg (225 lb 15.5 oz) 102 kg (224 lb 1.6 oz)     Body mass index is 36.73 kg/m².    Intake/Output Summary (Last 24 hours) at 2024  1350  Last data filed at 11/24/2024 0800  Gross per 24 hour   Intake 950 ml   Output 600 ml   Net 350 ml     Diet: Regular/House, Diabetic; Consistent Carbohydrate; Fluid Consistency: Thin (IDDSI 0)  ----------------------------------------------------------------------------------------------------------------------  Physical exam:  General: Obese, chronically ill-appearing, in bed, comfortable,awake, alert, oriented to self, place, and time,  No respiratory distress.    Skin:  Skin is warm and dry. No rash noted. No pallor.    HENT:  Head:  Normocephalic and atraumatic.  Mouth:  Moist mucous membranes.    Eyes:  Conjunctivae and EOM are normal.  Pupils are equal, round, and reactive to light.  No scleral icterus.    Neck:  Neck supple.  No JVD present.    Pulmonary/Chest:  No respiratory distress, no wheezes, no crackles, with normal breath sounds and good air movement.  Cardiovascular:  Normal rate, regular rhythm and normal heart sounds with no murmur.  Abdominal:  Soft.  Bowel sounds are normal.  No distension and no tenderness.   Extremities: Left sided slightly plegic, no edema, no tenderness, and no deformity.  No red or swollen joints anywhere.  Strong pulses in all 4 extremities with no clubbing, no cyanosis, no edema.  Neurological: Left-sided is slight hemiplegia,   Genitourinary: No Florentino catheter  Back:  ----------------------------------------------------------------------------------------------------------------------  ----------------------------------------------------------------------------------------------------------------------      Results from last 7 days   Lab Units 11/22/24  0036   WBC 10*3/mm3 9.51   HEMOGLOBIN g/dL 11.7*   HEMATOCRIT % 36.4   MCV fL 102.2*   MCHC g/dL 32.1   PLATELETS 10*3/mm3 334         Results from last 7 days   Lab Units 11/22/24  0036 11/19/24  0129   SODIUM mmol/L 138 136   POTASSIUM mmol/L 3.8 4.3   MAGNESIUM mg/dL  --  1.8   CHLORIDE mmol/L 102 101   CO2 mmol/L  "22.8 24.4   BUN mg/dL 23* 21*   CREATININE mg/dL 0.53* 0.57   CALCIUM mg/dL 9.2 9.3   GLUCOSE mg/dL 126* 142*   ALBUMIN g/dL 3.3*  --    BILIRUBIN mg/dL 0.2  --    ALK PHOS U/L 109  --    AST (SGOT) U/L 16  --    ALT (SGPT) U/L 16  --    Estimated Creatinine Clearance: 163.6 mL/min (A) (by C-G formula based on SCr of 0.53 mg/dL (L)).    No results found for: \"AMMONIA\"      No results found for: \"BLOODCX\"  No results found for: \"URINECX\"  No results found for: \"WOUNDCX\"  No results found for: \"STOOLCX\"    I have personally looked at the labs and they are summarized above.  ----------------------------------------------------------------------------------------------------------------------  Imaging Results (Last 24 Hours)       ** No results found for the last 24 hours. **          ----------------------------------------------------------------------------------------------------------------------  Assessment and Plan:  -Chronic debility  -Recent ESBL UTI  -History of CVA with left-sided plegia  -Hypertension  -Anxiety depression  -Obese with hyperglycemia improved    Awaiting for placement, continue PT OT, Accu-Chek and sliding scale, diabetes diet.    Disposition placement      Mara Navas MD  11/24/24  13:50 EST  "

## 2024-11-25 LAB
GLUCOSE BLDC GLUCOMTR-MCNC: 134 MG/DL (ref 70–130)
GLUCOSE BLDC GLUCOMTR-MCNC: 178 MG/DL (ref 70–130)
GLUCOSE BLDC GLUCOMTR-MCNC: 215 MG/DL (ref 70–130)
GLUCOSE BLDC GLUCOMTR-MCNC: 232 MG/DL (ref 70–130)

## 2024-11-25 PROCEDURE — 25010000002 HEPARIN (PORCINE) PER 1000 UNITS: Performed by: INTERNAL MEDICINE

## 2024-11-25 PROCEDURE — 82948 REAGENT STRIP/BLOOD GLUCOSE: CPT

## 2024-11-25 PROCEDURE — 63710000001 PROCHLORPERAZINE MALEATE PER 5 MG: Performed by: STUDENT IN AN ORGANIZED HEALTH CARE EDUCATION/TRAINING PROGRAM

## 2024-11-25 PROCEDURE — 63710000001 INSULIN LISPRO (HUMAN) PER 5 UNITS: Performed by: INTERNAL MEDICINE

## 2024-11-25 PROCEDURE — 63710000001 INSULIN GLARGINE PER 5 UNITS

## 2024-11-25 PROCEDURE — 97110 THERAPEUTIC EXERCISES: CPT

## 2024-11-25 PROCEDURE — 63710000001 INSULIN LISPRO (HUMAN) PER 5 UNITS

## 2024-11-25 PROCEDURE — 99231 SBSQ HOSP IP/OBS SF/LOW 25: CPT

## 2024-11-25 PROCEDURE — 97164 PT RE-EVAL EST PLAN CARE: CPT

## 2024-11-25 RX ORDER — TRAMADOL HYDROCHLORIDE 50 MG/1
50 TABLET ORAL EVERY 8 HOURS PRN
Status: DISCONTINUED | OUTPATIENT
Start: 2024-11-25 | End: 2024-11-29 | Stop reason: HOSPADM

## 2024-11-25 RX ADMIN — METOPROLOL TARTRATE 25 MG: 25 TABLET, FILM COATED ORAL at 08:52

## 2024-11-25 RX ADMIN — PREGABALIN 25 MG: 25 CAPSULE ORAL at 15:07

## 2024-11-25 RX ADMIN — INSULIN LISPRO 4 UNITS: 100 INJECTION, SOLUTION INTRAVENOUS; SUBCUTANEOUS at 17:58

## 2024-11-25 RX ADMIN — HEPARIN SODIUM 5000 UNITS: 5000 INJECTION INTRAVENOUS; SUBCUTANEOUS at 05:30

## 2024-11-25 RX ADMIN — PREGABALIN 25 MG: 25 CAPSULE ORAL at 05:30

## 2024-11-25 RX ADMIN — ASPIRIN 81 MG: 81 TABLET, CHEWABLE ORAL at 08:48

## 2024-11-25 RX ADMIN — PANTOPRAZOLE SODIUM 40 MG: 40 TABLET, DELAYED RELEASE ORAL at 08:39

## 2024-11-25 RX ADMIN — ACETAMINOPHEN 650 MG: 325 TABLET ORAL at 08:39

## 2024-11-25 RX ADMIN — NYSTATIN: 100000 POWDER TOPICAL at 08:40

## 2024-11-25 RX ADMIN — PROCHLORPERAZINE MALEATE 5 MG: 5 TABLET ORAL at 16:40

## 2024-11-25 RX ADMIN — Medication 10 ML: at 08:41

## 2024-11-25 RX ADMIN — INSULIN GLARGINE 40 UNITS: 100 INJECTION, SOLUTION SUBCUTANEOUS at 20:51

## 2024-11-25 RX ADMIN — ATORVASTATIN CALCIUM 80 MG: 40 TABLET, FILM COATED ORAL at 08:40

## 2024-11-25 RX ADMIN — INSULIN LISPRO 8 UNITS: 100 INJECTION, SOLUTION INTRAVENOUS; SUBCUTANEOUS at 11:33

## 2024-11-25 RX ADMIN — LISINOPRIL 20 MG: 10 TABLET ORAL at 08:52

## 2024-11-25 RX ADMIN — TRAMADOL HYDROCHLORIDE 50 MG: 50 TABLET, FILM COATED ORAL at 12:49

## 2024-11-25 RX ADMIN — HEPARIN SODIUM 5000 UNITS: 5000 INJECTION INTRAVENOUS; SUBCUTANEOUS at 15:07

## 2024-11-25 RX ADMIN — DULOXETINE HYDROCHLORIDE 90 MG: 60 CAPSULE, DELAYED RELEASE ORAL at 08:40

## 2024-11-25 RX ADMIN — PREGABALIN 25 MG: 25 CAPSULE ORAL at 21:00

## 2024-11-25 RX ADMIN — LIDOCAINE 3 PATCH: 4 PATCH TOPICAL at 16:40

## 2024-11-25 RX ADMIN — HEPARIN SODIUM 5000 UNITS: 5000 INJECTION INTRAVENOUS; SUBCUTANEOUS at 21:00

## 2024-11-25 RX ADMIN — TRAMADOL HYDROCHLORIDE 50 MG: 50 TABLET, FILM COATED ORAL at 23:13

## 2024-11-25 RX ADMIN — Medication 5 MG: at 22:59

## 2024-11-25 RX ADMIN — NYSTATIN: 100000 POWDER TOPICAL at 20:53

## 2024-11-25 RX ADMIN — VITAMINS A AND D OINTMENT: 15.5; 53.4 OINTMENT TOPICAL at 08:40

## 2024-11-25 RX ADMIN — VITAMINS A AND D OINTMENT: 15.5; 53.4 OINTMENT TOPICAL at 20:53

## 2024-11-25 RX ADMIN — Medication 10 ML: at 20:53

## 2024-11-25 RX ADMIN — CYCLOBENZAPRINE 10 MG: 10 TABLET, FILM COATED ORAL at 20:52

## 2024-11-25 RX ADMIN — INSULIN GLARGINE 20 UNITS: 100 INJECTION, SOLUTION SUBCUTANEOUS at 08:41

## 2024-11-25 RX ADMIN — NICOTINE 1 PATCH: 7 PATCH, EXTENDED RELEASE TRANSDERMAL at 08:44

## 2024-11-25 RX ADMIN — INSULIN LISPRO 8 UNITS: 100 INJECTION, SOLUTION INTRAVENOUS; SUBCUTANEOUS at 20:51

## 2024-11-25 NOTE — PLAN OF CARE
Goal Outcome Evaluation:           Progress: no change  Outcome Evaluation: Patient is in bed at this time. VSS on RA. WC completed per orders. PRN pain meds given per orders. No other complaints at this time. Will continue POC.

## 2024-11-25 NOTE — PLAN OF CARE
Goal Outcome Evaluation:           Progress: no change  Outcome Evaluation: Patient resting in bed. VSS on room air. Wound care completed per orders. No complaints or concerns at this time. Will continue plan of care.

## 2024-11-25 NOTE — THERAPY RE-EVALUATION
Acute Care - Physical Therapy Re-Evaluation   [facility]     Patient Name: Lety Johnson  : 1980  MRN: [id number]  Today's Date: 2024   Onset of Illness/Injury or Date of Surgery: 24  Visit Dx:     ICD-10-CM ICD-9-CM   1. Hypokalemia  E87.6 276.8   2. Pressure injury of skin of sacral region, unspecified injury stage  L89.159 707.03     707.20   3. Pressure injury of skin of left heel, unspecified injury stage  L89.629 707.07     707.20   4. Acute cystitis without hematuria  N30.00 595.0     Patient Active Problem List   Diagnosis   (none) - all problems resolved or deleted     Past Medical History:   Diagnosis Date    Stroke      History reviewed. No pertinent surgical history.  PT Assessment (Last 12 Hours)       PT Evaluation and Treatment       Row Name 24 1421          Physical Therapy Time and Intention    Subjective Information complains of;pain  -KM     Document Type therapy note (daily note)  -KM     Mode of Treatment individual therapy;physical therapy  -KM     Patient Effort fair  -KM     Symptoms Noted During/After Treatment none  -KM       Row Name 24 1421          General Information    Patient Profile Reviewed yes  -KM     Patient Observations alert;cooperative;agree to therapy  -KM     Existing Precautions/Restrictions fall  -KM       Row Name 24 1421          Cognition    Orientation Status (Cognition) oriented x 3  -KM     Follows Commands (Cognition) WFL  -KM       Row Name 24 1421          Bed Mobility    Comment, (Bed Mobility) pt. deferred d/t pain  -KM       Row Name 24 1421          Transfers    Comment, (Transfers) pt. deferred  -KM       Row Name 24 1421          Gait/Stairs (Locomotion)    Patient was able to Ambulate no, other medical factors prevent ambulation  -KM     Reason Patient was unable to Ambulate Non-Ambulatory at Baseline  -KM       Row Name 24 1421          Safety Issues/Impairments Affecting Functional Mobility     Impairments Affecting Function (Mobility) endurance/activity tolerance;pain;range of motion (ROM);strength;coordination;motor control;muscle tone abnormal  -KM       Row Name 11/25/24 1421          Motor Skills    Comments, Therapeutic Exercise supine ther-ex  -KM       Row Name             Wound 09/26/24 1809 Left posterior ankle Other (comment)    Wound - Properties Group Placement Date: 09/26/24  -KJ Placement Time: 1809  -KJ Side: Left  -KJ Orientation: posterior  -KJ Location: ankle  -KJ Primary Wound Type: Other  -TW, unknow etiology     Retired Wound - Properties Group Placement Date: 09/26/24  -KJ Placement Time: 1809  -KJ Side: Left  -KJ Orientation: posterior  -KJ Location: ankle  -KJ Primary Wound Type: Other  -TW, unknow etiology     Retired Wound - Properties Group Placement Date: 09/26/24  -KJ Placement Time: 1809  -KJ Side: Left  -KJ Orientation: posterior  -KJ Location: ankle  -KJ Primary Wound Type: Other  -TW, unknow etiology     Retired Wound - Properties Group Date first assessed: 09/26/24  -KJ Time first assessed: 1809  -KJ Side: Left  -KJ Location: ankle  -KJ Primary Wound Type: Other  -TW, unknow etiology       Row Name             Wound 10/11/24 1330 medial coccyx Fissure    Wound - Properties Group Placement Date: 10/11/24  -TW Placement Time: 1330 -TW Orientation: medial  -TW Location: coccyx  -TW Primary Wound Type: Fissure  -TW    Retired Wound - Properties Group Placement Date: 10/11/24  -TW Placement Time: 1330 -TW Orientation: medial  -TW Location: coccyx  -TW Primary Wound Type: Fissure  -TW    Retired Wound - Properties Group Placement Date: 10/11/24  -TW Placement Time: 1330 -TW Orientation: medial  -TW Location: coccyx  -TW Primary Wound Type: Fissure  -TW    Retired Wound - Properties Group Date first assessed: 10/11/24  -TW Time first assessed: 1330 -TW Location: coccyx  -TW Primary Wound Type: Fissure  -TW      Row Name             Wound 10/16/24 0705 Left medial gluteal  MASD (moisture associated skin damage)    Wound - Properties Group Placement Date: 10/16/24  -MS Placement Time: 0705 -MS Side: Left  -MS Orientation: medial  -MS Location: gluteal  -MS Primary Wound Type: MASD  -MS Type: MASD (moisture associated skin damage)  -MS Present on Original Admission: N  -MS Additional Comments: Noted at shift change skin assesment, Night shift RN stated it had been there her shift.  -MS    Retired Wound - Properties Group Placement Date: 10/16/24  -MS Placement Time: 0705 -MS Present on Original Admission: N  -MS Side: Left  -MS Orientation: medial  -MS Location: gluteal  -MS Primary Wound Type: MASD  -MS Additional Comments: Noted at shift change skin assesment, Night shift RN stated it had been there her shift.  -MS Type: MASD (moisture associated skin damage)  -MS    Retired Wound - Properties Group Placement Date: 10/16/24  -MS Placement Time: 0705 -MS Present on Original Admission: N  -MS Side: Left  -MS Orientation: medial  -MS Location: gluteal  -MS Primary Wound Type: MASD  -MS Additional Comments: Noted at shift change skin assesment, Night shift RN stated it had been there her shift.  -MS Type: MASD (moisture associated skin damage)  -MS    Retired Wound - Properties Group Date first assessed: 10/16/24  -MS Time first assessed: 0705 -MS Present on Original Admission: N  -MS Side: Left  -MS Location: gluteal  -MS Primary Wound Type: MASD  -MS Additional Comments: Noted at shift change skin assesment, Night shift RN stated it had been there her shift.  -MS Type: MASD (moisture associated skin damage)  -MS      Row Name             Wound 10/16/24 0705 Left posterior greater trochanter MASD (moisture associated skin damage)    Wound - Properties Group Placement Date: 10/16/24  -MS Placement Time: 0705 -MS Side: Left  -MS Orientation: posterior  -MS Location: greater trochanter  -MS Primary Wound Type: MASD  -MS Type: MASD (moisture associated skin damage)  -MS Present on  Original Admission: N  -MS    Retired Wound - Properties Group Placement Date: 10/16/24  -MS Placement Time: 0705 -MS Present on Original Admission: N  -MS Side: Left  -MS Orientation: posterior  -MS Location: greater trochanter  -MS Primary Wound Type: MASD  -MS Type: MASD (moisture associated skin damage)  -MS    Retired Wound - Properties Group Placement Date: 10/16/24  -MS Placement Time: 0705 -MS Present on Original Admission: N  -MS Side: Left  -MS Orientation: posterior  -MS Location: greater trochanter  -MS Primary Wound Type: MASD  -MS Type: MASD (moisture associated skin damage)  -MS    Retired Wound - Properties Group Date first assessed: 10/16/24  -MS Time first assessed: 0705 -MS Present on Original Admission: N  -MS Side: Left  -MS Location: greater trochanter  -MS Primary Wound Type: MASD  -MS Type: MASD (moisture associated skin damage)  -MS      Row Name 11/25/24 1421          Progress Summary (PT)    Daily Progress Summary (PT) Pt. deferred all mobility skills d/t pain. She was able to tolerate supine ther-ex. She tolerated minimal activity. She continues to show lack of progress d/t being unwilling to consistently participate. Pt. needs to show increased willingness to participate or will need to be discharged from PT.  -KM       Row Name 11/25/24 1421          Physical Therapy Goals    Bed Mobility Goal Selection (PT) bed mobility, PT goal 1  -KM     Transfer Goal Selection (PT) transfer, PT goal 1  -KM       Row Name 11/25/24 1421          Bed Mobility Goal 1 (PT)    Activity/Assistive Device (Bed Mobility Goal 1, PT) bed mobility activities, all  -KM     [place] Level/Cues Needed (Bed Mobility Goal 1, PT) moderate assist (50-74% patient effort)  -KM     Time Frame (Bed Mobility Goal 1, PT) by discharge  -KM     Progress/Outcomes (Bed Mobility Goal 1, PT) goal ongoing  -KM       Row Name 11/25/24 1421          Transfer Goal 1 (PT)    Activity/Assistive Device (Transfer Goal 1, PT)  transfers, all  -KM     [place] Level/Cues Needed (Transfer Goal 1, PT) moderate assist (50-74% patient effort)  -KM     Time Frame (Transfer Goal 1, PT) by discharge  -KM     Progress/Outcome (Transfer Goal 1, PT) goal ongoing  -KM               User Key  (r) = Recorded By, (t) = Taken By, (c) = Cosigned By      Initials Name Provider Type    Karen Peralta, RN Registered Nurse    KM Thierry Saldivar, PT Physical Therapist    Nory Keenan, RN Registered Nurse    Roe North RN Registered Nurse                    Physical Therapy Education       Title: PT OT SLP Therapies (Done)       Topic: Physical Therapy (Done)       Point: Mobility training (Done)       Learning Progress Summary            Patient Acceptance, E, VU by  at 11/22/2024 0229    Acceptance, E, VU by WG at 11/21/2024 0236    Acceptance, E,TB, VU by CF at 11/17/2024 1024    Acceptance, E,TB, VU by RR at 11/14/2024 0148    Acceptance, E,TB, VU by RR at 11/13/2024 0218    Acceptance, E,TB, VU by RG at 11/12/2024 0927    Acceptance, TB,E, VU by RG at 11/11/2024 0944    Acceptance, E,TB, VU by RG at 11/10/2024 0931    Acceptance, E, VU by WG at 11/10/2024 0233    Acceptance, E,TB, VU by CF at 11/9/2024 1016    Acceptance, E, NR by SC at 11/3/2024 0025    Acceptance, E,TB, VU by RR at 11/1/2024 2315    Acceptance, E,D, VU,NR by  at 10/31/2024 1234    Comment: PT educated pt. in importance of mobilization and L L/UE PROM and self ROM to prevent contracture.  Pt. is encouraged to to perform R LE AROM frequently while supine or sitting EOB.    Acceptance, E,TB, VU by RR at 10/27/2024 0031    Acceptance, E,TB, VU by RG at 10/22/2024 1002    Acceptance, TB,E, VU by RG at 10/21/2024 0907    Nonacceptance, E, NR by WG at 10/16/2024 0228    Acceptance, E,TB, VU by  at 10/15/2024 0753    Acceptance, E,TB, VU by CF at 10/14/2024 0801    Acceptance, E,TB, VU by CF at 10/13/2024 1045    Acceptance, E,D, VU,NR by AG at 10/10/2024 1310     Acceptance, E,TB, VU by CB at 10/8/2024 0448    Acceptance, E, NR by RD at 10/2/2024 1101    Acceptance, E, NR by RD at 10/1/2024 0856    Acceptance, E,D, VU,NR by AG at 9/30/2024 1559                      Point: Home exercise program (Done)       Learning Progress Summary            Patient Acceptance, E, VU by WG at 11/22/2024 0229    Acceptance, E, VU by WG at 11/21/2024 0236    Acceptance, E,TB, VU by CF at 11/17/2024 1024    Acceptance, E,TB, VU by RR at 11/14/2024 0148    Acceptance, E,TB, VU by RR at 11/13/2024 0218    Acceptance, E,TB, VU by RG at 11/12/2024 0927    Acceptance, TB,E, VU by RG at 11/11/2024 0944    Acceptance, E,TB, VU by RG at 11/10/2024 0931    Acceptance, E, VU by WG at 11/10/2024 0233    Acceptance, E,TB, VU by CF at 11/9/2024 1016    Acceptance, E, NR by SC at 11/3/2024 0025    Acceptance, E,TB, VU by RR at 11/1/2024 2315    Acceptance, E,D, VU,NR by AG at 10/31/2024 1234    Comment: PT educated pt. in importance of mobilization and L L/UE PROM and self ROM to prevent contracture.  Pt. is encouraged to to perform R LE AROM frequently while supine or sitting EOB.    Acceptance, E,TB, VU by RR at 10/27/2024 0031    Acceptance, E,TB, VU by RG at 10/22/2024 1002    Acceptance, TB,E, VU by RG at 10/21/2024 0907    Nonacceptance, E, NR by WG at 10/16/2024 0228    Acceptance, E,TB, VU by CF at 10/15/2024 0753    Acceptance, E,TB, VU by CF at 10/14/2024 0801    Acceptance, E,TB, VU by CF at 10/13/2024 1045    Acceptance, E,D, VU,NR by AG at 10/10/2024 1310    Acceptance, E,TB, VU by CB at 10/8/2024 0448    Acceptance, E, NR by RD at 10/2/2024 1101    Acceptance, E, NR by RD at 10/1/2024 0856    Acceptance, E,D, VU,NR by AG at 9/30/2024 1559                      Point: Body mechanics (Done)       Learning Progress Summary            Patient Acceptance, E, VU by WG at 11/22/2024 0229    Acceptance, E, VU by WG at 11/21/2024 0236    Acceptance, E,TB, VU by CF at 11/17/2024 1024    Acceptance, E,TB,  VU by RR at 11/14/2024 0148    Acceptance, E,TB, VU by RR at 11/13/2024 0218    Acceptance, E,TB, VU by RG at 11/12/2024 0927    Acceptance, TB,E, VU by RG at 11/11/2024 0944    Acceptance, E,TB, VU by RG at 11/10/2024 0931    Acceptance, E, VU by WG at 11/10/2024 0233    Acceptance, E,TB, VU by CF at 11/9/2024 1016    Acceptance, E, NR by SC at 11/3/2024 0025    Acceptance, E,TB, VU by RR at 11/1/2024 2315    Acceptance, E,D, VU,NR by AG at 10/31/2024 1234    Comment: PT educated pt. in importance of mobilization and L L/UE PROM and self ROM to prevent contracture.  Pt. is encouraged to to perform R LE AROM frequently while supine or sitting EOB.    Acceptance, E,TB, VU by RR at 10/27/2024 0031    Acceptance, E,TB, VU by RG at 10/22/2024 1002    Acceptance, TB,E, VU by RG at 10/21/2024 0907    Nonacceptance, E, NR by WG at 10/16/2024 0228    Acceptance, E,TB, VU by CF at 10/15/2024 0753    Acceptance, E,TB, VU by CF at 10/14/2024 0801    Acceptance, E,TB, VU by CF at 10/13/2024 1045    Acceptance, E,D, VU,NR by AG at 10/10/2024 1310    Acceptance, E,TB, VU by CB at 10/8/2024 0448    Acceptance, E, NR by RD at 10/2/2024 1101    Acceptance, E, NR by RD at 10/1/2024 0856    Acceptance, E,D, VU,NR by AG at 9/30/2024 1559                      Point: Precautions (Done)       Learning Progress Summary            Patient Acceptance, E, VU by WG at 11/22/2024 0229    Acceptance, E, VU by WG at 11/21/2024 0236    Acceptance, E,TB, VU by CF at 11/17/2024 1024    Acceptance, E,TB, VU by RR at 11/14/2024 0148    Acceptance, E,TB, VU by RR at 11/13/2024 0218    Acceptance, E,TB, VU by RG at 11/12/2024 0927    Acceptance, TB,E, VU by RG at 11/11/2024 0944    Acceptance, E,TB, VU by RG at 11/10/2024 0931    Acceptance, E, VU by WG at 11/10/2024 0233    Acceptance, E,TB, VU by CF at 11/9/2024 1016    Acceptance, E, NR by SC at 11/3/2024 0025    Acceptance, E,TB, VU by RR at 11/1/2024 2315    Acceptance, E,D, VU,NR by AG at  10/31/2024 1234    Comment: PT educated pt. in importance of mobilization and L L/UE PROM and self ROM to prevent contracture.  Pt. is encouraged to to perform R LE AROM frequently while supine or sitting EOB.    Acceptance, E,TB, VU by RR at 10/27/2024 0031    Acceptance, E,TB, VU by RG at 10/22/2024 1002    Acceptance, TB,E, VU by RG at 10/21/2024 0907    Nonacceptance, E, NR by WG at 10/16/2024 0228    Acceptance, E,TB, VU by CF at 10/15/2024 0753    Acceptance, E,TB, VU by CF at 10/14/2024 0801    Acceptance, E,TB, VU by CF at 10/13/2024 1045    Acceptance, E,D, VU,NR by AG at 10/10/2024 1310    Acceptance, E,TB, VU by CB at 10/8/2024 0448    Acceptance, E, NR by RD at 10/2/2024 1101    Acceptance, E, NR by RD at 10/1/2024 0856    Acceptance, E,D, VU,NR by AG at 9/30/2024 1559                                      User Key       Initials Effective Dates Name Provider Type Discipline    AG 06/16/21 -  Мария Joya, PT Physical Therapist PT    RD 06/16/21 -  Laisha Verdugo, RN Registered Nurse Nurse    RR 06/11/24 -  Jaymie Dominguez, RN Registered Nurse Nurse    RG 06/06/23 -  Jesus Matthews, RN Registered Nurse Nurse    WG 02/12/24 -  Shanthi Burden, RN Registered Nurse Nurse    CF 06/25/24 -  Cherelle Germain, RN Registered Nurse Nurse    CB 06/17/24 -  Fidelia Lombardo, RN Registered Nurse Nurse    SC 09/11/24 -  Sally Hair, RN Registered Nurse Nurse                  PT Recommendation and Plan  Anticipated Discharge Disposition (PT): home with 24/7 care, skilled nursing facility  Progress Summary (PT)  Daily Progress Summary (PT): Pt. deferred all mobility skills d/t pain. She was able to tolerate supine ther-ex. She tolerated minimal activity. She continues to show lack of progress d/t being unwilling to consistently participate. Pt. needs to show increased willingness to participate or will need to be discharged from PT.       Time Calculation:    PT Charges       Row Name 11/25/24 1420             Time  Calculation    PT Received On 11/25/24  -KM      PT Goal Re-Cert Due Date 12/09/24  -KM         Time Calculation- PT    Total Timed Code Minutes- PT 15 minute(s)  -KM                User Key  (r) = Recorded By, (t) = Taken By, (c) = Cosigned By      Initials Name Provider Type    Thierry Carter, PT Physical Therapist                  Therapy Charges for Today       Code Description Service Date Service Provider Modifiers Qty    74893836308 HC PT RE-EVAL ESTABLISHED PLAN 2 11/25/2024 Thierry Saldivar, PT GP 1    [id number]  PT THER PROC EA 15 MIN 11/25/2024 Thierry Saldivar, PT GP 1            PT G-Codes  AM-PAC 6 Clicks Score (PT): 9    Thierry Saldivar PT  11/25/2024

## 2024-11-25 NOTE — CASE MANAGEMENT/SOCIAL WORK
Discharge Planning Assessment   [place]     Patient Name: Lety Johnson  MRN: [id number]  Today's Date: 11/25/2024    Admit Date: 9/26/2024       Discharge Plan       Row Name 11/25/24 1646       Plan    Plan SS contacted [facility] per Regis Barnhart request to know if pt has a checking account. SS spoke to pt at bedside. Pt voices that she does not have a checking account, however she is secondary on her significant other's checkings account. SS notified Bronwyn. ТАТЬЯНА to follow.                  Continued Care and Services - Admitted Since 9/26/2024       Destination       Service Provider Request Status Services Address Phone Fax Patient Preferred    [facility] Pending - Request Sent -- [address] [place] [address] [id number] [phone] --    [facility] Declined  Cannot meet patient's needs -- [address] [facility] [address] [phone] --    [facility] Acute Care Declined  Not eligible -- [address] [facility] [place] [address] [phone] --    [facility] Declined  Not eligible -- [address] [facility] [id number] [phone] --    [facility] SPECIALTY [facility] Declined  Clinical Denial -- [address] [facility], 4TH FLOOR, [facility] [address] [phone] --    [facility] SWING BED UNIT Declined  Cannot meet patient's needs -- [address] [facility] [address] [phone] --     [facility] Declined  Not eligible -- [address] [facility] [address] [phone] --    [facility] Declined  Bed not available -- [address] [facility] [address] [phone] --    [facility] SKILLED CARE Declined  Bed not available -- [address] [facility] [address] [phone] --    [facility] Declined  Out of network --  [address] [place] [address] [phone] --    [facility] BED UN[facility] Declined -- [address] [facility] [place] [address] [phone] --    [facility] Declined  Clinical Denial -- [address] [facility] [address] [phone] --    [facility] SWING BED UNIT Declined  Insurance Denial, Out of network -- [address] [address] [phone] --    [facility] Declined  Out of network -- [address] [address] [phone] --    [facility] Declined  Out of network -- [address] [address] [phone] -- --    [facility] Declined  Out of network -- [address] [facility] [address] [phone] --                  Expected Discharge Date and Time       Expected Discharge Date Expected Discharge Time    Nov 23, 2024         HELEN Butterfield

## 2024-11-25 NOTE — PROGRESS NOTES
Patient Identification:  Name:  Lety Johnson  Age:  44 y.o.  Sex:  female  :  1980  MRN:  [id number]  Visit Number:  [id number]  Primary Care Provider:  Concha Rao APRN    Length of stay:  58    Subjective/Interval History/Consultants/Procedures     Chief Complaint:   Chief Complaint   Patient presents with    Fall       Subjective/Interval History:    44 y.o. female who was admitted on 2024 with  UTI     PMH is significant for CVA w/ left sided hemiparesis, debility, hx genital herpes on suppressive therapy, morbid obesity, T2DM   For complete admission information, please see history and physical.     Consultations:  Infectious disease  PT/OT  CM/SW  Psychiatry     Procedures/Scans:  CT head without contrast  CT C-spine without contrast  CT T-spine without contrast  CT L-spine without contrast  CXR x 2  XR left foot  XR left ankle    Today, the patient was seen and examined with no family present at the bedside. No new complaints today. Noted bothersome upper and lower extremity pain as well as headache.      Room location at the time of evaluation was [facility].    ----------------------------------------------------------------------------------------------------------------------  Current Hospital Meds:  aspirin, 81 mg, Oral, Daily  atorvastatin, 80 mg, Oral, Daily  DULoxetine, 90 mg, Oral, Daily  heparin (porcine), 5,000 Units, Subcutaneous, Q8H  insulin glargine, 20 Units, Subcutaneous, Daily With Breakfast  insulin glargine, 40 Units, Subcutaneous, Nightly  insulin lispro, 4-24 Units, Subcutaneous, 4x Daily AC & at Bedtime  insulin lispro, 8 Units, Subcutaneous, Daily With Lunch  Lidocaine, 3 patch, Transdermal, Q24H  lisinopril, 20 mg, Oral, Daily  magic barrier cream, , Topical, BID  metoprolol tartrate, 25 mg, Oral, Q12H  nicotine, 1 patch, Transdermal, Q24H  nystatin, , Topical, Q12H  pantoprazole, 40 mg, Oral, Daily  pregabalin, 25 mg, Oral, Q8H  sodium chloride, 10 mL, Intravenous,  Q12H  sodium chloride, 10 mL, Intravenous, Q12H      Pharmacy Consult,       ----------------------------------------------------------------------------------------------------------------------      Objective     Vital Signs:  Temp:  [97.4 °F (36.3 °C)-98.1 °F (36.7 °C)] 97.6 °F (36.4 °C)  Heart Rate:  [] 108  Resp:  [18] 18  BP: ()/(46-88) 104/62      10/07/24  0511 10/08/24  0500 10/09/24  0500   Weight: 102 kg (225 lb 14.4 oz) (FIFI cameron) 102 kg (225 lb 15.5 oz) 102 kg (224 lb 1.6 oz)     Body mass index is 36.73 kg/m².    Intake/Output Summary (Last 24 hours) at 11/25/2024 1040  Last data filed at 11/25/2024 0333  Gross per 24 hour   Intake 240 ml   Output 600 ml   Net -360 ml     No intake/output data recorded.  Diet: Regular/House, Diabetic; Consistent Carbohydrate; Fluid Consistency: Thin (IDDSI 0)  ----------------------------------------------------------------------------------------------------------------------    Physical Exam  Vitals and nursing note reviewed.   Constitutional:       General: She is not in acute distress.  HENT:      Head: Normocephalic and atraumatic.   Eyes:      Extraocular Movements: Extraocular movements intact.   Cardiovascular:      Rate and Rhythm: Normal rate.   Pulmonary:      Effort: Pulmonary effort is normal. No respiratory distress.   Abdominal:      Palpations: Abdomen is soft.   Musculoskeletal:      Right lower leg: No edema.      Left lower leg: No edema.   Skin:     General: Skin is warm and dry.   Neurological:      Mental Status: She is alert. Mental status is at baseline.              ----------------------------------------------------------------------------------------------------------------------  Tele:      ----------------------------------------------------------------------------------------------------------------------      Results from last 7 days   Lab Units 11/22/24  0036   WBC 10*3/mm3 9.51   HEMOGLOBIN g/dL 11.7*   HEMATOCRIT %  "36.4   MCV fL 102.2*   MCHC g/dL 32.1   PLATELETS 10*3/mm3 334         Results from last 7 days   Lab Units 11/22/24  0036 11/19/24  0129   SODIUM mmol/L 138 136   POTASSIUM mmol/L 3.8 4.3   MAGNESIUM mg/dL  --  1.8   CHLORIDE mmol/L 102 101   CO2 mmol/L 22.8 24.4   BUN mg/dL 23* 21*   CREATININE mg/dL 0.53* 0.57   CALCIUM mg/dL 9.2 9.3   GLUCOSE mg/dL 126* 142*   ALBUMIN g/dL 3.3*  --    BILIRUBIN mg/dL 0.2  --    ALK PHOS U/L 109  --    AST (SGOT) U/L 16  --    ALT (SGPT) U/L 16  --    Estimated Creatinine Clearance: 163.6 mL/min (A) (by C-G formula based on SCr of 0.53 mg/dL (L)).  No results found for: \"AMMONIA\"      No results found for: \"BLOODCX\"  No results found for: \"URINECX\"  No results found for: \"WOUNDCX\"  No results found for: \"STOOLCX\"  ----------------------------------------------------------------------------------------------------------------------  Imaging Results (Last 24 Hours)       ** No results found for the last 24 hours. **          ----------------------------------------------------------------------------------------------------------------------   I have reviewed the above laboratory values for 11/25/24    Assessment/Plan     There are no active hospital problems to display for this patient.        ASSESSMENT/PLAN:    ESBL E. coli UTI  Acute metabolic encephalopathy, resolved  S/p Invanz.  Infectious disease input appreciated.     Hx CVA with left-sided hemiparesis  Chronic debility  Continue PT OT  CM/SW assisting with discharge planning.  Patient is pending placement.     T2DM  Diabetic neuropathy  Continue insulin regimen-titrate as needed. Add scheduled meal time insulin w/ lunch.   Continue supportive care measures, Cymbalta  Lyrica added 11/13 given persistent pain with some improvement noted.      Hx genital herpes  Continue suppressive acyclovir     Morbid obesity  Complicates all aspects of care     Depressive disorder  Adjustment disorder w/ disturbance of mood and anxiety "   Patient with somewhat labile mood, tearful at times. Reports history of depression as well, likely exacerbated by current health status/hospitalization.  She is taking cymbalta as above  Psychiatry consulted, input appreciated. Felt in the setting of recent stressors findings consistent with adjustment disorder on top of patient's unspecified depressive disorder with patient declining any medication adjustments at this time.   Increased Cymbalta to 90 daily per psychiatry recommendation.  May benefit from psychotherapy referral at discharge.      -----------  -DVT prophylaxis: [facility]  -Disposition plans/anticipated needs: pending placement        The patient is high risk due to the following diagnoses/reasons:  hemiparesis, debility         Bruce Branham PA-C  11/25/24  10:40 EST

## 2024-11-26 LAB
GLUCOSE BLDC GLUCOMTR-MCNC: 103 MG/DL (ref 70–130)
GLUCOSE BLDC GLUCOMTR-MCNC: 138 MG/DL (ref 70–130)
GLUCOSE BLDC GLUCOMTR-MCNC: 143 MG/DL (ref 70–130)
GLUCOSE BLDC GLUCOMTR-MCNC: 174 MG/DL (ref 70–130)
GLUCOSE BLDC GLUCOMTR-MCNC: 243 MG/DL (ref 70–130)
GLUCOSE BLDC GLUCOMTR-MCNC: 245 MG/DL (ref 70–130)
GLUCOSE BLDC GLUCOMTR-MCNC: 254 MG/DL (ref 70–130)

## 2024-11-26 PROCEDURE — 63710000001 INSULIN LISPRO (HUMAN) PER 5 UNITS: Performed by: INTERNAL MEDICINE

## 2024-11-26 PROCEDURE — 97110 THERAPEUTIC EXERCISES: CPT

## 2024-11-26 PROCEDURE — 99231 SBSQ HOSP IP/OBS SF/LOW 25: CPT

## 2024-11-26 PROCEDURE — 63710000001 INSULIN LISPRO (HUMAN) PER 5 UNITS

## 2024-11-26 PROCEDURE — 63710000001 INSULIN GLARGINE PER 5 UNITS

## 2024-11-26 PROCEDURE — 25010000002 HEPARIN (PORCINE) PER 1000 UNITS: Performed by: INTERNAL MEDICINE

## 2024-11-26 PROCEDURE — 82948 REAGENT STRIP/BLOOD GLUCOSE: CPT

## 2024-11-26 RX ORDER — LISINOPRIL 10 MG/1
10 TABLET ORAL DAILY
Status: DISCONTINUED | OUTPATIENT
Start: 2024-11-27 | End: 2024-11-29 | Stop reason: HOSPADM

## 2024-11-26 RX ADMIN — DICLOFENAC SODIUM 4 G: 10 GEL TOPICAL at 15:00

## 2024-11-26 RX ADMIN — INSULIN LISPRO 8 UNITS: 100 INJECTION, SOLUTION INTRAVENOUS; SUBCUTANEOUS at 11:01

## 2024-11-26 RX ADMIN — Medication 10 ML: at 08:54

## 2024-11-26 RX ADMIN — HEPARIN SODIUM 5000 UNITS: 5000 INJECTION INTRAVENOUS; SUBCUTANEOUS at 05:40

## 2024-11-26 RX ADMIN — HEPARIN SODIUM 5000 UNITS: 5000 INJECTION INTRAVENOUS; SUBCUTANEOUS at 22:08

## 2024-11-26 RX ADMIN — PREGABALIN 25 MG: 25 CAPSULE ORAL at 15:00

## 2024-11-26 RX ADMIN — LIDOCAINE 3 PATCH: 4 PATCH TOPICAL at 16:47

## 2024-11-26 RX ADMIN — ASPIRIN 81 MG: 81 TABLET, CHEWABLE ORAL at 08:51

## 2024-11-26 RX ADMIN — PANTOPRAZOLE SODIUM 40 MG: 40 TABLET, DELAYED RELEASE ORAL at 08:51

## 2024-11-26 RX ADMIN — HEPARIN SODIUM 5000 UNITS: 5000 INJECTION INTRAVENOUS; SUBCUTANEOUS at 14:34

## 2024-11-26 RX ADMIN — VITAMINS A AND D OINTMENT: 15.5; 53.4 OINTMENT TOPICAL at 08:53

## 2024-11-26 RX ADMIN — ATORVASTATIN CALCIUM 80 MG: 40 TABLET, FILM COATED ORAL at 08:50

## 2024-11-26 RX ADMIN — Medication 10 ML: at 22:09

## 2024-11-26 RX ADMIN — NYSTATIN: 100000 POWDER TOPICAL at 08:53

## 2024-11-26 RX ADMIN — VITAMINS A AND D OINTMENT: 15.5; 53.4 OINTMENT TOPICAL at 22:08

## 2024-11-26 RX ADMIN — PREGABALIN 25 MG: 25 CAPSULE ORAL at 05:40

## 2024-11-26 RX ADMIN — DULOXETINE HYDROCHLORIDE 90 MG: 60 CAPSULE, DELAYED RELEASE ORAL at 08:51

## 2024-11-26 RX ADMIN — INSULIN LISPRO 12 UNITS: 100 INJECTION, SOLUTION INTRAVENOUS; SUBCUTANEOUS at 22:07

## 2024-11-26 RX ADMIN — PREGABALIN 25 MG: 25 CAPSULE ORAL at 22:08

## 2024-11-26 RX ADMIN — NICOTINE 1 PATCH: 7 PATCH, EXTENDED RELEASE TRANSDERMAL at 08:54

## 2024-11-26 RX ADMIN — CYCLOBENZAPRINE 10 MG: 10 TABLET, FILM COATED ORAL at 22:08

## 2024-11-26 RX ADMIN — LISINOPRIL 20 MG: 10 TABLET ORAL at 08:51

## 2024-11-26 RX ADMIN — TRAMADOL HYDROCHLORIDE 50 MG: 50 TABLET, FILM COATED ORAL at 22:08

## 2024-11-26 RX ADMIN — SENNOSIDES AND DOCUSATE SODIUM 2 TABLET: 50; 8.6 TABLET ORAL at 14:34

## 2024-11-26 RX ADMIN — NYSTATIN: 100000 POWDER TOPICAL at 22:09

## 2024-11-26 RX ADMIN — METOPROLOL TARTRATE 25 MG: 25 TABLET, FILM COATED ORAL at 22:08

## 2024-11-26 RX ADMIN — Medication 5 MG: at 22:08

## 2024-11-26 RX ADMIN — METOPROLOL TARTRATE 25 MG: 25 TABLET, FILM COATED ORAL at 08:50

## 2024-11-26 RX ADMIN — INSULIN GLARGINE 40 UNITS: 100 INJECTION, SOLUTION SUBCUTANEOUS at 22:08

## 2024-11-26 RX ADMIN — INSULIN GLARGINE 20 UNITS: 100 INJECTION, SOLUTION SUBCUTANEOUS at 08:49

## 2024-11-26 NOTE — PLAN OF CARE
Goal Outcome Evaluation:           Progress: no change  Outcome Evaluation: Patient resting in bed throughout shift. VSS on room air. Wound care completed per orders. PRN pain medicatin given per MAR. No complaints or concerns at this time. Will continue plan of care.

## 2024-11-26 NOTE — THERAPY TREATMENT NOTE
"Acute Care - Physical Therapy Treatment Note   [facility]     Patient Name: Lety Johnson  : 1980  MRN: [id number]  Today's Date: 2024   Onset of Illness/Injury or Date of Surgery: 24  Visit Dx:     ICD-10-CM ICD-9-CM   1. Hypokalemia  E87.6 276.8   2. Pressure injury of skin of sacral region, unspecified injury stage  L89.159 707.03     707.20   3. Pressure injury of skin of left heel, unspecified injury stage  L89.629 707.07     707.20   4. Acute cystitis without hematuria  N30.00 595.0     Patient Active Problem List   Diagnosis   (none) - all problems resolved or deleted     Past Medical History:   Diagnosis Date    Stroke      History reviewed. No pertinent surgical history.  PT Assessment (Last 12 Hours)       PT Evaluation and Treatment       Row Name 24 1338          Physical Therapy Time and Intention    Subjective Information complains of;fatigue;pain  -KM     Document Type therapy note (daily note)  -KM     Mode of Treatment individual therapy;physical therapy  -KM     Patient Effort fair  -KM     Symptoms Noted During/After Treatment fatigue  -KM       Row Name 24 1338          General Information    Patient Profile Reviewed yes  -KM     Patient Observations alert;cooperative;agree to therapy  -KM     Existing Precautions/Restrictions fall  -KM       Row Name 24 1338          Cognition    Orientation Status (Cognition) oriented x 3  -KM     Follows Commands (Cognition) WFL  -KM       Row Name 24 1338          Bed Mobility    Comment, (Bed Mobility) pt. deferred d/t being \"too sleepy\"  -KM       Row Name 24 1338          Gait/Stairs (Locomotion)    Patient was able to Ambulate no, other medical factors prevent ambulation  -KM     Reason Patient was unable to Ambulate Non-Ambulatory at Baseline  -KM       Row Name 24 1338          Safety Issues/Impairments Affecting Functional Mobility    Impairments Affecting Function (Mobility) endurance/activity " tolerance;pain;range of motion (ROM);strength;coordination;motor control;muscle tone abnormal  -KM       Row Name 11/26/24 1338          Motor Skills    Comments, Therapeutic Exercise supine ther-ex  -KM       Row Name             Wound 09/26/24 1809 Left posterior ankle Other (comment)    Wound - Properties Group Placement Date: 09/26/24  -KJ Placement Time: 1809  -KJ Side: Left  -KJ Orientation: posterior  -KJ Location: ankle  -KJ Primary Wound Type: Other  -TW, unknow etiology     Retired Wound - Properties Group Placement Date: 09/26/24  -KJ Placement Time: 1809  -KJ Side: Left  -KJ Orientation: posterior  -KJ Location: ankle  -KJ Primary Wound Type: Other  -TW, unknow etiology     Retired Wound - Properties Group Placement Date: 09/26/24  -KJ Placement Time: 1809  -KJ Side: Left  -KJ Orientation: posterior  -KJ Location: ankle  -KJ Primary Wound Type: Other  -TW, unknow etiology     Retired Wound - Properties Group Date first assessed: 09/26/24  -KJ Time first assessed: 1809  -KJ Side: Left  -KJ Location: ankle  -KJ Primary Wound Type: Other  -TW, unknow etiology       Row Name             Wound 10/11/24 1330 medial coccyx Fissure    Wound - Properties Group Placement Date: 10/11/24  -TW Placement Time: 1330 -TW Orientation: medial  -TW Location: coccyx  -TW Primary Wound Type: Fissure  -TW    Retired Wound - Properties Group Placement Date: 10/11/24  -TW Placement Time: 1330  -TW Orientation: medial  -TW Location: coccyx  -TW Primary Wound Type: Fissure  -TW    Retired Wound - Properties Group Placement Date: 10/11/24  -TW Placement Time: 1330 -TW Orientation: medial  -TW Location: coccyx  -TW Primary Wound Type: Fissure  -TW    Retired Wound - Properties Group Date first assessed: 10/11/24  -TW Time first assessed: 1330 -TW Location: coccyx  -TW Primary Wound Type: Fissure  -TW      Row Name             Wound 10/16/24 0705 Left medial gluteal MASD (moisture associated skin damage)    Wound - Properties  Group Placement Date: 10/16/24  -MS Placement Time: 0705 -MS Side: Left  -MS Orientation: medial  -MS Location: gluteal  -MS Primary Wound Type: MASD  -MS Type: MASD (moisture associated skin damage)  -MS Present on Original Admission: N  -MS Additional Comments: Noted at shift change skin assesment, Night shift RN stated it had been there her shift.  -MS    Retired Wound - Properties Group Placement Date: 10/16/24  -MS Placement Time: 0705 -MS Present on Original Admission: N  -MS Side: Left  -MS Orientation: medial  -MS Location: gluteal  -MS Primary Wound Type: MASD  -MS Additional Comments: Noted at shift change skin assesment, Night shift RN stated it had been there her shift.  -MS Type: MASD (moisture associated skin damage)  -MS    Retired Wound - Properties Group Placement Date: 10/16/24  -MS Placement Time: 0705 -MS Present on Original Admission: N  -MS Side: Left  -MS Orientation: medial  -MS Location: gluteal  -MS Primary Wound Type: MASD  -MS Additional Comments: Noted at shift change skin assesment, Night shift RN stated it had been there her shift.  -MS Type: MASD (moisture associated skin damage)  -MS    Retired Wound - Properties Group Date first assessed: 10/16/24  -MS Time first assessed: 0705 -MS Present on Original Admission: N  -MS Side: Left  -MS Location: gluteal  -MS Primary Wound Type: MASD  -MS Additional Comments: Noted at shift change skin assesment, Night shift RN stated it had been there her shift.  -MS Type: MASD (moisture associated skin damage)  -MS      Row Name             Wound 10/16/24 0705 Left posterior greater trochanter MASD (moisture associated skin damage)    Wound - Properties Group Placement Date: 10/16/24  -MS Placement Time: 0705 -MS Side: Left  -MS Orientation: posterior  -MS Location: greater trochanter  -MS Primary Wound Type: MASD  -MS Type: MASD (moisture associated skin damage)  -MS Present on Original Admission: N  -MS    Retired Wound - Properties Group  "Placement Date: 10/16/24  -MS Placement Time: 0705 -MS Present on Original Admission: N  -MS Side: Left  -MS Orientation: posterior  -MS Location: greater trochanter  -MS Primary Wound Type: MASD  -MS Type: MASD (moisture associated skin damage)  -MS    Retired Wound - Properties Group Placement Date: 10/16/24  -MS Placement Time: 0705 -MS Present on Original Admission: N  -MS Side: Left  -MS Orientation: posterior  -MS Location: greater trochanter  -MS Primary Wound Type: MASD  -MS Type: MASD (moisture associated skin damage)  -MS    Retired Wound - Properties Group Date first assessed: 10/16/24  -MS Time first assessed: 0705 -MS Present on Original Admission: N  -MS Side: Left  -MS Location: greater trochanter  -MS Primary Wound Type: MASD  -MS Type: MASD (moisture associated skin damage)  -MS      Row Name 11/26/24 1338          Progress Summary (PT)    Daily Progress Summary (PT) Pt. deferred all mobility skills d/t being \"too sleepy\". She tolerated supine ther-ex. PT attempted explaining importance of attempting mobility skills to prevent further debility. Pt. states, \"I am too tired to sit. I will fall over if I sit up\". Pt. continues to show no progress and decreased willingness to participate w/ PT.  -KM               User Key  (r) = Recorded By, (t) = Taken By, (c) = Cosigned By      Initials Name Provider Type    Karen Peralta, RN Registered Nurse    Thierry Carter, PT Physical Therapist    Nory Keenan, RN Registered Nurse    Roe North, RN Registered Nurse                    Physical Therapy Education       Title: PT OT SLP Therapies (Done)       Topic: Physical Therapy (Done)       Point: Mobility training (Done)       Learning Progress Summary            Patient Acceptance, E, VU by WG at 11/22/2024 0229    Acceptance, E, VU by WG at 11/21/2024 0236    Acceptance, E,TB, VU by CF at 11/17/2024 1024    Acceptance, E,TB, VU by RR at 11/14/2024 0148    Acceptance, E,TB, VU by RR " at 11/13/2024 0218    Acceptance, E,TB, VU by RG at 11/12/2024 0927    Acceptance, TB,E, VU by RG at 11/11/2024 0944    Acceptance, E,TB, VU by RG at 11/10/2024 0931    Acceptance, E, VU by WG at 11/10/2024 0233    Acceptance, E,TB, VU by CF at 11/9/2024 1016    Acceptance, E, NR by SC at 11/3/2024 0025    Acceptance, E,TB, VU by RR at 11/1/2024 2315    Acceptance, E,D, VU,NR by AG at 10/31/2024 1234    Comment: PT educated pt. in importance of mobilization and L L/UE PROM and self ROM to prevent contracture.  Pt. is encouraged to to perform R LE AROM frequently while supine or sitting EOB.    Acceptance, E,TB, VU by RR at 10/27/2024 0031    Acceptance, E,TB, VU by RG at 10/22/2024 1002    Acceptance, TB,E, VU by RG at 10/21/2024 0907    Nonacceptance, E, NR by WG at 10/16/2024 0228    Acceptance, E,TB, VU by CF at 10/15/2024 0753    Acceptance, E,TB, VU by CF at 10/14/2024 0801    Acceptance, E,TB, VU by CF at 10/13/2024 1045    Acceptance, E,D, VU,NR by AG at 10/10/2024 1310    Acceptance, E,TB, VU by CB at 10/8/2024 0448    Acceptance, E, NR by RD at 10/2/2024 1101    Acceptance, E, NR by RD at 10/1/2024 0856    Acceptance, E,D, VU,NR by AG at 9/30/2024 1559                      Point: Home exercise program (Done)       Learning Progress Summary            Patient Acceptance, E, VU by WG at 11/22/2024 0229    Acceptance, E, VU by WG at 11/21/2024 0236    Acceptance, E,TB, VU by CF at 11/17/2024 1024    Acceptance, E,TB, VU by RR at 11/14/2024 0148    Acceptance, E,TB, VU by RR at 11/13/2024 0218    Acceptance, E,TB, VU by RG at 11/12/2024 0927    Acceptance, TB,E, VU by RG at 11/11/2024 0944    Acceptance, E,TB, VU by RG at 11/10/2024 0931    Acceptance, E, VU by WG at 11/10/2024 0233    Acceptance, E,TB, VU by CF at 11/9/2024 1016    Acceptance, E, NR by SC at 11/3/2024 0025    Acceptance, E,TB, VU by RR at 11/1/2024 2315    Acceptance, E,D, VU,NR by AG at 10/31/2024 1234    Comment: PT educated pt. in  importance of mobilization and L L/UE PROM and self ROM to prevent contracture.  Pt. is encouraged to to perform R LE AROM frequently while supine or sitting EOB.    Acceptance, E,TB, VU by RR at 10/27/2024 0031    Acceptance, E,TB, VU by RG at 10/22/2024 1002    Acceptance, TB,E, VU by RG at 10/21/2024 0907    Nonacceptance, E, NR by WG at 10/16/2024 0228    Acceptance, E,TB, VU by CF at 10/15/2024 0753    Acceptance, E,TB, VU by CF at 10/14/2024 0801    Acceptance, E,TB, VU by CF at 10/13/2024 1045    Acceptance, E,D, VU,NR by AG at 10/10/2024 1310    Acceptance, E,TB, VU by CB at 10/8/2024 0448    Acceptance, E, NR by RD at 10/2/2024 1101    Acceptance, E, NR by RD at 10/1/2024 0856    Acceptance, E,D, VU,NR by AG at 9/30/2024 1559                      Point: Body mechanics (Done)       Learning Progress Summary            Patient Acceptance, E, VU by WG at 11/22/2024 0229    Acceptance, E, VU by WG at 11/21/2024 0236    Acceptance, E,TB, VU by CF at 11/17/2024 1024    Acceptance, E,TB, VU by RR at 11/14/2024 0148    Acceptance, E,TB, VU by RR at 11/13/2024 0218    Acceptance, E,TB, VU by RG at 11/12/2024 0927    Acceptance, TB,E, VU by RG at 11/11/2024 0944    Acceptance, E,TB, VU by RG at 11/10/2024 0931    Acceptance, E, VU by WG at 11/10/2024 0233    Acceptance, E,TB, VU by CF at 11/9/2024 1016    Acceptance, E, NR by SC at 11/3/2024 0025    Acceptance, E,TB, VU by RR at 11/1/2024 2315    Acceptance, E,D, VU,NR by AG at 10/31/2024 1234    Comment: PT educated pt. in importance of mobilization and L L/UE PROM and self ROM to prevent contracture.  Pt. is encouraged to to perform R LE AROM frequently while supine or sitting EOB.    Acceptance, E,TB, VU by RR at 10/27/2024 0031    Acceptance, E,TB, VU by RG at 10/22/2024 1002    Acceptance, TB,E, VU by RG at 10/21/2024 0907    Nonacceptance, E, NR by WG at 10/16/2024 0228    Acceptance, E,TB, VU by CF at 10/15/2024 0753    Acceptance, E,TB, VU by CF at 10/14/2024  0801    Acceptance, E,TB, VU by CF at 10/13/2024 1045    Acceptance, E,D, VU,NR by AG at 10/10/2024 1310    Acceptance, E,TB, VU by CB at 10/8/2024 0448    Acceptance, E, NR by RD at 10/2/2024 1101    Acceptance, E, NR by RD at 10/1/2024 0856    Acceptance, E,D, VU,NR by AG at 9/30/2024 1559                      Point: Precautions (Done)       Learning Progress Summary            Patient Acceptance, E, VU by WG at 11/22/2024 0229    Acceptance, E, VU by WG at 11/21/2024 0236    Acceptance, E,TB, VU by CF at 11/17/2024 1024    Acceptance, E,TB, VU by RR at 11/14/2024 0148    Acceptance, E,TB, VU by RR at 11/13/2024 0218    Acceptance, E,TB, VU by RG at 11/12/2024 0927    Acceptance, TB,E, VU by RG at 11/11/2024 0944    Acceptance, E,TB, VU by RG at 11/10/2024 0931    Acceptance, E, VU by WG at 11/10/2024 0233    Acceptance, E,TB, VU by CF at 11/9/2024 1016    Acceptance, E, NR by SC at 11/3/2024 0025    Acceptance, E,TB, VU by RR at 11/1/2024 2315    Acceptance, E,D, VU,NR by AG at 10/31/2024 1234    Comment: PT educated pt. in importance of mobilization and L L/UE PROM and self ROM to prevent contracture.  Pt. is encouraged to to perform R LE AROM frequently while supine or sitting EOB.    Acceptance, E,TB, VU by RR at 10/27/2024 0031    Acceptance, E,TB, VU by RG at 10/22/2024 1002    Acceptance, TB,E, VU by RG at 10/21/2024 0907    Nonacceptance, E, NR by WG at 10/16/2024 0228    Acceptance, E,TB, VU by CF at 10/15/2024 0753    Acceptance, E,TB, VU by CF at 10/14/2024 0801    Acceptance, E,TB, VU by CF at 10/13/2024 1045    Acceptance, E,D, VU,NR by AG at 10/10/2024 1310    Acceptance, E,TB, VU by CB at 10/8/2024 0448    Acceptance, E, NR by RD at 10/2/2024 1101    Acceptance, E, NR by RD at 10/1/2024 0856    Acceptance, E,D, VU,NR by AG at 9/30/2024 1559                                      User Key       Initials Effective Dates Name Provider Type Discipline     06/16/21 -  Мария Joya, PT Physical  "Therapist PT    RD 06/16/21 -  Laisha Verdugo, RN Registered Nurse Nurse    RR 06/11/24 -  Jaymie Dominguez, RN Registered Nurse Nurse    RG 06/06/23 -  Jesus Matthews, RN Registered Nurse Nurse    WG 02/12/24 -  Shanthi Burden, RN Registered Nurse Nurse    CF 06/25/24 -  Cherelle Germain, RN Registered Nurse Nurse    CB 06/17/24 -  Fidelia Lombardo, RN Registered Nurse Nurse    SC 09/11/24 -  Sally Hair, RN Registered Nurse Nurse                  PT Recommendation and Plan  Anticipated Discharge Disposition (PT): home with 24/7 [facility], skilled nursing [facility]  Progress Summary (PT)  Daily Progress Summary (PT): Pt. deferred all mobility skills d/t being \"too sleepy\". She tolerated supine ther-ex. PT attempted explaining importance of attempting mobility skills to prevent further debility. Pt. states, \"I am too tired to sit. I will fall over if I sit up\". Pt. continues to show no progress and decreased willingness to participate w/ PT.       Time Calculation:    PT Charges       Row Name 11/26/24 1336             Time Calculation    PT Received On 11/26/24  -KM         Time Calculation- PT    Total Timed Code Minutes- PT 15 minute(s)  -KM                User Key  (r) = Recorded By, (t) = Taken By, (c) = Cosigned By      Initials Name Provider Type    Thierry Carter, PT Physical Therapist                  Therapy Charges for Today       Code Description Service Date Service Provider Modifiers Qty    45212920723 HC PT RE-EVAL ESTABLISHED PLAN 2 11/25/2024 Thierry Saldivar, PT GP 1    92419084412 HC PT THER PROC EA 15 MIN 11/25/2024 Thierry Saldivar, PT GP 1    74983206720 HC PT THER PROC EA 15 MIN 11/26/2024 Thierry Saldivar, PT GP 1            PT G-Codes  AM-PAC 6 Clicks Score (PT): 9    Thierry Saldivar PT  11/26/2024    "

## 2024-11-26 NOTE — PLAN OF CARE
Goal Outcome Evaluation:               Pt VSS. Pt worked with PT today. No apparent problems with PT. Radial and pedal pulse +2 bilateral. HR regular. Breathing unlabored in all lobes. A/O x 3. No acute changes. Prn and routine meds given. Tolerated prn and routine meds. Will continue to monitor. Will continue plan of care.

## 2024-11-26 NOTE — PROGRESS NOTES
Patient Identification:  Name:  Lety Johnson  Age:  44 y.o.  Sex:  female  :  1980  MRN:  [id number]  Visit Number:  [id number]  Primary Care Provider:  Concha Rao APRN    Length of stay:  59    Subjective/Interval History/Consultants/Procedures     Chief Complaint:   Chief Complaint   Patient presents with    Fall       Subjective/Interval History:    44 y.o. female who was admitted on 2024 with UTI     PMH is significant for CVA w/ left sided hemiparesis, debility, hx genital herpes on suppressive therapy, morbid obesity, T2DM   For complete admission information, please see history and physical.     Consultations:  Infectious disease  PT/OT  CM/SW  Psychiatry     Procedures/Scans:  CT head without contrast  CT C-spine without contrast  CT T-spine without contrast  CT L-spine without contrast  CXR x 2  XR left foot  XR left ankle    Today, the patient was seen and examined with no family present at the bedside. She appeared comfortable. No new complaints voiced. No acute events overnight per review of the chart.      Room location at the time of evaluation was [facility].    ----------------------------------------------------------------------------------------------------------------------  Current Hospital Meds:  aspirin, 81 mg, Oral, Daily  atorvastatin, 80 mg, Oral, Daily  DULoxetine, 90 mg, Oral, Daily  heparin (porcine), 5,000 Units, Subcutaneous, Q8H  insulin glargine, 20 Units, Subcutaneous, Daily With Breakfast  insulin glargine, 40 Units, Subcutaneous, Nightly  insulin lispro, 4-24 Units, Subcutaneous, 4x Daily AC & at Bedtime  insulin lispro, 8 Units, Subcutaneous, Daily With Lunch  Lidocaine, 3 patch, Transdermal, Q24H  lisinopril, 20 mg, Oral, Daily  magic barrier cream, , Topical, BID  metoprolol tartrate, 25 mg, Oral, Q12H  nicotine, 1 patch, Transdermal, Q24H  nystatin, , Topical, Q12H  pantoprazole, 40 mg, Oral, Daily  pregabalin, 25 mg, Oral, Q8H  sodium chloride, 10 mL,  Intravenous, Q12H  sodium chloride, 10 mL, Intravenous, Q12H      Pharmacy Consult,       ----------------------------------------------------------------------------------------------------------------------      Objective     Vital Signs:  Temp:  [97.8 °F (36.6 °C)-98.5 °F (36.9 °C)] 98.1 °F (36.7 °C)  Heart Rate:  [100-114] 114  Resp:  [20] 20  BP: ()/(55-65) 93/55      10/07/24  0511 10/08/24  0500 10/09/24  0500   Weight: 102 kg (225 lb 14.4 oz) (FIFI cameron) 102 kg (225 lb 15.5 oz) 102 kg (224 lb 1.6 oz)     Body mass index is 36.73 kg/m².    Intake/Output Summary (Last 24 hours) at 11/26/2024 1236  Last data filed at 11/26/2024 0340  Gross per 24 hour   Intake 250 ml   Output 850 ml   Net -600 ml     No intake/output data recorded.  Diet: Regular/House, Diabetic; Consistent Carbohydrate; Fluid Consistency: Thin (IDDSI 0)  ----------------------------------------------------------------------------------------------------------------------    Physical Exam  Vitals and nursing note reviewed.   Constitutional:       General: She is not in acute distress.     Appearance: She is obese.   HENT:      Head: Normocephalic and atraumatic.   Eyes:      Extraocular Movements: Extraocular movements intact.   Cardiovascular:      Rate and Rhythm: Normal rate and regular rhythm.   Pulmonary:      Effort: Pulmonary effort is normal.   Abdominal:      Palpations: Abdomen is soft.   Musculoskeletal:      Right lower leg: No edema.      Left lower leg: No edema.   Skin:     General: Skin is warm and dry.   Neurological:      Mental Status: She is alert. Mental status is at baseline.   Psychiatric:         Mood and Affect: Mood normal.         Behavior: Behavior normal.                ----------------------------------------------------------------------------------------------------------------------  Tele:   "    ----------------------------------------------------------------------------------------------------------------------      Results from last 7 days   Lab Units 11/22/24  0036   WBC 10*3/mm3 9.51   HEMOGLOBIN g/dL 11.7*   HEMATOCRIT % 36.4   MCV fL 102.2*   MCHC g/dL 32.1   PLATELETS 10*3/mm3 334         Results from last 7 days   Lab Units 11/22/24  0036   SODIUM mmol/L 138   POTASSIUM mmol/L 3.8   CHLORIDE mmol/L 102   CO2 mmol/L 22.8   BUN mg/dL 23*   CREATININE mg/dL 0.53*   CALCIUM mg/dL 9.2   GLUCOSE mg/dL 126*   ALBUMIN g/dL 3.3*   BILIRUBIN mg/dL 0.2   ALK PHOS U/L 109   AST (SGOT) U/L 16   ALT (SGPT) U/L 16   Estimated Creatinine Clearance: 163.6 mL/min (A) (by C-G formula based on SCr of 0.53 mg/dL (L)).  No results found for: \"AMMONIA\"      No results found for: \"BLOODCX\"  No results found for: \"URINECX\"  No results found for: \"WOUNDCX\"  No results found for: \"STOOLCX\"  ----------------------------------------------------------------------------------------------------------------------  Imaging Results (Last 24 Hours)       ** No results found for the last 24 hours. **          ----------------------------------------------------------------------------------------------------------------------   I have reviewed the above laboratory values for 11/26/24    Assessment/Plan     There are no active hospital problems to display for this patient.        ASSESSMENT/PLAN:    ESBL E. coli UTI  Acute metabolic encephalopathy, resolved  S/p Invanz.  Infectious disease input appreciated.     Hx CVA with left-sided hemiparesis  Chronic debility  Continue PT OT  CM/SW assisting with discharge planning.  Patient is pending placement.     T2DM  Diabetic neuropathy  Continue insulin regimen-titrate as needed. Added scheduled meal time insulin w/ lunch.   Continue supportive care measures, Cymbalta  Lyrica added 11/13 given persistent pain with some improvement noted.      Hx genital herpes  Continue suppressive " acyclovir     Morbid obesity  Complicates all aspects of care     Depressive disorder  Adjustment disorder w/ disturbance of mood and anxiety   Patient with somewhat labile mood, tearful at times. Reports history of depression as well, likely exacerbated by current health status/hospitalization.  She is taking cymbalta as above  Psychiatry consulted, input appreciated. Felt in the setting of recent stressors findings consistent with adjustment disorder on top of patient's unspecified depressive disorder with patient declining any medication adjustments at this time.   Increased Cymbalta to 90 daily per psychiatry recommendation.  May benefit from psychotherapy referral at discharge.     -----------  -DVT prophylaxis: [facility]  -Disposition plans/anticipated needs: pending placement        The patient is high risk due to the following diagnoses/reasons:  hemiparesis, debility        Bruce Branham PA-C  11/26/24  12:36 EST

## 2024-11-27 LAB
GLUCOSE BLDC GLUCOMTR-MCNC: 168 MG/DL (ref 70–130)
GLUCOSE BLDC GLUCOMTR-MCNC: 187 MG/DL (ref 70–130)
GLUCOSE BLDC GLUCOMTR-MCNC: 237 MG/DL (ref 70–130)
GLUCOSE BLDC GLUCOMTR-MCNC: 245 MG/DL (ref 70–130)
GLUCOSE BLDC GLUCOMTR-MCNC: 81 MG/DL (ref 70–130)

## 2024-11-27 PROCEDURE — 97110 THERAPEUTIC EXERCISES: CPT

## 2024-11-27 PROCEDURE — 63710000001 INSULIN GLARGINE PER 5 UNITS

## 2024-11-27 PROCEDURE — 63710000001 INSULIN LISPRO (HUMAN) PER 5 UNITS: Performed by: INTERNAL MEDICINE

## 2024-11-27 PROCEDURE — 25010000002 KETOROLAC TROMETHAMINE PER 15 MG

## 2024-11-27 PROCEDURE — 25010000002 HEPARIN (PORCINE) PER 1000 UNITS: Performed by: INTERNAL MEDICINE

## 2024-11-27 PROCEDURE — 63710000001 INSULIN LISPRO (HUMAN) PER 5 UNITS

## 2024-11-27 PROCEDURE — 99231 SBSQ HOSP IP/OBS SF/LOW 25: CPT

## 2024-11-27 PROCEDURE — 82948 REAGENT STRIP/BLOOD GLUCOSE: CPT

## 2024-11-27 RX ORDER — KETOROLAC TROMETHAMINE 30 MG/ML
30 INJECTION, SOLUTION INTRAMUSCULAR; INTRAVENOUS EVERY 6 HOURS PRN
Status: DISCONTINUED | OUTPATIENT
Start: 2024-11-27 | End: 2024-11-29 | Stop reason: HOSPADM

## 2024-11-27 RX ADMIN — TRAMADOL HYDROCHLORIDE 50 MG: 50 TABLET, FILM COATED ORAL at 08:42

## 2024-11-27 RX ADMIN — HEPARIN SODIUM 5000 UNITS: 5000 INJECTION INTRAVENOUS; SUBCUTANEOUS at 13:35

## 2024-11-27 RX ADMIN — VITAMINS A AND D OINTMENT: 15.5; 53.4 OINTMENT TOPICAL at 08:44

## 2024-11-27 RX ADMIN — HEPARIN SODIUM 5000 UNITS: 5000 INJECTION INTRAVENOUS; SUBCUTANEOUS at 22:00

## 2024-11-27 RX ADMIN — PREGABALIN 25 MG: 25 CAPSULE ORAL at 05:48

## 2024-11-27 RX ADMIN — PANTOPRAZOLE SODIUM 40 MG: 40 TABLET, DELAYED RELEASE ORAL at 08:43

## 2024-11-27 RX ADMIN — INSULIN LISPRO 8 UNITS: 100 INJECTION, SOLUTION INTRAVENOUS; SUBCUTANEOUS at 16:52

## 2024-11-27 RX ADMIN — LIDOCAINE 3 PATCH: 4 PATCH TOPICAL at 16:52

## 2024-11-27 RX ADMIN — TRAMADOL HYDROCHLORIDE 50 MG: 50 TABLET, FILM COATED ORAL at 21:58

## 2024-11-27 RX ADMIN — NYSTATIN: 100000 POWDER TOPICAL at 21:59

## 2024-11-27 RX ADMIN — Medication 10 ML: at 08:49

## 2024-11-27 RX ADMIN — INSULIN GLARGINE 20 UNITS: 100 INJECTION, SOLUTION SUBCUTANEOUS at 08:43

## 2024-11-27 RX ADMIN — KETOROLAC TROMETHAMINE 30 MG: 30 INJECTION, SOLUTION INTRAMUSCULAR; INTRAVENOUS at 14:35

## 2024-11-27 RX ADMIN — INSULIN LISPRO 4 UNITS: 100 INJECTION, SOLUTION INTRAVENOUS; SUBCUTANEOUS at 21:59

## 2024-11-27 RX ADMIN — HEPARIN SODIUM 5000 UNITS: 5000 INJECTION INTRAVENOUS; SUBCUTANEOUS at 05:48

## 2024-11-27 RX ADMIN — VITAMINS A AND D OINTMENT: 15.5; 53.4 OINTMENT TOPICAL at 21:59

## 2024-11-27 RX ADMIN — DICLOFENAC SODIUM 4 G: 10 GEL TOPICAL at 08:44

## 2024-11-27 RX ADMIN — ASPIRIN 81 MG: 81 TABLET, CHEWABLE ORAL at 08:43

## 2024-11-27 RX ADMIN — METOPROLOL TARTRATE 25 MG: 25 TABLET, FILM COATED ORAL at 21:58

## 2024-11-27 RX ADMIN — PREGABALIN 25 MG: 25 CAPSULE ORAL at 21:58

## 2024-11-27 RX ADMIN — DULOXETINE HYDROCHLORIDE 90 MG: 60 CAPSULE, DELAYED RELEASE ORAL at 08:43

## 2024-11-27 RX ADMIN — ATORVASTATIN CALCIUM 80 MG: 40 TABLET, FILM COATED ORAL at 08:43

## 2024-11-27 RX ADMIN — PREGABALIN 25 MG: 25 CAPSULE ORAL at 14:33

## 2024-11-27 RX ADMIN — NICOTINE 1 PATCH: 7 PATCH, EXTENDED RELEASE TRANSDERMAL at 08:49

## 2024-11-27 RX ADMIN — INSULIN LISPRO 8 UNITS: 100 INJECTION, SOLUTION INTRAVENOUS; SUBCUTANEOUS at 11:06

## 2024-11-27 RX ADMIN — CYCLOBENZAPRINE 10 MG: 10 TABLET, FILM COATED ORAL at 21:58

## 2024-11-27 RX ADMIN — KETOROLAC TROMETHAMINE 30 MG: 30 INJECTION, SOLUTION INTRAMUSCULAR; INTRAVENOUS at 05:51

## 2024-11-27 RX ADMIN — NYSTATIN: 100000 POWDER TOPICAL at 08:44

## 2024-11-27 RX ADMIN — INSULIN GLARGINE 40 UNITS: 100 INJECTION, SOLUTION SUBCUTANEOUS at 21:58

## 2024-11-27 RX ADMIN — INSULIN LISPRO 8 UNITS: 100 INJECTION, SOLUTION INTRAVENOUS; SUBCUTANEOUS at 11:05

## 2024-11-27 RX ADMIN — Medication 5 MG: at 21:58

## 2024-11-27 NOTE — THERAPY DISCHARGE NOTE
Acute Care - Physical Therapy Treatment Note/Discharge   [place]     Patient Name: Lety Johnson  : 1980  MRN: [id number]  Today's Date: 2024   Onset of Illness/Injury or Date of Surgery: 24     Referring Physician: Dr. Marc      Admit Date: 2024    Visit Dx:    ICD-10-CM ICD-9-CM   1. Hypokalemia  E87.6 276.8   2. Pressure injury of skin of sacral region, unspecified injury stage  L89.159 707.03     707.20   3. Pressure injury of skin of left heel, unspecified injury stage  L89.629 707.07     707.20   4. Acute cystitis without hematuria  N30.00 595.0     Patient Active Problem List   Diagnosis   (none) - all problems resolved or deleted     Past Medical History:   Diagnosis Date    Stroke      History reviewed. No pertinent surgical history.    PT Assessment (Last 12 Hours)       PT Evaluation and Treatment       Row Name 24 1039          Physical Therapy Time and Intention    Subjective Information complains of;fatigue;pain  -KM     Document Type discharge treatment  -KM     Mode of Treatment individual therapy;physical therapy  -KM     Patient Effort poor  -KM     Symptoms Noted During/After Treatment fatigue  -KM       Row Name 24 1039          General Information    Patient Profile Reviewed yes  -KM     Patient Observations poorly cooperative  -KM     Existing Precautions/Restrictions fall  -KM       Row Name 24 1039          Cognition    Affect/Mental Status (Cognition) low arousal/lethargic  -KM     Orientation Status (Cognition) oriented x 3  -KM     Follows Commands (Cognition) WFL  -KM       Row Name 24 1039          Bed Mobility    Comment, (Bed Mobility) Pt. refused d/t compaints of pain and fatigue  -KM       Row Name 24 1039          Transfers    Comment, (Transfers) pt. deferred  -KM       Row Name 24 1039          Gait/Stairs (Locomotion)    Patient was able to Ambulate no, other medical factors prevent ambulation  -KM     Reason Patient  was unable to Ambulate Non-Ambulatory at Baseline  -KM       Row Name 11/27/24 1039          Safety Issues/Impairments Affecting Functional Mobility    Impairments Affecting Function (Mobility) endurance/activity tolerance;pain;range of motion (ROM);strength;coordination;motor control;muscle tone abnormal  -KM       Row Name 11/27/24 1039          Motor Skills    Comments, Therapeutic Exercise supine ther-ex  -KM       Row Name             Wound 09/26/24 1809 Left posterior ankle Other (comment)    Wound - Properties Group Placement Date: 09/26/24  -KJ Placement Time: 1809  -KJ Side: Left  -KJ Orientation: posterior  -KJ Location: ankle  -KJ Primary Wound Type: Other  -TW, unknow etiology     Retired Wound - Properties Group Placement Date: 09/26/24  -KJ Placement Time: 1809  -KJ Side: Left  -KJ Orientation: posterior  -KJ Location: ankle  -KJ Primary Wound Type: Other  -TW, unknow etiology     Retired Wound - Properties Group Placement Date: 09/26/24  -KJ Placement Time: 1809  -KJ Side: Left  -KJ Orientation: posterior  -KJ Location: ankle  -KJ Primary Wound Type: Other  -TW, unknow etiology     Retired Wound - Properties Group Date first assessed: 09/26/24  -KJ Time first assessed: 1809  -KJ Side: Left  -KJ Location: ankle  -KJ Primary Wound Type: Other  -TW, unknow etiology       Row Name             Wound 10/11/24 1330 medial coccyx Fissure    Wound - Properties Group Placement Date: 10/11/24  -TW Placement Time: 1330  -TW Orientation: medial  -TW Location: coccyx  -TW Primary Wound Type: Fissure  -TW    Retired Wound - Properties Group Placement Date: 10/11/24  -TW Placement Time: 1330 -TW Orientation: medial  -TW Location: coccyx  -TW Primary Wound Type: Fissure  -TW    Retired Wound - Properties Group Placement Date: 10/11/24  -TW Placement Time: 1330  -TW Orientation: medial  -TW Location: coccyx  -TW Primary Wound Type: Fissure  -TW    Retired Wound - Properties Group Date first assessed: 10/11/24  -TW  Time first assessed: 1330  -TW Location: coccyx  -TW Primary Wound Type: Fissure  -TW      Row Name             Wound 10/16/24 0705 Left medial gluteal MASD (moisture associated skin damage)    Wound - Properties Group Placement Date: 10/16/24  -MS Placement Time: 0705 -MS Side: Left  -MS Orientation: medial  -MS Location: gluteal  -MS Primary Wound Type: MASD  -MS Type: MASD (moisture associated skin damage)  -MS Present on Original Admission: N  -MS Additional Comments: Noted at shift change skin assesment, Night shift RN stated it had been there her shift.  -MS    Retired Wound - Properties Group Placement Date: 10/16/24  -MS Placement Time: 0705 -MS Present on Original Admission: N  -MS Side: Left  -MS Orientation: medial  -MS Location: gluteal  -MS Primary Wound Type: MASD  -MS Additional Comments: Noted at shift change skin assesment, Night shift RN stated it had been there her shift.  -MS Type: MASD (moisture associated skin damage)  -MS    Retired Wound - Properties Group Placement Date: 10/16/24  -MS Placement Time: 0705 -MS Present on Original Admission: N  -MS Side: Left  -MS Orientation: medial  -MS Location: gluteal  -MS Primary Wound Type: MASD  -MS Additional Comments: Noted at shift change skin assesment, Night shift RN stated it had been there her shift.  -MS Type: MASD (moisture associated skin damage)  -MS    Retired Wound - Properties Group Date first assessed: 10/16/24  -MS Time first assessed: 0705 -MS Present on Original Admission: N  -MS Side: Left  -MS Location: gluteal  -MS Primary Wound Type: MASD  -MS Additional Comments: Noted at shift change skin assesment, Night shift RN stated it had been there her shift.  -MS Type: MASD (moisture associated skin damage)  -MS      Row Name             Wound 10/16/24 0705 Left posterior greater trochanter MASD (moisture associated skin damage)    Wound - Properties Group Placement Date: 10/16/24  -MS Placement Time: 0705 -MS Side: Left  -MS  Orientation: posterior  -MS Location: greater trochanter  -MS Primary Wound Type: MASD  -MS Type: MASD (moisture associated skin damage)  -MS Present on Original Admission: N  -MS    Retired Wound - Properties Group Placement Date: 10/16/24  -MS Placement Time: 0705 -MS Present on Original Admission: N  -MS Side: Left  -MS Orientation: posterior  -MS Location: greater trochanter  -MS Primary Wound Type: MASD  -MS Type: MASD (moisture associated skin damage)  -MS    Retired Wound - Properties Group Placement Date: 10/16/24  -MS Placement Time: 0705 -MS Present on Original Admission: N  -MS Side: Left  -MS Orientation: posterior  -MS Location: greater trochanter  -MS Primary Wound Type: MASD  -MS Type: MASD (moisture associated skin damage)  -MS    Retired Wound - Properties Group Date first assessed: 10/16/24  -MS Time first assessed: 0705 -MS Present on Original Admission: N  -MS Side: Left  -MS Location: greater trochanter  -MS Primary Wound Type: MASD  -MS Type: MASD (moisture associated skin damage)  -MS      Row Name 11/27/24 1039          Progress Summary (PT)    Daily Progress Summary (PT) Pt. has continued to refuse all mobility skills for third day in a row. She has refused mobility many times each week over many weeks. Pt. has shown no willingness to participate and improve w/ PT. PT has explained to pt. the importance of mobility and working w/ PT. Pt. continues to show no effort in improving. Pt. to be discharged from PT case load at this time. Consult PT if pt. is willing to participate and is interested in improving mobility. Thank you.  -KM       Row Name 11/27/24 1039          Physical Therapy Goals    Bed Mobility Goal Selection (PT) bed mobility, PT goal 1  -KM     Transfer Goal Selection (PT) transfer, PT goal 1  -KM       Row Name 11/27/24 1039          Bed Mobility Goal 1 (PT)    Activity/Assistive Device (Bed Mobility Goal 1, PT) bed mobility activities, all  -KM     [place] Level/Cues  Needed (Bed Mobility Goal 1, PT) moderate assist (50-74% patient effort)  -KM     Time Frame (Bed Mobility Goal 1, PT) by discharge  -KM     Progress/Outcomes (Bed Mobility Goal 1, PT) goal not met  -KM       Row Name 11/27/24 1039          Transfer Goal 1 (PT)    Activity/Assistive Device (Transfer Goal 1, PT) transfers, all  -KM     [place] Level/Cues Needed (Transfer Goal 1, PT) moderate assist (50-74% patient effort)  -KM     Time Frame (Transfer Goal 1, PT) by discharge  -KM     Progress/Outcome (Transfer Goal 1, PT) goal not met  -KM       Row Name 11/27/24 1039          Discharge Summary (PT)    Additional Documentation Discharge Summary (PT) (Group)  -KM       Row Name 11/27/24 1039          Discharge Summary (PT)    Reason for Discharge (PT Discharge Summary) no further expectation of functional progress;other (see comments)  Pt. only interested in light exercise in supine. She refuses to attempt any mobility skills for multiple days in a row.  -KM     Outcomes Achieved Upon Discharge (PT Discharge Summary) unable to make functional progress toward goals;unable to tolerate or actively participate in therapy  -KM     Transfer to Another Level of Care or Facility (PT Discharge Summary) skilled nursing facility;other (see comments)  home w/ 24/7 care  -KM     Discharge Summary Statement (PT) Pt. has continued to refuse all mobility skills for third day in a row. She has refused mobility many times each week over many weeks. Pt. has shown no willingness to participate and improve w/ PT. PT has explained to pt. the importance of mobility and working w/ PT. Pt. continues to show no effort in improving. Pt. to be discharged from PT case load at this time. Consult PT if pt. is willing to participate and is interested in improving mobility. Thank you.  -KM               User Key  (r) = Recorded By, (t) = Taken By, (c) = Cosigned By      Initials Name Provider Type    Karen Peralta, RN Registered Nurse     Thierry Carter, PT Physical Therapist    Nory Keenan, RN Registered Nurse    Roe North, RN Registered Nurse                      Physical Therapy Education       Title: PT OT SLP Therapies (Done)       Topic: Physical Therapy (Done)       Point: Mobility training (Done)       Learning Progress Summary            Patient Acceptance, E, VU by WG at 11/22/2024 0229    Acceptance, E, VU by WG at 11/21/2024 0236    Acceptance, E,TB, VU by CF at 11/17/2024 1024    Acceptance, E,TB, VU by RR at 11/14/2024 0148    Acceptance, E,TB, VU by RR at 11/13/2024 0218    Acceptance, E,TB, VU by RG at 11/12/2024 0927    Acceptance, TB,E, VU by RG at 11/11/2024 0944    Acceptance, E,TB, VU by RG at 11/10/2024 0931    Acceptance, E, VU by WG at 11/10/2024 0233    Acceptance, E,TB, VU by CF at 11/9/2024 1016    Acceptance, E, NR by SC at 11/3/2024 0025    Acceptance, E,TB, VU by RR at 11/1/2024 2315    Acceptance, E,D, VU,NR by AG at 10/31/2024 1234    Comment: PT educated pt. in importance of mobilization and L L/UE PROM and self ROM to prevent contracture.  Pt. is encouraged to to perform R LE AROM frequently while supine or sitting EOB.    Acceptance, E,TB, VU by RR at 10/27/2024 0031    Acceptance, E,TB, VU by RG at 10/22/2024 1002    Acceptance, TB,E, VU by RG at 10/21/2024 0907    Nonacceptance, E, NR by WG at 10/16/2024 0228    Acceptance, E,TB, VU by CF at 10/15/2024 0753    Acceptance, E,TB, VU by CF at 10/14/2024 0801    Acceptance, E,TB, VU by CF at 10/13/2024 1045    Acceptance, E,D, VU,NR by AG at 10/10/2024 1310    Acceptance, E,TB, VU by CB at 10/8/2024 0448    Acceptance, E, NR by RD at 10/2/2024 1101    Acceptance, E, NR by RD at 10/1/2024 0856    Acceptance, E,D, VU,NR by MARI at 9/30/2024 1559                      Point: Home exercise program (Done)       Learning Progress Summary            Patient Acceptance, E, VU by WG at 11/22/2024 0229    Acceptance, E, VU by WG at 11/21/2024 0236    Acceptance,  E,TB, VU by CF at 11/17/2024 1024    Acceptance, E,TB, VU by RR at 11/14/2024 0148    Acceptance, E,TB, VU by RR at 11/13/2024 0218    Acceptance, E,TB, VU by RG at 11/12/2024 0927    Acceptance, TB,E, VU by RG at 11/11/2024 0944    Acceptance, E,TB, VU by RG at 11/10/2024 0931    Acceptance, E, VU by WG at 11/10/2024 0233    Acceptance, E,TB, VU by CF at 11/9/2024 1016    Acceptance, E, NR by SC at 11/3/2024 0025    Acceptance, E,TB, VU by RR at 11/1/2024 2315    Acceptance, E,D, VU,NR by AG at 10/31/2024 1234    Comment: PT educated pt. in importance of mobilization and L L/UE PROM and self ROM to prevent contracture.  Pt. is encouraged to to perform R LE AROM frequently while supine or sitting EOB.    Acceptance, E,TB, VU by RR at 10/27/2024 0031    Acceptance, E,TB, VU by RG at 10/22/2024 1002    Acceptance, TB,E, VU by RG at 10/21/2024 0907    Nonacceptance, E, NR by WG at 10/16/2024 0228    Acceptance, E,TB, VU by CF at 10/15/2024 0753    Acceptance, E,TB, VU by CF at 10/14/2024 0801    Acceptance, E,TB, VU by CF at 10/13/2024 1045    Acceptance, E,D, VU,NR by AG at 10/10/2024 1310    Acceptance, E,TB, VU by CB at 10/8/2024 0448    Acceptance, E, NR by RD at 10/2/2024 1101    Acceptance, E, NR by RD at 10/1/2024 0856    Acceptance, E,D, VU,NR by AG at 9/30/2024 1559                      Point: Body mechanics (Done)       Learning Progress Summary            Patient Acceptance, E, VU by WG at 11/22/2024 0229    Acceptance, E, VU by WG at 11/21/2024 0236    Acceptance, E,TB, VU by CF at 11/17/2024 1024    Acceptance, E,TB, VU by RR at 11/14/2024 0148    Acceptance, E,TB, VU by RR at 11/13/2024 0218    Acceptance, E,TB, VU by RG at 11/12/2024 0927    Acceptance, TB,E, VU by RG at 11/11/2024 0944    Acceptance, E,TB, VU by RG at 11/10/2024 0931    Acceptance, E, VU by WG at 11/10/2024 0233    Acceptance, E,TB, VU by CF at 11/9/2024 1016    Acceptance, E, NR by SC at 11/3/2024 0025    Acceptance, E,TB, VU by RR at  11/1/2024 2315    Acceptance, E,D, VU,NR by AG at 10/31/2024 1234    Comment: PT educated pt. in importance of mobilization and L L/UE PROM and self ROM to prevent contracture.  Pt. is encouraged to to perform R LE AROM frequently while supine or sitting EOB.    Acceptance, E,TB, VU by RR at 10/27/2024 0031    Acceptance, E,TB, VU by RG at 10/22/2024 1002    Acceptance, TB,E, VU by RG at 10/21/2024 0907    Nonacceptance, E, NR by WG at 10/16/2024 0228    Acceptance, E,TB, VU by CF at 10/15/2024 0753    Acceptance, E,TB, VU by CF at 10/14/2024 0801    Acceptance, E,TB, VU by CF at 10/13/2024 1045    Acceptance, E,D, VU,NR by AG at 10/10/2024 1310    Acceptance, E,TB, VU by CB at 10/8/2024 0448    Acceptance, E, NR by RD at 10/2/2024 1101    Acceptance, E, NR by RD at 10/1/2024 0856    Acceptance, E,D, VU,NR by AG at 9/30/2024 1559                      Point: Precautions (Done)       Learning Progress Summary            Patient Acceptance, E, VU by WG at 11/22/2024 0229    Acceptance, E, VU by WG at 11/21/2024 0236    Acceptance, E,TB, VU by CF at 11/17/2024 1024    Acceptance, E,TB, VU by RR at 11/14/2024 0148    Acceptance, E,TB, VU by RR at 11/13/2024 0218    Acceptance, E,TB, VU by RG at 11/12/2024 0927    Acceptance, TB,E, VU by RG at 11/11/2024 0944    Acceptance, E,TB, VU by RG at 11/10/2024 0931    Acceptance, E, VU by WG at 11/10/2024 0233    Acceptance, E,TB, VU by CF at 11/9/2024 1016    Acceptance, E, NR by SC at 11/3/2024 0025    Acceptance, E,TB, VU by RR at 11/1/2024 2315    Acceptance, E,D, VU,NR by AG at 10/31/2024 1234    Comment: PT educated pt. in importance of mobilization and L L/UE PROM and self ROM to prevent contracture.  Pt. is encouraged to to perform R LE AROM frequently while supine or sitting EOB.    Acceptance, E,TB, VU by RR at 10/27/2024 0031    Acceptance, E,TB, VU by RG at 10/22/2024 1002    Acceptance, TB,E, VU by RG at 10/21/2024 0907    Nonacceptance, E, NR by WG at 10/16/2024  0228    Acceptance, E,TB, VU by CF at 10/15/2024 0753    Acceptance, E,TB, VU by CF at 10/14/2024 0801    Acceptance, E,TB, VU by CF at 10/13/2024 1045    Acceptance, E,D, VU,NR by AG at 10/10/2024 1310    Acceptance, E,TB, VU by CB at 10/8/2024 0448    Acceptance, E, NR by RD at 10/2/2024 1101    Acceptance, E, NR by RD at 10/1/2024 0856    Acceptance, E,D, VU,NR by AG at 9/30/2024 1559                                      User Key       Initials Effective Dates Name Provider Type Discipline     06/16/21 -  Мария Joya, PT Physical Therapist PT    RD 06/16/21 -  Laisha Verdugo, RN Registered Nurse Nurse    RR 06/11/24 -  Jaymie Dominguez, RN Registered Nurse Nurse    RG 06/06/23 -  Jesus Matthews, RN Registered Nurse Nurse    WG 02/12/24 -  Shanthi Burden, RN Registered Nurse Nurse    CF 06/25/24 -  Cherelle Germain, RN Registered Nurse Nurse    CB 06/17/24 -  Fidelia Lombardo, RN Registered Nurse Nurse    SC 09/11/24 -  Sally Hair, RN Registered Nurse Nurse                    PT Recommendation and Plan  Anticipated Discharge Disposition (PT): skilled nursing facility, home with 24/7 care  Progress Summary (PT)  Daily Progress Summary (PT): Pt. has continued to refuse all mobility skills for third day in a row. She has refused mobility many times each week over many weeks. Pt. has shown no willingness to participate and improve w/ PT. PT has explained to pt. the importance of mobility and working w/ PT. Pt. continues to show no effort in improving. Pt. to be discharged from PT case load at this time. Consult PT if pt. is willing to participate and is interested in improving mobility. Thank you.         Time Calculation:    PT Charges       Row Name 11/27/24 1038             Time Calculation    PT Received On 11/27/24  -KM                User Key  (r) = Recorded By, (t) = Taken By, (c) = Cosigned By      Initials Name Provider Type    Thierry Carter, PT Physical Therapist                  Therapy Charges for  Today       Code Description Service Date Service Provider Modifiers Qty    46548374121 HC PT THER PROC EA 15 MIN 11/26/2024 Thierry Saldivar, PT GP 1    53086123885 HC PT THER PROC EA 15 MIN 11/27/2024 Thierry Saldivar, PT GP 1            PT G-Codes  AM-PAC 6 Clicks Score (PT): 9    PT Discharge Summary  Anticipated Discharge Disposition (PT): skilled nursing facility, home with 24/7 care    Thierry Saldivar, PT  11/27/2024

## 2024-11-27 NOTE — PLAN OF CARE
Goal Outcome Evaluation:  Plan of Care Reviewed With: patient        Progress: no change  Outcome Evaluation: Patient resting in bed. VSS on RA. Wound care completed per order. Pt c/o pain, PRN pain medication given per MAR. No concerns or complaints at this time. Will continue with plan of care.

## 2024-11-27 NOTE — CASE MANAGEMENT/SOCIAL WORK
Discharge Planning Assessment   [place]     Patient Name: Lety Johnson  MRN: [id number]  Today's Date: 11/27/2024    Admit Date: 9/26/2024     Discharge Plan       Row Name 11/27/24 1609       Plan    Plan SS contacted [facility] per Bronwyn and PASSR is still pending. SS discussed pt with Kinsey LAUREANO and she is working on getting Goods and Services approval for $2,700 to pay toward amount requested by [facility]. APS BOBY requested a statement from [facility] stating they will not be getting reimbursed by Medicaid. SS contacted [facility] per Gabino and statement requested by ABELARDO LANDIS was sent. SS sent statement from [facility] to ABELARDO LANDIS and notified Kinsey LAUREANO. ABELARDO LANDIS voices that she will not receive a decision from [employer] until Monday, 12/2/24. SS will follow up with [facility] on Friday, 11/29/24. ABELARDO LANDIS will be unavailable on Friday, 12/2/24. SS to follow.                  Continued Care and Services - Admitted Since 9/26/2024       Destination       Service Provider Request Status Services Address Phone Fax Patient Preferred    [facility] Pending - Request Sent -- [address] [place] [address] [phone] --    [facility] Declined  Cannot meet patient's needs -- [address] [facility] [address] [phone] --    [facility] Acute Care Declined  Not eligible -- [address] [place] [address] [phone] --    [facility] Declined  Not eligible -- [address] [facility] [address] [phone] --    [facility] Declined  Clinical Denial -- [address] [facility], 4TH FLOOR, [facility] [address] [phone] --    [facility] SWING BED UNIT Declined  Cannot meet patient's needs -- [address] [place] [address] [facility] [address] [phone] --    [facility]  Re[facility] Declined  Not eligible -- [address] [place] [id number] [phone] --    [facility] Declined  Bed not available -- [address] [place] [address] [phone] --    [facility] SKILLED CARE Declined  Bed not available -- [address] [facility] [address] [phone] --    [facility] Swing Declined  Out of network -- [address] [facility] [place] [id number] [phone] --    [facility] SWING BED UNIT Declined -- [address] [facility] [place] [address] [phone] --    [facility]  Clinical Denial -- [address] [facility] [id number] [phone] --    [facility] SWING BED UNIT Declined  Insurance Denial, Out of network -- [address] [place] [id number] [phone] --    [facility] Declined  Out of network -- [address] [facility] [address] [phone] --    [facility] Declined  Out of network -- [address] [address] [phone] -- --    [facility] Declined  Out of network -- [address] [facility] [address] [phone] --                  Expected Discharge Date and Time       Expected Discharge Date Expected Discharge Time    Nov 29, 2024         HELEN Butterfield

## 2024-11-27 NOTE — CASE MANAGEMENT/SOCIAL WORK
Case Management/Social Work    Patient Name:  Lety Johnson  YOB: 1980  MRN: [id number]  Admit Date:  9/26/2024    9:22: Pt does not recall the name of significant other's bank. SS was informed by pt that significant other's brother, Rk Mccauley can access the checking account. SS contacted significant other's brother, Rk Mccauley with pt's consent. Significant other's checking account is at [employer] in [place]. SS contacted [employer] in [place] [id number] per Nely who states pt is not the owner on the checking account, however is an authorized signee on significant other's checking account. SS was informed by [employer] if nursing home needs last three months of checking account statements a statement signed and notarized by significant other including request, reason why he is unable to request statements in person, and account # would be required. Significant other checking account statements can only be picked up or mailed with required notarized statement. SS to follow.     Addendum @ 20:01: SS contacted [facility] per Bronwyn and director of nursing, Gabino. SS notified [facility] of checking account information received from significant other's bank and was informed since pt is not an owner on the account the statements are not needed. SS sent progress note stating pt's orientation to [employer] per request. [facility] is requesting $5,985 prior to admission. SS discussed the requested amount with  and manager. SS contacted APS SW, Kinsey Dial and she is requesting Goods and Services assistance with requested amount by [employer]. [facility] started PASRR per [place] requirements. SS to follow.     Electronically signed by:  HELEN Butterfield

## 2024-11-27 NOTE — PLAN OF CARE
Goal Outcome Evaluation:      Pt A/O x 3. VSS. Radial and pedal pulse +2, regular, bilateral. HR regular, S1 and S2 regular. Breathing unlabored in all lobes. Bowl sounds active in all quadrants. No acute changes. Will continue to monitor. Will continue plan of care.

## 2024-11-27 NOTE — PROGRESS NOTES
Patient Identification:  Name:  Lety Johnson  Age:  44 y.o.  Sex:  female  :  1980  MRN:  [id number]  Visit Number:  [id number]  Primary Care Provider:  Concha Rao APRN    Length of stay:  60    Subjective/Interval History/Consultants/Procedures     Chief Complaint:   Chief Complaint   Patient presents with    Fall       Subjective/Interval History:    44 y.o. female who was admitted on 2024 with UTI    PMH is significant for CVA w/ left sided hemiparesis, debility, hx genital herpes on suppressive therapy, morbid obesity, T2DM   For complete admission information, please see history and physical.     Consultations:  Infectious disease  PT/OT  CM/SW  Psychiatry     Procedures/Scans:  CT head without contrast  CT C-spine without contrast  CT T-spine without contrast  CT L-spine without contrast  CXR x 2  XR left foot  XR left ankle    Today, the patient was seen with no family present at the bedside.  Care also discussed with RN.  No acute events overnight.  Therapy did sign off today.    Room location at the time of evaluation was [facility] [place].    ----------------------------------------------------------------------------------------------------------------------  Current Hospital Meds:  aspirin, 81 mg, Oral, Daily  atorvastatin, 80 mg, Oral, Daily  DULoxetine, 90 mg, Oral, Daily  heparin (porcine), 5,000 Units, Subcutaneous, Q8H  insulin glargine, 20 Units, Subcutaneous, Daily With Breakfast  insulin glargine, 40 Units, Subcutaneous, Nightly  insulin lispro, 4-24 Units, Subcutaneous, 4x Daily AC & at Bedtime  insulin lispro, 8 Units, Subcutaneous, Daily With Lunch  Lidocaine, 3 patch, Transdermal, Q24H  lisinopril, 10 mg, Oral, Daily  magic barrier cream, , Topical, BID  metoprolol tartrate, 25 mg, Oral, Q12H  nicotine, 1 patch, Transdermal, Q24H  nystatin, , Topical, Q12H  pantoprazole, 40 mg, Oral, Daily  pregabalin, 25 mg, Oral, Q8H  sodium chloride, 10 mL, Intravenous, Q12H  sodium chloride,  10 mL, Intravenous, Q12H      Pharmacy Consult,       ----------------------------------------------------------------------------------------------------------------------      Objective     Vital Signs:  Temp:  [97.7 °F (36.5 °C)-98.5 °F (36.9 °C)] 98 °F (36.7 °C)  Heart Rate:  [] 94  Resp:  [17-18] 18  BP: ()/(46-68) 106/55      10/07/24  0511 10/08/24  0500 10/09/24  0500   Weight: 102 kg (225 lb 14.4 oz) (FIFI cameron) 102 kg (225 lb 15.5 oz) 102 kg (224 lb 1.6 oz)     Body mass index is 36.73 kg/m².    Intake/Output Summary (Last 24 hours) at 11/27/2024 1212  Last data filed at 11/27/2024 0300  Gross per 24 hour   Intake 220 ml   Output 1000 ml   Net -780 ml     No intake/output data recorded.  Diet: Regular/House, Diabetic; Consistent Carbohydrate; Fluid Consistency: Thin (IDDSI 0)  ----------------------------------------------------------------------------------------------------------------------    Physical Exam  Vitals and nursing note reviewed.   Constitutional:       General: She is not in acute distress.  HENT:      Head: Normocephalic and atraumatic.   Eyes:      Extraocular Movements: Extraocular movements intact.   Pulmonary:      Effort: Pulmonary effort is normal. No respiratory distress.   Skin:     General: Skin is dry.   Neurological:      Mental Status: Mental status is at baseline.                ----------------------------------------------------------------------------------------------------------------------  Tele:        ----------------------------------------------------------------------------------------------------------------------      Results from last 7 days   Lab Units 11/22/24 0036   WBC 10*3/mm3 9.51   HEMOGLOBIN g/dL 11.7*   HEMATOCRIT % 36.4   MCV fL 102.2*   MCHC g/dL 32.1   PLATELETS 10*3/mm3 334         Results from last 7 days   Lab Units 11/22/24 0036   SODIUM mmol/L 138   POTASSIUM mmol/L 3.8   CHLORIDE mmol/L 102   CO2 mmol/L 22.8   BUN mg/dL 23*  "  CREATININE mg/dL 0.53*   CALCIUM mg/dL 9.2   GLUCOSE mg/dL 126*   ALBUMIN g/dL 3.3*   BILIRUBIN mg/dL 0.2   ALK PHOS U/L 109   AST (SGOT) U/L 16   ALT (SGPT) U/L 16   Estimated Creatinine Clearance: 163.6 mL/min (A) (by C-G formula based on SCr of 0.53 mg/dL (L)).  No results found for: \"AMMONIA\"      No results found for: \"BLOODCX\"  No results found for: \"URINECX\"  No results found for: \"WOUNDCX\"  No results found for: \"STOOLCX\"  ----------------------------------------------------------------------------------------------------------------------  Imaging Results (Last 24 Hours)       ** No results found for the last 24 hours. **          ----------------------------------------------------------------------------------------------------------------------   I have reviewed the above laboratory values for 11/27/24    Assessment/Plan     There are no active hospital problems to display for this patient.        ASSESSMENT/PLAN:    ESBL E. coli UTI  Acute metabolic encephalopathy, resolved  S/p Invanz.  Infectious disease input appreciated.     Hx CVA with left-sided hemiparesis  Chronic debility  PT OT now signed off  CM/SW assisting with discharge planning.  Patient is pending placement.     T2DM  Diabetic neuropathy  Continue insulin regimen-titrate as needed. Added scheduled meal time insulin w/ lunch.   Continue supportive care measures, Cymbalta  Lyrica added 11/13 given persistent pain with some improvement noted.      Hx genital herpes  Continue suppressive acyclovir     Morbid obesity  Complicates all aspects of care     Depressive disorder  Adjustment disorder w/ disturbance of mood and anxiety   Patient with somewhat labile mood, tearful at times. Reports history of depression as well, likely exacerbated by current health status/hospitalization.  She is taking cymbalta as above  Psychiatry consulted, input appreciated. Felt in the setting of recent stressors findings consistent with adjustment disorder on " top of patient's unspecified depressive disorder with patient declining any medication adjustments at this time.   Increased Cymbalta to 90 daily per psychiatry recommendation.  May benefit from psychotherapy referral at discharge.     Copied portions of this note reviewed and up-to-date as of 11/27/2024    -----------  -DVT prophylaxis: [facility]  -Disposition plans/anticipated needs: Pending placement        The patient is high risk due to the following diagnoses/reasons: Hemiparesis, debility        Bruce Branham PA-C  11/27/24  12:12 EST

## 2024-11-28 LAB
GLUCOSE BLDC GLUCOMTR-MCNC: 201 MG/DL (ref 70–130)
GLUCOSE BLDC GLUCOMTR-MCNC: 258 MG/DL (ref 70–130)
GLUCOSE BLDC GLUCOMTR-MCNC: 320 MG/DL (ref 70–130)
GLUCOSE BLDC GLUCOMTR-MCNC: 97 MG/DL (ref 70–130)

## 2024-11-28 PROCEDURE — 63710000001 INSULIN LISPRO (HUMAN) PER 5 UNITS

## 2024-11-28 PROCEDURE — 63710000001 PROCHLORPERAZINE MALEATE PER 5 MG: Performed by: STUDENT IN AN ORGANIZED HEALTH CARE EDUCATION/TRAINING PROGRAM

## 2024-11-28 PROCEDURE — 25010000002 HEPARIN (PORCINE) PER 1000 UNITS: Performed by: INTERNAL MEDICINE

## 2024-11-28 PROCEDURE — 82948 REAGENT STRIP/BLOOD GLUCOSE: CPT

## 2024-11-28 PROCEDURE — 99231 SBSQ HOSP IP/OBS SF/LOW 25: CPT | Performed by: INTERNAL MEDICINE

## 2024-11-28 PROCEDURE — 63710000001 INSULIN GLARGINE PER 5 UNITS

## 2024-11-28 PROCEDURE — 63710000001 INSULIN LISPRO (HUMAN) PER 5 UNITS: Performed by: INTERNAL MEDICINE

## 2024-11-28 RX ADMIN — INSULIN GLARGINE 20 UNITS: 100 INJECTION, SOLUTION SUBCUTANEOUS at 09:27

## 2024-11-28 RX ADMIN — INSULIN GLARGINE 40 UNITS: 100 INJECTION, SOLUTION SUBCUTANEOUS at 21:45

## 2024-11-28 RX ADMIN — HEPARIN SODIUM 5000 UNITS: 5000 INJECTION INTRAVENOUS; SUBCUTANEOUS at 14:40

## 2024-11-28 RX ADMIN — PREGABALIN 25 MG: 25 CAPSULE ORAL at 14:40

## 2024-11-28 RX ADMIN — DULOXETINE HYDROCHLORIDE 90 MG: 60 CAPSULE, DELAYED RELEASE ORAL at 09:27

## 2024-11-28 RX ADMIN — INSULIN LISPRO 16 UNITS: 100 INJECTION, SOLUTION INTRAVENOUS; SUBCUTANEOUS at 21:45

## 2024-11-28 RX ADMIN — PROCHLORPERAZINE MALEATE 5 MG: 5 TABLET ORAL at 22:48

## 2024-11-28 RX ADMIN — PREGABALIN 25 MG: 25 CAPSULE ORAL at 05:59

## 2024-11-28 RX ADMIN — VITAMINS A AND D OINTMENT: 15.5; 53.4 OINTMENT TOPICAL at 21:46

## 2024-11-28 RX ADMIN — HEPARIN SODIUM 5000 UNITS: 5000 INJECTION INTRAVENOUS; SUBCUTANEOUS at 21:47

## 2024-11-28 RX ADMIN — LIDOCAINE 3 PATCH: 4 PATCH TOPICAL at 17:24

## 2024-11-28 RX ADMIN — HEPARIN SODIUM 5000 UNITS: 5000 INJECTION INTRAVENOUS; SUBCUTANEOUS at 05:59

## 2024-11-28 RX ADMIN — NYSTATIN: 100000 POWDER TOPICAL at 09:28

## 2024-11-28 RX ADMIN — PANTOPRAZOLE SODIUM 40 MG: 40 TABLET, DELAYED RELEASE ORAL at 09:27

## 2024-11-28 RX ADMIN — Medication 5 MG: at 21:45

## 2024-11-28 RX ADMIN — TRAMADOL HYDROCHLORIDE 50 MG: 50 TABLET, FILM COATED ORAL at 21:45

## 2024-11-28 RX ADMIN — NYSTATIN: 100000 POWDER TOPICAL at 21:46

## 2024-11-28 RX ADMIN — NICOTINE 1 PATCH: 7 PATCH, EXTENDED RELEASE TRANSDERMAL at 09:29

## 2024-11-28 RX ADMIN — PREGABALIN 25 MG: 25 CAPSULE ORAL at 21:45

## 2024-11-28 RX ADMIN — INSULIN LISPRO 8 UNITS: 100 INJECTION, SOLUTION INTRAVENOUS; SUBCUTANEOUS at 11:06

## 2024-11-28 RX ADMIN — VITAMINS A AND D OINTMENT: 15.5; 53.4 OINTMENT TOPICAL at 09:29

## 2024-11-28 RX ADMIN — METOPROLOL TARTRATE 25 MG: 25 TABLET, FILM COATED ORAL at 21:45

## 2024-11-28 RX ADMIN — CYCLOBENZAPRINE 10 MG: 10 TABLET, FILM COATED ORAL at 21:45

## 2024-11-28 RX ADMIN — INSULIN LISPRO 12 UNITS: 100 INJECTION, SOLUTION INTRAVENOUS; SUBCUTANEOUS at 17:24

## 2024-11-28 RX ADMIN — ASPIRIN 81 MG: 81 TABLET, CHEWABLE ORAL at 09:27

## 2024-11-28 RX ADMIN — ATORVASTATIN CALCIUM 80 MG: 40 TABLET, FILM COATED ORAL at 09:27

## 2024-11-28 NOTE — PROGRESS NOTES
[facility]     Patient Identification:  Name:  Lety Johnson  Age:  44 y.o.  Sex:  female  :  1980  MRN:  [id number]  Visit Number:  [id number]  Primary Care Provider:  Concha Rao APRN    Length of stay:  61    Chief complaint: None    Subjective:    Patient seen and examined at bedside with no nursing staff or family present.  Patient is doing well with no new complaints.  ----------------------------------------------------------------------------------------------------------------------  Current Hospital Meds:  aspirin, 81 mg, Oral, Daily  atorvastatin, 80 mg, Oral, Daily  DULoxetine, 90 mg, Oral, Daily  heparin (porcine), 5,000 Units, Subcutaneous, Q8H  insulin glargine, 20 Units, Subcutaneous, Daily With Breakfast  insulin glargine, 40 Units, Subcutaneous, Nightly  insulin lispro, 4-24 Units, Subcutaneous, 4x Daily AC & at Bedtime  insulin lispro, 8 Units, Subcutaneous, Daily With Lunch  Lidocaine, 3 patch, Transdermal, Q24H  lisinopril, 10 mg, Oral, Daily  magic barrier cream, , Topical, BID  metoprolol tartrate, 25 mg, Oral, Q12H  nicotine, 1 patch, Transdermal, Q24H  nystatin, , Topical, Q12H  pantoprazole, 40 mg, Oral, Daily  pregabalin, 25 mg, Oral, Q8H  sodium chloride, 10 mL, Intravenous, Q12H  sodium chloride, 10 mL, Intravenous, Q12H      Pharmacy Consult,       ----------------------------------------------------------------------------------------------------------------------  Vital Signs:  Temp:  [97.5 °F (36.4 °C)-98 °F (36.7 °C)] 98 °F (36.7 °C)  Heart Rate:  [] 83  Resp:  [16-20] 16  BP: ()/(54-72) 124/72      10/07/24  0511 10/08/24  0500 10/09/24  0500   Weight: 102 kg (225 lb 14.4 oz) (FIFI cameron) 102 kg (225 lb 15.5 oz) 102 kg (224 lb 1.6 oz)     Body mass index is 36.73 kg/m².    Intake/Output Summary (Last 24 hours) at 2024 1159  Last data filed at 2024 0830  Gross per 24 hour   Intake 1220 ml   Output  "1800 ml   Net -580 ml     Diet: Regular/House, Diabetic; Consistent Carbohydrate; Fluid Consistency: Thin (IDDSI 0)  ----------------------------------------------------------------------------------------------------------------------  Physical Exam:  General: Patient appears awake, alert, and in no acute distress.  Head: Normocephalic, atraumatic  Eyes: EOMI. Conjunctivae and sclerae normal.  Ears: Ears appear intact with no abnormalities noted.   Neck: Trachea midline. No obvious JVD.  Lungs: Respirations appear to be regular, even and unlabored with no signs of respiratory distress. No audible wheezing.  Abdomen: No obvious abdominal distension.  MS: Muscle tone appears normal. No gross deformities.  Extremities: No clubbing, cyanosis or edema noted.  Skin: No visible bleeding, bruising, or rash.  Neurologic: Alert and oriented x3. No gross focal deficits.     ---------------------------------------------------------------------------------------  ----------------------------------------------------------------------------------------------------------------------      Results from last 7 days   Lab Units 11/22/24  0036   WBC 10*3/mm3 9.51   HEMOGLOBIN g/dL 11.7*   HEMATOCRIT % 36.4   MCV fL 102.2*   MCHC g/dL 32.1   PLATELETS 10*3/mm3 334         Results from last 7 days   Lab Units 11/22/24  0036   SODIUM mmol/L 138   POTASSIUM mmol/L 3.8   CHLORIDE mmol/L 102   CO2 mmol/L 22.8   BUN mg/dL 23*   CREATININE mg/dL 0.53*   CALCIUM mg/dL 9.2   GLUCOSE mg/dL 126*   ALBUMIN g/dL 3.3*   BILIRUBIN mg/dL 0.2   ALK PHOS U/L 109   AST (SGOT) U/L 16   ALT (SGPT) U/L 16   Estimated Creatinine Clearance: 163.6 mL/min (A) (by C-G formula based on SCr of 0.53 mg/dL (L)).    No results found for: \"AMMONIA\"      No results found for: \"BLOODCX\"  No results found for: \"URINECX\"  No results found for: \"WOUNDCX\"  No results found for: \"STOOLCX\"    I have personally looked at the labs and they are summarized " above.  ----------------------------------------------------------------------------------------------------------------------  Imaging Results (Last 24 Hours)       ** No results found for the last 24 hours. **          ----------------------------------------------------------------------------------------------------------------------  Assessment and Plan:    ESBL E. coli acute cystitis -completed full course of Invanz  History of CVA with left-sided hemiparesis -supportive care  Type 2 diabetes mellitus -continue Accu-Cheks before every meal and nightly with sliding scale insulin  Genital herpes -continue suppressive acyclovir  Morbid obesity    Overall, no significant changes in physical exam compared to yesterday.  Patient still awaiting placement.    Disposition still pending placement    Marv Navas,    11/28/24   11:59 EST

## 2024-11-28 NOTE — PLAN OF CARE
Goal Outcome Evaluation:  Plan of Care Reviewed With: patient        Progress: no change  Outcome Evaluation: Patient resting in bed. VSS on RA. Wound care completed per order. Pt c/o pain, PRN pain medication given. No concerns or complaints at this time. Will continue with plan of care.

## 2024-11-28 NOTE — PLAN OF CARE
Goal Outcome Evaluation:           Progress: no change  Outcome Evaluation: Pt resting in bed, VSS on RA. No acute changes. Wound care completed. Pt voices no complaints this shift. Will cont with POC.

## 2024-11-29 VITALS
DIASTOLIC BLOOD PRESSURE: 62 MMHG | WEIGHT: 224.1 LBS | TEMPERATURE: 98.3 F | HEART RATE: 93 BPM | HEIGHT: 66 IN | RESPIRATION RATE: 16 BRPM | OXYGEN SATURATION: 94 % | SYSTOLIC BLOOD PRESSURE: 104 MMHG | BODY MASS INDEX: 36.02 KG/M2

## 2024-11-29 LAB
GLUCOSE BLDC GLUCOMTR-MCNC: 106 MG/DL (ref 70–130)
GLUCOSE BLDC GLUCOMTR-MCNC: 193 MG/DL (ref 70–130)
GLUCOSE BLDC GLUCOMTR-MCNC: 203 MG/DL (ref 70–130)

## 2024-11-29 PROCEDURE — 63710000001 INSULIN LISPRO (HUMAN) PER 5 UNITS

## 2024-11-29 PROCEDURE — 99239 HOSP IP/OBS DSCHRG MGMT >30: CPT | Performed by: INTERNAL MEDICINE

## 2024-11-29 PROCEDURE — 25010000002 HEPARIN (PORCINE) PER 1000 UNITS: Performed by: INTERNAL MEDICINE

## 2024-11-29 PROCEDURE — 63710000001 INSULIN GLARGINE PER 5 UNITS

## 2024-11-29 PROCEDURE — 82948 REAGENT STRIP/BLOOD GLUCOSE: CPT

## 2024-11-29 PROCEDURE — 63710000001 INSULIN LISPRO (HUMAN) PER 5 UNITS: Performed by: INTERNAL MEDICINE

## 2024-11-29 RX ORDER — DULOXETIN HYDROCHLORIDE 30 MG/1
90 CAPSULE, DELAYED RELEASE ORAL DAILY
Qty: 90 CAPSULE | Refills: 0 | Status: SHIPPED | OUTPATIENT
Start: 2024-11-30

## 2024-11-29 RX ORDER — INSULIN GLARGINE 100 [IU]/ML
20 INJECTION, SOLUTION SUBCUTANEOUS DAILY
Qty: 10 ML | Refills: 0 | Status: SHIPPED | OUTPATIENT
Start: 2024-11-29

## 2024-11-29 RX ORDER — INSULIN GLARGINE 100 [IU]/ML
40 INJECTION, SOLUTION SUBCUTANEOUS NIGHTLY
Qty: 10 ML | Refills: 0 | Status: SHIPPED | OUTPATIENT
Start: 2024-11-29

## 2024-11-29 RX ORDER — INSULIN LISPRO 100 [IU]/ML
8 INJECTION, SOLUTION INTRAVENOUS; SUBCUTANEOUS
Qty: 10 ML | Refills: 0 | Status: SHIPPED | OUTPATIENT
Start: 2024-11-29

## 2024-11-29 RX ORDER — PREGABALIN 25 MG/1
25 CAPSULE ORAL EVERY 8 HOURS SCHEDULED
Qty: 21 CAPSULE | Refills: 0 | Status: SHIPPED | OUTPATIENT
Start: 2024-11-29

## 2024-11-29 RX ADMIN — METOPROLOL TARTRATE 25 MG: 25 TABLET, FILM COATED ORAL at 08:44

## 2024-11-29 RX ADMIN — PANTOPRAZOLE SODIUM 40 MG: 40 TABLET, DELAYED RELEASE ORAL at 08:45

## 2024-11-29 RX ADMIN — HEPARIN SODIUM 5000 UNITS: 5000 INJECTION INTRAVENOUS; SUBCUTANEOUS at 05:51

## 2024-11-29 RX ADMIN — LISINOPRIL 10 MG: 10 TABLET ORAL at 08:45

## 2024-11-29 RX ADMIN — ASPIRIN 81 MG: 81 TABLET, CHEWABLE ORAL at 08:45

## 2024-11-29 RX ADMIN — HEPARIN SODIUM 5000 UNITS: 5000 INJECTION INTRAVENOUS; SUBCUTANEOUS at 13:53

## 2024-11-29 RX ADMIN — INSULIN LISPRO 8 UNITS: 100 INJECTION, SOLUTION INTRAVENOUS; SUBCUTANEOUS at 11:35

## 2024-11-29 RX ADMIN — INSULIN LISPRO 8 UNITS: 100 INJECTION, SOLUTION INTRAVENOUS; SUBCUTANEOUS at 11:34

## 2024-11-29 RX ADMIN — DICLOFENAC SODIUM 4 G: 10 GEL TOPICAL at 05:52

## 2024-11-29 RX ADMIN — DULOXETINE HYDROCHLORIDE 90 MG: 60 CAPSULE, DELAYED RELEASE ORAL at 08:45

## 2024-11-29 RX ADMIN — NYSTATIN: 100000 POWDER TOPICAL at 08:46

## 2024-11-29 RX ADMIN — PREGABALIN 25 MG: 25 CAPSULE ORAL at 05:51

## 2024-11-29 RX ADMIN — PREGABALIN 25 MG: 25 CAPSULE ORAL at 13:53

## 2024-11-29 RX ADMIN — ATORVASTATIN CALCIUM 80 MG: 40 TABLET, FILM COATED ORAL at 08:45

## 2024-11-29 RX ADMIN — VITAMINS A AND D OINTMENT: 15.5; 53.4 OINTMENT TOPICAL at 08:46

## 2024-11-29 RX ADMIN — NICOTINE 1 PATCH: 7 PATCH, EXTENDED RELEASE TRANSDERMAL at 08:46

## 2024-11-29 RX ADMIN — INSULIN GLARGINE 20 UNITS: 100 INJECTION, SOLUTION SUBCUTANEOUS at 08:45

## 2024-11-29 NOTE — CASE MANAGEMENT/SOCIAL WORK
Discharge Planning Assessment   [facility]     Patient Name: Lety Johnson  MRN: [id number]  Today's Date: 11/29/2024    Admit Date: 9/26/2024            Discharge Plan       Row Name 11/29/24 1007       Plan    Plan [facility] per Milmine states PASRR remains pending. SS to follow.    13:35pm: Per [facility] at [facility] PASRR has been approved and Pt can come to facility today pending SS mgr follow up with  Administration about allotted amount of 3,285.00 on Monday 12/02/24. On 11/27/24 APS BOBY, Kinsey Dial. APS SW voiced that she will not receive a decision from [employer] until Monday, 12/2/24 and APS SW will not be available today due to the holiday. SS to follow up with APS BOBY on Monday 12/02/24. Luis Angel Greco is also aware that SS will follow up with APS BOBY on Monday 12/02/24 as well. SS notified Provider. SS to follow.                     SHELBY ReynoldsW

## 2024-11-29 NOTE — PROGRESS NOTES
[facility]     Patient Identification:  Name:  Lety Johnson  Age:  44 y.o.  Sex:  female  :  1980  MRN:  [id number]  Visit Number:  [id number]  Primary Care Provider:  Concha Rao APRN    Length of stay:  62    Chief complaint: None    Subjective:    Patient doing well with no new complaints.  Per nursing staff, no new events overnight.  Patient still pending placement.  ----------------------------------------------------------------------------------------------------------------------  Current Hospital Meds:  aspirin, 81 mg, Oral, Daily  atorvastatin, 80 mg, Oral, Daily  DULoxetine, 90 mg, Oral, Daily  heparin (porcine), 5,000 Units, Subcutaneous, Q8H  insulin glargine, 20 Units, Subcutaneous, Daily With Breakfast  insulin glargine, 40 Units, Subcutaneous, Nightly  insulin lispro, 4-24 Units, Subcutaneous, 4x Daily AC & at Bedtime  insulin lispro, 8 Units, Subcutaneous, Daily With Lunch  Lidocaine, 3 patch, Transdermal, Q24H  lisinopril, 10 mg, Oral, Daily  magic barrier cream, , Topical, BID  metoprolol tartrate, 25 mg, Oral, Q12H  nicotine, 1 patch, Transdermal, Q24H  nystatin, , Topical, Q12H  pantoprazole, 40 mg, Oral, Daily  pregabalin, 25 mg, Oral, Q8H  sodium chloride, 10 mL, Intravenous, Q12H  sodium chloride, 10 mL, Intravenous, Q12H      Pharmacy Consult,       ----------------------------------------------------------------------------------------------------------------------  Vital Signs:  Temp:  [98 °F (36.7 °C)-98.3 °F (36.8 °C)] 98.3 °F (36.8 °C)  Heart Rate:  [] 93  Resp:  [16-18] 16  BP: ()/(58-73) 104/62      10/07/24  0511 10/08/24  0500 10/09/24  0500   Weight: 102 kg (225 lb 14.4 oz) (FIFI cameron) 102 kg (225 lb 15.5 oz) 102 kg (224 lb 1.6 oz)     Body mass index is 36.73 kg/m².    Intake/Output Summary (Last 24 hours) at 2024 1132  Last data filed at 2024 0847  Gross per 24 hour   Intake 1220 ml   Output  "1550 ml   Net -330 ml     Diet: Regular/House, Diabetic; Consistent Carbohydrate; Fluid Consistency: Thin (IDDSI 0)  ----------------------------------------------------------------------------------------------------------------------  Physical Exam:  General: Patient appears awake, alert, and in no acute distress.  Head: Normocephalic, atraumatic  Eyes: EOMI. Conjunctivae and sclerae normal.  Ears: Ears appear intact with no abnormalities noted.   Neck: Trachea midline. No obvious JVD.  Lungs: Respirations appear to be regular, even and unlabored with no signs of respiratory distress. No audible wheezing.  Abdomen: No obvious abdominal distension.  MS: Muscle tone appears normal. No gross deformities.  Extremities: No clubbing, cyanosis or edema noted.  Skin: No visible bleeding, bruising, or rash.  Neurologic: Alert and oriented x3. No gross focal deficits.     No significant change in physical exam in comparison to 11/28/2024    ---------------------------------------------------------------------------------------  ----------------------------------------------------------------------------------------------------------------------                      Invalid input(s): \"PROT\"  Estimated Creatinine Clearance: 163.6 mL/min (A) (by C-G formula based on SCr of 0.53 mg/dL (L)).    No results found for: \"AMMONIA\"      No results found for: \"BLOODCX\"  No results found for: \"URINECX\"  No results found for: \"WOUNDCX\"  No results found for: \"STOOLCX\"    I have personally looked at the labs and they are summarized above.  ----------------------------------------------------------------------------------------------------------------------  Imaging Results (Last 24 Hours)       ** No results found for the last 24 hours. **          ----------------------------------------------------------------------------------------------------------------------  Assessment and Plan:    ESBL E. coli acute cystitis -completed full " course of Invanz  History of CVA with left-sided hemiparesis -supportive care  Type 2 diabetes mellitus -continue Accu-Cheks before every meal and nightly with sliding scale insulin  Genital herpes -continue suppressive acyclovir  Morbid obesity    Overall, no significant changes in physical exam compared to yesterday.  Patient still awaiting placement.    Disposition still pending placement    Marv Navas, DO   11/29/24   11:32 EST

## 2024-11-29 NOTE — PLAN OF CARE
Goal Outcome Evaluation:  Plan of Care Reviewed With: patient        Progress: no change        No acute changes at this time will continue to monitor and follow current plan of care

## 2024-11-29 NOTE — CASE MANAGEMENT/SOCIAL WORK
Discharge Planning Assessment  [facility]     Patient Name: Lety Johnson  MRN: [id number]  Today's Date: 11/29/2024    Admit Date: 9/26/2024          Discharge Plan       Row Name 11/29/24 1417       Plan    Final Discharge Disposition Code 03 - skilled nursing facility (SNF)    Final Note Pt is being discharged to LT[facility] on this date. Pt and Pt;s guardian/sister are aware and agreeable to discharge. SS faxed dc summary, AVS summary and clinical update via EPIC basket. SS to completed PCS form for EMS transport and fax to floor at fax [phone].    14:58pm: SS provided RN report number for [facility] [phone], [address]. Pt's sister Moises requested to be called at [phone] once EMS arrives. RN updated.     15:05pm: SS contacted [facility] per Kiya and scheduled transport.                   Continued Care and Services - Admitted Since 9/26/2024       Destination Coordination complete.      Service Provider Request Status Services Address Phone Fax Patient Preferred    [facility]  Selected Skilled Nursing [address] [place] [address] [phone] --    [facility] Declined  Cannot meet patient's needs -- [address] [facility] [address] [phone] --    [facility] Acute Care Declined  Not eligible -- [address] [facility] [place] [address] [phone] --    [facility] Declined  Not eligible -- [address] [facility] [address] [phone] --    [facility] Declined  Clinical Denial -- [address] [facility]TH FLOOR, [facility] [address] [phone] --    [facility] SWING BED UNIT Declined  Cannot meet patient's needs -- [address] [facility] [address] [phone] --     [facility] Declined  Not eligible -- [address] [address] [phone] --     [facility] Declined  Bed not available -- [address] [place] [address] [phone] --    [facility] SKILLED CARE Declined  Bed not available -- [address] [facility] [address] [phone] --    [facility] Declined  Out of network -- [address] [facility] [place] [id number] [phone] --    [facility] SWING BED UNIT Declined -- [address] [facility] [place] [address] [phone] --    [facility] Declined  Clinical Denial -- [address] [facility] [address] [phone] --    [facility] SW[facility] BED UNIT Declined  Insurance Denial, Out of network -- [address], [facility] [address] [phone] --    [facility] Declined  Out of network -- [address] [facility] [address] [phone] --    [facility] Declined  Out of network -- [address] [address] [phone] -- --    [facility] Declined  Out of network -- [address] [facility] [address] [phone] --                  Expected Discharge Date and Time       Expected Discharge Date Expected Discharge Time    Nov 29, 2024             KAZ Reynolds

## 2024-11-29 NOTE — DISCHARGE PLACEMENT REQUEST
"Lety Johnson (44 y.o. Female)       Date of Birth   1980    Social Security Number       Address   [address] [facility] [address]    Home Phone   [phone]    MRN   [id number]       Judaism   [facility]    Marital Status   Single                            Admission Date   9/26/24    Admission Type   Emergency    Admitting Provider   Ramon Marc MD    Attending Provider   Marv Navas DO    Department, Room/Bed   [address], 3332/1P       Discharge Date       Discharge Disposition   Skilled Nursing Facility (DC - External)    Discharge Destination                                 Attending Provider: Marv Navas DO    Allergies: No Known Allergies    Isolation: None   Infection: None   Code Status: CPR    Ht: 166.4 cm (65.5\")   Wt: 102 kg (224 lb 1.6 oz)    Admission Cmt: None   Principal Problem: UTI (urinary tract infection) [N39.0]                   Active Insurance as of 9/26/2024       Primary Coverage       Payor Plan Insurance Group Employer/Plan Group    [place] [id number]       Payor Plan Address Payor Plan Phone Number Payor Plan Fax Number Effective Dates    HU[employer]ICAL [address] [address] [phone]  5/1/2024 - None Entered    [facility] [id number]         Subscriber Name Subscriber Birth Date Member ID       LETY JOHNSON 1980 [id number]                     Emergency Contacts        (Rel.) Home Phone Work Phone Mobile Phone    Arnoldo Arguelles (Son) -- -- [phone]    Moises Narvaez (Legal Guardian) -- -- [phone]          After Visit Summary  Lety Johnson  MRN: [id number]    Icon primary diagnosis          Urinary tract infection   Icon Date   9/26/2024 - 11/29/2024   Icon Location   [address]   Icon Checklist header    Your Next Steps    Icon do   Do    Icon Checkbox     these medications from [facility] - " "[place]  acyclovir  nitrofurantoin (macrocrystal-monohydrate)  Icon Checkbox     these medications from [facility] [place], [place] - [address] [place] [address]  - [phone]  - [phone] FX  pregabalin  Icon read   Read    Icon Checkbox    Read these attachments  Urinary Tract Infection  Adult  Easy-to-Read (English)  Toxic Metabolic Encephalopathy (English)  Red Dot Payment Sign-Up    Send messages to your doctor, view your test results, renew your prescriptions, schedule appointments, and more.   Go to [email or web address] ([email or web address] ([employer])/, click \"Sign Up Now\", and enter your personal activation code: #####-#####-#####. Activation code expires 12/29/2024.  After Visit Summary  Instructions    Icon medication changes this visit           Your medications have changed    Icon medications to start taking   START taking:  pregabalin (LYRICA)   Icon medications to ask your healthcare provider about   ASK how to take:  nitrofurantoin (macrocrystal-monohydrate) 100 MG capsule (Macrobid)   Review your updated medication list below.  Your Next Steps  Icon do   Do  Icon read   Read  If you have any questions about your recovery, please call the [facility] Nurse Call Center at [phone]. A registered nurse is available 24 hours a day 7 days a week to assist you.   If you have any COVID-19 related questions, please call [phone].     Conversations that Matter: Have you thought about who would speak for you if you could not speak for yourself?     Consider appointing someone to make healthcare decisions for you if you are unable to do so. We can help! Call our Advance Care Planning helpline at [phone], or visit   CompuPay/ACP.  Icon diet    Diet instructions    Regular diet as tolerated.      Icon Orders placed today                  Other instructions    Discharge Follow-up with PCP   Currently Documented PCP:   Provider, No Known   PCP Phone Number:   [phone]   Follow Up " Details:   please follow up with pcp in 1 week  Verify the patient's follow-up provider is added in the Provider section of the Discharge navigator with the appropriate appointment information.:   Complete  Discharge Follow-up with PCP   Currently Documented PCP:   Concha Rao APRN   PCP Phone Number:   [phone]   Follow Up Details:   please follow up with pcp in 1 week  What's Next    What's Next          Follow up with No Known Provider  please follow up with pcp in 1 week [facility]   [place] [address]  [phone]          Follow up with Concha Rao  please follow up with pcp in 1 week [address]   [facility] [id number] [phone]       Your Allergies  Date Reviewed: 2024  Your Allergies   No active allergies     Patient Belongings Returned    Document Return of Belongings Flowsheet    Were the patient bedside belongings sent home? --   Medication Retrieved from Unit & Sent Home --   Belongings Sent to Safe --   Belongings sent with: --   Belongings Retrieved from Security & Sent Home --   Medication Retrieved from Unit & Sent Home --   Medications Retrieved from Pharmacy & Sent Home --         MyChart Signup    [facility] Rover allows you to send messages to your doctor, view your test results, renew your prescriptions, schedule appointments, and more. To sign up, go to [employer] and click on the Sign Up Now link in the New User? box. Enter your Rover Activation Code exactly as it appears below along with the last four digits of your Social Security Number and your Date of Birth () to complete the sign-up process. If you do not sign up before the expiration date, you must request a new code.     Rover Activation Code: #####-#####-#####  2024  2:13 PM     If you have questions, you can email [email or web address] or call [phone] to talk to our Rover staff. Remember, Rover is NOT to be used for urgent needs. For medical  emergencies, dial 911.     Medication List    TAKE these medications    TAKE these medications     Morning Afternoon Evening Bedtime As Needed    acyclovir 400 MG tablet  Commonly known as: ZOVIRAX  Take 1 tablet by mouth Every Night.  Last time this was given: Ask your nurse or doctor  Signed by: Marv Navas         aspirin 81 MG chewable tablet  Chew 1 tablet Daily.  Last time this was given: 81 mg on November 29, 2024  8:45 AM         atorvastatin 80 MG tablet  Commonly known as: LIPITOR  Take 1 tablet by mouth Daily.  Last time this was given: 80 mg on November 29, 2024  8:45 AM         cyclobenzaprine 10 MG tablet  Commonly known as: FLEXERIL  Take 1 tablet by mouth 3 (Three) Times a Day As Needed for Muscle Spasms.  Last time this was given: 10 mg on November 28, 2024  9:45 PM     Take 1 tablet by mouth 3 (Three) Times a Day As Needed for Muscle Spasms.    DULoxetine 60 MG capsule  Commonly known as: CYMBALTA  Take 1 capsule by mouth Daily.  Last time this was given: 90 mg on November 29, 2024  8:45 AM         lisinopril 20 MG tablet  Commonly known as: PRINIVIL,ZESTRIL  Take 1 tablet by mouth Daily.  Last time this was given: 10 mg on November 29, 2024  8:45 AM         metFORMIN 1000 MG tablet  Commonly known as: GLUCOPHAGE  Take 1 tablet by mouth 2 (Two) Times a Day With Meals. As directed.         pantoprazole 40 MG EC tablet  Commonly known as: PROTONIX  Take 1 tablet by mouth Daily.  Last time this was given: 40 mg on November 29, 2024  8:45 AM        Icon medications to start taking   pregabalin 25 MG capsule  Commonly known as: LYRICA  Take 1 capsule by mouth Every 8 (Eight) Hours.  For diagnoses: Disease of nerves in the arms, legs, hands and feet  Last time this was given: 25 mg on November 29, 2024  1:53 PM  Signed by: Marv Navas 1 capsule 1 capsule  1 capsule      ASK your doctor about these medications    ASK your doctor about these medications     Morning Afternoon Evening Bedtime As Needed    Icon medications to ask your healthcare provider about   nitrofurantoin (macrocrystal-monohydrate) 100 MG capsule  Commonly known as: Macrobid  Take 1 capsule by mouth 2 (Two) Times a Day for 2 days.  Signed by: Marv Navas  Ask about: Should I take this medication?                 Where to  your medications    Icon medication  information                   these medications at [facility]    acyclovir  nitrofurantoin (macrocrystal-monohydrate)  Address: [address] [place] [address]   Hours: Monday to Friday 7 AM to 6 PM   Phone: [phone]     Icon medication  information                   these medications at [facility] [place], [place] - [address]  - [phone]  - [phone] FX    pregabalin  Address: [address] [place] [address] [facility] [address]   Phone: [phone]          History & Physical        Ramon Marc MD at 24 1701              [facility] HOSPITALIST HISTORY AND PHYSICAL    Patient Identification:  Name:  Lety Johnson  Age:  43 y.o.  Sex:  female  :  1980  MRN:  [id number]   Admit Date: 2024   Visit Number:  [id number]  Room number:  404/04  Primary Care Physician:  Provider, No Known     Subjective     Chief complaint:    Chief Complaint   Patient presents with    Fall     History of presenting illness:   43F Morbid Obesity by BMI PMH History Genital Herpes, Cerebrovascular Accident 2024 complicated by L sided paralysis, presented to [facility] emergency room  after sliding into the floor off her chair due to weakness.  Upon arrival patient afebrile, heart rate 109, respiratory rate 18, blood pressure 146/101, satting 98% on room air. Labs showed WBC count 10K, lactate 1.1, CRP 3.8, potassium 2.7, magnesium 1.5, HCG negative, blood cultures pending. CXR no acute cardiopulmonary processes.  Xray ankle/foot without fracture or dislocation.  Emergency room  provider gave antibiotics, pain medications, zofran, potassium replacement.  Hospital Medicine consulted for admission. Patient seen and examined in the emergency room, notes fevers chills at home a few days ago, recently has been on antibiotics for lower extremity cellulitis, has had some dysuria and suprapubic pain, denies current sexual activity, reports her fiance was taken to intermediate about a week ago and has been her primary caretaker, has had difficulty at home since prior stroke and caretakers not consistent, last 24hrs didn't have anyone to help her, sister endorses recent confusion/hallucinations beyond baseline, seeing dead family members, coinciding with onset of dysuria.  ---------------------------------------------------------------------------------------------------------------------   Review of Systems   Constitutional:  Positive for chills and fever.   HENT: Negative.     Eyes: Negative.    Respiratory:  Positive for shortness of breath.    Cardiovascular:  Positive for leg swelling. Negative for chest pain.   Gastrointestinal: Negative.    Endocrine: Negative.    Genitourinary:  Positive for dysuria.   Musculoskeletal: Negative.    Skin:  Positive for rash.   Allergic/Immunologic: Negative.    Neurological:  Positive for weakness.   Hematological: Negative.    Psychiatric/Behavioral:  Positive for hallucinations.      ---------------------------------------------------------------------------------------------------------------------   Past Medical History:   Diagnosis Date    Stroke      No past surgical history on file.  No family history on file.  Social History     Socioeconomic History    Marital status: Single     ---------------------------------------------------------------------------------------------------------------------   Allergies:  Patient has no known allergies.  ---------------------------------------------------------------------------------------------------------------------    Medications below are reported home medications pulling from within the system; at this time, these medications have not been reconciled unless otherwise specified and are in the verification process for further verifcation as current home medications.    Prior to Admission Medications       Prescriptions Last Dose Informant Patient Reported? Taking?    aspirin 81 MG chewable tablet Unknown  Yes No    Chew 1 tablet Daily.    atorvastatin (LIPITOR) 80 MG tablet Unknown  Yes No    Take 1 tablet by mouth Daily.    cyclobenzaprine (FLEXERIL) 10 MG tablet Unknown  Yes No    Take 1 tablet by mouth 3 (Three) Times a Day As Needed for Muscle Spasms.    DULoxetine (CYMBALTA) 60 MG capsule Unknown  Yes No    Take 1 capsule by mouth Daily.    lisinopril (PRINIVIL,ZESTRIL) 20 MG tablet Unknown  Yes No    Take 1 tablet by mouth Daily.    metFORMIN (GLUCOPHAGE) 1000 MG tablet Unknown  Yes No    Take 1 tablet by mouth 2 (Two) Times a Day With Meals.    pantoprazole (PROTONIX) 40 MG EC tablet Unknown  Yes No    Take 1 tablet by mouth Daily.          Objective     Vital Signs:  Temp:  [98.2 °F (36.8 °C)] 98.2 °F (36.8 °C)  Heart Rate:  [] 118  Resp:  [18] 18  BP: (135-157)/() 140/79    Mean Arterial Pressure (Non-Invasive) for the past 24 hrs (Last 3 readings):   Noninvasive MAP (mmHg)   09/26/24 1645 90   09/26/24 1630 100   09/26/24 1615 103     SpO2:  [91 %-100 %] 94 %  on   ;   Device (Oxygen Therapy): room air  Body mass index is 43.26 kg/m².    Wt Readings from Last 3 Encounters:   09/26/24 120 kg (264 lb)   06/27/24 120 kg (264 lb)      ---------------------------------------------------------------------------------------------------------------------   Physical Exam:  Constitutional:  Well-developed and well-nourished. Older than stated age. No acute distress.      HENT:  Head:  Normocephalic and atraumatic.  Mouth:  Moist mucous membranes.    Eyes:  Conjunctivae and EOM are normal. No scleral icterus.   "  Neck:  Neck supple.  No JVD present.    Cardiovascular:  Normal rate, regular rhythm and normal heart sounds with no murmur.  Pulmonary/Chest:  No respiratory distress, no wheezes, no crackles, with normal breath sounds and good air movement.  Abdominal:  Soft. No distension and no tenderness.   Musculoskeletal:  No tenderness, and no deformity.  No red or swollen joints anywhere.    Neurological:  Alert and oriented to person, place, and time.  No cranial nerve deficit. Chronic L sided paralysis.    Skin:  Skin is warm and dry. No pallor. Significant intertriginous erythema under panus, consistent with yeast infection.   Peripheral vascular:  No clubbing, no cyanosis, trace edema.  Psychiatric: Appropriate mood and affect  Edited by: Ramon Marc MD at 9/26/2024 1701  ---------------------------------------------------------------------------------------------------------------------  EKG:  N/A    Telemetry:  normal sinus rhythm, no significant ST changes    I have personally looked at telemetry.    Last echocardiogram:  Ordered and pending   --------------------------------------------------------------------------------------------------------------------  Labs:  Results from last 7 days   Lab Units 09/26/24  1504 09/26/24  1350   LACTATE mmol/L  --  1.1   CRP mg/dL 3.80*  --    WBC 10*3/mm3  --  10.42   HEMOGLOBIN g/dL  --  12.5   HEMATOCRIT %  --  37.4   MCV fL  --  94.0   MCHC g/dL  --  33.4   PLATELETS 10*3/mm3  --  402         Results from last 7 days   Lab Units 09/26/24  1504   SODIUM mmol/L 140   POTASSIUM mmol/L 2.7*   MAGNESIUM mg/dL 1.5*   CHLORIDE mmol/L 99   CO2 mmol/L 28.9   BUN mg/dL 10   CREATININE mg/dL 0.51*   CALCIUM mg/dL 9.4   GLUCOSE mg/dL 220*   ALBUMIN g/dL 3.5   BILIRUBIN mg/dL 0.6   ALK PHOS U/L 119*   AST (SGOT) U/L 21   ALT (SGPT) U/L 8   Estimated Creatinine Clearance: 188.1 mL/min (A) (by C-G formula based on SCr of 0.51 mg/dL (L)).  No results found for: \"AMMONIA\"          No " "results found for: \"HGBA1C\", \"POCGLU\"  No results found for: \"TSH\", \"FREET4\"  No results found for: \"PREGTESTUR\", \"PREGSERUM\", \"HCG\", \"HCGQUANT\"  Pain Management Panel           No data to display              Brief Urine Lab Results  (Last result in the past 365 days)        Color   Clarity   Blood   Leuk Est   Nitrite   Protein   CREAT   Urine HCG        09/26/24 1419 Dark Yellow   Turbid   Trace   Moderate (2+)   Positive   30 mg/dL (1+)                 No results found for: \"BLOODCX\"  No results found for: \"URINECX\"  No results found for: \"WOUNDCX\"  No results found for: \"STOOLCX\"    I have personally looked at the labs and they are summarized above.  ----------------------------------------------------------------------------------------------------------------------  Detailed radiology reports for the last 24 hours:    Imaging Results (Last 24 Hours)       Procedure Component Value Units Date/Time    XR Ankle 3+ View Left [[id number]] Collected: 09/26/24 1537     Updated: 09/26/24 1540    Narrative:      EXAM:    XR Left Ankle Complete, 3 or More Views     EXAM DATE:    9/26/2024 3:17 PM     CLINICAL HISTORY:    left ankle injury     TECHNIQUE:    Frontal, lateral and oblique views of the left ankle.     COMPARISON:    No relevant prior studies available.     FINDINGS:    Bones/joints:  See below.      Soft tissues:  Soft tissue swelling, but no acute fracture or  dislocation.       Impression:        Soft tissue swelling, but no acute fracture or dislocation.        This report was finalized on 9/26/2024 3:38 PM by Dr. Moses Otto MD.       XR Foot 3+ View Left [[id number]] Collected: 09/26/24 1536     Updated: 09/26/24 1539    Narrative:      EXAM:    XR Left Foot Complete, 3 or More Views     EXAM DATE:    9/26/2024 3:16 PM     CLINICAL HISTORY:    left foot wound     TECHNIQUE:    Frontal, lateral and oblique views of the left foot.     COMPARISON:    No relevant prior studies available.     FINDINGS:    " Bones/joints:  Unremarkable.  No acute fracture.  No dislocation.    Soft tissues:  Unremarkable.  No radiopaque foreign body.       Impression:        No acute findings in the left foot.        This report was finalized on 9/26/2024 3:37 PM by Dr. Moses Otto MD.       XR Chest 1 View [[id number]] Collected: 09/26/24 1406     Updated: 09/26/24 1408    Narrative:      XR CHEST 1 VW-     CLINICAL INDICATION: weakness        COMPARISON: None immediately available      TECHNIQUE: Single frontal view of the chest.     FINDINGS:     LUNGS: Lungs are adequately aerated.      HEART AND MEDIASTINUM: Heart and mediastinal contours are unremarkable        SKELETON: Bony and soft tissue structures are unremarkable.             Impression:      No radiographic evidence of acute cardiac or pulmonary disease.           This report was finalized on 9/26/2024 2:06 PM by Dr. Moses Otto MD.       CT Cervical Spine Without Contrast [[id number]] Collected: 09/26/24 1317     Updated: 09/26/24 1319    Narrative:      CT CERVICAL SPINE WO CONTRAST-     CLINICAL INDICATION: neck pain        COMPARISON: None available     TECHNIQUE: Axial images of the cervical spine were acquired with out any  intravenous contrast. Reformatted images were then created in the  sagittal and coronal planes.     DOSE:      Radiation dose reduction techniques were utilized per ALARA protocol.  Automated exposure control was initiated through either or CareDoPrivcap or  DoseRigAtheroNova software packages by  protocol.           FINDINGS:   The provided study demonstrates preservation of the vertebral body  heights in the sagittally reconstructed images.     There is no prevertebral soft tissue swelling.     Alignment is near anatomic.     The disc space heights are uniform.     I see no acute cervical spine fracture.       Impression:         1. No acute bony abnormality.     2. Other incidental findings as above     This report was finalized on 9/26/2024  1:17 PM by Dr. Moses Otto MD.       CT Lumbar Spine Without Contrast [[id number]] Collected: 09/26/24 1317     Updated: 09/26/24 1319    Narrative:      EXAM:    CT Lumbar Spine Without Intravenous Contrast     EXAM DATE:    9/26/2024 12:59 PM     CLINICAL HISTORY:    back pain     TECHNIQUE:    Axial computed tomography images of the lumbar spine without  intravenous contrast.  Sagittal and coronal reformatted images were  created and reviewed.  This CT exam was performed using one or more of  the following dose reduction techniques:  automated exposure control,  adjustment of the mA and/or kV according to patient size, and/or use of  iterative reconstruction technique.     COMPARISON:    No relevant prior studies available.     FINDINGS:    VERTEBRAE:  Unremarkable.  No acute fracture.    DISCS/SPINAL CANAL/NEURAL FORAMINA:  No acute findings.  No spinal  canal stenosis.    SOFT TISSUES:  Unremarkable.       Impression:        No acute findings in the lumbar spine.        This report was finalized on 9/26/2024 1:17 PM by Dr. Moses Otto MD.       CT Thoracic Spine Without Contrast [[id number]] Collected: 09/26/24 1316     Updated: 09/26/24 1319    Narrative:      EXAM:    CT Thoracic Spine Without Intravenous Contrast     EXAM DATE:    9/26/2024 12:58 PM     CLINICAL HISTORY:    back pain     TECHNIQUE:    Axial computed tomography images of the thoracic spine without  intravenous contrast.  Sagittal and coronal reformatted images were  created and reviewed.  This CT exam was performed using one or more of  the following dose reduction techniques:  automated exposure control,  adjustment of the mA and/or kV according to patient size, and/or use of  iterative reconstruction technique.     COMPARISON:    No relevant prior studies available.     FINDINGS:    VERTEBRAE:  Unremarkable.  No acute fracture.    DISCS/SPINAL CANAL/NEURAL FORAMINA:  No acute findings.  No spinal  canal stenosis.    SOFT TISSUES:   Unremarkable.       Impression:        No acute findings in the thoracic spine.        This report was finalized on 9/26/2024 1:17 PM by Dr. Moses Otto MD.       CT Head Without Contrast [[id number]] Collected: 09/26/24 1259     Updated: 09/26/24 1302    Narrative:      CT HEAD WO CONTRAST-     CLINICAL INDICATION: weakness        COMPARISON: None available     TECHNIQUE: Axial images of the brain were obtained with out intravenous  contrast.  Reformatted images were created in the sagittal and coronal  planes.     DOSE:     Radiation dose reduction techniques were utilized per ALARA protocol.  Automated exposure control was initiated through either or Boticca or  DoseRight software packages by  protocol.        FINDINGS:    BRAIN: Probable subacute ischemia right middle cerebral artery  territory    VENTRICLES:  Unremarkable.  No ventriculomegaly.       BONES/JOINTS:  Unremarkable.  No acute fracture.       SOFT TISSUES:  Unremarkable.       SINUSES:  no air fluid levels       MASTOID AIR CELLS:  Unremarkable as visualized.  No mastoid effusion.          Impression:         1. Probable remote right middle cerebral artery infarction  2. No acute parenchymal mass, hemorrhage, or midline shift     This report was finalized on 9/26/2024 1:00 PM by Dr. Moses Otto MD.             I have personally looked at the radiology images and read the final radiology report.    Assessment & Plan    43F Morbid Obesity by BMI PMH History Genital Herpes, Cerebrovascular Accident 5/2024 complicated by L sided paralysis, presented to [facility] emergency room 9/26 after sliding into the floor off her chair due to weakness.     #Acute Metabolic Encephalopathy due to Acute Urinary Tract Infection in setting of probable neurogenic bladder  - Continue Ceftriaxone, follow up cultures, rule out STI    #Electrolyte Abnormalities  - Acute Mild Hypokalemia - Replacing, on protocol  - Acute Mild Hypomagnesemia -  Replacing, on protocol    #History Cerebrovascular Accident complicated by L sided paralysis, unclear etiology  - Review home medications and resume as indicated, Supportive Care     #Debility  - Consult PT/OT, Social Work if placement needed     #History Genital Herpes  - Supportive Care, follow up medication reconciliation for any suppressive medications, check HIV & acute hepatitis panel     #Morbid Obesity by BMI  - Body mass index is 43.26 kg/m².; complicates all aspects of care    F: Oral  E: Monitor & Replace as needed   N: Regular Diet  PPx: SQH  Code Status (Patient has no pulse and is not breathing): CPR  Medical Interventions (Patient has pulse or is breathing): Full Support     Dispo: Pending workup and clinical improvement    *This patient is considered high risk secondary to Urinary Tract Infection, electrolyte abnormalities, chronic L sided paralysis from prior Cerebrovascular Accident.      Edited by: Ramon Marc MD at 2024    Ramon Marc MD  [facility]  24  17:01 EDT        Electronically signed by Ramon Marc MD at 24 1703          Physician Progress Notes (most recent note)        Marv Navas DO at 24 1132              [facility]     Patient Identification:  Name:  Lety Johnson  Age:  44 y.o.  Sex:  female  :  1980  MRN:  [id number]  Visit Number:  [id number]  Primary Care Provider:  Concha Rao APRN    Length of stay:  62    Chief complaint: None    Subjective:    Patient doing well with no new complaints.  Per nursing staff, no new events overnight.  Patient still pending placement.  ----------------------------------------------------------------------------------------------------------------------  Current Hospital Meds:  aspirin, 81 mg, Oral, Daily  atorvastatin, 80 mg, Oral, Daily  DULoxetine, 90 mg, Oral, Daily  heparin (porcine), 5,000 Units, Subcutaneous,  Q8H  insulin glargine, 20 Units, Subcutaneous, Daily With Breakfast  insulin glargine, 40 Units, Subcutaneous, Nightly  insulin lispro, 4-24 Units, Subcutaneous, 4x Daily AC & at Bedtime  insulin lispro, 8 Units, Subcutaneous, Daily With Lunch  Lidocaine, 3 patch, Transdermal, Q24H  lisinopril, 10 mg, Oral, Daily  magic barrier cream, , Topical, BID  metoprolol tartrate, 25 mg, Oral, Q12H  nicotine, 1 patch, Transdermal, Q24H  nystatin, , Topical, Q12H  pantoprazole, 40 mg, Oral, Daily  pregabalin, 25 mg, Oral, Q8H  sodium chloride, 10 mL, Intravenous, Q12H  sodium chloride, 10 mL, Intravenous, Q12H      Pharmacy Consult,       ----------------------------------------------------------------------------------------------------------------------  Vital Signs:  Temp:  [98 °F (36.7 °C)-98.3 °F (36.8 °C)] 98.3 °F (36.8 °C)  Heart Rate:  [] 93  Resp:  [16-18] 16  BP: ()/(58-73) 104/62      10/07/24  0511 10/08/24  0500 10/09/24  0500   Weight: 102 kg (225 lb 14.4 oz) (FIFI cameron) 102 kg (225 lb 15.5 oz) 102 kg (224 lb 1.6 oz)     Body mass index is 36.73 kg/m².    Intake/Output Summary (Last 24 hours) at 11/29/2024 1132  Last data filed at 11/29/2024 0847  Gross per 24 hour   Intake 1220 ml   Output 1550 ml   Net -330 ml     Diet: Regular/House, Diabetic; Consistent Carbohydrate; Fluid Consistency: Thin (IDDSI 0)  ----------------------------------------------------------------------------------------------------------------------  Physical Exam:  General: Patient appears awake, alert, and in no acute distress.  Head: Normocephalic, atraumatic  Eyes: EOMI. Conjunctivae and sclerae normal.  Ears: Ears appear intact with no abnormalities noted.   Neck: Trachea midline. No obvious JVD.  Lungs: Respirations appear to be regular, even and unlabored with no signs of respiratory distress. No audible wheezing.  Abdomen: No obvious abdominal distension.  MS: Muscle tone appears normal. No gross  "deformities.  Extremities: No clubbing, cyanosis or edema noted.  Skin: No visible bleeding, bruising, or rash.  Neurologic: Alert and oriented x3. No gross focal deficits.     No significant change in physical exam in comparison to 11/28/2024    ---------------------------------------------------------------------------------------  ----------------------------------------------------------------------------------------------------------------------                      Invalid input(s): \"PROT\"  Estimated Creatinine Clearance: 163.6 mL/min (A) (by C-G formula based on SCr of 0.53 mg/dL (L)).    No results found for: \"AMMONIA\"      No results found for: \"BLOODCX\"  No results found for: \"URINECX\"  No results found for: \"WOUNDCX\"  No results found for: \"STOOLCX\"    I have personally looked at the labs and they are summarized above.  ----------------------------------------------------------------------------------------------------------------------  Imaging Results (Last 24 Hours)       ** No results found for the last 24 hours. **          ----------------------------------------------------------------------------------------------------------------------  Assessment and Plan:    ESBL E. coli acute cystitis -completed full course of Invanz  History of CVA with left-sided hemiparesis -supportive care  Type 2 diabetes mellitus -continue Accu-Cheks before every meal and nightly with sliding scale insulin  Genital herpes -continue suppressive acyclovir  Morbid obesity    Overall, no significant changes in physical exam compared to yesterday.  Patient still awaiting placement.    Disposition still pending placement    Marv Navas DO   11/29/24   11:32 EST       Electronically signed by Marv Navas DO at 11/29/24 0073          Consult Notes (most recent note)        Prasanth Ramirez MD at 11/20/24 7788          Referring Provider: Yonas  Reason for Consultation: depression      Chief " complaint/Focus of Exam: depression    Subjective .     History of present illness:    43-year-old female with a history of CVA admitted on 9/26/2024 for management of encephalopathy, weakness, fall.  Psychiatry consulted for depression.    Patient is seen in room today.  She was awake, alert and oriented x 4.  She reports feeling down earlier in hospitalization, but says that she was able to speak to her family and dwight last night, which helped a lot.  Dwight has a court date and will potentially be moved to house arrest in January.  She denies significant depressive symptoms or need for medication changes at this time.  Denies SI, HI, AVH.    Review of Systems  Pertinent items are noted in HPI, all other systems reviewed and negative    History  Past Medical History:   Diagnosis Date    Stroke    , History reviewed. No pertinent surgical history., History reviewed. No pertinent family history.,   Social History     Socioeconomic History    Marital status: Single   Tobacco Use    Smoking status: Every Day     Average packs/day: 2.0 packs/day for 26.0 years (52.0 ttl pk-yrs)     Types: Cigarettes     Start date: 1998    Smokeless tobacco: Never   Vaping Use    Vaping status: Never Used   Substance and Sexual Activity    Alcohol use: Not Currently    Drug use: Never    Sexual activity: Not Currently     Partners: Male     Comment:  in senior care     E-cigarette/Vaping    E-cigarette/Vaping Use Never User     Passive Exposure No     Counseling Given No      E-cigarette/Vaping Substances    Nicotine No     THC No     CBD No     Flavoring No      E-cigarette/Vaping Devices    Disposable No     Pre-filled or Refillable Cartridge No     Refillable Tank No     Pre-filled Pod No          ,   Medications Prior to Admission   Medication Sig Dispense Refill Last Dose/Taking    aspirin 81 MG chewable tablet Chew 1 tablet Daily.   Unknown    atorvastatin (LIPITOR) 80 MG tablet Take 1 tablet by mouth Daily.   Unknown     cyclobenzaprine (FLEXERIL) 10 MG tablet Take 1 tablet by mouth 3 (Three) Times a Day As Needed for Muscle Spasms.   Unknown    DULoxetine (CYMBALTA) 60 MG capsule Take 1 capsule by mouth Daily.   Unknown    lisinopril (PRINIVIL,ZESTRIL) 20 MG tablet Take 1 tablet by mouth Daily.   Unknown    metFORMIN (GLUCOPHAGE) 1000 MG tablet Take 1 tablet by mouth 2 (Two) Times a Day With Meals.   Unknown    pantoprazole (PROTONIX) 40 MG EC tablet Take 1 tablet by mouth Daily.   Unknown    [DISCONTINUED] acyclovir (ZOVIRAX) 400 MG tablet Take 1 tablet by mouth Every Night.      , Scheduled Meds:  acyclovir, 400 mg, Oral, Nightly  aspirin, 81 mg, Oral, Daily  atorvastatin, 80 mg, Oral, Daily  DULoxetine, 60 mg, Oral, Daily  heparin (porcine), 5,000 Units, Subcutaneous, Q8H  insulin glargine, 20 Units, Subcutaneous, Daily With Breakfast  insulin glargine, 40 Units, Subcutaneous, Nightly  insulin lispro, 4-24 Units, Subcutaneous, 4x Daily AC & at Bedtime  insulin lispro, 8 Units, Subcutaneous, Daily With Lunch  Lidocaine, 3 patch, Transdermal, Q24H  lisinopril, 20 mg, Oral, Daily  magic barrier cream, , Topical, BID  metoprolol tartrate, 25 mg, Oral, Q12H  nicotine, 1 patch, Transdermal, Q24H  nystatin, , Topical, Q12H  pantoprazole, 40 mg, Oral, Daily  pregabalin, 25 mg, Oral, Q8H  sodium chloride, 10 mL, Intravenous, Q12H  sodium chloride, 10 mL, Intravenous, Q12H   , Continuous Infusions:  Pharmacy Consult,    , PRN Meds:    acetaminophen    senna-docusate sodium **AND** polyethylene glycol **AND** bisacodyl **AND** bisacodyl    Calcium Replacement - Follow Nurse / BPA Driven Protocol    cyclobenzaprine    dextrose    dextrose    Diclofenac Sodium    glucagon (human recombinant)    loperamide    Magnesium Standard Dose Replacement - Follow Nurse / BPA Driven Protocol    melatonin    Pharmacy Consult    Phosphorus Replacement - Follow Nurse / BPA Driven Protocol    Potassium Replacement - Follow Nurse / BPA Driven Protocol     prochlorperazine    sodium chloride    sodium chloride    sodium chloride    sodium chloride    traMADol, and Allergies:  Patient has no known allergies.    Objective     Vital Signs   Temp:  [97.4 °F (36.3 °C)-98.1 °F (36.7 °C)] 98.1 °F (36.7 °C)  Heart Rate:  [] 97  Resp:  [16] 16  BP: ()/(53-68) 93/59    Mental Status Exam:  Hygiene:   good  Cooperation:  Cooperative  Eye Contact:  Good  Psychomotor Behavior:  Appropriate  Affect:  Full range  Hopelessness: Denies  Speech:  Normal  Thought Progress:  Goal directed and Linear  Thought Content:  Normal  Suicidal:  None  Homicidal:  None  Hallucinations:  None  Delusion:  None  Memory:  Intact  Orientation:  Person, Place, Time, and Situation  Reliability:  fair  Insight:  Fair  Judgement:  Fair  Impulse Control:  Fair    Results Review:   I reviewed the patient's new clinical results.  I reviewed the patient's other test results and agree with the interpretation  I personally viewed and interpreted the patient's EKG/Telemetry data  Lab Results (last 24 hours)       Procedure Component Value Units Date/Time    POC Glucose Once [[id number]]  (Normal) Collected: 11/20/24 0650    Specimen: Blood Updated: 11/20/24 0656     Glucose 102 mg/dL     POC Glucose Once [[id number]]  (Abnormal) Collected: 11/19/24 1944    Specimen: Blood Updated: 11/19/24 1950     Glucose 310 mg/dL     POC Glucose Once [[id number]]  (Abnormal) Collected: 11/19/24 1557    Specimen: Blood Updated: 11/19/24 1604     Glucose 177 mg/dL     POC Glucose Once [[id number]]  (Abnormal) Collected: 11/19/24 1033    Specimen: Blood Updated: 11/19/24 1039     Glucose 280 mg/dL           Imaging Results (Last 24 Hours)       ** No results found for the last 24 hours. **              Assessment & Plan     Active Problems:    * No active hospital problems. *     Adjustment disorder with disturbance of mood and anxiety  -Recent stressors appear situational, potentially personality related.  Patient  reports feeling better now and denies need for medication changes.  -Refer for outpatient psychotherapy at discharge    Unspecified depressive disorder  -Continue Cymbalta 60 mg daily.  Could increase to 90 mg daily if depressive symptoms worsen    I discussed the patient's findings and my recommendations with patient    Prasanth Ramirez MD  24  09:59 EST    Electronically signed by Prasanth Ramirez MD at 24 1113          Physical Therapy Notes (most recent note)        Thierry Saldivar, PT at 24 1050  Version 1 of 1         Acute Care - Physical Therapy Treatment Note/Discharge   [facility]     Patient Name: Lety Johnson  : 1980  MRN: [id number]  Today's Date: 2024   Onset of Illness/Injury or Date of Surgery: 24     Referring Physician: Dr. Marc      Admit Date: 2024    Visit Dx:    ICD-10-CM ICD-9-CM   1. Hypokalemia  E87.6 276.8   2. Pressure injury of skin of sacral region, unspecified injury stage  L89.159 707.03     707.20   3. Pressure injury of skin of left heel, unspecified injury stage  L89.629 707.07     707.20   4. Acute cystitis without hematuria  N30.00 595.0     Patient Active Problem List   Diagnosis   (none) - all problems resolved or deleted     Past Medical History:   Diagnosis Date    Stroke      History reviewed. No pertinent surgical history.    PT Assessment (Last 12 Hours)       PT Evaluation and Treatment       Row Name 24 1039          Physical Therapy Time and Intention    Subjective Information complains of;fatigue;pain  -KM     Document Type discharge treatment  -KM     Mode of Treatment individual therapy;physical therapy  -KM     Patient Effort poor  -KM     Symptoms Noted During/After Treatment fatigue  -KM       Row Name 24 1039          General Information    Patient Profile Reviewed yes  -KM     Patient Observations poorly cooperative  -KM     Existing Precautions/Restrictions fall  -KM       Row Name 24 1030           Cognition    Affect/Mental Status (Cognition) low arousal/lethargic  -     Orientation Status (Cognition) oriented x 3  -KM     Follows Commands (Cognition) WFL  -KM       Row Name 11/27/24 1039          Bed Mobility    Comment, (Bed Mobility) Pt. refused d/t compaints of pain and fatigue  -KM       Row Name 11/27/24 1039          Transfers    Comment, (Transfers) pt. deferred  -KM       Row Name 11/27/24 1039          Gait/Stairs (Locomotion)    Patient was able to Ambulate no, other medical factors prevent ambulation  -     Reason Patient was unable to Ambulate Non-Ambulatory at Baseline  -KM       Row Name 11/27/24 1039          Safety Issues/Impairments Affecting Functional Mobility    Impairments Affecting Function (Mobility) endurance/activity tolerance;pain;range of motion (ROM);strength;coordination;motor control;muscle tone abnormal  -KM       Row Name 11/27/24 1039          Motor Skills    Comments, Therapeutic Exercise supine ther-ex  -KM       Row Name             Wound 09/26/24 1809 Left posterior ankle Other (comment)    Wound - Properties Group Placement Date: 09/26/24  -KJ Placement Time: 1809  -KJ Side: Left  -KJ Orientation: posterior  -KJ Location: ankle  -KJ Primary Wound Type: Other  -TW, unknow etiology     Retired Wound - Properties Group Placement Date: 09/26/24  -KJ Placement Time: 1809  -KJ Side: Left  -KJ Orientation: posterior  -KJ Location: ankle  -KJ Primary Wound Type: Other  -TW, unknow etiology     Retired Wound - Properties Group Placement Date: 09/26/24  -KJ Placement Time: 1809  -KJ Side: Left  -KJ Orientation: posterior  -KJ Location: ankle  -KJ Primary Wound Type: Other  -TW, unknow etiology     Retired Wound - Properties Group Date first assessed: 09/26/24  -KJ Time first assessed: 1809  -KJ Side: Left  -KJ Location: ankle  -KJ Primary Wound Type: Other  -TW, unknow etiology       Row Name             Wound 10/11/24 1330 medial coccyx Fissure    Wound - Properties Group  Placement Date: 10/11/24  -TW Placement Time: 1330 -TW Orientation: medial  -TW Location: coccyx  -TW Primary Wound Type: Fissure  -TW    Retired Wound - Properties Group Placement Date: 10/11/24  -TW Placement Time: 1330 -TW Orientation: medial  -TW Location: coccyx  -TW Primary Wound Type: Fissure  -TW    Retired Wound - Properties Group Placement Date: 10/11/24  -TW Placement Time: 1330  -TW Orientation: medial  -TW Location: coccyx  -TW Primary Wound Type: Fissure  -TW    Retired Wound - Properties Group Date first assessed: 10/11/24  -TW Time first assessed: 1330 -TW Location: coccyx  -TW Primary Wound Type: Fissure  -TW      Row Name             Wound 10/16/24 0705 Left medial gluteal MASD (moisture associated skin damage)    Wound - Properties Group Placement Date: 10/16/24  -MS Placement Time: 0705 -MS Side: Left  -MS Orientation: medial  -MS Location: gluteal  -MS Primary Wound Type: MASD  -MS Type: MASD (moisture associated skin damage)  -MS Present on Original Admission: N  -MS Additional Comments: Noted at shift change skin assesment, Night shift RN stated it had been there her shift.  -MS    Retired Wound - Properties Group Placement Date: 10/16/24  -MS Placement Time: 0705 -MS Present on Original Admission: N  -MS Side: Left  -MS Orientation: medial  -MS Location: gluteal  -MS Primary Wound Type: MASD  -MS Additional Comments: Noted at shift change skin assesment, Night shift RN stated it had been there her shift.  -MS Type: MASD (moisture associated skin damage)  -MS    Retired Wound - Properties Group Placement Date: 10/16/24  -MS Placement Time: 0705 -MS Present on Original Admission: N  -MS Side: Left  -MS Orientation: medial  -MS Location: gluteal  -MS Primary Wound Type: MASD  -MS Additional Comments: Noted at shift change skin assesment, Night shift RN stated it had been there her shift.  -MS Type: MASD (moisture associated skin damage)  -MS    Retired Wound - Properties Group Date first  assessed: 10/16/24  -MS Time first assessed: 0705  -MS Present on Original Admission: N  -MS Side: Left  -MS Location: gluteal  -MS Primary Wound Type: MASD  -MS Additional Comments: Noted at shift change skin assesment, Night shift RN stated it had been there her shift.  -MS Type: MASD (moisture associated skin damage)  -MS      Row Name             Wound 10/16/24 0705 Left posterior greater trochanter MASD (moisture associated skin damage)    Wound - Properties Group Placement Date: 10/16/24  -MS Placement Time: 0705  -MS Side: Left  -MS Orientation: posterior  -MS Location: greater trochanter  -MS Primary Wound Type: MASD  -MS Type: MASD (moisture associated skin damage)  -MS Present on Original Admission: N  -MS    Retired Wound - Properties Group Placement Date: 10/16/24  -MS Placement Time: 0705  -MS Present on Original Admission: N  -MS Side: Left  -MS Orientation: posterior  -MS Location: greater trochanter  -MS Primary Wound Type: MASD  -MS Type: MASD (moisture associated skin damage)  -MS    Retired Wound - Properties Group Placement Date: 10/16/24  -MS Placement Time: 0705  -MS Present on Original Admission: N  -MS Side: Left  -MS Orientation: posterior  -MS Location: greater trochanter  -MS Primary Wound Type: MASD  -MS Type: MASD (moisture associated skin damage)  -MS    Retired Wound - Properties Group Date first assessed: 10/16/24  -MS Time first assessed: 0705  -MS Present on Original Admission: N  -MS Side: Left  -MS Location: greater trochanter  -MS Primary Wound Type: MASD  -MS Type: MASD (moisture associated skin damage)  -MS      Row Name 11/27/24 1039          Progress Summary (PT)    Daily Progress Summary (PT) Pt. has continued to refuse all mobility skills for third day in a row. She has refused mobility many times each week over many weeks. Pt. has shown no willingness to participate and improve w/ PT. PT has explained to pt. the importance of mobility and working w/ PT. Pt. continues to  show no effort in improving. Pt. to be discharged from PT case load at this time. Consult PT if pt. is willing to participate and is interested in improving mobility. Thank you.  -KM       Row Name 11/27/24 1039          Physical Therapy Goals    Bed Mobility Goal Selection (PT) bed mobility, PT goal 1  -KM     Transfer Goal Selection (PT) transfer, PT goal 1  -KM       Row Name 11/27/24 1039          Bed Mobility Goal 1 (PT)    Activity/Assistive Device (Bed Mobility Goal 1, PT) bed mobility activities, all  -KM     Childs Level/Cues Needed (Bed Mobility Goal 1, PT) moderate assist (50-74% patient effort)  -KM     Time Frame (Bed Mobility Goal 1, PT) by discharge  -KM     Progress/Outcomes (Bed Mobility Goal 1, PT) goal not met  -KM       Row Name 11/27/24 1039          Transfer Goal 1 (PT)    Activity/Assistive Device (Transfer Goal 1, PT) transfers, all  -KM     Childs Level/Cues Needed (Transfer Goal 1, PT) moderate assist (50-74% patient effort)  -KM     Time Frame (Transfer Goal 1, PT) by discharge  -KM     Progress/Outcome (Transfer Goal 1, PT) goal not met  -KM       Row Name 11/27/24 1039          Discharge Summary (PT)    Additional Documentation Discharge Summary (PT) (Group)  -KM       Row Name 11/27/24 1039          Discharge Summary (PT)    Reason for Discharge (PT Discharge Summary) no further expectation of functional progress;other (see comments)  Pt. only interested in light exercise in supine. She refuses to attempt any mobility skills for multiple days in a row.  -KM     Outcomes Achieved Upon Discharge (PT Discharge Summary) unable to make functional progress toward goals;unable to tolerate or actively participate in therapy  -KM     Transfer to Another Level of Care or Facility (PT Discharge Summary) skilled nursing facility;other (see comments)  home w/ 24/7 care  -KM     Discharge Summary Statement (PT) Pt. has continued to refuse all mobility skills for third day in a row. She  has refused mobility many times each week over many weeks. Pt. has shown no willingness to participate and improve w/ PT. PT has explained to pt. the importance of mobility and working w/ PT. Pt. continues to show no effort in improving. Pt. to be discharged from PT case load at this time. Consult PT if pt. is willing to participate and is interested in improving mobility. Thank you.  -               User Key  (r) = Recorded By, (t) = Taken By, (c) = Cosigned By      Initials Name Provider Type    Karen Peralta, RN Registered Nurse    Thierry Carter, PT Physical Therapist    Nory Keenan, RN Registered Nurse    Roe North, RN Registered Nurse                      Physical Therapy Education       Title: PT OT SLP Therapies (Done)       Topic: Physical Therapy (Done)       Point: Mobility training (Done)       Learning Progress Summary            Patient Acceptance, E, VU by  at 11/22/2024 0229    Acceptance, E, VU by  at 11/21/2024 0236    Acceptance, E,TB, VU by  at 11/17/2024 1024    Acceptance, E,TB, VU by RR at 11/14/2024 0148    Acceptance, E,TB, VU by RR at 11/13/2024 0218    Acceptance, E,TB, VU by RG at 11/12/2024 0927    Acceptance, TB,E, VU by RG at 11/11/2024 0944    Acceptance, E,TB, VU by RG at 11/10/2024 0931    Acceptance, E, VU by  at 11/10/2024 0233    Acceptance, E,TB, VU by CF at 11/9/2024 1016    Acceptance, E, NR by SC at 11/3/2024 0025    Acceptance, E,TB, VU by RR at 11/1/2024 2315    Acceptance, E,D, VU,NR by  at 10/31/2024 1234    Comment: PT educated pt. in importance of mobilization and L L/UE PROM and self ROM to prevent contracture.  Pt. is encouraged to to perform R LE AROM frequently while supine or sitting EOB.    Acceptance, E,TB, VU by RR at 10/27/2024 0031    Acceptance, E,TB, VU by RG at 10/22/2024 1002    Acceptance, TB,E, VU by RG at 10/21/2024 0907    Nonacceptance, E, NR by WG at 10/16/2024 0228    Acceptance, E,TB, VU by CF at 10/15/2024  0753    Acceptance, E,TB, VU by CF at 10/14/2024 0801    Acceptance, E,TB, VU by CF at 10/13/2024 1045    Acceptance, E,D, VU,NR by AG at 10/10/2024 1310    Acceptance, E,TB, VU by CB at 10/8/2024 0448    Acceptance, E, NR by RD at 10/2/2024 1101    Acceptance, E, NR by RD at 10/1/2024 0856    Acceptance, E,D, VU,NR by AG at 9/30/2024 1559                      Point: Home exercise program (Done)       Learning Progress Summary            Patient Acceptance, E, VU by WG at 11/22/2024 0229    Acceptance, E, VU by WG at 11/21/2024 0236    Acceptance, E,TB, VU by CF at 11/17/2024 1024    Acceptance, E,TB, VU by RR at 11/14/2024 0148    Acceptance, E,TB, VU by RR at 11/13/2024 0218    Acceptance, E,TB, VU by RG at 11/12/2024 0927    Acceptance, TB,E, VU by RG at 11/11/2024 0944    Acceptance, E,TB, VU by RG at 11/10/2024 0931    Acceptance, E, VU by WG at 11/10/2024 0233    Acceptance, E,TB, VU by CF at 11/9/2024 1016    Acceptance, E, NR by SC at 11/3/2024 0025    Acceptance, E,TB, VU by RR at 11/1/2024 2315    Acceptance, E,D, VU,NR by AG at 10/31/2024 1234    Comment: PT educated pt. in importance of mobilization and L L/UE PROM and self ROM to prevent contracture.  Pt. is encouraged to to perform R LE AROM frequently while supine or sitting EOB.    Acceptance, E,TB, VU by RR at 10/27/2024 0031    Acceptance, E,TB, VU by RG at 10/22/2024 1002    Acceptance, TB,E, VU by RG at 10/21/2024 0907    Nonacceptance, E, NR by WG at 10/16/2024 0228    Acceptance, E,TB, VU by CF at 10/15/2024 0753    Acceptance, E,TB, VU by CF at 10/14/2024 0801    Acceptance, E,TB, VU by CF at 10/13/2024 1045    Acceptance, E,D, VU,NR by AG at 10/10/2024 1310    Acceptance, E,TB, VU by CB at 10/8/2024 0448    Acceptance, E, NR by RD at 10/2/2024 1101    Acceptance, E, NR by RD at 10/1/2024 0856    Acceptance, E,D, VU,NR by AG at 9/30/2024 1559                      Point: Body mechanics (Done)       Learning Progress Summary            Patient  Acceptance, E, VU by WG at 11/22/2024 0229    Acceptance, E, VU by WG at 11/21/2024 0236    Acceptance, E,TB, VU by CF at 11/17/2024 1024    Acceptance, E,TB, VU by RR at 11/14/2024 0148    Acceptance, E,TB, VU by RR at 11/13/2024 0218    Acceptance, E,TB, VU by RG at 11/12/2024 0927    Acceptance, TB,E, VU by RG at 11/11/2024 0944    Acceptance, E,TB, VU by RG at 11/10/2024 0931    Acceptance, E, VU by WG at 11/10/2024 0233    Acceptance, E,TB, VU by CF at 11/9/2024 1016    Acceptance, E, NR by SC at 11/3/2024 0025    Acceptance, E,TB, VU by RR at 11/1/2024 2315    Acceptance, E,D, VU,NR by AG at 10/31/2024 1234    Comment: PT educated pt. in importance of mobilization and L L/UE PROM and self ROM to prevent contracture.  Pt. is encouraged to to perform R LE AROM frequently while supine or sitting EOB.    Acceptance, E,TB, VU by RR at 10/27/2024 0031    Acceptance, E,TB, VU by RG at 10/22/2024 1002    Acceptance, TB,E, VU by RG at 10/21/2024 0907    Nonacceptance, E, NR by WG at 10/16/2024 0228    Acceptance, E,TB, VU by CF at 10/15/2024 0753    Acceptance, E,TB, VU by CF at 10/14/2024 0801    Acceptance, E,TB, VU by CF at 10/13/2024 1045    Acceptance, E,D, VU,NR by AG at 10/10/2024 1310    Acceptance, E,TB, VU by CB at 10/8/2024 0448    Acceptance, E, NR by RD at 10/2/2024 1101    Acceptance, E, NR by RD at 10/1/2024 0856    Acceptance, E,D, VU,NR by AG at 9/30/2024 1559                      Point: Precautions (Done)       Learning Progress Summary            Patient Acceptance, E, VU by WG at 11/22/2024 0229    Acceptance, E, VU by WG at 11/21/2024 0236    Acceptance, E,TB, VU by CF at 11/17/2024 1024    Acceptance, E,TB, VU by RR at 11/14/2024 0148    Acceptance, E,TB, VU by RR at 11/13/2024 0218    Acceptance, E,TB, VU by RG at 11/12/2024 0927    Acceptance, TB,E, VU by RG at 11/11/2024 0944    Acceptance, E,TB, VU by RG at 11/10/2024 0931    Acceptance, E, VU by WG at 11/10/2024 0233    Acceptance, E,TB, VU  by CF at 11/9/2024 1016    Acceptance, E, NR by SC at 11/3/2024 0025    Acceptance, E,TB, VU by RR at 11/1/2024 2315    Acceptance, E,D, VU,NR by AG at 10/31/2024 1234    Comment: PT educated pt. in importance of mobilization and L L/UE PROM and self ROM to prevent contracture.  Pt. is encouraged to to perform R LE AROM frequently while supine or sitting EOB.    Acceptance, E,TB, VU by RR at 10/27/2024 0031    Acceptance, E,TB, VU by RG at 10/22/2024 1002    Acceptance, TB,E, VU by RG at 10/21/2024 0907    Nonacceptance, E, NR by WG at 10/16/2024 0228    Acceptance, E,TB, VU by CF at 10/15/2024 0753    Acceptance, E,TB, VU by CF at 10/14/2024 0801    Acceptance, E,TB, VU by CF at 10/13/2024 1045    Acceptance, E,D, VU,NR by AG at 10/10/2024 1310    Acceptance, E,TB, VU by CB at 10/8/2024 0448    Acceptance, E, NR by RD at 10/2/2024 1101    Acceptance, E, NR by RD at 10/1/2024 0856    Acceptance, E,D, VU,NR by  at 9/30/2024 1559                                      User Key       Initials Effective Dates Name Provider Type Discipline     06/16/21 -  Мария Joya, PT Physical Therapist PT    RD 06/16/21 -  Laisha Verdugo, RN Registered Nurse Nurse    RR 06/11/24 -  Jaymie Dominguez, RN Registered Nurse Nurse    RG 06/06/23 -  Jesus Matthews, RN Registered Nurse Nurse    WG 02/12/24 -  Shanthi Burden, RN Registered Nurse Nurse    CF 06/25/24 -  Cherelle Germain, RN Registered Nurse Nurse    CB 06/17/24 -  Fidelia Lombardo, RN Registered Nurse Nurse    SC 09/11/24 -  Sally Hair, RN Registered Nurse Nurse                    PT Recommendation and Plan  Anticipated Discharge Disposition (PT): skilled nursing facility, home with 24/7 care  Progress Summary (PT)  Daily Progress Summary (PT): Pt. has continued to refuse all mobility skills for third day in a row. She has refused mobility many times each week over many weeks. Pt. has shown no willingness to participate and improve w/ PT. PT has explained to pt. the  importance of mobility and working w/ PT. Pt. continues to show no effort in improving. Pt. to be discharged from PT case load at this time. Consult PT if pt. is willing to participate and is interested in improving mobility. Thank you.         Time Calculation:    PT Charges       Row Name 24 1038             Time Calculation    PT Received On 24  -                User Key  (r) = Recorded By, (t) = Taken By, (c) = Cosigned By      Initials Name Provider Type     Thierry Saldivar, PT Physical Therapist                  Therapy Charges for Today       Code Description Service Date Service Provider Modifiers Qty    06155210966 HC PT THER PROC EA 15 MIN 2024 Thierry Saldivar, PT GP 1    76282925647 HC PT THER PROC EA 15 MIN 2024 Thierry Saldivar, PT GP 1            PT G-Codes  AM-PAC 6 Clicks Score (PT): 9    PT Discharge Summary  Anticipated Discharge Disposition (PT): skilled nursing facility, home with  care    Thierry Saldivar PT  2024         Electronically signed by Thierry Saldivar PT at 24 1051          Occupational Therapy Notes (most recent note)        Loida Flaherty, OT at 24 1102          Acute Care - Occupational Therapy Treatment Note  LILLIANA Haines     Patient Name: Lety Johnson  : 1980  MRN: [id number]  Today's Date: 2024  Onset of Illness/Injury or Date of Surgery: 24     Referring Physician: Dr. Marc    Admit Date: 2024       ICD-10-CM ICD-9-CM   1. Hypokalemia  E87.6 276.8   2. Pressure injury of skin of sacral region, unspecified injury stage  L89.159 707.03     707.20   3. Pressure injury of skin of left heel, unspecified injury stage  L89.629 707.07     707.20   4. Acute cystitis without hematuria  N30.00 595.0     Patient Active Problem List   Diagnosis   (none) - all problems resolved or deleted     Past Medical History:   Diagnosis Date    Stroke      History reviewed. No pertinent surgical history.      OT ASSESSMENT FLOWSHEET (Last  12 Hours)       OT Evaluation and Treatment       Row Name 11/22/24 1055                   OT Time and Intention    Subjective Information complains of;weakness;pain  -LA        Document Type therapy note (daily note)  -LA        Mode of Treatment occupational therapy  -LA        Patient Effort adequate  -LA        Symptoms Noted During/After Treatment increased pain;fatigue  -LA           General Information    Patient Profile Reviewed yes  -LA        Patient/Family/Caregiver Comments/Observations Patient agreeable to therapy. Patient refuses OT to provide any therapy to left UE secondary to pain. Patient tolerates sitting up and fx reaching with right UE.  -LA        Existing Precautions/Restrictions fall  -LA           Cognition    Affect/Mental Status (Cognition) emotionally labile  -LA        Orientation Status (Cognition) oriented x 3  -LA        Follows Commands (Cognition) WFL  -LA           Bed Mobility    Supine-Sit [place] (Bed Mobility) maximum assist (25% patient effort);2 person assist  -LA        Sit-Supine [place] (Bed Mobility) maximum assist (25% patient effort);2 person assist  -LA           Motor Skills    Motor Skills coordination;functional endurance  -LA        Coordination fine motor deficit;gross motor deficit;left;upper extremity;severe impairment  -LA        Functional Endurance poor  -LA        Muscle Tone left;severe impairment;spasticity  -LA        Motor Control/Coordination Interventions gross motor coordination activities  -LA           Neuromuscular Re-education    Interventions (Neuromuscular Re-education) --  refused any movement of left UE this date  -LA           Balance    Static Sitting Balance standby assist  -LA        Dynamic Sitting Balance contact guard;minimal assist  -LA           Wound 09/26/24 1809 Left posterior ankle Other (comment)    Wound - Properties Group Placement Date: 09/26/24  -KJ Placement Time: 1809  -KJ Side: Left  -KJ Orientation: posterior   -KJ Location: ankle  -KJ Primary Wound Type: Other  -TW, unknow etiology     Retired Wound - Properties Group Placement Date: 09/26/24  -KJ Placement Time: 1809  -KJ Side: Left  -KJ Orientation: posterior  -KJ Location: ankle  -KJ Primary Wound Type: Other  -TW, unknow etiology     Retired Wound - Properties Group Placement Date: 09/26/24  -KJ Placement Time: 1809  -KJ Side: Left  -KJ Orientation: posterior  -KJ Location: ankle  -KJ Primary Wound Type: Other  -TW, unknow etiology     Retired Wound - Properties Group Date first assessed: 09/26/24  -KJ Time first assessed: 1809  -KJ Side: Left  -KJ Location: ankle  -KJ Primary Wound Type: Other  -TW, unknow etiology        Wound 10/11/24 1330 medial coccyx Fissure    Wound - Properties Group Placement Date: 10/11/24  -TW Placement Time: 1330  -TW Orientation: medial  -TW Location: coccyx  -TW Primary Wound Type: Fissure  -TW    Retired Wound - Properties Group Placement Date: 10/11/24  -TW Placement Time: 1330  -TW Orientation: medial  -TW Location: coccyx  -TW Primary Wound Type: Fissure  -TW    Retired Wound - Properties Group Placement Date: 10/11/24  -TW Placement Time: 1330  -TW Orientation: medial  -TW Location: coccyx  -TW Primary Wound Type: Fissure  -TW    Retired Wound - Properties Group Date first assessed: 10/11/24  -TW Time first assessed: 1330  -TW Location: coccyx  -TW Primary Wound Type: Fissure  -TW       Wound 10/16/24 0705 Left medial gluteal MASD (moisture associated skin damage)    Wound - Properties Group Placement Date: 10/16/24  -MS Placement Time: 0705 -MS Side: Left  -MS Orientation: medial  -MS Location: gluteal  -MS Primary Wound Type: MASD  -MS Type: MASD (moisture associated skin damage)  -MS Present on Original Admission: N  -MS Additional Comments: Noted at shift change skin assesment, Night shift RN stated it had been there her shift.  -MS    Retired Wound - Properties Group Placement Date: 10/16/24  -MS Placement Time: 0705 -MS  Present on Original Admission: N  -MS Side: Left  -MS Orientation: medial  -MS Location: gluteal  -MS Primary Wound Type: MASD  -MS Additional Comments: Noted at shift change skin assesment, Night shift RN stated it had been there her shift.  -MS Type: MASD (moisture associated skin damage)  -MS    Retired Wound - Properties Group Placement Date: 10/16/24  -MS Placement Time: 0705 -MS Present on Original Admission: N  -MS Side: Left  -MS Orientation: medial  -MS Location: gluteal  -MS Primary Wound Type: MASD  -MS Additional Comments: Noted at shift change skin assesment, Night shift RN stated it had been there her shift.  -MS Type: MASD (moisture associated skin damage)  -MS    Retired Wound - Properties Group Date first assessed: 10/16/24  -MS Time first assessed: 0705 -MS Present on Original Admission: N  -MS Side: Left  -MS Location: gluteal  -MS Primary Wound Type: MASD  -MS Additional Comments: Noted at shift change skin assesment, Night shift RN stated it had been there her shift.  -MS Type: MASD (moisture associated skin damage)  -MS       Wound 10/16/24 0705 Left posterior greater trochanter MASD (moisture associated skin damage)    Wound - Properties Group Placement Date: 10/16/24  -MS Placement Time: 0705 -MS Side: Left  -MS Orientation: posterior  -MS Location: greater trochanter  -MS Primary Wound Type: MASD  -MS Type: MASD (moisture associated skin damage)  -MS Present on Original Admission: N  -MS    Retired Wound - Properties Group Placement Date: 10/16/24  -MS Placement Time: 0705 -MS Present on Original Admission: N  -MS Side: Left  -MS Orientation: posterior  -MS Location: greater trochanter  -MS Primary Wound Type: MASD  -MS Type: MASD (moisture associated skin damage)  -MS    Retired Wound - Properties Group Placement Date: 10/16/24  -MS Placement Time: 0705 -MS Present on Original Admission: N  -MS Side: Left  -MS Orientation: posterior  -MS Location: greater trochanter  -MS Primary Wound  Type: MASD  -MS Type: MASD (moisture associated skin damage)  -MS    Retired Wound - Properties Group Date first assessed: 10/16/24  -MS Time first assessed: 705  -MS Present on Original Admission: N  -MS Side: Left  -MS Location: greater trochanter  -MS Primary Wound Type: MASD  -MS Type: MASD (moisture associated skin damage)  -MS       Plan of Care Review    Plan of Care Reviewed With patient  -LA        Progress no change  -LA           Positioning and Restraints    Pre-Treatment Position in bed  -LA        Post Treatment Position bed  -LA        In Bed supine;call light within reach;encouraged to call for assist;exit alarm on  -LA                  User Key  (r) = Recorded By, (t) = Taken By, (c) = Cosigned By      Initials Name Effective Dates    TW Karen Gipson RN 21 -     Nory Keenan RN 23 -     Loida Richard OT 22 -     Roe North RN 24 -                      Occupational Therapy Education        No education to display                      OT Recommendation and Plan     Plan of Care Review  Plan of Care Reviewed With: patient  Progress: no change  Plan of Care Reviewed With: patient        Time Calculation:     Therapy Charges for Today       Code Description Service Date Service Provider Modifiers Qty    91088270632 HC OT THERAPEUTIC ACT EA 15 MIN 2024 Loida Flaherty OT GO 2                 Loida Flaherty OT  2024    Electronically signed by Loida Flaherty OT at 24 1102          Discharge Summary        Marv Navas DO at 10/03/24 1436              [facility] MEDICINE DISCHARGE SUMMARY    Patient Identification:  Name:  Lety Johnson  Age:  43 y.o.  Sex:  female  :  1980  MRN:  [id number]  Visit Number:  [id number]    Date of Admission: 2024  Date of Discharge: 10/3/2024    PCP: Provider, No Known    DISCHARGE DIAGNOSIS   ESBL acute cystitis  Acute metabolic encephalopathy  History of  CVA  General Debility  History of genital herpes  Morbid obesity      CONSULTS  YUE Rivera ID      PROCEDURES PERFORMED   None      HOSPITAL COURSE  Ms. Johnson is a 43 y.o. female who presented to [facility] ED on 9/26/2024 with a chief complaint of controlled fall at home.  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  Patient reported being at home in her usual state of health when she had worsening weakness and slid out of her chair into the floor.  Patient called EMS who then brought her to the emergency department for further treatment and evaluation.  Upon further questioning, patient's family members did state the patient appeared to have worsening encephalopathy/confusion surrounding the time of her worsening weakness.  Initial evaluation in the emergency department did consist of basic laboratory work as well as physical exam and vital signs.  Initial vital signs found patient's blood pressure 146/101, respiratory rate 18, heart rate 109, oxygen saturation 98% on room air.  Initial lab work did include CBC and CMP.  Please see initial H&P for specific details.  Urine analysis did appear concerning for acute cystitis and as such patient was admitted for further treatment and evaluation.    In regards to acute urinary tract infection, patient was started on empiric antibiotic therapy with IV Rocephin.  Urine cultures eventually demonstrated ESBL E. coli and as such antibiotic therapy was transitioned to Invanz.  Patient was continued on Invanz throughout the remainder of her hospitalization.  Infectious disease was consulted who thoroughly evaluated the patient.  After thorough evaluation, recommendation was made to complete a full 7-day course of antibiotic therapy for ESBL acute cystitis.  Patient has 2 additional days of antibiotic therapy to complete her 7-day course.  As such,  patient will be transition to Macrobid 100 mg p.o. twice daily as recommended by infectious disease.    Patient does have history of CVA complicated by left-sided paralysis.  With this in mind, physical therapy was consulted to evaluate patient.  On date of discharge, patient was able to perform bed mobility with maximum assistance and was able to sit on the edge of the bed for a short period of time.  However, patient was poorly motivated to work with physical therapy and refused to work with transferring from the bed to a chair or wheelchair with a slide board.  It is well-known patient has difficulty at home and had previously relied on a significant other to help take care of her daily needs.  Unfortunately, 2 weeks prior to admission, the significant other was arrested and placed in custodial.  Patient was encouraged to work with physical therapy so she may be approved for skilled nursing facility or inpatient rehab placement.  However, secondary to patient's poor motivation to work with physical therapy, it is felt patient is not an appropriate candidate for inpatient rehab or SNF. As such, patient will be discharged from the hospital in stable condition today.    VITAL SIGNS:      10/01/24  0530 10/02/24  0510 10/03/24  0500   Weight: 107 kg (235 lb 11.2 oz) 105 kg (232 lb 6.4 oz) 107 kg (235 lb 12.8 oz)     Body mass index is 38.64 kg/m².    PHYSICAL EXAM:  Constitutional: Chronically ill-appearing  female in no apparent distress.     HENT:  Head:  Normocephalic and atraumatic.  Mouth:  Moist mucous membranes.    Eyes:  Conjunctivae and EOM are normal.  Pupils are equal, round, and reactive to light.  No scleral icterus.    Neck:  Neck supple. No thyromegaly.  No JVD present.    Cardiovascular:  Regular rate and rhythm with no murmurs, rubs, clicks or gallops appreciated.  Pulmonary/Chest:  Clear to auscultation bilaterally with no crackles, wheezes or rhonchi appreciated.  Abdominal:  Soft. Nontender.  Nondistended  Bowel sounds are normal in all four quadrants. No organomegally appreciated.   Musculoskeletal:  No edema, no tenderness, and no deformity.  No red or swollen joints anywhere.    Neurological:  Alert, Cranial nerves II-XII intact with no focal deficits.  No facial droop.  No slurred speech.   Skin:  Warm and dry to palpation with no rashes or lesions appreciated.  Peripheral vascular:  2+ radial and pedal pulses in bilateral upper and lower extremities.  Psychiatric:  Alert and oriented x3, demonstrates appropriate judgment and insight.    DISCHARGE DISPOSITION   Stable    DISCHARGE MEDICATIONS:     Discharge Medications        New Medications        Instructions Start Date   nitrofurantoin (macrocrystal-monohydrate) 100 MG capsule  Commonly known as: Macrobid   100 mg, Oral, 2 Times Daily             Continue These Medications        Instructions Start Date   acyclovir 400 MG tablet  Commonly known as: ZOVIRAX   400 mg, Oral, Nightly      aspirin 81 MG chewable tablet   81 mg, Oral, Daily      atorvastatin 80 MG tablet  Commonly known as: LIPITOR   80 mg, Oral, Daily      cyclobenzaprine 10 MG tablet  Commonly known as: FLEXERIL   10 mg, Oral, 3 Times Daily PRN      DULoxetine 60 MG capsule  Commonly known as: CYMBALTA   60 mg, Oral, Daily      lisinopril 20 MG tablet  Commonly known as: PRINIVIL,ZESTRIL   20 mg, Oral, Daily      metFORMIN 1000 MG tablet  Commonly known as: GLUCOPHAGE   1,000 mg, Oral, 2 Times Daily With Meals      pantoprazole 40 MG EC tablet  Commonly known as: PROTONIX   40 mg, Oral, Daily                     Additional Instructions for the Follow-ups that You Need to Schedule       Discharge Follow-up with PCP   As directed       Currently Documented PCP:    Provider, No Known    PCP Phone Number:    [phone]     Follow Up Details: please follow up with pcp in 1 week               Follow-up Information       Provider, No Known .    Why: please follow up with pcp in 1  week  Contact information:  [facility] [place] [address]  [phone]                             TEST  RESULTS PENDING AT DISCHARGE       Marv Navas DO  10/03/24  14:36 EDT    Please note that this discharge summary required more than 30 minutes to complete.    Please send a copy of this dictation to the following providers:  Provider, No Known    Electronically signed by Marv Navas DO at 10/03/24 1526       Marv Navas DO at 24 1400              [facility] HOSPITALIST MEDICINE DISCHARGE SUMMARY    Patient Identification:  Name:  Lety Johnson  Age:  44 y.o.  Sex:  female  :  1980  MRN:  [id number]  Visit Number:  [id number]    Date of Admission: 2024  Date of Discharge: 2024    PCP: Concha Rao, YUE    DISCHARGE DIAGNOSIS   ESBL E. coli acute cystitis  History of CVA  Type 2 diabetes mellitus  Genital herpes  Orbit obesity      CONSULTS  Dr. Ramirez, Psychiatry  Dr. Abdalla, ID      PROCEDURES PERFORMED   None      HOSPITAL COURSE  Ms. Johnson is a 44 y.o. female who presented to [facility] ED on 2024 with a chief complaint of fall at home.  Patient has a past medical history markable for CVA and type 2 diabetes mellitus.  Patient reported fever with chills approximately 2 to 3 days prior to evaluation in the emergency department.  She was given antibiotic therapy for reported bilateral lower extremity cellulitis as well as dysuria with suprapubic pain several days prior to admission.  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  Unfortunately, patient's fiancé was taken to intermediate 1 week prior to presentation in the emergency department and patient had no one at home to help take care of her.  Patient was then reportedly having visual and auditory hallucinations 24 hours prior to evaluation in the emergency department.  For these  reasons, patient was brought to the emergency department for further treatment and evaluation.  Initial evaluation in the emergency department did consist of basic laboratory work as well as physical exam and vital signs.  Please see initial H&P for specific details.  After thorough evaluation, patient was diagnosed with acute metabolic encephalopathy secondary to acute cystitis and was admitted for further treatment and evaluation.    Patient was started on empiric antibiotic therapy with IV Rocephin.  Urine culture was obtained which eventually demonstrated ESBL E. coli.  Patient was transition to Invanz and completed a full course of antibiotic therapy during hospitalization.  Patient's mentation did improve.  However, patient had residual left-sided hemiparesis with no one to help take care of her at home.  As such, case management was consulted who assisted in procuring placement for this patient.  Unfortunately, secondary to issues with insurance, it took approximately 2 months to find placement for this patient.  Patient has now been accepted for admission to [facility] for long term placement, and will be discharged in stable condition today.    VITAL SIGNS:      10/07/24  0511 10/08/24  0500 10/09/24  0500   Weight: 102 kg (225 lb 14.4 oz) (RN Hoda cameron) 102 kg (225 lb 15.5 oz) 102 kg (224 lb 1.6 oz)     Body mass index is 36.73 kg/m².    PHYSICAL EXAM:  General: Patient appears awake, alert, and in no acute distress.  Head: Normocephalic, atraumatic  Eyes: EOMI. Conjunctivae and sclerae normal.  Ears: Ears appear intact with no abnormalities noted.   Neck: Trachea midline. No obvious JVD.  Lungs: Respirations appear to be regular, even and unlabored with no signs of respiratory distress. No audible wheezing.  Abdomen: No obvious abdominal distension.  MS: Muscle tone appears normal. No gross deformities.  Extremities: No clubbing, cyanosis or edema noted.  Skin: No visible bleeding, bruising, or  rash.  Neurologic: Alert and oriented x3. No gross focal deficits.     DISCHARGE DISPOSITION   Stable    DISCHARGE MEDICATIONS:     Discharge Medications        New Medications        Instructions Start Date   pregabalin 25 MG capsule  Commonly known as: LYRICA   25 mg, Oral, Every 8 Hours Scheduled             Continue These Medications        Instructions Start Date   acyclovir 400 MG tablet  Commonly known as: ZOVIRAX   400 mg, Oral, Nightly      aspirin 81 MG chewable tablet   81 mg, Oral, Daily      atorvastatin 80 MG tablet  Commonly known as: LIPITOR   80 mg, Oral, Daily      cyclobenzaprine 10 MG tablet  Commonly known as: FLEXERIL   10 mg, Oral, 3 Times Daily PRN      DULoxetine 60 MG capsule  Commonly known as: CYMBALTA   60 mg, Oral, Daily      lisinopril 20 MG tablet  Commonly known as: PRINIVIL,ZESTRIL   20 mg, Oral, Daily      metFORMIN 1000 MG tablet  Commonly known as: GLUCOPHAGE   1,000 mg, Oral, 2 Times Daily With Meals      pantoprazole 40 MG EC tablet  Commonly known as: PROTONIX   40 mg, Oral, Daily             ASK your doctor about these medications        Instructions Start Date   nitrofurantoin (macrocrystal-monohydrate) 100 MG capsule  Commonly known as: Macrobid  Ask about: Should I take this medication?   100 mg, Oral, 2 Times Daily               Diet Instructions    Regular diet as tolerated.              Additional Instructions for the Follow-ups that You Need to Schedule       Discharge Follow-up with PCP   As directed       Currently Documented PCP:    Maddi, No Known    PCP Phone Number:    [phone]     Follow Up Details: please follow up with pcp in 1 week        Discharge Follow-up with PCP   As directed       Currently Documented PCP:    Concha Rao APRN    PCP Phone Number:    [phone]     Follow Up Details: please follow up with pcp in 1 week               Follow-up Information       Provider, No Known .    Why: please follow up with pcp in 1 week  Contact  information:  [facility] [place] [address]  [phone]               Concha Rao APRN .    Specialties: Nurse Practitioner, Internal Medicine  Why: please follow up with pcp in 1 week  Contact information:  [address]  [facility] [address]  [phone]                             TEST  RESULTS PENDING AT DISCHARGE       Marv Richey DO  11/29/24  14:00 EST    Please note that this discharge summary required more than 30 minutes to complete.    Please send a copy of this dictation to the following providers:  Concha Rao APRN    Electronically signed by Marv Richey DO at 11/29/24 1405       Discharge Order (From admission, onward)       Start     Ordered    11/29/24 1357  Discharge patient  Once        Expected Discharge Date: 11/29/24   Expected Discharge Time: Afternoon   Discharge Disposition: Skilled Nursing Facility (DC - External)   Physician of Record for Attribution - Please select from Treatment Team: MRAV RICHEY [[id number]]   Review needed by CMO to determine Physician of Record: No      Question Answer Comment   Physician of Record for Attribution - Please select from Treatment Team MARV RICHEY    Review needed by CMO to determine Physician of Record No        11/29/24 1359    10/03/24 1351  Discharge patient  Once,   Status:  Canceled        Expected Discharge Date: 10/03/24   Expected Discharge Time: Afternoon   Discharge Disposition: Home or Self Care   Physician of Record for Attribution - Please select from Treatment Team: MARV RICHEY [[id number]]   Review needed by CMO to determine Physician of Record: No      Question Answer Comment   Physician of Record for Attribution - Please select from Treatment Team MARV RICHEY    Review needed by CMO to determine Physician of Record No        10/03/24 1402

## 2024-11-29 NOTE — DISCHARGE SUMMARY
[facility] HOSPITALIST MEDICINE DISCHARGE SUMMARY    Patient Identification:  Name:  Lety Johnson  Age:  44 y.o.  Sex:  female  :  1980  MRN:  [id number]  Visit Number:  [id number]    Date of Admission: 2024  Date of Discharge: 2024    PCP: Concha Rao, YUE    DISCHARGE DIAGNOSIS   ESBL E. coli acute cystitis  History of CVA  Type 2 diabetes mellitus  Genital herpes  Orbit obesity      CONSULTS  Dr. Ramirez, Psychiatry  Dr. Abdalla, ID      PROCEDURES PERFORMED   None      HOSPITAL COURSE  Ms. Johnson is a 44 y.o. female who presented to [facility] ED on 2024 with a chief complaint of fall at home.  Patient has a past medical history markable for CVA and type 2 diabetes mellitus.  Patient reported fever with chills approximately 2 to 3 days prior to evaluation in the emergency department.  She was given antibiotic therapy for reported bilateral lower extremity cellulitis as well as dysuria with suprapubic pain several days prior to admission.  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  Unfortunately, patient's fiancé was taken to [facility] 1 week prior to presentation in the emergency department and patient had no one at home to help take care of her.  Patient was then reportedly having visual and auditory hallucinations 24 hours prior to evaluation in the emergency department.  For these reasons, patient was brought to the emergency department for further treatment and evaluation.  Initial evaluation in the emergency department did consist of basic laboratory work as well as physical exam and vital signs.  Please see initial H&P for specific details.  After thorough evaluation, patient was diagnosed with acute metabolic encephalopathy secondary to acute cystitis and was admitted for further treatment and evaluation.    Patient was started on empiric antibiotic therapy with IV Rocephin.   Urine culture was obtained which eventually demonstrated ESBL E. coli.  Patient was transition to Invanz and completed a full course of antibiotic therapy during hospitalization.  Patient's mentation did improve.  However, patient had residual left-sided hemiparesis with no one to help take care of her at home.  As such, case management was consulted who assisted in procuring placement for this patient.  Unfortunately, secondary to issues with insurance, it took approximately 2 months to find placement for this patient.  Patient has now been accepted for admission to [facility] for long term placement, and will be discharged in stable condition today.    VITAL SIGNS:      10/07/24  0511 10/08/24  0500 10/09/24  0500   Weight: 102 kg (225 lb 14.4 oz) (FIFI cameron) 102 kg (225 lb 15.5 oz) 102 kg (224 lb 1.6 oz)     Body mass index is 36.73 kg/m².    PHYSICAL EXAM:  General: Patient appears awake, alert, and in no acute distress.  Head: Normocephalic, atraumatic  Eyes: EOMI. Conjunctivae and sclerae normal.  Ears: Ears appear intact with no abnormalities noted.   Neck: Trachea midline. No obvious JVD.  Lungs: Respirations appear to be regular, even and unlabored with no signs of respiratory distress. No audible wheezing.  Abdomen: No obvious abdominal distension.  MS: Muscle tone appears normal. No gross deformities.  Extremities: No clubbing, cyanosis or edema noted.  Skin: No visible bleeding, bruising, or rash.  Neurologic: Alert and oriented x3. No gross focal deficits.     DISCHARGE DISPOSITION   Stable    DISCHARGE MEDICATIONS:     Discharge Medications        New Medications        Instructions Start Date   pregabalin 25 MG capsule  Commonly known as: LYRICA   25 mg, Oral, Every 8 Hours Scheduled             Continue These Medications        Instructions Start Date   acyclovir 400 MG tablet  Commonly known as: ZOVIRAX   400 mg, Oral, Nightly      aspirin 81 MG chewable tablet   81 mg, Oral, Daily       atorvastatin 80 MG tablet  Commonly known as: LIPITOR   80 mg, Oral, Daily      cyclobenzaprine 10 MG tablet  Commonly known as: FLEXERIL   10 mg, Oral, 3 Times Daily PRN      DULoxetine 60 MG capsule  Commonly known as: CYMBALTA   60 mg, Oral, Daily      lisinopril 20 MG tablet  Commonly known as: PRINIVIL,ZESTRIL   20 mg, Oral, Daily      metFORMIN 1000 MG tablet  Commonly known as: GLUCOPHAGE   1,000 mg, Oral, 2 Times Daily With Meals      pantoprazole 40 MG EC tablet  Commonly known as: PROTONIX   40 mg, Oral, Daily             ASK your doctor about these medications        Instructions Start Date   nitrofurantoin (macrocrystal-monohydrate) 100 MG capsule  Commonly known as: Macrobid  Ask about: Should I take this medication?   100 mg, Oral, 2 Times Daily               Diet Instructions    Regular diet as tolerated.              Additional Instructions for the Follow-ups that You Need to Schedule       Discharge Follow-up with PCP   As directed       Currently Documented PCP:    Provider, No Known    PCP Phone Number:    [phone]     Follow Up Details: please follow up with pcp in 1 week        Discharge Follow-up with PCP   As directed       Currently Documented PCP:    Concha Rao APRN    PCP Phone Number:    [phone]     Follow Up Details: please follow up with pcp in 1 week               Follow-up Information       Provider, No Known .    Why: please follow up with pcp in 1 week  Contact information:  [facility] [address]  [phone]               Concha Rao APRN .    Specialties: Nurse Practitioner, Internal Medicine  Why: please follow up with pcp in 1 week  Contact information:  [address]  [facility] [address] [phone]                             TEST  RESULTS PENDING AT DISCHARGE       Marv Navas DO  11/29/24  14:00 EST    Please note that this discharge summary required more than 30 minutes to complete.    Please send a copy of this  dictation to the following providers:  Concha Rao, APRN

## 2024-11-29 NOTE — NURSING NOTE
FIFI Sandoval at [facility] notified that EMS is taking pt there at this time. Pt belongings including tablet, SS card, and all other belongings being sent with pt and EMS.

## 2024-11-29 NOTE — PROGRESS NOTES
Adult Nutrition  Assessment/PES    Patient Name:  Lety Johnson  YOB: 1980  MRN: [id number]  Admit Date:  9/26/2024    Assessment Date:  11/29/2024    Comments:  follow-up    Po intake 85% ave of meals.    Encourage po and voice food prefs    Will cont to follow and monitor     Reason for Assessment       Row Name 11/29/24 1319          Reason for Assessment    Reason For Assessment follow-up protocol  remote REZA White     Diagnosis infection/sepsis;neurologic conditions  cystitis, met enceph, debility hx CVA                    Nutrition/Diet History       Row Name 11/29/24 1319          Nutrition/Diet History    Typical Intake (Food/Fluid/EN/PN) called to room, no answer                    Labs/Tests/Procedures/Meds       Row Name 11/29/24 1319          Labs/Procedures/Meds    Lab Results Reviewed reviewed     Lab Results Comments glu 203, 320        Diagnostic Tests/Procedures    Diagnostic Test/Procedure Reviewed reviewed        Medications    Pertinent Medications Reviewed reviewed     Pertinent Medications Comments humalog, lantus                        Nutrition Prescription Ordered       Row Name 11/29/24 1319          Nutrition Prescription PO    Common Modifiers Consistent Carbohydrate                    Evaluation of Received Nutrient/Fluid Intake       Row Name 11/29/24 1319          Fluid Intake Evaluation    Oral Fluid (mL) 1167  ave x 3        PO Evaluation    Number of Meals 10     % PO Intake 85                       Problem/Interventions:   Problem 1       Row Name 11/29/24 1320          Nutrition Diagnoses Problem 1    Problem 1 Other (comment)  No nutrition dx at this time                          Intervention Goal       Row Name 11/29/24 1320          Intervention Goal    General Meet nutritional needs for age/condition     PO Maintain intake;PO intake (%)     PO Intake % 80 %  or greater                    Nutrition Intervention       Row Name 11/29/24 1320          Nutrition  Intervention    RD/Tech Action Follow Tx progress                      Education/Evaluation       Row Name 11/29/24 5953          Education    Education No discharge needs identified at this time        Monitor/Evaluation    Monitor Per protocol;I&O;PO intake;Pertinent labs;Weight;Skin status                     Electronically signed by:  Vee Van RD  11/29/24 13:20 EST

## 2024-12-02 NOTE — PAYOR COMM NOTE
"CONTACT:  RONAL RACHEL RN  UTILIZATION MANAGEMENT DEPT.   [facility]   [address], [address]   PHONE:  [phone]   FAX: [phone]         DC SNF 11/29/2024    REF # [id number]        Lety Johnson (44 y.o. Female)       Date of Birth   1980    Social Security Number       Address   [address] [address]    Home Phone   [phone]    MRN   [id number]       Mu-ism   [facility]    Marital Status   Single                            Admission Date   9/26/24    Admission Type   Emergency    Admitting Provider   Ramon Marc MD    Attending Provider       Department, Room/Bed   [address], [address]/       Discharge Date   11/29/2024    Discharge Disposition   Skilled Nursing Facility (DC - External)    Discharge Destination                                 Attending Provider: (none)   Allergies: No Known Allergies    Isolation: None   Infection: None   Code Status: Prior    Ht: 166.4 cm (65.5\")   Wt: 102 kg (224 lb 1.6 oz)    Admission Cmt: None   Principal Problem: UTI (urinary tract infection) [N39.0]                   Active Insurance as of 9/26/2024       Primary Coverage       Payor Plan Insurance Group Employer/Plan Group    [place] [id number]       Payor Plan Address Payor Plan Phone Number Payor Plan Fax Number Effective Dates    [employer] [address] [address] [phone]  5/1/2024 - None Entered    [facility] [address]         Subscriber Name Subscriber Birth Date Member ID       LETY JOHNSON 1980 [id number]                     Emergency Contacts        (Rel.) Home Phone Work Phone Mobile Phone    Arnoldo Arguelles (Son) -- -- [phone]    Moises Rachel (Legal Guardian) -- -- [phone]                 Discharge Summary        Marv Navas DO at 10/03/24 [facility]              [facility] HOSPITALIST MEDICINE DISCHARGE SUMMARY    Patient Identification:  Name:  " Lety Johnson  Age:  43 y.o.  Sex:  female  :  1980  MRN:  [id number]  Visit Number:  [id number]    Date of Admission: 2024  Date of Discharge: 10/3/2024    PCP: Provider, No Known    DISCHARGE DIAGNOSIS   ESBL acute cystitis  Acute metabolic encephalopathy  History of CVA  General Debility  History of genital herpes  Morbid obesity      CONSULTS  Cuca Yuen, YUE, ID      PROCEDURES PERFORMED   None      HOSPITAL COURSE  Ms. Johnson is a 43 y.o. female who presented to [facility] ED on 2024 with a chief complaint of controlled fall at home.  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  Patient reported being at home in her usual state of health when she had worsening weakness and slid out of her chair into the floor.  Patient called EMS who then brought her to the emergency department for further treatment and evaluation.  Upon further questioning, patient's family members did state the patient appeared to have worsening encephalopathy/confusion surrounding the time of her worsening weakness.  Initial evaluation in the emergency department did consist of basic laboratory work as well as physical exam and vital signs.  Initial vital signs found patient's blood pressure 146/101, respiratory rate 18, heart rate 109, oxygen saturation 98% on room air.  Initial lab work did include CBC and CMP.  Please see initial H&P for specific details.  Urine analysis did appear concerning for acute cystitis and as such patient was admitted for further treatment and evaluation.    In regards to acute urinary tract infection, patient was started on empiric antibiotic therapy with IV Rocephin.  Urine cultures eventually demonstrated ESBL E. coli and as such antibiotic therapy was transitioned to Invanz.  Patient was continued on Invanz throughout the remainder of her hospitalization.   Infectious disease was consulted who thoroughly evaluated the patient.  After thorough evaluation, recommendation was made to complete a full 7-day course of antibiotic therapy for ESBL acute cystitis.  Patient has 2 additional days of antibiotic therapy to complete her 7-day course.  As such, patient will be transition to Macrobid 100 mg p.o. twice daily as recommended by infectious disease.    Patient does have history of CVA complicated by left-sided paralysis.  With this in mind, physical therapy was consulted to evaluate patient.  On date of discharge, patient was able to perform bed mobility with maximum assistance and was able to sit on the edge of the bed for a short period of time.  However, patient was poorly motivated to work with physical therapy and refused to work with transferring from the bed to a chair or wheelchair with a slide board.  It is well-known patient has difficulty at home and had previously relied on a significant other to help take care of her daily needs.  Unfortunately, 2 weeks prior to admission, the significant other was arrested and placed in FDC.  Patient was encouraged to work with physical therapy so she may be approved for skilled nursing facility or inpatient rehab placement.  However, secondary to patient's poor motivation to work with physical therapy, it is felt patient is not an appropriate candidate for inpatient rehab or SNF. As such, patient will be discharged from the hospital in stable condition today.    VITAL SIGNS:      10/01/24  0530 10/02/24  0510 10/03/24  0500   Weight: 107 kg (235 lb 11.2 oz) 105 kg (232 lb 6.4 oz) 107 kg (235 lb 12.8 oz)     Body mass index is 38.64 kg/m².    PHYSICAL EXAM:  Constitutional: Chronically ill-appearing  female in no apparent distress.     HENT:  Head:  Normocephalic and atraumatic.  Mouth:  Moist mucous membranes.    Eyes:  Conjunctivae and EOM are normal.  Pupils are equal, round, and reactive to light.  No scleral  icterus.    Neck:  Neck supple. No thyromegaly.  No JVD present.    Cardiovascular:  Regular rate and rhythm with no murmurs, rubs, clicks or gallops appreciated.  Pulmonary/Chest:  Clear to auscultation bilaterally with no crackles, wheezes or rhonchi appreciated.  Abdominal:  Soft. Nontender. Nondistended  Bowel sounds are normal in all four quadrants. No organomegally appreciated.   Musculoskeletal:  No edema, no tenderness, and no deformity.  No red or swollen joints anywhere.    Neurological:  Alert, Cranial nerves II-XII intact with no focal deficits.  No facial droop.  No slurred speech.   Skin:  Warm and dry to palpation with no rashes or lesions appreciated.  Peripheral vascular:  2+ radial and pedal pulses in bilateral upper and lower extremities.  Psychiatric:  Alert and oriented x3, demonstrates appropriate judgment and insight.    DISCHARGE DISPOSITION   Stable    DISCHARGE MEDICATIONS:     Discharge Medications        New Medications        Instructions Start Date   nitrofurantoin (macrocrystal-monohydrate) 100 MG capsule  Commonly known as: Macrobid   100 mg, Oral, 2 Times Daily             Continue These Medications        Instructions Start Date   acyclovir 400 MG tablet  Commonly known as: ZOVIRAX   400 mg, Oral, Nightly      aspirin 81 MG chewable tablet   81 mg, Oral, Daily      atorvastatin 80 MG tablet  Commonly known as: LIPITOR   80 mg, Oral, Daily      cyclobenzaprine 10 MG tablet  Commonly known as: FLEXERIL   10 mg, Oral, 3 Times Daily PRN      DULoxetine 60 MG capsule  Commonly known as: CYMBALTA   60 mg, Oral, Daily      lisinopril 20 MG tablet  Commonly known as: PRINIVIL,ZESTRIL   20 mg, Oral, Daily      metFORMIN 1000 MG tablet  Commonly known as: GLUCOPHAGE   1,000 mg, Oral, 2 Times Daily With Meals      pantoprazole 40 MG EC tablet  Commonly known as: PROTONIX   40 mg, Oral, Daily                     Additional Instructions for the Follow-ups that You Need to Schedule        Discharge Follow-up with PCP   As directed       Currently Documented PCP:    Provider, Jeannette Known    PCP Phone Number:    [phone]     Follow Up Details: please follow up with pcp in 1 week               Follow-up Information       Provider, Jeannette Known .    Why: please follow up with pcp in 1 week  Contact information:  [facility] [place] [address]  [phone]                             TEST  RESULTS PENDING AT DISCHARGE       Marv Navas DO  10/03/24  14:36 EDT    Please note that this discharge summary required more than 30 minutes to complete.    Please send a copy of this dictation to the following providers:  Provider, Jeannette Known    Electronically signed by Marv Navas DO at 10/03/24 1526       Marv Navas DO at 24 1400              [facility] HOSPITALIST MEDICINE DISCHARGE SUMMARY    Patient Identification:  Name:  Lety Johnson  Age:  44 y.o.  Sex:  female  :  1980  MRN:  [id number]  Visit Number:  [id number]    Date of Admission: 2024  Date of Discharge: 2024    PCP: Concha Rao APRN    DISCHARGE DIAGNOSIS   ESBL E. coli acute cystitis  History of CVA  Type 2 diabetes mellitus  Genital herpes  Orbit obesity      CONSULTS  Dr. Ramirez, Psychiatry  Dr. Abdalla, ID      PROCEDURES PERFORMED   None      HOSPITAL COURSE  Ms. Johnson is a 44 y.o. female who presented to [facility] ED on 2024 with a chief complaint of fall at home.  Patient has a past medical history markable for CVA and type 2 diabetes mellitus.  Patient reported fever with chills approximately 2 to 3 days prior to evaluation in the emergency department.  She was given antibiotic therapy for reported bilateral lower extremity cellulitis as well as dysuria with suprapubic pain several days prior to admission.  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  Unfortunately,  patient's fiancé was taken to care home 1 week prior to presentation in the emergency department and patient had no one at home to help take care of her.  Patient was then reportedly having visual and auditory hallucinations 24 hours prior to evaluation in the emergency department.  For these reasons, patient was brought to the emergency department for further treatment and evaluation.  Initial evaluation in the emergency department did consist of basic laboratory work as well as physical exam and vital signs.  Please see initial H&P for specific details.  After thorough evaluation, patient was diagnosed with acute metabolic encephalopathy secondary to acute cystitis and was admitted for further treatment and evaluation.    Patient was started on empiric antibiotic therapy with IV Rocephin.  Urine culture was obtained which eventually demonstrated ESBL E. coli.  Patient was transition to Invanz and completed a full course of antibiotic therapy during hospitalization.  Patient's mentation did improve.  However, patient had residual left-sided hemiparesis with no one to help take care of her at home.  As such, case management was consulted who assisted in procuring placement for this patient.  Unfortunately, secondary to issues with insurance, it took approximately 2 months to find placement for this patient.  Patient has now been accepted for admission to [facility] for long term placement, and will be discharged in stable condition today.    VITAL SIGNS:      10/07/24  0511 10/08/24  0500 10/09/24  0500   Weight: 102 kg (225 lb 14.4 oz) (FIFI cameron) 102 kg (225 lb 15.5 oz) 102 kg (224 lb 1.6 oz)     Body mass index is 36.73 kg/m².    PHYSICAL EXAM:  General: Patient appears awake, alert, and in no acute distress.  Head: Normocephalic, atraumatic  Eyes: EOMI. Conjunctivae and sclerae normal.  Ears: Ears appear intact with no abnormalities noted.   Neck: Trachea midline. No obvious JVD.  Lungs: Respirations appear  to be regular, even and unlabored with no signs of respiratory distress. No audible wheezing.  Abdomen: No obvious abdominal distension.  MS: Muscle tone appears normal. No gross deformities.  Extremities: No clubbing, cyanosis or edema noted.  Skin: No visible bleeding, bruising, or rash.  Neurologic: Alert and oriented x3. No gross focal deficits.     DISCHARGE DISPOSITION   Stable    DISCHARGE MEDICATIONS:     Discharge Medications        New Medications        Instructions Start Date   pregabalin 25 MG capsule  Commonly known as: LYRICA   25 mg, Oral, Every 8 Hours Scheduled             Continue These Medications        Instructions Start Date   acyclovir 400 MG tablet  Commonly known as: ZOVIRAX   400 mg, Oral, Nightly      aspirin 81 MG chewable tablet   81 mg, Oral, Daily      atorvastatin 80 MG tablet  Commonly known as: LIPITOR   80 mg, Oral, Daily      cyclobenzaprine 10 MG tablet  Commonly known as: FLEXERIL   10 mg, Oral, 3 Times Daily PRN      DULoxetine 60 MG capsule  Commonly known as: CYMBALTA   60 mg, Oral, Daily      lisinopril 20 MG tablet  Commonly known as: PRINIVIL,ZESTRIL   20 mg, Oral, Daily      metFORMIN 1000 MG tablet  Commonly known as: GLUCOPHAGE   1,000 mg, Oral, 2 Times Daily With Meals      pantoprazole 40 MG EC tablet  Commonly known as: PROTONIX   40 mg, Oral, Daily             ASK your doctor about these medications        Instructions Start Date   nitrofurantoin (macrocrystal-monohydrate) 100 MG capsule  Commonly known as: Macrobid  Ask about: Should I take this medication?   100 mg, Oral, 2 Times Daily               Diet Instructions    Regular diet as tolerated.              Additional Instructions for the Follow-ups that You Need to Schedule       Discharge Follow-up with PCP   As directed       Currently Documented PCP:    Provider, No Known    PCP Phone Number:    [phone]     Follow Up Details: please follow up with pcp in 1 week        Discharge Follow-up with PCP   As  directed       Currently Documented PCP:    Concha Rao APRN    PCP Phone Number:    [phone]     Follow Up Details: please follow up with pcp in 1 week               Follow-up Information       Provider, No Known .    Why: please follow up with pcp in 1 week  Contact information:  [facility] [address]  [phone]               Concha Rao APRN .    Specialties: Nurse Practitioner, Internal Medicine  Why: please follow up with pcp in 1 week  Contact information:  [address]  [facility] [address]  [phone]                             TEST  RESULTS PENDING AT DISCHARGE       Marv Richey DO  11/29/24  14:00 EST    Please note that this discharge summary required more than 30 minutes to complete.    Please send a copy of this dictation to the following providers:  Concha Rao APRN    Electronically signed by Marv Richey DO at 11/29/24 1405       Discharge Order (From admission, onward)       Start     Ordered    11/29/24 1357  Discharge patient  Once        Expected Discharge Date: 11/29/24   Expected Discharge Time: Afternoon   Discharge Disposition: Skilled Nursing Facility (DC - External)   Physician of Record for Attribution - Please select from Treatment Team: MARV RICHEY [[id number]]   Review needed by CMO to determine Physician of Record: No      Question Answer Comment   Physician of Record for Attribution - Please select from Treatment Team MARV RICHEY    Review needed by CMO to determine Physician of Record No        11/29/24 1359    10/03/24 1351  Discharge patient  Once,   Status:  Canceled        Expected Discharge Date: 10/03/24   Expected Discharge Time: Afternoon   Discharge Disposition: Home or Self Care   Physician of Record for Attribution - Please select from Treatment Team: MARV RICHEY [[id number]]   Review needed by CMO to determine Physician of Record: No      Question Answer Comment    Physician of Record for Attribution - Please select from Treatment Team FELIZ RICHEY    Review needed by CMO to determine Physician of Record No        10/03/24 3913

## 2024-12-03 NOTE — CASE MANAGEMENT/SOCIAL WORK
Discharge Planning Assessment  [facility]     Patient Name: Lety Johnson  MRN: [id number]  Today's Date: 12/2/2024    Admit Date: 9/26/2024         Discharge Plan       Row Name 12/02/24 1912       Plan    Final Note SS notified [facility]PS Kinsey LANDIS that pt was discharged to [facility] on Friday, 11/29/24. [facility] [employer] approved to pay the amount of $2,700 to [facility]. [facility] per Pierson notified.                  Continued Care and Services - Discharged on 11/29/2024 Admission date: 9/26/2024 - Discharge disposition: Skilled Nursing Facility (DC - External)      Destination Coordination complete.      Service Provider Request Status Services Address Phone Fax Patient Preferred    [facility] [address] [place] [address] [phone] --    [facility] Declined  Cannot meet patient's needs -- [address] [facility] [address] [phone] --    [facility] Acute Care Declined  Not eligible -- [address] [facility] [address] [phone] --    [facility] Declined  Not eligible -- [address] [facility] [address] [phone] --    [facility] SPECIALTY [facility] Declined  Clinical Denial -- [address] [facility], 4TH FLOOR, [facility] [address] [phone] --    [facility] SWING BED UNIT Declined  Cannot meet patient's needs -- [address] [facility] [address] [facility] [address] [phone] --     [facility] Declined  Not eligible -- [address] [place] [address] [phone] --    [facility] Declined  Bed not available -- [address] [facility] [address] [phone] --    [facility]  Bed not available -- [address] [facility] [address] [phone] --    [facility] Declined  Out of network -- [address] [facility] [place] [address] [phone] --    [facility] SWING BED UNIT Declined -- [address] [facility] [place] [address] [phone] --    [facility] Declined  Clinical Denial -- [address] [facility] [id number] [phone] --    [facility] SWING BED UNIT Declined  Insurance Denial, Out of network -- [address], [facility] [address] [phone] --    [facility] Declined  Out of network -- [address] [facility] [address] [phone] --    [facility] Declined  Out of network -- [address] [address] [phone] -- --    [facility] Declined  Out of network -- [address] [facility] [address] [phone] --                  Expected Discharge Date and Time       Expected Discharge Date Expected Discharge Time    Nov 29, 2024           HELEN Butterfield